# Patient Record
Sex: FEMALE | Race: WHITE | NOT HISPANIC OR LATINO | Employment: OTHER | URBAN - METROPOLITAN AREA
[De-identification: names, ages, dates, MRNs, and addresses within clinical notes are randomized per-mention and may not be internally consistent; named-entity substitution may affect disease eponyms.]

---

## 2017-01-05 ENCOUNTER — ALLSCRIPTS OFFICE VISIT (OUTPATIENT)
Dept: OTHER | Facility: OTHER | Age: 82
End: 2017-01-05

## 2017-01-20 ENCOUNTER — ALLSCRIPTS OFFICE VISIT (OUTPATIENT)
Dept: OTHER | Facility: OTHER | Age: 82
End: 2017-01-20

## 2017-01-21 ENCOUNTER — APPOINTMENT (EMERGENCY)
Dept: RADIOLOGY | Facility: HOSPITAL | Age: 82
End: 2017-01-21
Payer: COMMERCIAL

## 2017-01-21 ENCOUNTER — HOSPITAL ENCOUNTER (EMERGENCY)
Facility: HOSPITAL | Age: 82
Discharge: HOME | End: 2017-01-21
Attending: EMERGENCY MEDICINE | Admitting: EMERGENCY MEDICINE
Payer: COMMERCIAL

## 2017-01-21 VITALS
WEIGHT: 213.19 LBS | OXYGEN SATURATION: 98 % | HEART RATE: 115 BPM | RESPIRATION RATE: 16 BRPM | DIASTOLIC BLOOD PRESSURE: 62 MMHG | TEMPERATURE: 97.5 F | BODY MASS INDEX: 38.99 KG/M2 | SYSTOLIC BLOOD PRESSURE: 119 MMHG

## 2017-01-21 DIAGNOSIS — R55 NEAR SYNCOPE: Primary | ICD-10-CM
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
## 2017-01-21 DIAGNOSIS — S73.004A HIP DISLOCATION, RIGHT, INITIAL ENCOUNTER (HCC): ICD-10-CM

## 2017-01-21 LAB
ATRIAL RATE: 149 BPM
QRS AXIS: -8 DEGREES
QRSD INTERVAL: 88 MS
QT INTERVAL: 294 MS
QTC INTERVAL: 398 MS
T WAVE AXIS: 258 DEGREES
VENTRICULAR RATE: 110 BPM

## 2017-01-21 PROCEDURE — 99284 EMERGENCY DEPT VISIT MOD MDM: CPT

## 2017-01-21 PROCEDURE — 73502 X-RAY EXAM HIP UNI 2-3 VIEWS: CPT

## 2017-01-21 PROCEDURE — 73501 X-RAY EXAM HIP UNI 1 VIEW: CPT

## 2017-01-21 PROCEDURE — 93005 ELECTROCARDIOGRAM TRACING: CPT | Performed by: EMERGENCY MEDICINE

## 2017-01-21 PROCEDURE — 96374 THER/PROPH/DIAG INJ IV PUSH: CPT

## 2017-01-21 PROCEDURE — 96375 TX/PRO/DX INJ NEW DRUG ADDON: CPT

## 2017-01-21 RX ORDER — FENTANYL CITRATE 50 UG/ML
100 INJECTION, SOLUTION INTRAMUSCULAR; INTRAVENOUS ONCE
Status: COMPLETED | OUTPATIENT
Start: 2017-01-21 | End: 2017-01-21

## 2017-01-21 RX ORDER — ETOMIDATE 2 MG/ML
8 INJECTION INTRAVENOUS ONCE
Status: COMPLETED | OUTPATIENT
Start: 2017-01-21 | End: 2017-01-21

## 2017-01-21 RX ADMIN — ETOMIDATE 8 MG: 2 INJECTION, SOLUTION INTRAVENOUS at 14:10

## 2017-01-21 RX ADMIN — FENTANYL CITRATE 100 MCG: 50 INJECTION, SOLUTION INTRAMUSCULAR; INTRAVENOUS at 13:21

## 2017-01-26 ENCOUNTER — APPOINTMENT (OUTPATIENT)
Dept: LAB | Facility: CLINIC | Age: 82
End: 2017-01-26
Payer: COMMERCIAL

## 2017-01-26 DIAGNOSIS — I48.91 ATRIAL FIBRILLATION (HCC): ICD-10-CM

## 2017-01-26 LAB
INR PPP: 2.1 (ref 0.86–1.16)
PROTHROMBIN TIME: 23.3 SECONDS (ref 12–14.3)

## 2017-01-26 PROCEDURE — 85610 PROTHROMBIN TIME: CPT

## 2017-01-26 PROCEDURE — 36415 COLL VENOUS BLD VENIPUNCTURE: CPT

## 2017-02-14 ENCOUNTER — GENERIC CONVERSION - ENCOUNTER (OUTPATIENT)
Dept: OTHER | Facility: OTHER | Age: 82
End: 2017-02-14

## 2017-02-28 ENCOUNTER — ALLSCRIPTS OFFICE VISIT (OUTPATIENT)
Dept: OTHER | Facility: OTHER | Age: 82
End: 2017-02-28

## 2017-03-17 ENCOUNTER — TRANSCRIBE ORDERS (OUTPATIENT)
Dept: LAB | Facility: CLINIC | Age: 82
End: 2017-03-17

## 2017-03-17 ENCOUNTER — APPOINTMENT (OUTPATIENT)
Dept: LAB | Facility: CLINIC | Age: 82
End: 2017-03-17
Payer: COMMERCIAL

## 2017-03-17 DIAGNOSIS — I48.91 ATRIAL FIBRILLATION, UNSPECIFIED TYPE (HCC): Primary | ICD-10-CM

## 2017-03-17 LAB
INR PPP: 1.95 (ref 0.86–1.16)
PROTHROMBIN TIME: 22.1 SECONDS (ref 12–14.3)

## 2017-03-17 PROCEDURE — 85610 PROTHROMBIN TIME: CPT

## 2017-03-17 PROCEDURE — 36415 COLL VENOUS BLD VENIPUNCTURE: CPT

## 2017-03-27 ENCOUNTER — APPOINTMENT (OUTPATIENT)
Dept: LAB | Facility: CLINIC | Age: 82
End: 2017-03-27
Payer: COMMERCIAL

## 2017-03-27 ENCOUNTER — TRANSCRIBE ORDERS (OUTPATIENT)
Dept: LAB | Facility: CLINIC | Age: 82
End: 2017-03-27

## 2017-03-27 DIAGNOSIS — I48.91 ATRIAL FIBRILLATION, UNSPECIFIED TYPE (HCC): Primary | ICD-10-CM

## 2017-03-27 LAB
INR PPP: 1.83 (ref 0.86–1.16)
PROTHROMBIN TIME: 21 SECONDS (ref 12–14.3)

## 2017-03-27 PROCEDURE — 85610 PROTHROMBIN TIME: CPT

## 2017-03-27 PROCEDURE — 36415 COLL VENOUS BLD VENIPUNCTURE: CPT

## 2017-04-04 ENCOUNTER — ALLSCRIPTS OFFICE VISIT (OUTPATIENT)
Dept: OTHER | Facility: OTHER | Age: 82
End: 2017-04-04

## 2017-04-24 ENCOUNTER — TRANSCRIBE ORDERS (OUTPATIENT)
Dept: LAB | Facility: CLINIC | Age: 82
End: 2017-04-24

## 2017-04-24 ENCOUNTER — TRANSCRIBE ORDERS (OUTPATIENT)
Dept: ADMINISTRATIVE | Facility: HOSPITAL | Age: 82
End: 2017-04-24

## 2017-04-24 ENCOUNTER — APPOINTMENT (OUTPATIENT)
Dept: LAB | Facility: CLINIC | Age: 82
End: 2017-04-24
Payer: COMMERCIAL

## 2017-04-24 DIAGNOSIS — E55.9 UNSPECIFIED VITAMIN D DEFICIENCY: ICD-10-CM

## 2017-04-24 DIAGNOSIS — Z79.899 ENCOUNTER FOR LONG-TERM (CURRENT) USE OF OTHER MEDICATIONS: ICD-10-CM

## 2017-04-24 DIAGNOSIS — I34.0 MITRAL VALVE INSUFFICIENCY, UNSPECIFIED ETIOLOGY: Primary | ICD-10-CM

## 2017-04-24 DIAGNOSIS — I48.91 ATRIAL FIBRILLATION, UNSPECIFIED TYPE (HCC): Primary | ICD-10-CM

## 2017-04-24 LAB
25(OH)D3 SERPL-MCNC: 8.8 NG/ML (ref 30–100)
INR PPP: 2.18 (ref 0.86–1.16)
PROTHROMBIN TIME: 24 SECONDS (ref 12–14.3)
VIT B12 SERPL-MCNC: 313 PG/ML (ref 100–900)

## 2017-04-24 PROCEDURE — 85610 PROTHROMBIN TIME: CPT

## 2017-04-24 PROCEDURE — 82306 VITAMIN D 25 HYDROXY: CPT

## 2017-04-24 PROCEDURE — 82607 VITAMIN B-12: CPT

## 2017-04-24 PROCEDURE — 36415 COLL VENOUS BLD VENIPUNCTURE: CPT

## 2017-04-25 ENCOUNTER — GENERIC CONVERSION - ENCOUNTER (OUTPATIENT)
Dept: OTHER | Facility: OTHER | Age: 82
End: 2017-04-25

## 2017-05-02 ENCOUNTER — GENERIC CONVERSION - ENCOUNTER (OUTPATIENT)
Dept: OTHER | Facility: OTHER | Age: 82
End: 2017-05-02

## 2017-05-08 ENCOUNTER — TRANSCRIBE ORDERS (OUTPATIENT)
Dept: LAB | Facility: CLINIC | Age: 82
End: 2017-05-08

## 2017-05-08 ENCOUNTER — APPOINTMENT (OUTPATIENT)
Dept: LAB | Facility: CLINIC | Age: 82
End: 2017-05-08
Payer: COMMERCIAL

## 2017-05-08 DIAGNOSIS — I48.91 ATRIAL FIBRILLATION, UNSPECIFIED TYPE (HCC): Primary | ICD-10-CM

## 2017-05-08 LAB
INR PPP: 1.95 (ref 0.86–1.16)
PROTHROMBIN TIME: 22.4 SECONDS (ref 12.1–14.4)

## 2017-05-08 PROCEDURE — 36415 COLL VENOUS BLD VENIPUNCTURE: CPT

## 2017-05-08 PROCEDURE — 85610 PROTHROMBIN TIME: CPT

## 2017-05-17 ENCOUNTER — HOSPITAL ENCOUNTER (OUTPATIENT)
Dept: NON INVASIVE DIAGNOSTICS | Facility: CLINIC | Age: 82
Discharge: HOME | End: 2017-05-17
Payer: COMMERCIAL

## 2017-05-17 ENCOUNTER — GENERIC CONVERSION - ENCOUNTER (OUTPATIENT)
Dept: OTHER | Facility: OTHER | Age: 82
End: 2017-05-17

## 2017-05-17 DIAGNOSIS — I34.0 MITRAL VALVE INSUFFICIENCY, UNSPECIFIED ETIOLOGY: ICD-10-CM

## 2017-05-17 PROCEDURE — 93306 TTE W/DOPPLER COMPLETE: CPT

## 2017-05-22 ENCOUNTER — ALLSCRIPTS OFFICE VISIT (OUTPATIENT)
Dept: OTHER | Facility: OTHER | Age: 82
End: 2017-05-22

## 2017-05-22 DIAGNOSIS — I10 ESSENTIAL (PRIMARY) HYPERTENSION: ICD-10-CM

## 2017-05-22 DIAGNOSIS — E66.01 MORBID (SEVERE) OBESITY DUE TO EXCESS CALORIES (HCC): ICD-10-CM

## 2017-05-22 DIAGNOSIS — I48.91 ATRIAL FIBRILLATION (HCC): ICD-10-CM

## 2017-05-25 ENCOUNTER — GENERIC CONVERSION - ENCOUNTER (OUTPATIENT)
Dept: OTHER | Facility: OTHER | Age: 82
End: 2017-05-25

## 2017-05-29 ENCOUNTER — HOSPITAL ENCOUNTER (EMERGENCY)
Facility: HOSPITAL | Age: 82
Discharge: HOME | End: 2017-05-29
Attending: EMERGENCY MEDICINE | Admitting: EMERGENCY MEDICINE
Payer: COMMERCIAL

## 2017-05-29 VITALS
DIASTOLIC BLOOD PRESSURE: 67 MMHG | OXYGEN SATURATION: 94 % | SYSTOLIC BLOOD PRESSURE: 149 MMHG | HEART RATE: 86 BPM | RESPIRATION RATE: 20 BRPM | TEMPERATURE: 97.9 F

## 2017-05-29 DIAGNOSIS — M79.89 SWELLING OF RIGHT HAND: Primary | ICD-10-CM

## 2017-05-29 PROCEDURE — 99283 EMERGENCY DEPT VISIT LOW MDM: CPT

## 2017-05-29 RX ORDER — PREDNISONE 20 MG/1
20 TABLET ORAL DAILY
Qty: 10 TABLET | Refills: 0 | Status: SHIPPED | OUTPATIENT
Start: 2017-05-29 | End: 2017-06-08

## 2017-05-29 RX ORDER — PREDNISONE 20 MG/1
40 TABLET ORAL ONCE
Status: COMPLETED | OUTPATIENT
Start: 2017-05-29 | End: 2017-05-29

## 2017-05-29 RX ADMIN — PREDNISONE 40 MG: 20 TABLET ORAL at 13:16

## 2017-06-09 ENCOUNTER — LAB (OUTPATIENT)
Dept: LAB | Facility: CLINIC | Age: 82
End: 2017-06-09
Payer: COMMERCIAL

## 2017-06-09 DIAGNOSIS — E66.01 MORBID (SEVERE) OBESITY DUE TO EXCESS CALORIES (HCC): ICD-10-CM

## 2017-06-09 DIAGNOSIS — I48.91 ATRIAL FIBRILLATION (HCC): ICD-10-CM

## 2017-06-09 LAB
ALBUMIN SERPL BCP-MCNC: 3.2 G/DL (ref 3.5–5)
ALP SERPL-CCNC: 92 U/L (ref 46–116)
ALT SERPL W P-5'-P-CCNC: 20 U/L (ref 12–78)
ANION GAP SERPL CALCULATED.3IONS-SCNC: 9 MMOL/L (ref 4–13)
AST SERPL W P-5'-P-CCNC: 19 U/L (ref 5–45)
BILIRUB SERPL-MCNC: 0.36 MG/DL (ref 0.2–1)
BUN SERPL-MCNC: 13 MG/DL (ref 5–25)
CALCIUM SERPL-MCNC: 9 MG/DL (ref 8.3–10.1)
CHLORIDE SERPL-SCNC: 107 MMOL/L (ref 100–108)
CHOLEST SERPL-MCNC: 196 MG/DL (ref 50–200)
CO2 SERPL-SCNC: 25 MMOL/L (ref 21–32)
CREAT SERPL-MCNC: 0.8 MG/DL (ref 0.6–1.3)
ERYTHROCYTE [DISTWIDTH] IN BLOOD BY AUTOMATED COUNT: 13.8 % (ref 11.6–15.1)
GFR SERPL CREATININE-BSD FRML MDRD: >60 ML/MIN/1.73SQ M
GLUCOSE P FAST SERPL-MCNC: 101 MG/DL (ref 65–99)
HCT VFR BLD AUTO: 38.1 % (ref 34.8–46.1)
HDLC SERPL-MCNC: 63 MG/DL (ref 40–60)
HGB BLD-MCNC: 12.2 G/DL (ref 11.5–15.4)
INR PPP: 2.29 (ref 0.86–1.16)
LDLC SERPL CALC-MCNC: 108 MG/DL (ref 0–100)
MCH RBC QN AUTO: 31.3 PG (ref 26.8–34.3)
MCHC RBC AUTO-ENTMCNC: 32 G/DL (ref 31.4–37.4)
MCV RBC AUTO: 98 FL (ref 82–98)
PLATELET # BLD AUTO: 264 THOUSANDS/UL (ref 149–390)
PMV BLD AUTO: 10.6 FL (ref 8.9–12.7)
POTASSIUM SERPL-SCNC: 3.9 MMOL/L (ref 3.5–5.3)
PROT SERPL-MCNC: 6.9 G/DL (ref 6.4–8.2)
PROTHROMBIN TIME: 25.5 SECONDS (ref 12.1–14.4)
RBC # BLD AUTO: 3.9 MILLION/UL (ref 3.81–5.12)
SODIUM SERPL-SCNC: 141 MMOL/L (ref 136–145)
TRIGL SERPL-MCNC: 125 MG/DL
TSH SERPL DL<=0.05 MIU/L-ACNC: 4.02 UIU/ML (ref 0.36–3.74)
WBC # BLD AUTO: 8.51 THOUSAND/UL (ref 4.31–10.16)

## 2017-06-09 PROCEDURE — 84443 ASSAY THYROID STIM HORMONE: CPT

## 2017-06-09 PROCEDURE — 85027 COMPLETE CBC AUTOMATED: CPT

## 2017-06-09 PROCEDURE — 36415 COLL VENOUS BLD VENIPUNCTURE: CPT

## 2017-06-09 PROCEDURE — 80053 COMPREHEN METABOLIC PANEL: CPT

## 2017-06-09 PROCEDURE — 85610 PROTHROMBIN TIME: CPT

## 2017-06-09 PROCEDURE — 80061 LIPID PANEL: CPT

## 2017-06-10 ENCOUNTER — GENERIC CONVERSION - ENCOUNTER (OUTPATIENT)
Dept: OTHER | Facility: OTHER | Age: 82
End: 2017-06-10

## 2017-06-23 ENCOUNTER — APPOINTMENT (OUTPATIENT)
Dept: LAB | Facility: CLINIC | Age: 82
End: 2017-06-23
Payer: COMMERCIAL

## 2017-06-23 ENCOUNTER — HOSPITAL ENCOUNTER (OUTPATIENT)
Dept: NON INVASIVE DIAGNOSTICS | Facility: CLINIC | Age: 82
Discharge: HOME | End: 2017-06-23
Payer: COMMERCIAL

## 2017-06-23 ENCOUNTER — GENERIC CONVERSION - ENCOUNTER (OUTPATIENT)
Dept: OTHER | Facility: OTHER | Age: 82
End: 2017-06-23

## 2017-06-23 ENCOUNTER — TRANSCRIBE ORDERS (OUTPATIENT)
Dept: LAB | Facility: CLINIC | Age: 82
End: 2017-06-23

## 2017-06-23 DIAGNOSIS — I48.91 ATRIAL FIBRILLATION (HCC): ICD-10-CM

## 2017-06-23 DIAGNOSIS — I10 ESSENTIAL HYPERTENSION, BENIGN: Primary | ICD-10-CM

## 2017-06-23 LAB — TSH SERPL DL<=0.05 MIU/L-ACNC: 3.93 UIU/ML (ref 0.36–3.74)

## 2017-06-23 PROCEDURE — 93226 XTRNL ECG REC<48 HR SCAN A/R: CPT

## 2017-06-23 PROCEDURE — 36415 COLL VENOUS BLD VENIPUNCTURE: CPT

## 2017-06-23 PROCEDURE — 84443 ASSAY THYROID STIM HORMONE: CPT

## 2017-06-23 PROCEDURE — 84439 ASSAY OF FREE THYROXINE: CPT

## 2017-06-23 PROCEDURE — 93225 XTRNL ECG REC<48 HRS REC: CPT

## 2017-06-24 ENCOUNTER — GENERIC CONVERSION - ENCOUNTER (OUTPATIENT)
Dept: OTHER | Facility: OTHER | Age: 82
End: 2017-06-24

## 2017-06-27 ENCOUNTER — GENERIC CONVERSION - ENCOUNTER (OUTPATIENT)
Dept: OTHER | Facility: OTHER | Age: 82
End: 2017-06-27

## 2017-06-30 DIAGNOSIS — Z79.01 LONG TERM CURRENT USE OF ANTICOAGULANT: ICD-10-CM

## 2017-06-30 DIAGNOSIS — I48.91 ATRIAL FIBRILLATION (HCC): ICD-10-CM

## 2017-07-07 ENCOUNTER — ALLSCRIPTS OFFICE VISIT (OUTPATIENT)
Dept: OTHER | Facility: OTHER | Age: 82
End: 2017-07-07

## 2017-07-11 ENCOUNTER — ALLSCRIPTS OFFICE VISIT (OUTPATIENT)
Dept: OTHER | Facility: OTHER | Age: 82
End: 2017-07-11

## 2017-08-10 ENCOUNTER — ALLSCRIPTS OFFICE VISIT (OUTPATIENT)
Dept: OTHER | Facility: OTHER | Age: 82
End: 2017-08-10

## 2017-08-10 DIAGNOSIS — R60.9 EDEMA: ICD-10-CM

## 2017-08-10 DIAGNOSIS — I50.32 CHRONIC DIASTOLIC HEART FAILURE (HCC): ICD-10-CM

## 2017-08-10 DIAGNOSIS — R06.09 OTHER FORMS OF DYSPNEA: ICD-10-CM

## 2017-08-10 DIAGNOSIS — I48.91 ATRIAL FIBRILLATION (HCC): ICD-10-CM

## 2017-08-14 ENCOUNTER — APPOINTMENT (OUTPATIENT)
Dept: LAB | Facility: CLINIC | Age: 82
End: 2017-08-14
Payer: COMMERCIAL

## 2017-08-14 DIAGNOSIS — I48.91 ATRIAL FIBRILLATION (HCC): ICD-10-CM

## 2017-08-14 DIAGNOSIS — Z79.01 LONG TERM CURRENT USE OF ANTICOAGULANT: ICD-10-CM

## 2017-08-14 LAB
INR PPP: 2.28 (ref 0.86–1.16)
PROTHROMBIN TIME: 25.4 SECONDS (ref 12.1–14.4)

## 2017-08-14 PROCEDURE — 85610 PROTHROMBIN TIME: CPT

## 2017-08-14 PROCEDURE — 36415 COLL VENOUS BLD VENIPUNCTURE: CPT

## 2017-08-15 ENCOUNTER — GENERIC CONVERSION - ENCOUNTER (OUTPATIENT)
Dept: OTHER | Facility: OTHER | Age: 82
End: 2017-08-15

## 2017-08-16 ENCOUNTER — HOSPITAL ENCOUNTER (OUTPATIENT)
Dept: NON INVASIVE DIAGNOSTICS | Facility: CLINIC | Age: 82
Discharge: HOME | End: 2017-08-16
Payer: COMMERCIAL

## 2017-08-16 DIAGNOSIS — I48.91 ATRIAL FIBRILLATION (HCC): ICD-10-CM

## 2017-08-16 DIAGNOSIS — R06.09 OTHER FORMS OF DYSPNEA: ICD-10-CM

## 2017-08-16 DIAGNOSIS — I50.32 CHRONIC DIASTOLIC HEART FAILURE (HCC): ICD-10-CM

## 2017-08-16 DIAGNOSIS — R60.9 EDEMA: ICD-10-CM

## 2017-08-16 PROCEDURE — 93225 XTRNL ECG REC<48 HRS REC: CPT

## 2017-08-16 PROCEDURE — 93226 XTRNL ECG REC<48 HR SCAN A/R: CPT

## 2017-09-14 ENCOUNTER — GENERIC CONVERSION - ENCOUNTER (OUTPATIENT)
Dept: OTHER | Facility: OTHER | Age: 82
End: 2017-09-14

## 2017-10-16 ENCOUNTER — TRANSCRIBE ORDERS (OUTPATIENT)
Dept: LAB | Facility: CLINIC | Age: 82
End: 2017-10-16

## 2017-10-16 ENCOUNTER — APPOINTMENT (OUTPATIENT)
Dept: LAB | Facility: CLINIC | Age: 82
End: 2017-10-16
Payer: COMMERCIAL

## 2017-10-16 DIAGNOSIS — Z79.01 LONG-TERM (CURRENT) USE OF ANTICOAGULANTS: Primary | ICD-10-CM

## 2017-10-16 DIAGNOSIS — I48.91 ATRIAL FIBRILLATION, UNSPECIFIED TYPE (HCC): ICD-10-CM

## 2017-10-16 LAB
INR PPP: 2.59 (ref 0.86–1.16)
PROTHROMBIN TIME: 28.1 SECONDS (ref 12.1–14.4)

## 2017-10-16 PROCEDURE — 36415 COLL VENOUS BLD VENIPUNCTURE: CPT

## 2017-10-16 PROCEDURE — 85610 PROTHROMBIN TIME: CPT

## 2017-10-23 ENCOUNTER — ALLSCRIPTS OFFICE VISIT (OUTPATIENT)
Dept: OTHER | Facility: OTHER | Age: 82
End: 2017-10-23

## 2017-10-24 NOTE — PROGRESS NOTES
Assessment  Assessed    1  Anticoagulated on warfarin (V58 61) (Z79 01)   2  Atrial fibrillation (427 31) (I48 91)   3  Benign essential hypertension (401 1) (I10)   4  Chronic diastolic CHF (congestive heart failure) (428 32,428 0) (I50 32)   5  JEROME (dyspnea on exertion) (786 09) (R06 09)   6  Edema (782 3) (R60 9)   7  Moderate mitral regurgitation (424 0) (I34 0)   8  Morbid obesity (278 01) (E66 01)   9  Palpitations (785 1) (R00 2)    Plan  Atrial fibrillation    · EKG/ECG- POC; Status:Complete;   Done: 07BYT8435   Perform: In Office; [phone]; Last Updated By:Shaila Murphy; 10/23/2017 8:09:48 AM;Ordered; For:Atrial fibrillation; Ordered By:Lincoln Jean;   · Follow-up visit in 5 months Evaluation and Treatment  Follow-up  Status: Hold For -  Scheduling  Requested for: 23Oct2017   Ordered; For: Atrial fibrillation; Ordered By: Gume Whitaker Performed:  Due: 20GED8825    Discussion/Summary  Cardiology Discussion Summary Free Text Note Form [facility]:   Dear Dr Janet Paige,  had the pleasure of having Belkys Barraganfarhan return to see me at the [place] office  fibrillation - persistent since 2016, mostly asymptomatic, on chronic warfarin  Stopped digoxin and added bisoprolol to lower overall risk  INR therapeutic  Would like to entertain the possibility of novel anticoagulation  She needs to check her formularl or with her prescription plan regarding the cost of ELIQUIS 5mg twice daily versus XARELTO 20mg daily versus PRADAXA 150mg twice daily  diastolic heart failure - recent increase in edema treated with doubling her torsemide dose and improved  I also suggest wearing compression stocking during the day  valve disease - moderate MR and moderate TR as of 3/16  A recheck suggested only mild TR and only mild to moderate MR as of 5/17   - well controlled  change to meds but inquire about other blood thinners  month f/u        Chief Complaint  Chief Complaint Free Text Note Form: pt is here for a hospital follow up  pt complains of palpitations, SOB and leg swellings  Chief Complaint Chronic Condition [facility]: Patient is here today for follow up of chronic conditions described in HPI  History of Present Illness  Cardiology HPI Free Text Note Form [facility]: Freddy Moseley is a delightful 26-year-old woman chronic diastolic CHF, atrial fibrillation which is persistent, hypertension and 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  He she has chronic dyspnea on exertion, mostly driven by some ambulatory dysfunction, right knee discomfort, history of left total knee replacement and obesity  She does not get much exercise  She has persistent atrial fibrillation, heart rates are well controlled  This took some time as we tried getting her off of digoxin to lower her overall risk  Her diltiazem was doubled, bisoprolol was added and overall she is doing well from a rate control standpoint  She continues on warfarin  She does inquire about the possibility of novel anticoagulants  Her blood pressure remains very nicely controlled  Her mixed valve disease was assessed in May, no significant change with mild-to-moderate MR and mild TR  Review of Systems  Cardiology Female ROS:     Cardiac: rhythm problems,-- has heart murmur present-- and-- has swelling in the Room, but-- no chest pain  Genitourinary: post menopausal, but-- no kidney problems   Psychological: No complaints of feeling depressed, anxiety, panic attacks, or difficulty concentrating  General: lack of energy/fatigue, but-- no trouble sleeping  Respiratory: shortness of breath, but-- no cough/sputum  ROS Reviewed:   ROS reviewed  Active Problems  Problems    1  Abdominal bloating (787 3) (R14 0)   2  Abdominal pain (789 00) (R10 9)   3  Acute upper respiratory infection (465 9) (J06 9)   4  Advanced directives, counseling/discussion (V65 49) (Z71 89)   5  Allergic rhinitis (477 9) (J30 9)   6  Anemia (285 9) (D64 9)   7  Anticoagulated on warfarin (V58 61) (Z79 01)   8   Arthralgia of hip (719 45) (M25 559)   9  Arthralgia of knee, right (719 46) (M25 561)   10  Atrial fibrillation (427 31) (I48 91)   11  Benign essential hypertension (401 1) (I10)   12  Bursitis of right hip (726 5) (M70 71)   13  Chronic diastolic CHF (congestive heart failure) (428 32,428 0) (I50 32)   14  Chronic pain of lower extremity, bilateral (729 5,338 29) (M79 604,M79 605,G89 29)   15  Community acquired pneumonia (5) (J18 9)   12  Diarrhea (787 91) (R19 7)   17  JEROME (dyspnea on exertion) (786 09) (R06 09)   18  Early satiety (780 94) (R68 81)   19  Edema (782 3) (R60 9)   20  Encounter for routine gynecological examination (V72 31) (Z01 419)   21  Encounter for screening for other nervous system disorder (V80 09) (Z13 858)   22  Encounter for screening mammogram for malignant neoplasm of breast (V76 12)    (Z12 31)   23  Encounter for special screening examination for genitourinary disorder (V81 6) (Z13 89)   24  External hemorrhoids (455 3) (K64 4)   25  Fibromyalgia (729 1) (M79 7)   26  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)   27  Headache (784 0) (R51)   28  Helicobacter pylori gastritis (535 10,041 86) (K29 70,B96 81)   29  Hematoma (924 9) (T14 8XXA)   30  Impacted cerumen of left ear (380 4) (H61 22)   31  Irritable bowel syndrome with constipation (564 1) (K58 1)   32  Lichen sclerosus of female genitalia (701 0) (N90 4)   33  Long term use of drug (V58 69) (Z79 899)   34  Lower back pain (724 2) (M54 5)   35  Lumbar canal stenosis (724 02) (M48 061)   36  Moderate mitral regurgitation (424 0) (I34 0)   37  Morbid obesity (278 01) (E66 01)   38  Need for prophylactic vaccination against single diseases (V05 9) (Z23)   39  Need for prophylactic vaccination and inoculation against influenza (V04 81) (Z23)   40  Osteoarthritis, multiple sites (715 89) (M15 9)   41  Osteoporosis (733 00) (M81 0)   42  Palpitations (785 1) (R00 2)   43  Pap smear, as part of routine gynecological examination (V76 2) (Z01 419)   44   Peripheral neuropathy (356 9) (G62 9)   45  Pes anserinus bursitis of right knee (726 61) (M70 51)   46  Preoperative clearance (V72 84) (Z01 818)   47  Primary osteoarthritis of right knee (715 16) (M17 11)   48  Right ankle pain (719 47) (M25 571)   49  Routine Gynecological Exam With Cervical Pap Smear (V72 31)   50  Screening for genitourinary condition (V81 6) (Z13 89)   51  Screening for neurological condition (V80 09) (Z13 89)   52  Screening for osteoporosis (V82 81) (Z13 820)   53  Skin lump of leg, left (782 2) (R22 42)   54  Status post gastric bypass for obesity (V45 86) (Z98 84)   55  Synovial cyst of right popliteal space (727 51) (M71 21)   56  Unstable right hip (718 85) (M25 351)   57  Urinary incontinence (788 30) (R32)   58  Visit for screening mammogram (V76 12) (Z12 31)   59  Vitamin D deficiency (268 9) (E55 9)   60  Wheezing (786 07) (R06 2)    Past Medical History  Problems    1  History of Acute Myocardial Infarction (V12 59)   2  History of Birth History   3  History of Disturbance Of Taste (781 1)   4  History of Easy bruising (782 9) (R23 8)   5  History of Effusion of knee joint right (719 06) (M25 461)   6  History of atrial fibrillation (V12 59) (Z86 79)   7  History of cancer (V10 90) (Z85 9)   8  History of dermatitis (V13 3) (Z87 2)   9  History of obesity (V13 89) (Z86 39)   10  History of osteopenia (V13 59) (Z87 39)   11  History of sciatica (V12 49) (Z86 69)   12  History of shortness of breath (V13 89) (Z87 898)   13  History of sinusitis (V12 69) (Z87 09)   14  History of syncope (V15 89) (Z87 898)   15  History of viral infection (V12 09) (Z86 19)   16  History of Joint pain, hip (719 45) (M25 559)   17  History of Limb pain (729 5) (M79 609)   18  History of Mammogram   19  History of Myalgia and myositis (729 1)   20  Need for prophylactic vaccination against single diseases (V05 9) (Z23)   21  Need for prophylactic vaccination and inoculation against influenza (V04 81) (Z23)   22  History of Preoperative cardiovascular examination (V72 81) (Z01 810)   23  History of Primary osteoarthritis of right knee (715 16) (M17 11)   24  History of Reported A Previous Heart Murmur   25  History of Reported Pap Smear   26  History of Right leg pain (729 5) (M79 604)   27  History of Sore throat (462) (J02 9)   28  History of Umbilical hernia (409 4) (K42 9)  Active Problems And Past Medical History Reviewed: The active problems and past medical history were reviewed and updated today  Surgical History  Problems    1  History of Ankle Arthrodesis Right   2  History of Cholecystectomy   3  History of Cholecystectomy   4  History of Gastric Surgery For Morbid Obesity Bypass With Ann Marie-en-Y   5  History of Gastric Surgery For Morbid Obesity Bypass With Ann Marie-en-Y   6  History of Knee Replacement   7  History of Pilonidal Cyst Resection   8  History of Pilonidal Cyst Resection   9  History of Total Knee Arthroplasty   10  History of Umbilical Hernia Repair   11  History of Umbilical Hernia Repair    Family History  Mother    1  Family history of Coronary Artery Disease (V17 49)  Father    2  Family history of Coronary Artery Disease (V17 49)  Sister    3  Family history of Acute Lymphoma   4  Family history of Lymphomatoid Papulosis  Brother    5  Family history of Coronary Artery Disease (V17 49)    Social History  Problems    · Denied: History of Alcohol Use (History)   · Denied: History of Drug Use   · Denied: Exercising Regularly   · Never smoked tobacco (V49 89) (Z78 9)   · Social alcohol use (Z78 9)  Social History Reviewed: The social history was reviewed and updated today  The social history was reviewed and is unchanged  Current Meds   1  Bisoprolol Fumarate 5 MG Oral Tablet; TAKE 1 TABLET ONCE DAILY; Therapy: 30Jgt6611 to (Evaluate:75Fwb2437)  Requested for: 05Fjp1890; Last   Rx:15Gwi2736 Ordered   2   Desoximetasone 0 25 % External Cream; APPLY TO AFFECTED AREA SPARINGLY AS   NEEDED; Therapy: 02Yln2976 to (Evaluate:92Bgm5396)  Requested for: 32QEL2445; Last   Rx:01Nov2016 Ordered   3  Dicyclomine HCl - 10 MG Oral Capsule; Take 1 capsule twice daily as needed; Therapy: 30HEA9241 to (Evaluate:77Fwj9807)  Requested for: 67VOA7346; Last   Rx:22May2017 Ordered   4  DilTIAZem HCl - 120 MG Oral Tablet; Take 1 tablet twice daily; Therapy: 53SRD5833 to (IZNENMAR:12WTL9189)  Requested for: 03Aug2017; Last   Rx:03Aug2017 Ordered   5  DULoxetine HCl - 60 MG Oral Capsule Delayed Release Particles; take 1 capsule daily; Therapy: 17ZSI8861 to (Evaluate:31Oct2017)  Requested for: 04Apr2017; Last   Rx:04Apr2017 Ordered   6  Fluticasone Propionate 50 MCG/ACT Nasal Suspension; USE 2 SPRAYS IN EACH   NOSTRIL DAILY (AVOID NASAL SEPTUM); Therapy: 53HLQ7596 to (Evaluate:19Sep2017)  Requested for: 94KSI0949; Last   Rx:22May2017 Ordered   7  Omeprazole 40 MG Oral Capsule Delayed Release; TAKE ONE CAPSULE BY MOUTH   ONCE DAILY; Therapy: 11Kvb4756 to (Evaluate:28Sep2017)  Requested for: 30KVL4187; Last   Rx:02Jul2017 Ordered   8  Potassium Chloride ER 20 MEQ Oral Tablet Extended Release; Take 1 tablet daily; Therapy: 07CWM7881 to Recorded   9  Torsemide 20 MG Oral Tablet; 2 tabs daily prn; Therapy: 51UIG0339 to (Evaluate:30Mar2017) Recorded   10  TraMADol HCl - 50 MG Oral Tablet; TAKE 1 TABLET BY MOUTH TWICE A DAY AS    NEEDED FOR PAIN;    Therapy: 05Gsk2612 to (Evaluate:10Nov2017)  Requested for: 88BCS9919; Last    Rx:11Oct2017; Status: ACTIVE - Renewal Denied Ordered   11  Warfarin Sodium 1 MG Oral Tablet; TAKE 1 TABLET DAILY AS DIRECTED; Therapy: 94AMG7646 to (([phone])  Requested for: 25Apr2017; Last    Rx:25Apr2017 Ordered   12  Warfarin Sodium 5 MG Oral Tablet; TAKE 1 TABLET DAILY AS DIRECTED; Therapy: 08LXK7058 to (Evaluate:30Jan2018)  Requested for: 73Ict0867; Last    Rx:95Szz5424 Ordered  Medication List Reviewed: The medication list was reviewed and updated today  Allergies  Medication    1  Bactrim DS TABS   2  Codeine Sulfate TABS   3  Percocet TABS   4  Sulfa Drugs  Non-Medication    5  Seasonal    Vitals  Vital Signs    Recorded: 60WRG0675 08:06AM   Heart Rate 88, R Radial   Systolic 577, LUE, Sitting   Diastolic 60, LUE, Sitting   BP CUFF SIZE Large   Height 5 ft 2 in   Weight 203 lb    BMI Calculated 37 13   BSA Calculated 1 92   O2 Saturation 95     Physical Exam    Constitutional - A significantly overweight elderly woman in no distress  Eyes - Conjunctiva and Sclera examination: Conjunctiva pink, sclera anicteric  Neck - Supple neck, no JVD, mild hepatojugular reflex  Pulmonary - Respiratory effort: No signs of respiratory distress  -- Auscultation of lungs: Clear to auscultation  Cardiovascular - Irregularly irregular, heart rate-controlled, 1/6 systolic murmur left sternal border  Carotid pulses: Normal, 2+ bilaterally  -- Pedal pulses: Normal, 2+ bilaterally  -- Trace edema left lower extremity  , Soft cast on the right ankle  Abdomen - Soft  Musculoskeletal - Gait and station: Normal gait  Skin - Skin: Normal without rashes  Skin is warm and well perfused  Neurologic - Speech normal  No focal deficits  Psychiatric - Orientation to person, place, and time: Normal       Results/Data  Diagnostic Studies Reviewed Cardio:   Echocardiogram/[facility]: 2014 - LVEF normal, mild left atrial enlargement, mild MR, mild TR, aortic valve sclerosis- LVEF 50%, minimal global hypokinesis, moderate MR, moderate TR - EF 55%, RV upper limits of normal, possible mildly reduced function, mild to moderate MR  Mildly dilated left atrium  Mild TR    ECG Report: Normal sinus rhythm with occasional premature atrial complexes  - sinus rhythm, sinus arrhythmia, PACs, possible LVH- atrial fibrillation, heart rate 92- atrial fibrillation, heart rate 73- atrial fibrillation, heart rate 63 - Afib, HR 82 (1) PT WITH INR 16Oct2017 09:51AM Adama Uptonrows     Test Name Result Flag Reference   INR 2 59 H 0 86-1 16   PT 28 1 seconds H 12 1-14 4     (1) LIPID PANEL, FASTING 09Jun2017 09:49AM Evelyn Kate   TW Order Number: [id number]     Test Name Result Flag Reference   CHOLESTEROL 196 mg/dL     HDL,DIRECT 63 mg/dL H 40-60   Specimen collection should occur prior to Metamizole administration due to the potential for falsely depressed results  LDL CHOLESTEROL CALCULATED 108 mg/dL H 0-100   Triglyceride:         Normal              <150 mg/dl       Borderline High    150-199 mg/dl       High               200-499 mg/dl       Very High          >499 mg/dl  Cholesterol:         Desirable        <200 mg/dl      Borderline High  200-239 mg/dl      High             >239 mg/dl  HDL Cholesterol:        High    >59 mg/dL      Low     <41 mg/dL  LDL CALCULATED:    This screening LDL is a calculated result  It does not have the accuracy of the Direct Measured LDL in the monitoring of patients with hyperlipidemia and/or statin therapy  Direct Measure LDL (KCJ546) must be ordered separately in these patients  TRIGLYCERIDES 125 mg/dL  <=150   Specimen collection should occur prior to N-Acetylcysteine or Metamizole administration due to the potential for falsely depressed results       Future Appointments    Date/Time Provider Specialty Site   11/28/2017 10:00 AM Evelyn Kate DO [facility]     Signatures   Electronically signed by : Liu Clayton MD; Oct 23 2017  8:34AM EST                       (Author)

## 2017-11-28 ENCOUNTER — ALLSCRIPTS OFFICE VISIT (OUTPATIENT)
Dept: OTHER | Facility: OTHER | Age: 82
End: 2017-11-28

## 2017-11-28 DIAGNOSIS — I10 ESSENTIAL (PRIMARY) HYPERTENSION: ICD-10-CM

## 2017-11-28 DIAGNOSIS — E66.01 MORBID (SEVERE) OBESITY DUE TO EXCESS CALORIES (HCC): ICD-10-CM

## 2017-11-28 DIAGNOSIS — Z00.00 ENCOUNTER FOR GENERAL ADULT MEDICAL EXAMINATION WITHOUT ABNORMAL FINDINGS: ICD-10-CM

## 2017-11-28 DIAGNOSIS — M79.604 PAIN OF RIGHT LEG: ICD-10-CM

## 2017-11-28 DIAGNOSIS — M79.671 PAIN OF RIGHT FOOT: ICD-10-CM

## 2017-11-29 NOTE — PROGRESS NOTES
Assessment    1  Right foot pain (729 5) (M79 671)   2  Medicare annual wellness visit, subsequent (V70 0) (Z00 00)   3  Dry mouth (527 7) (R68 2)   4  Right ankle pain (719 47) (M25 571)   5  Pain of right lower extremity (729 5) (M79 604)   6  Edema (782 3) (R60 9)  Assessment and plan 1  Status post fall resulting and right foot pain, right knee pain; rule out stress fracture will check x-ray of the right foot, right ankle the patient wants to hold off on x-ray of the right knee because her right knee pain has improved I recommended elevation and icing the foot  She will continue to use walker for now 2  Dry mouth likely secondary to the Bentyl I did ask her to use it as needed I will check SSA and SSB 3   Bilateral lower extremity edema which she noticed after discontinuation of the digoxin she had only been taking 1 Demadex were normally she takes 2 I will have her increase her Demadex to 20 mg 2 tablets per day she will continue her current dose of potassium chloride I will check laboratories and the patient is to contact her cardiologist to see what the next step would be regarding the leg edema and regarding the digoxin  Return to office  2 months  call if any problems   Plan  Allergic rhinitis    · Fluticasone Propionate 50 MCG/ACT Nasal Suspension; USE 2 SPRAYS INEACH NOSTRIL DAILY (AVOID NASAL SEPTUM)  Benign essential hypertension, Medicare annual wellness visit, subsequent, Morbidobesity    · (1) LIPID PANEL, FASTING; Status:Active; Requested for:28Nov2017;   Dry mouth    · (Q) SJOGRENS ANTIBODIES (SS-A,SS-B); Status:Active; Requested for:28Nov2017;   Medicare annual wellness visit, subsequent    · (1) COMPREHENSIVE METABOLIC PANEL; Status:Active; Requested for:28Nov2017;   Pain of right lower extremity    · VAS LOWER LIMB VENOUS DUPLEX STUDY, UNILATERAL/LIMITED;  UnilateralSide:Right; Status:Hold For - Scheduling; Requested for:28Nov2017;   Right foot pain    · * XR ANKLE 3+ VIEW RIGHT; Status:Active; Requested for:28Nov2017;    · * XR FOOT 3+ VIEW RIGHT; Status:Active; Requested for:28Nov2017;     Chief Complaint  Patient presents today c/o right foot and toe pain after a fall yesterday  History of Present Illness  HPI: 70-year-old female coming in with a chief complaint of a fall yesterday the patient reports to me yesterday she washed her footing and twisted her right ankle at which could lead to pain in the right foot and also right knee  She reports me the right knee pain is improving she reports me that eventually she will need a knee replacement because he arthritis in the right knee but at this point in time they are holding off on surgery because of her history of MRSA  She reports me the right knee pain is improved at this point  She reports me that she has pain of the right foot currently  Also the patient reports me swelling in the right lower extremity greater than left lower extremity  She has noticed some calf tenderness in the right lower extremity  Review of Systems   Constitutional: No fever, no chills, feels well, no tiredness, no recent weight gain or loss  Cardiovascular: no complaints of slow or fast heart rate, no chest pain, no palpitations, no leg claudication or lower extremity edema  Respiratory: no complaints of shortness of breath, no wheezing, no dyspnea on exertion, no orthopnea or PND  Gastrointestinal: no complaints of abdominal pain, no constipation, no nausea or diarrhea, no vomiting, no bloody stools  Genitourinary: no complaints of dysuria, no incontinence, no pelvic pain, no dysmenorrhea, no vaginal discharge or abnormal vaginal bleeding  ROS reviewed  Active Problems    1  Abdominal bloating (787 3) (R14 0)   2  Abdominal pain (789 00) (R10 9)   3  Acute upper respiratory infection (465 9) (J06 9)   4  Advanced directives, counseling/discussion (V65 49) (Z71 89)   5  Allergic rhinitis (477 9) (J30 9)   6  Anemia (285 9) (D64 9)   7   Anticoagulated on warfarin (V58 61) (Z79 01)   8  Arthralgia of hip (719 45) (M25 559)   9  Arthralgia of knee, right (719 46) (M25 561)   10  Atrial fibrillation (427 31) (I48 91)   11  Benign essential hypertension (401 1) (I10)   12  Bursitis of right hip (726 5) (M70 71)   13  Chronic diastolic CHF (congestive heart failure) (428 32,428 0) (I50 32)   14  Chronic pain of lower extremity, bilateral (729 5,338 29) (M79 604,M79 605,G89 29)   15  Community acquired pneumonia (5) (J18 9)   12  Diarrhea (787 91) (R19 7)   17  JEROME (dyspnea on exertion) (786 09) (R06 09)   18  Early satiety (780 94) (R68 81)   19  Edema (782 3) (R60 9)   20  Encounter for routine gynecological examination (V72 31) (Z01 419)   21  Encounter for screening for other nervous system disorder (V80 09) (Z13 858)   22  Encounter for screening mammogram for malignant neoplasm of breast (V76 12)  (Z12 31)   23  Encounter for special screening examination for genitourinary disorder (V81 6) (Z13 89)   24  External hemorrhoids (455 3) (K64 4)   25  Fibromyalgia (729 1) (M79 7)   26  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)   27  Headache (784 0) (R51)   28  Helicobacter pylori gastritis (535 10,041 86) (K29 70,B96 81)   29  Hematoma (924 9) (T14 8XXA)   30  Impacted cerumen of left ear (380 4) (H61 22)   31  Irritable bowel syndrome with constipation (564 1) (K58 1)   32  Lichen sclerosus of female genitalia (701 0) (N90 4)   33  Long term use of drug (V58 69) (Z79 899)   34  Lower back pain (724 2) (M54 5)   35  Lumbar canal stenosis (724 02) (M48 061)   36  Medicare annual wellness visit, subsequent (V70 0) (Z00 00)   37  Moderate mitral regurgitation (424 0) (I34 0)   38  Morbid obesity (278 01) (E66 01)   39  Need for prophylactic vaccination against single diseases (V05 9) (Z23)   40  Need for prophylactic vaccination and inoculation against influenza (V04 81) (Z23)   41  Osteoarthritis, multiple sites (715 89) (M15 9)   42  Osteoporosis (733 00) (M81 0)   43  Palpitations (785 1) (R00 2)   44  Pap smear, as part of routine gynecological examination (V76 2) (Z01 419)   45  Peripheral neuropathy (356 9) (G62 9)   46  Pes anserinus bursitis of right knee (726 61) (M70 51)   47  Preoperative clearance (V72 84) (Z01 818)   48  Primary osteoarthritis of right knee (715 16) (M17 11)   49  Right ankle pain (719 47) (M25 571)   50  Routine Gynecological Exam With Cervical Pap Smear (V72 31)   51  Screening for genitourinary condition (V81 6) (Z13 89)   52  Screening for neurological condition (V80 09) (Z13 89)   53  Screening for osteoporosis (V82 81) (Z13 820)   54  Skin lump of leg, left (782 2) (R22 42)   55  Status post gastric bypass for obesity (V45 86) (Z98 84)   56  Synovial cyst of right popliteal space (727 51) (M71 21)   57  Unstable right hip (718 85) (M25 351)   58  Urinary incontinence (788 30) (R32)   59  Visit for screening mammogram (V76 12) (Z12 31)   60  Vitamin D deficiency (268 9) (E55 9)   61  Wheezing (786 07) (R06 2)    Past Medical History  Active Problems And Past Medical History Reviewed: The active problems and past medical history were reviewed and updated today  Surgical History  Surgical History Reviewed: The surgical history was reviewed and updated today  Social History     · Denied: History of Alcohol Use (History)   · Denied: History of Drug Use   · Denied: Exercising Regularly   · Never smoked tobacco (V49 89) (Z78 9)   · Social alcohol use (Z78 9)  The social history was reviewed and updated today  The social history was reviewed and is unchanged  Family History  Family History Reviewed: The family history was reviewed and updated today  Current Meds   1  Bisoprolol Fumarate 5 MG Oral Tablet; TAKE 1 TABLET ONCE DAILY; Therapy: 04Mjs7599 to (Evaluate:39Hke9918)  Requested for: 48Yrw2787; Last Rx:40Zsz0145 Ordered   2  Desoximetasone 0 25 % External Cream; APPLY TO AFFECTED AREA SPARINGLY AS NEEDED;  Therapy: 25Lmp0365 to (Evaluate:29Jul2017)  Requested for: 64FJF5675; Last Rx:01Nov2016 Ordered   3  Dicyclomine HCl - 10 MG Oral Capsule; Take 1 capsule twice daily as needed; Therapy: 08UDT6392 to (Evaluate:19Sep2017)  Requested for: 62FZJ0113; Last Rx:22May2017 Ordered   4  DilTIAZem HCl - 120 MG Oral Tablet; Take 1 tablet twice daily; Therapy: 06MHW1949 to (Helen Wright)  Requested for: 34CFE2285 Recorded   5  DULoxetine HCl - 60 MG Oral Capsule Delayed Release Particles; take 1 capsule by mouth daily; Therapy: 07DFR6561 to (Evaluate:30Nov2017)  Requested for: 31Oct2017; Last Rx:31Oct2017 Ordered   6  Fluticasone Propionate 50 MCG/ACT Nasal Suspension; USE 2 SPRAYS IN EACH NOSTRIL DAILY (AVOID NASAL SEPTUM); Therapy: 80ZOJ5873 to (Evaluate:19Sep2017)  Requested for: 37IID3814; Last Rx:22May2017 Ordered   7  Potassium Chloride Katheryn ER 20 MEQ Oral Tablet Extended Release; Take 1 tablet daily; Therapy: 90JEY2850 to (Evaluate:16Mar2018)  Requested for: 01BAL1655; Last Rx:16Nov2017; Status: ACTIVE - Retrospective Authorization Ordered   8  Potassium Chloride ER 20 MEQ Oral Tablet Extended Release; Take 1 tablet daily; Therapy: 77CQZ8256 to Recorded   9  Torsemide 20 MG Oral Tablet; 2 tabs daily prn; Therapy: 14FWR3896 to (Evaluate:30Mar2017) Recorded   10  TraMADol HCl - 50 MG Oral Tablet; TAKE 1 TABLET BY MOUTH TWICE A DAY AS  NEEDED FOR PAIN;  Therapy: 33Oja5264 to (Evaluate:87Veo0757)  Requested for: 20Nov2017; Last  Rx:19Nov2017; Status: ACTIVE - Renewal Denied Ordered   11  Warfarin Sodium 1 MG Oral Tablet; TAKE 1 TABLET DAILY AS DIRECTED; Therapy: 20TGD3458 to (Gustavo Genao)  Requested for: 25Apr2017; Last  Rx:25Apr2017 Ordered   12  Warfarin Sodium 5 MG Oral Tablet; TAKE ONE TABLET BY MOUTH ONCE DAILY AS  DIRECTED; Therapy: 58CYH8437 to (Evaluate:29Jan2018)  Requested for: ([phone] 0205 9495; Last  Rx:31Oct2017 Ordered    The medication list was reviewed and updated today  Allergies  1  Bactrim DS TABS   2   Codeine Sulfate TABS   3  Percocet TABS   4  Sulfa Drugs  5  Seasonal    Vitals   Recorded: 89ACK9976 10:03AM   Heart Rate 73   Respiration 16   Systolic 689   Diastolic 70   Height 5 ft 2 in   Weight 207 lb 0 8 oz   BMI Calculated 37 87   BSA Calculated 1 94   O2 Saturation 95       Physical Exam  Right lower extremity edema greater than left lower extremity, right calf tenderness, there is metatarsal tenderness with palpation 3rd ray right foot examination of the right knee there is some medial joint line tenderness patient reports me this is chronic currently there is no erythema, no warmth and full range of motion  Constitutional  General appearance: No acute distress, well appearing and well nourished  appears healthy-- and-- obese  Eyes  Conjunctiva and lids: No swelling, erythema or discharge  Pupils and irises: Equal, round and reactive to light  Ears, Nose, Mouth, and Throat  External inspection of ears and nose: Normal    Otoscopic examination: Tympanic membranes translucent with normal light reflex  Canals patent without erythema  Nasal mucosa, septum, and turbinates: Normal without edema or erythema  Oropharynx: Normal with no erythema, edema, exudate or lesions  Pulmonary  Respiratory effort: No increased work of breathing or signs of respiratory distress  Auscultation of lungs: Clear to auscultation  Cardiovascular  Auscultation of heart: Normal rate and rhythm, normal S1 and S2, without murmurs  Examination of extremities for edema and/or varicosities: Normal    Abdomen  Abdomen: Non-tender, no masses  Lymphatic  Palpation of lymph nodes in neck: No lymphadenopathy     Psychiatric  Mood and affect: Normal          Results/Data  Falls Risk Assessment (Dx Z13 89 Screen for Neurologic Disorder) 10RLC5868 10:11AM User, s     Test Name Result Flag Reference   Falls Risk      1 fall without injury in the past year         Signatures   Electronically signed by : Nikole Bustillos DO; Nov 28 2017 11:07AM EST                       (Author)

## 2018-01-05 ENCOUNTER — TRANSCRIBE ORDERS (OUTPATIENT)
Dept: LAB | Facility: CLINIC | Age: 83
End: 2018-01-05

## 2018-01-05 ENCOUNTER — LAB CONVERSION - ENCOUNTER (OUTPATIENT)
Dept: OTHER | Facility: OTHER | Age: 83
End: 2018-01-05

## 2018-01-05 ENCOUNTER — APPOINTMENT (OUTPATIENT)
Dept: LAB | Facility: CLINIC | Age: 83
End: 2018-01-05
Payer: COMMERCIAL

## 2018-01-05 DIAGNOSIS — I48.91 ATRIAL FIBRILLATION (HCC): ICD-10-CM

## 2018-01-05 DIAGNOSIS — Z79.01 LONG TERM CURRENT USE OF ANTICOAGULANT: ICD-10-CM

## 2018-01-05 DIAGNOSIS — E66.01 MORBID (SEVERE) OBESITY DUE TO EXCESS CALORIES (HCC): ICD-10-CM

## 2018-01-05 DIAGNOSIS — I10 ESSENTIAL (PRIMARY) HYPERTENSION: ICD-10-CM

## 2018-01-05 DIAGNOSIS — R68.2 DRY MOUTH: Primary | ICD-10-CM

## 2018-01-05 DIAGNOSIS — Z00.00 ENCOUNTER FOR GENERAL ADULT MEDICAL EXAMINATION WITHOUT ABNORMAL FINDINGS: ICD-10-CM

## 2018-01-05 LAB
ALBUMIN SERPL BCP-MCNC: 3.6 G/DL (ref 3.5–5)
ALP SERPL-CCNC: 92 U/L (ref 46–116)
ALT SERPL W P-5'-P-CCNC: 18 U/L (ref 12–78)
ANION GAP SERPL CALCULATED.3IONS-SCNC: 9 MMOL/L (ref 4–13)
AST SERPL W P-5'-P-CCNC: 18 U/L (ref 5–45)
BILIRUB SERPL-MCNC: 0.42 MG/DL (ref 0.2–1)
BUN SERPL-MCNC: 14 MG/DL (ref 5–25)
CALCIUM SERPL-MCNC: 8.6 MG/DL (ref 8.3–10.1)
CHLORIDE SERPL-SCNC: 109 MMOL/L (ref 100–108)
CHOLEST SERPL-MCNC: 145 MG/DL (ref 50–200)
CO2 SERPL-SCNC: 23 MMOL/L (ref 21–32)
CREAT SERPL-MCNC: 0.84 MG/DL (ref 0.6–1.3)
GFR SERPL CREATININE-BSD FRML MDRD: 64 ML/MIN/1.73SQ M
GLUCOSE P FAST SERPL-MCNC: 92 MG/DL (ref 65–99)
HDLC SERPL-MCNC: 65 MG/DL (ref 40–60)
INR PPP: 2.08 (ref 0.86–1.16)
LDLC SERPL CALC-MCNC: 61 MG/DL (ref 0–100)
POTASSIUM SERPL-SCNC: 4 MMOL/L (ref 3.5–5.3)
PROT SERPL-MCNC: 7.1 G/DL (ref 6.4–8.2)
PROTHROMBIN TIME: 23.6 SECONDS (ref 12.1–14.4)
SODIUM SERPL-SCNC: 141 MMOL/L (ref 136–145)
TRIGL SERPL-MCNC: 93 MG/DL

## 2018-01-05 PROCEDURE — 86235 NUCLEAR ANTIGEN ANTIBODY: CPT

## 2018-01-05 PROCEDURE — 85610 PROTHROMBIN TIME: CPT

## 2018-01-05 PROCEDURE — 36415 COLL VENOUS BLD VENIPUNCTURE: CPT

## 2018-01-05 PROCEDURE — 80061 LIPID PANEL: CPT

## 2018-01-05 PROCEDURE — 80053 COMPREHEN METABOLIC PANEL: CPT

## 2018-01-06 LAB
ENA SS-A AB SER-ACNC: <0.2 AI (ref 0–0.9)
ENA SS-B AB SER-ACNC: <0.2 AI (ref 0–0.9)

## 2018-01-07 ENCOUNTER — GENERIC CONVERSION - ENCOUNTER (OUTPATIENT)
Dept: OTHER | Facility: OTHER | Age: 83
End: 2018-01-07

## 2018-01-10 NOTE — MISCELLANEOUS
History of Present Illness  A call was received from another HCP  The contact name and phone number is  [facility] Physical Therapist, Juan Osorio from [address] [facility]  Status Check: today's weight is 237 6  Patient is experiencing the following symptoms: edema  Concerns expressed today consisted of: Reports 8 # weight gain  HFCC Additional Notes: Called patient who reports nausea all morning related to eating fried chicken last night  Denies worsening HF symptoms  Also spoke to the RN, Tawnya Luna (saw patient today [phone]) who questions the accuracy of weights  BMP and Mg were done at this am's visit  WIll give extra 20 mg torsemide and check lab results  Diet reinforced  Future Appointments    Date/Time Provider Specialty Site   05/03/2016 01:00 PM PIO [place]   Orthopedic Surgery [facility]   04/23/2016 09:45 AM Brittany Najera MD Internal Medicine [facility]   04/15/2016 10:00 AM Elena Fuchs MD Cardiology [facility] [address] [facility]     Signatures   Electronically signed by : Joann Martinez, ; Apr 11 2016  1:44PM EST                       (Author)

## 2018-01-10 NOTE — PROGRESS NOTES
Assessment    1  Preoperative clearance (V72 84) (Z01 818)   2  Right ankle pain (719 47) (M25 571)   3  Benign essential hypertension (401 1) (I10)   4  Fibromyalgia (729 1) (M79 7)   5  Irritable bowel syndrome (564 1) (K58 9)   6  Osteoporosis (733 00) (M81 0)   7  Peripheral neuropathy (356 9) (G62 9)    Plan   *VB - Fall Risk Assessment  (Dx V80 09 Screen for Neurologic Disorder); Status:Resulted - Requires Verification;   Done: 90XGH7329 12:00AM  SGI:04CRQ7703; Last Updated By:Shirin Danielle; 1/22/2016 12:20:37 PM;Ordered;    For:Encounter for screening for other nervous system disorder; Ordered By:Santiago Schafer; Discussion/Summary  Surgical Clearance: She is at a LOW TO MODERATE risk from a cardiovascular standpoint at this time without any additional cardiac testing  Reevaluation needed, if she should present with symptoms prior to surgery/procedure  Surgical clearance faxed to Dr Jhonathan Campbell   MEDICALLY CLEARED FOR SURGERY  Chief Complaint  Patient here for right foot surgery      History of Present Illness  Pre-Op Visit (Brief): The patient is being seen for a preoperative visit  The procedure is a(n) RIGHT ANKLE REPAIR scheduled for 1/29/16 with JORDY  The indication for surgery is PAIN IN ANKLE  Surgical Risk Assessment:   Prior Anesthesia: She had prior anesthesia, a prior adverse reaction to general anesthesia and HAD COMPLICATIONS FROM ABDOMINAL SURGERY   Pertinent Past Medical History: TMJ osteoarthrosis and obesity, but no angina, no arrhythmia, no CAD, CAD without prior MI, CAD without recent PCI, no CHF, no chronic liver disease, no acute hepatitis, no coagulation delay, no primary hypercoagulable state, no secondary hypercoagulable state, no pulmonary embolism, no DVT, does not use anticoagulants, no diabetes, does not use insulin, no thyroid disease, no neck osteoarthrosis, does not wear dentures, no seizure disorder, no CVA, no asthma, no COPD, not MENG, no renal disease and no low serum albumin  Exercise Capacity: unable to walk four blocks without symptoms and unable to walk two flights of stairs without symptoms  Lifestyle Factors: denies tobacco use and denies illegal drug use  Symptoms: no symptoms  STOP questionnaire score is 1  Other MENG risk factors include high BMI and age over 48, but female gender and normal neck circumference  Predicted risk of MENG: Mild  Pertinent Family History: ischemic heart disease, but no family history of an adverse reaction to anesthesia, no aneurysm, no bleeding problems, no sudden early deaths and no stroke  Living Situation: home is secure and supportive and no post-op concerns with her living situation  HPI: PREOP CLEARANCE   LABS AND EKG REVIEWED  DR RIOS PERFORMED CARDIAC CLEARANCE       Review of Systems    Constitutional: No fever, no chills, feels well, no tiredness, no recent weight gain or weight loss  Eyes: No complaints of eye pain, no red eyes, no eyesight problems, no discharge, no dry eyes, no itching of eyes  ENT: no complaints of earache, no loss of hearing, no nose bleeds, no nasal discharge, no sore throat, no hoarseness  Cardiovascular: No complaints of slow heart rate, no fast heart rate, no chest pain, no palpitations, no leg claudication, no lower extremity edema  Respiratory: No complaints of shortness of breath, no wheezing, no cough, no SOB on exertion, no orthopnea, no PND  Gastrointestinal: No complaints of abdominal pain, no constipation, no nausea or vomiting, no diarrhea, no bloody stools  Genitourinary: No complaints of dysuria, no incontinence, no pelvic pain, no dysmenorrhea, no vaginal discharge or bleeding  Musculoskeletal: as noted in HPI  Integumentary: No complaints of skin rash or lesions, no itching, no skin wounds, no breast pain or lump     Neurological: No complaints of headache, no confusion, no convulsions, no numbness, no dizziness or fainting, no tingling, no limb weakness, no difficulty walking  Psychiatric: Not suicidal, no sleep disturbance, no anxiety or depression, no change in personality, no emotional problems  Endocrine: No complaints of proptosis, no hot flashes, no muscle weakness, no deepening of the voice, no feelings of weakness  Hematologic/Lymphatic: No complaints of swollen glands, no swollen glands in the neck, does not bleed easily, does not bruise easily  Preventive Quality 65 and Older: Falls Risk: The patient fell 0 times in the past 12 months  The patient is currently experiencing urinary symptoms  Urinary Incontinence Symptoms includes: urinary frequency       Active Problems    1  Abdominal pain (789 00) (R10 9)   2  Allergic rhinitis (477 9) (J30 9)   3  Anemia (285 9) (D64 9)   4  Arthralgia of hip (719 45) (M25 559)   5  Arthralgia of knee, right (719 46) (M25 561)   6  Benign essential hypertension (401 1) (I10)   7  Bursitis of right hip (726 5) (M70 71)   8  Edema (782 3) (R60 9)   9  Encounter for screening mammogram for malignant neoplasm of breast (V76 12)   (Z12 31)   10  Fibromyalgia (729 1) (M79 7)   11  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)   12  Headache (784 0) (R51)   13  Heart murmur (785 2) (R01 1)   14  Helicobacter pylori gastritis (535 10,041 86) (B96 81)   15  Irritable bowel syndrome (564 1) (K58 9)   16  Irritable bowel syndrome with constipation (564 1) (K58 9)   17  Lichen sclerosus of female genitalia (701 0) (N90 4)   18  Long term use of drug (V58 69) (Z79 899)   19  Lower back pain (724 2) (M54 5)   20  Lumbar canal stenosis (724 02) (M48 06)   21  Mitral regurgitation (424 0) (I34 0)   22  Morbid obesity (278 01) (E66 01)   23  Need for prophylactic vaccination against single diseases (V05 9) (Z23)   24  Need for prophylactic vaccination and inoculation against influenza (V04 81) (Z23)   25  Osteoarthritis, multiple sites (715 89) (M15 9)   26  Osteoporosis (733 00) (M81 0)   27  Palpitations (785 1) (R00 2)   28   History of Paroxysmal atrial fibrillation (427 31) (I48 0)   29  Peripheral neuropathy (356 9) (G62 9)   30  Preoperative cardiovascular examination (V72 81) (Z01 810)   31  Primary osteoarthritis of right knee (715 16) (M17 11)   32  Routine Gynecological Exam With Cervical Pap Smear (V72 31)   33  Screening for genitourinary condition (V81 6) (Z13 89)   34  Screening for neurological condition (V80 09) (Z13 89)   35  Status post gastric bypass for obesity (V45 86) (Z98 84)   36  Synovial cyst of right popliteal space (727 51) (M71 21)   37  Urinary incontinence (788 30) (R32)   38   Vitamin D deficiency (268 9) (E55 9)    Past Medical History    · History of Acute Myocardial Infarction (V12 59)   · History of Birth History   · History of Disturbance Of Taste (781 1)   · History of Easy bruising (782 9) (R23 8)   · History of Effusion of knee joint right (719 06) (M25 461)   · History of atrial fibrillation (V12 59) (Z86 79)   · History of cancer (V10 90) (Z85 9)   · History of dermatitis (V13 3) (Z87 2)   · History of obesity (V13 89) (Z86 39)   · History of osteopenia (V13 59) (Z87 39)   · History of sciatica (V12 49) (Z86 69)   · History of shortness of breath (V13 89) (H62 209)   · History of sinusitis (V12 69) (Z87 09)   · History of syncope (V15 89) (P97 230)   · History of viral infection (V12 09) (Z86 19)   · History of Joint pain, hip (719 45) (M25 559)   · History of Limb pain (729 5) (M79 609)   · History of Mammogram   · History of Myalgia and myositis (729 1) (M79 1,M60 9)   · Need for prophylactic vaccination against single diseases (V05 9) (Z23)   · Need for prophylactic vaccination and inoculation against influenza (V04 81) (Z23)   · History of Paroxysmal atrial fibrillation (427 31) (I48 0)   · History of Primary osteoarthritis of right knee (715 16) (M17 11)   · History of Reported A Previous Heart Murmur   · History of Reported Pap Smear   · History of Right leg pain (729 5) (H71 384)   · History of Sore throat (462) (J02 9)   · History of Umbilical hernia (462 3) (K42 9)    The active problems and past medical history were reviewed and updated today  Surgical History    · History of Ankle Arthrodesis Right   · History of Cholecystectomy   · History of Cholecystectomy   · History of Gastric Surgery For Morbid Obesity Bypass With Ann Marie-en-Y   · History of Gastric Surgery For Morbid Obesity Bypass With Ann Marie-en-Y   · History of Knee Replacement   · History of Pilonidal Cyst Resection   · History of Pilonidal Cyst Resection   · History of Total Knee Arthroplasty   · History of Umbilical Hernia Repair   · History of Umbilical Hernia Repair    The surgical history was reviewed and updated today  Family History    · Family history of Coronary Artery Disease (V17 49)    · Family history of Coronary Artery Disease (V17 49)    · Family history of Acute Lymphoma   · Family history of Lymphomatoid Papulosis    · Family history of Coronary Artery Disease (V17 49)    The family history was reviewed and updated today  Social History    · Denied: History of Alcohol Use (History)   · Denied: History of Drug Use   · Denied: Exercising Regularly   · Never A Smoker   · Social alcohol use (F10 99)  The social history was reviewed and updated today  The social history was reviewed and is unchanged  Current Meds   1  Aspirin 81 MG Oral Tablet; TAKE 1 TABLET DAILY; Therapy: (Recorded:71Fiq2637) to Recorded   2  Betamethasone Sod Phos & Acet 6 (3-3) MG/ML Injection Suspension; 4 ml; To Be Done:   00IKJ2795; Status: HOLD FOR - Administration Ordered   3  Calcium + D TABS; Advised to get between 1,200 and 2,000 mg daily, combining diet   and supplements; Therapy: (Recorded:56Oim5767) to Recorded   4  Desoximetasone 0 25 % External Cream; APPLY TO AFFECTED AREA SPARINGLY AS   NEEDED; Therapy: 29Taj7537 to (Evaluate:14Vql4916)  Requested for: 44ZLY0509; Last   Rx:17Oct2014 Ordered   5   Dicyclomine HCl - 10 MG Oral Capsule; TAKE 1 CAPSULE EVERY 6 HOURS AS   NEEDED  Requested for: 02EUD0256; Last Rx:72Yly0228 Ordered   6  DULoxetine HCl - 60 MG Oral Capsule Delayed Release Particles; TAKE 1 CAPSULE   Daily; Therapy: 75PXI8511 to (Vin Garza)  Requested for: 11TTC8118; Last   Rx:15Tpb1709 Ordered   7  Ergocalciferol 46887 UNIT Oral Capsule; TAKE 1 CAPSULE BY MOUTH EVERY WEEK; Therapy: 09LNI5939 to (Evaluate:19Oct2015)  Requested for: 80JWT2123 Recorded   8  Fluticasone Propionate 50 MCG/ACT Nasal Suspension; USE 2 SPRAYS IN EACH   NOSTRIL DAILY (AVOID NASAL SEPTUM); Therapy: 85UYS5710 to (Elmer Peaches)  Requested for: 27Jun2014; Last   SD:76UKV3483 Ordered   9  Gabapentin 300 MG Oral Capsule; TAKE 1 CAPSULE 4 TIMES DAILY -MAY CAUSE   DROWSINESS -DO NOT TAKE WITHALCOHOL; Therapy: 94RQU3847 to (Evaluate:24Sep2016)  Requested for: 44SVB3904; Last   Rx:68Rnw1706 Ordered   10  Hydrocodone-Acetaminophen 5-325 MG Oral Tablet; Take 1 to 2 tablets every 8 hours as    needed for pain  No alcohol nor driving with medication use  Do not exceed 6 tablets    in 24 hour period; Therapy: 50XAQ6042 to (Evaluate:08Mar2016); Last Rx:98Azd3170 Ordered   11  Multivitamins Oral Capsule; TAKE 1 CAPSULE DAILY; Therapy: (Recorded:28Rcc7532) to Recorded   12  Omeprazole 40 MG Oral Capsule Delayed Release; TAKE 1 CAPSULE DAILY; Therapy: 12Rhn9851 to (Evaluate:13Jan2016)  Requested for: 68Nbz4218; Last    Rx:15Swk9334 Ordered   13  Omeprazole 40 MG Oral Capsule Delayed Release; TAKE 1 CAPSULE DAILY; Therapy: 32JIO3138 to (Evaluate:13Jan2016)  Requested for: 84Yzw5667; Last    Rx:01Ccx0699 Ordered   14  Restasis 0 05 % Ophthalmic Emulsion; INSTILL 1 DROP IN [facility] EYE TWICE DAILY; Therapy: (Recorded:22Jan2016) to Recorded   15  Slow Fe 142 (45 Fe) MG Oral Tablet Extended Release; TAKE 1 TABLET DAILY; Therapy: 20WGL0265 to (Last LN:13EST2467) Ordered   16   Torsemide 20 MG Oral Tablet; TAKE 1 TABLET DAILY AS DIRECTED; Therapy: 55Vdj8195 to (Evaluate:20Dcw3203); Last Rx:23Fgx2722 Ordered   17  Vitamin B-12 500 MCG Oral Tablet; TAKE 1 TABLET DAILY; Therapy: 50ONX6409 to (Evaluate:01Prc0518); Last L99QBE6393 Ordered    The medication list was reviewed and updated today  Allergies    1  Bactrim DS TABS   2  Codeine Sulfate TABS   3  Percocet TABS   4  Sulfa Drugs    5  Seasonal    Vitals   Recorded: 03BTI3697 11:04AM   Temperature 97 8 F   Heart Rate 69   Respiration 18   Systolic 516   Diastolic 70   Height 5 ft 2 in   Weight 230 lb 4 oz   BMI Calculated 42 11   BSA Calculated 2 03     Physical Exam    Constitutional   General appearance: No acute distress, well appearing and well nourished  Head and Face   Head and face: Normal     Eyes   Conjunctiva and lids: No swelling, erythema or discharge  Pupils and irises: Equal, round, reactive to light  Ears, Nose, Mouth, and Throat   External inspection of ears and nose: Normal     Otoscopic examination: Tympanic membranes translucent with normal light reflex  Canals patent without erythema  Nasal mucosa, septum, and turbinates: Normal without edema or erythema  Lips, teeth, and gums: Normal, good dentition  Oropharynx: Normal with no erythema, edema, exudate or lesions  Neck   Neck: Supple, symmetric, trachea midline, no masses  Thyroid: Normal, no thyromegaly  Pulmonary   Respiratory effort: No increased work of breathing or signs of respiratory distress  Auscultation of lungs: Clear to auscultation  Cardiovascular   Auscultation of heart: Normal rate and rhythm, normal S1 and S2, no murmurs  Carotid pulses: 2+ bilaterally  Examination of extremities for edema and/or varicosities: Normal     Abdomen   Abdomen: Non-tender, no masses  Liver and spleen: No hepatomegaly or splenomegaly  Lymphatic   Palpation of lymph nodes in neck: No lymphadenopathy  Musculoskeletal   Gait and station: Abnormal   USING SPC     Digits and nails: Normal without clubbing or cyanosis  Joints, bones, and muscles: Abnormal   RIGHT ANKLE MEDIAL DEVIATION INWARD  Range of motion: Normal     Stability: Normal     Muscle strength/tone: Normal     Skin   Skin and subcutaneous tissue: Normal without rashes or lesions  Palpation of skin and subcutaneous tissue: Normal turgor  Neurologic   Cranial nerves: Cranial nerves II-XII intact  Cortical function: Normal mental status  Reflexes: 2+ and symmetric  Sensation: No sensory loss  Coordination: Normal finger to nose and heel to shin  Psychiatric   Judgment and insight: Normal     Orientation to person, place, and time: Normal     Recent and remote memory: Intact  Mood and affect: Normal        End of Encounter Meds    1  Fluticasone Propionate 50 MCG/ACT Nasal Suspension; USE 2 SPRAYS IN EACH   NOSTRIL DAILY (AVOID NASAL SEPTUM); Therapy: 47ZKR5869 to (87 89 79)  Requested for: 27Jun2014; Last   Rx:27Jun2014 Ordered    2  Slow Fe 142 (45 Fe) MG Oral Tablet Extended Release; TAKE 1 TABLET DAILY; Therapy: 43HMG2527 to (Last MAGAÑA:17XYQ7355) Ordered   3  Vitamin B-12 500 MCG Oral Tablet; TAKE 1 TABLET DAILY; Therapy: 58DLD0658 to (Evaluate:12Aug2015); Last DR:46PWH8111 Ordered    4  Betamethasone Sod Phos & Acet 6 (3-3) MG/ML Injection Suspension (Celestone   Soluspan); 4 ml; To Be Done: 76RKQ1956; Status: HOLD FOR - Administration Ordered    5  Torsemide 20 MG Oral Tablet; TAKE 1 TABLET DAILY AS DIRECTED; Therapy: 46Eah5683 to (Evaluate:02Sep2015); Last Rx:03Aug2015 Ordered    6  DULoxetine HCl - 60 MG Oral Capsule Delayed Release Particles; TAKE 1 CAPSULE   Daily; Therapy: 21WVY5318 to (Audelia Short)  Requested for: 02AVU0519; Last   Rx:19Chy6959 Ordered    7  Omeprazole 40 MG Oral Capsule Delayed Release (PriLOSEC); TAKE 1 CAPSULE   DAILY; Therapy: 08KYB2046 to (Evaluate:13Jan2016)  Requested for: 15Sep2015; Last   Rx:15Sep2015 Ordered   8   Omeprazole 40 MG Oral Capsule Delayed Release; TAKE 1 CAPSULE DAILY; Therapy: 87Zmv9596 to (Evaluate:13Jan2016)  Requested for: 49Ujf4737; Last   Rx:04Ncz4308 Ordered    9  Dicyclomine HCl - 10 MG Oral Capsule (Bentyl); TAKE 1 CAPSULE EVERY 6 HOURS AS   NEEDED  Requested for: 41AUZ6275; Last Rx:34Blc2546 Ordered    10  Desoximetasone 0 25 % External Cream; APPLY TO AFFECTED AREA SPARINGLY AS    NEEDED; Therapy: 44Gad4109 to (Evaluate:14Hro0151)  Requested for: 93NPR8427; Last    Rx:95Nup4052 Ordered    11  Hydrocodone-Acetaminophen 5-325 MG Oral Tablet; Take 1 to 2 tablets every 8 hours as    needed for pain  No alcohol nor driving with medication use  Do not exceed 6 tablets    in 24 hour period; Therapy: 15YWG3968 to (Evaluate:08Mar2016); Last Rx:59Hhm8398 Ordered    12  Gabapentin 300 MG Oral Capsule; TAKE 1 CAPSULE 4 TIMES DAILY -MAY CAUSE    DROWSINESS -DO NOT TAKE WITHALCOHOL; Therapy: 93HPJ4329 to (Evaluate:07Xdn5507)  Requested for: 09UCE1635; Last    Rx:44Uvi9713 Ordered    13  Ergocalciferol 59442 UNIT Oral Capsule; TAKE 1 CAPSULE BY MOUTH EVERY WEEK; Therapy: 03PBS9211 to (Evaluate:19Oct2015)  Requested for: 83LXU2488 Recorded    14  Aspirin 81 MG Oral Tablet; TAKE 1 TABLET DAILY; Therapy: (Recorded:61Nno5446) to Recorded   15  Calcium + D TABS; Advised to get between 1,200 and 2,000 mg daily, combining diet    and supplements; Therapy: (Recorded:11Ujr6880) to Recorded   16  Multivitamins Oral Capsule; TAKE 1 CAPSULE DAILY; Therapy: (Recorded:32Tiu3736) to Recorded   17  Restasis 0 05 % Ophthalmic Emulsion; INSTILL 1 DROP IN [facility] EYE TWICE DAILY; Therapy: (Recorded:34Kul1608) to Recorded    Future Appointments    Date/Time Provider Specialty Site   05/03/2016 01:00 PM PIO Diaz   Orthopedic Surgery [facility]   02/23/2016 10:00 AM Alma Rosa Farah DO Gastroenterology Adult [facility] GA[facility]OENTER[facility]   05/03/2016 02:00 PM Rafy Kay MD [facility]     Signatures   Electronically signed by : Mandy Alexis, Carlotta Samson; Jan 22 2016 11:35AM EST                       (Author)    Electronically signed by : Carol Clark DO; Jan 22 2016  4:48PM EST                       (Author)

## 2018-01-11 NOTE — RESULT NOTES
Message   Notify the patient normal free T4 follow up as scheduled        Verified Results  (1) T4, FREE 58HUM5435 02:02PM Sal Rodríguez Order Number: [id number]     Test Name Result Flag Reference   T4,FREE 0 96 ng/dL  0 76-1 46       Signatures   Electronically signed by : Jonathan Barrera DO; Jun 23 2017  5:09PM EST                       (Author)

## 2018-01-11 NOTE — RESULT NOTES
Message   Notify the patient the TSH is mildly elevated at 3 9 please have the patient follow up as scheduled to discuss if she develops any symptoms of hypothyroidism please let me know immediately fatigue, cold intolerance, difficulty losing weight, leg edema and constipation etc         Verified Results  (1) TSH 16HVQ1258 02:02PM Eliz Feng     Test Name Result Flag Reference   TSH 3 930 uIU/mL H 0 358-3 740   This bloodwork is non-fasting  Please drink two glasses of water morning of  bloodwork  Patients undergoing fluorescein dye angiography may retain small amounts of fluorescein in the body for 48-72 hours post procedure  Samples containing fluorescein can produce falsely depressed TSH values  If the patient had this procedure,a specimen should be resubmitted post fluorescein clearance            The recommended reference ranges for TSH during pregnancy are as follows:  First trimester 0 1 to 2 5 uIU/mL  Second trimester  0 2 to 3 0 uIU/mL  Third trimester 0 3 to 3 0 uIU/m       Signatures   Electronically signed by : Mayank Fernández DO; Jun 24 2017  8:30PM EST                       (Author)

## 2018-01-11 NOTE — PROGRESS NOTES
History of Present Illness  Care Coordination Encounter Information:   Type of Encounter: Telephonic    Spoke to Patient  Care Coordination  Nurse [address] [place] [address] [address]:   The reason for call is to discuss outreach for follow up/needed services  Care management case opened, target CHF, documentation in [employer]      Active Problems    1  Abdominal bloating (787 3) (R14 0)   2  Abdominal pain (789 00) (R10 9)   3  Acute upper respiratory infection (465 9) (J06 9)   4  Allergic rhinitis (477 9) (J30 9)   5  Anemia (285 9) (D64 9)   6  Anticoagulated on warfarin (V58 61) (Z79 01)   7  Arthralgia of hip (719 45) (M25 559)   8  Arthralgia of knee, right (719 46) (M25 561)   9  Atrial fibrillation (427 31) (I48 91)   10  Benign essential hypertension (401 1) (I10)   11  Bursitis of right hip (726 5) (M70 71)   12  Chronic diastolic CHF (congestive heart failure) (428 32,428 0) (I50 32)   13  Chronic pain of lower extremity, bilateral (729 5,338 29) (M79 604,M79 605,G89 29)   14  Community acquired pneumonia (5) (J18 9)   15  Diarrhea (787 91) (R19 7)   16  Early satiety (780 94) (R68 81)   17  Edema (782 3) (R60 9)   18  Encounter for routine gynecological examination (V72 31) (Z01 419)   19  Encounter for screening for other nervous system disorder (V80 09) (Z13 858)   20  Encounter for screening mammogram for malignant neoplasm of breast (V76 12)    (Z12 31)   21  Encounter for special screening examination for genitourinary disorder (V81 6) (Z13 89)   22  External hemorrhoids (455 3) (K64 4)   23  Fibromyalgia (729 1) (M79 7)   24  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)   25  Headache (784 0) (R51)   26  Heart murmur (785 2) (R01 1)   27  Helicobacter pylori gastritis (535 10,041 86) (K29 70,B96 81)   28  Hematoma (924 9) (T14 8)   29  Impacted cerumen of left ear (380 4) (H61 22)   30  Irritable bowel syndrome with constipation (564 1) (K58 1)   31  Lichen sclerosus of female genitalia (701 0) (N90 4)   32   Long term use of drug (V58 69) (Z79 899)   33  Lower back pain (724 2) (M54 5)   34  Lumbar canal stenosis (724 02) (M48 06)   35  Moderate mitral regurgitation (424 0) (I34 0)   36  Morbid obesity (278 01) (E66 01)   37  Need for prophylactic vaccination against single diseases (V05 9) (Z23)   38  Need for prophylactic vaccination and inoculation against influenza (V04 81) (Z23)   39  Osteoarthritis, multiple sites (715 89) (M15 9)   40  Osteoporosis (733 00) (M81 0)   41  Palpitations (785 1) (R00 2)   42  Pap smear, as part of routine gynecological examination (V76 2) (Z01 419)   43  Peripheral neuropathy (356 9) (G62 9)   44  Preoperative clearance (V72 84) (Z01 818)   45  Primary osteoarthritis of right knee (715 16) (M17 11)   46  Right ankle pain (719 47) (M25 571)   47  Routine Gynecological Exam With Cervical Pap Smear (V72 31)   48  Screening for genitourinary condition (V81 6) (Z13 89)   49  Screening for neurological condition (V80 09) (Z13 89)   50  Screening for osteoporosis (V82 81) (Z13 820)   51  Skin lump of leg, left (782 2) (R22 42)   52  Status post gastric bypass for obesity (V45 86) (Z98 84)   53  Synovial cyst of right popliteal space (727 51) (M71 21)   54  Unstable right hip (718 85) (M25 351)   55  Urinary incontinence (788 30) (R32)   56  Visit for screening mammogram (V76 12) (Z12 31)   57  Vitamin D deficiency (268 9) (E55 9)   58  Wheezing (786 07) (R06 2)    Past Medical History    1  History of Acute Myocardial Infarction (V12 59)   2  History of Birth History   3  History of Disturbance Of Taste (781 1)   4  History of Easy bruising (782 9) (R23 8)   5  History of Effusion of knee joint right (719 06) (M25 461)   6  History of atrial fibrillation (V12 59) (Z86 79)   7  History of cancer (V10 90) (Z85 9)   8  History of dermatitis (V13 3) (Z87 2)   9  History of obesity (V13 89) (Z86 39)   10  History of osteopenia (V13 59) (Z87 39)   11  History of sciatica (V12 49) (Z86 69)   12   History of shortness of breath (V13 89) (Z87 898)   13  History of sinusitis (V12 69) (Z87 09)   14  History of syncope (V15 89) (Z87 898)   15  History of viral infection (V12 09) (Z86 19)   16  History of Joint pain, hip (719 45) (M25 559)   17  History of Limb pain (729 5) (M79 609)   18  History of Mammogram   19  History of Myalgia and myositis (729 1)   20  Need for prophylactic vaccination against single diseases (V05 9) (Z23)   21  Need for prophylactic vaccination and inoculation against influenza (V04 81) (Z23)   22  History of Preoperative cardiovascular examination (V72 81) (Z01 810)   23  History of Primary osteoarthritis of right knee (715 16) (M17 11)   24  History of Reported A Previous Heart Murmur   25  History of Reported Pap Smear   26  History of Right leg pain (729 5) (M79 604)   27  History of Sore throat (462) (J02 9)   28  History of Umbilical hernia (729 0) (K42 9)    Surgical History    1  History of Ankle Arthrodesis Right   2  History of Cholecystectomy   3  History of Cholecystectomy   4  History of Gastric Surgery For Morbid Obesity Bypass With Ann Marie-en-Y   5  History of Gastric Surgery For Morbid Obesity Bypass With Ann Marie-en-Y   6  History of Knee Replacement   7  History of Pilonidal Cyst Resection   8  History of Pilonidal Cyst Resection   9  History of Total Knee Arthroplasty   10  History of Umbilical Hernia Repair   11  History of Umbilical Hernia Repair    Family History  Mother    1  Family history of Coronary Artery Disease (V17 49)  Father    2  Family history of Coronary Artery Disease (V17 49)  Sister    3  Family history of Acute Lymphoma   4  Family history of Lymphomatoid Papulosis  Brother    5  Family history of Coronary Artery Disease (V17 49)    Social History    · Denied: History of Alcohol Use (History)   · Denied: History of Drug Use   · Denied: Exercising Regularly   · Never smoked tobacco (V49 89) (Z78 9)   · Social alcohol use (Z78 9)    Current Meds    1   Fluticasone Propionate 50 MCG/ACT Nasal Suspension; USE 2 SPRAYS IN EACH   NOSTRIL DAILY (AVOID NASAL SEPTUM); Therapy: 18EBL6039 to (Evaluate:18Apr2017)  Requested for: 23Apr2016; Last   Rx:88Otg1635 Ordered    2  TraMADol HCl - 50 MG Oral Tablet; TAKE ONE TABLET BY MOUTH TWICE DAILY AS   NEEDED FOR PAIN;   Therapy: 75Srm6148 to (Seth Paige)  Requested for: 03Apr2017; Last   Rx:77Blt1616 Ordered    3  Digoxin 125 MCG Oral Tablet; Take 1 tablet daily; Therapy: 55Ycg5815 to (Evaluate:11Aug2017)  Requested for: 25Apr2017; Last   Rx:80Zxn7750 Ordered   4  Warfarin Sodium 1 MG Oral Tablet; TAKE 1 TABLET DAILY AS DIRECTED; Therapy: 31ZAF4267 to [phone])  Requested for: 25Apr2017; Last   Rx:91Szw1287 Ordered   5  Warfarin Sodium 4 MG Oral Tablet; TAKE 1 TABLET DAILY AS DIRECTED; Therapy: 24BYB0429 to (Evaluate:40Jfi6566)  Requested for: 67ZFP1319; Last   Rx:46Zth8279 Ordered    6  Potassium Chloride ER 20 MEQ Oral Tablet Extended Release; Take 1 tablet daily; Therapy: 95TNW2507 to Recorded   7  Torsemide 20 MG Oral Tablet; 2 tabs daily prn; Therapy: 00EDA5019 to (Evaluate:30Mar2017) Recorded    8  DilTIAZem HCl - 120 MG Oral Tablet; Take 1 tablet twice daily; Therapy: 03KCW8930 to (Evaluate:30Jun2017)  Requested for: 73AAW5536; Last   Rx:97Ajr4198 Ordered    9  DULoxetine HCl - 60 MG Oral Capsule Delayed Release Particles; take 1 capsule daily; Therapy: 29TGE4697 to (Evaluate:31Oct2017)  Requested for: 04Apr2017; Last   Rx:31Srt3965 Ordered    10  Omeprazole 40 MG Oral Capsule Delayed Release; TAKE ONE CAPSULE BY MOUTH    ONCE DAILY; Therapy: 09Sia2928 to (Yves Fox)  Requested for: 03Apr2017; Last    Rx:03Apr2017 Ordered    11  Dicyclomine HCl - 10 MG Oral Capsule (Bentyl); Take 1 capsule twice daily as needed; Therapy: 30ZHV5648 to (Evaluate:24Oct2016)  Requested for: 89Bts1669; Last    Rx:94Ufc6254 Ordered    12   Desoximetasone 0 25 % External Cream; APPLY TO AFFECTED AREA SPARINGLY AS    NEEDED; Therapy: 25Idu5771 to (Evaluate:31Cim0599)  Requested for: 27QYN2043; Last    Rx:01Nov2016 Ordered    Allergies    1  Bactrim DS TABS   2  Codeine Sulfate TABS   3  Percocet TABS   4  Sulfa Drugs    5  Seasonal    End of Encounter Meds    1  Fluticasone Propionate 50 MCG/ACT Nasal Suspension; USE 2 SPRAYS IN EACH   NOSTRIL DAILY (AVOID NASAL SEPTUM); Therapy: 80FCV7643 to (Evaluate:18Apr2017)  Requested for: 64Zck0801; Last   Rx:50Ayz0713 Ordered    2  TraMADol HCl - 50 MG Oral Tablet; TAKE ONE TABLET BY MOUTH TWICE DAILY AS   NEEDED FOR PAIN;   Therapy: 56Cst8867 to (Herminio Mitchell)  Requested for: 03Apr2017; Last   Rx:26Las2334 Ordered    3  Digoxin 125 MCG Oral Tablet; Take 1 tablet daily; Therapy: 36Bpu7192 to (Evaluate:11Aug2017)  Requested for: 16Ufw2264; Last   Rx:25Jhl8024 Ordered   4  Warfarin Sodium 1 MG Oral Tablet; TAKE 1 TABLET DAILY AS DIRECTED; Therapy: 68VQJ7278 to [phone])  Requested for: 25Apr2017; Last   Rx:67Wpt2279 Ordered   5  Warfarin Sodium 4 MG Oral Tablet; TAKE 1 TABLET DAILY AS DIRECTED; Therapy: 19HDT2914 to (Evaluate:00Nse7721)  Requested for: 51QZN0264; Last   Rx:32Ahp7941 Ordered    6  Potassium Chloride ER 20 MEQ Oral Tablet Extended Release; Take 1 tablet daily; Therapy: 80EYM6627 to Recorded   7  Torsemide 20 MG Oral Tablet; 2 tabs daily prn; Therapy: 46OVM0844 to (Evaluate:30Mar2017) Recorded    8  DilTIAZem HCl - 120 MG Oral Tablet; Take 1 tablet twice daily; Therapy: 42TUX3003 to (Evaluate:25Jyo4972)  Requested for: 55OLI1959; Last   Rx:03Ruq4533 Ordered    9  DULoxetine HCl - 60 MG Oral Capsule Delayed Release Particles; take 1 capsule daily; Therapy: 46NOG3364 to (Evaluate:66Rmg1752)  Requested for: 28Nvm6544; Last   Rx:85Evr8474 Ordered    10  Omeprazole 40 MG Oral Capsule Delayed Release; TAKE ONE CAPSULE BY MOUTH    ONCE DAILY;     Therapy: 16Qdn8616 to (Rosa Ambriz)  Requested for: 03Apr2017; Last Rx:03Apr2017 Ordered    11  Dicyclomine HCl - 10 MG Oral Capsule (Bentyl); Take 1 capsule twice daily as needed; Therapy: 34CWD4358 to (Evaluate:24Oct2016)  Requested for: 38Xtu5735; Last    Rx:97Wtz3756 Ordered    12  Desoximetasone 0 25 % External Cream; APPLY TO AFFECTED AREA SPARINGLY AS    NEEDED; Therapy: 43Hib1565 to (Freda Thornton)  Requested for: 57DTO0589; Last    Rx:01Nov2016 Ordered    Future Appointments    Date/Time Provider Specialty Site   07/11/2017 09:30 AM PIO Owens  Orthopedic Surgery [facility] [place] [facility]   05/22/2017 02:30 PM Carlitos Guzman DO Internal Medicine [facility]     Patient Care Team    Care Team Member Role Specialty Office Number   Manish Murillo MD Specialist Cardiology [phone] DPM Specialist Podiatry [phone]   Kaylen Noguera MD Specialist General Surgery [phone]   Miranda Srinivasan MD Specialist Obstetrics/Gynecology [phone]   [facility] Specialist Dermatology [phone]   Callie Muller MD Specialist Ophthalmology [phone]   Marianela Senior MD Specialist Orthopedic Surgery [phone]   Eliseo Goodness DO Specialist Pain Management [phone]   Jayjay WILSON   Specialist Orthopedic Surgery , Patrice Kirby DO Specialist Gastroenterology Adult [phone]   Farnaz Vogel MD  Orthopedic Surgery [phone]   Oscar Abarca DPPIO Specialist Podiatry [phone] [address] MD Specialist Orthopedic Surgery [phone]     Signatures   Electronically signed by : Orlando Quintanilla, ; May  2 2017  1:23PM EST                       (Author)

## 2018-01-11 NOTE — MISCELLANEOUS
Message   Recorded as Task   Date: 03/04/2016 11:42 AM, Created By: Kayden Mahan   Task Name: Follow Up   Assigned To: Sally Mosqueda   Regarding Patient: Tavon Le, Status: In Progress   Lei Segundo - 04 Mar 2016 11:42 AM     TASK CREATED  Caller: Lonnie Body; General Medical Question  Krys Vigil from [facility] called to report a change in patient's condition  Krys Yaritza says this patient has been immobilefor 1 5 months and has been eating cans of soup every day for a week  Patient is not in any pain and there is no redness, but she is having swelling in bilateral legs and abdomen  Krys Vigil- [phone]  Patient- [phone]   Sally Jaylin - 04 Mar 2016 12:40 PM     TASK REASSIGNED: Previously Assigned To Bulmaro Kurtz,    #1  Please call the patient to see what symptoms she is having  #2  If her symptoms are significant, have her go to emergency room for an evaluation and treatment  #3  If she feels that her symptoms are not too bad, schedule her on Monday here in the office for an evaluation  Halina Schwarz - 04 Mar 2016 3:41 PM     TASK EDITED  S/W PT SHE C/O BEING BLOATED FROM ABDOMIN TO BILATERAL LOWER EXTREMITIES AND HEADACHE AS K05 IF SYMPTOM ARE SIGNIFICANT HAVE PT GO TO ER, PT WOULD RATHER WAIT AND SHE SCHEDULED AN APPT MONDAY 3/7 WITH E05 AND ALSO STATES IF SHE FEELS ANY WORSE SHE WILL GO TO THE ER OVER THE WEEKEND  Yao Jha - 04 Mar 2016 3:42 PM     TASK IN PROGRESS        Signatures   Electronically signed by :  Kevin Hayes MD; Mar  5 2016 10:38AM EST                       (Author)

## 2018-01-12 VITALS
BODY MASS INDEX: 38.83 KG/M2 | DIASTOLIC BLOOD PRESSURE: 69 MMHG | HEIGHT: 62 IN | WEIGHT: 211 LBS | HEART RATE: 75 BPM | SYSTOLIC BLOOD PRESSURE: 103 MMHG

## 2018-01-12 VITALS
SYSTOLIC BLOOD PRESSURE: 122 MMHG | HEART RATE: 96 BPM | DIASTOLIC BLOOD PRESSURE: 72 MMHG | HEIGHT: 62 IN | WEIGHT: 199.04 LBS | BODY MASS INDEX: 36.63 KG/M2 | RESPIRATION RATE: 16 BRPM

## 2018-01-12 NOTE — RESULT NOTES
Message   Notify the patient normal comprehensive metabolic panel except a slightly elevated glucose level and a slightly low albumin level protein level please have the patient carbohydrates and sweets and follow up as scheduled        Verified Results  (1) COMPREHENSIVE METABOLIC PANEL [id number] 46:09IN Renay BANGURA Order Number: [id number]     Test Name Result Flag Reference   SODIUM 141 mmol/L  136-145   POTASSIUM 3 9 mmol/L  3 5-5 3   CHLORIDE 107 mmol/L  100-108   CARBON DIOXIDE 25 mmol/L  21-32   ANION GAP (CALC) 9 mmol/L  4-13   BLOOD UREA NITROGEN 13 mg/dL  5-25   CREATININE 0 80 mg/dL  0 60-1 30   Standardized to IDMS reference method   CALCIUM 9 0 mg/dL  8 3-10 1   BILI, TOTAL 0 36 mg/dL  0 20-1 00   ALK PHOSPHATAS 92 U/L     ALT (SGPT) 20 U/L  12-78   AST(SGOT) 19 U/L  5-45   ALBUMIN 3 2 g/dL L 3 5-5 0   TOTAL PROTEIN 6 9 g/dL  6 4-8 2   eGFR Non-African American      >60 0 ml/min/1 73sq m   [facility] [employer] Disease Education Program recommendations are as follows:  GFR calculation is accurate only with a steady state creatinine  Chronic Kidney disease less than 60 ml/min/1 73 sq  meters  Kidney failure less than 15 ml/min/1 73 sq  meters     GLUCOSE FASTING 101 mg/dL H 65-99       Signatures   Electronically signed by : Vannesa Michelle DO; Seamus 10 2017  4:43PM EST                       (Author)

## 2018-01-12 NOTE — PROGRESS NOTES
Assessment    1  Medicare annual wellness visit, subsequent (V70 0) (Z00 00)   2  Dry mouth (527 7) (R68 2)   3  Right ankle pain (719 47) (M25 571)   4  Right foot pain (729 5) (M79 671)   5  Pain of right lower extremity (729 5) (M79 604)    Plan  Allergic rhinitis    · Fluticasone Propionate 50 MCG/ACT Nasal Suspension; USE 2 SPRAYS IN  EACH NOSTRIL DAILY (AVOID NASAL SEPTUM)  Benign essential hypertension, Medicare annual wellness visit, subsequent, Morbid  obesity    · (1) LIPID PANEL, FASTING; Status:Active; Requested for:28Nov2017;   Dry mouth    · (Q) SJOGRENS ANTIBODIES (SS-A,SS-B); Status:Active; Requested for:28Nov2017;   Medicare annual wellness visit, subsequent    · (1) COMPREHENSIVE METABOLIC PANEL; Status:Active; Requested for:28Nov2017;   Pain of right lower extremity    · VAS LOWER LIMB VENOUS DUPLEX STUDY, UNILATERAL/LIMITED; Unilateral  Side:Right; Status:Hold For - Scheduling; Requested for:28Nov2017;   Right foot pain    · * XR ANKLE 3+ VIEW RIGHT; Status:Active; Requested for:28Nov2017;    · * XR FOOT 3+ VIEW RIGHT; Status:Active; Requested for:28Nov2017;     Assessment and plan 1  Medicare annual wellness examination completed today I did review the Medicare form with the patient I have counselled the pt to follow a healthy and balanced diet ,and recommend routine exercise  , weight loss recommended following a healthy imbalance diet  Will order routine laboratories patient is up-to-date on all vaccines with the exception of Tdap she will check with the insurance carrier to see if it is covered and let me know in the future if interested  Because of his advanced age no further screening colonoscopy is required  Return to office  6 months  call if any problems     History of Present Illness  Welcome to Medicare and Wellness Visits: The patient is being seen for the subsequent annual wellness visit     Medicare Screening and Risk Factors   Hospitalizations: no previous hospitalizations and she has been hospitalized 1 times  Medicare Screening Tests Risk Questions   Abdominal aortic aneurysm risk assessment: none indicated  HIV risk assessment: none indicated  Drug and Alcohol Use: The patient has never smoked cigarettes  The patient reports rare alcohol use and 1 per yr  She has never used illicit drugs  Diet and Physical Activity: Current diet includes well balanced meals, 1-2 servings of fruit per day, 1-2 servings of vegetables per day, 0-1 servings of meat per day, 1-2 servings of whole grains per day, 3 cups of coffee per day and 1 cans of diet soda per day  She exercises infrequently  Mood Disorder and Cognitive Impairment Screening:   Depression screening was done using  She denies feeling down, depressed, or hopeless over the past two weeks  She denies feeling little interest or pleasure in doing things over the past two weeks  Cognitive impairment screening: denies difficulty learning/retaining new information, denies difficulty handling complex tasks, denies difficulty with reasoning, denies difficulty with spatial ability and orientation, denies difficulty with language and denies difficulty with behavior  Functional Ability/Level of Safety: Hearing is normal in the right ear and slightly decreased in the left ear  She reports hearing difficulties  She does not use a hearing aid  Activities of daily living details: does not need help using the phone, no transportation help needed, does not need help shopping, no meal preparation help needed, does not need help doing housework, does not need help doing laundry, does not need help managing medications and does not need help managing money  Fall risk factors: The patient fell 1 times in the past 12 months  Home safety risk factors:  no unfamiliar surroundings, no loose rugs, no poor household lighting, no uneven floors, grab bars in the bathroom and handrails on the stairs     Advance Directives: Advance directives: living will, durable power of  for health care directives, advance directives and poa children  Co-Managers and Medical Equipment/Suppliers: See Patient Care Team      Patient Care Team    Care Team Member Role Specialty Office Number   Heidi Damon MD Specialist Cardiology [phone] [id number] DPM Specialist Podiatry [phone]   Triston Rick MD Specialist General Surgery [phone]   Jarred Leach MD Specialist Obstetrics/Gynecology [phone]   [facility] Specialist Dermatology [phone]   Rogelio Hu MD Specialist Ophthalmology [phone]   Holger Gleason MD Specialist Orthopedic Surgery [phone]   Olman Silva DO Specialist Pain Management [phone]   Lilliam WILSON  Specialist Orthopedic Surgery , [address] [facility] Specialist Gastroenterology Adult [phone]   Vinicio Sweeney MD  Orthopedic Surgery [phone]   [facility]zytaj Kowalski [facility] Specialist Podiatry [phone] [address] Gil[address] Garcia MD Specialist Orthopedic Surgery , [facility]  Internal Medicine [phone]     Active Problems    1  Abdominal bloating (787 3) (R14 0)   2  Abdominal pain (789 00) (R10 9)   3  Acute upper respiratory infection (465 9) (J06 9)   4  Advanced directives, counseling/discussion (V65 49) (Z71 89)   5  Allergic rhinitis (477 9) (J30 9)   6  Anemia (285 9) (D64 9)   7  Anticoagulated on warfarin (V58 61) (Z79 01)   8  Arthralgia of hip (719 45) (M25 559)   9  Arthralgia of knee, right (719 46) (M25 561)   10  Atrial fibrillation (427 31) (I48 91)   11  Benign essential hypertension (401 1) (I10)   12  Bursitis of right hip (726 5) (M70 71)   13  Chronic diastolic CHF (congestive heart failure) (428 32,428 0) (I50 32)   14  Chronic pain of lower extremity, bilateral (729 5,338 29) (M79 604,M79 605,G89 29)   15  Community acquired pneumonia (5) (J18 9)   12  Diarrhea (787 91) (R19 7)   17   JEROME (dyspnea on exertion) (786 09) (R06 09)   18  Early satiety (780 94) (R68 81)   19  Edema (782 3) (R60 9)   20  Encounter for routine gynecological examination (V72 31) (Z01 419)   21  Encounter for screening for other nervous system disorder (V80 09) (Z13 858)   22  Encounter for screening mammogram for malignant neoplasm of breast (V76 12)    (Z12 31)   23  Encounter for special screening examination for genitourinary disorder (V81 6) (Z13 89)   24  External hemorrhoids (455 3) (K64 4)   25  Fibromyalgia (729 1) (M79 7)   26  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)   27  Headache (784 0) (R51)   28  Helicobacter pylori gastritis (535 10,041 86) (K29 70,B96 81)   29  Hematoma (924 9) (T14 8XXA)   30  Impacted cerumen of left ear (380 4) (H61 22)   31  Irritable bowel syndrome with constipation (564 1) (K58 1)   32  Lichen sclerosus of female genitalia (701 0) (N90 4)   33  Long term use of drug (V58 69) (Z79 899)   34  Lower back pain (724 2) (M54 5)   35  Lumbar canal stenosis (724 02) (M48 061)   36  Moderate mitral regurgitation (424 0) (I34 0)   37  Morbid obesity (278 01) (E66 01)   38  Need for prophylactic vaccination against single diseases (V05 9) (Z23)   39  Need for prophylactic vaccination and inoculation against influenza (V04 81) (Z23)   40  Osteoarthritis, multiple sites (715 89) (M15 9)   41  Osteoporosis (733 00) (M81 0)   42  Palpitations (785 1) (R00 2)   43  Pap smear, as part of routine gynecological examination (V76 2) (Z01 419)   44  Peripheral neuropathy (356 9) (G62 9)   45  Pes anserinus bursitis of right knee (726 61) (M70 51)   46  Preoperative clearance (V72 84) (Z01 818)   47  Primary osteoarthritis of right knee (715 16) (M17 11)   48  Right ankle pain (719 47) (M25 571)   49  Routine Gynecological Exam With Cervical Pap Smear (V72 31)   50  Screening for genitourinary condition (V81 6) (Z13 89)   51  Screening for neurological condition (V80 09) (Z13 89)   52  Screening for osteoporosis (V82 81) (Z13 820)   53  Skin lump of leg, left (782 2) (R22 42)   54  Status post gastric bypass for obesity (V45 86) (Z98 84)   55  Synovial cyst of right popliteal space (727 51) (M71 21)   56  Unstable right hip (718 85) (M25 351)   57  Urinary incontinence (788 30) (R32)   58  Visit for screening mammogram (V76 12) (Z12 31)   59  Vitamin D deficiency (268 9) (E55 9)   60  Wheezing (786 07) (R06 2)    Past Medical History    · History of Acute Myocardial Infarction (V12 59)   · History of Birth History   · History of Disturbance Of Taste (781 1)   · History of Easy bruising (782 9) (R23 8)   · History of Effusion of knee joint right (719 06) (M25 461)   · History of atrial fibrillation (V12 59) (Z86 79)   · History of cancer (V10 90) (Z85 9)   · History of dermatitis (V13 3) (Z87 2)   · History of obesity (V13 89) (Z86 39)   · History of osteopenia (V13 59) (Z87 39)   · History of sciatica (V12 49) (Z86 69)   · History of shortness of breath (V13 89) (W01 600)   · History of sinusitis (V12 69) (Z87 09)   · History of syncope (V15 89) (O21 774)   · History of viral infection (V12 09) (Z86 19)   · History of Joint pain, hip (719 45) (M25 559)   · History of Limb pain (729 5) (M79 609)   · History of Mammogram   · History of Myalgia and myositis (729 1)   · Need for prophylactic vaccination against single diseases (V05 9) (Z23)   · Need for prophylactic vaccination and inoculation against influenza (V04 81) (Z23)   · History of Preoperative cardiovascular examination (V72 81) (Z01 810)   · History of Primary osteoarthritis of right knee (715 16) (M17 11)   · History of Reported A Previous Heart Murmur   · History of Reported Pap Smear   · History of Right leg pain (729 5) (M79 604)   · History of Sore throat (462) (J02 9)   · History of Umbilical hernia (256 0) (K42 9)    The active problems and past medical history were reviewed and updated today        Surgical History    · History of Ankle Arthrodesis Right   · History of Cholecystectomy   · History of Cholecystectomy   · History of Gastric Surgery For Morbid Obesity Bypass With Ann Marie-en-Y   · History of Gastric Surgery For Morbid Obesity Bypass With Ann Marie-en-Y   · History of Knee Replacement   · History of Pilonidal Cyst Resection   · History of Pilonidal Cyst Resection   · History of Total Knee Arthroplasty   · History of Umbilical Hernia Repair   · History of Umbilical Hernia Repair    The surgical history was reviewed and updated today  Family History  Mother    · Family history of Coronary Artery Disease (V17 49)  Father    · Family history of Coronary Artery Disease (V17 49)  Sister    · Family history of Acute Lymphoma   · Family history of Lymphomatoid Papulosis  Brother    · Family history of Coronary Artery Disease (V17 49)    The family history was reviewed and updated today  Social History    · Denied: History of Alcohol Use (History)   · Denied: History of Drug Use   · Denied: Exercising Regularly   · Never smoked tobacco (V49 89) (Z78 9)   · Social alcohol use (Z78 9)  The social history was reviewed and updated today  The social history was reviewed and is unchanged  Current Meds   1  Bisoprolol Fumarate 5 MG Oral Tablet; TAKE 1 TABLET ONCE DAILY; Therapy: 68Rdo4275 to (Evaluate:76Bde9508)  Requested for: 61Zbl5875; Last   Rx:39Gmm1520 Ordered   2  Desoximetasone 0 25 % External Cream; APPLY TO AFFECTED AREA SPARINGLY AS   NEEDED; Therapy: 67Ofp3491 to (Evaluate:62Ipt3428)  Requested for: 31XXZ5041; Last   Rx:53Ljz9255 Ordered   3  Dicyclomine HCl - 10 MG Oral Capsule; Take 1 capsule twice daily as needed; Therapy: 46YPZ0470 to (Evaluate:73Spi3826)  Requested for: 17HDJ9154; Last   Rx:12Ion7086 Ordered   4  DilTIAZem HCl - 120 MG Oral Tablet; Take 1 tablet twice daily; Therapy: 55ILW6814 to ([facility])  Requested for: 27BHH9752 Recorded   5   DULoxetine HCl - 60 MG Oral Capsule Delayed Release Particles; take 1 capsule by   mouth daily; Therapy: 68AYZ5762 to (Evaluate:30Nov2017)  Requested for: 31Oct2017; Last   Rx:31Oct2017 Ordered   6  Fluticasone Propionate 50 MCG/ACT Nasal Suspension; USE 2 SPRAYS IN EACH   NOSTRIL DAILY (AVOID NASAL SEPTUM); Therapy: 37RDM4467 to (Evaluate:54Kyu0489)  Requested for: 90OQX1466; Last   Rx:01Kdi7396 Ordered   7  Potassium Chloride Katheryn ER 20 MEQ Oral Tablet Extended Release; Take 1 tablet daily; Therapy: 29AVO1134 to (Evaluate:16Mar2018)  Requested for: 55CVM0727; Last   Rx:16Nov2017; Status: ACTIVE - Retrospective Authorization Ordered   8  Potassium Chloride ER 20 MEQ Oral Tablet Extended Release; Take 1 tablet daily; Therapy: 02XAF7362 to Recorded   9  Torsemide 20 MG Oral Tablet; 2 tabs daily prn; Therapy: 95PZC2903 to (Evaluate:30Mar2017) Recorded   10  TraMADol HCl - 50 MG Oral Tablet; TAKE 1 TABLET BY MOUTH TWICE A DAY AS    NEEDED FOR PAIN;    Therapy: 55Wmk7472 to (Evaluate:74Nvb6187)  Requested for: 20Nov2017; Last    Rx:19Nov2017; Status: ACTIVE - Renewal Denied Ordered   11  Warfarin Sodium 1 MG Oral Tablet; TAKE 1 TABLET DAILY AS DIRECTED; Therapy: 38XUG0615 to (63 330 135)  Requested for: 25Apr2017; Last    Rx:25Apr2017 Ordered   12  Warfarin Sodium 5 MG Oral Tablet; TAKE ONE TABLET BY MOUTH ONCE DAILY AS    DIRECTED; Therapy: 29ABX1922 to (Evaluate:29Jan2018)  Requested for: [phone]; Last    Rx:31Oct2017 Ordered    The medication list was reviewed and updated today  Allergies    1  Bactrim DS TABS   2  Codeine Sulfate TABS   3  Percocet TABS   4  Sulfa Drugs    5  Seasonal    Immunizations   ** Printed in Appendix #1 below  Vitals  Signs    Heart Rate: 73  Respiration: 16  Systolic: 808  Diastolic: 70  Height: 5 ft 2 in  Weight: 207 lb 0 8 oz  BMI Calculated: 37 87  BSA Calculated: 1 94  O2 Saturation: 95    Physical Exam    Constitutional   General appearance: No acute distress, well appearing and well nourished      Head and Face   Head and face: Normal     Eyes Conjunctiva and lids: No swelling, erythema or discharge  Pupils and irises: Equal, round, reactive to light  Ears, Nose, Mouth, and Throat   External inspection of ears and nose: Normal     Otoscopic examination: Tympanic membranes translucent with normal light reflex  Canals patent without erythema  Hearing: Normal     Nasal mucosa, septum, and turbinates: Normal without edema or erythema  Lips, teeth, and gums: Normal, good dentition  Oropharynx: Normal with no erythema, edema, exudate or lesions  Neck   Neck: Supple, symmetric, trachea midline, no masses  Pulmonary   Respiratory effort: No increased work of breathing or signs of respiratory distress  Auscultation of lungs: Clear to auscultation  Cardiovascular   Auscultation of heart: Normal rate and rhythm, normal S1 and S2, no murmurs  Examination of extremities for edema and/or varicosities: Normal     Abdomen   Abdomen: Non-tender, no masses  Liver and spleen: No hepatomegaly or splenomegaly     Psychiatric   Mood and affect: Normal        Results/Data  Falls Risk Assessment (Dx Z13 89 Screen for Neurologic Disorder) 00RAW0040 10:11AM User, [employer]     Test Name Result Flag Reference   Falls Risk      1 fall without injury in the past year     (1) PT WITH INR 86Nev7078 09:51AM [employer]     Test Name Result Flag Reference   INR 2 59 H 0 86-1 16   PT 28 1 seconds H 12 1-14 4     (1) COMPREHENSIVE METABOLIC PANEL 99STI4891 70:26FV [employer]   TW Order Number: [id number]     Test Name Result Flag Reference   SODIUM 141 mmol/L  136-145   POTASSIUM 3 9 mmol/L  3 5-5 3   CHLORIDE 107 mmol/L  100-108   CARBON DIOXIDE 25 mmol/L  21-32   ANION GAP (CALC) 9 mmol/L  4-13   BLOOD UREA NITROGEN 13 mg/dL  5-25   CREATININE 0 80 mg/dL  0 60-1 30   Standardized to IDMS reference method   CALCIUM 9 0 mg/dL  8 3-10 1   BILI, TOTAL 0 36 mg/dL  0 20-1 00   ALK PHOSPHATAS 92 U/L     ALT (SGPT) 20 U/L  12-78 AST(SGOT) 19 U/L  5-45   ALBUMIN 3 2 g/dL L 3 5-5 0   TOTAL PROTEIN 6 9 g/dL  6 4-8 2   eGFR Non-African American      >60 0 ml/min/1 73sq m   [facility] Disease Education Program recommendations are as follows:  GFR calculation is accurate only with a steady state creatinine  Chronic Kidney disease less than 60 ml/min/1 73 sq  meters  Kidney failure less than 15 ml/min/1 73 sq  meters  GLUCOSE FASTING 101 mg/dL H 65-99     (1) LIPID PANEL, FASTING 2017 09:49AM Cara Willard    Order Number: [id number]     Test Name Result Flag Reference   CHOLESTEROL 196 mg/dL     HDL,DIRECT 63 mg/dL H 40-60   Specimen collection should occur prior to Metamizole administration due to the potential for falsely depressed results  LDL CHOLESTEROL CALCULATED 108 mg/dL H 0-100   Triglyceride:         Normal              <150 mg/dl       Borderline High    150-199 mg/dl       High               200-499 mg/dl       Very High          >499 mg/dl  Cholesterol:         Desirable        <200 mg/dl      Borderline High  200-239 mg/dl      High             >239 mg/dl  HDL Cholesterol:        High    >59 mg/dL      Low     <41 mg/dL  LDL CALCULATED:    This screening LDL is a calculated result  It does not have the accuracy of the Direct Measured LDL in the monitoring of patients with hyperlipidemia and/or statin therapy  Direct Measure LDL (LDM067) must be ordered separately in these patients  TRIGLYCERIDES 125 mg/dL  <=150   Specimen collection should occur prior to N-Acetylcysteine or Metamizole administration due to the potential for falsely depressed results       Signatures   Electronically signed by : Brenna Heath DO; 2017 11:02AM EST                       (Author)    Appendix #1     Patient: Masood Casillas ; : 1933; MRN: [id number]      1 2 3 4 5 6    Influenza  04-Oct-2010 05-Oct-2013 31-Oct-2014 14-Sep-2015 13-Oct-2016 17-Oct-2017    [facility]  14-Sep-2015         PPSV 21-Oct-2002         Zoster  16-Apr-2013

## 2018-01-12 NOTE — MISCELLANEOUS
Chief Complaint  Chief Complaint Free Text Note Form: No LISA was made for this patient because she was discharged to a skilled nursing facility  Active Problems    1  Abdominal bloating (787 3) (R14 0)   2  Abdominal pain (789 00) (R10 9)   3  Allergic rhinitis (477 9) (J30 9)   4  Anemia (285 9) (D64 9)   5  Anticoagulated on warfarin (V58 61) (Z79 01)   6  Arthralgia of hip (719 45) (M25 559)   7  Arthralgia of knee, right (719 46) (M25 561)   8  Atrial fibrillation (427 31) (I48 91)   9  Benign essential hypertension (401 1) (I10)   10  Bursitis of right hip (726 5) (M70 71)   11  Chronic diastolic CHF (congestive heart failure) (428 32,428 0) (I50 32)   12  Chronic pain of lower extremity, bilateral (729 5,338 29) (M79 604,M79 605,G89 29)   13  Community acquired pneumonia (5) (J18 9)   14  Diarrhea (787 91) (R19 7)   15  Edema (782 3) (R60 9)   16  Encounter for screening for other nervous system disorder (V80 09) (Z13 858)   17  Encounter for screening mammogram for malignant neoplasm of breast (V76 12)    (Z12 31)   18  Encounter for special screening examination for genitourinary disorder (V81 6) (Z13 89)   19  External hemorrhoids (455 3) (K64 4)   20  Fibromyalgia (729 1) (M79 7)   21  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)   22  Headache (784 0) (R51)   23  Heart murmur (785 2) (R01 1)   24  Helicobacter pylori gastritis (535 10,041 86) (K29 70,B96 81)   25  Hematoma (924 9) (T14 8)   26  Irritable bowel syndrome with constipation (564 1) (K58 9)   27  Lichen sclerosus of female genitalia (701 0) (N90 4)   28  Long term use of drug (V58 69) (Z79 899)   29  Lower back pain (724 2) (M54 5)   30  Lumbar canal stenosis (724 02) (M48 06)   31  Moderate mitral regurgitation (424 0) (I34 0)   32  Morbid obesity (278 01) (E66 01)   33  Need for prophylactic vaccination against single diseases (V05 9) (Z23)   34  Need for prophylactic vaccination and inoculation against influenza (V04 81) (Z23)   35  Osteoarthritis, multiple sites (715 89) (M15 9)   36  Osteoporosis (733 00) (M81 0)   37  Palpitations (785 1) (R00 2)   38  Peripheral neuropathy (356 9) (G62 9)   39  Preoperative clearance (V72 84) (Z01 818)   40  Primary osteoarthritis of right knee (715 16) (M17 11)   41  Right ankle pain (719 47) (M25 571)   42  Routine Gynecological Exam With Cervical Pap Smear (V72 31)   43  Screening for genitourinary condition (V81 6) (Z13 89)   44  Screening for neurological condition (V80 09) (Z13 89)   45  Skin lump of leg, left (782 2) (R22 42)   46  Status post gastric bypass for obesity (V45 86) (Z98 84)   47  Synovial cyst of right popliteal space (727 51) (M71 21)   48  Urinary incontinence (788 30) (R32)   49  Vitamin D deficiency (268 9) (E55 9)   50  Wheezing (786 07) (R06 2)    Past Medical History    1  History of Acute Myocardial Infarction (V12 59)   2  History of Birth History   3  History of Disturbance Of Taste (781 1)   4  History of Easy bruising (782 9) (R23 8)   5  History of Effusion of knee joint right (719 06) (M25 461)   6  History of atrial fibrillation (V12 59) (Z86 79)   7  History of cancer (V10 90) (Z85 9)   8  History of dermatitis (V13 3) (Z87 2)   9  History of obesity (V13 89) (Z86 39)   10  History of osteopenia (V13 59) (Z87 39)   11  History of sciatica (V12 49) (Z86 69)   12  History of shortness of breath (V13 89) (Z87 898)   13  History of sinusitis (V12 69) (Z87 09)   14  History of syncope (V15 89) (Z87 898)   15  History of viral infection (V12 09) (Z86 19)   16  History of Joint pain, hip (719 45) (M25 559)   17  History of Limb pain (729 5) (M79 609)   18  History of Mammogram   19  History of Myalgia and myositis (729 1)   20  Need for prophylactic vaccination against single diseases (V05 9) (Z23)   21  Need for prophylactic vaccination and inoculation against influenza (V04 81) (Z23)   22  History of Preoperative cardiovascular examination (V72 81) (Z01 810)   23   History of Primary osteoarthritis of right knee (715 16) (M17 11)   24  History of Reported A Previous Heart Murmur   25  History of Reported Pap Smear   26  History of Right leg pain (729 5) (M79 604)   27  History of Sore throat (462) (J02 9)   28  History of Umbilical hernia (975 8) (K42 9)    Surgical History    1  History of Ankle Arthrodesis Right   2  History of Cholecystectomy   3  History of Cholecystectomy   4  History of Gastric Surgery For Morbid Obesity Bypass With Ann Marie-en-Y   5  History of Gastric Surgery For Morbid Obesity Bypass With Ann Marie-en-Y   6  History of Knee Replacement   7  History of Pilonidal Cyst Resection   8  History of Pilonidal Cyst Resection   9  History of Total Knee Arthroplasty   10  History of Umbilical Hernia Repair   11  History of Umbilical Hernia Repair    Family History  Mother    1  Family history of Coronary Artery Disease (V17 49)  Father    2  Family history of Coronary Artery Disease (V17 49)  Sister    3  Family history of Acute Lymphoma   4  Family history of Lymphomatoid Papulosis  Brother    5  Family history of Coronary Artery Disease (V17 49)    Social History    · Denied: History of Alcohol Use (History)   · Denied: History of Drug Use   · Denied: Exercising Regularly   · Never smoked tobacco (V49 89) (Z78 9)   · Social alcohol use (Z78 9)    Current Meds   1  Desoximetasone 0 25 % External Cream; APPLY TO AFFECTED AREA SPARINGLY AS   NEEDED; Therapy: 28Udf8459 to (Evaluate:01Jcw1206)  Requested for: 36NIL9908; Last   Rx:29Gxl0575 Ordered   2  Dicyclomine HCl - 10 MG Oral Capsule; Take 1 capsule twice daily as needed; Therapy: 86XUP1869 to (Evaluate:53Wgv6809)  Requested for: 52Euk4045; Last   Rx:94Jam2903 Ordered   3  Digoxin 125 MCG Oral Tablet; TAKE 1 TABLET DAILY  Requested for: 81Pjg9084; Last   Rx:35Yoh7681 Ordered   4  Diltiazem HCl - 120 MG Oral Tablet; Take 1 tablet twice daily;    Therapy: 54DDS8407 to (Evaluate:72Bro5592)  Requested for: 69RWR5783; Last Rx: 65RBY6351 Ordered   5  DULoxetine HCl - 60 MG Oral Capsule Delayed Release Particles; take 1 capsule daily; Therapy: 93FKS8050 to (Evaluate:33Qus7167)  Requested for: 08RJX8980; Last   Rx:72Oes5996 Ordered   6  Ergocalciferol 42669 UNIT Oral Capsule; TAKE 1 CAPSULE BY MOUTH EVERY WEEK; Therapy: 78DHM4357 to (Evaluate:23Htc4654)  Requested for: 60FHR8490 Recorded   7  Fluticasone Propionate 50 MCG/ACT Nasal Suspension; USE 2 SPRAYS IN EACH   NOSTRIL DAILY (AVOID NASAL SEPTUM); Therapy: 20BZJ8666 to (Evaluate:80Kvh2488)  Requested for: 85Nrs7212; Last   Rx:50Mty9000 Ordered   8  Gabapentin 300 MG Oral Capsule; TAKE 1 CAPSULE 4 TIMES DAILY -MAY CAUSE   DROWSINESS -DO NOT TAKE WITHALCOHOL; Therapy: 30TBT3038 to (Evaluate:32Kxc7056)  Requested for: 84KXS9044; Last   Rx:01Yqu9676 Ordered   9  Hydrocodone-Acetaminophen 5-325 MG Oral Tablet; Take 1 to 2 tablets every 8 hours as   needed for pain  No alcohol nor driving with medication use  Do not exceed 6 tablets   in 24 hour period; Therapy: 13PKM7768 to (Evaluate:03Cjv7352); Last Rx:67Mms8486 Ordered   10  Multivitamins Oral Capsule; TAKE 1 CAPSULE DAILY; Therapy: (Recorded:00Exo3889) to Recorded   11  Omeprazole 40 MG Oral Capsule Delayed Release; TAKE 1 CAPSULE DAILY; Therapy: 85Nqf3423 to (Evaluate:27Vcl2257)  Requested for: 47MZN2201; Last    Rx:40Opy0385 Ordered   12  Potassium Chloride Katheryn ER 20 MEQ Oral Tablet Extended Release; TAKE 1 TABLET    DAILY  Requested for: 13Xbf5627; Last Rx:04Qnj6656; Status: ACTIVE - Renewal Denied    Ordered   13  Torsemide 20 MG Oral Tablet; take 2 tablet daily; Therapy: 91Tsz2622 to (Evaluate:02Udc8258)  Requested for: 18Utk2721; Last    Rx:24Dkv1651 Ordered   14  Vitamin B-12 500 MCG Oral Tablet; TAKE 1 TABLET DAILY; Therapy: 82XYZ6565 to (Evaluate:40Dhz0589) Recorded   15  Vitamin D3 1000 UNIT Oral Capsule;     Therapy: (Recorded:97Ddr2446) to Recorded   16  Warfarin Sodium 2 MG Oral Tablet; TAKE 1 TABLET DAILY AS DIRECTED; Therapy: 57GWK2109 to (Evaluate:13Cth2361)  Requested for: 93XQG6408; Last    Rx:55Pco4981 Ordered   17  Warfarin Sodium 3 MG Oral Tablet; TAKE 1 TABLET DAILY; Therapy: 06AYO5247 to (Evaluate:54Aul8297)  Requested for: 72Cwa6613; Last    Rx:81Ndi3108 Ordered    Allergies    1  Bactrim DS TABS   2  Codeine Sulfate TABS   3  Percocet TABS   4  Sulfa Drugs    5  Seasonal    Future Appointments    Date/Time Provider Specialty Site   10/13/2016 09:30 AM Junito Duncan DO [facility]     Signatures   Electronically signed by : Michi Flores, ; Aug 24 2016  8:13AM EST                       (Author)    Electronically signed by :  Isa Rodrigues MD; Aug 24 2016 10:31AM EST

## 2018-01-12 NOTE — PROGRESS NOTES
Surgery Scheduling Form  Pre-Operative Risk Assessment:   Date Last Seen: 01/19/2016   Date of Surgery: 01/29/2016   Type of Surgery: Ankle Surgery   Surgeon Name: Jerry Wasserman Office Number: [phone]  Fax Number: [phone]     Pulmonary: No Pulmonary Contraindication for planned surgical procedure   Cardiac: No Cardiac Contraindication for planned surgical procedure   Vascular: No Vascular Contraindication for planned surgical procedure   Further Testing Required: No   Anticoagulation: No   Anesthesia: Choice   Patient Approved for Surgery: Yes   Clearing Doctor: Umer Lyles      Signatures   Electronically signed by : Umer Lyles MD; Jan 19 2016  2:35PM EST                       (Author)

## 2018-01-12 NOTE — MISCELLANEOUS
History of Present Illness    The patient is being contacted for follow-up after hospitalization  This was not a readmission  The date of discharge was 3/17/16  She has a moderate risk for readmission  Spoke with the patient  She was discharged home with home health services  Spoke to DAFNE  The name and phone number of the home health agency is [facility], [phone]- Louis Lovett is usual nurse  Left message for Louisabraham [place] to call me      Discharge instructions were reviewed and medications were reviewed at time of discharge  The patient's discharge weight was 235  Patient has follow-up appointment(s) on: To see PCP NP and HF NP this Friday  Patient is experiencing the following symptoms: edema   "just abit, but much better"  Knowledge Assessment/Teachback Questions: the patient is following diet, foods to limit/avoid, she knows the name of her medications/water pill and the patient understands the activity/exercise plan, but the patient is not obtaining daily weights and the patient does not know when to report symptoms  Counseling was provided to the patient  Topics counseled included diet, need for daily weights, medications, importance of compliance with treatment, symptoms to report, home health agency benefits and HF program # and when to call   HF Additional Notes:   Also reviewed reading food labels-  does shopping and does not really cook any healthy meals, so will try to meet with him at office visit  Current Meds   1  Aspirin 81 MG Oral Tablet; TAKE 1 TABLET DAILY; Therapy: (Recorded:41Rqm6534) to Recorded   2  Betamethasone Sod Phos & Acet 6 (3-3) MG/ML Injection Suspension (Celestone   Soluspan); 4 ml; To Be Done: 64FSA2762; Status: HOLD FOR - Administration Ordered   3  Calcium + D TABS; Advised to get between 1,200 and 2,000 mg daily, combining diet and   supplements; Therapy: (Recorded:24Xvz0916) to Recorded   4   Desoximetasone 0 25 % External Cream; APPLY TO AFFECTED AREA SPARINGLY AS   NEEDED; Therapy: 47Nvg6466 to (Evaluate:37Ksi6405)  Requested for: 07TDD1238; Last   Rx:64Vgh0141 Ordered   5  Dicyclomine HCl - 10 MG Oral Capsule (Bentyl); Take 1 capsule twice daily, as needed    Requested for: 04Oyv8838; Last Rx:49Ere8274 Ordered   6  DULoxetine HCl - 60 MG Oral Capsule Delayed Release Particles; TAKE 1 CAPSULE   Daily; Therapy: 42KZC6803 to (Evaluate:15Jun2016)  Requested for: 81DHU0812; Last   Rx:06Nup5640 Ordered   7  Ergocalciferol 56314 UNIT Oral Capsule; TAKE 1 CAPSULE BY MOUTH EVERY WEEK; Therapy: 44QKQ5464 to (Evaluate:19Oct2015)  Requested for: 78JMV9975 Recorded   8  Fluticasone Propionate 50 MCG/ACT Nasal Suspension; USE 2 SPRAYS IN EACH   NOSTRIL DAILY (AVOID NASAL SEPTUM); Therapy: 77NER4471 to (Joao Naranjo)  Requested for: 27Jun2014; Last   QT:82USI3793 Ordered   9  Gabapentin 300 MG Oral Capsule; TAKE 1 CAPSULE 4 TIMES DAILY -MAY CAUSE   DROWSINESS -DO NOT TAKE WITHALCOHOL; Therapy: 53XEH6662 to (Evaluate:90Smu2121)  Requested for: 89WYY9155; Last   Rx:09Lwy5349 Ordered   10  Hydrocodone-Acetaminophen 5-325 MG Oral Tablet; Take 1 to 2 tablets every 8 hours as    needed for pain  No alcohol nor driving with medication use  Do not exceed 6 tablets    in 24 hour period; Therapy: 30BOW5714 to (Evaluate:08Mar2016); Last Rx:30Vlz8900 Ordered   11  Multivitamins Oral Capsule; TAKE 1 CAPSULE DAILY; Therapy: (Recorded:00Uyf8674) to Recorded   12  Omeprazole 40 MG Oral Capsule Delayed Release (PriLOSEC); TAKE 1 CAPSULE    DAILY; Therapy: 66YDP0331 to (Evaluate:13Jan2016)  Requested for: 14Pkj7174; Last    Rx:94Qli7616 Ordered   13  Omeprazole 40 MG Oral Capsule Delayed Release; TAKE 1 CAPSULE DAILY; Therapy: 76Jpk5007 to (Evaluate:11Jun2016)  Requested for: 81THE4768; Last    Rx:01Klz8005 Ordered   14  Restasis 0 05 % Ophthalmic Emulsion; INSTILL 1 DROP IN [facility] EYE TWICE DAILY; Therapy: (Recorded:22Jan2016) to Recorded   15   Slow Fe 142 (45 Fe) MG Oral Tablet Extended Release; TAKE 1 TABLET DAILY; Therapy: 86QUF8739 to (Last NE:18AEM0623) Ordered   16  Torsemide 20 MG Oral Tablet; TAKE 1 TABLET DAILY AS DIRECTED; Therapy: 21Hrl1420 to (Evaluate:27Lqn2372); Last Rx:53Tmy0333 Ordered   17  Vitamin B-12 500 MCG Oral Tablet; TAKE 1 TABLET DAILY; Therapy: 68JXA8709 to (Evaluate:24Jah0854); Last ZX:49RSM5180 Ordered    Future Appointments    Date/Time Provider Specialty Site   05/03/2016 01:00 PM PIO Owens  Orthopedic Surgery [facility]   03/25/2016 01:40 PM Rudy Courser Cardiology [facility] CARDIOLOGY [facility]   05/03/2016 02:00 PM Calista Hi MD [facility]   03/25/2016 11:30 AM TEJAS Godoy [facility]     Allergies    1  Bactrim DS TABS   2  Codeine Sulfate TABS   3  Percocet TABS   4  Sulfa Drugs    5   Seasonal    Signatures   Electronically signed by : Corazon Whitmore, ; Mar 21 2016 12:31PM EST                       (Author)

## 2018-01-13 VITALS
OXYGEN SATURATION: 95 % | SYSTOLIC BLOOD PRESSURE: 130 MMHG | HEART RATE: 86 BPM | DIASTOLIC BLOOD PRESSURE: 70 MMHG | HEIGHT: 62 IN

## 2018-01-13 VITALS
BODY MASS INDEX: 37.21 KG/M2 | HEART RATE: 108 BPM | SYSTOLIC BLOOD PRESSURE: 120 MMHG | HEIGHT: 62 IN | WEIGHT: 202.19 LBS | DIASTOLIC BLOOD PRESSURE: 62 MMHG

## 2018-01-13 VITALS
HEIGHT: 62 IN | SYSTOLIC BLOOD PRESSURE: 138 MMHG | WEIGHT: 203 LBS | BODY MASS INDEX: 37.36 KG/M2 | DIASTOLIC BLOOD PRESSURE: 80 MMHG | RESPIRATION RATE: 16 BRPM | HEART RATE: 82 BPM | OXYGEN SATURATION: 97 % | TEMPERATURE: 97.6 F

## 2018-01-13 VITALS
SYSTOLIC BLOOD PRESSURE: 108 MMHG | HEART RATE: 88 BPM | BODY MASS INDEX: 37.36 KG/M2 | WEIGHT: 203 LBS | HEIGHT: 62 IN | DIASTOLIC BLOOD PRESSURE: 60 MMHG | OXYGEN SATURATION: 95 %

## 2018-01-13 VITALS
HEIGHT: 62 IN | HEART RATE: 73 BPM | SYSTOLIC BLOOD PRESSURE: 112 MMHG | DIASTOLIC BLOOD PRESSURE: 70 MMHG | BODY MASS INDEX: 38.1 KG/M2 | RESPIRATION RATE: 16 BRPM | OXYGEN SATURATION: 95 % | WEIGHT: 207.05 LBS

## 2018-01-13 VITALS
DIASTOLIC BLOOD PRESSURE: 74 MMHG | HEIGHT: 62 IN | BODY MASS INDEX: 36.62 KG/M2 | SYSTOLIC BLOOD PRESSURE: 121 MMHG | HEART RATE: 98 BPM | WEIGHT: 199 LBS

## 2018-01-13 NOTE — RESULT NOTES
Message   #1  Please address       Verified Results  (1) PT WITH INR 77Ttz3438 09:05AM Deon Yuli     Test Name Result Flag Reference   INR 2 23 H 0 86-1 16   PT 24 4 seconds H 12 0-14 3

## 2018-01-14 VITALS
HEART RATE: 69 BPM | BODY MASS INDEX: 37.36 KG/M2 | HEIGHT: 62 IN | DIASTOLIC BLOOD PRESSURE: 62 MMHG | SYSTOLIC BLOOD PRESSURE: 124 MMHG | WEIGHT: 203 LBS

## 2018-01-14 VITALS
WEIGHT: 203 LBS | HEIGHT: 62 IN | HEART RATE: 80 BPM | DIASTOLIC BLOOD PRESSURE: 75 MMHG | BODY MASS INDEX: 37.36 KG/M2 | SYSTOLIC BLOOD PRESSURE: 131 MMHG

## 2018-01-14 NOTE — RESULT NOTES
Message   Notify the patient the echocardiogram does show a mild to moderate regurgitation of the mitral valve and also does show a low normal systolic function please have the patient see cardiologist Dr Beyer Screen follow up as scheduled        Verified Results  ECHO COMPLETE WITH CONTRAST IF INDICATED 09EVG2259 09:06AM Jayashree Kincaid     Test Name Result Flag Reference   ECHO COMPLETE WITH CONTRAST IF INDICATED (Report)     [facility], [address] [place] [address]   [phone]     Transthoracic Echocardiogram   2D, M-mode, Doppler, and Color Doppler     Study date: 17-May-2017     Patient: Marlon Devlin   MR number: [id number]   Account number: [de-identified]   : 1933   Age: 80 years   Gender: Female   Status: Outpatient   Location: [facility]   Height: 62 in   Weight: 212 5 lb   BP: 138/ 80 mmHg     Indications: Follow up mitral regurgitation  Diagnoses: I34 9 - Nonrheumatic mitral valve disorder, unspecified     Sonographer: JESUS Wayne   Referring Physician: Berenice Delacruz DO   Group: [facility]   cc: Natalia Vargas MD   Interpreting Physician: Ileana Banks MD     SUMMARY     LEFT VENTRICLE:   Systolic function was normal  Ejection fraction was estimated to be 55 %  Although no diagnostic regional wall motion abnormality was identified, this possibility cannot be completely excluded on the basis of this study  RIGHT VENTRICLE:   The size was at the upper limits of normal    Systolic function was low normal      MITRAL VALVE:   There was mild to moderate regurgitation  COMPARISONS:   The previous study was not available for direct comparison, however, there have been changes from the report of that study  Comparison was made with the previous study of 08-Mar-2016  Estimated pulmonary pressures on today's study are   lower, and RV function overall appears improved   RV does not measure dilated, although visually does appear enlarged compared to LV  HISTORY: PRIOR HISTORY: Increasing shortness of breath, CHF, Hypertension, Afib, Edema     PROCEDURE: The study was performed in the [address] [place] [address] and [facility]  This was a routine study  The transthoracic approach was used  The study included complete 2D imaging, M-mode, complete spectral Doppler, and color Doppler  The   heart rate was 96 bpm, at the start of the study  Images were obtained from the parasternal, apical, subcostal, and suprasternal notch acoustic windows  Image quality was adequate  LEFT VENTRICLE: Size was normal  Systolic function was normal  Ejection fraction was estimated to be 55 %  Although no diagnostic regional wall motion abnormality was identified, this possibility cannot be completely excluded on the basis   of this study  Wall thickness was normal  DOPPLER: Normal sinus rhythm was absent  RIGHT VENTRICLE: The size was at the upper limits of normal  Systolic function was low normal      LEFT ATRIUM: The atrium was mildly dilated  RIGHT ATRIUM: Size was normal      MITRAL VALVE: There was mild to moderate annular calcification  There was mild thickening  There was mild calcification  There was normal leaflet separation  DOPPLER: There was no evidence for stenosis  There was mild to moderate   regurgitation  AORTIC VALVE: The valve was probably trileaflet  Leaflets exhibited mildly increased thickness, mild calcification, and normal cuspal separation  DOPPLER: There was no evidence for stenosis  There was no regurgitation  TRICUSPID VALVE: The valve structure was normal  There was normal leaflet separation  DOPPLER: There was no evidence for stenosis  There was mild regurgitation  Estimated peak PA pressure was 25 mmHg  PULMONIC VALVE: Leaflets exhibited normal thickness, no calcification, and normal cuspal separation  DOPPLER: The transpulmonic velocity was within the normal range   There was no regurgitation  PERICARDIUM: There was no pericardial effusion  The pericardium was normal in appearance  AORTA: The root exhibited normal size       SYSTEM MEASUREMENT TABLES     2D   %FS: 26 03 %   AV Diam: 2 55 cm   EDV(Teich): 94 34 ml   EF(Cube): 59 53 %   EF(Teich): 51 23 %   ESV(Cube): 37 84 ml   ESV(Teich): 46 01 ml   IVSd: 0 84 cm   LA Area: 21 69 cm2   LA Diam: 4 03 cm   LVEDV MOD A4C: 75 17 ml   LVEF MOD A4C: 62 83 %   LVESV MOD A4C: 27 94 ml   LVIDd: 4 54 cm   LVIDs: 3 36 cm   LVLd A4C: 7 07 cm   LVLs A4C: 5 41 cm   LVPWd: 0 82 cm   RA Area: 17 61 cm2   RV Diam: 3 07 cm   SI(Cube): 28 26 ml/m2   SI(Teich): 24 53 ml/m2   SV MOD A4C: 47 23 ml   SV(Cube): 55 67 ml   SV(Teich): 48 33 ml     CW   TR MaxP 76 mmHg   TR Vmax: 2 27 m/s     [employer] Accredited Echocardiography Laboratory     Prepared and electronically signed by     Paul Infante MD   Signed 17-May-2017 13:09:45       Signatures   Electronically signed by : Yanely Richard DO; May 17 2017  8:12PM EST                       (Author)

## 2018-01-14 NOTE — RESULT NOTES
Message   notify the pt mild elevated TSH -  please have the pt check Third-generation TSH and free T4 and follow up as scheduled        Verified Results  (1) COMPREHENSIVE METABOLIC PANEL 07NFP2072 09:38WM Han Martinezi   TW Order Number: [id number]     Test Name Result Flag Reference   SODIUM 141 mmol/L  136-145   POTASSIUM 3 9 mmol/L  3 5-5 3   CHLORIDE 107 mmol/L  100-108   CARBON DIOXIDE 25 mmol/L  21-32   ANION GAP (CALC) 9 mmol/L  4-13   BLOOD UREA NITROGEN 13 mg/dL  5-25   CREATININE 0 80 mg/dL  0 60-1 30   Standardized to IDMS reference method   CALCIUM 9 0 mg/dL  8 3-10 1   BILI, TOTAL 0 36 mg/dL  0 20-1 00   ALK PHOSPHATAS 92 U/L     ALT (SGPT) 20 U/L  12-78   AST(SGOT) 19 U/L  5-45   ALBUMIN 3 2 g/dL L 3 5-5 0   TOTAL PROTEIN 6 9 g/dL  6 4-8 2   eGFR Non-African American      >60 0 ml/min/1 73sq m   [facility] [employer] Disease Education Program recommendations are as follows:  GFR calculation is accurate only with a steady state creatinine  Chronic Kidney disease less than 60 ml/min/1 73 sq  meters  Kidney failure less than 15 ml/min/1 73 sq  meters  GLUCOSE FASTING 101 mg/dL H 65-99     (1) LIPID PANEL, FASTING 09Jun2017 09:49AM Han Cutler   TW Order Number: [id number]     Test Name Result Flag Reference   CHOLESTEROL 196 mg/dL     HDL,DIRECT 63 mg/dL H 40-60   Specimen collection should occur prior to Metamizole administration due to the potential for falsely depressed results  LDL CHOLESTEROL CALCULATED 108 mg/dL H 0-100   Triglyceride:         Normal              <150 mg/dl       Borderline High    150-199 mg/dl       High               200-499 mg/dl       Very High          >499 mg/dl  Cholesterol:         Desirable        <200 mg/dl      Borderline High  200-239 mg/dl      High             >239 mg/dl  HDL Cholesterol:        High    >59 mg/dL      Low     <41 mg/dL  LDL CALCULATED:    This screening LDL is a calculated result    It does not have the accuracy of the Direct Measured LDL in the monitoring of patients with hyperlipidemia and/or statin therapy  Direct Measure LDL (HFY237) must be ordered separately in these patients  TRIGLYCERIDES 125 mg/dL  <=150   Specimen collection should occur prior to N-Acetylcysteine or Metamizole administration due to the potential for falsely depressed results  (1) CBC/ PLT (NO DIFF) 51UHJ9418 09:49AM Anita Chiqui   TW Order Number: [id number]     Test Name Result Flag Reference   HEMATOCRIT 38 1 %  34 8-46 1   HEMOGLOBIN 12 2 g/dL  11 5-15 4   MCHC 32 0 g/dL  31 4-37 4   MCH 31 3 pg  26 8-34 3   MCV 98 fL  82-98   PLATELET COUNT 740 Thousands/uL  149-390   RBC COUNT 3 90 Million/uL  3 81-5 12   RDW 13 8 %  11 6-15 1   WBC COUNT 8 51 Thousand/uL  4 31-10 16   MPV 10 6 fL  8 9-12 7     (1) TSH 38HEE0948 09:49AM Anita Chiqui     Test Name Result Flag Reference   TSH 4 020 uIU/mL H 0 358-3 740   This bloodwork is non-fasting  Please drink two glasses of water morning of  bloodwork  Patients undergoing fluorescein dye angiography may retain small amounts of fluorescein in the body for 48-72 hours post procedure  Samples containing fluorescein can produce falsely depressed TSH values  If the patient had this procedure,a specimen should be resubmitted post fluorescein clearance            The recommended reference ranges for TSH during pregnancy are as follows:  First trimester 0 1 to 2 5 uIU/mL  Second trimester  0 2 to 3 0 uIU/mL  Third trimester 0 3 to 3 0 uIU/m       Signatures   Electronically signed by : Yanely Richard DO; Seamus 10 2017  4:47PM EST                       (Author)

## 2018-01-14 NOTE — RESULT NOTES
Message   Notify the patient the Holter monitor does show atrial fibrillation with a controlled ventricular rate there are occasional ventricular ectopy versus abberency please have the patient see cardiologist Dr Jagdish Keen follow up as scheduled        Verified Results  HOLTER MONITOR - Vdaim Tang 115 [id number] 02:29PM Cassi Meyer Order Number: [id number]    - Patient Instructions: To schedule this appointment, please contact Central Scheduling at [phone]  Test Name Result Flag Reference   HOLTER MONITOR - 48 HOUR (Report)     PT NAME: Brandi Zendejas   : 1933 AGE: 80 y o  GENDER: female   MRN: [id number] PROCEDURE: Holter monitor - 48 hour       INDICATIONS:    48 hour Holter monitor was performed 17 for atrial fibrillation  The patient was in atrial fibrillation throughout the test  Average heart rate was 74 BPM; minimum heart rate was 35 BPM at 3:36 PM; and maximum heart rate was 162 BPM at 10:28 AM     Average hourly heart rate ranged between  BPM       Ventricular ectopic activity consisted of 324 beats, which comprises 0 2% of total beats  13 were in 2 runs, 96 were in couplets, 10 were late beats, remainder were isolated PVCs  Longest ventricular run was 9 beats at a HR of 126 BPM at 2:53 AM on day    2, possible afib with aberrancy as rhythm was irregular and preceded by a long-short sequence (Rik's phenomenon)  Fastest ventricular run was 4 beats with a maximum HR of 135 BPM at 2:53 AM on day 2  Longest R-R interval was 2 3 seconds at 5:48 AM       IMPRESSIONS:   1  Atrial fibrillation with controlled ventricular response  2  Occasional ventricular ectopy versus aberrancy  3  No symptoms reported  Interpreted by:  Dorothy Ford MD       Signatures   Electronically signed by : Toribio Montes DO; 2017  6:13PM EST                       (Author)

## 2018-01-14 NOTE — MISCELLANEOUS
Message  GI Reminder Recall Dru Singh:   Date: 12/21/2016   Dear Gypsy Dillard:     Review of our records shows you are due for the following: Follow Up Visit  Please call the following office to schedule your appointment:   [address] [facility], [address], [address] [facility] [phone]  We look forward to hearing from you!      Sincerely,       [facility]      Signatures   Electronically signed by : Meli Deras, ; Dec 21 2016  2:21PM EST                       (Author)

## 2018-01-15 NOTE — PROGRESS NOTES
History of Present Illness  Care Coordination Encounter Information:   Type of Encounter: Telephonic   Contact: Follow-Up    Spoke to Patient  Care Coordination SL Nurse Shanelle Learn:   The reason for call is to discuss outreach for follow up/needed services  outreach TC to patient for CM assessment  Patient reports she was at cardiology and had digoxin stopped  Patient denies any CP/pressure, palpitations, edema  Patient does report SOB w/ exertion  No chronic cough  Patient does report BLE swelling trace amount  Patient is not always compliant with her diuretic  Patient is to have Holter monitor applied tomorrow for 24 hours  Patient has F/U visit scheduled with cardiology, orthopedics, and PCP  Patient verbalizes understanding of and compliance with medication schedule  Patient has no current needs/concerns  Patient agrees with program closure      Active Problems    1  Abdominal bloating (787 3) (R14 0)   2  Abdominal pain (789 00) (R10 9)   3  Acute upper respiratory infection (465 9) (J06 9)   4  Advanced directives, counseling/discussion (V65 49) (Z71 89)   5  Allergic rhinitis (477 9) (J30 9)   6  Anemia (285 9) (D64 9)   7  Anticoagulated on warfarin (V58 61) (Z79 01)   8  Arthralgia of hip (719 45) (M25 559)   9  Arthralgia of knee, right (719 46) (M25 561)   10  Atrial fibrillation (427 31) (I48 91)   11  Benign essential hypertension (401 1) (I10)   12  Bursitis of right hip (726 5) (M70 71)   13  Chronic diastolic CHF (congestive heart failure) (428 32,428 0) (I50 32)   14  Chronic pain of lower extremity, bilateral (729 5,338 29) (M79 604,M79 605,G89 29)   15  Community acquired pneumonia (5) (J18 9)   12  Diarrhea (787 91) (R19 7)   17  JEROME (dyspnea on exertion) (786 09) (R06 09)   18  Early satiety (780 94) (R68 81)   19  Edema (782 3) (R60 9)   20  Encounter for routine gynecological examination (V72 31) (Z01 419)   21   Encounter for screening for other nervous system disorder (V80 09) (Z13 858)   22  Encounter for screening mammogram for malignant neoplasm of breast (V76 12)    (Z12 31)   23  Encounter for special screening examination for genitourinary disorder (V81 6) (Z13 89)   24  External hemorrhoids (455 3) (K64 4)   25  Fibromyalgia (729 1) (M79 7)   26  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)   27  Headache (784 0) (R51)   28  Helicobacter pylori gastritis (535 10,041 86) (K29 70,B96 81)   29  Hematoma (924 9) (T14 8)   30  Impacted cerumen of left ear (380 4) (H61 22)   31  Irritable bowel syndrome with constipation (564 1) (K58 1)   32  Lichen sclerosus of female genitalia (701 0) (N90 4)   33  Long term use of drug (V58 69) (Z79 899)   34  Lower back pain (724 2) (M54 5)   35  Lumbar canal stenosis (724 02) (M48 06)   36  Moderate mitral regurgitation (424 0) (I34 0)   37  Morbid obesity (278 01) (E66 01)   38  Need for prophylactic vaccination against single diseases (V05 9) (Z23)   39  Need for prophylactic vaccination and inoculation against influenza (V04 81) (Z23)   40  Osteoarthritis, multiple sites (715 89) (M15 9)   41  Osteoporosis (733 00) (M81 0)   42  Palpitations (785 1) (R00 2)   43  Pap smear, as part of routine gynecological examination (V76 2) (Z01 419)   44  Peripheral neuropathy (356 9) (G62 9)   45  Pes anserinus bursitis of right knee (726 61) (M70 51)   46  Preoperative clearance (V72 84) (Z01 818)   47  Primary osteoarthritis of right knee (715 16) (M17 11)   48  Right ankle pain (719 47) (M25 571)   49  Routine Gynecological Exam With Cervical Pap Smear (V72 31)   50  Screening for genitourinary condition (V81 6) (Z13 89)   51  Screening for neurological condition (V80 09) (Z13 89)   52  Screening for osteoporosis (V82 81) (Z13 820)   53  Skin lump of leg, left (782 2) (R22 42)   54  Status post gastric bypass for obesity (V45 86) (Z98 84)   55  Synovial cyst of right popliteal space (727 51) (M71 21)   56  Unstable right hip (718 85) (M25 351)   57   Urinary incontinence (788 30) (R32)   58  Visit for screening mammogram (V76 12) (Z12 31)   59  Vitamin D deficiency (268 9) (E55 9)   60  Wheezing (786 07) (R06 2)    Past Medical History    1  History of Acute Myocardial Infarction (V12 59)   2  History of Birth History   3  History of Disturbance Of Taste (781 1)   4  History of Easy bruising (782 9) (R23 8)   5  History of Effusion of knee joint right (719 06) (M25 461)   6  History of atrial fibrillation (V12 59) (Z86 79)   7  History of cancer (V10 90) (Z85 9)   8  History of dermatitis (V13 3) (Z87 2)   9  History of obesity (V13 89) (Z86 39)   10  History of osteopenia (V13 59) (Z87 39)   11  History of sciatica (V12 49) (Z86 69)   12  History of shortness of breath (V13 89) (Z87 898)   13  History of sinusitis (V12 69) (Z87 09)   14  History of syncope (V15 89) (Z87 898)   15  History of viral infection (V12 09) (Z86 19)   16  History of Joint pain, hip (719 45) (M25 559)   17  History of Limb pain (729 5) (M79 609)   18  History of Mammogram   19  History of Myalgia and myositis (729 1)   20  Need for prophylactic vaccination against single diseases (V05 9) (Z23)   21  Need for prophylactic vaccination and inoculation against influenza (V04 81) (Z23)   22  History of Preoperative cardiovascular examination (V72 81) (Z01 810)   23  History of Primary osteoarthritis of right knee (715 16) (M17 11)   24  History of Reported A Previous Heart Murmur   25  History of Reported Pap Smear   26  History of Right leg pain (729 5) (M79 604)   27  History of Sore throat (462) (J02 9)   28  History of Umbilical hernia (885 8) (K42 9)    Surgical History    1  History of Ankle Arthrodesis Right   2  History of Cholecystectomy   3  History of Cholecystectomy   4  History of Gastric Surgery For Morbid Obesity Bypass With Ann Marie-en-Y   5  History of Gastric Surgery For Morbid Obesity Bypass With Ann Marie-en-Y   6  History of Knee Replacement   7  History of Pilonidal Cyst Resection   8  History of Pilonidal Cyst Resection   9  History of Total Knee Arthroplasty   10  History of Umbilical Hernia Repair   11  History of Umbilical Hernia Repair    Family History  Mother    1  Family history of Coronary Artery Disease (V17 49)  Father    2  Family history of Coronary Artery Disease (V17 49)  Sister    3  Family history of Acute Lymphoma   4  Family history of Lymphomatoid Papulosis  Brother    5  Family history of Coronary Artery Disease (V17 49)    Social History    · Denied: History of Alcohol Use (History)   · Denied: History of Drug Use   · Denied: Exercising Regularly   · Never smoked tobacco (V49 89) (Z78 9)   · Social alcohol use (Z78 9)    Current Meds    1  Fluticasone Propionate 50 MCG/ACT Nasal Suspension; USE 2 SPRAYS IN EACH   NOSTRIL DAILY (AVOID NASAL SEPTUM); Therapy: 37ETA4639 to (Evaluate:19Sep2017)  Requested for: 42PHM4555; Last   Rx:44Gda1263 Ordered    2  TraMADol HCl - 50 MG Oral Tablet; TAKE 1 TABLET BY MOUTH TWICE A DAY AS   NEEDED FOR PAIN;   Therapy: 03Apr2017 to (Evaluate:03Sep2017)  Requested for: 10Inb4816; Last   Rx:36Uax6887; Status: ACTIVE - Renewal Denied Ordered    3  Warfarin Sodium 1 MG Oral Tablet; TAKE 1 TABLET DAILY AS DIRECTED; Therapy: 51PSJ3351 to (96 446853)  Requested for: 25Apr2017; Last   Rx:25Apr2017 Ordered   4  Warfarin Sodium 5 MG Oral Tablet (Coumadin); TAKE 1 TABLET DAILY AS DIRECTED; Therapy: 67EPS0857 to (Evaluate:30Jan2018)  Requested for: 93Wtk7389; Last   Rx:55Rcv5103 Ordered    5  DilTIAZem HCl - 120 MG Oral Tablet; Take 1 tablet twice daily; Therapy: 81Qyf9303 to (Evaluate:30Jan2018)  Requested for: 51Khc1862; Last   Rx:60Zji9912; Status: ACTIVE - Retrospective Authorization Ordered    6  Potassium Chloride ER 20 MEQ Oral Tablet Extended Release; Take 1 tablet daily; Therapy: 02BXN9155 to Recorded   7  Torsemide 20 MG Oral Tablet; 2 tabs daily prn; Therapy: 20NDM2219 to (Evaluate:30Mar2017) Recorded    8   Dicyclomine HCl - 10 MG Oral Capsule (Bentyl); Take 1 capsule twice daily as needed; Therapy: 26HKB5222 to (Evaluate:76Els2503)  Requested for: 64BMT3579; Last   Rx:20Cog4870 Ordered   9  DilTIAZem HCl - 120 MG Oral Tablet; Take 1 tablet twice daily; Therapy: 93ESB2310 to (PPSHBBQK:04WDC2132)  Requested for: 21Gml5267; Last   Rx:92Nbr7360 Ordered    10  DULoxetine HCl - 60 MG Oral Capsule Delayed Release Particles; take 1 capsule daily; Therapy: 54WWX3405 to (Evaluate:31Oct2017)  Requested for: 64Qhj6633; Last    Rx:51Zev1639 Ordered    11  Omeprazole 40 MG Oral Capsule Delayed Release; TAKE ONE CAPSULE BY MOUTH    ONCE DAILY; Therapy: 50Fyb3354 to (Evaluate:96Rgp5675)  Requested for: 34UOK0484; Last    Rx:72Lca5648 Ordered    12  Desoximetasone 0 25 % External Cream; APPLY TO AFFECTED AREA SPARINGLY AS    NEEDED; Therapy: 75Qlx6475 to (Evaluate:59Ugc6451)  Requested for: 84ODC0275; Last    Rx:31Cts8293 Ordered    Allergies    1  Bactrim DS TABS   2  Codeine Sulfate TABS   3  Percocet TABS   4  Sulfa Drugs    5  Seasonal    End of Encounter Meds    1  Fluticasone Propionate 50 MCG/ACT Nasal Suspension; USE 2 SPRAYS IN EACH   NOSTRIL DAILY (AVOID NASAL SEPTUM); Therapy: 27PPI9839 to (Evaluate:04Ohg3238)  Requested for: 15BGN4489; Last   Rx:90Knj8936 Ordered    2  TraMADol HCl - 50 MG Oral Tablet; TAKE 1 TABLET BY MOUTH TWICE A DAY AS   NEEDED FOR PAIN;   Therapy: 70Gcg9020 to (Evaluate:49Xuz6505)  Requested for: 06Yat9628; Last   Rx:07Mfp5551; Status: ACTIVE - Renewal Denied Ordered    3  Warfarin Sodium 1 MG Oral Tablet; TAKE 1 TABLET DAILY AS DIRECTED; Therapy: 84LML3702 to [phone]  Requested for: 91Eld1184; Last   Rx:77Vgq6079 Ordered   4  Warfarin Sodium 5 MG Oral Tablet (Coumadin); TAKE 1 TABLET DAILY AS DIRECTED; Therapy: 50MJW2735 to (Evaluate:30Jan2018)  Requested for: 03Aug2017; Last   Rx:03Aug2017 Ordered    5  DilTIAZem HCl - 120 MG Oral Tablet; Take 1 tablet twice daily;    Therapy: 03Aug2017 to (Evaluate:30Jan2018)  Requested for: 94Nim4060; Last   Rx:51Eaq7477; Status: ACTIVE - Retrospective Authorization Ordered    6  Potassium Chloride ER 20 MEQ Oral Tablet Extended Release; Take 1 tablet daily; Therapy: 83LTH4293 to Recorded   7  Torsemide 20 MG Oral Tablet; 2 tabs daily prn; Therapy: 16ABD8375 to (Evaluate:30Mar2017) Recorded    8  Dicyclomine HCl - 10 MG Oral Capsule (Bentyl); Take 1 capsule twice daily as needed; Therapy: 74IVN1238 to (Evaluate:19Sep2017)  Requested for: 84VKU6931; Last   Rx:83Zem3996 Ordered   9  DilTIAZem HCl - 120 MG Oral Tablet; Take 1 tablet twice daily; Therapy: 35IQB2897 to (ALQSJYFP:22FIK2177)  Requested for: 01Zyk6171; Last   Rx:32Nqg4268 Ordered    10  DULoxetine HCl - 60 MG Oral Capsule Delayed Release Particles; take 1 capsule daily; Therapy: 33DZQ7651 to (Evaluate:31Oct2017)  Requested for: 04Apr2017; Last    Rx:04Apr2017 Ordered    11  Omeprazole 40 MG Oral Capsule Delayed Release; TAKE ONE CAPSULE BY MOUTH    ONCE DAILY; Therapy: 01Vbd9765 to (Evaluate:28Sep2017)  Requested for: 30SGA1796; Last    Rx:13Xmk9145 Ordered    12  Desoximetasone 0 25 % External Cream; APPLY TO AFFECTED AREA SPARINGLY AS    NEEDED; Therapy: 85Rig3962 to ([place]ark)  Requested for: 76RPW9626; Last    Rx:01Nov2016 Ordered    Future Appointments    Date/Time Provider Specialty Site   10/10/2017 09:30 AM PIO Villalba   Orthopedic Surgery [facility]   11/28/2017 10:00 AM Deepak Chris DO [facility]     Patient Care Team    Care Team Member Role Specialty Office Number   Dontrell TROTTER MD Specialist Cardiology [phone] DPM Specialist Podiatry [phone]   Tracee Burnette MD Specialist General Surgery [phone]   Clinton Whitt MD Specialist Obstetrics/Gynecology [phone]   [facility] Specialist Dermatology [phone]   Camille Downs MD Specialist Ophthalmology [phone]   [place] Specialist Orthopedic Surgery [phone]   El Quintana DO Specialist Pain Management [phone]   Melvi WILSON   Specialist Orthopedic Surgery , Jesenia Moy DO Specialist Gastroenterology Adult [phone]   Shweta Goodman MD  Orthopedic Surgery [phone]   Aryan Nguyen DPM Specialist Podiatry [phone] [address] Gil[address] Garcia MD Specialist Orthopedic Surgery , [address] [place] [address]  Internal Medicine [phone]     Signatures   Electronically signed by : Raul Rueda RN; Aug 15 2017 10:30AM EST                       (Author)

## 2018-01-15 NOTE — MISCELLANEOUS
Message   Recorded as Task   Date: 05/31/2016 02:48 PM, Created By: [place]   Task Name: Follow Up   Assigned To: Dina Vega   Regarding Patient: Neli Hernandez, Status: Active   Comment:    Christal Pompa - 31 May 2016 2:48 PM     TASK CREATED  Caller: Self; Other; [phone] (Home)  PT  CAN'T FIND THE PAPER GIVEN TO HER TO HAVE HER INR DONE- WAS GOOD FOR 6 MOS  DOESN'T REMEMBER IF SHE GOT IT IN THE HOSPITAL OR IN OUR OFFICE  PLEASE FILL OUT NEW SLIP  CALL PT  WHEN READY  Dina Vega - 44 May 2016 3:01 PM     TASK EDITED  CTN        Plan  Anticoagulated on warfarin, Atrial fibrillation    · (1) PT WITH INR; Status:Active; Requested for:41Ygi9521;     Signatures   Electronically signed by :  Jolly Porter MD; May 31 2016  3:02PM EST                       (Author)

## 2018-01-15 NOTE — RESULT NOTES
Message   notify the pt normal cbc f/u as scheduled        Verified Results  (1) COMPREHENSIVE METABOLIC PANEL 03MCI7119 45:73ZR Vee Ghotra   TW Order Number: [id number]     Test Name Result Flag Reference   SODIUM 141 mmol/L  136-145   POTASSIUM 3 9 mmol/L  3 5-5 3   CHLORIDE 107 mmol/L  100-108   CARBON DIOXIDE 25 mmol/L  21-32   ANION GAP (CALC) 9 mmol/L  4-13   BLOOD UREA NITROGEN 13 mg/dL  5-25   CREATININE 0 80 mg/dL  0 60-1 30   Standardized to IDMS reference method   CALCIUM 9 0 mg/dL  8 3-10 1   BILI, TOTAL 0 36 mg/dL  0 20-1 00   ALK PHOSPHATAS 92 U/L     ALT (SGPT) 20 U/L  12-78   AST(SGOT) 19 U/L  5-45   ALBUMIN 3 2 g/dL L 3 5-5 0   TOTAL PROTEIN 6 9 g/dL  6 4-8 2   eGFR Non-African American      >60 0 ml/min/1 73sq [facility] Disease Education Program recommendations are as follows:  GFR calculation is accurate only with a steady state creatinine  Chronic Kidney disease less than 60 ml/min/1 73 sq  meters  Kidney failure less than 15 ml/min/1 73 sq  meters  GLUCOSE FASTING 101 mg/dL H 65-99     (1) LIPID PANEL, FASTING 09Jun2017 09:49AM Vee Ghotra   TW Order Number: [id number]     Test Name Result Flag Reference   CHOLESTEROL 196 mg/dL     HDL,DIRECT 63 mg/dL H 40-60   Specimen collection should occur prior to Metamizole administration due to the potential for falsely depressed results  LDL CHOLESTEROL CALCULATED 108 mg/dL H 0-100   Triglyceride:         Normal              <150 mg/dl       Borderline High    150-199 mg/dl       High               200-499 mg/dl       Very High          >499 mg/dl  Cholesterol:         Desirable        <200 mg/dl      Borderline High  200-239 mg/dl      High             >239 mg/dl  HDL Cholesterol:        High    >59 mg/dL      Low     <41 mg/dL  LDL CALCULATED:    This screening LDL is a calculated result    It does not have the accuracy of the Direct Measured LDL in the monitoring of patients with hyperlipidemia and/or statin therapy  Direct Measure LDL (GSA855) must be ordered separately in these patients  TRIGLYCERIDES 125 mg/dL  <=150   Specimen collection should occur prior to N-Acetylcysteine or Metamizole administration due to the potential for falsely depressed results       (1) CBC/ PLT (NO DIFF) 60KEF5567 09:49AM Miroslava Fall    Order Number: [id number]     Test Name Result Flag Reference   HEMATOCRIT 38 1 %  34 8-46 1   HEMOGLOBIN 12 2 g/dL  11 5-15 4   MCHC 32 0 g/dL  31 4-37 4   MCH 31 3 pg  26 8-34 3   MCV 98 fL  82-98   PLATELET COUNT 492 Thousands/uL  149-390   RBC COUNT 3 90 Million/uL  3 81-5 12   RDW 13 8 %  11 6-15 1   WBC COUNT 8 51 Thousand/uL  4 31-10 16   MPV 10 6 fL  8 9-12 7       Signatures   Electronically signed by : Veena Lovett DO; Seamus 10 2017  4:44PM EST                       (Author)

## 2018-01-15 NOTE — PROGRESS NOTES
Chief Complaint  VALE FROM [facility] CALLED STATING PATIENT HAS GAINED 8 LBS IN 4 DAYS PT'S CURRENT WEIGHT  LBS  PER K05 ADVISED VALE TO CALL PT CARDIOLOGIST KELVIN BEAVER  Active Problems     1  Abdominal pain (789 00) (R10 9)   2  Acute foot pain, left (729 5) (M79 672)   3  Allergic rhinitis (477 9) (J30 9)   4  Anemia (285 9) (D64 9)   5  Arthralgia of hip (719 45) (M25 559)   6  Arthralgia of knee, right (719 46) (M25 561)   7  Bursitis of right hip (726 5) (M70 71)   8  Edema (782 3) (R60 9)   9  Encounter for screening for other nervous system disorder (V80 09) (Z13 858)   10  Encounter for screening mammogram for malignant neoplasm of breast (V76 12)    (Z12 31)   11  Encounter for special screening examination for genitourinary disorder (V81 6) (Z13 89)   12  External hemorrhoids (455 3) (K64 4)   13  Fibromyalgia (729 1) (M79 7)   14  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)   15  Headache (784 0) (R51)   16  Heart murmur (785 2) (R01 1)   17  Helicobacter pylori gastritis (535 10,041 86) (K29 70,B96 81)   18  Irritable bowel syndrome (564 1) (K58 9)   19  Irritable bowel syndrome with constipation (564 1) (K58 9)   20  Lichen sclerosus of female genitalia (701 0) (N90 4)   21  Long term use of drug (V58 69) (Z79 899)   22  Lower back pain (724 2) (M54 5)   23  Lumbar canal stenosis (724 02) (M48 06)   24  Morbid obesity (278 01) (E66 01)   25  Need for prophylactic vaccination against single diseases (V05 9) (Z23)   26  Need for prophylactic vaccination and inoculation against influenza (V04 81) (Z23)   27  Osteoarthritis, multiple sites (715 89) (M15 9)   28  Osteoporosis (733 00) (M81 0)   29  Palpitations (785 1) (R00 2)   30  Peripheral neuropathy (356 9) (G62 9)   31  Preoperative clearance (V72 84) (Z01 818)   32  Primary osteoarthritis of right knee (715 16) (M17 11)   33  Right ankle pain (719 47) (M25 571)   34  Routine Gynecological Exam With Cervical Pap Smear (V72 31)   35  Screening for genitourinary condition (V81 6) (Z13 89)   36  Screening for neurological condition (V80 09) (Z13 89)   37  Status post gastric bypass for obesity (V45 86) (Z98 84)   38  Synovial cyst of right popliteal space (727 51) (M71 21)   39  Urinary incontinence (788 30) (R32)   40  Vitamin D deficiency (268 9) (E55 9)    Benign essential hypertension (401 1) (I10)       Mitral regurgitation (424 0) (I34 0)     - Mild - 2014       Paroxysmal atrial fibrillation (427 31) (I48 0)     - Occurred during a hospitalization for sepsis       Preoperative cardiovascular examination (V72 81) (Z01 810)       Atrial fibrillation (427 31) (I48 91)       Chronic atrial fibrillation (427 31) (I48 2)       Chronic combined systolic and diastolic CHF (congestive heart failure) (428 42) (I50 42)          Current Meds   1  Desoximetasone 0 25 % External Cream; APPLY TO AFFECTED AREA SPARINGLY AS   NEEDED; Therapy: 66Mpk7824 to (Evaluate:15Exv4389)  Requested for: 09PBR8082; Last   Rx:17Oct2014 Ordered   2  Dicyclomine HCl - 10 MG Oral Capsule; Take 1 capsule daily, as needed; Therapy: ([facility] to  Requested for: 72HTQ3172 Recorded   3  Digoxin 125 MCG Oral Tablet; TAKE 1 TABLET DAILY; Therapy: (Recorded:25Mar2016) to Recorded   4  DULoxetine HCl - 60 MG Oral Capsule Delayed Release Particles; TAKE 1 CAPSULE   Daily; Therapy: 28OBO0240 to (Evaluate:15Jun2016)  Requested for: 33IOM2197; Last   Rx:64Kuo6395 Ordered   5  Ergocalciferol 51288 UNIT Oral Capsule; TAKE 1 CAPSULE BY MOUTH EVERY WEEK; Therapy: 67PCC9414 to (Evaluate:19Oct2015)  Requested for: 11DQF1691 Recorded   6  Fluticasone Propionate 50 MCG/ACT Nasal Suspension; USE 2 SPRAYS IN EACH   NOSTRIL DAILY (AVOID NASAL SEPTUM); Therapy: 92VFX5624 to (Porfirio Coon)  Requested for: 27Jun2014; Last   Rx:27Jun2014 Ordered   7  Gabapentin 300 MG Oral Capsule; TAKE 1 CAPSULE 4 TIMES DAILY -MAY CAUSE   DROWSINESS -DO NOT TAKE WITHALCOHOL;    Therapy: 65ZFV9921 to (Evaluate:73Two8386)  Requested for: 22NCX1578; Last   Rx:09Eoe0830 Ordered   8  Hydrocodone-Acetaminophen 5-325 MG Oral Tablet; Take 1 to 2 tablets every 8 hours as   needed for pain  No alcohol nor driving with medication use  Do not exceed 6 tablets   in 24 hour period; Therapy: 34BCA4539 to (Evaluate:20Jun2016); Last Rx:22Mar2016 Ordered   9  Metoprolol Tartrate 50 MG Oral Tablet; 1 5 tabs every 12 hours; Last Rx:25Mar2016   Ordered   10  Multivitamins Oral Capsule; TAKE 1 CAPSULE DAILY; Therapy: (Recorded:48Xdu5415) to Recorded   11  Omeprazole 40 MG Oral Capsule Delayed Release; TAKE 1 CAPSULE DAILY; Therapy: 89Rsk4780 to (Evaluate:11Jun2016)  Requested for: 67WAH7990; Last    Rx:53Frb3318 Ordered   12  Omeprazole 40 MG Oral Capsule Delayed Release; TAKE 1 CAPSULE DAILY; Therapy: 10PKP3485 to (Evaluate:27Eol0390)  Requested for: 25Mar2016; Last    Rx:25Mar2016 Ordered   13  Restasis 0 05 % Ophthalmic Emulsion; INSTILL 1 DROP IN [facility] EYE TWICE DAILY; Therapy: (Recorded:22Jan2016) to Recorded   14  Slow Fe 142 (45 Fe) MG Oral Tablet Extended Release; TAKE 1 TABLET DAILY; Therapy: 22XXT0086 to (Last CQ:35IZM1136) Ordered   15  Torsemide 20 MG Oral Tablet; TAKE 2 TABS DAILY; Therapy: 14Xbp1056 to (Evaluate:20Mar2017) Recorded   16  Vitamin B-12 500 MCG Oral Tablet; TAKE 1 TABLET DAILY; Therapy: 69OTB0730 to (Evaluate:93Gpu3626); Last GT:63PJU9082 Ordered   17  Vitamin D3 1000 UNIT Oral Capsule; Therapy: (Recorded:25Mar2016) to Recorded   18  Warfarin Sodium 3 MG Oral Tablet; TAKE 1 TABLET DAILY; Therapy: 50SWD5879 to (Evaluate:30Apr2016)  Requested for: 99MRG6218; Last    Rx:31Mar2016 Ordered   19  Warfarin Sodium 3 MG Oral Tablet; TAKE 1 TABLET DAILY; Therapy: 55TQH9745 to (Evaluate:28Jun2016)  Requested for: 01Apr2016; Last    Rx:30Mar2016 Ordered   20  Warfarin Sodium TABS; Therapy: (Recorded:25Mar2016) to Recorded    Allergies    1  Bactrim DS TABS   2   Codeine Sulfate TABS   3  Percocet TABS   4  Sulfa Drugs    5  Seasonal    Future Appointments    Date/Time Provider Specialty Site   05/03/2016 01:00 PM PIO Mercedes  Orthopedic Surgery [facility]     Signatures   Electronically signed by :  Chelsey Sanchez MD; Apr 23 2016 10:38AM EST

## 2018-01-15 NOTE — MISCELLANEOUS
Provider Comments  Provider Comments:   Pt was a n/s  SWP, she forgot her apt, pt made new apt        Signatures   Electronically signed by : Nazario Valencia DO; Feb 14 2017  8:24PM EST                       (Author)

## 2018-01-15 NOTE — RESULT NOTES
Message   notify the pt normal lipid panel f/u as scheduled        Verified Results  (1) COMPREHENSIVE METABOLIC PANEL 20EHI7594 69:93ZU "[employer]   TW Order Number: [id number]     Test Name Result Flag Reference   SODIUM 141 mmol/L  136-145   POTASSIUM 3 9 mmol/L  3 5-5 3   CHLORIDE 107 mmol/L  100-108   CARBON DIOXIDE 25 mmol/L  21-32   ANION GAP (CALC) 9 mmol/L  4-13   BLOOD UREA NITROGEN 13 mg/dL  5-25   CREATININE 0 80 mg/dL  0 60-1 30   Standardized to IDMS reference method   CALCIUM 9 0 mg/dL  8 3-10 1   BILI, TOTAL 0 36 mg/dL  0 20-1 00   ALK PHOSPHATAS 92 U/L     ALT (SGPT) 20 U/L  12-78   AST(SGOT) 19 U/L  5-45   ALBUMIN 3 2 g/dL L 3 5-5 0   TOTAL PROTEIN 6 9 g/dL  6 4-8 2   eGFR Non-African American      >60 0 ml/min/1 73sq [facility] Disease Education Program recommendations are as follows:  GFR calculation is accurate only with a steady state creatinine  Chronic Kidney disease less than 60 ml/min/1 73 sq  meters  Kidney failure less than 15 ml/min/1 73 sq  meters  GLUCOSE FASTING 101 mg/dL H 65-99     (1) LIPID PANEL, FASTING 09Jun2017 09:49AM [employer] Order Number: [id number]     Test Name Result Flag Reference   CHOLESTEROL 196 mg/dL     HDL,DIRECT 63 mg/dL H 40-60   Specimen collection should occur prior to Metamizole administration due to the potential for falsely depressed results  LDL CHOLESTEROL CALCULATED 108 mg/dL H 0-100   Triglyceride:         Normal              <150 mg/dl       Borderline High    150-199 mg/dl       High               200-499 mg/dl       Very High          >499 mg/dl  Cholesterol:         Desirable        <200 mg/dl      Borderline High  200-239 mg/dl      High             >239 mg/dl  HDL Cholesterol:        High    >59 mg/dL      Low     <41 mg/dL  LDL CALCULATED:    This screening LDL is a calculated result    It does not have the accuracy of the Direct Measured LDL in the monitoring of patients with hyperlipidemia and/or statin therapy  Direct Measure LDL (FHO492) must be ordered separately in these patients  TRIGLYCERIDES 125 mg/dL  <=150   Specimen collection should occur prior to N-Acetylcysteine or Metamizole administration due to the potential for falsely depressed results         Signatures   Electronically signed by : Sunita Stapleton DO; Seamus 10 2017  4:44PM EST                       (Author)

## 2018-01-15 NOTE — RESULT NOTES
Message   Notify the patient low vitamin D level; please let me know how much vitamin D the patient is currently taking        Verified Results  (1) VITAMIN D 25-HYDROXY 97Swb2641 11:38AM Symone Soares     Test Name Result Flag Reference   VIT D 25-HYDROX 8 8 ng/mL L 30 0-100 0   This assay is a certified procedure of the CDC Vitamin D Standardization Certification Program (VDSCP)     Deficiency <20ng/ml   Insufficiency 20-30ng/ml   Sufficient  ng/ml     *Patients undergoing fluorescein dye angiography may retain small amounts of fluorescein in the body for 48-72 hours post procedure  Samples containing fluorescein can produce falsely elevated Vitamin D values  If the patient had this procedure, a specimen should be resubmitted post fluorescein clearance  Signatures   Electronically signed by : Mitchell Mccrary DO;  Apr 25 2017  7:03PM EST                       (Author)

## 2018-01-15 NOTE — CONSULTS
Chief Complaint  Patient here for right foot surgery      History of Present Illness  Pre-Op Visit (Brief): The patient is being seen for a preoperative visit  The procedure is a(n) RIGHT ANKLE REPAIR scheduled for 1/29/16 with JORDY  The indication for surgery is PAIN IN ANKLE  Surgical Risk Assessment:   Prior Anesthesia: She had prior anesthesia, a prior adverse reaction to general anesthesia and HAD COMPLICATIONS FROM ABDOMINAL SURGERY  Pertinent Past Medical History: TMJ osteoarthrosis and obesity, but no angina, no arrhythmia, no CAD, CAD without prior MI, CAD without recent PCI, no CHF, no chronic liver disease, no acute hepatitis, no coagulation delay, no primary hypercoagulable state, no secondary hypercoagulable state, no pulmonary embolism, no DVT, does not use anticoagulants, no diabetes, does not use insulin, no thyroid disease, no neck osteoarthrosis, does not wear dentures, no seizure disorder, no CVA, no asthma, no COPD, not MENG, no renal disease and no low serum albumin  Exercise Capacity: unable to walk four blocks without symptoms and unable to walk two flights of stairs without symptoms  Lifestyle Factors: denies tobacco use and denies illegal drug use  Symptoms: no symptoms  STOP questionnaire score is 1  Other MENG risk factors include high BMI and age over 48, but female gender and normal neck circumference  Predicted risk of MENG: Mild  Pertinent Family History: ischemic heart disease, but no family history of an adverse reaction to anesthesia, no aneurysm, no bleeding problems, no sudden early deaths and no stroke  Living Situation: home is secure and supportive and no post-op concerns with her living situation  HPI: PREOP CLEARANCE   LABS AND EKG REVIEWED  DR RIOS PERFORMED CARDIAC CLEARANCE       Review of Systems    Constitutional: No fever, no chills, feels well, no tiredness, no recent weight gain or weight loss     Eyes: No complaints of eye pain, no red eyes, no eyesight problems, no discharge, no dry eyes, no itching of eyes  ENT: no complaints of earache, no loss of hearing, no nose bleeds, no nasal discharge, no sore throat, no hoarseness  Cardiovascular: No complaints of slow heart rate, no fast heart rate, no chest pain, no palpitations, no leg claudication, no lower extremity edema  Respiratory: No complaints of shortness of breath, no wheezing, no cough, no SOB on exertion, no orthopnea, no PND  Gastrointestinal: No complaints of abdominal pain, no constipation, no nausea or vomiting, no diarrhea, no bloody stools  Genitourinary: No complaints of dysuria, no incontinence, no pelvic pain, no dysmenorrhea, no vaginal discharge or bleeding  Musculoskeletal: as noted in HPI  Integumentary: No complaints of skin rash or lesions, no itching, no skin wounds, no breast pain or lump  Neurological: No complaints of headache, no confusion, no convulsions, no numbness, no dizziness or fainting, no tingling, no limb weakness, no difficulty walking  Psychiatric: Not suicidal, no sleep disturbance, no anxiety or depression, no change in personality, no emotional problems  Endocrine: No complaints of proptosis, no hot flashes, no muscle weakness, no deepening of the voice, no feelings of weakness  Hematologic/Lymphatic: No complaints of swollen glands, no swollen glands in the neck, does not bleed easily, does not bruise easily  Preventive Quality 65 and Older: Falls Risk: The patient fell 0 times in the past 12 months  The patient is currently experiencing urinary symptoms  Urinary Incontinence Symptoms includes: urinary frequency       Active Problems    1  Abdominal pain (789 00) (R10 9)   2  Allergic rhinitis (477 9) (J30 9)   3  Anemia (285 9) (D64 9)   4  Arthralgia of hip (719 45) (M25 559)   5  Arthralgia of knee, right (719 46) (M25 561)   6  Benign essential hypertension (401 1) (I10)   7  Bursitis of right hip (726 5) (M70 71)   8   Edema (782 3) (R60 9) 9  Encounter for screening mammogram for malignant neoplasm of breast (V76 12)   (Z12 31)   10  Fibromyalgia (729 1) (M79 7)   11  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)   12  Headache (784 0) (R51)   13  Heart murmur (785 2) (R01 1)   14  Helicobacter pylori gastritis (535 10,041 86) (B96 81)   15  Irritable bowel syndrome (564 1) (K58 9)   16  Irritable bowel syndrome with constipation (564 1) (K58 9)   17  Lichen sclerosus of female genitalia (701 0) (N90 4)   18  Long term use of drug (V58 69) (Z79 899)   19  Lower back pain (724 2) (M54 5)   20  Lumbar canal stenosis (724 02) (M48 06)   21  Mitral regurgitation (424 0) (I34 0)   22  Morbid obesity (278 01) (E66 01)   23  Need for prophylactic vaccination against single diseases (V05 9) (Z23)   24  Need for prophylactic vaccination and inoculation against influenza (V04 81) (Z23)   25  Osteoarthritis, multiple sites (715 89) (M15 9)   26  Osteoporosis (733 00) (M81 0)   27  Palpitations (785 1) (R00 2)   28  History of Paroxysmal atrial fibrillation (427 31) (I48 0)   29  Peripheral neuropathy (356 9) (G62 9)   30  Preoperative cardiovascular examination (V72 81) (Z01 810)   31  Primary osteoarthritis of right knee (715 16) (M17 11)   32  Routine Gynecological Exam With Cervical Pap Smear (V72 31)   33  Screening for genitourinary condition (V81 6) (Z13 89)   34  Screening for neurological condition (V80 09) (Z13 89)   35  Status post gastric bypass for obesity (V45 86) (Z98 84)   36  Synovial cyst of right popliteal space (727 51) (M71 21)   37  Urinary incontinence (788 30) (R32)   38   Vitamin D deficiency (268 9) (E55 9)    Past Medical History    · History of Acute Myocardial Infarction (V12 59)   · History of Birth History   · History of Disturbance Of Taste (781 1)   · History of Easy bruising (782 9) (R23 8)   · History of Effusion of knee joint right (719 06) (M25 461)   · History of atrial fibrillation (V12 59) (Z86 79)   · History of cancer (V10 90) (Z85 9)   · History of dermatitis (V13 3) (Z87 2)   · History of obesity (V13 89) (Z86 39)   · History of osteopenia (V13 59) (Z87 39)   · History of sciatica (V12 49) (Z86 69)   · History of shortness of breath (V13 89) (T32 249)   · History of sinusitis (V12 69) (Z87 09)   · History of syncope (V15 89) (B99 634)   · History of viral infection (V12 09) (Z86 19)   · History of Joint pain, hip (719 45) (M25 559)   · History of Limb pain (729 5) (M79 609)   · History of Mammogram   · History of Myalgia and myositis (729 1) (M79 1,M60 9)   · Need for prophylactic vaccination against single diseases (V05 9) (Z23)   · Need for prophylactic vaccination and inoculation against influenza (V04 81) (Z23)   · History of Paroxysmal atrial fibrillation (427 31) (I48 0)   · History of Primary osteoarthritis of right knee (715 16) (M17 11)   · History of Reported A Previous Heart Murmur   · History of Reported Pap Smear   · History of Right leg pain (729 5) (M79 604)   · History of Sore throat (462) (J02 9)   · History of Umbilical hernia (634 0) (K42 9)    The active problems and past medical history were reviewed and updated today  Surgical History    · History of Ankle Arthrodesis Right   · History of Cholecystectomy   · History of Cholecystectomy   · History of Gastric Surgery For Morbid Obesity Bypass With Ann Marie-en-Y   · History of Gastric Surgery For Morbid Obesity Bypass With Ann Marie-en-Y   · History of Knee Replacement   · History of Pilonidal Cyst Resection   · History of Pilonidal Cyst Resection   · History of Total Knee Arthroplasty   · History of Umbilical Hernia Repair   · History of Umbilical Hernia Repair    The surgical history was reviewed and updated today         Family History    · Family history of Coronary Artery Disease (V17 49)    · Family history of Coronary Artery Disease (V17 49)    · Family history of Acute Lymphoma   · Family history of Lymphomatoid Papulosis    · Family history of Coronary Artery Disease (V17 49)    The family history was reviewed and updated today  Social History    · Denied: History of Alcohol Use (History)   · Denied: History of Drug Use   · Denied: Exercising Regularly   · Never A Smoker   · Social alcohol use (F10 99)  The social history was reviewed and updated today  The social history was reviewed and is unchanged  Current Meds   1  Aspirin 81 MG Oral Tablet; TAKE 1 TABLET DAILY; Therapy: (Recorded:10Sfc3125) to Recorded   2  Betamethasone Sod Phos & Acet 6 (3-3) MG/ML Injection Suspension; 4 ml; To Be Done:   31QGT3035; Status: HOLD FOR - Administration Ordered   3  Calcium + D TABS; Advised to get between 1,200 and 2,000 mg daily, combining diet and   supplements; Therapy: (Recorded:36Uij0830) to Recorded   4  Desoximetasone 0 25 % External Cream; APPLY TO AFFECTED AREA SPARINGLY AS   NEEDED; Therapy: 75Sjr5567 to (Evaluate:77Nay4793)  Requested for: 91GHA0741; Last   Rx:14Ilf8987 Ordered   5  Dicyclomine HCl - 10 MG Oral Capsule; TAKE 1 CAPSULE EVERY 6 HOURS AS   NEEDED  Requested for: 93ZUD3134; Last Rx:21Wec1621 Ordered   6  DULoxetine HCl - 60 MG Oral Capsule Delayed Release Particles; TAKE 1 CAPSULE   Daily; Therapy: 23YPY9555 to (Mary Jane [place])  Requested for: 91PKN0760; Last   Rx:11Kdk1958 Ordered   7  Ergocalciferol 34401 UNIT Oral Capsule; TAKE 1 CAPSULE BY MOUTH EVERY WEEK; Therapy: 80DRU5007 to (Evaluate:19Oct2015)  Requested for: 54GCU4815 Recorded   8  Fluticasone Propionate 50 MCG/ACT Nasal Suspension; USE 2 SPRAYS IN EACH   NOSTRIL DAILY (AVOID NASAL SEPTUM); Therapy: 83UKA7843 to ([phone])  Requested for: 27Jun2014; Last   FO:09PBF9377 Ordered   9  Gabapentin 300 MG Oral Capsule; TAKE 1 CAPSULE 4 TIMES DAILY -MAY CAUSE   DROWSINESS -DO NOT TAKE WITHALCOHOL; Therapy: 66WOX1756 to (Evaluate:73Oti9906)  Requested for: 67KTP9091; Last   Rx:73Akq0984 Ordered   10  Hydrocodone-Acetaminophen 5-325 MG Oral Tablet;  Take 1 to 2 tablets every 8 hours as    needed for pain  No alcohol nor driving with medication use  Do not exceed 6 tablets    in 24 hour period; Therapy: 55UGV4801 to (Evaluate:08Mar2016); Last Rx:08Rql4308 Ordered   11  Multivitamins Oral Capsule; TAKE 1 CAPSULE DAILY; Therapy: (Recorded:22Myl1472) to Recorded   12  Omeprazole 40 MG Oral Capsule Delayed Release; TAKE 1 CAPSULE DAILY; Therapy: 61Dif8131 to (Evaluate:13Jan2016)  Requested for: 72Qut3059; Last    Rx:15Phh6199 Ordered   13  Omeprazole 40 MG Oral Capsule Delayed Release; TAKE 1 CAPSULE DAILY; Therapy: 29YYO4089 to (Evaluate:13Jan2016)  Requested for: 39Glv1699; Last    Rx:13Waw0792 Ordered   14  Restasis 0 05 % Ophthalmic Emulsion; INSTILL 1 DROP IN [facility] EYE TWICE DAILY; Therapy: (Recorded:22Jan2016) to Recorded   15  Slow Fe 142 (45 Fe) MG Oral Tablet Extended Release; TAKE 1 TABLET DAILY; Therapy: 14FCZ7706 to (Last IB:08YNN7124) Ordered   16  Torsemide 20 MG Oral Tablet; TAKE 1 TABLET DAILY AS DIRECTED; Therapy: 45Npp2595 to (Evaluate:35Oih2103); Last Rx:66Kal0271 Ordered   17  Vitamin B-12 500 MCG Oral Tablet; TAKE 1 TABLET DAILY; Therapy: 73YPB7082 to (Evaluate:84Dki6141); Last YR:45ZKL4442 Ordered    The medication list was reviewed and updated today  Allergies    1  Bactrim DS TABS   2  Codeine Sulfate TABS   3  Percocet TABS   4  Sulfa Drugs    5  Seasonal    Vitals  Signs [Data Includes: Current Encounter]    Temperature: 97 8 F  Heart Rate: 69  Respiration: 18  Systolic: 100  Diastolic: 70  Height: 5 ft 2 in  Weight: 230 lb 4 oz  BMI Calculated: 42 11  BSA Calculated: 2 03    Physical Exam    Constitutional   General appearance: No acute distress, well appearing and well nourished  Head and Face   Head and face: Normal     Eyes   Conjunctiva and lids: No swelling, erythema or discharge  Pupils and irises: Equal, round, reactive to light      Ears, Nose, Mouth, and Throat   External inspection of ears and nose: Normal  Otoscopic examination: Tympanic membranes translucent with normal light reflex  Canals patent without erythema  Nasal mucosa, septum, and turbinates: Normal without edema or erythema  Lips, teeth, and gums: Normal, good dentition  Oropharynx: Normal with no erythema, edema, exudate or lesions  Neck   Neck: Supple, symmetric, trachea midline, no masses  Thyroid: Normal, no thyromegaly  Pulmonary   Respiratory effort: No increased work of breathing or signs of respiratory distress  Auscultation of lungs: Clear to auscultation  Cardiovascular   Auscultation of heart: Normal rate and rhythm, normal S1 and S2, no murmurs  Carotid pulses: 2+ bilaterally  Examination of extremities for edema and/or varicosities: Normal     Abdomen   Abdomen: Non-tender, no masses  Liver and spleen: No hepatomegaly or splenomegaly  Lymphatic   Palpation of lymph nodes in neck: No lymphadenopathy  Musculoskeletal   Gait and station: Abnormal   USING SPC  Digits and nails: Normal without clubbing or cyanosis  Joints, bones, and muscles: Abnormal   RIGHT ANKLE MEDIAL DEVIATION INWARD  Range of motion: Normal     Stability: Normal     Muscle strength/tone: Normal     Skin   Skin and subcutaneous tissue: Normal without rashes or lesions  Palpation of skin and subcutaneous tissue: Normal turgor  Neurologic   Cranial nerves: Cranial nerves II-XII intact  Cortical function: Normal mental status  Reflexes: 2+ and symmetric  Sensation: No sensory loss  Coordination: Normal finger to nose and heel to shin  Psychiatric   Judgment and insight: Normal     Orientation to person, place, and time: Normal     Recent and remote memory: Intact  Mood and affect: Normal        Assessment    1  Preoperative clearance (V72 84) (Z01 818)   2  Right ankle pain (719 47) (M25 571)   3  Benign essential hypertension (401 1) (I10)   4  Fibromyalgia (729 1) (M79 7)   5   Irritable bowel syndrome (564  1) (K58 9)   6  Osteoporosis (733 00) (M81 0)   7  Peripheral neuropathy (356 9) (G62 9)    Plan   *VB - Fall Risk Assessment  (Dx V80 09 Screen for Neurologic Disorder); Status:Resulted - Requires Verification;   Done: 49UCG0630 12:00AM  OZS:49KZW0093; Last Updated By:Yamilex Danielle; 1/22/2016 12:20:37 PM;Ordered;    For:Encounter for screening for other nervous system disorder; Ordered By:Itz Schafer; Discussion/Summary  Surgical Clearance: She is at a LOW TO MODERATE risk from a cardiovascular standpoint at this time without any additional cardiac testing  Reevaluation needed, if she should present with symptoms prior to surgery/procedure  Surgical clearance faxed to Dr Aurelio Kern   MEDICALLY CLEARED FOR SURGERY  End of Encounter Meds    1  Fluticasone Propionate 50 MCG/ACT Nasal Suspension; USE 2 SPRAYS IN EACH   NOSTRIL DAILY (AVOID NASAL SEPTUM); Therapy: 53BYJ8721 to (Anahi Ward)  Requested for: 27Jun2014; Last   Rx:27Jun2014 Ordered    2  Slow Fe 142 (45 Fe) MG Oral Tablet Extended Release; TAKE 1 TABLET DAILY; Therapy: 21KIC0968 to (Last EO:16AGG1930) Ordered   3  Vitamin B-12 500 MCG Oral Tablet; TAKE 1 TABLET DAILY; Therapy: 80HPY5840 to (Evaluate:06Vfp6578); Last HI:12PKL8648 Ordered    4  Betamethasone Sod Phos & Acet 6 (3-3) MG/ML Injection Suspension ([employer]   Soluspan); 4 ml; To Be Done: 89IXO6784; Status: HOLD FOR - Administration Ordered    5  Torsemide 20 MG Oral Tablet; TAKE 1 TABLET DAILY AS DIRECTED; Therapy: 31Jdj6838 to (Evaluate:81Waf3765); Last Rx:17Gfq8437 Ordered    6  DULoxetine HCl - 60 MG Oral Capsule Delayed Release Particles; TAKE 1 CAPSULE   Daily; Therapy: 62WAM4596 to (Nicolasa Blanco)  Requested for: 42PQE2930; Last   Rx:88Pxj7245 Ordered    7  Omeprazole 40 MG Oral Capsule Delayed Release (PriLOSEC); TAKE 1 CAPSULE   DAILY; Therapy: 97RSC7069 to (Evaluate:13Jan2016)  Requested for: 03Veh9349; Last   Rx:44Brj3570 Ordered   8  Omeprazole 40 MG Oral Capsule Delayed Release; TAKE 1 CAPSULE DAILY; Therapy: 45Unh5677 to (Evaluate:13Jan2016)  Requested for: 98Ctm8979; Last   Rx:06Dba7056 Ordered    9  Dicyclomine HCl - 10 MG Oral Capsule (Bentyl); TAKE 1 CAPSULE EVERY 6 HOURS AS   NEEDED  Requested for: 89IPP3704; Last Rx:90Iel7578 Ordered    10  Desoximetasone 0 25 % External Cream; APPLY TO AFFECTED AREA SPARINGLY AS    NEEDED; Therapy: 49Hqq0874 to (Evaluate:50Vtc6250)  Requested for: 43LRW4652; Last    Rx:74Xvv5418 Ordered    11  Hydrocodone-Acetaminophen 5-325 MG Oral Tablet; Take 1 to 2 tablets every 8 hours as    needed for pain  No alcohol nor driving with medication use  Do not exceed 6 tablets    in 24 hour period; Therapy: 41CBW0568 to (Evaluate:08Mar2016); Last Rx:36Aur5593 Ordered    12  Gabapentin 300 MG Oral Capsule; TAKE 1 CAPSULE 4 TIMES DAILY -MAY CAUSE    DROWSINESS -DO NOT TAKE WITHALCOHOL; Therapy: 75YUA8327 to (Evaluate:92Qmx4217)  Requested for: 86PZJ6711; Last    Rx:34Yaq8646 Ordered    13  Ergocalciferol 06400 UNIT Oral Capsule; TAKE 1 CAPSULE BY MOUTH EVERY WEEK; Therapy: 04TNL9646 to (Evaluate:19Oct2015)  Requested for: 97FFP2265 Recorded    14  Aspirin 81 MG Oral Tablet; TAKE 1 TABLET DAILY; Therapy: (Recorded:53Gjc2670) to Recorded   15  Calcium + D TABS; Advised to get between 1,200 and 2,000 mg daily, combining diet and    supplements; Therapy: (Recorded:23Klo7123) to Recorded   16  Multivitamins Oral Capsule; TAKE 1 CAPSULE DAILY; Therapy: (Recorded:18Rhd2352) to Recorded   17  Restasis 0 05 % Ophthalmic Emulsion; INSTILL 1 DROP IN [facility] EYE TWICE DAILY;     Therapy: (Ben Kong) to Recorded    Signatures   Electronically signed by : Lizabeth Najjar, [address]; Jan 22 2016 11:35AM EST                       (Author)    Electronically signed by : Jonathan Barrrea DO; Jan 22 2016  4:48PM EST                       (Author)

## 2018-01-16 NOTE — PROGRESS NOTES
Assessment  Assessed    1  Benign essential hypertension (401 1) (I10)   2  Edema (782 3) (R60 9)   3  Mitral regurgitation (424 0) (I34 0)   4  Obesity (278 00) (E66 9)   5  Preoperative cardiovascular examination (V72 81) (Z01 810)    Plan  Preoperative cardiovascular examination    · EKG/ECG- POC; Status:Complete;   Done: 03AFG6180   Perform: In Office; LIS:51VEM7672; Last Updated By:Mitul Sheriff; 1/19/2016 9:59:03 AM;Ordered; For:Preoperative cardiovascular examination; Ordered By:Lincoln Jean;   · Follow-up visit in 1 year Evaluation and Treatment  Follow-up  Status: Complete  Done:  04BOR4469   Ordered; For: Preoperative cardiovascular examination; Ordered By: Shanon Benz Performed:  Due: 49KQC4811; Last Updated By: Kylie Mcgraw; 1/19/2016 11:14:35 AM    Discussion/Summary  Cardiology Discussion Summary Free Text Note Form [facility]:   Dear Dr Ophelia Barthel    I had the pleasure of seeing Renetta Hall for a preoperative clearance prior to undergoing right ankle repair  She is a woman I saw in consultation a year ago for edema which I felt was multifactorial and probably related to obesity  Her echocardiogram suggested minimal valve disease  She takes sparing torsemide on occasion  Her blood pressure is minimally elevated today without a long-standing history of hypertension  She has normal lipids  She has no diagnosis of coronary artery disease  At this point in time she does seem clinically stable without significant cardiovascular symptoms and can proceed with her planned right ankle repair  Chief Complaint  Chief Complaint Free Text Note Form: Pt here for 1 year f/u and cardiac clearance for ankle surgery  i completed an EKG   Chief Complaint Chronic Condition [facility]: Patient is here today for follow up of chronic conditions described in HPI  History of Present Illness  Cardiology HPI Free Text Note Form [facility]:  Myra Hillman is a very pleasant 22-year-old woman, status post history of gastric bypass, but remains severely overweight, who has had intermittent elevated blood pressure in the past and does present slightly more elevated today, but also assures me that in many other doctors offices she has blood pressures that seem well controlled in the 130 range  She does not take any antihypertensive therapy  She has normal lipids, does not take lipid-lowering therapy  She has very rare lightheadedness without any alan syncope  No recent change in his symptoms  She denies chest pain  She ambulates with a cane  She has right ankle issues and right foot architectural changes for which he is seeking surgical improvement  She is here for preoperative risk clearance  She can walk up a flight of stairs  She does some shopping  She denies any symptoms with any of her current level of exertion  Her EKG reveals sinus rhythm with sinus arrhythmia and occasional PACs with minimal criteria for possible LVH  She has had a prior echo of the last 2 years indicates minimal valve disease  She does have a murmur on exam       Review of Systems  Cardiology Female ROS:     Cardiac: as noted in HPI  Skin: No complaints of nonhealing sores or skin rash  Psychological: palpitations present   General: trouble sleeping and lack of energy/fatigue  Respiratory: shortness of breath, but no cough/sputum  Musculoskeletal: arthritis and back pain Fibromyalgia  Active Problems  Problems    1  Abdominal pain (789 00) (R10 9)   2  Allergic rhinitis (477 9) (J30 9)   3  Anemia (285 9) (D64 9)   4  Arthralgia of hip (719 45) (M25 559)   5  Arthralgia of knee, right (719 46) (M25 561)   6  Benign essential hypertension (401 1) (I10)   7  Bursitis of right hip (726 5) (M70 71)   8  Dermatitis (692 9) (L30 9)   9  Disturbance Of Taste (781 1)   10  Easy bruising (782 9) (R23 8)   11  Edema (782 3) (R60 9)   12  Effusion of knee joint right (719 06) (M25 461)   13   Encounter for screening mammogram for malignant neoplasm of breast (V76 12)    (Z12 31)   14  Fibromyalgia (729 1) (M79 7)   15  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)   16  Headache (784 0) (R51)   17  Heart murmur (785 2) (R01 1)   18  Helicobacter pylori gastritis (535 10,041 86) (B96 81)   19  Irritable bowel syndrome (564 1) (K58 9)   20  Irritable bowel syndrome with constipation (564 1) (K58 9)   21  Joint pain, hip (719 45) (M25 559)   22  Lichen sclerosus of female genitalia (701 0) (N90 4)   23  Limb pain (729 5) (M79 609)   24  Long term use of drug (V58 69) (Z79 899)   25  Lower back pain (724 2) (M54 5)   26  Lumbar canal stenosis (724 02) (M48 06)   27  Mitral regurgitation (424 0) (I34 0)   28  Morbid obesity (278 01) (E66 01)   29  Myalgia and myositis (729 1) (M79 1,M60 9)   30  Need for prophylactic vaccination against single diseases (V05 9) (Z23)   31  Need for prophylactic vaccination and inoculation against influenza (V04 81) (Z23)   32  Obesity (278 00) (E66 9)   33  Osteoarthritis, multiple sites (715 89) (M15 9)   34  Osteopenia (733 90) (M85 80)   35  Osteoporosis (733 00) (M81 0)   36  Palpitations (785 1) (R00 2)   37  History of Paroxysmal atrial fibrillation (427 31) (I48 0)   38  Peripheral neuropathy (356 9) (G62 9)   39  Preoperative cardiovascular examination (V72 81) (Z01 810)   40  Primary osteoarthritis of right knee (715 16) (M17 11)   41  Right leg pain (729 5) (M79 604)   42  Routine Gynecological Exam With Cervical Pap Smear (V72 31)   43  Sciatica (724 3) (M54 30)   44  Screening for genitourinary condition (V81 6) (Z13 89)   45  Screening for neurological condition (V80 09) (Z13 89)   46  Shortness of breath (786 05) (R06 02)   47  Sinusitis (473 9) (J32 9)   48  Sore throat (462) (J02 9)   49  Status post gastric bypass for obesity (V45 86) (Z98 84)   50  Synovial cyst of right popliteal space (727 51) (M71 21)   51  Umbilical hernia (628 7) (K42 9)   52  Urinary incontinence (788 30) (R32)   53  Viral syndrome (079 99) (B34 9)   54   Vitamin D deficiency (268 9) (E55 9)    Past Medical History  Problems    1  History of Acute Myocardial Infarction (V12 59)   2  History of Birth History   3  History of atrial fibrillation (V12 59) (Z86 79)   4  History of cancer (V10 90) (Z85 9)   5  History of syncope (V15 89) (Z87 898)   6  History of Mammogram   7  Need for prophylactic vaccination against single diseases (V05 9) (Z23)   8  Need for prophylactic vaccination and inoculation against influenza (V04 81) (Z23)   9  History of Paroxysmal atrial fibrillation (427 31) (I48 0)   10  History of Primary osteoarthritis of right knee (715 16) (M17 11)   11  History of Reported A Previous Heart Murmur   12  History of Reported Pap Smear  Active Problems And Past Medical History Reviewed: The active problems and past medical history were reviewed and updated today  Surgical History  Problems    1  History of Ankle Arthrodesis Right   2  History of Cholecystectomy   3  History of Cholecystectomy   4  History of Gastric Surgery For Morbid Obesity Bypass With Ann Marie-en-Y   5  History of Gastric Surgery For Morbid Obesity Bypass With Ann Marie-en-Y   6  History of Knee Replacement   7  History of Pilonidal Cyst Resection   8  History of Pilonidal Cyst Resection   9  History of Total Knee Arthroplasty   10  History of Umbilical Hernia Repair   11  History of Umbilical Hernia Repair  Surgical History Reviewed: The surgical history was reviewed and updated today  Family History  Mother    1  Family history of Coronary Artery Disease (V17 49)  Father    2  Family history of Coronary Artery Disease (V17 49)  Sister    3  Family history of Acute Lymphoma   4  Family history of Lymphomatoid Papulosis  Brother    5  Family history of Coronary Artery Disease (V17 49)  Family History Reviewed: The family history was reviewed and updated today         Social History  Problems    · Denied: History of Alcohol Use (History)   · Denied: History of Drug Use   · Denied: Exercising Regularly   · Never A Smoker   · Social alcohol use (F10 99)  Social History Reviewed: The social history was reviewed and updated today  The social history was reviewed and is unchanged  Current Meds   1  Amoxicillin 500 MG Oral Tablet; TAKE 2 TABLETS EVERY 12 HOURS UNTIL GONE   Recorded   2  Aspirin 81 MG Oral Tablet; TAKE 1 TABLET DAILY; Therapy: (Recorded:40Jgd1698) to Recorded   3  Betamethasone Sod Phos & Acet 6 (3-3) MG/ML Injection Suspension; 4 ml; To Be Done:   86BJH1579; Status: HOLD FOR - Administration Ordered   4  Calcium + D TABS; Advised to get between 1,200 and 2,000 mg daily, combining diet and   supplements; Therapy: (Recorded:98Qva5524) to Recorded   5  Desoximetasone 0 25 % External Cream; APPLY TO AFFECTED AREA SPARINGLY AS   NEEDED; Therapy: 80Rsf5860 to (Evaluate:40Atb8764)  Requested for: 45NUM2550; Last   Rx:71Nez0720 Ordered   6  Dicyclomine HCl - 10 MG Oral Capsule; TAKE 1 CAPSULE EVERY 6 HOURS AS   NEEDED  Requested for: 78KDC0530; Last Rx:13Veh6238 Ordered   7  DULoxetine HCl - 60 MG Oral Capsule Delayed Release Particles; TAKE 1 CAPSULE   Daily; Therapy: 90MDD3783 to (Alexandro Conteh)  Requested for: 87CHR0291; Last   Rx:04Wus4739 Ordered   8  Ergocalciferol 49108 UNIT Oral Capsule; TAKE 1 CAPSULE BY MOUTH EVERY WEEK; Therapy: 26NGL6074 to (Evaluate:42Xec1489)  Requested for: 24AUZ5290 Recorded   9  Fluticasone Propionate 50 MCG/ACT Nasal Suspension; USE 2 SPRAYS IN EACH   NOSTRIL DAILY (AVOID NASAL SEPTUM); Therapy: 97JCJ4821 to (Natalia Harmon)  Requested for: 27Jun2014; Last   Rx:27Jun2014 Ordered   10  Gabapentin 300 MG Oral Capsule; TAKE 1 CAPSULE 4 TIMES DAILY -MAY CAUSE    DROWSINESS -DO NOT TAKE WITHALCOHOL; Therapy: 00DYE9176 to (Evaluate:33Nod3254)  Requested for: 59MWS6798; Last    Rx:14Jpx9381 Ordered   11  Hydrocodone-Acetaminophen 5-325 MG Oral Tablet; Take 1 to 2 tablets every 8 hours as    needed for pain   No alcohol nor driving with medication use  Do not exceed 6 tablets    in 24 hour period; Therapy: 31FPM4612 to (Evaluate:08Mar2016); Last Rx:80Ory0114 Ordered   12  Linzess 290 MCG Oral Capsule; take 1 capsule daily; Therapy: 04Wqj7646 to (Evaluate:86Pfu7863); Last Rx:42Lmk6806 Ordered   13  Multivitamins Oral Capsule; TAKE 1 CAPSULE DAILY; Therapy: (Recorded:50Xcc6058) to Recorded   14  Omeprazole 40 MG Oral Capsule Delayed Release; TAKE 1 CAPSULE DAILY; Therapy: 62Xgk1736 to (Evaluate:13Jan2016)  Requested for: 01Wmk8727; Last    Rx:59Fzv0264 Ordered   15  Omeprazole 40 MG Oral Capsule Delayed Release; TAKE 1 CAPSULE DAILY; Therapy: 77HRP7263 to (Evaluate:13Jan2016)  Requested for: 23Gmz6316; Last    Rx:78Wax2543 Ordered   16  Slow Fe 142 (45 Fe) MG Oral Tablet Extended Release; TAKE 1 TABLET DAILY; Therapy: 37UBE4056 to (Last :55HCZ7767) Ordered   17  Torsemide 20 MG Oral Tablet; TAKE 1 TABLET DAILY AS DIRECTED; Therapy: 98Xhi9689 to (Evaluate:76Dxh9871); Last Rx:53Bpf3251 Ordered   18  Vitamin B-12 500 MCG Oral Tablet; TAKE 1 TABLET DAILY; Therapy: 24HLX0500 to (Evaluate:57Qdf6339); Last ALFRED:89LBX2596 Ordered  Medication List Reviewed: The medication list was reviewed and updated today  Allergies  Medication    1  Bactrim DS TABS   2  Codeine Sulfate TABS   3  Percocet TABS   4  Sulfa Drugs  Non-Medication    5  Seasonal    Vitals  Vital Signs [Data Includes: Current Encounter]    Recorded: [id number] 10:50AM   Heart Rate 67   Systolic 980   Diastolic 76   Height 5 ft 2 in   Weight 228 lb    BMI Calculated 41 7   BSA Calculated 2 02   O2 Saturation 96     Physical Exam    Constitutional - A significantly overweight elderly woman in no distress  Eyes - Conjunctiva and Sclera examination: Conjunctiva pink, sclera anicteric  Neck - Normal, no JVD   Pulmonary - Respiratory effort: No signs of respiratory distress  Auscultation of lungs: Clear to auscultation      Cardiovascular - Faint 1/6 systolic murmur, regular rate and rhythm, no ectopy  Carotid pulses: Normal, 2+ bilaterally  Pedal pulses: Normal, 2+ bilaterally  Examination of extremities for edema and/or varicosities: Normal     Abdomen - Soft  Musculoskeletal - Gait and station: Normal gait  Skin - Skin: Normal without rashes  Skin is warm and well perfused  Neurologic - Speech normal  No focal deficits  Psychiatric - Orientation to person, place, and time: Normal       Results/Data  Diagnostic Studies Reviewed Cardio:   Echocardiogram/[facility]: 2014 - LVEF normal, mild left atrial enlargement, mild MR, mild TR, aortic valve sclerosis  ECG Report: Normal sinus rhythm with occasional premature atrial complexes  1/19/16 - sinus rhythm, sinus arrhythmia, PACs, possible LVH      Future Appointments    Date/Time Provider Specialty Site   04/12/2016 09:30 AM PIO Luu   Orthopedic Surgery [facility]   02/23/2016 10:00 AM Zeny aFrah,  Gastroenterology Adult [facility]   02/26/2016 09:30 AM Elisabeth Pandya MD [facility] [address]   01/20/2016 08:30 AM Carlotta Joseph [facility] [address]     Signatures   Electronically signed by : Oz Ballard MD; Jan 19 2016  2:34PM EST                       (Author)

## 2018-01-16 NOTE — RESULT NOTES
Verified Results  (1) PT WITH INR [id number] 10:14AM Ha Chinchilla Order Number: [id number]     Test Name Result Flag Reference   INR 2 65 H 0 86-1 16   Performing Comments: WEEKLY OR AS DIRECTED X 6 MONTHS      Performing Comments: WEEKLY OR AS DIRECTED X 6 MONTHS   PT 27 8 seconds H 12 0-14 3   INR 2 65 H 0 86-1 16   Performing Comments: WEEKLY OR AS DIRECTED X 6 MONTHS      Performing Comments: WEEKLY OR AS DIRECTED X 6 MONTHS   PT 27 8 seconds H 12 0-14 3

## 2018-01-17 NOTE — MISCELLANEOUS
Message  Add Ilia Hernandez has had worsening swelling and weight gain, possibly in response to stopping digoxin and increasing diltiazem  I have been trying to avoid digoxin chronically in her, however she does have slightly difficult to control heart rates  She has a history of diarrhea with metoprolol, however I am going to attempt an alternative beta-blocker in the form of bisoprolol and try to reduce her diltiazem back to 120 mg twice a day  Check not going to have her change her diuretic at this time, but I did recommend she call me at the continues to be issues with weight gain and edema        Plan  Atrial fibrillation    · Bisoprolol Fumarate 5 MG Oral Tablet; TAKE 1 TABLET ONCE DAILY  Benign essential hypertension    · DilTIAZem HCl - 120 MG Oral Tablet    Signatures   Electronically signed by : Carlos Reagan MD; Sep 14 2017  2:50PM EST                       (Author)

## 2018-01-17 NOTE — RESULT NOTES
Message   Notify the patient chest x-ray no active pulmonary disease follow up as scheduled        Verified Results  * XR CHEST PA & LATERAL [id number] 02:52PM [facility] [place]     Test Name Result Flag Reference   XR CHEST PA & LATERAL (Report)     CHEST      INDICATION: Unresolved aspiration pneumonia  COMPARISON: 8/20/2016  VIEWS: Frontal and lateral projections; 2 images     FINDINGS:        Cardiomediastinal silhouette appears stable  The lungs are clear  No pneumothorax or pleural effusion  Visualized osseous structures appear within normal limits for the patient's age  IMPRESSION:     No active pulmonary disease  Workstation performed: [id number]     Signed by:    Arsh Winter MD   12/7/16       Signatures   Electronically signed by : Dellia Gosselin, DO; Dec  7 2016  7:30PM EST                       (Author)

## 2018-01-17 NOTE — MISCELLANEOUS
Assessment    1  Benign essential hypertension (401 1) (I10)   2  Acute foot pain, left (729 5) (M79 672)   3  External hemorrhoids (455 3) (K64 4)   4  Edema (782 3) (R60 9)   5  Anemia (285 9) (D64 9)   6  Fibromyalgia (729 1) (M79 7)   7  Irritable bowel syndrome (564 1) (K58 9)   8  Atrial fibrillation (427 31) (I48 91)   9  Chronic combined systolic and diastolic CHF (congestive heart failure) (428 42,428 0)   (I50 42)    Plan  Atrial fibrillation    · (1) PT WITH INR; Status:Active; Requested for:28Mar2016;    Perform:[facility]; RICO:[id number];QASEUCG; For:Atrial fibrillation; Ordered By:Lawrence Denton;  Gastric erosion    · Omeprazole 40 MG Oral Capsule Delayed Release (PriLOSEC); TAKE 1  CAPSULE DAILY   Rx By: Israel Ahumada; Dispense: 30 Days ; #:30 Capsule Delayed Release; Refill: 3; For: Gastric erosion; DOROTA = N; Verified Transmission to [facility]; Last Updated By: System, SureScript[employer]; 3/25/2016 12:58:01 PM    Discussion/Summary  Discussion Summary:   REFILL AND CONT OMEPRAZOLE  PT/INR, HOLD COUMADIN UNTIL MONDAY AND AWAIT ORDERED  CARDIAC MEDS PER CARDIOLOGY  SLEEP STUDY IN THE NEAR FUTURE, PT TO CONSIDER  CONT OTC PREP H CREAM, CAN TRY TUCKS PADS FOR EXT HEMORRHOIDS  CONT NON WEIGHT BEARING PER PODIATRY   CONT HOME VNA   F/U DR Boy Moseley ONE MONTH  CALL WITH ANY PROBLEMS    Medication SE Review and Pt Understands Tx: Possible side effects of new medications were reviewed with the patient/guardian today  The treatment plan was reviewed with the patient/guardian  The patient/guardian understands and agrees with the treatment plan      Chief Complaint  Chief Complaint Free Text Note Form: TCM       History of Present Illness  TCM Communication [facility]: The patient is being contacted for follow-up after hospitalization  Hospital records were reviewed  She was hospitalized at [address] [place] [address]  The date of admission: 03/07/2016, date of discharge: 03/17/2016   Diagnosis: Atrial Fibrillation  She was discharged to home  Medications were not reviewed today  She scheduled a follow up appointment  The patient is currently asymptomatic  Counseling was provided to the patient  Communication performed and completed by Donta Gaytan   HPI: [address] 65  89 [address]  HAD FOOT SURGERY ON FEB 29TH, DID WELL INITIALLY, HAS CAST AND NON WEIGHT BEARING RIGHT FOOT  INJURY TO LEFT FOOT ON 3/6 AND HAS CONTUSION TO DORSUM OF LEFT FOOT WITH RESOLVING HEMATOMA  POST OP WAS HOME AND STARTED WITH ABDOMINAL BLOATING AND LEG SWELLING  WENT TO ER ON 3/6 WITH ABD BLOATING AND LEG SWELLING   FOUND TO BE IN RAPID AFIB AND CHF   D/C ON 3/17 FROM [place] [address], PUT ON METOPROLOL AND DIGOXIN , ON TORSEMIDE AND COUMADIN, COUMADIN HELD FOR THE WEEKEND, REPEAT ON 3/28 PER DR Garrett EMERY NURSES COMING INTO THE HOME   THERE IS CONCERN FOR SOME MENG AND IS TO HAVE OUTPATIENT SLEEP STUDY  HAS APPT WITH CARDIOLOGY TODAY   FEELING WELL OVERALL, DENIES CP/SOB/JEROME  C/O DIARRHEA/LOOSE STOOLS, NOT SURE RELATED TO MEDICATION SIDE AFFECT, BOTHERSOME EXTERNAL HEMORRHOID USING PREPARATION H WITH SOME RELIEF           Review of Systems  Complete-Female:   Constitutional: No fever, no chills, feels well, no tiredness, no recent weight gain or weight loss  Eyes: No complaints of eye pain, no red eyes, no eyesight problems, no discharge, no dry eyes, no itching of eyes  ENT: no complaints of earache, no loss of hearing, no nose bleeds, no nasal discharge, no sore throat, no hoarseness  Cardiovascular: as noted in HPI  Respiratory: No complaints of shortness of breath, no wheezing, no cough, no SOB on exertion, no orthopnea, no PND  Gastrointestinal: as noted in HPI  Genitourinary: No complaints of dysuria, no incontinence, no pelvic pain, no dysmenorrhea, no vaginal discharge or bleeding  Musculoskeletal: as noted in HPI     Integumentary: No complaints of skin rash or lesions, no itching, no skin wounds, no breast pain or lump  Neurological: No complaints of headache, no confusion, no convulsions, no numbness, no dizziness or fainting, no tingling, no limb weakness, no difficulty walking  Psychiatric: Not suicidal, no sleep disturbance, no anxiety or depression, no change in personality, no emotional problems  Endocrine: No complaints of proptosis, no hot flashes, no muscle weakness, no deepening of the voice, no feelings of weakness  Hematologic/Lymphatic: No complaints of swollen glands, no swollen glands in the neck, does not bleed easily, does not bruise easily  Active Problems    1  Abdominal pain (789 00) (R10 9)   2  Allergic rhinitis (477 9) (J30 9)   3  Anemia (285 9) (D64 9)   4  Arthralgia of hip (719 45) (M25 559)   5  Arthralgia of knee, right (719 46) (M25 561)   6  Benign essential hypertension (401 1) (I10)   7  Bursitis of right hip (726 5) (M70 71)   8  Edema (782 3) (R60 9)   9  Encounter for screening for other nervous system disorder (V80 09) (Z13 858)   10  Encounter for screening mammogram for malignant neoplasm of breast (V76 12)    (Z12 31)   11  Encounter for special screening examination for genitourinary disorder (V81 6) (Z13 89)   12  Fibromyalgia (729 1) (M79 7)   13  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)   14  Headache (784 0) (R51)   15  Heart murmur (785 2) (R01 1)   16  Helicobacter pylori gastritis (535 10,041 86) (K29 70,B96 81)   17  Irritable bowel syndrome (564 1) (K58 9)   18  Irritable bowel syndrome with constipation (564 1) (K58 9)   19  Lichen sclerosus of female genitalia (701 0) (N90 4)   20  Long term use of drug (V58 69) (Z79 899)   21  Lower back pain (724 2) (M54 5)   22  Lumbar canal stenosis (724 02) (M48 06)   23  Mitral regurgitation (424 0) (I34 0)   24  Morbid obesity (278 01) (E66 01)   25  Need for prophylactic vaccination against single diseases (V05 9) (Z23)   26   Need for prophylactic vaccination and inoculation against influenza (V04 81) (Z23)   27  Osteoarthritis, multiple sites (715 89) (M15 9)   28  Osteoporosis (733 00) (M81 0)   29  Palpitations (785 1) (R00 2)   30  History of Paroxysmal atrial fibrillation (427 31) (I48 0)   31  Peripheral neuropathy (356 9) (G62 9)   32  Preoperative cardiovascular examination (V72 81) (Z01 810)   33  Preoperative clearance (V72 84) (Z01 818)   34  Primary osteoarthritis of right knee (715 16) (M17 11)   35  Right ankle pain (719 47) (M25 571)   36  Routine Gynecological Exam With Cervical Pap Smear (V72 31)   37  Screening for genitourinary condition (V81 6) (Z13 89)   38  Screening for neurological condition (V80 09) (Z13 89)   39  Status post gastric bypass for obesity (V45 86) (Z98 84)   40  Synovial cyst of right popliteal space (727 51) (M71 21)   41  Urinary incontinence (788 30) (R32)   42  Vitamin D deficiency (268 9) (E55 9)    Past Medical History    1  History of Acute Myocardial Infarction (V12 59)   2  History of Birth History   3  History of Disturbance Of Taste (781 1)   4  History of Easy bruising (782 9) (R23 8)   5  History of Effusion of knee joint right (719 06) (M25 461)   6  History of atrial fibrillation (V12 59) (Z86 79)   7  History of cancer (V10 90) (Z85 9)   8  History of dermatitis (V13 3) (Z87 2)   9  History of obesity (V13 89) (Z86 39)   10  History of osteopenia (V13 59) (Z87 39)   11  History of sciatica (V12 49) (Z86 69)   12  History of shortness of breath (V13 89) (Z87 898)   13  History of sinusitis (V12 69) (Z87 09)   14  History of syncope (V15 89) (Z87 898)   15  History of viral infection (V12 09) (Z86 19)   16  History of Joint pain, hip (719 45) (M25 559)   17  History of Limb pain (729 5) (M79 609)   18  History of Mammogram   19  History of Myalgia and myositis (729 1) (M79 1,M60 9)   20  Need for prophylactic vaccination against single diseases (V05 9) (Z23)   21   Need for prophylactic vaccination and inoculation against influenza (V04 81) (Z23)   22  History of Paroxysmal atrial fibrillation (427 31) (I48 0)   23  History of Primary osteoarthritis of right knee (715 16) (M17 11)   24  History of Reported A Previous Heart Murmur   25  History of Reported Pap Smear   26  History of Right leg pain (729 5) (M79 604)   27  History of Sore throat (462) (J02 9)   28  History of Umbilical hernia (320 7) (K42 9)    Surgical History    1  History of Ankle Arthrodesis Right   2  History of Cholecystectomy   3  History of Cholecystectomy   4  History of Gastric Surgery For Morbid Obesity Bypass With Ann Marie-en-Y   5  History of Gastric Surgery For Morbid Obesity Bypass With Ann Marie-en-Y   6  History of Knee Replacement   7  History of Pilonidal Cyst Resection   8  History of Pilonidal Cyst Resection   9  History of Total Knee Arthroplasty   10  History of Umbilical Hernia Repair   11  History of Umbilical Hernia Repair  Surgical History Reviewed: The surgical history was reviewed and updated today  Family History    1  Family history of Coronary Artery Disease (V17 49)    2  Family history of Coronary Artery Disease (V17 49)    3  Family history of Acute Lymphoma   4  Family history of Lymphomatoid Papulosis    5  Family history of Coronary Artery Disease (V17 49)  Family History Reviewed: The family history was reviewed and updated today  Social History    · Denied: History of Alcohol Use (History)   · Denied: History of Drug Use   · Denied: Exercising Regularly   · Never A Smoker   · Social alcohol use (Z78 9)  Social History Reviewed: The social history was reviewed and updated today  The social history was reviewed and is unchanged  Current Meds   1  Aspirin 81 MG Oral Tablet; TAKE 1 TABLET DAILY; Therapy: (Recorded:39Gsr0148) to Recorded   2  Betamethasone Sod Phos & Acet 6 (3-3) MG/ML Injection Suspension; 4 ml; To Be Done:   99ISW0674; Status: HOLD FOR - Administration Ordered   3   Calcium + D TABS; Advised to get between 1,200 and 2,000 mg daily, combining diet and   supplements; Therapy: (Recorded:95Kre6292) to Recorded   4  Coumadin 5 MG Oral Tablet; Take 1 tablet daily as directed; Therapy: 01SLS0710 to Recorded   5  Desoximetasone 0 25 % External Cream; APPLY TO AFFECTED AREA SPARINGLY AS   NEEDED; Therapy: 11Mba0690 to (Evaluate:38Zds2891)  Requested for: 04MDK0066; Last   Rx:63Umo2252 Ordered   6  Dicyclomine HCl - 10 MG Oral Capsule; Take 1 capsule twice daily, as needed    Requested for: 95Alq9451; Last Rx:93Tnd2811 Ordered   7  DULoxetine HCl - 60 MG Oral Capsule Delayed Release Particles; TAKE 1 CAPSULE   Daily; Therapy: 53HSF7118 to (Evaluate:15Jun2016)  Requested for: 22NWI7708; Last   Rx:08Cwi1228 Ordered   8  Ergocalciferol 75668 UNIT Oral Capsule; TAKE 1 CAPSULE BY MOUTH EVERY WEEK; Therapy: 43VXY5881 to (Evaluate:19Oct2015)  Requested for: 04VBC8628 Recorded   9  Fluticasone Propionate 50 MCG/ACT Nasal Suspension; USE 2 SPRAYS IN EACH   NOSTRIL DAILY (AVOID NASAL SEPTUM); Therapy: 62HBE9200 to (Rhonda Diesel)  Requested for: 27Jun2014; Last   Rx:27Jun2014 Ordered   10  Gabapentin 300 MG Oral Capsule; TAKE 1 CAPSULE 4 TIMES DAILY -MAY CAUSE    DROWSINESS -DO NOT TAKE WITHALCOHOL; Therapy: 38GYO4953 to (Evaluate:47Ltq9566)  Requested for: 52BKW6805; Last    Rx:17Hwf0549 Ordered   11  Hydrocodone-Acetaminophen 5-325 MG Oral Tablet; Take 1 to 2 tablets every 8 hours as    needed for pain  No alcohol nor driving with medication use  Do not exceed 6 tablets    in 24 hour period; Therapy: 28ITJ8208 to (Evaluate:08Mar2016); Last Rx:62Wzn1591 Ordered   12  Multivitamins Oral Capsule; TAKE 1 CAPSULE DAILY; Therapy: (Recorded:14Idz4426) to Recorded   13  Omeprazole 40 MG Oral Capsule Delayed Release; TAKE 1 CAPSULE DAILY; Therapy: 12Ooz9813 to (Evaluate:11Jun2016)  Requested for: 17SOB0051; Last    Rx:73Jzx3048 Ordered   14   Omeprazole 40 MG Oral Capsule Delayed Release; TAKE 1 CAPSULE DAILY; Therapy: 39JIY6182 to (Evaluate:13Jan2016)  Requested for: 33Hie9419; Last    Rx:26Jlb5118 Ordered   15  Restasis 0 05 % Ophthalmic Emulsion; INSTILL 1 DROP IN [facility] EYE TWICE DAILY; Therapy: (Recorded:22Jan2016) to Recorded   16  Slow Fe 142 (45 Fe) MG Oral Tablet Extended Release; TAKE 1 TABLET DAILY; Therapy: 27IWB5666 to (Last AX:49MKM4392) Ordered   17  Torsemide 20 MG Oral Tablet; TAKE 1 TABLET DAILY AS DIRECTED; Therapy: 13Huq0503 to (Evaluate:02Sep2015); Last Rx:03Aug2015 Ordered   18  Vitamin B-12 500 MCG Oral Tablet; TAKE 1 TABLET DAILY; Therapy: 22ZQK1705 to (Evaluate:12Aug2015); Last IQ:45DWI0822 Ordered  Medication List Reviewed: The medication list was reviewed and updated today  Allergies    1  Bactrim DS TABS   2  Codeine Sulfate TABS   3  Percocet TABS   4  Sulfa Drugs    5  Seasonal    Vitals  Signs [Data Includes: Current Encounter]   Recorded: [id number] 12:00PM   Temperature: 97 8 F, Oral  Heart Rate: 97  Respiration: 16  Systolic: 877, LUE, Sitting  Diastolic: 76, LUE, Sitting  Height Unobtainable: Yes  Weight Unobtainable: Yes    Physical Exam    Constitutional   General appearance: No acute distress, well appearing and well nourished  Eyes   Conjunctiva and lids: No swelling, erythema or discharge  Pupils and irises: Equal, round and reactive to light  Ears, Nose, Mouth, and Throat   External inspection of ears and nose: Normal     Otoscopic examination: Tympanic membranes translucent with normal light reflex  Canals patent without erythema  Nasal mucosa, septum, and turbinates: Normal without edema or erythema  Oropharynx: Normal with no erythema, edema, exudate or lesions  Pulmonary   Respiratory effort: No increased work of breathing or signs of respiratory distress  Auscultation of lungs: Clear to auscultation  Cardiovascular   Auscultation of heart: Abnormal   The rhythm was irregularly irregular   Heart sounds: normal S1 and normal S2  Examination of extremities for edema and/or varicosities: Abnormal   bilateral ankle 2+ pitting edema and bilateral pretibial 2+ pitting edema, but pitting edema present  Carotid pulses: Normal     Abdomen   Abdomen: Non-tender, no masses  The abdomen was rounded and obese  The abdomen was soft and nontender  no masses palpated  Liver and spleen: No hepatomegaly or splenomegaly  Lymphatic   Palpation of lymph nodes in neck: No lymphadenopathy  Musculoskeletal   Gait and station: Normal     Digits and nails: Normal without clubbing or cyanosis  Inspection/palpation of joints, bones, and muscles: Normal     Skin   Skin and subcutaneous tissue: Normal without rashes or lesions  Neurologic   Reflexes: 2+ and symmetric  Sensation: No sensory loss  Psychiatric   Orientation to person, place, and time: Normal     Mood and affect: Normal          Future Appointments    Date/Time Provider Specialty Site   05/03/2016 01:00 PM PIO Monson   Orthopedic Surgery [facility]   04/23/2016 09:45 AM Donna Quach MD [facility]   05/03/2016 02:00 PM Donna Quach MD [facility]   04/15/2016 10:00 AM Basil Hutson MD Cardiology [address]     Signatures   Electronically signed by : Roxanne Limon, ; Mar 18 2016 12:00PM EST                       (Author)    Electronically signed by : TEJAS Jovel; Mar 25 2016  9:16PM EST                       (Author)    Electronically signed by : Ryann Cordero DO; Mar 26 2016  5:08PM EST                       (Author)

## 2018-01-17 NOTE — PROGRESS NOTES
History of Present Illness  Care Coordination Encounter Information:   Type of Encounter: Telephonic   Contact: Follow-Up        Care Coordination SL Nurse ADVOCATE On license of [facility]:   The reason for call is to discuss outreach for follow up/needed services  comprehensive assessment completed  Patient has PMH of CHF,A-Fib IBS, Fibromyalgia, gastritis and Gastric bypass surgery  patient reports at this time she has no cardiac symptoms except SOB with moderate exertion  Patient denies chest pain/pressure, cough, edema, or weight gain  Patient does report widespread pain secondary to fibromyalgia  Patient states pain is controlled using tramadol  Patient currently denies any abdominal pain  patient states her bowels are irregular and she uses dicyclomine when needed  Patient has blood work to complete per PCP orders and is to schedule a visit with GI  Patient understands need for doing so and denies needing help with this task  Patient is agreeable to have blood work completed on Tuesday, 5/30/17  Follow -up from previous CC note patient is taking torsemide 1/2 tab daily  patient is agreeable to continued care coordination  Care Coordination Chronic Condition HPI:     with room air  Patient is experiencing the following symptoms: fatigue and shortness of breath with usual activity, but no cough, no chest pain and no edema  Knowledge Assessment/Teachback Questions: is following diet, foods to limit/avoid, knows the name of her medications/water pill and understands the activity/exercise plan, but is not obtaining daily weights, does not know when to report symptoms and does not have a plan in place for who to call for worsening symptoms  Counseling was provided to the patient  Topics counseled included medications, importance of compliance with treatment and symptoms to report    Care Coordinator Additional Notes: discussed with patient importance of medication compliance, need for dietary compliance secondary to warfarin and disease process  patient verbalizing understanding  Patient has follow-up appointment(s) on:   Date: 7/7/17  Provider: Dr Fatou Taveras  Active Problems    1  Abdominal bloating (787 3) (R14 0)   2  Abdominal pain (789 00) (R10 9)   3  Acute upper respiratory infection (465 9) (J06 9)   4  Advanced directives, counseling/discussion (V65 49) (Z71 89)   5  Allergic rhinitis (477 9) (J30 9)   6  Anemia (285 9) (D64 9)   7  Anticoagulated on warfarin (V58 61) (Z79 01)   8  Arthralgia of hip (719 45) (M25 559)   9  Arthralgia of knee, right (719 46) (M25 561)   10  Atrial fibrillation (427 31) (I48 91)   11  Benign essential hypertension (401 1) (I10)   12  Bursitis of right hip (726 5) (M70 71)   13  Chronic diastolic CHF (congestive heart failure) (428 32,428 0) (I50 32)   14  Chronic pain of lower extremity, bilateral (729 5,338 29) (M79 604,M79 605,G89 29)   15  Community acquired pneumonia (5) (J18 9)   12  Diarrhea (787 91) (R19 7)   17  JEROME (dyspnea on exertion) (786 09) (R06 09)   18  Early satiety (780 94) (R68 81)   19  Edema (782 3) (R60 9)   20  Encounter for routine gynecological examination (V72 31) (Z01 419)   21  Encounter for screening for other nervous system disorder (V80 09) (Z13 858)   22  Encounter for screening mammogram for malignant neoplasm of breast (V76 12)    (Z12 31)   23  Encounter for special screening examination for genitourinary disorder (V81 6) (Z13 89)   24  External hemorrhoids (455 3) (K64 4)   25  Fibromyalgia (729 1) (M79 7)   26  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)   27  Headache (784 0) (R51)   28  Heart murmur (785 2) (R01 1)   29  Helicobacter pylori gastritis (535 10,041 86) (K29 70,B96 81)   30  Hematoma (924 9) (T14 8)   31  Impacted cerumen of left ear (380 4) (H61 22)   32  Irritable bowel syndrome with constipation (564 1) (K58 1)   33  Lichen sclerosus of female genitalia (701 0) (N90 4)   34  Long term use of drug (V58 69) (Z79 899)   35   Lower back pain (724 2) (M54 5)   36  Lumbar canal stenosis (724 02) (M48 06)   37  Moderate mitral regurgitation (424 0) (I34 0)   38  Morbid obesity (278 01) (E66 01)   39  Need for prophylactic vaccination against single diseases (V05 9) (Z23)   40  Need for prophylactic vaccination and inoculation against influenza (V04 81) (Z23)   41  Osteoarthritis, multiple sites (715 89) (M15 9)   42  Osteoporosis (733 00) (M81 0)   43  Palpitations (785 1) (R00 2)   44  Pap smear, as part of routine gynecological examination (V76 2) (Z01 419)   45  Peripheral neuropathy (356 9) (G62 9)   46  Preoperative clearance (V72 84) (Z01 818)   47  Primary osteoarthritis of right knee (715 16) (M17 11)   48  Right ankle pain (719 47) (M25 571)   49  Routine Gynecological Exam With Cervical Pap Smear (V72 31)   50  Screening for genitourinary condition (V81 6) (Z13 89)   51  Screening for neurological condition (V80 09) (Z13 89)   52  Screening for osteoporosis (V82 81) (Z13 820)   53  Skin lump of leg, left (782 2) (R22 42)   54  Status post gastric bypass for obesity (V45 86) (Z98 84)   55  Synovial cyst of right popliteal space (727 51) (M71 21)   56  Unstable right hip (718 85) (M25 351)   57  Urinary incontinence (788 30) (R32)   58  Visit for screening mammogram (V76 12) (Z12 31)   59  Vitamin D deficiency (268 9) (E55 9)   60  Wheezing (786 07) (R06 2)    Past Medical History    1  History of Acute Myocardial Infarction (V12 59)   2  History of Birth History   3  History of Disturbance Of Taste (781 1)   4  History of Easy bruising (782 9) (R23 8)   5  History of Effusion of knee joint right (719 06) (M25 461)   6  History of atrial fibrillation (V12 59) (Z86 79)   7  History of cancer (V10 90) (Z85 9)   8  History of dermatitis (V13 3) (Z87 2)   9  History of obesity (V13 89) (Z86 39)   10  History of osteopenia (V13 59) (Z87 39)   11  History of sciatica (V12 49) (Z86 69)   12  History of shortness of breath (V13 89) (Z87 898)   13   History of sinusitis (V12 69) (Z87 09)   14  History of syncope (V15 89) (Z87 898)   15  History of viral infection (V12 09) (Z86 19)   16  History of Joint pain, hip (719 45) (M25 559)   17  History of Limb pain (729 5) (M79 609)   18  History of Mammogram   19  History of Myalgia and myositis (729 1)   20  Need for prophylactic vaccination against single diseases (V05 9) (Z23)   21  Need for prophylactic vaccination and inoculation against influenza (V04 81) (Z23)   22  History of Preoperative cardiovascular examination (V72 81) (Z01 810)   23  History of Primary osteoarthritis of right knee (715 16) (M17 11)   24  History of Reported A Previous Heart Murmur   25  History of Reported Pap Smear   26  History of Right leg pain (729 5) (M79 604)   27  History of Sore throat (462) (J02 9)   28  History of Umbilical hernia (954 4) (K42 9)    Surgical History    1  History of Ankle Arthrodesis Right   2  History of Cholecystectomy   3  History of Cholecystectomy   4  History of Gastric Surgery For Morbid Obesity Bypass With Ann Marie-en-Y   5  History of Gastric Surgery For Morbid Obesity Bypass With Ann Marie-en-Y   6  History of Knee Replacement   7  History of Pilonidal Cyst Resection   8  History of Pilonidal Cyst Resection   9  History of Total Knee Arthroplasty   10  History of Umbilical Hernia Repair   11  History of Umbilical Hernia Repair    Family History  Mother    1  Family history of Coronary Artery Disease (V17 49)  Father    2  Family history of Coronary Artery Disease (V17 49)  Sister    3  Family history of Acute Lymphoma   4  Family history of Lymphomatoid Papulosis  Brother    5  Family history of Coronary Artery Disease (V17 49)    Social History    · Denied: History of Alcohol Use (History)   · Denied: History of Drug Use   · Denied: Exercising Regularly   · Never smoked tobacco (V49 89) (Z78 9)   · Social alcohol use (Z78 9)    Current Meds    1   Fluticasone Propionate 50 MCG/ACT Nasal Suspension; USE 2 SPRAYS IN EACH   NOSTRIL DAILY (AVOID NASAL SEPTUM); Therapy: 01MBV4232 to (Evaluate:19Sep2017)  Requested for: 72MPS5329; Last   Rx:22May2017 Ordered    2  TraMADol HCl - 50 MG Oral Tablet; TAKE ONE TABLET BY MOUTH TWICE DAILY AS   NEEDED FOR PAIN;   Therapy: 03Apr2017 to (Evaluate:21Jun2017)  Requested for: 35WBZ2949; Last   Rx:78Qau8329 Ordered    3  Digoxin 125 MCG Oral Tablet; Take 1 tablet daily; Therapy: 01Hqk2072 to (Evaluate:11Aug2017)  Requested for: 25Apr2017; Last   Rx:13Apr2017 Ordered   4  Warfarin Sodium 1 MG Oral Tablet; TAKE 1 TABLET DAILY AS DIRECTED; Therapy: 78IIY1117 to (77 873 135)  Requested for: 25Apr2017; Last   Rx:25Apr2017 Ordered   5  Warfarin Sodium 4 MG Oral Tablet; TAKE 1 TABLET DAILY AS DIRECTED; Therapy: 61DHU4525 to (Evaluate:81Tkr2796)  Requested for: 17WNA8177; Last   Rx:23Xnm4511 Ordered    6  Potassium Chloride ER 20 MEQ Oral Tablet Extended Release; Take 1 tablet daily; Therapy: 39ZTY3348 to Recorded   7  Torsemide 20 MG Oral Tablet; 2 tabs daily prn; Therapy: 65LRG2209 to (Evaluate:30Mar2017) Recorded    8  Dicyclomine HCl - 10 MG Oral Capsule (Bentyl); Take 1 capsule twice daily as needed; Therapy: 39LLX4742 to (Evaluate:19Sep2017)  Requested for: 23UJT0772; Last   Rx:22May2017 Ordered   9  DilTIAZem HCl - 120 MG Oral Tablet; Take 1 tablet twice daily; Therapy: 14XLD8395 to (Evaluate:30Jun2017)  Requested for: 39NWI7492; Last   Rx:84Ycq8067 Ordered    10  DULoxetine HCl - 60 MG Oral Capsule Delayed Release Particles; take 1 capsule daily; Therapy: 09BBR9172 to (Evaluate:31Oct2017)  Requested for: 04Apr2017; Last    Rx:04Apr2017 Ordered    11  Omeprazole 40 MG Oral Capsule Delayed Release; TAKE ONE CAPSULE BY MOUTH    ONCE DAILY; Therapy: 46Mlv0251 to (Bard Perks)  Requested for: 03Apr2017; Last    Rx:70Ils1633 Ordered    12  Desoximetasone 0 25 % External Cream; APPLY TO AFFECTED AREA SPARINGLY AS    NEEDED;     Therapy: 75Mis8810 to (Romie Whitney)  Requested for: 56KFL5087; Last    Rx:01Nov2016 Ordered    Allergies    1  Bactrim DS TABS   2  Codeine Sulfate TABS   3  Percocet TABS   4  Sulfa Drugs    5  Seasonal    Plan    Continue care coordination  Goals  Goals Care Coordination:     1)  weigh daily and record  2)  free of s/sx of CHF  3)  IBS under control  End of Encounter Meds    1  Fluticasone Propionate 50 MCG/ACT Nasal Suspension; USE 2 SPRAYS IN EACH   NOSTRIL DAILY (AVOID NASAL SEPTUM); Therapy: 07SDA1889 to (Evaluate:52Yqk4842)  Requested for: 14GAC5346; Last   Rx:22May2017 Ordered    2  TraMADol HCl - 50 MG Oral Tablet; TAKE ONE TABLET BY MOUTH TWICE DAILY AS   NEEDED FOR PAIN;   Therapy: 47Jhs8001 to (Evaluate:21Jun2017)  Requested for: 82OPD2609; Last   Rx:22May2017 Ordered    3  Digoxin 125 MCG Oral Tablet; Take 1 tablet daily; Therapy: 96Ymv3717 to (Evaluate:11Aug2017)  Requested for: 15Uyt7078; Last   Rx:06Vcr3046 Ordered   4  Warfarin Sodium 1 MG Oral Tablet; TAKE 1 TABLET DAILY AS DIRECTED; Therapy: 25TVB0944 to [phone]  Requested for: 25Apr2017; Last   Rx:25Apr2017 Ordered   5  Warfarin Sodium 4 MG Oral Tablet; TAKE 1 TABLET DAILY AS DIRECTED; Therapy: 26FBZ8470 to (Evaluate:62Iiw4234)  Requested for: 50IDL9686; Last   Rx:60Sli7150 Ordered    6  Potassium Chloride ER 20 MEQ Oral Tablet Extended Release; Take 1 tablet daily; Therapy: 99DLZ8274 to Recorded   7  Torsemide 20 MG Oral Tablet; 2 tabs daily prn; Therapy: 77QOY8918 to (Evaluate:30Mar2017) Recorded    8  Dicyclomine HCl - 10 MG Oral Capsule (Bentyl); Take 1 capsule twice daily as needed; Therapy: 65SKH2190 to (Evaluate:88Qge7106)  Requested for: 88PDB3953; Last   Rx:06Kvk1794 Ordered   9  DilTIAZem HCl - 120 MG Oral Tablet; Take 1 tablet twice daily; Therapy: 57RAJ7682 to (Evaluate:30Jun2017)  Requested for: 50CMQ5019; Last   Rx:03Gfe5731 Ordered    10   DULoxetine HCl - 60 MG Oral Capsule Delayed Release Particles; take 1 capsule daily; Therapy: 07JMB4868 to (Evaluate:31Oct2017)  Requested for: 04Apr2017; Last    Rx:04Apr2017 Ordered    11  Omeprazole 40 MG Oral Capsule Delayed Release; TAKE ONE CAPSULE BY MOUTH    ONCE DAILY; Therapy: 69Yez6967 to (Jaz Genin)  Requested for: 03Apr2017; Last    Rx:03Apr2017 Ordered    12  Desoximetasone 0 25 % External Cream; APPLY TO AFFECTED AREA SPARINGLY AS    NEEDED; Therapy: 68Pse6367 to (Jaz Genin)  Requested for: 86BHP7551; Last    Rx:01Nov2016 Ordered    Future Appointments    Date/Time Provider Specialty Site   07/11/2017 09:30 AM PIO Winters  Orthopedic Surgery [facility] [place]   07/07/2017 10:40 AM Maisha Cole MD Cardiology [address]   11/28/2017 10:00 AM Argentina Gabriel DO Internal Medicine [facility]     Patient Care Team    Care Team Member Role Specialty Office Number   Elvin Amin MD Specialist Cardiology [phone] [id number] DPM Specialist Podiatry [phone]   Salome Quintero MD Specialist General Surgery [phone]   Danis Cunningham MD Specialist Obstetrics/Gynecology [phone]   [facility] Specialist Dermatology [phone]   Noel Lassiter MD Specialist Ophthalmology [phone]   Sue Goins MD Specialist Orthopedic Surgery [phone]   Yvone Dear DO Specialist Pain Management [phone]   Antonio WILSON   Specialist Orthopedic Surgery , Samreen Duncan DO Specialist Gastroenterology Adult [phone]   Ashley Renee MD  Orthopedic Surgery [phone]   Sharif Hines DPM Specialist Podiatry [phone] [address] MD Specialist Orthopedic Surgery [phone]     Signatures   Electronically signed by : Sami Siddiqi RN; May 25 2017  3:04PM EST                       (Author)

## 2018-01-18 NOTE — MISCELLANEOUS
Message   Recorded as Task   Date: 02/12/2016 10:47 AM, Created By: Adryan Jimenes   Task Name: Med Renewal Request   Assigned To: Lily De La Torre   Regarding Patient: Benny Seth, Status: In Progress   Comment:    Krysten Rust - 12 Feb 2016 10:47 AM     TASK CREATED  patient said she will no longer be seeing Dr Lauri Pike who had prescribed omeprazole 40mg but is asking that you prescribe because the medication is helping    [facility] pharmacy/[facility]/30 days    [email or web address]   Halina Heredia - 12 Feb 2016 11:25 AM     TASK IN PROGRESS   Halina Heredia - 12 Feb 2016 11:26 AM     TASK REASSIGNED: Previously Assigned To Lawrence Denton        Active Problems    1  Abdominal pain (789 00) (R10 9)   2  Allergic rhinitis (477 9) (J30 9)   3  Anemia (285 9) (D64 9)   4  Arthralgia of hip (719 45) (M25 559)   5  Arthralgia of knee, right (719 46) (M25 561)   6  Benign essential hypertension (401 1) (I10)   7  Bursitis of right hip (726 5) (M70 71)   8  Edema (782 3) (R60 9)   9  Encounter for screening for other nervous system disorder (V80 09) (Z13 858)   10  Encounter for screening mammogram for malignant neoplasm of breast (V76 12)    (Z12 31)   11  Encounter for special screening examination for genitourinary disorder (V81 6) (Z13 89)   12  Fibromyalgia (729 1) (M79 7)   13  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)   14  Headache (784 0) (R51)   15  Heart murmur (785 2) (R01 1)   16  Helicobacter pylori gastritis (535 10,041 86) (B96 81)   17  Irritable bowel syndrome (564 1) (K58 9)   18  Irritable bowel syndrome with constipation (564 1) (K58 9)   19  Lichen sclerosus of female genitalia (701 0) (N90 4)   20  Long term use of drug (V58 69) (Z79 899)   21  Lower back pain (724 2) (M54 5)   22  Lumbar canal stenosis (724 02) (M48 06)   23  Mitral regurgitation (424 0) (I34 0)   24  Morbid obesity (278 01) (E66 01)   25  Need for prophylactic vaccination against single diseases (V05 9) (Z23)   26   Need for prophylactic vaccination and inoculation against influenza (V04 81) (Z23)   27  Osteoarthritis, multiple sites (715 89) (M15 9)   28  Osteoporosis (733 00) (M81 0)   29  Palpitations (785 1) (R00 2)   30  History of Paroxysmal atrial fibrillation (427 31) (I48 0)   31  Peripheral neuropathy (356 9) (G62 9)   32  Preoperative cardiovascular examination (V72 81) (Z01 810)   33  Preoperative clearance (V72 84) (Z01 818)   34  Primary osteoarthritis of right knee (715 16) (M17 11)   35  Right ankle pain (719 47) (M25 571)   36  Routine Gynecological Exam With Cervical Pap Smear (V72 31)   37  Screening for genitourinary condition (V81 6) (Z13 89)   38  Screening for neurological condition (V80 09) (Z13 89)   39  Status post gastric bypass for obesity (V45 86) (Z98 84)   40  Synovial cyst of right popliteal space (727 51) (M71 21)   41  Urinary incontinence (788 30) (R32)   42  Vitamin D deficiency (268 9) (E55 9)    Current Meds   1  Aspirin 81 MG Oral Tablet; TAKE 1 TABLET DAILY; Therapy: (Recorded:87Lwr2131) to Recorded   2  Betamethasone Sod Phos & Acet 6 (3-3) MG/ML Injection Suspension (Celestone   Soluspan); 4 ml; To Be Done: 16QJA1286; Status: HOLD FOR - Administration Ordered   3  Calcium + D TABS; Advised to get between 1,200 and 2,000 mg daily, combining diet   and supplements; Therapy: (Recorded:81Jbv1724) to Recorded   4  Desoximetasone 0 25 % External Cream; APPLY TO AFFECTED AREA SPARINGLY AS   NEEDED; Therapy: 22Dcc9948 to (Evaluate:88Tsb1152)  Requested for: 96FYU5555; Last   Rx:17Oct2014 Ordered   5  Dicyclomine HCl - 10 MG Oral Capsule (Bentyl); TAKE 1 CAPSULE EVERY 6 HOURS AS   NEEDED  Requested for: 23AOT8309; Last Rx:57Jiz2114 Ordered   6  DULoxetine HCl - 60 MG Oral Capsule Delayed Release Particles; TAKE 1 CAPSULE   Daily; Therapy: 71HDE4871 to (Patrice Chiang)  Requested for: 39CJK2010; Last   Rx:94Sxc1200 Ordered   7   Ergocalciferol 78012 UNIT Oral Capsule; TAKE 1 CAPSULE BY MOUTH EVERY WEEK; Therapy: 83QXF4055 to (Evaluate:19Oct2015)  Requested for: 83VER0469 Recorded   8  Fluticasone Propionate 50 MCG/ACT Nasal Suspension; USE 2 SPRAYS IN EACH   NOSTRIL DAILY (AVOID NASAL SEPTUM); Therapy: 63TFD1835 to (August Mis)  Requested for: 27Jun2014; Last   DF:40ZWY2576 Ordered   9  Gabapentin 300 MG Oral Capsule; TAKE 1 CAPSULE 4 TIMES DAILY -MAY CAUSE   DROWSINESS -DO NOT TAKE WITHALCOHOL; Therapy: 14JTK1703 to (Evaluate:24Sep2016)  Requested for: 52QDZ2057; Last   Rx:66Erw3228 Ordered   10  Hydrocodone-Acetaminophen 5-325 MG Oral Tablet; Take 1 to 2 tablets every 8 hours as    needed for pain  No alcohol nor driving with medication use  Do not exceed 6 tablets    in 24 hour period; Therapy: 29GPM2062 to (Evaluate:08Mar2016); Last Rx:02Aud6832 Ordered   11  Multivitamins Oral Capsule; TAKE 1 CAPSULE DAILY; Therapy: (Recorded:70Sxq9339) to Recorded   12  Omeprazole 40 MG Oral Capsule Delayed Release (PriLOSEC); TAKE 1 CAPSULE    DAILY; Therapy: 22FYX8890 to (Evaluate:13Jan2016)  Requested for: 15Sep2015; Last    Rx:78Byh3136 Ordered   13  Restasis 0 05 % Ophthalmic Emulsion; INSTILL 1 DROP IN [facility] EYE TWICE DAILY; Therapy: (Recorded:22Jan2016) to Recorded   14  Slow Fe 142 (45 Fe) MG Oral Tablet Extended Release; TAKE 1 TABLET DAILY; Therapy: 77FVY9088 to (Last AF:54JWS8902) Ordered   15  Torsemide 20 MG Oral Tablet; TAKE 1 TABLET DAILY AS DIRECTED; Therapy: 43Apn5273 to (Evaluate:94Kma2646); Last Rx:59Qlz5536 Ordered   16  Vitamin B-12 500 MCG Oral Tablet; TAKE 1 TABLET DAILY; Therapy: 76SGF1120 to (Evaluate:59Zko5769); Last GO:55OYE8443 Ordered    Allergies    1  Bactrim DS TABS   2  Codeine Sulfate TABS   3  Percocet TABS   4  Sulfa Drugs    5  Seasonal    Plan   #1  Prescription processed  Signatures   Electronically signed by :  Shadi Bravo MD; Feb 12 2016 12:31PM EST                       (Author)

## 2018-01-22 ENCOUNTER — ANTICOAG VISIT (OUTPATIENT)
Dept: INTERNAL MEDICINE CLINIC | Facility: CLINIC | Age: 83
End: 2018-01-22

## 2018-01-23 NOTE — RESULT NOTES
Message   Please notify the patient normal labs follow up as scheduled to discuss the report in detail        Verified Results  (1) [address] [id number] 11:28AM Edenilson Combs     Test Name Result Flag Reference   SS-A <0 2 AI  0 0 - 0 9   SS-B <0 2 AI  0 0 - 0 9   Performed at:  [address]  [id number]  : Vanda Singletary MD, Phone:  [phone]

## 2018-01-26 ENCOUNTER — APPOINTMENT (OUTPATIENT)
Dept: LAB | Facility: CLINIC | Age: 83
End: 2018-01-26
Payer: COMMERCIAL

## 2018-01-26 ENCOUNTER — OFFICE VISIT (OUTPATIENT)
Dept: INTERNAL MEDICINE CLINIC | Facility: CLINIC | Age: 83
End: 2018-01-26
Payer: COMMERCIAL

## 2018-01-26 ENCOUNTER — TRANSCRIBE ORDERS (OUTPATIENT)
Dept: LAB | Facility: CLINIC | Age: 83
End: 2018-01-26

## 2018-01-26 VITALS
HEART RATE: 96 BPM | WEIGHT: 218.2 LBS | DIASTOLIC BLOOD PRESSURE: 70 MMHG | RESPIRATION RATE: 16 BRPM | BODY MASS INDEX: 40.15 KG/M2 | HEIGHT: 62 IN | OXYGEN SATURATION: 99 % | SYSTOLIC BLOOD PRESSURE: 130 MMHG

## 2018-01-26 DIAGNOSIS — Z79.01 LONG-TERM (CURRENT) USE OF ANTICOAGULANTS: Primary | ICD-10-CM

## 2018-01-26 DIAGNOSIS — R10.31 RIGHT LOWER QUADRANT ABDOMINAL PAIN: Primary | ICD-10-CM

## 2018-01-26 DIAGNOSIS — I48.91 ATRIAL FIBRILLATION, UNSPECIFIED TYPE (HCC): ICD-10-CM

## 2018-01-26 DIAGNOSIS — R07.89 OTHER CHEST PAIN: ICD-10-CM

## 2018-01-26 DIAGNOSIS — I50.31 ACUTE DIASTOLIC CONGESTIVE HEART FAILURE (HCC): ICD-10-CM

## 2018-01-26 DIAGNOSIS — I48.20 CHRONIC ATRIAL FIBRILLATION (HCC): ICD-10-CM

## 2018-01-26 DIAGNOSIS — E66.9 OBESITY WITHOUT SERIOUS COMORBIDITY, UNSPECIFIED CLASSIFICATION, UNSPECIFIED OBESITY TYPE: ICD-10-CM

## 2018-01-26 PROBLEM — M25.351: Status: ACTIVE | Noted: 2017-04-04

## 2018-01-26 LAB
INR PPP: 2.66 (ref 0.86–1.16)
INR PPP: 2.66 (ref 0.86–1.16)
PROTHROMBIN TIME: 28.7 SECONDS (ref 12.1–14.4)

## 2018-01-26 PROCEDURE — 36415 COLL VENOUS BLD VENIPUNCTURE: CPT

## 2018-01-26 PROCEDURE — 99214 OFFICE O/P EST MOD 30 MIN: CPT | Performed by: INTERNAL MEDICINE

## 2018-01-26 PROCEDURE — 93000 ELECTROCARDIOGRAM COMPLETE: CPT | Performed by: INTERNAL MEDICINE

## 2018-01-26 PROCEDURE — 85610 PROTHROMBIN TIME: CPT

## 2018-01-26 RX ORDER — WARFARIN SODIUM 5 MG/1
1 TABLET ORAL DAILY
COMMUNITY
Start: 2016-12-30 | End: 2018-03-22

## 2018-01-26 RX ORDER — FLUTICASONE PROPIONATE 50 MCG
SPRAY, SUSPENSION (ML) NASAL
COMMUNITY
Start: 2012-01-25 | End: 2018-03-22

## 2018-01-26 RX ORDER — DESOXIMETASONE 2.5 MG/G
CREAM TOPICAL
COMMUNITY
Start: 2011-08-03 | End: 2018-10-24 | Stop reason: SDUPTHER

## 2018-01-26 RX ORDER — TRAMADOL HYDROCHLORIDE 50 MG/1
1 TABLET ORAL 2 TIMES DAILY PRN
COMMUNITY
Start: 2017-04-03 | End: 2018-03-08 | Stop reason: SDUPTHER

## 2018-01-26 RX ORDER — WARFARIN SODIUM 1 MG/1
1 TABLET ORAL DAILY
COMMUNITY
Start: 2017-04-03 | End: 2018-03-22

## 2018-01-26 RX ORDER — WARFARIN SODIUM 4 MG/1
4 TABLET ORAL EVERY OTHER DAY
Refills: 3 | COMMUNITY
Start: 2017-12-20 | End: 2018-08-01 | Stop reason: SDUPTHER

## 2018-01-26 RX ORDER — DILTIAZEM HYDROCHLORIDE 120 MG/1
1 TABLET, FILM COATED ORAL 2 TIMES DAILY
COMMUNITY
Start: 2016-07-05 | End: 2018-03-22

## 2018-01-26 RX ORDER — TORSEMIDE 20 MG/1
TABLET ORAL
COMMUNITY
Start: 2017-02-28 | End: 2018-03-22

## 2018-01-26 RX ORDER — POTASSIUM CHLORIDE 20 MEQ/1
1 TABLET, EXTENDED RELEASE ORAL DAILY
COMMUNITY
Start: 2017-11-16 | End: 2018-03-22

## 2018-01-26 RX ORDER — BISOPROLOL FUMARATE 5 MG/1
1 TABLET ORAL DAILY
COMMUNITY
Start: 2017-09-14 | End: 2018-03-29 | Stop reason: CLARIF

## 2018-01-26 RX ORDER — DULOXETIN HYDROCHLORIDE 60 MG/1
1 CAPSULE, DELAYED RELEASE ORAL DAILY
COMMUNITY
Start: 2014-03-10 | End: 2018-03-22

## 2018-01-26 RX ORDER — DICYCLOMINE HYDROCHLORIDE 10 MG/1
1 CAPSULE ORAL 2 TIMES DAILY PRN
COMMUNITY
Start: 2016-07-26 | End: 2018-03-22

## 2018-01-26 NOTE — PROGRESS NOTES
Assessment/Plan:           Problem List Items Addressed This Visit     RESOLVED: Acute diastolic congestive heart failure (HCC)    Relevant Medications    bisoprolol (ZEBETA) 5 mg tablet    diltiazem (CARDIZEM) 120 MG tablet    Atrial fibrillation (HCC)     Rate controlled, anticoagulated  Relevant Medications    bisoprolol (ZEBETA) 5 mg tablet    diltiazem (CARDIZEM) 120 MG tablet    Obesity without serious comorbidity     I have counselled the pt to follow a healthy and balanced diet ,and recommend routine exercise  Other chest pain     Today I will be checking EKG, I will also have the patient see cardiologist Dr Marly Figueroa ECG (Completed)    Ambulatory referral to Cardiology    Right lower quadrant abdominal pain - Primary     I will be checking a CT scan of the abdomen, I will refer the patient to GI Dr Shannon Jj she will need colonoscopy         Relevant Orders    CT abdomen pelvis wo contrast            Subjective:      Patient ID: Emily Enrique is a 80 y o  female  [age] old female coming in for a follow up office visit regarding chronic atrial fibrillation morbid obesity abdominal pain/distension chronic lower extremity edema/lymphedema; does report me that the abdominal wall on had usually hung since her bypass surgery approximately 15 years ago; but more recently she has noticed "a puffiness to the lower abdomen right side greater than the left side" and also notices some swelling of bilateral lower extremities  She 1st noticed the symptoms in 2016, at she does mention that I will weather will make it worse at times  He also notices some  Water blisters diet would be intermittent   Vital reports me elevating leg does have an order for a venous Doppler which she will work on in the near future    Patient also mentions me that she has noticed some shortness of breath when going a flight is step along chest pain at peak exercise this is newSince she has started to become more active packing up her house for a  Currently she is asymptomatic        The following portions of the patient's history were reviewed and updated as appropriate: allergies, current medications, past family history, past medical history, past social history, past surgical history and problem list   Harry Jorgensen was seen today for follow-up  Diagnoses and all orders for this visit:    Right lower quadrant abdominal pain  -     CT abdomen pelvis wo contrast; Future    Other chest pain  -     POCT ECG  -     Ambulatory referral to Cardiology;  Future    Chronic atrial fibrillation (HCC)    Acute diastolic congestive heart failure (HCC)    Obesity without serious comorbidity, unspecified classification, unspecified obesity type                            Review of Systems - General ROS: positive for  - weight gain  Past Medical History:   Diagnosis Date    Arthritis     CHF (congestive heart failure) (HCC)     Disturbance of smell and taste     disturbance of taste    Effusion of knee joint right     Fibromyalgia     Heart murmur     reported a previous heart murmur    History of acute myocardial infarction     History of atrial fibrillation     History of bruising easily     History of cancer     History of dermatitis     History of mammogram     History of obesity     History of osteopenia     History of sciatica     History of shortness of breath     History of sinusitis     History of sore throat     History of syncope     History of umbilical hernia     History of viral infection     Irritable bowel syndrome (IBS)     Joint pain, hip     Limb pain     Myalgia     myalgia and myositis    Need for prophylactic vaccination against single diseases     Need for prophylactic vaccination and inoculation against influenza     Preoperative cardiovascular examination     Primary osteoarthritis of right knee     Right leg pain     Vaginal Pap smear     reported pap smear     Past Surgical History:   Procedure Laterality Date    ANKLE ARTHRODESIS Right     BACK SURGERY      CHOLECYSTECTOMY      x2    FOOT SURGERY      HIP CLOSE REDUCTION Right 8/21/2016    Procedure: CLOSED REDUCTION RIGHT TOTAL HIP;  Surgeon: Diane Polanco MD;  [facility];  Service:     JOINT REPLACEMENT      LEFT KNEE REPLACEMENT    PILONIDAL CYST EXCISION      x2    REPLACEMENT TOTAL KNEE Right     SILVANA-EN-Y PROCEDURE      x2    TOTAL KNEE ARTHROPLASTY      UMBILICAL HERNIA REPAIR      x2     Current Outpatient Prescriptions on File Prior to Visit   Medication Sig Dispense Refill    fluticasone (FLONASE) 50 mcg/act nasal spray 1 spray into each nostril daily   warfarin (COUMADIN) 5 mg tablet Take 1 tablet (5 mg total) by mouth daily  2 mg alternating with 3 mg (Patient taking differently: Take 2 mg by mouth daily 2 mg alternating with 3 mg  )  0    dicyclomine (BENTYL) 10 mg capsule Take 10 mg by mouth daily   digoxin (LANOXIN) 0 125 mg tablet Take 125 mcg by mouth daily   diltiazem (CARDIZEM) 120 MG tablet Take 120 mg by mouth 2 (two) times a day   DULoxetine (CYMBALTA) 60 mg delayed release capsule Take 60 mg by mouth daily   HYDROcodone-acetaminophen (NORCO) 5-325 mg per tablet Take 1 tablet by mouth every 6 (six) hours as needed for moderate pain for up to 10 doses  Max Daily Amount: 4 tablets 10 tablet 0    omeprazole (PriLOSEC) 40 MG capsule Take 40 mg by mouth daily   potassium chloride (KLOR-CON) 20 mEq packet Take 20 mEq by mouth once a week        torsemide (DEMADEX) 20 mg tablet Take 20 mg by mouth once a week         No current facility-administered medications on file prior to visit        Family History   Problem Relation Age of Onset    Coronary artery disease Mother     Coronary artery disease Father     Lymphoma Sister      acute    Rashes / Skin problems Sister      lymphomatoid papulosis    Coronary artery disease Brother      Social History     Social History    Marital status: /Civil Union     Spouse name: N/A    Number of children: N/A    Years of education: N/A     Occupational History    Not on file  Social History Main Topics    Smoking status: Never Smoker    Smokeless tobacco: Never Used    Alcohol use Yes      Comment: (history) social    Drug use: No    Sexual activity: Not on file     Other Topics Concern    Not on file     Social History Narrative    No narrative on file     Vitals:    01/26/18 1023   BP: 130/70   Pulse: 96   Resp: 16   SpO2: 99%                               Results for orders placed or performed in visit on 01/05/18   Comprehensive metabolic panel   Result Value Ref Range    Sodium 141 136 - 145 mmol/L    Potassium 4 0 3 5 - 5 3 mmol/L    Chloride 109 (H) 100 - 108 mmol/L    CO2 23 21 - 32 mmol/L    Anion Gap 9 4 - 13 mmol/L    BUN 14 5 - 25 mg/dL    Creatinine 0 84 0 60 - 1 30 mg/dL    Glucose, Fasting 92 65 - 99 mg/dL    Calcium 8 6 8 3 - 10 1 mg/dL    AST 18 5 - 45 U/L    ALT 18 12 - 78 U/L    Alkaline Phosphatase 92 46 - 116 U/L    Total Protein 7 1 6 4 - 8 2 g/dL    Albumin 3 6 3 5 - 5 0 g/dL    Total Bilirubin 0 42 0 20 - 1 00 mg/dL    eGFR 64 ml/min/1 73sq m   Lipid panel   Result Value Ref Range    Cholesterol 145 50 - 200 mg/dL    Triglycerides 93 <=150 mg/dL    HDL, Direct 65 (H) 40 - 60 mg/dL    LDL Calculated 61 0 - 100 mg/dL   Protime-INR   Result Value Ref Range    Protime 23 6 (H) 12 1 - 14 4 seconds    INR 2 08 (H) 0 86 - 1 16     No images are attached to the encounter  Review of Systems   Constitutional: Negative for activity change, appetite change, chills, fever and unexpected weight change  HENT: Negative for hearing loss and postnasal drip  Eyes: Negative for pain  Respiratory: Positive for shortness of breath (worse since starting moving)  Negative for cough      Cardiovascular: Positive for chest pain (at peak exercize getting a little cp)    Gastrointestinal: Positive for abdominal distention and abdominal pain  Negative for diarrhea, nausea and vomiting  Neurological: Negative for dizziness, light-headedness and headaches  Psychiatric/Behavioral: Negative for suicidal ideas  Objective:     Physical Exam   Constitutional: She appears well-developed and well-nourished  HENT:   Head: Normocephalic  Mouth/Throat: Oropharynx is clear and moist    Eyes: Conjunctivae are normal  Pupils are equal, round, and reactive to light  Neck: Neck supple  Cardiovascular: Normal rate, regular rhythm, normal heart sounds and intact distal pulses  Exam reveals no gallop and no friction rub  No murmur heard  Pulmonary/Chest: Breath sounds normal  No respiratory distress  She has no wheezes  She has no rales  Abdominal: She exhibits no mass  Distention: right lower quadrant  There is tenderness  There is no rebound (right lower quadrant tenderness) and no guarding  Musculoskeletal: She exhibits no edema  Neurological: She is alert

## 2018-01-26 NOTE — ASSESSMENT & PLAN NOTE
I will be checking a CT scan of the abdomen, I will refer the patient to GI Dr Angela Cline she will need colonoscopy

## 2018-01-29 ENCOUNTER — ANTICOAG VISIT (OUTPATIENT)
Dept: INTERNAL MEDICINE CLINIC | Facility: CLINIC | Age: 83
End: 2018-01-29

## 2018-02-01 DIAGNOSIS — G89.29 OTHER CHRONIC PAIN: Primary | ICD-10-CM

## 2018-02-01 RX ORDER — DULOXETIN HYDROCHLORIDE 60 MG/1
CAPSULE, DELAYED RELEASE ORAL
Qty: 30 CAPSULE | Refills: 1 | Status: SHIPPED | OUTPATIENT
Start: 2018-02-01 | End: 2018-03-05 | Stop reason: SDUPTHER

## 2018-02-02 ENCOUNTER — HOSPITAL ENCOUNTER (OUTPATIENT)
Dept: CT IMAGING | Facility: HOSPITAL | Age: 83
Discharge: HOME | End: 2018-02-02
Payer: COMMERCIAL

## 2018-02-02 DIAGNOSIS — R10.31 RIGHT LOWER QUADRANT ABDOMINAL PAIN: ICD-10-CM

## 2018-02-02 PROCEDURE — 74176 CT ABD & PELVIS W/O CONTRAST: CPT

## 2018-02-15 ENCOUNTER — OFFICE VISIT (OUTPATIENT)
Dept: OBGYN CLINIC | Facility: CLINIC | Age: 83
End: 2018-02-15
Payer: COMMERCIAL

## 2018-02-15 ENCOUNTER — OFFICE VISIT (OUTPATIENT)
Dept: OBGYN CLINIC | Facility: HOSPITAL | Age: 83
End: 2018-02-15
Payer: COMMERCIAL

## 2018-02-15 VITALS
HEIGHT: 61 IN | WEIGHT: 215 LBS | SYSTOLIC BLOOD PRESSURE: 110 MMHG | BODY MASS INDEX: 40.59 KG/M2 | DIASTOLIC BLOOD PRESSURE: 60 MMHG

## 2018-02-15 VITALS
BODY MASS INDEX: 40.02 KG/M2 | HEIGHT: 61 IN | SYSTOLIC BLOOD PRESSURE: 108 MMHG | HEART RATE: 125 BPM | WEIGHT: 212 LBS | DIASTOLIC BLOOD PRESSURE: 75 MMHG

## 2018-02-15 DIAGNOSIS — Z01.419 PAPANICOLAOU SMEAR, AS PART OF ROUTINE GYNECOLOGICAL EXAMINATION: ICD-10-CM

## 2018-02-15 DIAGNOSIS — Z12.31 VISIT FOR SCREENING MAMMOGRAM: Primary | ICD-10-CM

## 2018-02-15 DIAGNOSIS — M17.11 PRIMARY OSTEOARTHRITIS OF RIGHT KNEE: Primary | ICD-10-CM

## 2018-02-15 DIAGNOSIS — M70.50 PES ANSERINE BURSITIS: ICD-10-CM

## 2018-02-15 DIAGNOSIS — Z13.820 OSTEOPOROSIS SCREENING: ICD-10-CM

## 2018-02-15 DIAGNOSIS — Z01.419 GYNECOLOGIC EXAM NORMAL: ICD-10-CM

## 2018-02-15 PROCEDURE — 20610 DRAIN/INJ JOINT/BURSA W/O US: CPT | Performed by: ORTHOPAEDIC SURGERY

## 2018-02-15 PROCEDURE — G0101 CA SCREEN;PELVIC/BREAST EXAM: HCPCS | Performed by: OBSTETRICS & GYNECOLOGY

## 2018-02-15 PROCEDURE — G0145 SCR C/V CYTO,THINLAYER,RESCR: HCPCS | Performed by: OBSTETRICS & GYNECOLOGY

## 2018-02-15 PROCEDURE — 99213 OFFICE O/P EST LOW 20 MIN: CPT | Performed by: ORTHOPAEDIC SURGERY

## 2018-02-15 PROCEDURE — 87624 HPV HI-RISK TYP POOLED RSLT: CPT | Performed by: OBSTETRICS & GYNECOLOGY

## 2018-02-15 RX ORDER — BETAMETHASONE SODIUM PHOSPHATE AND BETAMETHASONE ACETATE 3; 3 MG/ML; MG/ML
12 INJECTION, SUSPENSION INTRA-ARTICULAR; INTRALESIONAL; INTRAMUSCULAR; SOFT TISSUE
Status: COMPLETED | OUTPATIENT
Start: 2018-02-15 | End: 2018-02-15

## 2018-02-15 RX ORDER — BUPIVACAINE HYDROCHLORIDE 2.5 MG/ML
2 INJECTION, SOLUTION INFILTRATION; PERINEURAL
Status: COMPLETED | OUTPATIENT
Start: 2018-02-15 | End: 2018-02-15

## 2018-02-15 RX ORDER — LIDOCAINE HYDROCHLORIDE 10 MG/ML
2 INJECTION, SOLUTION INFILTRATION; PERINEURAL
Status: COMPLETED | OUTPATIENT
Start: 2018-02-15 | End: 2018-02-15

## 2018-02-15 RX ADMIN — BETAMETHASONE SODIUM PHOSPHATE AND BETAMETHASONE ACETATE 12 MG: 3; 3 INJECTION, SUSPENSION INTRA-ARTICULAR; INTRALESIONAL; INTRAMUSCULAR; SOFT TISSUE at 13:58

## 2018-02-15 RX ADMIN — BUPIVACAINE HYDROCHLORIDE 2 ML: 2.5 INJECTION, SOLUTION INFILTRATION; PERINEURAL at 13:58

## 2018-02-15 RX ADMIN — LIDOCAINE HYDROCHLORIDE 2 ML: 10 INJECTION, SOLUTION INFILTRATION; PERINEURAL at 13:58

## 2018-02-15 NOTE — PROGRESS NOTES
Assessment/Plan:    Assessment:  Visit for annual gynecologic and medical examination    Plan:  Normal gynecologic examination today    Self-breast examination was stressed and mammogram slip given to the patient    Pap smear was completed    The importance of exercise in good diet as well as using sun block when out in the sun for prolonged periods were also stressed to the patient    The importance of calcium and vitamin-D intake were discussed    Patient is current with her colonoscopy  She will be seen in 1 year for routine follow-up and examination    She was told to call should any problems developed during the interim      No problem-specific Assessment & Plan notes found for this encounter  Diagnoses and all orders for this visit:    Visit for screening mammogram  -     Mammo screening bilateral w cad; Future    Gynecologic exam normal  -     Liquid-based pap, screening; Future  -     Liquid-based pap, screening    Osteoporosis screening  -     DXA bone density spine hip and pelvis; Future    Papanicolaou smear, as part of routine gynecological examination          Subjective:      Patient ID: Annmarie Moran is a 80 y o  female  HPI    The following portions of the patient's history were reviewed and updated as appropriate: allergies, current medications, past family history, past medical history, past social history, past surgical history and problem list     Review of Systems   Constitutional: Negative  HENT: Negative  Eyes: Negative  Respiratory: Negative  Cardiovascular: Negative  Followed for hypertension, congestive heart failure, and atrial fibrillation   Gastrointestinal: Negative  Endocrine: Negative  Genitourinary: Negative  Musculoskeletal: Negative  Patient also followed for multiple orthopedic issues and pain issues as well   Skin: Negative  Neurological: Negative  Psychiatric/Behavioral: Negative            Objective:    Vitals: 02/15/18 1017   BP: 110/60        Physical Exam   Constitutional: She appears well-developed  HENT:   Head: Normocephalic  Eyes: Pupils are equal, round, and reactive to light  Neck: Normal range of motion  Neck supple  Cardiovascular: Normal rate and regular rhythm  Pulmonary/Chest: Effort normal and breath sounds normal    Abdominal: Soft  Normal appearance and bowel sounds are normal    Genitourinary: Rectum normal, vagina normal and uterus normal  Pelvic exam was performed with patient supine  Right adnexum displays no mass, no tenderness and no fullness  Left adnexum displays no mass, no tenderness and no fullness  Lymphadenopathy:     She has no cervical adenopathy  She has no axillary adenopathy  Right: No inguinal and no supraclavicular adenopathy present  Left: No inguinal and no supraclavicular adenopathy present  Neurological: She is alert  Skin: Skin is intact  No rash noted  Psychiatric: She has a normal mood and affect   Her speech is normal and behavior is normal  Judgment and thought content normal  Cognition and memory are normal

## 2018-02-15 NOTE — PATIENT INSTRUCTIONS
Normal gynecologic examination    Self-breast examinations stressed to the patient    Mammogram slip was given    Slip for DEXA scan also given to the patient    Patient was told to return in 1 year for routine follow-up in examination    She was told to call should any problems developed during the interim

## 2018-02-15 NOTE — PROGRESS NOTES
Assessment/Plan:     80y o  year old female   With right knee osteoarthritis and right pes anserine bursitis  Today she presents to the exam room in a wheelchair  She says that she is able to ambulate however it is painful so she opted to use a wheelchair in the office today  The pain in her right knee in the area of the pes anserine bursa came back about 3 months after her last injection in July, so she opted for injections in both these areas again today which she tolerated well  She expressed interest in some form of therapy program to strengthen her legs and help her with ambulation  She was offered a referral to physical therapy to achieve these ends,  And we will see her again in 3 months to reassess her pain and ambulatory status  The assessment and plan was formulated by the attending physician and I assisted in carrying it out  Follow Up:  3  month(s)        Subjective:     CHIEF COMPLAINT:  Chief Complaint   Patient presents with    Right Knee - Pain, Follow-up       HPI:  Sofy Franks is a 80y o  year old female   Who presents the office today in a wheelchair for right knee pain  She last received injections last July in her right knee and region of right pes anserine bursa  She says she received  About 3 months of relief from her pain after these previous injections  She says that she is able to ambulate only short distances as pain limits her      PAST MEDICAL HISTORY:  Past Medical History:   Diagnosis Date    Arthritis     CHF (congestive heart failure) (HCC)     Disturbance of smell and taste     disturbance of taste    Effusion of knee joint right     Fibromyalgia     Heart murmur     reported a previous heart murmur    History of acute myocardial infarction     History of atrial fibrillation     History of bruising easily     History of cancer     History of dermatitis     History of mammogram     History of obesity     History of osteopenia     History of sciatica     History of shortness of breath     History of sinusitis     History of sore throat     History of syncope     History of umbilical hernia     History of viral infection     Irritable bowel syndrome (IBS)     Joint pain, hip     Limb pain     Myalgia     myalgia and myositis    Need for prophylactic vaccination against single diseases     Need for prophylactic vaccination and inoculation against influenza     Preoperative cardiovascular examination     Primary osteoarthritis of right knee     Right leg pain     Vaginal Pap smear     reported pap smear       PAST SURGICAL HISTORY:  Past Surgical History:   Procedure Laterality Date    ANKLE ARTHRODESIS Right     BACK SURGERY      BARIATRIC SURGERY      CHOLECYSTECTOMY      x2    FOOT SURGERY      HIP CLOSE REDUCTION Right 8/21/2016    Procedure: CLOSED REDUCTION RIGHT TOTAL HIP;  Surgeon: Dieudonne Devine MD;  [facility];  Service:     JOINT REPLACEMENT      LEFT KNEE REPLACEMENT    PILONIDAL CYST EXCISION      x2    REPLACEMENT TOTAL KNEE Right     SILVANA-EN-Y PROCEDURE      x2    TOTAL KNEE ARTHROPLASTY      UMBILICAL HERNIA REPAIR      x2       FAMILY HISTORY:  Family History   Problem Relation Age of Onset    Coronary artery disease Mother     Coronary artery disease Father     Lymphoma Sister      acute    Rashes / Skin problems Sister      lymphomatoid papulosis    Coronary artery disease Brother        SOCIAL HISTORY:  Social History   Substance Use Topics    Smoking status: Never Smoker    Smokeless tobacco: Never Used    Alcohol use Yes      Comment: (history) social       MEDICATIONS:    Current Outpatient Prescriptions:     bisoprolol (ZEBETA) 5 mg tablet, Take 1 tablet by mouth daily, Disp: , Rfl:     desoximetasone (TOPICORT) 0 25 % cream, Apply topically, Disp: , Rfl:     dicyclomine (BENTYL) 10 mg capsule, Take 10 mg by mouth daily  , Disp: , Rfl:     dicyclomine (BENTYL) 10 mg capsule, Take 1 capsule by mouth 2 (two) times a day as needed, Disp: , Rfl:     digoxin (LANOXIN) 0 125 mg tablet, Take 125 mcg by mouth daily  , Disp: , Rfl:     diltiazem (CARDIZEM) 120 MG tablet, Take 120 mg by mouth 2 (two) times a day , Disp: , Rfl:     diltiazem (CARDIZEM) 120 MG tablet, Take 1 tablet by mouth 2 (two) times a day, Disp: , Rfl:     DULoxetine (CYMBALTA) 60 mg delayed release capsule, Take 60 mg by mouth daily  , Disp: , Rfl:     DULoxetine (CYMBALTA) 60 mg delayed release capsule, Take 1 capsule by mouth daily, Disp: , Rfl:     DULoxetine (CYMBALTA) 60 mg delayed release capsule, TAKE ONE CAPSULE BY MOUTH ONCE DAILY, Disp: 30 capsule, Rfl: 1    fluticasone (FLONASE) 50 mcg/act nasal spray, 1 spray into each nostril daily  , Disp: , Rfl:     fluticasone (FLONASE) 50 mcg/act nasal spray, into each nostril, Disp: , Rfl:     HYDROcodone-acetaminophen (NORCO) 5-325 mg per tablet, Take 1 tablet by mouth every 6 (six) hours as needed for moderate pain for up to 10 doses  Max Daily Amount: 4 tablets, Disp: 10 tablet, Rfl: 0    omeprazole (PriLOSEC) 40 MG capsule, Take 40 mg by mouth daily  , Disp: , Rfl:     potassium chloride (K-DUR,KLOR-CON) 20 mEq tablet, Take 1 tablet by mouth daily, Disp: , Rfl:     potassium chloride (KLOR-CON) 20 mEq packet, Take 20 mEq by mouth once a week  , Disp: , Rfl:     torsemide (DEMADEX) 20 mg tablet, Take 20 mg by mouth once a week  , Disp: , Rfl:     torsemide (DEMADEX) 20 mg tablet, Take by mouth, Disp: , Rfl:     traMADol (ULTRAM) 50 mg tablet, Take 1 tablet by mouth 2 (two) times a day as needed, Disp: , Rfl:     warfarin (COUMADIN) 1 mg tablet, Take 1 tablet by mouth daily, Disp: , Rfl:     warfarin (COUMADIN) 4 mg tablet, Take 8 mg by mouth daily, Disp: , Rfl: 3    warfarin (COUMADIN) 5 mg tablet, Take 1 tablet (5 mg total) by mouth daily   2 mg alternating with 3 mg (Patient taking differently: Take 2 mg by mouth daily 2 mg alternating with 3 mg  ), Disp: , Rfl: 0    warfarin (COUMADIN) 5 mg tablet, Take 1 tablet by mouth daily, Disp: , Rfl:     ALLERGIES:  Allergies   Allergen Reactions    Codeine Vomiting     Reaction Date: 24Apr2012;     Oxycodone Hcl Vomiting     Reaction Date: 24Apr2012;     Percocet [Oxycodone-Acetaminophen]     Seasonal Ic  [Cholestatin] Allergic Rhinitis    Bactrim [Sulfamethoxazole-Trimethoprim] Rash    Sulfa Antibiotics Rash       REVIEW OF SYSTEMS:  Pertinent items are noted in HPI  LABS:  HgA1c: No results found for: HGBA1C  BMP:   Lab Results   Component Value Date    GLUCOSE 106 12/30/2016    CALCIUM 8 6 01/05/2018     01/05/2018    K 4 0 01/05/2018    CO2 23 01/05/2018     (H) 01/05/2018    BUN 14 01/05/2018    CREATININE 0 84 01/05/2018         Objective:     _____________________________________________________  PHYSICAL EXAMINATION:  General: well developed and well nourished, alert, oriented times 3 and appears comfortable Pt in wheelchair  Psychiatric: Normal  HEENT: Trachea Midline, No torticollis  Cardiovascular: No discernable arrhythmia  Pulmonary: No wheezing or stridor  Skin: No masses, erthema, lacerations, fluctation, ulcerations  Neurovascular:  sensation intact right lower extremity  Skin warm well perfused  MUSCULOSKELETAL EXAMINATION:   right knee:  Full range of motion with extension, flexion limited to about 95 degrees  No visible or palpable effusions present  Tenderness palpation over medial lateral joint lines as well as anteromedial knee, and region of pes anserine bursa  No thigh atrophy noted  No patellar bursal inflammation or swelling present  1+ pitting edema present bilateral legs     Calves nontender, non erythematous    _____________________________________________________  STUDIES REVIEWED:  No Studies to review      PROCEDURES PERFORMED:  Large joint arthrocentesis  Date/Time: 2/15/2018 1:58 PM  Consent given by: patient  Timeout: Immediately prior to procedure a time out was called to verify the correct patient, procedure, equipment, support staff and site/side marked as required   Supporting Documentation  Indications: pain   Procedure Details  Location: knee - R knee  Needle size: 22 G  Ultrasound guidance: no  Approach: anteromedial  Medications administered: 2 mL lidocaine 1 %; 2 mL bupivacaine 0 25 %; 12 mg betamethasone acetate-betamethasone sodium phosphate 6 (3-3) mg/mL    Patient tolerance: patient tolerated the procedure well with no immediate complications  Dressing:  Sterile dressing applied  Large joint arthrocentesis  Date/Time: 2/15/2018 1:58 PM  Consent given by: patient  Timeout: Immediately prior to procedure a time out was called to verify the correct patient, procedure, equipment, support staff and site/side marked as required   Supporting Documentation  Indications: pain   Procedure Details  Location: knee (Pes Anserine Bursa) - R knee  Preparation: Patient was prepped and draped in the usual sterile fashion  Needle size: 22 G  Ultrasound guidance: no  Medications administered: 2 mL bupivacaine 0 25 %; 2 mL lidocaine 1 %; 12 mg betamethasone acetate-betamethasone sodium phosphate 6 (3-3) mg/mL    Patient tolerance: patient tolerated the procedure well with no immediate complications  Dressing:  Sterile dressing applied

## 2018-02-19 DIAGNOSIS — I48.91 ATRIAL FIBRILLATION, UNSPECIFIED TYPE (HCC): Primary | ICD-10-CM

## 2018-02-19 LAB — HPV RRNA GENITAL QL NAA+PROBE: NORMAL

## 2018-02-20 LAB
LAB AP GYN PRIMARY INTERPRETATION: NORMAL
Lab: NORMAL

## 2018-02-21 ENCOUNTER — EVALUATION (OUTPATIENT)
Dept: PHYSICAL THERAPY | Facility: CLINIC | Age: 83
End: 2018-02-21

## 2018-03-05 DIAGNOSIS — G89.29 OTHER CHRONIC PAIN: ICD-10-CM

## 2018-03-05 RX ORDER — DULOXETIN HYDROCHLORIDE 60 MG/1
CAPSULE, DELAYED RELEASE ORAL
Qty: 30 CAPSULE | Refills: 3 | Status: SHIPPED | OUTPATIENT
Start: 2018-03-05 | End: 2018-03-29 | Stop reason: SDUPTHER

## 2018-03-08 DIAGNOSIS — G62.9 NEUROPATHY: Primary | ICD-10-CM

## 2018-03-08 NOTE — TELEPHONE ENCOUNTER
I left a message for the patient to call back and confirm that she is NOT allergic to this medication

## 2018-03-10 RX ORDER — TRAMADOL HYDROCHLORIDE 50 MG/1
TABLET ORAL
Qty: 60 TABLET | Refills: 0 | Status: SHIPPED | OUTPATIENT
Start: 2018-03-10 | End: 2018-05-14 | Stop reason: SDUPTHER

## 2018-03-12 DIAGNOSIS — K43.9 VENTRAL HERNIA WITHOUT OBSTRUCTION OR GANGRENE: Primary | ICD-10-CM

## 2018-03-12 DIAGNOSIS — T79.2XXS: ICD-10-CM

## 2018-03-13 ENCOUNTER — ANTICOAG VISIT (OUTPATIENT)
Dept: INTERNAL MEDICINE CLINIC | Facility: CLINIC | Age: 83
End: 2018-03-13

## 2018-03-13 ENCOUNTER — APPOINTMENT (OUTPATIENT)
Dept: LAB | Facility: CLINIC | Age: 83
End: 2018-03-13
Payer: COMMERCIAL

## 2018-03-13 ENCOUNTER — TRANSCRIBE ORDERS (OUTPATIENT)
Dept: LAB | Facility: CLINIC | Age: 83
End: 2018-03-13

## 2018-03-13 DIAGNOSIS — Z79.01 LONG-TERM (CURRENT) USE OF ANTICOAGULANTS: Primary | ICD-10-CM

## 2018-03-13 DIAGNOSIS — I48.91 ATRIAL FIBRILLATION, UNSPECIFIED TYPE (HCC): ICD-10-CM

## 2018-03-13 LAB
INR PPP: 3.21 (ref 0.86–1.16)
PROTHROMBIN TIME: 33.3 SECONDS (ref 12.1–14.4)

## 2018-03-13 PROCEDURE — 36415 COLL VENOUS BLD VENIPUNCTURE: CPT

## 2018-03-13 PROCEDURE — 85610 PROTHROMBIN TIME: CPT

## 2018-03-14 DIAGNOSIS — I48.91 ATRIAL FIBRILLATION, UNSPECIFIED TYPE (HCC): Primary | ICD-10-CM

## 2018-03-14 RX ORDER — WARFARIN SODIUM 3 MG/1
TABLET ORAL
Qty: 90 TABLET | Refills: 1 | Status: ON HOLD | OUTPATIENT
Start: 2018-03-14 | End: 2018-08-24

## 2018-03-21 ENCOUNTER — TELEPHONE (OUTPATIENT)
Dept: INTERNAL MEDICINE CLINIC | Facility: CLINIC | Age: 83
End: 2018-03-21

## 2018-03-21 NOTE — TELEPHONE ENCOUNTER
Spoke to the patient and advised  She will call her son and go to the [facility]

## 2018-03-21 NOTE — TELEPHONE ENCOUNTER
Patient called c/o black tarry stools since last night and bilateral ankle weeping since Monday  She has abdominal pain near the waist area as well as SOB on exertion but states that these are not new symptoms  Advised patient to ER for evaluation  The patient states that she would prefer to wait until tomorrow to go due to the weather  I told her I would discuss with you and ler her know your recommendation  Please advise

## 2018-03-22 ENCOUNTER — HOSPITAL ENCOUNTER (INPATIENT)
Facility: HOSPITAL | Age: 83
LOS: 2 days | Discharge: HOME | DRG: 813 | End: 2018-03-24
Attending: EMERGENCY MEDICINE | Admitting: INTERNAL MEDICINE
Payer: COMMERCIAL

## 2018-03-22 DIAGNOSIS — D64.9 ANEMIA, UNSPECIFIED TYPE: ICD-10-CM

## 2018-03-22 DIAGNOSIS — K92.1 GASTROINTESTINAL HEMORRHAGE WITH MELENA: ICD-10-CM

## 2018-03-22 DIAGNOSIS — K92.2 ACUTE UPPER GI BLEED: Primary | ICD-10-CM

## 2018-03-22 PROBLEM — R93.5 ABNORMAL CT OF THE ABDOMEN: Status: ACTIVE | Noted: 2018-03-22

## 2018-03-22 PROBLEM — K43.9 VENTRAL HERNIA: Status: ACTIVE | Noted: 2018-03-22

## 2018-03-22 LAB
ABO GROUP BLD: NORMAL
ALBUMIN SERPL BCP-MCNC: 3.3 G/DL (ref 3.5–5)
ALP SERPL-CCNC: 89 U/L (ref 46–116)
ALT SERPL W P-5'-P-CCNC: 15 U/L (ref 12–78)
ANION GAP SERPL CALCULATED.3IONS-SCNC: 10 MMOL/L (ref 4–13)
APTT PPP: 36 SECONDS (ref 23–35)
AST SERPL W P-5'-P-CCNC: 15 U/L (ref 5–45)
ATRIAL RATE: 234 BPM
BASOPHILS # BLD AUTO: 0.01 THOUSANDS/ΜL (ref 0–0.1)
BASOPHILS NFR BLD AUTO: 0 % (ref 0–1)
BILIRUB SERPL-MCNC: 0.37 MG/DL (ref 0.2–1)
BLD GP AB SCN SERPL QL: NEGATIVE
BUN SERPL-MCNC: 18 MG/DL (ref 5–25)
CALCIUM SERPL-MCNC: 8.3 MG/DL (ref 8.3–10.1)
CHLORIDE SERPL-SCNC: 105 MMOL/L (ref 100–108)
CO2 SERPL-SCNC: 25 MMOL/L (ref 21–32)
CREAT SERPL-MCNC: 0.88 MG/DL (ref 0.6–1.3)
EOSINOPHIL # BLD AUTO: 0.04 THOUSAND/ΜL (ref 0–0.61)
EOSINOPHIL NFR BLD AUTO: 1 % (ref 0–6)
ERYTHROCYTE [DISTWIDTH] IN BLOOD BY AUTOMATED COUNT: 14.6 % (ref 11.6–15.1)
GFR SERPL CREATININE-BSD FRML MDRD: 61 ML/MIN/1.73SQ M
GLUCOSE SERPL-MCNC: 92 MG/DL (ref 65–140)
HCT VFR BLD AUTO: 34.3 % (ref 34.8–46.1)
HGB BLD-MCNC: 10.7 G/DL (ref 11.5–15.4)
HGB BLD-MCNC: 9.9 G/DL (ref 11.5–15.4)
INR PPP: 1.65 (ref 0.86–1.16)
INR PPP: 2.29 (ref 0.86–1.16)
LIPASE SERPL-CCNC: 49 U/L (ref 73–393)
LYMPHOCYTES # BLD AUTO: 1.36 THOUSANDS/ΜL (ref 0.6–4.47)
LYMPHOCYTES NFR BLD AUTO: 15 % (ref 14–44)
MCH RBC QN AUTO: 29.6 PG (ref 26.8–34.3)
MCHC RBC AUTO-ENTMCNC: 31.2 G/DL (ref 31.4–37.4)
MCV RBC AUTO: 95 FL (ref 82–98)
MONOCYTES # BLD AUTO: 0.98 THOUSAND/ΜL (ref 0.17–1.22)
MONOCYTES NFR BLD AUTO: 11 % (ref 4–12)
NEUTROPHILS # BLD AUTO: 6.41 THOUSANDS/ΜL (ref 1.85–7.62)
NEUTS SEG NFR BLD AUTO: 73 % (ref 43–75)
NRBC BLD AUTO-RTO: 0 /100 WBCS
PLATELET # BLD AUTO: 208 THOUSANDS/UL (ref 149–390)
PMV BLD AUTO: 10.2 FL (ref 8.9–12.7)
POTASSIUM SERPL-SCNC: 3.8 MMOL/L (ref 3.5–5.3)
PROT SERPL-MCNC: 7 G/DL (ref 6.4–8.2)
PROTHROMBIN TIME: 19.6 SECONDS (ref 12.1–14.4)
PROTHROMBIN TIME: 25.5 SECONDS (ref 12.1–14.4)
QRS AXIS: -25 DEGREES
QRSD INTERVAL: 86 MS
QT INTERVAL: 254 MS
QTC INTERVAL: 414 MS
RBC # BLD AUTO: 3.61 MILLION/UL (ref 3.81–5.12)
RH BLD: POSITIVE
SODIUM SERPL-SCNC: 140 MMOL/L (ref 136–145)
SPECIMEN EXPIRATION DATE: NORMAL
T WAVE AXIS: 42 DEGREES
VENTRICULAR RATE: 160 BPM
WBC # BLD AUTO: 8.83 THOUSAND/UL (ref 4.31–10.16)

## 2018-03-22 PROCEDURE — 86901 BLOOD TYPING SEROLOGIC RH(D): CPT | Performed by: EMERGENCY MEDICINE

## 2018-03-22 PROCEDURE — 85018 HEMOGLOBIN: CPT | Performed by: PHYSICIAN ASSISTANT

## 2018-03-22 PROCEDURE — 80053 COMPREHEN METABOLIC PANEL: CPT | Performed by: EMERGENCY MEDICINE

## 2018-03-22 PROCEDURE — 93010 ELECTROCARDIOGRAM REPORT: CPT | Performed by: INTERNAL MEDICINE

## 2018-03-22 PROCEDURE — 36430 TRANSFUSION BLD/BLD COMPNT: CPT

## 2018-03-22 PROCEDURE — C9113 INJ PANTOPRAZOLE SODIUM, VIA: HCPCS | Performed by: PHYSICIAN ASSISTANT

## 2018-03-22 PROCEDURE — 86927 PLASMA FRESH FROZEN: CPT

## 2018-03-22 PROCEDURE — 82272 OCCULT BLD FECES 1-3 TESTS: CPT

## 2018-03-22 PROCEDURE — 36415 COLL VENOUS BLD VENIPUNCTURE: CPT | Performed by: EMERGENCY MEDICINE

## 2018-03-22 PROCEDURE — 96361 HYDRATE IV INFUSION ADD-ON: CPT

## 2018-03-22 PROCEDURE — P9017 PLASMA 1 DONOR FRZ W/IN 8 HR: HCPCS

## 2018-03-22 PROCEDURE — 99285 EMERGENCY DEPT VISIT HI MDM: CPT

## 2018-03-22 PROCEDURE — 86850 RBC ANTIBODY SCREEN: CPT | Performed by: EMERGENCY MEDICINE

## 2018-03-22 PROCEDURE — 85610 PROTHROMBIN TIME: CPT | Performed by: PHYSICIAN ASSISTANT

## 2018-03-22 PROCEDURE — 99254 IP/OBS CNSLTJ NEW/EST MOD 60: CPT | Performed by: PHYSICIAN ASSISTANT

## 2018-03-22 PROCEDURE — 86900 BLOOD TYPING SEROLOGIC ABO: CPT | Performed by: EMERGENCY MEDICINE

## 2018-03-22 PROCEDURE — 96374 THER/PROPH/DIAG INJ IV PUSH: CPT

## 2018-03-22 PROCEDURE — 96376 TX/PRO/DX INJ SAME DRUG ADON: CPT

## 2018-03-22 PROCEDURE — 85025 COMPLETE CBC W/AUTO DIFF WBC: CPT | Performed by: EMERGENCY MEDICINE

## 2018-03-22 PROCEDURE — 99223 1ST HOSP IP/OBS HIGH 75: CPT | Performed by: INTERNAL MEDICINE

## 2018-03-22 PROCEDURE — 93005 ELECTROCARDIOGRAM TRACING: CPT

## 2018-03-22 PROCEDURE — 85730 THROMBOPLASTIN TIME PARTIAL: CPT | Performed by: EMERGENCY MEDICINE

## 2018-03-22 PROCEDURE — 85610 PROTHROMBIN TIME: CPT | Performed by: EMERGENCY MEDICINE

## 2018-03-22 PROCEDURE — 30233K1 TRANSFUSION OF NONAUTOLOGOUS FROZEN PLASMA INTO PERIPHERAL VEIN, PERCUTANEOUS APPROACH: ICD-10-PCS | Performed by: INTERNAL MEDICINE

## 2018-03-22 PROCEDURE — 83690 ASSAY OF LIPASE: CPT | Performed by: EMERGENCY MEDICINE

## 2018-03-22 RX ORDER — DILTIAZEM HYDROCHLORIDE 120 MG/1
120 CAPSULE, EXTENDED RELEASE ORAL 2 TIMES DAILY
Status: DISCONTINUED | OUTPATIENT
Start: 2018-03-22 | End: 2018-03-24 | Stop reason: HOSPADM

## 2018-03-22 RX ORDER — ONDANSETRON 2 MG/ML
4 INJECTION INTRAMUSCULAR; INTRAVENOUS EVERY 6 HOURS PRN
Status: DISCONTINUED | OUTPATIENT
Start: 2018-03-22 | End: 2018-03-24 | Stop reason: HOSPADM

## 2018-03-22 RX ORDER — TRAMADOL HYDROCHLORIDE 50 MG/1
50 TABLET ORAL 2 TIMES DAILY PRN
Status: DISCONTINUED | OUTPATIENT
Start: 2018-03-22 | End: 2018-03-24 | Stop reason: HOSPADM

## 2018-03-22 RX ORDER — CALCIUM CARBONATE 200(500)MG
1000 TABLET,CHEWABLE ORAL DAILY PRN
Status: DISCONTINUED | OUTPATIENT
Start: 2018-03-22 | End: 2018-03-24 | Stop reason: HOSPADM

## 2018-03-22 RX ORDER — DULOXETIN HYDROCHLORIDE 60 MG/1
60 CAPSULE, DELAYED RELEASE ORAL DAILY
Status: DISCONTINUED | OUTPATIENT
Start: 2018-03-23 | End: 2018-03-24 | Stop reason: HOSPADM

## 2018-03-22 RX ORDER — DILTIAZEM HYDROCHLORIDE 5 MG/ML
10 INJECTION INTRAVENOUS ONCE
Status: COMPLETED | OUTPATIENT
Start: 2018-03-22 | End: 2018-03-22

## 2018-03-22 RX ORDER — SENNOSIDES 8.6 MG
1 TABLET ORAL
Status: DISCONTINUED | OUTPATIENT
Start: 2018-03-22 | End: 2018-03-24 | Stop reason: HOSPADM

## 2018-03-22 RX ORDER — DESOXIMETASONE 2.5 MG/G
CREAM TOPICAL 2 TIMES DAILY
Status: DISCONTINUED | OUTPATIENT
Start: 2018-03-22 | End: 2018-03-24 | Stop reason: HOSPADM

## 2018-03-22 RX ORDER — ACETAMINOPHEN 325 MG/1
650 TABLET ORAL EVERY 6 HOURS PRN
Status: DISCONTINUED | OUTPATIENT
Start: 2018-03-22 | End: 2018-03-24 | Stop reason: HOSPADM

## 2018-03-22 RX ORDER — TORSEMIDE 20 MG/1
40 TABLET ORAL 2 TIMES DAILY
Status: DISCONTINUED | OUTPATIENT
Start: 2018-03-22 | End: 2018-03-24 | Stop reason: HOSPADM

## 2018-03-22 RX ORDER — POTASSIUM CHLORIDE 750 MG/1
20 TABLET, EXTENDED RELEASE ORAL WEEKLY
Status: DISCONTINUED | OUTPATIENT
Start: 2018-03-22 | End: 2018-03-24 | Stop reason: HOSPADM

## 2018-03-22 RX ORDER — BISOPROLOL FUMARATE 5 MG/1
5 TABLET ORAL DAILY
Status: DISCONTINUED | OUTPATIENT
Start: 2018-03-23 | End: 2018-03-24 | Stop reason: HOSPADM

## 2018-03-22 RX ORDER — FLUTICASONE PROPIONATE 50 MCG
1 SPRAY, SUSPENSION (ML) NASAL DAILY
Status: DISCONTINUED | OUTPATIENT
Start: 2018-03-23 | End: 2018-03-24 | Stop reason: HOSPADM

## 2018-03-22 RX ADMIN — SODIUM CHLORIDE 8 MG/HR: 9 INJECTION, SOLUTION INTRAVENOUS at 14:43

## 2018-03-22 RX ADMIN — SODIUM CHLORIDE 500 ML: 0.9 INJECTION, SOLUTION INTRAVENOUS at 11:44

## 2018-03-22 RX ADMIN — SODIUM CHLORIDE 8 MG/HR: 9 INJECTION, SOLUTION INTRAVENOUS at 22:46

## 2018-03-22 RX ADMIN — DILTIAZEM HYDROCHLORIDE 120 MG: 120 CAPSULE, EXTENDED RELEASE ORAL at 21:03

## 2018-03-22 RX ADMIN — DILTIAZEM HYDROCHLORIDE 10 MG: 5 INJECTION INTRAVENOUS at 12:40

## 2018-03-22 RX ADMIN — DILTIAZEM HYDROCHLORIDE 10 MG/HR: 5 INJECTION INTRAVENOUS at 12:49

## 2018-03-22 RX ADMIN — PHYTONADIONE 10 MG: 10 INJECTION, EMULSION INTRAMUSCULAR; INTRAVENOUS; SUBCUTANEOUS at 13:48

## 2018-03-22 NOTE — ED PROVIDER NOTES
History  Chief Complaint   Patient presents with    Abdominal Pain     pt with abd pain, mid epigastric,  over the last few days and has had some ankle swelling with drainage also with dark black and tarry stools        70-year-old female, history of previous peptic ulcer disease, history gastric bypass, presenting to the emergency department for evaluation melanotic stools  Patient has history of AFib, takes warfarin for anticoagulation, states that she has been having 3 weeks of epigastric abdominal discomfort that has been evaluated by her primary care physician with a CT abdomen and pelvis  CT interpretation:    1  No acute intra-abdominal inflammatory process  2   Stable large lower abdominal and pelvic ventral hernia including multiple loops of small bowel and cecum  Note evidence of bowel obstruction or bowel compromise  3   8 7 x 7 9 x 10 4 cm hypodense rim calcified collection about the lateral and posterior aspect of the femoral stem component of the right hip arthroplasty  In retrospect this collection was present on prior exam dated 12/2012 and given the chronicity this likely represents a benign entity such as postoperative seroma  Superimposed infection cannot be excluded on this noncontrast evaluation  Recommend clinical and laboratory correlation  The patient states that she has been taking Pepto-Bismol for her epigastric abdominal discomfort but does not recall when she had last taken it  The patient is presenting to the emergency department for approximately 6 episodes of melanotic stools starting yesterday  Patient denies any palpitations, chest pain, lightheadedness or weakness  Patient reports she has baseline dyspnea  Patient states that she did not take her morning medications, and so some of her elevated heart rate could be secondary to medication noncompliance  Prior to Admission Medications   Prescriptions Last Dose Informant Patient Reported? Taking? DULoxetine (CYMBALTA) 60 mg delayed release capsule 3/21/2018 at Unknown time  Yes Yes   Sig: Take 60 mg by mouth daily  DULoxetine (CYMBALTA) 60 mg delayed release capsule 3/21/2018 at Unknown time  No Yes   Sig: TAKE ONE CAPSULE BY MOUTH ONCE DAILY   bisoprolol (ZEBETA) 5 mg tablet 3/21/2018 at Unknown time  Yes Yes   Sig: Take 1 tablet by mouth daily   desoximetasone (TOPICORT) 0 25 % cream Past Week at Unknown time  Yes Yes   Sig: Apply topically   diltiazem (CARDIZEM) 120 MG tablet 3/21/2018 at Unknown time  Yes Yes   Sig: Take 120 mg by mouth 2 (two) times a day  fluticasone (FLONASE) 50 mcg/act nasal spray Past Week at Unknown time  Yes Yes   Si spray into each nostril daily  potassium chloride (KLOR-CON) 20 mEq packet 3/21/2018 at Unknown time  Yes Yes   Sig: Take 20 mEq by mouth once a week     torsemide (DEMADEX) 20 mg tablet Past Week at Unknown time  Yes Yes   Sig: Take 40 mg by mouth 2 (two) times a day     traMADol (ULTRAM) 50 mg tablet 3/21/2018 at Unknown time  No Yes   Sig: TAKE ONE TABLET BY MOUTH TWICE DAILY AS NEEDED FOR PAIN   warfarin (COUMADIN) 3 mg tablet Past Week at Unknown time  No Yes   Sig: Take 1 tablet daily or as directed  Patient taking differently: Take 3 mg by mouth every other day Take 1 tablet daily or as directed      warfarin (COUMADIN) 4 mg tablet 3/21/2018 at Unknown time  Yes Yes   Sig: Take 4 mg by mouth every other day        Facility-Administered Medications: None       Past Medical History:   Diagnosis Date    Arthritis     CHF (congestive heart failure) (HCC)     Disturbance of smell and taste     disturbance of taste    Effusion of knee joint right     Fibromyalgia     Heart murmur     reported a previous heart murmur    History of acute myocardial infarction     History of atrial fibrillation     History of bruising easily     History of cancer     History of dermatitis     History of mammogram     History of obesity     History of osteopenia     History of sciatica     History of shortness of breath     History of sinusitis     History of sore throat     History of syncope     History of umbilical hernia     History of viral infection     Irritable bowel syndrome (IBS)     Joint pain, hip     Limb pain     Myalgia     myalgia and myositis    Need for prophylactic vaccination against single diseases     Need for prophylactic vaccination and inoculation against influenza     Preoperative cardiovascular examination     Primary osteoarthritis of right knee     Right leg pain     Vaginal Pap smear     reported pap smear       Past Surgical History:   Procedure Laterality Date    ANKLE ARTHRODESIS Right     BACK SURGERY      BARIATRIC SURGERY      CHOLECYSTECTOMY      x2    FOOT SURGERY      HIP CLOSE REDUCTION Right 8/21/2016    Procedure: CLOSED REDUCTION RIGHT TOTAL HIP;  Surgeon: Harjit Buchanan MD;  [facility];  Service:     JOINT REPLACEMENT      LEFT KNEE REPLACEMENT    PILONIDAL CYST EXCISION      x2    REPLACEMENT TOTAL KNEE Right     SILVANA-EN-Y PROCEDURE      x2    TOTAL KNEE ARTHROPLASTY      UMBILICAL HERNIA REPAIR      x2       Family History   Problem Relation Age of Onset    Coronary artery disease Mother     Coronary artery disease Father     Lymphoma Sister      acute    Rashes / Skin problems Sister      lymphomatoid papulosis    Coronary artery disease Brother      I have reviewed and agree with the history as documented  Social History   Substance Use Topics    Smoking status: Never Smoker    Smokeless tobacco: Never Used    Alcohol use Yes      Comment: (history) social        Review of Systems   Constitutional: Negative for diaphoresis and fever  HENT: Negative for congestion and drooling  Eyes: Negative for redness and visual disturbance  Respiratory: Negative for cough and shortness of breath  Cardiovascular: Negative for chest pain and leg swelling  Gastrointestinal: Positive for abdominal pain  Negative for diarrhea and vomiting  Melena   Genitourinary: Negative for difficulty urinating and dysuria  Musculoskeletal: Negative for neck pain and neck stiffness  Skin: Negative for color change and rash  Neurological: Negative for speech difficulty and headaches  All other systems reviewed and are negative  Physical Exam  ED Triage Vitals   Temperature Pulse Respirations Blood Pressure SpO2   03/22/18 1006 03/22/18 1006 03/22/18 1006 03/22/18 1006 03/22/18 1006   (!) 97 2 °F (36 2 °C) 82 18 122/74 97 %      Temp src Heart Rate Source Patient Position - Orthostatic VS BP Location FiO2 (%)   -- 03/22/18 1240 -- -- --    Monitor         Pain Score       03/22/18 1006       7           Orthostatic Vital Signs  Vitals:    03/22/18 1006 03/22/18 1240   BP: 122/74 113/78   Pulse: 82 (!) 123       Physical Exam   Constitutional: She is oriented to person, place, and time  She appears well-developed and well-nourished  HENT:   Head: Normocephalic and atraumatic  Eyes: Conjunctivae are normal  Pupils are equal, round, and reactive to light  Neck: Normal range of motion  Neck supple  Cardiovascular: Intact distal pulses  An irregularly irregular rhythm present  Tachycardia present  Pulmonary/Chest: Effort normal and breath sounds normal    Abdominal: Soft  There is no tenderness  Patient has grey stool  Hemoccult test positive  Positive control  Musculoskeletal: Normal range of motion  Neurological: She is alert and oriented to person, place, and time  She has normal strength  Skin: Skin is warm and dry  Vitals reviewed        ED Medications  Medications   phytonadione (AQUA-MEPHYTON) 10 mg/mL 10 mg in sodium chloride 0 9 % 50 mL IVPB (10 mg Intravenous New Bag 3/22/18 1348)   pantoprazole (PROTONIX) 80 mg in sodium chloride 0 9 % 100 mL infusion (not administered)   diltiazem (CARDIZEM) 125 mg in sodium chloride 0 9 % 125 mL infusion (10 mg/hr Intravenous New Bag 3/22/18 1249)   diltiazem (CARDIZEM) injection 10 mg (10 mg Intravenous Given 3/22/18 1240)   sodium chloride 0 9 % bolus 500 mL (0 mL Intravenous Stopped 3/22/18 1240)       Diagnostic Studies  Results Reviewed     Procedure Component Value Units Date/Time    Hemoglobin [[id number]]     Lab Status:  No result Specimen:  Blood     Protime-INR [[id number]]     Lab Status:  No result Specimen:  Blood     Protime-INR [[id number]]  (Abnormal) Collected:  03/22/18 1140    Lab Status:  Final result Specimen:  Blood from Arm, Right Updated:  03/22/18 1215     Protime 25 5 (H) seconds      INR 2 29 (H)    APTT [[id number]]  (Abnormal) Collected:  03/22/18 1140    Lab Status:  Final result Specimen:  Blood from Arm, Right Updated:  03/22/18 1215     PTT 36 (H) seconds     Narrative: Therapeutic Heparin Range = 60-90 seconds    Comprehensive metabolic panel [[id number]]  (Abnormal) Collected:  03/22/18 1140    Lab Status:  Final result Specimen:  Blood from Arm, Right Updated:  03/22/18 1210     Sodium 140 mmol/L      Potassium 3 8 mmol/L      Chloride 105 mmol/L      CO2 25 mmol/L      Anion Gap 10 mmol/L      BUN 18 mg/dL      Creatinine 0 88 mg/dL      Glucose 92 mg/dL      Calcium 8 3 mg/dL      AST 15 U/L      ALT 15 U/L      Alkaline Phosphatase 89 U/L      Total Protein 7 0 g/dL      Albumin 3 3 (L) g/dL      Total Bilirubin 0 37 mg/dL      eGFR 61 ml/min/1 73sq m     Narrative:         National Kidney Disease Education Program recommendations are as follows:  GFR calculation is accurate only with a steady state creatinine  Chronic Kidney disease less than 60 ml/min/1 73 sq  meters  Kidney failure less than 15 ml/min/1 73 sq  meters      Lipase [[id number]]  (Abnormal) Collected:  03/22/18 1140    Lab Status:  Final result Specimen:  Blood from Arm, Right Updated:  03/22/18 1210     Lipase 49 (L) u/L     CBC and differential [[id number]]  (Abnormal) Collected:  03/22/18 1140    Lab Status:  Final result Specimen:  Blood from Arm, Right Updated:  03/22/18 1154     WBC 8 83 Thousand/uL      RBC 3 61 (L) Million/uL      Hemoglobin 10 7 (L) g/dL      Hematocrit 34 3 (L) %      MCV 95 fL      MCH 29 6 pg      MCHC 31 2 (L) g/dL      RDW 14 6 %      MPV 10 2 fL      Platelets 509 Thousands/uL      nRBC 0 /100 WBCs      Neutrophils Relative 73 %      Lymphocytes Relative 15 %      Monocytes Relative 11 %      Eosinophils Relative 1 %      Basophils Relative 0 %      Neutrophils Absolute 6 41 Thousands/µL      Lymphocytes Absolute 1 36 Thousands/µL      Monocytes Absolute 0 98 Thousand/µL      Eosinophils Absolute 0 04 Thousand/µL      Basophils Absolute 0 01 Thousands/µL                  No orders to display              Procedures  Procedures       Phone Contacts  ED Phone Contact    ED Course  ED Course            Identification of Seniors at [facility] Most Recent Value   (ISAR) Identification of Seniors at Risk   Before the illness or injury that brought you to the Emergency, did you need someone to help you on a regular basis? 0 Filed at: 03/22/2018 1009   In the last 24 hours, have you needed more help than usual?  0 Filed at: 03/22/2018 1009   Have you been hospitalized for one or more nights during the past 6 months? 0 Filed at: 03/22/2018 1009   In general, do you see well?  0 Filed at: 03/22/2018 1009   In general, do you have serious problems with your memory? 0 Filed at: 03/22/2018 1009   Do you take more than three different medications every day?   1 Filed at: 03/22/2018 1009   ISAR Score  1 Filed at: 03/22/2018 1009                          [facility]  CritCare Time    Disposition  Final diagnoses:   Acute upper GI bleed     Time reflects when diagnosis was documented in both MDM as applicable and the Disposition within this note     Time User Action Codes Description Comment    3/22/2018 12:30 PM Suman Foreman [K92 2] Acute upper GI bleed       ED Disposition     ED Disposition Condition Comment    Admit  Case was discussed with Dr Savanna Vargas and the patient's admission status was agreed to be Inpatient to the service of Dr Savanna Vargas  Follow-up Information    None       Patient's Medications   Discharge Prescriptions    No medications on file     No discharge procedures on file      ED Provider  Electronically Signed by           Ángel Castañeda MD  03/22/18 4444

## 2018-03-22 NOTE — CONSULTS
Consultation -  [facility]  Teresa Zimmer 80 y o  female MRN: [id number]  Unit/Bed#: ED 29 Encounter: [id number]        Consults    ASSESSMENT/ PLAN:    81 yo female pmh gastric bypass, PUD, CHF, a-fib on coumadin presented to the ED for epigastric abdominal pain and multiple episodes of melena  1  Epigastric abdominal pain with melena: admits to epigastric abdominal pain x a few weeks  CT abdomen on 2/2 without acute intraabdominal inflammatory process  She has been taking pepto-bismol for her pain and she admits to multiple episodes of melena yesterday and throughout the night with last episode occurring around 7am today  She denies NSAID use  Admits to hx of PUD and gastric bypass  No recent labs for comparison, last Hgb 12 2 in 6/9/17 and today she is 10 7  She is tachycardic but currently in a-fib  Rectal exam revealed brown stool  Given her INR is elevated at 2 2, she is receiving 2 units of FFP  There are no signs of active bleeding  This could be secondary to PUD at her anastomosis site, gastric AVMs, gastritis  -monitor H&H  -transfuse as necessary  -NPO  -ppi gtt  -repeat INR and Hgb after FFP given  -EGD today vs tomorrow    Reason for Consult / Principal Problem: melena    HPI: Leslye Padilla is an 81 yo female pmh gastric bypass, PUD, CHF, a-fib on coumadin who presented to the ER for abdominal pain and melena  She states that for the past few weeks she has been experiencing epigastric abdominal pain and yesterday she noticed multiple episodes of black, tarry stool  She states that she had multiple episodes throughout the night as well as additional episode this morning bringing her to the ED  She denies NSAID use but admits to taking pepto bismol for the pain  She was evaluated by her PCP with a CT scan for the abdominal pain 2/2, which was without acute abdominal abnormality  It did reveal large pelvic ventral hernia including multiple loops of small bowel and cecum  She denies any SOB, lightheadedness, dizziness, hematochezia  She does admit to a hx of PUD as well as gastric bypass surgery and abdominal hernia repair  Denies alcohol, tobacco use  REVIEW OF SYSTEMS:     CONSTITUTIONAL: Denies any fever, chills, or rigors  Good appetite, and no recent weight loss  HEENT: No earache or tinnitus  Denies hearing loss or visual disturbances  CARDIOVASCULAR: No chest pain or palpitations  RESPIRATORY: Denies any cough, hemoptysis, shortness of breath or dyspnea on exertion  GASTROINTESTINAL: As noted in the History of Present Illness  GENITOURINARY: No problems with urination  Denies any hematuria or dysuria  NEUROLOGIC: No dizziness or vertigo, denies headaches  MUSCULOSKELETAL: Denies any muscle or joint pain  SKIN: Denies skin rashes or itching  ENDOCRINE: Denies excessive thirst  Denies intolerance to heat or cold  PSYCHOSOCIAL: Denies depression or anxiety  Denies any recent memory loss         Historical Information   Past Medical History:   Diagnosis Date    Arthritis     CHF (congestive heart failure) (HCC)     Disturbance of smell and taste     disturbance of taste    Effusion of knee joint right     Fibromyalgia     Heart murmur     reported a previous heart murmur    History of acute myocardial infarction     History of atrial fibrillation     History of bruising easily     History of cancer     History of dermatitis     History of mammogram     History of obesity     History of osteopenia     History of sciatica     History of shortness of breath     History of sinusitis     History of sore throat     History of syncope     History of umbilical hernia     History of viral infection     Irritable bowel syndrome (IBS)     Joint pain, hip     Limb pain     Myalgia     myalgia and myositis    Need for prophylactic vaccination against single diseases     Need for prophylactic vaccination and inoculation against influenza     Preoperative cardiovascular examination     Primary osteoarthritis of right knee     Right leg pain     Vaginal Pap smear     reported pap smear     Past Surgical History:   Procedure Laterality Date    ANKLE ARTHRODESIS Right     BACK SURGERY      BARIATRIC SURGERY      CHOLECYSTECTOMY      x2    FOOT SURGERY      HIP CLOSE REDUCTION Right 8/21/2016    Procedure: CLOSED REDUCTION RIGHT TOTAL HIP;  Surgeon: Miki Gordillo MD;  [facility];  Service:     JOINT REPLACEMENT      LEFT KNEE REPLACEMENT    PILONIDAL CYST EXCISION      x2    REPLACEMENT TOTAL KNEE Right     SILVANA-EN-Y PROCEDURE      x2    TOTAL KNEE ARTHROPLASTY      UMBILICAL HERNIA REPAIR      x2     Social History   History   Alcohol Use    Yes     Comment: (history) social     History   Drug Use No     History   Smoking Status    Never Smoker   Smokeless Tobacco    Never Used     Family History   Problem Relation Age of Onset    Coronary artery disease Mother     Coronary artery disease Father     Lymphoma Sister      acute    Rashes / Skin problems Sister      lymphomatoid papulosis    Coronary artery disease Brother        Meds/Allergies       (Not in a hospital admission)  Current Facility-Administered Medications   Medication Dose Route Frequency    pantoprazole (PROTONIX) 80 mg in sodium chloride 0 9 % 100 mL infusion  8 mg/hr Intravenous Continuous       Allergies   Allergen Reactions    Codeine Vomiting     Reaction Date: 24Apr2012;     Oxycodone Hcl Vomiting     Reaction Date: 24Apr2012;     Percocet [Oxycodone-Acetaminophen]     Seasonal Ic  [Cholestatin] Allergic Rhinitis    Bactrim [Sulfamethoxazole-Trimethoprim] Rash    Sulfa Antibiotics Rash           Objective     Blood pressure 145/78, pulse (!) 120, temperature 97 7 °F (36 5 °C), temperature source Oral, resp  rate 19, height 5' 1" (1 549 m), weight 99 8 kg (220 lb), SpO2 98 %        Intake/Output Summary (Last 24 hours) at 03/22/18 1452  Last data filed at 03/22/18 1436   Gross per 24 hour   Intake             1110 ml   Output                0 ml   Net             1110 ml         PHYSICAL EXAM:      General Appearance:   A&Ox3, cooperative, no distress, appears stated age    HEENT:   Normocephalic, atraumatic      Neck:  Supple, symmetrical, trachea midline   Lungs:   Clear to auscultation bilaterally; no rales, rhonchi or wheezing    Heart[de-identified]   S1 and S2 normal; irregularly irregular rhythm, tachycardic; +murmur   Abdomen:   Soft, non-tender, non-distended; normal bowel sounds; no masses, no organomegaly    Genitalia:   Deferred    Rectal:   Brown stool    Extremities:  No cyanosis, clubbing or edema        Skin:  Skin color, texture, turgor normal, no rashes or lesions          Lab Results:   Admission on 03/22/2018   Component Date Value    WBC 03/22/2018 8 83     RBC 03/22/2018 3 61*    Hemoglobin 03/22/2018 10 7*    Hematocrit 03/22/2018 34 3*    MCV 03/22/2018 95     MCH 03/22/2018 29 6     MCHC 03/22/2018 31 2*    RDW 03/22/2018 14 6     MPV 03/22/2018 10 2     Platelets 04/62/4661 208     nRBC 03/22/2018 0     Neutrophils Relative 03/22/2018 73     Lymphocytes Relative 03/22/2018 15     Monocytes Relative 03/22/2018 11     Eosinophils Relative 03/22/2018 1     Basophils Relative 03/22/2018 0     Neutrophils Absolute 03/22/2018 6 41     Lymphocytes Absolute 03/22/2018 1 36     Monocytes Absolute 03/22/2018 0 98     Eosinophils Absolute 03/22/2018 0 04     Basophils Absolute 03/22/2018 0 01     Protime 03/22/2018 25 5*    INR 03/22/2018 2 29*    PTT 03/22/2018 36*    ABO Grouping 03/22/2018 O     Rh Factor 03/22/2018 Positive     Antibody Screen 03/22/2018 Negative     Specimen Expiration Date 03/22/2018 [id number]     Sodium 03/22/2018 140     Potassium 03/22/2018 3 8     Chloride 03/22/2018 105     CO2 03/22/2018 25     Anion Gap 03/22/2018 10     BUN 03/22/2018 18     Creatinine 03/22/2018 0 88     Glucose 03/22/2018 92     Calcium 03/22/2018 8 3     AST 03/22/2018 15     ALT 03/22/2018 15     Alkaline Phosphatase 03/22/2018 89     Total Protein 03/22/2018 7 0     Albumin 03/22/2018 3 3*    Total Bilirubin 03/22/2018 0 37     eGFR 03/22/2018 61     Lipase 03/22/2018 49*    Unit Product Code 03/22/2018 P3968X71     Unit Number 03/22/2018 [id number]     Unit ABO 03/22/2018 O     Unit RH 03/22/2018 POS     Unit Dispense Status 03/22/2018 Issued     Unit Product Code 03/22/2018 N0909E70     Unit Number 03/22/2018 [id number]     Unit ABO 03/22/2018 O     Unit RH 03/22/2018 POS     Unit Dispense Status 03/22/2018 Issued        Imaging Studies: I have personally reviewed pertinent imaging studies  No results found  This patient was seen and examined by Dr Rick Perea  All kumar medical decisions were made by Dr Rick Perea  Thank you for allowing us to participate in the care of this patient  We will follow up closely with you

## 2018-03-22 NOTE — H&P
H&P- Emily Klaus 1933, 80 y o  female MRN: [id number]    Unit/Bed#: ED 29 Encounter: [id number]    Primary Care Provider: Bradley Ramos DO   Date and time admitted to hospital: 3/22/2018 11:02 AM        * GI bleeding   Assessment & Plan    · Patient has been having melena for the past 2 days  · GI consult  · For upper endoscopy  · NPO for now  According to ER physician, he spoke to the advance practitioner for GI, at this point in time, not yet sure if endoscopy will be done today or tomorrow  · Continue with Protonix drip as ordered here in the emergency room  · Monitor patient's CBC/hemoglobin hematocrit  · Transfuse as needed  Patient already had a blood consent  · FFP transfusion was ordered here in the emergency room, to reverse patient's Coumadin coagulopathy  · Patient has history of gastric erosions in the past with rare H pylori in the biopsy but negative H pylori stool antigen  Chronic diastolic CHF (congestive heart failure) ([facility])   Assessment & Plan    · Patient has chronic shortness of breath and chronic bilateral lower extremity edema for 2 years now  · Continue with patient's cardiovascular and heart failure medications  · Outpatient follow-up with cardiology, Dr Davis Bear  · We will put elastic bandage/Ace wrap or Butch stockings/compression stockings on patient's bilateral lower extremities to help with the edema  Anemia   Assessment & Plan    · Patient's previous hemoglobin was normal at 12 2  Today it was 10 7, likely due to the GI bleeding  · Monitor patient's hemoglobin/hematocrit/CBC, transfuse as needed  We will likely transfuse if hemoglobin is below 7 to 8 and/or if patient is actively having symptoms of anemia  · Patient already has a blood consent  Atrial fibrillation ([facility] [address] )   Assessment & Plan    · Presently, patient has atrial fibrillation with rapid ventricular response    This was likely due to the fact that patient has not taken her antiarrhythmic medications yet  · Cardizem drip was ordered here in the emergency room and we will continue  Hopefully, this will be titrated down and be off  · I will continue patient's oral antiarrhythmic medications  · Due to the GI bleeding, patient's Coumadin will be on hold  Benefits and risks of holding the Coumadin were explained to the patient and she is okay with the plans of holding it off for now  · Outpatient follow-up with Cardiology  Abnormal CT of the abdomen   Assessment & Plan    · Patient had a recent CT scan of the abdomen as a workup for patient's abdominal pains, revealing ventral hernia on the right side as well as likely postoperative seroma on her right hip  · Patient will need an outpatient follow-up with her orthopedic surgeon, Dr Anu Young (according to her, she had history of right hip replacement)  · For the ventral hernia, outpatient follow with primary care physician  Presently asymptomatic at this point  GI will be on board  VTE Prophylaxis: Pharmacologic VTE Prophylaxis contraindicated due to GI bleeding   / reason for no mechanical VTE prophylaxis Patient has significant bilateral lower extremity edema, with some weeping lesions  Code Status:  Full code  POLST: POLST form is not discussed and not completed at this time  Anticipated Length of Stay:  Patient will be admitted on an Inpatient basis with an anticipated length of stay of  greater than 2 midnights  Justification for Hospital Stay:  Due to the above findings and plans  Total Time for Visit, including Counseling / Coordination of Care: 1 hour  Greater than 50% of this total time spent on direct patient counseling and coordination of care  Chief Complaint:   I had been having black stools      History of Present Illness:    Mily Doyle is a 80 y o  female who presents with black stools to the emergency room at [facility]    According to the patient, she has been having black stools since 2 days ago  She told me that her last episode was last night  So far, no bowel movements yet today  She claims that she noticed this, because usually her stools were light colored, and thus at this is very unusual for her  For the past 1 month, patient had been complaining of abdominal pains  Patient described the pains to be gnawing in character, and severe  Patient localized the pain on her epigastric and periumbilical areas  Her primary care physician had her undergo a CT scan of the abdomen  Results of that CT scan revealed a ventral hernia on the right as well as likely postoperative seroma on her right hip  She told me she had history of hip replacement  When asked, patient told me that she has not taken any NSAID medications like Advil or Motrin or ibuprofen at least in the last 1 to 2 months  Patient is on Coumadin for her atrial fibrillation  Patient has history of gastric bypass and initially told us that she thinks she had a history of peptic ulcer disease, and was told of a large ulcer  However, further review of patient's records, revealed that in 2015, patient had an EGD done by our GI doctor, Dr Castillo Winslow, and it did not reveal a large ulcer but instead revealed 2 small gastric erosions on her gastric pouch  Biopsy revealed rare H pylori, however stool antigen was negative for H pylori  When asked, patient does not remember if she has taken medications for H pylori (e g , antibiotics)  Patient at that time was prescribed with a PPI  Patient is not on a PPI anymore  Presently, patient still has abdominal pains  Patient denies any chest pains or shortness of breath or any dizziness or lightheadedness at this point  However, patient told me that she has chronic/baseline dyspnea on exertion due to her congestive heart failure, that she started to have since 2016  Patient also told me that she has chronic bilateral lower extremity edema   According to her, her primary care physician and cardiologist new about these edema and she takes torsemide for this  Patient denies any nausea or vomiting or any fever chills  In the emergency room, patient was found to have atrial fibrillation with rapid ventricle response  Patient admitted to me that she has not taken her medications which included her medications that could slow down her heart rate  Review of Systems:    Review of Systems     Ten point review systems done and they were negative except for the ones I mentioned in my HPI  Patient denies any cough or colds  Patient denies any problems urinating  Patient denies any headaches        Past Medical and Surgical History:     Past Medical History:   Diagnosis Date    Arthritis     [facility]     Disturbance of smell and taste     disturbance of taste    Effusion of knee joint right     Fibromyalgia     Heart murmur     reported a previous heart murmur    History of acute myocardial infarction     History of atrial fibrillation     History of bruising easily     History of cancer     History of dermatitis     History of mammogram     History of obesity     History of osteopenia     History of sciatica     History of shortness of breath     History of sinusitis     History of sore throat     History of syncope     History of umbilical hernia     History of viral infection     Irritable bowel syndrome (IBS)     Joint pain, hip     Limb pain     Myalgia     myalgia and myositis    Need for prophylactic vaccination against single diseases     Need for prophylactic vaccination and inoculation against influenza     Preoperative cardiovascular examination     Primary osteoarthritis of right knee     Right leg pain     Vaginal Pap smear     reported pap smear       Past Surgical History:   Procedure Laterality Date    ANKLE ARTHRODESIS Right     BACK SURGERY      BARIATRIC SURGERY      CHOLECYSTECTOMY x2    FOOT SURGERY      HIP CLOSE REDUCTION Right 8/21/2016    Procedure: CLOSED REDUCTION RIGHT TOTAL HIP;  Surgeon: Sushila Ortega MD;  [facility];  Service:     JOINT REPLACEMENT      LEFT KNEE REPLACEMENT    PILONIDAL CYST EXCISION      x2    REPLACEMENT TOTAL KNEE Right     SILVANA-EN-Y PROCEDURE      x2    TOTAL KNEE ARTHROPLASTY      UMBILICAL HERNIA REPAIR      x2       Meds/Allergies:    Prior to Admission medications    Medication Sig Start Date End Date Taking? Authorizing Provider   bisoprolol (ZEBETA) 5 mg tablet Take 1 tablet by mouth daily 9/14/17  Yes Historical Provider, MD   desoximetasone (TOPICORT) 0 25 % cream Apply topically 8/3/11  Yes Historical Provider, MD   diltiazem (CARDIZEM) 120 MG tablet Take 120 mg by mouth 2 (two) times a day  Yes Historical Provider, MD   DULoxetine (CYMBALTA) 60 mg delayed release capsule Take 60 mg by mouth daily  Yes Historical Provider, MD   DULoxetine (CYMBALTA) 60 mg delayed release capsule TAKE ONE CAPSULE BY MOUTH ONCE DAILY 3/5/18  Yes Isaiah Louis, DO   fluticasone (FLONASE) 50 mcg/act nasal spray 1 spray into each nostril daily  Yes Historical Provider, MD   potassium chloride (KLOR-CON) 20 mEq packet Take 20 mEq by mouth once a week     Yes Historical Provider, MD   torsemide (DEMADEX) 20 mg tablet Take 40 mg by mouth 2 (two) times a day     Yes Historical Provider, MD   traMADol (ULTRAM) 50 mg tablet TAKE ONE TABLET BY MOUTH TWICE DAILY AS NEEDED FOR PAIN 3/10/18  Yes Lilliana Boyd, DO   warfarin (COUMADIN) 3 mg tablet Take 1 tablet daily or as directed  Patient taking differently: Take 3 mg by mouth every other day Take 1 tablet daily or as directed  3/14/18  Yes Lilliana Boyd, DO   warfarin (COUMADIN) 4 mg tablet Take 4 mg by mouth every other day   12/20/17  Yes Historical Provider, MD   dicyclomine (BENTYL) 10 mg capsule Take 10 mg by mouth daily    3/22/18  Historical Provider, MD   dicyclomine (BENTYL) 10 mg capsule Take 1 capsule by mouth 2 (two) times a day as needed 7/26/16 3/22/18  Historical Provider, MD   digoxin (LANOXIN) 0 125 mg tablet Take 125 mcg by mouth daily  3/22/18  Historical Provider, MD   diltiazem (CARDIZEM) 120 MG tablet Take 1 tablet by mouth 2 (two) times a day 7/5/16 3/22/18  Historical Provider, MD   DULoxetine (CYMBALTA) 60 mg delayed release capsule Take 1 capsule by mouth daily 3/10/14 3/22/18  Historical Provider, MD   fluticasone Mahsa Coad) 50 mcg/act nasal spray into each nostril 1/25/12 3/22/18  Historical Provider, MD   HYDROcodone-acetaminophen (NORCO) 5-325 mg per tablet Take 1 tablet by mouth every 6 (six) hours as needed for moderate pain for up to 10 doses  Max Daily Amount: 4 tablets 8/23/16 3/22/18  Agustina Will PA-C   omeprazole (PriLOSEC) 40 MG capsule Take 40 mg by mouth daily  3/22/18  Historical Provider, MD   potassium chloride (K-DUR,KLOR-CON) 20 mEq tablet Take 1 tablet by mouth daily 11/16/17 3/22/18  Historical Provider, MD   torsemide (DEMADEX) 20 mg tablet Take by mouth 2/28/17 3/22/18  Historical Provider, MD   warfarin (COUMADIN) 1 mg tablet Take 1 tablet by mouth daily 4/3/17 3/22/18  Historical Provider, MD   warfarin (COUMADIN) 5 mg tablet Take 1 tablet (5 mg total) by mouth daily  2 mg alternating with 3 mg  Patient taking differently: Take 2 mg by mouth daily 2 mg alternating with 3 mg   8/23/16 3/22/18  Yasmine Will PA-C   warfarin (COUMADIN) 5 mg tablet Take 1 tablet by mouth daily 12/30/16 3/22/18  Historical Provider, MD     Medication list was reviewed  Allergies:    Allergies   Allergen Reactions    Codeine Vomiting     Reaction Date: 24Apr2012;     Oxycodone Hcl Vomiting     Reaction Date: 24Apr2012;     Percocet [Oxycodone-Acetaminophen]     Seasonal Ic  [Cholestatin] Allergic Rhinitis    Bactrim [Sulfamethoxazole-Trimethoprim] Rash    Sulfa Antibiotics Rash       Social History:     Marital Status: /Civil Union     History   Alcohol Use    Yes     Comment: (history) social     History   Smoking Status    Never Smoker   Smokeless Tobacco    Never Used     History   Drug Use No       Family History:    Family History   Problem Relation Age of Onset    Coronary artery disease Mother     Coronary artery disease Father     Lymphoma Sister      acute    Rashes / Skin problems Sister      lymphomatoid papulosis    Coronary artery disease Brother        Physical Exam:     Vitals:   Blood Pressure: 145/78 (03/22/18 1436)  Pulse: (!) 120 (03/22/18 1436)  Temperature: 97 7 °F (36 5 °C) (03/22/18 1436)  Temp Source: Oral (03/22/18 1436)  Respirations: 19 (03/22/18 1436)  Height: 5' 1" (154 9 cm) (03/22/18 1006)  Weight - Scale: 99 8 kg (220 lb) (03/22/18 1006)  SpO2: 98 % (03/22/18 1436)    Physical Exam   Constitutional: She is oriented to person, place, and time  No distress  HENT:   Head: Normocephalic and atraumatic  Mouth/Throat: Oropharynx is clear and moist  No oropharyngeal exudate  Eyes: Conjunctivae are normal  Right eye exhibits no discharge  Left eye exhibits no discharge  No scleral icterus  Neck: No JVD present  No tracheal deviation present  Cardiovascular: Exam reveals no gallop and no friction rub  No murmur heard  Tachycardic, irregularly irregular heart rhythm  Pulmonary/Chest: Effort normal and breath sounds normal  No stridor  No respiratory distress  She has no wheezes  She has no rales  Abdominal: Soft  Bowel sounds are normal  She exhibits no distension  There is tenderness  There is no rebound and no guarding  Obese abdomen; ventral hernia noted on the right lower part of the abdomen  Nontender  No signs of incarceration or strangulation  Musculoskeletal: She exhibits edema  She exhibits no tenderness or deformity  Positive for grade 3 bilateral lower extremity edema (chronic according to the patient), with some few weeping lesions     Neurological: She is alert and oriented to person, place, and time  No cranial nerve deficit  Skin: Skin is warm  No rash noted  She is not diaphoretic  No erythema  No pallor  Psychiatric: She has a normal mood and affect  Her behavior is normal  Thought content normal    Patient is pleasant  Vitals reviewed  Additional Data:     Lab Results: I have personally reviewed pertinent reports  Results from last 7 days  Lab Units 03/22/18  1140   WBC Thousand/uL 8 83   HEMOGLOBIN g/dL 10 7*   HEMATOCRIT % 34 3*   PLATELETS Thousands/uL 208   NEUTROS PCT % 73   LYMPHS PCT % 15   MONOS PCT % 11   EOS PCT % 1       Results from last 7 days  Lab Units 03/22/18  1140   SODIUM mmol/L 140   POTASSIUM mmol/L 3 8   CHLORIDE mmol/L 105   CO2 mmol/L 25   BUN mg/dL 18   CREATININE mg/dL 0 88   CALCIUM mg/dL 8 3   TOTAL PROTEIN g/dL 7 0   BILIRUBIN TOTAL mg/dL 0 37   ALK PHOS U/L 89   ALT U/L 15   AST U/L 15   GLUCOSE RANDOM mg/dL 92       Results from last 7 days  Lab Units 03/22/18  1140   INR  2 29*       Imaging: I have personally reviewed pertinent reports  No orders to display       EKG, Pathology, and Other Studies Reviewed on Admission:   · EKG:  Atrial fibrillation with rapid ventricular response at 160 per minute  Presently on telemetry right now, patient's heart rate is now running 88 to 110 per minute  Allscripts / Epic Records Reviewed: Yes     ** Please Note: This note has been constructed using a voice recognition system   **

## 2018-03-22 NOTE — ASSESSMENT & PLAN NOTE
· Presently, patient has atrial fibrillation with rapid ventricular response  This was likely due to the fact that patient has not taken her antiarrhythmic medications yet  · Cardizem drip was ordered here in the emergency room and we will continue  Hopefully, this will be titrated down and be off  · I will continue patient's oral antiarrhythmic medications  · Due to the GI bleeding, patient's Coumadin will be on hold  Benefits and risks of holding the Coumadin were explained to the patient and she is okay with the plans of holding it off for now  · Outpatient follow-up with Cardiology

## 2018-03-22 NOTE — PROGRESS NOTES
Pt  With /88    Spoke with Dr Alise Todd as pt  Currently has cardizem drip started in ED still running  Per Dr Alise Todd, give oral cardizem as ordered and stop drip

## 2018-03-22 NOTE — ASSESSMENT & PLAN NOTE
· Patient has been having melena for the past 2 days  · GI consult  · For upper endoscopy  · NPO for now  According to ER physician, he spoke to the advance practitioner for GI, at this point in time, not yet sure if endoscopy will be done today or tomorrow  · Continue with Protonix drip as ordered here in the emergency room  · Monitor patient's CBC/hemoglobin hematocrit  · Transfuse as needed  Patient already had a blood consent  · FFP transfusion was ordered here in the emergency room, to reverse patient's Coumadin coagulopathy  · Patient has history of gastric erosions in the past with rare H pylori in the biopsy but negative H pylori stool antigen

## 2018-03-22 NOTE — ASSESSMENT & PLAN NOTE
· Patient's previous hemoglobin was normal at 12 2  Today it was 10 7, likely due to the GI bleeding  · Monitor patient's hemoglobin/hematocrit/CBC, transfuse as needed  We will likely transfuse if hemoglobin is below 7 to 8 and/or if patient is actively having symptoms of anemia  · Patient already has a blood consent

## 2018-03-22 NOTE — ASSESSMENT & PLAN NOTE
· Patient has chronic shortness of breath and chronic bilateral lower extremity edema for 2 years now  · Continue with patient's cardiovascular and heart failure medications  · Outpatient follow-up with cardiology, Dr Brower Sos  · We will put elastic bandage/Ace wrap or Butch stockings/compression stockings on patient's bilateral lower extremities to help with the edema

## 2018-03-22 NOTE — MEDICAL STUDENT
MEDICAL STUDENT  Inpatient H&P for TRAINING ONLY   Not Part of Legal Medical Record       H&P Exam - Jada Combs 80 y o  female MRN: [id number]    Unit/Bed#: ED 29 Encounter: [id number]      Assessment/Plan     Assessment:  81 y/o female with atrial fibrillation on warfarin, history of ulcer several years prior, chronic diastolic CHF (last EF 76% in 2012), hx of multiple abdominal surgeries, now presenting with 2 days of melanotic stool and several weeks of intermittent, gnawing abdominal pain that improves with meals  Patient is hemodynamically stable  Patient will be admitted for acute upper GI bleed  Plan:  Acute upper GI bleed - pt with 2d of multiple melanotic stools  Hemodynamically stable and currently asymptomatic  Blatchford score 5, will need upper endoscopy  Possible etiologies include duodenal ulcer which is consistent with patient's symptoms of intermittent gnawing periumbilical pain improved with food  Unlikely pancreatitis, lipase negative  Mesenteric ischemia, bowel strangulation also less likely as patient does not appear toxic and labs generally unremarkable  · Will give IV pantoprazole 80 mg to reduce rebleed risk  · Last INR 2 29, given vitamin K, hold coumadin for now   · Follow up INR tomorrow in AM   · Trend hemoglobin   · Check orthostatic vital signs   · Check lactate, would like to r/o hypoperfusion / ischemia and potentially avoid IVF as patient appears volume overloaded   · Consult to GI, will need endoscopy   · NPO for now     Coagulopathy - INR 2 29  Patient's warfarin dose recently decreased from 4 mg -> 3mg / 4mg alternating days  · Give 1 dose vitamin K 10 mg IV   · Give 4F prothrombin complex concentrate  · Recheck INR s/p infusion of blood products  Goal < 1 5     Chronic diastolic congestive heart failure - does not appear to be in acute exacerbation, but has had increased edema and weeping of L leg     · Avoid aggressive IVF for now   · Give 1 time dose furosemide 80 mg IV (~40 mg torsemide) as patient appears quite volume overloaded      Atrial fibrillation - pt in rapid a fib currently, reports she forgot her AM diltiazem   · Hold coumadin for now   · Give diltiazem 10 mg IV bolus, diltiazem 125 mg drip until rate controlled       History of Present Illness   HPI:  Uriel Wheatley is a 80 y o  female with history of GI ulcer, CHF, atrial fibrillation on warfarin, hx of gastric bypass, who presents with 2 days of black stools  She has had about 5-7 episodes total of "black, tarry" stools  She has never experienced this before  Over the last several weeks, she has been experiencing intermittent epigastric/periumbilical abdominal pain which feels like a "gnawing"  Pain improves with food  No specific exacerbating factors  The pain radiates around the R side to the back  She denies weakness or dizziness, nausea or vomiting, diarrhea or constipation, any bleeding or increased bruising elsewhere  Of note, she did experience palpitations this morning but forgot to take her diltiazem which she takes for a  fib  She denies any recent increased NSAID use  She is unsure of her last INR, but last week her warfarin dose was reduced from 4mg daily -> 3mg/4mg alternating days  She did have a recent abdominal CT w/o contrast last month due to increasing right lower quadrant pain and distension, which showed a stable large ventral hernia without evidence of obstruction or strangulation  Review of Systems   Constitutional: Positive for unexpected weight change  Negative for appetite change, fatigue and fever  Pt states over last several weeks she has been gaining about 1 lb per day    HENT: Negative for congestion and nosebleeds  Respiratory: Negative for cough, chest tightness and shortness of breath  Cardiovascular: Positive for palpitations  Negative for chest pain          Intermittently has palpitations due to a  fib   Gastrointestinal: Positive for abdominal distention, abdominal pain and blood in stool  Negative for constipation, diarrhea, nausea and vomiting  Black, tarry   Genitourinary: Negative for dysuria and frequency  Skin: Negative for rash and wound  Neurological: Negative for dizziness, weakness, light-headedness and headaches         Historical Information   Past Medical History:   Diagnosis Date    Arthritis     CHF (congestive heart failure) (HCC)     Disturbance of smell and taste     disturbance of taste    Effusion of knee joint right     Fibromyalgia     Heart murmur     reported a previous heart murmur    History of acute myocardial infarction     History of atrial fibrillation     History of bruising easily     History of cancer     History of dermatitis     History of mammogram     History of obesity     History of osteopenia     History of sciatica     History of shortness of breath     History of sinusitis     History of sore throat     History of syncope     History of umbilical hernia     History of viral infection     Irritable bowel syndrome (IBS)     Joint pain, hip     Limb pain     Myalgia     myalgia and myositis    Need for prophylactic vaccination against single diseases     Need for prophylactic vaccination and inoculation against influenza     Preoperative cardiovascular examination     Primary osteoarthritis of right knee     Right leg pain     Vaginal Pap smear     reported pap smear     Past Surgical History:   Procedure Laterality Date    ANKLE ARTHRODESIS Right     BACK SURGERY      BARIATRIC SURGERY      CHOLECYSTECTOMY      x2    FOOT SURGERY      HIP CLOSE REDUCTION Right 8/21/2016    Procedure: CLOSED REDUCTION RIGHT TOTAL HIP;  Surgeon: Simpson Olszewski, MD;  [facility]: [facility];  Service:     JOINT REPLACEMENT      LEFT KNEE REPLACEMENT    PILONIDAL CYST EXCISION      x2    REPLACEMENT TOTAL KNEE Right     SILVANA-EN-Y PROCEDURE      x2    TOTAL KNEE ARTHROPLASTY      UMBILICAL HERNIA REPAIR      x2     Social History   History   Alcohol Use    Yes     Comment: (history) social     History   Drug Use No     History   Smoking Status    Never Smoker   Smokeless Tobacco    Never Used     Family History:   Family History   Problem Relation Age of Onset    Coronary artery disease Mother     Coronary artery disease Father     Lymphoma Sister      acute    Rashes / Skin problems Sister      lymphomatoid papulosis    Coronary artery disease Brother        Meds/Allergies   all medications and allergies reviewed  Allergies   Allergen Reactions    Codeine Vomiting     Reaction Date: 24Apr2012;     Oxycodone Hcl Vomiting     Reaction Date: 24Apr2012;     Percocet [Oxycodone-Acetaminophen]     Seasonal Ic  [Cholestatin] Allergic Rhinitis    Bactrim [Sulfamethoxazole-Trimethoprim] Rash    Sulfa Antibiotics Rash       Objective   Vitals: Blood pressure 113/78, pulse (!) 123, temperature (!) 97 2 °F (36 2 °C), resp  rate 18, height 5' 1" (1 549 m), weight 99 8 kg (220 lb), SpO2 99 %  Intake/Output Summary (Last 24 hours) at 03/22/18 1318  Last data filed at 03/22/18 1240   Gross per 24 hour   Intake              500 ml   Output                0 ml   Net              500 ml       Invasive Devices     Peripheral Intravenous Line            Peripheral IV 03/22/18 Right Forearm less than 1 day                Physical Exam   Constitutional: She appears well-developed and well-nourished  No distress  HENT:   Head: Normocephalic and atraumatic  Mouth/Throat: No oropharyngeal exudate  Eyes: Right eye exhibits no discharge  Left eye exhibits no discharge  No scleral icterus  Neck: No JVD present  Cardiovascular: Exam reveals no friction rub  No murmur heard  Tachycardic, irregular    Pulmonary/Chest: Effort normal and breath sounds normal  No respiratory distress  Abdominal: Soft  Bowel sounds are normal  There is tenderness  There is no rebound     Diffuse tenderness, most pronounced in epigastric region   Musculoskeletal: She exhibits edema  Pitting edema to knee b/l, L ankle visibly weeping    Neurological: She is alert  Lab Results: I have personally reviewed pertinent reports  Imaging: I have personally reviewed pertinent reports  EKG, Pathology, and Other Studies: I have personally reviewed pertinent reports        Code Status: Prior

## 2018-03-22 NOTE — ASSESSMENT & PLAN NOTE
· Patient had a recent CT scan of the abdomen as a workup for patient's abdominal pains, revealing ventral hernia on the right side as well as likely postoperative seroma on her right hip  · Patient will need an outpatient follow-up with her orthopedic surgeon, Dr Desiree Reis (according to her, she had history of right hip replacement)  · For the ventral hernia, outpatient follow with primary care physician  Presently asymptomatic at this point  GI will be on board

## 2018-03-23 ENCOUNTER — ANESTHESIA (INPATIENT)
Dept: GASTROENTEROLOGY | Facility: HOSPITAL | Age: 83
DRG: 813 | End: 2018-03-23
Payer: COMMERCIAL

## 2018-03-23 ENCOUNTER — ANESTHESIA EVENT (INPATIENT)
Dept: GASTROENTEROLOGY | Facility: HOSPITAL | Age: 83
DRG: 813 | End: 2018-03-23
Payer: COMMERCIAL

## 2018-03-23 PROBLEM — K92.1 GASTROINTESTINAL HEMORRHAGE WITH MELENA: Status: ACTIVE | Noted: 2018-03-22

## 2018-03-23 LAB
ABO GROUP BLD BPU: NORMAL
ABO GROUP BLD BPU: NORMAL
ANION GAP SERPL CALCULATED.3IONS-SCNC: 8 MMOL/L (ref 4–13)
BPU ID: NORMAL
BPU ID: NORMAL
BUN SERPL-MCNC: 11 MG/DL (ref 5–25)
CALCIUM SERPL-MCNC: 8.4 MG/DL (ref 8.3–10.1)
CHLORIDE SERPL-SCNC: 110 MMOL/L (ref 100–108)
CO2 SERPL-SCNC: 25 MMOL/L (ref 21–32)
CREAT SERPL-MCNC: 0.73 MG/DL (ref 0.6–1.3)
ERYTHROCYTE [DISTWIDTH] IN BLOOD BY AUTOMATED COUNT: 14.5 % (ref 11.6–15.1)
GFR SERPL CREATININE-BSD FRML MDRD: 76 ML/MIN/1.73SQ M
GLUCOSE SERPL-MCNC: 92 MG/DL (ref 65–140)
HCT VFR BLD AUTO: 33.7 % (ref 34.8–46.1)
HGB BLD-MCNC: 10.6 G/DL (ref 11.5–15.4)
INR PPP: 1.39 (ref 0.86–1.16)
MCH RBC QN AUTO: 29.9 PG (ref 26.8–34.3)
MCHC RBC AUTO-ENTMCNC: 31.5 G/DL (ref 31.4–37.4)
MCV RBC AUTO: 95 FL (ref 82–98)
PLATELET # BLD AUTO: 190 THOUSANDS/UL (ref 149–390)
PMV BLD AUTO: 10.8 FL (ref 8.9–12.7)
POTASSIUM SERPL-SCNC: 3.9 MMOL/L (ref 3.5–5.3)
PROTHROMBIN TIME: 17.1 SECONDS (ref 12.1–14.4)
RBC # BLD AUTO: 3.55 MILLION/UL (ref 3.81–5.12)
SODIUM SERPL-SCNC: 143 MMOL/L (ref 136–145)
UNIT DISPENSE STATUS: NORMAL
UNIT DISPENSE STATUS: NORMAL
UNIT PRODUCT CODE: NORMAL
UNIT PRODUCT CODE: NORMAL
UNIT RH: NORMAL
UNIT RH: NORMAL
WBC # BLD AUTO: 5.83 THOUSAND/UL (ref 4.31–10.16)

## 2018-03-23 PROCEDURE — 85610 PROTHROMBIN TIME: CPT | Performed by: INTERNAL MEDICINE

## 2018-03-23 PROCEDURE — 80048 BASIC METABOLIC PNL TOTAL CA: CPT | Performed by: INTERNAL MEDICINE

## 2018-03-23 PROCEDURE — 99232 SBSQ HOSP IP/OBS MODERATE 35: CPT | Performed by: INTERNAL MEDICINE

## 2018-03-23 PROCEDURE — 43235 EGD DIAGNOSTIC BRUSH WASH: CPT | Performed by: INTERNAL MEDICINE

## 2018-03-23 PROCEDURE — C9113 INJ PANTOPRAZOLE SODIUM, VIA: HCPCS | Performed by: PHYSICIAN ASSISTANT

## 2018-03-23 PROCEDURE — 0DJ08ZZ INSPECTION OF UPPER INTESTINAL TRACT, VIA NATURAL OR ARTIFICIAL OPENING ENDOSCOPIC: ICD-10-PCS | Performed by: INTERNAL MEDICINE

## 2018-03-23 PROCEDURE — 85027 COMPLETE CBC AUTOMATED: CPT | Performed by: INTERNAL MEDICINE

## 2018-03-23 RX ORDER — SODIUM CHLORIDE 9 MG/ML
INJECTION, SOLUTION INTRAVENOUS CONTINUOUS PRN
Status: DISCONTINUED | OUTPATIENT
Start: 2018-03-23 | End: 2018-03-23 | Stop reason: SURG

## 2018-03-23 RX ORDER — PROPOFOL 10 MG/ML
INJECTION, EMULSION INTRAVENOUS AS NEEDED
Status: DISCONTINUED | OUTPATIENT
Start: 2018-03-23 | End: 2018-03-23 | Stop reason: SURG

## 2018-03-23 RX ORDER — LIDOCAINE HYDROCHLORIDE 10 MG/ML
INJECTION, SOLUTION INFILTRATION; PERINEURAL AS NEEDED
Status: DISCONTINUED | OUTPATIENT
Start: 2018-03-23 | End: 2018-03-23 | Stop reason: SURG

## 2018-03-23 RX ORDER — SUCRALFATE ORAL 1 G/10ML
1000 SUSPENSION ORAL EVERY 6 HOURS SCHEDULED
Status: DISCONTINUED | OUTPATIENT
Start: 2018-03-23 | End: 2018-03-24 | Stop reason: HOSPADM

## 2018-03-23 RX ORDER — ESMOLOL HYDROCHLORIDE 10 MG/ML
INJECTION INTRAVENOUS AS NEEDED
Status: DISCONTINUED | OUTPATIENT
Start: 2018-03-23 | End: 2018-03-23 | Stop reason: SURG

## 2018-03-23 RX ORDER — PANTOPRAZOLE SODIUM 40 MG/1
40 TABLET, DELAYED RELEASE ORAL
Status: DISCONTINUED | OUTPATIENT
Start: 2018-03-23 | End: 2018-03-24 | Stop reason: HOSPADM

## 2018-03-23 RX ADMIN — PROPOFOL 25 MG: 10 INJECTION, EMULSION INTRAVENOUS at 12:02

## 2018-03-23 RX ADMIN — PROPOFOL 50 MG: 10 INJECTION, EMULSION INTRAVENOUS at 12:00

## 2018-03-23 RX ADMIN — SUCRALFATE 1000 MG: 1 SUSPENSION ORAL at 17:55

## 2018-03-23 RX ADMIN — SODIUM CHLORIDE 8 MG/HR: 9 INJECTION, SOLUTION INTRAVENOUS at 07:46

## 2018-03-23 RX ADMIN — PROPOFOL 25 MG: 10 INJECTION, EMULSION INTRAVENOUS at 12:03

## 2018-03-23 RX ADMIN — ESMOLOL HYDROCHLORIDE 30 MG: 100 INJECTION, SOLUTION INTRAVENOUS at 12:01

## 2018-03-23 RX ADMIN — DULOXETINE HYDROCHLORIDE 60 MG: 60 CAPSULE, DELAYED RELEASE ORAL at 14:24

## 2018-03-23 RX ADMIN — LIDOCAINE HYDROCHLORIDE 100 MG: 10 INJECTION, SOLUTION INFILTRATION; PERINEURAL at 11:59

## 2018-03-23 RX ADMIN — PROPOFOL 50 MG: 10 INJECTION, EMULSION INTRAVENOUS at 11:59

## 2018-03-23 RX ADMIN — FLUTICASONE PROPIONATE 1 SPRAY: 50 SPRAY, METERED NASAL at 14:24

## 2018-03-23 RX ADMIN — SODIUM CHLORIDE: 0.9 INJECTION, SOLUTION INTRAVENOUS at 11:53

## 2018-03-23 RX ADMIN — DILTIAZEM HYDROCHLORIDE 120 MG: 120 CAPSULE, EXTENDED RELEASE ORAL at 14:23

## 2018-03-23 RX ADMIN — BISOPROLOL FUMARATE 5 MG: 5 TABLET ORAL at 14:24

## 2018-03-23 RX ADMIN — PANTOPRAZOLE SODIUM 40 MG: 40 TABLET, DELAYED RELEASE ORAL at 17:55

## 2018-03-23 RX ADMIN — PROPOFOL 50 MG: 10 INJECTION, EMULSION INTRAVENOUS at 12:01

## 2018-03-23 RX ADMIN — DILTIAZEM HYDROCHLORIDE 120 MG: 120 CAPSULE, EXTENDED RELEASE ORAL at 17:57

## 2018-03-23 NOTE — SOCIAL WORK
CM met with pt and pt  all aware cm role at discharge  Pt lives in an apartment with her  Maximo Fernandez  There are 2 steps to get in   Prior to admission pt was independent all ADL"s   Pt uses a rollator and has a cane at home  Pt denies any visiting nurse in the past  Pt was at short term rehab at [facility] in the past  Pt denies any mental health or [facility] rehab in the past  Pt drives and gets medication from [email or web address]   When medically clear family will transport home   CM reviewed d/c planning process including the following: identifying help at home, patient preference for d/c planning needs, Discharge Lounge, [facility] Meds to Bed program, availability of treatment team to discuss questions or concerns patient and/or family may have regarding understanding medications and recognizing signs and symptoms once discharged  CM also encouraged patient to follow up with all recommended appointments after discharge  Patient advised of importance for patient and family to participate in managing patients medical well being

## 2018-03-23 NOTE — ASSESSMENT & PLAN NOTE
· Presently, patient has atrial fibrillation with rapid ventricular response  This was likely due to the fact that patient has not taken her antiarrhythmic medications yet  · Cardizem drip was ordered here in the emergency room and we will continue  Hopefully, this will be titrated down and be off  · I will continue patient's oral antiarrhythmic medications  · Due to the GI bleeding, patient's Coumadin will be on hold  Benefits and risks of holding the Coumadin were explained to the patient and she is okay with the plans of holding it off for now  Resume Coumadin once cleared by GI  · Outpatient follow-up with Cardiology

## 2018-03-23 NOTE — ASSESSMENT & PLAN NOTE
· Patient had a recent CT scan of the abdomen as a workup for patient's abdominal pains, revealing ventral hernia on the right side as well as likely postoperative seroma on her right hip  · Patient will need an outpatient follow-up with her orthopedic surgeon, Dr Alfonzo Garcia (according to her, she had history of right hip replacement)  · For the ventral hernia, outpatient follow with primary care physician  Presently asymptomatic at this point

## 2018-03-23 NOTE — SOCIAL WORK
MCG Guide Used for Initial Round: Gi bleed   Optimal GLOS: 2  Hospital Day: 1 day  · DC Readiness:Discharge readiness is indicated by patient meeting Recovery Milestones, including ALL of the following:  ? Hypotension absent  ? Surgical and other acute interventions not needed  ? Bleeding absent or minimal  ? Stable Hgb/Hct  ? Pain absent or managed  ? Ambulatory  ? Oral hydration, medications, and diet  ?  Discharge plans and education understood    Identified Barriers: endoscopy today  Monitor hemoglobin   Discussion Date (Time): 03/23/18 with Dr Elena Li

## 2018-03-23 NOTE — ANESTHESIA PREPROCEDURE EVALUATION
Review of Systems/Medical History  Patient summary reviewed  Chart reviewed  No history of anesthetic complications     Cardiovascular  Hypertension , Dysrhythmias, atrial fibrillation, CHF ,    Pulmonary  Negative pulmonary ROS        GI/Hepatic    GI bleeding , active, Bariatric surgery,        Negative  ROS        Endo/Other    Obesity  morbid obesity   GYN       Hematology  Anemia acute blood loss anemia,     Musculoskeletal    Arthritis     Neurology    Neuromuscular disease ,   Comment: Fibromyalgia Psychology   Depression ,              Physical Exam    Airway    Mallampati score: II  TM Distance: >3 FB  Neck ROM: full     Dental   No notable dental hx     Cardiovascular      Pulmonary      Other Findings        Anesthesia Plan  ASA Score- 3     Anesthesia Type- IV sedation with anesthesia with ASA Monitors  Additional Monitors:   Airway Plan:     Comment: IV sedation,  standard ASA monitors  Risks and benefits discussed with patient; patient consented and agrees to proceed  I saw and evaluated the patient  If seen with CRNA, we have discussed the anesthetic plan and I am in agreement that the plan is appropriate for the patient  Pt in chronic afib, RVR  She is on cardizem, HR has been as high as 180s, low as 120s, now 120s-140s  She does not have any symptoms, no CP/SOB, BP stable  Given that her medical team is continuing to treat her afib V rate, she is otherwise stable and comfortable, and the need to determine the source of her bleeding, we will proceed cautiously  I spoke to patient about risks, she agrees to proceed        Plan Factors-    Induction- intravenous  Postoperative Plan-     Informed Consent- Anesthetic plan and risks discussed with patient  I personally reviewed this patient with the CRNA  Discussed and agreed on the Anesthesia Plan with the PHIL Blank

## 2018-03-23 NOTE — PROGRESS NOTES
Progress Note - Eduardo Brian 1933, 80 y o  female MRN: [id number]    Unit/Bed#: [facility] 820-01 Encounter: [id number]    Primary Care Provider: Bernadette Kee DO   Date and time admitted to hospital: 3/22/2018 11:02 AM        * GI bleeding   Assessment & Plan    · Patient has been having melena for the past 2 days  · GI consult  · For upper endoscopy  · NPO for now  · Continue with Protonix drip as ordered here in the emergency room  · CC stable, continue to follow  · Transfuse as needed  Patient already had a blood consent  · FFP transfusion was ordered here in the emergency room, to reverse patient's Coumadin coagulopathy  · Patient has history of gastric erosions in the past with rare H pylori in the biopsy but negative H pylori stool antigen  Abnormal CT of the abdomen   Assessment & Plan    · Patient had a recent CT scan of the abdomen as a workup for patient's abdominal pains, revealing ventral hernia on the right side as well as likely postoperative seroma on her right hip  · Patient will need an outpatient follow-up with her orthopedic surgeon, Dr Jennifer Esquivel (according to her, she had history of right hip replacement)  · For the ventral hernia, outpatient follow with primary care physician  Presently asymptomatic at this point  Chronic diastolic CHF (congestive heart failure) (HCC)   Assessment & Plan    · Patient has chronic shortness of breath and chronic bilateral lower extremity edema for 2 years now  · Continue with patient's cardiovascular and heart failure medications  · Outpatient follow-up with cardiology, Dr Clarissa Barnhart  · We will put elastic bandage/Ace wrap or Butch stockings/compression stockings on patient's bilateral lower extremities to help with the edema  Atrial fibrillation ([address] )   Assessment & Plan    · Presently, patient has atrial fibrillation with rapid ventricular response    This was likely due to the fact that patient has not taken her antiarrhythmic medications yet  · Cardizem drip was ordered here in the emergency room and we will continue  Hopefully, this will be titrated down and be off  · I will continue patient's oral antiarrhythmic medications  · Due to the GI bleeding, patient's Coumadin will be on hold  Benefits and risks of holding the Coumadin were explained to the patient and she is okay with the plans of holding it off for now  Resume Coumadin once cleared by GI  · Outpatient follow-up with Cardiology  VTE Pharmacologic Prophylaxis:   Pharmacologic: Pharmacologic VTE Prophylaxis contraindicated due to gib  Mechanical VTE Prophylaxis in Place: Yes    Patient Centered Rounds: I have performed bedside rounds with nursing staff today  Discussions with Specialists or Other Care Team Provider: gi    Education and Discussions with Family / Patient: yes    Current Length of Stay: 1 day(s)    Current Patient Status: Inpatient   Certification Statement: The patient will continue to require additional inpatient hospital stay due to gib    Code Status: Level 1 - Full Code      Subjective:   Patient post procedure, hungry and without complaints  Objective:     Vitals:   Temp (24hrs), Av 7 °F (36 5 °C), Min:97 °F (36 1 °C), Max:98 3 °F (36 8 °C)    HR:  [] 119  Resp:  [16-20] 18  BP: (105-146)/(61-96) 126/96  SpO2:  [93 %-99 %] 95 %  Body mass index is 41 57 kg/m²  Input and Output Summary (last 24 hours):        Intake/Output Summary (Last 24 hours) at 18 1439  Last data filed at 18 1207   Gross per 24 hour   Intake           130 33 ml   Output             2050 ml   Net         -1919 67 ml       Physical Exam:  General Appearance:    Alert, cooperative, no distress, appropriately responsive    Head:    Normocephalic, without obvious abnormality, atraumatic, mucous membranes moist    Eyes:    Conjunctiva/corneas clear, EOM's intact   Neck:   Supple, no JVD or bruits noted   Lungs:     Clear to auscultation bilaterally, respirations unlabored, no crackles or wheeze     Heart:    Regular rate and rhythm, S1 and S2    Abdomen:     Soft, non-tender, bowel sounds active all four quadrants,     no masses, no organomegaly   Extremities:   Extremities normal, atraumatic, no cyanosis or edema   Neurologic:  nonfocal, A/O x 3           Additional Data:     Labs:      Results from last 7 days  Lab Units 03/23/18  0550  03/22/18  1140   WBC Thousand/uL 5 83  --  8 83   HEMOGLOBIN g/dL 10 6*  < > 10 7*   HEMATOCRIT % 33 7*  --  34 3*   PLATELETS Thousands/uL 190  --  208   NEUTROS PCT %  --   --  73   LYMPHS PCT %  --   --  15   MONOS PCT %  --   --  11   EOS PCT %  --   --  1   < > = values in this interval not displayed  Results from last 7 days  Lab Units 03/23/18  0550 03/22/18  1140   SODIUM mmol/L 143 140   POTASSIUM mmol/L 3 9 3 8   CHLORIDE mmol/L 110* 105   CO2 mmol/L 25 25   BUN mg/dL 11 18   CREATININE mg/dL 0 73 0 88   CALCIUM mg/dL 8 4 8 3   TOTAL PROTEIN g/dL  --  7 0   BILIRUBIN TOTAL mg/dL  --  0 37   ALK PHOS U/L  --  89   ALT U/L  --  15   AST U/L  --  15   GLUCOSE RANDOM mg/dL 92 92       Results from last 7 days  Lab Units 03/23/18  0550   INR  1 39*       * I Have Reviewed All Lab Data Listed Above  * Additional Pertinent Lab Tests Reviewed:  All Labs Within Last 24 Hours Reviewed    Cultures:   Blood Culture: No results found for: BLOODCX  Urine Culture:   Lab Results   Component Value Date    URINECX 80,000-89,000 cfu/ml Mixed Contaminants X3 08/20/2016     Sputum Culture: No components found for: SPUTUMCX  Wound Culture: No results found for: WOUNDCULT    Last 24 Hours Medication List:     Current Facility-Administered Medications:  acetaminophen 650 mg Oral Q6H PRN Kirit Lyons MD    bisoprolol 5 mg Oral Daily Kirit Lyons MD    calcium carbonate 1,000 mg Oral Daily PRN Kirit Lyons MD    desoximetasone  Topical BID Kirit Lyons MD    diltiazem 120 mg Oral BID Michelle Phillips MD    DULoxetine 60 mg Oral Daily Kirit Lyons MD    fluticasone 1 spray Nasal Daily Kirit Lyons MD    ondansetron 4 mg Intravenous Q6H PRN Kirit Lyons MD    pantoprozole (PROTONIX) infusion (Continuous) 8 mg/hr Intravenous Continuous Giancarlo Bennett PA-C Last Rate: 8 mg/hr (03/23/18 0746)   potassium chloride 20 mEq Oral Weekly Kirit Lyons MD    senna 1 tablet Oral HS PRN Kirit Lyons MD    torsemide 40 mg Oral BID Kirit Lyons MD    traMADol 50 mg Oral BID PRN Michelle Phillips MD         Today, Patient Was Seen By: Shane Epperson DO    ** Please Note: Dragon 360 Dictation voice to text software may have been used in the creation of this document   **

## 2018-03-23 NOTE — ASSESSMENT & PLAN NOTE
· Patient has been having melena for the past 2 days  · GI consult  · For upper endoscopy  · NPO for now  · Continue with Protonix drip as ordered here in the emergency room  · CC stable, continue to follow  · Transfuse as needed  Patient already had a blood consent  · FFP transfusion was ordered here in the emergency room, to reverse patient's Coumadin coagulopathy  · Patient has history of gastric erosions in the past with rare H pylori in the biopsy but negative H pylori stool antigen

## 2018-03-23 NOTE — ANESTHESIA POSTPROCEDURE EVALUATION
Post-Op Assessment Note      CV Status:  Stable    Mental Status:  Alert and awake    Hydration Status:  Euvolemic    PONV Controlled:  Controlled    Airway Patency:  Patent    Post Op Vitals Reviewed: Yes          Staff: CRNA, Anesthesiologist           /86 (03/23/18 1213)    Temp     Pulse (!) 138 (03/23/18 1213)   Resp 16 (03/23/18 1213)    SpO2 97 % (03/23/18 1213)

## 2018-03-23 NOTE — OP NOTE
OPERATIVE REPORT  PATIENT NAME: Vianey Cano    :  1933  MRN: [id number]  Pt Location: [facility] ROOM 04    SURGERY DATE: 3/23/2018    Surgeon(s) and Role:     * Ailyn Olvera MD - Primary    ESOPHAGOGASTRODUODENOSCOPY    PROCEDURE: EGD    SEDATION: Monitored anesthesia care, check anesthesia records    ASA Class: 3    INDICATIONS:  Melena and anemia  Prior gastric bypass    CONSENT:  Informed consent was obtained for the procedure, including sedation after explaining the risks and benefits of the procedure  Risks including but not limited to bleeding, perforation, infection, and missed lesion  PREPARATION:   Telemetry, pulse oximetry, blood pressure were monitored throughout the procedure  Patient was identified by myself both verbally and by visual inspection of ID band  DESCRIPTION:   Patient was placed in the left lateral decubitus position and was sedated with the above medication  The gastroscope was introduced in to the oropharynx and the esophagus was intubated under direct visualization  Scope was passed down the esophagus up to proximal jejunum through [address] anastomosis  A careful inspection was made as the gastroscope was withdrawn, including a retroflexed view of the stomach; findings and interventions are described below  FINDINGS:    #1  Esophagus- normal esophagus    #2  Stomach- normal gastric pouch    #3  Jejunum -a 15 mm clean based ulcer noted on the jejunal side of gastrojejunostomy anastomosis  No active bleeding  No blood seen in the stomach or jejunum  Another small fistulous connection between the stomach and blind loop of jejunum noted  IMPRESSIONS:      15 mm clean based ulcer at the jejunal side of gastrojejunostomy anastomosis  No active bleeding  There was a small fistulous communication between the gastric pouch and the blind loop of the jejunum  No active bleeding or blood seen in the stomach or jejunum    Normal esophagus    RECOMMENDATIONS: Protonix 40 mg b i d   Carafate 1 g t i d  Resume regular diet  Monitor hemoglobin  If hemoglobin is stable can discharge home tomorrow  Okay to resume anticoagulation if hemoglobin stable by tomorrow  COMPLICATIONS:  None; patient tolerated the procedure well            DISPOSITION: PACU           CONDITION: Stable    SIGNATURE: Wood Liang MD  DATE: March 23, 2018  TIME: 12:08 PM

## 2018-03-23 NOTE — CASE MANAGEMENT
Initial Clinical Review    Admission: Date/Time/Statement: 3/22/18 @ 1256     Orders Placed This Encounter   Procedures    Inpatient Admission (expected length of stay for this patient is greater than two midnights)     Standing Status:   Standing     Number of Occurrences:   1     Order Specific Question:   Admitting Physician     Answer:   Max Ryder [[id number]]     Order Specific Question:   Level of Care     Answer:   Med Surg [[id number]]     Order Specific Question:   Estimated length of stay     Answer:   More than 2 Midnights     Order Specific Question:   Certification     Answer:   I certify that inpatient services are medically necessary for this patient for a duration of greater than two midnights  See H&P and MD Progress Notes for additional information about the patient's course of treatment  ED: Date/Time/Mode of Arrival:   ED Arrival Information     Expected Arrival Acuity Means of Arrival Escorted By Service Admission Type    3/22/2018 09:02 3/22/2018 09:55 Urgent Walk-In Self General Medicine Urgent    Arrival Complaint    black, tarry stools,        Chief Complaint:   Chief Complaint   Patient presents with    Abdominal Pain     pt with abd pain, mid epigastric,  over the last few days and has had some ankle swelling with drainage also with dark black and tarry stools      History of Illness: Gabriel Perez is a 80 y o  female who presents with black stools to the emergency room at [facility]  According to the patient, she has been having black stools since 2 days ago  She told me that her last episode was last night  So far, no bowel movements yet today  She claims that she noticed this, because usually her stools were light colored, and thus at this is very unusual for her  For the past 1 month, patient had been complaining of abdominal pains  Patient described the pains to be gnawing in character, and severe    Patient localized the pain on her epigastric and periumbilical areas  Her primary care physician had her undergo a CT scan of the abdomen  Results of that CT scan revealed a ventral hernia on the right as well as likely postoperative seroma on her right hip  She told me she had history of hip replacement  When asked, patient told me that she has not taken any NSAID medications like Advil or Motrin or ibuprofen at least in the last 1 to 2 months  Patient is on Coumadin for her atrial fibrillation  Patient has history of gastric bypass and initially told us that she thinks she had a history of peptic ulcer disease, and was told of a large ulcer  However, further review of patient's records, revealed that in 2015, patient had an EGD done by our GI doctor, Dr Eliane Lima, and it did not reveal a large ulcer but instead revealed 2 small gastric erosions on her gastric pouch  Biopsy revealed rare H pylori, however stool antigen was negative for H pylori  When asked, patient does not remember if she has taken medications for H pylori (e g , antibiotics)  Patient at that time was prescribed with a PPI  Patient is not on a PPI anymore  Presently, patient still has abdominal pains  Patient denies any chest pains or shortness of breath or any dizziness or lightheadedness at this point  However, patient told me that she has chronic/baseline dyspnea on exertion due to her congestive heart failure, that she started to have since 2016  Patient also told me that she has chronic bilateral lower extremity edema  According to her, her primary care physician and cardiologist new about these edema and she takes torsemide for this  Patient denies any nausea or vomiting or any fever chills  In the emergency room, patient was found to have atrial fibrillation with rapid ventricle response  Patient admitted to me that she has not taken her medications which included her medications that could slow down her heart rate      ED Vital Signs:   ED Triage Vitals   Temperature Pulse Respirations Blood Pressure SpO2   03/22/18 1006 03/22/18 1006 03/22/18 1006 03/22/18 1006 03/22/18 1006   (!) 97 2 °F (36 2 °C) 82 18 122/74 97 %      Temp Source Heart Rate Source Patient Position - Orthostatic VS BP Location FiO2 (%)   03/22/18 1413 03/22/18 1240 03/22/18 1436 03/22/18 1436 --   Oral Monitor Lying Right arm       Pain Score       03/22/18 1006       7        Wt Readings from Last 1 Encounters:   03/22/18 99 8 kg (220 lb)     Vital Signs (abnormal):   03/23/18 1020   97 3 °F (36 3 °C)   120  17  127/83  98 %  None (Room air)  --   Pulse: A-Fib at 03/23/18 1020   03/22/18 2300   97 °F (36 1 °C)   124  18  128/90  96 %  None (Room air)  Lying   03/22/18 1436  97 7 °F (36 5 °C)   120  19  145/78  98 %  None (Room air)  Lying   03/22/18 1422  97 5 °F (36 4 °C)   118  18   171/82  --  --  --   03/22/18 1413  97 8 °F (36 6 °C)   111  19  111/73  99 %  None (Room air)  --   03/22/18 1358  97 7 °F (36 5 °C)   106  18  128/81  --  --  --   03/22/18 1240  --   123  18  113/78  99 %  None (Room air)  --   03/22/18 1006   97 2 °F (36 2 °C)  82  18  122/74  97 %  None (Room air)  --     Abnormal Labs:    Cl 110  Albumin 3 3  Lipase 49  PT/INR 25 5/2 29, 19 6/1 65, 17 1/1 39  PTT 36     3/22  3/23   RBC 3 61  3 55   Hemoglobin 10 7 9 9 10 6   Hematocrit 34 3  33 7   MCHC 31 2  31 5     Diagnostic Test Results:     3/22 EKG - Atrial fibrillation with rapid ventricular response  Low voltage QRS  Poor R wave progression Cannot rule out Anterior infarct , age   undetermined  Low voltage QRS  Abnormal ECG    ED Treatment:   Medication Administration from 03/22/2018 0902 to 03/22/2018 1857    Date/Time Order Dose Route Action   03/22/2018 1249 diltiazem (CARDIZEM) 125 mg in sodium chloride 0 9 % 125 mL infusion 10 mg/hr Intravenous New Bag   03/22/2018 1240 diltiazem (CARDIZEM) injection 10 mg 10 mg Intravenous Given   03/22/2018 1144 sodium chloride 0 9 % bolus 500 mL 500 mL Intravenous New Bag   03/22/2018 0707 phytonadione (AQUA-MEPHYTON) 10 mg/mL 10 mg in sodium chloride 0 9 % 50 mL IVPB 10 mg Intravenous New Bag   03/22/2018 1443 pantoprazole (PROTONIX) 80 mg in sodium chloride 0 9 % 100 mL infusion 8 mg/hr Intravenous New Bag        Past Medical/Surgical History:    Active Ambulatory Problems     Diagnosis Date Noted    Atrial fibrillation ([facility] ) 03/07/2016    Anemia 03/09/2016    Fall 08/21/2016    Hip dislocation, right (HCC) 08/21/2016    Chronic diastolic CHF (congestive heart failure) ([facility] ) 08/23/2016    Depression 08/23/2016    Obesity without serious comorbidity 08/23/2016    Chronic pain 08/23/2016    Abdominal bloating 08/01/2016    Abdominal pain 12/08/2012    Arthralgia of hip 04/14/2015    Arthralgia of knee, right 09/18/2014    Benign essential hypertension 12/09/2012    Chronic pain of lower extremity, bilateral 06/23/2016    Fibromyalgia 05/31/2012    External hemorrhoids 03/25/2016    Irritable bowel syndrome with constipation 09/15/2015    Lumbar canal stenosis 05/17/2012    Moderate mitral regurgitation 05/13/2014    Osteoarthritis, multiple sites 06/26/2014    Osteoporosis 02/04/2014    Palpitations 05/04/2015    Peripheral neuropathy 05/25/2012    Primary osteoarthritis of right knee 06/30/2015    Synovial cyst of right popliteal space 08/04/2015    Unstable right hip 04/04/2017    Urinary incontinence 05/13/2014    Other chest pain 01/26/2018    Right lower quadrant abdominal pain 01/26/2018    Ventral hernia 03/22/2018     Resolved Ambulatory Problems     Diagnosis Date Noted    Acute diastolic congestive heart failure (HCC) 03/07/2016    Leg swelling 03/07/2016    Shortness of breath 03/08/2016    Hypokalemia 03/09/2016    Elevated TSH 03/09/2016    Left foot pain 03/11/2016     Past Medical History:   Diagnosis Date    Arthritis     [facility]     Disturbance of smell and taste     Effusion of knee joint right     Fibromyalgia     Heart murmur     History of acute myocardial infarction     History of atrial fibrillation     History of bruising easily     History of cancer     History of dermatitis     History of mammogram     History of obesity     History of osteopenia     History of sciatica     History of shortness of breath     History of sinusitis     History of sore throat     History of syncope     History of umbilical hernia     History of viral infection     Irritable bowel syndrome (IBS)     Joint pain, hip     Limb pain     Myalgia     Need for prophylactic vaccination against single diseases     Need for prophylactic vaccination and inoculation against influenza     Preoperative cardiovascular examination     Primary osteoarthritis of right knee     Right leg pain     Vaginal Pap smear      Admitting Diagnosis: Acute upper GI bleed [K92 2]  Black tarry stools [K92 1]    Age/Sex: 80 y o  female    Assessment/Plan:          * GI bleeding   Assessment & Plan     · Patient has been having melena for the past 2 days  · GI consult  · For upper endoscopy  · NPO for now  According to ER physician, he spoke to the advance practitioner for GI, at this point in time, not yet sure if endoscopy will be done today or tomorrow  · Continue with Protonix drip as ordered here in the emergency room  · Monitor patient's CBC/hemoglobin hematocrit  · Transfuse as needed  Patient already had a blood consent  · FFP transfusion was ordered here in the emergency room, to reverse patient's Coumadin coagulopathy  · Patient has history of gastric erosions in the past with rare H pylori in the biopsy but negative H pylori stool antigen         Chronic diastolic CHF (congestive heart failure) ([facility] )   Assessment & Plan     · Patient has chronic shortness of breath and chronic bilateral lower extremity edema for 2 years now  · Continue with patient's cardiovascular and heart failure medications    · Outpatient follow-up with cardiology, Dr Sarmiento Exon  · We will put elastic bandage/Ace wrap or Butch stockings/compression stockings on patient's bilateral lower extremities to help with the edema         Anemia   Assessment & Plan     · Patient's previous hemoglobin was normal at 12 2  Today it was 10 7, likely due to the GI bleeding  · Monitor patient's hemoglobin/hematocrit/CBC, transfuse as needed  We will likely transfuse if hemoglobin is below 7 to 8 and/or if patient is actively having symptoms of anemia  · Patient already has a blood consent        Atrial fibrillation [facility])   Assessment & Plan     · Presently, patient has atrial fibrillation with rapid ventricular response  This was likely due to the fact that patient has not taken her antiarrhythmic medications yet  · Cardizem drip was ordered here in the emergency room and we will continue  Hopefully, this will be titrated down and be off  · I will continue patient's oral antiarrhythmic medications  · Due to the GI bleeding, patient's Coumadin will be on hold  Benefits and risks of holding the Coumadin were explained to the patient and she is okay with the plans of holding it off for now  · Outpatient follow-up with Cardiology         Abnormal CT of the abdomen   Assessment & Plan     · Patient had a recent CT scan of the abdomen as a workup for patient's abdominal pains, revealing ventral hernia on the right side as well as likely postoperative seroma on her right hip  · Patient will need an outpatient follow-up with her orthopedic surgeon, Dr Deon Sahni (according to her, she had history of right hip replacement)  · For the ventral hernia, outpatient follow with primary care physician  Presently asymptomatic at this point  GI will be on board       Admission Orders:  Scheduled Meds:   Current Facility-Administered Medications:  acetaminophen 650 mg Oral Q6H PRN Kirit Lyons MD    bisoprolol 5 mg Oral Daily Kirit Lyons MD    calcium carbonate 1,000 mg Oral Daily PRN Kirit Lyons MD    desoximetasone  Topical BID Kirit Lyons MD    diltiazem 120 mg Oral BID Kirit Lyons MD    DULoxetine 60 mg Oral Daily Kirit Lyons MD    fluticasone 1 spray Nasal Daily Kirit Lyons MD    ondansetron 4 mg Intravenous Q6H PRN Kirit Lyons MD    pantoprozole (PROTONIX) infusion (Continuous) 8 mg/hr Intravenous Continuous Cristo Worrell PA-C Last Rate: 8 mg/hr (03/23/18 0746)   potassium chloride 20 mEq Oral Weekly Kirit Lyons MD    senna 1 tablet Oral HS PRN Kirit Lyosn MD    torsemide 40 mg Oral BID Kirit Lyons MD    traMADol 50 mg Oral BID PRN Kirit Lyons MD      Continuous Infusions:   pantoprozole (PROTONIX) infusion (Continuous) 8 mg/hr Last Rate: 8 mg/hr (03/23/18 0746)     PRN Meds:   acetaminophen    calcium carbonate    ondansetron    senna    traMADol     Tele  Up w/ assist   Diet NPO w/ sips  Cons GI   Transfused 2 U FFP   ___________________________  3/22 GI Consult   80-year-old female with history of Ann Marie-en-Y gastric bypass, peptic ulcer disease, congestive heart failure, AFib on Coumadin admitted with abdominal pain and melena  She was found to have hemoglobin of 10 7      1  Anemia secondary to GI blood loss -suspect upper GI bleeding differential diagnosis include marginal ulcer, erosive gastritis, peptic ulcer disease, or AVMs  -Protonix drip  -monitor hemoglobin  -hold Coumadin  -give her FFP  -EGD tomorrow for further evaluation and hemostatic intervention  -if patient's hemodynamic status change overnight please call the on-call doctor for urgent EGD     2   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 -multivitamin, iron and vitamin-D supplement once her bleeding stabilizes  Thank you,  [address] in the [facility] by Rahul Henderson for 2017  Network Utilization Review Department  Phone: [phone];  Fax [phone]  ATTENTION: The Network Utilization Review Department is now centralized for our 7 Facilities  Make a note that we have a new phone and fax numbers for our Department  Please call with any questions or concerns to [phone] and carefully follow the prompts so that you are directed to the right person  All voicemails are confidential  Fax any determinations, approvals, denials, and requests for initial or continue stay review clinical to [phone]  Due to HIGH CALL volume, it would be easier if you could please send faxed requests to expedite your requests and in part, help us provide discharge notifications faster

## 2018-03-23 NOTE — PLAN OF CARE
GASTROINTESTINAL - ADULT     Minimal or absence of nausea and/or vomiting Progressing     Maintains or returns to baseline bowel function Progressing     Maintains adequate nutritional intake Progressing        PAIN - ADULT     Verbalizes/displays adequate comfort level or baseline comfort level Progressing        SAFETY ADULT     Patient will remain free of falls Progressing     Maintain or return to baseline ADL function Progressing     Maintain or return mobility status to optimal level Progressing

## 2018-03-23 NOTE — ASSESSMENT & PLAN NOTE
· Patient has chronic shortness of breath and chronic bilateral lower extremity edema for 2 years now  · Continue with patient's cardiovascular and heart failure medications  · Outpatient follow-up with cardiology, Dr Acuna Core  · We will put elastic bandage/Ace wrap or Butch stockings/compression stockings on patient's bilateral lower extremities to help with the edema

## 2018-03-24 VITALS
WEIGHT: 220 LBS | DIASTOLIC BLOOD PRESSURE: 58 MMHG | SYSTOLIC BLOOD PRESSURE: 102 MMHG | HEART RATE: 70 BPM | BODY MASS INDEX: 41.54 KG/M2 | HEIGHT: 61 IN | RESPIRATION RATE: 17 BRPM | TEMPERATURE: 98 F | OXYGEN SATURATION: 95 %

## 2018-03-24 LAB
BASOPHILS # BLD AUTO: 0.02 THOUSANDS/ΜL (ref 0–0.1)
BASOPHILS NFR BLD AUTO: 0 % (ref 0–1)
EOSINOPHIL # BLD AUTO: 0.05 THOUSAND/ΜL (ref 0–0.61)
EOSINOPHIL NFR BLD AUTO: 1 % (ref 0–6)
ERYTHROCYTE [DISTWIDTH] IN BLOOD BY AUTOMATED COUNT: 14.6 % (ref 11.6–15.1)
HCT VFR BLD AUTO: 33.7 % (ref 34.8–46.1)
HGB BLD-MCNC: 10.5 G/DL (ref 11.5–15.4)
LYMPHOCYTES # BLD AUTO: 0.97 THOUSANDS/ΜL (ref 0.6–4.47)
LYMPHOCYTES NFR BLD AUTO: 14 % (ref 14–44)
MCH RBC QN AUTO: 29.7 PG (ref 26.8–34.3)
MCHC RBC AUTO-ENTMCNC: 31.2 G/DL (ref 31.4–37.4)
MCV RBC AUTO: 95 FL (ref 82–98)
MONOCYTES # BLD AUTO: 0.92 THOUSAND/ΜL (ref 0.17–1.22)
MONOCYTES NFR BLD AUTO: 13 % (ref 4–12)
NEUTROPHILS # BLD AUTO: 5.1 THOUSANDS/ΜL (ref 1.85–7.62)
NEUTS SEG NFR BLD AUTO: 72 % (ref 43–75)
NRBC BLD AUTO-RTO: 0 /100 WBCS
PLATELET # BLD AUTO: 195 THOUSANDS/UL (ref 149–390)
PMV BLD AUTO: 10.4 FL (ref 8.9–12.7)
RBC # BLD AUTO: 3.54 MILLION/UL (ref 3.81–5.12)
WBC # BLD AUTO: 7.08 THOUSAND/UL (ref 4.31–10.16)

## 2018-03-24 PROCEDURE — 85025 COMPLETE CBC W/AUTO DIFF WBC: CPT | Performed by: INTERNAL MEDICINE

## 2018-03-24 PROCEDURE — 99238 HOSP IP/OBS DSCHRG MGMT 30/<: CPT | Performed by: INTERNAL MEDICINE

## 2018-03-24 RX ORDER — PANTOPRAZOLE SODIUM 40 MG/1
40 TABLET, DELAYED RELEASE ORAL
Qty: 60 TABLET | Refills: 0 | Status: SHIPPED | OUTPATIENT
Start: 2018-03-24 | End: 2018-05-15 | Stop reason: ALTCHOICE

## 2018-03-24 RX ORDER — SUCRALFATE ORAL 1 G/10ML
1000 SUSPENSION ORAL EVERY 6 HOURS SCHEDULED
Qty: 420 ML | Refills: 0 | Status: SHIPPED | OUTPATIENT
Start: 2018-03-24 | End: 2018-04-17 | Stop reason: SDUPTHER

## 2018-03-24 RX ADMIN — SUCRALFATE 1000 MG: 1 SUSPENSION ORAL at 06:22

## 2018-03-24 RX ADMIN — DULOXETINE HYDROCHLORIDE 60 MG: 60 CAPSULE, DELAYED RELEASE ORAL at 08:59

## 2018-03-24 RX ADMIN — PANTOPRAZOLE SODIUM 40 MG: 40 TABLET, DELAYED RELEASE ORAL at 06:22

## 2018-03-24 RX ADMIN — SUCRALFATE 1000 MG: 1 SUSPENSION ORAL at 00:02

## 2018-03-24 NOTE — ASSESSMENT & PLAN NOTE
· Presently, patient has atrial fibrillation with rapid ventricular response  This was likely due to the fact that patient has not taken her antiarrhythmic medications yet  · Cardizem drip was ordered here in the emergency room and we will continue  Hopefully, this will be titrated down and be off  · I will continue patient's oral antiarrhythmic medications  · Due to the GI bleeding, patient's Coumadin will be on hold  Benefits and risks of holding the Coumadin were explained to the patient and she is okay with the plans of holding it off for now  Resume Coumadin, ok with GI  · Outpatient follow-up with Cardiology

## 2018-03-24 NOTE — ASSESSMENT & PLAN NOTE
· Patient has been having melena for the past 2 days  · Continue with Protonix drip as ordered here in the emergency room  · CC stable, continue to follow  · Transfuse as needed  Patient already had a blood consent  · FFP transfusion was ordered here in the emergency room, to reverse patient's Coumadin coagulopathy  · Patient has history of gastric erosions in the past with rare H pylori in the biopsy but negative H pylori stool antigen  Endoscopy results noted, patient tolerated procedure and diet without difficulty  Hemoglobin stable and okay to discharge

## 2018-03-24 NOTE — DISCHARGE SUMMARY
Discharge- Emily Klaus 1933, 80 y o  female MRN: [id number]    Unit/Bed#: [facility] 820-01 Encounter: [id number]    Primary Care Provider: Bradley Ramos DO   Date and time admitted to hospital: 3/22/2018 11:02 AM        * GI bleeding   Assessment & Plan    · Patient has been having melena for the past 2 days  · Continue with Protonix drip as ordered here in the emergency room  · CC stable, continue to follow  · Transfuse as needed  Patient already had a blood consent  · FFP transfusion was ordered here in the emergency room, to reverse patient's Coumadin coagulopathy  · Patient has history of gastric erosions in the past with rare H pylori in the biopsy but negative H pylori stool antigen  Endoscopy results noted, patient tolerated procedure and diet without difficulty  Hemoglobin stable and okay to discharge  Chronic diastolic CHF (congestive heart failure) (HCC)   Assessment & Plan    · Patient has chronic shortness of breath and chronic bilateral lower extremity edema for 2 years now  · Continue with patient's cardiovascular and heart failure medications  · Outpatient follow-up with cardiology, Dr Davis Bear  · We will put elastic bandage/Ace wrap or Butch stockings/compression stockings on patient's bilateral lower extremities to help with the edema  Atrial fibrillation ([address] )   Assessment & Plan    · Presently, patient has atrial fibrillation with rapid ventricular response  This was likely due to the fact that patient has not taken her antiarrhythmic medications yet  · Cardizem drip was ordered here in the emergency room and we will continue  Hopefully, this will be titrated down and be off  · I will continue patient's oral antiarrhythmic medications  · Due to the GI bleeding, patient's Coumadin will be on hold  Benefits and risks of holding the Coumadin were explained to the patient and she is okay with the plans of holding it off for now    Resume Coumadin, ok with GI  · Outpatient follow-up with Cardiology  Discharge Summary - [facility] Medicine    Patient Information: Sofy Franks 80 y o  female MRN: [id number]  Unit/Bed#: [facility] 820-01 Encounter: [id number]    Discharging Physician / Practitioner: Pinky Newell DO  PCP: Isis Martin DO  Admission Date: 3/22/2018  Discharge Date: 03/24/18    Disposition:     Home    Reason for Admission: gib    Discharge Diagnoses:     Principal Problem:    GI bleeding  Active Problems:    Abnormal CT of the abdomen    Chronic diastolic CHF (congestive heart failure) (HCC)    Atrial fibrillation ([address] )    Anemia    Gastrointestinal hemorrhage with melena  Resolved Problems:    * No resolved hospital problems  *      Consultations During Hospital Stay:  · gi    Procedures Performed:     · egd      Hospital Course:     Sofy Franks is a 80 y o  female patient who originally presented to the hospital on 3/22/2018 due to increased melanotic stool  Patient was treated with IV PPI and sucralfate  A endoscopy was performed which revealed marginal ulcer without active bleeding  Patient's diet was advanced to clear liquids and then low residue without difficulty  Hemoglobin remained stable  Patient was cleared by GI for discharge to home with outpatient follow-up  The above-mentioned GI bleed was related to the marginal ulcer with contributory factor of hypocoagulable state related to Coumadin use and INR greater than 2  Condition at Discharge: good     Discharge Day Visit / Exam:     Subjective:   Without complaints  Vitals: Blood Pressure: 102/58 (03/24/18 0700)  Pulse: 70 (03/24/18 0700)  Temperature: 98 °F (36 7 °C) (03/24/18 0700)  Temp Source: Oral (03/24/18 0700)  Respirations: 17 (03/24/18 0700)  Height: 5' 1" (154 9 cm) (03/22/18 1006)  Weight - Scale: 99 8 kg (220 lb) (03/22/18 1006)  SpO2: 95 % (03/24/18 0700)  Exam:   Physical Exam   Constitutional: She is oriented to person, place, and time  She appears well-developed and well-nourished  She is not intubated  HENT:   Head: Normocephalic and atraumatic  Neck: No JVD present  No thyromegaly present  Cardiovascular: Normal rate, regular rhythm, S1 normal and S2 normal     No murmur heard  Pulmonary/Chest: No tachypnea  She is not intubated  No respiratory distress  She has no wheezes  She has no rhonchi  She has no rales  Abdominal: Soft  Normal appearance and bowel sounds are normal  There is no hepatosplenomegaly  Musculoskeletal: Normal range of motion  Neurological: She is alert and oriented to person, place, and time  She has normal strength  Skin: Skin is warm and dry  No rash noted  Psychiatric: She has a normal mood and affect  Her behavior is normal        Discharge instructions/Information to patient and family:   See after visit summary for information provided to patient and family  Provisions for Follow-Up Care:  See after visit summary for information related to follow-up care and any pertinent home health orders  Discharge Statement:  I spent 25 minutes discharging the patient  This time was spent on the day of discharge  I had direct contact with the patient on the day of discharge  Greater than 50% of the total time was spent examining patient, answering all patient questions, arranging and discussing plan of care with patient as well as directly providing post-discharge instructions  Additional time then spent on discharge activities  Discharge Medications:  See after visit summary for reconciled discharge medications provided to patient and family        ** Please Note: This note has been constructed using a voice recognition system **

## 2018-03-24 NOTE — ASSESSMENT & PLAN NOTE
· Patient has chronic shortness of breath and chronic bilateral lower extremity edema for 2 years now  · Continue with patient's cardiovascular and heart failure medications  · Outpatient follow-up with cardiology, Dr Aidan Jc  · We will put elastic bandage/Ace wrap or Butch stockings/compression stockings on patient's bilateral lower extremities to help with the edema

## 2018-03-26 ENCOUNTER — TRANSITIONAL CARE MANAGEMENT (OUTPATIENT)
Dept: INTERNAL MEDICINE CLINIC | Facility: CLINIC | Age: 83
End: 2018-03-26

## 2018-03-26 NOTE — CASE MANAGEMENT
Notification of Discharge  This is a Notification of Discharge from our facility [address]  Please be advised that this patient has been discharge from our facility  Below you will find the admission and discharge date and time including the patients disposition  PRESENTATION DATE: 3/22/2018 11:02 AM  IP ADMISSION DATE: 3/22/18 1256  DISCHARGE DATE: 3/24/2018  1:22 PM  DISPOSITION: [facility] in the [facility] by Rahul Henderson for 2017  Network Utilization Review Department  Phone: [phone]; Fax [phone]  ATTENTION: The Network Utilization Review Department is now centralized for our 7 Facilities  Make a note that we have a new phone and fax numbers for our Department  Please call with any questions or concerns to [phone] and carefully follow the prompts so that you are directed to the right person  All voicemails are confidential  Fax any determinations, approvals, denials, and requests for initial or continue stay review clinical to [phone]  Due to HIGH CALL volume, it would be easier if you could please send faxed requests to expedite your requests and in part, help us provide discharge notifications faster

## 2018-03-29 ENCOUNTER — OFFICE VISIT (OUTPATIENT)
Dept: CARDIOLOGY CLINIC | Facility: CLINIC | Age: 83
End: 2018-03-29
Payer: COMMERCIAL

## 2018-03-29 ENCOUNTER — OFFICE VISIT (OUTPATIENT)
Dept: INTERNAL MEDICINE CLINIC | Facility: CLINIC | Age: 83
End: 2018-03-29
Payer: COMMERCIAL

## 2018-03-29 VITALS
HEART RATE: 109 BPM | HEIGHT: 62 IN | BODY MASS INDEX: 42.4 KG/M2 | WEIGHT: 230.4 LBS | SYSTOLIC BLOOD PRESSURE: 119 MMHG | OXYGEN SATURATION: 98 % | DIASTOLIC BLOOD PRESSURE: 70 MMHG | RESPIRATION RATE: 16 BRPM

## 2018-03-29 VITALS
SYSTOLIC BLOOD PRESSURE: 112 MMHG | BODY MASS INDEX: 42.62 KG/M2 | HEART RATE: 88 BPM | WEIGHT: 231.6 LBS | HEIGHT: 62 IN | DIASTOLIC BLOOD PRESSURE: 60 MMHG

## 2018-03-29 DIAGNOSIS — R07.89 CHEST PRESSURE: ICD-10-CM

## 2018-03-29 DIAGNOSIS — I10 BENIGN ESSENTIAL HYPERTENSION: ICD-10-CM

## 2018-03-29 DIAGNOSIS — D64.9 ANEMIA, UNSPECIFIED TYPE: ICD-10-CM

## 2018-03-29 DIAGNOSIS — I48.91 ATRIAL FIBRILLATION, UNSPECIFIED TYPE (HCC): Primary | ICD-10-CM

## 2018-03-29 DIAGNOSIS — I34.0 MODERATE MITRAL REGURGITATION: ICD-10-CM

## 2018-03-29 DIAGNOSIS — I48.20 CHRONIC ATRIAL FIBRILLATION (HCC): ICD-10-CM

## 2018-03-29 DIAGNOSIS — K92.1 GASTROINTESTINAL HEMORRHAGE WITH MELENA: ICD-10-CM

## 2018-03-29 DIAGNOSIS — I50.32 CHRONIC DIASTOLIC CHF (CONGESTIVE HEART FAILURE) (HCC): ICD-10-CM

## 2018-03-29 DIAGNOSIS — R14.0 ABDOMINAL BLOATING: ICD-10-CM

## 2018-03-29 DIAGNOSIS — R07.9 EXERTIONAL CHEST PAIN: ICD-10-CM

## 2018-03-29 DIAGNOSIS — IMO0002 ULCER: Primary | ICD-10-CM

## 2018-03-29 DIAGNOSIS — K43.9 VENTRAL HERNIA WITHOUT OBSTRUCTION OR GANGRENE: ICD-10-CM

## 2018-03-29 PROCEDURE — 99496 TRANSJ CARE MGMT HIGH F2F 7D: CPT | Performed by: INTERNAL MEDICINE

## 2018-03-29 PROCEDURE — 99214 OFFICE O/P EST MOD 30 MIN: CPT | Performed by: INTERNAL MEDICINE

## 2018-03-29 PROCEDURE — 93000 ELECTROCARDIOGRAM COMPLETE: CPT | Performed by: INTERNAL MEDICINE

## 2018-03-29 RX ORDER — BISOPROLOL FUMARATE 5 MG/1
5 TABLET ORAL DAILY
Qty: 30 TABLET | Refills: 11 | Status: SHIPPED | OUTPATIENT
Start: 2018-03-29 | End: 2018-05-15 | Stop reason: SDUPTHER

## 2018-03-29 RX ORDER — TORSEMIDE 20 MG/1
TABLET ORAL
Qty: 90 TABLET | Refills: 11 | Status: SHIPPED | OUTPATIENT
Start: 2018-03-29 | End: 2018-05-15 | Stop reason: SDUPTHER

## 2018-03-29 NOTE — PROGRESS NOTES
Follow-up - Cardiology   Jada Combs 80 y o  female MRN: [id number]        Problems    1  Atrial fibrillation, unspecified type ([address] )  POCT ECG   2  Abdominal bloating     3  Benign essential hypertension     4  Chronic diastolic CHF (congestive heart failure) ([facility])  Basic metabolic panel    torsemide (DEMADEX) 20 mg tablet   5  Gastrointestinal hemorrhage with melena     6  Anemia, unspecified type     7  Moderate mitral regurgitation  Echo complete with contrast if indicated   8  Chest pressure  NM myocardial perfusion spect (rx stress and/or rest)       Plan     I had the pleasure of having Dannie Abraham return to see me at the  [place] office having recently been hospitalized for melena, upper GI bleed, ulcers and anemia  acute on chronic diastolic CHF  -In this setting she has been gaining weight since December, is  Now 30 lb up from October when I last saw her  She has gross edematous legs  She has shortness of breath and chest pressure  I recommended the above testing  I have also recommended that she increase torsemide to 40 mg in the morning, 20 mg in the off noon, do not skip any days, and I have recommended some additional ways to reduce the salt in her diet  Hypertension  -controlled     atrial fibrillation   persistent, rates were 88 in the office today controlled, but prior Holter last year suggested rates were slightly elevated  She has continued on warfarin  No further melena despite resumption of warfarin  HPI: Jada Combs is a 80y o  year old female  With a history of moderate mitral regurgitation, chronic diastolic CHF, persistent atrial fibrillation, chronic anticoagulation who was recently hospitalized with melena, upper GI bleed due to ulcers, and in that setting is also had about a 30 lb weight gain since December  She has significant lower extremity swelling, lower extremity discomfort  She was released from her recent hospitalization back on warfarin  Pantoprazole was started as well as sucralfate  She has not had any further melena  Her recent hemoglobin was approximately 10  Normal for her has been about 12 5  Additionally she has a large abdominal hernia she saw general surgery regarding this, and there was reluctance to intervene on her hernia because of her cardiac conditions  Her AFib is under good rate control today, she takes bisoprolol and Cardizem, heart rate 88  Her blood pressure is normal   Her prior history of moderate mitral regurgitation was re-evaluated 5/17 with suggestion of only mild-to-moderate mitral regurgitation at that time  She is 231 lb in the office today, was 204 lb at my previous office visit 10/17  Her hospital records and labs were all reviewed  Review of Systems   All other systems reviewed and are negative          Past Medical History:   Diagnosis Date    Arthritis     CHF (congestive heart failure) (HCC)     Disturbance of smell and taste     disturbance of taste    Effusion of knee joint right     Fibromyalgia     Heart murmur     reported a previous heart murmur    History of acute myocardial infarction     History of atrial fibrillation     History of bruising easily     History of cancer     History of dermatitis     History of mammogram     History of obesity     History of osteopenia     History of sciatica     History of shortness of breath     History of sinusitis     History of sore throat     History of syncope     History of umbilical hernia     History of viral infection     Irritable bowel syndrome (IBS)     Joint pain, hip     Limb pain     Myalgia     myalgia and myositis    Need for prophylactic vaccination against single diseases     Need for prophylactic vaccination and inoculation against influenza     Preoperative cardiovascular examination     Primary osteoarthritis of right knee     Right leg pain     Vaginal Pap smear     reported pap smear     History Alcohol Use    Yes     Comment: (history) social     History   Drug Use No     History   Smoking Status    Never Smoker   Smokeless Tobacco    Never Used       Allergies: Allergies   Allergen Reactions    Codeine Vomiting     Reaction Date: 24Apr2012;     Oxycodone Hcl Vomiting     Reaction Date: 24Apr2012;     Percocet [Oxycodone-Acetaminophen]     Seasonal Ic  [Cholestatin] Allergic Rhinitis    Bactrim [Sulfamethoxazole-Trimethoprim] Rash    Sulfa Antibiotics Rash       Medications:     Current Outpatient Prescriptions:     desoximetasone (TOPICORT) 0 25 % cream, Apply topically, Disp: , Rfl:     diltiazem (CARDIZEM) 120 MG tablet, Take 120 mg by mouth 2 (two) times a day , Disp: , Rfl:     DULoxetine (CYMBALTA) 60 mg delayed release capsule, Take 60 mg by mouth daily  , Disp: , Rfl:     fluticasone (FLONASE) 50 mcg/act nasal spray, 1 spray into each nostril daily  , Disp: , Rfl:     pantoprazole (PROTONIX) 40 mg tablet, Take 1 tablet (40 mg total) by mouth 2 (two) times a day before meals, Disp: 60 tablet, Rfl: 0    potassium chloride (KLOR-CON) 20 mEq packet, Take 10 mEq by mouth as needed  , Disp: , Rfl:     sucralfate (CARAFATE) 1 g/10 mL suspension, Take 10 mL (1,000 mg total) by mouth every 6 (six) hours, Disp: 420 mL, Rfl: 0    torsemide (DEMADEX) 20 mg tablet, Take 2 tablets in the morning, 1 tablet in the  afternoon, Disp: 90 tablet, Rfl: 11    traMADol (ULTRAM) 50 mg tablet, TAKE ONE TABLET BY MOUTH TWICE DAILY AS NEEDED FOR PAIN, Disp: 60 tablet, Rfl: 0    warfarin (COUMADIN) 3 mg tablet, Take 1 tablet daily or as directed   (Patient taking differently: Take 3 mg by mouth every other day Take 1 tablet daily or as directed  ), Disp: 90 tablet, Rfl: 1    warfarin (COUMADIN) 4 mg tablet, Take 4 mg by mouth every other day  , Disp: , Rfl: 3      Vitals:    03/29/18 1333   BP: 112/60   Pulse: 88     Weight (last 2 days)     Date/Time   Weight    03/29/18 1333  105 (231 6) Physical Exam   Constitutional: No distress  HENT:   Head: Normocephalic and atraumatic  Eyes: Conjunctivae are normal  No scleral icterus  Neck: Normal range of motion  JVD present  Cardiovascular: Normal rate, normal heart sounds and intact distal pulses  No murmur heard  Irregular   Pulmonary/Chest: Effort normal and breath sounds normal  No respiratory distress  She has no wheezes  She has no rales  Musculoskeletal: She exhibits edema (  3+ pitting lower extremity edema with redness)  She exhibits no tenderness  Skin: Skin is warm and dry  She is not diaphoretic  Laboratory Studies:  CMP:  Results from last 7 days  Lab Units 18  0550   SODIUM mmol/L 143   POTASSIUM mmol/L 3 9   CHLORIDE mmol/L 110*   CO2 mmol/L 25   ANION GAP mmol/L 8   BUN mg/dL 11   CREATININE mg/dL 0 73   GLUCOSE RANDOM mg/dL 92   CALCIUM mg/dL 8 4   EGFR ml/min/1 73sq m 76     NT-proBNP: No results found for: NTBNP   Coags:  Results from last 7 days  Lab Units 18  0550   INR  1 39*     Lipid Profile:   Lab Results   Component Value Date    CHOL 145 2018     Lab Results   Component Value Date    HDL 65 (H) 2018     Lab Results   Component Value Date    LDLCALC 61 2018     Lab Results   Component Value Date    TRIG 93 2018       Cardiac testing:   EKG reviewed personally:  AFib, heart rate 88  Results for orders placed during the hospital encounter of 17   Echo complete with contrast if indicated    Narrative [place] [address], [address] [facility]  [phone]    Transthoracic Echocardiogram  2D, M-mode, Doppler, and Color Doppler    Study date:  17-May-2017    Patient: Fili Lima  MR number: [id number]  Account number: [de-identified]  : 1933  Age: 80 years  Gender: Female  Status: Outpatient  Location: [facility]  Height: 62 in  Weight: 212 5 lb  BP: 138/ 80 mmHg    Indications:  Follow up mitral regurgitation  Diagnoses: I34 9 - Nonrheumatic mitral valve disorder, unspecified    Sonographer:  JESUS Reyes  Referring Physician:  South Rdz DO  Group:  [facility]  cc:  Radha Frey MD  Interpreting Physician:  Janusz Lomax MD    SUMMARY    LEFT VENTRICLE:  Systolic function was normal  Ejection fraction was estimated to be 55 %  Although no diagnostic regional wall motion abnormality was identified, this possibility cannot be completely excluded on the basis of this study  RIGHT VENTRICLE:  The size was at the upper limits of normal   Systolic function was low normal     MITRAL VALVE:  There was mild to moderate regurgitation  COMPARISONS:  The previous study was not available for direct comparison, however, there have been changes from the report of that study  Comparison was made with the previous study of 08-Mar-2016  Estimated pulmonary pressures on today's study are  lower, and RV function overall appears improved  RV does not measure dilated, although visually does appear enlarged compared to LV  HISTORY: PRIOR HISTORY: Increasing shortness of breath, CHF, Hypertension, Afib, Edema    PROCEDURE: The study was performed in the [facility]  This was a routine study  The transthoracic approach was used  The study included complete 2D imaging, M-mode, complete spectral Doppler, and color Doppler  The  heart rate was 96 bpm, at the start of the study  Images were obtained from the parasternal, apical, subcostal, and suprasternal notch acoustic windows  Image quality was adequate  LEFT VENTRICLE: Size was normal  Systolic function was normal  Ejection fraction was estimated to be 55 %  Although no diagnostic regional wall motion abnormality was identified, this possibility cannot be completely excluded on the basis  of this study  Wall thickness was normal  DOPPLER: Normal sinus rhythm was absent      RIGHT VENTRICLE: The size was at the upper limits of normal  Systolic function was low normal     LEFT ATRIUM: The atrium was mildly dilated  RIGHT ATRIUM: Size was normal     MITRAL VALVE: There was mild to moderate annular calcification  There was mild thickening  There was mild calcification  There was normal leaflet separation  DOPPLER: There was no evidence for stenosis  There was mild to moderate  regurgitation  AORTIC VALVE: The valve was probably trileaflet  Leaflets exhibited mildly increased thickness, mild calcification, and normal cuspal separation  DOPPLER: There was no evidence for stenosis  There was no regurgitation  TRICUSPID VALVE: The valve structure was normal  There was normal leaflet separation  DOPPLER: There was no evidence for stenosis  There was mild regurgitation  Estimated peak PA pressure was 25 mmHg  PULMONIC VALVE: Leaflets exhibited normal thickness, no calcification, and normal cuspal separation  DOPPLER: The transpulmonic velocity was within the normal range  There was no regurgitation  PERICARDIUM: There was no pericardial effusion  The pericardium was normal in appearance  AORTA: The root exhibited normal size  SYSTEM MEASUREMENT TABLES    2D  %FS: 26 03 %  AV Diam: 2 55 cm  EDV(Teich): 94 34 ml  EF(Cube): 59 53 %  EF(Teich): 51 23 %  ESV(Cube): 37 84 ml  ESV(Teich): 46 01 ml  IVSd: 0 84 cm  LA Area: 21 69 cm2  LA Diam: 4 03 cm  LVEDV MOD A4C: 75 17 ml  LVEF MOD A4C: 62 83 %  LVESV MOD A4C: 27 94 ml  LVIDd: 4 54 cm  LVIDs: 3 36 cm  LVLd A4C: 7 07 cm  LVLs A4C: 5 41 cm  LVPWd: 0 82 cm  RA Area: 17 61 cm2  RV Diam: 3 07 cm  SI(Cube): 28 26 ml/m2  SI(Teich): 24 53 ml/m2  SV MOD A4C: 47 23 ml  SV(Cube): 55 67 ml  SV(Teich): 48 33 ml    CW  TR MaxP 76 mmHg  TR Vmax: 2 27 m/s    [employer] Accredited Echocardiography Laboratory    Prepared and electronically signed by    Violet Brady MD  Signed 17-May-2017 13:09:45       No results found for this or any previous visit    No results found for this or any previous visit  No results found for this or any previous visit  Urmila Dao MD    Portions of the record may have been created with voice recognition software   Occasional wrong word or "sound a like" substitutions may have occurred due to the inherent limitations of voice recognition software   Read the chart carefully and recognize, using context, where substitutions have occurred

## 2018-03-29 NOTE — PROGRESS NOTES
Assessment/Plan:     Transition of care management visit regarding recent hospitalization for stomach ulcer      Diagnoses and all orders for this visit:    Ulcer ([facility] )  Comments:  Recent hospitalization for ulcer of the anastomosis line; the patient is on high-dose Protonix no further melena she reports mild dyspepsia see GI  Orders:  -     Ambulatory referral to Gastroenterology; Future  -     CBC (Includes Diff/Plt) (Refl); Future  -     Comprehensive metabolic panel; Future    Exertional chest pain  Comments:  Exertional chest pain that resolves with shortness of breath she does not want to go back to the hospital we have arranged evaluation with Cardiology tomorrow    Gastrointestinal hemorrhage with melena  Comments:  No further melena she reports mild dyspepsia will have the patient see GI for possible Re EGD continue PPI    Ventral hernia without obstruction or gangrene  Comments:  She is seen general surgeon Dr Wen Hernandez; her cardiac symptoms must be evaluated prior to surgery    Chronic atrial fibrillation ([facility] )  Comments:  Patient is been having some mild elevations of the heart rate she is currently back on anticoagulation she will see Cardiology tomorrow       Return to office  1 month  call if any problems  Subjective:     Patient ID: Raissa Urrutia is a 80 y o  female  HPI 80year old female complex medical patient recent GI bleed, ulceration of the anastomosis, ventral hernia, chronic atrial fibrillation, exertional chest pain coming in for a transition care management visit regarding recent hospitalization for melena/GI bleed/peptic ulcer disease during that hospitalization she had higher heart rates  The patient's anticoagulation was temporally  Until EGD was completed which did show some ulceration of the neck to [employer]    She was on IV Protonix in the hospital and discharged on high-dose Protonix he reports me initially she felt better but has developed some mild dyspepsia but no melena since discharge from hospital   The patient also reports me exertional chest pain over last month which resolves with rest   She reports me mildly elevated heart rates  She does not want to go back to the hospital   The patient reports me seeing General surgery about the ventral hernia but they would like her to be stabilize cardiac wise prior to repair of the ventral hernia  Patient reports me not being able to get in to Cardiology for several weeks  We will arrange appointment ASAP with Cardiology to [place] she does not want to go back into the hospital     Review of Systems   Constitutional: Negative for activity change, appetite change, chills, fever and unexpected weight change  HENT: Negative for hearing loss and postnasal drip  Eyes: Negative for visual disturbance  Respiratory: Positive for chest tightness  Negative for cough and shortness of breath  Cardiovascular: Positive for chest pain  Gastrointestinal: Positive for abdominal pain and nausea  Negative for abdominal distention, blood in stool (no melena), diarrhea and vomiting  Musculoskeletal: Positive for gait problem (uses walker)  Neurological: Negative for dizziness, light-headedness and headaches  Objective:     Physical Exam   Constitutional: She appears well-developed and well-nourished  HENT:   Head: Normocephalic  Mouth/Throat: Oropharynx is clear and moist    Eyes: Conjunctivae are normal  Pupils are equal, round, and reactive to light  Right eye exhibits no discharge  Left eye exhibits no discharge  No scleral icterus  Neck: Neck supple  Cardiovascular: Normal rate, regular rhythm, normal heart sounds and intact distal pulses  Exam reveals no gallop and no friction rub  No murmur heard  Pulmonary/Chest: Breath sounds normal  No respiratory distress  She has no wheezes  She has no rales  Abdominal: Soft  Bowel sounds are normal  She exhibits no distension and no mass   There is tenderness (Mild epigastric tenderness)  There is no rebound and no guarding  Musculoskeletal: She exhibits no edema or deformity  Lymphadenopathy:     She has no cervical adenopathy  Neurological: She is alert  Coordination normal          Vitals:    03/29/18 0949   BP: 119/70   BP Location: Left arm   Patient Position: Sitting   Cuff Size: Large   Pulse: (!) 109   Resp: 16   SpO2: 98%   Weight: 105 kg (230 lb 6 4 oz)   Height: 5' 2" (1 575 m)       Transitional Care Management Review:  Zack Canas is a 80 y o  female here for TCM follow up       During the TCM phone call patient stated:    Hospital care reviewed:  Records not available  Patient was hopsitalized at:  [facility]  Date of admission:  3/22/18  Date of discharge:  3/24/18  Diagnosis:  abd pain  Were the patients medicaitons reviewed and updated:  No  Current symptoms:  None  Post hospital issues:  None  Should patient be enrolled in anticoag monitoring?:  Yes  Scheduled for follow up?:  Yes  Did you obtain your prescribed medications:  Yes  Do you need help managing your perscriptions or medications:  No  Is transportation to your appointments needed:  No  I have advised the patient to call PCP with any new or worsening symptoms (please type in name along with any credentials):  Dana 15:  Spouse or Significiant other  Support System:  Family, Children  The type of support provided:  Emotional, Financial, Physical  Do you have social support:  No, not at all  Are you recieving outpatient services:  No  Are you recieving home care services:  No  Are you using any community resources:  No  Have you fallen in the last 12 months:  Yes  How many times:  a few day prior to going into hospital  Interperter language line required?:  No  Counseling:  Patient             Tomeka Singer, DO

## 2018-04-17 DIAGNOSIS — K92.2 ACUTE UPPER GI BLEED: ICD-10-CM

## 2018-04-17 RX ORDER — SUCRALFATE ORAL 1 G/10ML
1000 SUSPENSION ORAL EVERY 6 HOURS SCHEDULED
Qty: 420 ML | Refills: 0 | Status: SHIPPED | OUTPATIENT
Start: 2018-04-17 | End: 2018-07-26 | Stop reason: SDUPTHER

## 2018-04-23 ENCOUNTER — TELEPHONE (OUTPATIENT)
Dept: INTERNAL MEDICINE CLINIC | Facility: CLINIC | Age: 83
End: 2018-04-23

## 2018-04-27 ENCOUNTER — TRANSCRIBE ORDERS (OUTPATIENT)
Dept: LAB | Facility: CLINIC | Age: 83
End: 2018-04-27

## 2018-04-27 ENCOUNTER — APPOINTMENT (OUTPATIENT)
Dept: LAB | Facility: CLINIC | Age: 83
End: 2018-04-27
Payer: COMMERCIAL

## 2018-04-27 ENCOUNTER — ANTICOAG VISIT (OUTPATIENT)
Dept: INTERNAL MEDICINE CLINIC | Facility: CLINIC | Age: 83
End: 2018-04-27

## 2018-04-27 ENCOUNTER — HOSPITAL ENCOUNTER (OUTPATIENT)
Dept: NON INVASIVE DIAGNOSTICS | Facility: CLINIC | Age: 83
Discharge: HOME | End: 2018-04-27
Payer: COMMERCIAL

## 2018-04-27 ENCOUNTER — HOSPITAL ENCOUNTER (OUTPATIENT)
Dept: NON INVASIVE DIAGNOSTICS | Facility: CLINIC | Age: 83
End: 2018-04-27
Payer: COMMERCIAL

## 2018-04-27 DIAGNOSIS — R07.89 CHEST PRESSURE: ICD-10-CM

## 2018-04-27 DIAGNOSIS — IMO0002 ULCER: ICD-10-CM

## 2018-04-27 DIAGNOSIS — Z79.01 LONG TERM (CURRENT) USE OF ANTICOAGULANTS: Primary | ICD-10-CM

## 2018-04-27 DIAGNOSIS — I50.32 CHRONIC DIASTOLIC CHF (CONGESTIVE HEART FAILURE) (HCC): ICD-10-CM

## 2018-04-27 DIAGNOSIS — I48.91 ATRIAL FIBRILLATION, UNSPECIFIED TYPE (HCC): ICD-10-CM

## 2018-04-27 DIAGNOSIS — I34.0 MODERATE MITRAL REGURGITATION: ICD-10-CM

## 2018-04-27 DIAGNOSIS — Z79.01 LONG TERM (CURRENT) USE OF ANTICOAGULANTS: ICD-10-CM

## 2018-04-27 LAB
ALBUMIN SERPL BCP-MCNC: 3.4 G/DL (ref 3.5–5)
ALP SERPL-CCNC: 92 U/L (ref 46–116)
ALT SERPL W P-5'-P-CCNC: 21 U/L (ref 12–78)
ANION GAP SERPL CALCULATED.3IONS-SCNC: 9 MMOL/L (ref 4–13)
AST SERPL W P-5'-P-CCNC: 16 U/L (ref 5–45)
BASOPHILS # BLD AUTO: 0.02 THOUSANDS/ΜL (ref 0–0.1)
BASOPHILS NFR BLD AUTO: 0 % (ref 0–1)
BILIRUB SERPL-MCNC: 0.4 MG/DL (ref 0.2–1)
BUN SERPL-MCNC: 18 MG/DL (ref 5–25)
CALCIUM SERPL-MCNC: 9 MG/DL (ref 8.3–10.1)
CHLORIDE SERPL-SCNC: 105 MMOL/L (ref 100–108)
CO2 SERPL-SCNC: 26 MMOL/L (ref 21–32)
CREAT SERPL-MCNC: 1.13 MG/DL (ref 0.6–1.3)
EOSINOPHIL # BLD AUTO: 0.08 THOUSAND/ΜL (ref 0–0.61)
EOSINOPHIL NFR BLD AUTO: 1 % (ref 0–6)
ERYTHROCYTE [DISTWIDTH] IN BLOOD BY AUTOMATED COUNT: 15.1 % (ref 11.6–15.1)
GFR SERPL CREATININE-BSD FRML MDRD: 45 ML/MIN/1.73SQ M
GLUCOSE SERPL-MCNC: 117 MG/DL (ref 65–140)
HCT VFR BLD AUTO: 32.7 % (ref 34.8–46.1)
HGB BLD-MCNC: 9.9 G/DL (ref 11.5–15.4)
INR PPP: 1.82 (ref 0.86–1.16)
LYMPHOCYTES # BLD AUTO: 0.91 THOUSANDS/ΜL (ref 0.6–4.47)
LYMPHOCYTES NFR BLD AUTO: 14 % (ref 14–44)
MCH RBC QN AUTO: 28.9 PG (ref 26.8–34.3)
MCHC RBC AUTO-ENTMCNC: 30.3 G/DL (ref 31.4–37.4)
MCV RBC AUTO: 95 FL (ref 82–98)
MONOCYTES # BLD AUTO: 0.59 THOUSAND/ΜL (ref 0.17–1.22)
MONOCYTES NFR BLD AUTO: 9 % (ref 4–12)
NEUTROPHILS # BLD AUTO: 4.91 THOUSANDS/ΜL (ref 1.85–7.62)
NEUTS SEG NFR BLD AUTO: 76 % (ref 43–75)
PLATELET # BLD AUTO: 201 THOUSANDS/UL (ref 149–390)
PMV BLD AUTO: 10.5 FL (ref 8.9–12.7)
POTASSIUM SERPL-SCNC: 4.3 MMOL/L (ref 3.5–5.3)
PROT SERPL-MCNC: 6.9 G/DL (ref 6.4–8.2)
PROTHROMBIN TIME: 21.7 SECONDS (ref 12.1–14.4)
RBC # BLD AUTO: 3.43 MILLION/UL (ref 3.81–5.12)
SODIUM SERPL-SCNC: 140 MMOL/L (ref 136–145)
WBC # BLD AUTO: 6.51 THOUSAND/UL (ref 4.31–10.16)

## 2018-04-27 PROCEDURE — 85025 COMPLETE CBC W/AUTO DIFF WBC: CPT

## 2018-04-27 PROCEDURE — 93306 TTE W/DOPPLER COMPLETE: CPT

## 2018-04-27 PROCEDURE — 85610 PROTHROMBIN TIME: CPT

## 2018-04-27 PROCEDURE — 36415 COLL VENOUS BLD VENIPUNCTURE: CPT

## 2018-04-27 PROCEDURE — 80053 COMPREHEN METABOLIC PANEL: CPT

## 2018-04-27 PROCEDURE — 93306 TTE W/DOPPLER COMPLETE: CPT | Performed by: INTERNAL MEDICINE

## 2018-05-11 ENCOUNTER — HOSPITAL ENCOUNTER (OUTPATIENT)
Dept: NON INVASIVE DIAGNOSTICS | Facility: CLINIC | Age: 83
Discharge: HOME | End: 2018-05-11
Payer: COMMERCIAL

## 2018-05-11 PROCEDURE — 93017 CV STRESS TEST TRACING ONLY: CPT

## 2018-05-11 PROCEDURE — 78452 HT MUSCLE IMAGE SPECT MULT: CPT

## 2018-05-11 PROCEDURE — A9502 TC99M TETROFOSMIN: HCPCS

## 2018-05-11 RX ADMIN — REGADENOSON 0.4 MG: 0.08 INJECTION, SOLUTION INTRAVENOUS at 13:49

## 2018-05-14 DIAGNOSIS — G62.9 NEUROPATHY: ICD-10-CM

## 2018-05-14 LAB
CHEST PAIN STATEMENT: NORMAL
MAX DIASTOLIC BP: 78 MMHG
MAX HEART RATE: 133 BPM
MAX PREDICTED HEART RATE: 136 BPM
MAX. SYSTOLIC BP: 122 MMHG
PROTOCOL NAME: NORMAL
REASON FOR TERMINATION: NORMAL
TARGET HR FORMULA: NORMAL
TEST INDICATION: NORMAL
TIME IN EXERCISE PHASE: NORMAL

## 2018-05-14 RX ORDER — POTASSIUM CHLORIDE 20 MEQ/1
20 TABLET, EXTENDED RELEASE ORAL DAILY
Refills: 3 | COMMUNITY
Start: 2018-05-01 | End: 2018-05-15 | Stop reason: SDUPTHER

## 2018-05-14 RX ORDER — TRAMADOL HYDROCHLORIDE 50 MG/1
TABLET ORAL
Qty: 60 TABLET | Refills: 0 | Status: SHIPPED | OUTPATIENT
Start: 2018-05-14 | End: 2018-08-28 | Stop reason: SDUPTHER

## 2018-05-15 ENCOUNTER — OFFICE VISIT (OUTPATIENT)
Dept: OBGYN CLINIC | Facility: HOSPITAL | Age: 83
End: 2018-05-15
Payer: COMMERCIAL

## 2018-05-15 ENCOUNTER — OFFICE VISIT (OUTPATIENT)
Dept: CARDIOLOGY CLINIC | Facility: CLINIC | Age: 83
End: 2018-05-15
Payer: COMMERCIAL

## 2018-05-15 VITALS
WEIGHT: 220 LBS | DIASTOLIC BLOOD PRESSURE: 64 MMHG | OXYGEN SATURATION: 96 % | HEART RATE: 108 BPM | HEIGHT: 62 IN | BODY MASS INDEX: 40.48 KG/M2 | SYSTOLIC BLOOD PRESSURE: 122 MMHG

## 2018-05-15 VITALS
HEIGHT: 62 IN | BODY MASS INDEX: 40.49 KG/M2 | WEIGHT: 220.02 LBS | DIASTOLIC BLOOD PRESSURE: 61 MMHG | SYSTOLIC BLOOD PRESSURE: 93 MMHG | HEART RATE: 114 BPM

## 2018-05-15 DIAGNOSIS — I48.91 ATRIAL FIBRILLATION, UNSPECIFIED TYPE (HCC): Primary | ICD-10-CM

## 2018-05-15 DIAGNOSIS — I50.32 CHRONIC DIASTOLIC CHF (CONGESTIVE HEART FAILURE) (HCC): ICD-10-CM

## 2018-05-15 DIAGNOSIS — I10 BENIGN ESSENTIAL HYPERTENSION: ICD-10-CM

## 2018-05-15 DIAGNOSIS — I34.0 MODERATE MITRAL REGURGITATION: ICD-10-CM

## 2018-05-15 DIAGNOSIS — M17.11 PRIMARY OSTEOARTHRITIS OF RIGHT KNEE: ICD-10-CM

## 2018-05-15 DIAGNOSIS — M25.561 CHRONIC PAIN OF RIGHT KNEE: Primary | ICD-10-CM

## 2018-05-15 DIAGNOSIS — G89.29 CHRONIC PAIN OF RIGHT KNEE: Primary | ICD-10-CM

## 2018-05-15 PROCEDURE — 93000 ELECTROCARDIOGRAM COMPLETE: CPT | Performed by: INTERNAL MEDICINE

## 2018-05-15 PROCEDURE — 99213 OFFICE O/P EST LOW 20 MIN: CPT | Performed by: ORTHOPAEDIC SURGERY

## 2018-05-15 PROCEDURE — 20610 DRAIN/INJ JOINT/BURSA W/O US: CPT | Performed by: ORTHOPAEDIC SURGERY

## 2018-05-15 PROCEDURE — 99214 OFFICE O/P EST MOD 30 MIN: CPT | Performed by: INTERNAL MEDICINE

## 2018-05-15 RX ORDER — BISOPROLOL FUMARATE 10 MG/1
10 TABLET ORAL DAILY
Qty: 90 TABLET | Refills: 3 | Status: SHIPPED | OUTPATIENT
Start: 2018-05-15 | End: 2018-07-26 | Stop reason: SDUPTHER

## 2018-05-15 RX ORDER — LIDOCAINE HYDROCHLORIDE 10 MG/ML
2 INJECTION, SOLUTION INFILTRATION; PERINEURAL
Status: COMPLETED | OUTPATIENT
Start: 2018-05-15 | End: 2018-05-15

## 2018-05-15 RX ORDER — BETAMETHASONE SODIUM PHOSPHATE AND BETAMETHASONE ACETATE 3; 3 MG/ML; MG/ML
12 INJECTION, SUSPENSION INTRA-ARTICULAR; INTRALESIONAL; INTRAMUSCULAR; SOFT TISSUE
Status: COMPLETED | OUTPATIENT
Start: 2018-05-15 | End: 2018-05-15

## 2018-05-15 RX ORDER — TORSEMIDE 20 MG/1
TABLET ORAL
Qty: 120 TABLET | Refills: 3 | Status: SHIPPED | OUTPATIENT
Start: 2018-05-15 | End: 2018-07-04 | Stop reason: HOSPADM

## 2018-05-15 RX ORDER — BUPIVACAINE HYDROCHLORIDE 2.5 MG/ML
2 INJECTION, SOLUTION INFILTRATION; PERINEURAL
Status: COMPLETED | OUTPATIENT
Start: 2018-05-15 | End: 2018-05-15

## 2018-05-15 RX ADMIN — BETAMETHASONE SODIUM PHOSPHATE AND BETAMETHASONE ACETATE 12 MG: 3; 3 INJECTION, SUSPENSION INTRA-ARTICULAR; INTRALESIONAL; INTRAMUSCULAR; SOFT TISSUE at 11:05

## 2018-05-15 RX ADMIN — BUPIVACAINE HYDROCHLORIDE 2 ML: 2.5 INJECTION, SOLUTION INFILTRATION; PERINEURAL at 11:05

## 2018-05-15 RX ADMIN — LIDOCAINE HYDROCHLORIDE 2 ML: 10 INJECTION, SOLUTION INFILTRATION; PERINEURAL at 11:05

## 2018-05-15 NOTE — PROGRESS NOTES
Follow-up - Cardiology   Brandi Zendejas 80 y o  female MRN: [id number]        Problems    1  Atrial fibrillation, unspecified type ([facility] [address] )  POCT ECG    bisoprolol (ZEBETA) 10 MG tablet   2  Chronic diastolic CHF (congestive heart failure) ([facility] [address] )     3  Moderate mitral regurgitation     4  Benign essential hypertension         Impression:      Joshua Abarca follows up with me at the [facility] ED office, she completed her testing in the form of her echocardiogram/stress test for her recent worsening diastolic heart failure  Echocardiogram shows normal LV function, moderate MR, stress test did not show any significant abnormality, EF 66%  AFib-persistent, rate uncontrolled, continues on warfarin  She is on bisoprolol 5 mg, diltiazem 120 mg twice a day  Chronic diastolic CHF-remains volume overloaded, but has lost 11 lb since my last visit with the change in the torsemide dosing  She is not very consistent with the administration, especially when visiting doctors in being out of the house she typically does not take the medication  Hypertension -well controlled  Mitral regurgitation -moderate, central, probably somewhat functional    No further testing needed at this time  Plan:    Orders Placed This Encounter   Procedures    POCT ECG       Increase bisoprolol to 10 mg daily  Holter monitor to be obtained  In 2 weeks to assess improvement in heart rate  I have recommended the days that she is home take torsemide 40 mg twice a day  When she is out to doctor's offices, if she is out in the morning, she should take 40 mg when she returns home  follow-up 8 weeks    HPI:   Brandi Zendejas is a 80y o  year old female with chronic AFib, heart rates not well controlled today  She takes bisoprolol 5 mg and diltiazem 20 mg twice a day  She has hypertension, blood pressure is well controlled  She has occasional palpitations especially when she is but active, feels heart racing    Heart rate 109 my office today  She had recent testing by echocardiogram with normal LV function, moderate MR  She had stress Myoview testing as well with EF 66% and no perfusion deficits  She remains volume overloaded, edema and shortness of breath are biggest complaints  He is however down 11 lb since my prior visit which means she has had some success with diuresis  However still has ambulatory dysfunction due to her heavy bilateral edematous legs  Review of Systems   HENT: Negative  Respiratory: Positive for shortness of breath  Cardiovascular: Positive for palpitations and leg swelling  Negative for chest pain  Gastrointestinal: Negative  Endocrine: Negative  Musculoskeletal: Positive for arthralgias and gait problem           Past Medical History:   Diagnosis Date    Arthritis     [facility]     Disturbance of smell and taste     disturbance of taste    Effusion of knee joint right     Fibromyalgia     Heart murmur     reported a previous heart murmur    History of acute myocardial infarction     History of atrial fibrillation     History of bruising easily     History of cancer     History of dermatitis     History of mammogram     History of obesity     History of osteopenia     History of sciatica     History of shortness of breath     History of sinusitis     History of sore throat     History of syncope     History of umbilical hernia     History of viral infection     Irritable bowel syndrome (IBS)     Joint pain, hip     Limb pain     Myalgia     myalgia and myositis    Need for prophylactic vaccination against single diseases     Need for prophylactic vaccination and inoculation against influenza     Preoperative cardiovascular examination     Primary osteoarthritis of right knee     Right leg pain     Vaginal Pap smear     reported pap smear     History   Alcohol Use    Yes     Comment:  social     History   Drug Use No     History Smoking Status    Never Smoker   Smokeless Tobacco    Never Used       Allergies: Allergies   Allergen Reactions    Codeine Vomiting     Reaction Date: 24Apr2012;     Oxycodone Hcl Vomiting     Reaction Date: 24Apr2012;     Percocet [Oxycodone-Acetaminophen]     Seasonal Ic  [Cholestatin] Allergic Rhinitis    Bactrim [Sulfamethoxazole-Trimethoprim] Rash    Sulfa Antibiotics Rash       Medications:     Current Outpatient Prescriptions:     bisoprolol (ZEBETA) 10 MG tablet, Take 1 tablet (10 mg total) by mouth daily, Disp: 90 tablet, Rfl: 3    desoximetasone (TOPICORT) 0 25 % cream, Apply topically, Disp: , Rfl:     diltiazem (CARDIZEM) 120 MG tablet, Take 120 mg by mouth 2 (two) times a day , Disp: , Rfl:     DULoxetine (CYMBALTA) 60 mg delayed release capsule, Take 60 mg by mouth daily  , Disp: , Rfl:     fluticasone (FLONASE) 50 mcg/act nasal spray, 1 spray into each nostril daily  , Disp: , Rfl:     potassium chloride (KLOR-CON) 20 mEq packet, Take 10 mEq by mouth as needed  , Disp: , Rfl:     sucralfate (CARAFATE) 1 g/10 mL suspension, Take 10 mL (1,000 mg total) by mouth every 6 (six) hours, Disp: 420 mL, Rfl: 0    torsemide (DEMADEX) 20 mg tablet, Take 2 tablets in the morning, 1 tablet in the  afternoon, Disp: 90 tablet, Rfl: 11    traMADol (ULTRAM) 50 mg tablet, TAKE ONE TABLET BY MOUTH TWICE DAILY AS NEEDED FOR PAIN, Disp: 60 tablet, Rfl: 0    warfarin (COUMADIN) 3 mg tablet, Take 1 tablet daily or as directed  (Patient taking differently: Take 3 mg by mouth every other day Take 1 tablet daily or as directed  ), Disp: 90 tablet, Rfl: 1    warfarin (COUMADIN) 4 mg tablet, Take 4 mg by mouth every other day  , Disp: , Rfl: 3      Vitals:    05/15/18 0853   BP: 122/64   Pulse: (!) 108   SpO2: 96%     Weight (last 2 days)     Date/Time   Weight    05/15/18 0853  99 8 (220)            Physical Exam   Constitutional: No distress  HENT:   Head: Normocephalic and atraumatic     Eyes: Conjunctivae are normal  No scleral icterus  Neck: Normal range of motion  JVD present  Cardiovascular: Intact distal pulses  Murmur heard  Tachycardic and irregular   Pulmonary/Chest: Effort normal and breath sounds normal  No respiratory distress  She has no wheezes  She has no rales  Musculoskeletal: She exhibits edema  She exhibits no tenderness  Skin: Skin is warm and dry  She is not diaphoretic           Laboratory Studies:  Lab Results   Component Value Date     04/27/2018     03/23/2018     03/22/2018     04/03/2015     11/04/2014     08/29/2013    K 4 3 04/27/2018    K 3 9 03/23/2018    K 3 8 03/22/2018    K 4 4 04/03/2015    K 4 5 11/04/2014    K 4 9 08/29/2013     04/27/2018     (H) 03/23/2018     03/22/2018     04/03/2015     11/04/2014     08/29/2013    CO2 26 04/27/2018    CO2 25 03/23/2018    CO2 25 03/22/2018    CO2 26 04/03/2015    CO2 28 11/04/2014    CO2 29 08/29/2013    GLUCOSE 117 04/27/2018    GLUCOSE 92 03/23/2018    GLUCOSE 92 03/22/2018    GLUCOSE 98 04/03/2015    GLUCOSE 94 11/04/2014    GLUCOSE 93 08/29/2013    CREATININE 1 13 04/27/2018    CREATININE 0 73 03/23/2018    CREATININE 0 88 03/22/2018    CREATININE 0 74 04/03/2015    CREATININE 0 72 11/04/2014    CREATININE 0 76 08/29/2013    BUN 18 04/27/2018    BUN 11 03/23/2018    BUN 18 03/22/2018    BUN 19 04/03/2015    BUN 14 11/04/2014    BUN 12 08/29/2013    MG 1 9 03/07/2016     Lab Results   Component Value Date    WBC 6 51 04/27/2018    WBC 7 98 04/20/2015    RBC 3 43 (L) 04/27/2018    RBC 3 78 (L) 04/20/2015    HGB 9 9 (L) 04/27/2018    HGB 12 0 04/20/2015    HCT 32 7 (L) 04/27/2018    HCT 37 0 04/20/2015    HCT 38 0 04/20/2015    MCV 95 04/27/2018     (H) 04/20/2015    MCH 28 9 04/27/2018    MCH 31 7 04/20/2015    RDW 15 1 04/27/2018    RDW 13 2 04/20/2015     04/27/2018     04/20/2015     NT-proBNP: No results for input(s): NTBNP in the last 72 hours  Coags:    Lipid Profile:   Lab Results   Component Value Date    CHOL 145 2018     Lab Results   Component Value Date    HDL 65 (H) 2018     Lab Results   Component Value Date    LDLCALC 61 2018     Lab Results   Component Value Date    TRIG 93 2018       Cardiac testing:   EKG reviewed personally:   AFib, heart rate 109   Results for orders placed during the hospital encounter of 18   Echo complete with contrast if indicated    Narrative [facility], [address] [place] [address]  [phone]    Transthoracic Echocardiogram  2D, M-mode, Doppler, and Color Doppler    Study date:  2018    Patient: Nadja Knox  MR number: [id number]  Account number: [de-identified]  : 1933  Age: 80 years  Gender: Female  Status: Outpatient  Location: [facility]  Height: 62 in  Weight: 231 lb  BP: 112/ 60 mmHg    Indications: MV Disease    Diagnoses: I34 0 - Nonrheumatic mitral (valve) insufficiency    Sonographer:  JESUS Garcia  Primary Physician:  Chiki Brooks DO  Referring Physician:  Juancarlos Manzano MD  Group:  [facility]  Interpreting Physician:  Gustavo Raines MD    SUMMARY    LEFT VENTRICLE:  Size was normal   Systolic function was normal  Ejection fraction was estimated to be 55 %  Wall thickness was at the upper limits of normal     RIGHT VENTRICLE:  The ventricle was mildly dilated  Systolic function was normal     RIGHT ATRIUM:  The atrium was mildly dilated  MITRAL VALVE:  There was moderate regurgitation  TRICUSPID VALVE:  There was moderate regurgitation  IVC, HEPATIC VEINS:  The inferior vena cava was mildly dilated  Respirophasic changes were blunted (less than 50% variation)  COMPARISONS:  Comparison was made with the previous study of 17-May-2017  Tricuspid regurgitation has worsened      HISTORY: PRIOR HISTORY: AFIB, Moderate MR, Hypertension, CHF, Anemia    PROCEDURE: The study was performed in the [facility]  This was a routine study  The transthoracic approach was used  The study included complete 2D imaging, M-mode, complete spectral Doppler, and color Doppler  The  heart rate was 110 bpm, at the start of the study  Images were obtained from the parasternal, apical, subcostal, and suprasternal notch acoustic windows  Echocardiographic views were limited due to lung interference  Image quality was  adequate  LEFT VENTRICLE: Size was normal  Systolic function was normal  Ejection fraction was estimated to be 55 %  There were no regional wall motion abnormalities  Wall thickness was at the upper limits of normal  DOPPLER: Transmitral flow  pattern: atrial fibrillation  The study was not technically sufficient to allow evaluation of LV diastolic function  RIGHT VENTRICLE: The ventricle was mildly dilated  Systolic function was normal  Wall thickness was normal     LEFT ATRIUM: Size was normal     RIGHT ATRIUM: The atrium was mildly dilated  MITRAL VALVE: Valve structure was normal  There was normal leaflet separation  DOPPLER: The transmitral velocity was within the normal range  There was no evidence for stenosis  There was moderate regurgitation  AORTIC VALVE: The valve was trileaflet  Leaflets exhibited normal thickness, normal cuspal separation, and sclerosis  DOPPLER: Transaortic velocity was within the normal range  There was no evidence for stenosis  There was no  regurgitation  TRICUSPID VALVE: The valve structure was normal  There was normal leaflet separation  DOPPLER: The transtricuspid velocity was within the normal range  There was no evidence for stenosis  There was moderate regurgitation  Pulmonary artery  systolic pressure was within the normal range  Estimated peak PA pressure was 36 mmHg  PULMONIC VALVE: Leaflets exhibited normal thickness, no calcification, and normal cuspal separation   DOPPLER: The transpulmonic velocity was within the normal range  There was no regurgitation  PERICARDIUM: There was no pericardial effusion  The pericardium was normal in appearance  AORTA: The root exhibited normal size  SYSTEMIC VEINS: IVC: The inferior vena cava was mildly dilated  Respirophasic changes were blunted (less than 50% variation)  SYSTEM MEASUREMENT TABLES    2D  %FS: 34 71 %  AV Diam: 2 84 cm  EDV(Teich): 101 4 ml  EF(Cube): 72 17 %  EF(Teich): 63 88 %  ESV(Cube): 28 55 ml  ESV(Teich): 36 63 ml  IVSd: 1 1 cm  LA Area: 17 55 cm2  LA Diam: 4 14 cm  LVEDV MOD A4C: 61 32 ml  LVEF MOD A4C: 61 87 %  LVESV MOD A4C: 23 38 ml  LVIDd: 4 68 cm  LVIDs: 3 06 cm  LVLd A4C: 6 59 cm  LVLs A4C: 5 67 cm  LVPWd: 1 cm  RA Area: 23 38 cm2  RV Diam: 3 71 cm  SV MOD A4C: 37 94 ml  SV(Cube): 74 03 ml  SV(Teich): 64 77 ml    CW  TR MaxP 51 mmHg  TR Vmax: 2 21 m/s    MM  TAPSE: 1 35 cm    [facility]redited [facility]    Prepared and electronically signed by    Francesco Kelley MD  Signed 2018 17:36:53               Carlos Reagan MD    Portions of the record may have been created with voice recognition software   Occasional wrong word or "sound a like" substitutions may have occurred due to the inherent limitations of voice recognition software   Read the chart carefully and recognize, using context, where substitutions have occurred

## 2018-05-15 NOTE — PROGRESS NOTES
80 y o female presents for follow-up  She has return to weight-bearing pain in the right knee  The pain is located level joint, the pain is made worse bearing weight, pain increases with increased activities  She desires ongoing not aggressive treatments is under alleviate pain as well as promote improved function  Review of Systems  Review of systems negative unless otherwise specified in HPI    Past Medical History  Past Medical History:   Diagnosis Date    Arthritis     CHF (congestive heart failure) (HCC)     Disturbance of smell and taste     disturbance of taste    Effusion of knee joint right     Fibromyalgia     Heart murmur     reported a previous heart murmur    History of acute myocardial infarction     History of atrial fibrillation     History of bruising easily     History of cancer     History of dermatitis     History of mammogram     History of obesity     History of osteopenia     History of sciatica     History of shortness of breath     History of sinusitis     History of sore throat     History of syncope     History of umbilical hernia     History of viral infection     Irritable bowel syndrome (IBS)     Joint pain, hip     Limb pain     Myalgia     myalgia and myositis    Need for prophylactic vaccination against single diseases     Need for prophylactic vaccination and inoculation against influenza     Preoperative cardiovascular examination     Primary osteoarthritis of right knee     Right leg pain     Vaginal Pap smear     reported pap smear       Past Surgical History  Past Surgical History:   Procedure Laterality Date    ANKLE ARTHRODESIS Right     BACK SURGERY      BARIATRIC SURGERY      CHOLECYSTECTOMY      x2    ESOPHAGOGASTRODUODENOSCOPY N/A 3/23/2018    Procedure: ESOPHAGOGASTRODUODENOSCOPY (EGD); Surgeon: Kalyan Coulter MD;  [facility]: [facility];   Service: Gastroenterology    FOOT SURGERY      HIP CLOSE REDUCTION Right 8/21/2016 Procedure: CLOSED REDUCTION RIGHT TOTAL HIP;  Surgeon: Raghu Souza MD;  [facility];  Service:     JOINT REPLACEMENT      LEFT KNEE REPLACEMENT    PILONIDAL CYST EXCISION      x2    REPLACEMENT TOTAL KNEE Right     SILVANA-EN-Y PROCEDURE      x2    TOTAL KNEE ARTHROPLASTY      UMBILICAL HERNIA REPAIR      x2       Current Medications  Current Outpatient Prescriptions on File Prior to Visit   Medication Sig Dispense Refill    desoximetasone (TOPICORT) 0 25 % cream Apply topically      diltiazem (CARDIZEM) 120 MG tablet Take 120 mg by mouth 2 (two) times a day   DULoxetine (CYMBALTA) 60 mg delayed release capsule Take 60 mg by mouth daily   fluticasone (FLONASE) 50 mcg/act nasal spray 1 spray into each nostril daily   potassium chloride (KLOR-CON) 20 mEq packet Take 10 mEq by mouth as needed        sucralfate (CARAFATE) 1 g/10 mL suspension Take 10 mL (1,000 mg total) by mouth every 6 (six) hours 420 mL 0    traMADol (ULTRAM) 50 mg tablet TAKE ONE TABLET BY MOUTH TWICE DAILY AS NEEDED FOR PAIN 60 tablet 0    warfarin (COUMADIN) 3 mg tablet Take 1 tablet daily or as directed  (Patient taking differently: Take 3 mg by mouth every other day Take 1 tablet daily or as directed  ) 90 tablet 1    warfarin (COUMADIN) 4 mg tablet Take 4 mg by mouth every other day    3    [DISCONTINUED] bisoprolol (ZEBETA) 5 mg tablet Take 1 tablet (5 mg total) by mouth daily 30 tablet 11    [DISCONTINUED] pantoprazole (PROTONIX) 40 mg tablet Take 1 tablet (40 mg total) by mouth 2 (two) times a day before meals 60 tablet 0    [DISCONTINUED] torsemide (DEMADEX) 20 mg tablet Take 2 tablets in the morning, 1 tablet in the  afternoon 90 tablet 11     No current facility-administered medications on file prior to visit          Recent Labs [facility])    0  Lab Value Date/Time   HCT 32 7 (L) 04/27/2018 0847   HCT 37 0 04/20/2015 1109   HCT 38 0 04/20/2015 1109   HGB 9 9 (L) 04/27/2018 0847   HGB 12 0 04/20/2015 1109   WBC 6 51 04/27/2018 0847   WBC 7 98 04/20/2015 1109   INR 1 82 (H) 04/27/2018 0852   GLUCOSE 117 04/27/2018 0847   GLUCOSE 98 04/03/2015 0831         Physical exam  · General: Awake, Alert, Oriented  · Eyes: Pupils equal, round and reactive to light  · Heart: regular rate and rhythm  · Lungs: No audible wheezing  · Abdomen: soft  Exam finds a gait pattern with a gliding walker  Right thighs devoid of atrophy right knee is not effused  There is bony enlargement and tenderness medially laterally  There is crepitation flexion extension  There is no palpable warmth the synovium  She has lymphedema in the right leg from the knee distal   There is no overt calf tenderness  Imaging  None obtained or performed today    Procedure  An injection of corticosteroid is provided for the arthritic right knee  It is documented below    Large joint arthrocentesis  Date/Time: 5/15/2018 11:05 AM  Consent given by: patient  Supporting Documentation  Indications: pain   Procedure Details  Location: knee - R knee  Needle size: 22 G  Ultrasound guidance: no  Medications administered: 2 mL bupivacaine 0 25 %; 2 mL lidocaine 1 %; 12 mg betamethasone acetate-betamethasone sodium phosphate 6 (3-3) mg/mL    Patient tolerance: patient tolerated the procedure well with no immediate complications  Dressing:  Sterile dressing applied          Assessment/Plan:   80 y  o female who has an arthritic right knee with return of symptoms  An injection of corticosteroid is indicated  It is advised, except, administers outlined above    I would welcome the opportunity see this patient back in the office in 3 months time for repeat physical exam

## 2018-05-15 NOTE — PATIENT INSTRUCTIONS
Torsemide 40 mg twice a day when home  Torsemide 40 mg in the p m  If you're out at [address] [place] [address] in the morning      Holter monitor in 2 weeks   follow-up with me in 8 weeks

## 2018-06-14 ENCOUNTER — APPOINTMENT (OUTPATIENT)
Dept: LAB | Facility: CLINIC | Age: 83
End: 2018-06-14
Payer: COMMERCIAL

## 2018-06-14 ENCOUNTER — ANTICOAG VISIT (OUTPATIENT)
Dept: INTERNAL MEDICINE CLINIC | Facility: CLINIC | Age: 83
End: 2018-06-14

## 2018-06-14 ENCOUNTER — TRANSCRIBE ORDERS (OUTPATIENT)
Dept: LAB | Facility: CLINIC | Age: 83
End: 2018-06-14

## 2018-06-14 DIAGNOSIS — Z79.01 LONG TERM (CURRENT) USE OF ANTICOAGULANTS: Primary | ICD-10-CM

## 2018-06-14 DIAGNOSIS — Z79.01 LONG TERM (CURRENT) USE OF ANTICOAGULANTS: ICD-10-CM

## 2018-06-14 DIAGNOSIS — I48.91 ATRIAL FIBRILLATION, UNSPECIFIED TYPE (HCC): ICD-10-CM

## 2018-06-14 LAB
INR PPP: 2.89 (ref 0.86–1.17)
PROTHROMBIN TIME: 30.3 SECONDS (ref 11.8–14.2)

## 2018-06-14 PROCEDURE — 36415 COLL VENOUS BLD VENIPUNCTURE: CPT

## 2018-06-14 PROCEDURE — 85610 PROTHROMBIN TIME: CPT

## 2018-06-28 ENCOUNTER — HOSPITAL ENCOUNTER (OUTPATIENT)
Dept: NON INVASIVE DIAGNOSTICS | Facility: CLINIC | Age: 83
Discharge: HOME | End: 2018-06-28
Payer: COMMERCIAL

## 2018-06-28 DIAGNOSIS — I48.91 ATRIAL FIBRILLATION, UNSPECIFIED TYPE (HCC): ICD-10-CM

## 2018-06-28 PROCEDURE — 93225 XTRNL ECG REC<48 HRS REC: CPT

## 2018-06-28 PROCEDURE — 93226 XTRNL ECG REC<48 HR SCAN A/R: CPT

## 2018-06-30 DIAGNOSIS — I48.91 ATRIAL FIBRILLATION, UNSPECIFIED TYPE (HCC): Primary | ICD-10-CM

## 2018-07-01 RX ORDER — WARFARIN SODIUM 5 MG/1
TABLET ORAL
Qty: 90 TABLET | Refills: 0 | Status: SHIPPED | OUTPATIENT
Start: 2018-07-01 | End: 2018-07-04 | Stop reason: HOSPADM

## 2018-07-02 ENCOUNTER — HOSPITAL ENCOUNTER (INPATIENT)
Facility: HOSPITAL | Age: 83
LOS: 2 days | Discharge: HOME HEALTH | DRG: 683 | End: 2018-07-04
Attending: EMERGENCY MEDICINE | Admitting: INTERNAL MEDICINE
Payer: COMMERCIAL

## 2018-07-02 ENCOUNTER — APPOINTMENT (EMERGENCY)
Dept: RADIOLOGY | Facility: HOSPITAL | Age: 83
DRG: 683 | End: 2018-07-02
Payer: COMMERCIAL

## 2018-07-02 ENCOUNTER — TELEPHONE (OUTPATIENT)
Dept: CARDIOLOGY CLINIC | Facility: CLINIC | Age: 83
End: 2018-07-02

## 2018-07-02 ENCOUNTER — ANTICOAG VISIT (OUTPATIENT)
Dept: INTERNAL MEDICINE CLINIC | Facility: CLINIC | Age: 83
End: 2018-07-02

## 2018-07-02 ENCOUNTER — TELEPHONE (OUTPATIENT)
Dept: INTERNAL MEDICINE CLINIC | Facility: CLINIC | Age: 83
End: 2018-07-02

## 2018-07-02 ENCOUNTER — OFFICE VISIT (OUTPATIENT)
Dept: INTERNAL MEDICINE CLINIC | Facility: CLINIC | Age: 83
End: 2018-07-02
Payer: COMMERCIAL

## 2018-07-02 VITALS
DIASTOLIC BLOOD PRESSURE: 50 MMHG | OXYGEN SATURATION: 97 % | HEIGHT: 62 IN | WEIGHT: 188.4 LBS | SYSTOLIC BLOOD PRESSURE: 90 MMHG | HEART RATE: 143 BPM | BODY MASS INDEX: 34.67 KG/M2

## 2018-07-02 DIAGNOSIS — I10 BENIGN ESSENTIAL HYPERTENSION: ICD-10-CM

## 2018-07-02 DIAGNOSIS — I48.20 ATRIAL FIBRILLATION, CHRONIC (HCC): Primary | ICD-10-CM

## 2018-07-02 DIAGNOSIS — I48.91 ATRIAL FIBRILLATION WITH RVR (HCC): Primary | ICD-10-CM

## 2018-07-02 DIAGNOSIS — R55 PRE-SYNCOPE: ICD-10-CM

## 2018-07-02 DIAGNOSIS — I48.20 ATRIAL FIBRILLATION, CHRONIC (HCC): ICD-10-CM

## 2018-07-02 DIAGNOSIS — R42 DIZZINESS: Primary | ICD-10-CM

## 2018-07-02 DIAGNOSIS — R55 NEAR SYNCOPE: ICD-10-CM

## 2018-07-02 DIAGNOSIS — I50.32 CHRONIC DIASTOLIC CHF (CONGESTIVE HEART FAILURE) (HCC): ICD-10-CM

## 2018-07-02 PROBLEM — N17.9 AKI (ACUTE KIDNEY INJURY) (HCC): Status: ACTIVE | Noted: 2018-07-02

## 2018-07-02 LAB
ALBUMIN SERPL BCP-MCNC: 3.2 G/DL (ref 3.5–5)
ALP SERPL-CCNC: 88 U/L (ref 46–116)
ALT SERPL W P-5'-P-CCNC: 18 U/L (ref 12–78)
ANION GAP SERPL CALCULATED.3IONS-SCNC: 11 MMOL/L (ref 4–13)
APTT PPP: 32 SECONDS (ref 24–36)
AST SERPL W P-5'-P-CCNC: 17 U/L (ref 5–45)
ATRIAL RATE: 250 BPM
BASOPHILS # BLD AUTO: 0.02 THOUSANDS/ΜL (ref 0–0.1)
BASOPHILS NFR BLD AUTO: 0 % (ref 0–1)
BILIRUB SERPL-MCNC: 0.5 MG/DL (ref 0.2–1)
BUN SERPL-MCNC: 41 MG/DL (ref 5–25)
CALCIUM SERPL-MCNC: 8.5 MG/DL (ref 8.3–10.1)
CHLORIDE SERPL-SCNC: 103 MMOL/L (ref 100–108)
CO2 SERPL-SCNC: 26 MMOL/L (ref 21–32)
CREAT SERPL-MCNC: 1.56 MG/DL (ref 0.6–1.3)
EOSINOPHIL # BLD AUTO: 0.02 THOUSAND/ΜL (ref 0–0.61)
EOSINOPHIL NFR BLD AUTO: 0 % (ref 0–6)
ERYTHROCYTE [DISTWIDTH] IN BLOOD BY AUTOMATED COUNT: 15.2 % (ref 11.6–15.1)
GFR SERPL CREATININE-BSD FRML MDRD: 30 ML/MIN/1.73SQ M
GLUCOSE SERPL-MCNC: 115 MG/DL (ref 65–140)
HCT VFR BLD AUTO: 35.7 % (ref 34.8–46.1)
HGB BLD-MCNC: 11.2 G/DL (ref 11.5–15.4)
INR PPP: 1.96 (ref 0.86–1.17)
LYMPHOCYTES # BLD AUTO: 0.86 THOUSANDS/ΜL (ref 0.6–4.47)
LYMPHOCYTES NFR BLD AUTO: 11 % (ref 14–44)
MAGNESIUM SERPL-MCNC: 1.8 MG/DL (ref 1.6–2.6)
MCH RBC QN AUTO: 29.3 PG (ref 26.8–34.3)
MCHC RBC AUTO-ENTMCNC: 31.4 G/DL (ref 31.4–37.4)
MCV RBC AUTO: 94 FL (ref 82–98)
MONOCYTES # BLD AUTO: 0.68 THOUSAND/ΜL (ref 0.17–1.22)
MONOCYTES NFR BLD AUTO: 9 % (ref 4–12)
NEUTROPHILS # BLD AUTO: 6.29 THOUSANDS/ΜL (ref 1.85–7.62)
NEUTS SEG NFR BLD AUTO: 80 % (ref 43–75)
NT-PROBNP SERPL-MCNC: 5945 PG/ML
P AXIS: 0 DEGREES
PLATELET # BLD AUTO: 162 THOUSANDS/UL (ref 149–390)
PMV BLD AUTO: 11.5 FL (ref 8.9–12.7)
POTASSIUM SERPL-SCNC: 3.7 MMOL/L (ref 3.5–5.3)
PROT SERPL-MCNC: 6.8 G/DL (ref 6.4–8.2)
PROTHROMBIN TIME: 21.7 SECONDS (ref 11.8–14.2)
QRS AXIS: -20 DEGREES
QRSD INTERVAL: 92 MS
QT INTERVAL: 330 MS
QTC INTERVAL: 468 MS
RBC # BLD AUTO: 3.82 MILLION/UL (ref 3.81–5.12)
SODIUM SERPL-SCNC: 140 MMOL/L (ref 136–145)
T WAVE AXIS: -69 DEGREES
TROPONIN I SERPL-MCNC: <0.02 NG/ML
VENTRICULAR RATE: 121 BPM
WBC # BLD AUTO: 7.87 THOUSAND/UL (ref 4.31–10.16)

## 2018-07-02 PROCEDURE — 36415 COLL VENOUS BLD VENIPUNCTURE: CPT | Performed by: EMERGENCY MEDICINE

## 2018-07-02 PROCEDURE — 93005 ELECTROCARDIOGRAM TRACING: CPT

## 2018-07-02 PROCEDURE — 93227 XTRNL ECG REC<48 HR R&I: CPT | Performed by: INTERNAL MEDICINE

## 2018-07-02 PROCEDURE — 99285 EMERGENCY DEPT VISIT HI MDM: CPT

## 2018-07-02 PROCEDURE — 83735 ASSAY OF MAGNESIUM: CPT | Performed by: EMERGENCY MEDICINE

## 2018-07-02 PROCEDURE — 99223 1ST HOSP IP/OBS HIGH 75: CPT | Performed by: INTERNAL MEDICINE

## 2018-07-02 PROCEDURE — 83880 ASSAY OF NATRIURETIC PEPTIDE: CPT | Performed by: EMERGENCY MEDICINE

## 2018-07-02 PROCEDURE — 85610 PROTHROMBIN TIME: CPT | Performed by: EMERGENCY MEDICINE

## 2018-07-02 PROCEDURE — 71046 X-RAY EXAM CHEST 2 VIEWS: CPT

## 2018-07-02 PROCEDURE — 84484 ASSAY OF TROPONIN QUANT: CPT | Performed by: INTERNAL MEDICINE

## 2018-07-02 PROCEDURE — 96361 HYDRATE IV INFUSION ADD-ON: CPT

## 2018-07-02 PROCEDURE — 85025 COMPLETE CBC W/AUTO DIFF WBC: CPT | Performed by: EMERGENCY MEDICINE

## 2018-07-02 PROCEDURE — 96374 THER/PROPH/DIAG INJ IV PUSH: CPT

## 2018-07-02 PROCEDURE — 84484 ASSAY OF TROPONIN QUANT: CPT | Performed by: EMERGENCY MEDICINE

## 2018-07-02 PROCEDURE — 80053 COMPREHEN METABOLIC PANEL: CPT | Performed by: EMERGENCY MEDICINE

## 2018-07-02 PROCEDURE — 93010 ELECTROCARDIOGRAM REPORT: CPT | Performed by: INTERNAL MEDICINE

## 2018-07-02 PROCEDURE — 96376 TX/PRO/DX INJ SAME DRUG ADON: CPT

## 2018-07-02 PROCEDURE — 99213 OFFICE O/P EST LOW 20 MIN: CPT | Performed by: NURSE PRACTITIONER

## 2018-07-02 PROCEDURE — 85730 THROMBOPLASTIN TIME PARTIAL: CPT | Performed by: EMERGENCY MEDICINE

## 2018-07-02 PROCEDURE — 1111F DSCHRG MED/CURRENT MED MERGE: CPT | Performed by: NURSE PRACTITIONER

## 2018-07-02 RX ORDER — ACETAMINOPHEN 325 MG/1
650 TABLET ORAL EVERY 6 HOURS PRN
Status: DISCONTINUED | OUTPATIENT
Start: 2018-07-02 | End: 2018-07-04 | Stop reason: HOSPADM

## 2018-07-02 RX ORDER — DILTIAZEM HYDROCHLORIDE 120 MG/1
TABLET, FILM COATED ORAL
Qty: 90 TABLET | Refills: 11 | Status: ON HOLD | OUTPATIENT
Start: 2018-07-02 | End: 2018-07-04

## 2018-07-02 RX ORDER — TRAMADOL HYDROCHLORIDE 50 MG/1
25 TABLET ORAL EVERY 12 HOURS PRN
Status: DISCONTINUED | OUTPATIENT
Start: 2018-07-02 | End: 2018-07-04 | Stop reason: HOSPADM

## 2018-07-02 RX ORDER — WARFARIN SODIUM 4 MG/1
4 TABLET ORAL
Status: DISCONTINUED | OUTPATIENT
Start: 2018-07-03 | End: 2018-07-04 | Stop reason: HOSPADM

## 2018-07-02 RX ORDER — DULOXETIN HYDROCHLORIDE 60 MG/1
60 CAPSULE, DELAYED RELEASE ORAL DAILY
Status: DISCONTINUED | OUTPATIENT
Start: 2018-07-03 | End: 2018-07-04 | Stop reason: HOSPADM

## 2018-07-02 RX ORDER — DILTIAZEM HYDROCHLORIDE 5 MG/ML
10 INJECTION INTRAVENOUS ONCE
Status: COMPLETED | OUTPATIENT
Start: 2018-07-02 | End: 2018-07-02

## 2018-07-02 RX ORDER — BISOPROLOL FUMARATE 5 MG/1
10 TABLET ORAL DAILY
Status: DISCONTINUED | OUTPATIENT
Start: 2018-07-03 | End: 2018-07-03

## 2018-07-02 RX ORDER — DILTIAZEM HYDROCHLORIDE 60 MG/1
120 TABLET, FILM COATED ORAL EVERY 12 HOURS SCHEDULED
Status: DISCONTINUED | OUTPATIENT
Start: 2018-07-02 | End: 2018-07-02

## 2018-07-02 RX ORDER — ONDANSETRON 2 MG/ML
4 INJECTION INTRAMUSCULAR; INTRAVENOUS EVERY 6 HOURS PRN
Status: DISCONTINUED | OUTPATIENT
Start: 2018-07-02 | End: 2018-07-04 | Stop reason: HOSPADM

## 2018-07-02 RX ORDER — DESOXIMETASONE 2.5 MG/G
CREAM TOPICAL 2 TIMES DAILY
Status: DISCONTINUED | OUTPATIENT
Start: 2018-07-02 | End: 2018-07-04 | Stop reason: HOSPADM

## 2018-07-02 RX ORDER — FLUTICASONE PROPIONATE 50 MCG
1 SPRAY, SUSPENSION (ML) NASAL DAILY
Status: DISCONTINUED | OUTPATIENT
Start: 2018-07-03 | End: 2018-07-04 | Stop reason: HOSPADM

## 2018-07-02 RX ORDER — SUCRALFATE ORAL 1 G/10ML
1000 SUSPENSION ORAL EVERY 6 HOURS SCHEDULED
Status: DISCONTINUED | OUTPATIENT
Start: 2018-07-02 | End: 2018-07-04 | Stop reason: HOSPADM

## 2018-07-02 RX ORDER — SODIUM CHLORIDE 9 MG/ML
50 INJECTION, SOLUTION INTRAVENOUS CONTINUOUS
Status: DISCONTINUED | OUTPATIENT
Start: 2018-07-02 | End: 2018-07-03

## 2018-07-02 RX ORDER — SENNOSIDES 8.6 MG
1 TABLET ORAL
Status: DISCONTINUED | OUTPATIENT
Start: 2018-07-02 | End: 2018-07-04 | Stop reason: HOSPADM

## 2018-07-02 RX ADMIN — DILTIAZEM HYDROCHLORIDE 5 MG/HR: 5 INJECTION INTRAVENOUS at 16:50

## 2018-07-02 RX ADMIN — SODIUM CHLORIDE 500 ML: 0.9 INJECTION, SOLUTION INTRAVENOUS at 16:09

## 2018-07-02 RX ADMIN — SUCRALFATE 1000 MG: 1 SUSPENSION ORAL at 21:35

## 2018-07-02 RX ADMIN — DILTIAZEM HYDROCHLORIDE 10 MG: 5 INJECTION INTRAVENOUS at 15:52

## 2018-07-02 RX ADMIN — SODIUM CHLORIDE 50 ML/HR: 0.9 INJECTION, SOLUTION INTRAVENOUS at 20:44

## 2018-07-02 NOTE — H&P
H&P- Elsa Ward 1933, 80 y o  female MRN: [id number]    Unit/Bed#: -01 Encounter: [id number]    Primary Care Provider: Yaniv Delaney DO   Date and time admitted to hospital: 7/2/2018  3:21 PM        * Near syncope   Assessment & Plan    · In the past 2 weeks, patient had been having lightheadedness/dizziness and feeling that she is going to pass out every time she would get up  · This is likely due to hypovolemia/dehydration, likely orthostasis  · Patient is clinically dry, and diuretic medications were increased lately with 40 lb weight loss, and with hot weather today  Patient has acute kidney injury at this point likely due to dehydration  · Hold off diuretics for now  · Gentle IV fluids  · PT OT eval   · Orthostatic vital signs  BRITTANY (acute kidney injury) ([facility] 75 )   Assessment & Plan    · Based on my review of patient's previous BMP, likely patient has chronic kidney disease stage 2 to 3 with baseline serum creatinine around 0 8 to 1 1  · This is likely prerenal with over diuresis, and being in the hot weather this week  · Will do gentle IV fluid hydration  · Monitor kidney function  · Monitor input and output  · For further workup and management if this will not improve significantly  At[facility] with [facility] ([facility] )   Assessment & Plan    · Patient came in with rapid ventricle response and has history of atrial fibrillation  · Continue Cardizem drip  We will discontinue Cardizem drip once heart rate is below 100, and at that time, we may restart patient's oral Cardizem  · Continue patient's oral beta-blocker  · Dr Oswaldo Latham, patient's outpatient cardiologist, recommended to increase patient's oral Cardizem doses to 240 mg in the morning and 120 mg in the evening  Likely will start this medication once heart rate is more controlled with the Cardizem drip, and once Cardizem drip is titrated down and off  · Continue Coumadin    Recheck PT and INR   · Patient's atrial fibrillation with rapid ventricular response likely brought about by patient's dehydration also  Thus will gently hydrate the patient  · Patient's Holter monitor in the out patient also having rapid ventricle response at 118 per minute  · Cardiology consult  Chronic diastolic CHF (congestive heart failure) (HCC)   Assessment & Plan    · Presently compensated  · According to the patient, her diuretic medications were recently increased  According to her, since she was diuresed, she had lost 40 lb  · Patient is presently clinically dry  Thus will hold off patient's diuretic medications  · Continue other cardiovascular heart failure medications  · Cardiology consult  Anemia   Assessment & Plan    · Mild and likely chronic based on my review of patient's previous CBCs  · Monitor  · For further workup management if this worsens significantly  VTE Prophylaxis: Warfarin (Coumadin)  / sequential compression device   Code Status:  Full code  POLST: POLST form is not discussed and not completed at this time  Discussion with family:  I spoke to the patient's  at bedside  I discussed our findings and plans  I answered questions and concerns  Anticipated Length of Stay:  Patient will be admitted on an Inpatient basis with an anticipated length of stay of  greater than 2 midnights  Justification for Hospital Stay:  Due to the above findings and plans  Total Time for Visit, including Counseling / Coordination of Care: 1 hour  Greater than 50% of this total time spent on direct patient counseling and coordination of care  Chief Complaint:   I felt that I am going to pass out    History of Present Illness:    Sunil Berumen is a 80 y o  female who presents to the emergency room at [facility] with a feeling that he she is going to pass out    Patient has a Holter monitor and has been having atrial fibrillation with rapid ventricle response with heart rate of 118 per minute  This past 2 weeks, patient told me that she has been having lightheadedness and dizziness and when she suddenly gets up, she would feel that the she will almost passed out  No actual loss of consciousness or passing out happened  This according to the patient is because she was able to hang on something, that prevented her to fall or completely passed out  According to the patient, she has been having more and more episodes of this  Thus, she called her cardiologist, Dr Latha Aguiar, and was told to call her primary care physician, Dr Saniya Rodrigues  Patient was seen in the office by the primary care physician's advance practitioner and was told to go to the nearest emergency room  Patient also told me that since she started her diuresis for her congestive heart failure, she lost approximately 40 lb  She also told me that recently, her cardiologist increase the doses of her diuretic medications  Presently, patient feels very dry  Patient also has occasional headaches, but denies any other symptoms other the ones mentioned above  Patient denies any chest pains or any shortness of breath  Review of Systems:    Review of Systems     Ten point review systems done and they were negative except for the ones I mentioned in my HPI  Patient denies any fever chills or any cough or colds  Patient denies any abdominal pains or any nausea or vomiting  Patient denies any urinary symptoms or any bowel movement problems        Past Medical and Surgical History:     Past Medical History:   Diagnosis Date    Arthritis     CHF (congestive heart failure) (HCC)     Disturbance of smell and taste     disturbance of taste    Effusion of knee joint right     Fibromyalgia     Heart murmur     reported a previous heart murmur    History of acute myocardial infarction     History of atrial fibrillation     History of bruising easily     History of cancer     History of dermatitis     History of mammogram     History of obesity     History of osteopenia     History of sciatica     History of shortness of breath     History of sinusitis     History of sore throat     History of syncope     History of umbilical hernia     History of viral infection     Irritable bowel syndrome (IBS)     Joint pain, hip     Limb pain     Myalgia     myalgia and myositis    Need for prophylactic vaccination against single diseases     Need for prophylactic vaccination and inoculation against influenza     Preoperative cardiovascular examination     Primary osteoarthritis of right knee     Right leg pain     Vaginal Pap smear     reported pap smear       Past Surgical History:   Procedure Laterality Date    ANKLE ARTHRODESIS Right     BACK SURGERY      BARIATRIC SURGERY      CHOLECYSTECTOMY      x2    ESOPHAGOGASTRODUODENOSCOPY N/A 3/23/2018    Procedure: ESOPHAGOGASTRODUODENOSCOPY (EGD); Surgeon: Millie Castellano MD;  Location: [facility]; Service: Gastroenterology    FOOT SURGERY      HIP CLOSE REDUCTION Right 8/21/2016    Procedure: CLOSED REDUCTION RIGHT TOTAL HIP;  Surgeon: Deborah Nguyen MD;  [facility];  Service:     JOINT REPLACEMENT      LEFT KNEE REPLACEMENT    PILONIDAL CYST EXCISION      x2    REPLACEMENT TOTAL KNEE Right     SILVANA-EN-Y PROCEDURE      x2    TOTAL KNEE ARTHROPLASTY      UMBILICAL HERNIA REPAIR      x2       Meds/Allergies:    Prior to Admission medications    Medication Sig Start Date End Date Taking? Authorizing Provider   bisoprolol (ZEBETA) 10 MG tablet Take 1 tablet (10 mg total) by mouth daily 5/15/18  Yes Mustapha Gonzales MD   desoximetasone (TOPICORT) 0 25 % cream Apply topically 8/3/11  Yes Historical Provider, MD   diltiazem (CARDIZEM) 120 MG tablet Take 2 tablets in the morning, 1 tablet at night 7/2/18  Yes Mustapha Gonzales MD   DULoxetine (CYMBALTA) 60 mg delayed release capsule Take 60 mg by mouth daily     Yes Historical Provider, MD   fluticasone (FLONASE) 50 mcg/act nasal spray 1 spray into each nostril daily  Yes Historical Provider, MD   potassium chloride (KLOR-CON) 20 mEq packet Take 10 mEq by mouth as needed     Yes Historical Provider, MD   sucralfate (CARAFATE) 1 g/10 mL suspension Take 10 mL (1,000 mg total) by mouth every 6 (six) hours 4/17/18  Yes Trudi Prader, DO   torsemide (DEMADEX) 20 mg tablet On days you are home, take 40mg twice a day  On days you are out, take 40mg in PM  5/15/18  Yes Tosha Johnson MD   traMADol (ULTRAM) 50 mg tablet TAKE ONE TABLET BY MOUTH TWICE DAILY AS NEEDED FOR PAIN 5/14/18  Yes Trudi Prader, DO   warfarin (COUMADIN) 3 mg tablet Take 1 tablet daily or as directed  Patient taking differently: Take 3 mg by mouth every other day Take 1 tablet daily or as directed  3/14/18  Yes Trudi Prader, DO   warfarin (COUMADIN) 4 mg tablet Take 4 mg by mouth every other day   12/20/17  Yes Historical Provider, MD   warfarin (COUMADIN) 5 mg tablet TAKE ONE TABLET BY MOUTH ONCE DAILY AS DIRECTED 7/1/18  Yes Trudi Prader, DO   diltiazem (CARDIZEM) 120 MG tablet Take 120 mg by mouth 2 (two) times a day  7/2/18  Historical Provider, MD     Patient's medication list was reviewed  Allergies: Allergies   Allergen Reactions    Codeine Vomiting and GI Intolerance     GI upset, ana m morphine  Reaction Date: 24Apr2012;     Other      Other reaction(s): Other (See Comments)  ana m toradol in OR 6/06 sah    Oxycodone Hcl Vomiting     Reaction Date: 24Apr2012;     Oxycodone-Acetaminophen      Other reaction(s): Other (See Comments)  GI upset; ana m vicodin    Percocet [Oxycodone-Acetaminophen]     Seasonal Ic  [Cholestatin] Allergic Rhinitis    Bactrim [Sulfamethoxazole-Trimethoprim] Rash    Sulfa Antibiotics Rash     Other reaction(s):  Other (See Comments)  takes lasix @home       Social History:     Marital Status: /Civil Union     History   Alcohol Use    Yes     Comment: social     History   Smoking Status    Never Smoker   Smokeless Tobacco    Never Used     History   Drug Use No       Family History:    Family History   Problem Relation Age of Onset    Coronary artery disease Mother     Heart attack Mother     Hypertension Mother     Coronary artery disease Father     Heart attack Father     Hypertension Father     Lymphoma Sister         acute    Rashes / Skin problems Sister         lymphomatoid papulosis    Coronary artery disease Brother     Heart attack Brother         x2       Physical Exam:     Vitals:   Blood Pressure: 137/78 (07/02/18 1800)  Pulse: (!) 130 (07/02/18 1800)  Temperature: 98 °F (36 7 °C) (07/02/18 1800)  Temp Source: Oral (07/02/18 1800)  Respirations: 20 (07/02/18 1800)  Height: 5' 2" (157 5 cm) (07/02/18 1800)  Weight - Scale: 87 4 kg (192 lb 10 9 oz) (07/02/18 1800)  SpO2: 97 % (07/02/18 1800)    Physical Exam   Constitutional: She is oriented to person, place, and time  No distress  HENT:   Head: Normocephalic and atraumatic  Mouth/Throat: No oropharyngeal exudate  Dry oral mucosa, dry tongue, dry lips  Eyes: Conjunctivae are normal  Right eye exhibits no discharge  Left eye exhibits no discharge  No scleral icterus  Neck: No JVD present  No tracheal deviation present  Cardiovascular: Exam reveals no gallop and no friction rub  No murmur heard  Tachycardic, irregularly irregular heart rhythm  Pulmonary/Chest: Effort normal and breath sounds normal  No stridor  No respiratory distress  She has no wheezes  She has no rales  Abdominal: Soft  Bowel sounds are normal  She exhibits no distension  There is no tenderness  There is no rebound and no guarding  Musculoskeletal: She exhibits no edema, tenderness or deformity  Neurological: She is alert and oriented to person, place, and time  No cranial nerve deficit  Skin: Skin is warm and dry  No rash noted  She is not diaphoretic  No erythema  No pallor     Psychiatric: She has a normal mood and affect  Her behavior is normal  Thought content normal    Vitals reviewed  Additional Data:     Lab Results: I have personally reviewed pertinent reports  Results from last 7 days  Lab Units 07/02/18  1604   WBC Thousand/uL 7 87   HEMOGLOBIN g/dL 11 2*   HEMATOCRIT % 35 7   PLATELETS Thousands/uL 162   NEUTROS PCT % 80*   LYMPHS PCT % 11*   MONOS PCT % 9   EOS PCT % 0       Results from last 7 days  Lab Units 07/02/18  1604   SODIUM mmol/L 140   POTASSIUM mmol/L 3 7   CHLORIDE mmol/L 103   CO2 mmol/L 26   BUN mg/dL 41*   CREATININE mg/dL 1 56*   CALCIUM mg/dL 8 5   TOTAL PROTEIN g/dL 6 8   BILIRUBIN TOTAL mg/dL 0 50   ALK PHOS U/L 88   ALT U/L 18   AST U/L 17   GLUCOSE RANDOM mg/dL 115       Results from last 7 days  Lab Units 07/02/18  1604   INR  1 96*               Imaging: I have personally reviewed pertinent reports  XR chest 2 views   Final Result by Celina Mancilla DO (07/02 5445)      No acute cardiopulmonary disease  Workstation performed: [id number]             EKG, Pathology, and Other Studies Reviewed on Admission:   · EKG:  Atrial fibrillation/atrial flutter at 121 per minute with nonspecific ST wave abnormality  No previous EKG for comparison    [facility] Epic Records Reviewed: Yes     ** Please Note: This note has been constructed using a voice recognition system   **

## 2018-07-02 NOTE — ED PROVIDER NOTES
History  Chief Complaint   Patient presents with    Syncope     Increased nausea, vomiting, "dry heaving", increased SOB [facility] and had a near syncolable episode  Been going on for about two week  40-year-old female presents today complaining of fatigue and shortness of breath with exertion which has been going on for approximately 2 weeks  States she can't walk anywhere without getting extremely short of breath  Was at her PCPs office today and became very dizzy upon standing and was sent to the emergency department for further evaluation  History provided by:  Patient  Shortness of Breath   Severity:  Moderate  Onset quality:  Gradual  Duration:  2 weeks  Timing:  Constant  Progression:  Waxing and waning  Chronicity:  New  Relieved by:  Rest  Worsened by:  Exertion  Ineffective treatments:  None tried  Associated symptoms: no abdominal pain, no chest pain, no diaphoresis, no fever, no headaches, no hemoptysis, no rash, no sore throat, no syncope and no wheezing        Prior to Admission Medications   Prescriptions Last Dose Informant Patient Reported? Taking? DULoxetine (CYMBALTA) 60 mg delayed release capsule Past Week at Unknown time Self Yes Yes   Sig: Take 60 mg by mouth daily  bisoprolol (ZEBETA) 10 MG tablet 2018 at Unknown time  No Yes   Sig: Take 1 tablet (10 mg total) by mouth daily   desoximetasone (TOPICORT) 0 25 % cream 2018 at Unknown time Self Yes Yes   Sig: Apply topically   diltiazem (CARDIZEM) 120 MG tablet Past Week at Unknown time  No Yes   Sig: Take 2 tablets in the morning, 1 tablet at night   fluticasone (FLONASE) 50 mcg/act nasal spray Past Week at Unknown time Self Yes Yes   Si spray into each nostril daily     potassium chloride (KLOR-CON) 20 mEq packet 2018 at Unknown time Self Yes Yes   Sig: Take 10 mEq by mouth as needed     sucralfate (CARAFATE) 1 g/10 mL suspension 2018 at Unknown time Self No Yes   Sig: Take 10 mL (1,000 mg total) by mouth every 6 (six) hours   torsemide (DEMADEX) 20 mg tablet 7/2/2018 at Unknown time  No Yes   Sig: On days you are home, take 40mg twice a day  On days you are out, take 40mg in PM    traMADol (ULTRAM) 50 mg tablet 7/2/2018 at Unknown time Self No Yes   Sig: TAKE ONE TABLET BY MOUTH TWICE DAILY AS NEEDED FOR PAIN   warfarin (COUMADIN) 3 mg tablet 7/2/2018 at Unknown time Self No Yes   Sig: Take 1 tablet daily or as directed  Patient taking differently: Take 3 mg by mouth every other day Take 1 tablet daily or as directed      warfarin (COUMADIN) 4 mg tablet 7/2/2018 at Unknown time Self Yes Yes   Sig: Take 4 mg by mouth every other day     warfarin (COUMADIN) 5 mg tablet 7/2/2018 at Unknown time  No Yes   Sig: TAKE ONE TABLET BY MOUTH ONCE DAILY AS DIRECTED      Facility-Administered Medications: None       Past Medical History:   Diagnosis Date    Arthritis     CHF (congestive heart failure) (HCC)     Disturbance of smell and taste     disturbance of taste    Effusion of knee joint right     Fibromyalgia     Heart murmur     reported a previous heart murmur    History of acute myocardial infarction     History of atrial fibrillation     History of bruising easily     History of cancer     History of dermatitis     History of mammogram     History of obesity     History of osteopenia     History of sciatica     History of shortness of breath     History of sinusitis     History of sore throat     History of syncope     History of umbilical hernia     History of viral infection     Irritable bowel syndrome (IBS)     Joint pain, hip     Limb pain     Myalgia     myalgia and myositis    Need for prophylactic vaccination against single diseases     Need for prophylactic vaccination and inoculation against influenza     Preoperative cardiovascular examination     Primary osteoarthritis of right knee     Right leg pain     Vaginal Pap smear     reported pap smear       Past Surgical History:   Procedure Laterality Date    ANKLE ARTHRODESIS Right     BACK SURGERY      BARIATRIC SURGERY      CHOLECYSTECTOMY      x2    ESOPHAGOGASTRODUODENOSCOPY N/A 3/23/2018    Procedure: ESOPHAGOGASTRODUODENOSCOPY (EGD); Surgeon: Juan Polanco MD;  Location: [facility]; Service: Gastroenterology    FOOT SURGERY      HIP CLOSE REDUCTION Right 8/21/2016    Procedure: CLOSED REDUCTION RIGHT TOTAL HIP;  Surgeon: Anson Holter, MD;  [facility];  Service:     JOINT REPLACEMENT      LEFT KNEE REPLACEMENT    PILONIDAL CYST EXCISION      x2    REPLACEMENT TOTAL KNEE Right     SILVANA-EN-Y PROCEDURE      x2    TOTAL KNEE ARTHROPLASTY      UMBILICAL HERNIA REPAIR      x2       Family History   Problem Relation Age of Onset    Coronary artery disease Mother     Heart attack Mother     Hypertension Mother     Coronary artery disease Father     Heart attack Father     Hypertension Father     Lymphoma Sister         acute    Rashes / Skin problems Sister         lymphomatoid papulosis    Coronary artery disease Brother     Heart attack Brother         x2     I have reviewed and agree with the history as documented  Social History   Substance Use Topics    Smoking status: Never Smoker    Smokeless tobacco: Never Used    Alcohol use Yes      Comment:  social        Review of Systems   Constitutional: Negative for chills, diaphoresis, fatigue and fever  HENT: Negative for congestion, postnasal drip, sore throat and trouble swallowing  Respiratory: Positive for shortness of breath  Negative for hemoptysis, chest tightness and wheezing  Cardiovascular: Negative for chest pain and syncope  Gastrointestinal: Negative for abdominal pain  Genitourinary: Negative for dysuria  Musculoskeletal: Negative for back pain  Skin: Negative for rash  Allergic/Immunologic: Negative for immunocompromised state  Neurological: Negative for light-headedness and headaches  Psychiatric/Behavioral: Negative for confusion  Physical Exam  Physical Exam   Constitutional: She is oriented to person, place, and time  She appears well-developed and well-nourished  HENT:   Head: Normocephalic and atraumatic  Mouth/Throat: Uvula is midline, oropharynx is clear and moist and mucous membranes are normal  No tonsillar exudate  Eyes: Pupils are equal, round, and reactive to light  Neck: Normal range of motion  Neck supple  Cardiovascular: An irregularly irregular rhythm present  Tachycardia present  Pulmonary/Chest: Effort normal and breath sounds normal    Abdominal: Soft  Bowel sounds are normal  There is no tenderness  There is no rebound and no guarding  Musculoskeletal: Normal range of motion  Neurological: She is alert and oriented to person, place, and time  Patient moving all extremities equally, no focal neuro deficits noted  Skin: Skin is warm and dry  Capillary refill takes less than 2 seconds  Psychiatric: She has a normal mood and affect  Nursing note and vitals reviewed        Vital Signs  ED Triage Vitals   Temperature Pulse Respirations Blood Pressure SpO2   07/02/18 1527 07/02/18 1534 07/02/18 1534 07/02/18 1534 07/02/18 1534   98 1 °F (36 7 °C) 82 20 112/77 98 %      Temp Source Heart Rate Source Patient Position - Orthostatic VS BP Location FiO2 (%)   07/02/18 1527 07/02/18 1534 07/02/18 1534 07/02/18 1534 --   Oral Monitor Sitting Right arm       Pain Score       07/02/18 1545       No Pain           Vitals:    07/02/18 1545 07/02/18 1615 07/02/18 1645 07/02/18 1800   BP: 116/90 114/63 107/70 137/78   Pulse: (!) 116 74 80 (!) 130   Patient Position - Orthostatic VS: Sitting Sitting Sitting Lying       Visual Acuity      ED Medications  Medications   bisoprolol (ZEBETA) tablet 10 mg (not administered)   desoximetasone (TOPICORT) 0 25 % cream (not administered)   DULoxetine (CYMBALTA) delayed release capsule 60 mg (not administered)   fluticasone (FLONASE) 50 mcg/act nasal spray 1 spray (not administered)   sucralfate (CARAFATE) oral suspension 1,000 mg (not administered)   warfarin (COUMADIN) tablet 4 mg (not administered)   traMADol (ULTRAM) tablet 25 mg (not administered)   sodium chloride 0 9 % infusion (not administered)   ondansetron (ZOFRAN) injection 4 mg (not administered)   senna (SENOKOT) tablet 8 6 mg (not administered)   acetaminophen (TYLENOL) tablet 650 mg (not administered)   diltiazem (CARDIZEM) 125 mg in sodium chloride 0 9 % 125 mL infusion (not administered)   diltiazem (CARDIZEM) injection 10 mg (10 mg Intravenous Given 7/2/18 1552)   diltiazem (CARDIZEM) 125 mg in sodium chloride 0 9 % 125 mL infusion (5 mg/hr Intravenous New Bag 7/2/18 1650)   sodium chloride 0 9 % bolus 500 mL (500 mL Intravenous New Bag 7/2/18 1609)       Diagnostic Studies  Results Reviewed     Procedure Component Value Units Date/Time    Magnesium [[id number]]  (Normal) Collected:  07/02/18 1604    Lab Status:  Final result Specimen:  Blood from Arm, Left Updated:  07/02/18 1641     Magnesium 1 8 mg/dL     B-type natriuretic peptide [[id number]]  (Abnormal) Collected:  07/02/18 1604    Lab Status:  Final result Specimen:  Blood from Arm, Left Updated:  07/02/18 1641     NT-proBNP 5,945 (H) pg/mL     Troponin I [[id number]]  (Normal) Collected:  07/02/18 1604    Lab Status:  Final result Specimen:  Blood from Arm, Left Updated:  07/02/18 1637     Troponin I <0 02 ng/mL     Comprehensive metabolic panel [[id number]]  (Abnormal) Collected:  07/02/18 1604    Lab Status:  Final result Specimen:  Blood from Arm, Left Updated:  07/02/18 1635     Sodium 140 mmol/L      Potassium 3 7 mmol/L      Chloride 103 mmol/L      CO2 26 mmol/L      Anion Gap 11 mmol/L      BUN 41 (H) mg/dL      Creatinine 1 56 (H) mg/dL      Glucose 115 mg/dL      Calcium 8 5 mg/dL      AST 17 U/L      ALT 18 U/L      Alkaline Phosphatase 88 U/L      Total Protein 6 8 g/dL      Albumin 3 2 (L) g/dL Total Bilirubin 0 50 mg/dL      eGFR 30 ml/min/1 73sq m     Narrative:         National Kidney Disease Education Program recommendations are as follows:  GFR calculation is accurate only with a steady state creatinine  Chronic Kidney disease less than 60 ml/min/1 73 sq  meters  Kidney failure less than 15 ml/min/1 73 sq  meters  Protime-INR [[id number]]  (Abnormal) Collected:  07/02/18 1604    Lab Status:  Final result Specimen:  Blood from Arm, Left Updated:  07/02/18 1631     Protime 21 7 (H) seconds      INR 1 96 (H)    APTT [[id number]]  (Normal) Collected:  07/02/18 1604    Lab Status:  Final result Specimen:  Blood from Arm, Left Updated:  07/02/18 1631     PTT 32 seconds     CBC and differential [[id number]]  (Abnormal) Collected:  07/02/18 1604    Lab Status:  Final result Specimen:  Blood from Arm, Left Updated:  07/02/18 1612     WBC 7 87 Thousand/uL      RBC 3 82 Million/uL      Hemoglobin 11 2 (L) g/dL      Hematocrit 35 7 %      MCV 94 fL      MCH 29 3 pg      MCHC 31 4 g/dL      RDW 15 2 (H) %      MPV 11 5 fL      Platelets 416 Thousands/uL      Neutrophils Relative 80 (H) %      Lymphocytes Relative 11 (L) %      Monocytes Relative 9 %      Eosinophils Relative 0 %      Basophils Relative 0 %      Neutrophils Absolute 6 29 Thousands/µL      Lymphocytes Absolute 0 86 Thousands/µL      Monocytes Absolute 0 68 Thousand/µL      Eosinophils Absolute 0 02 Thousand/µL      Basophils Absolute 0 02 Thousands/µL                  XR chest 2 views   Final Result by Antonina Jerry DO (07/02 1715)      No acute cardiopulmonary disease              Workstation performed: [id number]                    Procedures  ECG 12 Lead Documentation  Date/Time: 7/2/2018 3:45 PM  Performed by: Keron Hewitt  Authorized by: Keron Hewitt     Indications / Diagnosis:  Dizziness  ECG reviewed by me, the ED Provider: yes    Patient location:  ED  Previous ECG:     Previous ECG:  Compared to current  Comments:      AFib with rest with ventricular response at 121 beats per minute  Normal axis, normal intervals, nonspecific ST T wave abnormalities  When compared to previous EKG from 03/22/2018 the rate has decreased from 160 to 121 but still AFib RVR and nonspecific ST T wave abnormalities  Phone Contacts  ED Phone Contact    ED Course                               MDM  Number of Diagnoses or Management Options  Atrial fibrillation with RVR [facility]): new and requires workup  Diagnosis management comments: MDM: Patient presents to the Emergency Department and was diagnosed with acute a fib with RVR  This is a new problem that will require additional planned work-up in a hospitalized setting  Clinical laboratory testing, radiology imaging, and medical testing (EKG) were ordered  I independently reviewed the radiologic imaging, EKG, and laboratory studies  This case is considered high risk secondary to the above listed disease process that poses a threat to bodily function that requires further diagnostic testing and management which may include the administration of parenteral controlled substances  Discussed with ARELIS  We had a detailed discussion of the patient's condition and case,  including need for admission  Accepts to his/her service  Bed request/bridging orders placed             Amount and/or Complexity of Data Reviewed  Clinical lab tests: ordered and reviewed  Tests in the radiology section of CPT®: ordered and reviewed  Tests in the medicine section of CPT®: reviewed and ordered  Decide to obtain previous medical records or to obtain history from someone other than the patient: yes  Review and summarize past medical records: yes  Discuss the patient with other providers: yes  Independent visualization of images, tracings, or specimens: yes    Risk of Complications, Morbidity, and/or Mortality  Presenting problems: high  Diagnostic procedures: high  Management options: high    Patient Progress  Patient progress: stable    CritCare Time    Disposition  Final diagnoses:   Atrial fibrillation with RVR ([facility] [address] )     Time reflects when diagnosis was documented in both MDM as applicable and the Disposition within this note     Time User Action Codes Description Comment    7/2/2018  5:02 PM Jenny Montana [I48 [id number]] Atrial fibrillation with RVR ([facility] [address] )     7/2/2018  6:11 PM Eloy, Alpiniano B Modify [I48 91] Atrial fibrillation with RVR ([facility] [address] )     7/2/2018  6:11 PM Eloy, Alpiniano B Add [L93 47] Chronic diastolic [facility]congest[facility]     7/2/2018  6:11 PM Eloy, Alpiniano B Modify [I48 91] Atrial fibrillation with RVR ([facility]     7/2/2018  6:11 PM Eloy, Alpiniano B Modify [U46 57] Chronic diastolic CHF (congestive heart failure) ([facility]     7/2/2018  6:11 PM Eloy, Alpiniano B Modify [I48 91] Atrial fibrillation with RVR ([facility] [address] )     7/2/2018  6:11 PM Eloy, Alpiniano B Modify [I48 91] Atrial fibrillation with RVR ([facility] [address] )     7/2/2018  6:11 PM Eloy, Alpiniano B Add [R55] Near syncope     7/2/2018  6:11 PM Eloy, Alpiniano B Modify [I48 91] Atrial fibrillation with RVR ([facility] [address] )     7/2/2018  6:11 PM Eloy, Alpiniano B Modify [R55] Near syncope     7/2/2018  6:11 PM Eloy, Alpiniano B Modify [I48 91] Atrial fibrillation with RVR ([facility] [address] )     7/2/2018  6:11 PM Eloy, Alpiniano B Modify [I48 91] Atrial fibrillation with RVR [facility]       ED Disposition     ED Disposition Condition Comment    Admit  Case was discussed with ARELIS and the patient's admission status was agreed to be Admission Status: inpatient status to the service of Dr Ioana Howe           Follow-up Information    None         Current Discharge Medication List      CONTINUE these medications which have NOT CHANGED    Details   bisoprolol (ZEBETA) 10 MG tablet Take 1 tablet (10 mg total) by mouth daily  Qty: 90 tablet, Refills: 3    Associated Diagnoses: Atrial fibrillation, unspecified type (HCC)      desoximetasone (TOPICORT) 0 25 % cream Apply topically diltiazem (CARDIZEM) 120 MG tablet Take 2 tablets in the morning, 1 tablet at night  Qty: 90 tablet, Refills: 11    Associated Diagnoses: Atrial fibrillation, chronic (HCC)      DULoxetine (CYMBALTA) 60 mg delayed release capsule Take 60 mg by mouth daily  fluticasone (FLONASE) 50 mcg/act nasal spray 1 spray into each nostril daily  potassium chloride (KLOR-CON) 20 mEq packet Take 10 mEq by mouth as needed        sucralfate (CARAFATE) 1 g/10 mL suspension Take 10 mL (1,000 mg total) by mouth every 6 (six) hours  Qty: 420 mL, Refills: 0    Associated Diagnoses: Acute upper GI bleed      torsemide (DEMADEX) 20 mg tablet On days you are home, take 40mg twice a day  On days you are out, take 40mg in PM   Qty: 120 tablet, Refills: 3    Associated Diagnoses: Chronic diastolic CHF (congestive heart failure) (HCC)      traMADol (ULTRAM) 50 mg tablet TAKE ONE TABLET BY MOUTH TWICE DAILY AS NEEDED FOR PAIN  Qty: 60 tablet, Refills: 0    Associated Diagnoses: Neuropathy      !! warfarin (COUMADIN) 3 mg tablet Take 1 tablet daily or as directed  Qty: 90 tablet, Refills: 1    Associated Diagnoses: Atrial fibrillation, unspecified type ([address] )      ! ! warfarin (COUMADIN) 4 mg tablet Take 4 mg by mouth every other day    Refills: 3      !! warfarin (COUMADIN) 5 mg tablet TAKE ONE TABLET BY MOUTH ONCE DAILY AS DIRECTED  Qty: 90 tablet, Refills: 0    Associated Diagnoses: Atrial fibrillation, unspecified type ([address] )       ! ! - Potential duplicate medications found  Please discuss with provider  No discharge procedures on file      ED Provider  Electronically Signed by           Kevin Womack, DO  07/02/18 615 DAMIAN Rodgers, DO  07/02/18 1265

## 2018-07-02 NOTE — ASSESSMENT & PLAN NOTE
· Presently compensated  · According to the patient, her diuretic medications were recently increased  According to her, since she was diuresed, she had lost 40 lb  · Patient is presently clinically dry  Thus will hold off patient's diuretic medications  · Continue other cardiovascular heart failure medications  · Cardiology consult

## 2018-07-02 NOTE — ASSESSMENT & PLAN NOTE
· In the past 2 weeks, patient had been having lightheadedness/dizziness and feeling that she is going to pass out every time she would get up  · This is likely due to hypovolemia/dehydration, likely orthostasis  · Patient is clinically dry, and diuretic medications were increased lately with 40 lb weight loss, and with hot weather today  Patient has acute kidney injury at this point likely due to dehydration  · Hold off diuretics for now  · Gentle IV fluids  · PT OT eval   · Orthostatic vital signs

## 2018-07-02 NOTE — ASSESSMENT & PLAN NOTE
· Patient came in with rapid ventricle response and has history of atrial fibrillation  · Continue Cardizem drip  We will discontinue Cardizem drip once heart rate is below 100, and at that time, we may restart patient's oral Cardizem  · Continue patient's oral beta-blocker  · Dr Jose Barnes, patient's outpatient cardiologist, recommended to increase patient's oral Cardizem doses to 240 mg in the morning and 120 mg in the evening  Likely will start this medication once heart rate is more controlled with the Cardizem drip, and once Cardizem drip is titrated down and off  · Continue Coumadin  Recheck PT and INR  · Patient's atrial fibrillation with rapid ventricular response likely brought about by patient's dehydration also  Thus will gently hydrate the patient  · Patient's Holter monitor in the out patient also having rapid ventricle response at 118 per minute  · Cardiology consult

## 2018-07-02 NOTE — TELEPHONE ENCOUNTER
Patient called sched for appt at 2 pm,  pt has ongoing issues of dizziness, nausea and abd pain from ulcer  Over the weekend she ate a tiny piece of rib  At night she bought a large amount of mucous/bile up,  since then she is only able to take small sips of fluid  She has not eaten and feels very nauseated and dizzy      I scheduled her with you today at 2 PM

## 2018-07-02 NOTE — ASSESSMENT & PLAN NOTE
· Mild and likely chronic based on my review of patient's previous CBCs  · Monitor  · For further workup management if this worsens significantly

## 2018-07-02 NOTE — TELEPHONE ENCOUNTER
Pt c/o increased dizziness, emesis, bringing up mucous and c/o dry hemarilyn  Stated she had bariatric sx and emesis is ongoing, but worse than normal   C/o SOB on exertion  Wt down, no edema noted   Recommended an OV w/PCP and call cardiology if he recommends an appt

## 2018-07-02 NOTE — ASSESSMENT & PLAN NOTE
· Based on my review of patient's previous BMP, likely patient has chronic kidney disease stage 2 to 3 with baseline serum creatinine around 0 8 to 1 1  · This is likely prerenal with over diuresis, and being in the hot weather this week  · Will do gentle IV fluid hydration  · Monitor kidney function  · Monitor input and output  · For further workup and management if this will not improve significantly

## 2018-07-02 NOTE — PROGRESS NOTES
Holter monitor suggest average heart rate 118 in the setting of chronic AFib, not well controlled, recommending increasing Cardizem to 120 mg  2 tablets in the morning,  1 tablet at night

## 2018-07-03 LAB
ANION GAP SERPL CALCULATED.3IONS-SCNC: 11 MMOL/L (ref 4–13)
BUN SERPL-MCNC: 31 MG/DL (ref 5–25)
CALCIUM SERPL-MCNC: 8.3 MG/DL (ref 8.3–10.1)
CHLORIDE SERPL-SCNC: 105 MMOL/L (ref 100–108)
CO2 SERPL-SCNC: 25 MMOL/L (ref 21–32)
CREAT SERPL-MCNC: 1.17 MG/DL (ref 0.6–1.3)
ERYTHROCYTE [DISTWIDTH] IN BLOOD BY AUTOMATED COUNT: 15.2 % (ref 11.6–15.1)
GFR SERPL CREATININE-BSD FRML MDRD: 43 ML/MIN/1.73SQ M
GLUCOSE SERPL-MCNC: 89 MG/DL (ref 65–140)
HCT VFR BLD AUTO: 34.3 % (ref 34.8–46.1)
HGB BLD-MCNC: 10.6 G/DL (ref 11.5–15.4)
INR PPP: 2.05 (ref 0.86–1.17)
MAGNESIUM SERPL-MCNC: 2 MG/DL (ref 1.6–2.6)
MCH RBC QN AUTO: 29 PG (ref 26.8–34.3)
MCHC RBC AUTO-ENTMCNC: 30.9 G/DL (ref 31.4–37.4)
MCV RBC AUTO: 94 FL (ref 82–98)
PLATELET # BLD AUTO: 159 THOUSANDS/UL (ref 149–390)
PMV BLD AUTO: 11.6 FL (ref 8.9–12.7)
POTASSIUM SERPL-SCNC: 3.3 MMOL/L (ref 3.5–5.3)
PROTHROMBIN TIME: 22.5 SECONDS (ref 11.8–14.2)
RBC # BLD AUTO: 3.65 MILLION/UL (ref 3.81–5.12)
SODIUM SERPL-SCNC: 141 MMOL/L (ref 136–145)
TSH SERPL DL<=0.05 MIU/L-ACNC: 6.59 UIU/ML (ref 0.36–3.74)
WBC # BLD AUTO: 6.19 THOUSAND/UL (ref 4.31–10.16)

## 2018-07-03 PROCEDURE — G8979 MOBILITY GOAL STATUS: HCPCS

## 2018-07-03 PROCEDURE — 85610 PROTHROMBIN TIME: CPT | Performed by: INTERNAL MEDICINE

## 2018-07-03 PROCEDURE — 83735 ASSAY OF MAGNESIUM: CPT | Performed by: INTERNAL MEDICINE

## 2018-07-03 PROCEDURE — 80048 BASIC METABOLIC PNL TOTAL CA: CPT | Performed by: INTERNAL MEDICINE

## 2018-07-03 PROCEDURE — G8978 MOBILITY CURRENT STATUS: HCPCS

## 2018-07-03 PROCEDURE — 99232 SBSQ HOSP IP/OBS MODERATE 35: CPT | Performed by: INTERNAL MEDICINE

## 2018-07-03 PROCEDURE — 99222 1ST HOSP IP/OBS MODERATE 55: CPT | Performed by: INTERNAL MEDICINE

## 2018-07-03 PROCEDURE — 97163 PT EVAL HIGH COMPLEX 45 MIN: CPT

## 2018-07-03 PROCEDURE — 84443 ASSAY THYROID STIM HORMONE: CPT | Performed by: INTERNAL MEDICINE

## 2018-07-03 PROCEDURE — 85027 COMPLETE CBC AUTOMATED: CPT | Performed by: INTERNAL MEDICINE

## 2018-07-03 RX ORDER — DILTIAZEM HYDROCHLORIDE 60 MG/1
60 TABLET, FILM COATED ORAL EVERY 6 HOURS SCHEDULED
Status: DISCONTINUED | OUTPATIENT
Start: 2018-07-03 | End: 2018-07-04 | Stop reason: HOSPADM

## 2018-07-03 RX ORDER — BISOPROLOL FUMARATE 5 MG/1
5 TABLET ORAL EVERY 12 HOURS
Status: DISCONTINUED | OUTPATIENT
Start: 2018-07-03 | End: 2018-07-04 | Stop reason: HOSPADM

## 2018-07-03 RX ORDER — POTASSIUM CHLORIDE 20 MEQ/1
40 TABLET, EXTENDED RELEASE ORAL ONCE
Status: COMPLETED | OUTPATIENT
Start: 2018-07-03 | End: 2018-07-03

## 2018-07-03 RX ADMIN — DILTIAZEM HYDROCHLORIDE 30 MG: 30 TABLET, FILM COATED ORAL at 00:08

## 2018-07-03 RX ADMIN — DILTIAZEM HYDROCHLORIDE 30 MG: 30 TABLET, FILM COATED ORAL at 12:15

## 2018-07-03 RX ADMIN — SUCRALFATE 1000 MG: 1 SUSPENSION ORAL at 05:02

## 2018-07-03 RX ADMIN — SUCRALFATE 1000 MG: 1 SUSPENSION ORAL at 17:47

## 2018-07-03 RX ADMIN — DILTIAZEM HYDROCHLORIDE 60 MG: 60 TABLET, FILM COATED ORAL at 17:47

## 2018-07-03 RX ADMIN — SODIUM CHLORIDE 50 ML/HR: 0.9 INJECTION, SOLUTION INTRAVENOUS at 05:02

## 2018-07-03 RX ADMIN — SUCRALFATE 1000 MG: 1 SUSPENSION ORAL at 00:08

## 2018-07-03 RX ADMIN — DILTIAZEM HYDROCHLORIDE 60 MG: 60 TABLET, FILM COATED ORAL at 23:50

## 2018-07-03 RX ADMIN — DULOXETINE 60 MG: 60 CAPSULE, DELAYED RELEASE ORAL at 09:45

## 2018-07-03 RX ADMIN — POTASSIUM CHLORIDE 40 MEQ: 1500 TABLET, EXTENDED RELEASE ORAL at 09:45

## 2018-07-03 RX ADMIN — FLUTICASONE PROPIONATE 1 SPRAY: 50 SPRAY, METERED NASAL at 09:45

## 2018-07-03 RX ADMIN — SUCRALFATE 1000 MG: 1 SUSPENSION ORAL at 23:50

## 2018-07-03 RX ADMIN — BISOPROLOL FUMARATE 5 MG: 5 TABLET ORAL at 09:45

## 2018-07-03 RX ADMIN — SUCRALFATE 1000 MG: 1 SUSPENSION ORAL at 12:20

## 2018-07-03 RX ADMIN — WARFARIN SODIUM 4 MG: 4 TABLET ORAL at 17:47

## 2018-07-03 NOTE — ASSESSMENT & PLAN NOTE
· Recently had a 24 hour Holter monitor on 6/28/18 which showed atrial fibrillation with RVR  This was however in the setting of volume depletion  · Rates control has improved with IV fluids  She is currently off Cardizem infusion and has been resumed on oral Cardizem  Was on 240mg q a m and 120 mg Q p m  · Started on 60 mg Q 6 hours per Cardiology, ? Increase back to home dose    Will discuss with Cardiology further on this

## 2018-07-03 NOTE — ASSESSMENT & PLAN NOTE
· Patient with orthostatics symptoms over the last couple of weeks in the setting of intravascular volume depletion due to aggressive diuresis  · Symptoms have since improved with IV fluids and patient feels back at her baseline currently   · Diuretics remain on hold    Will need to resume at a lower dose on discharge

## 2018-07-03 NOTE — CASE MANAGEMENT
Thank you,  Jen Aqq  [facility] Utilization Review Department  Phone: [phone]; Fax [phone]  ATTENTION: The Network Utilization Review Department is now centralized for our 9 Facilities  Make a note that we have a new phone and fax numbers for our Department  Please call with any questions or concerns to [phone] and carefully follow the prompts so that you are directed to the right person  All voicemails are confidential  Fax any determinations, approvals, denials, and requests for initial or continue stay review clinical to [phone]  Due to HIGH CALL volume, it would be easier if you could please send faxed requests to expedite your requests and in part, help us provide discharge notifications faster  Initial Clinical Review    Admission: Date/Time/Statement:  INPATIENT ADMISSION 7/2/18 @ 1703     Orders Placed This Encounter   Procedures    Inpatient Admission (expected length of stay for this patient is greater than two midnights)     Standing Status:   Standing     Number of Occurrences:   1     Order Specific Question:   Admitting Physician     Answer:   Beth Steinberg [[id number]]     Order Specific Question:   Level of Care     Answer:   Med Surg [16]     Order Specific Question:   Estimated length of stay     Answer:   More than 2 Midnights     Order Specific Question:   Certification     Answer:   I certify that inpatient services are medically necessary for this patient for a duration of greater than two midnights  See H&P and MD Progress Notes for additional information about the patient's course of treatment           ED: Date/Time/Mode of Arrival:   ED Arrival Information     Expected Arrival Acuity Means of Arrival Escorted By Service Admission Type    - 7/2/2018 15:21 Urgent Ambulance [facility] General Medicine Urgent    Arrival Complaint    Dizziness          Chief Complaint:   Chief Complaint   Patient presents with    Syncope Increased nausea, vomiting, "dry heaving", increased [facility] and had a near syncolable episode  Been going on for about two week  History of Illness: 80 y o  Female presenting to ED with feeling she is going to pass out-currently has a Holter monitor and has been having atrial fibrillation with rapid ventricular response with /min  Pt has been having more episodes of lightheadedness when changing position from sit to stand, requiring her ot hold on to something to steady herself  She sought hep from her Cardiologist & PCP office who advised her to come to the ED       ED Vital Signs:   ED Triage Vitals   Temperature Pulse Respirations Blood Pressure SpO2   07/02/18 1527 07/02/18 1534 07/02/18 1534 07/02/18 1534 07/02/18 1534   98 1 °F (36 7 °C) 82 20 112/77 98 %      Temp Source Heart Rate Source Patient Position - Orthostatic VS BP Location FiO2 (%)   07/02/18 1527 07/02/18 1534 07/02/18 1534 07/02/18 1534 --   Oral Monitor Sitting Right arm       Pain Score       07/02/18 1545       No Pain        Wt Readings from Last 1 Encounters:   07/03/18 87 9 kg (193 lb 12 6 oz)       Vital Signs (abnormal): 07/02/2018: , 120 130, 116, 110, 116  07/03/2018: Temp 97 4    Abnormal Labs/Diagnostic Test Results: 07/02/2018: BUN 41, creatine 1 56, NT-proBNP 5,945, hgb 11 2, Protime 21 7, INR 1 96  EKG: Atrial fibrillation with rapid ventricular response  Nonspecific ST and T wave abnormality  Abnormal ECG  07/03/2018: RBC 3 65, hgb 10 6, hct 34 3, Protime 22 5, INR 2 05 TSH 3rd generation 6 591, potassium 3 3, BUN 31    ED Treatment:   Medication Administration from 07/02/2018 1520 to 07/02/2018 1800       Date/Time Order Dose Route Action Action by Comments     07/02/2018 1552 diltiazem (CARDIZEM) injection 10 mg 10 mg Intravenous Given Lissette Torres RN      07/02/2018 1650 diltiazem (CARDIZEM) 125 mg in sodium chloride 0 9 % 125 mL infusion 5 mg/hr Intravenous New Bag Lissette Torres RN HR 100     07/02/2018 1610 diltiazem (CARDIZEM) 125 mg in sodium chloride 0 9 % 125 mL infusion 0 mg/hr Intravenous Hold Paul Shore RN      07/02/2018 1609 sodium chloride 0 9 % bolus 500 mL 500 mL Intravenous New Bag Paul Shore RN           Past Medical/Surgical History:    Active Ambulatory Problems     Diagnosis Date Noted    [facility] 03/07/2016    Anemia 03/09/2016    Fall 08/21/2016    Hip dislocation, right (HCC) 08/21/2016    Chronic diastolic CHF (congestive heart failure) ([facility] ) 08/23/2016    Depression 08/23/2016    Obesity without serious comorbidity 08/23/2016    Chronic pain 08/23/2016    Abdominal bloating 08/01/2016    Abdominal pain 12/08/2012    Arthralgia of hip 04/14/2015    Arthralgia of knee, right 09/18/2014    Benign essential hypertension 12/09/2012    Chronic pain of lower extremity, bilateral 06/23/2016    Fibromyalgia 05/31/2012    External hemorrhoids 03/25/2016    Irritable bowel syndrome with constipation 09/15/2015    Lumbar canal stenosis 05/17/2012    Moderate mitral regurgitation 05/13/2014    Osteoarthritis, multiple sites 06/26/2014    Osteoporosis 02/04/2014    Palpitations 05/04/2015    Peripheral neuropathy 05/25/2012    Primary osteoarthritis of right knee 06/30/2015    Synovial cyst of right popliteal space 08/04/2015    Unstable right hip 04/04/2017    Urinary incontinence 05/13/2014    Exertional chest pain 01/26/2018    Right lower quadrant abdominal pain 01/26/2018    GI bleeding 03/22/2018    Ventral hernia without obstruction or gangrene 03/22/2018    Abnormal CT of the abdomen 03/22/2018    Gastrointestinal hemorrhage with melena 03/22/2018    Ulcer 03/29/2018    Chronic pain of right knee 05/15/2018     Resolved Ambulatory Problems     Diagnosis Date Noted    Acute diastolic congestive heart failure (HCC) 03/07/2016    Leg swelling 03/07/2016    Shortness of breath 03/08/2016    Hypokalemia 03/09/2016    Elevated TSH 03/09/2016    Left foot pain 03/11/2016     Past Medical History:   Diagnosis Date    Arthritis     [facility]est[facility]     Disturbance of smell and taste     Effusion of knee joint right     Fibromyalgia     Heart murmur     History of acute myocardial infarction     History of atrial fibrillation     History of bruising easily     History of cancer     History of dermatitis     History of mammogram     History of obesity     History of osteopenia     History of sciatica     History of shortness of breath     History of sinusitis     History of sore throat     History of syncope     History of umbilical hernia     History of viral infection     Irritable bowel syndrome (IBS)     Joint pain, hip     Limb pain     Myalgia     Need for prophylactic vaccination against single diseases     Need for prophylactic vaccination and inoculation against influenza     Preoperative cardiovascular examination     Primary osteoarthritis of right knee     Right leg pain     Vaginal Pap smear        Admitting Diagnosis: Syncope [R55]  At[facility] ([facility] ) [I48 [id number]]    Age/Sex: 80 y o  female    Assessment/Plan:  Near syncope   Assessment & Plan     · In the past 2 weeks, patient had been having lightheadedness/dizziness and feeling that she is going to pass out every time she would get up  · This is likely due to hypovolemia/dehydration, likely orthostasis  · Patient is clinically dry, and diuretic medications were increased lately with 40 lb weight loss, and with hot weather today  Patient has acute kidney injury at this point likely due to dehydration  · Hold off diuretics for now  · Gentle IV fluids    · PT OT eval   · Orthostatic vital signs           BRITTANY (acute kidney injury) ([facility] )   Assessment & Plan     · Based on my review of patient's previous BMP, likely patient has chronic kidney disease stage 2 to 3 with baseline serum creatinine around 0 8 to 1 1  · This is likely prerenal with over diuresis, and being in the hot weather this week  · Will do gentle IV fluid hydration  · Monitor kidney function  · Monitor input and output  · For further workup and management if this will not improve significantly             Atrial fibrillation with RVR ([facility])   Assessment & Plan     · Patient came in with rapid ventricle response and has history of atrial fibrillation  · Continue Cardizem drip  We will discontinue Cardizem drip once heart rate is below 100, and at that time, we may restart patient's oral Cardizem  · Continue patient's oral beta-blocker  · Dr Carlos Sheppard, patient's outpatient cardiologist, recommended to increase patient's oral Cardizem doses to 240 mg in the morning and 120 mg in the evening  Likely will start this medication once heart rate is more controlled with the Cardizem drip, and once Cardizem drip is titrated down and off  · Continue Coumadin  Recheck PT and INR  · Patient's atrial fibrillation with rapid ventricular response likely brought about by patient's dehydration also  Thus will gently hydrate the patient  · Patient's Holter monitor in the out patient also having rapid ventricle response at 118 per minute  · Cardiology consult           Chronic diastolic CHF (congestive heart failure) ([facility] )   Assessment & Plan     · Presently compensated  · According to the patient, her diuretic medications were recently increased  According to her, since she was diuresed, she had lost 40 lb  · Patient is presently clinically dry  Thus will hold off patient's diuretic medications  · Continue other cardiovascular heart failure medications  · Cardiology consult           Anemia   Assessment & Plan     · Mild and likely chronic based on my review of patient's previous CBCs  · Monitor    · For further workup management if this worsens significantly                VTE Prophylaxis: Warfarin (Coumadin)  / sequential compression device        Admission Orders:  Scheduled Meds:   Current Facility-Administered Medications:  acetaminophen 650 mg Oral Q6H PRN    bisoprolol 5 mg Oral Q12H    desoximetasone  Topical BID    diltiazem 1-15 mg/hr Intravenous Titrated Last Rate: Stopped (07/03/18 0704)   diltiazem 30 mg Oral Q6H JOSE    DULoxetine 60 mg Oral Daily    fluticasone 1 spray Nasal Daily    ondansetron 4 mg Intravenous Q6H PRN    senna 1 tablet Oral HS PRN    sodium chloride 50 mL/hr Intravenous Continuous Last Rate: 50 mL/hr (07/03/18 0502)   sucralfate 1,000 mg Oral Q6H [facility]    traMADol 25 mg Oral Q12H PRN    warfarin 4 mg Oral Daily (warfarin)      Continuous Infusions:   diltiazem 1-15 mg/hr Last Rate: Stopped (07/03/18 0704)   sodium chloride 50 mL/hr Last Rate: 50 mL/hr (07/03/18 0502)     Cardiac diet  PIV  Up w assist  Incentive spirometry  Daily wts  I/O  Orthostatic blood pressure  Pulse ox spot  Vitals routine  OT/PT eval & treat  48 hr tele  Nasal cannula O2  Consult Cardiology    Cardiology Consult  Assessment/Plan  1  Presyncope  Patient has been describing orthostasis symptoms for several weeks  She became presyncopal yesterday  I suspect this is due to intravascular volume depletion  Her symptoms are significantly improved with fluid administration  She did have an acute kidney injury on presentation which has resolved  I would stop her fluids this morning to avoid volume overload  She received 500 cc bolus of saline and then 125 cc an hour overnight  I would however continue to hold her diuretic for now  We need to confirm her diuretic dosing prior to determining her discharge dosing because there seems to be some confusion as to what she is taking  According her office note she is to be on Demadex 40 mg b i d   2   Chronic diastolic heart failure-diuretics as above  3  Persistent atrial fibrillation-she is much better rate controlled with fluid administration    She had recent Holter indicating a rapid ventricular response  This was in the setting of intravascular depletion  She however did not have controlled rates when she was in the office back in May  She is currently taking Cardizem 120 mg b i d  and she believes she is taking bisoprolol 10 mg daily  She is now off IV Cardizem  Continue PO meds and determine if there is need for titration  Currently her rates are controlled  Will continue her warfarin for stroke prophylaxis  4   Acute kidney injury-resolved with fluid administration    Patient was intravascularly depleted

## 2018-07-03 NOTE — CONSULTS
Consultation - Cardiology   Ivis Pinedo 80 y o  female MRN: [id number]  Unit/Bed#: -01 Encounter: [id number]    Assessment/Plan     Principal Problem:    Near syncope  Active Problems:    Atrial fibrillation with RVR ([facility])    Anemia    Chronic diastolic CHF (congestive heart failure) ([facility])    BRITTANY (acute kidney injury) ([facility] [address] )      Assessment/Plan    1  Presyncope  Patient has been describing orthostasis symptoms for several weeks  She became presyncopal yesterday  I suspect this is due to intravascular volume depletion  Her symptoms are significantly improved with fluid administration  She did have an acute kidney injury on presentation which has resolved  I would stop her fluids this morning to avoid volume overload  She received 500 cc bolus of saline and then 125 cc an hour overnight  I would however continue to hold her diuretic for now  We need to confirm her diuretic dosing prior to determining her discharge dosing because there seems to be some confusion as to what she is taking  According her office note she is to be on Demadex 40 mg b i d     2   Chronic diastolic heart failure-diuretics as above    3  Persistent atrial fibrillation-she is much better rate controlled with fluid administration  She had recent Holter indicating a rapid ventricular response  This was in the setting of intravascular depletion  She however did not have controlled rates when she was in the office back in May  She is currently taking Cardizem 120 mg b i d  and she believes she is taking bisoprolol 10 mg daily  She is now off IV Cardizem  Continue PO meds and determine if there is need for titration  Currently her rates are controlled  Will continue her warfarin for stroke prophylaxis  4   Acute kidney injury-resolved with fluid administration  Patient was intravascularly depleted      History of Present Illness   Physician Requesting Consult: Kayla Benson MD  Reason for Consult / Dominguez Calderon Problem:  Presyncope    HPI: Elsa Ward is a 80y o  year old female with chronic diastolic heart failure, persistent atrial fibrillation , moderate MR who presents with presyncope  She said she has been very lightheaded upon standing for several weeks  This is gotten pretty significant and yesterday she thought she was going to pass out  When she got to the emergency room her heart rate was rapid  It has been noted in the office that she had a recent Holter monitor which showed RVR  There was a call me from our office to her home last night she was increase her Cardizem to 240 mg in the morning and and 120 at night  She has been experiencing palpitations at home  Baseline creatinine 0 8-1 and creatinine yesterday was 1 5  She received fluids and the amount is unclear  She is currently on 125 cc an hour  Troponins are normal   ProBNP 5, 945  Her prescribed dosing of Demadex is 40 mg b i d   Sometime she does not take that 2nd dose  She says this is double what it was in her from early May  She was felt to have some decompensation at that point and her diuretic dose is increased  She said her cardiologist wanted to get about 20 lb of fluid off of her  She said her weight is down 40 lb from that time  Cardiac testing:  Lexiscan Myoview stress test May/20 18 no ischemia  2D echocardiogram 04/2018 normal LV function with RV mildly dilated with normal systolic function  Moderate MR  Moderate TR      Inpatient consult to Cardiology  Consult performed by: Lena Hernandez ordered by: Jamison Barkley          Review of Systems    Historical Information   Past Medical History:   Diagnosis Date    Arthritis     CHF (congestive heart failure) (HCC)     Disturbance of smell and taste     disturbance of taste    Effusion of knee joint right     Fibromyalgia     Heart murmur     reported a previous heart murmur    History of acute myocardial infarction     History of atrial fibrillation     History of bruising easily     History of cancer     History of dermatitis     History of mammogram     History of obesity     History of osteopenia     History of sciatica     History of shortness of breath     History of sinusitis     History of sore throat     History of syncope     History of umbilical hernia     History of viral infection     Irritable bowel syndrome (IBS)     Joint pain, hip     Limb pain     Myalgia     myalgia and myositis    Need for prophylactic vaccination against single diseases     Need for prophylactic vaccination and inoculation against influenza     Preoperative cardiovascular examination     Primary osteoarthritis of right knee     Right leg pain     Vaginal Pap smear     reported pap smear     Past Surgical History:   Procedure Laterality Date    ANKLE ARTHRODESIS Right     BACK SURGERY      BARIATRIC SURGERY      CHOLECYSTECTOMY      x2    ESOPHAGOGASTRODUODENOSCOPY N/A 3/23/2018    Procedure: ESOPHAGOGASTRODUODENOSCOPY (EGD); Surgeon: Kalyan Coulter MD;  [facility];   Service: Gastroenterology    FOOT SURGERY      HIP CLOSE REDUCTION Right 8/21/2016    Procedure: CLOSED REDUCTION RIGHT TOTAL HIP;  Surgeon: Faye Cardona MD;  [facility];  Service:     JOINT REPLACEMENT      LEFT KNEE REPLACEMENT    PILONIDAL CYST EXCISION      x2    REPLACEMENT TOTAL KNEE Right     SILVANA-EN-Y PROCEDURE      x2    TOTAL KNEE ARTHROPLASTY      UMBILICAL HERNIA REPAIR      x2     History   Alcohol Use    Yes     Comment:  social     History   Drug Use No     History   Smoking Status    Never Smoker   Smokeless Tobacco    Never Used     Family History:   Family History   Problem Relation Age of Onset    Coronary artery disease Mother     Heart attack Mother     Hypertension Mother     Coronary artery disease Father     Heart attack Father     Hypertension Father     Lymphoma Sister         acute    Rashes / Skin problems Sister         lymphomatoid papulosis    Coronary artery disease Brother     Heart attack Brother         x2       Meds/Allergies   current meds:   Current Facility-Administered Medications   Medication Dose Route Frequency    acetaminophen (TYLENOL) tablet 650 mg  650 mg Oral Q6H PRN    bisoprolol (ZEBETA) tablet 5 mg  5 mg Oral Q12H    desoximetasone (TOPICORT) 0 25 % cream   Topical BID    diltiazem (CARDIZEM) 125 mg in sodium chloride 0 9 % 125 mL infusion  1-15 mg/hr Intravenous Titrated    diltiazem (CARDIZEM) tablet 30 mg  30 mg Oral Q6H [facility]    DULoxetine (CYMBALTA) delayed release capsule 60 mg  60 mg Oral Daily    fluticasone (FLONASE) 50 mcg/act nasal spray 1 spray  1 spray Nasal Daily    ondansetron (ZOFRAN) injection 4 mg  4 mg Intravenous Q6H PRN    potassium chloride (K-DUR,KLOR-CON) CR tablet 40 mEq  40 mEq Oral Once    senna (SENOKOT) tablet 8 6 mg  1 tablet Oral HS PRN    sodium chloride 0 9 % infusion  50 mL/hr Intravenous Continuous    sucralfate (CARAFATE) oral suspension 1,000 mg  1,000 mg Oral Q6H JOSE    traMADol (ULTRAM) tablet 25 mg  25 mg Oral Q12H PRN    warfarin (COUMADIN) tablet 4 mg  4 mg Oral Daily (warfarin)    and PTA meds:  Prescriptions Prior to Admission   Medication    bisoprolol (ZEBETA) 10 MG tablet    desoximetasone (TOPICORT) 0 25 % cream    diltiazem (CARDIZEM) 120 MG tablet    DULoxetine (CYMBALTA) 60 mg delayed release capsule    fluticasone (FLONASE) 50 mcg/act nasal spray    potassium chloride (KLOR-CON) 20 mEq packet    sucralfate (CARAFATE) 1 g/10 mL suspension    torsemide (DEMADEX) 20 mg tablet    traMADol (ULTRAM) 50 mg tablet    warfarin (COUMADIN) 3 mg tablet    warfarin (COUMADIN) 4 mg tablet    warfarin (COUMADIN) 5 mg tablet     Allergies   Allergen Reactions    Codeine Vomiting and GI Intolerance     GI upset, ana m morphine  Reaction Date: 24Apr2012;     Other      Other reaction(s):  Other (See Comments)  ana m toradol in OR 6/06 sah    Oxycodone Hcl Vomiting     Reaction Date: 24Apr2012;     Oxycodone-Acetaminophen      Other reaction(s): Other (See Comments)  GI upset; ana m vicodin    Percocet [Oxycodone-Acetaminophen]     Seasonal Ic  [Cholestatin] Allergic Rhinitis    Bactrim [Sulfamethoxazole-Trimethoprim] Rash    Sulfa Antibiotics Rash     Other reaction(s): Other (See Comments)  takes lasix @home       Objective   Vitals: Blood pressure 108/65, pulse 98, temperature (!) 97 4 °F (36 3 °C), temperature source Oral, resp  rate 18, height 5' 2" (1 575 m), weight 87 9 kg (193 lb 12 6 oz), SpO2 97 %    Orthostatic Blood Pressures      Most Recent Value   Blood Pressure  108/65 filed at 07/03/2018 0722   Patient Position - Orthostatic VS  Lying filed at 07/03/2018 0680            Intake/Output Summary (Last 24 hours) at 07/03/18 0933  Last data filed at 07/03/18 0501   Gross per 24 hour   Intake              980 ml   Output                0 ml   Net              980 ml       Invasive Devices     Peripheral Intravenous Line            Peripheral IV 07/02/18 Right Antecubital 1 day                Physical Exam: /65 (BP Location: Left arm)   Pulse 98   Temp (!) 97 4 °F (36 3 °C) (Oral)   Resp 18   Ht 5' 2" (1 575 m)   Wt 87 9 kg (193 lb 12 6 oz)   SpO2 97%   BMI 35 44 kg/m²      General Appearance:    Alert, cooperative, no distress, appears stated age   Head:    Normocephalic, no scleral icterus   Eyes:    PERRL   Nose:   Nares normal, septum midline, mucosa normal, no drainage    Throat:   Lips, mucosa, and tongue normal   Neck:   Supple, symmetrical, trachea midline            Lungs:     Clear to auscultation bilaterally, respirations unlabored   Chest Wall:    No tenderness or deformity    Heart:    Iregular rate and rhythm, S1 and S2 normal, no murmur, rub   or gallop   Abdomen:     Soft, non-tender, bowel sounds active all four quadrants,     no masses, no organomegaly   Extremities:   Extremities normal, atraumatic, no cyanosis , trace edema   Pulses:   2+ and symmetric all extremities   Skin:   Skin color, texture, turgor normal, no rashes or lesions   Neurologic:   Alert and oriented to person place and time   No focal deficits       Lab Results:   Recent Results (from the past 72 hour(s))   ECG 12 lead    Collection Time: 07/02/18  3:39 PM   Result Value Ref Range    Ventricular Rate 121 BPM    Atrial Rate 250 BPM    AR Interval  ms    QRSD Interval 92 ms    QT Interval 330 ms    QTC Interval 468 ms    P Axis 0 degrees    QRS Axis -20 degrees    T Wave Axis -69 degrees   Comprehensive metabolic panel    Collection Time: 07/02/18  4:04 PM   Result Value Ref Range    Sodium 140 136 - 145 mmol/L    Potassium 3 7 3 5 - 5 3 mmol/L    Chloride 103 100 - 108 mmol/L    CO2 26 21 - 32 mmol/L    Anion Gap 11 4 - 13 mmol/L    BUN 41 (H) 5 - 25 mg/dL    Creatinine 1 56 (H) 0 60 - 1 30 mg/dL    Glucose 115 65 - 140 mg/dL    Calcium 8 5 8 3 - 10 1 mg/dL    AST 17 5 - 45 U/L    ALT 18 12 - 78 U/L    Alkaline Phosphatase 88 46 - 116 U/L    Total Protein 6 8 6 4 - 8 2 g/dL    Albumin 3 2 (L) 3 5 - 5 0 g/dL    Total Bilirubin 0 50 0 20 - 1 00 mg/dL    eGFR 30 ml/min/1 73sq m   CBC and differential    Collection Time: 07/02/18  4:04 PM   Result Value Ref Range    WBC 7 87 4 31 - 10 16 Thousand/uL    RBC 3 82 3 81 - 5 12 Million/uL    Hemoglobin 11 2 (L) 11 5 - 15 4 g/dL    Hematocrit 35 7 34 8 - 46 1 %    MCV 94 82 - 98 fL    MCH 29 3 26 8 - 34 3 pg    MCHC 31 4 31 4 - 37 4 g/dL    RDW 15 2 (H) 11 6 - 15 1 %    MPV 11 5 8 9 - 12 7 fL    Platelets 878 823 - 438 Thousands/uL    Neutrophils Relative 80 (H) 43 - 75 %    Lymphocytes Relative 11 (L) 14 - 44 %    Monocytes Relative 9 4 - 12 %    Eosinophils Relative 0 0 - 6 %    Basophils Relative 0 0 - 1 %    Neutrophils Absolute 6 29 1 85 - 7 62 Thousands/µL    Lymphocytes Absolute 0 86 0 60 - 4 47 Thousands/µL    Monocytes Absolute 0 68 0 17 - 1 22 Thousand/µL    Eosinophils Absolute 0 02 0 00 - 0 61 Thousand/µL    Basophils Absolute 0 02 0 00 - 0 10 Thousands/µL   Troponin I    Collection Time: 07/02/18  4:04 PM   Result Value Ref Range    Troponin I <0 02 <=0 04 ng/mL   Protime-INR    Collection Time: 07/02/18  4:04 PM   Result Value Ref Range    Protime 21 7 (H) 11 8 - 14 2 seconds    INR 1 96 (H) 0 86 - 1 17   APTT    Collection Time: 07/02/18  4:04 PM   Result Value Ref Range    PTT 32 24 - 36 seconds   Magnesium    Collection Time: 07/02/18  4:04 PM   Result Value Ref Range    Magnesium 1 8 1 6 - 2 6 mg/dL   B-type natriuretic peptide    Collection Time: 07/02/18  4:04 PM   Result Value Ref Range    NT-proBNP 5,945 (H) <450 pg/mL   Troponin I    Collection Time: 07/02/18  7:00 PM   Result Value Ref Range    Troponin I <0 02 <=0 04 ng/mL   Troponin I    Collection Time: 07/02/18  9:57 PM   Result Value Ref Range    Troponin I <0 02 <=0 04 ng/mL   CBC (With Platelets)    Collection Time: 07/03/18  6:40 AM   Result Value Ref Range    WBC 6 19 4 31 - 10 16 Thousand/uL    RBC 3 65 (L) 3 81 - 5 12 Million/uL    Hemoglobin 10 6 (L) 11 5 - 15 4 g/dL    Hematocrit 34 3 (L) 34 8 - 46 1 %    MCV 94 82 - 98 fL    MCH 29 0 26 8 - 34 3 pg    MCHC 30 9 (L) 31 4 - 37 4 g/dL    RDW 15 2 (H) 11 6 - 15 1 %    Platelets 988 188 - 368 Thousands/uL    MPV 11 6 8 9 - 12 7 fL   Protime-INR    Collection Time: 07/03/18  6:41 AM   Result Value Ref Range    Protime 22 5 (H) 11 8 - 14 2 seconds    INR 2 05 (H) 0 86 - 1 17   TSH, 3rd generation    Collection Time: 07/03/18  6:41 AM   Result Value Ref Range    TSH 3RD GENERATON 6 591 (H) 0 358 - 3 740 uIU/mL   Basic metabolic panel    Collection Time: 07/03/18  6:41 AM   Result Value Ref Range    Sodium 141 136 - 145 mmol/L    Potassium 3 3 (L) 3 5 - 5 3 mmol/L    Chloride 105 100 - 108 mmol/L    CO2 25 21 - 32 mmol/L    Anion Gap 11 4 - 13 mmol/L    BUN 31 (H) 5 - 25 mg/dL    Creatinine 1 17 0 60 - 1 30 mg/dL    Glucose 89 65 - 140 mg/dL    Calcium 8 3 8 3 - 10 1 mg/dL    eGFR 43 ml/min/1 73sq m   Magnesium    Collection Time: 18  6:41 AM   Result Value Ref Range    Magnesium 2 0 1 6 - 2 6 mg/dL     [facility] [facility]  [phone]     Transthoracic Echocardiogram  2D, M-mode, Doppler, and Color Doppler     Study date:  2018     Patient: Era Callejas  MR number: [id number]  Account number: [de-identified]  : 1933  Age: 80 years  Gender: Female  Status: Outpatient  Location: [facility]  Height: 62 in  Weight: 231 lb  BP: 112/ 60 mmHg     Indications: MV Disease     Diagnoses: I34 0 - Nonrheumatic mitral (valve) insufficiency     Sonographer:  JESUS Atkins  Primary Physician:  Trudi Prader, DO  Referring Physician:  Tosha Johnson MD  Group:  [facility]  Interpreting Physician:  Glenna Ordonez MD     SUMMARY     LEFT VENTRICLE:  Size was normal   Systolic function was normal  Ejection fraction was estimated to be 55 %  Wall thickness was at the upper limits of normal      RIGHT VENTRICLE:  The ventricle was mildly dilated  Systolic function was normal      RIGHT ATRIUM:  The atrium was mildly dilated      MITRAL VALVE:  There was moderate regurgitation      TRICUSPID VALVE:  There was moderate regurgitation      IVC, HEPATIC VEINS:  The inferior vena cava was mildly dilated  Respirophasic changes were blunted (less than 50% variation)      COMPARISONS:  Comparison was made with the previous study of 17-May-2017  Tricuspid regurgitation has worsened      HISTORY: PRIOR HISTORY: AFIB, Moderate MR, Hypertension, CHF, Anemia     PROCEDURE: The study was performed in the [facility]  This was a routine study  The transthoracic approach was used  The study included complete 2D imaging, M-mode, complete spectral Doppler, and color Doppler  The  heart rate was 110 bpm, at the start of the study   Images were obtained from the parasternal, apical, subcostal, and suprasternal notch acoustic windows  Echocardiographic views were limited due to lung interference  Image quality was  adequate      LEFT VENTRICLE: Size was normal  Systolic function was normal  Ejection fraction was estimated to be 55 %  There were no regional wall motion abnormalities  Wall thickness was at the upper limits of normal  DOPPLER: Transmitral flow  pattern: atrial fibrillation  The study was not technically sufficient to allow evaluation of LV diastolic function      RIGHT VENTRICLE: The ventricle was mildly dilated  Systolic function was normal  Wall thickness was normal      LEFT ATRIUM: Size was normal      RIGHT ATRIUM: The atrium was mildly dilated      MITRAL VALVE: Valve structure was normal  There was normal leaflet separation  DOPPLER: The transmitral velocity was within the normal range  There was no evidence for stenosis  There was moderate regurgitation      AORTIC VALVE: The valve was trileaflet  Leaflets exhibited normal thickness, normal cuspal separation, and sclerosis  DOPPLER: Transaortic velocity was within the normal range  There was no evidence for stenosis  There was no  regurgitation      TRICUSPID VALVE: The valve structure was normal  There was normal leaflet separation  DOPPLER: The transtricuspid velocity was within the normal range  There was no evidence for stenosis  There was moderate regurgitation  Pulmonary artery  systolic pressure was within the normal range  Estimated peak PA pressure was 36 mmHg      PULMONIC VALVE: Leaflets exhibited normal thickness, no calcification, and normal cuspal separation  DOPPLER: The transpulmonic velocity was within the normal range  There was no regurgitation      PERICARDIUM: There was no pericardial effusion  The pericardium was normal in appearance      AORTA: The root exhibited normal size      SYSTEMIC VEINS: IVC: The inferior vena cava was mildly dilated   Respirophasic changes were blunted (less than 50% variation)      SYSTEM MEASUREMENT TABLES     2D  %FS: 34 71 %  AV Diam: 2 84 cm  EDV(Teich): 101 4 ml  EF(Cube): 72 17 %  EF(Teich): 63 88 %  ESV(Cube): 28 55 ml  ESV(Teich): 36 63 ml  IVSd: 1 1 cm  LA Area: 17 55 cm2  LA Diam: 4 14 cm  LVEDV MOD A4C: 61 32 ml  LVEF MOD A4C: 61 87 %  LVESV MOD A4C: 23 38 ml  LVIDd: 4 68 cm  LVIDs: 3 06 cm  LVLd A4C: 6 59 cm  LVLs A4C: 5 67 cm  LVPWd: 1 cm  RA Area: 23 38 cm2  RV Diam: 3 71 cm  SV MOD A4C: 37 94 ml  SV(Cube): 74 03 ml  SV(Teich): 64 77 ml     CW  TR MaxP 51 mmHg  TR Vmax: 2 21 m/s     MM  TAPSE: 1 35 cm     [facility] Accredited Echocardiography Laboratory     Prepared and electronically signed by  Vidhi Arriaga MD  Signed 2018 17:36:53          [address]  [phone]     Rest/Stress Gated SPECT Myocardial Perfusion Imaging After Regadenoson     Patient: Jan Quintero  MR number: [id number]  Account number: [de-identified]  : 1933  Age: 80 years  Gender: Female  Status: Outpatient  Location: [facility]  Height: 62 in  Weight: 231 lb  BP: 116/ 70 mmHg     Allergies: CODEINE, OXYCODONE HCL, OXYCODONE-ACETAMINOPHEN, CHOLESTATIN, SULFAMETHOXAZOLE-TRIMETHOPRIM, SULFA ANTIBIOTICS     Diagnosis: I48 0 - Atrial fibrillation, I50 9 - Heart failure, unspecified, R07 9 - Chest pain, unspecified     Primary Physician:  Carol Clark DO  RN:  Alexandria Jackson RN BSN  Referring Physician:  Anabela Kasper MD  Technician:  Yana Styles  Group:  [facility]  Report Prepared By[de-identified]  Alexandria Jackson RN BSN  Interpreting Physician:  Ggae Cerda MD     INDICATIONS: chest pain/pressure     HISTORY: Gastric bypass/ GI bleed/BL LE edemaModerate Mitral Insuff  chronic diastolic CHF/arthritis/IBS/Fibromyalgia/MRSA  uses walker to ambulate/Chronic Afib The patient is a 80year old  female   Chest pain status: chest pain, consistent with atypical angina  Other symptoms: decreased effort tolerance, palpitations, and edema  Coronary artery  disease risk factors: hypertension  Cardiovascular history: congestive heart failure, mitral valve disease, and arrhythmia  Co-morbidity: obesity  Medications: a beta blocker and a calcium channel blocker  Previous test results: abnormal  ECG      PHYSICAL EXAM: Baseline physical exam screening: no wheezes audible      REST ECG: Atrial fibrillation  Nonspecific ST and T wave abnormalities were present      PROCEDURE: The study was performed at the [address] [place] [address] and Vascular Center  A regadenoson infusion pharmacologic stress test was performed  Gated SPECT myocardial perfusion imaging was performed after stress  Systolic blood pressure was  116 mmHg, at the start of the study  Diastolic blood pressure was 70 mmHg, at the start of the study  The heart rate was 85 bpm, at the start of the study  IV double checked  Regadenoson protocol:  HR bpm SBP mmHg DBP mmHg Symptoms  Baseline 85 116 70 none  Immediate -- -- -- moderate dyspnea  1 min -- -- -- same as above  2 min 107 122 78 subsiding  5 min 90 120 70 none     STRESS SUMMARY: Duration of pharmacologic stress was Maximal heart rate during stress was 107 bpm  There was normal resting blood pressure with an appropriate response to stress  The rate-pressure product for the peak heart rate and blood  pressure was 42373  There was no chest pain during stress  The stress test was terminated due to protocol completion  Pre oxygen saturation: 99 %  Peak oxygen saturation: 99 %  The stress ECG was negative for ischemia and normal   Arrhythmia during stress: isolated premature ventricular beats      ISOTOPE ADMINISTRATION:  The radiopharmaceutical was injected at the peak effect of pharmacologic stress      PERFUSION DEFECTS:  -  There were no perfusion defects      GATED SPECT:  The calculated left ventricular ejection fraction was 66 %   There was no left ventricular regional abnormality      SUMMARY:  -  Stress results: There was no chest pain during stress  -  ECG conclusions: The stress ECG was negative for ischemia and normal   -  Perfusion imaging: There were no perfusion defects   -  Gated SPECT: The calculated left ventricular ejection fraction was 66 %  There was no left ventricular regional abnormality      IMPRESSIONS: Normal study after pharmacologic vasodilation without reproduction of symptoms  Myocardial perfusion imaging was normal at rest and with stress      Prepared and signed by     Jordana Spaulding MD  Signed 05/11/2018 15:09:24    Imaging: I have personally reviewed pertinent reports  EKG: atrial fibrillation  VTE Prophylaxis: Warfarin (Coumadin)    Code Status: Level 1 - Full Code  Advance Directive and Living Will:      Power of :    POLST:      Counseling / Coordination of Care  Total floor / unit time spent today 45 minutes  Greater than 50% of total time was spent with the patient and / or family counseling and / or coordination of care

## 2018-07-03 NOTE — OCCUPATIONAL THERAPY NOTE
Occupational Therapy Screen        Patient Name: Brandi BEAUCHAMP Date: 7/3/2018    OT orders received and chart review completed  Spoke w/ DAVID, Rudolph Orta and RN, Shaun العلي  Per RN, pt completing ADL's independently, an no OT needs at this time   D/C OT    Larissa Stovall, OTR/L

## 2018-07-03 NOTE — PHYSICAL THERAPY NOTE
PT Evaluation (21min)  (9:10-9:31)    Past Medical History:   Diagnosis Date    Arthritis     CHF (congestive heart failure) (HCC)     Disturbance of smell and taste     disturbance of taste    Effusion of knee joint right     Fibromyalgia     Heart murmur     reported a previous heart murmur    History of acute myocardial infarction     History of atrial fibrillation     History of bruising easily     History of cancer     History of dermatitis     History of mammogram     History of obesity     History of osteopenia     History of sciatica     History of shortness of breath     History of sinusitis     History of sore throat     History of syncope     History of umbilical hernia     History of viral infection     Irritable bowel syndrome (IBS)     Joint pain, hip     Limb pain     Myalgia     myalgia and myositis    Need for prophylactic vaccination against single diseases     Need for prophylactic vaccination and inoculation against influenza     Preoperative cardiovascular examination     Primary osteoarthritis of right knee     Right leg pain     Vaginal Pap smear     reported pap smear        07/03/18 0931   Note Type   Note type Eval only   Pain Assessment   Pain Assessment No/denies pain   Home Living   Type of [address] [facility] One level;Stairs to enter without rails  (1 JER[address])   [address] [place] chair; Toilet raiser   Home Equipment Walker;Cane   Prior Function   Level of [place] Independent with ADLs and functional mobility   Lives With Spouse   Receives Help From Family   ADL Assistance Independent   IADLs Independent   Falls in the last 6 months 1 to 4   Vocational Retired   Comments (+) drives   Restrictions/Precautions   Other Precautions Contact/isolation;Multiple lines;Telemetry; Fall Risk   General   Additional Pertinent History pt presents to [facility] c lightheadedness/dizziness  s/p near syncopal episode  PT consulted for mobility + d/c planning  up c (A)  Family/Caregiver Present No   Cognition   Orientation Level Oriented X4   RUE Assessment   RUE Assessment WFL  (4/5)   LUE Assessment   LUE Assessment WFL  (4/5)   RLE Assessment   RLE Assessment WFL  (4/5)   LLE Assessment   LLE Assessment WFL  (4/5)   Coordination   Sensation WFL   Transfers   Sit to Stand 5  Supervision   Additional items Armrests; Verbal cues; Increased time required   Stand to Sit 5  Supervision   Additional items Armrests; Verbal cues; Increased time required   Ambulation/Elevation   Gait pattern Decreased foot clearance;Decreased L stance; Excessively slow   Gait Assistance 5  Supervision   Additional items Verbal cues   Assistive Device Other (Comment)  (IV pole)   Distance 3' + 45' c seated rest 2* dizziness   Balance   Static Sitting Good   Dynamic Sitting Good   Static Standing Fair   Dynamic Standing Fair   Ambulatory Fair   Endurance Deficit   Endurance Deficit Yes   Endurance Deficit Description dizziness; /61   Activity Tolerance   Activity Tolerance Patient limited by fatigue   Nurse Made Aware Radha   Assessment   Prognosis Good   Problem List Decreased strength;Decreased endurance; Impaired balance;Decreased mobility;Obesity   Assessment pt is an 83y/o f who presents to [facility] s/p near syncopal episode  pt c extensive PMH significant for anemia, CHF, obesity, chronic pain, fibromyaliga, + peripheral neuropathy  at baseline, pt (I) c functional mobility s AD  resides c spouse in apt c 1 JERRELL  currently presents c deficits in strength, balance, gait quality, + activity tolerance noted in PT exam above  Barthel Index 60/100  upon initial ambulation attempt, pt reported dizziness c standing requiring return to seated position  /61  pt able to resume ambulation once dizziness resolved  ambulated 39' c (S) + support of IV pole demonstrating gait deviations above  would benefit from follow up PT session to maximize functional mobility for safe d/c home   upon d/c, recommend pt return home c no anticipated PT needs  PT eval of high complexity 2* unstable med status c pt requiring ongoing medical management s/p near syncopal episode  pt c extensive PMH significant for L hip dislocation, CHF, + chronic pain impacting PT interventions  pt c reported dizziness upon standing requiring return to seated position prior to initiating ambulation  Barriers to Discharge None   Goals   Patient Goals "to go home"  STG Expiration Date 07/10/18   Short Term Goal #1 1  increase strength 1/2 grade to improve overall functional mobility, 2  perform transfers mod (I) to safely perform ADLs, 3  ambulate 300' (I) c stable vitals to safely ambulate in community, 4  negotiate 1 step mod (I) to safely enter apt   Plan   Treatment/Interventions Functional transfer training;LE strengthening/ROM; Elevations; Therapeutic exercise; Endurance training;Patient/family training;Bed mobility;Gait training;Spoke to nursing   PT Frequency Follow-up visit only   Recommendation   Recommendation Home with family support   PT - OK to Discharge Yes   Barthel Index   Feeding 10   Bathing 0   Grooming Score 5   Dressing Score 10   Bladder Score 10   Bowels Score 10   Toilet Use Score 5   Transfers (Bed/Chair) Score 10   Mobility (Level Surface) Score 0   Stairs Score 0   Barthel Index Score 60     Adriana Nieves, PT

## 2018-07-03 NOTE — PLAN OF CARE
Problem: PHYSICAL THERAPY ADULT  Goal: Performs mobility at highest level of function for planned discharge setting  See evaluation for individualized goals  Treatment/Interventions: Functional transfer training, LE strengthening/ROM, Elevations, Therapeutic exercise, Endurance training, Patient/family training, Bed mobility, Gait training, Spoke to nursing          See flowsheet documentation for full assessment, interventions and recommendations  Prognosis: Good  Problem List: Decreased strength, Decreased endurance, Impaired balance, Decreased mobility, Obesity  Assessment: pt is an 85y/o f who presents to [facility] s/p near syncopal episode  pt c extensive PMH significant for anemia, CHF, obesity, chronic pain, fibromyaliga, + peripheral neuropathy  at baseline, pt (I) c functional mobility s AD  resides c spouse in apt c 1 JERRELL  currently presents c deficits in strength, balance, gait quality, + activity tolerance noted in PT exam above  Barthel Index 60/100  upon initial ambulation attempt, pt reported dizziness c standing requiring return to seated position  /61  pt able to resume ambulation once dizziness resolved  ambulated 39' c (S) + support of IV pole demonstrating gait deviations above  would benefit from follow up PT session to maximize functional mobility for safe d/c home  upon d/c, recommend pt return home c no anticipated PT needs  PT eval of high complexity 2* unstable med status c pt requiring ongoing medical management s/p near syncopal episode  pt c extensive PMH significant for L hip dislocation, CHF, + chronic pain impacting PT interventions  pt c reported dizziness upon standing requiring return to seated position prior to initiating ambulation  Barriers to Discharge: None     Recommendation: Home with family support     PT - OK to Discharge: Yes    See flowsheet documentation for full assessment

## 2018-07-03 NOTE — PLAN OF CARE
CARDIOVASCULAR - ADULT     Maintains optimal cardiac output and hemodynamic stability Progressing     Absence of cardiac dysrhythmias or at baseline rhythm Progressing        METABOLIC, FLUID AND ELECTROLYTES - ADULT     Electrolytes maintained within normal limits Progressing     Fluid balance maintained Progressing        Potential for Falls     Patient will remain free of falls Progressing        RESPIRATORY - ADULT     Achieves optimal ventilation and oxygenation Progressing        SAFETY ADULT     Maintain or return to baseline ADL function Progressing     Maintain or return mobility status to optimal level Progressing

## 2018-07-03 NOTE — PROGRESS NOTES
Progress Note - Brandi Zendejas 1933, 80 y o  female MRN: [id number]    Unit/Bed#: -01 Encounter: [id number]    Primary Care Provider: Toribio Montes DO   Date and time admitted to hospital: 7/2/2018  3:21 PM    * Near syncope   Assessment & Plan    · Patient with orthostatics symptoms over the last couple of weeks in the setting of intravascular volume depletion due to aggressive diuresis  · Symptoms have since improved with IV fluids and patient feels back at her baseline currently   · Diuretics remain on hold  Will need to resume at a lower dose on discharge      Atrial fibrillation with RVR [facility])   Assessment & Plan    · Recently had a 24 hour Holter monitor on 6/28/18 which showed atrial fibrillation with RVR  This was however in the setting of volume depletion  · Rates control has improved with IV fluids  She is currently off Cardizem infusion and has been resumed on oral Cardizem  Was on 240mg q a m and 120 mg Q p m  · Started on 60 mg Q 6 hours per Cardiology, ? 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 with a baseline creatinine of 0 8-1 1       Chronic diastolic CHF (congestive heart failure) ([facility] [address] )   Assessment & Plan    · Presently compensated  · Patient reports approximately 40 lb weight loss since her diuretics were recently increased  · Continue to hold diuretics today with plans to resume at a lower dose prior to discharge      Anemia   Assessment & Plan    · Likely anemia of chronic disease    Hb stable at 10 6 today  · Continue to monitor        VTE Pharmacologic Prophylaxis:   Pharmacologic: Warfarin (Coumadin)  Mechanical VTE Prophylaxis in Place: Yes    Patient Centered Rounds: I have performed bedside rounds with nursing staff today  Discussions with Specialists or Other Care Team Provider:  Nursing    Education and Discussions with Family / Patient:  Patient    Current Length of Stay: 1 day(s)    Current Patient Status: Inpatient   Certification Statement: The patient will continue to require additional inpatient hospital stay due to Above diagnosis and care plan    Discharge Plan:  Anticipate discharge tomorrow if stable and heart rate controlled    Code Status: Level 1 - Full Code      Subjective:   Patient seen and evaluated  She reports feeling much better today  No dizziness  Had an episode earlier this morning of lightheadedness when she got up abruptly which has since resolved without further recurrence  She denies chest pain, no shortness of breath    Objective:     Vitals:   Temp (24hrs), Av 8 °F (36 6 °C), Min:97 4 °F (36 3 °C), Max:98 °F (36 7 °C)    HR:  [] 87  Resp:  [16-20] 18  BP: ()/(54-90) 97/65  SpO2:  [94 %-100 %] 94 %  Body mass index is 35 44 kg/m²  Input and Output Summary (last 24 hours):        Intake/Output Summary (Last 24 hours) at 18 1537  Last data filed at 18 1300   Gross per 24 hour   Intake             1340 ml   Output              100 ml   Net             1240 ml       Physical Exam:  General Appearance:    Alert, cooperative, no distress, appropriately responsive    Head:    Normocephalic, without obvious abnormality, atraumatic, mucous membranes moist    Eyes:    Conjunctiva/corneas clear, EOM's intact   Neck:   Supple   Lungs:     Clear to auscultation bilaterally, respirations unlabored, no crackles or wheeze     Heart:    Regular rate and rhythm, S1 and S2    Abdomen:     Soft, obese, non-tender, bowel sounds active all four quadrants,     no masses, no organomegaly   Extremities:   Extremities normal, atraumatic, no cyanosis or edema   Neurologic:  nonfocal         Additional Data:     Labs:      Results from last 7 days  Lab Units 07/03/18  0640 07/02/18  1604   WBC Thousand/uL 6 19 7 87   HEMOGLOBIN g/dL 10 6* 11 2*   HEMATOCRIT % 34 3* 35 7   PLATELETS Thousands/uL 159 162   NEUTROS PCT %  --  80*   LYMPHS PCT %  --  11*   MONOS PCT %  --  9   EOS PCT %  --  0       Results from last 7 days  Lab Units 07/03/18  0641 07/02/18  1604   SODIUM mmol/L 141 140   POTASSIUM mmol/L 3 3* 3 7   CHLORIDE mmol/L 105 103   CO2 mmol/L 25 26   BUN mg/dL 31* 41*   CREATININE mg/dL 1 17 1 56*   CALCIUM mg/dL 8 3 8 5   TOTAL PROTEIN g/dL  --  6 8   BILIRUBIN TOTAL mg/dL  --  0 50   ALK PHOS U/L  --  88   ALT U/L  --  18   AST U/L  --  17   GLUCOSE RANDOM mg/dL 89 115       Results from last 7 days  Lab Units 07/03/18  0641   INR  2 05*       * I Have Reviewed All Lab Data Listed Above  * Additional Pertinent Lab Tests Reviewed:  [address] Admission Reviewed    Cultures:   Blood Culture: No results found for: BLOODCX  Urine Culture:   Lab Results   Component Value Date    URINECX 80,000-89,000 cfu/ml Mixed Contaminants X3 08/20/2016     Sputum Culture: No components found for: SPUTUMCX  Wound Culture: No results found for: WOUNDCULT    Last 24 Hours Medication List:     Current Facility-Administered Medications:  acetaminophen 650 mg Oral Q6H PRN Kirit Lyons MD    bisoprolol 5 mg Oral Q12H TEJAS Alfaro    desoximetasone  Topical BID Kirit Lyons MD    diltiazem 1-15 mg/hr Intravenous Titrated Kirit Lyons MD Last Rate: Stopped (07/03/18 0704)   diltiazem 60 mg Oral Q6H [address] TEJAS Alfaro    DULoxetine 60 mg Oral Daily Kirit Lyons MD    fluticasone 1 spray Nasal Daily Kirit Lyons MD    ondansetron 4 mg Intravenous Q6H PRN Kirit Lyons MD    senna 1 tablet Oral HS PRN Kirit Lyons MD    sodium chloride 50 mL/hr Intravenous Continuous Kirit Lyons MD Last Rate: 50 mL/hr (07/03/18 7172)   sucralfate 1,000 mg Oral Q6H [address] Kirit Lyons MD    traMADol 25 mg Oral Q12H PRN Kirit Lyons MD    warfarin 4 mg Oral Daily (warfarin) Stiven Alcantara MD         Today, Patient Was Seen By: Markie Washington MD    ** Please Note: Dragon 360 Dictation voice to text software may have been used in the creation of this document   **

## 2018-07-03 NOTE — PLAN OF CARE
Problem: DISCHARGE PLANNING - CARE MANAGEMENT  Goal: Discharge to post-acute care or home with appropriate resources  INTERVENTIONS:  - Conduct assessment to determine patient/family and health care team treatment goals, and need for post-acute services based on payer coverage, community resources, and patient preferences, and barriers to discharge  - Address psychosocial, clinical, and financial barriers to discharge as identified in assessment in conjunction with the patient/family and health care team  - Arrange appropriate level of post-acute services according to patient's   needs and preference and payer coverage in collaboration with the physician and health care team  - Communicate with and update the patient/family, physician, and health care team regarding progress on the discharge plan  - Arrange appropriate transportation to post-acute venues  Outcome: Progressing  LOS 1  Not a bundle; Not a readmission  Cm met with pt,  and daughter at bedside  CM reviewed discharge planning process including the following: identifying caregivers at home, preference for d/c planning needs, [facility] Meds to Bed program, availability of treatment team to discuss questions or concerns patient and/or family may have regarding diagnosis, plan of care, old or new medications and discharge planning   CM will continue to follow for care coordination and update assessment as necessary  Pt lives at home with her  in an apartment where she has 2 small steps to enter  She is able to manage all her ADL's and has a shower chair and rollator  She uses a rollator when going out of the home  She is still able to drive  Her daughter will transport her home at [place]  She has had [facility] in the past with [facility]  She was told a referral will be made to the CHF PALS program and she wants whichever VNA offers that  Cm explained it is [facility] and she is agreeable to a referral being made  Referral has been made  Pt [facility]  Pt has a hx of depression that is managed  No hx of D&A  She does not have a POA/AD but has paperwork to do it  She has Rx coverage  CM will follow  CM name and role reviewed and Discharge Checklist reviewed with pt and family  07/03/18 1612   Patient Information   Mental Status Alert   Primary Caregiver Self   Support System Immediate family   Legal [facility] Proxy Appointed No   Activities of Daily Living Prior to Admission   Functional Status Independent   Assistive Device Walker   Living Arrangement Apartment;Lives with someone   Ambulation Independent   Means of Transportation   Means of Transport to \[facility] [place] [facility]\"": [place]       62/49/51 1623   Discharge Planning   Living Arrangements Spouse/significant other   [facility] Spouse/significant other   Type of [place] [address] residence   [facility] No   Home Health Referral Provided   [address] [place] [address] requested: [facility] Name: [id number] [facility] Provider: PCP   Home Health Services Needed: Heart Failure Management   Homebound Criteria Met: Uses an Assist Device (i e  cane, walker, etc)   Supporting Clincal Findings: Limited Endurance   Discharge Communications   Discharge planning discussed with: pt/daughter/   Freedom of Choice Yes   Does the patient need discharge transport arranged?  No   CM Handoff Comments 7/3: [facility] [place]

## 2018-07-03 NOTE — ASSESSMENT & PLAN NOTE
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

## 2018-07-03 NOTE — ASSESSMENT & PLAN NOTE
· Presently compensated    · Patient reports approximately 40 lb weight loss since her diuretics were recently increased  · Continue to hold diuretics today with plans to resume at a lower dose prior to discharge

## 2018-07-03 NOTE — SOCIAL WORK
LOS 1   Not a bundle; Not a readmission  Cm met with pt,  and daughter at bedside  CM reviewed discharge planning process including the following: identifying caregivers at home, preference for d/c planning needs, [facility] Meds to Bed program, availability of treatment team to discuss questions or concerns patient and/or family may have regarding diagnosis, plan of care, old or new medications and discharge planning   CM will continue to follow for care coordination and update assessment as necessary  Pt lives at home with her  in an apartment where she has 2 small steps to enter  She is able to manage all her ADL's and has a shower chair and rollator  She uses a rollator when going out of the home  She is still able to drive  Her daughter will transport her home at [place]  She has [facility] in the past [facility]  She was told a referral will be made to the CHF PALS program and she wants whichever VNA offers that  Cm explained it is [facility] and she is agreeable to a referral being made  Referral has been made  Pt has been to [facility] the past [facility]  Pt has a hx of depression that is managed  No hx of D&A  She does not have a POA/AD but has paperwork to do it  She has Rx coverage  CM will follow  CM name and role reviewed and Discharge Checklist reviewed with pt and family         07/03/18 1612   Patient Information   Mental Status Alert   Primary Caregiver Self   Support System Immediate family   Legal [facility] Proxy Appointed No   Activities of Daily Living Prior to Admission   Functional Status Independent   Assistive Device Walker   Living Arrangement Apartment;Lives with someone   Ambulation Independent   Means of Transportation   Means of Transport to Appts: Drive Self      14/45/59 1623   Discharge Planning   Living Arrangements Spouse/significant other   Support Systems Spouse/significant other   Type of Current Residence Private residence Current Home Care Services No   Home Health Referral Provided   Home Health Discipline requested: [facility] Name: [id number] [facility] Provider: PCP   Home Health Services Needed: Heart Failure Management   Homebound Criteria Met: Uses an Assist Device (i e  cane, walker, etc)   Supporting Clincal Findings: Limited Endurance   Discharge Communications   Discharge planning discussed with: pt/daughter/   Freedom of Choice Yes   Does the patient need discharge transport arranged?  No   CM Handoff Comments 7/3: [facility] [place]

## 2018-07-04 VITALS
WEIGHT: 196.43 LBS | SYSTOLIC BLOOD PRESSURE: 109 MMHG | BODY MASS INDEX: 36.15 KG/M2 | OXYGEN SATURATION: 100 % | RESPIRATION RATE: 18 BRPM | HEIGHT: 62 IN | TEMPERATURE: 98.4 F | HEART RATE: 64 BPM | DIASTOLIC BLOOD PRESSURE: 68 MMHG

## 2018-07-04 PROBLEM — N17.9 AKI (ACUTE KIDNEY INJURY) (HCC): Status: RESOLVED | Noted: 2018-07-02 | Resolved: 2018-07-04

## 2018-07-04 PROBLEM — R55 NEAR SYNCOPE: Status: RESOLVED | Noted: 2018-07-02 | Resolved: 2018-07-04

## 2018-07-04 LAB
ANION GAP SERPL CALCULATED.3IONS-SCNC: 11 MMOL/L (ref 4–13)
BUN SERPL-MCNC: 25 MG/DL (ref 5–25)
CALCIUM SERPL-MCNC: 8.7 MG/DL (ref 8.3–10.1)
CHLORIDE SERPL-SCNC: 106 MMOL/L (ref 100–108)
CO2 SERPL-SCNC: 23 MMOL/L (ref 21–32)
CREAT SERPL-MCNC: 1.16 MG/DL (ref 0.6–1.3)
GFR SERPL CREATININE-BSD FRML MDRD: 43 ML/MIN/1.73SQ M
GLUCOSE SERPL-MCNC: 181 MG/DL (ref 65–140)
INR PPP: 2.05 (ref 0.86–1.17)
POTASSIUM SERPL-SCNC: 3.9 MMOL/L (ref 3.5–5.3)
PROTHROMBIN TIME: 22.5 SECONDS (ref 11.8–14.2)
SODIUM SERPL-SCNC: 140 MMOL/L (ref 136–145)

## 2018-07-04 PROCEDURE — 85610 PROTHROMBIN TIME: CPT | Performed by: INTERNAL MEDICINE

## 2018-07-04 PROCEDURE — 80048 BASIC METABOLIC PNL TOTAL CA: CPT | Performed by: INTERNAL MEDICINE

## 2018-07-04 PROCEDURE — 99232 SBSQ HOSP IP/OBS MODERATE 35: CPT | Performed by: INTERNAL MEDICINE

## 2018-07-04 PROCEDURE — 99239 HOSP IP/OBS DSCHRG MGMT >30: CPT | Performed by: INTERNAL MEDICINE

## 2018-07-04 RX ORDER — TORSEMIDE 20 MG/1
40 TABLET ORAL DAILY
Refills: 0
Start: 2018-07-04 | End: 2018-08-24 | Stop reason: HOSPADM

## 2018-07-04 RX ORDER — DILTIAZEM HYDROCHLORIDE 120 MG/1
120 TABLET, FILM COATED ORAL EVERY 12 HOURS SCHEDULED
Qty: 90 TABLET | Refills: 0
Start: 2018-07-04 | End: 2018-07-04

## 2018-07-04 RX ORDER — DILTIAZEM HYDROCHLORIDE 120 MG/1
120 TABLET, FILM COATED ORAL EVERY 12 HOURS SCHEDULED
Qty: 90 TABLET | Refills: 0
Start: 2018-07-04 | End: 2018-07-26 | Stop reason: DRUGHIGH

## 2018-07-04 RX ORDER — TORSEMIDE 20 MG/1
40 TABLET ORAL DAILY
Status: DISCONTINUED | OUTPATIENT
Start: 2018-07-04 | End: 2018-07-04 | Stop reason: HOSPADM

## 2018-07-04 RX ADMIN — SUCRALFATE 1000 MG: 1 SUSPENSION ORAL at 12:13

## 2018-07-04 RX ADMIN — FLUTICASONE PROPIONATE 1 SPRAY: 50 SPRAY, METERED NASAL at 08:47

## 2018-07-04 RX ADMIN — BISOPROLOL FUMARATE 5 MG: 5 TABLET ORAL at 08:46

## 2018-07-04 RX ADMIN — DILTIAZEM HYDROCHLORIDE 60 MG: 60 TABLET, FILM COATED ORAL at 12:13

## 2018-07-04 RX ADMIN — DULOXETINE 60 MG: 60 CAPSULE, DELAYED RELEASE ORAL at 08:46

## 2018-07-04 RX ADMIN — SUCRALFATE 1000 MG: 1 SUSPENSION ORAL at 05:59

## 2018-07-04 NOTE — CASE MANAGEMENT
Continued Stay Review    Date: 7/3/2018    Vital Signs: Temp (24hrs), Av 8 °F (36 6 °C), Min:97 4 °F (36 3 °C), Max:98 °F (36 7 °C)     HR:  [] 87  Resp:  [16-20] 18  BP: ()/(54-90) 97/65  SpO2:  [94 %-100 %] 94 %  Body mass index is 35 44 kg/m²  Medications:   Scheduled Meds:   Current Facility-Administered Medications:  bisoprolol 5 mg Oral Q12H   desoximetasone  Topical BID   diltiazem 60 mg Oral Q6H JOSE   DULoxetine 60 mg Oral Daily   fluticasone 1 spray Nasal Daily   sucralfate 1,000 mg Oral Q6H [facility]   torsemide 40 mg Oral Daily   traMADol 25 mg Oral Q12H PRN   warfarin 4 mg Oral Daily (warfarin)     Continuous Infusions:    PRN Meds: not used  Abnormal Labs/Diagnostic Results:   K 3 3  Bun 31  Wbc 6 19, hgb 10 6, hct 34 3    Age/Sex: 80 y o  female     Assessment/Plan:   Near syncope   Assessment & Plan     · Patient with orthostatics symptoms over the last couple of weeks in the setting of intravascular volume depletion due to aggressive diuresis  · Symptoms have since improved with IV fluids and patient feels back at her baseline currently   · Diuretics remain on hold  Will need to resume at a lower dose on discharge       Atrial fibrillation with RVR [facility])   Assessment & Plan     · Recently had a 24 hour Holter monitor on 18 which showed atrial fibrillation with RVR  This was however in the setting of volume depletion  · Rates control has improved with IV fluids  She is currently off Cardizem infusion and has been resumed on oral Cardizem  Was on 240mg q a m and 120 mg Q p m  · Started on 60 mg Q 6 hours per Cardiology, ? Increase back to home dose  Will discuss with Cardiology further on this  · Continue Coumadin  INR therapeutic at 2 05 today       BRITTANY (acute kidney injury) ([facility] [address] )   Assessment & Plan     · Likely pre renal and is resolved with IV fluid hydration  · Discontinue IV fluids today and monitor    Eventual resume Demadex at lower dose on discharge  · Review of prior BMP's shows likely underlying CKD stage 2 to 3 with a baseline creatinine of 0 8-1  1        Chronic diastolic CHF (congestive heart failure) (HCC)   Assessment & Plan     · Presently compensated  · Patient reports approximately 40 lb weight loss since her diuretics were recently increased  · Continue to hold diuretics today with plans to resume at a lower dose prior to discharge       Anemia   Assessment & Plan     · Likely anemia of chronic disease  Hb stable at 10 6 today  · Continue to monitor         Discharge Plan:  Discharge planned for  7/4              Thank you,  Jen Navas  [address] Utilization Review Department  Phone: [phone]; Fax [phone]  ATTENTION: The Network Utilization Review Department is now centralized for our 9 Facilities  Make a note that we have a new phone and fax numbers for our Department  Please call with any questions or concerns to [phone] and carefully follow the prompts so that you are directed to the right person  All voicemails are confidential  Fax any determinations, approvals, denials, and requests for initial or continue stay review clinical to [phone]  Due to HIGH CALL volume, it would be easier if you could please send faxed requests to expedite your requests and in part, help us provide discharge notifications faster

## 2018-07-04 NOTE — DISCHARGE INSTRUCTIONS
Take an additional Lasix 40 mg PO in the evening if you note weight gain of >3 lbs in 24 hours or 5 lbs in 5 days

## 2018-07-04 NOTE — CASE MANAGEMENT
Notification of Discharge  This is a Notification of Discharge from our facility [address]  Please be advised that this patient has been discharge from our facility  Below you will find the admission and discharge date and time including the patients disposition  PRESENTATION DATE: 7/2/2018  3:21 PM  IP ADMISSION DATE: 7/2/18 1703  DISCHARGE DATE: 7/4/2018  1:43 PM  DISPOSITION: Home/Self Care    [address] [facility] in the [facility] Utilization Review Department  Phone: [phone]; Fax [phone]  ATTENTION: The Network Utilization Review Department is now centralized for our 9 Facilities  Make a note that we have a new phone and fax numbers for our Department  Please call with any questions or concerns to [phone] and carefully follow the prompts so that you are directed to the right person  All voicemails are confidential  Fax any determinations, approvals, denials, and requests for initial or continue stay review clinical to [phone]  Due to HIGH CALL volume, it would be easier if you could please send faxed requests to expedite your requests and in part, help us provide discharge notifications faster    Reference #[id number]

## 2018-07-04 NOTE — DISCHARGE SUMMARY
Discharge Summary - [facility]    Patient Information: Soco Marquez 80 y o  female MRN: [id number]  Unit/Bed#: -01 Encounter: [id number]    Discharging Physician / Practitioner: Vanna Closs, MD  PCP: Jenny Gamboa DO  Admission Date: 7/2/2018  Discharge Date: 07/04/18    Reason for Admission:  Near syncope    Discharge Diagnoses:     Principal Problem:    Near syncope  Active Problems:    Atrial fibrillation with RVR (HCC)    BRITTANY (acute kidney injury) ([facility] )    Chronic diastolic CHF (congestive heart failure) ([facility] )    Anemia      Consultations During Hospital Stay:  · Cardiology    Procedures Performed:   · None    Significant findings:  · None    Hospital Course:   Soco Marquez is a 80 y o  female patient who originally presented to the hospital on 7/2/2018 due to near syncope  Patient recently had the dose of her oral diuretics increased outpatient  She presented with a near syncopal episode and was noted to be volume depleted from over diuresis and in acute kidney injury  She was also noted to be in AFib with RVR on presentation which resolved following hydration  She received IV hydration with resolution of BRITTANY and symptoms of near-syncope  She was evaluated by the cardiologist and dose of her torsemide as well as Cardizem were adjusted during her hospital course  She will continue Cardizem 120 mg twice daily with torsemide 40 mg daily and additional 40 q p m  if she notes weight gain of greater than 3 lb in 24 hours or 5 lb in 5 days  The patient is to follow up with her cardiologist Dr Vadim Huffman on 7/12/18 at 8:40 a m  she was cleared for discharge home on 7/4/18  Condition at Discharge: stable     Discharge Day Visit / Exam:     Subjective:  Feels much better this morning and is eager for discharge  Denies any dizziness or lightheadedness  Patient instructed to avoid sudden position changes from lying to sitting or standing positions      Vitals: Blood Pressure: 109/68 (07/04/18 0700)  Pulse: 64 (07/04/18 0700)  Temperature: 98 4 °F (36 9 °C) (07/04/18 0700)  Temp Source: Oral (07/04/18 0700)  Respirations: 18 (07/04/18 0700)  Height: 5' 2" (157 5 cm) (07/02/18 1800)  Weight - Scale: 89 1 kg (196 lb 6 9 oz) (07/04/18 0600)  SpO2: 100 % (07/04/18 0700)    General Appearance:    Alert, cooperative, no distress, appropriately responsive    Head:    Normocephalic, without obvious abnormality, atraumatic, mucous membranes moist    Eyes:    Conjunctiva/corneas clear, EOM's intact   Neck:   Supple   Lungs:     Clear to auscultation bilaterally, respirations unlabored, no crackles or wheeze     Heart:    Regular rate and rhythm, S1 and S2    Abdomen:     Soft, obese, non-tender, bowel sounds active all four quadrants,     no masses, no organomegaly   Extremities:   Extremities normal, atraumatic, no cyanosis or edema   Neurologic:  nonfocal      Discharge instructions/Information to patient and family:   See after visit summary for information provided to patient and family  Provisions for Follow-Up Care:  See after visit summary for information related to follow-up care and any pertinent home health orders  Disposition: Home      Discharge Statement:  I spent >30 minutes discharging the patient  This time was spent on the day of discharge  I had direct contact with the patient on the day of discharge  Greater than 50% of the total time was spent examining patient, answering all patient questions, arranging and discussing plan of care with patient as well as directly providing post-discharge instructions  Additional time then spent on discharge activities  Discharge Medications:  See after visit summary for reconciled discharge medications provided to patient and family  ** Please Note: Dragon 360 Dictation voice to text software may have been used in the creation of this document   **

## 2018-07-04 NOTE — PROGRESS NOTES
Heart Failure Service Progress Note - Josefa Capellan 80 y o  female MRN: [id number]    Unit/Bed#: -01 Encounter: [id number]      Assessment:    Principal Problem:    Near syncope  Active Problems:    Atrial fibrillation with RVR ([facility])    Anemia    Chronic diastolic CHF (congestive heart failure) ([facility])    CALLIE (acute kidney injury) ([facility] )    # Near syncope: Likely volume depleted from overdiuresis  Improved with IV hydration  # AFib: Continue bisoprolol 5 mg PO BID and diltiazem 120 mg PO BID (outpatient dose)  Rates now controlled  Continue warfarin  # Callie: Improved  # Chronic diastolic HF: Euvolemic  --Per office notes, on torsemide 40 mg PO BID  --Decrease to torsemide 40 mg qAM, with additional 40 mg PO qPM if she notes weight gain of >3 lbs in 24 hours or 5 lbs in 5 days    F/U with Dr Kulwinder Kidd as outpatient  Subjective:   Patient seen and examined  No significant events overnight  Objective:     Xray [employer] (day, reason): Burger catheter (day, reason):    Vitals: Blood pressure 109/68, pulse 64, temperature 98 4 °F (36 9 °C), temperature source Oral, resp  rate 18, height 5' 2" (1 575 m), weight 89 1 kg (196 lb 6 9 oz), SpO2 100 %  , Body mass index is 35 93 kg/m² , I/O last 3 completed shifts: In: 9279 [P O :840; IV Piggyback:500]  Out: 1000 [Urine:1000]  No intake/output data recorded  Wt Readings from Last 3 Encounters:   07/04/18 89 1 kg (196 lb 6 9 oz)   07/02/18 85 5 kg (188 lb 6 4 oz)   05/15/18 99 8 kg (220 lb 0 3 oz)       Intake/Output Summary (Last 24 hours) at 07/04/18 1035  Last data filed at 07/04/18 5583   Gross per 24 hour   Intake              120 ml   Output             1000 ml   Net             -880 ml     I/O last 3 completed shifts: In: 5727 [P O :840; IV Piggyback:500]  Out: 1000 [Urine:1000]    No significant arrhythmias seen on telemetry review        Physical Exam:  Vitals:    07/03/18 2211 07/04/18 0559 07/04/18 0600 07/04/18 0700   BP: 106/73 (!) 84/53  109/68 BP Location: Left arm   Left arm   Pulse: 86   64   Resp: 18   18   Temp: 97 5 °F (36 4 °C)   98 4 °F (36 9 °C)   TempSrc: Oral   Oral   SpO2: 97%   100%   Weight:   89 1 kg (196 lb 6 9 oz)    Height:           GEN: Marcie Coulter appears well, alert and oriented x 3, pleasant and cooperative   HEENT: pupils equal, round, and reactive to light; extraocular muscles intact  NECK: supple, no carotid bruits   HEART: regular rhythm, normal S1 and S2, no murmurs, clicks, gallops or rubs, JVP is    LUNGS: clear to auscultation bilaterally; no wheezes, rales, or rhonchi   ABDOMEN: normal bowel sounds, soft, no tenderness, no distention  EXTREMITIES: peripheral pulses normal; no clubbing, cyanosis, or edema  NEURO: no focal findings   SKIN: normal without suspicious lesions on exposed skin      Current Facility-Administered Medications:     acetaminophen (TYLENOL) tablet 650 mg, 650 mg, Oral, Q6H PRN, Kirit Lyons MD    bisoprolol (ZEBETA) tablet 5 mg, 5 mg, Oral, Q12H, TEJAS Alfaro, 5 mg at 07/04/18 0846    desoximetasone (TOPICORT) 0 25 % cream, , Topical, BID, Kirit Lyons MD    diltiazem (CARDIZEM) tablet 60 mg, 60 mg, Oral, Q6H [facility], TEJAS Alfaro, 60 mg at 07/03/18 2350    DULoxetine (CYMBALTA) delayed release capsule 60 mg, 60 mg, Oral, Daily, Kirit Lyons MD, 60 mg at 07/04/18 0846    fluticasone (FLONASE) 50 mcg/act nasal spray 1 spray, 1 spray, Nasal, Daily, Kirit Lyons MD, 1 spray at 07/04/18 0847    ondansetron (ZOFRAN) injection 4 mg, 4 mg, Intravenous, Q6H PRN, Kriit Lyons MD    senna (SENOKOT) tablet 8 6 mg, 1 tablet, Oral, HS PRN, Kirit Lyons MD    sucralfate (CARAFATE) oral suspension 1,000 mg, 1,000 mg, Oral, Q6H [facility], Kirit Lyons MD, 1,000 mg at 07/04/18 0559    traMADol (ULTRAM) tablet 25 mg, 25 mg, Oral, Q12H PRN, Kirit Lyons MD    warfarin (COUMADIN) tablet 4 mg, 4 mg, Oral, Daily (warfarin), Kirit Lyons, MD, 4 mg at 07/03/18 1747      Labs & Results:      Results from last 7 days  Lab Units 07/02/18  2157 07/02/18  1900 07/02/18  1604   TROPONIN I ng/mL <0 02 <0 02 <0 02     Results from last 7 days  Lab Units 07/03/18  0640 07/02/18  1604   WBC Thousand/uL 6 19 7 87   HEMOGLOBIN g/dL 10 6* 11 2*   HEMATOCRIT % 34 3* 35 7   PLATELETS Thousands/uL 159 162           Results from last 7 days  Lab Units 07/04/18  0904 07/03/18  0641 07/02/18  1604   SODIUM mmol/L 140 141 140   POTASSIUM mmol/L 3 9 3 3* 3 7   CHLORIDE mmol/L 106 105 103   CO2 mmol/L 23 25 26   BUN mg/dL 25 31* 41*   CREATININE mg/dL 1 16 1 17 1 56*   CALCIUM mg/dL 8 7 8 3 8 5   TOTAL PROTEIN g/dL  --   --  6 8   BILIRUBIN TOTAL mg/dL  --   --  0 50   ALK PHOS U/L  --   --  88   ALT U/L  --   --  18   AST U/L  --   --  17   GLUCOSE RANDOM mg/dL 181* 89 115     Results from last 7 days  Lab Units 07/04/18  0904 07/03/18  0641 07/02/18  1604   INR  2 05* 2 05* 1 96*     EKG personally reviewed by Malcolm Glover MD      Counseling / Coordination of Care  Total floor / unit time spent today 40 minutes  Greater than 50% of total time was spent with the patient and / or family counseling and / or coordination of care  A description of the counseling / coordination of care: 20 min  Thank you for the opportunity to participate in the care of this patient      Malcolm Glover MD, PhD   Nick Fraga

## 2018-07-05 ENCOUNTER — TRANSITIONAL CARE MANAGEMENT (OUTPATIENT)
Dept: INTERNAL MEDICINE CLINIC | Facility: CLINIC | Age: 83
End: 2018-07-05

## 2018-07-05 ENCOUNTER — TELEPHONE (OUTPATIENT)
Dept: CARDIOLOGY CLINIC | Facility: CLINIC | Age: 83
End: 2018-07-05

## 2018-07-05 NOTE — TELEPHONE ENCOUNTER
Spoke with pt re your order for medication change  Pt states she was just discharged from [place] [address]  7/4/2018 with afib(rvr)  Pt will continue her medications as prescribed to her at time of discharge and will f/u with you 7/12, her nxt  appt

## 2018-07-06 ENCOUNTER — OFFICE VISIT (OUTPATIENT)
Dept: INTERNAL MEDICINE CLINIC | Facility: CLINIC | Age: 83
End: 2018-07-06
Payer: COMMERCIAL

## 2018-07-06 VITALS
DIASTOLIC BLOOD PRESSURE: 72 MMHG | RESPIRATION RATE: 16 BRPM | WEIGHT: 197.8 LBS | HEART RATE: 56 BPM | SYSTOLIC BLOOD PRESSURE: 110 MMHG | BODY MASS INDEX: 36.4 KG/M2 | OXYGEN SATURATION: 99 % | HEIGHT: 62 IN

## 2018-07-06 DIAGNOSIS — N17.9 AKI (ACUTE KIDNEY INJURY) (HCC): ICD-10-CM

## 2018-07-06 DIAGNOSIS — R79.89 ABNORMAL TSH: Primary | ICD-10-CM

## 2018-07-06 DIAGNOSIS — R42 POSTURAL DIZZINESS WITH PRESYNCOPE: ICD-10-CM

## 2018-07-06 DIAGNOSIS — I48.91 ATRIAL FIBRILLATION, UNSPECIFIED TYPE (HCC): ICD-10-CM

## 2018-07-06 DIAGNOSIS — R55 POSTURAL DIZZINESS WITH PRESYNCOPE: ICD-10-CM

## 2018-07-06 PROCEDURE — 99495 TRANSJ CARE MGMT MOD F2F 14D: CPT | Performed by: INTERNAL MEDICINE

## 2018-07-06 NOTE — PROGRESS NOTES
Assessment/Plan:            Problem List Items Addressed This Visit     Atrial fibrillation ([facility] )     Rate controlled and anticoagulated currently stable doing well we will continue monitor  Patient to follow up with Cardiology  Relevant Orders    Protime-INR    BRITTANY (acute kidney injury) ([facility] )    Relevant Orders    Comprehensive metabolic panel    Abnormal TSH - Primary     Will recheck a 3rd generation TSH         Relevant Orders    TSH, 3rd generation    Postural dizziness with presyncope     Transition of care management visit regarding recent hospitalization for presyncope/postural dizziness secondary to dehydration/volume depletion secondary to over diuresis; the patient also had a rapid ventricular rate atrial fibrillation  Her symptoms improved with IV hydration adjustment of the dose of Demadex to 40 mg once a day and adjustment of the Cardizem dose to 120 mg b i d   Patient is to weight herself daily and if greater than 3 lb weight gain notify myself or Cardiology immediately  The patient will follow up with Cardiology  Currently she is stable doing well  Return to office as scheduled call if any problems                Subjective:     Patient ID: Sunil Berumen is a 80 y o  female  HPI 59-year-old female coming in for a transition care management visit regarding recent hospitalization on 07/02/2018 due to near-syncope  The patient recently had the dose of her oral diuretics increased outpatient via Cardiology  The patient reports me she developed dizziness and also felt like she was going to pass out  Therefore she went to the ER and was found to have a low blood pressure and to be volume depleted from over diuresis  Also found to be in acute kidney injury  She was also found to have rapid ventricular rate of the AFib  Her symptoms improved with IV hydration  She was seen by Cardiology in the dose of her Demadex was changed to 40 mg once a day    Also her Cardizem was adjusted and she will continue with Cardizem 120 mg twice a day  Patient is now monitoring the weight routinely and if she notices greater than 3 lb weight gain she is to notify Cardiology or myself immediately  At this point time the patient is feeling much better the dizziness and presyncope has resolved  She does have a follow-up visit with her cardiologist   She ambulates with a walker  Review of Systems   Constitutional: Positive for activity change ( deconditioned)  Negative for appetite change and unexpected weight change ( patient has expected weight loss secondary to diuresis)  HENT: Negative for postnasal drip  Respiratory: Negative for cough and shortness of breath  Cardiovascular: Positive for leg swelling ( improving)  Negative for chest pain  Gastrointestinal: Negative for abdominal pain, diarrhea, nausea and vomiting  Neurological: Negative for dizziness, light-headedness and headaches  Objective:     Physical Exam   Constitutional: She appears well-developed and well-nourished  HENT:   Head: Normocephalic  Mouth/Throat: Oropharynx is clear and moist    Eyes: Conjunctivae are normal  Pupils are equal, round, and reactive to light  Right eye exhibits no discharge  Left eye exhibits no discharge  No scleral icterus  Neck: Neck supple  Cardiovascular: Normal rate, regular rhythm, normal heart sounds and intact distal pulses  Exam reveals no gallop and no friction rub  No murmur heard  Pulmonary/Chest: Breath sounds normal  No respiratory distress  She has no wheezes  She has no rales  Abdominal: Soft  Bowel sounds are normal  She exhibits no distension and no mass  There is no tenderness  There is no rebound and no guarding  Musculoskeletal: She exhibits edema (1+ pitting edema bilateral significantly improved as per the patient)  She exhibits no deformity  Lymphadenopathy:     She has no cervical adenopathy  Neurological: She is alert   Coordination normal  Vitals:    07/06/18 1102   BP: 110/72   BP Location: Right arm   Patient Position: Sitting   Cuff Size: Standard   Pulse: 56   Resp: 16   SpO2: 99%   Weight: 89 7 kg (197 lb 12 8 oz)   Height: 5' 2" (1 575 m)       Transitional Care Management Review:  Jennifer Keyes is a 80 y o  female here for TCM follow up  During the TCM phone call patient stated:    Date and time hospital follow up call was made:  7/5/2018  9:10 AM  Hospital care reviewed:  Records reviewed  Patient was hopsitalized at:  [address] [facility]  Date of admission:  7/2/18  Date of discharge:  7/4/18  Diagnosis:  Near syncope  Disposition:  Home  Were the patients medicaitons reviewed and updated:  No  Current symptoms:  Dizziness  Post hospital issues:  None  Should patient be enrolled in anticoag monitoring?:  Yes  Scheduled for follow up?:  Yes  Patients specialists:  Cardiologist  Do you need help managing your perscriptions or medications:  Yes  Is transportation to your appointments needed:  No  I have advised the patient to call PCP with any new or worsening symptoms (please type in name along with any credentials):  Byron Ordonez-   Are you recieving outpatient services:  No  Are you recieving home care services:  Yes  Types of home care services: Other (comment)  comment:  Patient is not sure who is coming to her house  It is someone especially for CHF     Are you using any community resources:  No  Have you fallen in the last 12 months:  No  Interperter language line required?:  No  Counseling:  Patient             Sascha Sal, DO

## 2018-07-08 PROBLEM — R42 POSTURAL DIZZINESS WITH PRESYNCOPE: Status: ACTIVE | Noted: 2018-07-08

## 2018-07-08 PROBLEM — R79.89 ABNORMAL TSH: Status: ACTIVE | Noted: 2018-07-08

## 2018-07-08 PROBLEM — R55 POSTURAL DIZZINESS WITH PRESYNCOPE: Status: ACTIVE | Noted: 2018-07-08

## 2018-07-08 NOTE — ASSESSMENT & PLAN NOTE
Rate controlled and anticoagulated currently stable doing well we will continue monitor  Patient to follow up with Cardiology

## 2018-07-08 NOTE — ASSESSMENT & PLAN NOTE
Transition of care management visit regarding recent hospitalization for presyncope/postural dizziness secondary to dehydration/volume depletion secondary to over diuresis; the patient also had a rapid ventricular rate atrial fibrillation  Her symptoms improved with IV hydration adjustment of the dose of Demadex to 40 mg once a day and adjustment of the Cardizem dose to 120 mg b i d   Patient is to weight herself daily and if greater than 3 lb weight gain notify myself or Cardiology immediately  The patient will follow up with Cardiology  Currently she is stable doing well    Return to office as scheduled call if any problems

## 2018-07-09 ENCOUNTER — APPOINTMENT (OUTPATIENT)
Dept: LAB | Facility: CLINIC | Age: 83
End: 2018-07-09
Payer: COMMERCIAL

## 2018-07-09 ENCOUNTER — TRANSITIONAL CARE MANAGEMENT (OUTPATIENT)
Dept: INTERNAL MEDICINE CLINIC | Facility: CLINIC | Age: 83
End: 2018-07-09

## 2018-07-09 DIAGNOSIS — R79.89 ABNORMAL TSH: ICD-10-CM

## 2018-07-09 DIAGNOSIS — N17.9 AKI (ACUTE KIDNEY INJURY) (HCC): ICD-10-CM

## 2018-07-09 DIAGNOSIS — Z79.01 LONG TERM (CURRENT) USE OF ANTICOAGULANTS: ICD-10-CM

## 2018-07-09 DIAGNOSIS — I48.91 ATRIAL FIBRILLATION, UNSPECIFIED TYPE (HCC): ICD-10-CM

## 2018-07-09 LAB
ALBUMIN SERPL BCP-MCNC: 3.4 G/DL (ref 3.5–5)
ALP SERPL-CCNC: 85 U/L (ref 46–116)
ALT SERPL W P-5'-P-CCNC: 15 U/L (ref 12–78)
ANION GAP SERPL CALCULATED.3IONS-SCNC: 10 MMOL/L (ref 4–13)
AST SERPL W P-5'-P-CCNC: 15 U/L (ref 5–45)
BILIRUB SERPL-MCNC: 0.49 MG/DL (ref 0.2–1)
BUN SERPL-MCNC: 19 MG/DL (ref 5–25)
CALCIUM SERPL-MCNC: 8.5 MG/DL (ref 8.3–10.1)
CHLORIDE SERPL-SCNC: 106 MMOL/L (ref 100–108)
CO2 SERPL-SCNC: 25 MMOL/L (ref 21–32)
CREAT SERPL-MCNC: 1.15 MG/DL (ref 0.6–1.3)
GFR SERPL CREATININE-BSD FRML MDRD: 44 ML/MIN/1.73SQ M
GLUCOSE P FAST SERPL-MCNC: 89 MG/DL (ref 65–99)
INR PPP: 2.99 (ref 0.86–1.17)
POTASSIUM SERPL-SCNC: 3.7 MMOL/L (ref 3.5–5.3)
PROT SERPL-MCNC: 6.7 G/DL (ref 6.4–8.2)
PROTHROMBIN TIME: 31.1 SECONDS (ref 11.8–14.2)
SODIUM SERPL-SCNC: 141 MMOL/L (ref 136–145)
TSH SERPL DL<=0.05 MIU/L-ACNC: 4.21 UIU/ML (ref 0.36–3.74)

## 2018-07-09 PROCEDURE — 80053 COMPREHEN METABOLIC PANEL: CPT

## 2018-07-09 PROCEDURE — 36415 COLL VENOUS BLD VENIPUNCTURE: CPT

## 2018-07-09 PROCEDURE — 84443 ASSAY THYROID STIM HORMONE: CPT

## 2018-07-09 PROCEDURE — 85610 PROTHROMBIN TIME: CPT

## 2018-07-10 ENCOUNTER — ANTICOAG VISIT (OUTPATIENT)
Dept: INTERNAL MEDICINE CLINIC | Facility: CLINIC | Age: 83
End: 2018-07-10

## 2018-07-10 DIAGNOSIS — E03.9 HYPOTHYROIDISM, UNSPECIFIED TYPE: Primary | ICD-10-CM

## 2018-07-10 DIAGNOSIS — G89.29 OTHER CHRONIC PAIN: ICD-10-CM

## 2018-07-10 RX ORDER — DULOXETIN HYDROCHLORIDE 60 MG/1
CAPSULE, DELAYED RELEASE ORAL
Qty: 30 CAPSULE | Refills: 3 | Status: SHIPPED | OUTPATIENT
Start: 2018-07-10 | End: 2018-12-02 | Stop reason: SDUPTHER

## 2018-07-12 ENCOUNTER — OFFICE VISIT (OUTPATIENT)
Dept: CARDIOLOGY CLINIC | Facility: CLINIC | Age: 83
End: 2018-07-12
Payer: COMMERCIAL

## 2018-07-12 VITALS
HEART RATE: 80 BPM | SYSTOLIC BLOOD PRESSURE: 120 MMHG | BODY MASS INDEX: 35.35 KG/M2 | HEIGHT: 62 IN | OXYGEN SATURATION: 96 % | WEIGHT: 192.1 LBS | DIASTOLIC BLOOD PRESSURE: 64 MMHG

## 2018-07-12 DIAGNOSIS — I10 BENIGN ESSENTIAL HYPERTENSION: ICD-10-CM

## 2018-07-12 DIAGNOSIS — I34.0 MODERATE MITRAL REGURGITATION: ICD-10-CM

## 2018-07-12 DIAGNOSIS — I50.32 CHRONIC DIASTOLIC CHF (CONGESTIVE HEART FAILURE) (HCC): ICD-10-CM

## 2018-07-12 DIAGNOSIS — I10 HYPERTENSION, UNSPECIFIED TYPE: ICD-10-CM

## 2018-07-12 DIAGNOSIS — I48.91 ATRIAL FIBRILLATION, UNSPECIFIED TYPE (HCC): Primary | ICD-10-CM

## 2018-07-12 DIAGNOSIS — N17.9 AKI (ACUTE KIDNEY INJURY) (HCC): ICD-10-CM

## 2018-07-12 PROCEDURE — 99214 OFFICE O/P EST MOD 30 MIN: CPT | Performed by: INTERNAL MEDICINE

## 2018-07-12 PROCEDURE — 93000 ELECTROCARDIOGRAM COMPLETE: CPT | Performed by: INTERNAL MEDICINE

## 2018-07-12 NOTE — PROGRESS NOTES
Follow-up - Cardiology   Rachel Jackson 80 y o  female MRN: [id number]        Problems    1  Atrial fibrillation, unspecified type ([address] )  POCT ECG   2  Hypertension, unspecified type  POCT ECG       Impression:      Braulio Schneider follows up with me at the [facility] office   Having been hospitalized last week for dehydration in the setting of aggressive diuresis with torsemide 40 mg twice daily  She still has occasional lightheadedness when standing, but nothing like the near syncope she had prior to her hospitalization    AFib    Permanent, rate controlled, previously increase diltiazem and bisoprolol for better rate control, heart rate, it Nicole 81 beats per minute and takes warfarin  Chronic diastolic CHF    Has lost about 40 lb since the beginning of the year  Mostly water weight  Now on  Torsemide 40 mg once daily instead of twice daily as she was recently hospitalized with dehydration and acute kidney injury  Blood pressure is normal, renal function has stabilized, creatinine 1 15  Hypertension     Well controlled  Mitral regurgitation   -moderate, With a central jet, mostly functional, recently diuresed exceptionally well, and thus not too concerned about the MR      Plan:    Orders Placed This Encounter   Procedures    POCT ECG    continue current medications  If the dizziness does not improve or gets worse, we should consider decreasing the torsemide even further by alternating 20 mg and 40 mg doses every other day  HPI:   Rachel Jackson is a 80y o  year old female   With chronic diastolic CHF, permanent atrial fibrillation, hypertension, moderate mitral regurgitation, hyperlipidemia presents for a hospital follow-up for recent acute kidney injury, dehydration in the setting of torsemide 40 mg twice daily which was prescribed back in the spring of 2018 due to severe volume overload  She had been between 220 in 231 lb    She diuresed and had some caloric weight loss and is currently down to 192 lb  She looks great, she feels better, her near syncope has resolved, her acute kidney injury has resolved, creatinine on 7/9/2018 was 1 15  Her AFib remains well rate controlled on bisoprolol and Cardizem, heart rate currently 81  She continues on warfarin anticoagulation  She denies any significant bleeding or stroke-like symptoms  She denies any worsening shortness of breath or edema  Her out blood pressure is well controlled  She has moderate MR  Review of Systems   Constitutional: Negative  HENT: Negative  Eyes: Negative  Respiratory: Positive for shortness of breath  Cardiovascular: Positive for leg swelling  Gastrointestinal: Negative  Endocrine: Negative  Genitourinary: Negative  Musculoskeletal: Positive for arthralgias and gait problem  Skin: Negative  Hematological: Negative  Psychiatric/Behavioral: Negative            Past Medical History:   Diagnosis Date    Arthritis     CHF (congestive heart failure) (HCC)     Disturbance of smell and taste     disturbance of taste    Effusion of knee joint right     Fibromyalgia     Heart murmur     reported a previous heart murmur    History of acute myocardial infarction     History of atrial fibrillation     History of bruising easily     History of cancer     History of dermatitis     History of mammogram     History of obesity     History of osteopenia     History of sciatica     History of shortness of breath     History of sinusitis     History of sore throat     History of syncope     History of umbilical hernia     History of viral infection     Irritable bowel syndrome (IBS)     Joint pain, hip     Limb pain     Myalgia     myalgia and myositis    Need for prophylactic vaccination against single diseases     Need for prophylactic vaccination and inoculation against influenza     Preoperative cardiovascular examination     Primary osteoarthritis of right knee     Right leg pain     Vaginal Pap smear     reported pap smear     History   Alcohol Use    Yes     Comment:  social     History   Drug Use No     History   Smoking Status    Never Smoker   Smokeless Tobacco    Never Used       Allergies: Allergies   Allergen Reactions    Codeine Vomiting and GI Intolerance     GI upset, ana m morphine  Reaction Date: 24Apr2012;     Other      Other reaction(s): Other (See Comments)  ana m toradol in OR 6/06 sah    Oxycodone Hcl Vomiting     Reaction Date: 24Apr2012;     Oxycodone-Acetaminophen      Other reaction(s): Other (See Comments)  GI upset; ana m vicodin    Percocet [Oxycodone-Acetaminophen]     Seasonal Ic  [Cholestatin] Allergic Rhinitis    Bactrim [Sulfamethoxazole-Trimethoprim] Rash    Sulfa Antibiotics Rash     Other reaction(s): Other (See Comments)  takes lasix @home       Medications:     Current Outpatient Prescriptions:     bisoprolol (ZEBETA) 10 MG tablet, Take 1 tablet (10 mg total) by mouth daily, Disp: 90 tablet, Rfl: 3    desoximetasone (TOPICORT) 0 25 % cream, Apply topically, Disp: , Rfl:     diltiazem (CARDIZEM) 120 MG tablet, Take 1 tablet (120 mg total) by mouth every 12 (twelve) hours, Disp: 90 tablet, Rfl: 0    DULoxetine (CYMBALTA) 60 mg delayed release capsule, TAKE 1 CAPSULE BY MOUTH ONCE DAILY, Disp: 30 capsule, Rfl: 3    fluticasone (FLONASE) 50 mcg/act nasal spray, 1 spray into each nostril daily  , Disp: , Rfl:     potassium chloride (KLOR-CON) 20 mEq packet, Take 10 mEq by mouth as needed  , Disp: , Rfl:     torsemide (DEMADEX) 20 mg tablet, Take 2 tablets (40 mg total) by mouth daily, Disp: , Rfl: 0    traMADol (ULTRAM) 50 mg tablet, TAKE ONE TABLET BY MOUTH TWICE DAILY AS NEEDED FOR PAIN, Disp: 60 tablet, Rfl: 0    warfarin (COUMADIN) 3 mg tablet, Take 1 tablet daily or as directed   (Patient taking differently: Take 3 mg by mouth every other day Take 1 tablet daily or as directed  ), Disp: 90 tablet, Rfl: 1    warfarin (COUMADIN) 4 mg tablet, Take 4 mg by mouth every other day  , Disp: , Rfl: 3    sucralfate (CARAFATE) 1 g/10 mL suspension, Take 10 mL (1,000 mg total) by mouth every 6 (six) hours, Disp: 420 mL, Rfl: 0      Vitals:    07/12/18 0853   BP: 120/64   Pulse: 80   SpO2: 96%     Weight (last 2 days)     Date/Time   Weight    07/12/18 0853  87 1 (192 1)            Physical Exam   Constitutional: No distress  HENT:   Head: Normocephalic and atraumatic  Eyes: Conjunctivae are normal  No scleral icterus  Neck: Normal range of motion  No JVD present  Cardiovascular: Normal rate and intact distal pulses  Murmur heard  Irregular   Pulmonary/Chest: Effort normal and breath sounds normal  No respiratory distress  She has no wheezes  She has no rales  Musculoskeletal: She exhibits edema  She exhibits no tenderness  Skin: Skin is warm and dry  She is not diaphoretic           Laboratory Studies:  Lab Results   Component Value Date     07/09/2018     07/04/2018     07/03/2018     04/03/2015     11/04/2014     08/29/2013    K 3 7 07/09/2018    K 3 9 07/04/2018    K 3 3 (L) 07/03/2018    K 4 4 04/03/2015    K 4 5 11/04/2014    K 4 9 08/29/2013     07/09/2018     07/04/2018     07/03/2018     04/03/2015     11/04/2014     08/29/2013    CO2 25 07/09/2018    CO2 23 07/04/2018    CO2 25 07/03/2018    CO2 26 04/03/2015    CO2 28 11/04/2014    CO2 29 08/29/2013    GLUCOSE 181 (H) 07/04/2018    GLUCOSE 89 07/03/2018    GLUCOSE 115 07/02/2018    GLUCOSE 98 04/03/2015    GLUCOSE 94 11/04/2014    GLUCOSE 93 08/29/2013    CREATININE 1 15 07/09/2018    CREATININE 1 16 07/04/2018    CREATININE 1 17 07/03/2018    CREATININE 0 74 04/03/2015    CREATININE 0 72 11/04/2014    CREATININE 0 76 08/29/2013    BUN 19 07/09/2018    BUN 25 07/04/2018    BUN 31 (H) 07/03/2018    BUN 19 04/03/2015    BUN 14 11/04/2014    BUN 12 08/29/2013    MG 2 0 07/03/2018    MG 1 8 07/02/2018    MG 1 9 2016     Lab Results   Component Value Date    WBC 6 19 2018    WBC 7 98 2015    RBC 3 65 (L) 2018    RBC 3 78 (L) 2015    HGB 10 6 (L) 2018    HGB 12 0 2015    HCT 34 3 (L) 2018    HCT 37 0 2015    HCT 38 0 2015    MCV 94 2018     (H) 2015    MCH 29 0 2018    MCH 31 7 2015    RDW 15 2 (H) 2018    RDW 13 2 2015     2018     2015     NT-proBNP: No results for input(s): NTBNP in the last 72 hours  Coags:    Results from last 7 days  Lab Units 18  0921   INR  2 99*     Lipid Profile:   Lab Results   Component Value Date    CHOL 145 2018     Lab Results   Component Value Date    HDL 65 (H) 2018     Lab Results   Component Value Date    LDLCALC 61 2018     Lab Results   Component Value Date    TRIG 93 2018       Cardiac testing:   EKG reviewed personally:   AFib, heart rate 109   Results for orders placed during the hospital encounter of 18   Echo complete with contrast if indicated    Narrative [place] [address], [address] [place] [address]  [phone]    Transthoracic Echocardiogram  2D, M-mode, Doppler, and Color Doppler    Study date:  2018    Patient: Marlon Devlin  MR number: [id number]  Account number: [de-identified]  : 1933  Age: 80 years  Gender: Female  Status: Outpatient  [facility]: [facility]  Height: 62 in  Weight: 231 lb  BP: 112/ 60 mmHg    Indications: MV Disease    Diagnoses: I34 0 - Nonrheumatic mitral (valve) insufficiency    Sonographer:  JESUS Virk  Primary Physician:  Berenice Delacruz DO  Referring Physician:  Natalia Vargas MD  Group:  [facility]  Interpreting Physician:  Genet Moreland MD    SUMMARY    LEFT VENTRICLE:  Size was normal   Systolic function was normal  Ejection fraction was estimated to be 55 %    Wall thickness was at the upper limits of normal     RIGHT VENTRICLE:  The ventricle was mildly dilated  Systolic function was normal     RIGHT ATRIUM:  The atrium was mildly dilated  MITRAL VALVE:  There was moderate regurgitation  TRICUSPID VALVE:  There was moderate regurgitation  IVC, HEPATIC VEINS:  The inferior vena cava was mildly dilated  Respirophasic changes were blunted (less than 50% variation)  COMPARISONS:  Comparison was made with the previous study of 17-May-2017  Tricuspid regurgitation has worsened  HISTORY: PRIOR HISTORY: AFIB, Moderate MR, Hypertension, CHF, Anemia    PROCEDURE: The study was performed in the [facility]  This was a routine study  The transthoracic approach was used  The study included complete 2D imaging, M-mode, complete spectral Doppler, and color Doppler  The  heart rate was 110 bpm, at the start of the study  Images were obtained from the parasternal, apical, subcostal, and suprasternal notch acoustic windows  Echocardiographic views were limited due to lung interference  Image quality was  adequate  LEFT VENTRICLE: Size was normal  Systolic function was normal  Ejection fraction was estimated to be 55 %  There were no regional wall motion abnormalities  Wall thickness was at the upper limits of normal  DOPPLER: Transmitral flow  pattern: atrial fibrillation  The study was not technically sufficient to allow evaluation of LV diastolic function  RIGHT VENTRICLE: The ventricle was mildly dilated  Systolic function was normal  Wall thickness was normal     LEFT ATRIUM: Size was normal     RIGHT ATRIUM: The atrium was mildly dilated  MITRAL VALVE: Valve structure was normal  There was normal leaflet separation  DOPPLER: The transmitral velocity was within the normal range  There was no evidence for stenosis  There was moderate regurgitation  AORTIC VALVE: The valve was trileaflet  Leaflets exhibited normal thickness, normal cuspal separation, and sclerosis  DOPPLER: Transaortic velocity was within the normal range  There was no evidence for stenosis  There was no  regurgitation  TRICUSPID VALVE: The valve structure was normal  There was normal leaflet separation  DOPPLER: The transtricuspid velocity was within the normal range  There was no evidence for stenosis  There was moderate regurgitation  Pulmonary artery  systolic pressure was within the normal range  Estimated peak PA pressure was 36 mmHg  PULMONIC VALVE: Leaflets exhibited normal thickness, no calcification, and normal cuspal separation  DOPPLER: The transpulmonic velocity was within the normal range  There was no regurgitation  PERICARDIUM: There was no pericardial effusion  The pericardium was normal in appearance  AORTA: The root exhibited normal size  SYSTEMIC VEINS: IVC: The inferior vena cava was mildly dilated  Respirophasic changes were blunted (less than 50% variation)      SYSTEM MEASUREMENT TABLES    2D  %FS: 34 71 %  AV Diam: 2 84 cm  EDV(Teich): 101 4 ml  EF(Cube): 72 17 %  EF(Teich): 63 88 %  ESV(Cube): 28 55 ml  ESV(Teich): 36 63 ml  IVSd: 1 1 cm  LA Area: 17 55 cm2  LA Diam: 4 14 cm  LVEDV MOD A4C: 61 32 ml  LVEF MOD A4C: 61 87 %  LVESV MOD A4C: 23 38 ml  LVIDd: 4 68 cm  LVIDs: 3 06 cm  LVLd A4C: 6 59 cm  LVLs A4C: 5 67 cm  LVPWd: 1 cm  RA Area: 23 38 cm2  RV Diam: 3 71 cm  SV MOD A4C: 37 94 ml  SV(Cube): 74 03 ml  SV(Teich): 64 77 ml    CW  TR MaxP 51 mmHg  TR Vmax: 2 21 m/s    MM  TAPSE: 1 35 cm    [employer] [facility] [facility]    Prepared and electronically signed by    Glenna Ordonez MD  Signed 2018 17:36:53               Tosha Johnson MD    Portions of the record may have been created with voice recognition software   Occasional wrong word or "sound a like" substitutions may have occurred due to the inherent limitations of voice recognition software   Read the chart carefully and recognize, using context, where substitutions have occurred

## 2018-07-13 ENCOUNTER — HOSPITAL ENCOUNTER (OUTPATIENT)
Facility: HOSPITAL | Age: 83
Setting detail: OBSERVATION
Discharge: HOME HEALTH | End: 2018-07-15
Attending: EMERGENCY MEDICINE | Admitting: INTERNAL MEDICINE
Payer: COMMERCIAL

## 2018-07-13 ENCOUNTER — APPOINTMENT (EMERGENCY)
Dept: RADIOLOGY | Facility: HOSPITAL | Age: 83
End: 2018-07-13
Payer: COMMERCIAL

## 2018-07-13 DIAGNOSIS — Z96.649 DISLOCATION OF HIP JOINT PROSTHESIS, INITIAL ENCOUNTER (HCC): Primary | ICD-10-CM

## 2018-07-13 DIAGNOSIS — E87.6 HYPOKALEMIA: ICD-10-CM

## 2018-07-13 DIAGNOSIS — S73.004D DISLOCATION OF RIGHT HIP, SUBSEQUENT ENCOUNTER: ICD-10-CM

## 2018-07-13 DIAGNOSIS — T84.029A DISLOCATION OF HIP JOINT PROSTHESIS, INITIAL ENCOUNTER (HCC): Primary | ICD-10-CM

## 2018-07-13 PROCEDURE — 73501 X-RAY EXAM HIP UNI 1 VIEW: CPT

## 2018-07-13 PROCEDURE — 96374 THER/PROPH/DIAG INJ IV PUSH: CPT

## 2018-07-13 PROCEDURE — 73502 X-RAY EXAM HIP UNI 2-3 VIEWS: CPT

## 2018-07-13 PROCEDURE — 96361 HYDRATE IV INFUSION ADD-ON: CPT

## 2018-07-13 RX ORDER — PROPOFOL 10 MG/ML
100 INJECTION, EMULSION INTRAVENOUS ONCE
Status: COMPLETED | OUTPATIENT
Start: 2018-07-13 | End: 2018-07-13

## 2018-07-13 RX ORDER — FENTANYL CITRATE 50 UG/ML
50 INJECTION, SOLUTION INTRAMUSCULAR; INTRAVENOUS ONCE
Status: COMPLETED | OUTPATIENT
Start: 2018-07-13 | End: 2018-07-13

## 2018-07-13 RX ADMIN — PROPOFOL 75 MG: 10 INJECTION, EMULSION INTRAVENOUS at 23:21

## 2018-07-13 RX ADMIN — SODIUM CHLORIDE 1000 ML: 0.9 INJECTION, SOLUTION INTRAVENOUS at 23:15

## 2018-07-13 RX ADMIN — FENTANYL CITRATE 50 MCG: 50 INJECTION, SOLUTION INTRAMUSCULAR; INTRAVENOUS at 22:28

## 2018-07-14 LAB
ANION GAP SERPL CALCULATED.3IONS-SCNC: 9 MMOL/L (ref 4–13)
BUN SERPL-MCNC: 14 MG/DL (ref 5–25)
CALCIUM SERPL-MCNC: 8.1 MG/DL (ref 8.3–10.1)
CHLORIDE SERPL-SCNC: 105 MMOL/L (ref 100–108)
CO2 SERPL-SCNC: 28 MMOL/L (ref 21–32)
CREAT SERPL-MCNC: 0.94 MG/DL (ref 0.6–1.3)
ERYTHROCYTE [DISTWIDTH] IN BLOOD BY AUTOMATED COUNT: 15 % (ref 11.6–15.1)
GFR SERPL CREATININE-BSD FRML MDRD: 56 ML/MIN/1.73SQ M
GLUCOSE SERPL-MCNC: 102 MG/DL (ref 65–140)
HCT VFR BLD AUTO: 32.3 % (ref 34.8–46.1)
HGB BLD-MCNC: 10.1 G/DL (ref 11.5–15.4)
INR PPP: 2.81 (ref 0.86–1.17)
MCH RBC QN AUTO: 29 PG (ref 26.8–34.3)
MCHC RBC AUTO-ENTMCNC: 31.3 G/DL (ref 31.4–37.4)
MCV RBC AUTO: 93 FL (ref 82–98)
PLATELET # BLD AUTO: 158 THOUSANDS/UL (ref 149–390)
PMV BLD AUTO: 10.6 FL (ref 8.9–12.7)
POTASSIUM SERPL-SCNC: 2.8 MMOL/L (ref 3.5–5.3)
PROTHROMBIN TIME: 28.7 SECONDS (ref 11.8–14.2)
RBC # BLD AUTO: 3.48 MILLION/UL (ref 3.81–5.12)
SODIUM SERPL-SCNC: 142 MMOL/L (ref 136–145)
WBC # BLD AUTO: 7.01 THOUSAND/UL (ref 4.31–10.16)

## 2018-07-14 PROCEDURE — 85027 COMPLETE CBC AUTOMATED: CPT | Performed by: PHYSICIAN ASSISTANT

## 2018-07-14 PROCEDURE — 85610 PROTHROMBIN TIME: CPT | Performed by: PHYSICIAN ASSISTANT

## 2018-07-14 PROCEDURE — 80048 BASIC METABOLIC PNL TOTAL CA: CPT | Performed by: PHYSICIAN ASSISTANT

## 2018-07-14 PROCEDURE — 99220 PR INITIAL OBSERVATION CARE/DAY 70 MINUTES: CPT | Performed by: INTERNAL MEDICINE

## 2018-07-14 PROCEDURE — 99285 EMERGENCY DEPT VISIT HI MDM: CPT

## 2018-07-14 RX ORDER — TORSEMIDE 20 MG/1
20 TABLET ORAL
Status: DISCONTINUED | OUTPATIENT
Start: 2018-07-14 | End: 2018-07-15 | Stop reason: HOSPADM

## 2018-07-14 RX ORDER — TRAMADOL HYDROCHLORIDE 50 MG/1
50 TABLET ORAL EVERY 6 HOURS PRN
Status: DISCONTINUED | OUTPATIENT
Start: 2018-07-14 | End: 2018-07-15 | Stop reason: HOSPADM

## 2018-07-14 RX ORDER — POTASSIUM CHLORIDE 14.9 MG/ML
20 INJECTION INTRAVENOUS
Status: COMPLETED | OUTPATIENT
Start: 2018-07-14 | End: 2018-07-14

## 2018-07-14 RX ORDER — POTASSIUM CHLORIDE 20 MEQ/1
40 TABLET, EXTENDED RELEASE ORAL DAILY
Status: DISCONTINUED | OUTPATIENT
Start: 2018-07-14 | End: 2018-07-15 | Stop reason: HOSPADM

## 2018-07-14 RX ORDER — SUCRALFATE ORAL 1 G/10ML
1000 SUSPENSION ORAL EVERY 6 HOURS SCHEDULED
Status: DISCONTINUED | OUTPATIENT
Start: 2018-07-14 | End: 2018-07-15 | Stop reason: HOSPADM

## 2018-07-14 RX ORDER — FLUTICASONE PROPIONATE 50 MCG
1 SPRAY, SUSPENSION (ML) NASAL DAILY
Status: DISCONTINUED | OUTPATIENT
Start: 2018-07-14 | End: 2018-07-15 | Stop reason: HOSPADM

## 2018-07-14 RX ORDER — DILTIAZEM HYDROCHLORIDE 60 MG/1
120 CAPSULE, EXTENDED RELEASE ORAL EVERY 12 HOURS SCHEDULED
Status: DISCONTINUED | OUTPATIENT
Start: 2018-07-14 | End: 2018-07-15 | Stop reason: HOSPADM

## 2018-07-14 RX ORDER — BISOPROLOL FUMARATE 5 MG/1
10 TABLET ORAL DAILY
Status: DISCONTINUED | OUTPATIENT
Start: 2018-07-14 | End: 2018-07-15 | Stop reason: HOSPADM

## 2018-07-14 RX ORDER — ONDANSETRON 2 MG/ML
4 INJECTION INTRAMUSCULAR; INTRAVENOUS EVERY 6 HOURS PRN
Status: DISCONTINUED | OUTPATIENT
Start: 2018-07-14 | End: 2018-07-15 | Stop reason: HOSPADM

## 2018-07-14 RX ORDER — WARFARIN SODIUM 4 MG/1
4 TABLET ORAL EVERY OTHER DAY
Status: DISCONTINUED | OUTPATIENT
Start: 2018-07-14 | End: 2018-07-15 | Stop reason: HOSPADM

## 2018-07-14 RX ORDER — ACETAMINOPHEN 325 MG/1
650 TABLET ORAL EVERY 6 HOURS PRN
Status: DISCONTINUED | OUTPATIENT
Start: 2018-07-14 | End: 2018-07-15 | Stop reason: HOSPADM

## 2018-07-14 RX ORDER — LIDOCAINE 50 MG/G
1 PATCH TOPICAL DAILY
Status: DISCONTINUED | OUTPATIENT
Start: 2018-07-14 | End: 2018-07-15

## 2018-07-14 RX ORDER — WARFARIN SODIUM 3 MG/1
3 TABLET ORAL EVERY OTHER DAY
Status: DISCONTINUED | OUTPATIENT
Start: 2018-07-15 | End: 2018-07-15 | Stop reason: HOSPADM

## 2018-07-14 RX ORDER — DULOXETIN HYDROCHLORIDE 60 MG/1
60 CAPSULE, DELAYED RELEASE ORAL DAILY
Status: DISCONTINUED | OUTPATIENT
Start: 2018-07-14 | End: 2018-07-15 | Stop reason: HOSPADM

## 2018-07-14 RX ADMIN — SUCRALFATE 1000 MG: 1 SUSPENSION ORAL at 06:30

## 2018-07-14 RX ADMIN — DILTIAZEM HYDROCHLORIDE 120 MG: 60 CAPSULE, EXTENDED RELEASE ORAL at 03:14

## 2018-07-14 RX ADMIN — POTASSIUM CHLORIDE 20 MEQ: 200 INJECTION, SOLUTION INTRAVENOUS at 15:17

## 2018-07-14 RX ADMIN — TORSEMIDE 20 MG: 20 TABLET ORAL at 09:24

## 2018-07-14 RX ADMIN — SUCRALFATE 1000 MG: 1 SUSPENSION ORAL at 17:47

## 2018-07-14 RX ADMIN — LIDOCAINE 1 PATCH: 50 PATCH CUTANEOUS at 03:13

## 2018-07-14 RX ADMIN — POTASSIUM CHLORIDE 40 MEQ: 1500 TABLET, EXTENDED RELEASE ORAL at 14:50

## 2018-07-14 RX ADMIN — DILTIAZEM HYDROCHLORIDE 120 MG: 60 CAPSULE, EXTENDED RELEASE ORAL at 09:25

## 2018-07-14 RX ADMIN — POTASSIUM CHLORIDE 20 MEQ: 200 INJECTION, SOLUTION INTRAVENOUS at 16:54

## 2018-07-14 RX ADMIN — SUCRALFATE 1000 MG: 1 SUSPENSION ORAL at 03:14

## 2018-07-14 RX ADMIN — DULOXETINE 60 MG: 60 CAPSULE, DELAYED RELEASE ORAL at 09:24

## 2018-07-14 RX ADMIN — TRAMADOL HYDROCHLORIDE 50 MG: 50 TABLET, FILM COATED ORAL at 09:29

## 2018-07-14 RX ADMIN — BISOPROLOL FUMARATE 10 MG: 5 TABLET ORAL at 09:24

## 2018-07-14 RX ADMIN — SUCRALFATE 1000 MG: 1 SUSPENSION ORAL at 11:53

## 2018-07-14 RX ADMIN — ONDANSETRON 4 MG: 2 INJECTION INTRAMUSCULAR; INTRAVENOUS at 19:12

## 2018-07-14 RX ADMIN — WARFARIN SODIUM 4 MG: 4 TABLET ORAL at 17:47

## 2018-07-14 NOTE — ASSESSMENT & PLAN NOTE
· Anticoagulated with Coumadin  Obtain PT/INR  · Rate controlled  Continue Cardizem and bisoprolol  · Outpatient follow-up with cardiology, Dr Arturo Fleming

## 2018-07-14 NOTE — SEDATION DOCUMENTATION
Reduction not successful per xray confirmation  XRAY to remain at bedside, Dr Mary Davis to give more sedation

## 2018-07-14 NOTE — CASE MANAGEMENT
Initial Clinical Review    Admission: Date/Time/Statement:   OBS  ORDER   7/14  @  0028     Orders Placed This Encounter   Procedures    Place in Observation (expected length of stay for this patient is less than two midnights)     Standing Status:   Standing     Number of Occurrences:   1     Order Specific Question:   Admitting Physician     Answer:   Torsten Biswas     Order Specific Question:   Level of Care     Answer:   Med Surg [16]         ED: Date/Time/Mode of Arrival:   ED Arrival Information     Expected Arrival Acuity Means of Arrival Escorted By Service Admission Type    - 7/13/2018 21:10 Emergent Ambulance [facility] General Medicine Emergency    Arrival Complaint    Hip Pain          Chief Complaint:   Chief Complaint   Patient presents with    Hip Injury     Pt was at [employer] and turning the wheelchair when she felt her right hip pop out  Pt right leg shortened and externally rotated, pt has good pedal pulses  History of Illness: Franci Padilla is a 80 y o  female with a history of right hip prosthesis, prior hip dislocations, atrial fibrillation on Coumadin, chronic CHF and hypertension who presents with dislocation of right hip prosthesis  Patient reports that she was grocery shopping last evening and was using one of the motorized shopping carts because she becomes SOB easily  She reportedly moved in the seat to reach the credit card machine when she felt her right hip dislocate  In the ED, patient's hip was relocated under sedation  She denies pain, paresthesias or numbness at this time  She reports having dislocated the right hip prosthesis 3 times in 2016    She was admitted in August 2016 with right hip dislocation s/p fall and required closed reduction at that time        ED Vital Signs:   ED Triage Vitals [07/13/18 2115]   Temperature Pulse Respirations Blood Pressure SpO2   98 3 °F (36 8 °C) 91 18 104/66 97 %      Temp Source Heart Rate Source Patient Position - Orthostatic VS BP Location FiO2 (%)   Oral Monitor Lying Right arm --      Pain Score       4        Wt Readings from Last 1 Encounters:   07/13/18 88 8 kg (195 lb 12 3 oz)       Vital Signs (abnormal):    above    Abnormal Labs/Diagnostic Test Results:    X ray  R  Hip:    Dislocation of the right total hip arthroplasty  Interval reduction of the previously noted right total hip arthroplasty dislocation  ED Treatment:   Medication Administration from 07/13/2018 2110 to 07/14/2018 0133       Date/Time Order Dose Route Action Action by Comments     07/13/2018 2228 fentanyl citrate (PF) 100 MCG/2ML 50 mcg 50 mcg Intravenous Given Jerrica Brooks RN      07/13/2018 2321 propofol (DIPRIVAN) 200 MG/20ML bolus injection 100 mg 75 mg Intravenous Given by Kim Meléndez RN Given by Dr Art Lewis     07/14/2018 0032 sodium chloride 0 9 % bolus 1,000 mL 0 mL Intravenous Stopped Jerrica Brooks RN      07/13/2018 2315 sodium chloride 0 9 % bolus 1,000 mL 1,000 mL Intravenous New Bag Abbey Meléndez RN           Past Medical/Surgical History:    Active Ambulatory Problems     Diagnosis Date Noted    Atrial fibrillation ([facility] ) 03/07/2016    Anemia 03/09/2016    Fall 08/21/2016    Hip dislocation, right ([facility] ) 08/21/2016    Chronic diastolic CHF (congestive heart failure) ([facility] ) 08/23/2016    Depression 08/23/2016    Obesity without serious comorbidity 08/23/2016    Chronic pain 08/23/2016    Abdominal bloating 08/01/2016    Abdominal pain 12/08/2012    Arthralgia of hip 04/14/2015    Arthralgia of knee, right 09/18/2014    Benign essential hypertension 12/09/2012    Chronic pain of lower extremity, bilateral 06/23/2016    Fibromyalgia 05/31/2012    External hemorrhoids 03/25/2016    Irritable bowel syndrome with constipation 09/15/2015    Lumbar canal stenosis 05/17/2012    Moderate mitral regurgitation 05/13/2014    Osteoarthritis, multiple sites 06/26/2014    Osteoporosis 02/04/2014    Palpitations 05/04/2015    Peripheral neuropathy 05/25/2012    Primary osteoarthritis of right knee 06/30/2015    Synovial cyst of right popliteal space 08/04/2015    Unstable right hip 04/04/2017    Urinary incontinence 05/13/2014    Exertional chest pain 01/26/2018    Right lower quadrant abdominal pain 01/26/2018    GI bleeding 03/22/2018    Ventral hernia without obstruction or gangrene 03/22/2018    Abnormal CT of the abdomen 03/22/2018    Gastrointestinal hemorrhage with melena 03/22/2018    Ulcer 03/29/2018    Chronic pain of right knee 05/15/2018    Abnormal TSH 07/08/2018    Postural dizziness with presyncope 07/08/2018     Resolved Ambulatory Problems     Diagnosis Date Noted    Acute diastolic congestive heart failure ([facility] ) 03/07/2016    Leg swelling 03/07/2016    Shortness of breath 03/08/2016    Hypokalemia 03/09/2016    Elevated TSH 03/09/2016    Left foot pain 03/11/2016    BRITT[facility]) [facility] ) 07/02/2018    Near syncope 07/02/2018     Past Medical History:   Diagnosis Date    Arthritis     Cancer ([facility] )     [facility]congest[facility]) [facility]     Disturbance of smell and taste     Effusion of knee joint right     Fibromyalgia     Heart murmur     History of acute myocardial infarction     History of atrial fibrillation     History of bruising easily     History of cancer     History of dermatitis     History of mammogram     History of obesity     History of osteopenia     History of sciatica     History of shortness of breath     History of sinusitis     History of sore throat     History of syncope     History of umbilical hernia     History of viral infection     Irritable bowel syndrome (IBS)     Joint pain, hip     Limb pain     Myalgia     Need for prophylactic vaccination against single diseases     Need for prophylactic vaccination and inoculation against influenza     Preoperative cardiovascular examination     Primary osteoarthritis of right knee     Right leg pain     Vaginal Pap smear        Admitting Diagnosis: Hip pain [M25 559]  Dislocation of hip joint prosthesis, initial encounter ([place] [address] ) [H65 043X, Z96 [id number]]    Age/Sex: 80 y o  female    Assessment/Plan:   Hip dislocation, right ([facility] )   Assessment & Plan     · Presented with nontraumatic dislocation of the right hip prosthesis  · Hip was relocated in the ED under sedation and confirmed with x-ray  · Pain control with prn acetaminophen, lidocaine patch  · Obtain PT evaluation  · Fall precautions            Atrial fibrillation ([place] [address] )   Assessment & Plan     · Anticoagulated with Coumadin  Obtain PT/INR  · Rate controlled  Continue Cardizem and bisoprolol  · Outpatient follow-up with cardiology, Dr Rasheed Romo          Chronic diastolic CHF (congestive heart failure) ([facility])   Assessment & Plan     · Continue torsemide  · Last echocardiogram was in April 2018, EF 55%, moderate MR and TR  · Monitor daily weights and intake/output            Benign essential hypertension   Assessment & Plan     · BP acceptable  · Continue bisoprolol and Cardizem    Anticipated Length of Stay:  Patient will be admitted on an Observation basis with an anticipated length of stay of  < 2 midnights     Justification for Hospital Stay: right hip dislocation, need for PT eval          Admission Orders:   OBS  ORDER   7/14  @  0028  Scheduled Meds:   Current Facility-Administered Medications:  acetaminophen 650 mg Oral Q6H PRN Emigdio Guardado PA-C   bisoprolol 10 mg Oral Daily Marlen Rivero PA-C   diltiazem 120 mg Oral Q12H [address] Emigdio Guardado PA-C   DULoxetine 60 mg Oral Daily Emigdio Guardado PA-C   fluticasone 1 spray Nasal Daily Emigdio Guardado PA-C   lidocaine 1 patch Transdermal Daily Marlen Rivero PA-C   ondansetron 4 mg Intravenous Q6H PRN Emigdio Guardado PA-C   sucralfate 1,000 mg Oral Q6H [address] Marlen Rivero PA-C   torsemide 20 mg Oral BID (diuretic) Marlen Trevor Reza PA-C   traMADol 50 mg Oral Q6H PRN Margarita Alexander PA-C   [START ON 7/15/2018] warfarin 3 mg Oral Every Other Day Margarita Alexander PA-C   warfarin 4 mg Oral Every Other Day Margarita Alexander PA-C     Continuous Infusions:    PRN Meds:   acetaminophen    ondansetron    traMADol     Cardiac  Diet  Contact isolation  PT    Thank you,  Jen Aqq  [address] Utilization Review Department  Phone: [phone]; Fax [phone]  ATTENTION: The Network Utilization Review Department is now centralized for our 9 Facilities  Make a note that we have a new phone and fax numbers for our Department  Please call with any questions or concerns to [phone] and carefully follow the prompts so that you are directed to the right person  All voicemails are confidential  Fax any determinations, approvals, denials, and requests for initial or continue stay review clinical to [phone]  Due to HIGH CALL volume, it would be easier if you could please send faxed requests to expedite your requests and in part, help us provide discharge notifications faster

## 2018-07-14 NOTE — ASSESSMENT & PLAN NOTE
· Anticoagulated with Coumadin  Obtain PT/INR  · Rate controlled  Continue Cardizem and bisoprolol  · Outpatient follow-up with cardiology, Dr Coleman Dominguez

## 2018-07-14 NOTE — H&P
H&P- Sander Escobedo 1933, 80 y o  female MRN: [id number]    Unit/Bed#: -01 Encounter: [id number]    Primary Care Provider: Nikole Bustillos DO   Date and time admitted to hospital: 7/13/2018  9:11 PM        * Hip dislocation, right [facility])   Assessment & Plan    · Presented with nontraumatic dislocation of the right hip prosthesis  · Hip was relocated in the ED under sedation and confirmed with x-ray  · Pain control with prn acetaminophen, lidocaine patch  · Obtain PT evaluation  · Fall precautions  Atrial fibrillation ([address] )   Assessment & Plan    · Anticoagulated with Coumadin  Obtain PT/INR  · Rate controlled  Continue Cardizem and bisoprolol  · Outpatient follow-up with cardiology, Dr Hussein Sanchez  Chronic diastolic CHF (congestive heart failure) ([facility])   Assessment & Plan    · Continue torsemide  · Last echocardiogram was in April 2018, EF 55%, moderate MR and TR  · Monitor daily weights and intake/output  Benign essential hypertension   Assessment & Plan    · BP acceptable  · Continue bisoprolol and Cardizem  VTE Prophylaxis: Warfarin (Coumadin)  / sequential compression device   Code Status: level 1- Full Code  POLST: POLST form is not discussed and not completed at this time  Anticipated Length of Stay:  Patient will be admitted on an Observation basis with an anticipated length of stay of  < 2 midnights  Justification for Hospital Stay: right hip dislocation, need for PT eval     Total Time for Visit, including Counseling / Coordination of Care: 45 minutes  Greater than 50% of this total time spent on direct patient counseling and coordination of care  Chief Complaint:   Right hip dislocation       History of Present Illness:    Sander Escobedo is a 80 y o  female with a history of right hip prosthesis, prior hip dislocations, atrial fibrillation on Coumadin, chronic CHF and hypertension who presents with dislocation of right hip prosthesis  Patient reports that she was grocery shopping last evening and was using one of the motorized shopping carts because she becomes SOB easily  She reportedly moved in the seat to reach the credit card machine when she felt her right hip dislocate  In the ED, patient's hip was relocated under sedation  She denies pain, paresthesias or numbness at this time  She reports having dislocated the right hip prosthesis 3 times in 2016  She was admitted in August 2016 with right hip dislocation s/p fall and required closed reduction at that time  Review of Systems:    Review of Systems   Musculoskeletal: Positive for arthralgias and gait problem  All other systems reviewed and are negative        Past Medical and Surgical History:     Past Medical History:   Diagnosis Date    Arthritis     Cancer [facility] [address] [facility]     CHF (congestive heart failure) ([facility])     Disturbance of smell and taste     disturbance of taste    Effusion of knee joint right     Fibromyalgia     Heart murmur     reported a previous heart murmur    History of acute myocardial infarction     History of atrial fibrillation     History of bruising easily     History of cancer     History of dermatitis     History of mammogram     History of obesity     History of osteopenia     History of sciatica     History of shortness of breath     History of sinusitis     History of sore throat     History of syncope     History of umbilical hernia     History of viral infection     Irritable bowel syndrome (IBS)     Joint pain, hip     Limb pain     Myalgia     myalgia and myositis    Need for prophylactic vaccination against single diseases     Need for prophylactic vaccination and inoculation against influenza     Preoperative cardiovascular examination     Primary osteoarthritis of right knee     Right leg pain     Vaginal Pap smear     reported pap smear       Past Surgical History:   Procedure Laterality Date    ANKLE ARTHRODESIS Right     BACK SURGERY      BARIATRIC SURGERY      CHOLECYSTECTOMY      x2    ESOPHAGOGASTRODUODENOSCOPY N/A 3/23/2018    Procedure: ESOPHAGOGASTRODUODENOSCOPY (EGD); Surgeon: Rj Salazar MD;  [facility]; Service: Gastroenterology    FOOT SURGERY      HIP CLOSE REDUCTION Right 8/21/2016    Procedure: CLOSED REDUCTION RIGHT TOTAL HIP;  Surgeon: Laree Brunner, MD;  [facility];  Service:     JOINT REPLACEMENT      LEFT KNEE REPLACEMENT    PILONIDAL CYST EXCISION      x2    REPLACEMENT TOTAL KNEE Right     SILVANA-EN-Y PROCEDURE      x2    TOTAL KNEE ARTHROPLASTY      UMBILICAL HERNIA REPAIR      x2       Meds/Allergies:    Prior to Admission medications    Medication Sig Start Date End Date Taking? Authorizing Provider   bisoprolol (ZEBETA) 10 MG tablet Take 1 tablet (10 mg total) by mouth daily 5/15/18   Farhana Benites MD   desoximetasone (TOPICORT) 0 25 % cream Apply topically 8/3/11   Historical Provider, MD   diltiazem (CARDIZEM) 120 MG tablet Take 1 tablet (120 mg total) by mouth every 12 (twelve) hours 7/4/18   Lissette Gutierrez MD   DULoxetine (CYMBALTA) 60 mg delayed release capsule TAKE 1 CAPSULE BY MOUTH ONCE DAILY 7/10/18   Jessica Hernandez DO   fluticasone (FLONASE) 50 mcg/act nasal spray 1 spray into each nostril daily  Historical Provider, MD   potassium chloride (KLOR-CON) 20 mEq packet Take 10 mEq by mouth as needed      Historical Provider, MD   sucralfate (CARAFATE) 1 g/10 mL suspension Take 10 mL (1,000 mg total) by mouth every 6 (six) hours 4/17/18   Jessica Hernandez DO   torsemide (DEMADEX) 20 mg tablet Take 2 tablets (40 mg total) by mouth daily 7/4/18   Lissette Gutierrez MD   traMADol (ULTRAM) 50 mg tablet TAKE ONE TABLET BY MOUTH TWICE DAILY AS NEEDED FOR PAIN 5/14/18   Jessica Hernandez DO   warfarin (COUMADIN) 3 mg tablet Take 1 tablet daily or as directed  Patient taking differently: Take 3 mg by mouth every other day Take 1 tablet daily or as directed  3/14/18   Nadeen Stoner DO   warfarin (COUMADIN) 4 mg tablet Take 4 mg by mouth every other day   12/20/17   Historical Provider, MD     I have reviewed home medications with a medical source (PCP, Pharmacy, other)  Allergies: Allergies   Allergen Reactions    Codeine Vomiting and GI Intolerance     GI upset, ana m morphine  Reaction Date: 24Apr2012;     Other      Other reaction(s): Other (See Comments)  ana m toradol in OR 6/06 sah    Oxycodone Hcl Vomiting     Reaction Date: 24Apr2012;     Oxycodone-Acetaminophen      Other reaction(s): Other (See Comments)  GI upset; ana m vicodin    Percocet [Oxycodone-Acetaminophen]     Seasonal Ic  [Cholestatin] Allergic Rhinitis    Bactrim [Sulfamethoxazole-Trimethoprim] Rash    Sulfa Antibiotics Rash     Other reaction(s):  Other (See Comments)  takes lasix @home       Social History:     Marital Status: /Civil Union   Patient Pre-hospital Living Situation: resides at home with her   Patient Pre-hospital Level of Mobility: rollator walker  Patient Pre-hospital Diet Restrictions: none  Substance Use History:   History   Alcohol Use    Yes     Comment:  social     History   Smoking Status    Never Smoker   Smokeless Tobacco    Never Used     History   Drug Use No       Family History:    Family History   Problem Relation Age of Onset    Coronary artery disease Mother     Heart attack Mother     Hypertension Mother     Coronary artery disease Father     Heart attack Father     Hypertension Father     Lymphoma Sister         acute    Rashes / Skin problems Sister         lymphomatoid papulosis    Coronary artery disease Brother     Heart attack Brother         x2    Aneurysm Neg Hx     Hyperlipidemia Neg Hx        Physical Exam:     Vitals:   Blood Pressure: 121/86 (07/14/18 0045)  Pulse: 68 (07/14/18 0138)  Temperature: 98 3 °F (36 8 °C) (07/13/18 2115)  Temp Source: Oral (07/13/18 2115)  Respirations: 18 (07/14/18 0138)  Weight - Scale: 88 8 kg (195 lb 12 3 oz) (07/13/18 2115)  SpO2: 99 % (07/14/18 0138)    Physical Exam   Constitutional: She is oriented to person, place, and time  She appears well-developed and well-nourished  No distress  HENT:   Head: Normocephalic and atraumatic  Eyes: Conjunctivae are normal    Cardiovascular: Normal rate and normal heart sounds  An irregular rhythm present  Pulmonary/Chest: Effort normal and breath sounds normal  No respiratory distress  Abdominal: Soft  Bowel sounds are normal  There is no tenderness  Musculoskeletal: She exhibits no edema or tenderness  Abduction pillow in place   Neurological: She is alert and oriented to person, place, and time  Skin: Skin is warm and dry  She is not diaphoretic  No erythema  Psychiatric: She has a normal mood and affect  Nursing note and vitals reviewed  Additional Data:     Lab Results: I have personally reviewed pertinent reports  Results from last 7 days  Lab Units 07/09/18  0921   SODIUM mmol/L 141   POTASSIUM mmol/L 3 7   CHLORIDE mmol/L 106   CO2 mmol/L 25   BUN mg/dL 19   CREATININE mg/dL 1 15   CALCIUM mg/dL 8 5   TOTAL PROTEIN g/dL 6 7   BILIRUBIN TOTAL mg/dL 0 49   ALK PHOS U/L 85   ALT U/L 15   AST U/L 15       Results from last 7 days  Lab Units 07/09/18  0921   INR  2 99*               Imaging: I have personally reviewed pertinent reports  and I have personally reviewed pertinent films in PACS    XR hip/pelv 2-3 vws right   ED Interpretation by Mary Jane Vega MD (07/13 2224)   Dislocation of right hip prosthesis      XR hip/pelv 1 vw RIGHT if performed    (Results Pending)     Allscripts / [facility] Records Reviewed: Yes     ** Please Note: This note has been constructed using a voice recognition system   **

## 2018-07-14 NOTE — ED PROVIDER NOTES
History  Chief Complaint   Patient presents with    Hip Injury     Pt was at [facility] and turning the wheelchair when she felt her right hip pop out  Pt right leg shortened and externally rotated, pt has good pedal pulses  History provided by:  Patient   used: No     51-year-old female with a right hip prosthesis and multiple dislocations in the past presented after moving slightly in her mart cart at [facility] with acute pain  Some pain radiating down the leg  Moderate overall  No paresthesias or loss of function  On exam here the leg is externally rotated and shortened  She has some tenderness proximally  Normal DP pulse, motion of the toes  Limited motion due to pain  Plan x-ray, pain control and closed reduction for likely prosthetic dislocation       Prior to Admission Medications   Prescriptions Last Dose Informant Patient Reported? Taking? DULoxetine (CYMBALTA) 60 mg delayed release capsule  Self No No   Sig: TAKE 1 CAPSULE BY MOUTH ONCE DAILY   bisoprolol (ZEBETA) 10 MG tablet  Self No No   Sig: Take 1 tablet (10 mg total) by mouth daily   desoximetasone (TOPICORT) 0 25 % cream  Self Yes No   Sig: Apply topically   diltiazem (CARDIZEM) 120 MG tablet  Self No No   Sig: Take 1 tablet (120 mg total) by mouth every 12 (twelve) hours   fluticasone (FLONASE) 50 mcg/act nasal spray  Self Yes No   Si spray into each nostril daily  potassium chloride (KLOR-CON) 20 mEq packet  Self Yes No   Sig: Take 10 mEq by mouth as needed     sucralfate (CARAFATE) 1 g/10 mL suspension  Self No No   Sig: Take 10 mL (1,000 mg total) by mouth every 6 (six) hours   torsemide (DEMADEX) 20 mg tablet  Self No No   Sig: Take 2 tablets (40 mg total) by mouth daily   traMADol (ULTRAM) 50 mg tablet  Self No No   Sig: TAKE ONE TABLET BY MOUTH TWICE DAILY AS NEEDED FOR PAIN   warfarin (COUMADIN) 3 mg tablet  Self No No   Sig: Take 1 tablet daily or as directed     Patient taking differently: Take 3 mg by mouth every other day Take 1 tablet daily or as directed  warfarin (COUMADIN) 4 mg tablet  Self Yes No   Sig: Take 4 mg by mouth every other day        Facility-Administered Medications: None       Past Medical History:   Diagnosis Date    Arthritis     Cancer ([address] )     CH[facility]congestive [facility]) ([facility]     Disturbance of smell and taste     disturbance of taste    Effusion of knee joint right     Fibromyalgia     Heart murmur     reported a previous heart murmur    History of acute myocardial infarction     History of atrial fibrillation     History of bruising easily     History of cancer     History of dermatitis     History of mammogram     History of obesity     History of osteopenia     History of sciatica     History of shortness of breath     History of sinusitis     History of sore throat     History of syncope     History of umbilical hernia     History of viral infection     Irritable bowel syndrome (IBS)     Joint pain, hip     Limb pain     Myalgia     myalgia and myositis    Need for prophylactic vaccination against single diseases     Need for prophylactic vaccination and inoculation against influenza     Preoperative cardiovascular examination     Primary osteoarthritis of right knee     Right leg pain     Vaginal Pap smear     reported pap smear       Past Surgical History:   Procedure Laterality Date    ANKLE ARTHRODESIS Right     BACK SURGERY      BARIATRIC SURGERY      CHOLECYSTECTOMY      x2    ESOPHAGOGASTRODUODENOSCOPY N/A 3/23/2018    Procedure: ESOPHAGOGASTRODUODENOSCOPY (EGD); Surgeon: Litzy Stacy MD;  [facility];   Service: Gastroenterology    FOOT SURGERY      HIP CLOSE REDUCTION Right 8/21/2016    Procedure: CLOSED REDUCTION RIGHT TOTAL HIP;  Surgeon: Filomena Kat MD;  [facility];  Service:     JOINT REPLACEMENT      LEFT KNEE REPLACEMENT    PILONIDAL CYST EXCISION      x2    REPLACEMENT TOTAL KNEE Right     SILVANA-EN-Y PROCEDURE      x2    TOTAL KNEE ARTHROPLASTY      UMBILICAL HERNIA REPAIR      x2       Family History   Problem Relation Age of Onset    Coronary artery disease Mother     Heart attack Mother     Hypertension Mother     Coronary artery disease Father     Heart attack Father     Hypertension Father     Lymphoma Sister         acute    Rashes / Skin problems Sister         lymphomatoid papulosis    Coronary artery disease Brother     Heart attack Brother         x2    Aneurysm Neg Hx     Hyperlipidemia Neg Hx      I have reviewed and agree with the history as documented  Social History   Substance Use Topics    Smoking status: Never Smoker    Smokeless tobacco: Never Used    Alcohol use Yes      Comment:  social        Review of Systems   Constitutional: Negative for activity change and appetite change  Musculoskeletal: Positive for gait problem  Negative for back pain  Neurological: Negative for weakness and numbness  All other systems reviewed and are negative  Physical Exam  Physical Exam   Constitutional: She is oriented to person, place, and time  She appears well-developed and well-nourished  No distress  HENT:   Head: Normocephalic  Mouth/Throat: Oropharynx is clear and moist    Cardiovascular: Normal rate, regular rhythm and intact distal pulses  Pulmonary/Chest: Effort normal  No respiratory distress  Abdominal: Soft  She exhibits no distension  Musculoskeletal:   Limited motion of the right hip due to pain  Leg is externally rotated and shortened  Neurological: She is alert and oriented to person, place, and time  No sensory deficit  She exhibits normal muscle tone  Skin: Skin is warm and dry  Psychiatric: She has a normal mood and affect  Her behavior is normal    Nursing note and vitals reviewed        Vital Signs  ED Triage Vitals [07/13/18 2115]   Temperature Pulse Respirations Blood Pressure SpO2   98 3 °F (36 8 °C) 91 18 104/66 97 %      Temp Source Heart Rate Source Patient Position - Orthostatic VS BP Location FiO2 (%)   Oral Monitor Lying Right arm --      Pain Score       4           Vitals:    07/13/18 2354 07/13/18 2355 07/13/18 2356 07/13/18 2358   BP:  102/56  134/62   Pulse: 86  82 84   Patient Position - Orthostatic VS:           Visual Acuity      ED Medications  Medications   sodium chloride 0 9 % bolus 1,000 mL (not administered)   fentanyl citrate (PF) 100 MCG/2ML 50 mcg (50 mcg Intravenous Given 7/13/18 2228)   propofol (DIPRIVAN) 200 MG/20ML bolus injection 100 mg (75 mg Intravenous Given by Other 7/13/18 2321)       Diagnostic Studies  Results Reviewed     None                 XR hip/pelv 2-3 vws right   ED Interpretation by Emerald Damon MD (07/13 2224)   Dislocation of right hip prosthesis      XR hip/pelv 1 vw RIGHT if performed    (Results Pending)              Procedures  Procedural Sedation  Date/Time: 7/13/2018 11:47 PM  Performed by: Curtis Garcia  Authorized by: Curtis Garcia     Orthopedic Injury  Date/Time: 7/13/2018 11:47 PM  Performed by: Curtis Garcia  Authorized by: Curtis Garcia         Conscious Sedation Assessment      Classification Score   ASA Scale Assessment  2-Mild to moderate systemic disease, medically well controlled, with no functional limitation filed at 07/13/2018 2228   Mallampati Classification  Class III: soft palate, base of uvula visible - Moderate Difficulty filed at 07/13/2018 2228           Phone Contacts  ED Phone Contact    ED Course                               MDM  Number of Diagnoses or Management Options  Dislocation of hip joint prosthesis, initial encounter [facility]):   Diagnosis management comments: 75-year-old female with a prosthetic right hip with multiple dislocations in the past presented after and atraumatic dislocation  Hip relocated under conscious sedation, confirmed on x-ray  Admitted after reduction for full PT eval prior to discharge         Amount and/or Complexity of Data Reviewed  Tests in the radiology section of CPT®: ordered and reviewed    Patient Progress  Patient progress: improved    CritCare Time    Disposition  Final diagnoses:   Dislocation of hip joint prosthesis, initial encounter [facility])     Time reflects when diagnosis was documented in both MDM as applicable and the Disposition within this note     Time User Action Codes Description Comment    7/14/2018 12:27 AM Arnol Jones Add [T84 029A,  [id number]] Dislocation of hip joint prosthesis, initial encounter [facility])       ED Disposition     ED Disposition Condition Comment    Admit  Case was discussed with Leodan Stiles and the patient's admission status was agreed to be Admission Status: observation status to the service of Dr Adrian Eli   Follow-up Information    None         Patient's Medications   Discharge Prescriptions    No medications on file     No discharge procedures on file      ED Provider  Electronically Signed by           Emerald Damon MD  07/14/18 8597

## 2018-07-14 NOTE — PROGRESS NOTES
Progress Note - Ed Tejada 1933, 80 y o  female MRN: [id number]    Unit/Bed#: -01 Encounter: [id number]    Primary Care Provider: Sunita Stapleton DO   Date and time admitted to hospital: 2018  9:11 PM        * Hip dislocation, right [facility])   Assessment & Plan    · Presented with nontraumatic dislocation of the right hip prosthesis  · Hip was relocated in the ED under sedation and confirmed with x-ray  · Pain control with prn acetaminophen, lidocaine patch  · Obtain PT evaluation  · Fall precautions  Benign essential hypertension   Assessment & Plan    · BP acceptable  · Continue bisoprolol and Cardizem  Chronic diastolic CHF (congestive heart failure) (HCC)   Assessment & Plan    · Continue torsemide  · Last echocardiogram was in 2018, EF 55%, moderate MR and TR  · Monitor daily weights and intake/output  Atrial fibrillation ([address] )   Assessment & Plan    · Anticoagulated with Coumadin  Obtain PT/INR  · Rate controlled  Continue Cardizem and bisoprolol  · Outpatient follow-up with cardiology, Dr Manish Todd  VTE Pharmacologic Prophylaxis:   Pharmacologic: Warfarin (Coumadin)  Mechanical VTE Prophylaxis in Place: No    Patient Centered Rounds: I have performed bedside rounds with nursing staff today  Time Spent for Care: 15 minutes  More than 50% of total time spent on counseling and coordination of care as described above      Current Length of Stay: 0 day(s)    Current Patient Status: Observation   Certification Statement: The patient will continue to require additional inpatient hospital stay due to Need to monitor symptoms of hip pain      Code Status: Level 1 - Full Code      Subjective:   Patient comfortable    Objective:     Vitals:   Temp (24hrs), Av 1 °F (36 7 °C), Min:97 9 °F (36 6 °C), Max:98 3 °F (36 8 °C)    HR:  [] 71  Resp:  [14-23] 18  BP: ()/(55-88) 135/75  SpO2:  [95 %-99 %] 98 %  Body mass index is 35 8 kg/m²  Input and Output Summary (last 24 hours): Intake/Output Summary (Last 24 hours) at 07/14/18 1231  Last data filed at 07/14/18 1144   Gross per 24 hour   Intake             1360 ml   Output              850 ml   Net              510 ml       Physical Exam:     Physical Exam   Constitutional: She is oriented to person, place, and time  HENT:   Head: Normocephalic and atraumatic  Pulmonary/Chest: Effort normal and breath sounds normal  No respiratory distress  She has no wheezes  Abdominal: Soft  She exhibits no distension  Neurological: She is oriented to person, place, and time  Skin: Skin is warm and dry  Additional Data:     Labs:      Results from last 7 days  Lab Units 07/14/18  0359   WBC Thousand/uL 7 01   HEMOGLOBIN g/dL 10 1*   HEMATOCRIT % 32 3*   PLATELETS Thousands/uL 158       Results from last 7 days  Lab Units 07/14/18  0359 07/09/18  0921   SODIUM mmol/L 142 141   POTASSIUM mmol/L 2 8* 3 7   CHLORIDE mmol/L 105 106   CO2 mmol/L 28 25   BUN mg/dL 14 19   CREATININE mg/dL 0 94 1 15   CALCIUM mg/dL 8 1* 8 5   TOTAL PROTEIN g/dL  --  6 7   BILIRUBIN TOTAL mg/dL  --  0 49   ALK PHOS U/L  --  85   ALT U/L  --  15   AST U/L  --  15   GLUCOSE RANDOM mg/dL 102  --        Results from last 7 days  Lab Units 07/14/18  0359   INR  2 81*       * I Have Reviewed All Lab Data Listed Above  * Additional Pertinent Lab Tests Reviewed:  All Labs Within Last 24 Hours Reviewed      Recent Cultures (last 7 days):           Last 24 Hours Medication List:     Current Facility-Administered Medications:  acetaminophen 650 mg Oral Q6H PRN Zahraa Briseno PA-C   bisoprolol 10 mg Oral Daily Marlen Rivero PA-C   diltiazem 120 mg Oral Q12H [address] Zahraa Briseno PA-C   DULoxetine 60 mg Oral Daily Marlen Rivero PA-C   fluticasone 1 spray Nasal Daily Zahraa Briseno PA-C   lidocaine 1 patch Transdermal Daily Marlen Rivero PA-C   ondansetron 4 mg Intravenous Q6H PRN Zahraa Briseno PA-C   sucralfate 1,000 mg Oral Q6H [facility] Marlen Rivero PA-C   torsemide 20 mg Oral BID (diuretic) Ericka Lux PA-C   traMADol 50 mg Oral Q6H PRN Ericka Lux PA-C   [START ON 7/15/2018] warfarin 3 mg Oral Every Other Day Ericka Lux PA-C   warfarin 4 mg Oral Every Other Day Ericka Lux PA-C        Today, Patient Was Seen By: Luisa Briseno DO    ** Please Note: Dictation voice to text software may have been used in the creation of this document   **

## 2018-07-14 NOTE — ASSESSMENT & PLAN NOTE
· Presented with nontraumatic dislocation of the right hip prosthesis  · Hip was relocated in the ED under sedation and confirmed with x-ray  · Pain control with prn acetaminophen, lidocaine patch  · Obtain PT evaluation  · Fall precautions

## 2018-07-14 NOTE — ASSESSMENT & PLAN NOTE
· Continue torsemide  · Last echocardiogram was in April 2018, EF 55%, moderate MR and TR  · Monitor daily weights and intake/output

## 2018-07-15 VITALS
RESPIRATION RATE: 18 BRPM | TEMPERATURE: 97.8 F | HEART RATE: 75 BPM | OXYGEN SATURATION: 97 % | SYSTOLIC BLOOD PRESSURE: 114 MMHG | WEIGHT: 195.77 LBS | DIASTOLIC BLOOD PRESSURE: 64 MMHG | BODY MASS INDEX: 35.8 KG/M2

## 2018-07-15 LAB — POTASSIUM SERPL-SCNC: 4.5 MMOL/L (ref 3.5–5.3)

## 2018-07-15 PROCEDURE — 84132 ASSAY OF SERUM POTASSIUM: CPT | Performed by: PHYSICIAN ASSISTANT

## 2018-07-15 PROCEDURE — 99217 PR OBSERVATION CARE DISCHARGE MANAGEMENT: CPT | Performed by: PHYSICIAN ASSISTANT

## 2018-07-15 RX ORDER — LIDOCAINE 50 MG/G
1 PATCH TOPICAL DAILY
Status: DISCONTINUED | OUTPATIENT
Start: 2018-07-16 | End: 2018-07-15 | Stop reason: HOSPADM

## 2018-07-15 RX ORDER — POTASSIUM CHLORIDE 1.5 G/1.77G
10 POWDER, FOR SOLUTION ORAL DAILY
Refills: 0
Start: 2018-07-15 | End: 2018-08-24 | Stop reason: HOSPADM

## 2018-07-15 RX ADMIN — POTASSIUM CHLORIDE 40 MEQ: 1500 TABLET, EXTENDED RELEASE ORAL at 09:28

## 2018-07-15 RX ADMIN — SUCRALFATE 1000 MG: 1 SUSPENSION ORAL at 11:28

## 2018-07-15 RX ADMIN — DULOXETINE 60 MG: 60 CAPSULE, DELAYED RELEASE ORAL at 09:28

## 2018-07-15 RX ADMIN — SUCRALFATE 1000 MG: 1 SUSPENSION ORAL at 00:12

## 2018-07-15 RX ADMIN — TORSEMIDE 20 MG: 20 TABLET ORAL at 09:24

## 2018-07-15 RX ADMIN — LIDOCAINE 1 PATCH: 50 PATCH CUTANEOUS at 09:28

## 2018-07-15 RX ADMIN — BISOPROLOL FUMARATE 10 MG: 5 TABLET ORAL at 09:30

## 2018-07-15 RX ADMIN — DILTIAZEM HYDROCHLORIDE 120 MG: 60 CAPSULE, EXTENDED RELEASE ORAL at 09:30

## 2018-07-15 RX ADMIN — SUCRALFATE 1000 MG: 1 SUSPENSION ORAL at 05:46

## 2018-07-15 NOTE — CASE MANAGEMENT
Continued Stay Review    Date: 7/15/2018    Vital Signs: /56 (BP Location: Right arm)   Pulse 75   Temp 97 8 °F (36 6 °C) (Oral)   Resp 18   Wt 88 8 kg (195 lb 12 3 oz)   SpO2 97%   BMI 35 80 kg/m²     Medications:   Scheduled Meds:   Current Facility-Administered Medications:  acetaminophen 650 mg Oral Q6H PRN    bisoprolol 10 mg Oral Daily    diltiazem 120 mg Oral Q12H JOSE    DULoxetine 60 mg Oral Daily    fluticasone 1 spray Nasal Daily    lidocaine 1 patch Transdermal Daily    ondansetron 4 mg Intravenous Q6H PRN 7/14 x 1    potassium chloride 40 mEq Oral Daily    sucralfate 1,000 mg Oral Q6H [address]    torsemide 20 mg Oral BID (diuretic)    traMADol 50 mg Oral Q6H PRN 7/14 x 1    warfarin 3 mg Oral Every Other Day    warfarin 4 mg Oral Every Other Day      Nursing Orders - VS q 4 - SCD's to le's - diet cardiac 2 3 gm NA - PT/OT eval     Abnormal Labs/Diagnostic Results:  7/14 - K 2 8 - Live 8 1 - H/H 10 1/32 3 - Pt/Inr 28 7/2 81    XR hip- Pelvis  Right 7/14 - Interval reduction of the previously noted right total hip arthroplasty dislocation      Age/Sex: 80 y o  female     Assessment/Plan:   Hip dislocation -  Was relocate din the ER - Pain control - PT eval- fall precautions  Benign essential HTN - continue  Po meds  Chronic CHF - continue meds- monitor weights - I & O   Afib - on coumadin - rate controlled  Continue meds     Discharge Plan: TBD

## 2018-07-15 NOTE — ASSESSMENT & PLAN NOTE
· Presented with nontraumatic dislocation of the right hip prosthesis  · Hip was relocated in the ED under sedation and confirmed with x-ray  · Pain control with prn acetaminophen, lidocaine patch     · Ambulating with walker without difficulty  · PT not available today  · Will set up PT through [facility]  · Discharge home

## 2018-07-15 NOTE — DISCHARGE SUMMARY
Discharge- [facility] 1933, 80 y o  female MRN: [id number]    Unit/Bed#: -01 Encounter: [id number]    Primary Care Provider: Mayank Fernández DO   Date and time admitted to hospital: 7/13/2018  9:11 PM        * Hip dislocation, right [facility])   Assessment & Plan    · Presented with nontraumatic dislocation of the right hip prosthesis  · Hip was relocated in the ED under sedation and confirmed with x-ray  · Pain control with prn acetaminophen, lidocaine patch  · Ambulating with walker without difficulty  · PT not available today  · Will set up PT through [facility]  · Discharge home         Atrial fibrillation ([address] )   Assessment & Plan    · Anticoagulated with Coumadin  · INR therapeutic  · Rate controlled  Continue Cardizem and bisoprolol  · Outpatient follow-up with cardiology, Dr Rolando Damon  Chronic diastolic CHF (congestive heart failure) ([facility])   Assessment & Plan    · Continue torsemide  · Last echocardiogram was in April 2018, EF 55%, moderate MR and TR  · Monitor daily weights and intake/output  Benign essential hypertension   Assessment & Plan    · BP acceptable  · Continue bisoprolol and Cardizem          Hypokalemia   Assessment & Plan    · replace            Discharging Physician / Practitioner: Neal Young PA-C  PCP: Mayank Fernández DO  Admission Date:   Admission Orders     Ordered        07/14/18 0028  Place in Observation (expected length of stay for this patient is less than two midnights)  Once             Discharge Date: 07/15/18    Resolved Problems  Date Reviewed: 7/15/2018    None          Consultations During Hospital Stay:  · none    Procedures Performed:     · Relocation of right hip  · Hip XR - Interval reduction of the previously noted right total hip arthroplasty dislocation  · Hip XR - Dislocation of the right total hip arthroplasty    Significant Findings / Test Results:     · See above    Incidental Findings:   · none     Test Results Pending at Discharge (will require follow up):   · none     Outpatient Tests Requested:  · none    Complications:  none    Reason for Admission:  Right hip dislocation    Hospital Course:     Patt Fitzpatrick is a 80 y o  female patient who originally presented to the hospital on 7/13/2018 due to right hip dislocation  Patient has a history of prior hip dislocations  Patient was using her motorized scooter when she moved in the see to reach the [employer] card machine, she felt her hip dislocate  Patient's hip was relocated in the ED under sedation  Patient was admitted under observation over night to monitor for pain and mobility  At time of discharge patient without pain  She is ambulating with a walker  She will be discharged home with the [facility] for PT  Please see above list of diagnoses and related plan for additional information  Condition at Discharge: good     Discharge Day Visit / Exam:     Subjective:  Offers no complaints  Ambulating with a walker  Denies any pain  Vitals: Blood Pressure: 114/64 (07/15/18 0929)  Pulse: 75 (07/15/18 0700)  Temperature: 97 8 °F (36 6 °C) (07/15/18 0700)  Temp Source: Oral (07/15/18 0700)  Respirations: 18 (07/15/18 0700)  Weight - Scale: 88 8 kg (195 lb 12 3 oz) (07/13/18 2115)  SpO2: 97 % (07/15/18 0700)  Exam:   Physical Exam   Constitutional: She is oriented to person, place, and time  She appears well-developed and well-nourished  No distress  HENT:   Head: Normocephalic and atraumatic  Cardiovascular: Normal rate  An irregularly irregular rhythm present  Exam reveals no friction rub  Murmur heard  Pulmonary/Chest: Effort normal and breath sounds normal  No respiratory distress  She has no wheezes  Abdominal: Soft  Bowel sounds are normal  She exhibits no distension  There is no tenderness  There is no rebound and no guarding  Musculoskeletal: She exhibits no edema  Neurological: She is alert and oriented to person, place, and time   No cranial nerve deficit  Skin: Skin is warm and dry  No rash noted  Psychiatric: She has a normal mood and affect  Nursing note and vitals reviewed  Discussion with Family:     Discharge instructions/Information to patient and family:   See after visit summary for information provided to patient and family  Provisions for Follow-Up Care:  See after visit summary for information related to follow-up care and any pertinent home health orders  Disposition:     Home with [facility] Services (Reminder: Complete face to face encounter)    For Discharges to [facility]:   · Not Applicable to this Patient - Not Applicable to this Patient    Planned Readmission: ori     Discharge Statement:  I spent 40 minutes discharging the patient  This time was spent on the day of discharge  I had direct contact with the patient on the day of discharge  Greater than 50% of the total time was spent examining patient, answering all patient questions, arranging and discussing plan of care with patient as well as directly providing post-discharge instructions  Additional time then spent on discharge activities  Discharge Medications:  See after visit summary for reconciled discharge medications provided to patient and family        ** Please Note: This note has been constructed using a voice recognition system **

## 2018-07-15 NOTE — ASSESSMENT & PLAN NOTE
· Anticoagulated with Coumadin  · INR therapeutic  · Rate controlled  Continue Cardizem and bisoprolol  · Outpatient follow-up with cardiology, Dr Adali Covarrubias

## 2018-07-17 ENCOUNTER — ANTICOAG VISIT (OUTPATIENT)
Dept: INTERNAL MEDICINE CLINIC | Facility: CLINIC | Age: 83
End: 2018-07-17

## 2018-07-17 ENCOUNTER — TELEPHONE (OUTPATIENT)
Dept: CARDIOLOGY CLINIC | Facility: CLINIC | Age: 83
End: 2018-07-17

## 2018-07-17 LAB — INR PPP: 2.7 (ref 0.86–1.17)

## 2018-07-17 NOTE — TELEPHONE ENCOUNTER
[facility] nurse saw pt today and BP was 88/52 with HR of 44 and irregular  She was just seen in office on 7/12 where BP was 120/64 and HR was 80  The day after ov, Belkys Maynard was in hospital overnight after falling while in her motorized wheelchair and dislocating her hip  She stayed overnight after hip relocated  BP, HR in hospital similar to OV vitals  [facility] nurse reports pt is asymptomatic except for feeling a little tired  She will visit the patient again on Friday  Please advise

## 2018-07-18 NOTE — TELEPHONE ENCOUNTER
I spoke with Dagoberto Oviedo and advised  Verbalized understanding  I also left a msg for THEODORA Cadena nurse advising

## 2018-07-26 ENCOUNTER — OFFICE VISIT (OUTPATIENT)
Dept: INTERNAL MEDICINE CLINIC | Facility: CLINIC | Age: 83
End: 2018-07-26
Payer: COMMERCIAL

## 2018-07-26 ENCOUNTER — TELEPHONE (OUTPATIENT)
Dept: INTERNAL MEDICINE CLINIC | Facility: CLINIC | Age: 83
End: 2018-07-26

## 2018-07-26 VITALS
BODY MASS INDEX: 34.41 KG/M2 | SYSTOLIC BLOOD PRESSURE: 102 MMHG | HEIGHT: 62 IN | OXYGEN SATURATION: 99 % | HEART RATE: 71 BPM | DIASTOLIC BLOOD PRESSURE: 80 MMHG | WEIGHT: 187 LBS

## 2018-07-26 DIAGNOSIS — R11.2 NAUSEA AND VOMITING, INTRACTABILITY OF VOMITING NOT SPECIFIED, UNSPECIFIED VOMITING TYPE: Primary | ICD-10-CM

## 2018-07-26 DIAGNOSIS — I48.91 ATRIAL FIBRILLATION, UNSPECIFIED TYPE (HCC): ICD-10-CM

## 2018-07-26 DIAGNOSIS — I95.9 HYPOTENSION, UNSPECIFIED HYPOTENSION TYPE: ICD-10-CM

## 2018-07-26 DIAGNOSIS — I48.20 ATRIAL FIBRILLATION, CHRONIC (HCC): ICD-10-CM

## 2018-07-26 PROCEDURE — 99213 OFFICE O/P EST LOW 20 MIN: CPT | Performed by: NURSE PRACTITIONER

## 2018-07-26 PROCEDURE — 1111F DSCHRG MED/CURRENT MED MERGE: CPT | Performed by: NURSE PRACTITIONER

## 2018-07-26 RX ORDER — BISOPROLOL FUMARATE 5 MG/1
5 TABLET ORAL DAILY
Qty: 30 TABLET | Refills: 0 | Status: ON HOLD | OUTPATIENT
Start: 2018-07-26 | End: 2018-08-14

## 2018-07-26 RX ORDER — DILTIAZEM HYDROCHLORIDE 120 MG/1
120 TABLET, FILM COATED ORAL DAILY
Qty: 90 TABLET | Refills: 0
Start: 2018-07-26 | End: 2018-08-24 | Stop reason: HOSPADM

## 2018-07-26 RX ORDER — SUCRALFATE ORAL 1 G/10ML
1000 SUSPENSION ORAL 2 TIMES DAILY
Qty: 420 ML | Refills: 0 | Status: SHIPPED | OUTPATIENT
Start: 2018-07-26 | End: 2018-09-24 | Stop reason: CLARIF

## 2018-07-26 NOTE — PROGRESS NOTES
Assessment/Plan:     Diagnoses and all orders for this visit:    Nausea and vomiting, intractability of vomiting not specified, unspecified vomiting type  Comments:  restart carafate  Follow up with GI regarding recent ulcers  Orders:  -     sucralfate (CARAFATE) 1 g/10 mL suspension; Take 10 mL (1,000 mg total) by mouth 2 (two) times a day  -     Ambulatory referral to Gastroenterology; Future    Atrial fibrillation, chronic (HCC)  -     diltiazem (CARDIZEM) 120 MG tablet; Take 1 tablet (120 mg total) by mouth daily    Hypotension, unspecified hypotension type  Comments:  discussed plan with Dr James Albertooter  Will decrease bisoprolol to 5 mg daily  [facility] will continue to monitor patient's BP     Atrial fibrillation, unspecified type (HCC)  -     bisoprolol (ZEBETA) 5 mg tablet; Take 1 tablet (5 mg total) by mouth daily          Subjective:      Patient ID: Patt Fitzpatrick is a 80 y o  female  Here for hypotension and vomiting     Since June she has had episodes of vomiting  She has a history of this since her gastric bypass about 12 years ago that comes and goes  Recently it has been worse  She had an egd which revealed two ulcers  She was treated with protonix and carafate but those have since been stopped  She denies any bloody emesis  She does have some epigastric discomfort from vomiting  She has been slightly constipated but had a normal BM yesterday  She also has concerns today about hypotension  The [facility] came Tuesday and noticed BP was low 90/60s  and HR was in the 40's  Patient is symptomatic with intermittent dizziness especially when changing position  She denies any recent falls, cp, increasing sob, or weight gain  She had an episode of dizziness while in bed on Monday night that lasted about an hour  She was diaphoretic and nauseous at the time  Once she turned her air conditioner back on she felt better     Cardiology decreased cardizem 7/18 due to [facility] calling in at that time        The following portions of the patient's history were reviewed and updated as appropriate: allergies, current medications, past family history, past medical history, past social history, past surgical history and problem list     Review of Systems   Constitutional: Negative  Respiratory: Negative  Cardiovascular: Negative  Gastrointestinal: Positive for nausea and vomiting  Negative for constipation and diarrhea  Genitourinary: Negative  Musculoskeletal: Negative  Skin: Negative  Neurological: Positive for dizziness and light-headedness  Objective:      /80 (BP Location: Left arm, Patient Position: Sitting, Cuff Size: Adult)   Pulse 71   Ht 5' 2" (1 575 m)   Wt 84 8 kg (187 lb)   SpO2 99%   BMI 34 20 kg/m²          Physical Exam   Constitutional: She is oriented to person, place, and time  She appears well-developed and well-nourished  Pulmonary/Chest: Effort normal and breath sounds normal    Abdominal: She exhibits no distension  There is tenderness in the epigastric area  Some mild epigastric tenderness   Musculoskeletal:   Mild pitting edema bilateral LE   Neurological: She is alert and oriented to person, place, and time  Psychiatric: She has a normal mood and affect  Her behavior is normal    Vitals reviewed

## 2018-07-26 NOTE — TELEPHONE ENCOUNTER
I spoke with Albino Daniels and advised   She will inform NP [place] of Dr Mansoor Littlejohn recommendation

## 2018-07-26 NOTE — TELEPHONE ENCOUNTER
Pt seen in PCP office today r/t nausea and vomiting and BP issues  On 7/16, you decreased diltiazem 120 mg to once a day  At PCP office /80, HR 71, and pt c/o dizziness  Yara also taking bisoprolol 10 mg daily  NP, Darwin Rao, at PCP office,  is inclined to change meds but wanted your input  Pt given referral to GI  Please advise

## 2018-07-26 NOTE — TELEPHONE ENCOUNTER
Ok great, I will decrease her bisoprolol to 5 mg daily  Please call the patient and let her know that her cardizem will stay the same and decrease the bisoprolol  Thank you!

## 2018-07-26 NOTE — TELEPHONE ENCOUNTER
Socorro Baugh, nurse from Dr Roopa Louise, called stating he recommends decreasing the bisoprolol to 5mg daily, instead of 10mg daily  He does not want to change the cardizem at this time  Please advise

## 2018-07-26 NOTE — TELEPHONE ENCOUNTER
Patient informed of medication change and made aware a new rx for lower dose of Bisoprolol sent to pharmacy  Patient verbalized understanding

## 2018-07-26 NOTE — TELEPHONE ENCOUNTER
Patient was seen in office today by Zabrina Barba with complaints of feeling lightheaded  Visiting nurse's were concerned about recent low BP readings (90s/60s) and low heart rates in the 40s  Zabrina Barba wants to consult with patient's cardiologist, Dr Kevin Kumari, before lowering or changing any medications  Call was placed to Dr Kevin Kumari office, information was relayed to nurse  Nurse states Dr Kevin Kumari was in the hospital today but will send a message to him regarding concerns  Waiting to hear back

## 2018-07-27 NOTE — TELEPHONE ENCOUNTER
THEODORA called  She is with pt today who has a BP of 90/60 and feels occasional lightheadedness  Yesterday, you instructed her to decrease bisoprolol to 5 mg daily from 10 mg daily  THEODORA nurse discovered today that Belkys Maynard has actually been taking 5 mg of bisoprolol daily all along  She checked the bottle, and they are 5 mg tablets  Pt was supposed to be taking 2 tabs but thought they were 10 mg tab so was only taking one  So, she has had the lower BP and symptoms on 5 mg bisoprolol  Any new instructions?

## 2018-07-31 ENCOUNTER — ANTICOAG VISIT (OUTPATIENT)
Dept: INTERNAL MEDICINE CLINIC | Facility: CLINIC | Age: 83
End: 2018-07-31

## 2018-07-31 LAB — INR PPP: 2.3 (ref 0.86–1.17)

## 2018-08-01 DIAGNOSIS — I48.20 CHRONIC ATRIAL FIBRILLATION (HCC): Primary | ICD-10-CM

## 2018-08-01 RX ORDER — WARFARIN SODIUM 4 MG/1
TABLET ORAL
Qty: 180 TABLET | Refills: 0 | Status: ON HOLD | OUTPATIENT
Start: 2018-08-01 | End: 2018-08-24

## 2018-08-07 ENCOUNTER — TELEPHONE (OUTPATIENT)
Dept: CARDIOLOGY CLINIC | Facility: CLINIC | Age: 83
End: 2018-08-07

## 2018-08-07 NOTE — TELEPHONE ENCOUNTER
Received a v/m from [place], [place] stating pt gained 5 lbs, also states she is non-compliant w/diet  baseline wt 184  Wt  7/  Wt yesterday & today-189 0  Asymptomatic, sleeping well at night, shoes/rings tight  Taking Torsemide 20mg BID, but took 40mg this am  Potassium 20meq daily   [phone]

## 2018-08-08 NOTE — TELEPHONE ENCOUNTER
Just received an e-mail today from the visiting nurse I believe, I suggested doubling the torsemide for at least a few days until her weight gets back to 185 or so

## 2018-08-14 ENCOUNTER — OFFICE VISIT (OUTPATIENT)
Dept: OBGYN CLINIC | Facility: HOSPITAL | Age: 83
End: 2018-08-14
Payer: COMMERCIAL

## 2018-08-14 ENCOUNTER — TELEPHONE (OUTPATIENT)
Dept: INTERNAL MEDICINE CLINIC | Facility: CLINIC | Age: 83
End: 2018-08-14

## 2018-08-14 ENCOUNTER — APPOINTMENT (EMERGENCY)
Dept: RADIOLOGY | Facility: HOSPITAL | Age: 83
DRG: 683 | End: 2018-08-14
Payer: COMMERCIAL

## 2018-08-14 ENCOUNTER — HOSPITAL ENCOUNTER (INPATIENT)
Facility: HOSPITAL | Age: 83
LOS: 2 days | DRG: 683 | End: 2018-08-16
Attending: EMERGENCY MEDICINE | Admitting: INTERNAL MEDICINE
Payer: COMMERCIAL

## 2018-08-14 VITALS
SYSTOLIC BLOOD PRESSURE: 110 MMHG | HEIGHT: 62 IN | BODY MASS INDEX: 34.93 KG/M2 | HEART RATE: 77 BPM | WEIGHT: 189.8 LBS | DIASTOLIC BLOOD PRESSURE: 69 MMHG

## 2018-08-14 DIAGNOSIS — I48.91 ATRIAL FIBRILLATION WITH RAPID VENTRICULAR RESPONSE (HCC): Primary | ICD-10-CM

## 2018-08-14 DIAGNOSIS — M17.11 PRIMARY OSTEOARTHRITIS OF RIGHT KNEE: Primary | ICD-10-CM

## 2018-08-14 DIAGNOSIS — M25.561 ARTHRALGIA OF KNEE, RIGHT: ICD-10-CM

## 2018-08-14 LAB
ALBUMIN SERPL BCP-MCNC: 3.3 G/DL (ref 3.5–5)
ALP SERPL-CCNC: 103 U/L (ref 46–116)
ALT SERPL W P-5'-P-CCNC: 22 U/L (ref 12–78)
ANION GAP SERPL CALCULATED.3IONS-SCNC: 9 MMOL/L (ref 4–13)
APTT PPP: 38 SECONDS (ref 24–36)
AST SERPL W P-5'-P-CCNC: 20 U/L (ref 5–45)
BASOPHILS # BLD AUTO: 0 THOUSANDS/ΜL (ref 0–0.1)
BASOPHILS NFR BLD AUTO: 0 % (ref 0–1)
BILIRUB DIRECT SERPL-MCNC: 0.16 MG/DL (ref 0–0.2)
BILIRUB SERPL-MCNC: 0.4 MG/DL (ref 0.2–1)
BUN SERPL-MCNC: 28 MG/DL (ref 5–25)
CALCIUM SERPL-MCNC: 8.9 MG/DL (ref 8.3–10.1)
CHLORIDE SERPL-SCNC: 100 MMOL/L (ref 100–108)
CO2 SERPL-SCNC: 27 MMOL/L (ref 21–32)
CREAT SERPL-MCNC: 1.37 MG/DL (ref 0.6–1.3)
EOSINOPHIL # BLD AUTO: 0 THOUSAND/ΜL (ref 0–0.61)
EOSINOPHIL NFR BLD AUTO: 0 % (ref 0–6)
ERYTHROCYTE [DISTWIDTH] IN BLOOD BY AUTOMATED COUNT: 15.2 % (ref 11.6–15.1)
GFR SERPL CREATININE-BSD FRML MDRD: 35 ML/MIN/1.73SQ M
GLUCOSE SERPL-MCNC: 219 MG/DL (ref 65–140)
HCT VFR BLD AUTO: 34.2 % (ref 34.8–46.1)
HGB BLD-MCNC: 10.9 G/DL (ref 11.5–15.4)
IMM GRANULOCYTES # BLD AUTO: 0.03 THOUSAND/UL (ref 0–0.2)
IMM GRANULOCYTES NFR BLD AUTO: 1 % (ref 0–2)
INR PPP: 2.3 (ref 0.86–1.17)
LYMPHOCYTES # BLD AUTO: 0.47 THOUSANDS/ΜL (ref 0.6–4.47)
LYMPHOCYTES NFR BLD AUTO: 13 % (ref 14–44)
MCH RBC QN AUTO: 29.7 PG (ref 26.8–34.3)
MCHC RBC AUTO-ENTMCNC: 31.9 G/DL (ref 31.4–37.4)
MCV RBC AUTO: 93 FL (ref 82–98)
MONOCYTES # BLD AUTO: 0.07 THOUSAND/ΜL (ref 0.17–1.22)
MONOCYTES NFR BLD AUTO: 2 % (ref 4–12)
NEUTROPHILS # BLD AUTO: 2.97 THOUSANDS/ΜL (ref 1.85–7.62)
NEUTS SEG NFR BLD AUTO: 84 % (ref 43–75)
NRBC BLD AUTO-RTO: 0 /100 WBCS
PLATELET # BLD AUTO: 169 THOUSANDS/UL (ref 149–390)
PMV BLD AUTO: 10.8 FL (ref 8.9–12.7)
POTASSIUM SERPL-SCNC: 4.2 MMOL/L (ref 3.5–5.3)
PROT SERPL-MCNC: 7.2 G/DL (ref 6.4–8.2)
PROTHROMBIN TIME: 24.6 SECONDS (ref 11.8–14.2)
RBC # BLD AUTO: 3.67 MILLION/UL (ref 3.81–5.12)
SODIUM SERPL-SCNC: 136 MMOL/L (ref 136–145)
TSH SERPL DL<=0.05 MIU/L-ACNC: 2.37 UIU/ML (ref 0.36–3.74)
WBC # BLD AUTO: 3.54 THOUSAND/UL (ref 4.31–10.16)

## 2018-08-14 PROCEDURE — 80048 BASIC METABOLIC PNL TOTAL CA: CPT | Performed by: EMERGENCY MEDICINE

## 2018-08-14 PROCEDURE — 36415 COLL VENOUS BLD VENIPUNCTURE: CPT | Performed by: EMERGENCY MEDICINE

## 2018-08-14 PROCEDURE — 84484 ASSAY OF TROPONIN QUANT: CPT | Performed by: PHYSICIAN ASSISTANT

## 2018-08-14 PROCEDURE — 99222 1ST HOSP IP/OBS MODERATE 55: CPT | Performed by: PHYSICIAN ASSISTANT

## 2018-08-14 PROCEDURE — 85610 PROTHROMBIN TIME: CPT | Performed by: EMERGENCY MEDICINE

## 2018-08-14 PROCEDURE — 20610 DRAIN/INJ JOINT/BURSA W/O US: CPT | Performed by: PHYSICIAN ASSISTANT

## 2018-08-14 PROCEDURE — 85730 THROMBOPLASTIN TIME PARTIAL: CPT | Performed by: EMERGENCY MEDICINE

## 2018-08-14 PROCEDURE — 85025 COMPLETE CBC W/AUTO DIFF WBC: CPT | Performed by: EMERGENCY MEDICINE

## 2018-08-14 PROCEDURE — 71045 X-RAY EXAM CHEST 1 VIEW: CPT

## 2018-08-14 PROCEDURE — 96365 THER/PROPH/DIAG IV INF INIT: CPT

## 2018-08-14 PROCEDURE — 99213 OFFICE O/P EST LOW 20 MIN: CPT | Performed by: PHYSICIAN ASSISTANT

## 2018-08-14 PROCEDURE — 96376 TX/PRO/DX INJ SAME DRUG ADON: CPT

## 2018-08-14 PROCEDURE — 85610 PROTHROMBIN TIME: CPT | Performed by: PHYSICIAN ASSISTANT

## 2018-08-14 PROCEDURE — 84443 ASSAY THYROID STIM HORMONE: CPT | Performed by: EMERGENCY MEDICINE

## 2018-08-14 PROCEDURE — 80076 HEPATIC FUNCTION PANEL: CPT | Performed by: EMERGENCY MEDICINE

## 2018-08-14 PROCEDURE — 99285 EMERGENCY DEPT VISIT HI MDM: CPT

## 2018-08-14 PROCEDURE — 93005 ELECTROCARDIOGRAM TRACING: CPT

## 2018-08-14 RX ORDER — ACETAMINOPHEN 325 MG/1
650 TABLET ORAL EVERY 6 HOURS PRN
Status: DISCONTINUED | OUTPATIENT
Start: 2018-08-14 | End: 2018-08-16 | Stop reason: HOSPADM

## 2018-08-14 RX ORDER — BISOPROLOL FUMARATE 5 MG/1
5 TABLET ORAL DAILY
Status: DISCONTINUED | OUTPATIENT
Start: 2018-08-15 | End: 2018-08-14

## 2018-08-14 RX ORDER — TRAMADOL HYDROCHLORIDE 50 MG/1
50 TABLET ORAL 2 TIMES DAILY PRN
Status: DISCONTINUED | OUTPATIENT
Start: 2018-08-14 | End: 2018-08-16 | Stop reason: HOSPADM

## 2018-08-14 RX ORDER — BUPIVACAINE HYDROCHLORIDE 2.5 MG/ML
2 INJECTION, SOLUTION INFILTRATION; PERINEURAL
Status: COMPLETED | OUTPATIENT
Start: 2018-08-14 | End: 2018-08-14

## 2018-08-14 RX ORDER — POTASSIUM CHLORIDE 20 MEQ/1
20 TABLET, EXTENDED RELEASE ORAL DAILY
Status: DISCONTINUED | OUTPATIENT
Start: 2018-08-15 | End: 2018-08-16 | Stop reason: HOSPADM

## 2018-08-14 RX ORDER — DILTIAZEM HYDROCHLORIDE 5 MG/ML
10 INJECTION INTRAVENOUS ONCE
Status: COMPLETED | OUTPATIENT
Start: 2018-08-14 | End: 2018-08-14

## 2018-08-14 RX ORDER — SODIUM CHLORIDE 9 MG/ML
75 INJECTION, SOLUTION INTRAVENOUS CONTINUOUS
Status: DISCONTINUED | OUTPATIENT
Start: 2018-08-14 | End: 2018-08-15

## 2018-08-14 RX ORDER — WARFARIN SODIUM 3 MG/1
3 TABLET ORAL EVERY OTHER DAY
Status: DISCONTINUED | OUTPATIENT
Start: 2018-08-15 | End: 2018-08-15

## 2018-08-14 RX ORDER — WARFARIN SODIUM 4 MG/1
8 TABLET ORAL DAILY
Status: DISCONTINUED | OUTPATIENT
Start: 2018-08-15 | End: 2018-08-15

## 2018-08-14 RX ORDER — DULOXETIN HYDROCHLORIDE 60 MG/1
60 CAPSULE, DELAYED RELEASE ORAL DAILY
Status: DISCONTINUED | OUTPATIENT
Start: 2018-08-15 | End: 2018-08-16 | Stop reason: HOSPADM

## 2018-08-14 RX ORDER — LIDOCAINE HYDROCHLORIDE 10 MG/ML
2 INJECTION, SOLUTION INFILTRATION; PERINEURAL
Status: COMPLETED | OUTPATIENT
Start: 2018-08-14 | End: 2018-08-14

## 2018-08-14 RX ORDER — POTASSIUM CHLORIDE 20 MEQ/1
20 TABLET, EXTENDED RELEASE ORAL DAILY
Refills: 3 | COMMUNITY
Start: 2018-08-01 | End: 2018-10-24 | Stop reason: SDUPTHER

## 2018-08-14 RX ORDER — METHYLPREDNISOLONE ACETATE 40 MG/ML
2 INJECTION, SUSPENSION INTRA-ARTICULAR; INTRALESIONAL; INTRAMUSCULAR; SOFT TISSUE
Status: COMPLETED | OUTPATIENT
Start: 2018-08-14 | End: 2018-08-14

## 2018-08-14 RX ORDER — FLUTICASONE PROPIONATE 50 MCG
1 SPRAY, SUSPENSION (ML) NASAL DAILY
Status: DISCONTINUED | OUTPATIENT
Start: 2018-08-15 | End: 2018-08-16 | Stop reason: HOSPADM

## 2018-08-14 RX ADMIN — LIDOCAINE HYDROCHLORIDE 2 ML: 10 INJECTION, SOLUTION INFILTRATION; PERINEURAL at 09:12

## 2018-08-14 RX ADMIN — SODIUM CHLORIDE 500 ML: 0.9 INJECTION, SOLUTION INTRAVENOUS at 19:15

## 2018-08-14 RX ADMIN — DILTIAZEM HYDROCHLORIDE 10 MG: 5 INJECTION INTRAVENOUS at 19:15

## 2018-08-14 RX ADMIN — METHYLPREDNISOLONE ACETATE 2 ML: 40 INJECTION, SUSPENSION INTRA-ARTICULAR; INTRALESIONAL; INTRAMUSCULAR; SOFT TISSUE at 09:12

## 2018-08-14 RX ADMIN — BUPIVACAINE HYDROCHLORIDE 2 ML: 2.5 INJECTION, SOLUTION INFILTRATION; PERINEURAL at 09:12

## 2018-08-14 RX ADMIN — DILTIAZEM HYDROCHLORIDE 5 MG/HR: 5 INJECTION INTRAVENOUS at 19:19

## 2018-08-14 NOTE — PROGRESS NOTES
Assessment/Plan:    Patient seen examined by Dr Jamil Espana and myself  She returns with symptomatic right knee osteoarthritis  She was provided a steroid injection in the office today without complication  She also notes today she had a right total hip arthroplasty dislocation about one month ago, and is a candidate for a new liner  She will contemplate her options and return for a visit if she wishes to proceed with a surgery  Otherwise she will follow-up in 3 months for repeat right knee injection if desired  Problem List Items Addressed This Visit     Arthralgia of knee, right    Primary osteoarthritis of right knee - Primary            Subjective:      Patient ID: Dora Mckay is a 80 y o  female  HPI     Patient is an 58-year-old female who presents for follow-up appointment  She has known right knee osteoarthritis and receives steroid injections approximately every three months to the right knee  Since her last visit three months ago she notes continuing right knee pain and stiffness that is unchanged  She notes that she lost approximately 30 lb of water weight and is feeling better  She does note that she had another right hip arthroplasty dislocation about one month ago that was reduced by the ER without complication  She states this was fourth time she had this problem  Her right hip was replaced by Dr Jammie Velarde  approximately 10 years ago  She is unsure if she is interested in revision surgery at this time  The following portions of the patient's history were reviewed and updated as appropriate: allergies, current medications, past family history, past medical history, past social history, past surgical history and problem list     Review of Systems   Constitutional: Negative for chills, diaphoresis, fatigue and fever  Respiratory: Negative for wheezing  Musculoskeletal: Positive for arthralgias and gait problem  Negative for joint swelling     Neurological: Positive for weakness  Negative for numbness  Objective:      /69   Pulse 77   Ht 5' 2" (1 575 m)   Wt 86 1 kg (189 lb 12 8 oz)   BMI 34 71 kg/m²          Physical Exam   Constitutional: She is oriented to person, place, and time  She appears well-developed and well-nourished  No distress  HENT:   Head: Normocephalic and atraumatic  Pulmonary/Chest: Effort normal    Musculoskeletal: She exhibits tenderness  She exhibits no edema  Neurological: She is alert and oriented to person, place, and time  Skin: Skin is warm and dry  She is not diaphoretic  There is erythema  Psychiatric: She has a normal mood and affect  Nursing note and vitals reviewed  No acute distress  Gait is assisted with a rolling walker  Inspection reveals pretibial edema and erythema with no warmth to palpation  There is no knee effusion, warmth, or edema about the knee joint  There is medial and lateral joint line tenderness  There is crepitation flexion and extension  There is no calf tenderness      Large joint arthrocentesis  Date/Time: 8/14/2018 9:12 AM  Consent given by: patient  Site marked: site marked  Timeout: Immediately prior to procedure a time out was called to verify the correct patient, procedure, equipment, support staff and site/side marked as required   Supporting Documentation  Indications: pain   Procedure Details  Location: knee - R knee  Preparation: Patient was prepped and draped in the usual sterile fashion  Needle size: 22 G  Ultrasound guidance: no  Approach: anterolateral  Medications administered: 2 mL bupivacaine 0 25 %; 2 mL lidocaine 1 %; 2 mL methylPREDNISolone acetate 40 mg/mL    Patient tolerance: patient tolerated the procedure well with no immediate complications  Dressing:  Sterile dressing applied

## 2018-08-14 NOTE — TELEPHONE ENCOUNTER
[facility] Visiting nurse called in regarding pt - pt has a history of afib  Bartholome Pinks She has a headache today heart rate is 136-144 for heart rate and it's also irregular    She wants to know if she should go to [facility]      Bp: 138/82

## 2018-08-14 NOTE — ED PROVIDER NOTES
History  Chief Complaint   Patient presents with    Irregular Heart Beat     pt was in physical therapy at home, sent by dr Jason Hughes and therapist for "rapid heart rate"  pt has hx of afib  History provided by:  Patient and spouse  Rapid Heart Rate   Palpitations quality:  Fast  Onset quality:  Unable to specify  Duration: Patient unclear, she thinks over the past 2 days but is not sure  Timing:  Constant  Progression:  Unchanged  Chronicity:  Recurrent  Context comment:  History of atrial fibrillation  Physical therapist today found her to be in rapid atrial fibrillation  Relieved by:  None tried  Worsened by:  Nothing  Ineffective treatments:  None tried  Associated symptoms: no chest pain, no cough, no diaphoresis, no dizziness, no nausea, no numbness, no shortness of breath and no vomiting        Prior to Admission Medications   Prescriptions Last Dose Informant Patient Reported? Taking? DULoxetine (CYMBALTA) 60 mg delayed release capsule  Self No No   Sig: TAKE 1 CAPSULE BY MOUTH ONCE DAILY   bisoprolol (ZEBETA) 5 mg tablet   No No   Sig: Take 1 tablet (5 mg total) by mouth daily   desoximetasone (TOPICORT) 0 25 % cream  Self Yes No   Sig: Apply topically   diltiazem (CARDIZEM) 120 MG tablet   No No   Sig: Take 1 tablet (120 mg total) by mouth daily   fluticasone (FLONASE) 50 mcg/act nasal spray  Self Yes No   Si spray into each nostril daily     potassium chloride (K-DUR,KLOR-CON) 20 mEq tablet   Yes No   Sig: Take 20 mEq by mouth daily   potassium chloride (KLOR-CON) 20 mEq packet   No No   Sig: Take 10 mEq by mouth daily   sucralfate (CARAFATE) 1 g/10 mL suspension   No No   Sig: Take 10 mL (1,000 mg total) by mouth 2 (two) times a day   torsemide (DEMADEX) 20 mg tablet  Self No No   Sig: Take 2 tablets (40 mg total) by mouth daily   traMADol (ULTRAM) 50 mg tablet  Self No No   Sig: TAKE ONE TABLET BY MOUTH TWICE DAILY AS NEEDED FOR PAIN   warfarin (COUMADIN) 3 mg tablet  Self No No   Sig: Take 1 tablet daily or as directed  Patient taking differently: Take 3 mg by mouth every other day Take 1 tablet daily or as directed  warfarin (COUMADIN) 4 mg tablet   No No   Sig: TAKE 2 TABLETS BY MOUTH DAILY      Facility-Administered Medications Last Administration Doses Remaining   bupivacaine (MARCAINE) 0 25 % injection 2 mL 8/14/2018  9:12 AM 0   lidocaine (XYLOCAINE) 1 % injection 2 mL 8/14/2018  9:12 AM 0   methylPREDNISolone acetate (DEPO-MEDROL) injection 2 mL 8/14/2018  9:12 AM 0          Past Medical History:   Diagnosis Date    Arthritis     Cancer ([address] )     [facility]     Disturbance of smell and taste     disturbance of taste    Effusion of knee joint right     Fibromyalgia     Heart murmur     reported a previous heart murmur    History of acute myocardial infarction     History of atrial fibrillation     History of bruising easily     History of cancer     History of dermatitis     History of mammogram     History of obesity     History of osteopenia     History of sciatica     History of shortness of breath     History of sinusitis     History of sore throat     History of syncope     History of umbilical hernia     History of viral infection     Irritable bowel syndrome (IBS)     Joint pain, hip     Limb pain     Myalgia     myalgia and myositis    Need for prophylactic vaccination against single diseases     Need for prophylactic vaccination and inoculation against influenza     Preoperative cardiovascular examination     Primary osteoarthritis of right knee     Right leg pain     Vaginal Pap smear     reported pap smear       Past Surgical History:   Procedure Laterality Date    ANKLE ARTHRODESIS Right     BACK SURGERY      BARIATRIC SURGERY      CHOLECYSTECTOMY      x2    ESOPHAGOGASTRODUODENOSCOPY N/A 3/23/2018    Procedure: ESOPHAGOGASTRODUODENOSCOPY (EGD); Surgeon: Maryann Ramirez MD;  Location: [facility];   Service: Gastroenterology    FOOT SURGERY      HIP CLOSE REDUCTION Right 8/21/2016    Procedure: CLOSED REDUCTION RIGHT TOTAL HIP;  Surgeon: Soraya Aguayo MD;  [facility];  Service:     JOINT REPLACEMENT      LEFT KNEE REPLACEMENT    PILONIDAL CYST EXCISION      x2    REPLACEMENT TOTAL KNEE Right     SILVANA-EN-Y PROCEDURE      x2    TOTAL KNEE ARTHROPLASTY      UMBILICAL HERNIA REPAIR      x2       Family History   Problem Relation Age of Onset    Coronary artery disease Mother     Heart attack Mother     Hypertension Mother     Coronary artery disease Father     Heart attack Father     Hypertension Father     Lymphoma Sister         acute    Rashes / Skin problems Sister         lymphomatoid papulosis    Coronary artery disease Brother     Heart attack Brother         x2    Aneurysm Neg Hx     Hyperlipidemia Neg Hx      I have reviewed and agree with the history as documented  Social History   Substance Use Topics    Smoking status: Never Smoker    Smokeless tobacco: Never Used    Alcohol use Yes      Comment:  social        Review of Systems   Constitutional: Negative for activity change, chills, diaphoresis and fever  HENT: Negative for congestion, sinus pressure and sore throat  Eyes: Negative for pain and visual disturbance  Respiratory: Negative for cough, chest tightness, shortness of breath, wheezing and stridor  Cardiovascular: Positive for palpitations  Negative for chest pain  Gastrointestinal: Negative for abdominal distention, abdominal pain, constipation, diarrhea, nausea and vomiting  Genitourinary: Negative for dysuria and frequency  Musculoskeletal: Negative for neck pain and neck stiffness  Skin: Negative for rash  Neurological: Negative for dizziness, speech difficulty, light-headedness, numbness and headaches  Physical Exam  Physical Exam   Constitutional: She is oriented to person, place, and time  She appears well-developed     HENT:   Head: Normocephalic and atraumatic  Eyes: Pupils are equal, round, and reactive to light  Neck: Normal range of motion  Neck supple  No tracheal deviation present  Cardiovascular: Normal heart sounds and intact distal pulses  An irregularly irregular rhythm present  Tachycardia present  No murmur heard  Patient atrial fibrillation on the monitor, ventricular rate averaging about 145 , typically anywhere between 130-160  Pulmonary/Chest: Effort normal and breath sounds normal  No stridor  No respiratory distress  Abdominal: Soft  She exhibits no distension  There is no tenderness  There is no rebound and no guarding  Musculoskeletal: Normal range of motion  Neurological: She is alert and oriented to person, place, and time  Skin: Skin is warm and dry  She is not diaphoretic  No erythema  No pallor  Psychiatric: She has a normal mood and affect  Vitals reviewed        Vital Signs  ED Triage Vitals   Temp Pulse Respirations Blood Pressure SpO2   -- 08/14/18 1900 08/14/18 1900 08/14/18 1900 08/14/18 1900    (!) 136 14 127/74 95 %      Temp src Heart Rate Source Patient Position - Orthostatic VS BP Location FiO2 (%)   -- 08/14/18 1958 08/14/18 1958 08/14/18 1958 --    Monitor Sitting Right arm       Pain Score       --                  Vitals:    08/14/18 1900 08/14/18 1958   BP: 127/74 115/84   Pulse: (!) 136 95   Patient Position - Orthostatic VS:  Sitting       Visual Acuity      ED Medications  Medications   sodium chloride 0 9 % bolus 500 mL (500 mL Intravenous New Bag 8/14/18 1915)   diltiazem (CARDIZEM) 125 mg in sodium chloride 0 9 % 125 mL infusion (5 mg/hr Intravenous New Bag 8/14/18 1919)   diltiazem (CARDIZEM) injection 10 mg (10 mg Intravenous Given 8/14/18 1915)       Diagnostic Studies  Results Reviewed     Procedure Component Value Units Date/Time    Hepatic function panel [[id number]]  (Abnormal) Collected:  08/14/18 1916    Lab Status:  Final result Specimen:  Blood from Arm, Right Updated:  08/14/18 1957     Total Bilirubin 0 40 mg/dL      Bilirubin, Direct 0 16 mg/dL      Alkaline Phosphatase 103 U/L      AST 20 U/L      ALT 22 U/L      Total Protein 7 2 g/dL      Albumin 3 3 (L) g/dL     TSH [[id number]]  (Normal) Collected:  08/14/18 1916    Lab Status:  Final result Specimen:  Blood from Arm, Right Updated:  08/14/18 1957     TSH 3RD GENERATON 2 369 uIU/mL     Narrative:         Patients undergoing fluorescein dye angiography may retain small amounts of fluorescein in the body for 48-72 hours post procedure  Samples containing fluorescein can produce falsely depressed TSH values  If the patient had this procedure,a specimen should be resubmitted post fluorescein clearance  The recommended reference ranges for TSH during pregnancy are as follows:  First trimester 0 1 to 2 5 uIU/mL  Second trimester  0 2 to 3 0 uIU/mL  Third trimester 0 3 to 3 0 uIU/m      Protime-INR [[id number]]  (Abnormal) Collected:  08/14/18 1916    Lab Status:  Final result Specimen:  Blood from Arm, Right Updated:  08/14/18 1951     Protime 24 6 (H) seconds      INR 2 30 (H)    APTT [[id number]]  (Abnormal) Collected:  08/14/18 1916    Lab Status:  Final result Specimen:  Blood from Arm, Right Updated:  08/14/18 1951     PTT 38 (H) seconds     Basic metabolic panel [[id number]]  (Abnormal) Collected:  08/14/18 1916    Lab Status:  Final result Specimen:  Blood from Arm, Right Updated:  08/14/18 1948     Sodium 136 mmol/L      Potassium 4 2 mmol/L      Chloride 100 mmol/L      CO2 27 mmol/L      Anion Gap 9 mmol/L      BUN 28 (H) mg/dL      Creatinine 1 37 (H) mg/dL      Glucose 219 (H) mg/dL      Calcium 8 9 mg/dL      eGFR 35 ml/min/1 73sq m     Narrative:         National Kidney Disease Education Program recommendations are as follows:  GFR calculation is accurate only with a steady state creatinine  Chronic Kidney disease less than 60 ml/min/1 73 sq  meters  Kidney failure less than 15 ml/min/1 73 sq  meters  CBC and differential [[id number]]  (Abnormal) Collected:  08/14/18 1916    Lab Status:  Final result Specimen:  Blood from Arm, Right Updated:  08/14/18 1934     WBC 3 54 (L) Thousand/uL      RBC 3 67 (L) Million/uL      Hemoglobin 10 9 (L) g/dL      Hematocrit 34 2 (L) %      MCV 93 fL      MCH 29 7 pg      MCHC 31 9 g/dL      RDW 15 2 (H) %      MPV 10 8 fL      Platelets 576 Thousands/uL      nRBC 0 /100 WBCs      Neutrophils Relative 84 (H) %      Immat GRANS % 1 %      Lymphocytes Relative 13 (L) %      Monocytes Relative 2 (L) %      Eosinophils Relative 0 %      Basophils Relative 0 %      Neutrophils Absolute 2 97 Thousands/µL      Immature Grans Absolute 0 03 Thousand/uL      Lymphocytes Absolute 0 47 (L) Thousands/µL      Monocytes Absolute 0 07 (L) Thousand/µL      Eosinophils Absolute 0 00 Thousand/µL      Basophils Absolute 0 00 Thousands/µL                  XR chest 1 view portable    (Results Pending)              Procedures  ECG 12 Lead Documentation  Date/Time: 8/14/2018 6:52 PM  Performed by: Michael Callejas  Authorized by: Michael Callejas     ECG reviewed by me, the ED Provider: yes    Patient location:  ED  Interpretation:     Interpretation: abnormal    Rate:     ECG rate:  140    ECG rate assessment: tachycardic    Rhythm:     Rhythm: atrial fibrillation    Ectopy:     Ectopy: none    QRS:     QRS axis:  Normal    QRS intervals:  Normal  Conduction:     Conduction: normal    ST segments:     ST segments:  Non-specific  T waves:     T waves: non-specific             Phone Contacts  ED Phone Contact    ED Course                               MDM  Number of Diagnoses or Management Options  Atrial fibrillation with rapid ventricular response [facility]): new and requires workup  Diagnosis management comments:       Initial ED assessment:  79-year-old female presents with atrial fibrillation with rapid ventricular response        Initial ED plan:  IV Cardizem, will check blood work patient will require admission  Final ED summary/disposition:   After evaluation and workup in the emergency department, AFib with RVR, patient admitted to hospital        Amount and/or Complexity of Data Reviewed  Clinical lab tests: ordered and reviewed  Tests in the radiology section of CPT®: ordered and reviewed  Decide to obtain previous medical records or to obtain history from someone other than the patient: yes  Obtain history from someone other than the patient: yes  Review and summarize past medical records: yes  Discuss the patient with other providers: yes  Independent visualization of images, tracings, or specimens: yes      The patient presented with a condition in which there was a high probability of imminent or life-threatening deterioration, and critical care services (excluding separately billable procedures) totalled 30-74 minutes  Disposition  Final diagnoses:   Atrial fibrillation with rapid ventricular response ([address] )     Time reflects when diagnosis was documented in both MDM as applicable and the Disposition within this note     Time User Action Codes Description Comment    8/14/2018  8:18 PM Johnathan Jenningseron [[id number]] Atrial fibrillation with rapid ventricular response [facility])       ED Disposition     ED Disposition Condition Comment    Admit  Case was discussed with ARELIS Griffith and the patient's admission status was agreed to be Admission Status: inpatient status to the service of Dr Sergei Park   Follow-up Information    None         Patient's Medications   Discharge Prescriptions    No medications on file     No discharge procedures on file      ED Provider  Electronically Signed by           Lucas Bhakta DO  08/14/18 2020

## 2018-08-15 LAB
ANION GAP SERPL CALCULATED.3IONS-SCNC: 8 MMOL/L (ref 4–13)
ATRIAL RATE: 145 BPM
BUN SERPL-MCNC: 26 MG/DL (ref 5–25)
CALCIUM SERPL-MCNC: 9.4 MG/DL (ref 8.3–10.1)
CHLORIDE SERPL-SCNC: 104 MMOL/L (ref 100–108)
CO2 SERPL-SCNC: 27 MMOL/L (ref 21–32)
CREAT SERPL-MCNC: 1.17 MG/DL (ref 0.6–1.3)
GFR SERPL CREATININE-BSD FRML MDRD: 43 ML/MIN/1.73SQ M
GLUCOSE SERPL-MCNC: 155 MG/DL (ref 65–140)
INR PPP: 2.36 (ref 0.86–1.17)
POTASSIUM SERPL-SCNC: 4.1 MMOL/L (ref 3.5–5.3)
PROTHROMBIN TIME: 25.1 SECONDS (ref 11.8–14.2)
QRS AXIS: 0 DEGREES
QRSD INTERVAL: 90 MS
QT INTERVAL: 258 MS
QTC INTERVAL: 394 MS
SODIUM SERPL-SCNC: 139 MMOL/L (ref 136–145)
T WAVE AXIS: 266 DEGREES
TROPONIN I SERPL-MCNC: <0.02 NG/ML
VENTRICULAR RATE: 140 BPM

## 2018-08-15 PROCEDURE — 99232 SBSQ HOSP IP/OBS MODERATE 35: CPT | Performed by: INTERNAL MEDICINE

## 2018-08-15 PROCEDURE — 99222 1ST HOSP IP/OBS MODERATE 55: CPT | Performed by: INTERNAL MEDICINE

## 2018-08-15 PROCEDURE — 93010 ELECTROCARDIOGRAM REPORT: CPT | Performed by: INTERNAL MEDICINE

## 2018-08-15 PROCEDURE — 84484 ASSAY OF TROPONIN QUANT: CPT | Performed by: PHYSICIAN ASSISTANT

## 2018-08-15 PROCEDURE — 87081 CULTURE SCREEN ONLY: CPT | Performed by: INTERNAL MEDICINE

## 2018-08-15 PROCEDURE — 80048 BASIC METABOLIC PNL TOTAL CA: CPT | Performed by: PHYSICIAN ASSISTANT

## 2018-08-15 RX ORDER — DILTIAZEM HYDROCHLORIDE 180 MG/1
180 CAPSULE, COATED, EXTENDED RELEASE ORAL DAILY
Status: DISCONTINUED | OUTPATIENT
Start: 2018-08-15 | End: 2018-08-16 | Stop reason: HOSPADM

## 2018-08-15 RX ORDER — DILTIAZEM HYDROCHLORIDE 5 MG/ML
10 INJECTION INTRAVENOUS CONTINUOUS
Status: DISCONTINUED | OUTPATIENT
Start: 2018-08-15 | End: 2018-08-15

## 2018-08-15 RX ORDER — DILTIAZEM HYDROCHLORIDE 120 MG/1
120 CAPSULE, COATED, EXTENDED RELEASE ORAL
Status: DISCONTINUED | OUTPATIENT
Start: 2018-08-15 | End: 2018-08-15

## 2018-08-15 RX ORDER — DILTIAZEM HYDROCHLORIDE 60 MG/1
120 CAPSULE, EXTENDED RELEASE ORAL DAILY
Status: DISCONTINUED | OUTPATIENT
Start: 2018-08-15 | End: 2018-08-15

## 2018-08-15 RX ORDER — WARFARIN SODIUM 3 MG/1
3 TABLET ORAL
Status: DISCONTINUED | OUTPATIENT
Start: 2018-08-16 | End: 2018-08-16 | Stop reason: HOSPADM

## 2018-08-15 RX ORDER — WARFARIN SODIUM 4 MG/1
4 TABLET ORAL
Status: DISCONTINUED | OUTPATIENT
Start: 2018-08-17 | End: 2018-08-16 | Stop reason: HOSPADM

## 2018-08-15 RX ORDER — WARFARIN SODIUM 4 MG/1
4 TABLET ORAL
Status: DISCONTINUED | OUTPATIENT
Start: 2018-08-15 | End: 2018-08-15

## 2018-08-15 RX ORDER — WARFARIN SODIUM 4 MG/1
4 TABLET ORAL
Status: COMPLETED | OUTPATIENT
Start: 2018-08-15 | End: 2018-08-15

## 2018-08-15 RX ADMIN — DULOXETINE HYDROCHLORIDE 60 MG: 60 CAPSULE, DELAYED RELEASE ORAL at 09:07

## 2018-08-15 RX ADMIN — SODIUM CHLORIDE 75 ML/HR: 0.9 INJECTION, SOLUTION INTRAVENOUS at 00:05

## 2018-08-15 RX ADMIN — DILTIAZEM HYDROCHLORIDE 120 MG: 120 CAPSULE, COATED, EXTENDED RELEASE ORAL at 18:06

## 2018-08-15 RX ADMIN — ACETAMINOPHEN 650 MG: 325 TABLET, FILM COATED ORAL at 13:36

## 2018-08-15 RX ADMIN — POTASSIUM CHLORIDE 20 MEQ: 1500 TABLET, EXTENDED RELEASE ORAL at 09:07

## 2018-08-15 RX ADMIN — DILTIAZEM HYDROCHLORIDE 180 MG: 180 CAPSULE, COATED, EXTENDED RELEASE ORAL at 10:00

## 2018-08-15 RX ADMIN — DILTIAZEM HYDROCHLORIDE 5 MG/HR: 5 INJECTION INTRAVENOUS at 21:30

## 2018-08-15 RX ADMIN — WARFARIN SODIUM 4 MG: 4 TABLET ORAL at 00:38

## 2018-08-15 RX ADMIN — DILTIAZEM HYDROCHLORIDE 10 MG/HR: 5 INJECTION INTRAVENOUS at 00:38

## 2018-08-15 RX ADMIN — ACETAMINOPHEN 650 MG: 325 TABLET, FILM COATED ORAL at 00:04

## 2018-08-15 NOTE — CASE MANAGEMENT
Thank you,  Jen Aqq  [facility] Utilization Review Department  Phone: [phone]; Fax [phone]  ATTENTION: The Network Utilization Review Department is now centralized for our 9 Facilities  Make a note that we have a new phone and fax numbers for our Department  Please call with any questions or concerns to [phone] and carefully follow the prompts so that you are directed to the right person  All voicemails are confidential  Fax any determinations, approvals, denials, and requests for initial or continue stay review clinical to [phone]  Due to HIGH CALL volume, it would be easier if you could please send faxed requests to expedite your requests and in part, help us provide discharge notifications faster  Initial Clinical Review    Admission: Date/Time/Statement: inpatient admission  8/14/18 @ 2019     Orders Placed This Encounter   Procedures    Inpatient Admission (expected length of stay for this patient is greater than two midnights)     Standing Status:   Standing     Number of Occurrences:   1     Order Specific Question:   Admitting Physician     Answer:   Milton Hussein [[id number]]     Order Specific Question:   Level of Care     Answer:   Med Surg [16]     Order Specific Question:   Estimated length of stay     Answer:   More than 2 Midnights     Order Specific Question:   Certification     Answer:   I certify that inpatient services are medically necessary for this patient for a duration of greater than two midnights  See H&P and MD Progress Notes for additional information about the patient's course of treatment           ED: Date/Time/Mode of Arrival:   ED Arrival Information     Expected Arrival Acuity Means of Arrival Escorted By Service Admission Type    - 8/14/2018 18:29 Emergent Walk-In Spouse General Medicine Emergency    Arrival Complaint    RAPID HEART RATE          Chief Complaint:   Chief Complaint   Patient presents with    Irregular Heart Beat     pt was in physical therapy at home, sent by dr Emil Johnston and therapist for "rapid heart rate"  pt has hx of afib  History of Illness:  80 y o  female  presents with rapid heart rate  Pt states she had PT at her home today and they found her heart rate to be in the 140's then in the 120's and "all over the place"    Pt states that besides a frontal headache today, she actually felt more energetic than usual  She does say she had " a lot of excitement today    ED Vital Signs:   ED Triage Vitals   Temperature Pulse Respirations Blood Pressure SpO2   08/14/18 2258 08/14/18 1900 08/14/18 1900 08/14/18 1900 08/14/18 1900   97 6 °F (36 4 °C) (!) 136 14 127/74 95 %      Temp Source Heart Rate Source Patient Position - Orthostatic VS BP Location FiO2 (%)   08/14/18 2258 08/14/18 1958 08/14/18 1958 08/14/18 1958 --   Oral Monitor Sitting Right arm       Pain Score       08/14/18 2322       7        Wt Readings from Last 1 Encounters:   08/15/18 87 kg (191 lb 14 4 oz)       Vital Signs (abnormal): 08/14 ,113,122,136    Abnormal Labs/Diagnostic Test Results: 08/14:   BUN 28     Creatinine 1 37     Glucose 219       WBC 3 54     RBC 3 67     Hemoglobin 10 9       Protime 24 6     INR 2 30       Protime 25 1     INR 2 36       PTT 38, wbc 3 54,   EKGECG rate:  140    ECG rate assessment: tachycardic    Rhythm:     Rhythm: atrial fibrillation    Ectopy:     Ectopy: none    QRS:     QRS axis:  Normal    QRS intervals:  Normal  Conduction:     Conduction: normal    ST segments:     ST segments:  Non-specific  T waves:     T waves: non-specific      08/15:  BUN 26     Creatinine 1 17    Glucose 155            ED Treatment:   Medication Administration from 08/14/2018 1829 to 08/14/2018 2259       Date/Time Order Dose Route Action Comments     08/14/2018 2102 sodium chloride 0 9 % bolus 500 mL 0 mL Intravenous Stopped      08/14/2018 1915 sodium chloride 0 9 % bolus 500 mL 500 mL Intravenous New Bag      08/14/2018 0060 diltiazem (CARDIZEM) 125 mg in sodium chloride 0 9 % 125 mL infusion 12 5 mg/hr Intravenous Rate/Dose Change      08/14/2018 2043 diltiazem (CARDIZEM) 125 mg in sodium chloride 0 9 % 125 mL infusion 10 mg/hr Intravenous Rate/Dose Change      08/14/2018 1919 diltiazem (CARDIZEM) 125 mg in sodium chloride 0 9 % 125 mL infusion 5 mg/hr Intravenous New Bag      08/14/2018 1915 diltiazem (CARDIZEM) injection 10 mg 10 mg Intravenous Given           Past Medical/Surgical History:    Active Ambulatory Problems     Diagnosis Date Noted    Atrial fibrillation ([facility] ) 03/07/2016    Hypokalemia 03/09/2016    Anemia 03/09/2016    Fall 08/21/2016    Hip dislocation, right ([facility] ) 08/21/2016    Chronic diastolic CHF (congestive heart failure) ([facility] ) 08/23/2016    Depression 08/23/2016    Obesity without serious comorbidity 08/23/2016    Chronic pain 08/23/2016    Abdominal bloating 08/01/2016    Abdominal pain 12/08/2012    Arthralgia of hip 04/14/2015    Arthralgia of knee, right 09/18/2014    Benign essential hypertension 12/09/2012    Chronic pain of lower extremity, bilateral 06/23/2016    Fibromyalgia 05/31/2012    External hemorrhoids 03/25/2016    Irritable bowel syndrome with constipation 09/15/2015    Lumbar canal stenosis 05/17/2012    Moderate mitral regurgitation 05/13/2014    Osteoarthritis, multiple sites 06/26/2014    Osteoporosis 02/04/2014    Palpitations 05/04/2015    Peripheral neuropathy 05/25/2012    Primary osteoarthritis of right knee 06/30/2015    Synovial cyst of right popliteal space 08/04/2015    Unstable right hip 04/04/2017    Urinary incontinence 05/13/2014    Exertional chest pain 01/26/2018    Right lower quadrant abdominal pain 01/26/2018    GI bleeding 03/22/2018    Ventral hernia without obstruction or gangrene 03/22/2018    Abnormal CT of the abdomen 03/22/2018    Gastrointestinal hemorrhage with melena 03/22/2018    Ulcer 03/29/2018    Chronic pain of right knee 05/15/2018    [facility] kidney injury ([facility] ) 07/02/2018    Abnormal TSH 07/08/2018    Postural dizziness with presyncope 07/08/2018     Resolved Ambulatory Problems     Diagnosis Date Noted    Acute diastolic congestive heart failure ([facility] ) 03/07/2016    Leg swelling 03/07/2016    Shortness of breath 03/08/2016    Elevated TSH 03/09/2016    Left foot pain 03/11/2016    Near syncope 07/02/2018     Past Medical History:   Diagnosis Date    Arthritis     Cancer ([facility] )     CHF (congestive heart failure) (HCC)     Disturbance of smell and taste     Effusion of knee joint right     Fibromyalgia     Heart murmur     History of acute myocardial infarction     History of atrial fibrillation     History of bruising easily     History of cancer     History of dermatitis     History of mammogram     History of obesity     History of osteopenia     History of sciatica     History of shortness of breath     History of sinusitis     History of sore throat     History of syncope     History of umbilical hernia     History of viral infection     Irritable bowel syndrome (IBS)     Joint pain, hip     Limb pain     Myalgia     Need for prophylactic vaccination against single diseases     Need for prophylactic vaccination and inoculation against influenza     Preoperative cardiovascular examination     Primary osteoarthritis of right knee     Right leg pain     Vaginal Pap smear        Admitting Diagnosis: Irregular heart beat [I49 9]  Atrial fibrillation with rapid ventricular response ([facility] ) [I48 91]    Age/Sex: 80 y o  female    Assessment/Plan:   Atrial fibrillation ([facility] )   Assessment & Plan     · Known h/o Afib, found to have HR in 140's at PT appointment   ? uncontrolled rate per last OP Cardiology visit in May 2018  ? Did receive steroid injection today  ? Asymptomatic today  ? Sustaining HR in 110's on Cardizem gtt  ? No electrolyte disturbance  ?  Perhaps related to dehydration as well as evidenced by BRITTANY on presentation   · Pt/INR therapeutic, maintained on warfarin  · Continue Cardizem gtt  ? BP's soft currently  ? Wean gtt as tolerating  · Check troponin  · Will monitor I/o's and daily weights  ? no s/sx fluid overload on my exam  ? CXR pending  · Consult Cardiology           Acute kidney injury ([facility] )   Assessment & Plan     · Cr of 1 37 today as compared to prior labs with Cr around 1-1 1  · Provide IVF, check AM BMP  · Hold nephrotoxins          Benign essential hypertension   Assessment & Plan     · BP in 408'D systolic on Cardizem gtt, continue to monitor           Chronic diastolic CHF (congestive heart failure) ([facility])   Assessment & Plan     · EF 55% on echo in April 2018  ? Moderate mitral regurgitation  · Follows with Dr Brian Carver   · Hold diuretic until renal function improves           Anemia   Assessment & Plan     · Hgb 10 9 on arrival, baseline around 10           Depression   Assessment & Plan     · Continue Cymbalta         Admission Orders:  Scheduled Meds:   Current Facility-Administered Medications:  acetaminophen 650 mg Oral Q6H PRN   diltiazem 120 mg Oral After Dinner   diltiazem 180 mg Oral Daily   DULoxetine 60 mg Oral Daily   fluticasone 1 spray Nasal Daily   potassium chloride 20 mEq Oral Daily   traMADol 50 mg Oral BID PRN   [START ON 8/16/2018] warfarin 3 mg Oral Q72H   [START ON 8/17/2018] warfarin 4 mg Oral Q72H   warfarin 4 mg Oral Q72H     Continuous Infusions:    PRN Meds:   48 hr tele  Peripheral iv   daily wt  Cardiac diet  I/o  MRSA culture  Inpt to cardiology    08/15 Cariology consult:Principal Problem:    Atrial fibrillation ([facility] )  Active Problems:    Anemia    Chronic diastolic CHF (congestive heart failure) ([facility])    Depression    Benign essential hypertension    Osteoporosis    Acute kidney injury ([facility])        Assessment/Plan     1  Atrial fibrillation with rapid ventricular response  Her heart rate is running 100-110 with a blood pressure systolic we of 444T  She is currently on Cardizem 7 5 milligrams/minute which is equivalent to 180 mg in a 24 hour period of time  For now I am going to resume her Cardizem but at 180 mg oral daily  We will see what her blood pressure and heart rate does with that dosing  She has not yet been up out of bed so I suspect her heart rates may certainly increase  She did have mild BRITTANY on presentation which was felt to be hypovolemic  She is up about 4 lb, however she reports improved shortness of breath recently and improved lower extremity edema from baseline  I do not feel she is volume overloaded at this time  Since her creatinine did normalize I would however discontinue her fluids now to avoid volume overload  If her  heart rates remain elevated and blood pressure does not allow us to up titrate her Cardizem we could consider digoxin  If this does not control her heart rates could consider AV node ablation/PPM  Bisoprolol has been discontinued d/t symptomatic hypotension after being decreased from 10mg to 5mg daily  On warfarin for stroke prophylaxis  TSH normal    No obvious cause of RVR       2  Chronic diastolic heart failure- as mentioned above, I do not think at this time she is volume overloaded  I would however discontinue her intravenous fluids  I am going to resume her Demadex at 20 mg daily starting tomorrow  She takes Demadex 20 mg daily and if she has weight gain she takes another 20 mg      3  HTN-her issue more so has been hypotension leading to the discontinuation of bisoprolol and decrease of Cardizem  Monitor     4  Acute kidney injury- normalized with IVF  Would d/t fluids now  ? Mildly hypovolemic on presentation or decreased renal perfusion d/t RVR       5  Moderate MR-by echocardiogram 4/2018  Normal LV function  Additionally there is moderate TR    MR felt to be mostly functional

## 2018-08-15 NOTE — PLAN OF CARE
CARDIOVASCULAR - ADULT     Maintains optimal cardiac output and hemodynamic stability Progressing     Absence of cardiac dysrhythmias or at baseline rhythm Progressing        DISCHARGE PLANNING     Discharge to home or other facility with appropriate resources Progressing        Knowledge Deficit     Patient/family/caregiver demonstrates understanding of disease process, treatment plan, medications, and discharge instructions Progressing        METABOLIC, FLUID AND ELECTROLYTES - ADULT     Electrolytes maintained within normal limits Progressing     Fluid balance maintained Progressing        PAIN - ADULT     Verbalizes/displays adequate comfort level or baseline comfort level Progressing        Potential for Falls     Patient will remain free of falls Progressing        SAFETY ADULT     Maintain or return to baseline ADL function Progressing     Maintain or return mobility status to optimal level Progressing

## 2018-08-15 NOTE — PLAN OF CARE
Problem: DISCHARGE PLANNING - CARE MANAGEMENT  Goal: Discharge to post-acute care or home with appropriate resources  INTERVENTIONS:  - Conduct assessment to determine patient/family and health care team treatment goals, and need for post-acute services based on payer coverage, community resources, and patient preferences, and barriers to discharge  - Address psychosocial, clinical, and financial barriers to discharge as identified in assessment in conjunction with the patient/family and health care team  - Arrange appropriate level of post-acute services according to patients   needs and preference and payer coverage in collaboration with the physician and health care team  - Communicate with and update the patient/family, physician, and health care team regarding progress on the discharge plan  - Arrange appropriate transportation to post-acute venues  Outcome: Progressing  LOS 1  Not a bundle; Not a readmission  CM reviewed discharge planning process including the following: identifying caregivers at home, preference for d/c planning needs, [facility] Meds to Bed program, availability of treatment team to discuss questions or concerns patient and/or family may have regarding diagnosis, plan of care, old or new medications and discharge planning   CM will continue to follow for care coordination and update assessment as necessary  Pt lives at home with her  in an apartment where she has 2 small steps to enter  She is able to manage all her ADL's and has a shower chair and rollator  She uses a rollator when going out of the home  She has recently stopped driving  Her daughter will transport her home at [place]  She has had [facility] in the past with [facility] and [facility]  She is currently receiving services with [facility] and [facility]   Referral has been made for [facility]  Pt has been to rehab in the past at [facility]  Pt has a hx of depression that is managed  No hx of D&A    She does not have a POA/AD but has paperwork to do it  She has Rx coverage  08/15/18 1421   Referral Data   Referral Reason VNA   Patient Information   Mental Status Alert   Primary Caregiver Self   [facility] No   Activities of Daily Living Prior to Admission   Functional Status Minimum assistance   Assistive Device Walker   Living Arrangement House;Lives with someone   Ambulation Independent   Means of Transportation   Means of Transport to Appts: Family      08/15/18 1422   Discharge Planning   Living Arrangements Spouse/significant other   Support Systems Spouse/significant other;Children;Family members   Type of Current Residence Private residence   [address] Yes   Type of Current [address] Referral Provided   [address] requested: Nursing; Occupational Therapy; Physical Therapy   Home Health Agency Name: [id number] [facility] Provider: PCP   Home Health Services Needed: Evaluate Functional Status and Safety;Strengthening/Theraputic Exercises to Improve Function; Restore Joint Function Post Joint Replacement   Homebound Criteria Met: Uses an Assist Device (i e  cane, walker, etc); Requires the Assistance of Another Person for Safe Ambulation or to Leave the Home   Supporting Clincal Findings: Limited Endurance   Discharge Communications   Discharge planning discussed with: pt   Freedom of Choice Yes

## 2018-08-15 NOTE — ASSESSMENT & PLAN NOTE
· Known h/o Afib, found to have HR in 140's at PT appointment   · uncontrolled rate per last OP Cardiology visit in May 2018  · Did receive steroid injection 8/14  · Asymptomatic on admission  · Status post Cardizem drip  · No electrolyte disturbance  · Perhaps related to dehydration as well as evidenced by BRITTANY on presentation, this has resolved  · Pt/INR therapeutic, maintained on warfarin  · Status post Cardizem gtt, now on oral Cardizem  · BP's soft currently  · Negative troponins  · Will monitor I/o's and daily weights  · no s/sx fluid overload on my exam  · CXR:  No acute abnormality  · Cardiology on board  · According to Cardiology, they will try to titrate oral Cardizem  If not still no improvement of patient's heart rate, likely patient will need AV agustina ablation and permanent pacemaker placement    Cardiology will talk to patient's cardiologist in the outpatient as well as the electrophysiologist

## 2018-08-15 NOTE — CONSULTS
Consultation - Cardiology   Kirti Williamson 80 y o  female MRN: [id number]  Unit/Bed#: -01 Encounter: [id number]    Assessment/Plan     Principal Problem:    Atrial fibrillation ([facility] [place] [address] )  Active Problems:    Anemia    Chronic diastolic CHF (congestive heart failure) ([facility])    Depression    Benign essential hypertension    Osteoporosis    Acute kidney injury ([facility] [place] [address] )      Assessment/Plan    1  Atrial fibrillation with rapid ventricular response  Her heart rate is running 100-110 with a blood pressure systolic we of 349L  She is currently on Cardizem 7 5 milligrams/minute which is equivalent to 180 mg in a 24 hour period of time  For now I am going to resume her Cardizem but at 180 mg oral daily  We will see what her blood pressure and heart rate does with that dosing  She has not yet been up out of bed so I suspect her heart rates may certainly increase  She did have mild BRITTANY on presentation which was felt to be hypovolemic  She is up about 4 lb, however she reports improved shortness of breath recently and improved lower extremity edema from baseline  I do not feel she is volume overloaded at this time  Since her creatinine did normalize I would however discontinue her fluids now to avoid volume overload  If her  heart rates remain elevated and blood pressure does not allow us to up titrate her Cardizem we could consider digoxin  If this does not control her heart rates could consider AV node ablation/PPM  Bisoprolol has been discontinued d/t symptomatic hypotension after being decreased from 10mg to 5mg daily  On warfarin for stroke prophylaxis  TSH normal    No obvious cause of RVR  2   Chronic diastolic heart failure- as mentioned above, I do not think at this time she is volume overloaded  I would however discontinue her intravenous fluids  I am going to resume her Demadex at 20 mg daily starting tomorrow    She takes Demadex 20 mg daily and if she has weight gain she takes another 20 mg     3   HTN-her issue more so has been hypotension leading to the discontinuation of bisoprolol and decrease of Cardizem  Monitor    4  Acute kidney injury- normalized with IVF  Would d/t fluids now  ? Mildly hypovolemic on presentation or decreased renal perfusion d/t RVR  5  Moderate MR-by echocardiogram 4/2018  Normal LV function  Additionally there is moderate TR  MR felt to be mostly functional         History of Present Illness   Physician Requesting Consult: Rebeca Barraza MD  Reason for Consult / Principal Problem:  Atrial fibrillation with rapid ventricular response    HPI: Tana Baez is a 80y o  year old female with chronic atrial fibrillation, chronic  diastolic heart failure, HTN, moderate MR and moderate TR who was directed to come emergency room by her primary care physician for tachycardia with HR of 140  She was noted to be tachycardic with physical therapist was at her home  She recently had steroid injection for her knee pain which she felt she had great improvement from  The patient actually felt like she was having a great day  She had no complaints of shortness of breath or chest pain  She did not particularly have palpitations  She did say that she felt like she was on a high  She felt like she had more energy than usual   She is followed in the office by Dr Darya Wiseman  She said she has had atrial fibrillation for over 3 years  Her recent AV agustina blocker includes Cardizem 120 mg daily  This is currently on hold and she is on a IV Cardizem drip  Her heart rate is running at 100 with Cardizem running at 7 5 mg an hour which is equivalent to 180mg a day  She is asymptomatic  She has had issues recently with rate control as well as hypotension leading to medication adjustments  She had been in the hospital at the [facility] in July for AFib with RVR in the setting of acute kidney injury/dehydration in the setting of over diuresis    At this point her diuretic is Demadex 20 mg daily and less her weight is over 185 lb and she takes a 2nd dose  She said this week she had take a 2nd dose to 1 particular day Cozaar weight did go up a couple lb  She thinks she is about 4 lb above her dry weight of 185  She has had some recent issues with lightheadedness that led to the discontinuation of bisoprolol completely  Her Cardizem has been decreased from 120 mg twice a day to 120 mg daily  Inpatient consult to Cardiology  Consult performed by: Rica Montano ordered by: Poornima Rodríguez          Review of Systems   Constitutional: Positive for unexpected weight change  HENT: Negative  Eyes: Negative  Respiratory: Negative for shortness of breath  Cardiovascular: Positive for palpitations and leg swelling  Negative for chest pain  Gastrointestinal: Negative  Genitourinary: Negative  Musculoskeletal: Positive for gait problem  Hematological: Bruises/bleeds easily  Psychiatric/Behavioral: Negative          Historical Information   Past Medical History:   Diagnosis Date    Arthritis     Cancer ([facility] [address] )     [facility]congest[facility]) [facility]     Disturbance of smell and taste     disturbance of taste    Effusion of knee joint right     Fibromyalgia     Heart murmur     reported a previous heart murmur    History of acute myocardial infarction     History of atrial fibrillation     History of bruising easily     History of cancer     History of dermatitis     History of mammogram     History of obesity     History of osteopenia     History of sciatica     History of shortness of breath     History of sinusitis     History of sore throat     History of syncope     History of umbilical hernia     History of viral infection     Irritable bowel syndrome (IBS)     Joint pain, hip     Limb pain     Myalgia     myalgia and myositis    Need for prophylactic vaccination against single diseases     Need for prophylactic vaccination and inoculation against influenza     Preoperative cardiovascular examination     Primary osteoarthritis of right knee     Right leg pain     Vaginal Pap smear     reported pap smear     Past Surgical History:   Procedure Laterality Date    ANKLE ARTHRODESIS Right     BACK SURGERY      BARIATRIC SURGERY      CHOLECYSTECTOMY      x2    ESOPHAGOGASTRODUODENOSCOPY N/A 3/23/2018    Procedure: ESOPHAGOGASTRODUODENOSCOPY (EGD); Surgeon: Niki Srivastava MD;  Location: [facility];   Service: Gastroenterology    FOOT SURGERY      HIP CLOSE REDUCTION Right 8/21/2016    Procedure: CLOSED REDUCTION RIGHT TOTAL HIP;  Surgeon: Nakul Jacome MD;  [facility];  Service:     JOINT REPLACEMENT      LEFT KNEE REPLACEMENT    PILONIDAL CYST EXCISION      x2    REPLACEMENT TOTAL KNEE Right     SILVANA-EN-Y PROCEDURE      x2    TOTAL KNEE ARTHROPLASTY      UMBILICAL HERNIA REPAIR      x2     History   Alcohol Use    Yes     Comment:  social     History   Drug Use No     History   Smoking Status    Never Smoker   Smokeless Tobacco    Never Used     Family History:   Family History   Problem Relation Age of Onset    Coronary artery disease Mother     Heart attack Mother     Hypertension Mother     Coronary artery disease Father     Heart attack Father     Hypertension Father     Lymphoma Sister         acute    Rashes / Skin problems Sister         lymphomatoid papulosis    Coronary artery disease Brother     Heart attack Brother         x2    Aneurysm Neg Hx     Hyperlipidemia Neg Hx        Meds/Allergies   current meds:   Current Facility-Administered Medications   Medication Dose Route Frequency    acetaminophen (TYLENOL) tablet 650 mg  650 mg Oral Q6H PRN    diltiazem (CARDIZEM SR) 12 hr capsule 120 mg  120 mg Oral Daily    diltiazem (CARDIZEM) 125 mg in sodium chloride 0 9 % 125 mL infusion  1-15 mg/hr Intravenous Titrated    diltiazem (CARDIZEM) tablet 30 mg  30 mg Oral Q6H [address]    DULoxetine (CYMBALTA) delayed release capsule 60 mg  60 mg Oral Daily    fluticasone (FLONASE) 50 mcg/act nasal spray 1 spray  1 spray Nasal Daily    potassium chloride (K-DUR,KLOR-CON) CR tablet 20 mEq  20 mEq Oral Daily    traMADol (ULTRAM) tablet 50 mg  50 mg Oral BID PRN    [START ON 8/16/2018] warfarin (COUMADIN) tablet 3 mg  3 mg Oral Q72H    [START ON 8/17/2018] warfarin (COUMADIN) tablet 4 mg  4 mg Oral Q72H    warfarin (COUMADIN) tablet 4 mg  4 mg Oral Q72H    and PTA meds:  Facility-Administered Medications Prior to Admission   Medication    [COMPLETED] bupivacaine (MARCAINE) 0 25 % injection 2 mL    [COMPLETED] lidocaine (XYLOCAINE) 1 % injection 2 mL    [COMPLETED] methylPREDNISolone acetate (DEPO-MEDROL) injection 2 mL     Prescriptions Prior to Admission   Medication    desoximetasone (TOPICORT) 0 25 % cream    diltiazem (CARDIZEM) 120 MG tablet    DULoxetine (CYMBALTA) 60 mg delayed release capsule    fluticasone (FLONASE) 50 mcg/act nasal spray    potassium chloride (K-DUR,KLOR-CON) 20 mEq tablet    potassium chloride (KLOR-CON) 20 mEq packet    sucralfate (CARAFATE) 1 g/10 mL suspension    torsemide (DEMADEX) 20 mg tablet    traMADol (ULTRAM) 50 mg tablet    warfarin (COUMADIN) 3 mg tablet    warfarin (COUMADIN) 4 mg tablet     Allergies   Allergen Reactions    Codeine Vomiting and GI Intolerance     GI upset, ana m morphine  Reaction Date: 24Apr2012;     Other      Other reaction(s): Other (See Comments)  ana m toradol in OR 6/06 sah    Oxycodone Hcl Vomiting     Reaction Date: 24Apr2012;     Oxycodone-Acetaminophen      Other reaction(s): Other (See Comments)  GI upset; ana m vicodin    Percocet [Oxycodone-Acetaminophen]     Seasonal Ic  [Cholestatin] Allergic Rhinitis    Bactrim [Sulfamethoxazole-Trimethoprim] Rash    Sulfa Antibiotics Rash     Other reaction(s):  Other (See Comments)  takes lasix @home       Objective   Vitals: Blood pressure 122/79, pulse 100, temperature 97 6 °F (36 4 °C), temperature source Oral, resp  rate 16, height 5' 2" (1 575 m), weight 87 kg (191 lb 14 4 oz), SpO2 94 %  Orthostatic Blood Pressures      Most Recent Value   Blood Pressure  122/79 filed at 08/15/2018 0700   Patient Position - Orthostatic VS  Lying filed at 08/15/2018 0700            Intake/Output Summary (Last 24 hours) at 08/15/18 0934  Last data filed at 08/15/18 1033   Gross per 24 hour   Intake           1177 5 ml   Output              500 ml   Net            677 5 ml       Invasive Devices     Peripheral Intravenous Line            Peripheral IV 08/14/18 Right Wrist less than 1 day                Physical Exam: /79 (BP Location: Right arm)   Pulse 100   Temp 97 6 °F (36 4 °C) (Oral)   Resp 16   Ht 5' 2" (1 575 m)   Wt 87 kg (191 lb 14 4 oz)   SpO2 94%   BMI 35 10 kg/m²   General Appearance:    Alert, cooperative, no distress, appears stated age   Head:    Normocephalic, no scleral icterus   Eyes:    PERRL   Nose:   Nares normal, septum midline, mucosa normal, no drainage    Throat:   Lips, mucosa, and tongue normal   Neck:   Supple, symmetrical, trachea midline     no carotid bruit or JVD   Back:     Symmetric   Lungs:     Clear to auscultation bilaterally, respirations unlabored   Chest Wall:    No tenderness or deformity    Heart:    Regular rate and rhythm, S1 and S2 normal, no murmur, rub   or gallop   Abdomen:     Soft, non-tender, bowel sounds active all four quadrants,     no masses, no organomegaly   Extremities:   Extremities normal, atraumatic, no cyanosis or edema   Pulses:   2+ and symmetric all extremities   Skin:   Skin color, texture, turgor normal, no rashes or lesions   Neurologic:   Alert and oriented to person place and time   No focal deficits       Lab Results:   Recent Results (from the past 72 hour(s))   CBC and differential    Collection Time: 08/14/18  7:16 PM   Result Value Ref Range    WBC 3 54 (L) 4 31 - 10 16 Thousand/uL    RBC 3 67 (L) 3 81 - 5 12 Million/uL    Hemoglobin 10 9 (L) 11 5 - 15 4 g/dL    Hematocrit 34 2 (L) 34 8 - 46 1 %    MCV 93 82 - 98 fL    MCH 29 7 26 8 - 34 3 pg    MCHC 31 9 31 4 - 37 4 g/dL    RDW 15 2 (H) 11 6 - 15 1 %    MPV 10 8 8 9 - 12 7 fL    Platelets 428 600 - 665 Thousands/uL    nRBC 0 /100 WBCs    Neutrophils Relative 84 (H) 43 - 75 %    Immat GRANS % 1 0 - 2 %    Lymphocytes Relative 13 (L) 14 - 44 %    Monocytes Relative 2 (L) 4 - 12 %    Eosinophils Relative 0 0 - 6 %    Basophils Relative 0 0 - 1 %    Neutrophils Absolute 2 97 1 85 - 7 62 Thousands/µL    Immature Grans Absolute 0 03 0 00 - 0 20 Thousand/uL    Lymphocytes Absolute 0 47 (L) 0 60 - 4 47 Thousands/µL    Monocytes Absolute 0 07 (L) 0 17 - 1 22 Thousand/µL    Eosinophils Absolute 0 00 0 00 - 0 61 Thousand/µL    Basophils Absolute 0 00 0 00 - 0 10 Thousands/µL   Protime-INR    Collection Time: 08/14/18  7:16 PM   Result Value Ref Range    Protime 24 6 (H) 11 8 - 14 2 seconds    INR 2 30 (H) 0 86 - 1 17   APTT    Collection Time: 08/14/18  7:16 PM   Result Value Ref Range    PTT 38 (H) 24 - 36 seconds   Basic metabolic panel    Collection Time: 08/14/18  7:16 PM   Result Value Ref Range    Sodium 136 136 - 145 mmol/L    Potassium 4 2 3 5 - 5 3 mmol/L    Chloride 100 100 - 108 mmol/L    CO2 27 21 - 32 mmol/L    Anion Gap 9 4 - 13 mmol/L    BUN 28 (H) 5 - 25 mg/dL    Creatinine 1 37 (H) 0 60 - 1 30 mg/dL    Glucose 219 (H) 65 - 140 mg/dL    Calcium 8 9 8 3 - 10 1 mg/dL    eGFR 35 ml/min/1 73sq m   Hepatic function panel    Collection Time: 08/14/18  7:16 PM   Result Value Ref Range    Total Bilirubin 0 40 0 20 - 1 00 mg/dL    Bilirubin, Direct 0 16 0 00 - 0 20 mg/dL    Alkaline Phosphatase 103 46 - 116 U/L    AST 20 5 - 45 U/L    ALT 22 12 - 78 U/L    Total Protein 7 2 6 4 - 8 2 g/dL    Albumin 3 3 (L) 3 5 - 5 0 g/dL   TSH    Collection Time: 08/14/18  7:16 PM   Result Value Ref Range    TSH 3RD GENERATON 2 369 0 358 - 3 740 uIU/mL   Protime-INR    Collection Time: 18 11:58 PM   Result Value Ref Range    Protime 25 1 (H) 11 8 - 14 2 seconds    INR 2 36 (H) 0 86 - 1 17   Troponin I    Collection Time: 18 11:58 PM   Result Value Ref Range    Troponin I <0 02 <=0 04 ng/mL   Troponin I    Collection Time: 08/15/18  3:23 AM   Result Value Ref Range    Troponin I <0 02 <=0 04 ng/mL   Troponin I    Collection Time: 08/15/18  6:51 AM   Result Value Ref Range    Troponin I <0 02 <=0 04 ng/mL   Basic metabolic panel    Collection Time: 08/15/18  6:51 AM   Result Value Ref Range    Sodium 139 136 - 145 mmol/L    Potassium 4 1 3 5 - 5 3 mmol/L    Chloride 104 100 - 108 mmol/L    CO2 27 21 - 32 mmol/L    Anion Gap 8 4 - 13 mmol/L    BUN 26 (H) 5 - 25 mg/dL    Creatinine 1 17 0 60 - 1 30 mg/dL    Glucose 155 (H) 65 - 140 mg/dL    Calcium 9 4 8 3 - 10 1 mg/dL    eGFR 43 ml/min/1 73sq m       Transthoracic Echocardiogram  2D, M-mode, Doppler, and Color Doppler     Study date:  2018     Patient: Kemi Barnes  MR number: [id number]  Account number: [de-identified]  : 1933  Age: 80 years  Gender: Female  Status: Outpatient  Location: [facility]  Height: 62 in  Weight: 231 lb  BP: 112/ 60 mmHg     Indications: MV Disease     Diagnoses: I34 0 - Nonrheumatic mitral (valve) insufficiency     Sonographer:  JESUS Andrade  Primary Physician:  [address] [place] [address]  Referring Physician:  Lori Vega MD  Group:  [facility]  Interpreting Physician:  Bryce Salgado MD     SUMMARY     LEFT VENTRICLE:  Size was normal   Systolic function was normal  Ejection fraction was estimated to be 55 %  Wall thickness was at the upper limits of normal      RIGHT VENTRICLE:  The ventricle was mildly dilated    Systolic function was normal      RIGHT ATRIUM:  The atrium was mildly dilated      MITRAL VALVE:  There was moderate regurgitation      TRICUSPID VALVE:  There was moderate regurgitation      IVC, HEPATIC VEINS:  The inferior vena cava was mildly dilated  Respirophasic changes were blunted (less than 50% variation)      COMPARISONS:  Imaging: I have personally reviewed pertinent reports  Tele- afib with RVR  VTE Prophylaxis: Warfarin (Coumadin)    Code Status: Level 1 - Full Code  Advance Directive and Living Will:      Power of :    POLST:      Counseling / Coordination of Care  Total floor / unit time spent today 45 minutes  Greater than 50% of total time was spent with the patient and / or family counseling and / or coordination of care

## 2018-08-15 NOTE — ASSESSMENT & PLAN NOTE
· EF 55% on echo in April 2018  · Moderate mitral regurgitation  · Follows with Dr Coleman Dominguez   · Hold diuretic until renal function improves

## 2018-08-15 NOTE — H&P
H&P- Reginaldo Shaker 1933, 80 y o  female MRN: [id number]    Unit/Bed#: -01 Encounter: [id number]    Primary Care Provider: Mina Ewing DO   Date and time admitted to hospital: 8/14/2018  6:40 PM    * Atrial fibrillation [facility])   Assessment & Plan    · Known h/o Afib, found to have HR in 140's at PT appointment   · uncontrolled rate per last OP Cardiology visit in May 2018  · Did receive steroid injection today  · Asymptomatic today  · Sustaining HR in 110's on Cardizem gtt  · No electrolyte disturbance  · Perhaps related to dehydration as well as evidenced by BRITTANY on presentation   · Pt/INR therapeutic, maintained on warfarin  · Continue Cardizem gtt  · BP's soft currently  · Wean gtt as tolerating  · Check troponin  · Will monitor I/o's and daily weights  · no s/sx fluid overload on my exam  · CXR pending  · Consult Cardiology         Acute kidney injury [facility])   Assessment & Plan    · Cr of 1 37 today as compared to prior labs with Cr around 1-1 1  · Provide IVF, check AM BMP  · Hold nephrotoxins        Benign essential hypertension   Assessment & Plan    · BP in 280'R systolic on Cardizem gtt, continue to monitor         Chronic diastolic CHF (congestive heart failure) (HCC)   Assessment & Plan    · EF 55% on echo in April 2018  · Moderate mitral regurgitation  · Follows with Dr Rasheed Romo   · Hold diuretic until renal function improves         Anemia   Assessment & Plan    · Hgb 10 9 on arrival, baseline around 10         Depression   Assessment & Plan    · Continue Cymbalta          VTE Prophylaxis: Warfarin (Coumadin)  / sequential compression device   Code Status: FULL  POLST: POLST form is not discussed and not completed at this time  Discussion with family: none    Anticipated Length of Stay:  Patient will be admitted on an Inpatient basis with an anticipated length of stay of  > 2 midnights     Justification for Hospital Stay: per plan above    Total Time for Visit, including Counseling / Coordination of Care: 30 minutes  Greater than 50% of this total time spent on direct patient counseling and coordination of care  Chief Complaint:   Rapid heart rate    History of Present Illness:    Ronal Ardon is a 80 y o  female with PMHX of CHF, afib, anemia,  who presents with rapid heart rate  Pt states she had PT at her home today and they found her heart rate to be in the 140's then in the 120's and "all over the place"  Pt states that besides a frontal headache today, she actually felt more energetic than usual  She does say she had " a lot of excitement today"  She says she was out doing errands and felt well walking around all day  She says she got a call that she was going to have visitors today which she became excited about as well  She says never felt short of breath and denies any chest pain  She denies any JEROME before today and reports taking all of her medications including her diuretic as Dr Rebecca Chahal recently instructed her to lose weight  She does say her legs look somewhat more edematous but says usually they are much more red  She denies any palpitations or syncope  Reports good fluid intake throughout the day  Denies n/v/d or abdominal pain  Denies cough, congestion, urinary symptoms, fever or chills  Review of Systems:    Review of Systems   Constitutional: Negative  HENT: Negative  Eyes: Negative  Respiratory: Negative  Cardiovascular: Negative  Gastrointestinal: Negative  Endocrine: Negative  Genitourinary: Negative  Musculoskeletal: Negative  Skin: Negative  Allergic/Immunologic: Negative  Neurological: Positive for headaches  Hematological: Negative  Psychiatric/Behavioral: Negative          Past Medical and Surgical History:     Past Medical History:   Diagnosis Date    Arthritis     Cancer ([facility] [address] )     [facility]congestive [facility] failure) [facility]     Disturbance of smell and taste     disturbance of taste    Effusion of knee joint right     Fibromyalgia     Heart murmur     reported a previous heart murmur    History of acute myocardial infarction     History of atrial fibrillation     History of bruising easily     History of cancer     History of dermatitis     History of mammogram     History of obesity     History of osteopenia     History of sciatica     History of shortness of breath     History of sinusitis     History of sore throat     History of syncope     History of umbilical hernia     History of viral infection     Irritable bowel syndrome (IBS)     Joint pain, hip     Limb pain     Myalgia     myalgia and myositis    Need for prophylactic vaccination against single diseases     Need for prophylactic vaccination and inoculation against influenza     Preoperative cardiovascular examination     Primary osteoarthritis of right knee     Right leg pain     Vaginal Pap smear     reported pap smear       Past Surgical History:   Procedure Laterality Date    ANKLE ARTHRODESIS Right     BACK SURGERY      BARIATRIC SURGERY      CHOLECYSTECTOMY      x2    ESOPHAGOGASTRODUODENOSCOPY N/A 3/23/2018    Procedure: ESOPHAGOGASTRODUODENOSCOPY (EGD); Surgeon: Glenna Whitley MD;  [facility]: [facility]; Service: Gastroenterology    FOOT SURGERY      HIP CLOSE REDUCTION Right 8/21/2016    Procedure: CLOSED REDUCTION RIGHT TOTAL HIP;  Surgeon: Guillermina Blanca MD;  [facility];  Service:     JOINT REPLACEMENT      LEFT KNEE REPLACEMENT    PILONIDAL CYST EXCISION      x2    REPLACEMENT TOTAL KNEE Right     SILVANA-EN-Y PROCEDURE      x2    TOTAL KNEE ARTHROPLASTY      UMBILICAL HERNIA REPAIR      x2       Meds/Allergies:    Prior to Admission medications    Medication Sig Start Date End Date Taking?  Authorizing Provider   bisoprolol (ZEBETA) 5 mg tablet Take 1 tablet (5 mg total) by mouth daily 7/26/18   [facility] TEJAS Foley   desoximetasone (TOPICORT) 0 25 % cream Apply topically 8/3/11   Historical Provider, MD   diltiazem (CARDIZEM) 120 MG tablet Take 1 tablet (120 mg total) by mouth daily 7/26/18   TEJAS Galeana   DULoxetine (CYMBALTA) 60 mg delayed release capsule TAKE 1 CAPSULE BY MOUTH ONCE DAILY 7/10/18   Regine Nasrao, DO   fluticasone (FLONASE) 50 mcg/act nasal spray 1 spray into each nostril daily  Historical Provider, MD   potassium chloride (K-DUR,KLOR-CON) 20 mEq tablet Take 20 mEq by mouth daily 8/1/18   Historical Provider, MD   potassium chloride (KLOR-CON) 20 mEq packet Take 10 mEq by mouth daily 7/15/18   Ha Mcgregor PA-C   sucralfate (CARAFATE) 1 g/10 mL suspension Take 10 mL (1,000 mg total) by mouth 2 (two) times a day 7/26/18   ClTEJAS Coulter   torsemide (DEMADEX) 20 mg tablet Take 2 tablets (40 mg total) by mouth daily 7/4/18   Hedii Ward MD   traMADol (ULTRAM) 50 mg tablet TAKE ONE TABLET BY MOUTH TWICE DAILY AS NEEDED FOR PAIN 5/14/18   Regine Castro, DO   warfarin (COUMADIN) 3 mg tablet Take 1 tablet daily or as directed  Patient taking differently: Take 3 mg by mouth every other day Take 1 tablet daily or as directed  3/14/18   Regine Nasrao, DO   warfarin (COUMADIN) 4 mg tablet TAKE 2 TABLETS BY MOUTH DAILY 8/1/18   Reginemarcelina Castro, DO     I have reviewed home medications with patient personally  Allergies: Allergies   Allergen Reactions    Codeine Vomiting and GI Intolerance     GI upset, ana m morphine  Reaction Date: 24Apr2012;     Other      Other reaction(s): Other (See Comments)  ana m toradol in OR 6/06 sah    Oxycodone Hcl Vomiting     Reaction Date: 24Apr2012;     Oxycodone-Acetaminophen      Other reaction(s): Other (See Comments)  GI upset; ana m vicodin    Percocet [Oxycodone-Acetaminophen]     Seasonal Ic  [Cholestatin] Allergic Rhinitis    Bactrim [Sulfamethoxazole-Trimethoprim] Rash    Sulfa Antibiotics Rash     Other reaction(s):  Other (See Comments)  takes lasix @home       Social History:     Marital Status: /Civil Union   Occupation: retired  Patient Pre-hospital Living Situation: home with   Patient Pre-hospital Level of Mobility: ambulate with rollater  Patient Pre-hospital Diet Restrictions: none  Substance Use History:   History   Alcohol Use    Yes     Comment:  social     History   Smoking Status    Never Smoker   Smokeless Tobacco    Never Used     History   Drug Use No       Family History:    non-contributory    Physical Exam:     Vitals:   Blood Pressure: 121/62 (08/14/18 2258)  Pulse: (!) 113 (08/14/18 2258)  Temperature: 97 6 °F (36 4 °C) (08/14/18 2258)  Temp Source: Oral (08/14/18 2258)  Respirations: 16 (08/14/18 2030)  Height: 5' 2" (157 5 cm) (08/14/18 2258)  Weight - Scale: 85 7 kg (188 lb 15 oz) (08/14/18 2258)  SpO2: 96 % (08/14/18 2258)    Physical Exam   Constitutional: She is oriented to person, place, and time  She appears well-developed and well-nourished  No distress  HENT:   Head: Normocephalic  Dry MM   Cardiovascular: Intact distal pulses  Exam reveals no gallop and no friction rub  No murmur heard  Irregularly irregular rhythm    Pulmonary/Chest: Effort normal and breath sounds normal  No respiratory distress  She has no wheezes  She has no rales  She exhibits no tenderness  Abdominal: Soft  Bowel sounds are normal  She exhibits no distension and no mass  There is no tenderness  There is no rebound and no guarding  Musculoskeletal: She exhibits edema (1+ pitting edema)  She exhibits no tenderness  Neurological: She is alert and oriented to person, place, and time  Skin: Skin is warm and dry  No rash noted  She is not diaphoretic  No erythema  No pallor  Psychiatric: She has a normal mood and affect  Her behavior is normal    Nursing note and vitals reviewed  Additional Data:     Lab Results: I have personally reviewed pertinent reports          Results from last 7 days  Lab Units 08/14/18 1916   WBC Thousand/uL 3 54*   HEMOGLOBIN g/dL 10 9* HEMATOCRIT % 34 2*   PLATELETS Thousands/uL 169   NEUTROS PCT % 84*   LYMPHS PCT % 13*   MONOS PCT % 2*   EOS PCT % 0       Results from last 7 days  Lab Units 08/14/18 1916   SODIUM mmol/L 136   POTASSIUM mmol/L 4 2   CHLORIDE mmol/L 100   CO2 mmol/L 27   BUN mg/dL 28*   CREATININE mg/dL 1 37*   CALCIUM mg/dL 8 9   TOTAL PROTEIN g/dL 7 2   BILIRUBIN TOTAL mg/dL 0 40   ALK PHOS U/L 103   ALT U/L 22   AST U/L 20   GLUCOSE RANDOM mg/dL 219*       Results from last 7 days  Lab Units 08/14/18 1916   INR  2 30*               Imaging: I have personally reviewed pertinent reports  XR chest 1 view portable    (Results Pending)       EKG, Pathology, and Other Studies Reviewed on Admission:   · EKG: Afib with RVR, 140 PM    Allscripts / Epic Records Reviewed: Yes     ** Please Note: This note has been constructed using a voice recognition system   **

## 2018-08-15 NOTE — ASSESSMENT & PLAN NOTE
· Cr of 1 37 today as compared to prior labs with Cr around 1-1 1  · Provide IVF, check AM BMP  · Hold nephrotoxins

## 2018-08-15 NOTE — ED NOTES
Spoke with kimberly from [facility] [phone]- OK for pt to go to floor  Tele box available and RN will place pt on tele given pt is on cardiazem gtt, however currently no tele order        Renae Babinski, RN  08/14/18 0137

## 2018-08-15 NOTE — ASSESSMENT & PLAN NOTE
· Resolved  · Cr of 1 37 on admission as compared to prior labs with Cr around 1-1 1  · Status post IVF  · Check AM BMP  · Avoid nephrotoxin  · Avoid hypotension

## 2018-08-15 NOTE — ASSESSMENT & PLAN NOTE
· Known h/o Afib, found to have HR in 140's at PT appointment   · uncontrolled rate per last OP Cardiology visit in May 2018  · Asymptomatic today  · Sustaining HR in 110's on Cardizem gtt  · No electrolyte disturbance  · Perhaps related to dehydration as evidenced by BRITTANY on presentation   · Pt/INR therapeutic, maintained on warfarin  · Continue Cardizem gtt  · BP's soft currently  · Can resume PO bisoprolol and PO cardizem when pressure allows   · Wean gtt as tolerating  · Check troponin  · Will monitor I/o's and daily weights  · no s/sx fluid overload on my exam  · CXR pending  · Consult Cardiology

## 2018-08-15 NOTE — ASSESSMENT & PLAN NOTE
· BP in 970'M systolic on Cardizem gtt, continue to monitor   · Hold PO cardizem at this time, consider holding PO bisoprolol if BP continues to decrease

## 2018-08-15 NOTE — SOCIAL WORK
LOS 1   Not a bundle; Not a readmission  CM reviewed discharge planning process including the following: identifying caregivers at home, preference for d/c planning needs, [facility] Meds to Bed program, availability of treatment team to discuss questions or concerns patient and/or family may have regarding diagnosis, plan of care, old or new medications and discharge planning   CM will continue to follow for care coordination and update assessment as necessary  Pt lives at home with her  in an apartment where she has 2 small steps to enter  She is able to manage all her ADL's and has a shower chair and rollator  She uses a rollator when going out of the home  She has recently stopped driving  Her daughter will transport her home at [place]  She has had [facility]NA in the past with [facility] and [facility]  She is currently receiving services with [facility] and [facility]   Referral has been made for [facility]  Pt has been to rehab in the past at  [facility]  Pt has a hx of depression that is managed  No hx of D&A  She does not have a POA/AD but has paperwork to do it  She has Rx coverage  08/15/18 1421   Referral Data   Referral Reason VNA   Patient Information   Mental Status Alert   Primary Caregiver Self   Legal [facility] Proxy Appointed No   Activities of Daily Living Prior to Admission   Functional Status Minimum assistance   Assistive Device Walker   Living Arrangement House;Lives with someone   Ambulation Independent   Means of Transportation   Means of Transport to Appts: Family      08/15/18 1422   Discharge Planning   Living Arrangements Spouse/significant other   Support Systems Spouse/significant other;Children;Family members   Type of Current Residence Private residence   [address] Yes   Type of Current [facility] Referral Provided   [address] requested: Nursing; Occupational Therapy; Physical Therapy   Home Health Agency Name: [id number] [facility] Provider: ESTUARDO   Home Health Services Needed: Evaluate Functional Status and Safety;Strengthening/Theraputic Exercises to Improve Function; Restore Joint Function Post Joint Replacement   Homebound Criteria Met: Uses an Assist Device (i e  cane, walker, etc); Requires the Assistance of Another Person for Safe Ambulation or to Leave the Home   Supporting Clincal Findings: Limited Endurance   Discharge Communications   Discharge planning discussed with: pt   Freedom of Choice Yes

## 2018-08-15 NOTE — ASSESSMENT & PLAN NOTE
· EF 55% on echo in April 2018  · Moderate mitral regurgitation  · Follows with Dr Rolando Damon   · Hold diuretic until renal function improves

## 2018-08-15 NOTE — PROGRESS NOTES
Progress Note - Davian Waters 1933, 80 y o  female MRN: [id number]    Unit/Bed#: -01 Encounter: [id number]    Primary Care Provider: Denzel Cole DO   Date and time admitted to hospital: 8/14/2018  6:40 PM        Acute kidney injury [facility])   Assessment & Plan    · Cr of 1 37 today as compared to prior labs with Cr around 1-1 1  · Provide IVF, check AM BMP  · Hold nephrotoxins        * Atrial fibrillation (HCC)   Assessment & Plan    · Known h/o Afib, found to have HR in 140's at PT appointment   · uncontrolled rate per last OP Cardiology visit in May 2018  · Did receive steroid injection today  · Asymptomatic today  · Sustaining HR in 110's on Cardizem gtt  · No electrolyte disturbance  · Perhaps related to dehydration as well as evidenced by BRITTANY on presentation   · Pt/INR therapeutic, maintained on warfarin  · Continue Cardizem gtt  · BP's soft currently  · Wean gtt as tolerating  · Check troponin  · Will monitor I/o's and daily weights  · no s/sx fluid overload on my exam  · CXR pending  · Consult Cardiology         Chronic diastolic CHF (congestive heart failure) (HCC)   Assessment & Plan    · EF 55% on echo in April 2018  · Moderate mitral regurgitation  · Follows with Dr Monroe Tierney   · Hold diuretic until renal function improves         Anemia   Assessment & Plan    · Hgb 10 9 on arrival, baseline around 10         Benign essential hypertension   Assessment & Plan    · BP in 055'Y systolic on Cardizem gtt, continue to monitor         Depression   Assessment & Plan    · Continue Cymbalta            VTE Pharmacologic Prophylaxis:   Pharmacologic: Warfarin (Coumadin)  Mechanical VTE Prophylaxis in Place: Yes    Patient Centered Rounds: I have performed bedside rounds with nursing staff today  Discussions with Specialists or Other Care Team Provider:  Case management  Advance practitioner for Cardiology  Education and Discussions with Family / Patient:  Patient    I spoke to the patient's  at bedside and discussed our findings and plans  Time Spent for Care: 30 minutes  More than 50% of total time spent on counseling and coordination of care as described above  Current Length of Stay: 1 day(s)    Current Patient Status: Inpatient   Certification Statement: The patient will continue to require additional inpatient hospital stay due to Above findings and plans  Discharge Plan:  None yet  Code Status: Level 1 - Full Code      Subjective:       Objective:     Vitals:   Temp (24hrs), Av 7 °F (36 5 °C), Min:97 6 °F (36 4 °C), Max:98 °F (36 7 °C)    HR:  [] 119  Resp:  [14-18] 18  BP: ()/(60-86) 129/86  SpO2:  [92 %-98 %] 98 %  Body mass index is 35 1 kg/m²  Input and Output Summary (last 24 hours): Intake/Output Summary (Last 24 hours) at 08/15/18 1816  Last data filed at 08/15/18 1500   Gross per 24 hour   Intake           1537 5 ml   Output             1100 ml   Net            437 5 ml       Physical Exam:     Physical Exam   Constitutional: No distress  HENT:   Head: Normocephalic and atraumatic  Eyes: Right eye exhibits no discharge  Left eye exhibits no discharge  No scleral icterus  Neck: No JVD present  No tracheal deviation present  Cardiovascular: Exam reveals no gallop and no friction rub  No murmur heard  Tachycardic, irregularly irregular heart rhythm  Pulmonary/Chest: Effort normal and breath sounds normal  No stridor  No respiratory distress  She has no wheezes  She has no rales  Abdominal: Soft  Bowel sounds are normal  She exhibits no distension  There is no tenderness  There is no rebound and no guarding  Musculoskeletal: She exhibits no edema, tenderness or deformity  Neurological: She is alert  No cranial nerve deficit  Skin: Skin is warm  No rash noted  She is not diaphoretic  No erythema  No pallor  Psychiatric: She has a normal mood and affect   Her behavior is normal  Thought content normal    Patient is pleasant  Vitals reviewed  Additional Data:     Labs:      Results from last 7 days  Lab Units 08/14/18  1916   WBC Thousand/uL 3 54*   HEMOGLOBIN g/dL 10 9*   HEMATOCRIT % 34 2*   PLATELETS Thousands/uL 169   NEUTROS PCT % 84*   LYMPHS PCT % 13*   MONOS PCT % 2*   EOS PCT % 0       Results from last 7 days  Lab Units 08/15/18  0651 08/14/18  1916   SODIUM mmol/L 139 136   POTASSIUM mmol/L 4 1 4 2   CHLORIDE mmol/L 104 100   CO2 mmol/L 27 27   BUN mg/dL 26* 28*   CREATININE mg/dL 1 17 1 37*   CALCIUM mg/dL 9 4 8 9   TOTAL PROTEIN g/dL  --  7 2   BILIRUBIN TOTAL mg/dL  --  0 40   ALK PHOS U/L  --  103   ALT U/L  --  22   AST U/L  --  20   GLUCOSE RANDOM mg/dL 155* 219*       Results from last 7 days  Lab Units 08/14/18  2358   INR  2 36*       * I Have Reviewed All Lab Data Listed Above  * Additional Pertinent Lab Tests Reviewed: [facility] Reviewed    Imaging:    Imaging Reports Reviewed Today Include:  Diagnostic imaging studies that were done on this admission  Imaging Personally Reviewed by Myself Includes:  None  Recent Cultures (last 7 days):           Last 24 Hours Medication List:     Current Facility-Administered Medications:  acetaminophen 650 mg Oral Q6H PRN Lexy Manrique PA-C   diltiazem 120 mg Oral After [employer], DO   diltiazem 180 mg Oral Daily TEJAS Middleton   DULoxetine 60 mg Oral Daily Tameka Garcia PA-C   fluticasone 1 spray Nasal Daily Tameka De Lóen PA-C   potassium chloride 20 mEq Oral Daily Lexy Manrique PA-C   traMADol 50 mg Oral BID PRN Lexy Manrique PA-C   [START ON 8/16/2018] warfarin 3 mg Oral Q72H Tameka De León PA-C   [START ON 8/17/2018] warfarin 4 mg Oral Q72H Lexy Manrique PA-C        Today, Patient Was Seen By: Shanna Quintero MD    ** Please Note: Dragon 360 Dictation voice to text software may have been used in the creation of this document   **

## 2018-08-16 ENCOUNTER — HOSPITAL ENCOUNTER (INPATIENT)
Facility: HOSPITAL | Age: 83
LOS: 7 days | Discharge: HOME HEALTH | DRG: 243 | End: 2018-08-24
Attending: INTERNAL MEDICINE | Admitting: HOSPITALIST
Payer: COMMERCIAL

## 2018-08-16 VITALS
HEART RATE: 101 BPM | TEMPERATURE: 97.5 F | DIASTOLIC BLOOD PRESSURE: 75 MMHG | OXYGEN SATURATION: 96 % | WEIGHT: 193.56 LBS | RESPIRATION RATE: 18 BRPM | BODY MASS INDEX: 35.62 KG/M2 | SYSTOLIC BLOOD PRESSURE: 113 MMHG | HEIGHT: 62 IN

## 2018-08-16 DIAGNOSIS — I48.20 CHRONIC ATRIAL FIBRILLATION (HCC): ICD-10-CM

## 2018-08-16 DIAGNOSIS — I48.19 PERSISTENT ATRIAL FIBRILLATION (HCC): Primary | ICD-10-CM

## 2018-08-16 DIAGNOSIS — I50.32 CHRONIC DIASTOLIC CHF (CONGESTIVE HEART FAILURE) (HCC): ICD-10-CM

## 2018-08-16 DIAGNOSIS — I48.91 ATRIAL FIBRILLATION WITH RVR (HCC): ICD-10-CM

## 2018-08-16 DIAGNOSIS — I48.91 ATRIAL FIBRILLATION, UNSPECIFIED TYPE (HCC): ICD-10-CM

## 2018-08-16 LAB
ANION GAP SERPL CALCULATED.3IONS-SCNC: 8 MMOL/L (ref 4–13)
BUN SERPL-MCNC: 30 MG/DL (ref 5–25)
CALCIUM SERPL-MCNC: 9 MG/DL (ref 8.3–10.1)
CHLORIDE SERPL-SCNC: 105 MMOL/L (ref 100–108)
CO2 SERPL-SCNC: 26 MMOL/L (ref 21–32)
CREAT SERPL-MCNC: 1.14 MG/DL (ref 0.6–1.3)
GFR SERPL CREATININE-BSD FRML MDRD: 44 ML/MIN/1.73SQ M
GLUCOSE SERPL-MCNC: 123 MG/DL (ref 65–140)
INR PPP: 2.58 (ref 0.86–1.17)
MRSA NOSE QL CULT: NORMAL
POTASSIUM SERPL-SCNC: 4.3 MMOL/L (ref 3.5–5.3)
PROTHROMBIN TIME: 26.9 SECONDS (ref 11.8–14.2)
SODIUM SERPL-SCNC: 139 MMOL/L (ref 136–145)

## 2018-08-16 PROCEDURE — 85610 PROTHROMBIN TIME: CPT | Performed by: NURSE PRACTITIONER

## 2018-08-16 PROCEDURE — 80048 BASIC METABOLIC PNL TOTAL CA: CPT | Performed by: INTERNAL MEDICINE

## 2018-08-16 PROCEDURE — 99232 SBSQ HOSP IP/OBS MODERATE 35: CPT | Performed by: INTERNAL MEDICINE

## 2018-08-16 PROCEDURE — 99239 HOSP IP/OBS DSCHRG MGMT >30: CPT | Performed by: INTERNAL MEDICINE

## 2018-08-16 RX ORDER — FLUTICASONE PROPIONATE 50 MCG
1 SPRAY, SUSPENSION (ML) NASAL DAILY
Status: CANCELLED | OUTPATIENT
Start: 2018-08-17

## 2018-08-16 RX ORDER — ACETAMINOPHEN 325 MG/1
650 TABLET ORAL EVERY 6 HOURS PRN
Status: CANCELLED | OUTPATIENT
Start: 2018-08-16

## 2018-08-16 RX ORDER — POTASSIUM CHLORIDE 20 MEQ/1
20 TABLET, EXTENDED RELEASE ORAL DAILY
Status: CANCELLED | OUTPATIENT
Start: 2018-08-17

## 2018-08-16 RX ORDER — DILTIAZEM HYDROCHLORIDE 180 MG/1
180 CAPSULE, COATED, EXTENDED RELEASE ORAL DAILY
Status: CANCELLED | OUTPATIENT
Start: 2018-08-17

## 2018-08-16 RX ORDER — TRAMADOL HYDROCHLORIDE 50 MG/1
50 TABLET ORAL 2 TIMES DAILY PRN
Status: CANCELLED | OUTPATIENT
Start: 2018-08-16

## 2018-08-16 RX ORDER — WARFARIN SODIUM 3 MG/1
3 TABLET ORAL
Status: CANCELLED | OUTPATIENT
Start: 2018-08-16

## 2018-08-16 RX ORDER — DULOXETIN HYDROCHLORIDE 60 MG/1
60 CAPSULE, DELAYED RELEASE ORAL DAILY
Status: CANCELLED | OUTPATIENT
Start: 2018-08-17

## 2018-08-16 RX ORDER — DILTIAZEM HYDROCHLORIDE 60 MG/1
180 TABLET, FILM COATED ORAL DAILY
COMMUNITY
End: 2018-08-24 | Stop reason: HOSPADM

## 2018-08-16 RX ORDER — WARFARIN SODIUM 4 MG/1
4 TABLET ORAL
Status: CANCELLED | OUTPATIENT
Start: 2018-08-17

## 2018-08-16 RX ADMIN — DILTIAZEM HYDROCHLORIDE 180 MG: 180 CAPSULE, COATED, EXTENDED RELEASE ORAL at 09:13

## 2018-08-16 RX ADMIN — DULOXETINE HYDROCHLORIDE 60 MG: 60 CAPSULE, DELAYED RELEASE ORAL at 09:13

## 2018-08-16 RX ADMIN — POTASSIUM CHLORIDE 20 MEQ: 1500 TABLET, EXTENDED RELEASE ORAL at 09:13

## 2018-08-16 RX ADMIN — FLUTICASONE PROPIONATE 1 SPRAY: 50 SPRAY, METERED NASAL at 09:13

## 2018-08-16 RX ADMIN — DILTIAZEM HYDROCHLORIDE 10 MG/HR: 5 INJECTION INTRAVENOUS at 09:52

## 2018-08-16 RX ADMIN — WARFARIN SODIUM 3 MG: 3 TABLET ORAL at 18:00

## 2018-08-16 NOTE — PLAN OF CARE
CARDIOVASCULAR - ADULT     Maintains optimal cardiac output and hemodynamic stability Progressing     Absence of cardiac dysrhythmias or at baseline rhythm Progressing        DISCHARGE PLANNING     Discharge to home or other facility with appropriate resources Progressing        DISCHARGE PLANNING - CARE MANAGEMENT     Discharge to post-acute care or home with appropriate resources Progressing        Knowledge Deficit     Patient/family/caregiver demonstrates understanding of disease process, treatment plan, medications, and discharge instructions Progressing        METABOLIC, FLUID AND ELECTROLYTES - ADULT     Electrolytes maintained within normal limits Progressing     Fluid balance maintained Progressing        PAIN - ADULT     Verbalizes/displays adequate comfort level or baseline comfort level Progressing        Potential for Falls     Patient will remain free of falls Progressing        SAFETY ADULT     Maintain or return to baseline ADL function Progressing     Maintain or return mobility status to optimal level Progressing

## 2018-08-16 NOTE — PROGRESS NOTES
Patient received 2 doses of Cardizem CD today-- 180mg around 10AM and 120mg around 6PM  Still with HRs in the 130s  Will placed patient back on cardizem gtt and monitor HR and blood pressures  Monitor for CCB toxicity

## 2018-08-16 NOTE — PROGRESS NOTES
Patient up in chair  No complaints of chest pain or palpitations  No S&S of distress  Vital signs WDLs  Patient on cardizem drip at 5mg/hr with HR stable 's  Discharge orders written, patient getting transferred to B  Awaiting time  Will continue to monitor

## 2018-08-16 NOTE — DISCHARGE SUMMARY
Discharge Summary - [facility] Internal Medicine    Patient Information: Tana Baez 80 y o  female MRN: [id number]  Unit/Bed#: -01 Encounter: [id number]    Discharging Physician / Practitioner: Jose Mcgill MD  PCP: Yanely Richard DO  Admission Date: 8/14/2018  Discharge Date: 08/16/18    Disposition:     Other: [address] [place] [address]    Reason for Admission:  Tachycardia    Discharge Diagnoses:     Principal Problem:    Atrial fibrillation ([facility] [address] )  Active Problems:    Anemia    Chronic diastolic CHF (congestive heart failure) ([facility])    Depression    Benign essential hypertension    Osteoporosis    Acute kidney injury ([facility] [address] )  Resolved Problems:    * No resolved hospital problems  *      Consultations During Hospital Stay:  · Cardiology    Procedures Performed:     · Chest x-ray no active lung disease      Hospital Course:     Tana Baez is a 80 y o  female patient who originally presented to the hospital on 8/14/2018 due to tachycardia  Patient with history of CHF, anemia atrial fibrillation, she was evaluated by Physical therapy at home and noted to have heart rate of 120 to 140s irregular  Patient was admitted noted to have atrial fibrillation with rapid ventricular response  She was seen in consultation cardiology, she was placed on Cardizem drip however her heart rates were tachycardic when taken off Cardizem  Discussed with Cardiology, her AFib is not controlled with Cardizem, she has also been on beta-blockers and has not tolerated them well, she has also failed digoxin hence patient is being transferred to [facility] for possible AV agustina ablation and pacemaker placement  Patient is hemodynamically stable, she is agreeable to the plan as outlined      Condition at Discharge: fair     Discharge Day Visit / Exam:     Subjective:      Comfortably sitting up in chair  History chart labs medications reviewed    Vitals: Blood Pressure: 113/75 (08/16/18 0700)  Pulse: 101 (08/16/18 0700)  Temperature: 97 5 °F (36 4 °C) (08/16/18 0700)  Temp Source: Oral (08/16/18 0700)  Respirations: 18 (08/16/18 0700)  Height: 5' 2" (157 5 cm) (08/14/18 4698)  Weight - Scale: 87 8 kg (193 lb 9 oz) (08/16/18 0541)  SpO2: 96 % (08/16/18 0700)  Exam:   Physical Exam    Comfortably sitting up in chair  Neck supple  Lungs diminished breath sounds at bases  Heart sounds irregular  Abdomen soft nontender  Pulses present  Awake alert obeys simple commands  No rash    Discharge instructions/Information to patient and family:   See after visit summary for information provided to patient and family  Discharge plan discussed with Cardiology  Discharge plan discussed with the patient and her family at bedside in detail  Discussed with Dr Dominik Donato accepting [facility] [address] [place] [address] Avenue:  See after visit summary for information related to follow-up care and any pertinent home health orders  Planned Readmission: no     Discharge Statement:  I spent 50 minutes discharging the patient  This time was spent on the day of discharge  I had direct contact with the patient on the day of discharge  Greater than 50% of the total time was spent examining patient, answering all patient questions, arranging and discussing plan of care with patient as well as directly providing post-discharge instructions  Additional time then spent on discharge activities  Discharge Medications:  See after visit summary for reconciled discharge medications provided to patient and family        ** Please Note: This note has been constructed using a voice recognition system **

## 2018-08-16 NOTE — PROGRESS NOTES
Patient sleeping comfortably in bed  No signs of distress  Vitals stable  HR ranging from 86 - 109 on cardizem gtt  Will continue to monitor

## 2018-08-16 NOTE — PROGRESS NOTES
Cardiology Progress Note - Celester Meme 80 y o  female MRN: [id number]    Unit/Bed#: -01 Encounter: [id number]    Assessment and plan  1  Persistent atrial fibrillation on anticoagulation  2  Multiple medication intolerances  3  Chronic diastolic congestive heart failure  4  Hypertension  5  Moderate mitral regurgitation/moderate tricuspid regurgitation    Recommendations: We tried oral Cardizem at a dose she was able to tolerate at home however did not control her rates adequately  She has been on multiple beta-blockers and has not tolerated them and has also failed digoxin  I will talk to the electrophysiology department given her age we could consider AV node ablation and pacemaker  Subjective:    No significant events overnight  Comfortable denies chest pain or dyspnea telemetry shows controlled atrial fibrillation on oral and IV Cardizem    ROS    Objective:   Vitals: Blood pressure 113/75, pulse 101, temperature 97 5 °F (36 4 °C), temperature source Oral, resp  rate 18, height 5' 2" (1 575 m), weight 87 8 kg (193 lb 9 oz), SpO2 96 %  , Body mass index is 35 4 kg/m² , Orthostatic Blood Pressures      Most Recent Value   Blood Pressure  113/75 filed at 08/16/2018 0700   Patient Position - Orthostatic VS  Lying filed at 08/16/2018 9628         Systolic (32VTF), QUK:608 , Min:99 , PGY:633     Diastolic (64TFB), XXF:10, Min:61, Max:86      Intake/Output Summary (Last 24 hours) at 08/16/18 1122  Last data filed at 08/16/18 0359   Gross per 24 hour   Intake              360 ml   Output             2100 ml   Net            -1740 ml     Weight (last 2 days)     Date/Time   Weight    08/16/18 0541  87 8 (193 56)    08/15/18 0600  87 (191 9)    08/14/18 2258  85 7 (188 93)                Telemetry Review: No significant arrhythmias seen on telemetry review  EKG personally reviewed by Carina Pedraza DO  Physical Exam   Constitutional: She is oriented to person, place, and time   She appears well-nourished  No distress  HENT:   Head: Atraumatic  Eyes: Conjunctivae are normal  Pupils are equal, round, and reactive to light  Neck: Neck supple  Cardiovascular: Normal rate and S1 normal   An irregularly irregular rhythm present  Exam reveals no friction rub  Murmur heard  Systolic murmur is present with a grade of 1/6   Pulmonary/Chest: Effort normal and breath sounds normal  No respiratory distress  She has no wheezes  She has no rales  Abdominal: Bowel sounds are normal  She exhibits no distension  There is no tenderness  There is no rebound  Musculoskeletal: She exhibits no edema  Neurological: She is alert and oriented to person, place, and time  No cranial nerve deficit  Skin: Skin is warm and dry  No erythema  Nursing note and vitals reviewed          Laboratory Results:    Results from last 7 days  Lab Units 08/15/18  0651 08/15/18  0323 08/14/18  2358   TROPONIN I ng/mL <0 02 <0 02 <0 02       CBC with diff:   Results from last 7 days  Lab Units 08/14/18 1916   WBC Thousand/uL 3 54*   HEMOGLOBIN g/dL 10 9*   HEMATOCRIT % 34 2*   MCV fL 93   PLATELETS Thousands/uL 169   MCH pg 29 7   MCHC g/dL 31 9   RDW % 15 2*   MPV fL 10 8   NRBC AUTO /100 WBCs 0         CMP:  Results from last 7 days  Lab Units 08/16/18 0446 08/15/18  0651 08/14/18 1916   SODIUM mmol/L 139 139 136   POTASSIUM mmol/L 4 3 4 1 4 2   CHLORIDE mmol/L 105 104 100   CO2 mmol/L 26 27 27   ANION GAP mmol/L 8 8 9   BUN mg/dL 30* 26* 28*   CREATININE mg/dL 1 14 1 17 1 37*   GLUCOSE RANDOM mg/dL 123 155* 219*   CALCIUM mg/dL 9 0 9 4 8 9   AST U/L  --   --  20   ALT U/L  --   --  22   ALK PHOS U/L  --   --  103   TOTAL PROTEIN g/dL  --   --  7 2   BILIRUBIN TOTAL mg/dL  --   --  0 40   EGFR ml/min/1 73sq m 44 43 35         BMP:  Results from last 7 days  Lab Units 08/16/18 0446 08/15/18  0651 08/14/18  1916   SODIUM mmol/L 139 139 136   POTASSIUM mmol/L 4 3 4 1 4 2   CHLORIDE mmol/L 105 104 100   CO2 mmol/L 26 27 27   BUN mg/dL 30* 26* 28*   CREATININE mg/dL 1 14 1 17 1 37*   GLUCOSE RANDOM mg/dL 123 155* 219*   CALCIUM mg/dL 9 0 9 4 8 9       BNP: No results for input(s): BNP in the last 72 hours  Magnesium:       Coags:   Results from last 7 days  Lab Units 18  0900 18  2358 18  1916   PTT seconds  --   --  38*   INR  2 58* 2 36* 2 30*       TSH:        Hemoglobin A1C       Lipid Profile:       Cardiac testing:   Results for orders placed during the hospital encounter of 18   Echo complete with contrast if indicated    Narrative [place] [address], [facility]  [phone]    Transthoracic Echocardiogram  2D, M-mode, Doppler, and Color Doppler    Study date:  2018    Patient: Poonam Taylor  MR number: [id number]  Account number: [de-identified]  : 1933  Age: 80 years  Gender: Female  Status: Outpatient  Location: [facility]  Height: 62 in  Weight: 231 lb  BP: 112/ 60 mmHg    Indications: MV Disease    Diagnoses: I34 0 - Nonrheumatic mitral (valve) insufficiency    Sonographer:  JESUS John  Primary Physician:  Mago Kwan DO  Referring Physician:  Vivi Salmon MD  Group:  [facility]  Interpreting Physician:  Mary Alice Ross MD    SUMMARY    LEFT VENTRICLE:  Size was normal   Systolic function was normal  Ejection fraction was estimated to be 55 %  Wall thickness was at the upper limits of normal     RIGHT VENTRICLE:  The ventricle was mildly dilated  Systolic function was normal     RIGHT ATRIUM:  The atrium was mildly dilated  MITRAL VALVE:  There was moderate regurgitation  TRICUSPID VALVE:  There was moderate regurgitation  IVC, HEPATIC VEINS:  The inferior vena cava was mildly dilated  Respirophasic changes were blunted (less than 50% variation)  COMPARISONS:  Comparison was made with the previous study of 17-May-2017   Tricuspid regurgitation has worsened  HISTORY: PRIOR HISTORY: AFIB, Moderate MR, Hypertension, CHF, Anemia    PROCEDURE: The study was performed in the [address] [place] [address] and Vascular Center  This was a routine study  The transthoracic approach was used  The study included complete 2D imaging, M-mode, complete spectral Doppler, and color Doppler  The  heart rate was 110 bpm, at the start of the study  Images were obtained from the parasternal, apical, subcostal, and suprasternal notch acoustic windows  Echocardiographic views were limited due to lung interference  Image quality was  adequate  LEFT VENTRICLE: Size was normal  Systolic function was normal  Ejection fraction was estimated to be 55 %  There were no regional wall motion abnormalities  Wall thickness was at the upper limits of normal  DOPPLER: Transmitral flow  pattern: atrial fibrillation  The study was not technically sufficient to allow evaluation of LV diastolic function  RIGHT VENTRICLE: The ventricle was mildly dilated  Systolic function was normal  Wall thickness was normal     LEFT ATRIUM: Size was normal     RIGHT ATRIUM: The atrium was mildly dilated  MITRAL VALVE: Valve structure was normal  There was normal leaflet separation  DOPPLER: The transmitral velocity was within the normal range  There was no evidence for stenosis  There was moderate regurgitation  AORTIC VALVE: The valve was trileaflet  Leaflets exhibited normal thickness, normal cuspal separation, and sclerosis  DOPPLER: Transaortic velocity was within the normal range  There was no evidence for stenosis  There was no  regurgitation  TRICUSPID VALVE: The valve structure was normal  There was normal leaflet separation  DOPPLER: The transtricuspid velocity was within the normal range  There was no evidence for stenosis  There was moderate regurgitation  Pulmonary artery  systolic pressure was within the normal range  Estimated peak PA pressure was 36 mmHg      PULMONIC VALVE: Leaflets exhibited normal thickness, no calcification, and normal cuspal separation  DOPPLER: The transpulmonic velocity was within the normal range  There was no regurgitation  PERICARDIUM: There was no pericardial effusion  The pericardium was normal in appearance  AORTA: The root exhibited normal size  SYSTEMIC VEINS: IVC: The inferior vena cava was mildly dilated  Respirophasic changes were blunted (less than 50% variation)  SYSTEM MEASUREMENT TABLES    2D  %FS: 34 71 %  AV Diam: 2 84 cm  EDV(Teich): 101 4 ml  EF(Cube): 72 17 %  EF(Teich): 63 88 %  ESV(Cube): 28 55 ml  ESV(Teich): 36 63 ml  IVSd: 1 1 cm  LA Area: 17 55 cm2  LA Diam: 4 14 cm  LVEDV MOD A4C: 61 32 ml  LVEF MOD A4C: 61 87 %  LVESV MOD A4C: 23 38 ml  LVIDd: 4 68 cm  LVIDs: 3 06 cm  LVLd A4C: 6 59 cm  LVLs A4C: 5 67 cm  LVPWd: 1 cm  RA Area: 23 38 cm2  RV Diam: 3 71 cm  SV MOD A4C: 37 94 ml  SV(Cube): 74 03 ml  SV(Teich): 64 77 ml    CW  TR MaxP 51 mmHg  TR Vmax: 2 21 m/s    MM  TAPSE: 1 35 cm    [facility] Accredited Echocardiography Laboratory    Prepared and electronically signed by    Yemi Lindsey MD  Signed 2018 17:36:53       No results found for this or any previous visit  No results found for this or any previous visit  No results found for this or any previous visit      Meds/Allergies   all current active meds have been reviewed  Facility-Administered Medications Prior to Admission   Medication    [COMPLETED] bupivacaine (MARCAINE) 0 25 % injection 2 mL    [COMPLETED] lidocaine (XYLOCAINE) 1 % injection 2 mL    [COMPLETED] methylPREDNISolone acetate (DEPO-MEDROL) injection 2 mL     Prescriptions Prior to Admission   Medication    desoximetasone (TOPICORT) 0 25 % cream    diltiazem (CARDIZEM) 120 MG tablet    DULoxetine (CYMBALTA) 60 mg delayed release capsule    fluticasone (FLONASE) 50 mcg/act nasal spray    potassium chloride (K-DUR,KLOR-CON) 20 mEq tablet    potassium chloride (KLOR-CON) 20 mEq packet    sucralfate (CARAFATE) 1 g/10 mL suspension    torsemide (DEMADEX) 20 mg tablet    traMADol (ULTRAM) 50 mg tablet    warfarin (COUMADIN) 3 mg tablet    warfarin (COUMADIN) 4 mg tablet         diltiazem 1-15 mg/hr Last Rate: 8 5 mg/hr (08/16/18 1029)     Assessment:  Principal Problem:    Atrial fibrillation (HCC)  Active Problems:    Anemia    Chronic diastolic CHF (congestive heart failure) ([facility])    Depression    Benign essential hypertension    Osteoporosis    Acute kidney injury ([facility] [address] )

## 2018-08-17 ENCOUNTER — TRANSITIONAL CARE MANAGEMENT (OUTPATIENT)
Dept: INTERNAL MEDICINE CLINIC | Facility: CLINIC | Age: 83
End: 2018-08-17

## 2018-08-17 PROBLEM — I48.91 ATRIAL FIBRILLATION WITH RVR (HCC): Status: ACTIVE | Noted: 2018-08-17

## 2018-08-17 LAB
ANION GAP SERPL CALCULATED.3IONS-SCNC: 7 MMOL/L (ref 4–13)
BASOPHILS # BLD AUTO: 0.01 THOUSANDS/ΜL (ref 0–0.1)
BASOPHILS NFR BLD AUTO: 0 % (ref 0–1)
BUN SERPL-MCNC: 32 MG/DL (ref 5–25)
CALCIUM SERPL-MCNC: 8.5 MG/DL (ref 8.3–10.1)
CHLORIDE SERPL-SCNC: 110 MMOL/L (ref 100–108)
CO2 SERPL-SCNC: 21 MMOL/L (ref 21–32)
CREAT SERPL-MCNC: 0.99 MG/DL (ref 0.6–1.3)
EOSINOPHIL # BLD AUTO: 0.01 THOUSAND/ΜL (ref 0–0.61)
EOSINOPHIL NFR BLD AUTO: 0 % (ref 0–6)
ERYTHROCYTE [DISTWIDTH] IN BLOOD BY AUTOMATED COUNT: 15.9 % (ref 11.6–15.1)
GFR SERPL CREATININE-BSD FRML MDRD: 52 ML/MIN/1.73SQ M
GLUCOSE SERPL-MCNC: 86 MG/DL (ref 65–140)
HCT VFR BLD AUTO: 35.9 % (ref 34.8–46.1)
HGB BLD-MCNC: 11 G/DL (ref 11.5–15.4)
IMM GRANULOCYTES # BLD AUTO: 0.03 THOUSAND/UL (ref 0–0.2)
IMM GRANULOCYTES NFR BLD AUTO: 0 % (ref 0–2)
INR PPP: 2.65 (ref 0.86–1.17)
LYMPHOCYTES # BLD AUTO: 1.62 THOUSANDS/ΜL (ref 0.6–4.47)
LYMPHOCYTES NFR BLD AUTO: 19 % (ref 14–44)
MAGNESIUM SERPL-MCNC: 2.8 MG/DL (ref 1.6–2.6)
MCH RBC QN AUTO: 29.6 PG (ref 26.8–34.3)
MCHC RBC AUTO-ENTMCNC: 30.6 G/DL (ref 31.4–37.4)
MCV RBC AUTO: 97 FL (ref 82–98)
MONOCYTES # BLD AUTO: 0.92 THOUSAND/ΜL (ref 0.17–1.22)
MONOCYTES NFR BLD AUTO: 11 % (ref 4–12)
NEUTROPHILS # BLD AUTO: 5.89 THOUSANDS/ΜL (ref 1.85–7.62)
NEUTS SEG NFR BLD AUTO: 70 % (ref 43–75)
NRBC BLD AUTO-RTO: 0 /100 WBCS
PLATELET # BLD AUTO: 186 THOUSANDS/UL (ref 149–390)
PMV BLD AUTO: 11 FL (ref 8.9–12.7)
POTASSIUM SERPL-SCNC: 5.2 MMOL/L (ref 3.5–5.3)
PROTHROMBIN TIME: 28.3 SECONDS (ref 11.8–14.2)
RBC # BLD AUTO: 3.72 MILLION/UL (ref 3.81–5.12)
SODIUM SERPL-SCNC: 138 MMOL/L (ref 136–145)
WBC # BLD AUTO: 8.48 THOUSAND/UL (ref 4.31–10.16)

## 2018-08-17 PROCEDURE — 99223 1ST HOSP IP/OBS HIGH 75: CPT | Performed by: INTERNAL MEDICINE

## 2018-08-17 PROCEDURE — TCMPR

## 2018-08-17 PROCEDURE — 85610 PROTHROMBIN TIME: CPT | Performed by: HOSPITALIST

## 2018-08-17 PROCEDURE — 85025 COMPLETE CBC W/AUTO DIFF WBC: CPT | Performed by: HOSPITALIST

## 2018-08-17 PROCEDURE — 83735 ASSAY OF MAGNESIUM: CPT | Performed by: HOSPITALIST

## 2018-08-17 PROCEDURE — 99232 SBSQ HOSP IP/OBS MODERATE 35: CPT | Performed by: INTERNAL MEDICINE

## 2018-08-17 PROCEDURE — 80048 BASIC METABOLIC PNL TOTAL CA: CPT | Performed by: HOSPITALIST

## 2018-08-17 RX ORDER — ACETAMINOPHEN 325 MG/1
650 TABLET ORAL EVERY 6 HOURS PRN
Status: DISCONTINUED | OUTPATIENT
Start: 2018-08-17 | End: 2018-08-24 | Stop reason: HOSPADM

## 2018-08-17 RX ORDER — WARFARIN SODIUM 1 MG/1
4 TABLET ORAL
Status: DISCONTINUED | OUTPATIENT
Start: 2018-08-17 | End: 2018-08-17

## 2018-08-17 RX ORDER — FLUTICASONE PROPIONATE 50 MCG
1 SPRAY, SUSPENSION (ML) NASAL DAILY
Status: DISCONTINUED | OUTPATIENT
Start: 2018-08-17 | End: 2018-08-24 | Stop reason: HOSPADM

## 2018-08-17 RX ORDER — WARFARIN SODIUM 1 MG/1
3 TABLET ORAL
Status: DISCONTINUED | OUTPATIENT
Start: 2018-08-19 | End: 2018-08-17

## 2018-08-17 RX ORDER — TRAMADOL HYDROCHLORIDE 50 MG/1
50 TABLET ORAL 2 TIMES DAILY PRN
Status: DISCONTINUED | OUTPATIENT
Start: 2018-08-17 | End: 2018-08-24 | Stop reason: HOSPADM

## 2018-08-17 RX ORDER — DULOXETIN HYDROCHLORIDE 60 MG/1
60 CAPSULE, DELAYED RELEASE ORAL DAILY
Status: DISCONTINUED | OUTPATIENT
Start: 2018-08-17 | End: 2018-08-24 | Stop reason: HOSPADM

## 2018-08-17 RX ORDER — POTASSIUM CHLORIDE 20 MEQ/1
20 TABLET, EXTENDED RELEASE ORAL DAILY
Status: DISCONTINUED | OUTPATIENT
Start: 2018-08-17 | End: 2018-08-17

## 2018-08-17 RX ORDER — TORSEMIDE 20 MG/1
20 TABLET ORAL DAILY
Status: DISCONTINUED | OUTPATIENT
Start: 2018-08-17 | End: 2018-08-24 | Stop reason: HOSPADM

## 2018-08-17 RX ORDER — DILTIAZEM HYDROCHLORIDE 180 MG/1
180 CAPSULE, COATED, EXTENDED RELEASE ORAL DAILY
Status: DISCONTINUED | OUTPATIENT
Start: 2018-08-17 | End: 2018-08-18

## 2018-08-17 RX ORDER — WARFARIN SODIUM 1 MG/1
3 TABLET ORAL
Status: DISCONTINUED | OUTPATIENT
Start: 2018-08-17 | End: 2018-08-17

## 2018-08-17 RX ADMIN — FLUTICASONE PROPIONATE 1 SPRAY: 50 SPRAY, METERED NASAL at 09:38

## 2018-08-17 RX ADMIN — DILTIAZEM HYDROCHLORIDE 5 MG/HR: 5 INJECTION INTRAVENOUS at 01:19

## 2018-08-17 RX ADMIN — DILTIAZEM HYDROCHLORIDE 180 MG: 180 CAPSULE, COATED, EXTENDED RELEASE ORAL at 09:38

## 2018-08-17 RX ADMIN — TORSEMIDE 20 MG: 20 TABLET ORAL at 10:34

## 2018-08-17 RX ADMIN — DULOXETINE HYDROCHLORIDE 60 MG: 60 CAPSULE, DELAYED RELEASE ORAL at 09:34

## 2018-08-17 NOTE — CASE MANAGEMENT
Thank you,  [facility]  Utilization Review Department  Phone: [phone]; Fax [phone]  ATTENTION: Please call with any questions or concerns to [phone]  and carefully follow the prompts so that you are directed to the right person  Send all requests for admission clinical reviews, approved or denied determinations and any other requests to fax [phone]  All voicemails are confidential          Initial Clinical Review    Admission: Date/Time/Statement: 8/17/18 AT 0023 URGENT INPATIENT TRANSFER FROM [address] 2ND PERSISTENT ATRIAL FIB WITH RVR + INABILITY TO TOLERATE AV ALOK AGENTS 2ND HYPOTENSION + MEDICATION INTOLERANCE - FOR ELECTROPHYSIOLOGY EVALUATION FOR AV ALOK ABLATION + PERM PACEMAKER IMPLANT          Orders Placed This Encounter   Procedures    Inpatient Admission     Standing Status:   Standing     Number of Occurrences:   1     Order Specific Question:   Admitting Physician     Jonathan Naidu [[id number]]     Order Specific Question:   Level of Care     Answer:   Med Surg [16]     Order Specific Question:   Estimated length of stay     Answer:   More than 2 Midnights     Order Specific Question:   Certification     Answer:   I certify that inpatient services are medically necessary for this patient for a duration of greater than two midnights  See H&P and MD Progress Notes for additional information about the patient's course of treatment         Date/Time/Mode of Arrival:  8/17/18 AT 0023 URGENT INPATIENT TRANSFER FROM [address] 2ND PERSISTENT ATRIAL FIB WITH RVR + INABILITY TO TOLERATE AV ALOK AGENTS 2ND HYPOTENSION + MEDICATION INTOLERANCE - FOR ELECTROPHYSIOLOGY EVALUATION FOR AV ALOK ABLATION + PERM PACEMAKER IMPLANT         Chief Complaint:   Shortness of breath, bilateral lower extremities edema     History of Present Illness:   Jeanna Wagoner is a 80 y o  female who presents with past medical history of atrial fibrillation on chronic Coumadin, congestive heart failure and multiple right-sided hip dislocation  She was a transfer from [facility] after being admitted 2 days ago for evaluation of atrial fibrillation with RVR  Apparently, patient had repeated right hip dislocation 2 weeks before and was admitted for external fixation  She was subsequently discharged home for home PT and on a routine Pulse examination, patient was noted with irregular beat and was subsequently referred to [facility] where she was noted to be AFib with RVR  Patient does admit to mild shortness of breath and bilateral lower extremity swelling       Review of Systems:   Constitutional: Positive for activity change  Negative for appetite change, chills, diaphoresis, fatigue and fever  Respiratory: Positive for shortness of breath  Cardiovascular: Positive for leg swelling  Negative for chest pain and palpitations  Bilateral leg swelling   Gastrointestinal: Negative for abdominal distention, abdominal pain, diarrhea, nausea and vomiting  Genitourinary: Positive for dysuria  Negative for flank pain, frequency and urgency  Musculoskeletal: Negative for back pain, gait problem, myalgias, neck pain and neck stiffness  Skin: Positive for wound  Negative for color change and rash  decubitus ulcer, on the lower back   Neurological: Negative for dizziness, seizures, syncope, speech difficulty, weakness, light-headedness, numbness and headaches           ED Vital Signs:   ED Triage Vitals   Temperature Pulse Respirations Blood Pressure SpO2   08/16/18 2100 08/16/18 2100 08/16/18 2100 08/16/18 2100 08/16/18 2300   97 6 °F (36 4 °C) 75 18 108/59 98 %      Temp Source Heart Rate Source Patient Position - Orthostatic VS BP Location FiO2 (%)   08/16/18 2100 08/17/18 0306 08/17/18 0306 08/17/18 0306 --   Oral Monitor Sitting Right arm       Pain Score       08/16/18 2115       No Pain        Wt Readings from Last 1 Encounters:   08/17/18 87 8 kg (193 lb 9 oz)   Vital Signs      Report    08/16 0701  08/17 0700 08/17 0701  08/17 1246  Most Recent    Temperature (°F) 97  598 3 97 597 6  97 5 (36 4)    Pulse 117 114  114    Respirations 18 18  18    Blood Pressure 108/59133/65 108/58129/59  121/73    SpO2 (%) 9899 9799  97        LABS/Diagnostic Test Results:  Results from last 7 days  Lab Units 08/14/18  1916   WBC Thousand/uL 3 54*   HEMOGLOBIN g/dL 10 9*   HEMATOCRIT % 34 2*   PLATELETS Thousands/uL 169   NEUTROS PCT % 84*   LYMPHS PCT % 13*   MONOS PCT % 2*   EOS PCT % 0       Results from last 7 days  Lab Units 08/16/18  0446   08/14/18  1916   SODIUM mmol/L 139  < > 136   POTASSIUM mmol/L 4 3  < > 4 2   CHLORIDE mmol/L 105  < > 100   CO2 mmol/L 26  < > 27   BUN mg/dL 30*  < > 28*   CREATININE mg/dL 1 14  < > 1 37*   CALCIUM mg/dL 9 0  < > 8 9   TOTAL PROTEIN g/dL  --   --  7 2   BILIRUBIN TOTAL mg/dL  --   --  0 40   ALK PHOS U/L  --   --  103   ALT U/L  --   --  22   AST U/L  --   --  20   GLUCOSE RANDOM mg/dL 123  < > 219*      Results from last 7 days  Lab Units 08/16/18  0900   INR   2 58*       TROPONIN NEG X 3      EKG (Per Card MD):  Atrial Fib with RVR      Past Medical/Surgical History:    Active Ambulatory Problems     Diagnosis Date Noted    Atrial fibrillation ([facility] ) 03/07/2016    Hypokalemia 03/09/2016    Anemia 03/09/2016    Fall 08/21/2016    Hip dislocation, right ([facility] ) 08/21/2016    Chronic diastolic CHF (congestive heart failure) ([facility] ) 08/23/2016    Depression 08/23/2016    Obesity without serious comorbidity 08/23/2016    Chronic pain 08/23/2016    Abdominal bloating 08/01/2016    Abdominal pain 12/08/2012    Arthralgia of hip 04/14/2015    Arthralgia of knee, right 09/18/2014    Benign essential hypertension 12/09/2012    Chronic pain of lower extremity, bilateral 06/23/2016    Fibromyalgia 05/31/2012    External hemorrhoids 03/25/2016    Irritable bowel syndrome with constipation 09/15/2015    Lumbar canal stenosis 05/17/2012    Moderate mitral regurgitation 05/13/2014    Osteoarthritis, multiple sites 06/26/2014    Osteoporosis 02/04/2014    Palpitations 05/04/2015    Peripheral neuropathy 05/25/2012    Primary osteoarthritis of right knee 06/30/2015    Synovial cyst of right popliteal space 08/04/2015    Unstable right hip 04/04/2017    Urinary incontinence 05/13/2014    Exertional chest pain 01/26/2018    Right lower quadrant abdominal pain 01/26/2018    GI bleeding 03/22/2018    Ventral hernia without obstruction or gangrene 03/22/2018    Abnormal CT of the abdomen 03/22/2018    Gastrointestinal hemorrhage with melena 03/22/2018    Ulcer 03/29/2018    Chronic pain of right knee 05/15/2018    Acute kidney injury ([facility] ) 07/02/2018    Abnormal TSH 07/08/2018    Postural dizziness with presyncope 07/08/2018     Resolved Ambulatory Problems     Diagnosis Date Noted    Acute diastolic congestive heart failure ([facility] ) 03/07/2016    Leg swelling 03/07/2016    Shortness of breath 03/08/2016    Elevated TSH 03/09/2016    Left foot pain 03/11/2016    Near syncope 07/02/2018     Past Medical History:   Diagnosis Date    Arthritis     Cancer ([facility] )     CHF (congestive heart failure) (HCC)     Disturbance of smell and taste     Effusion of knee joint right     Fibromyalgia     Heart murmur     History of acute myocardial infarction     History of atrial fibrillation     History of bruising easily     History of cancer     History of dermatitis     History of mammogram     History of methicillin resistant staphylococcus aureus (MRSA)     History of methicillin resistant staphylococcus aureus (MRSA) 08/17/2018    History of obesity     History of osteopenia     History of sciatica     History of shortness of breath     History of sinusitis     History of sore throat     History of syncope     History of umbilical hernia     History of viral infection     Irritable bowel syndrome (IBS)     Joint pain, hip     Limb pain     Myalgia     Need for prophylactic vaccination against single diseases     Need for prophylactic vaccination and inoculation against influenza     Preoperative cardiovascular examination     Primary osteoarthritis of right knee     Right leg pain     Vaginal Pap smear        Admitting Diagnosis: Atrial fibrillation ([facility] ) [I48 [id number]]    Age/Sex: 80 y o  female      Assessment/Plan:   * Atrial fibrillation with RVR ([facility] )   Assessment & Plan     Initially admitted to [facility] and was title Cardizem drip which was later stopped  However it is been resumed and patient being transferred  Consult Cardiology for evaluation for ablation and permanent pacemaker  Follow up with labs, CBC, BMP and magnesium in a m             Benign essential hypertension   Assessment & Plan     Presently  Home medications on hold while patient is on Cardizem drip          Depression   Assessment & Plan     Will continue home medication with Cymbalta              VTE Prophylaxis: Was on Coumadin INR of 2 5  / sequential compression device     Code Status:  Full code    Anticipated Length of Stay:  Patient will be admitted on an Inpatient basis with an anticipated length of stay of  > 2 midnights       Justification for Hospital Stay:  Atrial fibrillation with rapid ventricular response        Admission Orders:  8/17/18 AT 0023  ADMIT INPATIENT  TELEMETRY  VS Q2HRS    Up with Assistance   SCD      NPO  (Diet Cardiac; Cardiac TLC 2 3 GM NA)    Continuous IV Infusions:   Diltiazem   TITRATE FOR HR < 115 1-15 mg/hr Last Rate: 5 mg/hr (08/17/18 1219)       Scheduled Meds:   Current Facility-Administered Medications:  acetaminophen 650 mg Oral Q6H PRN Itz Martini MD    diltiazem 180 mg Oral Daily Vicki Morris MD    diltiazem 1-15 mg/hr Intravenous Titrated Vicki Morris MD Last Rate: 5 mg/hr (08/17/18 1219)   DULoxetine 60 mg Oral Daily Antwon Rao MD    fluticasone 1 spray Nasal Daily Antwon Rao MD    torsemide 20 mg Oral Daily Arabella Lomax DO    traMADol 50 mg Oral BID PRN Antwon Rao MD    warfarin 3 mg Oral Daily (warfarin) Arabella Lomax DO        PRN Meds:     acetaminophen    traMADol      CONSULT CARD / ELECTROPHYSIOLOGY       Electrophysiology  Consults Date of Service: 8/17/2018  9:14 AM     Assessment/Plan:   Assessment:  1  Persistent atrial fibrillation with rapid ventricular response, on Coumadin anticoagulation with therapeutic INR              A ) unable to tolerate multiple AV agustina agents due to hypotension and medication intolerance  2  Chronic diastolic congestive heart failure with preserved EF per echo 04/2018  3  Moderate mitral regurgitation and moderate tricuspid regurgitation  4  Hypertension  5  Nonischemic nuclear stress test 05/2018  6  Obesity with BMI 35     Plan:  1  I discussed different treatment plans in general, but since she has been unable to control her heart rate without becoming symptomatic or hypotensive I think the best option is to proceed with AV node ablation and pacemaker implantation  I explained that typically we do this in a stepwise approach, with the pacemaker first and the ablation the following day  I explained both procedures in detail, and answered all questions  She is aware that she will still need to continue her blood thinner, but that she could then discontinue her rate controlling agents  She is in agreement to proceed      2  I will discuss with my attending the timing of the procedure  She is on coumadin, and today her INR is 2 6  Will need her INR closer to 2 0   For now, I will leave her NPO and I will let her know the final plan once it is made

## 2018-08-17 NOTE — H&P
H&P- Soraya Monk 1933, 80 y o  female MRN: [id number]    Unit/Bed#: [facility] 528-01 Encounter: [id number]    Primary Care Provider: Nazario Valencia DO   Date and time admitted to hospital: 8/16/2018  9:06 PM        * Atrial fibrillation with RVR [facility])   Assessment & Plan    Initially admitted to [facility] and was title Cardizem drip which was later stopped  However it is been resumed and patient being transferred  Consult Cardiology for evaluation for ablation and permanent pacemaker  Follow up with labs, CBC, BMP and magnesium in a m           Benign essential hypertension   Assessment & Plan    Presently  Home medications on hold while patient is on Cardizem drip        Depression   Assessment & Plan    Will continue home medication with Cymbalta              VTE Prophylaxis: Was on Coumadin INR of 2 5  / sequential compression device   Code Status:  Full code  POLST: POLST form is on file already (pre-hospital)  Discussion with family:  No    Anticipated Length of Stay:  Patient will be admitted on an Inpatient basis with an anticipated length of stay of  > 2 midnights  Justification for Hospital Stay:  Atrial fibrillation with rapid ventricular response    Total Time for Visit, including Counseling / Coordination of Care: 45 minutes  Greater than 50% of this total time spent on direct patient counseling and coordination of care  Chief Complaint:   Shortness of breath, bilateral lower extremities edema    History of Present Illness:    Soraya Monk is a 80 y o  female who presents with past medical history of atrial fibrillation on chronic Coumadin, congestive heart failure and multiple right-sided hip dislocation  She was a transfer from [facility] after being admitted 2 days ago for evaluation of atrial fibrillation with RVR  Apparently, patient had repeated right hip dislocation 2 weeks before and was admitted for external fixation  She was subsequently discharged home for home PT and on a routine Pulse examination, patient was noted with irregular beat and was subsequently referred to [facility] where she was noted to be AFib with RVR  Patient does admit to mild shortness of breath and bilateral lower extremity swelling, otherwise denies any chest pain dizziness, diaphoresis, fever, chills, or cough       Review of Systems:    Review of Systems   Constitutional: Positive for activity change  Negative for appetite change, chills, diaphoresis, fatigue and fever  HENT: Negative for congestion, drooling, postnasal drip, rhinorrhea, sneezing, sore throat and trouble swallowing  Eyes: Negative for photophobia, pain, discharge, redness and visual disturbance  Respiratory: Positive for shortness of breath  Negative for cough, chest tightness and wheezing  Minimal shortness of breath   Cardiovascular: Positive for leg swelling  Negative for chest pain and palpitations  Bilateral leg swelling   Gastrointestinal: Negative for abdominal distention, abdominal pain, diarrhea, nausea and vomiting  Endocrine: Negative for polydipsia, polyphagia and polyuria  Genitourinary: Positive for dysuria  Negative for flank pain, frequency and urgency  Musculoskeletal: Negative for back pain, gait problem, myalgias, neck pain and neck stiffness  Skin: Positive for wound  Negative for color change and rash  decubitus ulcer, on the lower back   Neurological: Negative for dizziness, seizures, syncope, speech difficulty, weakness, light-headedness, numbness and headaches  Psychiatric/Behavioral: Negative for agitation, behavioral problems and confusion         Past Medical and Surgical History:     Past Medical History:   Diagnosis Date    Arthritis     Cancer ([facility] )     CHF (congestive heart failure) (HCC)     Disturbance of smell and taste     disturbance of taste    Effusion of knee joint right     Fibromyalgia     Heart murmur     reported a previous heart murmur    History of acute myocardial infarction     History of atrial fibrillation     History of bruising easily     History of cancer     History of dermatitis     History of mammogram     History of obesity     History of osteopenia     History of sciatica     History of shortness of breath     History of sinusitis     History of sore throat     History of syncope     History of umbilical hernia     History of viral infection     Irritable bowel syndrome (IBS)     Joint pain, hip     Limb pain     Myalgia     myalgia and myositis    Need for prophylactic vaccination against single diseases     Need for prophylactic vaccination and inoculation against influenza     Preoperative cardiovascular examination     Primary osteoarthritis of right knee     Right leg pain     Vaginal Pap smear     reported pap smear       Past Surgical History:   Procedure Laterality Date    ANKLE ARTHRODESIS Right     BACK SURGERY      BARIATRIC SURGERY      CHOLECYSTECTOMY      x2    ESOPHAGOGASTRODUODENOSCOPY N/A 3/23/2018    Procedure: ESOPHAGOGASTRODUODENOSCOPY (EGD); Surgeon: Zia Rdz MD;  Location: [facility]; Service: Gastroenterology    FOOT SURGERY      HIP CLOSE REDUCTION Right 8/21/2016    Procedure: CLOSED REDUCTION RIGHT TOTAL HIP;  Surgeon: Soraya Aguayo MD;  [facility];  Service:     JOINT REPLACEMENT      LEFT KNEE REPLACEMENT    PILONIDAL CYST EXCISION      x2    REPLACEMENT TOTAL KNEE Right     SILVANA-EN-Y PROCEDURE      x2    TOTAL KNEE ARTHROPLASTY      UMBILICAL HERNIA REPAIR      x2       Meds/Allergies:    Prior to Admission medications    Medication Sig Start Date End Date Taking?  Authorizing Provider   diltiazem (CARDIZEM) 60 mg tablet Take 180 mg by mouth daily   Yes Historical Provider, MD   desoximetasone (TOPICORT) 0 25 % cream Apply topically 8/3/11   Historical Provider, MD   diltiazem (CARDIZEM) 120 MG tablet Take 1 tablet (120 mg total) by mouth daily  Patient taking differently: Take 120 mg by mouth daily at bedtime   7/26/18   TEJAS Sams   DULoxetine (CYMBALTA) 60 mg delayed release capsule TAKE 1 CAPSULE BY MOUTH ONCE DAILY 7/10/18   Chika Barba,    fluticasone (FLONASE) 50 mcg/act nasal spray 1 spray into each nostril daily  Historical Provider, MD   potassium chloride (K-DUR,KLOR-CON) 20 mEq tablet Take 20 mEq by mouth daily 8/1/18   Historical Provider, MD   potassium chloride (KLOR-CON) 20 mEq packet Take 10 mEq by mouth daily 7/15/18   Marquise Jennings PA-C   sucralfate (CARAFATE) 1 g/10 mL suspension Take 10 mL (1,000 mg total) by mouth 2 (two) times a day 7/26/18   TEJAS Sams   torsemide (DEMADEX) 20 mg tablet Take 2 tablets (40 mg total) by mouth daily  Patient taking differently: Take 20 mg by mouth 2 (two) times a day   7/4/18   Lelo Bruce MD   traMADol (ULTRAM) 50 mg tablet TAKE ONE TABLET BY MOUTH TWICE DAILY AS NEEDED FOR PAIN 5/14/18   Chika Barba, DO   warfarin (COUMADIN) 3 mg tablet Take 1 tablet daily or as directed  Patient taking differently: Take 3 mg by mouth every 3 (three) days Take 1 tablet daily or as directed  3/14/18   Chika Barba, DO   warfarin (COUMADIN) 4 mg tablet TAKE 2 TABLETS BY MOUTH DAILY  Patient taking differently: TAKE 1 TABLET BY MOUTH DAILY 8/1/18   Chika Barba, DO     I have reviewed home medications with patient personally  Allergies: Allergies   Allergen Reactions    Codeine Vomiting and GI Intolerance     GI upset, ana m morphine  Reaction Date: 24Apr2012;     Other      Other reaction(s): Other (See Comments)  ana m toradol in OR 6/06 sah    Oxycodone Hcl Vomiting     Reaction Date: 24Apr2012;     Oxycodone-Acetaminophen      Other reaction(s):  Other (See Comments)  GI upset; ana m vicodin    Percocet [Oxycodone-Acetaminophen]     Seasonal Ic  [Cholestatin] Allergic Rhinitis    Bactrim [Sulfamethoxazole-Trimethoprim] Rash    Sulfa Antibiotics Rash     Other reaction(s): Other (See Comments)  takes lasix @home       Social History:     Marital Status: /Civil Union   Occupation:  Retired  Patient Pre-hospital Living Situation:  Stable and active  Patient Pre-hospital Level of Mobility:  Mobile with walker  Patient Pre-hospital Diet Restrictions:  Cardiac  Substance Use History:   History   Alcohol Use    Yes     Comment:  social     History   Smoking Status    Never Smoker   Smokeless Tobacco    Never Used     History   Drug Use No       Family History:    Family History   Problem Relation Age of Onset    Coronary artery disease Mother     Heart attack Mother     Hypertension Mother     Coronary artery disease Father     Heart attack Father     Hypertension Father     Lymphoma Sister         acute    Rashes / Skin problems Sister         lymphomatoid papulosis    Coronary artery disease Brother     Heart attack Brother         x2    Aneurysm Neg Hx     Hyperlipidemia Neg Hx        Physical Exam:     Vitals:   Blood Pressure: 133/65 (08/16/18 2300)  Pulse: 85 (08/16/18 2300)  Temperature: 98 3 °F (36 8 °C) (08/16/18 2300)  Temp Source: Oral (08/16/18 2300)  Respirations: 18 (08/16/18 2300)  Height: 5' 2" (157 5 cm) (08/16/18 2100)  Weight - Scale: 89 9 kg (198 lb 3 1 oz) (08/16/18 2100)  SpO2: 98 % (08/16/18 2300)    Physical Exam   Constitutional: She is oriented to person, place, and time  She appears well-developed and well-nourished  No distress  HENT:   Head: Normocephalic and atraumatic  Nose: Nose normal    Mouth/Throat: No oropharyngeal exudate  Eyes: Conjunctivae and EOM are normal  Pupils are equal, round, and reactive to light  No scleral icterus  Neck: Normal range of motion  Neck supple  No thyromegaly present  Cardiovascular:   Murmur heard  Irregularly irregular   Pulmonary/Chest: Effort normal and breath sounds normal  No respiratory distress   She has no wheezes  She has no rales  She exhibits no tenderness  Abdominal: She exhibits no distension  There is no tenderness  Musculoskeletal: She exhibits edema and tenderness  Trace edema bilaterally of lower extremities   Neurological: She is alert and oriented to person, place, and time  No cranial nerve deficit  Skin: Skin is warm and dry  No rash noted  She is not diaphoretic  There is erythema  No pallor  Some mild chronic erythematous skin changes of bilateral lower extremities   Psychiatric: She has a normal mood and affect  (  Be Sure to Include Physical Exam: Delete this entire line when you have entered your exam)    Additional Data:     Lab Results: I have personally reviewed pertinent reports  Results from last 7 days  Lab Units 08/14/18  1916   WBC Thousand/uL 3 54*   HEMOGLOBIN g/dL 10 9*   HEMATOCRIT % 34 2*   PLATELETS Thousands/uL 169   NEUTROS PCT % 84*   LYMPHS PCT % 13*   MONOS PCT % 2*   EOS PCT % 0       Results from last 7 days  Lab Units 08/16/18  0446  08/14/18  1916   SODIUM mmol/L 139  < > 136   POTASSIUM mmol/L 4 3  < > 4 2   CHLORIDE mmol/L 105  < > 100   CO2 mmol/L 26  < > 27   BUN mg/dL 30*  < > 28*   CREATININE mg/dL 1 14  < > 1 37*   CALCIUM mg/dL 9 0  < > 8 9   TOTAL PROTEIN g/dL  --   --  7 2   BILIRUBIN TOTAL mg/dL  --   --  0 40   ALK PHOS U/L  --   --  103   ALT U/L  --   --  22   AST U/L  --   --  20   GLUCOSE RANDOM mg/dL 123  < > 219*   < > = values in this interval not displayed  Results from last 7 days  Lab Units 08/16/18  0900   INR  2 58*               Imaging: I have personally reviewed pertinent reports  No orders to display       EKG, Pathology, and Other Studies Reviewed on Admission:   · EKG:  Irregularly irregular    Allscripts / Epic Records Reviewed: Yes     ** Please Note: This note has been constructed using a voice recognition system   **

## 2018-08-17 NOTE — PROGRESS NOTES
[address] Internal Medicine Progress Note  Patient: Susie Nageotte 80 y o  female   MRN: [id number]  PCP: Mitchell Mccrary DO  [facility] Encounter: [id number]  Date Of Visit: 18    Assessment:    Principal Problem:    Atrial fibrillation with RVR ([address] )  Active Problems:    Depression    Benign essential hypertension      Plan:    1  Persistent Atrial fibrillation with RVR, patient has been intolerant to multiple medications, consult EP for ablation and pacemaker placement, INR at goal, on Coumadin and Cardizem drip  2  Hypertension, on Cardizem  3  Depression, continue Cymbalta  4  Chronic diastolic CHF, with moderate MR/TR,  resume Demadex  5  BRITTANY,  resolved       VTE Pharmacologic Prophylaxis:   Pharmacologic: Warfarin (Coumadin)  Mechanical VTE Prophylaxis in Place: Yes    Patient Centered Rounds: I have performed bedside rounds with nursing staff today  Discussions with Specialists or Other Care Team Provider:     Education and Discussions with Family / Patient:  Patient    Time Spent for Care: 30 minutes  More than 50% of total time spent on counseling and coordination of care as described above  Current Length of Stay: 1 day(s)    Current Patient Status: Inpatient   Certification Statement: The patient will continue to require additional inpatient hospital stay due to Management of rapid AFib    Discharge Plan / Estimated Discharge Date: not ready yet    Code Status: Level 1 - Full Code      Subjective:   Patient seen and examined  Comfortable sitting in chair  Denied chest pain or shortness of breath  Still with rapid AFib on tele    Objective:     Vitals:   Temp (24hrs), Av 8 °F (36 6 °C), Min:97 5 °F (36 4 °C), Max:98 3 °F (36 8 °C)    HR:  [] 82  Resp:  [18] 18  BP: (108-133)/(58-75) 108/58  SpO2:  [98 %-99 %] 99 %  Body mass index is 35 4 kg/m²  Input and Output Summary (last 24 hours):        Intake/Output Summary (Last 24 hours) at 18 0928  Last data filed at 08/17/18 0750   Gross per 24 hour   Intake            13 42 ml   Output             1550 ml   Net         -1536 58 ml       Physical Exam:     Physical Exam  Patient is awake alert in no acute distress  Lung clear to auscultation bilateral  Heart positive S1-S2 irregular with 1/6 systolic murmur  Abdomen soft nontender obese positive bowel sounds  Lower extremity with pitting edema with chronic venous stasis changes    Additional Data:     Labs:      Results from last 7 days  Lab Units 08/17/18  0809   WBC Thousand/uL 8 48   HEMOGLOBIN g/dL 11 0*   HEMATOCRIT % 35 9   PLATELETS Thousands/uL 186   NEUTROS PCT % 70   LYMPHS PCT % 19   MONOS PCT % 11   EOS PCT % 0       Results from last 7 days  Lab Units 08/17/18  0503  08/14/18  1916   SODIUM mmol/L 138  < > 136   POTASSIUM mmol/L 5 2  < > 4 2   CHLORIDE mmol/L 110*  < > 100   CO2 mmol/L 21  < > 27   BUN mg/dL 32*  < > 28*   CREATININE mg/dL 0 99  < > 1 37*   CALCIUM mg/dL 8 5  < > 8 9   TOTAL PROTEIN g/dL  --   --  7 2   BILIRUBIN TOTAL mg/dL  --   --  0 40   ALK PHOS U/L  --   --  103   ALT U/L  --   --  22   AST U/L  --   --  20   GLUCOSE RANDOM mg/dL 86  < > 219*   < > = values in this interval not displayed  Results from last 7 days  Lab Units 08/17/18  0503   INR  2 65*       * I Have Reviewed All Lab Data Listed Above  * Additional Pertinent Lab Tests Reviewed:  [address] Admission Reviewed    Imaging:    Imaging Reports Reviewed Today Include:   Imaging Personally Reviewed by Myself Includes:      Recent Cultures (last 7 days):           Last 24 Hours Medication List:     Current Facility-Administered Medications:  acetaminophen 650 mg Oral Q6H PRN Meliza Tomlinson MD    diltiazem 180 mg Oral Daily Meliza Tomlinson MD    diltiazem 1-15 mg/hr Intravenous Titrated Meliza Tomlinson MD Last Rate: 5 mg/hr (08/17/18 0119)   DULoxetine 60 mg Oral Daily Meliza Tomlinson MD    fluticasone 1 spray Nasal Daily Meliza Tomlinson MD potassium chloride 20 mEq Oral Daily Abhi Palacios MD    traMADol 50 mg Oral BID PRN Abhi Palacios MD         Today, Patient Was Seen By: Katrina Bartlett DO    ** Please Note: This note has been constructed using a voice recognition system   **

## 2018-08-17 NOTE — CONSULTS
Consultation - Electrophysiology-Cardiology (EP)   Josefa Capellan 80 y o  female MRN: [id number]  Unit/Bed#: [facility] 528-01 Encounter: [id number]      Inpatient consult to Cardiology  Consult performed by: Easton Mendoza ordered by: Carlos Kinney          Assessment/Plan     Assessment:  1  Persistent atrial fibrillation with rapid ventricular response, on Coumadin anticoagulation with therapeutic INR   A ) unable to tolerate multiple AV agustina agents due to hypotension and medication intolerance  2  Chronic diastolic congestive heart failure with preserved EF per echo 04/2018  3  Moderate mitral regurgitation and moderate tricuspid regurgitation  4  Hypertension  5  Nonischemic nuclear stress test 05/2018  6  Obesity with BMI 35    Plan:  1  I discussed different treatment plans in general, but since she has been unable to control her heart rate without becoming symptomatic or hypotensive I think the best option is to proceed with AV node ablation and pacemaker implantation  I explained that typically we do this in a stepwise approach, with the pacemaker first and the ablation the following day  I explained both procedures in detail, and answered all questions  She is aware that she will still need to continue her blood thinner, but that she could then discontinue her rate controlling agents  She is in agreement to proceed  2   I will discuss with my attending the timing of the procedure  She is on coumadin, and today her INR is 2 6  We may need to defer until Monday so that her INR can be closer to 2 0  For now, I will leave her NPO and I will let her know the final plan once it is made         History of Present Illness   Physician Requesting Consult: Raymond Elam DO  Reason for Consult / Principal Problem:  AFib with RVR, discuss AV node ablation and pacemaker placement    HPI: Josefa Capellan is a 80y o  year old female with a history of persistent atrial fibrillation likely since 2016 maintained on Coumadin anticoagulation, chronic diastolic congestive heart failure with preserved EF, moderate MR/TR, hypertension, and obesity  She typically follows with Dr Arturo Fleming as an outpatient  Recently, she has had issues with rapid ventricular response  She is unable to tolerate medication increases due to intolerances and symptomatic hypotension  She was recently hospitalized in July for symptomatic AFib with RVR in the setting of likely over-diuresis  She has been tried on multiple beta-blockers as well as digoxin, and was not able to tolerate them  She states that at times she was extremely dizzy and could barely walk, and also reports feeling nauseous with dry heaves  She has thus been transferred to discuss possible AV node ablation and pacemaker placement  She is currently sitting comfortably, is on IV cardizem  She reports that back in July she is volume overloaded, and her diuretic was doubled  She states she quickly lost 30 lb of water weight, however then continue to lose weight and this is what led to her admission in July  She states that currently her legs may be slightly swollen, but they are much better than these to be  Review of Systems  ROS as noted above, otherwise 12 point review of systems was performed and is negative         Historical Information   Past Medical History:   Diagnosis Date    Arthritis     Cancer ([facility] [address] )     [facility]congestive [facility] failure) ([facility])     Disturbance of smell and taste     disturbance of taste    Effusion of knee joint right     Fibromyalgia     Heart murmur     reported a previous heart murmur    History of acute myocardial infarction     History of atrial fibrillation     History of bruising easily     History of cancer     History of dermatitis     History of mammogram     History of obesity     History of osteopenia     History of sciatica     History of shortness of breath     History of sinusitis     History of sore throat     History of syncope     History of umbilical hernia     History of viral infection     Irritable bowel syndrome (IBS)     Joint pain, hip     Limb pain     Myalgia     myalgia and myositis    Need for prophylactic vaccination against single diseases     Need for prophylactic vaccination and inoculation against influenza     Preoperative cardiovascular examination     Primary osteoarthritis of right knee     Right leg pain     Vaginal Pap smear     reported pap smear     Past Surgical History:   Procedure Laterality Date    ANKLE ARTHRODESIS Right     BACK SURGERY      BARIATRIC SURGERY      CHOLECYSTECTOMY      x2    ESOPHAGOGASTRODUODENOSCOPY N/A 3/23/2018    Procedure: ESOPHAGOGASTRODUODENOSCOPY (EGD); Surgeon: Lillie Shafer MD;  [facility];   Service: Gastroenterology    FOOT SURGERY      HIP CLOSE REDUCTION Right 8/21/2016    Procedure: CLOSED REDUCTION RIGHT TOTAL HIP;  Surgeon: Anna Ramirez MD;  [facility];  Service:     JOINT REPLACEMENT      LEFT KNEE REPLACEMENT    PILONIDAL CYST EXCISION      x2    REPLACEMENT TOTAL KNEE Right     SILVANA-EN-Y PROCEDURE      x2    TOTAL KNEE ARTHROPLASTY      UMBILICAL HERNIA REPAIR      x2     History   Alcohol Use    Yes     Comment:  social     History   Drug Use No     History   Smoking Status    Never Smoker   Smokeless Tobacco    Never Used     Family History:   Family History   Problem Relation Age of Onset    Coronary artery disease Mother     Heart attack Mother     Hypertension Mother     Coronary artery disease Father     Heart attack Father     Hypertension Father     Lymphoma Sister         acute    Rashes / Skin problems Sister         lymphomatoid papulosis    Coronary artery disease Brother     Heart attack Brother         x2    Aneurysm Neg Hx     Hyperlipidemia Neg Hx        Meds/Allergies   Hospital Medications: Current Facility-Administered Medications   Medication Dose Route Frequency    acetaminophen (TYLENOL) tablet 650 mg  650 mg Oral Q6H PRN    diltiazem (CARDIZEM CD) 24 hr capsule 180 mg  180 mg Oral Daily    diltiazem (CARDIZEM) 125 mg in sodium chloride 0 9 % 125 mL infusion  1-15 mg/hr Intravenous Titrated    DULoxetine (CYMBALTA) delayed release capsule 60 mg  60 mg Oral Daily    fluticasone (FLONASE) 50 mcg/act nasal spray 1 spray  1 spray Nasal Daily    potassium chloride (K-DUR,KLOR-CON) CR tablet 20 mEq  20 mEq Oral Daily    traMADol (ULTRAM) tablet 50 mg  50 mg Oral BID PRN     Home Medications:   Prescriptions Prior to Admission   Medication    diltiazem (CARDIZEM) 60 mg tablet    desoximetasone (TOPICORT) 0 25 % cream    diltiazem (CARDIZEM) 120 MG tablet    DULoxetine (CYMBALTA) 60 mg delayed release capsule    fluticasone (FLONASE) 50 mcg/act nasal spray    potassium chloride (K-DUR,KLOR-CON) 20 mEq tablet    potassium chloride (KLOR-CON) 20 mEq packet    sucralfate (CARAFATE) 1 g/10 mL suspension    torsemide (DEMADEX) 20 mg tablet    traMADol (ULTRAM) 50 mg tablet    warfarin (COUMADIN) 3 mg tablet    warfarin (COUMADIN) 4 mg tablet       Allergies   Allergen Reactions    Codeine Vomiting and GI Intolerance     GI upset, ana m morphine  Reaction Date: 24Apr2012;     Other      Other reaction(s): Other (See Comments)  ana m toradol in OR 6/06 sah    Oxycodone Hcl Vomiting     Reaction Date: 24Apr2012;     Oxycodone-Acetaminophen      Other reaction(s): Other (See Comments)  GI upset; ana m vicodin    Percocet [Oxycodone-Acetaminophen]     Seasonal Ic  [Cholestatin] Allergic Rhinitis    Bactrim [Sulfamethoxazole-Trimethoprim] Rash    Sulfa Antibiotics Rash     Other reaction(s): Other (See Comments)  takes lasix @home       Objective   Vitals: Blood pressure 108/58, pulse 82, temperature 97 6 °F (36 4 °C), temperature source Oral, resp  rate 18, height 5' 2" (1 575 m), weight 87 8 kg (193 lb 9 oz), SpO2 99 %    Orthostatic Blood Pressures      Most Recent Value   Blood Pressure  108/58 filed at 08/17/2018 3680   Patient Position - Orthostatic VS  Lying filed at 08/17/2018 5823            Intake/Output Summary (Last 24 hours) at 08/17/18 0914  Last data filed at 08/17/18 0750   Gross per 24 hour   Intake            13 42 ml   Output             1550 ml   Net         -1536 58 ml       Invasive Devices     Peripheral Intravenous Line            Peripheral IV 08/14/18 Right Wrist 2 days                Physical Exam   GEN: NAD, alert and oriented, well appearing  SKIN: dry without significant lesions or rashes  HEENT: NCAT, PERRL, EOMs intact  NECK: No JVD appreciated  CARDIOVASCULAR:  Irregularly irregular, slightly tachycardic, normal S1, S2 without murmurs, rubs, or gallops appreciated  LUNGS: Clear to auscultation bilaterally without wheezes, rhonchi, or rales  ABDOMEN: Soft, nontender, nondistended  EXTREMITIES/VASCULAR: perfused without clubbing or cyanosis; trace LE edema b/l  PSYCH: Normal mood and affect  NEURO: CN ll-Xll grossly intact        Lab Results: I have personally reviewed pertinent lab results  Results from last 7 days  Lab Units 08/17/18  0809 08/14/18  1916   WBC Thousand/uL 8 48 3 54*   HEMOGLOBIN g/dL 11 0* 10 9*   HEMATOCRIT % 35 9 34 2*   PLATELETS Thousands/uL 186 169       Results from last 7 days  Lab Units 08/17/18  0503 08/16/18  0446 08/15/18  0651   SODIUM mmol/L 138 139 139   POTASSIUM mmol/L 5 2 4 3 4 1   CHLORIDE mmol/L 110* 105 104   CO2 mmol/L 21 26 27   BUN mg/dL 32* 30* 26*   CREATININE mg/dL 0 99 1 14 1 17   GLUCOSE RANDOM mg/dL 86 123 155*   CALCIUM mg/dL 8 5 9 0 9 4       Results from last 7 days  Lab Units 08/17/18  0503 08/16/18  0900 08/14/18  2358 08/14/18  1916   INR  2 65* 2 58* 2 36* 2 30*   PTT seconds  --   --   --  38*       Results from last 7 days  Lab Units 08/17/18  0503   MAGNESIUM mg/dL 2 8*       Imaging: I have personally reviewed pertinent reports      ECHO:   Results for orders placed during the hospital encounter of 18   Echo complete with contrast if indicated    Narrative [address], [address] [facility]  [phone]    Transthoracic Echocardiogram  2D, M-mode, Doppler, and Color Doppler    Study date:  2018    Patient: Scott Carson  MR number: [id number]  Account number: [de-identified]  : 1933  Age: 80 years  Gender: Female  Status: Outpatient  [facility]: [facility]  Height: 62 in  Weight: 231 lb  BP: 112/ 60 mmHg    Indications: MV Disease    Diagnoses: I34 0 - Nonrheumatic mitral (valve) insufficiency    Sonographer:  JESUS Onofre  Primary Physician:  Donna Salcido DO  Referring Physician:  Latha Aguiar MD  Group:  [facility]  Interpreting Physician:  Aurora Vickers MD    SUMMARY    LEFT VENTRICLE:  Size was normal   Systolic function was normal  Ejection fraction was estimated to be 55 %  Wall thickness was at the upper limits of normal     RIGHT VENTRICLE:  The ventricle was mildly dilated  Systolic function was normal     RIGHT ATRIUM:  The atrium was mildly dilated  MITRAL VALVE:  There was moderate regurgitation  TRICUSPID VALVE:  There was moderate regurgitation  IVC, HEPATIC VEINS:  The inferior vena cava was mildly dilated  Respirophasic changes were blunted (less than 50% variation)  COMPARISONS:  Comparison was made with the previous study of 17-May-2017  Tricuspid regurgitation has worsened  HISTORY: PRIOR HISTORY: AFIB, Moderate MR, Hypertension, CHF, Anemia    PROCEDURE: The study was performed in the [facility]  This was a routine study  The transthoracic approach was used  The study included complete 2D imaging, M-mode, complete spectral Doppler, and color Doppler  The  heart rate was 110 bpm, at the start of the study  Images were obtained from the parasternal, apical, subcostal, and suprasternal notch acoustic windows  Echocardiographic views were limited due to lung interference  Image quality was  adequate  LEFT VENTRICLE: Size was normal  Systolic function was normal  Ejection fraction was estimated to be 55 %  There were no regional wall motion abnormalities  Wall thickness was at the upper limits of normal  DOPPLER: Transmitral flow  pattern: atrial fibrillation  The study was not technically sufficient to allow evaluation of LV diastolic function  RIGHT VENTRICLE: The ventricle was mildly dilated  Systolic function was normal  Wall thickness was normal     LEFT ATRIUM: Size was normal     RIGHT ATRIUM: The atrium was mildly dilated  MITRAL VALVE: Valve structure was normal  There was normal leaflet separation  DOPPLER: The transmitral velocity was within the normal range  There was no evidence for stenosis  There was moderate regurgitation  AORTIC VALVE: The valve was trileaflet  Leaflets exhibited normal thickness, normal cuspal separation, and sclerosis  DOPPLER: Transaortic velocity was within the normal range  There was no evidence for stenosis  There was no  regurgitation  TRICUSPID VALVE: The valve structure was normal  There was normal leaflet separation  DOPPLER: The transtricuspid velocity was within the normal range  There was no evidence for stenosis  There was moderate regurgitation  Pulmonary artery  systolic pressure was within the normal range  Estimated peak PA pressure was 36 mmHg  PULMONIC VALVE: Leaflets exhibited normal thickness, no calcification, and normal cuspal separation  DOPPLER: The transpulmonic velocity was within the normal range  There was no regurgitation  PERICARDIUM: There was no pericardial effusion  The pericardium was normal in appearance  AORTA: The root exhibited normal size  SYSTEMIC VEINS: IVC: The inferior vena cava was mildly dilated  Respirophasic changes were blunted (less than 50% variation)      SYSTEM MEASUREMENT TABLES    2D  %FS: 34 71 %  AV Diam: 2 84 cm  EDV(Teich): 101 4 ml  EF(Cube): 72 17 %  EF(Teich): 63 88 %  ESV(Cube): 28 55 ml  ESV(Teich): 36 63 ml  IVSd: 1 1 cm  LA Area: 17 55 cm2  LA Diam: 4 14 cm  LVEDV MOD A4C: 61 32 ml  LVEF MOD A4C: 61 87 %  LVESV MOD A4C: 23 38 ml  LVIDd: 4 68 cm  LVIDs: 3 06 cm  LVLd A4C: 6 59 cm  LVLs A4C: 5 67 cm  LVPWd: 1 cm  RA Area: 23 38 cm2  RV Diam: 3 71 cm  SV MOD A4C: 37 94 ml  SV(Cube): 74 03 ml  SV(Teich): 64 77 ml    CW  TR MaxP 51 mmHg  TR Vmax: 2 21 m/s    MM  TAPSE: 1 35 cm    [facility]    Prepared and electronically signed by    Mary Alice Ross MD  Signed 2018 17:36:53         NUCLEAR STRESS TEST 2018: PERFUSION DEFECTS:  -  There were no perfusion defects      GATED SPECT:  The calculated left ventricular ejection fraction was 66 %  There was no left ventricular regional abnormality      SUMMARY:  -  Stress results: There was no chest pain during stress  -  ECG conclusions: The stress ECG was negative for ischemia and normal   -  Perfusion imaging: There were no perfusion defects   -  Gated SPECT: The calculated left ventricular ejection fraction was 66 %  There was no left ventricular regional abnormality      IMPRESSIONS: Normal study after pharmacologic vasodilation without reproduction of symptoms  Myocardial perfusion imaging was normal at rest and with stress      EKG:  AFib with RVR      VTE Prophylaxis: Warfarin (Coumadin) with therapeutic INR

## 2018-08-17 NOTE — ASSESSMENT & PLAN NOTE
Initially admitted to [facility] and was title Cardizem drip which was later stopped  However it is been resumed and patient being transferred  Consult Cardiology for evaluation for ablation and permanent pacemaker  Follow up with labs, CBC, BMP and magnesium in [place]

## 2018-08-17 NOTE — RESTORATIVE TECHNICIAN NOTE
Restorative Specialist Mobility Note       Activity: Chair, Dangle, Stand at bedside, Turn, Ambulate in room, Ambulate in koo     Assistive Device: Front wheel walker     Ambulation Response:  Tolerated fairly well  Repositioned: Sitting, Up in chair

## 2018-08-17 NOTE — NURSING NOTE
Spoke with [facility] admitting physician and informed that patient had arrived via EMS from [address]    Patient alert and oriented, VSS, will await orders and continue to monitor

## 2018-08-17 NOTE — PLAN OF CARE
CARDIOVASCULAR - ADULT     Maintains optimal cardiac output and hemodynamic stability Progressing     Absence of cardiac dysrhythmias or at baseline rhythm Progressing        DISCHARGE PLANNING     Discharge to home or other facility with appropriate resources Progressing        INFECTION - ADULT     Absence or prevention of progression during hospitalization Progressing     Absence of fever/infection during neutropenic period Progressing        Knowledge Deficit     Patient/family/caregiver demonstrates understanding of disease process, treatment plan, medications, and discharge instructions Progressing        METABOLIC, FLUID AND ELECTROLYTES - ADULT     Electrolytes maintained within normal limits Progressing     Fluid balance maintained Progressing     Glucose maintained within target range Progressing        PAIN - ADULT     Verbalizes/displays adequate comfort level or baseline comfort level Progressing        Potential for Falls     Patient will remain free of falls Progressing        SAFETY ADULT     Maintain or return to baseline ADL function Progressing     Maintain or return mobility status to optimal level Progressing        SKIN/TISSUE INTEGRITY - ADULT     Skin integrity remains intact Progressing     Incision(s), wounds(s) or drain site(s) healing without S/S of infection Progressing     Oral mucous membranes remain intact Progressing

## 2018-08-17 NOTE — SOCIAL WORK
CM met with pt at bedside and explained CM role  Pt lives with her  in a 1st floor apartment with 2 steps to enter  PTA pt was independent with ADL's and utilized a Rolator for ambulation  Pt also owns a shower chair  Pt does not drive and has assistance with transportation from her daughter Amada Dinh  Pt [facility]  Pt is currently receiving HHC through  [facility] and requests a referral in order to continue services post d/c  Referral in [place]  Pt reports no hx of D&A rehabilitation or mental health issues  Pt does not have a POA  Pt's primary contact is her daughter Blue Flores [phone]  CM reviewed d/c planning process including the following: identifying help at home, patient preference for d/c planning needs, Discharge Lounge, [facility] Meds to Bed program, availability of treatment team to discuss questions or concerns patient and/or family may have regarding understanding medications and recognizing signs and symptoms once discharged  CM also encouraged patient to follow up with all recommended appointments after discharge  Patient advised of importance for patient and family to participate in managing patients medical well being  Patient/caregiver received discharge checklist   Content reviewed  Patient/caregiver encouraged to participate in discharge plan of care prior to discharge home

## 2018-08-18 LAB
ANION GAP SERPL CALCULATED.3IONS-SCNC: 9 MMOL/L (ref 4–13)
BUN SERPL-MCNC: 25 MG/DL (ref 5–25)
CALCIUM SERPL-MCNC: 8.3 MG/DL (ref 8.3–10.1)
CHLORIDE SERPL-SCNC: 108 MMOL/L (ref 100–108)
CO2 SERPL-SCNC: 25 MMOL/L (ref 21–32)
CREAT SERPL-MCNC: 0.98 MG/DL (ref 0.6–1.3)
GFR SERPL CREATININE-BSD FRML MDRD: 53 ML/MIN/1.73SQ M
GLUCOSE SERPL-MCNC: 81 MG/DL (ref 65–140)
INR PPP: 2.4 (ref 0.86–1.17)
POTASSIUM SERPL-SCNC: 3.7 MMOL/L (ref 3.5–5.3)
PROTHROMBIN TIME: 26.2 SECONDS (ref 11.8–14.2)
SODIUM SERPL-SCNC: 142 MMOL/L (ref 136–145)

## 2018-08-18 PROCEDURE — 80048 BASIC METABOLIC PNL TOTAL CA: CPT | Performed by: NURSE PRACTITIONER

## 2018-08-18 PROCEDURE — 85610 PROTHROMBIN TIME: CPT | Performed by: HOSPITALIST

## 2018-08-18 PROCEDURE — 99232 SBSQ HOSP IP/OBS MODERATE 35: CPT | Performed by: INTERNAL MEDICINE

## 2018-08-18 PROCEDURE — 99232 SBSQ HOSP IP/OBS MODERATE 35: CPT | Performed by: PHYSICIAN ASSISTANT

## 2018-08-18 RX ORDER — POTASSIUM CHLORIDE 20 MEQ/1
40 TABLET, EXTENDED RELEASE ORAL ONCE
Status: COMPLETED | OUTPATIENT
Start: 2018-08-18 | End: 2018-08-18

## 2018-08-18 RX ADMIN — TORSEMIDE 20 MG: 20 TABLET ORAL at 09:21

## 2018-08-18 RX ADMIN — DILTIAZEM HYDROCHLORIDE 5 MG/HR: 5 INJECTION INTRAVENOUS at 21:10

## 2018-08-18 RX ADMIN — FLUTICASONE PROPIONATE 1 SPRAY: 50 SPRAY, METERED NASAL at 09:21

## 2018-08-18 RX ADMIN — POTASSIUM CHLORIDE 40 MEQ: 1500 TABLET, EXTENDED RELEASE ORAL at 12:15

## 2018-08-18 RX ADMIN — DILTIAZEM HYDROCHLORIDE 15 MG/HR: 5 INJECTION INTRAVENOUS at 12:29

## 2018-08-18 RX ADMIN — DULOXETINE HYDROCHLORIDE 60 MG: 60 CAPSULE, DELAYED RELEASE ORAL at 09:21

## 2018-08-18 RX ADMIN — DILTIAZEM HYDROCHLORIDE 180 MG: 180 CAPSULE, COATED, EXTENDED RELEASE ORAL at 07:32

## 2018-08-18 NOTE — PROGRESS NOTES
Progress Note - Electrophysiology-Cardiology (EP)   Tyrell Yañez 80 y o  female MRN: [id number]  Unit/Bed#: [facility] 528-01 Encounter: [id number]      Assessment:  1  Persistent atrial fibrillation with rapid ventricular response, on Coumadin anticoagulation with therapeutic INR              A ) unable to tolerate multiple AV agustina agents due to hypotension and medication intolerance  2  Chronic diastolic congestive heart failure with preserved EF per echo 04/2018  3  Moderate mitral regurgitation and moderate tricuspid regurgitation  4  Hypertension  5  Nonischemic nuclear stress test 05/2018  6  Obesity with BMI 35    Plan:   1  She had recurrent atrial fibrillation with RVR this morning  Yesterday her Cardizem drip was held and she was transitioned to oral   Given that she is having pacemaker implantation on Monday followed by staged AV node ablation, will keep on Cardizem drip over the weekend  She is unable to tolerate beta-blockers or digoxin, she tolerates Cardizem well and it has been effective in controlling her rate while hospitalized  There is a 1-2% reported incidence of associated diarrhea with Cardizem, thus will need to keep monitoring this closely to see if it continues  2   Her procedure was not done yesterday given her elevated INR  Coumadin was held, INR today is 2 4  Will hold again tonight and continue to trend INR  Ideally would like this closer to 2 0 at time of pacemaker implantation on Monday  3   Replete potassium  Continue to monitor her edema  She has had issues recently with over diuresis, thus I am hesitant to increase her diuretic at this time  Subjective/Objective   Chief Complaint: diarrhea    Subjective:  Main complaint this morning is several episodes of diarrhea today, unclear whether this is from something she ate for breakfast   She denies chest pain or pressure, dizziness, lightheadedness  She feels her legs may be more swollen than yesterday    She is not always aware of her heart racing, but she does note that after she exerts herself she feels slightly more short of breath      Objective:     Vitals: /79   Pulse (!) 158   Temp 98 3 °F (36 8 °C) (Oral)   Resp 20   Ht 5' 2" (1 575 m)   Wt 87 1 kg (192 lb 0 3 oz)   SpO2 99%   BMI 35 12 kg/m²   Vitals:    08/17/18 0600 08/18/18 0600   Weight: 87 8 kg (193 lb 9 oz) 87 1 kg (192 lb 0 3 oz)     Orthostatic Blood Pressures      Most Recent Value   Blood Pressure  117/79 filed at 08/18/2018 1015   Patient Position - Orthostatic VS  Sitting filed at 08/18/2018 0741            Intake/Output Summary (Last 24 hours) at 08/18/18 1053  Last data filed at 08/18/18 1017   Gross per 24 hour   Intake            532 8 ml   Output             2725 ml   Net          -2192 2 ml       Invasive Devices     Peripheral Intravenous Line            Peripheral IV 08/18/18 Left Antecubital less than 1 day                            Scheduled Meds:  Current Facility-Administered Medications:  acetaminophen 650 mg Oral Q6H PRN Itz Watson MD    diltiazem 1-15 mg/hr Intravenous Titrated Lee Ann Miller MD Last Rate: 10 mg/hr (08/18/18 1015)   DULoxetine 60 mg Oral Daily Itz Watson MD    fluticasone 1 spray Nasal Daily Lee Ann Miller MD    torsemide 20 mg Oral Daily Belle Mcdonald DO    traMADol 50 mg Oral BID PRN Lee Ann Miller MD      Continuous Infusions:  diltiazem 1-15 mg/hr Last Rate: 10 mg/hr (08/18/18 1015)     PRN Meds:   acetaminophen    traMADol    Review of Systems: Cardiovascular ROS: positive for - dyspnea on exertion  negative for - chest pain    Physical Exam:   GEN: NAD, alert and oriented, well appearing  SKIN: dry without significant lesions or rashes  HEENT: NCAT, PERRL, EOMs intact  NECK: No JVD appreciated  CARDIOVASCULAR:  Irregularly irregular, tachycardic, normal S1, S2 without murmurs, rubs, or gallops able to be appreciated  LUNGS: Clear to auscultation bilaterally without wheezes, rhonchi, or rales  ABDOMEN: Soft, nontender, nondistended  EXTREMITIES/VASCULAR: perfused without clubbing or cyanosis; trace LE edema b/l  PSYCH: Normal mood and affect  NEURO: CN ll-Xll grossly intact                Lab Results: I have personally reviewed pertinent lab results  Results from last 7 days  Lab Units 18  0809 18  1916   WBC Thousand/uL 8 48 3 54*   HEMOGLOBIN g/dL 11 0* 10 9*   HEMATOCRIT % 35 9 34 2*   PLATELETS Thousands/uL 186 169       Results from last 7 days  Lab Units 18  0527 18  0503 18  0446   SODIUM mmol/L 142 138 139   POTASSIUM mmol/L 3 7 5 2 4 3   CHLORIDE mmol/L 108 110* 105   CO2 mmol/L 25 21 26   BUN mg/dL 25 32* 30*   CREATININE mg/dL 0 98 0 99 1 14   GLUCOSE RANDOM mg/dL 81 86 123   CALCIUM mg/dL 8 3 8 5 9 0       Results from last 7 days  Lab Units 18  0528 18  0503 18  0900  18  1916   INR  2 40* 2 65* 2 58*  < > 2 30*   PTT seconds  --   --   --   --  38*   < > = values in this interval not displayed  Results from last 7 days  Lab Units 18  0503   MAGNESIUM mg/dL 2 8*         Imaging: I have personally reviewed pertinent reports      Results for orders placed during the hospital encounter of 18   Echo complete with contrast if indicated    Narrative [place] [address] [address] [place] [address]  [phone]    Transthoracic Echocardiogram  2D, M-mode, Doppler, and Color Doppler    Study date:  2018    Patient: Martin Gomez  MR number: [id number]  Account number: [de-identified]  : 1933  Age: 80 years  Gender: Female  Status: Outpatient  Location: [facility]  Height: 62 in  Weight: 231 lb  BP: 112/ 60 mmHg    Indications: MV Disease    Diagnoses: I34 0 - Nonrheumatic mitral (valve) insufficiency    Sonographer:  JESUS Cagle  Primary Physician:  Mitchell Mccrary DO  Referring Physician:  Jose Barnes MD  [facility]:  [id number]  Interpreting Physician:  Genet Moreland MD    SUMMARY    LEFT VENTRICLE:  Size was normal   Systolic function was normal  Ejection fraction was estimated to be 55 %  Wall thickness was at the upper limits of normal     RIGHT VENTRICLE:  The ventricle was mildly dilated  Systolic function was normal     RIGHT ATRIUM:  The atrium was mildly dilated  MITRAL VALVE:  There was moderate regurgitation  TRICUSPID VALVE:  There was moderate regurgitation  IVC, HEPATIC VEINS:  The inferior vena cava was mildly dilated  Respirophasic changes were blunted (less than 50% variation)  COMPARISONS:  Comparison was made with the previous study of 17-May-2017  Tricuspid regurgitation has worsened  HISTORY: PRIOR HISTORY: AFIB, Moderate MR, Hypertension, CHF, Anemia    PROCEDURE: The study was performed in the [facility]  This was a routine study  The transthoracic approach was used  The study included complete 2D imaging, M-mode, complete spectral Doppler, and color Doppler  The  heart rate was 110 bpm, at the start of the study  Images were obtained from the parasternal, apical, subcostal, and suprasternal notch acoustic windows  Echocardiographic views were limited due to lung interference  Image quality was  adequate  LEFT VENTRICLE: Size was normal  Systolic function was normal  Ejection fraction was estimated to be 55 %  There were no regional wall motion abnormalities  Wall thickness was at the upper limits of normal  DOPPLER: Transmitral flow  pattern: atrial fibrillation  The study was not technically sufficient to allow evaluation of LV diastolic function  RIGHT VENTRICLE: The ventricle was mildly dilated  Systolic function was normal  Wall thickness was normal     LEFT ATRIUM: Size was normal     RIGHT ATRIUM: The atrium was mildly dilated  MITRAL VALVE: Valve structure was normal  There was normal leaflet separation   DOPPLER: The transmitral velocity was within the normal range  There was no evidence for stenosis  There was moderate regurgitation  AORTIC VALVE: The valve was trileaflet  Leaflets exhibited normal thickness, normal cuspal separation, and sclerosis  DOPPLER: Transaortic velocity was within the normal range  There was no evidence for stenosis  There was no  regurgitation  TRICUSPID VALVE: The valve structure was normal  There was normal leaflet separation  DOPPLER: The transtricuspid velocity was within the normal range  There was no evidence for stenosis  There was moderate regurgitation  Pulmonary artery  systolic pressure was within the normal range  Estimated peak PA pressure was 36 mmHg  PULMONIC VALVE: Leaflets exhibited normal thickness, no calcification, and normal cuspal separation  DOPPLER: The transpulmonic velocity was within the normal range  There was no regurgitation  PERICARDIUM: There was no pericardial effusion  The pericardium was normal in appearance  AORTA: The root exhibited normal size  SYSTEMIC VEINS: IVC: The inferior vena cava was mildly dilated  Respirophasic changes were blunted (less than 50% variation)      SYSTEM MEASUREMENT TABLES    2D  %FS: 34 71 %  AV Diam: 2 84 cm  EDV(Teich): 101 4 ml  EF(Cube): 72 17 %  EF(Teich): 63 88 %  ESV(Cube): 28 55 ml  ESV(Teich): 36 63 ml  IVSd: 1 1 cm  LA Area: 17 55 cm2  LA Diam: 4 14 cm  LVEDV MOD A4C: 61 32 ml  LVEF MOD A4C: 61 87 %  LVESV MOD A4C: 23 38 ml  LVIDd: 4 68 cm  LVIDs: 3 06 cm  LVLd A4C: 6 59 cm  LVLs A4C: 5 67 cm  LVPWd: 1 cm  RA Area: 23 38 cm2  RV Diam: 3 71 cm  SV MOD A4C: 37 94 ml  SV(Cube): 74 03 ml  SV(Teich): 64 77 ml    CW  TR MaxP 51 mmHg  TR Vmax: 2 21 m/s    MM  TAPSE: 1 35 cm    [facility]    Prepared and electronically signed by    Salvador Breaux MD  Signed 2018 17:36:53         EKG/telemetry:  Atrial fibrillation, around 6:30 this morning her rates accelerated, currently in the 110s but has been as high as 150s    VTE Pharmacologic Prophylaxis: Warfarin (Coumadin) with therapeutic INR

## 2018-08-18 NOTE — PLAN OF CARE
INFECTION - ADULT     Absence of fever/infection during neutropenic period Completed        METABOLIC, FLUID AND ELECTROLYTES - ADULT     Fluid balance maintained Completed     Glucose maintained within target range Completed          CARDIOVASCULAR - ADULT     Maintains optimal cardiac output and hemodynamic stability Progressing     Absence of cardiac dysrhythmias or at baseline rhythm Progressing        DISCHARGE PLANNING     Discharge to home or other facility with appropriate resources Progressing        DISCHARGE PLANNING - CARE MANAGEMENT     Discharge to post-acute care or home with appropriate resources Progressing        INFECTION - ADULT     Absence or prevention of progression during hospitalization Progressing        Knowledge Deficit     Patient/family/caregiver demonstrates understanding of disease process, treatment plan, medications, and discharge instructions Progressing        METABOLIC, FLUID AND ELECTROLYTES - ADULT     Electrolytes maintained within normal limits Progressing        PAIN - ADULT     Verbalizes/displays adequate comfort level or baseline comfort level Progressing        Potential for Falls     Patient will remain free of falls Progressing        SAFETY ADULT     Maintain or return to baseline ADL function Progressing     Maintain or return mobility status to optimal level Progressing        SKIN/TISSUE INTEGRITY - ADULT     Skin integrity remains intact Progressing     Incision(s), wounds(s) or drain site(s) healing without S/S of infection Progressing     Oral mucous membranes remain intact Progressing

## 2018-08-18 NOTE — PROGRESS NOTES
[facility] Progress Note  Patient: Olimpia Rodrigues 80 y o  female   MRN: [id number]  PCP: Rula Baugh DO  [facility] Encounter: [id number]  Date Of Visit: 18    Assessment:    Principal Problem:    Atrial fibrillation with RVR ([facility] 75 )  Active Problems:    Chronic diastolic CHF (congestive heart failure) ([facility] )    Depression    Benign essential hypertension      Plan:    1  Persistent Atrial fibrillation with RVR,  intolerant to multiple medications, EP input appreciated plan for pacemaker insertion on Monday, continue Cardizem drip, Coumadin on hold   2  Hypertension, acceptable BP, on Cardizem  3  Depression, continue Cymbalta  4  Chronic diastolic CHF, with moderate MR/TR,  continue Demadex  5  BRITTANY,  resolved       VTE Pharmacologic Prophylaxis:   Pharmacologic:  Elevated INR  Mechanical VTE Prophylaxis in Place: Yes    Patient Centered Rounds: I have performed bedside rounds with nursing staff today  Discussions with Specialists or Other Care Team Provider:     Education and Discussions with Family / Patient:  Patient    Time Spent for Care: 30 minutes  More than 50% of total time spent on counseling and coordination of care as described above  Current Length of Stay: 2 day(s)    Current Patient Status: Inpatient   Certification Statement: The patient will continue to require additional inpatient hospital stay due to Management of rapid AFib    Discharge Plan / Estimated Discharge Date: not ready yet    Code Status: Level 1 - Full Code      Subjective:   Patient seen and examined  Comfortable sitting in chair  Denied chest pain or shortness of breath  Still with rapid AFib on tele, Cardizem drip re-initiated    Objective:     Vitals:   Temp (24hrs), Av 8 °F (36 6 °C), Min:97 5 °F (36 4 °C), Max:98 3 °F (36 8 °C)    HR:  [] 158  Resp:  [18-20] 20  BP: (102-134)/(55-85) 117/79  SpO2:  [93 %-99 %] 99 %  Body mass index is 35 12 kg/m²       Input and Output Summary (last 24 hours): Intake/Output Summary (Last 24 hours) at 08/18/18 1130  Last data filed at 08/18/18 1017   Gross per 24 hour   Intake            532 8 ml   Output             2725 ml   Net          -2192 2 ml       Physical Exam:     Physical Exam  Patient is awake alert in no acute distress  Lung clear to auscultation bilateral  Heart positive S1-S2 irregular with 1/6 systolic murmur  Abdomen soft nontender obese positive bowel sounds  Lower extremity with pitting edema and chronic venous stasis changes    Additional Data:     Labs:      Results from last 7 days  Lab Units 08/17/18  0809   WBC Thousand/uL 8 48   HEMOGLOBIN g/dL 11 0*   HEMATOCRIT % 35 9   PLATELETS Thousands/uL 186   NEUTROS PCT % 70   LYMPHS PCT % 19   MONOS PCT % 11   EOS PCT % 0       Results from last 7 days  Lab Units 08/18/18  0527  08/14/18  1916   SODIUM mmol/L 142  < > 136   POTASSIUM mmol/L 3 7  < > 4 2   CHLORIDE mmol/L 108  < > 100   CO2 mmol/L 25  < > 27   BUN mg/dL 25  < > 28*   CREATININE mg/dL 0 98  < > 1 37*   CALCIUM mg/dL 8 3  < > 8 9   TOTAL PROTEIN g/dL  --   --  7 2   BILIRUBIN TOTAL mg/dL  --   --  0 40   ALK PHOS U/L  --   --  103   ALT U/L  --   --  22   AST U/L  --   --  20   GLUCOSE RANDOM mg/dL 81  < > 219*   < > = values in this interval not displayed  Results from last 7 days  Lab Units 08/18/18  0528   INR  2 40*       * I Have Reviewed All Lab Data Listed Above  * Additional Pertinent Lab Tests Reviewed:  [facility]    Imaging:    Imaging Reports Reviewed Today Include:   Imaging Personally Reviewed by Myself Includes:      Recent Cultures (last 7 days):           Last 24 Hours Medication List:     Current Facility-Administered Medications:  acetaminophen 650 mg Oral Q6H PRN Lee Ann Miller MD    diltiazem 1-15 mg/hr Intravenous Titrated Lee Ann Miller MD Last Rate: 10 mg/hr (08/18/18 1015)   DULoxetine 60 mg Oral Daily Lee Ann Miller MD fluticasone 1 spray Nasal Daily Lee Ann Miller MD    potassium chloride 40 mEq Oral Once Ricardo Cortes PA-C    torsemide 20 mg Oral Daily Belle Mcdonald DO    traMADol 50 mg Oral BID PRN Lee Ann Miller MD         Today, Patient Was Seen By: Belle Mcdonald DO    ** Please Note: This note has been constructed using a voice recognition system   **

## 2018-08-19 LAB
INR PPP: 1.85 (ref 0.86–1.17)
PROTHROMBIN TIME: 21.4 SECONDS (ref 11.8–14.2)

## 2018-08-19 PROCEDURE — 85610 PROTHROMBIN TIME: CPT | Performed by: HOSPITALIST

## 2018-08-19 PROCEDURE — 99232 SBSQ HOSP IP/OBS MODERATE 35: CPT | Performed by: INTERNAL MEDICINE

## 2018-08-19 RX ORDER — HEPARIN SODIUM 5000 [USP'U]/ML
5000 INJECTION, SOLUTION INTRAVENOUS; SUBCUTANEOUS EVERY 8 HOURS SCHEDULED
Status: DISCONTINUED | OUTPATIENT
Start: 2018-08-19 | End: 2018-08-22

## 2018-08-19 RX ORDER — VANCOMYCIN HYDROCHLORIDE 1 G/200ML
1000 INJECTION, SOLUTION INTRAVENOUS ONCE
Status: COMPLETED | OUTPATIENT
Start: 2018-08-20 | End: 2018-08-20

## 2018-08-19 RX ORDER — WARFARIN SODIUM 3 MG/1
3 TABLET ORAL
Status: DISCONTINUED | OUTPATIENT
Start: 2018-08-19 | End: 2018-08-19

## 2018-08-19 RX ADMIN — TORSEMIDE 20 MG: 20 TABLET ORAL at 09:19

## 2018-08-19 RX ADMIN — FLUTICASONE PROPIONATE 1 SPRAY: 50 SPRAY, METERED NASAL at 09:19

## 2018-08-19 RX ADMIN — HEPARIN SODIUM 5000 UNITS: 5000 INJECTION, SOLUTION INTRAVENOUS; SUBCUTANEOUS at 14:51

## 2018-08-19 RX ADMIN — DULOXETINE HYDROCHLORIDE 60 MG: 60 CAPSULE, DELAYED RELEASE ORAL at 09:19

## 2018-08-19 RX ADMIN — DILTIAZEM HYDROCHLORIDE 12.5 MG/HR: 5 INJECTION INTRAVENOUS at 15:24

## 2018-08-19 RX ADMIN — HEPARIN SODIUM 5000 UNITS: 5000 INJECTION, SOLUTION INTRAVENOUS; SUBCUTANEOUS at 21:53

## 2018-08-19 NOTE — PROGRESS NOTES
[facility] Progress Note  Patient: Jamil Martini 80 y o  female   MRN: [id number]  PCP: Regine Castro DO  Unit/Bed#: [facility] 528-01 Encounter: [id number]  Date Of Visit: 18    Assessment:    Principal Problem:    Atrial fibrillation with RVR ([facility] )  Active Problems:    Chronic diastolic CHF (congestive heart failure) ([facility] )    Depression    Benign essential hypertension      Plan:    1  Persistent Atrial fibrillation with RVR,  intolerant to multiple medications, per  EP, plan  for pacemaker insertion tomorrow, continue Cardizem drip, Coumadin on hold   2  Hypertension, acceptable BP, on Cardizem  3  Depression, continue Cymbalta  4  Chronic diastolic CHF, with moderate MR/TR,  continue Demadex       VTE Pharmacologic Prophylaxis:   Pharmacologic:  Heparin subcu  Mechanical VTE Prophylaxis in Place: Yes    Patient Centered Rounds: I have performed bedside rounds with nursing staff today  Discussions with Specialists or Other Care Team Provider:     Education and Discussions with Family / Patient:  Patient, I offered to call her  she declined    Time Spent for Care: 30 minutes  More than 50% of total time spent on counseling and coordination of care as described above      Current Length of Stay: 3 day(s)    Current Patient Status: Inpatient   Certification Statement: The patient will continue to require additional inpatient hospital stay due to Management of rapid AFib    Discharge Plan / Estimated Discharge Date: not ready yet    Code Status: Level 1 - Full Code      Subjective:   Patient seen and examined  Comfortable sitting in chair  Continue to have intermittent palpitation  Denied chest pain or shortness of breath  Still on Cardizem drip    Objective:     Vitals:   Temp (24hrs), Av 4 °F (36 9 °C), Min:98 1 °F (36 7 °C), Max:98 9 °F (37 2 °C)    HR:  [] 125  Resp:  [18-20] 18  BP: (112-132)/(67-83) 112/67  SpO2:  [96 %-99 %] 98 %  Body mass index is 35 12 kg/m²  Input and Output Summary (last 24 hours): Intake/Output Summary (Last 24 hours) at 08/19/18 1014  Last data filed at 08/19/18 0740   Gross per 24 hour   Intake          2153 87 ml   Output             3275 ml   Net         -1121 13 ml       Physical Exam:     Physical Exam  Patient is awake alert in no acute distress  Lung clear to auscultation bilateral  Heart positive S1-S2 irregular with 1/6 systolic murmur  Abdomen soft nontender obese positive bowel sounds  Lower extremity with pitting edema and chronic venous stasis changes    Additional Data:     Labs:      Results from last 7 days  Lab Units 08/17/18  0809   WBC Thousand/uL 8 48   HEMOGLOBIN g/dL 11 0*   HEMATOCRIT % 35 9   PLATELETS Thousands/uL 186   NEUTROS PCT % 70   LYMPHS PCT % 19   MONOS PCT % 11   EOS PCT % 0       Results from last 7 days  Lab Units 08/18/18  0527  08/14/18  1916   SODIUM mmol/L 142  < > 136   POTASSIUM mmol/L 3 7  < > 4 2   CHLORIDE mmol/L 108  < > 100   CO2 mmol/L 25  < > 27   BUN mg/dL 25  < > 28*   CREATININE mg/dL 0 98  < > 1 37*   CALCIUM mg/dL 8 3  < > 8 9   TOTAL PROTEIN g/dL  --   --  7 2   BILIRUBIN TOTAL mg/dL  --   --  0 40   ALK PHOS U/L  --   --  103   ALT U/L  --   --  22   AST U/L  --   --  20   GLUCOSE RANDOM mg/dL 81  < > 219*   < > = values in this interval not displayed  Results from last 7 days  Lab Units 08/19/18  0537   INR  1 85*       * I Have Reviewed All Lab Data Listed Above  * Additional Pertinent Lab Tests Reviewed:  [facility] Admission Reviewed    Imaging:    Imaging Reports Reviewed Today Include:   Imaging Personally Reviewed by Myself Includes:      Recent Cultures (last 7 days):           Last 24 Hours Medication List:     Current Facility-Administered Medications:  acetaminophen 650 mg Oral Q6H PRN Adeola Ortega MD    diltiazem 1-15 mg/hr Intravenous Titrated Adeola Ortega MD Last Rate: 12 5 mg/hr (08/19/18 0923)   DULoxetine 60 mg Oral Daily Itz SUERO Sylvia Kehr, MD    fluticasone 1 spray Nasal Daily Olman Nazario MD    torsemide 20 mg Oral Daily Doreen Umana DO    traMADol 50 mg Oral BID PRN Olman Nazario MD         Today, Patient Was Seen By: Doreen Umana DO    ** Please Note: This note has been constructed using a voice recognition system   **

## 2018-08-19 NOTE — PROGRESS NOTES
Progress Note - Electrophysiology-Cardiology (EP)   Jamil Martini 80 y o  female MRN: [id number]  Unit/Bed#: [facility] 528-01 Encounter: [id number]      Assessment:  1   Persistent atrial fibrillation with rapid ventricular response, on Coumadin anticoagulation with therapeutic INR              A ) unable to tolerate multiple AV agustina agents due to hypotension and medication intolerance  2   Chronic diastolic congestive heart failure with preserved EF per echo 04/2018  3   Moderate mitral regurgitation and moderate tricuspid regurgitation  4   Hypertension  5   Nonischemic nuclear stress test 05/2018  6   Obesity with BMI 35    Plan:  1  Continue Cardizem drip for now  She is still tachycardic, but cannot tolerate digoxin or beta blockers  Plan is for pacemaker implantation tomorrow followed by AV node ablation on Tuesday, at which time her rate-controlling agents can be discontinued  NPO at midnight, check a m  labs, IV Vanco on-call to be given at time of procedure tomorrow  2   Her INR is now 1 8 down from 2 4  Will hold for one more night, depending on INR tomorrow can likely restart tomorrow night following pacemaker implantation  3   Continue oral diuretics, she states she had a good response this morning  She does have lower extremity edema unchanged from previously  Will hold off on additional doses for now, continue to monitor for worsening edema or dyspnea  Subjective/Objective   Chief Complaint: "I feel tried and anxious"    Subjective:  She continues to feel tired and anxious at times, likely from her tachycardia  She denies alan palpitations, dizziness, lightheadedness, or chest pain  She does admit to dyspnea when she is up and exerting herself, but she no longer feels short of breath when sitting in a chair which she previously did  She states her edema is unchanged      Objective:     Vitals: /67   Pulse (!) 125   Temp 98 6 °F (37 °C) (Oral)   Resp 18   Ht 5' 2" (1 575 m) Wt 87 1 kg (192 lb 0 3 oz)   SpO2 98%   BMI 35 12 kg/m²   Vitals:    08/18/18 0600 08/19/18 0600   Weight: 87 1 kg (192 lb 0 3 oz) 87 1 kg (192 lb 0 3 oz)     Orthostatic Blood Pressures      Most Recent Value   Blood Pressure  112/67 filed at 08/19/2018 6473   Patient Position - Orthostatic VS  Sitting filed at 08/19/2018 0801            Intake/Output Summary (Last 24 hours) at 08/19/18 1146  Last data filed at 08/19/18 1138   Gross per 24 hour   Intake          1973 87 ml   Output             2950 ml   Net          -976 13 ml       Invasive Devices     Peripheral Intravenous Line            Peripheral IV 08/18/18 Left Antecubital 1 day                            Scheduled Meds:  Current Facility-Administered Medications:  acetaminophen 650 mg Oral Q6H PRN Itz Henderson MD    diltiazem 1-15 mg/hr Intravenous Titrated Jeremy Osman MD Last Rate: 12 5 mg/hr (08/19/18 0923)   DULoxetine 60 mg Oral Daily Itz Henderson MD    fluticasone 1 spray Nasal Daily Jeremy Osman MD    heparin (porcine) 5,000 Units Subcutaneous [facility] Jackeline Norlina, DO    torsemide 20 mg Oral Daily Jackeline Norlina, DO    traMADol 50 mg Oral BID PRN Jeremy Osman MD    [START ON 8/20/2018] vancomycin 1,000 mg Intravenous Once Samantha Heaton PA-C    warfarin 3 mg Oral Daily (warfarin) Samantha Heaton PA-C      Continuous Infusions:  diltiazem 1-15 mg/hr Last Rate: 12 5 mg/hr (08/19/18 0923)     PRN Meds:   acetaminophen    traMADol    Review of Systems: Cardiovascular ROS: positive for - dyspnea on exertion and edema  negative for - chest pain or palpitations    Physical Exam:   GEN: NAD, alert and oriented, well appearing  SKIN: dry without significant lesions or rashes  HEENT: NCAT, PERRL, EOMs intact  NECK: No JVD appreciated  CARDIOVASCULAR:  Irregularly irregular, tachycardia, normal S1, S2 without murmurs, rubs, or gallops appreciated  LUNGS: Clear to auscultation bilaterally without wheezes, rhonchi, or rales  ABDOMEN: Soft, nontender, nondistended  EXTREMITIES/VASCULAR: perfused without clubbing or cyanosis' 1+ LE edema b/l  PSYCH: Normal mood and affect  NEURO: CN ll-Xll grossly intact                Lab Results: I have personally reviewed pertinent lab results  Results from last 7 days  Lab Units 18  0809 18  1916   WBC Thousand/uL 8 48 3 54*   HEMOGLOBIN g/dL 11 0* 10 9*   HEMATOCRIT % 35 9 34 2*   PLATELETS Thousands/uL 186 169       Results from last 7 days  Lab Units 18  0527 18  0503 18  0446   SODIUM mmol/L 142 138 139   POTASSIUM mmol/L 3 7 5 2 4 3   CHLORIDE mmol/L 108 110* 105   CO2 mmol/L 25 21 26   BUN mg/dL 25 32* 30*   CREATININE mg/dL 0 98 0 99 1 14   GLUCOSE RANDOM mg/dL 81 86 123   CALCIUM mg/dL 8 3 8 5 9 0       Results from last 7 days  Lab Units 18  0537 18  0528 18  0503  18  1916   INR  1 85* 2 40* 2 65*  < > 2 30*   PTT seconds  --   --   --   --  38*   < > = values in this interval not displayed  Results from last 7 days  Lab Units 18  0503   MAGNESIUM mg/dL 2 8*         Imaging: I have personally reviewed pertinent reports      Results for orders placed during the hospital encounter of 18   Echo complete with contrast if indicated    Narrative [facility] [facility]  [phone]    Transthoracic Echocardiogram  2D, M-mode, Doppler, and Color Doppler    Study date:  2018    Patient: Cielo Hoffman  MR number: [id number]  Account number: [de-identified]  : 1933  Age: 80 years  Gender: Female  Status: Outpatient  Location: [facility]  Height: 62 in  Weight: 231 lb  BP: 112/ 60 mmHg    Indications: MV Disease    Diagnoses: I34 0 - Nonrheumatic mitral (valve) insufficiency    Sonographer:  JESUS Thomson  Primary Physician:  Sascha Sal DO  Referring Physician:  Dru Tidwell MD  Group:  [facility] [id number]  Interpreting Physician:  Ivet Faria MD    SUMMARY    LEFT VENTRICLE:  Size was normal   Systolic function was normal  Ejection fraction was estimated to be 55 %  Wall thickness was at the upper limits of normal     RIGHT VENTRICLE:  The ventricle was mildly dilated  Systolic function was normal     RIGHT ATRIUM:  The atrium was mildly dilated  MITRAL VALVE:  There was moderate regurgitation  TRICUSPID VALVE:  There was moderate regurgitation  IVC, HEPATIC VEINS:  The inferior vena cava was mildly dilated  Respirophasic changes were blunted (less than 50% variation)  COMPARISONS:  Comparison was made with the previous study of 17-May-2017  Tricuspid regurgitation has worsened  HISTORY: PRIOR HISTORY: AFIB, Moderate MR, Hypertension, CHF, Anemia    PROCEDURE: The study was performed in the [facility]  This was a routine study  The transthoracic approach was used  The study included complete 2D imaging, M-mode, complete spectral Doppler, and color Doppler  The  heart rate was 110 bpm, at the start of the study  Images were obtained from the parasternal, apical, subcostal, and suprasternal notch acoustic windows  Echocardiographic views were limited due to lung interference  Image quality was  adequate  LEFT VENTRICLE: Size was normal  Systolic function was normal  Ejection fraction was estimated to be 55 %  There were no regional wall motion abnormalities  Wall thickness was at the upper limits of normal  DOPPLER: Transmitral flow  pattern: atrial fibrillation  The study was not technically sufficient to allow evaluation of LV diastolic function  RIGHT VENTRICLE: The ventricle was mildly dilated  Systolic function was normal  Wall thickness was normal     LEFT ATRIUM: Size was normal     RIGHT ATRIUM: The atrium was mildly dilated  MITRAL VALVE: Valve structure was normal  There was normal leaflet separation   DOPPLER: The transmitral velocity was within the normal range  There was no evidence for stenosis  There was moderate regurgitation  AORTIC VALVE: The valve was trileaflet  Leaflets exhibited normal thickness, normal cuspal separation, and sclerosis  DOPPLER: Transaortic velocity was within the normal range  There was no evidence for stenosis  There was no  regurgitation  TRICUSPID VALVE: The valve structure was normal  There was normal leaflet separation  DOPPLER: The transtricuspid velocity was within the normal range  There was no evidence for stenosis  There was moderate regurgitation  Pulmonary artery  systolic pressure was within the normal range  Estimated peak PA pressure was 36 mmHg  PULMONIC VALVE: Leaflets exhibited normal thickness, no calcification, and normal cuspal separation  DOPPLER: The transpulmonic velocity was within the normal range  There was no regurgitation  PERICARDIUM: There was no pericardial effusion  The pericardium was normal in appearance  AORTA: The root exhibited normal size  SYSTEMIC VEINS: IVC: The inferior vena cava was mildly dilated  Respirophasic changes were blunted (less than 50% variation)      SYSTEM MEASUREMENT TABLES    2D  %FS: 34 71 %  AV Diam: 2 84 cm  EDV(Teich): 101 4 ml  EF(Cube): 72 17 %  EF(Teich): 63 88 %  ESV(Cube): 28 55 ml  ESV(Teich): 36 63 ml  IVSd: 1 1 cm  LA Area: 17 55 cm2  LA Diam: 4 14 cm  LVEDV MOD A4C: 61 32 ml  LVEF MOD A4C: 61 87 %  LVESV MOD A4C: 23 38 ml  LVIDd: 4 68 cm  LVIDs: 3 06 cm  LVLd A4C: 6 59 cm  LVLs A4C: 5 67 cm  LVPWd: 1 cm  RA Area: 23 38 cm2  RV Diam: 3 71 cm  SV MOD A4C: 37 94 ml  SV(Cube): 74 03 ml  SV(Teich): 64 77 ml    CW  TR MaxP 51 mmHg  TR Vmax: 2 21 m/s    MM  TAPSE: 1 35 cm    [employer] Accredited [facility]    Prepared and electronically signed by    Bryce Salgado MD  Signed 2018 17:36:53         EKG/telemetry: ongoing atrial fibrillation, rates relatively controlled overnight, increases during waking hours with bursts up to 150s    VTE Pharmacologic Prophylaxis: Heparin and Warfarin (Coumadin)  VTE Mechanical Prophylaxis: sequential compression device

## 2018-08-19 NOTE — PLAN OF CARE
SAFETY ADULT     Maintain or return to baseline ADL function Completed          CARDIOVASCULAR - ADULT     Maintains optimal cardiac output and hemodynamic stability Progressing     Absence of cardiac dysrhythmias or at baseline rhythm Progressing        DISCHARGE PLANNING     Discharge to home or other facility with appropriate resources Progressing        DISCHARGE PLANNING - CARE MANAGEMENT     Discharge to post-acute care or home with appropriate resources Progressing        INFECTION - ADULT     Absence or prevention of progression during hospitalization Progressing        Knowledge Deficit     Patient/family/caregiver demonstrates understanding of disease process, treatment plan, medications, and discharge instructions Progressing        METABOLIC, FLUID AND ELECTROLYTES - ADULT     Electrolytes maintained within normal limits Progressing        PAIN - ADULT     Verbalizes/displays adequate comfort level or baseline comfort level Progressing        Potential for Falls     Patient will remain free of falls Progressing        SAFETY ADULT     Maintain or return mobility status to optimal level Progressing        SKIN/TISSUE INTEGRITY - ADULT     Skin integrity remains intact Progressing     Incision(s), wounds(s) or drain site(s) healing without S/S of infection Progressing     Oral mucous membranes remain intact Progressing

## 2018-08-20 ENCOUNTER — ANESTHESIA EVENT (INPATIENT)
Dept: NON INVASIVE DIAGNOSTICS | Facility: HOSPITAL | Age: 83
DRG: 243 | End: 2018-08-20
Payer: COMMERCIAL

## 2018-08-20 ENCOUNTER — APPOINTMENT (INPATIENT)
Dept: RADIOLOGY | Facility: HOSPITAL | Age: 83
DRG: 243 | End: 2018-08-20
Payer: COMMERCIAL

## 2018-08-20 LAB
ANION GAP SERPL CALCULATED.3IONS-SCNC: 8 MMOL/L (ref 4–13)
BUN SERPL-MCNC: 21 MG/DL (ref 5–25)
CALCIUM SERPL-MCNC: 8.5 MG/DL (ref 8.3–10.1)
CHLORIDE SERPL-SCNC: 108 MMOL/L (ref 100–108)
CO2 SERPL-SCNC: 25 MMOL/L (ref 21–32)
CREAT SERPL-MCNC: 0.93 MG/DL (ref 0.6–1.3)
ERYTHROCYTE [DISTWIDTH] IN BLOOD BY AUTOMATED COUNT: 15.7 % (ref 11.6–15.1)
GFR SERPL CREATININE-BSD FRML MDRD: 57 ML/MIN/1.73SQ M
GLUCOSE SERPL-MCNC: 90 MG/DL (ref 65–140)
HCT VFR BLD AUTO: 38.3 % (ref 34.8–46.1)
HGB BLD-MCNC: 11.5 G/DL (ref 11.5–15.4)
INR PPP: 1.45 (ref 0.86–1.17)
MCH RBC QN AUTO: 28.9 PG (ref 26.8–34.3)
MCHC RBC AUTO-ENTMCNC: 30 G/DL (ref 31.4–37.4)
MCV RBC AUTO: 96 FL (ref 82–98)
PLATELET # BLD AUTO: 185 THOUSANDS/UL (ref 149–390)
PMV BLD AUTO: 10.4 FL (ref 8.9–12.7)
POTASSIUM SERPL-SCNC: 3.7 MMOL/L (ref 3.5–5.3)
PROTHROMBIN TIME: 17.7 SECONDS (ref 11.8–14.2)
RBC # BLD AUTO: 3.98 MILLION/UL (ref 3.81–5.12)
SODIUM SERPL-SCNC: 141 MMOL/L (ref 136–145)
WBC # BLD AUTO: 7.9 THOUSAND/UL (ref 4.31–10.16)

## 2018-08-20 PROCEDURE — 33207 INSERT HEART PM VENTRICULAR: CPT | Performed by: INTERNAL MEDICINE

## 2018-08-20 PROCEDURE — 0JH604Z INSERTION OF PACEMAKER, SINGLE CHAMBER INTO CHEST SUBCUTANEOUS TISSUE AND FASCIA, OPEN APPROACH: ICD-10-PCS | Performed by: INTERNAL MEDICINE

## 2018-08-20 PROCEDURE — 02HK3JZ INSERTION OF PACEMAKER LEAD INTO RIGHT VENTRICLE, PERCUTANEOUS APPROACH: ICD-10-PCS | Performed by: INTERNAL MEDICINE

## 2018-08-20 PROCEDURE — 85027 COMPLETE CBC AUTOMATED: CPT | Performed by: PHYSICIAN ASSISTANT

## 2018-08-20 PROCEDURE — C1769 GUIDE WIRE: HCPCS | Performed by: PHYSICIAN ASSISTANT

## 2018-08-20 PROCEDURE — C1892 INTRO/SHEATH,FIXED,PEEL-AWAY: HCPCS | Performed by: PHYSICIAN ASSISTANT

## 2018-08-20 PROCEDURE — 71045 X-RAY EXAM CHEST 1 VIEW: CPT

## 2018-08-20 PROCEDURE — 99232 SBSQ HOSP IP/OBS MODERATE 35: CPT | Performed by: INTERNAL MEDICINE

## 2018-08-20 PROCEDURE — 85610 PROTHROMBIN TIME: CPT | Performed by: PHYSICIAN ASSISTANT

## 2018-08-20 PROCEDURE — C1898 LEAD, PMKR, OTHER THAN TRANS: HCPCS

## 2018-08-20 PROCEDURE — 93005 ELECTROCARDIOGRAM TRACING: CPT

## 2018-08-20 PROCEDURE — 33207 INSERT HEART PM VENTRICULAR: CPT | Performed by: PHYSICIAN ASSISTANT

## 2018-08-20 PROCEDURE — 80048 BASIC METABOLIC PNL TOTAL CA: CPT | Performed by: INTERNAL MEDICINE

## 2018-08-20 PROCEDURE — C1786 PMKR, SINGLE, RATE-RESP: HCPCS

## 2018-08-20 RX ORDER — SODIUM CHLORIDE 9 MG/ML
INJECTION, SOLUTION INTRAVENOUS CONTINUOUS PRN
Status: DISCONTINUED | OUTPATIENT
Start: 2018-08-20 | End: 2018-08-20 | Stop reason: SURG

## 2018-08-20 RX ORDER — GENTAMICIN SULFATE 40 MG/ML
INJECTION, SOLUTION INTRAMUSCULAR; INTRAVENOUS CODE/TRAUMA/SEDATION MEDICATION
Status: COMPLETED | OUTPATIENT
Start: 2018-08-20 | End: 2018-08-20

## 2018-08-20 RX ORDER — PROPOFOL 10 MG/ML
INJECTION, EMULSION INTRAVENOUS AS NEEDED
Status: DISCONTINUED | OUTPATIENT
Start: 2018-08-20 | End: 2018-08-20 | Stop reason: SURG

## 2018-08-20 RX ORDER — PROPOFOL 10 MG/ML
INJECTION, EMULSION INTRAVENOUS CONTINUOUS PRN
Status: DISCONTINUED | OUTPATIENT
Start: 2018-08-20 | End: 2018-08-20 | Stop reason: SURG

## 2018-08-20 RX ORDER — LIDOCAINE HYDROCHLORIDE 10 MG/ML
INJECTION, SOLUTION INFILTRATION; PERINEURAL CODE/TRAUMA/SEDATION MEDICATION
Status: COMPLETED | OUTPATIENT
Start: 2018-08-20 | End: 2018-08-20

## 2018-08-20 RX ADMIN — HEPARIN SODIUM 5000 UNITS: 5000 INJECTION, SOLUTION INTRAVENOUS; SUBCUTANEOUS at 21:41

## 2018-08-20 RX ADMIN — HEPARIN SODIUM 5000 UNITS: 5000 INJECTION, SOLUTION INTRAVENOUS; SUBCUTANEOUS at 13:42

## 2018-08-20 RX ADMIN — SODIUM CHLORIDE: 9 INJECTION, SOLUTION INTRAVENOUS at 08:30

## 2018-08-20 RX ADMIN — TRAMADOL HYDROCHLORIDE 50 MG: 50 TABLET, FILM COATED ORAL at 21:42

## 2018-08-20 RX ADMIN — VANCOMYCIN HYDROCHLORIDE 1000 MG: 1 INJECTION, SOLUTION INTRAVENOUS at 08:50

## 2018-08-20 RX ADMIN — PROPOFOL 80 MCG/KG/MIN: 10 INJECTION, EMULSION INTRAVENOUS at 09:03

## 2018-08-20 RX ADMIN — GENTAMICIN SULFATE 80 MG: 40 INJECTION, SOLUTION INTRAMUSCULAR; INTRAVENOUS at 09:55

## 2018-08-20 RX ADMIN — IOHEXOL 20 ML: 350 INJECTION, SOLUTION INTRAVENOUS at 09:30

## 2018-08-20 RX ADMIN — HEPARIN SODIUM 5000 UNITS: 5000 INJECTION, SOLUTION INTRAVENOUS; SUBCUTANEOUS at 05:35

## 2018-08-20 RX ADMIN — LIDOCAINE HYDROCHLORIDE 20 ML: 10 INJECTION, SOLUTION INFILTRATION; PERINEURAL at 09:23

## 2018-08-20 RX ADMIN — DILTIAZEM HYDROCHLORIDE 12.5 MG/HR: 5 INJECTION INTRAVENOUS at 20:38

## 2018-08-20 RX ADMIN — TORSEMIDE 20 MG: 20 TABLET ORAL at 11:31

## 2018-08-20 RX ADMIN — FLUTICASONE PROPIONATE 1 SPRAY: 50 SPRAY, METERED NASAL at 11:31

## 2018-08-20 RX ADMIN — LIDOCAINE HYDROCHLORIDE 5 ML: 10 INJECTION, SOLUTION INFILTRATION; PERINEURAL at 09:24

## 2018-08-20 RX ADMIN — DILTIAZEM HYDROCHLORIDE 7.5 MG/HR: 5 INJECTION INTRAVENOUS at 05:38

## 2018-08-20 RX ADMIN — PROPOFOL 30 MG: 10 INJECTION, EMULSION INTRAVENOUS at 09:03

## 2018-08-20 RX ADMIN — DULOXETINE HYDROCHLORIDE 60 MG: 60 CAPSULE, DELAYED RELEASE ORAL at 11:31

## 2018-08-20 NOTE — PROGRESS NOTES
[address] Internal Medicine Progress Note  Patient: Franci Padilla 80 y o  female   MRN: [id number]  PCP: Carol Clark DO  Unit/Bed#: [facility] 528-01 Encounter: [id number]  Date Of Visit: 18    Assessment:    Principal Problem:    Atrial fibrillation with RVR ([address] )  Active Problems:    Chronic diastolic CHF (congestive heart failure) (HCC)    Depression    Benign essential hypertension      Plan:    1  Persistent Atrial fibrillation with RVR,  intolerant to multiple medications, status post pacemaker placement today, still on Cardizem drip,  EP is managing, coumadin still on hold   2  HTN, acceptable BP  3  Depression, continue Cymbalta  4  Chronic diastolic CHF, with moderate MR/TR,  continue Demadex    VTE Pharmacologic Prophylaxis:   Pharmacologic:  Elevated INR  Mechanical VTE Prophylaxis in Place: Yes    Patient Centered Rounds: I have performed bedside rounds with nursing staff today  Discussions with Specialists or Other Care Team Provider:     Education and Discussions with Family / Patient:  Patient, I offered to call family she declined    Time Spent for Care: 30 minutes  More than 50% of total time spent on counseling and coordination of care as described above  Current Length of Stay: 4 day(s)    Current Patient Status: Inpatient   Certification Statement: The patient will continue to require additional inpatient hospital stay due to Management of rapid AFib    Discharge Plan / Estimated Discharge Date:  When cleared by EP    Code Status: Level 1 - Full Code      Subjective:   Patient seen and examined  Comfortable in bed  Just had pacemaker placement  No chest pain or shortness of breath      Objective:     Vitals:   Temp (24hrs), Av 2 °F (36 8 °C), Min:97 9 °F (36 6 °C), Max:98 6 °F (37 °C)    HR:  [] 95  Resp:  [16-20] 16  BP: (109-136)/(59-75) 125/75  SpO2:  [96 %-98 %] 97 %  Body mass index is 34 64 kg/m²       Input and Output Summary (last 24 hours): Intake/Output Summary (Last 24 hours) at 08/20/18 1049  Last data filed at 08/20/18 1010   Gross per 24 hour   Intake          1213 46 ml   Output             3250 ml   Net         -2036 54 ml       Physical Exam:     Physical Exam  Patient is awake alert in no acute distress  Left shoulder sling  Lung clear to auscultation bilateral anteriorly  Heart positive S1-S2 irregular with systolic murmur  Abdomen soft nontender obese positive bowel sounds  Lower extremity with pitting edema and chronic venous stasis changes    Additional Data:     Labs:      Results from last 7 days  Lab Units 08/20/18  0552 08/17/18  0809   WBC Thousand/uL 7 90 8 48   HEMOGLOBIN g/dL 11 5 11 0*   HEMATOCRIT % 38 3 35 9   PLATELETS Thousands/uL 185 186   NEUTROS PCT %  --  70   LYMPHS PCT %  --  19   MONOS PCT %  --  11   EOS PCT %  --  0       Results from last 7 days  Lab Units 08/20/18  0552  08/14/18  1916   SODIUM mmol/L 141  < > 136   POTASSIUM mmol/L 3 7  < > 4 2   CHLORIDE mmol/L 108  < > 100   CO2 mmol/L 25  < > 27   BUN mg/dL 21  < > 28*   CREATININE mg/dL 0 93  < > 1 37*   CALCIUM mg/dL 8 5  < > 8 9   TOTAL PROTEIN g/dL  --   --  7 2   BILIRUBIN TOTAL mg/dL  --   --  0 40   ALK PHOS U/L  --   --  103   ALT U/L  --   --  22   AST U/L  --   --  20   GLUCOSE RANDOM mg/dL 90  < > 219*   < > = values in this interval not displayed  Results from last 7 days  Lab Units 08/20/18  0552   INR  1 45*       * I Have Reviewed All Lab Data Listed Above  * Additional Pertinent Lab Tests Reviewed:  [address] Admission Reviewed    Imaging:    Imaging Reports Reviewed Today Include:   Imaging Personally Reviewed by Myself Includes:      Recent Cultures (last 7 days):           Last 24 Hours Medication List:     Current Facility-Administered Medications:  acetaminophen 650 mg Oral Q6H PRN Itz Nuñez MD    diltiazem 1-15 mg/hr Intravenous Titrated Jessica Schulte MD Last Rate: 7 5 mg/hr (08/20/18 0840) DULoxetine 60 mg Oral Daily Ashley García MD    fluticasone 1 spray Nasal Daily Ashley García MD    heparin (porcine) 5,000 Units Subcutaneous [facility] Althea Tuttle DO    torsemide 20 mg Oral Daily Althea Tuttle DO    traMADol 50 mg Oral BID PRN Ashley García MD         Today, Patient Was Seen By: Althea Tuttle DO    ** Please Note: This note has been constructed using a voice recognition system   **

## 2018-08-20 NOTE — ANESTHESIA POSTPROCEDURE EVALUATION
Post-Op Assessment Note      CV Status:  Stable    Mental Status:  Alert and awake    Hydration Status:  Euvolemic    PONV Controlled:  Controlled    Airway Patency:  Patent    Post Op Vitals Reviewed: Yes          Staff: Anesthesiologist, CRNA           BP   116/86   Temp      Pulse  82   Resp   20   SpO2   96

## 2018-08-20 NOTE — PLAN OF CARE
CARDIOVASCULAR - ADULT     Maintains optimal cardiac output and hemodynamic stability Progressing     Absence of cardiac dysrhythmias or at baseline rhythm Progressing        DISCHARGE PLANNING     Discharge to home or other facility with appropriate resources Progressing        DISCHARGE PLANNING - CARE MANAGEMENT     Discharge to post-acute care or home with appropriate resources Progressing        INFECTION - ADULT     Absence or prevention of progression during hospitalization Progressing        Knowledge Deficit     Patient/family/caregiver demonstrates understanding of disease process, treatment plan, medications, and discharge instructions Progressing        METABOLIC, FLUID AND ELECTROLYTES - ADULT     Electrolytes maintained within normal limits Progressing        PAIN - ADULT     Verbalizes/displays adequate comfort level or baseline comfort level Progressing        Potential for Falls     Patient will remain free of falls Progressing        SAFETY ADULT     Maintain or return mobility status to optimal level Progressing        SKIN/TISSUE INTEGRITY - ADULT     Skin integrity remains intact Progressing     Incision(s), wounds(s) or drain site(s) healing without S/S of infection Progressing     Oral mucous membranes remain intact Progressing

## 2018-08-20 NOTE — ANESTHESIA PREPROCEDURE EVALUATION
Review of Systems/Medical History  Patient summary reviewed  Chart reviewed  No history of anesthetic complications     Cardiovascular  EKG reviewed, Exercise tolerance (METS): >4,  Hypertension on > 1 medication, Dysrhythmias , atrial fibrillation, CHF compensated CHF,    Pulmonary       GI/Hepatic            Endo/Other     GYN       Hematology   Musculoskeletal    Arthritis     Neurology    Fibromyalgia   Psychology   Depression , being treated for depression,              Physical Exam    Airway    Mallampati score: I  TM Distance: >3 FB  Neck ROM: full     Dental   No notable dental hx     Cardiovascular      Pulmonary      Other Findings        Anesthesia Plan  ASA Score- 3     Anesthesia Type- IV sedation with anesthesia with ASA Monitors  Additional Monitors:   Airway Plan:         Plan Factors-    Induction- intravenous  Postoperative Plan-     Informed Consent- Anesthetic plan and risks discussed with patient  I personally reviewed this patient with the CRNA  Discussed and agreed on the Anesthesia Plan with the CRNA  Georgette Ormond

## 2018-08-21 ENCOUNTER — APPOINTMENT (INPATIENT)
Dept: RADIOLOGY | Facility: HOSPITAL | Age: 83
DRG: 243 | End: 2018-08-21
Payer: COMMERCIAL

## 2018-08-21 LAB
ANION GAP SERPL CALCULATED.3IONS-SCNC: 8 MMOL/L (ref 4–13)
ATRIAL RATE: 125 BPM
BUN SERPL-MCNC: 19 MG/DL (ref 5–25)
CALCIUM SERPL-MCNC: 8.2 MG/DL (ref 8.3–10.1)
CHLORIDE SERPL-SCNC: 107 MMOL/L (ref 100–108)
CO2 SERPL-SCNC: 26 MMOL/L (ref 21–32)
CREAT SERPL-MCNC: 0.89 MG/DL (ref 0.6–1.3)
ERYTHROCYTE [DISTWIDTH] IN BLOOD BY AUTOMATED COUNT: 15.6 % (ref 11.6–15.1)
GFR SERPL CREATININE-BSD FRML MDRD: 60 ML/MIN/1.73SQ M
GLUCOSE SERPL-MCNC: 94 MG/DL (ref 65–140)
HCT VFR BLD AUTO: 32.4 % (ref 34.8–46.1)
HGB BLD-MCNC: 10.2 G/DL (ref 11.5–15.4)
MCH RBC QN AUTO: 29.9 PG (ref 26.8–34.3)
MCHC RBC AUTO-ENTMCNC: 31.5 G/DL (ref 31.4–37.4)
MCV RBC AUTO: 95 FL (ref 82–98)
PLATELET # BLD AUTO: 171 THOUSANDS/UL (ref 149–390)
PMV BLD AUTO: 10.4 FL (ref 8.9–12.7)
POTASSIUM SERPL-SCNC: 3.5 MMOL/L (ref 3.5–5.3)
QRS AXIS: -16 DEGREES
QRSD INTERVAL: 100 MS
QT INTERVAL: 372 MS
QTC INTERVAL: 442 MS
RBC # BLD AUTO: 3.41 MILLION/UL (ref 3.81–5.12)
SODIUM SERPL-SCNC: 141 MMOL/L (ref 136–145)
T WAVE AXIS: -46 DEGREES
VENTRICULAR RATE: 85 BPM
WBC # BLD AUTO: 8.37 THOUSAND/UL (ref 4.31–10.16)

## 2018-08-21 PROCEDURE — 99024 POSTOP FOLLOW-UP VISIT: CPT | Performed by: INTERNAL MEDICINE

## 2018-08-21 PROCEDURE — 71046 X-RAY EXAM CHEST 2 VIEWS: CPT

## 2018-08-21 PROCEDURE — 99232 SBSQ HOSP IP/OBS MODERATE 35: CPT | Performed by: INTERNAL MEDICINE

## 2018-08-21 PROCEDURE — 85027 COMPLETE CBC AUTOMATED: CPT | Performed by: PHYSICIAN ASSISTANT

## 2018-08-21 PROCEDURE — 80048 BASIC METABOLIC PNL TOTAL CA: CPT | Performed by: PHYSICIAN ASSISTANT

## 2018-08-21 PROCEDURE — 93010 ELECTROCARDIOGRAM REPORT: CPT | Performed by: INTERNAL MEDICINE

## 2018-08-21 RX ORDER — WARFARIN SODIUM 5 MG/1
5 TABLET ORAL
Status: DISCONTINUED | OUTPATIENT
Start: 2018-08-21 | End: 2018-08-24 | Stop reason: HOSPADM

## 2018-08-21 RX ORDER — POLYETHYLENE GLYCOL 3350 17 G/17G
17 POWDER, FOR SOLUTION ORAL DAILY PRN
Status: DISCONTINUED | OUTPATIENT
Start: 2018-08-21 | End: 2018-08-24 | Stop reason: HOSPADM

## 2018-08-21 RX ORDER — DILTIAZEM HYDROCHLORIDE 60 MG/1
60 TABLET, FILM COATED ORAL EVERY 6 HOURS SCHEDULED
Status: DISCONTINUED | OUTPATIENT
Start: 2018-08-21 | End: 2018-08-22

## 2018-08-21 RX ORDER — SODIUM CHLORIDE 9 MG/ML
75 INJECTION, SOLUTION INTRAVENOUS CONTINUOUS
Status: DISCONTINUED | OUTPATIENT
Start: 2018-08-21 | End: 2018-08-22

## 2018-08-21 RX ADMIN — DILTIAZEM HYDROCHLORIDE 60 MG: 60 TABLET, FILM COATED ORAL at 10:23

## 2018-08-21 RX ADMIN — WARFARIN SODIUM 5 MG: 5 TABLET ORAL at 17:05

## 2018-08-21 RX ADMIN — HEPARIN SODIUM 5000 UNITS: 5000 INJECTION, SOLUTION INTRAVENOUS; SUBCUTANEOUS at 05:18

## 2018-08-21 RX ADMIN — POLYETHYLENE GLYCOL 3350 17 G: 17 POWDER, FOR SOLUTION ORAL at 23:10

## 2018-08-21 RX ADMIN — DILTIAZEM HYDROCHLORIDE 60 MG: 60 TABLET, FILM COATED ORAL at 22:41

## 2018-08-21 RX ADMIN — HEPARIN SODIUM 5000 UNITS: 5000 INJECTION, SOLUTION INTRAVENOUS; SUBCUTANEOUS at 22:40

## 2018-08-21 RX ADMIN — ACETAMINOPHEN 650 MG: 325 TABLET, FILM COATED ORAL at 19:31

## 2018-08-21 RX ADMIN — DILTIAZEM HYDROCHLORIDE 60 MG: 60 TABLET, FILM COATED ORAL at 17:04

## 2018-08-21 RX ADMIN — SODIUM CHLORIDE 75 ML/HR: 0.9 INJECTION, SOLUTION INTRAVENOUS at 23:15

## 2018-08-21 RX ADMIN — TORSEMIDE 20 MG: 20 TABLET ORAL at 07:33

## 2018-08-21 RX ADMIN — HEPARIN SODIUM 5000 UNITS: 5000 INJECTION, SOLUTION INTRAVENOUS; SUBCUTANEOUS at 13:45

## 2018-08-21 RX ADMIN — DILTIAZEM HYDROCHLORIDE 15 MG/HR: 5 INJECTION INTRAVENOUS at 08:01

## 2018-08-21 RX ADMIN — DULOXETINE HYDROCHLORIDE 60 MG: 60 CAPSULE, DELAYED RELEASE ORAL at 09:21

## 2018-08-21 RX ADMIN — FLUTICASONE PROPIONATE 1 SPRAY: 50 SPRAY, METERED NASAL at 09:21

## 2018-08-21 RX ADMIN — ACETAMINOPHEN 650 MG: 325 TABLET, FILM COATED ORAL at 07:32

## 2018-08-21 NOTE — PROGRESS NOTES
Progress Note - Electrophysiology-Cardiology (EP)   Davian Waters 80 y o  female MRN: [id number]  Unit/Bed#: [facility] 528-01 Encounter: [id number]      Assessment:  1   Persistent atrial fibrillation with rapid ventricular response, on Coumadin anticoagulation with now subtherapeutic INR              A ) unable to tolerate multiple AV agustina agents due to hypotension and medication intolerance (beta blockers, digoxin)   B ) status post Medtronic single-chamber pacemaker with His lead implantation 8/20/2018  2   Chronic diastolic congestive heart failure with preserved EF per echo 04/2018  3   Moderate mitral regurgitation and moderate tricuspid regurgitation  4   Hypertension  5   Nonischemic nuclear stress test 05/2018  6   Obesity with BMI 35    Plan:  1  Her cardizem gtt extravasated over the weekend, and it was noted at time of device implantation yesterday  Thus, Dr Marylen Askew would prefer to delay the AV node ablation until this is fully resolved  We will see her as an outpatient for ongoing rate monitoring and determine if/when she will need to undergo an ablation  2   She is able to tolerate cardizem  Her gtt was stopped, and she was changed to oral short acting cardizem this morning  Continue to monitor on telemetry  3  OK to restart coumadin at this time  Subjective/Objective   Chief Complaint: "I still feel tired"    Subjective:  No acute complaints, she has ongoing fatigue and dyspnea with exertion which is unchanged  Her arm swelling is greatly improved  She denies palpitations, dizziness, lightheadedness, or chest pain  She states her swelling is at its baseline      Objective:     Vitals: /68 (BP Location: Right arm)   Pulse 80   Temp 97 8 °F (36 6 °C) (Oral)   Resp 16   Ht 5' 2" (1 575 m)   Wt 85 2 kg (187 lb 12 8 oz)   SpO2 99%   BMI 34 35 kg/m²   Vitals:    08/20/18 0600 08/21/18 0331   Weight: 85 9 kg (189 lb 6 oz) 85 2 kg (187 lb 12 8 oz)     Orthostatic Blood Pressures Most Recent Value   Blood Pressure  107/68 filed at 08/21/2018 1512   Patient Position - Orthostatic VS  Lying filed at 08/21/2018 1512            Intake/Output Summary (Last 24 hours) at 08/21/18 1531  Last data filed at 08/21/18 1516   Gross per 24 hour   Intake          1134 62 ml   Output             2050 ml   Net          -915 38 ml       Invasive Devices     Peripheral Intravenous Line            Peripheral IV 08/21/18 Right Arm less than 1 day                            Scheduled Meds:  Current Facility-Administered Medications:  acetaminophen 650 mg Oral Q6H PRN Itz Duarte MD   diltiazem 60 mg Oral Q6H [facility] Kya Mo, DO   DULoxetine 60 mg Oral Daily Itz Duarte MD   fluticasone 1 spray Nasal Daily Wade Funes MD   heparin (porcine) 5,000 Units Subcutaneous [facility] Jennifer Mt, DO   torsemide 20 mg Oral Daily Jennifer Mt, DO   traMADol 50 mg Oral BID PRN Wade Funes MD     Continuous Infusions:   PRN Meds:   acetaminophen    traMADol    Review of Systems: Cardiovascular ROS: positive for - dyspnea on exertion and edema  negative for - chest pain or palpitations    Physical Exam:   GEN: NAD, alert and oriented, well appearing  SKIN: dry without significant lesions or rashes  HEENT: NCAT, PERRL, EOMs intact  NECK: No JVD appreciated  CARDIOVASCULAR:  Irregularly irregular, slightly tachycardic, normal S1, S2 without murmurs, rubs, or gallops appreciated  LUNGS: Clear to auscultation bilaterally without wheezes, rhonchi, or rales  ABDOMEN: Soft, nontender, nondistended  EXTREMITIES/VASCULAR: perfused without clubbing, cyanosis; trace LE edema b/l  PSYCH: Normal mood and affect  NEURO: CN ll-Xll grossly intact              Lab Results: I have personally reviewed pertinent lab results        Results from last 7 days  Lab Units 08/21/18  0502 08/20/18  0552 08/17/18  0809   WBC Thousand/uL 8 37 7 90 8 48   HEMOGLOBIN g/dL 10 2* 11 5 11 0*   HEMATOCRIT % 32 4* 38 3 35 9   PLATELETS Thousands/uL 171 185 186       Results from last 7 days  Lab Units 18  0502 18  0552 18  0527   SODIUM mmol/L 141 141 142   POTASSIUM mmol/L 3 5 3 7 3 7   CHLORIDE mmol/L 107 108 108   CO2 mmol/L 26 25 25   BUN mg/dL 19 21 25   CREATININE mg/dL 0 89 0 93 0 98   GLUCOSE RANDOM mg/dL 94 90 81   CALCIUM mg/dL 8 2* 8 5 8 3       Results from last 7 days  Lab Units 18  0552 18  0537 18  0528  18  1916   INR  1 45* 1 85* 2 40*  < > 2 30*   PTT seconds  --   --   --   --  38*   < > = values in this interval not displayed  Results from last 7 days  Lab Units 18  0503   MAGNESIUM mg/dL 2 8*         Imaging: I have personally reviewed pertinent reports  Results for orders placed during the hospital encounter of 18   Echo complete with contrast if indicated    Narrative [place] [address], [address] [facility]  [phone]    Transthoracic Echocardiogram  2D, M-mode, Doppler, and Color Doppler    Study date:  2018    Patient: Pedro Luis Lobo  MR number: [id number]  Account number: [de-identified]  : 1933  Age: 80 years  Gender: Female  Status: Outpatient  Location: [facility]  Height: 62 in  Weight: 231 lb  BP: 112/ 60 mmHg    Indications: MV Disease    Diagnoses: I34 0 - Nonrheumatic mitral (valve) insufficiency    Sonographer:  JESUS Muñoz  Primary Physician:  Howard Pretty DO  Referring Physician:  Trude Fothergill, MD  Group:  [facility]  Interpreting Physician:  Toni Vaughn MD    SUMMARY    LEFT VENTRICLE:  Size was normal   Systolic function was normal  Ejection fraction was estimated to be 55 %  Wall thickness was at the upper limits of normal     RIGHT VENTRICLE:  The ventricle was mildly dilated  Systolic function was normal     RIGHT ATRIUM:  The atrium was mildly dilated  MITRAL VALVE:  There was moderate regurgitation      TRICUSPID VALVE:  There was moderate regurgitation  IVC, HEPATIC VEINS:  The inferior vena cava was mildly dilated  Respirophasic changes were blunted (less than 50% variation)  COMPARISONS:  Comparison was made with the previous study of 17-May-2017  Tricuspid regurgitation has worsened  HISTORY: PRIOR HISTORY: AFIB, Moderate MR, Hypertension, CHF, Anemia    PROCEDURE: The study was performed in the [facility]  This was a routine study  The transthoracic approach was used  The study included complete 2D imaging, M-mode, complete spectral Doppler, and color Doppler  The  heart rate was 110 bpm, at the start of the study  Images were obtained from the parasternal, apical, subcostal, and suprasternal notch acoustic windows  Echocardiographic views were limited due to lung interference  Image quality was  adequate  LEFT VENTRICLE: Size was normal  Systolic function was normal  Ejection fraction was estimated to be 55 %  There were no regional wall motion abnormalities  Wall thickness was at the upper limits of normal  DOPPLER: Transmitral flow  pattern: atrial fibrillation  The study was not technically sufficient to allow evaluation of LV diastolic function  RIGHT VENTRICLE: The ventricle was mildly dilated  Systolic function was normal  Wall thickness was normal     LEFT ATRIUM: Size was normal     RIGHT ATRIUM: The atrium was mildly dilated  MITRAL VALVE: Valve structure was normal  There was normal leaflet separation  DOPPLER: The transmitral velocity was within the normal range  There was no evidence for stenosis  There was moderate regurgitation  AORTIC VALVE: The valve was trileaflet  Leaflets exhibited normal thickness, normal cuspal separation, and sclerosis  DOPPLER: Transaortic velocity was within the normal range  There was no evidence for stenosis  There was no  regurgitation  TRICUSPID VALVE: The valve structure was normal  There was normal leaflet separation   DOPPLER: The transtricuspid velocity was within the normal range  There was no evidence for stenosis  There was moderate regurgitation  Pulmonary artery  systolic pressure was within the normal range  Estimated peak PA pressure was 36 mmHg  PULMONIC VALVE: Leaflets exhibited normal thickness, no calcification, and normal cuspal separation  DOPPLER: The transpulmonic velocity was within the normal range  There was no regurgitation  PERICARDIUM: There was no pericardial effusion  The pericardium was normal in appearance  AORTA: The root exhibited normal size  SYSTEMIC VEINS: IVC: The inferior vena cava was mildly dilated  Respirophasic changes were blunted (less than 50% variation)      SYSTEM MEASUREMENT TABLES    2D  %FS: 34 71 %  AV Diam: 2 84 cm  EDV(Teich): 101 4 ml  EF(Cube): 72 17 %  EF(Teich): 63 88 %  ESV(Cube): 28 55 ml  ESV(Teich): 36 63 ml  IVSd: 1 1 cm  LA Area: 17 55 cm2  LA Diam: 4 14 cm  LVEDV MOD A4C: 61 32 ml  LVEF MOD A4C: 61 87 %  LVESV MOD A4C: 23 38 ml  LVIDd: 4 68 cm  LVIDs: 3 06 cm  LVLd A4C: 6 59 cm  LVLs A4C: 5 67 cm  LVPWd: 1 cm  RA Area: 23 38 cm2  RV Diam: 3 71 cm  SV MOD A4C: 37 94 ml  SV(Cube): 74 03 ml  SV(Teich): 64 77 ml    CW  TR MaxP 51 mmHg  TR Vmax: 2 21 m/s    MM  TAPSE: 1 35 cm    [facility] Accredited Echocardiography Laboratory    Prepared and electronically signed by    Ken Poe MD  Signed 2018 17:36:53         EKG/telemetry:  Atrial fibrillation with periods of rapid ventricular response, heart rates currently around 100 beats per minute    VTE Pharmacologic Prophylaxis: Reason for no pharmacologic prophylaxis held for potential ablation today  VTE Mechanical Prophylaxis: sequential compression device

## 2018-08-21 NOTE — ASSESSMENT & PLAN NOTE
Status post pacemaker placement  Transitioned to p o  Cardizem today, monitor on telemetry, patient still symptomatic with some episodes of rapid ventricular response  Electrophysiology following and titrating medications    Possible ablation if fails medical therapy

## 2018-08-21 NOTE — PROGRESS NOTES
Progress Note - Beronica Scott 1933, 80 y o  female MRN: [id number]    Unit/Bed#: [facility] 528-01 Encounter: [id number]    Primary Care Provider: Mago Kwan DO   Date and time admitted to hospital: 2018  9:06 PM        Benign essential hypertension   Assessment & Plan    BP is low, will continue to monitor current medications  Check orthostatics        Depression   Assessment & Plan    Will continue home medication with Cymbalta        Chronic diastolic CHF (congestive heart failure) ([facility])   Assessment & Plan    Appears euvolemic  Continue torsemide  Monitor orthostatic BPs        * Atrial fibrillation with RVR [facility])   Assessment & Plan    Status post pacemaker placement  Transitioned to p o  Cardizem today, monitor on telemetry, patient still symptomatic with some episodes of rapid ventricular response  Electrophysiology following and titrating medications  Possible ablation if fails medical therapy            VTE Pharmacologic Prophylaxis:   Pharmacologic: Apixaban (Eliquis)  Mechanical VTE Prophylaxis in Place: Yes    Patient Centered Rounds: I have performed bedside rounds with nursing staff today  Discussions with Specialists or Other Care Team Provider: EP    Education and Discussions with Family / Patient: patient, plan of care    Time Spent for Care: 20 minutes  More than 50% of total time spent on counseling and coordination of care as described above  Current Length of Stay: 5 day(s)    Current Patient Status: Inpatient   Certification Statement: The patient will continue to require additional inpatient hospital stay due to Monitor heart rates, titrate medications  Discharge Plan: home when stable    Code Status: Level 1 - Full Code      Subjective:   Reports mild shortness of dizziness when standing and shortness of breath when walking      Objective:     Vitals:   Temp (24hrs), Av °F (36 7 °C), Min:97 5 °F (36 4 °C), Max:98 5 °F (36 9 °C)    HR:  [] 80  Resp:  [16-18] 16  BP: ()/(58-81) 107/68  SpO2:  [96 %-99 %] 99 %  Body mass index is 34 35 kg/m²  Input and Output Summary (last 24 hours): Intake/Output Summary (Last 24 hours) at 08/21/18 1620  Last data filed at 08/21/18 1516   Gross per 24 hour   Intake          1134 62 ml   Output             2050 ml   Net          -915 38 ml       Physical Exam:     Physical Exam   Constitutional: She is oriented to person, place, and time  She appears well-developed and well-nourished  HENT:   Head: Normocephalic and atraumatic  Eyes: EOM are normal  Pupils are equal, round, and reactive to light  No scleral icterus  Neck: Neck supple  No JVD present  Cardiovascular:   Irregularly irregular, tachy   Pulmonary/Chest: Effort normal  She has no wheezes  She has no rales  Abdominal: Soft  There is no tenderness  Musculoskeletal: Normal range of motion  She exhibits no edema  Neurological: She is alert and oriented to person, place, and time  Skin: Skin is warm and dry  Additional Data:     Labs:      Results from last 7 days  Lab Units 08/21/18  0502  08/17/18  0809   WBC Thousand/uL 8 37  < > 8 48   HEMOGLOBIN g/dL 10 2*  < > 11 0*   HEMATOCRIT % 32 4*  < > 35 9   PLATELETS Thousands/uL 171  < > 186   NEUTROS PCT %  --   --  70   LYMPHS PCT %  --   --  19   MONOS PCT %  --   --  11   EOS PCT %  --   --  0   < > = values in this interval not displayed  Results from last 7 days  Lab Units 08/21/18  0502  08/14/18  1916   SODIUM mmol/L 141  < > 136   POTASSIUM mmol/L 3 5  < > 4 2   CHLORIDE mmol/L 107  < > 100   CO2 mmol/L 26  < > 27   BUN mg/dL 19  < > 28*   CREATININE mg/dL 0 89  < > 1 37*   CALCIUM mg/dL 8 2*  < > 8 9   TOTAL PROTEIN g/dL  --   --  7 2   BILIRUBIN TOTAL mg/dL  --   --  0 40   ALK PHOS U/L  --   --  103   ALT U/L  --   --  22   AST U/L  --   --  20   GLUCOSE RANDOM mg/dL 94  < > 219*   < > = values in this interval not displayed      Results from last 7 days  Lab Units 08/20/18  0552   INR  1 45*                 * I Have Reviewed All Lab Data Listed Above  * Additional Pertinent Lab Tests Reviewed: All Labs Within Last 24 Hours Reviewed      Recent Cultures (last 7 days):           Last 24 Hours Medication List:     Current Facility-Administered Medications:  acetaminophen 650 mg Oral Q6H PRN Itz Trotter MD   diltiazem 60 mg Oral Q6H [facility] Alma Patrick, DO   DULoxetine 60 mg Oral Daily Itz Trotter MD   fluticasone 1 spray Nasal Daily Mariano Gomes MD   heparin (porcine) 5,000 Units Subcutaneous [facility] Vickey Varma DO   torsemide 20 mg Oral Daily Vickye Varma DO   traMADol 50 mg Oral BID PRN Mariano Gomes MD   warfarin 5 mg Oral Daily (warfarin) Jesús Yousif PA-C        Today, Patient Was Seen By: Joann Jasmine MD    ** Please Note: Dictation voice to text software may have been used in the creation of this document   **

## 2018-08-22 LAB
INR PPP: 1.26 (ref 0.86–1.17)
PROTHROMBIN TIME: 15.9 SECONDS (ref 11.8–14.2)

## 2018-08-22 PROCEDURE — 99024 POSTOP FOLLOW-UP VISIT: CPT | Performed by: INTERNAL MEDICINE

## 2018-08-22 PROCEDURE — 99232 SBSQ HOSP IP/OBS MODERATE 35: CPT | Performed by: INTERNAL MEDICINE

## 2018-08-22 PROCEDURE — 85610 PROTHROMBIN TIME: CPT | Performed by: PHYSICIAN ASSISTANT

## 2018-08-22 RX ORDER — HEPARIN SODIUM 5000 [USP'U]/ML
5000 INJECTION, SOLUTION INTRAVENOUS; SUBCUTANEOUS EVERY 8 HOURS SCHEDULED
Status: DISCONTINUED | OUTPATIENT
Start: 2018-08-22 | End: 2018-08-24 | Stop reason: HOSPADM

## 2018-08-22 RX ORDER — SODIUM CHLORIDE 9 MG/ML
250 INJECTION, SOLUTION INTRAVENOUS CONTINUOUS
Status: DISCONTINUED | OUTPATIENT
Start: 2018-08-22 | End: 2018-08-22

## 2018-08-22 RX ADMIN — DEXTROSE 150 MG: 50 INJECTION, SOLUTION INTRAVENOUS at 17:28

## 2018-08-22 RX ADMIN — DULOXETINE HYDROCHLORIDE 60 MG: 60 CAPSULE, DELAYED RELEASE ORAL at 09:03

## 2018-08-22 RX ADMIN — SODIUM CHLORIDE 250 ML/HR: 0.9 INJECTION, SOLUTION INTRAVENOUS at 15:44

## 2018-08-22 RX ADMIN — TORSEMIDE 20 MG: 20 TABLET ORAL at 09:04

## 2018-08-22 RX ADMIN — HEPARIN SODIUM 5000 UNITS: 5000 INJECTION, SOLUTION INTRAVENOUS; SUBCUTANEOUS at 21:15

## 2018-08-22 RX ADMIN — DILTIAZEM HYDROCHLORIDE 60 MG: 60 TABLET, FILM COATED ORAL at 05:19

## 2018-08-22 RX ADMIN — WARFARIN SODIUM 5 MG: 5 TABLET ORAL at 16:52

## 2018-08-22 RX ADMIN — DILTIAZEM HYDROCHLORIDE 60 MG: 60 TABLET, FILM COATED ORAL at 10:35

## 2018-08-22 RX ADMIN — HEPARIN SODIUM 5000 UNITS: 5000 INJECTION, SOLUTION INTRAVENOUS; SUBCUTANEOUS at 05:19

## 2018-08-22 RX ADMIN — DEXTROSE 150 MG: 50 INJECTION, SOLUTION INTRAVENOUS at 17:09

## 2018-08-22 NOTE — ASSESSMENT & PLAN NOTE
Status post pacemaker placement  Transitioned to p o  Cardizem  Still having episodes of AFib with rapid ventricular response, blood pressures are low, history of intolerance to multiple medications   Discussed with electrophysiology will likely have an ablation prior to discharge

## 2018-08-22 NOTE — SOCIAL WORK
Patient identified as HRR per criteria  Call made to DC appointment hotline with information as required for CM support follow up  Referral made to out CM

## 2018-08-22 NOTE — PROGRESS NOTES
Pt 's HR sustaining in the 150s at rest  Asymptomatic  BPs 116/69-sitting and 99/66- standing  MD Asim Juan and EP [facility] aware  See new orders

## 2018-08-22 NOTE — PROGRESS NOTES
Progress Note - Jeanna Wagoner 1933, 80 y o  female MRN: [id number]    Unit/Bed#: [facility] 528-01 Encounter: [id number]    Primary Care Provider: Robbin Borges DO   Date and time admitted to hospital: 2018  9:06 PM        Benign essential hypertension   Assessment & Plan    BP is low, will continue to monitor current medications  Orthostatics are low  Will continue with IV fluid hydration        Chronic diastolic CHF (congestive heart failure) ([facility])   Assessment & Plan    Orthostatics were low  Continue IV hydration        * Atrial fibrillation with RVR [facility])   Assessment & Plan    Status post pacemaker placement  Transitioned to p o  Cardizem  Still having episodes of AFib with rapid ventricular response, blood pressures are low, history of intolerance to multiple medications   Discussed with electrophysiology will likely have an ablation prior to discharge  VTE Pharmacologic Prophylaxis:   Pharmacologic: Heparin  Mechanical VTE Prophylaxis in Place: Yes    Patient Centered Rounds: I have performed bedside rounds with nursing staff today  Discussions with Specialists or Other Care Team Provider: EP    Education and Discussions with Family / Patient: patient, plan of care    Time Spent for Care: 15 minutes  More than 50% of total time spent on counseling and coordination of care as described above  Current Length of Stay: 6 day(s)    Current Patient Status: Inpatient   Certification Statement: The patient will continue to require additional inpatient hospital stay due to uncontrolled a fib    Discharge Plan: home when stable    Code Status: Level 1 - Full Code      Subjective:   Reports less dizziness when standing  Went into rapid AFib this afternoon  EP was notified and will be following up for a possible ablation      Objective:     Vitals:   Temp (24hrs), Av 8 °F (36 6 °C), Min:97 4 °F (36 3 °C), Max:98 5 °F (36 9 °C)    HR:  [] 137  Resp:  [18] 18  BP: ()/(55-77) 99/66  SpO2:  [96 %-99 %] 98 %  Body mass index is 34 31 kg/m²  Input and Output Summary (last 24 hours): Intake/Output Summary (Last 24 hours) at 08/22/18 1630  Last data filed at 08/22/18 1628   Gross per 24 hour   Intake          1751 25 ml   Output             4775 ml   Net         -3023 75 ml       Physical Exam:     Physical Exam   Constitutional: She is oriented to person, place, and time  She appears well-developed and well-nourished  HENT:   Head: Normocephalic and atraumatic  Eyes: EOM are normal  Pupils are equal, round, and reactive to light  Neck: Neck supple  Cardiovascular:   Irregular irregular tachy   Pulmonary/Chest: She has no wheezes  She has no rales  Abdominal: Soft  Musculoskeletal: She exhibits no edema  Neurological: She is alert and oriented to person, place, and time  Skin: Skin is warm and dry  Additional Data:     Labs:      Results from last 7 days  Lab Units 08/21/18  0502  08/17/18  0809   WBC Thousand/uL 8 37  < > 8 48   HEMOGLOBIN g/dL 10 2*  < > 11 0*   HEMATOCRIT % 32 4*  < > 35 9   PLATELETS Thousands/uL 171  < > 186   NEUTROS PCT %  --   --  70   LYMPHS PCT %  --   --  19   MONOS PCT %  --   --  11   EOS PCT %  --   --  0   < > = values in this interval not displayed  Results from last 7 days  Lab Units 08/21/18  0502   SODIUM mmol/L 141   POTASSIUM mmol/L 3 5   CHLORIDE mmol/L 107   CO2 mmol/L 26   BUN mg/dL 19   CREATININE mg/dL 0 89   CALCIUM mg/dL 8 2*   GLUCOSE RANDOM mg/dL 94       Results from last 7 days  Lab Units 08/22/18  0520   INR  1 26*                 * I Have Reviewed All Lab Data Listed Above  * Additional Pertinent Lab Tests Reviewed:  All Labs Within Last 24 Hours Reviewed    Recent Cultures (last 7 days):           Last 24 Hours Medication List:     Current Facility-Administered Medications:  acetaminophen 650 mg Oral Q6H PRN Noy Espinoza MD    DULoxetine 60 mg Oral Daily Noy Espinoza MD    fluticasone 1 spray Nasal Daily Mahrouf Denver Bureau, MD    heparin (porcine) 5,000 Units Subcutaneous [facility] Gio Edmonds MD    polyethylene glycol 17 g Oral Daily PRN Jos Singh PA-C    sodium chloride 250 mL/hr Intravenous Continuous Gio Edmonds MD Last Rate: 250 mL/hr (08/22/18 1544)   torsemide 20 mg Oral Daily Jaylon Courtney,     traMADol 50 mg Oral BID PRN Rochelle Morin MD    warfarin 5 mg Oral Daily (warfarin) Hope Mendoza PA-C         Today, Patient Was Seen By: Nadja Hamlin MD    ** Please Note: Dictation voice to text software may have been used in the creation of this document   **

## 2018-08-22 NOTE — CASE MANAGEMENT
Thank you,  Edgar Urbann  Utilization Review Department  Phone: [phone]; Fax [phone]  ATTENTION: Please call with any questions or concerns to [phone]  and carefully follow the prompts so that you are directed to the right person  Send all requests for admission clinical reviews, approved or denied determinations and any other requests to fax [phone]   All voicemails are confidential        Continued Stay Review    Date:  18 ACUTE MED SURG LEVEL OF CARE    Vital Signs: /77   Pulse (!) 129   Temp 98 1 °F (36 7 °C) (Oral)   Resp 18   Ht 5' 2" (1 575 m)   Wt 85 1 kg (187 lb 9 8 oz)   SpO2 99%   BMI 34 31 kg/m²      Vitals:   Temp (24hrs), Av °F (36 7 °C), Min:97 5 °F (36 4 °C), Max:98 5 °F (36 9 °C)   HR:  [] 80  Resp:  [16-18] 16  BP: ()/(58-81) 107/68  SpO2:  [96 %-99 %] 99 %  Body mass index is 34 35 kg/m²       Input and Output Summary (last 24 hours):   Last data filed at 18 1516    Gross per 24 hour   Intake          1134 62 ml   Output             2050 ml   Net          -915 38 ml        Diet Cardiac; Cardiac Step 1; Sodium 2 GM       IV ACCESS    Medications:   Scheduled Meds:   Current Facility-Administered Medications:  acetaminophen 650 mg Oral Q6H PRN Itz Mahan MD   diltiazem 60 mg Oral Q6H [address] Selinda Sicks, DO   DULoxetine 60 mg Oral Daily Itz Mahan MD   fluticasone 1 spray Nasal Daily Itz Mahan MD   polyethylene glycol 17 g Oral Daily PRN Jeff Fierro PA-C   torsemide 20 mg Oral Daily Akua Madison, DO   traMADol 50 mg Oral BID PRN Itz Mahan MD   warfarin 5 mg Oral Daily (warfarin) Asher Lowry PA-C       PRN Meds:     Acetaminophen 650 mg q6hrs prn given x 2/ 24 hrs    polyethylene glycol 17 Gms po qDay prn given x 1/ 24 hrs    traMADol      LABS/Diagnostic Results:   Results from last 7 days  Lab Units 18  0502   18  0809   WBC Thousand/uL 8 37  < > 8 48 HEMOGLOBIN g/dL 10 2*  < > 11 0*   HEMATOCRIT % 32 4*  < > 35 9   PLATELETS Thousands/uL 171  < > 186   NEUTROS PCT %  --   --  70   LYMPHS PCT %  --   --  19   MONOS PCT %  --   --  11   EOS PCT %  --   --  0      Results from last 7 days  Lab Units 08/21/18  0502   08/14/18  1916   SODIUM mmol/L 141  < > 136   POTASSIUM mmol/L 3 5  < > 4 2   CHLORIDE mmol/L 107  < > 100   CO2 mmol/L 26  < > 27   BUN mg/dL 19  < > 28*   CREATININE mg/dL 0 89  < > 1 37*   CALCIUM mg/dL 8 2*  < > 8 9   TOTAL PROTEIN g/dL  --   --  7 2   BILIRUBIN TOTAL mg/dL  --   --  0 40   ALK PHOS U/L  --   --  103   ALT U/L  --   --  22   AST U/L  --   --  20   GLUCOSE RANDOM mg/dL 94  < > 219*      Results from last 7 days  Lab Units 08/20/18  0552   INR   1 45*       Age/Sex: 80 y o  female       Assessment/Plan:   Benign essential hypertension   Assessment & Plan     BP is low, will continue to monitor current medications  Check orthostatics          Depression   Assessment & Plan     Will continue home medication with Cymbalta          Chronic diastolic CHF (congestive heart failure) ([facility])   Assessment & Plan     Appears euvolemic  Continue torsemide  Monitor orthostatic BPs          * Atrial fibrillation with RVR ([facility])   Assessment & Plan     Status post pacemaker placement  Transitioned to p o  Cardizem today, monitor on telemetry, patient still symptomatic with some episodes of rapid ventricular response  Electrophysiology following and titrating medications  Possible ablation if fails medical therapy         VTE Pharmacologic Prophylaxis:   Pharmacologic: Apixaban (Eliquis)  Mechanical VTE Prophylaxis in Place:  Yes      Current Length of Stay: 5 day(s)     Current Patient Status: Inpatient   Certification Statement: The patient will continue to require additional inpatient hospital stay due to Monitor heart rates, titrate medications            Discharge Plan:   [address] CLEARED    CASE MANAGEMENT FOLLOWING CLOSELY FOR ALL DISCHARGE NEEDS

## 2018-08-22 NOTE — PROGRESS NOTES
Pt 's HR s/p amio bolus x2 in Afib 120s-140s  Patient Is asymptomatic and resting in bed  S/w cardiology fellow regarding HR  Per his request since patient Is asymptomatic and HR is not sustaining in the 170s will not aggressively treat d/t to patient already being scheduled for ablation tomorrow  No new orders at this time

## 2018-08-22 NOTE — PROGRESS NOTES
Progress Note - Electrophysiology-Cardiology (EP)   Reginaldo Blake 80 y o  female MRN: [id number]  Unit/Bed#: [facility] 528-01 Encounter: [id number]      Assessment:  1   Persistent atrial fibrillation with rapid ventricular response, on Coumadin anticoagulation with now subtherapeutic INR              A ) unable to tolerate multiple AV agustina agents due to hypotension and medication intolerance (beta blockers, digoxin)              B ) status post Medtronic single-chamber pacemaker with His lead implantation 8/20/2018   C ) previously on IV Cardizem however it extravasated, she was then switched to oral Cardizem without adequate rate control  2   Chronic diastolic congestive heart failure with preserved EF per echo 04/2018  3   Moderate mitral regurgitation and moderate tricuspid regurgitation  4   Hypertension  5   Nonischemic nuclear stress test 05/2018  6   Obesity with BMI 35    Plan:  1  She continues to be tachycardic on oral cardizem, and cannot tolerate up titatration due to ongoing hypotension  Recommend proceeding with AV node ablation tomorrow, 8/23/2018  Discussed with the patient, all questions answered, and she is in agreement to proceed  2   Discussed rate control options with Dr Eveline Dinero  Recommend giving IV amiodarone 150 mg bolus x 2 separate doses to help better control her heart rate overnight  This bolus does not typically cause hypotension, but will need to monitor closely  3   Keep NPO at midnight, check AM labs  4   OK to continue giving coumadin, she is subtherapeutic  5   Recommend discontinuing IVF due to her history of diastolic CHF and prior difficulty with volume overload  Subjective/Objective   Chief Complaint:  I feel fine    Subjective:  She states she is largely asymptomatic, denies feeling dizzy or lightheaded with her borderline low blood pressures  While she does not notice alan palpitations, she does at times feel jittery with her rapid heart rate    She denies significant shortness of breath at rest, this has been ongoing with exertion however      Objective:     Vitals: BP 99/66   Pulse (!) 137   Temp 98 5 °F (36 9 °C) (Oral)   Resp 18   Ht 5' 2" (1 575 m)   Wt 85 1 kg (187 lb 9 8 oz)   SpO2 98%   BMI 34 31 kg/m²   Vitals:    08/21/18 0331 08/22/18 0517   Weight: 85 2 kg (187 lb 12 8 oz) 85 1 kg (187 lb 9 8 oz)     Orthostatic Blood Pressures      Most Recent Value   Blood Pressure  99/66 filed at 08/22/2018 1501   Patient Position - Orthostatic VS  Standing - Orthostatic VS filed at 08/22/2018 1501            Intake/Output Summary (Last 24 hours) at 08/22/18 1622  Last data filed at 08/22/18 1457   Gross per 24 hour   Intake          1751 25 ml   Output             4475 ml   Net         -2723 75 ml       Invasive Devices     Peripheral Intravenous Line            Peripheral IV 08/21/18 Right Arm 1 day                            Scheduled Meds:  Current Facility-Administered Medications:  acetaminophen 650 mg Oral Q6H PRN Itz Dodd MD    DULoxetine 60 mg Oral Daily Itz Dodd MD    fluticasone 1 spray Nasal Daily Itz Dodd MD    polyethylene glycol 17 g Oral Daily PRN Jacki Cardenas PA-C    sodium chloride 250 mL/hr Intravenous Continuous Manjeet Parsons MD Last Rate: 250 mL/hr (08/22/18 1544)   torsemide 20 mg Oral Daily Sammy Reina DO    traMADol 50 mg Oral BID PRN Itz Dodd MD    warfarin 5 mg Oral Daily (warfarin) Otilia Leahy PA-C      Continuous Infusions:  sodium chloride 250 mL/hr Last Rate: 250 mL/hr (08/22/18 1544)     PRN Meds:   acetaminophen    polyethylene glycol    traMADol    Review of Systems: Cardiovascular ROS: positive for - dyspnea on exertion  negative for - chest pain or palpitations    Physical Exam:   GEN: NAD, alert and oriented, well appearing  SKIN: dry without significant lesions or rashes  HEENT: NCAT, PERRL, EOMs intact  NECK: No JVD appreciated  CARDIOVASCULAR: irregularly irregular, tachycardic, normal S1, S2 without murmurs, rubs, or gallops appreciated  LUNGS: Clear to auscultation bilaterally without wheezes, rhonchi, or rales  ABDOMEN: Soft, nontender, nondistended  EXTREMITIES/VASCULAR: perfused without clubbing, cyanosis; 1+ LE edema b/l  PSYCH: Normal mood and affect  NEURO: CN ll-Xll grossly intact                Lab Results: I have personally reviewed pertinent lab results  Results from last 7 days  Lab Units 18  0502 18  0552 18  0809   WBC Thousand/uL 8 37 7 90 8 48   HEMOGLOBIN g/dL 10 2* 11 5 11 0*   HEMATOCRIT % 32 4* 38 3 35 9   PLATELETS Thousands/uL 171 185 186       Results from last 7 days  Lab Units 18  0502 18  0552 18  0527   SODIUM mmol/L 141 141 142   POTASSIUM mmol/L 3 5 3 7 3 7   CHLORIDE mmol/L 107 108 108   CO2 mmol/L 26 25 25   BUN mg/dL 19 21 25   CREATININE mg/dL 0 89 0 93 0 98   GLUCOSE RANDOM mg/dL 94 90 81   CALCIUM mg/dL 8 2* 8 5 8 3       Results from last 7 days  Lab Units 18  0520 18  0552 18  0537   INR  1 26* 1 45* 1 85*       Results from last 7 days  Lab Units 18  0503   MAGNESIUM mg/dL 2 8*         Imaging: I have personally reviewed pertinent reports      Results for orders placed during the hospital encounter of 18   Echo complete with contrast if indicated    Narrative [place] [address], [facility] [facility]  [phone]    Transthoracic Echocardiogram  2D, M-mode, Doppler, and Color Doppler    Study date:  2018    Patient: Kj Watson  MR number: [id number]  Account number: [de-identified]  : 1933  Age: 80 years  Gender: Female  Status: Outpatient  Location: [facility]  Height: 62 in  Weight: 231 lb  BP: 112/ 60 mmHg    Indications: MV Disease    Diagnoses: I34 0 - Nonrheumatic mitral (valve) insufficiency    Sonographer:  JESUS Kapoor  Primary Physician:  Sanchez Seo DO  Referring Physician:  Lucinda Ram Allan Fulton MD  Group:  [facility]  Interpreting Physician:  Jocelyn Meneses MD    SUMMARY    LEFT VENTRICLE:  Size was normal   Systolic function was normal  Ejection fraction was estimated to be 55 %  Wall thickness was at the upper limits of normal     RIGHT VENTRICLE:  The ventricle was mildly dilated  Systolic function was normal     RIGHT ATRIUM:  The atrium was mildly dilated  MITRAL VALVE:  There was moderate regurgitation  TRICUSPID VALVE:  There was moderate regurgitation  IVC, HEPATIC VEINS:  The inferior vena cava was mildly dilated  Respirophasic changes were blunted (less than 50% variation)  COMPARISONS:  Comparison was made with the previous study of 17-May-2017  Tricuspid regurgitation has worsened  HISTORY: PRIOR HISTORY: AFIB, Moderate MR, Hypertension, CHF, Anemia    PROCEDURE: The study was performed in the [facility]  This was a routine study  The transthoracic approach was used  The study included complete 2D imaging, M-mode, complete spectral Doppler, and color Doppler  The  heart rate was 110 bpm, at the start of the study  Images were obtained from the parasternal, apical, subcostal, and suprasternal notch acoustic windows  Echocardiographic views were limited due to lung interference  Image quality was  adequate  LEFT VENTRICLE: Size was normal  Systolic function was normal  Ejection fraction was estimated to be 55 %  There were no regional wall motion abnormalities  Wall thickness was at the upper limits of normal  DOPPLER: Transmitral flow  pattern: atrial fibrillation  The study was not technically sufficient to allow evaluation of LV diastolic function  RIGHT VENTRICLE: The ventricle was mildly dilated  Systolic function was normal  Wall thickness was normal     LEFT ATRIUM: Size was normal     RIGHT ATRIUM: The atrium was mildly dilated  MITRAL VALVE: Valve structure was normal  There was normal leaflet separation  DOPPLER: The transmitral velocity was within the normal range  There was no evidence for stenosis  There was moderate regurgitation  AORTIC VALVE: The valve was trileaflet  Leaflets exhibited normal thickness, normal cuspal separation, and sclerosis  DOPPLER: Transaortic velocity was within the normal range  There was no evidence for stenosis  There was no  regurgitation  TRICUSPID VALVE: The valve structure was normal  There was normal leaflet separation  DOPPLER: The transtricuspid velocity was within the normal range  There was no evidence for stenosis  There was moderate regurgitation  Pulmonary artery  systolic pressure was within the normal range  Estimated peak PA pressure was 36 mmHg  PULMONIC VALVE: Leaflets exhibited normal thickness, no calcification, and normal cuspal separation  DOPPLER: The transpulmonic velocity was within the normal range  There was no regurgitation  PERICARDIUM: There was no pericardial effusion  The pericardium was normal in appearance  AORTA: The root exhibited normal size  SYSTEMIC VEINS: IVC: The inferior vena cava was mildly dilated  Respirophasic changes were blunted (less than 50% variation)      SYSTEM MEASUREMENT TABLES    2D  %FS: 34 71 %  AV Diam: 2 84 cm  EDV(Teich): 101 4 ml  EF(Cube): 72 17 %  EF(Teich): 63 88 %  ESV(Cube): 28 55 ml  ESV(Teich): 36 63 ml  IVSd: 1 1 cm  LA Area: 17 55 cm2  LA Diam: 4 14 cm  LVEDV MOD A4C: 61 32 ml  LVEF MOD A4C: 61 87 %  LVESV MOD A4C: 23 38 ml  LVIDd: 4 68 cm  LVIDs: 3 06 cm  LVLd A4C: 6 59 cm  LVLs A4C: 5 67 cm  LVPWd: 1 cm  RA Area: 23 38 cm2  RV Diam: 3 71 cm  SV MOD A4C: 37 94 ml  SV(Cube): 74 03 ml  SV(Teich): 64 77 ml    CW  TR MaxP 51 mmHg  TR Vmax: 2 21 m/s    MM  TAPSE: 1 35 cm    [employer] Accredited Echocardiography [facility]    Prepared and electronically signed by    Kristen Hermosillo MD  Signed 2018 17:36:53         EKG/telemetry: ongoing atrial fibrillation with rapid ventricular response, this afternoon has been more consistently in the 150s    VTE Pharmacologic Prophylaxis: Warfarin (Coumadin) with subtherapeutic INR  VTE Mechanical Prophylaxis: sequential compression device

## 2018-08-22 NOTE — ASSESSMENT & PLAN NOTE
BP is low, will continue to monitor current medications  Orthostatics are low    Will continue with IV fluid hydration

## 2018-08-23 ENCOUNTER — ANESTHESIA (INPATIENT)
Dept: NON INVASIVE DIAGNOSTICS | Facility: HOSPITAL | Age: 83
DRG: 243 | End: 2018-08-23
Payer: COMMERCIAL

## 2018-08-23 LAB
ANION GAP SERPL CALCULATED.3IONS-SCNC: 8 MMOL/L (ref 4–13)
ATRIAL RATE: 312 BPM
BASOPHILS # BLD AUTO: 0.02 THOUSANDS/ΜL (ref 0–0.1)
BASOPHILS NFR BLD AUTO: 0 % (ref 0–1)
BUN SERPL-MCNC: 15 MG/DL (ref 5–25)
CALCIUM SERPL-MCNC: 8.6 MG/DL (ref 8.3–10.1)
CHLORIDE SERPL-SCNC: 108 MMOL/L (ref 100–108)
CO2 SERPL-SCNC: 26 MMOL/L (ref 21–32)
CREAT SERPL-MCNC: 0.84 MG/DL (ref 0.6–1.3)
EOSINOPHIL # BLD AUTO: 0.17 THOUSAND/ΜL (ref 0–0.61)
EOSINOPHIL NFR BLD AUTO: 2 % (ref 0–6)
ERYTHROCYTE [DISTWIDTH] IN BLOOD BY AUTOMATED COUNT: 15.7 % (ref 11.6–15.1)
GFR SERPL CREATININE-BSD FRML MDRD: 64 ML/MIN/1.73SQ M
GLUCOSE SERPL-MCNC: 93 MG/DL (ref 65–140)
HCT VFR BLD AUTO: 33.1 % (ref 34.8–46.1)
HGB BLD-MCNC: 9.9 G/DL (ref 11.5–15.4)
IMM GRANULOCYTES # BLD AUTO: 0.04 THOUSAND/UL (ref 0–0.2)
IMM GRANULOCYTES NFR BLD AUTO: 1 % (ref 0–2)
INR PPP: 1.39 (ref 0.86–1.17)
LYMPHOCYTES # BLD AUTO: 1.38 THOUSANDS/ΜL (ref 0.6–4.47)
LYMPHOCYTES NFR BLD AUTO: 19 % (ref 14–44)
MCH RBC QN AUTO: 28.9 PG (ref 26.8–34.3)
MCHC RBC AUTO-ENTMCNC: 29.9 G/DL (ref 31.4–37.4)
MCV RBC AUTO: 97 FL (ref 82–98)
MONOCYTES # BLD AUTO: 1.09 THOUSAND/ΜL (ref 0.17–1.22)
MONOCYTES NFR BLD AUTO: 15 % (ref 4–12)
NEUTROPHILS # BLD AUTO: 4.42 THOUSANDS/ΜL (ref 1.85–7.62)
NEUTS SEG NFR BLD AUTO: 63 % (ref 43–75)
NRBC BLD AUTO-RTO: 0 /100 WBCS
PLATELET # BLD AUTO: 184 THOUSANDS/UL (ref 149–390)
PMV BLD AUTO: 10.2 FL (ref 8.9–12.7)
POTASSIUM SERPL-SCNC: 3.9 MMOL/L (ref 3.5–5.3)
PROTHROMBIN TIME: 17.2 SECONDS (ref 11.8–14.2)
QRS AXIS: 1 DEGREES
QRSD INTERVAL: 144 MS
QT INTERVAL: 464 MS
QTC INTERVAL: 535 MS
RBC # BLD AUTO: 3.42 MILLION/UL (ref 3.81–5.12)
SODIUM SERPL-SCNC: 142 MMOL/L (ref 136–145)
T WAVE AXIS: 169 DEGREES
VENTRICULAR RATE: 80 BPM
WBC # BLD AUTO: 7.12 THOUSAND/UL (ref 4.31–10.16)

## 2018-08-23 PROCEDURE — 93005 ELECTROCARDIOGRAM TRACING: CPT

## 2018-08-23 PROCEDURE — 85025 COMPLETE CBC W/AUTO DIFF WBC: CPT | Performed by: PHYSICIAN ASSISTANT

## 2018-08-23 PROCEDURE — 93650 ICAR CATH ABLTJ AV NODE FUNC: CPT | Performed by: PHYSICIAN ASSISTANT

## 2018-08-23 PROCEDURE — C1894 INTRO/SHEATH, NON-LASER: HCPCS | Performed by: PHYSICIAN ASSISTANT

## 2018-08-23 PROCEDURE — C1733 CATH, EP, OTHR THAN COOL-TIP: HCPCS | Performed by: PHYSICIAN ASSISTANT

## 2018-08-23 PROCEDURE — C1893 INTRO/SHEATH, FIXED,NON-PEEL: HCPCS | Performed by: PHYSICIAN ASSISTANT

## 2018-08-23 PROCEDURE — 93286 PERI-PX EVAL PM/LDLS PM IP: CPT | Performed by: PHYSICIAN ASSISTANT

## 2018-08-23 PROCEDURE — 4A0234Z MEASUREMENT OF CARDIAC ELECTRICAL ACTIVITY, PERCUTANEOUS APPROACH: ICD-10-PCS | Performed by: INTERNAL MEDICINE

## 2018-08-23 PROCEDURE — 85610 PROTHROMBIN TIME: CPT | Performed by: PHYSICIAN ASSISTANT

## 2018-08-23 PROCEDURE — 93010 ELECTROCARDIOGRAM REPORT: CPT | Performed by: INTERNAL MEDICINE

## 2018-08-23 PROCEDURE — 80048 BASIC METABOLIC PNL TOTAL CA: CPT | Performed by: PHYSICIAN ASSISTANT

## 2018-08-23 PROCEDURE — 02583ZZ DESTRUCTION OF CONDUCTION MECHANISM, PERCUTANEOUS APPROACH: ICD-10-PCS | Performed by: INTERNAL MEDICINE

## 2018-08-23 PROCEDURE — 93286 PERI-PX EVAL PM/LDLS PM IP: CPT | Performed by: INTERNAL MEDICINE

## 2018-08-23 PROCEDURE — 93650 ICAR CATH ABLTJ AV NODE FUNC: CPT | Performed by: INTERNAL MEDICINE

## 2018-08-23 PROCEDURE — 99232 SBSQ HOSP IP/OBS MODERATE 35: CPT | Performed by: INTERNAL MEDICINE

## 2018-08-23 RX ORDER — PROPOFOL 10 MG/ML
INJECTION, EMULSION INTRAVENOUS CONTINUOUS PRN
Status: DISCONTINUED | OUTPATIENT
Start: 2018-08-23 | End: 2018-08-23 | Stop reason: SURG

## 2018-08-23 RX ORDER — PROPOFOL 10 MG/ML
INJECTION, EMULSION INTRAVENOUS AS NEEDED
Status: DISCONTINUED | OUTPATIENT
Start: 2018-08-23 | End: 2018-08-23 | Stop reason: SURG

## 2018-08-23 RX ORDER — LABETALOL HYDROCHLORIDE 5 MG/ML
10 INJECTION, SOLUTION INTRAVENOUS ONCE
Status: COMPLETED | OUTPATIENT
Start: 2018-08-23 | End: 2018-08-23

## 2018-08-23 RX ORDER — ONDANSETRON 2 MG/ML
INJECTION INTRAMUSCULAR; INTRAVENOUS AS NEEDED
Status: DISCONTINUED | OUTPATIENT
Start: 2018-08-23 | End: 2018-08-23 | Stop reason: SURG

## 2018-08-23 RX ORDER — SODIUM CHLORIDE 9 MG/ML
INJECTION, SOLUTION INTRAVENOUS CONTINUOUS PRN
Status: DISCONTINUED | OUTPATIENT
Start: 2018-08-23 | End: 2018-08-23 | Stop reason: SURG

## 2018-08-23 RX ORDER — LIDOCAINE HYDROCHLORIDE 10 MG/ML
INJECTION, SOLUTION INFILTRATION; PERINEURAL CODE/TRAUMA/SEDATION MEDICATION
Status: COMPLETED | OUTPATIENT
Start: 2018-08-23 | End: 2018-08-23

## 2018-08-23 RX ADMIN — PROPOFOL 60 MCG/KG/MIN: 10 INJECTION, EMULSION INTRAVENOUS at 12:44

## 2018-08-23 RX ADMIN — SODIUM CHLORIDE: 0.9 INJECTION, SOLUTION INTRAVENOUS at 12:32

## 2018-08-23 RX ADMIN — WARFARIN SODIUM 5 MG: 5 TABLET ORAL at 17:30

## 2018-08-23 RX ADMIN — TORSEMIDE 20 MG: 20 TABLET ORAL at 09:29

## 2018-08-23 RX ADMIN — LABETALOL 20 MG/4 ML (5 MG/ML) INTRAVENOUS SYRINGE 10 MG: at 09:42

## 2018-08-23 RX ADMIN — PROPOFOL 20 MG: 10 INJECTION, EMULSION INTRAVENOUS at 12:44

## 2018-08-23 RX ADMIN — VANCOMYCIN HYDROCHLORIDE 1 G: 1 INJECTION, POWDER, LYOPHILIZED, FOR SOLUTION INTRAVENOUS at 12:51

## 2018-08-23 RX ADMIN — ONDANSETRON 4 MG: 2 INJECTION INTRAMUSCULAR; INTRAVENOUS at 12:37

## 2018-08-23 RX ADMIN — HEPARIN SODIUM 5000 UNITS: 5000 INJECTION, SOLUTION INTRAVENOUS; SUBCUTANEOUS at 05:06

## 2018-08-23 RX ADMIN — FLUTICASONE PROPIONATE 1 SPRAY: 50 SPRAY, METERED NASAL at 09:29

## 2018-08-23 RX ADMIN — LIDOCAINE HYDROCHLORIDE 8 ML: 10 INJECTION, SOLUTION INFILTRATION; PERINEURAL at 12:57

## 2018-08-23 RX ADMIN — HEPARIN SODIUM 5000 UNITS: 5000 INJECTION, SOLUTION INTRAVENOUS; SUBCUTANEOUS at 21:02

## 2018-08-23 RX ADMIN — DULOXETINE HYDROCHLORIDE 60 MG: 60 CAPSULE, DELAYED RELEASE ORAL at 09:29

## 2018-08-23 NOTE — PLAN OF CARE
Problem: Potential for Falls  Goal: Patient will remain free of falls  INTERVENTIONS:  - Assess patient frequently for physical needs  -  Identify cognitive and physical deficits and behaviors that affect risk of falls    -  [place] fall precautions as indicated by assessment   - Educate patient/family on patient safety including physical limitations  - Instruct patient to call for assistance with activity based on assessment  - Modify environment to reduce risk of injury  - Consider OT/PT consult to assist with strengthening/mobility   Outcome: Progressing      Problem: PAIN - ADULT  Goal: Verbalizes/displays adequate comfort level or baseline comfort level  Interventions:  - Encourage patient to monitor pain and request assistance  - Assess pain using appropriate pain scale  - Administer analgesics based on type and severity of pain and evaluate response  - Implement non-pharmacological measures as appropriate and evaluate response  - Consider cultural and social influences on pain and pain management  - Notify physician/advanced practitioner if interventions unsuccessful or patient reports new pain   Outcome: Progressing      Problem: INFECTION - ADULT  Goal: Absence or prevention of progression during hospitalization  INTERVENTIONS:  - Assess and monitor for signs and symptoms of infection  - Monitor lab/diagnostic results  - Monitor all insertion sites, i e  indwelling lines, tubes, and drains  - Monitor endotracheal (as able) and nasal secretions for changes in amount and color  - [place] appropriate cooling/warming therapies per order  - Administer medications as ordered  - Instruct and encourage patient and family to use good hand hygiene technique  - Identify and instruct in appropriate isolation precautions for identified infection/condition   Outcome: Progressing      Problem: SAFETY ADULT  Goal: Maintain or return mobility status to optimal level  INTERVENTIONS:  - Assess patient's baseline mobility status (ambulation, transfers, stairs, etc )    - Identify cognitive and physical deficits and behaviors that affect mobility  - Identify mobility aids required to assist with transfers and/or ambulation (gait belt, sit-to-stand, lift, walker, cane, etc )  - [place] fall precautions as indicated by assessment  - Record patient progress and toleration of activity level on Mobility SBAR; progress patient to next Phase/Stage  - Instruct patient to call for assistance with activity based on assessment  - Request Rehabilitation consult to assist with strengthening/weightbearing, etc    Outcome: Progressing      Problem: DISCHARGE PLANNING  Goal: Discharge to home or other facility with appropriate resources  INTERVENTIONS:  - Identify barriers to discharge w/patient and caregiver  - Arrange for needed discharge resources and transportation as appropriate  - Identify discharge learning needs (meds, wound care, etc )  - Arrange for interpretive services to assist at discharge as needed  - Refer to Case Management Department for coordinating discharge planning if the patient needs post-hospital services based on physician/advanced practitioner order or complex needs related to functional status, cognitive ability, or social support system   Outcome: Progressing      Problem: Knowledge Deficit  Goal: Patient/family/caregiver demonstrates understanding of disease process, treatment plan, medications, and discharge instructions  Complete learning assessment and assess knowledge base    Interventions:  - Provide teaching at level of understanding  - Provide teaching via preferred learning methods   Outcome: Progressing      Problem: CARDIOVASCULAR - ADULT  Goal: Maintains optimal cardiac output and hemodynamic stability  INTERVENTIONS:  - Monitor I/O, vital signs and rhythm  - Monitor for S/S and trends of decreased cardiac output i e  bleeding, hypotension  - Administer and titrate ordered vasoactive medications to optimize hemodynamic stability  - Assess quality of pulses, skin color and temperature  - Assess for signs of decreased coronary artery perfusion - ex   Angina  - Instruct patient to report change in severity of symptoms   Outcome: Progressing    Goal: Absence of cardiac dysrhythmias or at baseline rhythm  INTERVENTIONS:  - Continuous cardiac monitoring, monitor vital signs, obtain 12 lead EKG if indicated  - Administer antiarrhythmic and heart rate control medications as ordered  - Monitor electrolytes and administer replacement therapy as ordered   Outcome: Progressing      Problem: METABOLIC, FLUID AND ELECTROLYTES - ADULT  Goal: Electrolytes maintained within normal limits  INTERVENTIONS:  - Monitor labs and assess patient for signs and symptoms of electrolyte imbalances  - Administer electrolyte replacement as ordered  - Monitor response to electrolyte replacements, including repeat lab results as appropriate  - Instruct patient on fluid and nutrition as appropriate   Outcome: Progressing      Problem: SKIN/TISSUE INTEGRITY - ADULT  Goal: Skin integrity remains intact  INTERVENTIONS  - Identify patients at risk for skin breakdown  - Assess and monitor skin integrity  - Assess and monitor nutrition and hydration status  - Monitor labs (i e  albumin)  - Assess for incontinence   - Turn and reposition patient  - Assist with mobility/ambulation  - Relieve pressure over bony prominences  - Avoid friction and shearing  - Provide appropriate hygiene as needed including keeping skin clean and dry  - Evaluate need for skin moisturizer/barrier cream  - Collaborate with interdisciplinary team (i e  Nutrition, Rehabilitation, etc )   - Patient/family teaching   Outcome: Progressing    Goal: Incision(s), wounds(s) or drain site(s) healing without S/S of infection  INTERVENTIONS  - Assess and document risk factors for skin impairment   - Assess and document dressing, incision, wound bed, drain sites and surrounding tissue  - Initiate Nutrition services consult and/or wound management as needed   Outcome: Progressing    Goal: Oral mucous membranes remain intact  INTERVENTIONS  - Assess oral mucosa and hygiene practices  - Implement preventative oral hygiene regimen  - Implement oral medicated treatments as ordered  - Initiate Nutrition services referral as needed   Outcome: Progressing      Problem: DISCHARGE PLANNING - CARE MANAGEMENT  Goal: Discharge to post-acute care or home with appropriate resources  INTERVENTIONS:  - Conduct assessment to determine patient/family and health care team treatment goals, and need for post-acute services based on payer coverage, community resources, and patient preferences, and barriers to discharge  - Address psychosocial, clinical, and financial barriers to discharge as identified in assessment in conjunction with the patient/family and health care team  - Arrange appropriate level of post-acute services according to patient's   needs and preference and payer coverage in collaboration with the physician and health care team  - Communicate with and update the patient/family, physician, and health care team regarding progress on the discharge plan  - Arrange appropriate transportation to post-acute venues  -Pt to d/c with appropriate resources when medically ready    Outcome: Progressing      Problem: Nutrition/Hydration-ADULT  Goal: Nutrient/Hydration intake appropriate for improving, restoring or maintaining nutritional needs  Monitor and assess patient's nutrition/hydration status for malnutrition (ex- brittle hair, bruises, dry skin, pale skin and conjunctiva, muscle wasting, smooth red tongue, and disorientation)  Collaborate with interdisciplinary team and initiate plan and interventions as ordered  Monitor patient's weight and dietary intake as ordered or per policy  Utilize nutrition screening tool and intervene per policy   Determine patient's food preferences and provide high-protein, high-caloric foods as appropriate       INTERVENTIONS:  - Monitor oral intake, urinary output, labs, and treatment plans  - Assess nutrition and hydration status and recommend course of action  - Evaluate amount of meals eaten  - Assist patient with eating if necessary   - Allow adequate time for meals  - Recommend/ encourage appropriate diets, oral nutritional supplements, and vitamin/mineral supplements  - Order, calculate, and assess calorie counts as needed  - Recommend, monitor, and adjust tube feedings and TPN/PPN based on assessed needs  - Assess need for intravenous fluids  - Provide specific nutrition/hydration education as appropriate  - Include patient/family/caregiver in decisions related to nutrition   Outcome: Progressing      Problem: Prexisting or High Potential for Compromised Skin Integrity  Goal: Skin integrity is maintained or improved  INTERVENTIONS:  - Identify patients at risk for skin breakdown  - Assess and monitor skin integrity  - Assess and monitor nutrition and hydration status  - Monitor labs (i e  albumin)  - Assess for incontinence   - Turn and reposition patient  - Assist with mobility/ambulation  - Relieve pressure over bony prominences  - Avoid friction and shearing  - Provide appropriate hygiene as needed including keeping skin clean and dry  - Evaluate need for skin moisturizer/barrier cream  - Collaborate with interdisciplinary team (i e  Nutrition, Rehabilitation, etc )   - Patient/family teaching   Outcome: Progressing

## 2018-08-23 NOTE — PROCEDURES
Procedure - AVN ablation      History and physical were reviewed  Patient was examined and history was reviewed  No change in patient's condition Since history and physical has been completed        The pre- operative diagnosis:  AF, RVR - Unable to control rate with rate control medications  His lead in place      Postoperative diagnosis:  AF, RVR - Unable to control rate with rate control medications  His lead in place      Procedure:  AVN ablation  Device reprogramming before and after AVN ablation        Surgeon: Erika Morgan    Assistants -Scout Umanzor    Specimens - none    Estimated blood loss-10 mL    Findings-none    Complications none    Anesthesia- modified by anesthesiologist, local lidocaine by myself      Details of the procedure    Sheaths used  All  access was obtained under ultrasound guidance  Right femoral vein- 8F, changed to SRO      Catheters used  Ablation catheter - EPT 8mm      Imaging systems used  1   Fluoroscopy   Right anterior oblique views were used whenever we needed to determine between anterior and posterior  Left anterior oblique views were used whenever we needed to determine between right and left      Anticoagulation:  On coumadin, continue same         Details of the ablation  Patient seen as inpatient  and AVN ablation set up  Proper consent signed  Brought to EP lab, proper time out done, sterile dressing and draping done  Sheaths and catheter placed as described    Medtronic His PPM - changed to VVI 40/min, BP-98/54 mmHg  EPT catheter taken through SRO sheath  Ventricle , Atrial and His in between were noted  His lead identified and basic measurements as below  Came back 2 cm and came down about same  Ablation - 70 rodriguez, 55 degree centigrade  Could not cause AV block    Ablated fast pathway - still could not get AV block    Ablated within CS - roof - still no AV block    Dr María Underwood came in for second look   Went into V, Just proximal to His lead  Have far field A   Ablation - 70 rodriguez, 55 degree centigrade  Patient went into paced rhythm at 40 /min  Ablation continued for 120 sec  Consolidation done by another proximal lesion for 60 sec      Waited 30 min  All paced beats  Lead aVR followed closely, no antegrade conduction  Has a junctional at 35 min  Lead capture threshold coming down  catheter and sheath removed       Device reprogrammed  VVIR -80/min  Come down to 70/min after 1 month        Details of the electrophysiologic study  Pre-procedure - Baseline  QRS-90  QT--360  Basal CL- A fib at rates 110-150 min, avg HR around 110/min , avg BCL about 530 ms        Recommendation:  Continue coumadin, have INR 2-3  Groin hemostasis

## 2018-08-23 NOTE — PROGRESS NOTES
Progress Note - Jamil Martini 1933, 80 y o  female MRN: [id number]    Unit/Bed#: [facility] 528-01 Encounter: [id number]    Primary Care Provider: Regine Castro DO   Date and time admitted to hospital: 2018  9:06 PM        Benign essential hypertension   Assessment & Plan    Bps labile today, likely due to stopping cardizem  Will monitor for now        Chronic diastolic CHF (congestive heart failure) ([address] )   Assessment & Plan    Stable  Restart demadex on discharge        * Atrial fibrillation with RVR [facility])   Assessment & Plan    Status post pacemaker placement and AVN ablation  Monitor on telemetry overnight, possible discharge in AM            VTE Pharmacologic Prophylaxis:   Pharmacologic: Apixaban (Eliquis)  Mechanical VTE Prophylaxis in Place: Yes    Patient Centered Rounds: I have performed bedside rounds with nursing staff today  Discussions with Specialists or Other Care Team Provider: EP    Education and Discussions with Family / Patient: patient, plan of care    Time Spent for Care: 15 minutes  More than 50% of total time spent on counseling and coordination of care as described above  Current Length of Stay: 7 day(s)    Current Patient Status: Inpatient   Certification Statement: The patient will continue to require additional inpatient hospital stay due to monitor for stability    Discharge Plan: home tomorrow      Code Status: Level 1 - Full Code      Subjective:   Having occasional palpitations and dizziness in AM prior to ablation    Objective:     Vitals:   Temp (24hrs), Av °F (36 7 °C), Min:97 5 °F (36 4 °C), Max:99 °F (37 2 °C)    HR:  [] 109  Resp:  [18] 18  BP: (106-198)/() 116/66  SpO2:  [97 %-98 %] 98 %  Body mass index is 33 47 kg/m²  Input and Output Summary (last 24 hours):        Intake/Output Summary (Last 24 hours) at 18 1854  Last data filed at 18 1742   Gross per 24 hour   Intake              260 ml   Output             4126 ml Net            -3866 ml       Physical Exam:     Physical Exam   Constitutional: She is oriented to person, place, and time  She appears well-developed and well-nourished  HENT:   Head: Normocephalic and atraumatic  Mouth/Throat: No oropharyngeal exudate  Eyes: EOM are normal  Pupils are equal, round, and reactive to light  No scleral icterus  Neck: Neck supple  No JVD present  Cardiovascular: Normal rate and regular rhythm  Pulmonary/Chest: Effort normal  She has no wheezes  She has no rales  Abdominal: Soft  There is no tenderness  There is no rebound  Musculoskeletal: Normal range of motion  She exhibits no edema  Neurological: She is alert and oriented to person, place, and time  Skin: Skin is warm and dry  Additional Data:     Labs:      Results from last 7 days  Lab Units 08/23/18  0502   WBC Thousand/uL 7 12   HEMOGLOBIN g/dL 9 9*   HEMATOCRIT % 33 1*   PLATELETS Thousands/uL 184   NEUTROS PCT % 63   LYMPHS PCT % 19   MONOS PCT % 15*   EOS PCT % 2       Results from last 7 days  Lab Units 08/23/18  0502   SODIUM mmol/L 142   POTASSIUM mmol/L 3 9   CHLORIDE mmol/L 108   CO2 mmol/L 26   BUN mg/dL 15   CREATININE mg/dL 0 84   CALCIUM mg/dL 8 6   GLUCOSE RANDOM mg/dL 93       Results from last 7 days  Lab Units 08/23/18  0502   INR  1 39*                 * I Have Reviewed All Lab Data Listed Above  * Additional Pertinent Lab Tests Reviewed:  All Labs Within Last 24 Hours Reviewed        Recent Cultures (last 7 days):           Last 24 Hours Medication List:     Current Facility-Administered Medications:  acetaminophen 650 mg Oral Q6H PRN Jean Marie Pratt MD   DULoxetine 60 mg Oral Daily Jean Marie Pratt MD   fluticasone 1 spray Nasal Daily Jean Marie Pratt MD   heparin (porcine) 5,000 Units Subcutaneous [facility] Jennifer Reza MD   polyethylene glycol 17 g Oral Daily PRN Reynaldo Rodriguez PA-C   torsemide 20 mg Oral Daily Lavандрей Rateeugene DO   traMADol 50 mg Oral BID PRN Jean Marie Pratt MD warfarin 5 mg Oral Daily (warfarin) Jennifer Miller PA-C        Today, Patient Was Seen By: Michelle Conn MD    ** Please Note: Dictation voice to text software may have been used in the creation of this document   **

## 2018-08-23 NOTE — PROCEDURES
History and physical were reviewed  Patient was examined and history was reviewed  No change in patient's condition Since history and physical has been completed        The pre- operative diagnosis:  Persistent atrial fibrillation with RVR   Diastolic heart failure  MR and TR      Postoperative diagnosis:  Persistent atrial fibrillation with RVR   Diastolic heart failure  MR and TR        Procedure:  Single chamber PPM  RV lead - His lead - non selective capture        Surgeon: [id number] -none    Specimens - none    Estimated blood loss- 10 ml    Findings-none    Complications none    Anesthesia-  Anesthesia by anaesthesiologist , local lidocaine by myself      Details of the device                Description of procedure: The patient was seen before the procedure  The details of the procedure was explained and patient agreed to the same  Appropriate consent was signed  The patient was brought to the electrophysiologic laboratory  Proper time out was done  Sterile dressing and draping was done  Local lidocaine was infiltrated, In the infraclavicular region at the site of device implant  Initial incision was made by a sharp number 10 blade  Thereafter electrocautery and blunt dissection was used to form a prepectoral pocket  Appropriate hemostasis was taken care of      20 mL of radiocontrast, followed by 20 mL of saline, was injected in a peripheral vein on the side of the implant  Under direct visualization, the axillary vein was  Punctured,extra-thoracic  A single guidewire was inserted, and taking all the way to the inferior vena cava to confirm that it is on the right side  We also confirmed by the left anterior oblique view that the wire is in the right side  A 7F sheath passed over first wire     A His sheath and lead was passed through the sheath  Mapping of His bundle done  Position of the lead was confirmed in both BLANCHARD and [place] views Appropriate sensing and thresholds were noted    Lead was screwed in a para-hisian position with non selective capture  The sheath was slit and removed  Once again the lead was tested for appropriate sensing, impedance and threshold  The lead was tied down to the prepectoral fascia and muscle  The pocket was washed with large amounts of antibiotic saline  Appropriate hemostasis was taken care of  The lead was connected to the generator  The generator and leads were placed inside the pocket  The generator was tied down  Thereafter the device was interrogated and proper sensing and thresholds through the device were confirmed  The pocket was closed in 3 separate layers with intermittent sutures  Dressing was done with Steri-Strips, Telfa and Tegaderm    A modified pressure bandage was applied         Summary of the procedure: The patient came in to the laboratory for single chamber device placement   The device has been placed and patient tolerated the procedure well

## 2018-08-23 NOTE — ASSESSMENT & PLAN NOTE
Status post pacemaker placement and AVN ablation  Monitor on telemetry overnight, possible discharge in AM

## 2018-08-23 NOTE — DISCHARGE INSTRUCTIONS
No heavy lifting or strenuous activity for one week  No soaking in a bath tub/hot tub/swimming pool for one week or until groin heals  If you notice ongoing bleeding, swelling, or firm lumps in groin near ablation incision, please contact Dr Farhana Zamora office - [phone]  Please refer to post pacemaker implantation discharge instructions and restrictions and your pacemaker booklet/temporary card  Keep incision dry for one week  Do not use lotions/powders/creams on incision  Remove outer bandage 48 hours after implantation  Leave underlying steri-strips in place, they will either fall off on their own or will be removed at 2 week follow up appointment  No overhead reaching/pushing/pulling/lifting greater than 5-10lbs with left arm for one month  Please call the office if you notice redness, swelling, bleeding, or drainage from incision or if you develop fevers  AFTER PACEMAKER CARE:    If you have any questions, please call [phone] to speak with a nurse (8:30am-4pm, or [phone] after hours)  For appointments, please call [phone]  WHAT YOU SHOULD KNOW:   A pacemaker is a small, battery-powered device that is placed under your skin in your upper chest area with wires placed through a vein that lead directly into the heart  It helps regulate your heart rate and prevent your heart from beating too slowly                  AFTER YOU LEAVE:     Medicines:     · Pain medicine: You may need medicine to take away or decrease pain  ¨ Learn how to take your medicine  Ask what medicine and how much you should take  Be sure you know how, when, and how often to take it  Usually Over the counter pain medicine is sufficient to control pain (Acetominophen or Ibuprofen) Ask your doctor if you may take these  If this does not control your pain, narcotic pain killers may be prescribed, please call if you need prescription       ¨ Do not wait until the pain is severe before you take your medicine  Tell caregivers if your pain does not decrease  ¨ Pain medicine can make you dizzy or sleepy  Prevent falls by calling someone when you get out of bed or if you need help  Take your medicine as directed  Call your healthcare provider if you think your medicine is not helping or if you have side effects  Tell him if you are allergic to any medicine  Follow up with your cardiologist after your procedure: You will need a follow-up visit approximately 2 weeks after you leave the hospital  Your cardiologist will check your wound and make sure that your pacemaker is working correctly  Follow the instructions to check your pacemaker: Your cardiologist or primary healthcare provider will check your pacemaker and the battery regularly  He will use a computer to check your pacemaker over the telephone or wireless device which will be given to you  Pacemaker batteries usually last 5 to 10 years  The pacemaker unit will be replaced when the battery gets low  This is a simpler procedure than the original one to implant your pacemaker  Wound care:  Keep your incision dry for one week  Sponge/tub baths are preferred, try to avoid a shower for 7 days  Do not use lotions/powders/creams on incision  Remove outer bandage 48 hours after implantation  Leave underlying steri-strips in place, they will either fall off on their own or will be removed at 2 week follow up appointment  Please call the office if you notice redness, swelling, bleeding, or drainage from incision or if you develop fevers  Activity:   · Arm movement and lifting:  Be careful using the arm on the side of your pacemaker  Do not move your arm for the first 24 hours after your procedure  Do not  lift your arm above your shoulder or lift more than 10 pounds for one month after your procedure  Avoid pushing, pulling, or repetitive arm movements for one month   This helps the leads stay in place and helps your wound heal  Ask your caregiver when you can drive after your procedure  You may move your arm side to side without lifting above your shoulder, and do not need to wear a sling at home  · Typically driving is allowed after 1 week post pacemaker if you are stable, however in some cases it may be longer and you should discuss with your doctor before proceeding  · Sports:  Ask your caregiver when it is okay to play tennis, golf, basketball, or any sport that requires you to lift your arms  Do not play full contact sports, such as football, that could damage your pacemaker  Ask your cardiologist or primary healthcare provider how much and what kinds of physical activity are safe for you  Living with a pacemaker:   · Tell all caregivers you have a pacemaker: This includes surgeons, radiologists, and medical technicians  You may want to wear a medical alert ID bracelet or necklace that states that you have a pacemaker  · Carry your pacemaker ID card: Make sure you receive a pacemaker ID card  Carry it with you at all times  It lists important information about your pacemaker  Show it to airport security if you travel  · Avoid electrical interference:  Avoid welding equipment and other equipment with large magnets or electric fields  These things could interfere with how your pacemaker works  Use your cell phone on the ear opposite from your pacemaker  Do not carry your cell phone in your shirt pocket over your chest      · Some Pacemakers are MRI safe  Ask you doctor if it is safe to proceed with MRI and let the radiologist and staff know you have a pacemaker  · Do not touch the skin around your pacemaker: This can cause damage to the lead wires or move the pacemaker unit from where it should be  Contact your cardiologist or primary healthcare provider if:   · The area around your pacemaker has increasing amount of pain after surgery  The pain should improve over first few days after implantation       · The skin around your stitches has increasing redness, swelling, or has drainage  This may mean that you have an infection  · You have a fever  · You have chills, a cough, and feel weak or achy  These are also signs of infection  · Your feet or ankles are more swollen than your baseline  · Your Heart rate is less than 50 beats per minute     Seek care immediately if:   · Your bandage becomes soaked with blood  · Your pacemaker is swelling rapidly    · Your stitches open up  · You feel your heart suddenly beating very slowly or quickly  · You become too weak or dizzy to stand, or you pass out  · Your arm or leg feels warm, tender, and painful  It may look swollen and red  · You have chest pain that does not go away with rest or medicine  · You feel lightheaded, short of breath, and have chest pain  · You cough up blood  © 2014 [address] is for End User's use only and may not be sold, redistributed or otherwise used for commercial purposes  All illustrations and images included in CareNotes® are the copyrighted property of A D A M [employer] Inc  or [employer]ul [employer]  The above information is an  only  It is not intended as medical advice for individual conditions or treatments  Talk to your doctor, nurse or pharmacist before following any medical regimen to see if it is safe and effective for you

## 2018-08-23 NOTE — ANESTHESIA PREPROCEDURE EVALUATION
Review of Systems/Medical History  Patient summary reviewed        Cardiovascular  Hypertension , CHF ,   Comment: Transthoracic Echocardiogram  2D, M-mode, Doppler, and Color Doppler     Study date:  2018     Patient: Felicitas Shen  MR number: [id number]  Account number: [de-identified]  : 1933  Age: 80 years  Gender: Female  Status: Outpatient  Location: [facility] [place]  Height: 62 in  Weight: 231 lb  BP: 112/ 60 mmHg     Indications: MV Disease     Diagnoses: I34 0 - Nonrheumatic mitral (valve) insufficiency     Sonographer:  JESUS Salguero  Primary Physician:  Amita Colmenares DO  Referring Physician:  Mustapha Gonzales MD  [facility]  Interpreting Physician:  Cipriano Fernandez MD     SUMMARY     LEFT VENTRICLE:  Size was normal   Systolic function was normal  Ejection fraction was estimated to be 55 %  Wall thickness was at the upper limits of normal      RIGHT VENTRICLE:  The ventricle was mildly dilated  Systolic function was normal      RIGHT ATRIUM:  The atrium was mildly dilated      MITRAL VALVE:  There was moderate regurgitation      TRICUSPID VALVE:  There was moderate regurgitation      IVC, HEPATIC VEINS:  The inferior vena cava was mildly dilated  Respirophasic changes were blunted (less than 50% variation)      COMPARISONS:  Comparison was made with the previous study of 17-May-2017  Tricuspid regurgitation has worsened      HISTORY: PRIOR HISTORY: AFIB, Moderate MR, Hypertension, CHF, Anemia     PROCEDURE: The study was performed in the [facility]  This was a routine study  The transthoracic approach was used  The study included complete 2D imaging, M-mode, complete spectral Doppler, and color Doppler  The  heart rate was 110 bpm, at the start of the study  Images were obtained from the parasternal, apical, subcostal, and suprasternal notch acoustic windows   Echocardiographic views were limited due to lung interference  Image quality was  adequate      LEFT VENTRICLE: Size was normal  Systolic function was normal  Ejection fraction was estimated to be 55 %  There were no regional wall motion abnormalities  Wall thickness was at the upper limits of normal  DOPPLER: Transmitral flow  pattern: atrial fibrillation  The study was not technically sufficient to allow evaluation of LV diastolic function      RIGHT VENTRICLE: The ventricle was mildly dilated  Systolic function was normal  Wall thickness was normal      LEFT ATRIUM: Size was normal      RIGHT ATRIUM: The atrium was mildly dilated      MITRAL VALVE: Valve structure was normal  There was normal leaflet separation  DOPPLER: The transmitral velocity was within the normal range  There was no evidence for stenosis  There was moderate regurgitation      AORTIC VALVE: The valve was trileaflet  Leaflets exhibited normal thickness, normal cuspal separation, and sclerosis  DOPPLER: Transaortic velocity was within the normal range  There was no evidence for stenosis  There was no  regurgitation      TRICUSPID VALVE: The valve structure was normal  There was normal leaflet separation  DOPPLER: The transtricuspid velocity was within the normal range  There was no evidence for stenosis  There was moderate regurgitation  Pulmonary artery  systolic pressure was within the normal range  Estimated peak PA pressure was 36 mmHg      PULMONIC VALVE: Leaflets exhibited normal thickness, no calcification, and normal cuspal separation  DOPPLER: The transpulmonic velocity was within the normal range  There was no regurgitation      PERICARDIUM: There was no pericardial effusion   The pericardium was normal in appearance      AORTA: The root exhibited normal size ,  Pulmonary       GI/Hepatic            Endo/Other     GYN       Hematology  Anemia ,     Musculoskeletal    Arthritis     Neurology   Psychology   Depression ,              Physical Exam    Airway    Mallampati score: II  TM Distance: >3 FB  Neck ROM: full     Dental       Cardiovascular      Pulmonary      Other Findings        Anesthesia Plan  ASA Score- 3     Anesthesia Type- IV sedation with anesthesia and general with ASA Monitors  Additional Monitors:   Airway Plan:         Plan Factors-    Induction- intravenous  Postoperative Plan-     Informed Consent- Anesthetic plan and risks discussed with patient

## 2018-08-24 ENCOUNTER — TRANSITIONAL CARE MANAGEMENT (OUTPATIENT)
Dept: INTERNAL MEDICINE CLINIC | Facility: CLINIC | Age: 83
End: 2018-08-24

## 2018-08-24 VITALS
HEIGHT: 62 IN | WEIGHT: 183.42 LBS | TEMPERATURE: 98.8 F | OXYGEN SATURATION: 98 % | SYSTOLIC BLOOD PRESSURE: 132 MMHG | DIASTOLIC BLOOD PRESSURE: 63 MMHG | HEART RATE: 80 BPM | BODY MASS INDEX: 33.75 KG/M2 | RESPIRATION RATE: 18 BRPM

## 2018-08-24 PROBLEM — I48.91 ATRIAL FIBRILLATION WITH RVR (HCC): Status: RESOLVED | Noted: 2018-08-17 | Resolved: 2018-08-24

## 2018-08-24 LAB
ANION GAP SERPL CALCULATED.3IONS-SCNC: 6 MMOL/L (ref 4–13)
BUN SERPL-MCNC: 15 MG/DL (ref 5–25)
CALCIUM SERPL-MCNC: 8.5 MG/DL (ref 8.3–10.1)
CHLORIDE SERPL-SCNC: 105 MMOL/L (ref 100–108)
CO2 SERPL-SCNC: 26 MMOL/L (ref 21–32)
CREAT SERPL-MCNC: 1 MG/DL (ref 0.6–1.3)
ERYTHROCYTE [DISTWIDTH] IN BLOOD BY AUTOMATED COUNT: 15.9 % (ref 11.6–15.1)
GFR SERPL CREATININE-BSD FRML MDRD: 52 ML/MIN/1.73SQ M
GLUCOSE SERPL-MCNC: 99 MG/DL (ref 65–140)
HCT VFR BLD AUTO: 33.1 % (ref 34.8–46.1)
HGB BLD-MCNC: 10 G/DL (ref 11.5–15.4)
MCH RBC QN AUTO: 29.2 PG (ref 26.8–34.3)
MCHC RBC AUTO-ENTMCNC: 30.2 G/DL (ref 31.4–37.4)
MCV RBC AUTO: 97 FL (ref 82–98)
PLATELET # BLD AUTO: 183 THOUSANDS/UL (ref 149–390)
PMV BLD AUTO: 10.2 FL (ref 8.9–12.7)
POTASSIUM SERPL-SCNC: 4 MMOL/L (ref 3.5–5.3)
RBC # BLD AUTO: 3.42 MILLION/UL (ref 3.81–5.12)
SODIUM SERPL-SCNC: 137 MMOL/L (ref 136–145)
WBC # BLD AUTO: 8 THOUSAND/UL (ref 4.31–10.16)

## 2018-08-24 PROCEDURE — 85027 COMPLETE CBC AUTOMATED: CPT | Performed by: PHYSICIAN ASSISTANT

## 2018-08-24 PROCEDURE — 99024 POSTOP FOLLOW-UP VISIT: CPT | Performed by: INTERNAL MEDICINE

## 2018-08-24 PROCEDURE — 99239 HOSP IP/OBS DSCHRG MGMT >30: CPT | Performed by: INTERNAL MEDICINE

## 2018-08-24 PROCEDURE — 80048 BASIC METABOLIC PNL TOTAL CA: CPT | Performed by: PHYSICIAN ASSISTANT

## 2018-08-24 RX ORDER — WARFARIN SODIUM 3 MG/1
TABLET ORAL
Start: 2018-08-24 | End: 2019-01-02

## 2018-08-24 RX ORDER — WARFARIN SODIUM 4 MG/1
TABLET ORAL
Qty: 180 TABLET | Refills: 0 | Status: SHIPPED | OUTPATIENT
Start: 2018-08-24 | End: 2019-03-05 | Stop reason: SDUPTHER

## 2018-08-24 RX ORDER — TORSEMIDE 20 MG/1
20 TABLET ORAL DAILY
Qty: 30 TABLET | Refills: 0 | Status: SHIPPED | OUTPATIENT
Start: 2018-08-25 | End: 2019-03-15 | Stop reason: SDUPTHER

## 2018-08-24 RX ADMIN — DULOXETINE HYDROCHLORIDE 60 MG: 60 CAPSULE, DELAYED RELEASE ORAL at 10:04

## 2018-08-24 RX ADMIN — HEPARIN SODIUM 5000 UNITS: 5000 INJECTION, SOLUTION INTRAVENOUS; SUBCUTANEOUS at 13:41

## 2018-08-24 RX ADMIN — TORSEMIDE 20 MG: 20 TABLET ORAL at 10:04

## 2018-08-24 RX ADMIN — FLUTICASONE PROPIONATE 1 SPRAY: 50 SPRAY, METERED NASAL at 10:04

## 2018-08-24 RX ADMIN — HEPARIN SODIUM 5000 UNITS: 5000 INJECTION, SOLUTION INTRAVENOUS; SUBCUTANEOUS at 05:14

## 2018-08-24 NOTE — DISCHARGE SUMMARY
Discharge Summary - [facility]    Patient Information: Uzair Bryant 80 y o  female MRN: [id number]  Unit/Bed#: DISCHARGE POOL Encounter: [id number]    Discharging Physician / Practitioner: Sera Espinoza MD  PCP: Howard Pretty DO  Admission Date: 8/16/2018  Discharge Date: 08/24/18    Disposition:     Home    Reason for Admission:  Atrial fibrillation with rapid ventricular response    Discharge Diagnoses:     Principal Problem (Resolved):    Atrial fibrillation with RVR ([facility] [address] )  Active Problems:    Chronic diastolic CHF (congestive heart failure) ([facility] )    Depression    Benign essential hypertension      Consultations During Hospital Stay:  · Electrophysiology    Procedures Performed:     · Pacemaker placement  · AV agustina ablation    Significant Findings / Test Results:     · Atrial fibrillation with rapid ventricular response on EKG telemetry    Incidental Findings:   · None     Test Results Pending at Discharge (will require follow up): · None     Outpatient Tests Requested:  · Follow-up with device Clinic in 1 month    Complications:  None    Hospital Course:     Uzair Bryant is a 80 y o  female patient who originally presented to the hospital on 8/16/2018 due to atrial fibrillation with rapid ventricular response  The patient was admitted and placed on IV Cardizem drip, while on this medication she had episodes of severe hypotension, dizziness, rates her poorly controlled  Cardiology and electrophysiology were consulted and titrated medications without significant improvement  During hospital course a pacemaker was placed in AV agustina ablation was performed, these interventions she had significant improvement in her lightheadedness, dizziness  Heart rate remained controlled blood pressure stabilized  She was discharged home with home care    Condition at Discharge: stable     Discharge Day Visit / Exam:     Subjective:  Denies dizziness, lightheadedness    Does reports some exertional dyspnea  Vitals: Blood Pressure: 132/63 (08/24/18 1500)  Pulse: 80 (08/24/18 1500)  Temperature: 98 8 °F (37 1 °C) (08/24/18 1500)  Temp Source: Oral (08/24/18 1500)  Respirations: 18 (08/24/18 1500)  Height: 5' 2" (157 5 cm) (08/16/18 2100)  Weight - Scale: 83 2 kg (183 lb 6 8 oz) (08/24/18 0514)  SpO2: 98 % (08/24/18 1500)  Exam:   Physical Exam   Constitutional: She is oriented to person, place, and time  She appears well-developed and well-nourished  HENT:   Head: Normocephalic and atraumatic  Eyes: EOM are normal  Pupils are equal, round, and reactive to light  No scleral icterus  Neck: Neck supple  No JVD present  Cardiovascular: Normal rate  Pulmonary/Chest: Effort normal  She has no wheezes  She has no rales  Abdominal: Soft  There is no tenderness  There is no rebound  Musculoskeletal: Normal range of motion  She exhibits no edema  Neurological: She is alert and oriented to person, place, and time  Skin: Skin is warm  Discharge instructions/Information to patient and family:   See after visit summary for information provided to patient and family  Provisions for Follow-Up Care:  See after visit summary for information related to follow-up care and any pertinent home health orders  Planned Readmission:  None     Discharge Statement:  I spent 40 minutes discharging the patient  This time was spent on the day of discharge  I had direct contact with the patient on the day of discharge  Greater than 50% of the total time was spent examining patient, answering all patient questions, arranging and discussing plan of care with patient as well as directly providing post-discharge instructions  Additional time then spent on discharge activities  Discharge Medications:  See after visit summary for reconciled discharge medications provided to patient and family        ** Please Note: This note has been constructed using a voice recognition system **

## 2018-08-24 NOTE — PROGRESS NOTES
Progress Note - Electrophysiology-Cardiology (EP)   Olimpia Rodrigues 80 y o  female MRN: [id number]  Unit/Bed#: [facility] 528-01 Encounter: [id number]      Assessment:  1   Persistent atrial fibrillation with rapid ventricular response, on Coumadin anticoagulation with now subtherapeutic INR              A ) unable to tolerate multiple AV agustina agents due to hypotension and medication intolerance (beta blockers, digoxin)              B ) status post Medtronic single-chamber pacemaker with His lead implantation 8/20/2018              C ) previously on IV Cardizem however it extravasated, she was then switched to oral Cardizem without adequate rate control  2   Chronic diastolic congestive heart failure with preserved EF per echo 04/2018  3   Moderate mitral regurgitation and moderate tricuspid regurgitation  4   Hypertension  5   Nonischemic nuclear stress test 05/2018  6   Obesity with BMI 35    Plan:  1  All rate controlling medications have been discontinued  She will need to remain on coumadin anticoagulation therapy - I would recommend that she continue on her prior outpatient regimen, and check an INR early next week  She can then follow up with her coumadin clinic for further direction  2   Per Dr Zulay Osorio, now that her rates are controlled she can decrease her torsemide 10 mg daily  I will arrange a follow up appointment with her primary cardiologist (Dr Roopa Louise) for ongoing monitoring  I asked her to contact our office sooner if she has worsening dyspnea, swelling, or other symptoms  3   I will also arrange a one month outpatient device check so that her rate can be lowered to 70 bpm following her AV node ablation  4   Consider PT eval prior discharge if patient feels unsteady with ambulation  She feels like she is stable for discharge  OK for discharge from our standpoint         Subjective/Objective   Chief Complaint: no acute complaints    Subjective: She is feeling well today, denies issues with groin  Denies chest pain, significant dyspnea, or palpitations  She is eager for discharge       Objective:     Vitals: /74   Pulse 84   Temp (!) 97 2 °F (36 2 °C) (Oral)   Resp 18   Ht 5' 2" (1 575 m)   Wt 83 2 kg (183 lb 6 8 oz)   SpO2 100%   BMI 33 55 kg/m²   Vitals:    08/23/18 0510 08/24/18 0514   Weight: 83 kg (182 lb 15 7 oz) 83 2 kg (183 lb 6 8 oz)     Orthostatic Blood Pressures      Most Recent Value   Blood Pressure  141/74 filed at 08/24/2018 1100   Patient Position - Orthostatic VS  Sitting filed at 08/24/2018 0854            Intake/Output Summary (Last 24 hours) at 08/24/18 1204  Last data filed at 08/24/18 1003   Gross per 24 hour   Intake             1472 ml   Output             1700 ml   Net             -228 ml       Invasive Devices     Peripheral Intravenous Line            Peripheral IV 08/21/18 Right Arm 3 days                            Scheduled Meds:  Current Facility-Administered Medications:  acetaminophen 650 mg Oral Q6H PRN Itz Garcias MD   DULoxetine 60 mg Oral Daily Itz Garcias MD   fluticasone 1 spray Nasal Daily Shaquille Marion MD   heparin (porcine) 5,000 Units Subcutaneous [facility] Kobe Perez MD   polyethylene glycol 17 g Oral Daily PRN Tanya Boone PA-C   torsemide 20 mg Oral Daily Al Wheatley DO   traMADol 50 mg Oral BID PRN Shaquille Marion MD   warfarin 5 mg Oral Daily (warfarin) Sarah Barnes PA-C     Continuous Infusions:   PRN Meds:   acetaminophen    polyethylene glycol    traMADol    Review of Systems: Cardiovascular ROS: negative for - chest pain, palpitations or shortness of breath    Physical Exam:   GEN: NAD, alert and oriented, well appearing  SKIN: dry without significant lesions or rashes  HEENT: NCAT, PERRL, EOMs intact  NECK: No JVD appreciated  CARDIOVASCULAR: RRR, normal S1, S2 without murmurs, rubs, or gallops appreciated  LUNGS: Clear to auscultation bilaterally without wheezes, rhonchi, or rales, slightly decreased at bases b/l  ABDOMEN: Soft, nontender, nondistended  EXTREMITIES/VASCULAR: perfused without clubbing, cyanosis; mild LE edema b/l  PSYCH: Normal mood and affect  NEURO: CN ll-Xll grossly intact                Lab Results: I have personally reviewed pertinent lab results  Results from last 7 days  Lab Units 18  0506 18  0502 18  0502   WBC Thousand/uL 8 00 7 12 8 37   HEMOGLOBIN g/dL 10 0* 9 9* 10 2*   HEMATOCRIT % 33 1* 33 1* 32 4*   PLATELETS Thousands/uL 183 184 171       Results from last 7 days  Lab Units 18  0506 18  0502 18  0502   SODIUM mmol/L 137 142 141   POTASSIUM mmol/L 4 0 3 9 3 5   CHLORIDE mmol/L 105 108 107   CO2 mmol/L 26 26 26   BUN mg/dL 15 15 19   CREATININE mg/dL 1 00 0 84 0 89   GLUCOSE RANDOM mg/dL 99 93 94   CALCIUM mg/dL 8 5 8 6 8 2*       Results from last 7 days  Lab Units 18  0502 18  0520 18  0552   INR  1 39* 1 26* 1 45*             Imaging: I have personally reviewed pertinent reports      Results for orders placed during the hospital encounter of 18   Echo complete with contrast if indicated    Narrative [address] [place] [address], [address] [place] [address]  [phone]    Transthoracic Echocardiogram  2D, M-mode, Doppler, and Color Doppler    Study date:  2018    Patient: Edelmira Nielsen  MR number: [id number]  Account number: [de-identified]  : 1933  Age: 80 years  Gender: Female  Status: Outpatient  Location: [facility]  Height: 62 in  Weight: 231 lb  BP: 112/ 60 mmHg    Indications: MV Disease    Diagnoses: I34 0 - Nonrheumatic mitral (valve) insufficiency    Sonographer:  JESUS Anderson  Primary Physician:  Brenna Heath DO  Referring Physician:  Juan Castro MD  Group:  [facility]  Interpreting Physician:  Cesar Griffiths MD    SUMMARY    LEFT VENTRICLE:  Size was normal   Systolic function was normal  Ejection fraction was estimated to be 55 %  Wall thickness was at the upper limits of normal     RIGHT VENTRICLE:  The ventricle was mildly dilated  Systolic function was normal     RIGHT ATRIUM:  The atrium was mildly dilated  MITRAL VALVE:  There was moderate regurgitation  TRICUSPID VALVE:  There was moderate regurgitation  IVC, HEPATIC VEINS:  The inferior vena cava was mildly dilated  Respirophasic changes were blunted (less than 50% variation)  COMPARISONS:  Comparison was made with the previous study of 17-May-2017  Tricuspid regurgitation has worsened  HISTORY: PRIOR HISTORY: AFIB, Moderate MR, Hypertension, CHF, Anemia    PROCEDURE: The study was performed in the [address] [place] [address] and Vascular Center  This was a routine study  The transthoracic approach was used  The study included complete 2D imaging, M-mode, complete spectral Doppler, and color Doppler  The  heart rate was 110 bpm, at the start of the study  Images were obtained from the parasternal, apical, subcostal, and suprasternal notch acoustic windows  Echocardiographic views were limited due to lung interference  Image quality was  adequate  LEFT VENTRICLE: Size was normal  Systolic function was normal  Ejection fraction was estimated to be 55 %  There were no regional wall motion abnormalities  Wall thickness was at the upper limits of normal  DOPPLER: Transmitral flow  pattern: atrial fibrillation  The study was not technically sufficient to allow evaluation of LV diastolic function  RIGHT VENTRICLE: The ventricle was mildly dilated  Systolic function was normal  Wall thickness was normal     LEFT ATRIUM: Size was normal     RIGHT ATRIUM: The atrium was mildly dilated  MITRAL VALVE: Valve structure was normal  There was normal leaflet separation  DOPPLER: The transmitral velocity was within the normal range  There was no evidence for stenosis  There was moderate regurgitation  AORTIC VALVE: The valve was trileaflet   Leaflets exhibited normal thickness, normal cuspal separation, and sclerosis  DOPPLER: Transaortic velocity was within the normal range  There was no evidence for stenosis  There was no  regurgitation  TRICUSPID VALVE: The valve structure was normal  There was normal leaflet separation  DOPPLER: The transtricuspid velocity was within the normal range  There was no evidence for stenosis  There was moderate regurgitation  Pulmonary artery  systolic pressure was within the normal range  Estimated peak PA pressure was 36 mmHg  PULMONIC VALVE: Leaflets exhibited normal thickness, no calcification, and normal cuspal separation  DOPPLER: The transpulmonic velocity was within the normal range  There was no regurgitation  PERICARDIUM: There was no pericardial effusion  The pericardium was normal in appearance  AORTA: The root exhibited normal size  SYSTEMIC VEINS: IVC: The inferior vena cava was mildly dilated  Respirophasic changes were blunted (less than 50% variation)      SYSTEM MEASUREMENT TABLES    2D  %FS: 34 71 %  AV Diam: 2 84 cm  EDV(Teich): 101 4 ml  EF(Cube): 72 17 %  EF(Teich): 63 88 %  ESV(Cube): 28 55 ml  ESV(Teich): 36 63 ml  IVSd: 1 1 cm  LA Area: 17 55 cm2  LA Diam: 4 14 cm  LVEDV MOD A4C: 61 32 ml  LVEF MOD A4C: 61 87 %  LVESV MOD A4C: 23 38 ml  LVIDd: 4 68 cm  LVIDs: 3 06 cm  LVLd A4C: 6 59 cm  LVLs A4C: 5 67 cm  LVPWd: 1 cm  RA Area: 23 38 cm2  RV Diam: 3 71 cm  SV MOD A4C: 37 94 ml  SV(Cube): 74 03 ml  SV(Teich): 64 77 ml    CW  TR MaxP 51 mmHg  TR Vmax: 2 21 m/s    MM  TAPSE: 1 35 cm    [employer] Accredited Echocardiography [facility]    Prepared and electronically signed by    Francesco Kelley MD  Signed 2018 17:36:53         EKG/telemetry: atrial fibrillation with ventricular pacing at 80 bpm, no other arrhythmias and no intrinsic conduction noted    VTE Pharmacologic Prophylaxis: Warfarin (Coumadin)  VTE Mechanical Prophylaxis: sequential compression device

## 2018-08-24 NOTE — NURSING NOTE
Discharge instructions provided to patient  Prescriptions provided to patient  Pt and belongings taken to discharge lounge until daughter can pick her up

## 2018-08-24 NOTE — SOCIAL WORK
Discussed patient with Dr Ewa Tomlinson during care coordination rounds  The patient is cleared for discharge home today with [facility] followup  [facility] referral was made to [facility]  Patient reports she will not have transport home since son is away  Completed taxi voucher and gave it to RN

## 2018-08-24 NOTE — PLAN OF CARE
Problem: Potential for Falls  Goal: Patient will remain free of falls  INTERVENTIONS:  - Assess patient frequently for physical needs  -  Identify cognitive and physical deficits and behaviors that affect risk of falls    -  [place] fall precautions as indicated by assessment   - Educate patient/family on patient safety including physical limitations  - Instruct patient to call for assistance with activity based on assessment  - Modify environment to reduce risk of injury  - Consider OT/PT consult to assist with strengthening/mobility   Outcome: Progressing      Problem: PAIN - ADULT  Goal: Verbalizes/displays adequate comfort level or baseline comfort level  Interventions:  - Encourage patient to monitor pain and request assistance  - Assess pain using appropriate pain scale  - Administer analgesics based on type and severity of pain and evaluate response  - Implement non-pharmacological measures as appropriate and evaluate response  - Consider cultural and social influences on pain and pain management  - Notify physician/advanced practitioner if interventions unsuccessful or patient reports new pain   Outcome: Progressing      Problem: INFECTION - ADULT  Goal: Absence or prevention of progression during hospitalization  INTERVENTIONS:  - Assess and monitor for signs and symptoms of infection  - Monitor lab/diagnostic results  - Monitor all insertion sites, i e  indwelling lines, tubes, and drains  - Monitor endotracheal (as able) and nasal secretions for changes in amount and color  - [place] appropriate cooling/warming therapies per order  - Administer medications as ordered  - Instruct and encourage patient and family to use good hand hygiene technique  - Identify and instruct in appropriate isolation precautions for identified infection/condition   Outcome: Progressing      Problem: SAFETY ADULT  Goal: Maintain or return mobility status to optimal level  INTERVENTIONS:  - Assess patient's baseline mobility status (ambulation, transfers, stairs, etc )    - Identify cognitive and physical deficits and behaviors that affect mobility  - Identify mobility aids required to assist with transfers and/or ambulation (gait belt, sit-to-stand, lift, walker, cane, etc )  - [place] fall precautions as indicated by assessment  - Record patient progress and toleration of activity level on Mobility SBAR; progress patient to next Phase/Stage  - Instruct patient to call for assistance with activity based on assessment  - Request Rehabilitation consult to assist with strengthening/weightbearing, etc    Outcome: Progressing      Problem: DISCHARGE PLANNING  Goal: Discharge to home or other facility with appropriate resources  INTERVENTIONS:  - Identify barriers to discharge w/patient and caregiver  - Arrange for needed discharge resources and transportation as appropriate  - Identify discharge learning needs (meds, wound care, etc )  - Arrange for interpretive services to assist at discharge as needed  - Refer to Case Management Department for coordinating discharge planning if the patient needs post-hospital services based on physician/advanced practitioner order or complex needs related to functional status, cognitive ability, or social support system   Outcome: Progressing      Problem: Knowledge Deficit  Goal: Patient/family/caregiver demonstrates understanding of disease process, treatment plan, medications, and discharge instructions  Complete learning assessment and assess knowledge base    Interventions:  - Provide teaching at level of understanding  - Provide teaching via preferred learning methods   Outcome: Progressing      Problem: CARDIOVASCULAR - ADULT  Goal: Maintains optimal cardiac output and hemodynamic stability  INTERVENTIONS:  - Monitor I/O, vital signs and rhythm  - Monitor for S/S and trends of decreased cardiac output i e  bleeding, hypotension  - Administer and titrate ordered vasoactive medications to optimize hemodynamic stability  - Assess quality of pulses, skin color and temperature  - Assess for signs of decreased coronary artery perfusion - ex   Angina  - Instruct patient to report change in severity of symptoms   Outcome: Progressing    Goal: Absence of cardiac dysrhythmias or at baseline rhythm  INTERVENTIONS:  - Continuous cardiac monitoring, monitor vital signs, obtain 12 lead EKG if indicated  - Administer antiarrhythmic and heart rate control medications as ordered  - Monitor electrolytes and administer replacement therapy as ordered   Outcome: Progressing      Problem: METABOLIC, FLUID AND ELECTROLYTES - ADULT  Goal: Electrolytes maintained within normal limits  INTERVENTIONS:  - Monitor labs and assess patient for signs and symptoms of electrolyte imbalances  - Administer electrolyte replacement as ordered  - Monitor response to electrolyte replacements, including repeat lab results as appropriate  - Instruct patient on fluid and nutrition as appropriate   Outcome: Progressing      Problem: SKIN/TISSUE INTEGRITY - ADULT  Goal: Skin integrity remains intact  INTERVENTIONS  - Identify patients at risk for skin breakdown  - Assess and monitor skin integrity  - Assess and monitor nutrition and hydration status  - Monitor labs (i e  albumin)  - Assess for incontinence   - Turn and reposition patient  - Assist with mobility/ambulation  - Relieve pressure over bony prominences  - Avoid friction and shearing  - Provide appropriate hygiene as needed including keeping skin clean and dry  - Evaluate need for skin moisturizer/barrier cream  - Collaborate with interdisciplinary team (i e  Nutrition, Rehabilitation, etc )   - Patient/family teaching   Outcome: Progressing    Goal: Incision(s), wounds(s) or drain site(s) healing without S/S of infection  INTERVENTIONS  - Assess and document risk factors for skin impairment   - Assess and document dressing, incision, wound bed, drain sites and surrounding tissue  - Initiate Nutrition services consult and/or wound management as needed   Outcome: Progressing    Goal: Oral mucous membranes remain intact  INTERVENTIONS  - Assess oral mucosa and hygiene practices  - Implement preventative oral hygiene regimen  - Implement oral medicated treatments as ordered  - Initiate Nutrition services referral as needed   Outcome: Progressing      Problem: DISCHARGE PLANNING - CARE MANAGEMENT  Goal: Discharge to post-acute care or home with appropriate resources  INTERVENTIONS:  - Conduct assessment to determine patient/family and health care team treatment goals, and need for post-acute services based on payer coverage, community resources, and patient preferences, and barriers to discharge  - Address psychosocial, clinical, and financial barriers to discharge as identified in assessment in conjunction with the patient/family and health care team  - Arrange appropriate level of post-acute services according to patient's   needs and preference and payer coverage in collaboration with the physician and health care team  - Communicate with and update the patient/family, physician, and health care team regarding progress on the discharge plan  - Arrange appropriate transportation to post-acute venues  -Pt to d/c with appropriate resources when medically ready    Outcome: Progressing      Problem: Nutrition/Hydration-ADULT  Goal: Nutrient/Hydration intake appropriate for improving, restoring or maintaining nutritional needs  Monitor and assess patient's nutrition/hydration status for malnutrition (ex- brittle hair, bruises, dry skin, pale skin and conjunctiva, muscle wasting, smooth red tongue, and disorientation)  Collaborate with interdisciplinary team and initiate plan and interventions as ordered  Monitor patient's weight and dietary intake as ordered or per policy  Utilize nutrition screening tool and intervene per policy   Determine patient's food preferences and provide high-protein, high-caloric foods as appropriate       INTERVENTIONS:  - Monitor oral intake, urinary output, labs, and treatment plans  - Assess nutrition and hydration status and recommend course of action  - Evaluate amount of meals eaten  - Assist patient with eating if necessary   - Allow adequate time for meals  - Recommend/ encourage appropriate diets, oral nutritional supplements, and vitamin/mineral supplements  - Order, calculate, and assess calorie counts as needed  - Recommend, monitor, and adjust tube feedings and TPN/PPN based on assessed needs  - Assess need for intravenous fluids  - Provide specific nutrition/hydration education as appropriate  - Include patient/family/caregiver in decisions related to nutrition   Outcome: Progressing      Problem: Prexisting or High Potential for Compromised Skin Integrity  Goal: Skin integrity is maintained or improved  INTERVENTIONS:  - Identify patients at risk for skin breakdown  - Assess and monitor skin integrity  - Assess and monitor nutrition and hydration status  - Monitor labs (i e  albumin)  - Assess for incontinence   - Turn and reposition patient  - Assist with mobility/ambulation  - Relieve pressure over bony prominences  - Avoid friction and shearing  - Provide appropriate hygiene as needed including keeping skin clean and dry  - Evaluate need for skin moisturizer/barrier cream  - Collaborate with interdisciplinary team (i e  Nutrition, Rehabilitation, etc )   - Patient/family teaching   Outcome: Progressing

## 2018-08-27 ENCOUNTER — TELEPHONE (OUTPATIENT)
Dept: INTERNAL MEDICINE CLINIC | Facility: CLINIC | Age: 83
End: 2018-08-27

## 2018-08-27 NOTE — TELEPHONE ENCOUNTER
The patient was called for a TCM  She reports stomach pain with bloating and belching  There are no fevers, blood in her stools, she has soft stools daily  The symptoms were decreased when she took one dose of Carafate last night  The patient is to be on Carafate 10 ml BID for the ulcers  I reviewed this with the patient, she states that she was unaware  The patient is scheduled for a TCM tomorrow with Theodore Wang

## 2018-08-28 ENCOUNTER — PATIENT OUTREACH (OUTPATIENT)
Dept: INTERNAL MEDICINE CLINIC | Facility: CLINIC | Age: 83
End: 2018-08-28

## 2018-08-28 ENCOUNTER — OFFICE VISIT (OUTPATIENT)
Dept: INTERNAL MEDICINE CLINIC | Facility: CLINIC | Age: 83
End: 2018-08-28
Payer: COMMERCIAL

## 2018-08-28 VITALS
WEIGHT: 180 LBS | HEART RATE: 92 BPM | OXYGEN SATURATION: 96 % | BODY MASS INDEX: 33.13 KG/M2 | HEIGHT: 62 IN | SYSTOLIC BLOOD PRESSURE: 120 MMHG | DIASTOLIC BLOOD PRESSURE: 84 MMHG

## 2018-08-28 DIAGNOSIS — I48.19 PERSISTENT ATRIAL FIBRILLATION (HCC): ICD-10-CM

## 2018-08-28 DIAGNOSIS — Z95.0 STATUS POST PLACEMENT OF CARDIAC PACEMAKER: Primary | ICD-10-CM

## 2018-08-28 DIAGNOSIS — G62.9 NEUROPATHY: ICD-10-CM

## 2018-08-28 DIAGNOSIS — I50.32 CHRONIC DIASTOLIC CHF (CONGESTIVE HEART FAILURE) (HCC): ICD-10-CM

## 2018-08-28 PROCEDURE — 99496 TRANSJ CARE MGMT HIGH F2F 7D: CPT | Performed by: NURSE PRACTITIONER

## 2018-08-28 PROCEDURE — 1111F DSCHRG MED/CURRENT MED MERGE: CPT | Performed by: NURSE PRACTITIONER

## 2018-08-28 RX ORDER — TRAMADOL HYDROCHLORIDE 50 MG/1
50 TABLET ORAL EVERY 8 HOURS PRN
Qty: 30 TABLET | Refills: 0 | Status: SHIPPED | OUTPATIENT
Start: 2018-08-28 | End: 2019-05-14 | Stop reason: SDUPTHER

## 2018-08-28 NOTE — PROGRESS NOTES
Assessment/Plan:      Diagnoses and all orders for this visit:    Status post placement of cardiac pacemaker    Chronic diastolic CHF (congestive heart failure) (HCC)    Persistent atrial fibrillation (HCC)    Neuropathy  -     traMADol (ULTRAM) 50 mg tablet; Take 1 tablet (50 mg total) by mouth every 8 (eight) hours as needed for moderate pain         Subjective:     Patient ID: Odin Lorenzo is a 80 y o  female  Here for TCM  Was in [facility] from 8/16-8/24 for afib with RVR  She was on IV cardizem but was hypotensive and dizzy, and her rates were poorly controlled  A pacemaker was placed and a successful AV agustina ablation was done  She had significant improvement of her dizziness and blood pressure/rate were stable   Today, she is feeling better, but still a little weak  [facility] has been coming to the house, both nursing and therapy services   On coumadin   Weight has been stable   Denies any chest pain but still having some JEROME, some days are more than others   appt with cardiology 9/24  Device check on 9/4, and device reprogramming on 9/20            Review of Systems   Constitutional: Positive for fatigue  Negative for appetite change and fever  HENT: Negative  Respiratory: Positive for shortness of breath  Negative for cough, chest tightness and wheezing  Cardiovascular: Negative  Gastrointestinal: Negative  Genitourinary: Negative  Skin: Positive for wound  Neurological: Negative  Objective:     Physical Exam   Constitutional: She is oriented to person, place, and time  She appears well-developed and well-nourished  Cardiovascular: Normal rate, regular rhythm, normal heart sounds and intact distal pulses  Pulmonary/Chest: Effort normal and breath sounds normal    Abdominal: Soft  Bowel sounds are normal    Musculoskeletal: She exhibits edema  Mild pitting edema bilateral LE   Neurological: She is alert and oriented to person, place, and time     Skin:   Left chest wall incision with steri strips, healing nicely, no s/s of infection  Right groin incision open to air, no s/s of infection    Psychiatric: She has a normal mood and affect  Her behavior is normal    Vitals reviewed  Vitals:    18 1248   BP: 120/84   BP Location: Left arm   Patient Position: Sitting   Cuff Size: Adult   Pulse: 92   SpO2: 96%   Weight: 81 6 kg (180 lb)   Height: 5' 2" (1 575 m)       Transitional Care Management Review:  Soco Marquez is a 80 y o  female here for TCM follow up       During the TCM phone call patient stated:    Date and time hospital follow up call was made:  2018 12:03 PM  Hospital care reviewed:  Records not available  Patient was hopsitalized at:  [facility]  Date of admission:  18  Date of discharge:  18  Diagnosis:  Atrial fibrillation  Disposition:  Home  Were the patients medicaitons reviewed and updated:  No  Current symptoms:  (Comment: stomach pain, feeling of fullness)  Post hospital issues:  None  Should patient be enrolled in anticoag monitoring?:  Yes  Scheduled for follow up?:  Yes  Patients specialists:  Cardiologist  Do you need help managing your perscriptions or medications:  Yes  I have advised the patient to call PCP with any new or worsening symptoms (please type in name along with any credentials):  Parviz Gilliland   Are you recieving outpatient services:  No  Are you recieving home care services:  Yes  Types of home care services:  Nurse visit, Home PT  Are you using any community resources:  No  Have you fallen in the last 12 months:  No  Interperter language line required?:  No  Counselin TEJAS Brink

## 2018-08-28 NOTE — PROGRESS NOTES
Face to face visit with patient in PCP office  Patient hospitalized 8/14-8/24  Patient had pacemaker placed & AV node ablation for persistent A-fib w/ RVR  Multiple medications tried but patient had poor response  Patient today denies palpitations, chest pain or pressure, SOB, dizziness, or syncope  Patient's incision has steri-strips & is healing well  Patient has f/u w/ cardiology for device check & MD f/u  Educated on role of CM/contact info  Reviewed med list, appts  Agrees to additional calls

## 2018-08-29 ENCOUNTER — ANTICOAG VISIT (OUTPATIENT)
Dept: INTERNAL MEDICINE CLINIC | Facility: CLINIC | Age: 83
End: 2018-08-29

## 2018-08-31 ENCOUNTER — ANTICOAG VISIT (OUTPATIENT)
Dept: INTERNAL MEDICINE CLINIC | Facility: CLINIC | Age: 83
End: 2018-08-31

## 2018-08-31 DIAGNOSIS — I48.21 PERMANENT ATRIAL FIBRILLATION (HCC): Primary | ICD-10-CM

## 2018-08-31 LAB — INTERNATIONAL NORMALIZATION RATIO, POC: 2.2

## 2018-09-04 ENCOUNTER — IN-CLINIC DEVICE VISIT (OUTPATIENT)
Dept: CARDIOLOGY CLINIC | Facility: CLINIC | Age: 83
End: 2018-09-04

## 2018-09-04 DIAGNOSIS — I48.20 CHRONIC ATRIAL FIBRILLATION (HCC): ICD-10-CM

## 2018-09-04 DIAGNOSIS — I49.5 SSS (SICK SINUS SYNDROME) (HCC): Primary | ICD-10-CM

## 2018-09-04 DIAGNOSIS — Z95.0 PACEMAKER: ICD-10-CM

## 2018-09-04 PROCEDURE — 99024 POSTOP FOLLOW-UP VISIT: CPT | Performed by: INTERNAL MEDICINE

## 2018-09-11 ENCOUNTER — TELEPHONE (OUTPATIENT)
Dept: GASTROENTEROLOGY | Facility: CLINIC | Age: 83
End: 2018-09-11

## 2018-09-13 ENCOUNTER — OFFICE VISIT (OUTPATIENT)
Dept: INTERNAL MEDICINE CLINIC | Facility: CLINIC | Age: 83
End: 2018-09-13
Payer: COMMERCIAL

## 2018-09-13 VITALS
TEMPERATURE: 98.1 F | WEIGHT: 179 LBS | OXYGEN SATURATION: 97 % | DIASTOLIC BLOOD PRESSURE: 78 MMHG | RESPIRATION RATE: 16 BRPM | SYSTOLIC BLOOD PRESSURE: 124 MMHG | HEIGHT: 62 IN | HEART RATE: 90 BPM | BODY MASS INDEX: 32.94 KG/M2

## 2018-09-13 DIAGNOSIS — L25.9 CONTACT DERMATITIS, UNSPECIFIED CONTACT DERMATITIS TYPE, UNSPECIFIED TRIGGER: Primary | ICD-10-CM

## 2018-09-13 PROCEDURE — 99213 OFFICE O/P EST LOW 20 MIN: CPT | Performed by: INTERNAL MEDICINE

## 2018-09-13 RX ORDER — MOMETASONE FUROATE 1 MG/G
CREAM TOPICAL
Qty: 45 G | Refills: 0 | Status: SHIPPED | OUTPATIENT
Start: 2018-09-13 | End: 2019-01-02

## 2018-09-13 RX ORDER — FEXOFENADINE HCL 180 MG/1
TABLET ORAL
Qty: 10 TABLET | Refills: 0 | Status: SHIPPED | OUTPATIENT
Start: 2018-09-13 | End: 2018-09-24 | Stop reason: CLARIF

## 2018-09-13 NOTE — PROGRESS NOTES
Assessment/Plan:           Problem List Items Addressed This Visit        Musculoskeletal and Integument    Contact dermatitis - Primary     Suspect secondary to the laundry detergent she should go back to her usual laundry detergent I have prescribed a steroid cream if the symptoms not improve next several days she should notify me she may use Allegra 180 mg once a day as needed for itching         Relevant Medications    fexofenadine (ALLEGRA) 180 MG tablet    mometasone (ELOCON) 0 1 % cream          Return to office as scheduled   call if any problems  Subjective:      Patient ID: Brandi Zendejas is a 80 y o  female  HPI 77-year-old female chief complaint contact dermatitis she reports me she has developed itchy rash bilateral arms and legs she reports me that her  had been changing the laundry detergent surgeon her routine basis  She reports me she has had the rash for several weeks  No new medications, no new body wash, no new foods, she reports me that her  does not have a rash  She has not recently been in a hotel  The following portions of the patient's history were reviewed and updated as appropriate: allergies, current medications, past family history, past medical history, past social history, past surgical history and problem list     Review of Systems   Constitutional: Negative for chills and fever  Skin: Positive for rash  Puritis         Objective:      /78 (BP Location: Right arm, Patient Position: Sitting, Cuff Size: Standard)   Pulse 90   Temp 98 1 °F (36 7 °C)   Resp 16   Ht 5' 2" (1 575 m)   Wt 81 2 kg (179 lb)   SpO2 97%   BMI 32 74 kg/m²          Physical Exam   Constitutional: She appears well-developed and well-nourished  No distress  HENT:   Head: Normocephalic and atraumatic  Right Ear: External ear normal    Left Ear: External ear normal    Nose: Nose normal    Mouth/Throat: Oropharynx is clear and moist  No oropharyngeal exudate  Eyes: Conjunctivae and EOM are normal  Pupils are equal, round, and reactive to light  Right eye exhibits no discharge  Left eye exhibits no discharge  No scleral icterus  Cardiovascular: Normal heart sounds  No murmur heard  Pulmonary/Chest: No respiratory distress  She has no wheezes  She has no rales  Lymphadenopathy:     She has no cervical adenopathy     Skin: She is not diaphoretic      bilateral upper and lower extremity small erythematous papules few

## 2018-09-14 ENCOUNTER — TELEPHONE (OUTPATIENT)
Dept: INTERNAL MEDICINE CLINIC | Facility: CLINIC | Age: 83
End: 2018-09-14

## 2018-09-14 LAB — INR PPP: 1.9 (ref 0.86–1.17)

## 2018-09-14 NOTE — TELEPHONE ENCOUNTER
[facility] called to advise you patient is being discharged from their services beginning today 9/14

## 2018-09-16 PROBLEM — L25.9 CONTACT DERMATITIS: Status: ACTIVE | Noted: 2018-09-16

## 2018-09-16 NOTE — ASSESSMENT & PLAN NOTE
Suspect secondary to the laundry detergent she should go back to her usual laundry detergent I have prescribed a steroid cream if the symptoms not improve next several days she should notify me she may use Allegra 180 mg once a day as needed for itching

## 2018-09-18 NOTE — PROGRESS NOTES
No change and recheck in 2 weeks  Patient states that she takes 4mg, 4mg, 3mg/repeat  Spoke to patient and advised

## 2018-09-20 ENCOUNTER — IN-CLINIC DEVICE VISIT (OUTPATIENT)
Dept: CARDIOLOGY CLINIC | Facility: CLINIC | Age: 83
End: 2018-09-20

## 2018-09-20 DIAGNOSIS — I44.2 COMPLETE AV BLOCK DUE TO AV NODAL ABLATION (HCC): ICD-10-CM

## 2018-09-20 DIAGNOSIS — I48.20 CHRONIC ATRIAL FIBRILLATION (HCC): Primary | ICD-10-CM

## 2018-09-20 DIAGNOSIS — Z95.0 CARDIAC PACEMAKER: ICD-10-CM

## 2018-09-20 DIAGNOSIS — I97.190 COMPLETE AV BLOCK DUE TO AV NODAL ABLATION (HCC): ICD-10-CM

## 2018-09-20 DIAGNOSIS — I50.32 CHRONIC DIASTOLIC CONGESTIVE HEART FAILURE (HCC): ICD-10-CM

## 2018-09-20 PROCEDURE — 99024 POSTOP FOLLOW-UP VISIT: CPT | Performed by: INTERNAL MEDICINE

## 2018-09-20 NOTE — PROGRESS NOTES
Results for orders placed or performed in visit on 09/20/18   Cardiac EP device report    Narrative    MDT-SINGLE CHAMBER "HIS" PPM  NONBILLABLE - [id number] REPROGRAMMING (OPTIMA NOTE - 9/4/18)  ADAPTIVE CAPT  MANAGEMENT PROGRAMMED "OFF", LRL DECREASED TO 70 BPM (s/p AV NODE ABL 8/20/18)  BATTERY VOLTAGE ADEQUATE (4 8 YRS)   - 100% (>40%/AVB)  ALL LEAD PARAMETERS WITHIN NORMAL LIMITS  RV CAPT  THRESHOLD @ 1 0v/1 0 ms  ALL OTHER TESTING WITHIN NORMAL LIMITS  NO SIGNIFICANT HIGH RATE EPISODES  PACEMAKER FUNCTIONING APPROPRIATELY      eb

## 2018-09-24 ENCOUNTER — OFFICE VISIT (OUTPATIENT)
Dept: CARDIOLOGY CLINIC | Facility: CLINIC | Age: 83
End: 2018-09-24
Payer: COMMERCIAL

## 2018-09-24 VITALS
OXYGEN SATURATION: 97 % | HEART RATE: 72 BPM | HEIGHT: 62 IN | WEIGHT: 183.7 LBS | DIASTOLIC BLOOD PRESSURE: 56 MMHG | BODY MASS INDEX: 33.8 KG/M2 | SYSTOLIC BLOOD PRESSURE: 114 MMHG

## 2018-09-24 DIAGNOSIS — I50.32 CHRONIC DIASTOLIC CHF (CONGESTIVE HEART FAILURE) (HCC): ICD-10-CM

## 2018-09-24 DIAGNOSIS — I48.19 PERSISTENT ATRIAL FIBRILLATION (HCC): Primary | ICD-10-CM

## 2018-09-24 DIAGNOSIS — Z95.0 PACEMAKER: ICD-10-CM

## 2018-09-24 PROCEDURE — 99024 POSTOP FOLLOW-UP VISIT: CPT | Performed by: NURSE PRACTITIONER

## 2018-09-24 RX ORDER — BISOPROLOL FUMARATE 5 MG/1
5 TABLET ORAL DAILY
Qty: 30 TABLET | Refills: 3 | Status: SHIPPED | OUTPATIENT
Start: 2018-09-24 | End: 2018-09-24 | Stop reason: CLARIF

## 2018-09-24 NOTE — PROGRESS NOTES
Cardiology Office Visit   Ed Tejada 80 y o  female MRN: [id number]    [facility]  Ms Timothy Benites is an 80y o  year old female with a known past medical history of persistent atrial fibrillation likely since 2016 maintained on Coumadin anticoagulation, chronic diastolic congestive heart failure with preserved EF, moderate MR/TR, hypertension, and obesity  She was recently hospitalized at [facility] on 8/16-8/24/18 with atrial fibrillation with RVR  She was treated with IV Cardizem and experienced hypotension, dizziness and poorly controlled rates  EP consulted  Griselda Rain underwent a Medtronic single chamber, His Lead, PPM implant on 8/20/18  She underwent an AV agustina Ablation by Dr Crystal Mueller on 8/23/18  Device re programmed at this time VVIR 80 BPM  She was discharged home  Weight was  183 pounds  Today Ms Timothy Benites presents to our office for a recent hospitalization follow up visit  She is feeling well  Recent device check on 9/20/18 showed  100%, rate decreased to 70 BPM   Today she is feeling well and admits to fatigue  Griselda Sit denies dyspnea with minimal or moderate exertion, CP, palpitations, lightheadedness or dizziness  Her weight at home is 180 pounds and in the office is 183 pounds  She is following a 2 gm sodium diet at home        Patient Active Problem List   Diagnosis    Atrial fibrillation ([address] )    Hypokalemia    Anemia    Fall    Hip dislocation, right (HCC)    Chronic diastolic CHF (congestive heart failure) (HCC)    Depression    Obesity without serious comorbidity    Chronic pain    Abdominal bloating    Abdominal pain    Arthralgia of hip    Arthralgia of knee, right    Benign essential hypertension    Chronic pain of lower extremity, bilateral    Fibromyalgia    External hemorrhoids    Irritable bowel syndrome with constipation    Lumbar canal stenosis    Moderate mitral regurgitation    Osteoarthritis, multiple sites    Osteoporosis    Palpitations    Peripheral neuropathy    Primary osteoarthritis of right knee    Synovial cyst of right popliteal space    Unstable right hip    Urinary incontinence    Exertional chest pain    Right lower quadrant abdominal pain    GI bleeding    Ventral hernia without obstruction or gangrene    Abnormal CT of the abdomen    Gastrointestinal hemorrhage with melena    Ulcer    Chronic pain of right knee    Acute kidney injury (HCC)    Abnormal TSH    Postural dizziness with presyncope    Contact dermatitis       ROS- + fatigue       Objective:     Vitals:RUE sitting 104/64   Vitals:    09/24/18 1003   BP: 114/56   BP Location: Right arm   Patient Position: Sitting   Cuff Size: Standard   Pulse: 72   SpO2: 97%   Weight: 83 3 kg (183 lb 11 2 oz)   Height: 5' 2" (1 575 m)     Body mass index is 33 6 kg/m²  Wt Readings from Last 3 Encounters:   09/24/18 83 3 kg (183 lb 11 2 oz)   09/13/18 81 2 kg (179 lb)   08/28/18 81 6 kg (180 lb)         Physical Exam:  GEN: Sharon Sanders appears well, alert and oriented x 3, pleasant and cooperative   HEENT: pupils equal, round, and reactive to light; extraocular muscles intact  NECK: supple, no carotid bruits   HEART: regular rhythm, normal S1 and S2, no murmurs, clicks, gallops or rubs, JVP is falt  LUNGS: clear to auscultation bilaterally; no wheezes, rales, or rhonchi   ABDOMEN: normal bowel sounds, soft, no tenderness, no distention  EXTREMITIES: peripheral pulses normal; no clubbing, cyanosis, or trace edema  NEURO: no focal findings   SKIN: normal without suspicious lesions on exposed skin      Current Outpatient Prescriptions:     desoximetasone (TOPICORT) 0 25 % cream, Apply topically, Disp: , Rfl:     DULoxetine (CYMBALTA) 60 mg delayed release capsule, TAKE 1 CAPSULE BY MOUTH ONCE DAILY, Disp: 30 capsule, Rfl: 3    fluticasone (FLONASE) 50 mcg/act nasal spray, 1 spray into each nostril daily  , Disp: , Rfl:     mometasone (ELOCON) 0 1 % cream, Once a day prn, Disp: 45 g, Rfl: 0    potassium chloride (K-DUR,KLOR-CON) 20 mEq tablet, Take 20 mEq by mouth daily, Disp: , Rfl: 3    torsemide (DEMADEX) 20 mg tablet, Take 1 tablet (20 mg total) by mouth daily, Disp: 30 tablet, Rfl: 0    traMADol (ULTRAM) 50 mg tablet, Take 1 tablet (50 mg total) by mouth every 8 (eight) hours as needed for moderate pain, Disp: 30 tablet, Rfl: 0    warfarin (COUMADIN) 3 mg tablet, Take 1 tablet by mouth daily as previously directed by cardiology, Disp: , Rfl:     warfarin (COUMADIN) 4 mg tablet, Take one tablet daily as previously directed by cardiology, Disp: 180 tablet, Rfl: 0        Assessment/Plan:   1  Persistent atrial fibrillation-  8/23/18 sp AV agustina ablation, 9/20/18 device interrogation showed 100% V paced, rate 70 BPM, no high rate episodes, continues on coumadin goal INR 2 0-3 0 followed by Dr Neri Gibbons  2  8/20/18 Sp single chamber PPM implant- recent interrogation showed  100%  3  Chronic diastolic heart failure LVEF 55%- HR and BP controlled, weight stable 180 pounds at home and 183 pounds in the office   Maintain a 2 gram daily sodium diet and 1500 ml daily fluid restriction  Check daily weights  If you gained 3 pounds in one day, 5 pounds in one week, or experience worsening shortness of breath or increasing lower leg swelling  Please call the heart failure office at [phone]  Please bring a  list of your current medications and daily weights to the office visit  Follow up with Dr Hussein Sanchez in one month       TEJAS Peña  9/24/2018  10:10 AM

## 2018-09-24 NOTE — PATIENT INSTRUCTIONS
Maintain a 2 gram daily sodium diet and 1500 ml daily fluid restriction  Check daily weights  If you gain 3 pounds in one day, 5 pounds in one week, or experience worsening shortness of breath or increasing lower leg swelling  Please call the heart failure office at [phone]    Please bring a  list of your current medications and daily weights to the office visit  Reading food labels look for food with   5%- 10%  Per serving

## 2018-10-02 ENCOUNTER — APPOINTMENT (OUTPATIENT)
Dept: LAB | Facility: CLINIC | Age: 83
End: 2018-10-02
Payer: COMMERCIAL

## 2018-10-02 ENCOUNTER — ANTICOAG VISIT (OUTPATIENT)
Dept: INTERNAL MEDICINE CLINIC | Facility: CLINIC | Age: 83
End: 2018-10-02

## 2018-10-02 DIAGNOSIS — I50.32 CHRONIC DIASTOLIC CHF (CONGESTIVE HEART FAILURE) (HCC): ICD-10-CM

## 2018-10-02 DIAGNOSIS — E03.9 HYPOTHYROIDISM, UNSPECIFIED TYPE: ICD-10-CM

## 2018-10-02 DIAGNOSIS — I10 HYPERTENSION, UNSPECIFIED TYPE: ICD-10-CM

## 2018-10-02 LAB
ANION GAP SERPL CALCULATED.3IONS-SCNC: 8 MMOL/L (ref 4–13)
BASOPHILS # BLD AUTO: 0.04 THOUSANDS/ΜL (ref 0–0.1)
BASOPHILS NFR BLD AUTO: 1 % (ref 0–1)
BUN SERPL-MCNC: 16 MG/DL (ref 5–25)
CALCIUM SERPL-MCNC: 8.7 MG/DL (ref 8.3–10.1)
CHLORIDE SERPL-SCNC: 106 MMOL/L (ref 100–108)
CO2 SERPL-SCNC: 26 MMOL/L (ref 21–32)
CREAT SERPL-MCNC: 0.88 MG/DL (ref 0.6–1.3)
EOSINOPHIL # BLD AUTO: 0.08 THOUSAND/ΜL (ref 0–0.61)
EOSINOPHIL NFR BLD AUTO: 2 % (ref 0–6)
ERYTHROCYTE [DISTWIDTH] IN BLOOD BY AUTOMATED COUNT: 14.2 % (ref 11.6–15.1)
GFR SERPL CREATININE-BSD FRML MDRD: 60 ML/MIN/1.73SQ M
GLUCOSE P FAST SERPL-MCNC: 83 MG/DL (ref 65–99)
HCT VFR BLD AUTO: 37 % (ref 34.8–46.1)
HGB BLD-MCNC: 11.4 G/DL (ref 11.5–15.4)
IMM GRANULOCYTES # BLD AUTO: 0.01 THOUSAND/UL (ref 0–0.2)
IMM GRANULOCYTES NFR BLD AUTO: 0 % (ref 0–2)
LYMPHOCYTES # BLD AUTO: 1.15 THOUSANDS/ΜL (ref 0.6–4.47)
LYMPHOCYTES NFR BLD AUTO: 21 % (ref 14–44)
MCH RBC QN AUTO: 30.5 PG (ref 26.8–34.3)
MCHC RBC AUTO-ENTMCNC: 30.8 G/DL (ref 31.4–37.4)
MCV RBC AUTO: 99 FL (ref 82–98)
MONOCYTES # BLD AUTO: 0.49 THOUSAND/ΜL (ref 0.17–1.22)
MONOCYTES NFR BLD AUTO: 9 % (ref 4–12)
NEUTROPHILS # BLD AUTO: 3.6 THOUSANDS/ΜL (ref 1.85–7.62)
NEUTS SEG NFR BLD AUTO: 67 % (ref 43–75)
NRBC BLD AUTO-RTO: 0 /100 WBCS
PLATELET # BLD AUTO: 184 THOUSANDS/UL (ref 149–390)
PMV BLD AUTO: 11.1 FL (ref 8.9–12.7)
POTASSIUM SERPL-SCNC: 3.9 MMOL/L (ref 3.5–5.3)
RBC # BLD AUTO: 3.74 MILLION/UL (ref 3.81–5.12)
SODIUM SERPL-SCNC: 140 MMOL/L (ref 136–145)
TSH SERPL DL<=0.05 MIU/L-ACNC: 6.72 UIU/ML (ref 0.36–3.74)
WBC # BLD AUTO: 5.37 THOUSAND/UL (ref 4.31–10.16)

## 2018-10-02 PROCEDURE — 84443 ASSAY THYROID STIM HORMONE: CPT

## 2018-10-02 PROCEDURE — 80048 BASIC METABOLIC PNL TOTAL CA: CPT

## 2018-10-02 PROCEDURE — 85025 COMPLETE CBC W/AUTO DIFF WBC: CPT

## 2018-10-05 DIAGNOSIS — R79.89 TSH ELEVATION: Primary | ICD-10-CM

## 2018-10-11 ENCOUNTER — OFFICE VISIT (OUTPATIENT)
Dept: INTERNAL MEDICINE CLINIC | Facility: CLINIC | Age: 83
End: 2018-10-11
Payer: COMMERCIAL

## 2018-10-11 VITALS
HEIGHT: 62 IN | SYSTOLIC BLOOD PRESSURE: 130 MMHG | BODY MASS INDEX: 33.45 KG/M2 | WEIGHT: 181.8 LBS | HEART RATE: 83 BPM | RESPIRATION RATE: 16 BRPM | OXYGEN SATURATION: 98 % | DIASTOLIC BLOOD PRESSURE: 84 MMHG

## 2018-10-11 DIAGNOSIS — E66.9 OBESITY WITHOUT SERIOUS COMORBIDITY, UNSPECIFIED CLASSIFICATION, UNSPECIFIED OBESITY TYPE: ICD-10-CM

## 2018-10-11 DIAGNOSIS — Z23 NEED FOR INFLUENZA VACCINATION: ICD-10-CM

## 2018-10-11 DIAGNOSIS — R00.2 PALPITATIONS: ICD-10-CM

## 2018-10-11 DIAGNOSIS — I48.19 PERSISTENT ATRIAL FIBRILLATION (HCC): Primary | ICD-10-CM

## 2018-10-11 DIAGNOSIS — E03.9 HYPOTHYROIDISM, UNSPECIFIED TYPE: ICD-10-CM

## 2018-10-11 PROCEDURE — G0008 ADMIN INFLUENZA VIRUS VAC: HCPCS

## 2018-10-11 PROCEDURE — 99214 OFFICE O/P EST MOD 30 MIN: CPT | Performed by: INTERNAL MEDICINE

## 2018-10-11 PROCEDURE — 90662 IIV NO PRSV INCREASED AG IM: CPT

## 2018-10-11 RX ORDER — LEVOTHYROXINE SODIUM 0.03 MG/1
25 TABLET ORAL DAILY
Qty: 30 TABLET | Refills: 4 | Status: SHIPPED | OUTPATIENT
Start: 2018-10-11 | End: 2018-11-22 | Stop reason: ALTCHOICE

## 2018-10-11 NOTE — ASSESSMENT & PLAN NOTE
Patient does report me exertional palpitations which are intermittent will check a Holter monitor in referred patient to Cardiology if worsening of symptoms go immediately to the ER    I have put a message in to the patient's cardiologist Dr Cole Plan to inform him of her current condition

## 2018-10-11 NOTE — PROGRESS NOTES
Assessment/Plan:           Problem List Items Addressed This Visit        Endocrine    Hypothyroidism     Patient does report me multiple symptoms it might be compatible with hypothyroidism including fatigue, cold intolerance, obesity will start low-dose of levothyroxine 25 mcg once daily on empty stomach with no other medicines recheck 3rd generation TSH in 4 weeks  Relevant Medications    levothyroxine 25 mcg tablet       Cardiovascular and Mediastinum    Atrial fibrillation (HCC) - Primary     Rate controlled and anticoagulated the patient now has a pace maker is currently stable although she does report me some intermittent palpitations on exertion I put a message in to her cardiologist will check a Holter monitor and have her follow up with her cardiologist if increased symptoms or presyncope, chest pain go immediately to the ER            Other    Obesity without serious comorbidity     Obesity -I have counseled patient following healthy and balanced diet, I would like the patient to lose weight, I would like the patient exercise routinely; we will continue monitor the patient's progress  Palpitations     Patient does report me exertional palpitations which are intermittent will check a Holter monitor in referred patient to Cardiology if worsening of symptoms go immediately to the ER  I have put a message in to the patient's cardiologist Dr Cole Plan to inform him of her current condition         Relevant Orders    Holter monitor - 48 hour    Ambulatory referral to Cardiology      Other Visit Diagnoses     Need for influenza vaccination        Relevant Orders    influenza vaccine, 2303-6794, high-dose, PF 0 5 mL, for patients 65 yr+ (FLUZONE HIGH-DOSE) (Completed)          Return to office 6  months  call if any problems  Pt would like to hold off on Derm evaluation and the pt will try oneyda cream and call me it the symptoms do not jordan    Subjective:      Patient ID: Ruther Breath is a 80 y o  female  HPI 80-year old female coming in for a follow up office visit regarding persistent atrial fibrillation, obesity, palpitations, hypothyroidism; The patient reports me compliant taking medications without untoward side effects the  The patient is here to review his medical condition, update me on the medical condition and the patient reports me no hospitalizations and no ER visits  Patient does report me no further rash since taking the allergy pill but she does notice if she time to could be her eyelashes "they itch here slightly" as compared to previous it is slowing down  She reports me a he did return to her usual detergent and this did not have a major impact on her symptoms  Patient reports me at times she can feel tired and also lightheaded or dizziness  She reports working with Dr Hernandez Agent  He may need 3 capping of the right hip  The following portions of the patient's history were reviewed and updated as appropriate: allergies, current medications, past family history, past medical history, past social history, past surgical history and problem list     Review of Systems   Constitutional: Positive for fatigue  Negative for activity change, appetite change and unexpected weight change  Eyes: Negative for visual disturbance  Respiratory: Negative for cough and shortness of breath  Cardiovascular: Positive for palpitations (Exertional palpitations)  Negative for chest pain  Gastrointestinal: Negative for abdominal pain, diarrhea, nausea and vomiting  Neurological: Positive for dizziness and headaches  Negative for light-headedness  Hematological: Negative for adenopathy  Objective:                  No Follow-up on file  Allergies   Allergen Reactions    Codeine Vomiting and GI Intolerance     GI upset, ana m morphine  Reaction Date: 24Apr2012;     Other      Other reaction(s):  Other (See Comments)  ana m toradol in OR 6/06 sah    Oxycodone Hcl Vomiting Reaction Date: 24Apr2012;     Oxycodone-Acetaminophen      Other reaction(s): Other (See Comments)  GI upset; ana m vicodin    Percocet [Oxycodone-Acetaminophen]     Seasonal Ic  [Cholestatin] Allergic Rhinitis    Bactrim [Sulfamethoxazole-Trimethoprim] Rash    Sulfa Antibiotics Rash     Other reaction(s): Other (See Comments)  takes lasix @home       Past Medical History:   Diagnosis Date    Arthritis     Cancer ([address] )     [facility]est[facility])     Disturbance of smell and taste     disturbance of taste    Effusion of knee joint right     Fibromyalgia     Heart murmur     reported a previous heart murmur    History of acute myocardial infarction     History of atrial fibrillation     History of bruising easily     History of cancer     History of dermatitis     History of mammogram     History of methicillin resistant staphylococcus aureus (MRSA)     Negative nasal culture, isolation discontinued 8/17/2018      History of methicillin resistant staphylococcus aureus (MRSA) 08/17/2018    negative nasal culture-isolation and hx discontinued 8/17/2018    History of obesity     History of osteopenia     History of sciatica     History of shortness of breath     History of sinusitis     History of sore throat     History of syncope     History of umbilical hernia     History of viral infection     Irritable bowel syndrome (IBS)     Joint pain, hip     Limb pain     Myalgia     myalgia and myositis    Need for prophylactic vaccination against single diseases     Need for prophylactic vaccination and inoculation against influenza     Preoperative cardiovascular examination     Primary osteoarthritis of right knee     Right leg pain     Vaginal Pap smear     reported pap smear     Past Surgical History:   Procedure Laterality Date    ANKLE ARTHRODESIS Right     BACK SURGERY      BARIATRIC SURGERY      CHOLECYSTECTOMY      x2    ESOPHAGOGASTRODUODENOSCOPY N/A 3/23/2018 Procedure: ESOPHAGOGASTRODUODENOSCOPY (EGD); Surgeon: Michelle Hernandez MD;  Location: [facility]; Service: Gastroenterology    FOOT SURGERY      HIP CLOSE REDUCTION Right 8/21/2016    Procedure: CLOSED REDUCTION RIGHT TOTAL HIP;  Surgeon: Savannah Grubbs MD;  Location[facility];  Service:     JOINT REPLACEMENT      LEFT KNEE REPLACEMENT    PILONIDAL CYST EXCISION      x2    REPLACEMENT TOTAL KNEE Right     SILVANA-EN-Y PROCEDURE      x2    TOTAL KNEE ARTHROPLASTY      UMBILICAL HERNIA REPAIR      x2     Current Outpatient Prescriptions on File Prior to Visit   Medication Sig Dispense Refill    desoximetasone (TOPICORT) 0 25 % cream Apply topically      DULoxetine (CYMBALTA) 60 mg delayed release capsule TAKE 1 CAPSULE BY MOUTH ONCE DAILY 30 capsule 3    fluticasone (FLONASE) 50 mcg/act nasal spray 1 spray into each nostril daily   mometasone (ELOCON) 0 1 % cream Once a day prn 45 g 0    potassium chloride (K-DUR,KLOR-CON) 20 mEq tablet Take 20 mEq by mouth daily  3    torsemide (DEMADEX) 20 mg tablet Take 1 tablet (20 mg total) by mouth daily 30 tablet 0    traMADol (ULTRAM) 50 mg tablet Take 1 tablet (50 mg total) by mouth every 8 (eight) hours as needed for moderate pain 30 tablet 0    warfarin (COUMADIN) 3 mg tablet Take 1 tablet by mouth daily as previously directed by cardiology      warfarin (COUMADIN) 4 mg tablet Take one tablet daily as previously directed by cardiology 180 tablet 0     No current facility-administered medications on file prior to visit        Family History   Problem Relation Age of Onset    Coronary artery disease Mother     Heart attack Mother     Hypertension Mother     Coronary artery disease Father     Heart attack Father     Hypertension Father     Lymphoma Sister         acute    Rashes / Skin problems Sister         lymphomatoid papulosis    Coronary artery disease Brother     Heart attack Brother         x2    Aneurysm Neg Hx     Hyperlipidemia Neg Hx      Social History     Social History    Marital status: /Civil Union     Spouse name: N/A    Number of children: N/A    Years of education: N/A     Occupational History    Not on file       Social History Main Topics    Smoking status: Never Smoker    Smokeless tobacco: Never Used    Alcohol use Yes      Comment:  social    Drug use: No    Sexual activity: No     Other Topics Concern    Not on file     Social History Narrative    No narrative on file     Vitals:    10/11/18 1008   BP: 130/84   BP Location: Right arm   Patient Position: Sitting   Cuff Size: Standard   Pulse: 83   Resp: 16   SpO2: 98%   Weight: 82 5 kg (181 lb 12 8 oz)   Height: 5' 2" (1 575 m)     Results for orders placed or performed in visit on 28/90/34   Basic metabolic panel   Result Value Ref Range    Sodium 140 136 - 145 mmol/L    Potassium 3 9 3 5 - 5 3 mmol/L    Chloride 106 100 - 108 mmol/L    CO2 26 21 - 32 mmol/L    ANION GAP 8 4 - 13 mmol/L    BUN 16 5 - 25 mg/dL    Creatinine 0 88 0 60 - 1 30 mg/dL    Glucose, Fasting 83 65 - 99 mg/dL    Calcium 8 7 8 3 - 10 1 mg/dL    eGFR 60 ml/min/1 73sq m   TSH, 3rd generation   Result Value Ref Range    TSH 3RD GENERATON 6 720 (H) 0 358 - 3 740 uIU/mL   CBC and differential   Result Value Ref Range    WBC 5 37 4 31 - 10 16 Thousand/uL    RBC 3 74 (L) 3 81 - 5 12 Million/uL    Hemoglobin 11 4 (L) 11 5 - 15 4 g/dL    Hematocrit 37 0 34 8 - 46 1 %    MCV 99 (H) 82 - 98 fL    MCH 30 5 26 8 - 34 3 pg    MCHC 30 8 (L) 31 4 - 37 4 g/dL    RDW 14 2 11 6 - 15 1 %    MPV 11 1 8 9 - 12 7 fL    Platelets 382 879 - 962 Thousands/uL    nRBC 0 /100 WBCs    Neutrophils Relative 67 43 - 75 %    Immat GRANS % 0 0 - 2 %    Lymphocytes Relative 21 14 - 44 %    Monocytes Relative 9 4 - 12 %    Eosinophils Relative 2 0 - 6 %    Basophils Relative 1 0 - 1 %    Neutrophils Absolute 3 60 1 85 - 7 62 Thousands/µL    Immature Grans Absolute 0 01 0 00 - 0 20 Thousand/uL    Lymphocytes Absolute 1 15 0 60 - 4 47 Thousands/µL    Monocytes Absolute 0 49 0 17 - 1 22 Thousand/µL    Eosinophils Absolute 0 08 0 00 - 0 61 Thousand/µL    Basophils Absolute 0 04 0 00 - 0 10 Thousands/µL     Weight (last 2 days)     Date/Time   Weight    10/11/18 1008  82 5 (181 8)            Body mass index is 33 25 kg/m²  BP      Temp      Pulse     Resp      SpO2        Vitals:    10/11/18 1008   Weight: 82 5 kg (181 lb 12 8 oz)     Vitals:    10/11/18 1008   Weight: 82 5 kg (181 lb 12 8 oz)         /84 (BP Location: Right arm, Patient Position: Sitting, Cuff Size: Standard)   Pulse 83   Resp 16   Ht 5' 2" (1 575 m)   Wt 82 5 kg (181 lb 12 8 oz)   SpO2 98%   BMI 33 25 kg/m²          Physical Exam   Constitutional: She appears well-developed and well-nourished  HENT:   Head: Normocephalic  Mouth/Throat: Oropharynx is clear and moist    Eyes: Pupils are equal, round, and reactive to light  Conjunctivae are normal  Right eye exhibits no discharge  Left eye exhibits no discharge  No scleral icterus  Neck: Neck supple  Cardiovascular: Normal rate, regular rhythm, normal heart sounds and intact distal pulses  Exam reveals no gallop and no friction rub  No murmur heard  Pulmonary/Chest: Breath sounds normal  No respiratory distress  She has no wheezes  She has no rales  Abdominal: Soft  Bowel sounds are normal  She exhibits no distension and no mass  There is no tenderness  There is no rebound and no guarding  Musculoskeletal: She exhibits no edema or deformity  Lymphadenopathy:     She has no cervical adenopathy  Neurological: She is alert   Coordination normal

## 2018-10-11 NOTE — ASSESSMENT & PLAN NOTE
Rate controlled and anticoagulated the patient now has a pace maker is currently stable although she does report me some intermittent palpitations on exertion I put a message in to her cardiologist will check a Holter monitor and have her follow up with her cardiologist if increased symptoms or presyncope, chest pain go immediately to the ER

## 2018-10-12 NOTE — ASSESSMENT & PLAN NOTE
Patient does report me multiple symptoms it might be compatible with hypothyroidism including fatigue, cold intolerance, obesity will start low-dose of levothyroxine 25 mcg once daily on empty stomach with no other medicines recheck 3rd generation TSH in 4 weeks

## 2018-10-22 ENCOUNTER — ANTICOAG VISIT (OUTPATIENT)
Dept: INTERNAL MEDICINE CLINIC | Facility: CLINIC | Age: 83
End: 2018-10-22

## 2018-10-24 DIAGNOSIS — N90.89 VULVAR IRRITATION: Primary | ICD-10-CM

## 2018-10-24 DIAGNOSIS — I10 BENIGN ESSENTIAL HTN: Primary | ICD-10-CM

## 2018-10-24 RX ORDER — DESOXIMETASONE 2.5 MG/G
CREAM TOPICAL AS NEEDED
Qty: 30 G | Refills: 0 | Status: SHIPPED | OUTPATIENT
Start: 2018-10-24 | End: 2019-06-27 | Stop reason: SDUPTHER

## 2018-11-01 RX ORDER — POTASSIUM CHLORIDE 20 MEQ/1
TABLET, EXTENDED RELEASE ORAL
Qty: 30 TABLET | Refills: 11 | Status: SHIPPED | OUTPATIENT
Start: 2018-11-01 | End: 2021-02-05 | Stop reason: SDUPTHER

## 2018-11-05 ENCOUNTER — ANTICOAG VISIT (OUTPATIENT)
Dept: INTERNAL MEDICINE CLINIC | Facility: CLINIC | Age: 83
End: 2018-11-05

## 2018-11-06 ENCOUNTER — OFFICE VISIT (OUTPATIENT)
Dept: OBGYN CLINIC | Facility: HOSPITAL | Age: 83
End: 2018-11-06
Payer: COMMERCIAL

## 2018-11-06 VITALS
HEART RATE: 90 BPM | BODY MASS INDEX: 34.23 KG/M2 | HEIGHT: 62 IN | SYSTOLIC BLOOD PRESSURE: 140 MMHG | WEIGHT: 186 LBS | DIASTOLIC BLOOD PRESSURE: 74 MMHG

## 2018-11-06 DIAGNOSIS — G89.29 CHRONIC PAIN OF RIGHT KNEE: Primary | ICD-10-CM

## 2018-11-06 DIAGNOSIS — M25.561 CHRONIC PAIN OF RIGHT KNEE: Primary | ICD-10-CM

## 2018-11-06 DIAGNOSIS — M17.11 PRIMARY OSTEOARTHRITIS OF RIGHT KNEE: ICD-10-CM

## 2018-11-06 PROCEDURE — 99213 OFFICE O/P EST LOW 20 MIN: CPT | Performed by: ORTHOPAEDIC SURGERY

## 2018-11-06 PROCEDURE — 20610 DRAIN/INJ JOINT/BURSA W/O US: CPT | Performed by: ORTHOPAEDIC SURGERY

## 2018-11-06 RX ORDER — BETAMETHASONE SODIUM PHOSPHATE AND BETAMETHASONE ACETATE 3; 3 MG/ML; MG/ML
12 INJECTION, SUSPENSION INTRA-ARTICULAR; INTRALESIONAL; INTRAMUSCULAR; SOFT TISSUE
Status: COMPLETED | OUTPATIENT
Start: 2018-11-06 | End: 2018-11-06

## 2018-11-06 RX ORDER — BUPIVACAINE HYDROCHLORIDE 2.5 MG/ML
2 INJECTION, SOLUTION INFILTRATION; PERINEURAL
Status: COMPLETED | OUTPATIENT
Start: 2018-11-06 | End: 2018-11-06

## 2018-11-06 RX ORDER — LIDOCAINE HYDROCHLORIDE 10 MG/ML
2 INJECTION, SOLUTION INFILTRATION; PERINEURAL
Status: COMPLETED | OUTPATIENT
Start: 2018-11-06 | End: 2018-11-06

## 2018-11-06 RX ADMIN — LIDOCAINE HYDROCHLORIDE 2 ML: 10 INJECTION, SOLUTION INFILTRATION; PERINEURAL at 09:11

## 2018-11-06 RX ADMIN — BETAMETHASONE SODIUM PHOSPHATE AND BETAMETHASONE ACETATE 12 MG: 3; 3 INJECTION, SUSPENSION INTRA-ARTICULAR; INTRALESIONAL; INTRAMUSCULAR; SOFT TISSUE at 09:11

## 2018-11-06 RX ADMIN — BUPIVACAINE HYDROCHLORIDE 2 ML: 2.5 INJECTION, SOLUTION INFILTRATION; PERINEURAL at 09:11

## 2018-11-06 NOTE — PROGRESS NOTES
85 y o female presents for follow-up  Since her last visit she has received pacemaker to manage a wrist media  She does admit to no pain in her right hip  She admits to return of weight-bearing pain in the right knee  She has pain level of right knee joint, pain is made worse bearing weight, the pain increases with increased activities  She expresses a desire to continue nonaggressive ongoing treatment to alleviate right knee pain and allow improved function  Review of Systems  Review of systems negative unless otherwise specified in HPI    Past Medical History  Past Medical History:   Diagnosis Date    Arthritis     Cancer ([facility] [address] )     CH[facility]congest[facility] failure) ([facility])     Disturbance of smell and taste     disturbance of taste    Effusion of knee joint right     Fibromyalgia     Heart murmur     reported a previous heart murmur    History of acute myocardial infarction     History of atrial fibrillation     History of bruising easily     History of cancer     History of dermatitis     History of mammogram     History of methicillin resistant staphylococcus aureus (MRSA)     Negative nasal culture, isolation discontinued 8/17/2018      History of methicillin resistant staphylococcus aureus (MRSA) 08/17/2018    negative nasal culture-isolation and hx discontinued 8/17/2018    History of obesity     History of osteopenia     History of sciatica     History of shortness of breath     History of sinusitis     History of sore throat     History of syncope     History of umbilical hernia     History of viral infection     Irritable bowel syndrome (IBS)     Joint pain, hip     Limb pain     Myalgia     myalgia and myositis    Need for prophylactic vaccination against single diseases     Need for prophylactic vaccination and inoculation against influenza     Preoperative cardiovascular examination     Primary osteoarthritis of right knee     Right leg pain     Vaginal Pap smear reported pap smear       Past Surgical History  Past Surgical History:   Procedure Laterality Date    ANKLE ARTHRODESIS Right     BACK SURGERY      BARIATRIC SURGERY      CHOLECYSTECTOMY      x2    ESOPHAGOGASTRODUODENOSCOPY N/A 3/23/2018    Procedure: ESOPHAGOGASTRODUODENOSCOPY (EGD); Surgeon: Wood Liang MD;  Location: [facility]; Service: Gastroenterology    FOOT SURGERY      HIP CLOSE REDUCTION Right 8/21/2016    Procedure: CLOSED REDUCTION RIGHT TOTAL HIP;  Surgeon: Simpson Olszewski, MD;  Location[facility];  Service:     JOINT REPLACEMENT      LEFT KNEE REPLACEMENT    PILONIDAL CYST EXCISION      x2    REPLACEMENT TOTAL KNEE Right     SILVANA-EN-Y PROCEDURE      x2    TOTAL KNEE ARTHROPLASTY      UMBILICAL HERNIA REPAIR      x2       Current Medications  Current Outpatient Prescriptions on File Prior to Visit   Medication Sig Dispense Refill    desoximetasone (TOPICORT) 0 25 % cream Apply topically as needed for irritation 30 g 0    DULoxetine (CYMBALTA) 60 mg delayed release capsule TAKE 1 CAPSULE BY MOUTH ONCE DAILY 30 capsule 3    fluticasone (FLONASE) 50 mcg/act nasal spray 1 spray into each nostril daily   levothyroxine 25 mcg tablet Take 1 tablet (25 mcg total) by mouth daily 30 tablet 4    mometasone (ELOCON) 0 1 % cream Once a day prn 45 g 0    potassium chloride (K-DUR,KLOR-CON) 20 mEq tablet TAKE 1 TABLET DAILY 30 tablet 11    torsemide (DEMADEX) 20 mg tablet Take 1 tablet (20 mg total) by mouth daily 30 tablet 0    traMADol (ULTRAM) 50 mg tablet Take 1 tablet (50 mg total) by mouth every 8 (eight) hours as needed for moderate pain 30 tablet 0    warfarin (COUMADIN) 3 mg tablet Take 1 tablet by mouth daily as previously directed by cardiology      warfarin (COUMADIN) 4 mg tablet Take one tablet daily as previously directed by cardiology 180 tablet 0     No current facility-administered medications on file prior to visit          Recent Labs (HCT,HGB,PT,INR,ESR,CRP,GLU,HgA1C)    0  Lab Value Date/Time   HCT 37 0 10/02/2018 1041   HCT 37 0 04/20/2015 1109   HCT 38 0 04/20/2015 1109   HGB 11 4 (L) 10/02/2018 1041   HGB 12 0 04/20/2015 1109   WBC 5 37 10/02/2018 1041   WBC 7 98 04/20/2015 1109   INR 1 75 (H) 10/02/2018 1041   GLUCOSE 98 04/03/2015 0831         Physical exam  · General: Awake, Alert, Oriented  · Eyes: Pupils equal, round and reactive to light  · Heart: regular rate and rhythm  · Lungs: No audible wheezing  · Abdomen: soft  Gait pattern is with gliding walker  Right hip has good motion, and no pain with motion  Right knee is in valgus  There is no effusion today  There is bony enlargement and tenderness laterally  There is crepitation flexion extension  There is no palpable warmth of the synovium  Calf compartments are soft and supple  Toes are warm, sensate, mobile  Imaging  No new x-rays accompany her    Procedure  An injection of corticosteroid is provided for the right knee joint  It is documented below      Large joint arthrocentesis  Date/Time: 11/6/2018 9:11 AM  Consent given by: patient  Supporting Documentation  Indications: pain   Procedure Details  Location: knee - R knee  Needle size: 22 G  Ultrasound guidance: no  Medications administered: 2 mL bupivacaine 0 25 %; 2 mL lidocaine 1 %; 12 mg betamethasone acetate-betamethasone sodium phosphate 6 (3-3) mg/mL    Patient tolerance: patient tolerated the procedure well with no immediate complications  Dressing:  Sterile dressing applied          Assessment/Plan:   80 y  o female who has a symptom-free right total hip today, however her has recurrence of pain and dysfunction in the arthritic right knee  Treatment options discussed in detail including ongoing weight loss  An injection of corticosteroid is indicated for pain relief purposes  It is advised, except, administers outlined above    I would welcome the opportunity see back in 3 months time for repeat physical exam

## 2018-11-15 ENCOUNTER — APPOINTMENT (OUTPATIENT)
Dept: LAB | Facility: CLINIC | Age: 83
End: 2018-11-15
Payer: COMMERCIAL

## 2018-11-15 ENCOUNTER — IN-CLINIC DEVICE VISIT (OUTPATIENT)
Dept: CARDIOLOGY CLINIC | Facility: CLINIC | Age: 83
End: 2018-11-15
Payer: COMMERCIAL

## 2018-11-15 ENCOUNTER — OFFICE VISIT (OUTPATIENT)
Dept: CARDIOLOGY CLINIC | Facility: CLINIC | Age: 83
End: 2018-11-15
Payer: COMMERCIAL

## 2018-11-15 VITALS
SYSTOLIC BLOOD PRESSURE: 132 MMHG | BODY MASS INDEX: 33.79 KG/M2 | HEART RATE: 92 BPM | OXYGEN SATURATION: 98 % | DIASTOLIC BLOOD PRESSURE: 76 MMHG | WEIGHT: 183.6 LBS | HEIGHT: 62 IN

## 2018-11-15 DIAGNOSIS — I34.0 MODERATE MITRAL REGURGITATION: ICD-10-CM

## 2018-11-15 DIAGNOSIS — Z95.0 CARDIAC PACEMAKER: ICD-10-CM

## 2018-11-15 DIAGNOSIS — I97.190 AV BLOCK, COMPLETE, POST OP COMPLICATION OF AV NODAL ABLATION (HCC): ICD-10-CM

## 2018-11-15 DIAGNOSIS — R79.89 TSH ELEVATION: ICD-10-CM

## 2018-11-15 DIAGNOSIS — I48.91 ATRIAL FIBRILLATION, UNSPECIFIED TYPE (HCC): Primary | ICD-10-CM

## 2018-11-15 DIAGNOSIS — I50.32 CHRONIC DIASTOLIC CHF (CONGESTIVE HEART FAILURE) (HCC): ICD-10-CM

## 2018-11-15 DIAGNOSIS — I10 BENIGN ESSENTIAL HYPERTENSION: ICD-10-CM

## 2018-11-15 DIAGNOSIS — I48.21 PERMANENT ATRIAL FIBRILLATION (HCC): Primary | ICD-10-CM

## 2018-11-15 DIAGNOSIS — I44.2 AV BLOCK, COMPLETE, POST OP COMPLICATION OF AV NODAL ABLATION (HCC): ICD-10-CM

## 2018-11-15 LAB — TSH SERPL DL<=0.05 MIU/L-ACNC: 3.83 UIU/ML (ref 0.36–3.74)

## 2018-11-15 PROCEDURE — 93000 ELECTROCARDIOGRAM COMPLETE: CPT | Performed by: INTERNAL MEDICINE

## 2018-11-15 PROCEDURE — 93279 PRGRMG DEV EVAL PM/LDLS PM: CPT | Performed by: INTERNAL MEDICINE

## 2018-11-15 PROCEDURE — 99214 OFFICE O/P EST MOD 30 MIN: CPT | Performed by: INTERNAL MEDICINE

## 2018-11-15 PROCEDURE — 4040F PNEUMOC VAC/ADMIN/RCVD: CPT | Performed by: INTERNAL MEDICINE

## 2018-11-15 PROCEDURE — 84443 ASSAY THYROID STIM HORMONE: CPT

## 2018-11-15 NOTE — PROGRESS NOTES
Results for orders placed or performed in visit on 11/15/18   Cardiac EP device report    Narrative    MDT-SINGLE CHAMBER "HIS" PPM  DEVICE INTERROGATED IN THE [facility] OFFICE: BATTERY VOLTAGE ADEQUATE (4 6 YRS - 8 7 YRS AFTER PROGRAMMING CHANGES)   - 100% (>40%/s/p AVN ABL - VVIR 70)  ALL LEAD PARAMETERS TEST WITHIN NORMAL LIMITS  ALL OTHER TESTING WITHIN NORMAL LIMITS  NO SIGNIFICANT HIGH RATE EPISODES  CHRONIC AF - PT ON WARFARIN  DECREASE MADE TO  RV AMPLITUDE TO PROMOTE DEVICE LONGEVITY WHILE MAINTAINING AN APPROPRIATE SAFETY MARGIN  PT SEEN IN OFFICE TODAY BY DR Gavin Whitley  PACEMAKER FUNCTIONING APPROPRIATELY     eb

## 2018-11-15 NOTE — PROGRESS NOTES
[facility]  Follow up note  Nikole Umanzor 80 y o  female MRN: [id number]        Problems    1  Atrial fibrillation, unspecified type ([facility] [address] )  POCT ECG   2  Chronic diastolic CHF (congestive heart failure) ([facility] [address] )     3  Moderate mitral regurgitation     4  Benign essential hypertension         Impression:    Nigel Killian returns to see me at the [facility] office, having been hospitalized for 10 days in August for AFib with RVR with impossible rate control, often associated with hypotension her resulting in recommendation for AV node ablation and placement of a permanent pacemaker which worked wonderfully  Hypertension  Controlled    Chronic diastolic CHF  Weight fluctuate 180 lb to 186 lb, she is 183 lb today and appears nicely euvolemic on torsemide 20 mg daily    Atrial fibrillation  Permanent, now status post AV node ablation and permanent pacemaker  Continues on warfarin, last INR slightly subtherapeutic 10/2/2018 and going for repeat INR today    Mitral regurgitation  Moderate, central jet, somewhat functional but no significant associated symptoms and continue surveillance  Last echo was 4/18    Plan:    Four-month follow-up recommended  No new testing between now and then      HPI:     Nikole Umanzor is a 80y o  year old female   With chronic diastolic CHF, permanent atrial fibrillation, hypertension, moderate mitral regurgitation, hyperlipidemia presents for a hospital follow-up for AFib with RVR complicated by hypotension with all rate controlling medications, recommended for AV node ablation and pacemaker placement which went successfully  She had a pacemaker check today, she denies any new cardiovascular symptoms, her heart rate is paced at 74 on EK G  She has lost about 50 lb in the last 1 year, this is certainly going to improve her diastolic heart failure  She is on torsemide 20 mg once a day, maintaining weights between 180 and 185 lb  She denies orthopnea, shortness of breath    She has some knee issues ongoing, surgery has been declined by Orthopedics due to some risk, and she continues to use a walker  She remains on warfarin for AFib, last INR was slightly subtherapeutic on 10/2/2018 at 1 75  Her labs have otherwise been normal, creatinine 0 88, and very minimally anemic      Review of Systems   Respiratory: Negative for shortness of breath  Cardiovascular: Negative for chest pain and palpitations  Musculoskeletal: Positive for arthralgias and back pain  Neurological: Positive for light-headedness  Past Medical History:   Diagnosis Date    Arthritis     Cancer ([address] )     [facility]est[facility]) ([facility])     Disturbance of smell and taste     disturbance of taste    Effusion of knee joint right     Fibromyalgia     Heart murmur     reported a previous heart murmur    History of acute myocardial infarction     History of atrial fibrillation     History of bruising easily     History of cancer     History of dermatitis     History of mammogram     History of methicillin resistant staphylococcus aureus (MRSA)     Negative nasal culture, isolation discontinued 8/17/2018      History of methicillin resistant staphylococcus aureus (MRSA) 08/17/2018    negative nasal culture-isolation and hx discontinued 8/17/2018    History of obesity     History of osteopenia     History of sciatica     History of shortness of breath     History of sinusitis     History of sore throat     History of syncope     History of umbilical hernia     History of viral infection     Irritable bowel syndrome (IBS)     Joint pain, hip     Limb pain     Myalgia     myalgia and myositis    Need for prophylactic vaccination against single diseases     Need for prophylactic vaccination and inoculation against influenza     Preoperative cardiovascular examination     Primary osteoarthritis of right knee     Right leg pain     Vaginal Pap smear     reported pap smear     History Alcohol Use    Yes     Comment:  social     History   Drug Use No     History   Smoking Status    Never Smoker   Smokeless Tobacco    Never Used       Allergies: Allergies   Allergen Reactions    Codeine Vomiting and GI Intolerance     GI upset, ana m morphine  Reaction Date: 24Apr2012;     Other      Other reaction(s): Other (See Comments)  ana m toradol in OR 6/06 sah    Oxycodone Hcl Vomiting     Reaction Date: 24Apr2012;     Oxycodone-Acetaminophen      Other reaction(s): Other (See Comments)  GI upset; ana m vicodin    Percocet [Oxycodone-Acetaminophen]     Seasonal Ic  [Cholestatin] Allergic Rhinitis    Bactrim [Sulfamethoxazole-Trimethoprim] Rash    Sulfa Antibiotics Rash     Other reaction(s): Other (See Comments)  takes lasix @home       Medications:     Current Outpatient Prescriptions:     desoximetasone (TOPICORT) 0 25 % cream, Apply topically as needed for irritation, Disp: 30 g, Rfl: 0    DULoxetine (CYMBALTA) 60 mg delayed release capsule, TAKE 1 CAPSULE BY MOUTH ONCE DAILY, Disp: 30 capsule, Rfl: 3    fluticasone (FLONASE) 50 mcg/act nasal spray, 1 spray into each nostril daily  , Disp: , Rfl:     levothyroxine 25 mcg tablet, Take 1 tablet (25 mcg total) by mouth daily, Disp: 30 tablet, Rfl: 4    potassium chloride (K-DUR,KLOR-CON) 20 mEq tablet, TAKE 1 TABLET DAILY, Disp: 30 tablet, Rfl: 11    torsemide (DEMADEX) 20 mg tablet, Take 1 tablet (20 mg total) by mouth daily, Disp: 30 tablet, Rfl: 0    traMADol (ULTRAM) 50 mg tablet, Take 1 tablet (50 mg total) by mouth every 8 (eight) hours as needed for moderate pain, Disp: 30 tablet, Rfl: 0    warfarin (COUMADIN) 3 mg tablet, Take 1 tablet by mouth daily as previously directed by cardiology, Disp: , Rfl:     warfarin (COUMADIN) 4 mg tablet, Take one tablet daily as previously directed by cardiology, Disp: 180 tablet, Rfl: 0    mometasone (ELOCON) 0 1 % cream, Once a day prn, Disp: 45 g, Rfl: 0      Vitals:    11/15/18 0757   BP: 132/76 Pulse: 92   SpO2: 98%     Weight (last 2 days)     Date/Time   Weight    11/15/18 0757  83 3 (183 6)            Physical Exam   Constitutional: No distress  HENT:   Head: Normocephalic and atraumatic  Eyes: Conjunctivae are normal  No scleral icterus  Neck: Normal range of motion  No JVD present  Cardiovascular: Normal rate, regular rhythm, normal heart sounds and intact distal pulses  No murmur heard  Pulmonary/Chest: Effort normal and breath sounds normal  No respiratory distress  She has no wheezes  She has no rales  Musculoskeletal: She exhibits no edema or tenderness  Skin: Skin is warm and dry  She is not diaphoretic           Laboratory Studies:  Lab Results   Component Value Date     04/03/2015     11/04/2014     08/29/2013    K 3 9 10/02/2018    K 4 0 08/24/2018    K 3 9 08/23/2018    K 4 4 04/03/2015    K 4 5 11/04/2014    K 4 9 08/29/2013     10/02/2018     08/24/2018     08/23/2018     04/03/2015     11/04/2014     08/29/2013    CO2 26 10/02/2018    CO2 26 08/24/2018    CO2 26 08/23/2018    CO2 26 04/03/2015    CO2 28 11/04/2014    CO2 29 08/29/2013    GLUCOSE 98 04/03/2015    GLUCOSE 94 11/04/2014    GLUCOSE 93 08/29/2013    CREATININE 0 88 10/02/2018    CREATININE 1 00 08/24/2018    CREATININE 0 84 08/23/2018    CREATININE 0 74 04/03/2015    CREATININE 0 72 11/04/2014    CREATININE 0 76 08/29/2013    BUN 16 10/02/2018    BUN 15 08/24/2018    BUN 15 08/23/2018    BUN 19 04/03/2015    BUN 14 11/04/2014    BUN 12 08/29/2013    MG 2 8 (H) 08/17/2018    MG 2 0 07/03/2018    MG 1 8 07/02/2018     Lab Results   Component Value Date    WBC 5 37 10/02/2018    WBC 7 98 04/20/2015    RBC 3 74 (L) 10/02/2018    RBC 3 78 (L) 04/20/2015    HGB 11 4 (L) 10/02/2018    HGB 12 0 04/20/2015    HCT 37 0 10/02/2018    HCT 37 0 04/20/2015    HCT 38 0 04/20/2015    MCV 99 (H) 10/02/2018     (H) 04/20/2015    MCH 30 5 10/02/2018    MCH 31 7 2015    RDW 14 2 10/02/2018    RDW 13 2 2015     10/02/2018     2015     NT-proBNP: No results for input(s): NTBNP in the last 72 hours  Coags:    Lipid Profile:   Lab Results   Component Value Date    CHOL 197 2013     Lab Results   Component Value Date    HDL 65 (H) 2018     Lab Results   Component Value Date    LDLCALC 61 2018     Lab Results   Component Value Date    TRIG 93 2018       Cardiac testing:   EKG reviewed personally: Atrial fibrillation, V paced, heart rate 74  Results for orders placed during the hospital encounter of 18   Echo complete with contrast if indicated    Narrative [place] [address], [address] [facility]  [phone]    Transthoracic Echocardiogram  2D, M-mode, Doppler, and Color Doppler    Study date:  2018    Patient: Rosemarie Lozoya  MR number: [id number]  Account number: [de-identified]  : 1933  Age: 80 years  Gender: Female  Status: Outpatient  Location: [facility]  Height: 62 in  Weight: 231 lb  BP: 112/ 60 mmHg    Indications: MV Disease    Diagnoses: I34 0 - Nonrheumatic mitral (valve) insufficiency    Sonographer:  JESUS Stein  Primary Physician:  Maria Ines Oconnor DO  Referring Physician:  Melania Singh MD  Group:  [facility]  Interpreting Physician:  Nellie Tobar MD    SUMMARY    LEFT VENTRICLE:  Size was normal   Systolic function was normal  Ejection fraction was estimated to be 55 %  Wall thickness was at the upper limits of normal     RIGHT VENTRICLE:  The ventricle was mildly dilated  Systolic function was normal     RIGHT ATRIUM:  The atrium was mildly dilated  MITRAL VALVE:  There was moderate regurgitation  TRICUSPID VALVE:  There was moderate regurgitation  IVC, HEPATIC VEINS:  The inferior vena cava was mildly dilated    Respirophasic changes were blunted (less than 50% variation)  COMPARISONS:  Comparison was made with the previous study of 17-May-2017  Tricuspid regurgitation has worsened  HISTORY: PRIOR HISTORY: AFIB, Moderate MR, Hypertension, CHF, Anemia    PROCEDURE: The study was performed in the [facility]  This was a routine study  The transthoracic approach was used  The study included complete 2D imaging, M-mode, complete spectral Doppler, and color Doppler  The  heart rate was 110 bpm, at the start of the study  Images were obtained from the parasternal, apical, subcostal, and suprasternal notch acoustic windows  Echocardiographic views were limited due to lung interference  Image quality was  adequate  LEFT VENTRICLE: Size was normal  Systolic function was normal  Ejection fraction was estimated to be 55 %  There were no regional wall motion abnormalities  Wall thickness was at the upper limits of normal  DOPPLER: Transmitral flow  pattern: atrial fibrillation  The study was not technically sufficient to allow evaluation of LV diastolic function  RIGHT VENTRICLE: The ventricle was mildly dilated  Systolic function was normal  Wall thickness was normal     LEFT ATRIUM: Size was normal     RIGHT ATRIUM: The atrium was mildly dilated  MITRAL VALVE: Valve structure was normal  There was normal leaflet separation  DOPPLER: The transmitral velocity was within the normal range  There was no evidence for stenosis  There was moderate regurgitation  AORTIC VALVE: The valve was trileaflet  Leaflets exhibited normal thickness, normal cuspal separation, and sclerosis  DOPPLER: Transaortic velocity was within the normal range  There was no evidence for stenosis  There was no  regurgitation  TRICUSPID VALVE: The valve structure was normal  There was normal leaflet separation  DOPPLER: The transtricuspid velocity was within the normal range  There was no evidence for stenosis  There was moderate regurgitation   Pulmonary artery  systolic pressure was within the normal range  Estimated peak PA pressure was 36 mmHg  PULMONIC VALVE: Leaflets exhibited normal thickness, no calcification, and normal cuspal separation  DOPPLER: The transpulmonic velocity was within the normal range  There was no regurgitation  PERICARDIUM: There was no pericardial effusion  The pericardium was normal in appearance  AORTA: The root exhibited normal size  SYSTEMIC VEINS: IVC: The inferior vena cava was mildly dilated  Respirophasic changes were blunted (less than 50% variation)  SYSTEM MEASUREMENT TABLES    2D  %FS: 34 71 %  AV Diam: 2 84 cm  EDV(Teich): 101 4 ml  EF(Cube): 72 17 %  EF(Teich): 63 88 %  ESV(Cube): 28 55 ml  ESV(Teich): 36 63 ml  IVSd: 1 1 cm  LA Area: 17 55 cm2  LA Diam: 4 14 cm  LVEDV MOD A4C: 61 32 ml  LVEF MOD A4C: 61 87 %  LVESV MOD A4C: 23 38 ml  LVIDd: 4 68 cm  LVIDs: 3 06 cm  LVLd A4C: 6 59 cm  LVLs A4C: 5 67 cm  LVPWd: 1 cm  RA Area: 23 38 cm2  RV Diam: 3 71 cm  SV MOD A4C: 37 94 ml  SV(Cube): 74 03 ml  SV(Teich): 64 77 ml    CW  TR MaxP 51 mmHg  TR Vmax: 2 21 m/s    MM  TAPSE: 1 35 cm    [employer] [facility] [facility]    Prepared and electronically signed by    Opal Echeverria MD  Signed 2018 17:36:53       No results found for this or any previous visit  No results found for this or any previous visit  No results found for this or any previous visit  Fide Colvin MD    Portions of the record may have been created with voice recognition software   Occasional wrong word or "sound a like" substitutions may have occurred due to the inherent limitations of voice recognition software   Read the chart carefully and recognize, using context, where substitutions have occurred

## 2018-11-16 ENCOUNTER — ANTICOAG VISIT (OUTPATIENT)
Dept: INTERNAL MEDICINE CLINIC | Facility: CLINIC | Age: 83
End: 2018-11-16

## 2018-11-16 DIAGNOSIS — E03.9 HYPOTHYROIDISM, UNSPECIFIED TYPE: Primary | ICD-10-CM

## 2018-11-16 DIAGNOSIS — R79.89 ELEVATED TSH: Primary | ICD-10-CM

## 2018-11-16 RX ORDER — LEVOTHYROXINE SODIUM 0.05 MG/1
50 TABLET ORAL DAILY
Qty: 30 TABLET | Refills: 1 | Status: SHIPPED | OUTPATIENT
Start: 2018-11-16 | End: 2018-12-12 | Stop reason: SDUPTHER

## 2018-11-20 ENCOUNTER — PATIENT OUTREACH (OUTPATIENT)
Dept: INTERNAL MEDICINE CLINIC | Facility: CLINIC | Age: 83
End: 2018-11-20

## 2018-11-20 NOTE — PROGRESS NOTES
Outreach TC to patient for Cm assessment and closure  patient reports that she has been doing fine  Patient has not had any ER/inpatient stay in the last 90 days  Patient recently saw cardiology with no change to POC or medications  Patient saw orthopedics for steroid injection into r knee  Patient states she has had some relief with injection  Patient also uses tramadol for pain relief  Patient denies any further need for complex CM  Patient states that she is able to self care and self manage  reviewed medications, future appointments, s/sx to report & how/when to report symptoms to provider vs 911  verbalizes understanding  Agrees to case closure

## 2018-11-22 ENCOUNTER — HOSPITAL ENCOUNTER (EMERGENCY)
Facility: HOSPITAL | Age: 83
Discharge: HOME | End: 2018-11-22
Attending: EMERGENCY MEDICINE
Payer: COMMERCIAL

## 2018-11-22 ENCOUNTER — APPOINTMENT (EMERGENCY)
Dept: RADIOLOGY | Facility: HOSPITAL | Age: 83
End: 2018-11-22
Payer: COMMERCIAL

## 2018-11-22 VITALS
HEIGHT: 62 IN | SYSTOLIC BLOOD PRESSURE: 160 MMHG | OXYGEN SATURATION: 97 % | HEART RATE: 68 BPM | BODY MASS INDEX: 34.04 KG/M2 | WEIGHT: 185 LBS | RESPIRATION RATE: 14 BRPM | DIASTOLIC BLOOD PRESSURE: 69 MMHG | TEMPERATURE: 97.8 F

## 2018-11-22 DIAGNOSIS — T84.020A: Primary | ICD-10-CM

## 2018-11-22 LAB
ANION GAP SERPL CALCULATED.3IONS-SCNC: 6 MMOL/L (ref 4–13)
APTT PPP: 39 SECONDS (ref 26–38)
BASOPHILS # BLD AUTO: 0.02 THOUSANDS/ΜL (ref 0–0.1)
BASOPHILS NFR BLD AUTO: 0 % (ref 0–1)
BUN SERPL-MCNC: 16 MG/DL (ref 5–25)
CALCIUM SERPL-MCNC: 8.7 MG/DL (ref 8.3–10.1)
CHLORIDE SERPL-SCNC: 106 MMOL/L (ref 100–108)
CO2 SERPL-SCNC: 28 MMOL/L (ref 21–32)
CREAT SERPL-MCNC: 0.76 MG/DL (ref 0.6–1.3)
EOSINOPHIL # BLD AUTO: 0.06 THOUSAND/ΜL (ref 0–0.61)
EOSINOPHIL NFR BLD AUTO: 1 % (ref 0–6)
ERYTHROCYTE [DISTWIDTH] IN BLOOD BY AUTOMATED COUNT: 13.7 % (ref 11.6–15.1)
GFR SERPL CREATININE-BSD FRML MDRD: 72 ML/MIN/1.73SQ M
GLUCOSE SERPL-MCNC: 89 MG/DL (ref 65–140)
HCT VFR BLD AUTO: 34.6 % (ref 34.8–46.1)
HGB BLD-MCNC: 10.9 G/DL (ref 11.5–15.4)
IMM GRANULOCYTES # BLD AUTO: 0.01 THOUSAND/UL (ref 0–0.2)
IMM GRANULOCYTES NFR BLD AUTO: 0 % (ref 0–2)
INR PPP: 2.69 (ref 0.86–1.17)
LYMPHOCYTES # BLD AUTO: 1.21 THOUSANDS/ΜL (ref 0.6–4.47)
LYMPHOCYTES NFR BLD AUTO: 23 % (ref 14–44)
MCH RBC QN AUTO: 30.9 PG (ref 26.8–34.3)
MCHC RBC AUTO-ENTMCNC: 31.5 G/DL (ref 31.4–37.4)
MCV RBC AUTO: 98 FL (ref 82–98)
MONOCYTES # BLD AUTO: 0.5 THOUSAND/ΜL (ref 0.17–1.22)
MONOCYTES NFR BLD AUTO: 9 % (ref 4–12)
NEUTROPHILS # BLD AUTO: 3.51 THOUSANDS/ΜL (ref 1.85–7.62)
NEUTS SEG NFR BLD AUTO: 67 % (ref 43–75)
NRBC BLD AUTO-RTO: 0 /100 WBCS
PLATELET # BLD AUTO: 162 THOUSANDS/UL (ref 149–390)
PMV BLD AUTO: 10.3 FL (ref 8.9–12.7)
POTASSIUM SERPL-SCNC: 5.5 MMOL/L (ref 3.5–5.3)
PROTHROMBIN TIME: 27.8 SECONDS (ref 11.8–14.2)
RBC # BLD AUTO: 3.53 MILLION/UL (ref 3.81–5.12)
SODIUM SERPL-SCNC: 140 MMOL/L (ref 136–145)
WBC # BLD AUTO: 5.31 THOUSAND/UL (ref 4.31–10.16)

## 2018-11-22 PROCEDURE — 36415 COLL VENOUS BLD VENIPUNCTURE: CPT | Performed by: EMERGENCY MEDICINE

## 2018-11-22 PROCEDURE — 80048 BASIC METABOLIC PNL TOTAL CA: CPT | Performed by: EMERGENCY MEDICINE

## 2018-11-22 PROCEDURE — 85610 PROTHROMBIN TIME: CPT | Performed by: EMERGENCY MEDICINE

## 2018-11-22 PROCEDURE — 99284 EMERGENCY DEPT VISIT MOD MDM: CPT

## 2018-11-22 PROCEDURE — 85025 COMPLETE CBC W/AUTO DIFF WBC: CPT | Performed by: EMERGENCY MEDICINE

## 2018-11-22 PROCEDURE — 85730 THROMBOPLASTIN TIME PARTIAL: CPT | Performed by: EMERGENCY MEDICINE

## 2018-11-22 PROCEDURE — 96361 HYDRATE IV INFUSION ADD-ON: CPT

## 2018-11-22 PROCEDURE — 96374 THER/PROPH/DIAG INJ IV PUSH: CPT

## 2018-11-22 PROCEDURE — 73502 X-RAY EXAM HIP UNI 2-3 VIEWS: CPT

## 2018-11-22 PROCEDURE — 73501 X-RAY EXAM HIP UNI 1 VIEW: CPT

## 2018-11-22 RX ORDER — FENTANYL CITRATE 50 UG/ML
50 INJECTION, SOLUTION INTRAMUSCULAR; INTRAVENOUS ONCE
Status: COMPLETED | OUTPATIENT
Start: 2018-11-22 | End: 2018-11-22

## 2018-11-22 RX ORDER — PROPOFOL 10 MG/ML
100 INJECTION, EMULSION INTRAVENOUS ONCE
Status: COMPLETED | OUTPATIENT
Start: 2018-11-22 | End: 2018-11-22

## 2018-11-22 RX ADMIN — FENTANYL CITRATE 50 MCG: 50 INJECTION INTRAMUSCULAR; INTRAVENOUS at 12:51

## 2018-11-22 RX ADMIN — SODIUM CHLORIDE 1000 ML: 0.9 INJECTION, SOLUTION INTRAVENOUS at 12:33

## 2018-11-22 RX ADMIN — PROPOFOL 40 MG: 10 INJECTION, EMULSION INTRAVENOUS at 13:29

## 2018-11-22 NOTE — DISCHARGE INSTRUCTIONS
Hip Dislocation   WHAT YOU NEED TO KNOW:   What is a hip dislocation? A hip dislocation occurs when your thigh bone is forced out of your hip socket  What causes a hip dislocation? The most common cause is when your knees hit the dashboard in a car accident  A fall from a high height could dislocate your hip, such as from a ladder  It could also happen during a sports injury or if you are hit by a car  What are the signs and symptoms of a hip dislocation? · You are in severe pain  · You cannot move your leg  · Your hip or knee is twisted in or out  · Your foot, ankle, or leg is numb  How is a hip dislocation diagnosed? Your healthcare provider will examine you  You may need the following tests:  · X-rays  are pictures of your hip bones and hip joint  Healthcare providers will take x-rays to see if your hip is dislocated and if you have other injuries  · A CT scan  is also called a CAT scan  An x-ray machine uses a computer to take pictures of your hip  The pictures may show dislocation, fractures, or other injuries  You may be given dye before the pictures are taken to help healthcare providers see the pictures better  Tell your healthcare provider if you have ever had an allergic reaction to contrast dye  How is a hip dislocation treated? · Pain medicine  helps decrease or take away your pain  · Moderate sedation  is when medicine is used to make you relaxed and sleepy, but you remain awake  It is also called conscious sedation  It is used before reduction  · Closed reduction  is a procedure to move your thigh bone back into the socket of your hip joint  Healthcare providers will rotate your leg and move your hip in different positions while you are sedated  They may push or pull on your hip joint  If they cannot move your bone back into place, you will need surgery  · Open reduction  is surgery to move your hip bones back into place   Anesthesia will be given to keep you asleep  Your healthcare provider will make an incision so he can see and repair your injured hip joint  After reduction you will have another x-ray or CT scan  What are the risks of a hip dislocation? You might develop arthritis in your hip  The sciatic nerve in the back of your leg may be damaged during the injury  This could lead to numbness in your leg and foot  There could be small loose pieces of bone and cartilage in your hip joint  This may require another procedure to remove the pieces  The blood supply to your hip bone could be cut off and the bone could begin to die  Your hip could dislocate again  How can I prevent my hip from dislocating again? Follow these precautions for 6 weeks after your injury or as directed:  · Sit with your back straight and your feet flat on the floor  Do not cross your legs  Do not lean forward when you sit in a chair  · Keep your knees apart  Place a pillow or wedge between your knees when you sit or lie down  Do not twist your knees  Do not lift your knees higher than your hips  · Do not sit in a low chair  Use armrests and your upper body strength to push yourself up from a sitting position  · Do not bend at the waist to  an object from the floor  Bend your knees to reach the object, or use a tool to pick it up  How can I manage my symptoms? It will take 2 to 3 months for your hip to heal   · Use a walker or crutches as directed  Ask your healthcare provider or orthopedist when you can put weight on your injured side  As your hip heals, use a cane to help you walk until your limp goes away  · Go to physical therapy as directed by your healthcare provider  A physical therapist teaches you exercises to increase the range of motion in your hip  Exercises also make your hip stronger and decrease pain  · Avoid high-impact activities and sports  for 6 to 12 weeks or until your hip strength has returned    When should I contact my healthcare provider? · You have a fever  · Your incision is red, swollen, or draining pus  · You have numbness in your leg or foot  · You have pain that does not go away after you take pain medicine  · You cannot walk well with your cane or crutches  · You have questions or concerns about your condition or care  When should I seek immediate care? · You have severe pain  · You dislocate your hip again  · Your incision comes apart, or blood soaks through your bandage  CARE AGREEMENT:   You have the right to help plan your care  Learn about your health condition and how it may be treated  Discuss treatment options with your caregivers to decide what care you want to receive  You always have the right to refuse treatment  The above information is an  only  It is not intended as medical advice for individual conditions or treatments  Talk to your doctor, nurse or pharmacist before following any medical regimen to see if it is safe and effective for you  © 2017 [address] [employer] Information is for End User's use only and may not be sold, redistributed or otherwise used for commercial purposes  All illustrations and images included in CareNotes® are the copyrighted property of A D A M [employer]  or Rahul Henderson

## 2018-11-22 NOTE — ED PROVIDER NOTES
History  Chief Complaint   Patient presents with    Hip Pain     Patient states"she was bending over when she popped her right hip out of place"  Patient states"her hip had a replacement a few times and may need a possible replacement as per ortho"  41-year-old female presents to the emergency department for evaluation of right hip pain  Patient states that she was bending down to sit in her recliner when she felt her right prosthetic hip become displaced  Patient has had multiple prior hip a dislocations and her orthopedist has recommended that she have a revision  Patient denies numbness or weakness of the lower extremity  She does have chronic right knee pain  She patient has been able to have her hip relocated in the department several times  History provided by:  Patient   used: No    Hip Pain   Location:  Right hip  Quality:  Suspected dislocation  Severity:  Severe  Onset quality:  Sudden  Timing:  Constant  Progression:  Unchanged  Chronicity:  Recurrent  Context:  Attempting to sit in recliner when hip dislocated  Relieved by:  Nothing  Worsened by:  Nothing  Ineffective treatments:  None  Associated symptoms: no loss of consciousness and no nausea        Prior to Admission Medications   Prescriptions Last Dose Informant Patient Reported? Taking? DULoxetine (CYMBALTA) 60 mg delayed release capsule  Self No Yes   Sig: TAKE 1 CAPSULE BY MOUTH ONCE DAILY   desoximetasone (TOPICORT) 0 25 % cream  Self No Yes   Sig: Apply topically as needed for irritation   fluticasone (FLONASE) 50 mcg/act nasal spray  Self Yes Yes   Si spray into each nostril daily     levothyroxine 50 mcg tablet   No Yes   Sig: Take 1 tablet (50 mcg total) by mouth daily   mometasone (ELOCON) 0 1 % cream  Self No Yes   Sig: Once a day prn   potassium chloride (K-DUR,KLOR-CON) 20 mEq tablet  Self No Yes   Sig: TAKE 1 TABLET DAILY   torsemide (DEMADEX) 20 mg tablet  Self No Yes   Sig: Take 1 tablet (20 mg total) by mouth daily   traMADol (ULTRAM) 50 mg tablet  Self No Yes   Sig: Take 1 tablet (50 mg total) by mouth every 8 (eight) hours as needed for moderate pain   warfarin (COUMADIN) 3 mg tablet  Self No Yes   Sig: Take 1 tablet by mouth daily as previously directed by cardiology   warfarin (COUMADIN) 4 mg tablet  Self No Yes   Sig: Take one tablet daily as previously directed by cardiology      Facility-Administered Medications: None       Past Medical History:   Diagnosis Date    Arthritis     Cancer ([facility] [address] )     CHF (congestive heart failure) ([facility])     Disturbance of smell and taste     disturbance of taste    Effusion of knee joint right     Fibromyalgia     Heart murmur     reported a previous heart murmur    History of acute myocardial infarction     History of atrial fibrillation     History of bruising easily     History of cancer     History of dermatitis     History of mammogram     History of methicillin resistant staphylococcus aureus (MRSA)     Negative nasal culture, isolation discontinued 8/17/2018      History of methicillin resistant staphylococcus aureus (MRSA) 08/17/2018    negative nasal culture-isolation and hx discontinued 8/17/2018    History of obesity     History of osteopenia     History of sciatica     History of shortness of breath     History of sinusitis     History of sore throat     History of syncope     History of umbilical hernia     History of viral infection     Irritable bowel syndrome (IBS)     Joint pain, hip     Limb pain     Myalgia     myalgia and myositis    Need for prophylactic vaccination against single diseases     Need for prophylactic vaccination and inoculation against influenza     Preoperative cardiovascular examination     Primary osteoarthritis of right knee     Right leg pain     Vaginal Pap smear     reported pap smear       Past Surgical History:   Procedure Laterality Date    ANKLE ARTHRODESIS Right     BACK SURGERY  BARIATRIC SURGERY      CHOLECYSTECTOMY      x2    ESOPHAGOGASTRODUODENOSCOPY N/A 3/23/2018    Procedure: ESOPHAGOGASTRODUODENOSCOPY (EGD); Surgeon: Candido Silverio MD;  [facility]: [facility]; Service: Gastroenterology    FOOT SURGERY      HIP CLOSE REDUCTION Right 8/21/2016    Procedure: CLOSED REDUCTION RIGHT TOTAL HIP;  Surgeon: Gisell Simeon MD;  [facility];  Service:     JOINT REPLACEMENT      LEFT KNEE REPLACEMENT    PILONIDAL CYST EXCISION      x2    REPLACEMENT TOTAL KNEE Right     SILVANA-EN-Y PROCEDURE      x2    TOTAL KNEE ARTHROPLASTY      UMBILICAL HERNIA REPAIR      x2       Family History   Problem Relation Age of Onset    Coronary artery disease Mother     Heart attack Mother     Hypertension Mother     Coronary artery disease Father     Heart attack Father     Hypertension Father     Lymphoma Sister         acute    Rashes / Skin problems Sister         lymphomatoid papulosis    Coronary artery disease Brother     Heart attack Brother         x2    Aneurysm Neg Hx     Hyperlipidemia Neg Hx      I have reviewed and agree with the history as documented  Social History   Substance Use Topics    Smoking status: Never Smoker    Smokeless tobacco: Never Used    Alcohol use Yes      Comment:  social        Review of Systems   Gastrointestinal: Negative for nausea  Musculoskeletal: Positive for arthralgias and gait problem  Neurological: Negative for loss of consciousness, weakness and numbness  All other systems reviewed and are negative  Physical Exam  Physical Exam   Constitutional: She is oriented to person, place, and time  She appears well-developed and well-nourished  HENT:   Head: Normocephalic  Nose: Nose normal    Mouth/Throat: Oropharynx is clear and moist  No oropharyngeal exudate  Eyes: Pupils are equal, round, and reactive to light  Conjunctivae and EOM are normal    Neck: Normal range of motion  Neck supple     Cardiovascular: Normal rate, regular rhythm, normal heart sounds and intact distal pulses  Pulmonary/Chest: Effort normal and breath sounds normal    Abdominal: Soft  Bowel sounds are normal  She exhibits no distension  There is no tenderness  There is no rebound and no guarding  Musculoskeletal: She exhibits no edema or deformity  Right hip: She exhibits decreased range of motion, tenderness and bony tenderness  Legs:  Lymphadenopathy:     She has no cervical adenopathy  Neurological: She is alert and oriented to person, place, and time  She has normal strength and normal reflexes  No cranial nerve deficit or sensory deficit  She exhibits normal muscle tone  Coordination and gait normal    Skin: Skin is warm, dry and intact  No rash noted  Psychiatric: She has a normal mood and affect  Her behavior is normal  Judgment and thought content normal    Nursing note and vitals reviewed        Vital Signs  ED Triage Vitals   Temperature Pulse Respirations Blood Pressure SpO2   11/22/18 1115 11/22/18 1115 11/22/18 1115 11/22/18 1115 11/22/18 1115   97 8 °F (36 6 °C) 71 18 (!) 187/85 99 %      Temp Source Heart Rate Source Patient Position - Orthostatic VS BP Location FiO2 (%)   11/22/18 1115 11/22/18 1115 11/22/18 1115 11/22/18 1115 --   Oral Monitor Sitting Right arm       Pain Score       11/22/18 1251       9           Vitals:    11/22/18 1351 11/22/18 1353 11/22/18 1355 11/22/18 1425   BP:  (!) 175/86 (!) 175/86 160/69   Pulse: 68 68     Patient Position - Orthostatic VS:           Visual Acuity      ED Medications  Medications   sodium chloride 0 9 % bolus 1,000 mL (0 mL Intravenous Stopped 11/22/18 1438)   propofol (DIPRIVAN) 200 MG/20ML bolus injection 100 mg (40 mg Intravenous Given 11/22/18 1329)   fentanyl citrate (PF) 100 MCG/2ML 50 mcg (50 mcg Intravenous Given 11/22/18 1251)       Diagnostic Studies  Results Reviewed     Procedure Component Value Units Date/Time    Protime-INR [[id number]]  (Abnormal) Collected: 11/22/18 1232    Lab Status:  Final result Specimen:  Blood from Arm, Right Updated:  11/22/18 1257     Protime 27 8 (H) seconds      INR 2 69 (H)    APTT [[id number]]  (Abnormal) Collected:  11/22/18 1232    Lab Status:  Final result Specimen:  Blood from Arm, Right Updated:  11/22/18 1257     PTT 39 (H) seconds     Basic metabolic panel [[id number]]  (Abnormal) Collected:  11/22/18 1232    Lab Status:  Final result Specimen:  Blood from Arm, Right Updated:  11/22/18 1257     Sodium 140 mmol/L      Potassium 5 5 (H) mmol/L      Chloride 106 mmol/L      CO2 28 mmol/L      ANION GAP 6 mmol/L      BUN 16 mg/dL      Creatinine 0 76 mg/dL      Glucose 89 mg/dL      Calcium 8 7 mg/dL      eGFR 72 ml/min/1 73sq m     Narrative:         National Kidney Disease Education Program recommendations are as follows:  GFR calculation is accurate only with a steady state creatinine  Chronic Kidney disease less than 60 ml/min/1 73 sq  meters  Kidney failure less than 15 ml/min/1 73 sq  meters      CBC and differential [[id number]]  (Abnormal) Collected:  11/22/18 1232    Lab Status:  Final result Specimen:  Blood from Arm, Right Updated:  11/22/18 1238     WBC 5 31 Thousand/uL      RBC 3 53 (L) Million/uL      Hemoglobin 10 9 (L) g/dL      Hematocrit 34 6 (L) %      MCV 98 fL      MCH 30 9 pg      MCHC 31 5 g/dL      RDW 13 7 %      MPV 10 3 fL      Platelets 802 Thousands/uL      nRBC 0 /100 WBCs      Neutrophils Relative 67 %      Immat GRANS % 0 %      Lymphocytes Relative 23 %      Monocytes Relative 9 %      Eosinophils Relative 1 %      Basophils Relative 0 %      Neutrophils Absolute 3 51 Thousands/µL      Immature Grans Absolute 0 01 Thousand/uL      Lymphocytes Absolute 1 21 Thousands/µL      Monocytes Absolute 0 50 Thousand/µL      Eosinophils Absolute 0 06 Thousand/µL      Basophils Absolute 0 02 Thousands/µL                  XR hip/pelv 1 vw right if performed   ED Interpretation by Maria Elena Lai DO (11/22 1352)   Successful relocation of right hip prosthesis      Final Result by Jus Aguilera MD (11/22 9516)      Successful reduction of right hip prosthetic dislocation  Workstation performed: [id number]         XR hip/pelv 2-3 vws right if performed   ED Interpretation by Adeel Collins DO (11/22 4241)   Posterior hip dislocation      Final Result by Jus Aguilera MD (11/22 8716)      Right hip arthroplasty dislocation as described  The study was marked in [facility] for immediate notification  Workstation performed: [id number]                    Procedures  Procedural Sedation  Date/Time: 11/22/2018 1:34 PM  Performed by: Jackelyn Singh  Authorized by: Jackelyn Singh     Consent:     Consent obtained:  Verbal    Consent given by:  Patient    Risks discussed:  Prolonged hypoxia resulting in organ damage, vomiting, nausea, inadequate sedation and dysrhythmia  Universal protocol:     Procedure explained and questions answered to patient or proxy's satisfaction: yes      Radiology Images displayed and confirmed    If images not available, report reviewed: yes      Site/side marked: yes      Immediately prior to procedure a time out was called: yes      Patient identity confirmation method:  Verbally with patient and arm band  Indications:     Sedation purpose:  Dislocation reduction    Procedure necessitating sedation performed by:  Physician performing sedation    Intended level of sedation:  Moderate (conscious sedation)  Pre-sedation assessment:     NPO status caution: urgency dictates proceeding with non-ideal NPO status      ASA classification: class 2 - patient with mild systemic disease      Neck mobility: normal      Mouth opening:  3 or more finger widths    Thyromental distance:  3 finger widths    Mallampati score:  II - soft palate, uvula, fauces visible    Pre-sedation assessments completed and reviewed: airway patency, cardiovascular function, hydration status, mental status, pain level, respiratory function and temperature      History of difficult intubation: no      Pre-sedation assessment completed:  11/22/2018 1:05 PM  Immediate pre-procedure details:     Reassessment: Patient reassessed immediately prior to procedure      Reviewed: vital signs and relevant labs/tests      Verified: bag valve mask available, emergency equipment available, intubation equipment available, IV patency confirmed, oxygen available and suction available    Procedure details (see MAR for exact dosages):     Sedation start time:  11/22/2018 1:30 PM    Preoxygenation:  Room air    Sedation:  Propofol    Analgesia:  Fentanyl    Intra-procedure monitoring:  Blood pressure monitoring, cardiac monitor, continuous capnometry, continuous pulse oximetry, frequent LOC assessments and frequent vital sign checks    Intra-procedure events: none      Sedation end time:  11/22/2018 1:33 PM  Post-procedure details:     Post-sedation assessment completed:  11/22/2018 1:36 PM    Recovery: Patient returned to pre-procedure baseline      Post-sedation assessments completed and reviewed: mental status, pain level and respiratory function      Patient tolerance: Tolerated well, no immediate complications  Orthopedic Injury  Date/Time: 11/22/2018 1:37 PM  Performed by: Donzella Severe  Authorized by: Donzella Severe     Patient Location:  ED  Other Assisting Provider: Yes (comment) (TANIYA Gotti)    Verbal consent obtained?: Yes    Risks and benefits: Risks, benefits and alternatives were discussed    Consent given by:  Patient  Radiology Images displayed and confirmed   If images not available, report reviewed: Yes    Time out: Immediately prior to the procedure a time out was called    Injury location:  Hip  Location details:  Right hip  Injury type:  Dislocation  Dislocation type: posterior    Spontaneous?: Yes    Prosthesis?: Yes    Neurovascular status: Neurovascularly intact    Distal perfusion: normal    Neurological function: normal    Range of motion: reduced    Local anesthesia used?: No    Sedation type: Moderate (conscious) sedation (See separate Procedural Sedation form)  Skeletal traction used?: Yes    Neurovascular status: Neurovascularly intact    Distal perfusion: normal    Neurological function: normal    Range of motion: normal    Patient tolerance:  Patient tolerated the procedure well with no immediate complications           Phone Contacts  ED Phone Contact    ED Course  ED Course as of Nov 23 2259   Thu Nov 22, 2018   1410 Case discussed with Macey CANNON with Orthopedics  He will discuss disposition with Dr Marlo Parra  [facility]  Number of Diagnoses or Management Options  Dislocation of internal right hip prosthesis ([address] ): new and requires workup     Amount and/or Complexity of Data Reviewed  Clinical lab tests: ordered and reviewed  Tests in the radiology section of CPT®: reviewed and ordered  Decide to obtain previous medical records or to obtain history from someone other than the patient: yes  Discuss the patient with other providers: yes  Independent visualization of images, tracings, or specimens: yes    Risk of Complications, Morbidity, and/or Mortality  General comments: 54-year-old female with acute right hip prosthesis dislocation, patient had successful reduction in the department  Case discussed with orthopedics  Patient does not require any acute intervention  She can be discharged home  Patient declines knee immobilizer does ambulate with a walker  She plans to follow up with her orthopedist next week      Patient Progress  Patient progress: stable    CritCare Time    Disposition  Final diagnoses:   Dislocation of internal right hip prosthesis ([address] )     Time reflects when diagnosis was documented in both MDM as applicable and the Disposition within this note     Time User Action Codes Description Comment    11/22/2018  2:35 PM Eunice Guy Add [[id number]] Dislocation of internal right hip prosthesis ([address] ) ED Disposition     ED Disposition Condition Comment    Discharge  [address] discharge to home/self care  Condition at discharge: Stable        Follow-up Information     Follow up With Specialties Details Why Contact Info    Era Robbins MD Orthopedic Surgery Schedule an appointment as soon as possible for a visit in 1 day For recheck of current symptoms [facility]  [phone]            Discharge Medication List as of 11/22/2018  2:36 PM      CONTINUE these medications which have NOT CHANGED    Details   desoximetasone (TOPICORT) 0 25 % cream Apply topically as needed for irritation, Starting Wed 10/24/2018, Normal      DULoxetine (CYMBALTA) 60 mg delayed release capsule TAKE 1 CAPSULE BY MOUTH ONCE DAILY, Normal      fluticasone (FLONASE) 50 mcg/act nasal spray 1 spray into each nostril daily  , Historical Med      levothyroxine 50 mcg tablet Take 1 tablet (50 mcg total) by mouth daily, Starting Fri 11/16/2018, Normal      mometasone (ELOCON) 0 1 % cream Once a day prn, Normal      potassium chloride (K-DUR,KLOR-CON) 20 mEq tablet TAKE 1 TABLET DAILY, Normal      torsemide (DEMADEX) 20 mg tablet Take 1 tablet (20 mg total) by mouth daily, Starting Sat 8/25/2018, Print      traMADol (ULTRAM) 50 mg tablet Take 1 tablet (50 mg total) by mouth every 8 (eight) hours as needed for moderate pain, Starting Tue 8/28/2018, Normal      !! warfarin (COUMADIN) 3 mg tablet Take 1 tablet by mouth daily as previously directed by cardiology, No Print      !! warfarin (COUMADIN) 4 mg tablet Take one tablet daily as previously directed by cardiology, Print       !! - Potential duplicate medications found  Please discuss with provider  No discharge procedures on file      ED Provider  Electronically Signed by           Gayathri Fisher DO  11/23/18 1893

## 2018-11-23 ENCOUNTER — TELEPHONE (OUTPATIENT)
Dept: OBGYN CLINIC | Facility: HOSPITAL | Age: 83
End: 2018-11-23

## 2018-11-23 NOTE — TELEPHONE ENCOUNTER
Caller: patient  Call back number: [phone]  Patient's doctor: Dr Anoop Reza    Patient called stating that she was seen in [place] [address] ER 11/22 because her hip dislocated again  She is asking what the next step will be   Please advise

## 2018-11-29 ENCOUNTER — APPOINTMENT (OUTPATIENT)
Dept: LAB | Facility: CLINIC | Age: 83
End: 2018-11-29
Payer: COMMERCIAL

## 2018-11-29 DIAGNOSIS — R79.89 ELEVATED TSH: ICD-10-CM

## 2018-11-29 LAB
INTERNATIONAL NORMALIZATION RATIO, POC: 2.54
TSH SERPL DL<=0.05 MIU/L-ACNC: 2.99 UIU/ML (ref 0.36–3.74)

## 2018-11-29 PROCEDURE — 84443 ASSAY THYROID STIM HORMONE: CPT

## 2018-12-02 DIAGNOSIS — G89.29 OTHER CHRONIC PAIN: ICD-10-CM

## 2018-12-03 RX ORDER — DULOXETIN HYDROCHLORIDE 60 MG/1
CAPSULE, DELAYED RELEASE ORAL
Qty: 30 CAPSULE | Refills: 4 | Status: SHIPPED | OUTPATIENT
Start: 2018-12-03 | End: 2019-01-03 | Stop reason: SDUPTHER

## 2018-12-12 DIAGNOSIS — E03.9 HYPOTHYROIDISM, UNSPECIFIED TYPE: ICD-10-CM

## 2018-12-12 RX ORDER — LEVOTHYROXINE SODIUM 0.05 MG/1
50 TABLET ORAL DAILY
Qty: 30 TABLET | Refills: 2 | Status: SHIPPED | OUTPATIENT
Start: 2018-12-12 | End: 2019-03-05 | Stop reason: SDUPTHER

## 2018-12-18 ENCOUNTER — PREP FOR PROCEDURE (OUTPATIENT)
Dept: OBGYN CLINIC | Facility: HOSPITAL | Age: 83
End: 2018-12-18

## 2018-12-18 ENCOUNTER — OFFICE VISIT (OUTPATIENT)
Dept: OBGYN CLINIC | Facility: HOSPITAL | Age: 83
End: 2018-12-18
Payer: COMMERCIAL

## 2018-12-18 VITALS
BODY MASS INDEX: 34.41 KG/M2 | HEIGHT: 62 IN | WEIGHT: 187 LBS | DIASTOLIC BLOOD PRESSURE: 84 MMHG | HEART RATE: 90 BPM | SYSTOLIC BLOOD PRESSURE: 127 MMHG

## 2018-12-18 DIAGNOSIS — Z96.649 STATUS POST REVISION OF TOTAL HIP REPLACEMENT: ICD-10-CM

## 2018-12-18 DIAGNOSIS — M25.351 UNSTABLE RIGHT HIP: ICD-10-CM

## 2018-12-18 DIAGNOSIS — S73.004D DISLOCATION OF RIGHT HIP, SUBSEQUENT ENCOUNTER: Primary | ICD-10-CM

## 2018-12-18 DIAGNOSIS — G89.29 CHRONIC PAIN OF RIGHT KNEE: ICD-10-CM

## 2018-12-18 DIAGNOSIS — M25.551 PAIN IN RIGHT HIP: Primary | ICD-10-CM

## 2018-12-18 DIAGNOSIS — M25.561 CHRONIC PAIN OF RIGHT KNEE: ICD-10-CM

## 2018-12-18 PROCEDURE — 99213 OFFICE O/P EST LOW 20 MIN: CPT | Performed by: ORTHOPAEDIC SURGERY

## 2018-12-18 RX ORDER — FERROUS SULFATE TAB EC 324 MG (65 MG FE EQUIVALENT) 324 (65 FE) MG
324 TABLET DELAYED RESPONSE ORAL
Qty: 60 TABLET | Refills: 0 | Status: SHIPPED | OUTPATIENT
Start: 2018-12-18 | End: 2019-04-05 | Stop reason: ALTCHOICE

## 2018-12-18 RX ORDER — CHLORHEXIDINE GLUCONATE 0.12 MG/ML
15 RINSE ORAL ONCE
Status: CANCELLED | OUTPATIENT
Start: 2018-12-18 | End: 2018-12-18

## 2018-12-18 RX ORDER — GABAPENTIN 300 MG/1
300 CAPSULE ORAL ONCE
Status: CANCELLED | OUTPATIENT
Start: 2018-12-18 | End: 2018-12-18

## 2018-12-18 RX ORDER — FOLIC ACID 1 MG/1
1 TABLET ORAL DAILY
Qty: 30 TABLET | Refills: 0 | Status: SHIPPED | OUTPATIENT
Start: 2018-12-18 | End: 2019-01-16 | Stop reason: SDUPTHER

## 2018-12-18 RX ORDER — ACETAMINOPHEN 325 MG/1
975 TABLET ORAL ONCE
Status: CANCELLED | OUTPATIENT
Start: 2018-12-18 | End: 2018-12-18

## 2018-12-18 RX ORDER — CEFAZOLIN SODIUM 2 G/50ML
2000 SOLUTION INTRAVENOUS ONCE
Status: CANCELLED | OUTPATIENT
Start: 2018-12-18 | End: 2018-12-18

## 2018-12-18 RX ORDER — ASCORBIC ACID 500 MG
500 TABLET ORAL 2 TIMES DAILY
Qty: 60 TABLET | Refills: 0 | Status: SHIPPED | OUTPATIENT
Start: 2018-12-18

## 2018-12-18 NOTE — PROGRESS NOTES
Assessment:  1  Dislocation of right hip, subsequent encounter  folic acid (FOLVITE) 1 mg tablet    ascorbic acid (VITAMIN C) 500 mg tablet    ferrous sulfate 324 (65 Fe) mg    Case request operating room: ARTHROPLASTY HIP TOTAL REVISION    Comprehensive metabolic panel    CBC and differential    Protime-INR    CBC and Platelet    HEMOGLOBIN A1C W/ EAG ESTIMATION    EKG 12 lead    XR chest pa & lateral    Case request operating room: ARTHROPLASTY HIP TOTAL REVISION   2  Unstable right hip     3  Status post revision of total hip replacement  enoxaparin (LOVENOX) 40 mg/0 4 mL       Plan:  Diagnostics reviewed and physical exam performed  Diagnosis, treatment options and associated risks were discussed with the patient including no treatment, nonsurgical treatment and potential for surgical intervention  The patient was given the opportunity to ask questions regarding each  Quality of life to discuss revision right total hip with constrained liner  Risks and benefits of hip revision surgery discussed and consent obtained  To do next visit:  Return for post-op with x-rays  The above stated was discussed in layman's terms and the patient expressed understanding  All questions were answered to the patient's satisfaction  Scribe Attestation    I,:   Heber Anthony am acting as a scribe while in the presence of the attending physician :        I,:   Deedee Jesus MD personally performed the services described in this documentation    as scribed in my presence :              Subjective:   Trish Arriaga is a 80 y o  female who presents for follow-up after recent right total hip dislocation which was reduced in the emergency room on 11/22/2018  She has a history of right total hip arthroplasty performed by an outside provider (Dr Sirisha Lloyd, possibly [facility]) greater than 10 years ago with persistent and chronic instability    She continues to have bearing pain and swelling in her right lower extremity as result of her painful right hip  She uses a seated rolling walker for ambulatory assistance  She does find that her right hip symptoms do quad limit her quality of life and impede on her daily activities  Given the unstable nature of her right total hip she is at a high risk of falls  Review of systems negative unless otherwise specified in HPI    Past Medical History:   Diagnosis Date    Arthritis     Cancer ([facility] [address] )     CHF (congestive heart failure) ([facility])     Disturbance of smell and taste     disturbance of taste    Effusion of knee joint right     Fibromyalgia     Heart murmur     reported a previous heart murmur    History of acute myocardial infarction     History of atrial fibrillation     History of bruising easily     History of cancer     History of dermatitis     History of mammogram     History of methicillin resistant staphylococcus aureus (MRSA)     Negative nasal culture, isolation discontinued 8/17/2018      History of methicillin resistant staphylococcus aureus (MRSA) 08/17/2018    negative nasal culture-isolation and hx discontinued 8/17/2018    History of obesity     History of osteopenia     History of sciatica     History of shortness of breath     History of sinusitis     History of sore throat     History of syncope     History of umbilical hernia     History of viral infection     Irritable bowel syndrome (IBS)     Joint pain, hip     Limb pain     Myalgia     myalgia and myositis    Need for prophylactic vaccination against single diseases     Need for prophylactic vaccination and inoculation against influenza     Preoperative cardiovascular examination     Primary osteoarthritis of right knee     Right leg pain     Vaginal Pap smear     reported pap smear       Past Surgical History:   Procedure Laterality Date    ANKLE ARTHRODESIS Right     BACK SURGERY      BARIATRIC SURGERY      CHOLECYSTECTOMY      x2    ESOPHAGOGASTRODUODENOSCOPY N/A 3/23/2018    Procedure: ESOPHAGOGASTRODUODENOSCOPY (EGD); Surgeon: Monica Molina MD;  Location: [facility]; Service: Gastroenterology    FOOT SURGERY      HIP CLOSE REDUCTION Right 8/21/2016    Procedure: CLOSED REDUCTION RIGHT TOTAL HIP;  Surgeon: Stas Worthy MD;  [facility];  Service:     JOINT REPLACEMENT      LEFT KNEE REPLACEMENT    PILONIDAL CYST EXCISION      x2    REPLACEMENT TOTAL KNEE Right     SILVANA-EN-Y PROCEDURE      x2    TOTAL KNEE ARTHROPLASTY      UMBILICAL HERNIA REPAIR      x2       Family History   Problem Relation Age of Onset    Coronary artery disease Mother     Heart attack Mother     Hypertension Mother     Coronary artery disease Father     Heart attack Father     Hypertension Father     Lymphoma Sister         acute    Rashes / Skin problems Sister         lymphomatoid papulosis    Coronary artery disease Brother     Heart attack Brother         x2    Aneurysm Neg Hx     Hyperlipidemia Neg Hx        Social History     Occupational History    Not on file       Social History Main Topics    Smoking status: Never Smoker    Smokeless tobacco: Never Used    Alcohol use Yes      Comment:  social    Drug use: No    Sexual activity: No         Current Outpatient Prescriptions:     ascorbic acid (VITAMIN C) 500 mg tablet, Take 1 tablet (500 mg total) by mouth 2 (two) times a day, Disp: 60 tablet, Rfl: 0    desoximetasone (TOPICORT) 0 25 % cream, Apply topically as needed for irritation, Disp: 30 g, Rfl: 0    DULoxetine (CYMBALTA) 60 mg delayed release capsule, TAKE 1 CAPSULE BY MOUTH ONCE DAILY, Disp: 30 capsule, Rfl: 4    enoxaparin (LOVENOX) 40 mg/0 4 mL, Inject 0 4 mL (40 mg total) under the skin daily for 28 days To start postoperatively, Disp: 28 Syringe, Rfl: 0    ferrous sulfate 324 (65 Fe) mg, Take 1 tablet (324 mg total) by mouth 2 (two) times a day before meals, Disp: 60 tablet, Rfl: 0    fluticasone (FLONASE) 50 mcg/act nasal spray, 1 spray into each nostril daily  , Disp: , Rfl:     folic acid (FOLVITE) 1 mg tablet, Take 1 tablet (1 mg total) by mouth daily, Disp: 30 tablet, Rfl: 0    levothyroxine 50 mcg tablet, Take 1 tablet (50 mcg total) by mouth daily, Disp: 30 tablet, Rfl: 2    mometasone (ELOCON) 0 1 % cream, Once a day prn, Disp: 45 g, Rfl: 0    potassium chloride (K-DUR,KLOR-CON) 20 mEq tablet, TAKE 1 TABLET DAILY, Disp: 30 tablet, Rfl: 11    torsemide (DEMADEX) 20 mg tablet, Take 1 tablet (20 mg total) by mouth daily, Disp: 30 tablet, Rfl: 0    traMADol (ULTRAM) 50 mg tablet, Take 1 tablet (50 mg total) by mouth every 8 (eight) hours as needed for moderate pain, Disp: 30 tablet, Rfl: 0    warfarin (COUMADIN) 3 mg tablet, Take 1 tablet by mouth daily as previously directed by cardiology, Disp: , Rfl:     warfarin (COUMADIN) 4 mg tablet, Take one tablet daily as previously directed by cardiology, Disp: 180 tablet, Rfl: 0    Allergies   Allergen Reactions    Codeine Vomiting and GI Intolerance     GI upset, ana m morphine  Reaction Date: 24Apr2012;     Other      Other reaction(s): Other (See Comments)  ana m toradol in OR 6/06 sah    Oxycodone Hcl Vomiting     Reaction Date: 24Apr2012;     Oxycodone-Acetaminophen      Other reaction(s): Other (See Comments)  GI upset; ana m vicodin    Percocet [Oxycodone-Acetaminophen]     Seasonal Ic  [Cholestatin] Allergic Rhinitis    Bactrim [Sulfamethoxazole-Trimethoprim] Rash    Sulfa Antibiotics Rash     Other reaction(s): Other (See Comments)  takes lasix @home            Vitals:    12/18/18 0752   BP: 127/84   Pulse: 90       Objective:          Physical Exam                    Right Hip Exam     Muscle Strength   Abduction: 4/5   Adduction: 4/5   Flexion: 4/5     Other   Erythema: absent  Sensation: normal    Comments:    Hip incision is healed  Moderate diffuse swelling throughout her right lower extremity  Calf and thigh are soft nontender no signs DVT  Her hip is clinically located  Range of motion was not excessively stressed given her recent dislocation with reduction  Ambulatory assistance with seated rolling walker          History and physical  · Heart: regular rate and rhythm, pacemaker left sided chest which is well tracing  · ENT: clear  · Lungs: No audible wheezing, clear to auscultation  · Abdomen: soft and nontender    Diagnostics, reviewed and taken today if performed as documented: The attending physician has personally reviewed the pertinent films in PACS and interpretation is as follows:    Right hip and pelvis x-rays taken on 11/22/2018 were reviewed today and show:  Right total hip arthroplasty with dislocation superior laterally  Post reduction x-rays confirm excellent reduction of her total hip arthroplasty without evidence of fracture or loosening      Procedures, if performed today:    Procedures    None performed      Portions of the record may have been created with voice recognition software   Occasional wrong word or "sound a like" substitutions may have occurred due to the inherent limitations of voice recognition software   Read the chart carefully and recognize, using context, where substitutions have occurred

## 2018-12-19 ENCOUNTER — TELEPHONE (OUTPATIENT)
Dept: PREADMISSION TESTING | Facility: HOSPITAL | Age: 83
End: 2018-12-19

## 2018-12-19 ENCOUNTER — PREP FOR PROCEDURE (OUTPATIENT)
Dept: OBGYN CLINIC | Facility: HOSPITAL | Age: 83
End: 2018-12-19

## 2018-12-19 DIAGNOSIS — Z01.818 PRE-OP TESTING: Primary | ICD-10-CM

## 2018-12-21 ENCOUNTER — ANTICOAG VISIT (OUTPATIENT)
Dept: INTERNAL MEDICINE CLINIC | Facility: CLINIC | Age: 83
End: 2018-12-21

## 2018-12-31 ENCOUNTER — TELEPHONE (OUTPATIENT)
Dept: PREADMISSION TESTING | Facility: HOSPITAL | Age: 83
End: 2018-12-31

## 2019-01-02 ENCOUNTER — TELEPHONE (OUTPATIENT)
Dept: PREADMISSION TESTING | Facility: HOSPITAL | Age: 84
End: 2019-01-02

## 2019-01-02 ENCOUNTER — ANESTHESIA EVENT (OUTPATIENT)
Dept: PERIOP | Facility: HOSPITAL | Age: 84
DRG: 467 | End: 2019-01-02
Payer: COMMERCIAL

## 2019-01-02 ENCOUNTER — HOSPITAL ENCOUNTER (OUTPATIENT)
Dept: RADIOLOGY | Facility: HOSPITAL | Age: 84
Discharge: HOME | End: 2019-01-02

## 2019-01-02 ENCOUNTER — APPOINTMENT (OUTPATIENT)
Dept: LAB | Facility: HOSPITAL | Age: 84
End: 2019-01-02
Attending: ORTHOPAEDIC SURGERY
Payer: COMMERCIAL

## 2019-01-02 DIAGNOSIS — S73.004D DISLOCATION OF RIGHT HIP, SUBSEQUENT ENCOUNTER: ICD-10-CM

## 2019-01-02 DIAGNOSIS — Z01.818 PRE-OP TESTING: ICD-10-CM

## 2019-01-02 LAB
ABO GROUP BLD: NORMAL
ALBUMIN SERPL BCP-MCNC: 3.3 G/DL (ref 3.5–5)
ALP SERPL-CCNC: 100 U/L (ref 46–116)
ALT SERPL W P-5'-P-CCNC: 16 U/L (ref 12–78)
ANION GAP SERPL CALCULATED.3IONS-SCNC: 6 MMOL/L (ref 4–13)
APTT PPP: 37 SECONDS (ref 26–38)
AST SERPL W P-5'-P-CCNC: 23 U/L (ref 5–45)
BASOPHILS # BLD AUTO: 0.03 THOUSANDS/ΜL (ref 0–0.1)
BASOPHILS NFR BLD AUTO: 1 % (ref 0–1)
BILIRUB SERPL-MCNC: 0.39 MG/DL (ref 0.2–1)
BLD GP AB SCN SERPL QL: NEGATIVE
BUN SERPL-MCNC: 17 MG/DL (ref 5–25)
CALCIUM SERPL-MCNC: 8.6 MG/DL (ref 8.3–10.1)
CHLORIDE SERPL-SCNC: 106 MMOL/L (ref 100–108)
CO2 SERPL-SCNC: 26 MMOL/L (ref 21–32)
CREAT SERPL-MCNC: 0.92 MG/DL (ref 0.6–1.3)
CRP SERPL QL: <3 MG/L
EOSINOPHIL # BLD AUTO: 0.09 THOUSAND/ΜL (ref 0–0.61)
EOSINOPHIL NFR BLD AUTO: 1 % (ref 0–6)
ERYTHROCYTE [DISTWIDTH] IN BLOOD BY AUTOMATED COUNT: 13.7 % (ref 11.6–15.1)
EST. AVERAGE GLUCOSE BLD GHB EST-MCNC: 114 MG/DL
FERRITIN SERPL-MCNC: 14 NG/ML (ref 8–388)
GFR SERPL CREATININE-BSD FRML MDRD: 57 ML/MIN/1.73SQ M
GLUCOSE P FAST SERPL-MCNC: 83 MG/DL (ref 65–99)
HBA1C MFR BLD: 5.6 % (ref 4.2–6.3)
HCT VFR BLD AUTO: 35.9 % (ref 34.8–46.1)
HGB BLD-MCNC: 11 G/DL (ref 11.5–15.4)
IMM GRANULOCYTES # BLD AUTO: 0.01 THOUSAND/UL (ref 0–0.2)
IMM GRANULOCYTES NFR BLD AUTO: 0 % (ref 0–2)
INR PPP: 1.99 (ref 0.86–1.17)
IRON SATN MFR SERPL: 13 %
IRON SERPL-MCNC: 54 UG/DL (ref 50–170)
LYMPHOCYTES # BLD AUTO: 1.22 THOUSANDS/ΜL (ref 0.6–4.47)
LYMPHOCYTES NFR BLD AUTO: 19 % (ref 14–44)
MCH RBC QN AUTO: 30.1 PG (ref 26.8–34.3)
MCHC RBC AUTO-ENTMCNC: 30.6 G/DL (ref 31.4–37.4)
MCV RBC AUTO: 98 FL (ref 82–98)
MONOCYTES # BLD AUTO: 0.58 THOUSAND/ΜL (ref 0.17–1.22)
MONOCYTES NFR BLD AUTO: 9 % (ref 4–12)
NEUTROPHILS # BLD AUTO: 4.63 THOUSANDS/ΜL (ref 1.85–7.62)
NEUTS SEG NFR BLD AUTO: 70 % (ref 43–75)
NRBC BLD AUTO-RTO: 0 /100 WBCS
PLATELET # BLD AUTO: 203 THOUSANDS/UL (ref 149–390)
PMV BLD AUTO: 10.6 FL (ref 8.9–12.7)
POTASSIUM SERPL-SCNC: 3.8 MMOL/L (ref 3.5–5.3)
PROT SERPL-MCNC: 7.4 G/DL (ref 6.4–8.2)
PROTHROMBIN TIME: 22.7 SECONDS (ref 11.8–14.2)
RBC # BLD AUTO: 3.65 MILLION/UL (ref 3.81–5.12)
RH BLD: POSITIVE
SODIUM SERPL-SCNC: 138 MMOL/L (ref 136–145)
SPECIMEN EXPIRATION DATE: NORMAL
TIBC SERPL-MCNC: 410 UG/DL (ref 250–450)
WBC # BLD AUTO: 6.56 THOUSAND/UL (ref 4.31–10.16)

## 2019-01-02 PROCEDURE — 83036 HEMOGLOBIN GLYCOSYLATED A1C: CPT

## 2019-01-02 PROCEDURE — 83540 ASSAY OF IRON: CPT

## 2019-01-02 PROCEDURE — 36415 COLL VENOUS BLD VENIPUNCTURE: CPT

## 2019-01-02 PROCEDURE — 71046 X-RAY EXAM CHEST 2 VIEWS: CPT

## 2019-01-02 PROCEDURE — 82728 ASSAY OF FERRITIN: CPT

## 2019-01-02 PROCEDURE — 86850 RBC ANTIBODY SCREEN: CPT

## 2019-01-02 PROCEDURE — 86140 C-REACTIVE PROTEIN: CPT

## 2019-01-02 PROCEDURE — 86900 BLOOD TYPING SEROLOGIC ABO: CPT

## 2019-01-02 PROCEDURE — 85610 PROTHROMBIN TIME: CPT

## 2019-01-02 PROCEDURE — 83550 IRON BINDING TEST: CPT

## 2019-01-02 PROCEDURE — 85730 THROMBOPLASTIN TIME PARTIAL: CPT

## 2019-01-02 PROCEDURE — 85025 COMPLETE CBC W/AUTO DIFF WBC: CPT

## 2019-01-02 PROCEDURE — 80053 COMPREHEN METABOLIC PANEL: CPT

## 2019-01-02 PROCEDURE — 86901 BLOOD TYPING SEROLOGIC RH(D): CPT

## 2019-01-03 ENCOUNTER — ANTICOAG VISIT (OUTPATIENT)
Dept: INTERNAL MEDICINE CLINIC | Facility: CLINIC | Age: 84
End: 2019-01-03

## 2019-01-03 ENCOUNTER — PREP FOR PROCEDURE (OUTPATIENT)
Dept: OBGYN CLINIC | Facility: HOSPITAL | Age: 84
End: 2019-01-03

## 2019-01-03 DIAGNOSIS — G89.29 OTHER CHRONIC PAIN: ICD-10-CM

## 2019-01-03 RX ORDER — VANCOMYCIN HYDROCHLORIDE 1 G/200ML
1000 INJECTION, SOLUTION INTRAVENOUS ONCE
Status: CANCELLED | OUTPATIENT
Start: 2019-01-03 | End: 2019-01-03

## 2019-01-03 RX ORDER — DULOXETIN HYDROCHLORIDE 60 MG/1
60 CAPSULE, DELAYED RELEASE ORAL DAILY
Qty: 30 CAPSULE | Refills: 0 | Status: SHIPPED | OUTPATIENT
Start: 2019-01-03 | End: 2019-02-02 | Stop reason: SDUPTHER

## 2019-01-08 ENCOUNTER — TELEPHONE (OUTPATIENT)
Dept: ANESTHESIOLOGY | Facility: HOSPITAL | Age: 84
End: 2019-01-08

## 2019-01-08 NOTE — TELEPHONE ENCOUNTER
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

## 2019-01-10 RX ORDER — SODIUM CHLORIDE 9 MG/ML
20 INJECTION, SOLUTION INTRAVENOUS CONTINUOUS
Status: DISCONTINUED | OUTPATIENT
Start: 2019-01-11 | End: 2019-01-14 | Stop reason: HOSPADM

## 2019-01-10 RX ORDER — SODIUM CHLORIDE 9 MG/ML
20 INJECTION, SOLUTION INTRAVENOUS CONTINUOUS
Status: DISCONTINUED | OUTPATIENT
Start: 2019-01-11 | End: 2019-01-10

## 2019-01-11 ENCOUNTER — HOSPITAL ENCOUNTER (OUTPATIENT)
Dept: INFUSION CENTER | Facility: CLINIC | Age: 84
Discharge: HOME | End: 2019-01-11
Payer: COMMERCIAL

## 2019-01-11 VITALS
DIASTOLIC BLOOD PRESSURE: 70 MMHG | SYSTOLIC BLOOD PRESSURE: 120 MMHG | HEART RATE: 82 BPM | TEMPERATURE: 97.5 F | RESPIRATION RATE: 20 BRPM | OXYGEN SATURATION: 99 %

## 2019-01-11 PROCEDURE — 96365 THER/PROPH/DIAG IV INF INIT: CPT

## 2019-01-11 PROCEDURE — 96366 THER/PROPH/DIAG IV INF ADDON: CPT

## 2019-01-11 RX ADMIN — SODIUM CHLORIDE 20 ML/HR: 0.9 INJECTION, SOLUTION INTRAVENOUS at 14:18

## 2019-01-11 RX ADMIN — IRON SUCROSE 300 MG: 20 INJECTION, SOLUTION INTRAVENOUS at 14:18

## 2019-01-14 ENCOUNTER — TELEPHONE (OUTPATIENT)
Dept: PREADMISSION TESTING | Facility: HOSPITAL | Age: 84
End: 2019-01-14

## 2019-01-14 ENCOUNTER — TELEPHONE (OUTPATIENT)
Dept: CARDIOLOGY CLINIC | Facility: CLINIC | Age: 84
End: 2019-01-14

## 2019-01-14 RX ORDER — SODIUM CHLORIDE 9 MG/ML
20 INJECTION, SOLUTION INTRAVENOUS CONTINUOUS
Status: DISCONTINUED | OUTPATIENT
Start: 2019-01-15 | End: 2019-01-18 | Stop reason: HOSPADM

## 2019-01-15 ENCOUNTER — HOSPITAL ENCOUNTER (OUTPATIENT)
Dept: INFUSION CENTER | Facility: CLINIC | Age: 84
Discharge: HOME | End: 2019-01-15
Payer: COMMERCIAL

## 2019-01-15 ENCOUNTER — EVALUATION (OUTPATIENT)
Dept: PHYSICAL THERAPY | Facility: CLINIC | Age: 84
End: 2019-01-15
Payer: COMMERCIAL

## 2019-01-15 ENCOUNTER — TELEPHONE (OUTPATIENT)
Dept: PREADMISSION TESTING | Facility: HOSPITAL | Age: 84
End: 2019-01-15

## 2019-01-15 ENCOUNTER — TELEPHONE (OUTPATIENT)
Dept: OBGYN CLINIC | Facility: HOSPITAL | Age: 84
End: 2019-01-15

## 2019-01-15 VITALS
DIASTOLIC BLOOD PRESSURE: 70 MMHG | RESPIRATION RATE: 16 BRPM | OXYGEN SATURATION: 99 % | HEART RATE: 71 BPM | SYSTOLIC BLOOD PRESSURE: 149 MMHG | TEMPERATURE: 98.2 F

## 2019-01-15 DIAGNOSIS — M25.551 PAIN OF RIGHT HIP JOINT: ICD-10-CM

## 2019-01-15 DIAGNOSIS — M25.351 UNSTABLE RIGHT HIP: Primary | ICD-10-CM

## 2019-01-15 DIAGNOSIS — M25.551 PAIN IN RIGHT HIP: ICD-10-CM

## 2019-01-15 PROCEDURE — 97110 THERAPEUTIC EXERCISES: CPT | Performed by: PHYSICAL THERAPIST

## 2019-01-15 PROCEDURE — 96366 THER/PROPH/DIAG IV INF ADDON: CPT

## 2019-01-15 PROCEDURE — 96365 THER/PROPH/DIAG IV INF INIT: CPT

## 2019-01-15 PROCEDURE — 97162 PT EVAL MOD COMPLEX 30 MIN: CPT | Performed by: PHYSICAL THERAPIST

## 2019-01-15 RX ADMIN — SODIUM CHLORIDE 20 ML/HR: 0.9 INJECTION, SOLUTION INTRAVENOUS at 13:33

## 2019-01-15 RX ADMIN — IRON SUCROSE 300 MG: 20 INJECTION, SOLUTION INTRAVENOUS at 13:36

## 2019-01-15 NOTE — PROGRESS NOTES
PT Evaluation     Today's date: 1/15/2019  Patient name: Esperanza Muhammad  : 1933  MRN: [id number]  Referring provider: Max Singh MD  Dx:   Encounter Diagnosis     ICD-10-CM    1  Unstable right hip M25 351    2  Pain of right hip joint M25 551                   Assessment  Assessment details: Pt demonstrates severe impairments of R hip strength > ROM, balance, gait, ambulation tolerance, core stability, and safety  She is scheduled for a R MAIA revision 2019 and will benefit from skilled PT services post-operatively to address her impairments in order to decrease pain and improve safe functional mobility    Impairments: abnormal gait, abnormal or restricted ROM, activity intolerance, impaired balance, impaired physical strength, lacks appropriate home exercise program, pain with function, safety issue, weight-bearing intolerance, poor posture  and poor body mechanics  Understanding of Dx/Px/POC: good   Prognosis: good    Goals  STG - 4 wks  1) pt will be I with HEP  2) pt will demonstrate > 300 ft ambulation tolerance with FWW  3) pt will demonstrate > 3-/5 R hip strength    LTG - 8 wks  1) pt will demonstrate > 1000 ft ambulation tolerance with rollator walker  2) pt will demonstrate > 3+/5 R hip strength  3) pt will report no falls since IE    Plan  Planned therapy interventions: abdominal trunk stabilization, activity modification, balance/weight bearing training, body mechanics training, neuromuscular re-education, manual therapy, patient education, postural training, strengthening, stretching, therapeutic activities, therapeutic exercise, home exercise program and gait training  Frequency: 2x week  Duration in weeks: 8  Treatment plan discussed with: patient        Subjective Evaluation    History of Present Illness  Mechanism of injury: Pt is a 80 y o  female with PMHx significant for CHF, HTN, A fib s/p pacer Aug 2018, R knee OA, s/p L TKA, s/p R MAIA, s/p lumbar fusion, s/p R aknle fusion, IBS, osteopenia, hypothyroidism  She presents to PT for R MAIA revision pre-op evaluation following 5 R hip dislocations over past 2 years  She reports poor balance and 2 falls over the past year  Pain  Current pain ratin  At best pain ratin  At worst pain rating: 10 (R SL)    Social Support  Steps to enter house: yes (2 small landings, no railing)  Stairs in house: no   Lives in: apartment  Lives with: significant other    Employment status: not working  Treatments  No previous or current treatments  Patient Goals  Patient goals for therapy: decreased pain, improved balance and independence with ADLs/IADLs          Objective     Passive Range of Motion     Right Hip   Flexion: 90 degrees with pain  Extension: 0 degrees   Abduction: 30 degrees with pain  Adduction: 10 degrees   External rotation (prone): 60 degrees   Internal rotation (prone): 10 degrees with pain    Strength/Myotome Testing     Right Hip   Planes of Motion   Flexion: 3-  Extension: 3+  Abduction: 2  Adduction: 3-  External rotation: 2+  Internal rotation: 3-    Right Knee   Flexion: 4-  Extension: 4-    Ambulation     Comments   Pt demonstrated mod decreased R stance time, severe R >L Trendelenberg, and severe ipsilateral trunk lean at midstance with SPC    Pt currently uses rollator walker for most functional mobility, occasional SPC use    Functional Assessment     Comments  SLS: unable to perform  Tandem stance: R: 1 sec, L: 2 sec  FT EO: > 30 sec  FT EC: 2 sec          Precautions:  PMHx: CHF, HTN, A fib s/p pacer Aug 2018, R knee OA, s/p L TKA, s/p R MAIA, s/p lumbar fusion, s/p R aknle fusion, IBS, osteopenia, hypothyroidism  Dx: pre-op R MAIA revision  Daily Treatment Diary     Manual  1/15                                                                                 Exercise Diary  1/15            Gait training with FWW             Step-ups  4"/           STS: adjustable table             Abdominal bracing 5"x5 DLS R bent-knee hip flexion 1x10            Supine clamshells YTB/  1x10            Supine R hip ABD AROM 1x10                                                                                                                                                                                         Modalities

## 2019-01-16 DIAGNOSIS — S73.004D DISLOCATION OF RIGHT HIP, SUBSEQUENT ENCOUNTER: ICD-10-CM

## 2019-01-16 RX ORDER — SODIUM CHLORIDE 9 MG/ML
20 INJECTION, SOLUTION INTRAVENOUS CONTINUOUS
Status: DISCONTINUED | OUTPATIENT
Start: 2019-01-17 | End: 2019-01-20 | Stop reason: HOSPADM

## 2019-01-16 RX ORDER — FOLIC ACID 1 MG/1
TABLET ORAL
Qty: 30 TABLET | Refills: 0 | Status: SHIPPED | OUTPATIENT
Start: 2019-01-16 | End: 2019-01-20 | Stop reason: SDUPTHER

## 2019-01-17 ENCOUNTER — ANTICOAG VISIT (OUTPATIENT)
Dept: INTERNAL MEDICINE CLINIC | Facility: CLINIC | Age: 84
End: 2019-01-17

## 2019-01-17 ENCOUNTER — TELEPHONE (OUTPATIENT)
Dept: OBGYN CLINIC | Facility: HOSPITAL | Age: 84
End: 2019-01-17

## 2019-01-17 ENCOUNTER — TRANSCRIBE ORDERS (OUTPATIENT)
Dept: LAB | Facility: CLINIC | Age: 84
End: 2019-01-17

## 2019-01-17 ENCOUNTER — OFFICE VISIT (OUTPATIENT)
Dept: LAB | Facility: CLINIC | Age: 84
End: 2019-01-17
Payer: COMMERCIAL

## 2019-01-17 ENCOUNTER — HOSPITAL ENCOUNTER (OUTPATIENT)
Dept: INFUSION CENTER | Facility: CLINIC | Age: 84
Discharge: HOME | End: 2019-01-17
Payer: COMMERCIAL

## 2019-01-17 ENCOUNTER — APPOINTMENT (OUTPATIENT)
Dept: LAB | Facility: CLINIC | Age: 84
End: 2019-01-17
Payer: COMMERCIAL

## 2019-01-17 VITALS
DIASTOLIC BLOOD PRESSURE: 50 MMHG | RESPIRATION RATE: 18 BRPM | SYSTOLIC BLOOD PRESSURE: 122 MMHG | OXYGEN SATURATION: 99 % | HEART RATE: 72 BPM | TEMPERATURE: 97.6 F

## 2019-01-17 DIAGNOSIS — S73.004D DISLOCATION OF RIGHT HIP, SUBSEQUENT ENCOUNTER: ICD-10-CM

## 2019-01-17 DIAGNOSIS — S71.001D DISLOCATION OF HIP, RIGHT, OPEN, SUBSEQUENT ENCOUNTER: Primary | ICD-10-CM

## 2019-01-17 DIAGNOSIS — S73.004D DISLOCATION OF HIP, RIGHT, OPEN, SUBSEQUENT ENCOUNTER: Primary | ICD-10-CM

## 2019-01-17 DIAGNOSIS — S71.001D DISLOCATION OF HIP, RIGHT, OPEN, SUBSEQUENT ENCOUNTER: ICD-10-CM

## 2019-01-17 DIAGNOSIS — S73.004D DISLOCATION OF HIP, RIGHT, OPEN, SUBSEQUENT ENCOUNTER: ICD-10-CM

## 2019-01-17 LAB
ERYTHROCYTE [DISTWIDTH] IN BLOOD BY AUTOMATED COUNT: 14.4 % (ref 11.6–15.1)
HCT VFR BLD AUTO: 33 % (ref 34.8–46.1)
HGB BLD-MCNC: 10.2 G/DL (ref 11.5–15.4)
MCH RBC QN AUTO: 30.8 PG (ref 26.8–34.3)
MCHC RBC AUTO-ENTMCNC: 30.9 G/DL (ref 31.4–37.4)
MCV RBC AUTO: 100 FL (ref 82–98)
PLATELET # BLD AUTO: 176 THOUSANDS/UL (ref 149–390)
PMV BLD AUTO: 11.2 FL (ref 8.9–12.7)
RBC # BLD AUTO: 3.31 MILLION/UL (ref 3.81–5.12)
WBC # BLD AUTO: 6.66 THOUSAND/UL (ref 4.31–10.16)

## 2019-01-17 PROCEDURE — 85027 COMPLETE CBC AUTOMATED: CPT

## 2019-01-17 PROCEDURE — 96365 THER/PROPH/DIAG IV INF INIT: CPT

## 2019-01-17 PROCEDURE — 93005 ELECTROCARDIOGRAM TRACING: CPT

## 2019-01-17 PROCEDURE — 96366 THER/PROPH/DIAG IV INF ADDON: CPT

## 2019-01-17 RX ADMIN — IRON SUCROSE 300 MG: 20 INJECTION, SOLUTION INTRAVENOUS at 13:36

## 2019-01-17 RX ADMIN — SODIUM CHLORIDE 20 ML/HR: 0.9 INJECTION, SOLUTION INTRAVENOUS at 13:31

## 2019-01-17 NOTE — PLAN OF CARE
Problem: Potential for Falls  Goal: Patient will remain free of falls  INTERVENTIONS:  - Assess patient frequently for physical needs  -  Identify cognitive and physical deficits and behaviors that affect risk of falls    -  [place] fall precautions as indicated by assessment   - Educate patient/family on patient safety including physical limitations  - Instruct patient to call for assistance with activity based on assessment  - Modify environment to reduce risk of injury  - Consider OT/PT consult to assist with strengthening/mobility   Outcome: Progressing

## 2019-01-18 ENCOUNTER — CONSULT (OUTPATIENT)
Dept: INTERNAL MEDICINE CLINIC | Facility: CLINIC | Age: 84
End: 2019-01-18
Payer: COMMERCIAL

## 2019-01-18 ENCOUNTER — ANTICOAG VISIT (OUTPATIENT)
Dept: INTERNAL MEDICINE CLINIC | Facility: CLINIC | Age: 84
End: 2019-01-18

## 2019-01-18 VITALS
HEART RATE: 68 BPM | SYSTOLIC BLOOD PRESSURE: 124 MMHG | RESPIRATION RATE: 16 BRPM | DIASTOLIC BLOOD PRESSURE: 64 MMHG | OXYGEN SATURATION: 97 % | BODY MASS INDEX: 35.7 KG/M2 | WEIGHT: 194 LBS | HEIGHT: 62 IN

## 2019-01-18 DIAGNOSIS — Z01.818 PREOP EXAMINATION: ICD-10-CM

## 2019-01-18 DIAGNOSIS — E03.9 HYPOTHYROIDISM, UNSPECIFIED TYPE: Primary | ICD-10-CM

## 2019-01-18 DIAGNOSIS — I50.32 CHRONIC DIASTOLIC CHF (CONGESTIVE HEART FAILURE) (HCC): ICD-10-CM

## 2019-01-18 DIAGNOSIS — I48.19 PERSISTENT ATRIAL FIBRILLATION (HCC): ICD-10-CM

## 2019-01-18 DIAGNOSIS — I10 BENIGN ESSENTIAL HYPERTENSION: ICD-10-CM

## 2019-01-18 LAB
ATRIAL RATE: 77 BPM
QRS AXIS: -12 DEGREES
QRSD INTERVAL: 116 MS
QT INTERVAL: 400 MS
QTC INTERVAL: 456 MS
T WAVE AXIS: 159 DEGREES
VENTRICULAR RATE: 78 BPM

## 2019-01-18 PROCEDURE — 99213 OFFICE O/P EST LOW 20 MIN: CPT | Performed by: INTERNAL MEDICINE

## 2019-01-18 PROCEDURE — 93010 ELECTROCARDIOGRAM REPORT: CPT | Performed by: INTERNAL MEDICINE

## 2019-01-18 NOTE — PROGRESS NOTES
Assessment/Plan:           Problem List Items Addressed This Visit        Endocrine    Hypothyroidism - Primary     Hypothyroidism controlled the patient is currently euthyroid I will be ordering a TSH prior to the next office visit and the patient will continue with current medical regiment; we will continue to monitor the patient's progress  Relevant Orders    TSH, 3rd generation       Cardiovascular and Mediastinum    Atrial fibrillation ([facility] [address] )     Rate controlled and fully anticoagulated with Coumadin, Orthopedics has checked with Cardiology and okay for surgery and will be holding the Coumadin per Cardiology  Patient will be treated with Lovenox postoperatively  Chronic diastolic CHF (congestive heart failure) ([address] )     Patient has had a mild increase in edema secondary to not taking her diuretic regularly she is not taking the diuretic in the last several days and will restart the medication she reports me the leg swelling goes away immediately after starting diuretic  I have explained to the patient the importance of taking medication         Benign essential hypertension     Hypertension - controlled, I have counseled patient following healthy balance diet, I would like the patient reduce sodium, exercise routinely, I would like the patient continued the med current medical regiment and we will continue to monitor  Other    Preop examination     Patient is of moderate risk in functional capacity is low she has recently seen Cardiology and the patient reports ortho will be contacting  Cardiology clearance from a cardiac stand point to proceed  Her Coumadin will be held per Cardiology prior to the operation and the patient will be started on Lovenox for 28 days after the operation  Currently she is medically stable to proceed with the operation from our standpoint  Must watch for postop anemia  I have explained the importance of DVT prophylaxis to patient today    She may need internal Medicine/Cardiology consultation during the admission for the hip replacement  Return to office as scheduled  call if any problems  Subjective:      Patient ID: Brant Abbott is a 80 y o  female  HPI 49-year-old female coming in for preop clearance requested by Orthopedics Dr Bailey; no previous reactions to anesthesia, she does have a limitation of her functional ability because of the hip arthritis, she is not able to walk up a full flight of steps the history of pacemaker and since the pacemaker insertion she is able to walk a full block without becoming short of breath or chest pain with a walker  No previous history of blood clots she has never had any bleeding dysfunction  Last week since starting trying the iron infusion she has started to notice a heat intolerance  No previous DVT, no previous PT no bleeding disorder although she is on Coumadin for anticoagulation for AFib which is stable  She has noticed some mild swelling in her ankles bilaterally because she is not taking her water pill in the last several days  She states that she will start a water pill immediately no short of breath  She has recently seen Cardiology and states that her orthopedic physician will directly be contacting her cardiologist for clearance  Patient has had recurrent dislocation now 5 times and to repair this problem she will need a total hip replacement right side side  Mild diarrhea secondary to odor in the house she will monitor and in not improved in the next day she will notify me  Someone in a different apartment was cooking a very spicy food leading to nausea and diarrhea for this patient  The following portions of the patient's history were reviewed and updated as appropriate: allergies, current medications, past family history, past medical history, past social history, past surgical history and problem list     Review of Systems   Constitutional: Positive for activity change  Negative for appetite change and unexpected weight change  HENT: Positive for postnasal drip  Negative for congestion  Eyes: Negative for visual disturbance  Respiratory: Negative for cough, shortness of breath and wheezing  Cardiovascular: Positive for leg swelling (He had missed a dose of the water pill and secondary to going to doctor's offices  )  Negative for chest pain and palpitations  Gastrointestinal: Negative for abdominal pain, diarrhea, nausea and vomiting  Neurological: Negative for dizziness, light-headedness and headaches  Hematological: Negative for adenopathy  Objective:    No Follow-up on file  Procedure: Xr Chest Pa & Lateral    Result Date: 1/3/2019  Narrative: CHEST INDICATION:   S73 004D: Unspecified dislocation of right hip, subsequent encounter  COMPARISON:  Chest x-ray 2018  EXAM PERFORMED/VIEWS:  XR CHEST PA & LATERAL FINDINGS:  Left-sided chest wall pacemaker is identified  Pacemaker leads are intact  Cardiomediastinal silhouette appears unremarkable  The lungs are clear  No pneumothorax or pleural effusion  Posterior lumbar fusion hardware again noted  Impression: No acute cardiopulmonary disease  Workstation performed: [id number]     Recent Results (from the past 93673 hour(s))   ECG 12 lead   Result Value    Ventricular Rate 78    Atrial Rate 77    NV Interval     QRSD Interval 116    QT Interval 400    QTC Interval 456    P Axis     QRS Axis -12    T Wave Axis 159    Narrative    Ventricular-paced rhythm  When compared with ECG of 23-AUG-2018 15:28,  Vent  rate has decreased BY   2 BPM  Confirmed by Juliocesar Galvan ([id number]) on 2019 8:59:27 AM   POCT ECG    Impression    Atrial fibrillation, V paced, heart rate 74   Holter monitor - 24 hour    Narrative    PT NAME: Mahnaz Lentz  : 1933  AGE: 80 y o    GENDER: female  MRN: [id number]  PROCEDURE: Holter monitor - 24 hour      INDICATIONS:   24 hour Holter monitor was performed 18 for atrial fibrillation  The   patient was in atrial fibrillation throughout the duration of the test     Average heart rate was 118 beats per minute  Minimum heart rate was 60   beats per minute at 8:25 a m     Maximum heart rate was 171 beats per   minute at 2:40 p m  Camilo Garcia Average hourly heart rate ranged between 97 to 150   beats per minute  Ventricular ectopic activity consisted of 9 isolated PVCs, which comprises   less than 0 1% of total beats  Longest R-R interval was 2 1 seconds at 12:45 p m  Camilo Merwillem Patient's rhythm included 1 hour 28 minutes of tachycardia  No symptoms reported  IMPRESSIONS:  1  Atrial fibrillation with rapid ventricular response  2   Rare ventricular ectopic activity  3   No symptoms reported  Interpreted by:  Lobito Peace MD   NM myocardial perfusion spect (rx stress and/or rest)    Narrative     [address] [place] [address], [address] [place] [address]  [phone]    Rest/Stress Gated SPECT Myocardial Perfusion Imaging After Regadenoson    Patient: Juanita Geronimo  MR number: [id number]  Account number: [de-identified]  : 1933  Age: 80 years  Gender: Female  Status: Outpatient  Location: [address] [place] [address] [facility]  Height: 62 in  Weight: 231 lb  BP: 116/ 70 mmHg    Allergies: CODEINE, OXYCODONE HCL, OXYCODONE-ACETAMINOPHEN, CHOLESTATIN, SULFAMETHOXAZOLE-TRIMETHOPRIM, SULFA ANTIBIOTICS    Diagnosis: I48 0 - Atrial fibrillation, I50 9 - Heart failure, unspecified, R07 9 - Chest pain, unspecified    Primary Physician:  Ramón Ramirez DO  RN:  Nickolas Aggarwal RN BSN  Referring Physician:  Jeanine Bass MD  Technician:  Gregor Haas  Group:  [facility]  Report Prepared By[de-identified]  Nickolas Aggarwal RN BSN  Interpreting Physician:  Shana Terrazas MD    INDICATIONS: chest pain/pressure    HISTORY: Gastric bypass/ GI bleed/BL LE edemaModerate Mitral Insuff  chronic diastolic CHF/arthritis/IBS/Fibromyalgia/MRSA  uses walker to ambulate/Chronic Afib The patient is a 80year old  female  Chest pain status: chest pain, consistent with atypical angina  Other symptoms: decreased effort tolerance, palpitations, and edema  Coronary artery  disease risk factors: hypertension  Cardiovascular history: congestive heart failure, mitral valve disease, and arrhythmia  Co-morbidity: obesity  Medications: a beta blocker and a calcium channel blocker  Previous test results: abnormal  ECG  PHYSICAL EXAM: Baseline physical exam screening: no wheezes audible  REST ECG: Atrial fibrillation  Nonspecific ST and T wave abnormalities were present  PROCEDURE: The study was performed at the [facility]  A regadenoson infusion pharmacologic stress test was performed  Gated SPECT myocardial perfusion imaging was performed after stress  Systolic blood pressure was  116 mmHg, at the start of the study  Diastolic blood pressure was 70 mmHg, at the start of the study  The heart rate was 85 bpm, at the start of the study  IV double checked  Regadenoson protocol:  HR bpm SBP mmHg DBP mmHg Symptoms  Baseline 85 116 70 none  Immediate -- -- -- moderate dyspnea  1 min -- -- -- same as above  2 min 107 122 78 subsiding  5 min 90 120 70 none    STRESS SUMMARY: Duration of pharmacologic stress was Maximal heart rate during stress was 107 bpm  There was normal resting blood pressure with an appropriate response to stress  The rate-pressure product for the peak heart rate and blood  pressure was 87672  There was no chest pain during stress  The stress test was terminated due to protocol completion  Pre oxygen saturation: 99 %  Peak oxygen saturation: 99 %  The stress ECG was negative for ischemia and normal   Arrhythmia during stress: isolated premature ventricular beats  ISOTOPE ADMINISTRATION:  The radiopharmaceutical was injected at the peak effect of pharmacologic stress  PERFUSION DEFECTS:  -  There were no perfusion defects      GATED SPECT:  The calculated left ventricular ejection fraction was 66 %  There was no left ventricular regional abnormality  SUMMARY:  -  Stress results: There was no chest pain during stress  -  ECG conclusions: The stress ECG was negative for ischemia and normal   -  Perfusion imaging: There were no perfusion defects   -  Gated SPECT: The calculated left ventricular ejection fraction was 66 %  There was no left ventricular regional abnormality  IMPRESSIONS: Normal study after pharmacologic vasodilation without reproduction of symptoms  Myocardial perfusion imaging was normal at rest and with stress  Prepared and signed by    Bhavesh Faith MD  Signed 2018 15:09:24     Echo complete with contrast if indicated    Narrative    [place] [address], [address] [facility]  [phone]    Transthoracic Echocardiogram  2D, M-mode, Doppler, and Color Doppler    Study date:  2018    Patient: Keya Basilio  MR number: [id number]  Account number: [de-identified]  : 1933  Age: 80 years  Gender: Female  Status: Outpatient  Location: [facility]  Height: 62 in  Weight: 231 lb  BP: 112/ 60 mmHg    Indications: MV Disease    Diagnoses: I34 0 - Nonrheumatic mitral (valve) insufficiency    Sonographer:  JESUS Membreno  Primary Physician:  Bernadette Kee DO  Referring Physician:  Chetan Bates MD  Group:  [facility]  Interpreting Physician:  Clarissa Johnson MD    SUMMARY    LEFT VENTRICLE:  Size was normal   Systolic function was normal  Ejection fraction was estimated to be 55 %  Wall thickness was at the upper limits of normal     RIGHT VENTRICLE:  The ventricle was mildly dilated  Systolic function was normal     RIGHT ATRIUM:  The atrium was mildly dilated  MITRAL VALVE:  There was moderate regurgitation  TRICUSPID VALVE:  There was moderate regurgitation      IVC, HEPATIC VEINS:  The inferior vena cava was mildly dilated  Respirophasic changes were blunted (less than 50% variation)  COMPARISONS:  Comparison was made with the previous study of 17-May-2017  Tricuspid regurgitation has worsened  HISTORY: PRIOR HISTORY: AFIB, Moderate MR, Hypertension, CHF, Anemia    PROCEDURE: The study was performed in the [facility]  This was a routine study  The transthoracic approach was used  The study included complete 2D imaging, M-mode, complete spectral Doppler, and color Doppler  The  heart rate was 110 bpm, at the start of the study  Images were obtained from the parasternal, apical, subcostal, and suprasternal notch acoustic windows  Echocardiographic views were limited due to lung interference  Image quality was  adequate  LEFT VENTRICLE: Size was normal  Systolic function was normal  Ejection fraction was estimated to be 55 %  There were no regional wall motion abnormalities  Wall thickness was at the upper limits of normal  DOPPLER: Transmitral flow  pattern: atrial fibrillation  The study was not technically sufficient to allow evaluation of LV diastolic function  RIGHT VENTRICLE: The ventricle was mildly dilated  Systolic function was normal  Wall thickness was normal     LEFT ATRIUM: Size was normal     RIGHT ATRIUM: The atrium was mildly dilated  MITRAL VALVE: Valve structure was normal  There was normal leaflet separation  DOPPLER: The transmitral velocity was within the normal range  There was no evidence for stenosis  There was moderate regurgitation  AORTIC VALVE: The valve was trileaflet  Leaflets exhibited normal thickness, normal cuspal separation, and sclerosis  DOPPLER: Transaortic velocity was within the normal range  There was no evidence for stenosis  There was no  regurgitation  TRICUSPID VALVE: The valve structure was normal  There was normal leaflet separation  DOPPLER: The transtricuspid velocity was within the normal range  There was no evidence for stenosis  There was moderate regurgitation  Pulmonary artery  systolic pressure was within the normal range  Estimated peak PA pressure was 36 mmHg  PULMONIC VALVE: Leaflets exhibited normal thickness, no calcification, and normal cuspal separation  DOPPLER: The transpulmonic velocity was within the normal range  There was no regurgitation  PERICARDIUM: There was no pericardial effusion  The pericardium was normal in appearance  AORTA: The root exhibited normal size  SYSTEMIC VEINS: IVC: The inferior vena cava was mildly dilated  Respirophasic changes were blunted (less than 50% variation)  SYSTEM MEASUREMENT TABLES    2D  %FS: 34 71 %  AV Diam: 2 84 cm  EDV(Teich): 101 4 ml  EF(Cube): 72 17 %  EF(Teich): 63 88 %  ESV(Cube): 28 55 ml  ESV(Teich): 36 63 ml  IVSd: 1 1 cm  LA Area: 17 55 cm2  LA Diam: 4 14 cm  LVEDV MOD A4C: 61 32 ml  LVEF MOD A4C: 61 87 %  LVESV MOD A4C: 23 38 ml  LVIDd: 4 68 cm  LVIDs: 3 06 cm  LVLd A4C: 6 59 cm  LVLs A4C: 5 67 cm  LVPWd: 1 cm  RA Area: 23 38 cm2  RV Diam: 3 71 cm  SV MOD A4C: 37 94 ml  SV(Cube): 74 03 ml  SV(Teich): 64 77 ml    CW  TR MaxP 51 mmHg  TR Vmax: 2 21 m/s    MM  TAPSE: 1 35 cm    [facility] Accredited [facility]chocardi[facility]    Prepared and electronically signed by    Robert Amanda MD  Signed 2018 17:36:53     Holter monitor - 48 hour    Narrative    PT NAME: Sofy Franks  : 1933  AGE: 80 y o  GENDER: female  MRN: [id number]  PROCEDURE: Holter monitor - 48 hour      INDICATIONS:   48 hour Holter monitor was performed 17 for atrial fibrillation  The   patient was in atrial fibrillation throughout the test  Average heart rate   was 74 BPM; minimum heart rate was 35 BPM at 3:36 PM; and maximum heart   rate was 162 BPM at 10:28 AM  Average hourly heart rate ranged between    BPM      Ventricular ectopic activity consisted of 324 beats, which comprises 0 2%   of total beats   13 were in 2 runs, 96 were in couplets, 10 were late   beats, remainder were isolated PVCs  Longest ventricular run was 9 beats   at a HR of 126 BPM at 2:53 AM on day 2, possible afib with aberrancy as   rhythm was irregular and preceded by a long-short sequence (Rik's   phenomenon)  Fastest ventricular run was 4 beats with a maximum HR of 135   BPM at 2:53 AM on day 2  Longest R-R interval was 2 3 seconds at 5:48 AM      IMPRESSIONS:  1  Atrial fibrillation with controlled ventricular response  2  Occasional ventricular ectopy versus aberrancy  3  No symptoms reported  Interpreted by: Ronn Conner MD       Allergies   Allergen Reactions    Codeine Vomiting and GI Intolerance     GI upset, ana m morphine  Reaction Date: 24Apr2012;     Other      Other reaction(s): Other (See Comments)  ana m toradol in OR 6/06 sah    Oxycodone Hcl Vomiting     Reaction Date: 24Apr2012;     Oxycodone-Acetaminophen      Other reaction(s): Other (See Comments)  GI upset; ana m vicodin    Percocet [Oxycodone-Acetaminophen]     Seasonal Ic  [Cholestatin] Allergic Rhinitis    Bactrim [Sulfamethoxazole-Trimethoprim] Rash    Sulfa Antibiotics Rash     Other reaction(s): Other (See Comments)  takes lasix @home       Past Medical History:   Diagnosis Date    Anemia     Arthritis     Cancer ([address] )     CHF (congestive heart failure) (HCC)     Disease of thyroid gland     Disturbance of smell and taste     disturbance of taste    Effusion of knee joint right     Fibromyalgia     Heart murmur     reported a previous heart murmur    History of acute myocardial infarction     History of atrial fibrillation     History of bruising easily     History of cancer     History of dermatitis     History of mammogram     History of methicillin resistant staphylococcus aureus (MRSA)     Negative nasal culture, isolation discontinued 8/17/2018      History of methicillin resistant staphylococcus aureus (MRSA) 08/17/2018    negative nasal culture-isolation and hx discontinued 8/17/2018    History of obesity     History of osteopenia     History of sciatica     History of shortness of breath     History of sinusitis     History of sore throat     History of syncope     History of umbilical hernia     History of viral infection     Irritable bowel syndrome (IBS)     Joint pain, hip     Limb pain     Myalgia     myalgia and myositis    Need for prophylactic vaccination against single diseases     Need for prophylactic vaccination and inoculation against influenza     Preoperative cardiovascular examination     Primary osteoarthritis of right knee     Right leg pain     Vaginal Pap smear     reported pap smear     Past Surgical History:   Procedure Laterality Date    ANKLE ARTHRODESIS Right     BACK SURGERY      BARIATRIC SURGERY      CHOLECYSTECTOMY      x2    ESOPHAGOGASTRODUODENOSCOPY N/A 3/23/2018    Procedure: ESOPHAGOGASTRODUODENOSCOPY (EGD); Surgeon: Sukhwinder Kirby MD;  [facility]: [facility];   Service: Gastroenterology    FOOT SURGERY      HIP CLOSE REDUCTION Right 8/21/2016    Procedure: CLOSED REDUCTION RIGHT TOTAL HIP;  Surgeon: Ronald Singh MD;  Location: [facility];  Service:    [facility] / [facility] / [address] [place] [address]      x2    REPLACEMENT TOTAL KNEE Right     SILVANA-EN-Y PROCEDURE      x2    TOTAL KNEE ARTHROPLASTY      UMBILICAL HERNIA REPAIR      x2     Current Outpatient Prescriptions on File Prior to Visit   Medication Sig Dispense Refill    ascorbic acid (VITAMIN C) 500 mg tablet Take 1 tablet (500 mg total) by mouth 2 (two) times a day 60 tablet 0    desoximetasone (TOPICORT) 0 25 % cream Apply topically as needed for irritation 30 g 0    DULoxetine (CYMBALTA) 60 mg delayed release capsule Take 1 capsule (60 mg total) by mouth daily 30 capsule 0    enoxaparin (LOVENOX) 40 mg/0 4 mL Inject 0 4 mL (40 mg total) under the skin daily for 28 days To start postoperatively 28 Syringe 0    ferrous sulfate 324 (65 Fe) mg Take 1 tablet (324 mg total) by mouth 2 (two) times a day before meals 60 tablet 0    fluticasone (FLONASE) 50 mcg/act nasal spray 1 spray into each nostril as needed        folic acid (FOLVITE) 1 mg tablet TAKE 1 TABLET BY MOUTH EVERY DAY 30 tablet 0    levothyroxine 50 mcg tablet Take 1 tablet (50 mcg total) by mouth daily 30 tablet 2    potassium chloride (K-DUR,KLOR-CON) 20 mEq tablet TAKE 1 TABLET DAILY 30 tablet 11    torsemide (DEMADEX) 20 mg tablet Take 1 tablet (20 mg total) by mouth daily 30 tablet 0    traMADol (ULTRAM) 50 mg tablet Take 1 tablet (50 mg total) by mouth every 8 (eight) hours as needed for moderate pain 30 tablet 0    warfarin (COUMADIN) 4 mg tablet Take one tablet daily as previously directed by cardiology 180 tablet 0     Current Facility-Administered Medications on File Prior to Visit   Medication Dose Route Frequency Provider Last Rate Last Dose    [DISCONTINUED] sodium chloride 0 9 % infusion  20 mL/hr Intravenous Continuous Rufino Lucia DO   Stopped at 01/17/19 1538     Family History   Problem Relation Age of Onset    Coronary artery disease Mother     Heart attack Mother     Hypertension Mother     Coronary artery disease Father     Heart attack Father     Hypertension Father     Lymphoma Sister         acute    Rashes / Skin problems Sister         lymphomatoid papulosis    Coronary artery disease Brother     Heart attack Brother         x2    Aneurysm Neg Hx     Hyperlipidemia Neg Hx      Social History     Social History    Marital status: /Civil Union     Spouse name: N/A    Number of children: N/A    Years of education: N/A     Occupational History    Not on file       Social History Main Topics    Smoking status: Never Smoker    Smokeless tobacco: Never Used    Alcohol use No    Drug use: No    Sexual activity: No     Other Topics Concern    Not on file     Social History Narrative    No narrative on file     Vitals:    01/18/19 0939   BP: 124/64   BP Location: Left arm   Patient Position: Sitting   Cuff Size: Standard   Pulse: 68   Resp: 16   SpO2: 97%   Weight: 88 kg (194 lb)   Height: 5' 2" (1 575 m)     Results for orders placed or performed in visit on 01/17/19   ECG 12 lead   Result Value Ref Range    Ventricular Rate 78 BPM    Atrial Rate 77 BPM    ID Interval  ms    QRSD Interval 116 ms    QT Interval 400 ms    QTC Interval 456 ms    P Axis  degrees    QRS Axis -12 degrees    T Wave Axis 159 degrees     Weight (last 2 days)     Date/Time   Weight    01/18/19 0939  88 (194)            Body mass index is 35 48 kg/m²  BP      Temp      Pulse     Resp      SpO2        Vitals:    01/18/19 0939   Weight: 88 kg (194 lb)     Vitals:    01/18/19 0939   Weight: 88 kg (194 lb)       /64 (BP Location: Left arm, Patient Position: Sitting, Cuff Size: Standard)   Pulse 68   Resp 16   Ht 5' 2" (1 575 m)   Wt 88 kg (194 lb)   LMP  (LMP Unknown)   SpO2 97%   BMI 35 48 kg/m²          Physical Exam   Constitutional: She appears well-developed and well-nourished  HENT:   Head: Normocephalic  Mouth/Throat: Oropharynx is clear and moist    Eyes: Pupils are equal, round, and reactive to light  Conjunctivae are normal  Right eye exhibits no discharge  Left eye exhibits no discharge  No scleral icterus  Neck: Neck supple  Cardiovascular: Normal rate, regular rhythm, normal heart sounds and intact distal pulses  Exam reveals no gallop and no friction rub  No murmur heard  Pulmonary/Chest: Breath sounds normal  No respiratory distress  She has no wheezes  She has no rales  Abdominal: Soft  Bowel sounds are normal  She exhibits no distension and no mass  There is no tenderness  There is no rebound and no guarding  Musculoskeletal: She exhibits edema (Mild bilateral)  She exhibits no deformity  Lymphadenopathy:     She has no cervical adenopathy     Neurological: She is alert   Coordination normal

## 2019-01-18 NOTE — TELEPHONE ENCOUNTER
Preoperative Elective Admission Assessment- Spoke with pt  Living Situation: Pt lives with her  in a 1st floor apartment  Home Layout: Pt lives with her  in a 1st floor apartment, step-in tub                     Steps: #2 steps to enter  First Floor Setup: Yes  Post-op Caregiver:  Primary- , Jennie Vicente  Secondary- Son  Luis F Keane and Daughter, Esperanza Wei    Post-op Transport: Pt has limited transportation availability  Pt reports her son can transport her but limitedly due to him working "long days, 10-12 hours"  Pt declined me reaching out to her family to discuss support/transport  Pt has received home health care "many times" in the past per pt and she reports she has a $40 co-pay with each outpatient PT visit- she was requesting [address] again  I educated patient on the many benefits of outpt PT(Including maintaining independence, additional resources at outpt site, better outcomes etc  )  Also educated on how home PT vs  outpt PT is determined (while inpt)  Pt understands that [address] is not a guarantee and that it is reserved for patients that are homebound  Pt was encouraged to reach out to additional family, friends, and/or neighbors to try to secure post-op transportation in the event that [address] is not granted  Pt also encouraged to consider uber/bus  Outpatient Physical Therapy Site: [facility]- pt reports she has a $40 co-pay with each outpatient PT appt of which patient reports would be unaffordable  I offered to reach out to her PT site for them to discuss a payment plan with pt, pt declined  DME: Pt has a RW, cane and BSC  Patient's Current Level of Function: Pt is currently ambulating with her RW, she is independent with her ADLs  Medication Management: Pt self manages her medications using a pillbox                     Preferred Pharmacy: [facility] on corner of [address]                     Blood Management Vitamins: Pt confirms she is taking her daily iron, vitamin C, folic acid and MV                     Post-op anticoagulant: Lovenox was sent into [facility] on corner of [address]  [address]  Patient was advised that is is $95 co-pay  Pharmacy attempted to bill half the # of syringes of which the co-pay still came back at $95   Patient was advised of cost   Pt reports she does not get paid until 1/30 but she will have her daughter pick the #28 syringes of lovenox up on 1/30  Pt educated these syringes are for after surgery use only  DC Plan:   Plan A:  Home with outpatient PT  Plan B: [facility]                    Barriers to DC identified preoperatively: Limited transportation, high outpatient PT co-pay    BMI: 34 20- [facility] OV on 12/18    [employer]: Pt enrolled 1/18/19                     RAPT: Score 5, already in caresense                     ACE/ARB Form: N/A                     HOOS/KOOS: Score 46 652, already in caresense  Patient Education:  Pt educated on post-op pain, early mobilization (POD0), indication for/use of incentive spirometer (10x/hour while awake) and indication for/use of foot/leg pumps (18 hours/day)  Pt denies having any questions at this time  Pt encouraged to call me with any questions, concerns or issues

## 2019-01-20 DIAGNOSIS — S73.004D DISLOCATION OF RIGHT HIP, SUBSEQUENT ENCOUNTER: ICD-10-CM

## 2019-01-20 RX ORDER — FOLIC ACID 1 MG/1
1000 TABLET ORAL DAILY
Qty: 30 TABLET | Refills: 0 | Status: SHIPPED | OUTPATIENT
Start: 2019-01-20 | End: 2019-03-24 | Stop reason: SDUPTHER

## 2019-01-20 NOTE — ASSESSMENT & PLAN NOTE
Rate controlled and fully anticoagulated with Coumadin, Orthopedics has checked with Cardiology and okay for surgery and will be holding the Coumadin per Cardiology  Patient will be treated with Lovenox postoperatively

## 2019-01-20 NOTE — ASSESSMENT & PLAN NOTE
Patient has had a mild increase in edema secondary to not taking her diuretic regularly she is not taking the diuretic in the last several days and will restart the medication she reports me the leg swelling goes away immediately after starting diuretic    I have explained to the patient the importance of taking medication

## 2019-01-20 NOTE — ASSESSMENT & PLAN NOTE
Patient is of moderate risk in functional capacity is low she has recently seen Cardiology and the patient reports ortho will be contacting  Cardiology clearance from a cardiac stand point to proceed  Her Coumadin will be held per Cardiology prior to the operation and the patient will be started on Lovenox for 28 days after the operation  Currently she is medically stable to proceed with the operation from our standpoint  Must watch for postop anemia  I have explained the importance of DVT prophylaxis to patient today  She may need internal Medicine/Cardiology consultation during the admission for the hip replacement

## 2019-01-21 RX ORDER — FOLIC ACID 1 MG/1
1 TABLET ORAL DAILY
Qty: 15 TABLET | Refills: 0 | Status: SHIPPED | OUTPATIENT
Start: 2019-01-21 | End: 2019-02-25 | Stop reason: SDUPTHER

## 2019-01-21 RX ORDER — SODIUM CHLORIDE 9 MG/ML
20 INJECTION, SOLUTION INTRAVENOUS CONTINUOUS
Status: DISCONTINUED | OUTPATIENT
Start: 2019-01-22 | End: 2019-01-25 | Stop reason: HOSPADM

## 2019-01-22 ENCOUNTER — APPOINTMENT (OUTPATIENT)
Dept: LAB | Facility: CLINIC | Age: 84
End: 2019-01-22
Payer: COMMERCIAL

## 2019-01-22 ENCOUNTER — TRANSCRIBE ORDERS (OUTPATIENT)
Dept: LAB | Facility: CLINIC | Age: 84
End: 2019-01-22

## 2019-01-22 ENCOUNTER — HOSPITAL ENCOUNTER (OUTPATIENT)
Dept: INFUSION CENTER | Facility: CLINIC | Age: 84
Discharge: HOME | End: 2019-01-22
Payer: COMMERCIAL

## 2019-01-22 VITALS
RESPIRATION RATE: 16 BRPM | DIASTOLIC BLOOD PRESSURE: 73 MMHG | HEART RATE: 72 BPM | SYSTOLIC BLOOD PRESSURE: 137 MMHG | TEMPERATURE: 98.7 F | OXYGEN SATURATION: 98 %

## 2019-01-22 DIAGNOSIS — E03.9 HYPOTHYROIDISM, UNSPECIFIED TYPE: ICD-10-CM

## 2019-01-22 LAB — TSH SERPL DL<=0.05 MIU/L-ACNC: 3.71 UIU/ML (ref 0.36–3.74)

## 2019-01-22 PROCEDURE — 84443 ASSAY THYROID STIM HORMONE: CPT

## 2019-01-22 PROCEDURE — 96366 THER/PROPH/DIAG IV INF ADDON: CPT

## 2019-01-22 PROCEDURE — 96365 THER/PROPH/DIAG IV INF INIT: CPT

## 2019-01-22 RX ADMIN — IRON SUCROSE 300 MG: 20 INJECTION, SOLUTION INTRAVENOUS at 11:19

## 2019-01-22 RX ADMIN — SODIUM CHLORIDE 20 ML/HR: 0.9 INJECTION, SOLUTION INTRAVENOUS at 10:45

## 2019-01-24 ENCOUNTER — ANTICOAG VISIT (OUTPATIENT)
Dept: INTERNAL MEDICINE CLINIC | Facility: CLINIC | Age: 84
End: 2019-01-24

## 2019-01-28 ENCOUNTER — APPOINTMENT (OUTPATIENT)
Dept: LAB | Facility: CLINIC | Age: 84
DRG: 467 | End: 2019-01-28
Payer: COMMERCIAL

## 2019-01-28 ENCOUNTER — TRANSCRIBE ORDERS (OUTPATIENT)
Dept: LAB | Facility: CLINIC | Age: 84
End: 2019-01-28

## 2019-01-28 DIAGNOSIS — E61.1 LOW IRON: Primary | ICD-10-CM

## 2019-01-28 LAB
BASOPHILS # BLD AUTO: 0.05 THOUSANDS/ΜL (ref 0–0.1)
BASOPHILS NFR BLD AUTO: 1 % (ref 0–1)
EOSINOPHIL # BLD AUTO: 0.05 THOUSAND/ΜL (ref 0–0.61)
EOSINOPHIL NFR BLD AUTO: 1 % (ref 0–6)
ERYTHROCYTE [DISTWIDTH] IN BLOOD BY AUTOMATED COUNT: 15.8 % (ref 11.6–15.1)
HCT VFR BLD AUTO: 37.3 % (ref 34.8–46.1)
HGB BLD-MCNC: 11.1 G/DL (ref 11.5–15.4)
IMM GRANULOCYTES # BLD AUTO: 0.02 THOUSAND/UL (ref 0–0.2)
IMM GRANULOCYTES NFR BLD AUTO: 0 % (ref 0–2)
LYMPHOCYTES # BLD AUTO: 1.3 THOUSANDS/ΜL (ref 0.6–4.47)
LYMPHOCYTES NFR BLD AUTO: 22 % (ref 14–44)
MCH RBC QN AUTO: 30.8 PG (ref 26.8–34.3)
MCHC RBC AUTO-ENTMCNC: 29.8 G/DL (ref 31.4–37.4)
MCV RBC AUTO: 104 FL (ref 82–98)
MONOCYTES # BLD AUTO: 0.66 THOUSAND/ΜL (ref 0.17–1.22)
MONOCYTES NFR BLD AUTO: 11 % (ref 4–12)
NEUTROPHILS # BLD AUTO: 3.98 THOUSANDS/ΜL (ref 1.85–7.62)
NEUTS SEG NFR BLD AUTO: 65 % (ref 43–75)
NRBC BLD AUTO-RTO: 0 /100 WBCS
PLATELET # BLD AUTO: 185 THOUSANDS/UL (ref 149–390)
PMV BLD AUTO: 11.1 FL (ref 8.9–12.7)
RBC # BLD AUTO: 3.6 MILLION/UL (ref 3.81–5.12)
WBC # BLD AUTO: 6.06 THOUSAND/UL (ref 4.31–10.16)

## 2019-01-28 PROCEDURE — 85046 RETICYTE/HGB CONCENTRATE: CPT | Performed by: ANESTHESIOLOGY

## 2019-01-28 PROCEDURE — 36415 COLL VENOUS BLD VENIPUNCTURE: CPT

## 2019-01-28 PROCEDURE — 85025 COMPLETE CBC W/AUTO DIFF WBC: CPT

## 2019-01-29 ENCOUNTER — TELEPHONE (OUTPATIENT)
Dept: OBGYN CLINIC | Facility: HOSPITAL | Age: 84
End: 2019-01-29

## 2019-01-29 DIAGNOSIS — D50.8 OTHER IRON DEFICIENCY ANEMIA: Primary | ICD-10-CM

## 2019-01-29 LAB
HGB RETIC QN AUTO: 36.6 PG (ref 30–38.3)
IMM RETICS NFR: 11.3 % (ref 0–14)
RETICS # AUTO: ABNORMAL 10*3/UL (ref 14097–95744)
RETICS # CALC: 2.46 % (ref 0.37–1.87)

## 2019-01-30 ENCOUNTER — ANESTHESIA (OUTPATIENT)
Dept: PERIOP | Facility: HOSPITAL | Age: 84
DRG: 467 | End: 2019-01-30
Payer: COMMERCIAL

## 2019-01-30 NOTE — TELEPHONE ENCOUNTER
Per Dr Jairo Wynne, pt's Hgb remains at 11 g/dL despite therapy and he would recommend delaying surgery for further optimization   Per him, Dr Severiano Guevara has also recommended additional laboratory testing  Received OK from Dr Carisa Carmichael to postpone patient for anemia optimization  Call placed to relay above to patient  Patient is aware of her postponement and indication for it  New OR date is 2/27, pt aware  Patient is aware of additional lab orders   She plans to go tomorrow, 1/31, to the [address] [place] [address] lab   I advised the patient she would most likely hear from us/Dr Jairo Wynne early next week as to a further optimization plan  Pt denies having any questions at this time  Pt encouraged to call me with any questions, concerns or issues

## 2019-02-01 ENCOUNTER — APPOINTMENT (OUTPATIENT)
Dept: LAB | Facility: CLINIC | Age: 84
End: 2019-02-01
Payer: COMMERCIAL

## 2019-02-01 DIAGNOSIS — Z79.01 LONG TERM (CURRENT) USE OF ANTICOAGULANTS: ICD-10-CM

## 2019-02-01 DIAGNOSIS — I48.91 ATRIAL FIBRILLATION, UNSPECIFIED TYPE (HCC): ICD-10-CM

## 2019-02-01 DIAGNOSIS — D50.8 OTHER IRON DEFICIENCY ANEMIA: ICD-10-CM

## 2019-02-01 LAB
LDH SERPL-CCNC: 232 U/L (ref 81–234)
VIT B12 SERPL-MCNC: 221 PG/ML (ref 100–900)

## 2019-02-01 PROCEDURE — 82607 VITAMIN B-12: CPT

## 2019-02-01 PROCEDURE — 83615 LACTATE (LD) (LDH) ENZYME: CPT

## 2019-02-02 DIAGNOSIS — G89.29 OTHER CHRONIC PAIN: ICD-10-CM

## 2019-02-03 RX ORDER — DULOXETIN HYDROCHLORIDE 60 MG/1
CAPSULE, DELAYED RELEASE ORAL
Qty: 30 CAPSULE | Refills: 0 | Status: SHIPPED | OUTPATIENT
Start: 2019-02-03 | End: 2019-03-09 | Stop reason: SDUPTHER

## 2019-02-04 ENCOUNTER — TELEPHONE (OUTPATIENT)
Dept: OBGYN CLINIC | Facility: HOSPITAL | Age: 84
End: 2019-02-04

## 2019-02-04 NOTE — TELEPHONE ENCOUNTER
Patient called me questioning if her lab work has resulted and if we have a plan moving forward  I advised patient that I am still waiting on a plan of care from Dr Beltran Schmid and hematology  When I receive an update, pt was advised I will call her back  Patient also has questions regarding when she should stop certain medications pre-op, one medication is an anticoagulant  Message sent over to PAT RNs Bre and Steven Hill for them to reach out to patient to go over holding medications  Patient denies having any further needs at this time  Pt encouraged to call me with any questions, concerns or issues

## 2019-02-05 ENCOUNTER — TELEPHONE (OUTPATIENT)
Dept: OBGYN CLINIC | Facility: HOSPITAL | Age: 84
End: 2019-02-05

## 2019-02-05 NOTE — TELEPHONE ENCOUNTER
Received OK from Dr Brit Nieto to schedule patient a hematology appt with Dr Tiara Gramajo for further anemia optimization  Referral place for hematology  Spoke with Toy Olvera from [address] [place] [address]  Scheduled patient for a hematology appt with Dr Tiara Gramajo on 2/20 @ 21   Patient is aware of her hematology appt date and time  Pt reports she plans to attend  Patient understands her OR date of 2/27 is tentative  She is aware this date is contingent upon her anemia being optimized and we receive patient's required clearances  Patient denies having further questions, concerns or issues at this time  Pt encouraged to call me with any questions, concerns or issues

## 2019-02-08 ENCOUNTER — TELEPHONE (OUTPATIENT)
Dept: OBGYN CLINIC | Facility: HOSPITAL | Age: 84
End: 2019-02-08

## 2019-02-08 DIAGNOSIS — D53.9 MACROCYTIC ANEMIA: Primary | ICD-10-CM

## 2019-02-13 ENCOUNTER — TELEPHONE (OUTPATIENT)
Dept: OBGYN CLINIC | Facility: HOSPITAL | Age: 84
End: 2019-02-13

## 2019-02-13 NOTE — TELEPHONE ENCOUNTER
Call placed to notify patient that Dr Jazmyn Hatch is requesting blood work be completed prior to her hematology appt on 2/20  Spoke with patient today- she has been advised of her need to get additional blood work prior to 2/20 heme appt  Pt plans to go tomorrow to a [facility]'s lab to get additional labs drawn  Pt denies having any questions, concerns or issues at this time  Pt encouraged to call me with any questions, concerns or issues

## 2019-02-14 ENCOUNTER — ANTICOAG VISIT (OUTPATIENT)
Dept: INTERNAL MEDICINE CLINIC | Facility: CLINIC | Age: 84
End: 2019-02-14

## 2019-02-14 ENCOUNTER — APPOINTMENT (OUTPATIENT)
Dept: LAB | Facility: CLINIC | Age: 84
DRG: 467 | End: 2019-02-14
Payer: COMMERCIAL

## 2019-02-14 DIAGNOSIS — D53.9 MACROCYTIC ANEMIA: ICD-10-CM

## 2019-02-14 LAB
BASOPHILS # BLD AUTO: 0.04 THOUSANDS/ΜL (ref 0–0.1)
BASOPHILS NFR BLD AUTO: 1 % (ref 0–1)
EOSINOPHIL # BLD AUTO: 0.08 THOUSAND/ΜL (ref 0–0.61)
EOSINOPHIL NFR BLD AUTO: 2 % (ref 0–6)
ERYTHROCYTE [DISTWIDTH] IN BLOOD BY AUTOMATED COUNT: 15.1 % (ref 11.6–15.1)
HCT VFR BLD AUTO: 36.4 % (ref 34.8–46.1)
HGB BLD-MCNC: 10.9 G/DL (ref 11.5–15.4)
IMM GRANULOCYTES # BLD AUTO: 0.01 THOUSAND/UL (ref 0–0.2)
IMM GRANULOCYTES NFR BLD AUTO: 0 % (ref 0–2)
LYMPHOCYTES # BLD AUTO: 1.13 THOUSANDS/ΜL (ref 0.6–4.47)
LYMPHOCYTES NFR BLD AUTO: 22 % (ref 14–44)
MCH RBC QN AUTO: 31.3 PG (ref 26.8–34.3)
MCHC RBC AUTO-ENTMCNC: 29.9 G/DL (ref 31.4–37.4)
MCV RBC AUTO: 105 FL (ref 82–98)
MONOCYTES # BLD AUTO: 0.4 THOUSAND/ΜL (ref 0.17–1.22)
MONOCYTES NFR BLD AUTO: 8 % (ref 4–12)
NEUTROPHILS # BLD AUTO: 3.51 THOUSANDS/ΜL (ref 1.85–7.62)
NEUTS SEG NFR BLD AUTO: 67 % (ref 43–75)
NRBC BLD AUTO-RTO: 0 /100 WBCS
PLATELET # BLD AUTO: 150 THOUSANDS/UL (ref 149–390)
PMV BLD AUTO: 11.5 FL (ref 8.9–12.7)
RBC # BLD AUTO: 3.48 MILLION/UL (ref 3.81–5.12)
WBC # BLD AUTO: 5.17 THOUSAND/UL (ref 4.31–10.16)

## 2019-02-14 PROCEDURE — 85025 COMPLETE CBC W/AUTO DIFF WBC: CPT

## 2019-02-20 ENCOUNTER — TELEPHONE (OUTPATIENT)
Dept: HEMATOLOGY ONCOLOGY | Facility: CLINIC | Age: 84
End: 2019-02-20

## 2019-02-20 ENCOUNTER — CONSULT (OUTPATIENT)
Dept: HEMATOLOGY ONCOLOGY | Facility: CLINIC | Age: 84
End: 2019-02-20
Payer: COMMERCIAL

## 2019-02-20 VITALS
BODY MASS INDEX: 35.68 KG/M2 | HEIGHT: 61 IN | TEMPERATURE: 98.9 F | HEART RATE: 93 BPM | DIASTOLIC BLOOD PRESSURE: 80 MMHG | RESPIRATION RATE: 16 BRPM | WEIGHT: 189 LBS | SYSTOLIC BLOOD PRESSURE: 158 MMHG | OXYGEN SATURATION: 98 %

## 2019-02-20 DIAGNOSIS — S73.004D DISLOCATION OF HIP, RIGHT, OPEN, SUBSEQUENT ENCOUNTER: ICD-10-CM

## 2019-02-20 DIAGNOSIS — E53.8 B12 DEFICIENCY: ICD-10-CM

## 2019-02-20 DIAGNOSIS — Z90.3 STATUS POST GASTRECTOMY: ICD-10-CM

## 2019-02-20 DIAGNOSIS — S71.001D DISLOCATION OF HIP, RIGHT, OPEN, SUBSEQUENT ENCOUNTER: ICD-10-CM

## 2019-02-20 DIAGNOSIS — D64.9 ANEMIA, UNSPECIFIED TYPE: Primary | ICD-10-CM

## 2019-02-20 DIAGNOSIS — D75.89 MACROCYTOSIS: ICD-10-CM

## 2019-02-20 PROCEDURE — 99204 OFFICE O/P NEW MOD 45 MIN: CPT | Performed by: PHYSICIAN ASSISTANT

## 2019-02-20 NOTE — PROGRESS NOTES
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  3   Low normal B12 level  She is advised to take B12 (2) tablets SL     4   Recurrent right hip prosthesis dislocation  She is scheduled for revision 2/27/2019  Patient is not opposed to blood transfusion  Given her recurrent dislocations and pain, recommend proceeding with surgery as scheduled and transfuse prn   1 week is insufficient time to workup and raise H/H  Possibility of procrit pre-operatively could be considered however, at least 1 month would be needed for this treatment, it may not be approved, could be ineffective and does have cardiovascular risks  She is asked to f/u in 1 month with repeat labs         Patient seen and treatment plan made with Dr Marianne Camejo  CC:  "Prep for surgery on right hip-blood level?"      HPI:  Brooke Turner is a 80 y o  seen for initial consultation 2/20/2019 at the referral of Lani Nunes MD regarding anemia  She is tentatively scheduled for  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  Her surgery was originally January 2019 canceled due to the anemia       2/14/2019:  Hemoglobin 10 9, MCV of 105, white blood cell count 5 17 with 67% neutrophils, 22% lymphocytes, 8% monocytes, platelet count 298    2/1/2019: Vitamin B12 level 221       1/28/2019 hemoglobin 11 1, , RDW 15 8 white blood cell count 6 06 with normal differential, platelet count 186     1/22/2019 TSH 3 7    1/2/2019:  Hemoglobin 11, RDW 13 7 MCV of 98,white blood cell count 6 56 with normal differential, iron saturation 13%, ferritin 14, CRP less than 3  Cr 0 92,  Total protein 7 4, albumin 3 3, normal LFTs    August 2018 hemoglobin 9 9, MCV of 97, RDW 15 7, white blood cell count 7 12, platelet count 948    April 2018 hemoglobin 9 9    March 2016 hemoglobin fluctuated between 9 4-12 2 during hospitalization  November 2014 hemoglobin 12 1, , white blood cell count 6 6 with normal differential, platelet count 740  She is status post Venofer 300 milligrams x4 doses early January 2019  She is s/p gastric bypass surgery early 2000s  She has a history of AFib with RVR and underwent AV node ablation and placement of a permanent pacemaker placement 8/2018, continued on warfarin  She has chronic diastolic CHF, HTN, hypothyroidism  Since her heart rate has been controlled, she is feeling much better  She does not experience the shortness of breath and fatigue that she had previously  She denies any change of bowel habits  Denies any dizziness or lightheadedness  She denies any melena or hematochezia  Test Results:      Labs:   Lab Results   Component Value Date    HGB 10 9 (L) 02/14/2019    HCT 36 4 02/14/2019     (H) 02/14/2019     02/14/2019    WBC 5 17 02/14/2019    NRBC 0 02/14/2019     Lab Results   Component Value Date     04/03/2015    K 3 8 01/02/2019     01/02/2019    CO2 26 01/02/2019    ANIONGAP 10 04/03/2015    BUN 17 01/02/2019    CREATININE 0 92 01/02/2019    GLUCOSE 98 04/03/2015    GLUF 83 01/02/2019    CALCIUM 8 6 01/02/2019    AST 23 01/02/2019    ALT 16 01/02/2019    ALKPHOS 100 01/02/2019    PROT 7 0 04/03/2015    BILITOT 0 30 04/03/2015    EGFR 57 01/02/2019           Imaging:   No results found  ROS: As mentioned in HPI & Interval History otherwise 14 point ROS negative        Active Problems:   Patient Active Problem List   Diagnosis    Atrial fibrillation ([address] )    Hypokalemia    Anemia    Fall    Hip dislocation, right (HCC)    Chronic diastolic CHF (congestive heart failure) ([facility] )    Depression    Obesity without serious comorbidity    Chronic pain    Abdominal bloating  Abdominal pain    Arthralgia of hip    Arthralgia of knee, right    Benign essential hypertension    Chronic pain of lower extremity, bilateral    Fibromyalgia    External hemorrhoids    Irritable bowel syndrome with constipation    Lumbar canal stenosis    Moderate mitral regurgitation    Osteoarthritis, multiple sites    Osteoporosis    Peripheral neuropathy    Primary osteoarthritis of right knee    Synovial cyst of right popliteal space    Unstable right hip    Urinary incontinence    Exertional chest pain    Right lower quadrant abdominal pain    GI bleeding    Ventral hernia without obstruction or gangrene    Abnormal CT of the abdomen    Gastrointestinal hemorrhage with melena    Ulcer    Chronic pain of right knee    Acute kidney injury ([facility] )    Abnormal TSH    Postural dizziness with presyncope    Contact dermatitis    Pacemaker    Hypothyroidism    Preop examination       Past Medical History:   Past Medical History:   Diagnosis Date    Anemia     Arthritis     Cancer ([facility] )     CHF (congestive heart failure) [facility]     Disease of thyroid gland     Disturbance of smell and taste     disturbance of taste    Effusion of knee joint right     Fibromyalgia     Heart murmur     reported a previous heart murmur    History of acute myocardial infarction     History of atrial fibrillation     History of bruising easily     History of cancer     History of dermatitis     History of mammogram     History of methicillin resistant staphylococcus aureus (MRSA)     Negative nasal culture, isolation discontinued 8/17/2018      History of methicillin resistant staphylococcus aureus (MRSA) 08/17/2018    negative nasal culture-isolation and hx discontinued 8/17/2018    History of obesity     History of osteopenia     History of sciatica     History of shortness of breath     History of sinusitis     History of sore throat     History of syncope     History of umbilical hernia     History of viral infection     Irritable bowel syndrome (IBS)     Joint pain, hip     Limb pain     Myalgia     myalgia and myositis    Need for prophylactic vaccination against single diseases     Need for prophylactic vaccination and inoculation against influenza     Preoperative cardiovascular examination     Primary osteoarthritis of right knee     Right leg pain     Vaginal Pap smear     reported pap smear       Surgical History:   Past Surgical History:   Procedure Laterality Date    ANKLE ARTHRODESIS Right     BACK SURGERY      BARIATRIC SURGERY      CHOLECYSTECTOMY      x2    ESOPHAGOGASTRODUODENOSCOPY N/A 3/23/2018    Procedure: ESOPHAGOGASTRODUODENOSCOPY (EGD); Surgeon: Kayden Benites MD;  Location: [facility]; Service: Gastroenterology    FOOT SURGERY      HIP CLOSE REDUCTION Right 8/21/2016    Procedure: CLOSED REDUCTION RIGHT TOTAL HIP;  Surgeon: Harjit Buchanan MD;  [facility];  Service:    Arnaldo Valadez / Cecily [address]      x2    REPLACEMENT TOTAL KNEE Right     SILVANA-EN-Y PROCEDURE      x2    TOTAL KNEE ARTHROPLASTY      UMBILICAL HERNIA REPAIR      x2       Family History:    Family History   Problem Relation Age of Onset    Coronary artery disease Mother     Heart attack Mother     Hypertension Mother     Coronary artery disease Father     Heart attack Father     Hypertension Father     Lymphoma Sister         acute    Rashes / Skin problems Sister         lymphomatoid papulosis    Coronary artery disease Brother     Heart attack Brother         x2    Aneurysm Neg Hx     Hyperlipidemia Neg Hx        Cancer-related family history includes Lymphoma in her sister      Social History:   Social History     Socioeconomic History    Marital status: /Civil Union     Spouse name: Not on file    Number of children: Not on file    Years of education: Not on file  Highest education level: Not on file   Occupational History    Not on file   Social Needs    Financial resource strain: Not on file    Food insecurity:     Worry: Not on file     Inability: Not on file    Transportation needs:     Medical: Not on file     Non-medical: Not on file   Tobacco Use    Smoking status: Never Smoker    Smokeless tobacco: Never Used   Substance and Sexual Activity    Alcohol use: No    Drug use: No    Sexual activity: Never   Lifestyle    Physical activity:     Days per week: Not on file     Minutes per session: Not on file    Stress: Not on file   Relationships    Social connections:     Talks on phone: Not on file     Gets together: Not on file     Attends Yarsanism service: Not on file     Active member of club or organization: Not on file     Attends meetings of clubs or organizations: Not on file     Relationship status: Not on file    Intimate partner violence:     Fear of current or ex partner: Not on file     Emotionally abused: Not on file     Physically abused: Not on file     Forced sexual activity: Not on file   Other Topics Concern    Not on file   Social History Narrative    Not on file       Current Medications:   Current Outpatient Medications   Medication Sig Dispense Refill    ascorbic acid (VITAMIN C) 500 mg tablet Take 1 tablet (500 mg total) by mouth 2 (two) times a day 60 tablet 0    desoximetasone (TOPICORT) 0 25 % cream Apply topically as needed for irritation 30 g 0    DULoxetine (CYMBALTA) 60 mg delayed release capsule TAKE 1 CAPSULE BY MOUTH EVERY DAY 30 capsule 0    enoxaparin (LOVENOX) 40 mg/0 4 mL Inject 0 4 mL (40 mg total) under the skin daily for 28 days To start postoperatively 28 Syringe 0    ferrous sulfate 324 (65 Fe) mg Take 1 tablet (324 mg total) by mouth 2 (two) times a day before meals 60 tablet 0    fluticasone (FLONASE) 50 mcg/act nasal spray 1 spray into each nostril as needed        folic acid (FOLVITE) 1 mg tablet Take 1 tablet (1,000 mcg total) by mouth daily 30 tablet 0    folic acid (FOLVITE) 1 mg tablet Take 1 tablet (1 mg total) by mouth daily for 15 doses Take 1 pill po Qday PRE-OP, with Vit C, and Iron 15 tablet 0    levothyroxine 50 mcg tablet Take 1 tablet (50 mcg total) by mouth daily 30 tablet 2    potassium chloride (K-DUR,KLOR-CON) 20 mEq tablet TAKE 1 TABLET DAILY 30 tablet 11    torsemide (DEMADEX) 20 mg tablet Take 1 tablet (20 mg total) by mouth daily 30 tablet 0    traMADol (ULTRAM) 50 mg tablet Take 1 tablet (50 mg total) by mouth every 8 (eight) hours as needed for moderate pain 30 tablet 0    warfarin (COUMADIN) 4 mg tablet Take one tablet daily as previously directed by cardiology 180 tablet 0     No current facility-administered medications for this visit  Allergies: Allergies   Allergen Reactions    Codeine Vomiting and GI Intolerance     GI upset, ana m morphine  Reaction Date: 24Apr2012;     Other      Other reaction(s): Other (See Comments)  ana m toradol in OR 6/06 sah    Oxycodone Hcl Vomiting     Reaction Date: 24Apr2012;     Oxycodone-Acetaminophen      Other reaction(s): Other (See Comments)  GI upset; ana m vicodin    Percocet [Oxycodone-Acetaminophen]     Seasonal Ic  [Cholestatin] Allergic Rhinitis    Bactrim [Sulfamethoxazole-Trimethoprim] Rash    Sulfa Antibiotics Rash     Other reaction(s): Other (See Comments)  takes lasix @home         Physical Exam:    There is no height or weight on file to calculate BSA     Ht Readings from Last 3 Encounters:   01/18/19 5' 2" (1 575 m)   12/18/18 5' 2" (1 575 m)   11/22/18 5' 2" (1 575 m)       Wt Readings from Last 3 Encounters:   01/18/19 88 kg (194 lb)   12/18/18 84 8 kg (187 lb)   11/22/18 83 9 kg (185 lb)        Temp Readings from Last 3 Encounters:   01/22/19 98 7 °F (37 1 °C) (Temporal)   01/17/19 97 6 °F (36 4 °C) (Oral)   01/15/19 98 2 °F (36 8 °C) (Temporal)        BP Readings from Last 3 Encounters:   01/22/19 137/73   01/18/19 124/64   01/17/19 122/50         Pulse Readings from Last 3 Encounters:   01/22/19 72   01/18/19 68   01/17/19 72         Physical Exam    Physical Exam   Constitutional: She is oriented to person, place, and time  She appears well-developed and well-nourished  No distress  HENT:   Head: Normocephalic and atraumatic  Nose: Nose normal    Mouth/Throat: Oropharynx is clear and moist    Eyes: Pupils are equal, round, and reactive to light  Conjunctivae and EOM are normal  No scleral icterus  Neck: Normal range of motion  Neck supple  No tracheal deviation present  Cardiovascular: Normal rate, regular rhythm, normal heart sounds and intact distal pulses  Exam reveals no gallop and no friction rub  No murmur heard  Pulmonary/Chest: Effort normal and breath sounds normal  No stridor  No respiratory distress  She has no wheezes  She has no rales  She exhibits no tenderness  Abdominal: Soft  Bowel sounds are normal  She exhibits no distension and no mass  There is no tenderness  There is no rebound and no guarding  Musculoskeletal: She exhibits no edema  Rolling walker   Lymphadenopathy:     She has no cervical adenopathy  Neurological: She is alert and oriented to person, place, and time  No cranial nerve deficit  Coordination normal    Skin: Skin is warm and dry  No rash noted  She is not diaphoretic  No erythema  No pallor  Psychiatric: She has a normal mood and affect   Her behavior is normal  Judgment and thought content normal            Emergency Contacts:    Toi Halsted, ,

## 2019-02-21 ENCOUNTER — TELEPHONE (OUTPATIENT)
Dept: OBGYN CLINIC | Facility: HOSPITAL | Age: 84
End: 2019-02-21

## 2019-02-21 ENCOUNTER — REMOTE DEVICE CLINIC VISIT (OUTPATIENT)
Dept: CARDIOLOGY CLINIC | Facility: CLINIC | Age: 84
End: 2019-02-21
Payer: COMMERCIAL

## 2019-02-21 ENCOUNTER — TELEPHONE (OUTPATIENT)
Dept: HEMATOLOGY ONCOLOGY | Facility: CLINIC | Age: 84
End: 2019-02-21

## 2019-02-21 DIAGNOSIS — Z95.0 PACEMAKER: Primary | ICD-10-CM

## 2019-02-21 DIAGNOSIS — I49.5 SSS (SICK SINUS SYNDROME) (HCC): ICD-10-CM

## 2019-02-21 PROCEDURE — 93294 REM INTERROG EVL PM/LDLS PM: CPT | Performed by: INTERNAL MEDICINE

## 2019-02-21 PROCEDURE — 93296 REM INTERROG EVL PM/IDS: CPT | Performed by: INTERNAL MEDICINE

## 2019-02-21 NOTE — TELEPHONE ENCOUNTER
Paloma Fagan,    This patient is requiring further anemia optimization  We would like pt to proceed with hematologist Dr Prasanna Bennett Recommendations for subcutaneous procrit x3 weeks  I plan to reach out to Dr Prasanna Bennett office for them to set up the injections  With pt requiring further anemia optimization, we need to postpone her  Can you choose pt a new date for the week of 3/27? Called pt today and spoke with her  She is aware we are postponing her to the last week in March- she was told a surgical coordinator would be reaching out to her with a new date  This patient is also requesting a R hip injection due to pain  Can you also address this with Dr Beverly Charles and facilitate that appt as well? Thank you

## 2019-02-21 NOTE — TELEPHONE ENCOUNTER
Called Dr Stephanie Barnhart office and let them know she is cleared for surgery and trasfusion if needed

## 2019-02-21 NOTE — PROGRESS NOTES
Results for orders placed or performed in visit on 02/21/19   Cardiac EP device report    Narrative    MDT-SINGLE CHAMBER "HIS" PPM  CARELINK TRANSMISSION: BATTERY ADEQUATE (8 YRS)   98% (SSS/VVIR 70)  ALL AVAILABLE LEAD PARAMETERS WITHIN NORMAL LIMITS  NO EPISODES  NORMAL DEVICE FUNCTION   PL

## 2019-02-21 NOTE — TELEPHONE ENCOUNTER
Dr Mary Robbins,    I understand heme/onc cleared this patient for 2/27 surgery however in that same clearance note, Dr Daylin Lopez states "The patient was seen and examined by me, I do not have time to improve the anemia, she is scheduled for hip replacement in 7 days, the patient had a history of gastric bypass surgery, she received IV iron, current hemoglobin of 10 9, she is okay to receive blood transfusion if needed after the surgery otherwise the surgery should be postpone and will treat the patient with Procrit 12837 units subcu weekly x3 provided insurance approves"  This patient was given the OR date of 2/27 as a tentative date for anemia optimization  She was told this was tentative contingent upon anemia optimization  Would you agree that we should postpone patient about 4 weeks to allow Dr Collins Pascual team to optimize this patient further? Please advise, thank you

## 2019-02-21 NOTE — TELEPHONE ENCOUNTER
Jessica from [facility] called, states that Dr Shital Guzman wanted them to push patients surgery off for at least three weeks so the patient can have injections  The surgery was pushed back until the end of March, and they would like for you to reach out to the patient to get these started        Yudith Escamilla can be reached at [phone]

## 2019-02-21 NOTE — TELEPHONE ENCOUNTER
Dr Alfonzo Garcia -  Hip Arthroplasty 2/27    Chiki Lira from Hem/Onc called    Elicia CANNON has cleared patient to proceed with Hip Sx 2/27  There is a note in Epic stating this and to transfuse PRN if needed

## 2019-02-22 ENCOUNTER — TELEPHONE (OUTPATIENT)
Dept: OBGYN CLINIC | Facility: HOSPITAL | Age: 84
End: 2019-02-22

## 2019-02-22 ENCOUNTER — PREP FOR PROCEDURE (OUTPATIENT)
Dept: OBGYN CLINIC | Facility: HOSPITAL | Age: 84
End: 2019-02-22

## 2019-02-22 NOTE — TELEPHONE ENCOUNTER
Patient surgery has been r/s to 4/1/2019 (see below message) I will call pt to r/s post op appointments today      ~Dr Beatriz Zhao can pt have a Rt Hip inj prior to surgery 4/1?   ~Adriana can you check what PAT will need to be repeated?  ~Emily can you contact patient to r/s PT eval?

## 2019-02-22 NOTE — TELEPHONE ENCOUNTER
An injection into the right hip would not be advisable if her surgery is to be performed in early April

## 2019-02-22 NOTE — TELEPHONE ENCOUNTER
If her RT MAIA rev is scheduled for 4/1 should she have the inj w/i the next week?    And can you add the order please

## 2019-02-25 ENCOUNTER — OFFICE VISIT (OUTPATIENT)
Dept: INTERNAL MEDICINE CLINIC | Facility: CLINIC | Age: 84
End: 2019-02-25
Payer: COMMERCIAL

## 2019-02-25 VITALS
WEIGHT: 185.4 LBS | BODY MASS INDEX: 35.01 KG/M2 | HEIGHT: 61 IN | OXYGEN SATURATION: 97 % | HEART RATE: 100 BPM | DIASTOLIC BLOOD PRESSURE: 80 MMHG | TEMPERATURE: 97.4 F | SYSTOLIC BLOOD PRESSURE: 130 MMHG

## 2019-02-25 DIAGNOSIS — J06.9 UPPER RESPIRATORY TRACT INFECTION, UNSPECIFIED TYPE: Primary | ICD-10-CM

## 2019-02-25 DIAGNOSIS — I50.32 CHRONIC DIASTOLIC CHF (CONGESTIVE HEART FAILURE) (HCC): ICD-10-CM

## 2019-02-25 DIAGNOSIS — I48.19 PERSISTENT ATRIAL FIBRILLATION (HCC): ICD-10-CM

## 2019-02-25 LAB
FLUAV AG SPEC QL: DETECTED
FLUBV AG SPEC QL: NOT DETECTED
RSV B RNA SPEC QL NAA+PROBE: NOT DETECTED

## 2019-02-25 PROCEDURE — 87631 RESP VIRUS 3-5 TARGETS: CPT | Performed by: NURSE PRACTITIONER

## 2019-02-25 PROCEDURE — 1160F RVW MEDS BY RX/DR IN RCRD: CPT | Performed by: NURSE PRACTITIONER

## 2019-02-25 PROCEDURE — 99213 OFFICE O/P EST LOW 20 MIN: CPT | Performed by: NURSE PRACTITIONER

## 2019-02-25 RX ORDER — AMOXICILLIN 500 MG/1
500 CAPSULE ORAL EVERY 8 HOURS SCHEDULED
Qty: 21 CAPSULE | Refills: 0 | Status: SHIPPED | OUTPATIENT
Start: 2019-02-25 | End: 2019-03-04

## 2019-02-25 NOTE — PROGRESS NOTES
Assessment/Plan:     Diagnoses and all orders for this visit:    Upper respiratory tract infection, unspecified type  -     Influenza A/B and RSV by PCR; Future  -     amoxicillin (AMOXIL) 500 mg capsule; Take 1 capsule (500 mg total) by mouth every 8 (eight) hours for 7 days  -     Influenza A/B and RSV by PCR    Chronic diastolic CHF (congestive heart failure) (HCC)    Persistent atrial fibrillation (HCC)          Drink plenty of fluids and rest  May take over the counter mucinex and cough syrup/cough drops  Call if worsening  Subjective:      Patient ID: Oscar Sheridan is a 80 y o  female  Patient is here for 3 days of cough  bodyaches and chills  Earache  headaches  Symptoms started gradually    No fever       The following portions of the patient's history were reviewed and updated as appropriate: allergies, current medications, past family history, past medical history, past social history, past surgical history and problem list     Review of Systems   Constitutional: Positive for fatigue  HENT: Positive for ear pain  Eyes: Negative  Respiratory: Positive for cough  Cardiovascular: Negative  Gastrointestinal: Negative  Musculoskeletal: Positive for myalgias  Neurological: Negative  Objective:      /80   Pulse 100   Temp (!) 97 4 °F (36 3 °C)   Ht 5' 1" (1 549 m)   Wt 84 1 kg (185 lb 6 4 oz)   LMP  (LMP Unknown)   SpO2 97%   BMI 35 03 kg/m²          Physical Exam   Constitutional: She is oriented to person, place, and time  She appears well-developed and well-nourished  HENT:   Head: Normocephalic and atraumatic  Eyes: Pupils are equal, round, and reactive to light  Conjunctivae are normal    Neck: Normal range of motion  Neck supple  Cardiovascular: Normal rate and regular rhythm  Pulmonary/Chest: Effort normal and breath sounds normal    Abdominal: Soft  Bowel sounds are normal    Musculoskeletal: Normal range of motion     Neurological: She is alert and oriented to person, place, and time  Skin: Skin is warm and dry

## 2019-02-26 ENCOUNTER — ANTICOAG VISIT (OUTPATIENT)
Dept: INTERNAL MEDICINE CLINIC | Facility: CLINIC | Age: 84
End: 2019-02-26

## 2019-02-26 DIAGNOSIS — R68.89 FLU-LIKE SYMPTOMS: Primary | ICD-10-CM

## 2019-02-26 RX ORDER — OSELTAMIVIR PHOSPHATE 75 MG/1
75 CAPSULE ORAL EVERY 12 HOURS SCHEDULED
Qty: 10 CAPSULE | Refills: 0 | Status: SHIPPED | OUTPATIENT
Start: 2019-02-26 | End: 2019-03-03

## 2019-02-27 ENCOUNTER — TELEPHONE (OUTPATIENT)
Dept: INTERNAL MEDICINE CLINIC | Facility: CLINIC | Age: 84
End: 2019-02-27

## 2019-02-27 NOTE — TELEPHONE ENCOUNTER
Patient called with complaints of dizziness for the last 2 days  She states that had 1 episode of feeling "hot" last night as well as bouts of dizziness followed by a bowel movement  She states that it is not diarrhea and she had 3 BM's last night and 1 this morning  She notes that she is not having any N/V, fever, headache or abdominal pain  She is only taking the Amoxicillin as she did not fill the Tamiflu  She also states that she read the instructions incorrectly and have been taking it 2 times a day instead of 3  Please advise

## 2019-02-27 NOTE — TELEPHONE ENCOUNTER
Spoke to patient   She is feeling better no fever  Dizzy/having more bms because of amoxicillin but she will try to finish  She is not going to take the tamiflu since she feels better and she doesn't want the side effects of tamiflu

## 2019-02-28 ENCOUNTER — TELEPHONE (OUTPATIENT)
Dept: PREADMISSION TESTING | Facility: HOSPITAL | Age: 84
End: 2019-02-28

## 2019-03-05 DIAGNOSIS — E03.9 HYPOTHYROIDISM, UNSPECIFIED TYPE: ICD-10-CM

## 2019-03-05 DIAGNOSIS — I48.20 CHRONIC ATRIAL FIBRILLATION (HCC): ICD-10-CM

## 2019-03-05 RX ORDER — LEVOTHYROXINE SODIUM 0.05 MG/1
50 TABLET ORAL DAILY
Qty: 30 TABLET | Refills: 2 | Status: SHIPPED | OUTPATIENT
Start: 2019-03-05 | End: 2019-07-07 | Stop reason: SDUPTHER

## 2019-03-05 RX ORDER — WARFARIN SODIUM 4 MG/1
TABLET ORAL
Qty: 180 TABLET | Refills: 0 | Status: SHIPPED | OUTPATIENT
Start: 2019-03-05 | End: 2019-08-29 | Stop reason: SDUPTHER

## 2019-03-08 ENCOUNTER — PREP FOR PROCEDURE (OUTPATIENT)
Dept: OBGYN CLINIC | Facility: HOSPITAL | Age: 84
End: 2019-03-08

## 2019-03-08 ENCOUNTER — TELEPHONE (OUTPATIENT)
Dept: OBGYN CLINIC | Facility: HOSPITAL | Age: 84
End: 2019-03-08

## 2019-03-08 ENCOUNTER — DOCUMENTATION (OUTPATIENT)
Dept: HEMATOLOGY ONCOLOGY | Facility: CLINIC | Age: 84
End: 2019-03-08

## 2019-03-08 DIAGNOSIS — Z01.818 PRE-OP TESTING: Primary | ICD-10-CM

## 2019-03-08 NOTE — TELEPHONE ENCOUNTER
Tried to call patient, no answer, Left VM for patient to return my call to address her questions  Spoke to patient  She is reporting she has not heard from [address] [place] [address] as to her next steps for procrit injections  I spoke with RN Sita Del Rio at [address] [place] [address]- per her she has been having trouble reaching this patient to set up appts  Fransisco Reyes RN pt's cell number and also updated [id number] as cell in Epic  Beige RN updated me she spoke with patient to get her set up for Procrit injections  Pt was also given a new OR date of 4/15/19 in order for hematology to have enough time to optimize patient  She is aware of her new 4/15/19 OR date and is aware that hematology will be coordinating injections  Pt was encouraged to reach back out to me with any further questions, concerns or updates

## 2019-03-08 NOTE — PROGRESS NOTES
Spoke to [employer] with ortho regarding the patient's procrit injections  I explained I have been unable to reach the patient and she provided a new contact number for the patient  Called the patient and reviewed the possible dates for the procrit  They will be 3/25, 4/1, and 4/8  Someone from scheduling will call her to confirm these dates  Her surgery has been move to 4/15  The patient verbalized understanding

## 2019-03-09 DIAGNOSIS — G89.29 OTHER CHRONIC PAIN: ICD-10-CM

## 2019-03-09 RX ORDER — DULOXETIN HYDROCHLORIDE 60 MG/1
CAPSULE, DELAYED RELEASE ORAL
Qty: 30 CAPSULE | Refills: 0 | Status: SHIPPED | OUTPATIENT
Start: 2019-03-09 | End: 2019-04-06 | Stop reason: SDUPTHER

## 2019-03-11 ENCOUNTER — APPOINTMENT (OUTPATIENT)
Dept: LAB | Facility: CLINIC | Age: 84
DRG: 467 | End: 2019-03-11
Payer: COMMERCIAL

## 2019-03-11 DIAGNOSIS — Z01.818 PRE-OP TESTING: ICD-10-CM

## 2019-03-11 DIAGNOSIS — Z90.3 STATUS POST GASTRECTOMY: ICD-10-CM

## 2019-03-11 DIAGNOSIS — E53.8 B12 DEFICIENCY: ICD-10-CM

## 2019-03-11 LAB
ALBUMIN SERPL BCP-MCNC: 3.1 G/DL (ref 3.5–5)
ALP SERPL-CCNC: 107 U/L (ref 46–116)
ALT SERPL W P-5'-P-CCNC: 16 U/L (ref 12–78)
ANION GAP SERPL CALCULATED.3IONS-SCNC: 8 MMOL/L (ref 4–13)
AST SERPL W P-5'-P-CCNC: 18 U/L (ref 5–45)
BASOPHILS # BLD AUTO: 0.04 THOUSANDS/ΜL (ref 0–0.1)
BASOPHILS NFR BLD AUTO: 1 % (ref 0–1)
BILIRUB SERPL-MCNC: 0.35 MG/DL (ref 0.2–1)
BUN SERPL-MCNC: 14 MG/DL (ref 5–25)
CALCIUM SERPL-MCNC: 8.2 MG/DL (ref 8.3–10.1)
CHLORIDE SERPL-SCNC: 110 MMOL/L (ref 100–108)
CO2 SERPL-SCNC: 26 MMOL/L (ref 21–32)
CREAT SERPL-MCNC: 0.7 MG/DL (ref 0.6–1.3)
CRP SERPL QL: <3 MG/L
EOSINOPHIL # BLD AUTO: 0.04 THOUSAND/ΜL (ref 0–0.61)
EOSINOPHIL NFR BLD AUTO: 1 % (ref 0–6)
ERYTHROCYTE [DISTWIDTH] IN BLOOD BY AUTOMATED COUNT: 15.1 % (ref 11.6–15.1)
GFR SERPL CREATININE-BSD FRML MDRD: 79 ML/MIN/1.73SQ M
GLUCOSE SERPL-MCNC: 77 MG/DL (ref 65–140)
HCT VFR BLD AUTO: 35.8 % (ref 34.8–46.1)
HGB BLD-MCNC: 10.9 G/DL (ref 11.5–15.4)
IMM GRANULOCYTES # BLD AUTO: 0.03 THOUSAND/UL (ref 0–0.2)
IMM GRANULOCYTES NFR BLD AUTO: 0 % (ref 0–2)
LYMPHOCYTES # BLD AUTO: 0.99 THOUSANDS/ΜL (ref 0.6–4.47)
LYMPHOCYTES NFR BLD AUTO: 15 % (ref 14–44)
MCH RBC QN AUTO: 31.3 PG (ref 26.8–34.3)
MCHC RBC AUTO-ENTMCNC: 30.4 G/DL (ref 31.4–37.4)
MCV RBC AUTO: 103 FL (ref 82–98)
MONOCYTES # BLD AUTO: 0.75 THOUSAND/ΜL (ref 0.17–1.22)
MONOCYTES NFR BLD AUTO: 11 % (ref 4–12)
NEUTROPHILS # BLD AUTO: 4.97 THOUSANDS/ΜL (ref 1.85–7.62)
NEUTS SEG NFR BLD AUTO: 72 % (ref 43–75)
NRBC BLD AUTO-RTO: 0 /100 WBCS
PLATELET # BLD AUTO: 230 THOUSANDS/UL (ref 149–390)
PMV BLD AUTO: 10.3 FL (ref 8.9–12.7)
POTASSIUM SERPL-SCNC: 3.9 MMOL/L (ref 3.5–5.3)
PROT SERPL-MCNC: 6.6 G/DL (ref 6.4–8.2)
RBC # BLD AUTO: 3.48 MILLION/UL (ref 3.81–5.12)
SODIUM SERPL-SCNC: 144 MMOL/L (ref 136–145)
WBC # BLD AUTO: 6.82 THOUSAND/UL (ref 4.31–10.16)

## 2019-03-11 PROCEDURE — 86140 C-REACTIVE PROTEIN: CPT

## 2019-03-11 PROCEDURE — 80053 COMPREHEN METABOLIC PANEL: CPT

## 2019-03-11 PROCEDURE — 85025 COMPLETE CBC W/AUTO DIFF WBC: CPT

## 2019-03-11 PROCEDURE — 82607 VITAMIN B-12: CPT

## 2019-03-12 LAB — VIT B12 SERPL-MCNC: 4120 PG/ML (ref 100–900)

## 2019-03-13 ENCOUNTER — ANTICOAG VISIT (OUTPATIENT)
Dept: INTERNAL MEDICINE CLINIC | Facility: CLINIC | Age: 84
End: 2019-03-13

## 2019-03-13 ENCOUNTER — APPOINTMENT (OUTPATIENT)
Dept: LAB | Facility: CLINIC | Age: 84
DRG: 467 | End: 2019-03-13
Payer: COMMERCIAL

## 2019-03-15 ENCOUNTER — TELEPHONE (OUTPATIENT)
Dept: OBGYN CLINIC | Facility: HOSPITAL | Age: 84
End: 2019-03-15

## 2019-03-15 DIAGNOSIS — I50.32 CHRONIC DIASTOLIC CHF (CONGESTIVE HEART FAILURE) (HCC): ICD-10-CM

## 2019-03-15 RX ORDER — TORSEMIDE 20 MG/1
20 TABLET ORAL DAILY
Qty: 90 TABLET | Refills: 3 | Status: SHIPPED | OUTPATIENT
Start: 2019-03-15

## 2019-03-18 ENCOUNTER — APPOINTMENT (OUTPATIENT)
Dept: LAB | Facility: CLINIC | Age: 84
DRG: 467 | End: 2019-03-18
Payer: COMMERCIAL

## 2019-03-19 ENCOUNTER — TELEPHONE (OUTPATIENT)
Dept: PREADMISSION TESTING | Facility: HOSPITAL | Age: 84
End: 2019-03-19

## 2019-03-20 ENCOUNTER — APPOINTMENT (OUTPATIENT)
Dept: LAB | Facility: HOSPITAL | Age: 84
DRG: 467 | End: 2019-03-20
Attending: ORTHOPAEDIC SURGERY
Payer: COMMERCIAL

## 2019-03-20 ENCOUNTER — TRANSCRIBE ORDERS (OUTPATIENT)
Dept: LAB | Facility: HOSPITAL | Age: 84
End: 2019-03-20

## 2019-03-20 DIAGNOSIS — S73.004D DISLOCATION OF RIGHT HIP, SUBSEQUENT ENCOUNTER: Primary | ICD-10-CM

## 2019-03-20 LAB
EST. AVERAGE GLUCOSE BLD GHB EST-MCNC: 103 MG/DL
FERRITIN SERPL-MCNC: 169 NG/ML (ref 8–388)
HBA1C MFR BLD: 5.2 % (ref 4.2–6.3)
IRON SATN MFR SERPL: 28 %
IRON SERPL-MCNC: 82 UG/DL (ref 50–170)
TIBC SERPL-MCNC: 293 UG/DL (ref 250–450)

## 2019-03-20 PROCEDURE — 83036 HEMOGLOBIN GLYCOSYLATED A1C: CPT

## 2019-03-20 PROCEDURE — 36415 COLL VENOUS BLD VENIPUNCTURE: CPT

## 2019-03-20 PROCEDURE — 83550 IRON BINDING TEST: CPT

## 2019-03-20 PROCEDURE — 83540 ASSAY OF IRON: CPT

## 2019-03-20 PROCEDURE — 82728 ASSAY OF FERRITIN: CPT

## 2019-03-22 ENCOUNTER — OFFICE VISIT (OUTPATIENT)
Dept: HEMATOLOGY ONCOLOGY | Facility: CLINIC | Age: 84
End: 2019-03-22
Payer: COMMERCIAL

## 2019-03-22 DIAGNOSIS — D75.89 MACROCYTOSIS: Primary | ICD-10-CM

## 2019-03-22 DIAGNOSIS — D64.9 ANEMIA, UNSPECIFIED TYPE: ICD-10-CM

## 2019-03-22 DIAGNOSIS — Z98.84 STATUS POST GASTRIC BYPASS FOR OBESITY: ICD-10-CM

## 2019-03-22 PROCEDURE — 99213 OFFICE O/P EST LOW 20 MIN: CPT | Performed by: INTERNAL MEDICINE

## 2019-03-22 NOTE — LETTER
March 22, 2019     Heber Rubalcava MD  [facility] [place] [address]    Patient: Eduardo Brian   YOB: 1933   Date of Visit: 3/22/2019       Dear Dr Jennifer Esquivel: Thank you for referring Farooq Varma to me for evaluation  Below are my notes for this consultation  If you have questions, please do not hesitate to call me  I look forward to following your patient along with you  Sincerely,        Cecil Eli MD        CC: No Recipients  Cecil Eli MD  3/22/2019  8:02 AM  Sign at close encounter  Hematology Outpatient Follow - Up Note  Eduardo Brian 80 y o  female MRN: @ Encounter: [id number]        Date:  3/22/2019        Assessment/ Plan:      Anemia unspecified reason with macrocytosis chronic, for many years, no need for bone marrow biopsy by the patient request    Status post gastric bypass surgery for obesity    She has now normal vitamin B12 level, C-reactive protein, iron studies    Prior to right hip replacement Procrit 33873 unit once a week 3 weeks prior to the surgery every week and then proceed with surgery a week after finishing Procrit    I suggest CBC a day of the surgery    Follow-up on as-needed basis          HPI:  Eduardo Brian is a 80 y o  seen for initial consultation 2/20/2019 at the referral of Karyle Lemon, MD regarding anemia  She is tentatively scheduled for  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    Her surgery was originally January 2019 canceled due to the anemia       2/14/2019:  Hemoglobin 10 9, MCV of 105, white blood cell count 5 17 with 67% neutrophils, 22% lymphocytes, 8% monocytes, platelet count 816     2/1/2019: Vitamin B12 level 221        1/28/2019 hemoglobin 11 1, , RDW 15 8 white blood cell count 6 06 with normal differential, platelet count 694      1/22/2019 TSH 3 7     1/2/2019:  Hemoglobin 11, RDW 13 7 MCV of 98,white blood cell count 6 56 with normal differential, iron saturation 13%, ferritin 14, CRP less than 3  Cr 0 92,  Total protein 7 4, albumin 3 3, normal LFTs     August 2018 hemoglobin 9 9, MCV of 97, RDW 15 7, white blood cell count 7 12, platelet count 190     April 2018 hemoglobin 9 9     March 2016 hemoglobin fluctuated between 9 4-12 2 during hospitalization      November 2014 hemoglobin 12 1, , white blood cell count 6 6 with normal differential, platelet count 879      She is status post Venofer 300 milligrams x4 doses early January 2019  She is s/p gastric bypass surgery early 2000s        She has a history of AFib with RVR and underwent AV node ablation and placement of a permanent pacemaker placement 8/2018, continued on warfarin  She has chronic diastolic CHF, HTN, hypothyroidism  Interval History:  Hemoglobin 10 9, after vitamin B12, normal vitamin B12 level, iron studies, C-reactive protein       Previous Treatment:         Test Results:    Imaging: No results found  Labs:   Lab Results   Component Value Date    WBC 6 82 03/11/2019    HGB 10 9 (L) 03/11/2019    HCT 35 8 03/11/2019     (H) 03/11/2019     03/11/2019     Lab Results   Component Value Date     04/03/2015    K 3 9 03/11/2019     (H) 03/11/2019    CO2 26 03/11/2019    ANIONGAP 10 04/03/2015    BUN 14 03/11/2019    CREATININE 0 70 03/11/2019    GLUCOSE 98 04/03/2015    GLUF 83 01/02/2019    CALCIUM 8 2 (L) 03/11/2019    AST 18 03/11/2019    ALT 16 03/11/2019    ALKPHOS 107 03/11/2019    PROT 7 0 04/03/2015    BILITOT 0 30 04/03/2015    EGFR 79 03/11/2019       Lab Results   Component Value Date    IRON 82 03/20/2019    TIBC 293 03/20/2019    FERRITIN 169 03/20/2019       Lab Results   Component Value Date    [id number] 4,120 (H) 03/11/2019         ROS:   Review of Systems   Constitutional: Negative for activity change, appetite change, diaphoresis, fatigue, fever and unexpected weight change     HENT: Negative for facial swelling, hearing loss, rhinorrhea, sinus pressure, sinus pain, sneezing, sore throat and tinnitus  Eyes: Negative for photophobia, pain, discharge, redness, itching and visual disturbance  Respiratory: Negative for apnea and chest tightness  Cardiovascular: Negative for chest pain, palpitations and leg swelling  Gastrointestinal: Negative for abdominal distention, abdominal pain, blood in stool, constipation, diarrhea, nausea, rectal pain and vomiting  Endocrine: Negative for cold intolerance, heat intolerance, polydipsia and polyphagia  Genitourinary: Negative for difficulty urinating, dyspareunia, frequency, hematuria, pelvic pain and urgency  Musculoskeletal: Positive for arthralgias (Right hip pain)  Negative for back pain, gait problem, joint swelling and myalgias  Skin: Negative for color change, pallor and rash  Allergic/Immunologic: Negative for environmental allergies and food allergies  Neurological: Negative for dizziness, tremors, seizures, syncope, speech difficulty, numbness and headaches  Hematological: Negative for adenopathy  Does not bruise/bleed easily  Psychiatric/Behavioral: Negative for agitation, confusion, dysphoric mood, hallucinations and suicidal ideas  Current Medications: Reviewed  Allergies: Reviewed  PMH/FH/SH:  Reviewed      Physical Exam:    There is no height or weight on file to calculate BSA  Wt Readings from Last 3 Encounters:   02/25/19 84 1 kg (185 lb 6 4 oz)   02/20/19 85 7 kg (189 lb)   01/18/19 88 kg (194 lb)        Temp Readings from Last 3 Encounters:   02/25/19 (!) 97 4 °F (36 3 °C)   02/20/19 98 9 °F (37 2 °C) (Tympanic)   01/22/19 98 7 °F (37 1 °C) (Temporal)        BP Readings from Last 3 Encounters:   02/25/19 130/80   02/20/19 158/80   01/22/19 137/73         Pulse Readings from Last 3 Encounters:   02/25/19 100   02/20/19 93   01/22/19 72        Physical Exam   Constitutional: She is oriented to person, place, and time  She appears well-developed and well-nourished  No distress  HENT:   Head: Normocephalic and atraumatic  Mouth/Throat: Oropharynx is clear and moist  No oropharyngeal exudate  Eyes: Pupils are equal, round, and reactive to light  Conjunctivae and EOM are normal    Neck: Normal range of motion  Neck supple  No tracheal deviation present  No thyromegaly present  Cardiovascular: Normal rate and regular rhythm  Exam reveals no gallop and no friction rub  Murmur heard  Pulmonary/Chest: Effort normal and breath sounds normal  No respiratory distress  She has no wheezes  She has no rales  She exhibits no tenderness  Abdominal: Soft  Bowel sounds are normal  She exhibits no distension and no mass  There is no tenderness  There is no rebound and no guarding  Musculoskeletal: Normal range of motion  Lymphadenopathy:     She has no cervical adenopathy  Neurological: She is alert and oriented to person, place, and time  Skin: Skin is warm and dry  No rash noted  She is not diaphoretic  No erythema  No pallor  Psychiatric: She has a normal mood and affect  Her behavior is normal  Judgment and thought content normal    Vitals reviewed  Goals and Barriers:  Current Goal: Minimize effects of disease  Barriers: None  Patient's Capacity to Self Care:  Patient is able to self care      Code Status: [unfilled]

## 2019-03-22 NOTE — PROGRESS NOTES
Hematology Outpatient Follow - Up Note  Trish Arriaga 80 y o  female MRN: @ Encounter: [id number]        Date:  3/22/2019        Assessment/ Plan:      Anemia unspecified reason with macrocytosis chronic, for many years, no need for bone marrow biopsy by the patient request    Status post gastric bypass surgery for obesity    She has now normal vitamin B12 level, C-reactive protein, iron studies    Prior to right hip replacement Procrit 98130 unit once a week 3 weeks prior to the surgery every week and then proceed with surgery a week after finishing Procrit    I suggest CBC a day of the surgery    Follow-up on as-needed basis          HPI:  Trish Arriaga is a 80 y o  seen for initial consultation 2/20/2019 at the referral of Marguerite Amaro MD regarding anemia  She is tentatively scheduled for  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  Her surgery was originally January 2019 canceled due to the anemia       2/14/2019:  Hemoglobin 10 9, MCV of 105, white blood cell count 5 17 with 67% neutrophils, 22% lymphocytes, 8% monocytes, platelet count 655     2/1/2019: Vitamin B12 level 221        1/28/2019 hemoglobin 11 1, , RDW 15 8 white blood cell count 6 06 with normal differential, platelet count 520      1/22/2019 TSH 3 7     1/2/2019:  Hemoglobin 11, RDW 13 7 MCV of 98,white blood cell count 6 56 with normal differential, iron saturation 13%, ferritin 14, CRP less than 3  Cr 0 92,  Total protein 7 4, albumin 3 3, normal LFTs     August 2018 hemoglobin 9 9, MCV of 97, RDW 15 7, white blood cell count 7 12, platelet count 854     April 2018 hemoglobin 9 9     March 2016 hemoglobin fluctuated between 9 4-12 2 during hospitalization      November 2014 hemoglobin 12 1, , white blood cell count 6 6 with normal differential, platelet count 957      She is status post Venofer 300 milligrams x4 doses early January 2019  She is s/p gastric bypass surgery early 2000s          She has a history of AFib with RVR and underwent AV node ablation and placement of a permanent pacemaker placement 8/2018, continued on warfarin  She has chronic diastolic CHF, HTN, hypothyroidism  Interval History:  Hemoglobin 10 9, after vitamin B12, normal vitamin B12 level, iron studies, C-reactive protein       Previous Treatment:         Test Results:    Imaging: No results found  Labs:   Lab Results   Component Value Date    WBC 6 82 03/11/2019    HGB 10 9 (L) 03/11/2019    HCT 35 8 03/11/2019     (H) 03/11/2019     03/11/2019     Lab Results   Component Value Date     04/03/2015    K 3 9 03/11/2019     (H) 03/11/2019    CO2 26 03/11/2019    ANIONGAP 10 04/03/2015    BUN 14 03/11/2019    CREATININE 0 70 03/11/2019    GLUCOSE 98 04/03/2015    GLUF 83 01/02/2019    CALCIUM 8 2 (L) 03/11/2019    AST 18 03/11/2019    ALT 16 03/11/2019    ALKPHOS 107 03/11/2019    PROT 7 0 04/03/2015    BILITOT 0 30 04/03/2015    EGFR 79 03/11/2019       Lab Results   Component Value Date    IRON 82 03/20/2019    TIBC 293 03/20/2019    FERRITIN 169 03/20/2019       Lab Results   Component Value Date    [id number] 4,120 (H) 03/11/2019         ROS:   Review of Systems   Constitutional: Negative for activity change, appetite change, diaphoresis, fatigue, fever and unexpected weight change  HENT: Negative for facial swelling, hearing loss, rhinorrhea, sinus pressure, sinus pain, sneezing, sore throat and tinnitus  Eyes: Negative for photophobia, pain, discharge, redness, itching and visual disturbance  Respiratory: Negative for apnea and chest tightness  Cardiovascular: Negative for chest pain, palpitations and leg swelling  Gastrointestinal: Negative for abdominal distention, abdominal pain, blood in stool, constipation, diarrhea, nausea, rectal pain and vomiting  Endocrine: Negative for cold intolerance, heat intolerance, polydipsia and polyphagia     Genitourinary: Negative for difficulty urinating, dyspareunia, frequency, hematuria, pelvic pain and urgency  Musculoskeletal: Positive for arthralgias (Right hip pain)  Negative for back pain, gait problem, joint swelling and myalgias  Skin: Negative for color change, pallor and rash  Allergic/Immunologic: Negative for environmental allergies and food allergies  Neurological: Negative for dizziness, tremors, seizures, syncope, speech difficulty, numbness and headaches  Hematological: Negative for adenopathy  Does not bruise/bleed easily  Psychiatric/Behavioral: Negative for agitation, confusion, dysphoric mood, hallucinations and suicidal ideas  Current Medications: Reviewed  Allergies: Reviewed  PMH/FH/SH:  Reviewed      Physical Exam:    There is no height or weight on file to calculate BSA  Wt Readings from Last 3 Encounters:   02/25/19 84 1 kg (185 lb 6 4 oz)   02/20/19 85 7 kg (189 lb)   01/18/19 88 kg (194 lb)        Temp Readings from Last 3 Encounters:   02/25/19 (!) 97 4 °F (36 3 °C)   02/20/19 98 9 °F (37 2 °C) (Tympanic)   01/22/19 98 7 °F (37 1 °C) (Temporal)        BP Readings from Last 3 Encounters:   02/25/19 130/80   02/20/19 158/80   01/22/19 137/73         Pulse Readings from Last 3 Encounters:   02/25/19 100   02/20/19 93   01/22/19 72        Physical Exam   Constitutional: She is oriented to person, place, and time  She appears well-developed and well-nourished  No distress  HENT:   Head: Normocephalic and atraumatic  Mouth/Throat: Oropharynx is clear and moist  No oropharyngeal exudate  Eyes: Pupils are equal, round, and reactive to light  Conjunctivae and EOM are normal    Neck: Normal range of motion  Neck supple  No tracheal deviation present  No thyromegaly present  Cardiovascular: Normal rate and regular rhythm  Exam reveals no gallop and no friction rub  Murmur heard  Pulmonary/Chest: Effort normal and breath sounds normal  No respiratory distress  She has no wheezes  She has no rales   She exhibits no tenderness  Abdominal: Soft  Bowel sounds are normal  She exhibits no distension and no mass  There is no tenderness  There is no rebound and no guarding  Musculoskeletal: Normal range of motion  Lymphadenopathy:     She has no cervical adenopathy  Neurological: She is alert and oriented to person, place, and time  Skin: Skin is warm and dry  No rash noted  She is not diaphoretic  No erythema  No pallor  Psychiatric: She has a normal mood and affect  Her behavior is normal  Judgment and thought content normal    Vitals reviewed  Goals and Barriers:  Current Goal: Minimize effects of disease  Barriers: None  Patient's Capacity to Self Care:  Patient is able to self care      Code Status: [unfilled]

## 2019-03-24 DIAGNOSIS — S73.004D DISLOCATION OF RIGHT HIP, SUBSEQUENT ENCOUNTER: ICD-10-CM

## 2019-03-25 ENCOUNTER — ANTICOAG VISIT (OUTPATIENT)
Dept: INTERNAL MEDICINE CLINIC | Facility: CLINIC | Age: 84
End: 2019-03-25

## 2019-03-25 ENCOUNTER — HOSPITAL ENCOUNTER (OUTPATIENT)
Dept: INFUSION CENTER | Facility: CLINIC | Age: 84
Discharge: HOME | End: 2019-03-25
Payer: COMMERCIAL

## 2019-03-25 VITALS — SYSTOLIC BLOOD PRESSURE: 118 MMHG | DIASTOLIC BLOOD PRESSURE: 56 MMHG

## 2019-03-25 PROCEDURE — 96372 THER/PROPH/DIAG INJ SC/IM: CPT

## 2019-03-25 RX ORDER — FOLIC ACID 1 MG/1
TABLET ORAL
Qty: 30 TABLET | Refills: 0 | Status: SHIPPED | OUTPATIENT
Start: 2019-03-25 | End: 2019-04-05

## 2019-03-25 RX ADMIN — ERYTHROPOIETIN 40000 UNITS: 40000 INJECTION, SOLUTION INTRAVENOUS; SUBCUTANEOUS at 10:49

## 2019-03-26 ENCOUNTER — TELEPHONE (OUTPATIENT)
Dept: OBGYN CLINIC | Facility: HOSPITAL | Age: 84
End: 2019-03-26

## 2019-03-27 DIAGNOSIS — S73.004D DISLOCATION OF RIGHT HIP, SUBSEQUENT ENCOUNTER: ICD-10-CM

## 2019-03-27 RX ORDER — FOLIC ACID 1 MG/1
TABLET ORAL
Qty: 30 TABLET | Refills: 0 | OUTPATIENT
Start: 2019-03-27

## 2019-03-28 NOTE — TELEPHONE ENCOUNTER
Updated Preoperative Elective Admission Assessment- Spoke with pt          Living Situation: Pt lives with her  in a 1st floor apartment      Home Layout: Pt lives with her  in a 1st floor apartment, step-in tub                     Steps: #2 steps to enter      First Floor Setup: Yes      Post-op Caregiver:  Primary- , Candis Torres  Secondary- Son  Mana Pedersen and Daughter, Derik Pedersen works 10-12 hour days per patient so would be available limitedly depending on date and time      Post-op Transport: Pt has limited transportation availability depending on date/time but reports she has spoken to family Zoila Melendez and Ag Michaud) and reports she would be able to work out rides to/from outpatient PT/post-op appts  Pt declined me reaching out to her family to discuss support/transport      Outpatient Physical Therapy Site: [facility]- pt reports she has a $40 co-pay with each outpatient PT appt of which patient reports would be unaffordable  Pt also has received home health care "many times" in the past and pt reports she would prefer this  Patient was agreeable to scheduling post-op outpatient PT appt and has set up a payment plan through [facility]  DME: Pt has a RW, rollator walker, cane and BSC      Patient's Current Level of Function: Pt is currently ambulating with her RW, she is independent with her ADLs     Medication Management: Pt self manages her medications using a pillbox                      Preferred Pharmacy: [facility] on [facility]  and [address]                      Blood Management Vitamins: Pt confirms she is taking her daily iron, vitamin C, folic acid and MV                      Post-op anticoagulant: Lovenox was sent into [facility] on [facility]  and [facility]  Patient was advised that is is $95 co-pay   Pharmacy attempted to bill half the # of syringes of which the co-pay still came back at $95   Patient was advised of cost  Pt reports that she or her daughter will pick the #28 syringes of lovenox up, I advised to do so prior to DOS  Pt educated these syringes are for after surgery use only      DC Plan:   Plan A:  Home with outpatient PT  Plan B: Home with [facility]                     Barriers to DC identified preoperatively: Limited transportation, high outpatient PT co-pay     BMI: 34 20- [facility] OV on 12/18     C[employer]: Pt enrolled 1/18/19                      RAPT: Score 5, already in caresense                      ACE/ARB Form: N/A                     HOOS/KOOS: Score 46 652, already in caresense       Patient Education:  Pt educated on post-op pain, early mobilization (POD0), indication for/use of incentive spirometer (10x/hour while awake) and indication for/use of foot/leg pumps (18 hours/day)  I educated patient on the many benefits of outpt PT(Including maintaining independence, additional resources at outpt site, better outcomes etc  )  Also educated on how home PT vs  outpt PT is determined (while inpt)  Pt understands that [id number] is not a guarantee and that it is reserved for patients that are homebound  Pt denies having any questions at this time    Pt encouraged to call me with any questions, concerns or issues

## 2019-04-01 ENCOUNTER — HOSPITAL ENCOUNTER (OUTPATIENT)
Dept: INFUSION CENTER | Facility: CLINIC | Age: 84
Discharge: HOME | End: 2019-04-01
Payer: COMMERCIAL

## 2019-04-01 VITALS — DIASTOLIC BLOOD PRESSURE: 70 MMHG | SYSTOLIC BLOOD PRESSURE: 110 MMHG | HEART RATE: 76 BPM

## 2019-04-01 PROCEDURE — 96372 THER/PROPH/DIAG INJ SC/IM: CPT

## 2019-04-01 RX ADMIN — ERYTHROPOIETIN 40000 UNITS: 40000 INJECTION, SOLUTION INTRAVENOUS; SUBCUTANEOUS at 10:50

## 2019-04-02 ENCOUNTER — APPOINTMENT (OUTPATIENT)
Dept: LAB | Facility: CLINIC | Age: 84
End: 2019-04-02
Payer: COMMERCIAL

## 2019-04-05 ENCOUNTER — OFFICE VISIT (OUTPATIENT)
Dept: CARDIOLOGY CLINIC | Facility: CLINIC | Age: 84
End: 2019-04-05
Payer: COMMERCIAL

## 2019-04-05 VITALS
OXYGEN SATURATION: 97 % | SYSTOLIC BLOOD PRESSURE: 128 MMHG | HEIGHT: 61 IN | DIASTOLIC BLOOD PRESSURE: 70 MMHG | WEIGHT: 190.4 LBS | BODY MASS INDEX: 35.95 KG/M2

## 2019-04-05 DIAGNOSIS — I48.91 ATRIAL FIBRILLATION, UNSPECIFIED TYPE (HCC): Primary | ICD-10-CM

## 2019-04-05 DIAGNOSIS — I34.0 MODERATE MITRAL REGURGITATION: ICD-10-CM

## 2019-04-05 DIAGNOSIS — Z95.0 PACEMAKER: ICD-10-CM

## 2019-04-05 DIAGNOSIS — I10 BENIGN ESSENTIAL HYPERTENSION: ICD-10-CM

## 2019-04-05 DIAGNOSIS — I50.32 CHRONIC DIASTOLIC CHF (CONGESTIVE HEART FAILURE) (HCC): ICD-10-CM

## 2019-04-05 PROCEDURE — 99214 OFFICE O/P EST MOD 30 MIN: CPT | Performed by: INTERNAL MEDICINE

## 2019-04-05 PROCEDURE — 93000 ELECTROCARDIOGRAM COMPLETE: CPT | Performed by: INTERNAL MEDICINE

## 2019-04-06 DIAGNOSIS — G89.29 OTHER CHRONIC PAIN: ICD-10-CM

## 2019-04-07 RX ORDER — DULOXETIN HYDROCHLORIDE 60 MG/1
CAPSULE, DELAYED RELEASE ORAL
Qty: 30 CAPSULE | Refills: 0 | Status: SHIPPED | OUTPATIENT
Start: 2019-04-07 | End: 2019-05-27 | Stop reason: SDUPTHER

## 2019-04-08 ENCOUNTER — ANTICOAG VISIT (OUTPATIENT)
Dept: INTERNAL MEDICINE CLINIC | Facility: CLINIC | Age: 84
End: 2019-04-08

## 2019-04-08 ENCOUNTER — HOSPITAL ENCOUNTER (OUTPATIENT)
Dept: INFUSION CENTER | Facility: CLINIC | Age: 84
Discharge: HOME | End: 2019-04-08
Payer: COMMERCIAL

## 2019-04-08 VITALS
BODY MASS INDEX: 35.9 KG/M2 | DIASTOLIC BLOOD PRESSURE: 62 MMHG | WEIGHT: 190 LBS | HEART RATE: 79 BPM | SYSTOLIC BLOOD PRESSURE: 132 MMHG

## 2019-04-08 PROCEDURE — 96372 THER/PROPH/DIAG INJ SC/IM: CPT

## 2019-04-08 RX ADMIN — ERYTHROPOIETIN 40000 UNITS: 40000 INJECTION, SOLUTION INTRAVENOUS; SUBCUTANEOUS at 10:05

## 2019-04-08 NOTE — PROGRESS NOTES
Patient presents today for procrit injection  Patient offers no complaints  No lab parameters ordered  Injection given into left arm sub q as ordered without incident  Patient with no further appointments  AVS printed and given to patient

## 2019-04-09 ENCOUNTER — OFFICE VISIT (OUTPATIENT)
Dept: INTERNAL MEDICINE CLINIC | Facility: CLINIC | Age: 84
End: 2019-04-09
Payer: COMMERCIAL

## 2019-04-09 VITALS
RESPIRATION RATE: 16 BRPM | DIASTOLIC BLOOD PRESSURE: 82 MMHG | HEIGHT: 61 IN | OXYGEN SATURATION: 95 % | BODY MASS INDEX: 36.02 KG/M2 | WEIGHT: 190.8 LBS | HEART RATE: 92 BPM | SYSTOLIC BLOOD PRESSURE: 136 MMHG | TEMPERATURE: 98.3 F

## 2019-04-09 DIAGNOSIS — I48.19 PERSISTENT ATRIAL FIBRILLATION (HCC): ICD-10-CM

## 2019-04-09 DIAGNOSIS — I10 BENIGN ESSENTIAL HYPERTENSION: ICD-10-CM

## 2019-04-09 DIAGNOSIS — R19.4 CHANGE IN BOWEL HABITS: Primary | ICD-10-CM

## 2019-04-09 DIAGNOSIS — R00.2 PALPITATIONS: ICD-10-CM

## 2019-04-09 PROCEDURE — 99214 OFFICE O/P EST MOD 30 MIN: CPT | Performed by: INTERNAL MEDICINE

## 2019-04-10 ENCOUNTER — TELEPHONE (OUTPATIENT)
Dept: INTERNAL MEDICINE CLINIC | Facility: CLINIC | Age: 84
End: 2019-04-10

## 2019-04-15 ENCOUNTER — HOSPITAL ENCOUNTER (INPATIENT)
Facility: HOSPITAL | Age: 84
LOS: 3 days | Discharge: NURSING, REHAB OR LONG-TERM CARE | DRG: 467 | End: 2019-04-18
Attending: ORTHOPAEDIC SURGERY | Admitting: ORTHOPAEDIC SURGERY
Payer: COMMERCIAL

## 2019-04-15 ENCOUNTER — HOSPITAL ENCOUNTER (OUTPATIENT)
Dept: RADIOLOGY | Facility: HOSPITAL | Age: 84
Setting detail: SURGERY ADMIT
Discharge: HOME | DRG: 467 | End: 2019-04-15
Payer: COMMERCIAL

## 2019-04-15 DIAGNOSIS — Z96.649 HISTORY OF REVISION OF TOTAL HIP ARTHROPLASTY: ICD-10-CM

## 2019-04-15 DIAGNOSIS — S73.004D DISLOCATION OF RIGHT HIP, SUBSEQUENT ENCOUNTER: ICD-10-CM

## 2019-04-15 DIAGNOSIS — Z96.649 STATUS POST REVISION OF TOTAL HIP REPLACEMENT: ICD-10-CM

## 2019-04-15 DIAGNOSIS — N17.9 ACUTE KIDNEY INJURY (HCC): Primary | ICD-10-CM

## 2019-04-15 PROBLEM — I95.81 POSTPROCEDURAL HYPOTENSION: Status: ACTIVE | Noted: 2019-04-15

## 2019-04-15 PROBLEM — N18.30 CHRONIC KIDNEY DISEASE, STAGE III (MODERATE) (HCC): Status: ACTIVE | Noted: 2019-04-15

## 2019-04-15 LAB
ABO GROUP BLD: NORMAL
ANION GAP SERPL CALCULATED.3IONS-SCNC: 5 MMOL/L (ref 4–13)
APTT PPP: 29 SECONDS (ref 26–38)
BACTERIA UR QL AUTO: ABNORMAL /HPF
BASOPHILS # BLD AUTO: 0.04 THOUSANDS/ΜL (ref 0–0.1)
BASOPHILS NFR BLD AUTO: 1 % (ref 0–1)
BILIRUB UR QL STRIP: NEGATIVE
BLD GP AB SCN SERPL QL: NEGATIVE
BUN SERPL-MCNC: 14 MG/DL (ref 5–25)
CALCIUM SERPL-MCNC: 8.2 MG/DL (ref 8.3–10.1)
CHLORIDE SERPL-SCNC: 109 MMOL/L (ref 100–108)
CLARITY UR: CLEAR
CO2 SERPL-SCNC: 26 MMOL/L (ref 21–32)
COLOR UR: YELLOW
CREAT SERPL-MCNC: 0.7 MG/DL (ref 0.6–1.3)
EOSINOPHIL # BLD AUTO: 0.06 THOUSAND/ΜL (ref 0–0.61)
EOSINOPHIL NFR BLD AUTO: 1 % (ref 0–6)
ERYTHROCYTE [DISTWIDTH] IN BLOOD BY AUTOMATED COUNT: 14.9 % (ref 11.6–15.1)
GFR SERPL CREATININE-BSD FRML MDRD: 79 ML/MIN/1.73SQ M
GLUCOSE SERPL-MCNC: 94 MG/DL (ref 65–140)
GLUCOSE UR STRIP-MCNC: NEGATIVE MG/DL
HCT VFR BLD AUTO: 43.3 % (ref 34.8–46.1)
HGB BLD-MCNC: 13.4 G/DL (ref 11.5–15.4)
HGB UR QL STRIP.AUTO: ABNORMAL
HYALINE CASTS #/AREA URNS LPF: ABNORMAL /LPF
IMM GRANULOCYTES # BLD AUTO: 0.01 THOUSAND/UL (ref 0–0.2)
IMM GRANULOCYTES NFR BLD AUTO: 0 % (ref 0–2)
INR PPP: 1.18 (ref 0.86–1.17)
KETONES UR STRIP-MCNC: NEGATIVE MG/DL
LEUKOCYTE ESTERASE UR QL STRIP: NEGATIVE
LYMPHOCYTES # BLD AUTO: 1.22 THOUSANDS/ΜL (ref 0.6–4.47)
LYMPHOCYTES NFR BLD AUTO: 22 % (ref 14–44)
MCH RBC QN AUTO: 31.5 PG (ref 26.8–34.3)
MCHC RBC AUTO-ENTMCNC: 30.9 G/DL (ref 31.4–37.4)
MCV RBC AUTO: 102 FL (ref 82–98)
MONOCYTES # BLD AUTO: 0.66 THOUSAND/ΜL (ref 0.17–1.22)
MONOCYTES NFR BLD AUTO: 12 % (ref 4–12)
NEUTROPHILS # BLD AUTO: 3.6 THOUSANDS/ΜL (ref 1.85–7.62)
NEUTS SEG NFR BLD AUTO: 64 % (ref 43–75)
NITRITE UR QL STRIP: NEGATIVE
NON-SQ EPI CELLS URNS QL MICRO: ABNORMAL /HPF
NRBC BLD AUTO-RTO: 0 /100 WBCS
PH UR STRIP.AUTO: 6.5 [PH]
PLATELET # BLD AUTO: 170 THOUSANDS/UL (ref 149–390)
PMV BLD AUTO: 9.9 FL (ref 8.9–12.7)
POTASSIUM SERPL-SCNC: 3.6 MMOL/L (ref 3.5–5.3)
PROT UR STRIP-MCNC: NEGATIVE MG/DL
PROTHROMBIN TIME: 15.1 SECONDS (ref 11.8–14.2)
RBC # BLD AUTO: 4.26 MILLION/UL (ref 3.81–5.12)
RBC #/AREA URNS AUTO: ABNORMAL /HPF
RH BLD: POSITIVE
SODIUM SERPL-SCNC: 140 MMOL/L (ref 136–145)
SP GR UR STRIP.AUTO: 1 (ref 1–1.03)
SPECIMEN EXPIRATION DATE: NORMAL
UROBILINOGEN UR QL STRIP.AUTO: 0.2 E.U./DL
WBC # BLD AUTO: 5.59 THOUSAND/UL (ref 4.31–10.16)
WBC #/AREA URNS AUTO: ABNORMAL /HPF

## 2019-04-15 PROCEDURE — 86901 BLOOD TYPING SEROLOGIC RH(D): CPT | Performed by: ORTHOPAEDIC SURGERY

## 2019-04-15 PROCEDURE — C1776 JOINT DEVICE (IMPLANTABLE): HCPCS | Performed by: ORTHOPAEDIC SURGERY

## 2019-04-15 PROCEDURE — 99024 POSTOP FOLLOW-UP VISIT: CPT | Performed by: ORTHOPAEDIC SURGERY

## 2019-04-15 PROCEDURE — 85610 PROTHROMBIN TIME: CPT | Performed by: ORTHOPAEDIC SURGERY

## 2019-04-15 PROCEDURE — G8981 BODY POS CURRENT STATUS: HCPCS

## 2019-04-15 PROCEDURE — G8982 BODY POS GOAL STATUS: HCPCS

## 2019-04-15 PROCEDURE — 97163 PT EVAL HIGH COMPLEX 45 MIN: CPT

## 2019-04-15 PROCEDURE — 99222 1ST HOSP IP/OBS MODERATE 55: CPT | Performed by: INTERNAL MEDICINE

## 2019-04-15 PROCEDURE — 86900 BLOOD TYPING SEROLOGIC ABO: CPT | Performed by: ORTHOPAEDIC SURGERY

## 2019-04-15 PROCEDURE — 0SUA09Z SUPPLEMENT RIGHT HIP JOINT, ACETABULAR SURFACE WITH LINER, OPEN APPROACH: ICD-10-PCS | Performed by: ORTHOPAEDIC SURGERY

## 2019-04-15 PROCEDURE — 87070 CULTURE OTHR SPECIMN AEROBIC: CPT | Performed by: ORTHOPAEDIC SURGERY

## 2019-04-15 PROCEDURE — 81001 URINALYSIS AUTO W/SCOPE: CPT | Performed by: INTERNAL MEDICINE

## 2019-04-15 PROCEDURE — C1713 ANCHOR/SCREW BN/BN,TIS/BN: HCPCS | Performed by: ORTHOPAEDIC SURGERY

## 2019-04-15 PROCEDURE — 0SP90JZ REMOVAL OF SYNTHETIC SUBSTITUTE FROM RIGHT HIP JOINT, OPEN APPROACH: ICD-10-PCS | Performed by: ORTHOPAEDIC SURGERY

## 2019-04-15 PROCEDURE — 87206 SMEAR FLUORESCENT/ACID STAI: CPT | Performed by: ORTHOPAEDIC SURGERY

## 2019-04-15 PROCEDURE — 87102 FUNGUS ISOLATION CULTURE: CPT | Performed by: ORTHOPAEDIC SURGERY

## 2019-04-15 PROCEDURE — 80048 BASIC METABOLIC PNL TOTAL CA: CPT | Performed by: INTERNAL MEDICINE

## 2019-04-15 PROCEDURE — 87116 MYCOBACTERIA CULTURE: CPT | Performed by: ORTHOPAEDIC SURGERY

## 2019-04-15 PROCEDURE — 86850 RBC ANTIBODY SCREEN: CPT | Performed by: ORTHOPAEDIC SURGERY

## 2019-04-15 PROCEDURE — 0SR90JA REPLACEMENT OF RIGHT HIP JOINT WITH SYNTHETIC SUBSTITUTE, UNCEMENTED, OPEN APPROACH: ICD-10-PCS | Performed by: ORTHOPAEDIC SURGERY

## 2019-04-15 PROCEDURE — 85025 COMPLETE CBC W/AUTO DIFF WBC: CPT | Performed by: ANESTHESIOLOGY

## 2019-04-15 PROCEDURE — 85730 THROMBOPLASTIN TIME PARTIAL: CPT | Performed by: ORTHOPAEDIC SURGERY

## 2019-04-15 PROCEDURE — 87176 TISSUE HOMOGENIZATION CULTR: CPT | Performed by: ORTHOPAEDIC SURGERY

## 2019-04-15 PROCEDURE — 27134 REVISE HIP JOINT REPLACEMENT: CPT | Performed by: ORTHOPAEDIC SURGERY

## 2019-04-15 PROCEDURE — 97110 THERAPEUTIC EXERCISES: CPT

## 2019-04-15 PROCEDURE — 87075 CULTR BACTERIA EXCEPT BLOOD: CPT | Performed by: ORTHOPAEDIC SURGERY

## 2019-04-15 PROCEDURE — 87205 SMEAR GRAM STAIN: CPT | Performed by: ORTHOPAEDIC SURGERY

## 2019-04-15 DEVICE — PINNACLE GRIPTION ACETABULAR SHELL MULTI-HOLE 54MM OD
Type: IMPLANTABLE DEVICE | Site: HIP | Status: FUNCTIONAL
Brand: PINNACLE GRIPTION

## 2019-04-15 DEVICE — IMPLANTABLE DEVICE: Type: IMPLANTABLE DEVICE | Site: HIP | Status: FUNCTIONAL

## 2019-04-15 DEVICE — PINNACLE HIP SOLUTIONS GVF POLYETHYLENE CONSTRAINED ACETABULAR LINER +4 NEUTRAL 32MM ID 54MM OD
Type: IMPLANTABLE DEVICE | Site: HIP | Status: FUNCTIONAL
Brand: PINNACLE

## 2019-04-15 DEVICE — PINNACLE CANCELLOUS BONE SCREW 6.5MM X 35MM
Type: IMPLANTABLE DEVICE | Site: HIP | Status: FUNCTIONAL
Brand: PINNACLE

## 2019-04-15 RX ORDER — SODIUM CHLORIDE, SODIUM LACTATE, POTASSIUM CHLORIDE, CALCIUM CHLORIDE 600; 310; 30; 20 MG/100ML; MG/100ML; MG/100ML; MG/100ML
125 INJECTION, SOLUTION INTRAVENOUS CONTINUOUS
Status: DISCONTINUED | OUTPATIENT
Start: 2019-04-15 | End: 2019-04-18 | Stop reason: HOSPADM

## 2019-04-15 RX ORDER — PROPOFOL 10 MG/ML
INJECTION, EMULSION INTRAVENOUS CONTINUOUS PRN
Status: DISCONTINUED | OUTPATIENT
Start: 2019-04-15 | End: 2019-04-15 | Stop reason: SURG

## 2019-04-15 RX ORDER — ONDANSETRON 2 MG/ML
INJECTION INTRAMUSCULAR; INTRAVENOUS AS NEEDED
Status: DISCONTINUED | OUTPATIENT
Start: 2019-04-15 | End: 2019-04-15 | Stop reason: SURG

## 2019-04-15 RX ORDER — MORPHINE SULFATE 10 MG/ML
2 INJECTION, SOLUTION INTRAMUSCULAR; INTRAVENOUS
Status: DISCONTINUED | OUTPATIENT
Start: 2019-04-15 | End: 2019-04-18 | Stop reason: HOSPADM

## 2019-04-15 RX ORDER — MAGNESIUM HYDROXIDE 1200 MG/15ML
LIQUID ORAL AS NEEDED
Status: DISCONTINUED | OUTPATIENT
Start: 2019-04-15 | End: 2019-04-15 | Stop reason: HOSPADM

## 2019-04-15 RX ORDER — ACETAMINOPHEN 325 MG/1
975 TABLET ORAL ONCE
Status: COMPLETED | OUTPATIENT
Start: 2019-04-15 | End: 2019-04-15

## 2019-04-15 RX ORDER — SENNOSIDES 8.6 MG
1 TABLET ORAL DAILY
Status: DISCONTINUED | OUTPATIENT
Start: 2019-04-15 | End: 2019-04-18 | Stop reason: HOSPADM

## 2019-04-15 RX ORDER — CEFAZOLIN SODIUM 2 G/50ML
2000 SOLUTION INTRAVENOUS ONCE
Status: DISCONTINUED | OUTPATIENT
Start: 2019-04-15 | End: 2019-04-15

## 2019-04-15 RX ORDER — HYDROCODONE BITARTRATE AND ACETAMINOPHEN 5; 325 MG/1; MG/1
2 TABLET ORAL EVERY 6 HOURS PRN
Status: DISCONTINUED | OUTPATIENT
Start: 2019-04-15 | End: 2019-04-18 | Stop reason: HOSPADM

## 2019-04-15 RX ORDER — WARFARIN SODIUM 5 MG/1
5 TABLET ORAL
Status: DISCONTINUED | OUTPATIENT
Start: 2019-04-16 | End: 2019-04-17

## 2019-04-15 RX ORDER — POTASSIUM CHLORIDE 20 MEQ/1
20 TABLET, EXTENDED RELEASE ORAL DAILY
Status: DISCONTINUED | OUTPATIENT
Start: 2019-04-15 | End: 2019-04-15

## 2019-04-15 RX ORDER — ALBUTEROL SULFATE 2.5 MG/3ML
2.5 SOLUTION RESPIRATORY (INHALATION) ONCE AS NEEDED
Status: DISCONTINUED | OUTPATIENT
Start: 2019-04-15 | End: 2019-04-15

## 2019-04-15 RX ORDER — LEVOTHYROXINE SODIUM 0.05 MG/1
50 TABLET ORAL
Status: DISCONTINUED | OUTPATIENT
Start: 2019-04-16 | End: 2019-04-18 | Stop reason: HOSPADM

## 2019-04-15 RX ORDER — CHLORHEXIDINE GLUCONATE 0.12 MG/ML
15 RINSE ORAL ONCE
Status: COMPLETED | OUTPATIENT
Start: 2019-04-15 | End: 2019-04-15

## 2019-04-15 RX ORDER — SODIUM CHLORIDE, SODIUM LACTATE, POTASSIUM CHLORIDE, CALCIUM CHLORIDE 600; 310; 30; 20 MG/100ML; MG/100ML; MG/100ML; MG/100ML
150 INJECTION, SOLUTION INTRAVENOUS CONTINUOUS
Status: DISCONTINUED | OUTPATIENT
Start: 2019-04-15 | End: 2019-04-18 | Stop reason: HOSPADM

## 2019-04-15 RX ORDER — HYDROMORPHONE HCL/PF 1 MG/ML
0.5 SYRINGE (ML) INJECTION
Status: DISCONTINUED | OUTPATIENT
Start: 2019-04-15 | End: 2019-04-15

## 2019-04-15 RX ORDER — BUPIVACAINE HYDROCHLORIDE 7.5 MG/ML
INJECTION, SOLUTION INTRASPINAL AS NEEDED
Status: DISCONTINUED | OUTPATIENT
Start: 2019-04-15 | End: 2019-04-15 | Stop reason: SURG

## 2019-04-15 RX ORDER — LIDOCAINE HYDROCHLORIDE 10 MG/ML
0.5 INJECTION, SOLUTION EPIDURAL; INFILTRATION; INTRACAUDAL; PERINEURAL ONCE AS NEEDED
Status: DISCONTINUED | OUTPATIENT
Start: 2019-04-15 | End: 2019-04-15

## 2019-04-15 RX ORDER — OXYCODONE HYDROCHLORIDE 5 MG/1
TABLET ORAL
Qty: 30 TABLET | Refills: 0 | Status: SHIPPED | OUTPATIENT
Start: 2019-04-15 | End: 2019-05-16 | Stop reason: SDUPTHER

## 2019-04-15 RX ORDER — MEPERIDINE HYDROCHLORIDE 25 MG/ML
12.5 INJECTION INTRAMUSCULAR; INTRAVENOUS; SUBCUTANEOUS AS NEEDED
Status: DISCONTINUED | OUTPATIENT
Start: 2019-04-15 | End: 2019-04-15

## 2019-04-15 RX ORDER — TRAMADOL HYDROCHLORIDE 50 MG/1
50 TABLET ORAL EVERY 8 HOURS PRN
Status: DISCONTINUED | OUTPATIENT
Start: 2019-04-15 | End: 2019-04-15

## 2019-04-15 RX ORDER — OXYCODONE HYDROCHLORIDE 5 MG/1
5 TABLET ORAL EVERY 4 HOURS PRN
Status: DISCONTINUED | OUTPATIENT
Start: 2019-04-15 | End: 2019-04-15 | Stop reason: SDDI

## 2019-04-15 RX ORDER — VANCOMYCIN HYDROCHLORIDE 1 G/200ML
1000 INJECTION, SOLUTION INTRAVENOUS ONCE
Status: COMPLETED | OUTPATIENT
Start: 2019-04-15 | End: 2019-04-15

## 2019-04-15 RX ORDER — LABETALOL 20 MG/4 ML (5 MG/ML) INTRAVENOUS SYRINGE
5
Status: DISCONTINUED | OUTPATIENT
Start: 2019-04-15 | End: 2019-04-15

## 2019-04-15 RX ORDER — ASCORBIC ACID 500 MG
500 TABLET ORAL 2 TIMES DAILY
Status: DISCONTINUED | OUTPATIENT
Start: 2019-04-15 | End: 2019-04-18 | Stop reason: HOSPADM

## 2019-04-15 RX ORDER — PROMETHAZINE HYDROCHLORIDE 25 MG/ML
12.5 INJECTION, SOLUTION INTRAMUSCULAR; INTRAVENOUS ONCE AS NEEDED
Status: DISCONTINUED | OUTPATIENT
Start: 2019-04-15 | End: 2019-04-15

## 2019-04-15 RX ORDER — CEFAZOLIN SODIUM 1 G/50ML
1000 SOLUTION INTRAVENOUS EVERY 8 HOURS
Status: CANCELLED | OUTPATIENT
Start: 2019-04-15 | End: 2019-04-16

## 2019-04-15 RX ORDER — ACETAMINOPHEN 325 MG/1
650 TABLET ORAL EVERY 4 HOURS PRN
Status: DISCONTINUED | OUTPATIENT
Start: 2019-04-15 | End: 2019-04-18 | Stop reason: HOSPADM

## 2019-04-15 RX ORDER — OXYCODONE HYDROCHLORIDE 5 MG/1
10 TABLET ORAL EVERY 4 HOURS PRN
Status: DISCONTINUED | OUTPATIENT
Start: 2019-04-15 | End: 2019-04-15

## 2019-04-15 RX ORDER — FENTANYL CITRATE/PF 50 MCG/ML
25 SYRINGE (ML) INJECTION
Status: DISCONTINUED | OUTPATIENT
Start: 2019-04-15 | End: 2019-04-15

## 2019-04-15 RX ORDER — FLUTICASONE PROPIONATE 50 MCG
1 SPRAY, SUSPENSION (ML) NASAL DAILY
Status: DISCONTINUED | OUTPATIENT
Start: 2019-04-15 | End: 2019-04-18 | Stop reason: HOSPADM

## 2019-04-15 RX ORDER — UBIDECARENONE 75 MG
50 CAPSULE ORAL EVERY OTHER DAY
Status: DISCONTINUED | OUTPATIENT
Start: 2019-04-15 | End: 2019-04-18 | Stop reason: HOSPADM

## 2019-04-15 RX ORDER — DOCUSATE SODIUM 100 MG/1
100 CAPSULE, LIQUID FILLED ORAL 2 TIMES DAILY
Status: DISCONTINUED | OUTPATIENT
Start: 2019-04-15 | End: 2019-04-18 | Stop reason: HOSPADM

## 2019-04-15 RX ORDER — HYDROCODONE BITARTRATE AND ACETAMINOPHEN 5; 325 MG/1; MG/1
1 TABLET ORAL EVERY 6 HOURS PRN
Status: DISCONTINUED | OUTPATIENT
Start: 2019-04-15 | End: 2019-04-18 | Stop reason: HOSPADM

## 2019-04-15 RX ORDER — VANCOMYCIN HYDROCHLORIDE 1 G/200ML
1000 INJECTION, SOLUTION INTRAVENOUS EVERY 8 HOURS
Status: COMPLETED | OUTPATIENT
Start: 2019-04-15 | End: 2019-04-16

## 2019-04-15 RX ORDER — TRAMADOL HYDROCHLORIDE 50 MG/1
50 TABLET ORAL EVERY 8 HOURS PRN
Status: DISCONTINUED | OUTPATIENT
Start: 2019-04-15 | End: 2019-04-15 | Stop reason: SDUPTHER

## 2019-04-15 RX ORDER — OXYCODONE HYDROCHLORIDE 5 MG/1
5 TABLET ORAL EVERY 4 HOURS PRN
Status: DISCONTINUED | OUTPATIENT
Start: 2019-04-15 | End: 2019-04-15 | Stop reason: SDUPTHER

## 2019-04-15 RX ORDER — ONDANSETRON 2 MG/ML
4 INJECTION INTRAMUSCULAR; INTRAVENOUS EVERY 8 HOURS PRN
Status: DISCONTINUED | OUTPATIENT
Start: 2019-04-15 | End: 2019-04-18 | Stop reason: HOSPADM

## 2019-04-15 RX ORDER — GABAPENTIN 300 MG/1
300 CAPSULE ORAL ONCE
Status: COMPLETED | OUTPATIENT
Start: 2019-04-15 | End: 2019-04-15

## 2019-04-15 RX ORDER — CHLORHEXIDINE GLUCONATE 0.12 MG/ML
15 RINSE ORAL ONCE
Status: DISCONTINUED | OUTPATIENT
Start: 2019-04-15 | End: 2019-04-15

## 2019-04-15 RX ORDER — TORSEMIDE 20 MG/1
20 TABLET ORAL DAILY
Status: DISCONTINUED | OUTPATIENT
Start: 2019-04-15 | End: 2019-04-15

## 2019-04-15 RX ORDER — DULOXETIN HYDROCHLORIDE 60 MG/1
60 CAPSULE, DELAYED RELEASE ORAL DAILY
Status: DISCONTINUED | OUTPATIENT
Start: 2019-04-15 | End: 2019-04-18 | Stop reason: HOSPADM

## 2019-04-15 RX ADMIN — BUPIVACAINE HYDROCHLORIDE IN DEXTROSE 1.6 ML: 7.5 INJECTION, SOLUTION SUBARACHNOID at 12:51

## 2019-04-15 RX ADMIN — CHLORHEXIDINE GLUCONATE 15 ML: 1.2 RINSE ORAL at 11:30

## 2019-04-15 RX ADMIN — SODIUM CHLORIDE, SODIUM LACTATE, POTASSIUM CHLORIDE, AND CALCIUM CHLORIDE 125 ML/HR: .6; .31; .03; .02 INJECTION, SOLUTION INTRAVENOUS at 23:30

## 2019-04-15 RX ADMIN — TRAMADOL HYDROCHLORIDE 50 MG: 50 TABLET, COATED ORAL at 18:40

## 2019-04-15 RX ADMIN — DOCUSATE SODIUM 100 MG: 100 CAPSULE, LIQUID FILLED ORAL at 18:40

## 2019-04-15 RX ADMIN — PROPOFOL 100 MCG/KG/MIN: 10 INJECTION, EMULSION INTRAVENOUS at 12:53

## 2019-04-15 RX ADMIN — PHENYLEPHRINE HYDROCHLORIDE 100 MCG: 10 INJECTION INTRAVENOUS at 13:12

## 2019-04-15 RX ADMIN — PHENYLEPHRINE HYDROCHLORIDE 100 MCG: 10 INJECTION INTRAVENOUS at 14:25

## 2019-04-15 RX ADMIN — CHLORHEXIDINE GLUCONATE 0.12% ORAL RINSE 15 ML: 1.2 LIQUID ORAL at 12:24

## 2019-04-15 RX ADMIN — FLUTICASONE PROPIONATE 1 SPRAY: 50 SPRAY, METERED NASAL at 18:42

## 2019-04-15 RX ADMIN — SODIUM CHLORIDE, SODIUM LACTATE, POTASSIUM CHLORIDE, AND CALCIUM CHLORIDE 125 ML/HR: .6; .31; .03; .02 INJECTION, SOLUTION INTRAVENOUS at 12:35

## 2019-04-15 RX ADMIN — HYDROCODONE BITARTRATE AND ACETAMINOPHEN 2 TABLET: 5; 325 TABLET ORAL at 23:29

## 2019-04-15 RX ADMIN — ONDANSETRON 4 MG: 2 INJECTION INTRAMUSCULAR; INTRAVENOUS at 13:03

## 2019-04-15 RX ADMIN — VANCOMYCIN HYDROCHLORIDE 1000 MG: 1 INJECTION, SOLUTION INTRAVENOUS at 12:27

## 2019-04-15 RX ADMIN — PHENYLEPHRINE HYDROCHLORIDE 200 MCG: 10 INJECTION INTRAVENOUS at 13:34

## 2019-04-15 RX ADMIN — VANCOMYCIN HYDROCHLORIDE 1000 MG: 1 INJECTION, SOLUTION INTRAVENOUS at 21:25

## 2019-04-15 RX ADMIN — GABAPENTIN 300 MG: 300 CAPSULE ORAL at 12:22

## 2019-04-15 RX ADMIN — OXYCODONE HYDROCHLORIDE AND ACETAMINOPHEN 500 MG: 500 TABLET ORAL at 18:40

## 2019-04-15 RX ADMIN — ACETAMINOPHEN 975 MG: 325 TABLET ORAL at 12:22

## 2019-04-16 PROBLEM — R60.0 BILATERAL LEG EDEMA: Status: ACTIVE | Noted: 2019-04-16

## 2019-04-16 LAB
ANION GAP SERPL CALCULATED.3IONS-SCNC: 3 MMOL/L (ref 4–13)
BUN SERPL-MCNC: 13 MG/DL (ref 5–25)
CALCIUM SERPL-MCNC: 7.7 MG/DL (ref 8.3–10.1)
CHLORIDE SERPL-SCNC: 111 MMOL/L (ref 100–108)
CO2 SERPL-SCNC: 27 MMOL/L (ref 21–32)
CREAT SERPL-MCNC: 0.7 MG/DL (ref 0.6–1.3)
ERYTHROCYTE [DISTWIDTH] IN BLOOD BY AUTOMATED COUNT: 14.8 % (ref 11.6–15.1)
GFR SERPL CREATININE-BSD FRML MDRD: 79 ML/MIN/1.73SQ M
GLUCOSE SERPL-MCNC: 92 MG/DL (ref 65–140)
HCT VFR BLD AUTO: 34.8 % (ref 34.8–46.1)
HGB BLD-MCNC: 10.8 G/DL (ref 11.5–15.4)
INR PPP: 1.22 (ref 0.86–1.17)
MAGNESIUM SERPL-MCNC: 2.1 MG/DL (ref 1.6–2.6)
MCH RBC QN AUTO: 32.1 PG (ref 26.8–34.3)
MCHC RBC AUTO-ENTMCNC: 31 G/DL (ref 31.4–37.4)
MCV RBC AUTO: 104 FL (ref 82–98)
PLATELET # BLD AUTO: 139 THOUSANDS/UL (ref 149–390)
PMV BLD AUTO: 10.4 FL (ref 8.9–12.7)
POTASSIUM SERPL-SCNC: 4.8 MMOL/L (ref 3.5–5.3)
PROTHROMBIN TIME: 15.5 SECONDS (ref 11.8–14.2)
RBC # BLD AUTO: 3.36 MILLION/UL (ref 3.81–5.12)
SODIUM SERPL-SCNC: 141 MMOL/L (ref 136–145)
WBC # BLD AUTO: 5.35 THOUSAND/UL (ref 4.31–10.16)

## 2019-04-16 PROCEDURE — 97167 OT EVAL HIGH COMPLEX 60 MIN: CPT

## 2019-04-16 PROCEDURE — 83735 ASSAY OF MAGNESIUM: CPT | Performed by: INTERNAL MEDICINE

## 2019-04-16 PROCEDURE — 85027 COMPLETE CBC AUTOMATED: CPT | Performed by: ORTHOPAEDIC SURGERY

## 2019-04-16 PROCEDURE — G8987 SELF CARE CURRENT STATUS: HCPCS

## 2019-04-16 PROCEDURE — 85610 PROTHROMBIN TIME: CPT | Performed by: ORTHOPAEDIC SURGERY

## 2019-04-16 PROCEDURE — 80048 BASIC METABOLIC PNL TOTAL CA: CPT | Performed by: ORTHOPAEDIC SURGERY

## 2019-04-16 PROCEDURE — 97116 GAIT TRAINING THERAPY: CPT

## 2019-04-16 PROCEDURE — 99232 SBSQ HOSP IP/OBS MODERATE 35: CPT | Performed by: INTERNAL MEDICINE

## 2019-04-16 PROCEDURE — G8988 SELF CARE GOAL STATUS: HCPCS

## 2019-04-16 PROCEDURE — NS001 PR NO SIGNATURE OR ATTESTATION: Performed by: ORTHOPAEDIC SURGERY

## 2019-04-16 RX ADMIN — OXYCODONE HYDROCHLORIDE AND ACETAMINOPHEN 500 MG: 500 TABLET ORAL at 17:01

## 2019-04-16 RX ADMIN — FLUOCINONIDE: 0.5 CREAM TOPICAL at 08:25

## 2019-04-16 RX ADMIN — DIPHENHYDRAMINE HYDROCHLORIDE 50 MG: 50 INJECTION, SOLUTION INTRAMUSCULAR; INTRAVENOUS at 08:19

## 2019-04-16 RX ADMIN — ENOXAPARIN SODIUM 40 MG: 40 INJECTION SUBCUTANEOUS at 08:19

## 2019-04-16 RX ADMIN — HYDROCODONE BITARTRATE AND ACETAMINOPHEN 2 TABLET: 5; 325 TABLET ORAL at 22:45

## 2019-04-16 RX ADMIN — DOCUSATE SODIUM 100 MG: 100 CAPSULE, LIQUID FILLED ORAL at 17:01

## 2019-04-16 RX ADMIN — LEVOTHYROXINE SODIUM 50 MCG: 50 TABLET ORAL at 05:08

## 2019-04-16 RX ADMIN — STANDARDIZED SENNA CONCENTRATE 8.6 MG: 8.6 TABLET ORAL at 08:20

## 2019-04-16 RX ADMIN — HYDROCODONE BITARTRATE AND ACETAMINOPHEN 1 TABLET: 5; 325 TABLET ORAL at 07:35

## 2019-04-16 RX ADMIN — HYDROCODONE BITARTRATE AND ACETAMINOPHEN 2 TABLET: 5; 325 TABLET ORAL at 13:19

## 2019-04-16 RX ADMIN — VANCOMYCIN HYDROCHLORIDE 1000 MG: 1 INJECTION, SOLUTION INTRAVENOUS at 05:08

## 2019-04-16 RX ADMIN — OXYCODONE HYDROCHLORIDE AND ACETAMINOPHEN 500 MG: 500 TABLET ORAL at 08:26

## 2019-04-16 RX ADMIN — WARFARIN SODIUM 5 MG: 5 TABLET ORAL at 17:01

## 2019-04-16 RX ADMIN — DOCUSATE SODIUM 100 MG: 100 CAPSULE, LIQUID FILLED ORAL at 08:20

## 2019-04-16 RX ADMIN — DULOXETINE HYDROCHLORIDE 60 MG: 60 CAPSULE, DELAYED RELEASE ORAL at 08:20

## 2019-04-17 LAB
ANION GAP SERPL CALCULATED.3IONS-SCNC: 6 MMOL/L (ref 4–13)
BUN SERPL-MCNC: 15 MG/DL (ref 5–25)
CALCIUM SERPL-MCNC: 7.8 MG/DL (ref 8.3–10.1)
CHLORIDE SERPL-SCNC: 110 MMOL/L (ref 100–108)
CO2 SERPL-SCNC: 25 MMOL/L (ref 21–32)
CREAT SERPL-MCNC: 0.69 MG/DL (ref 0.6–1.3)
ERYTHROCYTE [DISTWIDTH] IN BLOOD BY AUTOMATED COUNT: 14.6 % (ref 11.6–15.1)
GFR SERPL CREATININE-BSD FRML MDRD: 80 ML/MIN/1.73SQ M
GLUCOSE SERPL-MCNC: 96 MG/DL (ref 65–140)
HCT VFR BLD AUTO: 34 % (ref 34.8–46.1)
HGB BLD-MCNC: 10.5 G/DL (ref 11.5–15.4)
INR PPP: 1.23 (ref 0.86–1.17)
MCH RBC QN AUTO: 31.9 PG (ref 26.8–34.3)
MCHC RBC AUTO-ENTMCNC: 30.9 G/DL (ref 31.4–37.4)
MCV RBC AUTO: 103 FL (ref 82–98)
PLATELET # BLD AUTO: 125 THOUSANDS/UL (ref 149–390)
PMV BLD AUTO: 10.6 FL (ref 8.9–12.7)
POTASSIUM SERPL-SCNC: 4 MMOL/L (ref 3.5–5.3)
PROTHROMBIN TIME: 15.6 SECONDS (ref 11.8–14.2)
RBC # BLD AUTO: 3.29 MILLION/UL (ref 3.81–5.12)
SODIUM SERPL-SCNC: 141 MMOL/L (ref 136–145)
WBC # BLD AUTO: 7.82 THOUSAND/UL (ref 4.31–10.16)

## 2019-04-17 PROCEDURE — 99232 SBSQ HOSP IP/OBS MODERATE 35: CPT | Performed by: INTERNAL MEDICINE

## 2019-04-17 PROCEDURE — 97116 GAIT TRAINING THERAPY: CPT

## 2019-04-17 PROCEDURE — NS001 PR NO SIGNATURE OR ATTESTATION: Performed by: ORTHOPAEDIC SURGERY

## 2019-04-17 PROCEDURE — 85027 COMPLETE CBC AUTOMATED: CPT | Performed by: ORTHOPAEDIC SURGERY

## 2019-04-17 PROCEDURE — 80048 BASIC METABOLIC PNL TOTAL CA: CPT | Performed by: ORTHOPAEDIC SURGERY

## 2019-04-17 PROCEDURE — 85610 PROTHROMBIN TIME: CPT | Performed by: ORTHOPAEDIC SURGERY

## 2019-04-17 PROCEDURE — 97530 THERAPEUTIC ACTIVITIES: CPT

## 2019-04-17 PROCEDURE — 97535 SELF CARE MNGMENT TRAINING: CPT | Performed by: STUDENT IN AN ORGANIZED HEALTH CARE EDUCATION/TRAINING PROGRAM

## 2019-04-17 RX ORDER — WARFARIN SODIUM 7.5 MG/1
7.5 TABLET ORAL
Status: DISCONTINUED | OUTPATIENT
Start: 2019-04-17 | End: 2019-04-17

## 2019-04-17 RX ORDER — WARFARIN SODIUM 7.5 MG/1
7.5 TABLET ORAL
Status: COMPLETED | OUTPATIENT
Start: 2019-04-17 | End: 2019-04-17

## 2019-04-17 RX ADMIN — HYDROCODONE BITARTRATE AND ACETAMINOPHEN 2 TABLET: 5; 325 TABLET ORAL at 16:06

## 2019-04-17 RX ADMIN — LEVOTHYROXINE SODIUM 50 MCG: 50 TABLET ORAL at 05:16

## 2019-04-17 RX ADMIN — DULOXETINE HYDROCHLORIDE 60 MG: 60 CAPSULE, DELAYED RELEASE ORAL at 08:41

## 2019-04-17 RX ADMIN — OXYCODONE HYDROCHLORIDE AND ACETAMINOPHEN 500 MG: 500 TABLET ORAL at 08:41

## 2019-04-17 RX ADMIN — HYDROCODONE BITARTRATE AND ACETAMINOPHEN 2 TABLET: 5; 325 TABLET ORAL at 08:42

## 2019-04-17 RX ADMIN — DOCUSATE SODIUM 100 MG: 100 CAPSULE, LIQUID FILLED ORAL at 08:41

## 2019-04-17 RX ADMIN — OXYCODONE HYDROCHLORIDE AND ACETAMINOPHEN 500 MG: 500 TABLET ORAL at 17:29

## 2019-04-17 RX ADMIN — WARFARIN SODIUM 7.5 MG: 7.5 TABLET ORAL at 10:28

## 2019-04-17 RX ADMIN — ACETAMINOPHEN 650 MG: 325 TABLET ORAL at 19:36

## 2019-04-17 RX ADMIN — ENOXAPARIN SODIUM 40 MG: 40 INJECTION SUBCUTANEOUS at 10:22

## 2019-04-17 RX ADMIN — STANDARDIZED SENNA CONCENTRATE 8.6 MG: 8.6 TABLET ORAL at 08:41

## 2019-04-17 RX ADMIN — DOCUSATE SODIUM 100 MG: 100 CAPSULE, LIQUID FILLED ORAL at 17:29

## 2019-04-17 RX ADMIN — VITAM B12 50 MCG: 100 TAB at 08:42

## 2019-04-18 ENCOUNTER — ANTICOAG VISIT (OUTPATIENT)
Dept: INTERNAL MEDICINE CLINIC | Facility: CLINIC | Age: 84
End: 2019-04-18

## 2019-04-18 ENCOUNTER — TRANSITIONAL CARE MANAGEMENT (OUTPATIENT)
Dept: INTERNAL MEDICINE CLINIC | Facility: CLINIC | Age: 84
End: 2019-04-18

## 2019-04-18 VITALS
HEART RATE: 73 BPM | WEIGHT: 184 LBS | SYSTOLIC BLOOD PRESSURE: 114 MMHG | HEIGHT: 61 IN | RESPIRATION RATE: 18 BRPM | BODY MASS INDEX: 34.74 KG/M2 | DIASTOLIC BLOOD PRESSURE: 66 MMHG | OXYGEN SATURATION: 99 % | TEMPERATURE: 98.1 F

## 2019-04-18 LAB
ANION GAP SERPL CALCULATED.3IONS-SCNC: 5 MMOL/L (ref 4–13)
BACTERIA SPEC ANAEROBE CULT: NO GROWTH
BACTERIA SPEC ANAEROBE CULT: NO GROWTH
BACTERIA SPEC BFLD CULT: NO GROWTH
BACTERIA TISS AEROBE CULT: NO GROWTH
BUN SERPL-MCNC: 17 MG/DL (ref 5–25)
CALCIUM SERPL-MCNC: 7.8 MG/DL (ref 8.3–10.1)
CHLORIDE SERPL-SCNC: 107 MMOL/L (ref 100–108)
CO2 SERPL-SCNC: 25 MMOL/L (ref 21–32)
CREAT SERPL-MCNC: 0.71 MG/DL (ref 0.6–1.3)
ERYTHROCYTE [DISTWIDTH] IN BLOOD BY AUTOMATED COUNT: 14.6 % (ref 11.6–15.1)
GFR SERPL CREATININE-BSD FRML MDRD: 78 ML/MIN/1.73SQ M
GLUCOSE SERPL-MCNC: 97 MG/DL (ref 65–140)
GRAM STN SPEC: NORMAL
HCT VFR BLD AUTO: 31.5 % (ref 34.8–46.1)
HGB BLD-MCNC: 9.7 G/DL (ref 11.5–15.4)
INR PPP: 1.41 (ref 0.86–1.17)
MCH RBC QN AUTO: 31.6 PG (ref 26.8–34.3)
MCHC RBC AUTO-ENTMCNC: 30.8 G/DL (ref 31.4–37.4)
MCV RBC AUTO: 103 FL (ref 82–98)
PLATELET # BLD AUTO: 122 THOUSANDS/UL (ref 149–390)
PMV BLD AUTO: 10.8 FL (ref 8.9–12.7)
POTASSIUM SERPL-SCNC: 4.1 MMOL/L (ref 3.5–5.3)
PROTHROMBIN TIME: 17.4 SECONDS (ref 11.8–14.2)
RBC # BLD AUTO: 3.07 MILLION/UL (ref 3.81–5.12)
SODIUM SERPL-SCNC: 137 MMOL/L (ref 136–145)
WBC # BLD AUTO: 8.13 THOUSAND/UL (ref 4.31–10.16)

## 2019-04-18 PROCEDURE — 85027 COMPLETE CBC AUTOMATED: CPT | Performed by: ORTHOPAEDIC SURGERY

## 2019-04-18 PROCEDURE — 85610 PROTHROMBIN TIME: CPT | Performed by: ORTHOPAEDIC SURGERY

## 2019-04-18 PROCEDURE — 97530 THERAPEUTIC ACTIVITIES: CPT

## 2019-04-18 PROCEDURE — TCMXX

## 2019-04-18 PROCEDURE — 99024 POSTOP FOLLOW-UP VISIT: CPT | Performed by: ORTHOPAEDIC SURGERY

## 2019-04-18 PROCEDURE — NS001 PR NO SIGNATURE OR ATTESTATION: Performed by: ORTHOPAEDIC SURGERY

## 2019-04-18 PROCEDURE — 80048 BASIC METABOLIC PNL TOTAL CA: CPT | Performed by: ORTHOPAEDIC SURGERY

## 2019-04-18 PROCEDURE — 97535 SELF CARE MNGMENT TRAINING: CPT

## 2019-04-18 PROCEDURE — 97116 GAIT TRAINING THERAPY: CPT

## 2019-04-18 RX ORDER — SENNOSIDES 8.6 MG
1 TABLET ORAL DAILY
Qty: 120 EACH | Refills: 0 | Status: SHIPPED | OUTPATIENT
Start: 2019-04-18 | End: 2019-07-16 | Stop reason: CLARIF

## 2019-04-18 RX ORDER — WARFARIN SODIUM 5 MG/1
5 TABLET ORAL
Status: DISCONTINUED | OUTPATIENT
Start: 2019-04-18 | End: 2019-04-18 | Stop reason: HOSPADM

## 2019-04-18 RX ORDER — DOCUSATE SODIUM 100 MG/1
100 CAPSULE, LIQUID FILLED ORAL 2 TIMES DAILY
Qty: 10 CAPSULE | Refills: 0 | Status: SHIPPED | OUTPATIENT
Start: 2019-04-18 | End: 2019-07-16 | Stop reason: CLARIF

## 2019-04-18 RX ADMIN — ENOXAPARIN SODIUM 40 MG: 40 INJECTION SUBCUTANEOUS at 08:43

## 2019-04-18 RX ADMIN — OXYCODONE HYDROCHLORIDE AND ACETAMINOPHEN 500 MG: 500 TABLET ORAL at 08:43

## 2019-04-18 RX ADMIN — DULOXETINE HYDROCHLORIDE 60 MG: 60 CAPSULE, DELAYED RELEASE ORAL at 08:43

## 2019-04-18 RX ADMIN — LEVOTHYROXINE SODIUM 50 MCG: 50 TABLET ORAL at 05:21

## 2019-04-18 RX ADMIN — STANDARDIZED SENNA CONCENTRATE 8.6 MG: 8.6 TABLET ORAL at 08:43

## 2019-04-18 RX ADMIN — HYDROCODONE BITARTRATE AND ACETAMINOPHEN 1 TABLET: 5; 325 TABLET ORAL at 05:21

## 2019-04-18 RX ADMIN — DOCUSATE SODIUM 100 MG: 100 CAPSULE, LIQUID FILLED ORAL at 08:43

## 2019-04-18 RX ADMIN — HYDROCODONE BITARTRATE AND ACETAMINOPHEN 2 TABLET: 5; 325 TABLET ORAL at 12:49

## 2019-04-19 ENCOUNTER — TELEPHONE (OUTPATIENT)
Dept: OBGYN CLINIC | Facility: HOSPITAL | Age: 84
End: 2019-04-19

## 2019-04-19 ENCOUNTER — TELEPHONE (OUTPATIENT)
Dept: PAIN MEDICINE | Facility: MEDICAL CENTER | Age: 84
End: 2019-04-19

## 2019-04-22 ENCOUNTER — TELEPHONE (OUTPATIENT)
Dept: OBGYN CLINIC | Facility: HOSPITAL | Age: 84
End: 2019-04-22

## 2019-04-22 ENCOUNTER — APPOINTMENT (EMERGENCY)
Dept: RADIOLOGY | Facility: HOSPITAL | Age: 84
End: 2019-04-22
Payer: COMMERCIAL

## 2019-04-22 ENCOUNTER — HOSPITAL ENCOUNTER (EMERGENCY)
Facility: HOSPITAL | Age: 84
Discharge: HOME | End: 2019-04-22
Attending: EMERGENCY MEDICINE
Payer: COMMERCIAL

## 2019-04-22 VITALS
DIASTOLIC BLOOD PRESSURE: 58 MMHG | HEART RATE: 70 BPM | BODY MASS INDEX: 35.87 KG/M2 | HEIGHT: 61 IN | SYSTOLIC BLOOD PRESSURE: 127 MMHG | TEMPERATURE: 98.4 F | WEIGHT: 190 LBS | RESPIRATION RATE: 18 BRPM | OXYGEN SATURATION: 98 %

## 2019-04-22 DIAGNOSIS — S32.591A CLOSED FRACTURE OF RIGHT INFERIOR PUBIC RAMUS, INITIAL ENCOUNTER (HCC): ICD-10-CM

## 2019-04-22 DIAGNOSIS — R26.2 AMBULATORY DYSFUNCTION: ICD-10-CM

## 2019-04-22 DIAGNOSIS — M25.551 RIGHT HIP PAIN: Primary | ICD-10-CM

## 2019-04-22 LAB
ANION GAP SERPL CALCULATED.3IONS-SCNC: 5 MMOL/L (ref 4–13)
BASOPHILS # BLD AUTO: 0.03 THOUSANDS/ΜL (ref 0–0.1)
BASOPHILS NFR BLD AUTO: 0 % (ref 0–1)
BUN SERPL-MCNC: 15 MG/DL (ref 5–25)
CALCIUM SERPL-MCNC: 7 MG/DL (ref 8.3–10.1)
CHLORIDE SERPL-SCNC: 106 MMOL/L (ref 100–108)
CO2 SERPL-SCNC: 24 MMOL/L (ref 21–32)
CREAT SERPL-MCNC: 0.63 MG/DL (ref 0.6–1.3)
EOSINOPHIL # BLD AUTO: 0.05 THOUSAND/ΜL (ref 0–0.61)
EOSINOPHIL NFR BLD AUTO: 1 % (ref 0–6)
ERYTHROCYTE [DISTWIDTH] IN BLOOD BY AUTOMATED COUNT: 14.1 % (ref 11.6–15.1)
GFR SERPL CREATININE-BSD FRML MDRD: 82 ML/MIN/1.73SQ M
GLUCOSE SERPL-MCNC: 90 MG/DL (ref 65–140)
HCT VFR BLD AUTO: 30.8 % (ref 34.8–46.1)
HGB BLD-MCNC: 9.6 G/DL (ref 11.5–15.4)
IMM GRANULOCYTES # BLD AUTO: 0.07 THOUSAND/UL (ref 0–0.2)
IMM GRANULOCYTES NFR BLD AUTO: 1 % (ref 0–2)
LYMPHOCYTES # BLD AUTO: 0.92 THOUSANDS/ΜL (ref 0.6–4.47)
LYMPHOCYTES NFR BLD AUTO: 9 % (ref 14–44)
MCH RBC QN AUTO: 31.8 PG (ref 26.8–34.3)
MCHC RBC AUTO-ENTMCNC: 31.2 G/DL (ref 31.4–37.4)
MCV RBC AUTO: 102 FL (ref 82–98)
MONOCYTES # BLD AUTO: 1.29 THOUSAND/ΜL (ref 0.17–1.22)
MONOCYTES NFR BLD AUTO: 13 % (ref 4–12)
NEUTROPHILS # BLD AUTO: 7.42 THOUSANDS/ΜL (ref 1.85–7.62)
NEUTS SEG NFR BLD AUTO: 76 % (ref 43–75)
NRBC BLD AUTO-RTO: 0 /100 WBCS
PLATELET # BLD AUTO: 220 THOUSANDS/UL (ref 149–390)
PMV BLD AUTO: 9.8 FL (ref 8.9–12.7)
POTASSIUM SERPL-SCNC: 3.8 MMOL/L (ref 3.5–5.3)
RBC # BLD AUTO: 3.02 MILLION/UL (ref 3.81–5.12)
SODIUM SERPL-SCNC: 135 MMOL/L (ref 136–145)
WBC # BLD AUTO: 9.78 THOUSAND/UL (ref 4.31–10.16)

## 2019-04-22 PROCEDURE — 99284 EMERGENCY DEPT VISIT MOD MDM: CPT | Performed by: EMERGENCY MEDICINE

## 2019-04-22 PROCEDURE — 80048 BASIC METABOLIC PNL TOTAL CA: CPT | Performed by: EMERGENCY MEDICINE

## 2019-04-22 PROCEDURE — 85025 COMPLETE CBC W/AUTO DIFF WBC: CPT | Performed by: EMERGENCY MEDICINE

## 2019-04-22 PROCEDURE — 99284 EMERGENCY DEPT VISIT MOD MDM: CPT

## 2019-04-22 PROCEDURE — 36415 COLL VENOUS BLD VENIPUNCTURE: CPT | Performed by: EMERGENCY MEDICINE

## 2019-04-22 PROCEDURE — 73502 X-RAY EXAM HIP UNI 2-3 VIEWS: CPT

## 2019-04-22 RX ORDER — FENTANYL CITRATE 50 UG/ML
1 INJECTION, SOLUTION INTRAMUSCULAR; INTRAVENOUS ONCE
Status: COMPLETED | OUTPATIENT
Start: 2019-04-22 | End: 2019-04-22

## 2019-04-23 ENCOUNTER — TELEPHONE (OUTPATIENT)
Dept: OBGYN CLINIC | Facility: HOSPITAL | Age: 84
End: 2019-04-23

## 2019-04-24 PROBLEM — R50.9 FEBRILE: Status: ACTIVE | Noted: 2019-04-24

## 2019-04-25 ENCOUNTER — OFFICE VISIT (OUTPATIENT)
Dept: OBGYN CLINIC | Facility: HOSPITAL | Age: 84
End: 2019-04-25

## 2019-04-25 VITALS — HEIGHT: 61 IN | BODY MASS INDEX: 35.88 KG/M2 | WEIGHT: 190.04 LBS

## 2019-04-25 DIAGNOSIS — G89.29 CHRONIC PAIN OF LOWER EXTREMITY, BILATERAL: ICD-10-CM

## 2019-04-25 DIAGNOSIS — M25.561 ARTHRALGIA OF KNEE, RIGHT: ICD-10-CM

## 2019-04-25 DIAGNOSIS — Z96.649 HISTORY OF REVISION OF TOTAL HIP ARTHROPLASTY: Primary | ICD-10-CM

## 2019-04-25 DIAGNOSIS — M79.7 FIBROMYALGIA: ICD-10-CM

## 2019-04-25 DIAGNOSIS — S32.591D PUBIC RAMUS FRACTURE, RIGHT, WITH ROUTINE HEALING, SUBSEQUENT ENCOUNTER: ICD-10-CM

## 2019-04-25 DIAGNOSIS — M79.605 CHRONIC PAIN OF LOWER EXTREMITY, BILATERAL: ICD-10-CM

## 2019-04-25 DIAGNOSIS — E66.9 OBESITY WITHOUT SERIOUS COMORBIDITY, UNSPECIFIED CLASSIFICATION, UNSPECIFIED OBESITY TYPE: ICD-10-CM

## 2019-04-25 DIAGNOSIS — M79.604 CHRONIC PAIN OF LOWER EXTREMITY, BILATERAL: ICD-10-CM

## 2019-04-25 PROCEDURE — 99024 POSTOP FOLLOW-UP VISIT: CPT | Performed by: ORTHOPAEDIC SURGERY

## 2019-04-30 PROBLEM — L24.A9 DRAINAGE FROM WOUND: Status: ACTIVE | Noted: 2019-04-30

## 2019-04-30 PROBLEM — J18.9 PNEUMONIA: Status: ACTIVE | Noted: 2019-04-30

## 2019-04-30 PROBLEM — N39.0 UTI (URINARY TRACT INFECTION): Status: ACTIVE | Noted: 2019-04-30

## 2019-05-01 ENCOUNTER — OFFICE VISIT (OUTPATIENT)
Dept: OBGYN CLINIC | Facility: HOSPITAL | Age: 84
End: 2019-05-01

## 2019-05-01 ENCOUNTER — TELEPHONE (OUTPATIENT)
Dept: OBGYN CLINIC | Facility: HOSPITAL | Age: 84
End: 2019-05-01

## 2019-05-01 VITALS
WEIGHT: 193.56 LBS | HEART RATE: 73 BPM | SYSTOLIC BLOOD PRESSURE: 133 MMHG | HEIGHT: 61 IN | BODY MASS INDEX: 36.55 KG/M2 | DIASTOLIC BLOOD PRESSURE: 84 MMHG

## 2019-05-01 DIAGNOSIS — L53.9 ERYTHEMA: Primary | ICD-10-CM

## 2019-05-01 DIAGNOSIS — Z96.649 HISTORY OF REVISION OF TOTAL HIP ARTHROPLASTY: ICD-10-CM

## 2019-05-01 PROCEDURE — 99024 POSTOP FOLLOW-UP VISIT: CPT | Performed by: ORTHOPAEDIC SURGERY

## 2019-05-01 RX ORDER — CEPHALEXIN 500 MG/1
500 CAPSULE ORAL EVERY 6 HOURS SCHEDULED
Qty: 28 CAPSULE | Refills: 0 | Status: SHIPPED | OUTPATIENT
Start: 2019-05-01 | End: 2019-05-08

## 2019-05-03 ENCOUNTER — ANTICOAG VISIT (OUTPATIENT)
Dept: INTERNAL MEDICINE CLINIC | Facility: CLINIC | Age: 84
End: 2019-05-03

## 2019-05-06 ENCOUNTER — TELEPHONE (OUTPATIENT)
Dept: OBGYN CLINIC | Facility: HOSPITAL | Age: 84
End: 2019-05-06

## 2019-05-07 ENCOUNTER — OFFICE VISIT (OUTPATIENT)
Dept: OBGYN CLINIC | Facility: HOSPITAL | Age: 84
End: 2019-05-07

## 2019-05-07 VITALS
HEIGHT: 61 IN | WEIGHT: 193.56 LBS | BODY MASS INDEX: 36.55 KG/M2 | SYSTOLIC BLOOD PRESSURE: 107 MMHG | DIASTOLIC BLOOD PRESSURE: 65 MMHG | HEART RATE: 73 BPM

## 2019-05-07 DIAGNOSIS — L24.A9 DRAINAGE FROM WOUND: Primary | ICD-10-CM

## 2019-05-07 DIAGNOSIS — Z96.649 HISTORY OF REVISION OF TOTAL HIP ARTHROPLASTY: ICD-10-CM

## 2019-05-07 PROCEDURE — 99024 POSTOP FOLLOW-UP VISIT: CPT | Performed by: ORTHOPAEDIC SURGERY

## 2019-05-07 RX ORDER — CEPHALEXIN 500 MG/1
500 CAPSULE ORAL EVERY 6 HOURS SCHEDULED
Qty: 28 CAPSULE | Refills: 0 | Status: SHIPPED | OUTPATIENT
Start: 2019-05-07 | End: 2019-05-14

## 2019-05-08 ENCOUNTER — TRANSITIONAL CARE MANAGEMENT (OUTPATIENT)
Dept: INTERNAL MEDICINE CLINIC | Facility: CLINIC | Age: 84
End: 2019-05-08

## 2019-05-08 PROBLEM — N39.0 UTI (URINARY TRACT INFECTION): Status: RESOLVED | Noted: 2019-04-30 | Resolved: 2019-05-08

## 2019-05-08 PROBLEM — R26.2 AMBULATORY DYSFUNCTION: Status: ACTIVE | Noted: 2019-05-08

## 2019-05-08 PROBLEM — J18.9 PNEUMONIA: Status: RESOLVED | Noted: 2019-04-30 | Resolved: 2019-05-08

## 2019-05-08 PROBLEM — M62.81 MUSCLE WEAKNESS: Status: ACTIVE | Noted: 2019-05-07

## 2019-05-10 ENCOUNTER — TELEPHONE (OUTPATIENT)
Dept: INTERNAL MEDICINE CLINIC | Facility: CLINIC | Age: 84
End: 2019-05-10

## 2019-05-11 ENCOUNTER — TELEPHONE (OUTPATIENT)
Dept: OTHER | Facility: OTHER | Age: 84
End: 2019-05-11

## 2019-05-13 ENCOUNTER — TELEPHONE (OUTPATIENT)
Dept: INTERNAL MEDICINE CLINIC | Facility: CLINIC | Age: 84
End: 2019-05-13

## 2019-05-13 ENCOUNTER — ANTICOAG VISIT (OUTPATIENT)
Dept: INTERNAL MEDICINE CLINIC | Facility: CLINIC | Age: 84
End: 2019-05-13

## 2019-05-13 LAB — INR PPP: 3.3 (ref 0.86–1.17)

## 2019-05-14 DIAGNOSIS — G62.9 NEUROPATHY: ICD-10-CM

## 2019-05-14 DIAGNOSIS — G89.29 OTHER CHRONIC PAIN: Primary | ICD-10-CM

## 2019-05-14 PROBLEM — G31.84 MCI (MILD COGNITIVE IMPAIRMENT): Status: ACTIVE | Noted: 2019-05-14

## 2019-05-15 RX ORDER — WARFARIN SODIUM 3 MG/1
TABLET ORAL
Refills: 0 | COMMUNITY
Start: 2019-05-10 | End: 2020-03-26 | Stop reason: SDUPTHER

## 2019-05-15 RX ORDER — TRAMADOL HYDROCHLORIDE 50 MG/1
50 TABLET ORAL EVERY 8 HOURS PRN
Qty: 30 TABLET | Refills: 0 | Status: SHIPPED | OUTPATIENT
Start: 2019-05-15 | End: 2019-05-16 | Stop reason: SDUPTHER

## 2019-05-15 RX ORDER — SUCRALFATE ORAL 1 G/10ML
1 SUSPENSION ORAL 2 TIMES DAILY
Qty: 420 ML | Refills: 0 | Status: SHIPPED | OUTPATIENT
Start: 2019-05-15 | End: 2019-05-17 | Stop reason: SDUPTHER

## 2019-05-16 ENCOUNTER — OFFICE VISIT (OUTPATIENT)
Dept: OBGYN CLINIC | Facility: HOSPITAL | Age: 84
End: 2019-05-16

## 2019-05-16 VITALS — HEART RATE: 73 BPM | SYSTOLIC BLOOD PRESSURE: 101 MMHG | DIASTOLIC BLOOD PRESSURE: 64 MMHG

## 2019-05-16 DIAGNOSIS — L24.A9 DRAINAGE FROM WOUND: Primary | ICD-10-CM

## 2019-05-16 DIAGNOSIS — G62.9 NEUROPATHY: ICD-10-CM

## 2019-05-16 DIAGNOSIS — Z96.649 HISTORY OF REVISION OF TOTAL HIP ARTHROPLASTY: ICD-10-CM

## 2019-05-16 LAB — INR PPP: 1.9 (ref 0.86–1.17)

## 2019-05-16 PROCEDURE — 99024 POSTOP FOLLOW-UP VISIT: CPT | Performed by: ORTHOPAEDIC SURGERY

## 2019-05-16 RX ORDER — TRAMADOL HYDROCHLORIDE 50 MG/1
50 TABLET ORAL EVERY 8 HOURS PRN
Qty: 30 TABLET | Refills: 0 | Status: SHIPPED | OUTPATIENT
Start: 2019-05-16 | End: 2019-06-12 | Stop reason: SDUPTHER

## 2019-05-16 RX ORDER — OXYCODONE HYDROCHLORIDE 5 MG/1
TABLET ORAL
Qty: 30 TABLET | Refills: 0 | Status: SHIPPED | OUTPATIENT
Start: 2019-05-16 | End: 2019-07-16 | Stop reason: CLARIF

## 2019-05-17 ENCOUNTER — ANTICOAG VISIT (OUTPATIENT)
Dept: INTERNAL MEDICINE CLINIC | Facility: CLINIC | Age: 84
End: 2019-05-17

## 2019-05-17 ENCOUNTER — OFFICE VISIT (OUTPATIENT)
Dept: INTERNAL MEDICINE CLINIC | Facility: CLINIC | Age: 84
End: 2019-05-17
Payer: COMMERCIAL

## 2019-05-17 VITALS
OXYGEN SATURATION: 96 % | HEART RATE: 73 BPM | DIASTOLIC BLOOD PRESSURE: 58 MMHG | SYSTOLIC BLOOD PRESSURE: 124 MMHG | HEIGHT: 61 IN | RESPIRATION RATE: 16 BRPM | BODY MASS INDEX: 35.47 KG/M2 | TEMPERATURE: 98.2 F

## 2019-05-17 DIAGNOSIS — Z96.641 STATUS POST RIGHT HIP REPLACEMENT: Primary | ICD-10-CM

## 2019-05-17 DIAGNOSIS — R10.13 EPIGASTRIC PAIN: ICD-10-CM

## 2019-05-17 PROCEDURE — 99496 TRANSJ CARE MGMT HIGH F2F 7D: CPT | Performed by: NURSE PRACTITIONER

## 2019-05-17 RX ORDER — SUCRALFATE ORAL 1 G/10ML
1 SUSPENSION ORAL 2 TIMES DAILY
Qty: 420 ML | Refills: 0 | Status: SHIPPED | OUTPATIENT
Start: 2019-05-17 | End: 2019-07-16 | Stop reason: SDUPTHER

## 2019-05-17 RX ORDER — SUCRALFATE ORAL 1 G/10ML
1 SUSPENSION ORAL 2 TIMES DAILY
Qty: 420 ML | Refills: 0 | Status: CANCELLED | OUTPATIENT
Start: 2019-05-17

## 2019-05-20 ENCOUNTER — TELEPHONE (OUTPATIENT)
Dept: OBGYN CLINIC | Facility: HOSPITAL | Age: 84
End: 2019-05-20

## 2019-05-20 LAB
FUNGUS SPEC CULT: NORMAL
FUNGUS SPEC CULT: NORMAL

## 2019-05-23 ENCOUNTER — REMOTE DEVICE CLINIC VISIT (OUTPATIENT)
Dept: CARDIOLOGY CLINIC | Facility: CLINIC | Age: 84
End: 2019-05-23
Payer: COMMERCIAL

## 2019-05-23 DIAGNOSIS — I49.5 SSS (SICK SINUS SYNDROME) (HCC): Primary | ICD-10-CM

## 2019-05-23 DIAGNOSIS — Z95.0 PRESENCE OF CARDIAC PACEMAKER: ICD-10-CM

## 2019-05-23 PROBLEM — Z96.641 STATUS POST RIGHT HIP REPLACEMENT: Status: ACTIVE | Noted: 2019-04-25

## 2019-05-23 LAB — INR PPP: 1.9 (ref 0.86–1.17)

## 2019-05-23 PROCEDURE — 93294 REM INTERROG EVL PM/LDLS PM: CPT | Performed by: INTERNAL MEDICINE

## 2019-05-23 PROCEDURE — 93296 REM INTERROG EVL PM/IDS: CPT | Performed by: INTERNAL MEDICINE

## 2019-05-23 RX ORDER — SUCRALFATE 1 G/1
1 TABLET ORAL 2 TIMES DAILY
Qty: 30 TABLET | Refills: 0 | Status: SHIPPED | OUTPATIENT
Start: 2019-05-23 | End: 2019-06-05 | Stop reason: SDUPTHER

## 2019-05-24 ENCOUNTER — TELEPHONE (OUTPATIENT)
Dept: INTERNAL MEDICINE CLINIC | Facility: CLINIC | Age: 84
End: 2019-05-24

## 2019-05-24 ENCOUNTER — ANTICOAG VISIT (OUTPATIENT)
Dept: INTERNAL MEDICINE CLINIC | Facility: CLINIC | Age: 84
End: 2019-05-24

## 2019-05-27 DIAGNOSIS — G89.29 OTHER CHRONIC PAIN: ICD-10-CM

## 2019-05-28 ENCOUNTER — TELEPHONE (OUTPATIENT)
Dept: INTERNAL MEDICINE CLINIC | Facility: CLINIC | Age: 84
End: 2019-05-28

## 2019-05-28 DIAGNOSIS — R60.0 LEG EDEMA: Primary | ICD-10-CM

## 2019-05-28 RX ORDER — DULOXETIN HYDROCHLORIDE 60 MG/1
CAPSULE, DELAYED RELEASE ORAL
Qty: 30 CAPSULE | Refills: 0 | Status: SHIPPED | OUTPATIENT
Start: 2019-05-28 | End: 2019-07-05 | Stop reason: SDUPTHER

## 2019-05-31 ENCOUNTER — APPOINTMENT (OUTPATIENT)
Dept: LAB | Facility: CLINIC | Age: 84
End: 2019-05-31
Payer: COMMERCIAL

## 2019-05-31 ENCOUNTER — TELEPHONE (OUTPATIENT)
Dept: INTERNAL MEDICINE CLINIC | Facility: CLINIC | Age: 84
End: 2019-05-31

## 2019-05-31 ENCOUNTER — ANTICOAG VISIT (OUTPATIENT)
Dept: INTERNAL MEDICINE CLINIC | Facility: CLINIC | Age: 84
End: 2019-05-31

## 2019-05-31 DIAGNOSIS — R60.0 LEG EDEMA: ICD-10-CM

## 2019-05-31 LAB
ANION GAP SERPL CALCULATED.3IONS-SCNC: 8 MMOL/L (ref 4–13)
BUN SERPL-MCNC: 16 MG/DL (ref 5–25)
CALCIUM SERPL-MCNC: 8.2 MG/DL (ref 8.3–10.1)
CHLORIDE SERPL-SCNC: 108 MMOL/L (ref 100–108)
CO2 SERPL-SCNC: 26 MMOL/L (ref 21–32)
CREAT SERPL-MCNC: 0.74 MG/DL (ref 0.6–1.3)
GFR SERPL CREATININE-BSD FRML MDRD: 74 ML/MIN/1.73SQ M
GLUCOSE P FAST SERPL-MCNC: 81 MG/DL (ref 65–99)
POTASSIUM SERPL-SCNC: 4.5 MMOL/L (ref 3.5–5.3)
SODIUM SERPL-SCNC: 142 MMOL/L (ref 136–145)

## 2019-05-31 PROCEDURE — 80048 BASIC METABOLIC PNL TOTAL CA: CPT

## 2019-06-04 ENCOUNTER — TELEPHONE (OUTPATIENT)
Dept: INTERNAL MEDICINE CLINIC | Facility: CLINIC | Age: 84
End: 2019-06-04

## 2019-06-04 ENCOUNTER — TELEPHONE (OUTPATIENT)
Dept: OBGYN CLINIC | Facility: HOSPITAL | Age: 84
End: 2019-06-04

## 2019-06-04 LAB
MYCOBACTERIUM SPEC CULT: NORMAL
MYCOBACTERIUM SPEC CULT: NORMAL
RHODAMINE-AURAMINE STN SPEC: NORMAL
RHODAMINE-AURAMINE STN SPEC: NORMAL

## 2019-06-05 DIAGNOSIS — E83.51 HYPOCALCEMIA: Primary | ICD-10-CM

## 2019-06-05 DIAGNOSIS — R10.13 EPIGASTRIC PAIN: ICD-10-CM

## 2019-06-05 RX ORDER — SUCRALFATE 1 G/1
TABLET ORAL
Qty: 30 TABLET | Refills: 0 | Status: SHIPPED | OUTPATIENT
Start: 2019-06-05 | End: 2019-07-03 | Stop reason: SDUPTHER

## 2019-06-06 ENCOUNTER — TELEPHONE (OUTPATIENT)
Dept: INTERNAL MEDICINE CLINIC | Facility: CLINIC | Age: 84
End: 2019-06-06

## 2019-06-11 ENCOUNTER — HOSPITAL ENCOUNTER (OUTPATIENT)
Dept: RADIOLOGY | Facility: HOSPITAL | Age: 84
Discharge: HOME | End: 2019-06-11
Attending: ORTHOPAEDIC SURGERY
Payer: COMMERCIAL

## 2019-06-11 ENCOUNTER — OFFICE VISIT (OUTPATIENT)
Dept: OBGYN CLINIC | Facility: HOSPITAL | Age: 84
End: 2019-06-11

## 2019-06-11 VITALS
BODY MASS INDEX: 35.45 KG/M2 | HEIGHT: 61 IN | HEART RATE: 79 BPM | SYSTOLIC BLOOD PRESSURE: 146 MMHG | DIASTOLIC BLOOD PRESSURE: 81 MMHG

## 2019-06-11 DIAGNOSIS — Z47.1 AFTERCARE FOLLOWING RIGHT HIP JOINT REPLACEMENT SURGERY: Primary | ICD-10-CM

## 2019-06-11 DIAGNOSIS — Z96.641 AFTERCARE FOLLOWING RIGHT HIP JOINT REPLACEMENT SURGERY: ICD-10-CM

## 2019-06-11 DIAGNOSIS — Z96.641 AFTERCARE FOLLOWING RIGHT HIP JOINT REPLACEMENT SURGERY: Primary | ICD-10-CM

## 2019-06-11 DIAGNOSIS — Z47.1 AFTERCARE FOLLOWING RIGHT HIP JOINT REPLACEMENT SURGERY: ICD-10-CM

## 2019-06-11 PROCEDURE — 73502 X-RAY EXAM HIP UNI 2-3 VIEWS: CPT

## 2019-06-11 PROCEDURE — 99024 POSTOP FOLLOW-UP VISIT: CPT | Performed by: ORTHOPAEDIC SURGERY

## 2019-06-12 DIAGNOSIS — G62.9 NEUROPATHY: ICD-10-CM

## 2019-06-13 ENCOUNTER — LAB REQUISITION (OUTPATIENT)
Dept: LAB | Facility: HOSPITAL | Age: 84
End: 2019-06-13
Payer: COMMERCIAL

## 2019-06-13 DIAGNOSIS — E83.51 HYPOCALCEMIA: ICD-10-CM

## 2019-06-13 LAB
CALCIUM SERPL-MCNC: 8.6 MG/DL (ref 8.3–10.1)
CREAT UR-MCNC: 57.4 MG/DL
INR PPP: 2.05 (ref 0.86–1.17)
MICROALBUMIN UR-MCNC: 11.5 MG/L (ref 0–20)
MICROALBUMIN/CREAT 24H UR: 20 MG/G CREATININE (ref 0–30)
PROTHROMBIN TIME: 22.5 SECONDS (ref 11.8–14.2)

## 2019-06-13 PROCEDURE — 85610 PROTHROMBIN TIME: CPT | Performed by: INTERNAL MEDICINE

## 2019-06-13 PROCEDURE — 82310 ASSAY OF CALCIUM: CPT | Performed by: INTERNAL MEDICINE

## 2019-06-13 PROCEDURE — 82570 ASSAY OF URINE CREATININE: CPT | Performed by: INTERNAL MEDICINE

## 2019-06-13 PROCEDURE — 82043 UR ALBUMIN QUANTITATIVE: CPT | Performed by: INTERNAL MEDICINE

## 2019-06-18 RX ORDER — TRAMADOL HYDROCHLORIDE 50 MG/1
50 TABLET ORAL EVERY 8 HOURS PRN
Qty: 30 TABLET | Refills: 0 | Status: SHIPPED | OUTPATIENT
Start: 2019-06-18 | End: 2019-07-19 | Stop reason: SDUPTHER

## 2019-06-20 ENCOUNTER — TELEPHONE (OUTPATIENT)
Dept: INTERNAL MEDICINE CLINIC | Facility: CLINIC | Age: 84
End: 2019-06-20

## 2019-06-20 DIAGNOSIS — I48.20 CHRONIC ATRIAL FIBRILLATION (HCC): Primary | ICD-10-CM

## 2019-06-26 NOTE — ASSESSMENT & PLAN NOTE
Clara Stallings is a 55 year old female presenting with   Chief Complaint   Patient presents with   • Establish Care   • Hospital F/U     [facility], Continue Cymbalta

## 2019-06-27 ENCOUNTER — OFFICE VISIT (OUTPATIENT)
Dept: INTERNAL MEDICINE CLINIC | Facility: CLINIC | Age: 84
End: 2019-06-27
Payer: COMMERCIAL

## 2019-06-27 VITALS
DIASTOLIC BLOOD PRESSURE: 80 MMHG | OXYGEN SATURATION: 98 % | HEIGHT: 61 IN | BODY MASS INDEX: 35.19 KG/M2 | WEIGHT: 186.4 LBS | SYSTOLIC BLOOD PRESSURE: 148 MMHG | HEART RATE: 89 BPM

## 2019-06-27 DIAGNOSIS — Z96.641 STATUS POST RIGHT HIP REPLACEMENT: ICD-10-CM

## 2019-06-27 DIAGNOSIS — I48.20 ATRIAL FIBRILLATION, CHRONIC (HCC): ICD-10-CM

## 2019-06-27 DIAGNOSIS — N90.89 VULVAR IRRITATION: ICD-10-CM

## 2019-06-27 DIAGNOSIS — I10 BENIGN ESSENTIAL HYPERTENSION: ICD-10-CM

## 2019-06-27 DIAGNOSIS — I48.20 CHRONIC ATRIAL FIBRILLATION (HCC): ICD-10-CM

## 2019-06-27 DIAGNOSIS — L03.115 CELLULITIS OF RIGHT LOWER EXTREMITY: Primary | ICD-10-CM

## 2019-06-27 PROBLEM — M25.551 PAIN OF RIGHT HIP JOINT: Status: ACTIVE | Noted: 2019-06-27

## 2019-06-27 PROCEDURE — 99214 OFFICE O/P EST MOD 30 MIN: CPT | Performed by: INTERNAL MEDICINE

## 2019-06-27 PROCEDURE — 1160F RVW MEDS BY RX/DR IN RCRD: CPT | Performed by: INTERNAL MEDICINE

## 2019-06-27 RX ORDER — DESOXIMETASONE 2.5 MG/G
CREAM TOPICAL AS NEEDED
Qty: 30 G | Refills: 0 | Status: SHIPPED | OUTPATIENT
Start: 2019-06-27 | End: 2020-05-01 | Stop reason: SDUPTHER

## 2019-06-27 RX ORDER — DOXYCYCLINE 100 MG/1
100 CAPSULE ORAL 2 TIMES DAILY
Qty: 20 CAPSULE | Refills: 0 | Status: SHIPPED | OUTPATIENT
Start: 2019-06-27 | End: 2019-07-07

## 2019-06-28 ENCOUNTER — TELEPHONE (OUTPATIENT)
Dept: INTERNAL MEDICINE CLINIC | Facility: CLINIC | Age: 84
End: 2019-06-28

## 2019-06-28 PROBLEM — L03.115 CELLULITIS OF RIGHT LOWER EXTREMITY: Status: ACTIVE | Noted: 2019-06-28

## 2019-06-28 PROBLEM — I48.20 ATRIAL FIBRILLATION, CHRONIC (HCC): Status: ACTIVE | Noted: 2019-06-28

## 2019-06-28 NOTE — TELEPHONE ENCOUNTER
SL visiting nurse is asking when Linad Reddy next INR should be done  Patient said she was told to wait until next week because of starting antibiotics      Please advise

## 2019-07-02 ENCOUNTER — TELEPHONE (OUTPATIENT)
Dept: INTERNAL MEDICINE CLINIC | Facility: CLINIC | Age: 84
End: 2019-07-02

## 2019-07-02 ENCOUNTER — ANTICOAG VISIT (OUTPATIENT)
Dept: INTERNAL MEDICINE CLINIC | Facility: CLINIC | Age: 84
End: 2019-07-02

## 2019-07-02 LAB — INR PPP: 1.7 (ref 0.84–1.19)

## 2019-07-02 NOTE — TELEPHONE ENCOUNTER
Spoke to Dr Jerald Solano and advised message  The patient was instructed to increase her Torsemide from 10 mg to 20 mg as she was previously taking for the edema  Regarding the incision drainage, the patient notes that it has decreased significantly since starting the antibiotic  I did inform her to keep an eye on it till tomorrow and if she was still having the drainage, she was told to call Dr Stalin Salgado office to let them know and make a sooner appointment since she is not scheduled until 7/9/19  She was also advised to the ER if unable to make a sooner appointment for possible IV antibiotics  The patient agreed and follow instructions  Ursula BHATT) was also notified of all above information

## 2019-07-02 NOTE — TELEPHONE ENCOUNTER
Ursula from the [facility] called in regards to patient  She states the patient is having increased swelling to the right leg and also reported a yellow/green discharge at her right hip incision site over the weekend  Jory Song states that the drainage is currently just yellow and she is afebrile  The patient did not start the Doxycycline until Sunday  Please advise

## 2019-07-03 DIAGNOSIS — R10.13 EPIGASTRIC PAIN: ICD-10-CM

## 2019-07-03 RX ORDER — SUCRALFATE 1 G/1
TABLET ORAL
Qty: 30 TABLET | Refills: 0 | Status: SHIPPED | OUTPATIENT
Start: 2019-07-03 | End: 2019-07-21 | Stop reason: SDUPTHER

## 2019-07-05 ENCOUNTER — TELEPHONE (OUTPATIENT)
Dept: INTERNAL MEDICINE CLINIC | Facility: CLINIC | Age: 84
End: 2019-07-05

## 2019-07-05 DIAGNOSIS — G89.29 OTHER CHRONIC PAIN: ICD-10-CM

## 2019-07-05 NOTE — TELEPHONE ENCOUNTER
The patient was into see you last week  You had put her on doxycycline  The patient feels she is allergic to the medication  She was vomiting, has stomach pain and the hive  Since then the patient stopped the medication  How would you like to have the patient proceed? Song advise  Thank you  Jenny Maxwell is aware of this

## 2019-07-05 NOTE — TELEPHONE ENCOUNTER
Spoke to patient and advised to stop Doxycycline per Dr Suzette Nam and follow up with Dr Gee Rubio on Tuesday 7/9/19and our office on Wednesday 7/10/19  Patient states that the drainage from the incision has pretty much stopped

## 2019-07-05 NOTE — TELEPHONE ENCOUNTER
Spoke to patient regarding message  She states that she started the medication on Sunday and was able to take it that day, Monday and Tuesday morning but not the evening dose on Tuesday  She had abdominal pain, nausea, vomiting and hives  She discontinued as of Tuesday night and was unable to take any of her oral medications until last night  She notes that she feels 100% better but will not be taking any more of the Doxycycline  She is scheduled for a follow up appointment on 7/10/19  Is there anything that you want her to do in the meantime? Please advise

## 2019-07-07 DIAGNOSIS — E03.9 HYPOTHYROIDISM, UNSPECIFIED TYPE: ICD-10-CM

## 2019-07-07 RX ORDER — DULOXETIN HYDROCHLORIDE 60 MG/1
CAPSULE, DELAYED RELEASE ORAL
Qty: 30 CAPSULE | Refills: 0 | Status: SHIPPED | OUTPATIENT
Start: 2019-07-07 | End: 2019-07-31 | Stop reason: SDUPTHER

## 2019-07-08 RX ORDER — LEVOTHYROXINE SODIUM 0.05 MG/1
TABLET ORAL
Qty: 30 TABLET | Refills: 2 | Status: SHIPPED | OUTPATIENT
Start: 2019-07-08 | End: 2019-11-03 | Stop reason: SDUPTHER

## 2019-07-09 ENCOUNTER — APPOINTMENT (OUTPATIENT)
Dept: LAB | Facility: HOSPITAL | Age: 84
End: 2019-07-09
Attending: ORTHOPAEDIC SURGERY
Payer: COMMERCIAL

## 2019-07-09 ENCOUNTER — HOSPITAL ENCOUNTER (OUTPATIENT)
Dept: RADIOLOGY | Facility: HOSPITAL | Age: 84
Discharge: HOME | End: 2019-07-09
Attending: ORTHOPAEDIC SURGERY
Payer: COMMERCIAL

## 2019-07-09 ENCOUNTER — ANTICOAG VISIT (OUTPATIENT)
Dept: INTERNAL MEDICINE CLINIC | Facility: CLINIC | Age: 84
End: 2019-07-09

## 2019-07-09 ENCOUNTER — OFFICE VISIT (OUTPATIENT)
Dept: OBGYN CLINIC | Facility: HOSPITAL | Age: 84
End: 2019-07-09
Payer: COMMERCIAL

## 2019-07-09 ENCOUNTER — PREP FOR PROCEDURE (OUTPATIENT)
Dept: OBGYN CLINIC | Facility: HOSPITAL | Age: 84
End: 2019-07-09

## 2019-07-09 ENCOUNTER — TELEPHONE (OUTPATIENT)
Dept: INTERNAL MEDICINE CLINIC | Facility: CLINIC | Age: 84
End: 2019-07-09

## 2019-07-09 VITALS
BODY MASS INDEX: 35.12 KG/M2 | SYSTOLIC BLOOD PRESSURE: 142 MMHG | DIASTOLIC BLOOD PRESSURE: 80 MMHG | WEIGHT: 186 LBS | HEIGHT: 61 IN | HEART RATE: 93 BPM

## 2019-07-09 DIAGNOSIS — Z47.1 AFTERCARE FOLLOWING RIGHT HIP JOINT REPLACEMENT SURGERY: ICD-10-CM

## 2019-07-09 DIAGNOSIS — Z47.1 AFTERCARE FOLLOWING RIGHT HIP JOINT REPLACEMENT SURGERY: Primary | ICD-10-CM

## 2019-07-09 DIAGNOSIS — Z96.641 AFTERCARE FOLLOWING RIGHT HIP JOINT REPLACEMENT SURGERY: ICD-10-CM

## 2019-07-09 DIAGNOSIS — L91.0 HYPERTROPHIC SCAR OF SKIN: ICD-10-CM

## 2019-07-09 DIAGNOSIS — G89.29 CHRONIC PAIN OF RIGHT KNEE: ICD-10-CM

## 2019-07-09 DIAGNOSIS — M70.51 PES ANSERINUS BURSITIS OF RIGHT KNEE: ICD-10-CM

## 2019-07-09 DIAGNOSIS — M25.561 CHRONIC PAIN OF RIGHT KNEE: ICD-10-CM

## 2019-07-09 DIAGNOSIS — Z96.641 AFTERCARE FOLLOWING RIGHT HIP JOINT REPLACEMENT SURGERY: Primary | ICD-10-CM

## 2019-07-09 LAB
ANION GAP SERPL CALCULATED.3IONS-SCNC: 9 MMOL/L (ref 4–13)
BUN SERPL-MCNC: 16 MG/DL (ref 5–25)
CALCIUM SERPL-MCNC: 8.5 MG/DL (ref 8.3–10.1)
CHLORIDE SERPL-SCNC: 107 MMOL/L (ref 100–108)
CO2 SERPL-SCNC: 25 MMOL/L (ref 21–32)
CREAT SERPL-MCNC: 0.65 MG/DL (ref 0.6–1.3)
ERYTHROCYTE [DISTWIDTH] IN BLOOD BY AUTOMATED COUNT: 15.8 % (ref 11.6–15.1)
GFR SERPL CREATININE-BSD FRML MDRD: 81 ML/MIN/1.73SQ M
GLUCOSE SERPL-MCNC: 94 MG/DL (ref 65–140)
HCT VFR BLD AUTO: 35.4 % (ref 34.8–46.1)
HGB BLD-MCNC: 10.6 G/DL (ref 11.5–15.4)
INR PPP: 2 (ref 0.84–1.19)
MCH RBC QN AUTO: 29.9 PG (ref 26.8–34.3)
MCHC RBC AUTO-ENTMCNC: 29.9 G/DL (ref 31.4–37.4)
MCV RBC AUTO: 100 FL (ref 82–98)
PLATELET # BLD AUTO: 217 THOUSANDS/UL (ref 149–390)
PMV BLD AUTO: 9.9 FL (ref 8.9–12.7)
POTASSIUM SERPL-SCNC: 4 MMOL/L (ref 3.5–5.3)
RBC # BLD AUTO: 3.55 MILLION/UL (ref 3.81–5.12)
SODIUM SERPL-SCNC: 141 MMOL/L (ref 136–145)
WBC # BLD AUTO: 7.34 THOUSAND/UL (ref 4.31–10.16)

## 2019-07-09 PROCEDURE — 73502 X-RAY EXAM HIP UNI 2-3 VIEWS: CPT

## 2019-07-09 PROCEDURE — 36415 COLL VENOUS BLD VENIPUNCTURE: CPT

## 2019-07-09 PROCEDURE — 20610 DRAIN/INJ JOINT/BURSA W/O US: CPT | Performed by: ORTHOPAEDIC SURGERY

## 2019-07-09 PROCEDURE — 85027 COMPLETE CBC AUTOMATED: CPT

## 2019-07-09 PROCEDURE — 80048 BASIC METABOLIC PNL TOTAL CA: CPT

## 2019-07-09 PROCEDURE — 99024 POSTOP FOLLOW-UP VISIT: CPT | Performed by: ORTHOPAEDIC SURGERY

## 2019-07-09 RX ORDER — CEPHALEXIN 500 MG/1
500 CAPSULE ORAL EVERY 6 HOURS SCHEDULED
Qty: 40 CAPSULE | Refills: 0 | Status: SHIPPED | OUTPATIENT
Start: 2019-07-09 | End: 2019-07-19

## 2019-07-09 RX ORDER — BETAMETHASONE SODIUM PHOSPHATE AND BETAMETHASONE ACETATE 3; 3 MG/ML; MG/ML
12 INJECTION, SUSPENSION INTRA-ARTICULAR; INTRALESIONAL; INTRAMUSCULAR; SOFT TISSUE
Status: COMPLETED | OUTPATIENT
Start: 2019-07-09 | End: 2019-07-09

## 2019-07-09 RX ORDER — LIDOCAINE HYDROCHLORIDE 10 MG/ML
2 INJECTION, SOLUTION INFILTRATION; PERINEURAL
Status: COMPLETED | OUTPATIENT
Start: 2019-07-09 | End: 2019-07-09

## 2019-07-09 RX ORDER — BUPIVACAINE HYDROCHLORIDE 2.5 MG/ML
2 INJECTION, SOLUTION INFILTRATION; PERINEURAL
Status: COMPLETED | OUTPATIENT
Start: 2019-07-09 | End: 2019-07-09

## 2019-07-09 RX ADMIN — BUPIVACAINE HYDROCHLORIDE 2 ML: 2.5 INJECTION, SOLUTION INFILTRATION; PERINEURAL at 17:05

## 2019-07-09 RX ADMIN — LIDOCAINE HYDROCHLORIDE 2 ML: 10 INJECTION, SOLUTION INFILTRATION; PERINEURAL at 17:05

## 2019-07-09 RX ADMIN — BETAMETHASONE SODIUM PHOSPHATE AND BETAMETHASONE ACETATE 12 MG: 3; 3 INJECTION, SUSPENSION INTRA-ARTICULAR; INTRALESIONAL; INTRAMUSCULAR; SOFT TISSUE at 17:05

## 2019-07-09 NOTE — TELEPHONE ENCOUNTER
I spoke with Mable Padilla from the [facility] today The patients INR is 2 0, I placed the INR on a yellow sheet  Per Mable Padilla, The patient is having drainage it is intermittent and  yellow/green, no fever, no redness  The patient see's Dr Estefania Padilla today and you on Wednesday  Dr Julia Malone, Please order PT/INR as the patient is no longer having PT/INR's drawn at home  The patient does go to [facility] for her blood work

## 2019-07-09 NOTE — H&P (VIEW-ONLY)
Subjective;    59-year-old adult female patient now 3 months status post acetabular revision involving right total hip prosthesis which was painful, and would dislocate  She continues to have drainage from the incision of her lateral thigh  She presents the office accompanied by family members who state that her PCP has recommended cultures    She relates a recent administration of doxycycline that was stops secondary to her reaction  There are no laboratories of note that are looking for inflammation or infection noted on her chart  She does not have shaking chills rigors a temperature intolerance of food or difficulty and change elimination  X-rays of the right hip were performed on arrival to the office    Past Medical History:   Diagnosis Date    Anemia     Arthritis     [facility] [address] [facility]     [facility]     Disease of thyroid gland     Disturbance of smell and taste     disturbance of taste    Effusion of knee joint right     Fibromyalgia     Heart murmur     reported a previous heart murmur    History of acute myocardial infarction     History of atrial fibrillation     History of bruising easily     History of cancer     History of dermatitis     History of mammogram     History of methicillin resistant staphylococcus aureus (MRSA)     Negative nasal culture, isolation discontinued 8/17/2018      History of methicillin resistant staphylococcus aureus (MRSA) 08/17/2018    negative nasal culture-isolation and hx discontinued 8/17/2018    History of obesity     History of osteopenia     History of sciatica     History of shortness of breath     History of sinusitis     History of sore throat     History of syncope     History of umbilical hernia     History of viral infection     Irritable bowel syndrome (IBS)     Joint pain, hip     Limb pain     Myalgia     myalgia and myositis    Need for prophylactic vaccination against single diseases     Need for prophylactic vaccination and inoculation against influenza     Preoperative cardiovascular examination     Primary osteoarthritis of right knee     Right leg pain     Vaginal Pap smear     reported pap smear       Past Surgical History:   Procedure Laterality Date    ANKLE ARTHRODESIS Right     BACK SURGERY      BARIATRIC SURGERY      CHOLECYSTECTOMY      x2    ESOPHAGOGASTRODUODENOSCOPY N/A 3/23/2018    Procedure: ESOPHAGOGASTRODUODENOSCOPY (EGD); Surgeon: Bridgette Harvey MD;  [facility];   Service: Gastroenterology    FOOT SURGERY      HIP CLOSE REDUCTION Right 8/21/2016    Procedure: CLOSED REDUCTION RIGHT TOTAL HIP;  Surgeon: Lucero Hernandez MD;  [facility];  [facility]    [facility] [address] [place] [address]      x2    [place] REVISE TOTAL HIP REPLACEMENT Right 4/15/2019    Procedure: ARTHROPLASTY HIP TOTAL ACETABULAR REVISION;  Surgeon: Beata Taveras MD;  [facility];  Service: Orthopedics    REPLACEMENT TOTAL KNEE Right     SILVANA-EN-Y PROCEDURE      x2    TOTAL KNEE ARTHROPLASTY      UMBILICAL HERNIA REPAIR      x2       Family History   Problem Relation Age of Onset    Coronary artery disease Mother     Heart attack Mother     Hypertension Mother     Coronary artery disease Father     Heart attack Father     Hypertension Father     Lymphoma Sister         acute    Rashes / Skin problems Sister         lymphomatoid papulosis    Coronary artery disease Brother     Heart attack Brother         x2    Aneurysm Neg Hx     Hyperlipidemia Neg Hx        Social History     Tobacco Use    Smoking status: Never Smoker    Smokeless tobacco: Never Used   Substance Use Topics    Alcohol use: Not Currently     Frequency: Never    Drug use: No     Exam;    Adult female in no acute distress  HEENT; wears glasses  Neck; no bruits  Chest; CTA  CVS; RRR  Abd , nontender    This lady's exam was done seated as well as standing  With the use of a walker and 2 male individuals,   She stood in place her trousers lowered and the full length of the incision of the right thigh able to be inspected  There is exuberant granulation tissue noted in the incisional line as well as serosanguineous drainage that occurs without odor and without gross purulence  Her right knee, has significant medial greater than lateral compartment tenderness  The right knee has no warmth no redness no fluid collection or effusion  She also has significant discomfort of focal to the pes anserine bursa right leg  X-rays;   right hip series shows revision total hip components appropriately placed,   As well as healing of fractures of the ramus and sacrum  Large joint arthrocentesis: R knee  Date/Time: 7/9/2019 5:05 PM  Consent given by: patient  Supporting Documentation  Indications: pain   Procedure Details  Location: knee - R knee  Needle size: 22 G  Ultrasound guidance: no  Medications administered: 2 mL bupivacaine 0 25 %; 2 mL lidocaine 1 %; 12 mg betamethasone acetate-betamethasone sodium phosphate 6 (3-3) mg/mL      Large joint arthrocentesis: R knee  Date/Time: 7/9/2019 5:05 PM  Consent given by: patient  Supporting Documentation  Indications: pain   Procedure Details  Location: knee - R knee (Pes Bursa)  Needle size: 22 G  Medications administered: 2 mL bupivacaine 0 25 %; 2 mL lidocaine 1 %; 12 mg betamethasone acetate-betamethasone sodium phosphate 6 (3-3) mg/mL          Impression; History of revision right total hip arthroplasty  Ongoing drainage and hypertrophic scar right lateral thigh incision  Right knee arthralgia  Right pes anserine bursitis    Plan;    She was offered and electively consented to surgical scar revision, and treatment as necessary for her right thigh    The perioperative period and its in tensions were discussed in detail by the attending surgeon with all who were in attendance in the room     Additionally she desired symptomatic relief of her knee pain  She was provided injections focal to the right knee joint as well as right pes anserine bursa  She will next be seen in the office as a postoperative patient after right lateral thigh scar revision    Her entire exam was provided by and plan formulated by the attending surgeon it was my privilege to assist him in its delivery

## 2019-07-09 NOTE — PROGRESS NOTES
Subjective;    40-year-old adult female patient now 3 months status post acetabular revision involving right total hip prosthesis which was painful, and would dislocate  She continues to have drainage from the incision of her lateral thigh  She presents the office accompanied by family members who state that her PCP has recommended cultures    She relates a recent administration of doxycycline that was stops secondary to her reaction  There are no laboratories of note that are looking for inflammation or infection noted on her chart  She does not have shaking chills rigors a temperature intolerance of food or difficulty and change elimination  X-rays of the right hip were performed on arrival to the office    Past Medical History:   Diagnosis Date    Anemia     Arthritis     [facility] [address] [facility]     [facility]     Disease of thyroid gland     Disturbance of smell and taste     disturbance of taste    Effusion of knee joint right     Fibromyalgia     Heart murmur     reported a previous heart murmur    History of acute myocardial infarction     History of atrial fibrillation     History of bruising easily     History of cancer     History of dermatitis     History of mammogram     History of methicillin resistant staphylococcus aureus (MRSA)     Negative nasal culture, isolation discontinued 8/17/2018      History of methicillin resistant staphylococcus aureus (MRSA) 08/17/2018    negative nasal culture-isolation and hx discontinued 8/17/2018    History of obesity     History of osteopenia     History of sciatica     History of shortness of breath     History of sinusitis     History of sore throat     History of syncope     History of umbilical hernia     History of viral infection     Irritable bowel syndrome (IBS)     Joint pain, hip     Limb pain     Myalgia     myalgia and myositis    Need for prophylactic vaccination against single diseases     Need for prophylactic vaccination and inoculation against influenza     Preoperative cardiovascular examination     Primary osteoarthritis of right knee     Right leg pain     Vaginal Pap smear     reported pap smear       Past Surgical History:   Procedure Laterality Date    ANKLE ARTHRODESIS Right     BACK SURGERY      BARIATRIC SURGERY      CHOLECYSTECTOMY      x2    ESOPHAGOGASTRODUODENOSCOPY N/A 3/23/2018    Procedure: ESOPHAGOGASTRODUODENOSCOPY (EGD); Surgeon: Brenna Romero MD;  [facility];   Service: Gastroenterology    FOOT SURGERY      HIP CLOSE REDUCTION Right 8/21/2016    Procedure: CLOSED REDUCTION RIGHT TOTAL HIP;  Surgeon: Manuel Rosado MD;  [facility];  Service:    Edgar Lopez / Janie Calderon / [address] [place] [address]      x2    IN REVISE TOTAL HIP REPLACEMENT Right 4/15/2019    Procedure: ARTHROPLASTY HIP TOTAL ACETABULAR REVISION;  Surgeon: Kwame Barba MD;  [facility];  Service: Orthopedics    REPLACEMENT TOTAL KNEE Right     SILVANA-EN-Y PROCEDURE      x2    TOTAL KNEE ARTHROPLASTY      UMBILICAL HERNIA REPAIR      x2       Family History   Problem Relation Age of Onset    Coronary artery disease Mother     Heart attack Mother     Hypertension Mother     Coronary artery disease Father     Heart attack Father     Hypertension Father     Lymphoma Sister         acute    Rashes / Skin problems Sister         lymphomatoid papulosis    Coronary artery disease Brother     Heart attack Brother         x2    Aneurysm Neg Hx     Hyperlipidemia Neg Hx        Social History     Tobacco Use    Smoking status: Never Smoker    Smokeless tobacco: Never Used   Substance Use Topics    Alcohol use: Not Currently     Frequency: Never    Drug use: No     Exam;    Adult female in no acute distress  HEENT; wears glasses  Neck; no bruits  Chest; CTA  CVS; RRR  Abd , nontender    This lady's exam was done seated as well as standing  With the use of a walker and 2 male individuals,   She stood in place her trousers lowered and the full length of the incision of the right thigh able to be inspected  There is exuberant granulation tissue noted in the incisional line as well as serosanguineous drainage that occurs without odor and without gross purulence  Her right knee, has significant medial greater than lateral compartment tenderness  The right knee has no warmth no redness no fluid collection or effusion  She also has significant discomfort of focal to the pes anserine bursa right leg  X-rays;   right hip series shows revision total hip components appropriately placed,   As well as healing of fractures of the ramus and sacrum  Large joint arthrocentesis: R knee  Date/Time: 7/9/2019 5:05 PM  Consent given by: patient  Supporting Documentation  Indications: pain   Procedure Details  Location: knee - R knee  Needle size: 22 G  Ultrasound guidance: no  Medications administered: 2 mL bupivacaine 0 25 %; 2 mL lidocaine 1 %; 12 mg betamethasone acetate-betamethasone sodium phosphate 6 (3-3) mg/mL      Large joint arthrocentesis: R knee  Date/Time: 7/9/2019 5:05 PM  Consent given by: patient  Supporting Documentation  Indications: pain   Procedure Details  Location: knee - R knee (Pes Bursa)  Needle size: 22 G  Medications administered: 2 mL bupivacaine 0 25 %; 2 mL lidocaine 1 %; 12 mg betamethasone acetate-betamethasone sodium phosphate 6 (3-3) mg/mL          Impression; History of revision right total hip arthroplasty  Ongoing drainage and hypertrophic scar right lateral thigh incision  Right knee arthralgia  Right pes anserine bursitis    Plan;    She was offered and electively consented to surgical scar revision, and treatment as necessary for her right thigh    The perioperative period and its in tensions were discussed in detail by the attending surgeon with all who were in attendance in the room     Additionally she desired symptomatic relief of her knee pain  She was provided injections focal to the right knee joint as well as right pes anserine bursa  She will next be seen in the office as a postoperative patient after right lateral thigh scar revision    Her entire exam was provided by and plan formulated by the attending surgeon it was my privilege to assist him in its delivery

## 2019-07-10 ENCOUNTER — OFFICE VISIT (OUTPATIENT)
Dept: INTERNAL MEDICINE CLINIC | Facility: CLINIC | Age: 84
End: 2019-07-10
Payer: COMMERCIAL

## 2019-07-10 VITALS
DIASTOLIC BLOOD PRESSURE: 82 MMHG | HEART RATE: 81 BPM | OXYGEN SATURATION: 98 % | HEIGHT: 61 IN | WEIGHT: 184 LBS | BODY MASS INDEX: 34.74 KG/M2 | RESPIRATION RATE: 16 BRPM | SYSTOLIC BLOOD PRESSURE: 118 MMHG

## 2019-07-10 DIAGNOSIS — Z01.818 PREOP EXAM FOR INTERNAL MEDICINE: ICD-10-CM

## 2019-07-10 DIAGNOSIS — I48.20 ATRIAL FIBRILLATION, CHRONIC (HCC): ICD-10-CM

## 2019-07-10 DIAGNOSIS — I10 BENIGN ESSENTIAL HYPERTENSION: Primary | ICD-10-CM

## 2019-07-10 PROCEDURE — 99213 OFFICE O/P EST LOW 20 MIN: CPT | Performed by: INTERNAL MEDICINE

## 2019-07-10 NOTE — PROGRESS NOTES
Assessment/Plan:    Benign essential hypertension  Hypertension - controlled, I have counseled patient following healthy balance diet, I would like the patient reduce sodium, exercise routinely, I would like the patient continued the med current medical regiment and we will continue to monitor  Atrial fibrillation, chronic ([address] )  Rate controlled and anticoagulated patient will be seeing Cardiology for cardiac clearance 3 upcoming surgery I did suggest a 5 day hold of Coumadin prior to the operation but she will be checking with Cardiology  Preop exam for internal medicine  Medically stable doing well she will be undergoing operation in the near future via Orthopedics she will 1st get a cardiac clearance I would like her to hold the Coumadin for 5 days prior to the operation if okay with Cardiology  Patient will be undergoing wound revision it appears she may have developed an infection she is currently on Keflex and tolerating well she was unable to tolerate doxycycline  Problem List Items Addressed This Visit        Cardiovascular and Mediastinum    Benign essential hypertension - Primary     Hypertension - controlled, I have counseled patient following healthy balance diet, I would like the patient reduce sodium, exercise routinely, I would like the patient continued the med current medical regiment and we will continue to monitor  Atrial fibrillation, chronic ([address] )     Rate controlled and anticoagulated patient will be seeing Cardiology for cardiac clearance 3 upcoming surgery I did suggest a 5 day hold of Coumadin prior to the operation but she will be checking with Cardiology  Other    Preop exam for internal medicine     Medically stable doing well she will be undergoing operation in the near future via Orthopedics she will 1st get a cardiac clearance I would like her to hold the Coumadin for 5 days prior to the operation if okay with Cardiology    Patient will be undergoing wound revision it appears she may have developed an infection she is currently on Keflex and tolerating well she was unable to tolerate doxycycline  Call after hospitalization to set up a transition care management visit call if any problems  Subjective:      Patient ID: Susie Nageotte is a 80 y o  female  HPI preop evaluation requested by Orthopedics for surgical scar revision  The patient did not tolerate doxycycline and now on Keflex  Patient had developed hives and also GI symptoms  She reports me a yellow discharge from the wound she will need a surgical scar revision and is here for preop clearance  The patient does report me no chest pain no shortness of breath no fever no chills  She reports me she can tolerate anesthesia no history of blood clots she will be seeing Cardiology for cardiac clearance  She reports me that she tolerated her prior orthopedic surgical arthroplasty without any problems recently  The following portions of the patient's history were reviewed and updated as appropriate: allergies, current medications, past family history, past medical history, past social history, past surgical history and problem list     Review of Systems   Constitutional: Negative for activity change, appetite change and unexpected weight change  HENT: Negative for congestion and postnasal drip  Eyes: Negative for visual disturbance  Respiratory: Negative for cough and shortness of breath  Cardiovascular: Negative for chest pain  Gastrointestinal: Negative for abdominal pain, diarrhea, nausea and vomiting  Neurological: Negative for dizziness, light-headedness and headaches  Hematological: Negative for adenopathy  Objective:    No follow-ups on file  No results found    Recent Results (from the past 20742 hour(s))   POCT ECG    Impression    AFib, V paced, heart rate 92   ECG 12 lead   Result Value    Ventricular Rate 78    Atrial Rate 77    MT Interval QRSD Interval 116    QT Interval 400    QTC Interval 456    P Axis     QRS Axis -12    T Wave Axis 159    Narrative    Ventricular-paced rhythm  When compared with ECG of 23-AUG-2018 15:28,  Vent  rate has decreased BY   2 BPM  Confirmed by Elin Nguyen ([id number]) on 2019 8:59:27 AM   Holter monitor - 24 hour    Narrative    PT NAME: Kirti Williamson  : 1933  AGE: 80 y o  GENDER: female  MRN: [id number]  PROCEDURE: Holter monitor - 24 hour      INDICATIONS:   24 hour Holter monitor was performed 18 for atrial fibrillation  The   patient was in atrial fibrillation throughout the duration of the test     Average heart rate was 118 beats per minute  Minimum heart rate was 60   beats per minute at 8:25 a m     Maximum heart rate was 171 beats per   minute at 2:40 p m  Syble Geoffrey Average hourly heart rate ranged between 97 to 150   beats per minute  Ventricular ectopic activity consisted of 9 isolated PVCs, which comprises   less than 0 1% of total beats  Longest R-R interval was 2 1 seconds at 12:45 p m  Syble Geoffrey Patient's rhythm included 1 hour 28 minutes of tachycardia  No symptoms reported  IMPRESSIONS:  1  Atrial fibrillation with rapid ventricular response  2   Rare ventricular ectopic activity  3   No symptoms reported  Interpreted by:  Sirisha Perkins MD   NM myocardial perfusion spect (rx stress and/or rest)    Narrative    [address]  [phone]    Rest/Stress Gated SPECT Myocardial Perfusion Imaging After Regadenoson    Patient: Sonja Catalan  MR number: [id number]  Account number: [de-identified]  : 1933  Age: 80 years  Gender: Female  Status: Outpatient  Location: [address] [place] [address] [facility] [place]  Height: 62 in  Weight: 231 lb  BP: 116/ 70 mmHg    Allergies: CODEINE, OXYCODONE HCL, OXYCODONE-ACETAMINOPHEN, CHOLESTATIN, SULFAMETHOXAZOLE-TRIMETHOPRIM, SULFA ANTIBIOTICS    Diagnosis: I48 0 - Atrial fibrillation, I50 9 - Heart failure, unspecified, R07 9 - Chest pain, unspecified    Primary Physician:  Earnestine Sutherland DO  RN:  Tracie Starks RN BSN  Referring Physician:  Jasbir Florian MD  Technician:  Sanjay Harris  Group:  [facility]  Report Prepared By[de-identified]  Tracie Starks RN BSN  Interpreting Physician:  Clarissa Courtney MD    INDICATIONS: chest pain/pressure    HISTORY: Gastric bypass/ GI bleed/BL LE edemaModerate Mitral Insuff  chronic diastolic CHF/arthritis/IBS/Fibromyalgia/MRSA  uses walker to ambulate/Chronic Afib The patient is a 80year old  female  Chest pain status: chest pain, consistent with atypical angina  Other symptoms: decreased effort tolerance, palpitations, and edema  Coronary artery  disease risk factors: hypertension  Cardiovascular history: congestive heart failure, mitral valve disease, and arrhythmia  Co-morbidity: obesity  Medications: a beta blocker and a calcium channel blocker  Previous test results: abnormal  ECG  PHYSICAL EXAM: Baseline physical exam screening: no wheezes audible  REST ECG: Atrial fibrillation  Nonspecific ST and T wave abnormalities were present  PROCEDURE: The study was performed at the [facility] and [facility]  A regadenoson infusion pharmacologic stress test was performed  Gated SPECT myocardial perfusion imaging was performed after stress  Systolic blood pressure was  116 mmHg, at the start of the study  Diastolic blood pressure was 70 mmHg, at the start of the study  The heart rate was 85 bpm, at the start of the study  IV double checked  Regadenoson protocol:  HR bpm SBP mmHg DBP mmHg Symptoms  Baseline 85 116 70 none  Immediate -- -- -- moderate dyspnea  1 min -- -- -- same as above  2 min 107 122 78 subsiding  5 min 90 120 70 none    STRESS SUMMARY: Duration of pharmacologic stress was Maximal heart rate during stress was 107 bpm  There was normal resting blood pressure with an appropriate response to stress   The rate-pressure product for the peak heart rate and blood  pressure was 70664  There was no chest pain during stress  The stress test was terminated due to protocol completion  Pre oxygen saturation: 99 %  Peak oxygen saturation: 99 %  The stress ECG was negative for ischemia and normal   Arrhythmia during stress: isolated premature ventricular beats  ISOTOPE ADMINISTRATION:  The radiopharmaceutical was injected at the peak effect of pharmacologic stress  PERFUSION DEFECTS:  -  There were no perfusion defects  GATED SPECT:  The calculated left ventricular ejection fraction was 66 %  There was no left ventricular regional abnormality  SUMMARY:  -  Stress results: There was no chest pain during stress  -  ECG conclusions: The stress ECG was negative for ischemia and normal   -  Perfusion imaging: There were no perfusion defects   -  Gated SPECT: The calculated left ventricular ejection fraction was 66 %  There was no left ventricular regional abnormality  IMPRESSIONS: Normal study after pharmacologic vasodilation without reproduction of symptoms  Myocardial perfusion imaging was normal at rest and with stress      Prepared and signed by    Lele Lemos MD  Signed 2018 15:09:24     Echo complete with contrast if indicated    Narrative    [place] [id number] [address] [place] [address]  [phone]    Transthoracic Echocardiogram  2D, M-mode, Doppler, and Color Doppler    Study date:  2018    Patient: Radha Snider  MR number: [id number]  Account number: [de-identified]  : 1933  Age: 80 years  Gender: Female  Status: Outpatient  Location: [facility] [place]  Height: 62 in  Weight: 231 lb  BP: 112/ 60 mmHg    Indications: MV Disease    Diagnoses: I34 0 - Nonrheumatic mitral (valve) insufficiency    Sonographer:  JESUS Narvaez  Primary Physician:  Chapis Nation DO  Referring Physician:  Carlos Sheppard MD  Group:  [facility]  Interpreting Physician:  Pastor Gonzalez MD    SUMMARY    LEFT VENTRICLE:  Size was normal   Systolic function was normal  Ejection fraction was estimated to be 55 %  Wall thickness was at the upper limits of normal     RIGHT VENTRICLE:  The ventricle was mildly dilated  Systolic function was normal     RIGHT ATRIUM:  The atrium was mildly dilated  MITRAL VALVE:  There was moderate regurgitation  TRICUSPID VALVE:  There was moderate regurgitation  IVC, HEPATIC VEINS:  The inferior vena cava was mildly dilated  Respirophasic changes were blunted (less than 50% variation)  COMPARISONS:  Comparison was made with the previous study of 17-May-2017  Tricuspid regurgitation has worsened  HISTORY: PRIOR HISTORY: AFIB, Moderate MR, Hypertension, CHF, Anemia    PROCEDURE: The study was performed in the [address] [place] [address] and Vascular Center  This was a routine study  The transthoracic approach was used  The study included complete 2D imaging, M-mode, complete spectral Doppler, and color Doppler  The  heart rate was 110 bpm, at the start of the study  Images were obtained from the parasternal, apical, subcostal, and suprasternal notch acoustic windows  Echocardiographic views were limited due to lung interference  Image quality was  adequate  LEFT VENTRICLE: Size was normal  Systolic function was normal  Ejection fraction was estimated to be 55 %  There were no regional wall motion abnormalities  Wall thickness was at the upper limits of normal  DOPPLER: Transmitral flow  pattern: atrial fibrillation  The study was not technically sufficient to allow evaluation of LV diastolic function  RIGHT VENTRICLE: The ventricle was mildly dilated  Systolic function was normal  Wall thickness was normal     LEFT ATRIUM: Size was normal     RIGHT ATRIUM: The atrium was mildly dilated  MITRAL VALVE: Valve structure was normal  There was normal leaflet separation   DOPPLER: The transmitral velocity was within the normal range  There was no evidence for stenosis  There was moderate regurgitation  AORTIC VALVE: The valve was trileaflet  Leaflets exhibited normal thickness, normal cuspal separation, and sclerosis  DOPPLER: Transaortic velocity was within the normal range  There was no evidence for stenosis  There was no  regurgitation  TRICUSPID VALVE: The valve structure was normal  There was normal leaflet separation  DOPPLER: The transtricuspid velocity was within the normal range  There was no evidence for stenosis  There was moderate regurgitation  Pulmonary artery  systolic pressure was within the normal range  Estimated peak PA pressure was 36 mmHg  PULMONIC VALVE: Leaflets exhibited normal thickness, no calcification, and normal cuspal separation  DOPPLER: The transpulmonic velocity was within the normal range  There was no regurgitation  PERICARDIUM: There was no pericardial effusion  The pericardium was normal in appearance  AORTA: The root exhibited normal size  SYSTEMIC VEINS: IVC: The inferior vena cava was mildly dilated  Respirophasic changes were blunted (less than 50% variation)  SYSTEM MEASUREMENT TABLES    2D  %FS: 34 71 %  AV Diam: 2 84 cm  EDV(Teich): 101 4 ml  EF(Cube): 72 17 %  EF(Teich): 63 88 %  ESV(Cube): 28 55 ml  ESV(Teich): 36 63 ml  IVSd: 1 1 cm  LA Area: 17 55 cm2  LA Diam: 4 14 cm  LVEDV MOD A4C: 61 32 ml  LVEF MOD A4C: 61 87 %  LVESV MOD A4C: 23 38 ml  LVIDd: 4 68 cm  LVIDs: 3 06 cm  LVLd A4C: 6 59 cm  LVLs A4C: 5 67 cm  LVPWd: 1 cm  RA Area: 23 38 cm2  RV Diam: 3 71 cm  SV MOD A4C: 37 94 ml  SV(Cube): 74 03 ml  SV(Teich): 64 77 ml    CW  TR MaxP 51 mmHg  TR Vmax: 2 21 m/s    MM  TAPSE: 1 35 cm    [employer] Accredited Echocardiography Laboratory    Prepared and electronically signed by    Salvador Breaux MD  Signed 2018 17:36:53     Holter monitor - 48 hour    Narrative    PT NAME: Sunil Berumen  : 1933  AGE: 80 y o  GENDER: female  MRN: [id number]  PROCEDURE: Holter monitor - 48 hour      INDICATIONS:   48 hour Holter monitor was performed 6/23/17 for atrial fibrillation  The   patient was in atrial fibrillation throughout the test  Average heart rate   was 74 BPM; minimum heart rate was 35 BPM at 3:36 PM; and maximum heart   rate was 162 BPM at 10:28 AM  Average hourly heart rate ranged between    BPM      Ventricular ectopic activity consisted of 324 beats, which comprises 0 2%   of total beats  13 were in 2 runs, 96 were in couplets, 10 were late   beats, remainder were isolated PVCs  Longest ventricular run was 9 beats   at a HR of 126 BPM at 2:53 AM on day 2, possible afib with aberrancy as   rhythm was irregular and preceded by a long-short sequence (Rik's   phenomenon)  Fastest ventricular run was 4 beats with a maximum HR of 135   BPM at 2:53 AM on day 2  Longest R-R interval was 2 3 seconds at 5:48 AM      IMPRESSIONS:  1  Atrial fibrillation with controlled ventricular response  2  Occasional ventricular ectopy versus aberrancy  3  No symptoms reported  Interpreted by: Jayne Bond MD       Allergies   Allergen Reactions    Amoxicillin      Reports nausea, headache, dizzy    Codeine Vomiting and GI Intolerance     GI upset, ana m morphine  Reaction Date: 24Apr2012;     Other      Other reaction(s): Other (See Comments)  ana m toradol in OR 6/06 sah    Oxycodone-Acetaminophen      Other reaction(s): Other (See Comments)  GI upset; ana m vicodin    Percocet [Oxycodone-Acetaminophen]     Seasonal Ic  [Cholestatin] Allergic Rhinitis    Bactrim [Sulfamethoxazole-Trimethoprim] Rash    Folic Acid-Vit L3-NWX V75 Diarrhea    Sulfa Antibiotics Rash     Other reaction(s):  Other (See Comments)  takes lasix @home       Past Medical History:   Diagnosis Date    Anemia     Arthritis     Cancer ([address] )     [facility]ive heart failure) [facility])     Disease of thyroid gland     Disturbance of smell and taste disturbance of taste    Effusion of knee joint right     Fibromyalgia     Heart murmur     reported a previous heart murmur    History of acute myocardial infarction     History of atrial fibrillation     History of bruising easily     History of cancer     History of dermatitis     History of mammogram     History of methicillin resistant staphylococcus aureus (MRSA)     Negative nasal culture, isolation discontinued 8/17/2018   History of methicillin resistant staphylococcus aureus (MRSA) 08/17/2018    negative nasal culture-isolation and hx discontinued 8/17/2018    History of obesity     History of osteopenia     History of sciatica     History of shortness of breath     History of sinusitis     History of sore throat     History of syncope     History of umbilical hernia     History of viral infection     Irritable bowel syndrome (IBS)     Joint pain, hip     Limb pain     Myalgia     myalgia and myositis    Need for prophylactic vaccination against single diseases     Need for prophylactic vaccination and inoculation against influenza     Preoperative cardiovascular examination     Primary osteoarthritis of right knee     Right leg pain     Vaginal Pap smear     reported pap smear     Past Surgical History:   Procedure Laterality Date    ANKLE ARTHRODESIS Right     BACK SURGERY      BARIATRIC SURGERY      CHOLECYSTECTOMY      x2    ESOPHAGOGASTRODUODENOSCOPY N/A 3/23/2018    Procedure: ESOPHAGOGASTRODUODENOSCOPY (EGD); Surgeon: Glenna Whitley MD;  [facility]: [facility];   Service: Gastroenterology    FOOT SURGERY      HIP CLOSE REDUCTION Right 8/21/2016    Procedure: CLOSED REDUCTION RIGHT TOTAL HIP;  Surgeon: Guillermina Blanca MD;  [facility];  Service:    Gabriela Jade / Marly Melo / Ashley Lay REPLACEMENT      LEFT KNEE REPLACEMENT    PILONIDAL CYST EXCISION      x2    CO REVISE TOTAL HIP REPLACEMENT Right 4/15/2019    Procedure: ARTHROPLASTY HIP TOTAL ACETABULAR REVISION;  Surgeon: Marco Phillips MD;  [facility];  Service: Orthopedics    REPLACEMENT TOTAL KNEE Right     SILVANA-EN-Y PROCEDURE      x2    TOTAL KNEE ARTHROPLASTY      UMBILICAL HERNIA REPAIR      x2     Current Outpatient Medications on File Prior to Visit   Medication Sig Dispense Refill    ascorbic acid (VITAMIN C) 500 mg tablet Take 1 tablet (500 mg total) by mouth 2 (two) times a day 60 tablet 0    cephalexin (KEFLEX) 500 mg capsule Take 1 capsule (500 mg total) by mouth every 6 (six) hours for 40 doses 40 capsule 0    Cyanocobalamin (VITAMIN B12 PO) Take 1 capsule by mouth every other day      desoximetasone (TOPICORT) 0 25 % cream Apply topically as needed for irritation 30 g 0    docusate sodium (COLACE) 100 mg capsule Take 1 capsule (100 mg total) by mouth 2 (two) times a day 10 capsule 0    DULoxetine (CYMBALTA) 60 mg delayed release capsule TAKE 1 CAPSULE BY MOUTH EVERY DAY 30 capsule 0    fluticasone (FLONASE) 50 mcg/act nasal spray 1 spray into each nostril as needed        levothyroxine 50 mcg tablet TAKE 1 TABLET BY MOUTH EVERY DAY 30 tablet 2    oxyCODONE (ROXICODONE) 5 mg immediate release tablet 1 pill po Q4 Hrs prn pain 30 tablet 0    potassium chloride (K-DUR,KLOR-CON) 20 mEq tablet TAKE 1 TABLET DAILY 30 tablet 11    senna (SENOKOT) 8 6 mg Take 1 tablet (8 6 mg total) by mouth daily 120 each 0    sucralfate (CARAFATE) 1 g tablet TAKE 1 TABLET BY MOUTH TWO TIMES A DAY 30 tablet 0    sucralfate (CARAFATE) 1 g/10 mL suspension Take 10 mL (1 g total) by mouth 2 (two) times a day 420 mL 0    torsemide (DEMADEX) 20 mg tablet Take 1 tablet (20 mg total) by mouth daily 90 tablet 3    traMADol (ULTRAM) 50 mg tablet TAKE 1 TABLET (50 MG TOTAL) BY MOUTH EVERY 8 (EIGHT) HOURS AS NEEDED FOR MODERATE PAIN 30 tablet 0    warfarin (COUMADIN) 3 mg tablet TAKE FRIDAY,SATURDAY, SUNDAY INR MONDAY TO FURTHER DOSE  0    warfarin (COUMADIN) 4 mg tablet Take one tablet daily as previously directed by cardiology 180 tablet 0     No current facility-administered medications on file prior to visit        Family History   Problem Relation Age of Onset    Coronary artery disease Mother     Heart attack Mother     Hypertension Mother     Coronary artery disease Father     Heart attack Father    Elham Sahu Hypertension Father     Lymphoma Sister         acute    Rashes / Skin problems Sister         lymphomatoid papulosis    Coronary artery disease Brother     Heart attack Brother         x2    Aneurysm Neg Hx     Hyperlipidemia Neg Hx      Social History     Socioeconomic History    Marital status: /Civil Union     Spouse name: Not on file    Number of children: Not on file    Years of education: Not on file    Highest education level: Not on file   Occupational History    Not on file   Social Needs    Financial resource strain: Not on file    Food insecurity:     Worry: Not on file     Inability: Not on file    Transportation needs:     Medical: Not on file     Non-medical: Not on file   Tobacco Use    Smoking status: Never Smoker    Smokeless tobacco: Never Used   Substance and Sexual Activity    Alcohol use: Not Currently     Frequency: Never    Drug use: No    Sexual activity: Never   Lifestyle    Physical activity:     Days per week: Not on file     Minutes per session: Not on file    Stress: Not on file   Relationships    Social connections:     Talks on phone: Not on file     Gets together: Not on file     Attends Holiness service: Not on file     Active member of club or organization: Not on file     Attends meetings of clubs or organizations: Not on file     Relationship status: Not on file    Intimate partner violence:     Fear of current or ex partner: Not on file     Emotionally abused: Not on file     Physically abused: Not on file     Forced sexual activity: Not on file   Other Topics Concern    Not on file   Social History Narrative    Not on file Vitals:    07/10/19 1128   BP: 118/82   Pulse: 81   Resp: 16   SpO2: 98%   Weight: 83 5 kg (184 lb)   Height: 5' 1" (1 549 m)     Results for orders placed or performed in visit on 88/59/15   Basic metabolic panel   Result Value Ref Range    Sodium 141 136 - 145 mmol/L    Potassium 4 0 3 5 - 5 3 mmol/L    Chloride 107 100 - 108 mmol/L    CO2 25 21 - 32 mmol/L    ANION GAP 9 4 - 13 mmol/L    BUN 16 5 - 25 mg/dL    Creatinine 0 65 0 60 - 1 30 mg/dL    Glucose 94 65 - 140 mg/dL    Calcium 8 5 8 3 - 10 1 mg/dL    eGFR 81 ml/min/1 73sq m   CBC and Platelet   Result Value Ref Range    WBC 7 34 4 31 - 10 16 Thousand/uL    RBC 3 55 (L) 3 81 - 5 12 Million/uL    Hemoglobin 10 6 (L) 11 5 - 15 4 g/dL    Hematocrit 35 4 34 8 - 46 1 %     (H) 82 - 98 fL    MCH 29 9 26 8 - 34 3 pg    MCHC 29 9 (L) 31 4 - 37 4 g/dL    RDW 15 8 (H) 11 6 - 15 1 %    Platelets 161 381 - 686 Thousands/uL    MPV 9 9 8 9 - 12 7 fL     Weight (last 2 days)     Date/Time   Weight    07/10/19 1128   83 5 (184)            Body mass index is 34 77 kg/m²  BP      Temp      Pulse     Resp      SpO2        Vitals:    07/10/19 1128   Weight: 83 5 kg (184 lb)     Vitals:    07/10/19 1128   Weight: 83 5 kg (184 lb)       /82   Pulse 81   Resp 16   Ht 5' 1" (1 549 m)   Wt 83 5 kg (184 lb)   LMP  (LMP Unknown)   SpO2 98%   BMI 34 77 kg/m²          Physical Exam   Constitutional: She appears well-developed and well-nourished  HENT:   Head: Normocephalic  Mouth/Throat: Oropharynx is clear and moist    Eyes: Pupils are equal, round, and reactive to light  Conjunctivae are normal  Right eye exhibits no discharge  Left eye exhibits no discharge  No scleral icterus  Neck: Neck supple  Cardiovascular: Normal rate, regular rhythm, normal heart sounds and intact distal pulses  Exam reveals no gallop and no friction rub  No murmur heard  Pulmonary/Chest: Breath sounds normal  No respiratory distress  She has no wheezes  She has no rales  Abdominal: Soft  Bowel sounds are normal  She exhibits no distension and no mass  There is no tenderness  There is no rebound and no guarding  Musculoskeletal: She exhibits no edema or deformity  Lymphadenopathy:     She has no cervical adenopathy  Neurological: She is alert   Coordination normal

## 2019-07-12 NOTE — ASSESSMENT & PLAN NOTE
Medically stable doing well she will be undergoing operation in the near future via Orthopedics she will 1st get a cardiac clearance I would like her to hold the Coumadin for 5 days prior to the operation if okay with Cardiology  Patient will be undergoing wound revision it appears she may have developed an infection she is currently on Keflex and tolerating well she was unable to tolerate doxycycline

## 2019-07-12 NOTE — ASSESSMENT & PLAN NOTE
Rate controlled and anticoagulated patient will be seeing Cardiology for cardiac clearance 3 upcoming surgery I did suggest a 5 day hold of Coumadin prior to the operation but she will be checking with Cardiology

## 2019-07-14 DIAGNOSIS — G62.9 NEUROPATHY: ICD-10-CM

## 2019-07-16 ENCOUNTER — OFFICE VISIT (OUTPATIENT)
Dept: CARDIOLOGY CLINIC | Facility: CLINIC | Age: 84
End: 2019-07-16
Payer: COMMERCIAL

## 2019-07-16 VITALS
WEIGHT: 180.2 LBS | BODY MASS INDEX: 34.02 KG/M2 | SYSTOLIC BLOOD PRESSURE: 122 MMHG | HEIGHT: 61 IN | DIASTOLIC BLOOD PRESSURE: 60 MMHG | OXYGEN SATURATION: 98 % | HEART RATE: 71 BPM

## 2019-07-16 DIAGNOSIS — I48.19 PERSISTENT ATRIAL FIBRILLATION (HCC): ICD-10-CM

## 2019-07-16 DIAGNOSIS — Z01.818 PRE-OPERATIVE CLEARANCE: Primary | ICD-10-CM

## 2019-07-16 DIAGNOSIS — I50.32 CHRONIC DIASTOLIC CONGESTIVE HEART FAILURE (HCC): ICD-10-CM

## 2019-07-16 PROCEDURE — 99215 OFFICE O/P EST HI 40 MIN: CPT | Performed by: NURSE PRACTITIONER

## 2019-07-16 PROCEDURE — 93000 ELECTROCARDIOGRAM COMPLETE: CPT | Performed by: NURSE PRACTITIONER

## 2019-07-16 NOTE — PROGRESS NOTES
Cardiology Consultation for pre operative clearance    Soco Marquez  [id number]  1933  [facility]  [address]  [place] [address]      Atrial Fibrillation on Coumadin  4/05/19 sp right acetabular revision involving right total hip prosthesis  Continued drainage from incision of lateral thigh   HTN  PPM, 5/23/19 device interrogation  99 3%  HLD     Ms Emastephanie Capps presents to our office for cardiac clearance  She will undergo right hip surgical scar revision and treatment as necessary for right thigh on 7/24/19 by Dr Jw Lucia denies CP, palpitations, dyspnea with minimal or moderate exertion, lightheadedness, or dizziness  She is walking with a walker  Lina Lucia is experiencing pain in her back and right leg  Her weight is 180 pounds in the office today      1  Preop examination  POCT ECG     Patient Active Problem List   Diagnosis    Chronic atrial fibrillation (HCC)    Hypokalemia    Anemia    Fall    Hip dislocation, right (HCC)    Chronic diastolic CHF (congestive heart failure) ([facility] [address] )    Depression    Obesity without serious comorbidity    Chronic pain    Abdominal bloating    Abdominal pain    Arthralgia of hip    Arthralgia of knee, right    Benign essential hypertension    Chronic pain of lower extremity, bilateral    Fibromyalgia    External hemorrhoids    Irritable bowel syndrome with constipation    Lumbar canal stenosis    Moderate mitral regurgitation    Osteoarthritis, multiple sites    Osteoporosis    Palpitations    Peripheral neuropathy    Primary osteoarthritis of right knee    Synovial cyst of right popliteal space    Unstable right hip    Urinary incontinence    Exertional chest pain    Right lower quadrant abdominal pain    GI bleeding    Ventral hernia without obstruction or gangrene    Abnormal CT of the abdomen    Gastrointestinal hemorrhage with melena    Ulcer    Chronic pain of right knee    Acute kidney injury ([address] )    Abnormal TSH    Postural dizziness with presyncope    Contact dermatitis    Pacemaker    Hypothyroidism    Preop examination    Macrocytosis    Status post gastrectomy    Change in bowel habits    Chronic kidney disease, stage III (moderate) (HCC)    Postprocedural hypotension    Bilateral leg edema    Febrile    Status post right hip replacement    Drainage from wound    Muscle weakness    Ambulatory dysfunction    MCI (mild cognitive impairment)    Aftercare following right hip joint replacement surgery    Pain of right hip joint    Cellulitis of right lower extremity    Atrial fibrillation, chronic (HCC)    Hypertrophic scar of skin    Preop exam for internal medicine     Past Medical History:   Diagnosis Date    Anemia     Arthritis     Cancer (HCC)     [facility]     Disease of thyroid gland     Disturbance of smell and taste     disturbance of taste    Effusion of knee joint right     Fibromyalgia     Heart murmur     reported a previous heart murmur    History of acute myocardial infarction     History of atrial fibrillation     History of bruising easily     History of cancer     History of dermatitis     History of mammogram     History of methicillin resistant staphylococcus aureus (MRSA)     Negative nasal culture, isolation discontinued 8/17/2018      History of methicillin resistant staphylococcus aureus (MRSA) 08/17/2018    negative nasal culture-isolation and hx discontinued 8/17/2018    History of obesity     History of osteopenia     History of sciatica     History of shortness of breath     History of sinusitis     History of sore throat     History of syncope     History of umbilical hernia     History of viral infection     Irritable bowel syndrome (IBS)     Joint pain, hip     Limb pain     Myalgia     myalgia and myositis  Need for prophylactic vaccination against single diseases     Need for prophylactic vaccination and inoculation against influenza     Preoperative cardiovascular examination     Primary osteoarthritis of right knee     Right leg pain     Vaginal Pap smear     reported pap smear     Social History     Socioeconomic History    Marital status: /Civil Union     Spouse name: Not on file    Number of children: Not on file    Years of education: Not on file    Highest education level: Not on file   Occupational History    Not on file   Social Needs    Financial resource strain: Not on file    Food insecurity:     Worry: Not on file     Inability: Not on file    Transportation needs:     Medical: Not on file     Non-medical: Not on file   Tobacco Use    Smoking status: Never Smoker    Smokeless tobacco: Never Used   Substance and Sexual Activity    Alcohol use: Not Currently     Frequency: Never    Drug use: No    Sexual activity: Never   Lifestyle    Physical activity:     Days per week: Not on file     Minutes per session: Not on file    Stress: Not on file   Relationships    Social connections:     Talks on phone: Not on file     Gets together: Not on file     Attends Buddhism service: Not on file     Active member of club or organization: Not on file     Attends meetings of clubs or organizations: Not on file     Relationship status: Not on file    Intimate partner violence:     Fear of current or ex partner: Not on file     Emotionally abused: Not on file     Physically abused: Not on file     Forced sexual activity: Not on file   Other Topics Concern    Not on file   Social History Narrative    Not on file      Family History   Problem Relation Age of Onset    Coronary artery disease Mother     Heart attack Mother     Hypertension Mother     Coronary artery disease Father     Heart attack Father     Hypertension Father     Lymphoma Sister         acute    Rashes / Skin problems Sister         lymphomatoid papulosis    Coronary artery disease Brother     Heart attack Brother         x2    Aneurysm Neg Hx     Hyperlipidemia Neg Hx      Past Surgical History:   Procedure Laterality Date    ANKLE ARTHRODESIS Right     BACK SURGERY      BARIATRIC SURGERY      CHOLECYSTECTOMY      x2    ESOPHAGOGASTRODUODENOSCOPY N/A 3/23/2018    Procedure: ESOPHAGOGASTRODUODENOSCOPY (EGD); Surgeon: Maryann Ramirez MD;  [facility];   Service: Gastroenterology    FOOT SURGERY      HIP CLOSE REDUCTION Right 8/21/2016    Procedure: CLOSED REDUCTION RIGHT TOTAL HIP;  Surgeon: Flo Gastelum MD;  [facility];  Service:    Lacey Prasad / Alberta Seth / Simon Gonzalez REPLACEMENT      LEFT KNEE REPLACEMENT    PILONIDAL CYST EXCISION      x2    MN REVISE TOTAL HIP REPLACEMENT Right 4/15/2019    Procedure: ARTHROPLASTY HIP TOTAL ACETABULAR REVISION;  Surgeon: Maurice Ferrer MD;  [facility];  Service: Orthopedics    REPLACEMENT TOTAL KNEE Right     SILVANA-EN-Y PROCEDURE      x2    TOTAL KNEE ARTHROPLASTY      UMBILICAL HERNIA REPAIR      x2       Current Outpatient Medications:     ascorbic acid (VITAMIN C) 500 mg tablet, Take 1 tablet (500 mg total) by mouth 2 (two) times a day, Disp: 60 tablet, Rfl: 0    cephalexin (KEFLEX) 500 mg capsule, Take 1 capsule (500 mg total) by mouth every 6 (six) hours for 40 doses, Disp: 40 capsule, Rfl: 0    Cyanocobalamin (VITAMIN B12 PO), Take 1 capsule by mouth daily , Disp: , Rfl:     desoximetasone (TOPICORT) 0 25 % cream, Apply topically as needed for irritation, Disp: 30 g, Rfl: 0    DULoxetine (CYMBALTA) 60 mg delayed release capsule, TAKE 1 CAPSULE BY MOUTH EVERY DAY, Disp: 30 capsule, Rfl: 0    fluticasone (FLONASE) 50 mcg/act nasal spray, 1 spray into each nostril as needed  , Disp: , Rfl:     levothyroxine 50 mcg tablet, TAKE 1 TABLET BY MOUTH EVERY DAY, Disp: 30 tablet, Rfl: 2    potassium chloride (K-DUR,KLOR-CON) 20 mEq tablet, TAKE 1 TABLET DAILY, Disp: 30 tablet, Rfl: 11    sucralfate (CARAFATE) 1 g tablet, TAKE 1 TABLET BY MOUTH TWO TIMES A DAY, Disp: 30 tablet, Rfl: 0    torsemide (DEMADEX) 20 mg tablet, Take 1 tablet (20 mg total) by mouth daily, Disp: 90 tablet, Rfl: 3    traMADol (ULTRAM) 50 mg tablet, TAKE 1 TABLET (50 MG TOTAL) BY MOUTH EVERY 8 (EIGHT) HOURS AS NEEDED FOR MODERATE PAIN, Disp: 30 tablet, Rfl: 0    warfarin (COUMADIN) 3 mg tablet, TAKE FRIDAY,SATURDAY, SUNDAY INR MONDAY TO FURTHER DOSE, Disp: , Rfl: 0    warfarin (COUMADIN) 4 mg tablet, Take one tablet daily as previously directed by cardiology, Disp: 180 tablet, Rfl: 0  Allergies   Allergen Reactions    Amoxicillin      Reports nausea, headache, dizzy    Codeine Vomiting and GI Intolerance     GI upset, ana m morphine  Reaction Date: 24Apr2012;     Other      Other reaction(s): Other (See Comments)  ana m toradol in OR 6/06 sah    Oxycodone-Acetaminophen      Other reaction(s): Other (See Comments)  GI upset; ana m vicodin    Percocet [Oxycodone-Acetaminophen]     Seasonal Ic  [Cholestatin] Allergic Rhinitis    Bactrim [Sulfamethoxazole-Trimethoprim] Rash    Folic Acid-Vit W7-AIT V79 Diarrhea    Sulfa Antibiotics Rash     Other reaction(s):  Other (See Comments)  takes lasix @home     Vitals:    07/16/19 0806   BP: 122/60   BP Location: Right arm   Patient Position: Sitting   Cuff Size: Large   Pulse: 71   SpO2: 98%   Weight: 81 7 kg (180 lb 3 2 oz)   Height: 5' 1" (1 549 m)       Labs:  Appointment on 07/09/2019   Component Date Value    Sodium 07/09/2019 141     Potassium 07/09/2019 4 0     Chloride 07/09/2019 107     CO2 07/09/2019 25     ANION GAP 07/09/2019 9     BUN 07/09/2019 16     Creatinine 07/09/2019 0 65     Glucose 07/09/2019 94     Calcium 07/09/2019 8 5     eGFR 07/09/2019 81     WBC 07/09/2019 7 34     RBC 07/09/2019 3 55*    Hemoglobin 07/09/2019 10 6*    Hematocrit 07/09/2019 35 4     MCV 07/09/2019 100*    MCH 07/09/2019 29 9     MCHC 07/09/2019 29 9*    RDW 07/09/2019 15 8*    Platelets 41/63/5456 217     MPV 07/09/2019 9 9    Anticoag visit on 07/09/2019   Component Date Value    INR 07/09/2019 2 00*   Anticoag visit on 07/02/2019   Component Date Value    INR 07/02/2019 1 70*   Lab Requisition on 06/13/2019   Component Date Value    Calcium 06/13/2019 8 6     Protime 06/13/2019 22 5*    INR 06/13/2019 2 05*    Creatinine, Ur 06/13/2019 57 4     Microalbum  ,U,Random 06/13/2019 11 5     Microalb Creat Ratio 06/13/2019 20    Appointment on 05/31/2019   Component Date Value    Sodium 05/31/2019 142     Potassium 05/31/2019 4 5     Chloride 05/31/2019 108     CO2 05/31/2019 26     ANION GAP 05/31/2019 8     BUN 05/31/2019 16     Creatinine 05/31/2019 0 74     Glucose, Fasting 05/31/2019 81     Calcium 05/31/2019 8 2*    eGFR 05/31/2019 74    Ancillary Orders on 05/31/2019   Component Date Value    Protime 05/31/2019 23 9*    INR 05/31/2019 2 13*   Anticoag visit on 05/24/2019   Component Date Value    INR 05/23/2019 1 90*   Anticoag visit on 05/17/2019   Component Date Value    INR 05/16/2019 1 90*     Imaging: Xr Hip/pelv 2-3 Vws Right If Performed    Result Date: 7/13/2019  Narrative: RIGHT HIP INDICATION:   Z47 1: Aftercare following joint replacement surgery Z96 641: Presence of right artificial hip joint  COMPARISON:  Plain radiographs June 11, 2019 as well as CT abdomen pelvis February 2, 2018  VIEWS:  XR HIP/PELV 2-3 VWS RIGHT W PELVIS IF PERFORMED Images: 3 FINDINGS: There are healing fractures of the acetabulum, superior pubic ramus and inferior pubic ramus medially as well as the inferior pubic ramus centrally  There is progressive periosteal reaction  There is protrusio acetabula  These findings are stable from  the prior study  There is a small metallic fragment in the joint compartment  There is no acute fracture or dislocation   No significant hip degenerative changes  No lytic or blastic osseous lesions  Calcification in the soft tissues, most compatible with myositis ossificans  Degenerative changes pubic symphysis and visualized lower lumbar spine  Impression: Healing fractures of the right acetabulum and pubic rami  No acute osseous abnormality  Workstation performed: [id number]       Review of Systems:  Review of Systems   Musculoskeletal: Positive for arthralgias, back pain, gait problem and myalgias  All other systems reviewed and are negative  Physical Exam:  Physical Exam   Constitutional: She is oriented to person, place, and time  She appears well-developed  HENT:   Head: Normocephalic  Eyes: Pupils are equal, round, and reactive to light  Neck: Normal range of motion  Cardiovascular: Normal rate, regular rhythm and normal heart sounds  Pulmonary/Chest: Effort normal and breath sounds normal    Abdominal: Soft  Bowel sounds are normal    Musculoskeletal: Normal range of motion  She exhibits no edema  Neurological: She is alert and oriented to person, place, and time  Skin: Skin is warm and dry  Capillary refill takes less than 2 seconds  Psychiatric: She has a normal mood and affect  Vitals reviewed  Discussion/Summary:  1  Pre operative clearance for right surgical scar revision and treatment as necessary for her right thigh to be done by Dr Lue Osler on 7/24/19  Memory Deacon is able to walk with her walker without symptoms  EKG V paced underlying atrial fibrillation  She is at low risk for planned procedure from a cardiovascular standpoint  She will stop Coumadin 5 days prior to planned procedure  She will restart Coumadin when ok with orthopedics after the procedure, goal INR 2 0    2  Atrial fibrillation stop coumadin 5 days prior to procedure when OK with orthopedics after procedure with goal INR 2 0    3  Chronic diastolic heart failure NYHA class III stage C- Weight in the office is 180 pounds    On PE she is euvolemic and compensated  Heart rate and blood pressure control  Continue on torsemide 20 mg daily  Maintain a 2 gram daily sodium diet and 1500 ml daily fluid restriction  Check daily weights  If you gained 3 pounds in one day, 5 pounds in one week, or experience worsening shortness of breath or increasing lower leg swelling  Please call the heart failure office at [phone]    Please bring a  list of your current medications and daily weights to the office visit

## 2019-07-16 NOTE — PATIENT INSTRUCTIONS
Maintain a 2 gram daily sodium diet and 1500 ml daily fluid restriction  Check daily weights  If you gained 3 pounds in one day, 5 pounds in one week, or experience worsening shortness of breath or increasing lower leg swelling  Please call the heart failure office at [phone]    Please bring a  list of your current medications and daily weights to the office visit    Hold Coumadin for 5 days prior to the procedure

## 2019-07-19 DIAGNOSIS — G62.9 NEUROPATHY: ICD-10-CM

## 2019-07-19 RX ORDER — TRAMADOL HYDROCHLORIDE 50 MG/1
50 TABLET ORAL EVERY 8 HOURS PRN
Qty: 30 TABLET | Refills: 0 | Status: SHIPPED | OUTPATIENT
Start: 2019-07-19 | End: 2019-08-06 | Stop reason: SDUPTHER

## 2019-07-21 DIAGNOSIS — R10.13 EPIGASTRIC PAIN: ICD-10-CM

## 2019-07-21 RX ORDER — SUCRALFATE 1 G/1
TABLET ORAL
Qty: 30 TABLET | Refills: 0 | Status: SHIPPED | OUTPATIENT
Start: 2019-07-21 | End: 2019-08-06 | Stop reason: SDUPTHER

## 2019-07-22 RX ORDER — CEPHALEXIN 500 MG/1
500 CAPSULE ORAL EVERY 6 HOURS SCHEDULED
COMMUNITY
End: 2020-03-26 | Stop reason: ALTCHOICE

## 2019-07-22 NOTE — PRE-PROCEDURE INSTRUCTIONS
Pre-Surgery Instructions:   Medication Instructions    ascorbic acid (VITAMIN C) 500 mg tablet Instructed patient per Anesthesia Guidelines   cephalexin (KEFLEX) 500 mg capsule Instructed patient per Anesthesia Guidelines   Cyanocobalamin (VITAMIN B12 PO) Instructed patient per Anesthesia Guidelines   desoximetasone (TOPICORT) 0 25 % cream Instructed patient per Anesthesia Guidelines   DULoxetine (CYMBALTA) 60 mg delayed release capsule Instructed patient per Anesthesia Guidelines   fluticasone (FLONASE) 50 mcg/act nasal spray Instructed patient per Anesthesia Guidelines   levothyroxine 50 mcg tablet Instructed patient per Anesthesia Guidelines   potassium chloride (K-DUR,KLOR-CON) 20 mEq tablet Instructed patient per Anesthesia Guidelines   sucralfate (CARAFATE) 1 g tablet Instructed patient per Anesthesia Guidelines   torsemide (DEMADEX) 20 mg tablet Instructed patient per Anesthesia Guidelines   traMADol (ULTRAM) 50 mg tablet Instructed patient per Anesthesia Guidelines   warfarin (COUMADIN) 3 mg tablet Patient was instructed by Physician and understands  Education Index     Med Instructions Troubleshoot   Diuretic Med Class     Continue this medication up to the evening before surgery/procedure, but do not take the morning of the day of surgery  Antidepressant Med Class     Continue to take this medication on your normal schedule  If this is an oral medication and you take it in the morning, then you may take this medicine with a sip of water  Opioid Med Class     Continue to take this medication on your normal schedule  If this is an oral medication and you take it in the morning, then you may take this medicine with a sip of water  Thyroxine Med Class     Continue to take this medication on your normal schedule  If this is an oral medication and you take it in the morning, then you may take this medicine with a sip of water    Warfarin Med Class     Consult with your Surgeon and prescribing Physician for exact timing of stopping this medication  Bridging anticoagulation may be needed if you have an artificial heart valve  Frequently, this medication is stopped 5 days prior to surgery  Approval to stop this medication should first come from your surgeon and/or prescribing physician      Pre procedure instructions reviewed verbalizes understanding

## 2019-07-23 ENCOUNTER — ANESTHESIA EVENT (OUTPATIENT)
Dept: PERIOP | Facility: HOSPITAL | Age: 84
End: 2019-07-23
Payer: COMMERCIAL

## 2019-07-24 ENCOUNTER — HOSPITAL ENCOUNTER (OUTPATIENT)
Facility: HOSPITAL | Age: 84
Setting detail: OUTPATIENT SURGERY
Discharge: HOME | End: 2019-07-24
Attending: ORTHOPAEDIC SURGERY | Admitting: ORTHOPAEDIC SURGERY
Payer: COMMERCIAL

## 2019-07-24 ENCOUNTER — ANESTHESIA (OUTPATIENT)
Dept: PERIOP | Facility: HOSPITAL | Age: 84
End: 2019-07-24
Payer: COMMERCIAL

## 2019-07-24 VITALS
SYSTOLIC BLOOD PRESSURE: 111 MMHG | HEIGHT: 61 IN | RESPIRATION RATE: 16 BRPM | HEART RATE: 74 BPM | DIASTOLIC BLOOD PRESSURE: 73 MMHG | OXYGEN SATURATION: 97 % | WEIGHT: 182 LBS | TEMPERATURE: 96.3 F | BODY MASS INDEX: 34.36 KG/M2

## 2019-07-24 PROBLEM — Z98.890 S/P SCAR REVISION: Status: ACTIVE | Noted: 2019-07-24

## 2019-07-24 PROCEDURE — 13121 CMPLX RPR S/A/L 2.6-7.5 CM: CPT | Performed by: ORTHOPAEDIC SURGERY

## 2019-07-24 PROCEDURE — 13122 CMPLX RPR S/A/L ADDL 5 CM/>: CPT | Performed by: ORTHOPAEDIC SURGERY

## 2019-07-24 RX ORDER — MAGNESIUM HYDROXIDE 1200 MG/15ML
LIQUID ORAL AS NEEDED
Status: DISCONTINUED | OUTPATIENT
Start: 2019-07-24 | End: 2019-07-24 | Stop reason: HOSPADM

## 2019-07-24 RX ORDER — GLYCOPYRROLATE 0.2 MG/ML
INJECTION INTRAMUSCULAR; INTRAVENOUS AS NEEDED
Status: DISCONTINUED | OUTPATIENT
Start: 2019-07-24 | End: 2019-07-24 | Stop reason: SURG

## 2019-07-24 RX ORDER — SODIUM CHLORIDE, SODIUM LACTATE, POTASSIUM CHLORIDE, CALCIUM CHLORIDE 600; 310; 30; 20 MG/100ML; MG/100ML; MG/100ML; MG/100ML
125 INJECTION, SOLUTION INTRAVENOUS CONTINUOUS
Status: DISCONTINUED | OUTPATIENT
Start: 2019-07-24 | End: 2019-07-24 | Stop reason: HOSPADM

## 2019-07-24 RX ORDER — LIDOCAINE HYDROCHLORIDE 10 MG/ML
0.5 INJECTION, SOLUTION EPIDURAL; INFILTRATION; INTRACAUDAL; PERINEURAL ONCE AS NEEDED
Status: COMPLETED | OUTPATIENT
Start: 2019-07-24 | End: 2019-07-24

## 2019-07-24 RX ORDER — FENTANYL CITRATE 50 UG/ML
INJECTION, SOLUTION INTRAMUSCULAR; INTRAVENOUS AS NEEDED
Status: DISCONTINUED | OUTPATIENT
Start: 2019-07-24 | End: 2019-07-24 | Stop reason: SURG

## 2019-07-24 RX ORDER — FENTANYL CITRATE/PF 50 MCG/ML
25 SYRINGE (ML) INJECTION
Status: COMPLETED | OUTPATIENT
Start: 2019-07-24 | End: 2019-07-24

## 2019-07-24 RX ORDER — ONDANSETRON 2 MG/ML
4 INJECTION INTRAMUSCULAR; INTRAVENOUS EVERY 6 HOURS PRN
Status: DISCONTINUED | OUTPATIENT
Start: 2019-07-24 | End: 2019-07-24 | Stop reason: HOSPADM

## 2019-07-24 RX ORDER — ONDANSETRON 2 MG/ML
4 INJECTION INTRAMUSCULAR; INTRAVENOUS ONCE AS NEEDED
Status: COMPLETED | OUTPATIENT
Start: 2019-07-24 | End: 2019-07-24

## 2019-07-24 RX ORDER — FENTANYL CITRATE/PF 50 MCG/ML
50 SYRINGE (ML) INJECTION
Status: DISCONTINUED | OUTPATIENT
Start: 2019-07-24 | End: 2019-07-24 | Stop reason: HOSPADM

## 2019-07-24 RX ORDER — DEXAMETHASONE SODIUM PHOSPHATE 10 MG/ML
INJECTION, SOLUTION INTRAMUSCULAR; INTRAVENOUS AS NEEDED
Status: DISCONTINUED | OUTPATIENT
Start: 2019-07-24 | End: 2019-07-24 | Stop reason: SURG

## 2019-07-24 RX ORDER — LIDOCAINE HYDROCHLORIDE 10 MG/ML
INJECTION, SOLUTION INFILTRATION; PERINEURAL AS NEEDED
Status: DISCONTINUED | OUTPATIENT
Start: 2019-07-24 | End: 2019-07-24 | Stop reason: SURG

## 2019-07-24 RX ORDER — SODIUM CHLORIDE, SODIUM LACTATE, POTASSIUM CHLORIDE, CALCIUM CHLORIDE 600; 310; 30; 20 MG/100ML; MG/100ML; MG/100ML; MG/100ML
INJECTION, SOLUTION INTRAVENOUS CONTINUOUS PRN
Status: DISCONTINUED | OUTPATIENT
Start: 2019-07-24 | End: 2019-07-24 | Stop reason: SURG

## 2019-07-24 RX ORDER — ACETAMINOPHEN 325 MG/1
975 TABLET ORAL EVERY 8 HOURS
Status: DISCONTINUED | OUTPATIENT
Start: 2019-07-24 | End: 2019-07-24 | Stop reason: HOSPADM

## 2019-07-24 RX ORDER — CEFAZOLIN SODIUM 1 G/3ML
INJECTION, POWDER, FOR SOLUTION INTRAMUSCULAR; INTRAVENOUS AS NEEDED
Status: DISCONTINUED | OUTPATIENT
Start: 2019-07-24 | End: 2019-07-24 | Stop reason: SURG

## 2019-07-24 RX ORDER — ONDANSETRON 2 MG/ML
INJECTION INTRAMUSCULAR; INTRAVENOUS AS NEEDED
Status: DISCONTINUED | OUTPATIENT
Start: 2019-07-24 | End: 2019-07-24 | Stop reason: SURG

## 2019-07-24 RX ORDER — ROCURONIUM BROMIDE 10 MG/ML
INJECTION, SOLUTION INTRAVENOUS AS NEEDED
Status: DISCONTINUED | OUTPATIENT
Start: 2019-07-24 | End: 2019-07-24 | Stop reason: SURG

## 2019-07-24 RX ORDER — SODIUM CHLORIDE, SODIUM LACTATE, POTASSIUM CHLORIDE, CALCIUM CHLORIDE 600; 310; 30; 20 MG/100ML; MG/100ML; MG/100ML; MG/100ML
50 INJECTION, SOLUTION INTRAVENOUS CONTINUOUS
Status: DISCONTINUED | OUTPATIENT
Start: 2019-07-24 | End: 2019-07-24 | Stop reason: HOSPADM

## 2019-07-24 RX ORDER — PROPOFOL 10 MG/ML
INJECTION, EMULSION INTRAVENOUS AS NEEDED
Status: DISCONTINUED | OUTPATIENT
Start: 2019-07-24 | End: 2019-07-24 | Stop reason: SURG

## 2019-07-24 RX ORDER — NEOSTIGMINE METHYLSULFATE 1 MG/ML
INJECTION INTRAVENOUS AS NEEDED
Status: DISCONTINUED | OUTPATIENT
Start: 2019-07-24 | End: 2019-07-24 | Stop reason: SURG

## 2019-07-24 RX ADMIN — ACETAMINOPHEN 975 MG: 325 TABLET ORAL at 15:23

## 2019-07-24 RX ADMIN — FENTANYL CITRATE 50 MCG: 50 INJECTION, SOLUTION INTRAMUSCULAR; INTRAVENOUS at 12:28

## 2019-07-24 RX ADMIN — FENTANYL CITRATE 25 MCG: 50 INJECTION, SOLUTION INTRAMUSCULAR; INTRAVENOUS at 12:55

## 2019-07-24 RX ADMIN — ONDANSETRON 4 MG: 2 INJECTION INTRAMUSCULAR; INTRAVENOUS at 14:23

## 2019-07-24 RX ADMIN — PROPOFOL 150 MG: 10 INJECTION, EMULSION INTRAVENOUS at 11:47

## 2019-07-24 RX ADMIN — ONDANSETRON 4 MG: 2 INJECTION INTRAMUSCULAR; INTRAVENOUS at 12:26

## 2019-07-24 RX ADMIN — DEXAMETHASONE SODIUM PHOSPHATE 5 MG: 10 INJECTION, SOLUTION INTRAMUSCULAR; INTRAVENOUS at 11:54

## 2019-07-24 RX ADMIN — LIDOCAINE HYDROCHLORIDE 0.5 ML: 10 INJECTION, SOLUTION EPIDURAL; INFILTRATION; INTRACAUDAL; PERINEURAL at 11:35

## 2019-07-24 RX ADMIN — CEFAZOLIN 2000 MG: 1 INJECTION, POWDER, FOR SOLUTION INTRAVENOUS at 12:08

## 2019-07-24 RX ADMIN — FENTANYL CITRATE 50 MCG: 50 INJECTION, SOLUTION INTRAMUSCULAR; INTRAVENOUS at 11:47

## 2019-07-24 RX ADMIN — LIDOCAINE HYDROCHLORIDE 50 MG: 10 INJECTION, SOLUTION INFILTRATION; PERINEURAL at 11:47

## 2019-07-24 RX ADMIN — SODIUM CHLORIDE, SODIUM LACTATE, POTASSIUM CHLORIDE, AND CALCIUM CHLORIDE 125 ML/HR: .6; .31; .03; .02 INJECTION, SOLUTION INTRAVENOUS at 11:35

## 2019-07-24 RX ADMIN — GLYCOPYRROLATE 0.5 MG: 0.2 INJECTION, SOLUTION INTRAMUSCULAR; INTRAVENOUS at 12:26

## 2019-07-24 RX ADMIN — ONDANSETRON 4 MG: 2 INJECTION INTRAMUSCULAR; INTRAVENOUS at 12:56

## 2019-07-24 RX ADMIN — FENTANYL CITRATE 25 MCG: 50 INJECTION, SOLUTION INTRAMUSCULAR; INTRAVENOUS at 13:18

## 2019-07-24 RX ADMIN — FENTANYL CITRATE 25 MCG: 50 INJECTION, SOLUTION INTRAMUSCULAR; INTRAVENOUS at 13:01

## 2019-07-24 RX ADMIN — FENTANYL CITRATE 25 MCG: 50 INJECTION, SOLUTION INTRAMUSCULAR; INTRAVENOUS at 13:09

## 2019-07-24 RX ADMIN — NEOSTIGMINE METHYLSULFATE 3 MG: 1 INJECTION, SOLUTION INTRAVENOUS at 12:26

## 2019-07-24 RX ADMIN — FENTANYL CITRATE 50 MCG: 50 INJECTION, SOLUTION INTRAMUSCULAR; INTRAVENOUS at 13:25

## 2019-07-24 RX ADMIN — ROCURONIUM BROMIDE 35 MG: 10 INJECTION, SOLUTION INTRAVENOUS at 11:47

## 2019-07-24 RX ADMIN — SODIUM CHLORIDE, SODIUM LACTATE, POTASSIUM CHLORIDE, AND CALCIUM CHLORIDE: .6; .31; .03; .02 INJECTION, SOLUTION INTRAVENOUS at 11:39

## 2019-07-24 RX ADMIN — FENTANYL CITRATE 50 MCG: 50 INJECTION, SOLUTION INTRAMUSCULAR; INTRAVENOUS at 13:30

## 2019-07-24 NOTE — INTERVAL H&P NOTE
H&P reviewed  After examining the patient I find no changes in the patients condition since the H&P had been written        Preop for surgery on the right hip to consist of scar revision

## 2019-07-24 NOTE — DISCHARGE INSTRUCTIONS
Discharge Instructions - Prince TROTTER Marylin 80 y o  female MRN: [id number]  Unit/Bed#: PACU 13    Weight Bearing Status:                                           Weight Bearing as tolerated to the right lower extremity  DVT prophylaxis:  Patient takes warfarin    Pain:  Continue analgesics as directed    Showering Instructions:   Do not shower until follow-up appointment    Dressing Instructions:   Keep dressing clean, dry and intact until follow up appointment  Driving Instructions:  No driving until cleared by Orthopaedic Surgery  PT/OT:  Not applicable    Appt Instructions:    If you do not have your appointment, please call the clinic at [phone]  Otherwise followup as scheduled below:

## 2019-07-24 NOTE — ANESTHESIA POSTPROCEDURE EVALUATION
Post-Op Assessment Note    CV Status:  Stable    Pain management: adequate     Mental Status:  Alert and awake   Hydration Status:  Euvolemic   PONV Controlled:  Controlled   Airway Patency:  Patent   Post Op Vitals Reviewed: Yes      Staff: Anesthesiologist, CRNA           BP   145/75   Temp (!) 97 °F (36 1 °C) (07/24/19 1236)    Pulse  69   Resp   14   SpO2   98

## 2019-07-24 NOTE — ANESTHESIA PREPROCEDURE EVALUATION
Review of Systems/Medical History          Cardiovascular  Pacemaker/AICD, Hypertension , Dysrhythmias , atrial fibrillation, CHF ,    Pulmonary       GI/Hepatic    Bariatric surgery,        Chronic kidney disease stage 3,        Endo/Other  History of thyroid disease ,      GYN       Hematology  Anemia ,     Musculoskeletal    Arthritis     Neurology   Psychology   Depression ,   Chronic pain,            Physical Exam    Airway    Mallampati score: II         Dental   No notable dental hx     Cardiovascular      Pulmonary      Other Findings        Anesthesia Plan  ASA Score- 3     Anesthesia Type- general with ASA Monitors  Additional Monitors:   Airway Plan: LMA  Comment: I, Dr Juancarlos Degroot, the attending physician, have personally seen and evaluated the patient prior to anesthetic care  I have reviewed the pre-anesthetic record, and other medical records if appropriate to the anesthetic care  If a CRNA is involved in the case, I have reviewed the CRNA assessment, if present, and agree  The patient is in a suitable condition to proceed with my formulated anesthetic plan        Plan Factors-    Induction- intravenous  Postoperative Plan-     Informed Consent- Anesthetic plan and risks discussed with patient  I personally reviewed this patient with the CRNA  Discussed and agreed on the Anesthesia Plan with the CRNA  Alise Gregory

## 2019-07-24 NOTE — OP NOTE
OPERATIVE REPORT  PATIENT NAME: Josefa Capellan    :  1933  MRN: [id number]  Pt Location: [facility] ROOM 04    SURGERY DATE: 2019    Surgeon(s) and Role:     * Deshaun Vargas MD - Primary     * Jenny Edmonds PA-C - Assisting     * Yolande Christensen MD - Assisting    Preop Diagnosis:  Hypertrophic scar of skin [L91 0]    Post-Op Diagnosis Codes:     * Hypertrophic scar of skin [L91 0]    Procedure(s) (LRB):  REVISION SCAR EXTREMITY Rt Hip (Right)    Specimen(s):  * No specimens in log *    Estimated Blood Loss:   Minimal    Drains:  * No LDAs found *    Anesthesia Type:   General    Operative Indications:  Hypertrophic scar of skin [L91 0]      Operative Findings:  Scar revision    Please note, that length of scar revised was a 10 cm long by 1 5 cm wide scar    Complications:   None    Procedure and Technique: Following induction of adequate level of general anesthesia, this patient was then placed in the left leg is, right-side-up position  An axillary roll was placed underneath the left axilla  The right hip and lateral thigh were then prepped draped sterilely  The area of granulation tissue was identified, an E this was at the mid pole of the incision  A skin incision was mapped out surrounding this extended both proximally distally  An ellipse of healthy tissue surrounding the area of granulation tissue was intended to be included in the operative specimen  An incision was made in the skin encompassing this marking  Dissection carried through the subcutaneous tissues  There was noted to be a sinus tract from the area of granulation tissue that extended to the fascia but did not extend deep to the fascia  And excision of this area was perform, the area was then flushed out with saline, and then wound closure was performed  Deep tissues closed number Vicryl suture  The superficial subcu tissue closed 2 Vicryl suture  The skin was closed to a nylons  Sterile dressings were applied    She was then awakened from general anesthesia, taken recovery room stable condition with plans to include follow up in the office as an outpatient a no point time to this fluid collection extend deep to the fascia and as such this is not a prosthetic joint infection   I was present for the entire procedure    Patient Disposition:  PACU     SIGNATURE: Alexy Galindo MD  DATE: July 24, 2019  TIME: 12:20 PM

## 2019-07-25 ENCOUNTER — TELEPHONE (OUTPATIENT)
Dept: OBGYN CLINIC | Facility: HOSPITAL | Age: 84
End: 2019-07-25

## 2019-07-25 NOTE — TELEPHONE ENCOUNTER
Please:  Patient ask her to reinforce the dressing for breakthrough bleeding  If possible, preservation of the silver dressing would be preferred

## 2019-07-25 NOTE — TELEPHONE ENCOUNTER
Patient has been advised  Also advised to try icing the area to stop bleeding  She verbalized understanding  She stated she will call back if the bleeding does not self limit or is she has any other concerns   Thank you

## 2019-07-25 NOTE — TELEPHONE ENCOUNTER
Patient called stating her incision is bleeding  Patient resumed coumadin as instructed this morning  Patient stated it was coming from under the silver dressing  She stated she placed an ABD pad over and reinforced the dressing in place  Please advise if removal of silver dressing and daily dressing changes are appropriate or if the reinforcement will do until follow up   Thank you

## 2019-07-29 ENCOUNTER — ANTICOAG VISIT (OUTPATIENT)
Dept: INTERNAL MEDICINE CLINIC | Facility: CLINIC | Age: 84
End: 2019-07-29

## 2019-07-29 NOTE — TELEPHONE ENCOUNTER
Patient calling back today to state that the blood in the silver dressing is now hard  She stated it is not currently bleeding, stopped bleeding shortly after my last conversation with her but the dressing is very hard  Wonders if this is okay to wait until next week's appointment  She stated she is having pain  Advised that she is taking tramadol for her pain as well as tylenol  Advised her to ice as well 20 mins on/20 mins off  She stated the pain is better than before but she doesn't know what to make of the hard blood dried in the dressing  Please advise if she should keep this on or if she can take it off and change the dressing before her next appointment

## 2019-07-31 DIAGNOSIS — G89.29 OTHER CHRONIC PAIN: ICD-10-CM

## 2019-07-31 RX ORDER — DULOXETIN HYDROCHLORIDE 60 MG/1
CAPSULE, DELAYED RELEASE ORAL
Qty: 30 CAPSULE | Refills: 0 | Status: SHIPPED | OUTPATIENT
Start: 2019-07-31 | End: 2019-08-06 | Stop reason: SDUPTHER

## 2019-08-06 ENCOUNTER — OFFICE VISIT (OUTPATIENT)
Dept: OBGYN CLINIC | Facility: HOSPITAL | Age: 84
End: 2019-08-06

## 2019-08-06 ENCOUNTER — TELEPHONE (OUTPATIENT)
Dept: OBGYN CLINIC | Facility: HOSPITAL | Age: 84
End: 2019-08-06

## 2019-08-06 VITALS
WEIGHT: 182 LBS | DIASTOLIC BLOOD PRESSURE: 68 MMHG | HEIGHT: 61 IN | SYSTOLIC BLOOD PRESSURE: 119 MMHG | HEART RATE: 91 BPM | BODY MASS INDEX: 34.36 KG/M2

## 2019-08-06 DIAGNOSIS — Z47.1 AFTERCARE FOLLOWING RIGHT HIP JOINT REPLACEMENT SURGERY: Primary | ICD-10-CM

## 2019-08-06 DIAGNOSIS — Z96.641 AFTERCARE FOLLOWING RIGHT HIP JOINT REPLACEMENT SURGERY: Primary | ICD-10-CM

## 2019-08-06 DIAGNOSIS — G62.9 NEUROPATHY: ICD-10-CM

## 2019-08-06 DIAGNOSIS — L24.A9 DRAINAGE FROM WOUND: ICD-10-CM

## 2019-08-06 DIAGNOSIS — G89.29 OTHER CHRONIC PAIN: ICD-10-CM

## 2019-08-06 DIAGNOSIS — R10.13 EPIGASTRIC PAIN: ICD-10-CM

## 2019-08-06 PROCEDURE — 99024 POSTOP FOLLOW-UP VISIT: CPT | Performed by: ORTHOPAEDIC SURGERY

## 2019-08-06 RX ORDER — TRAMADOL HYDROCHLORIDE 50 MG/1
50 TABLET ORAL EVERY 8 HOURS PRN
Qty: 30 TABLET | Refills: 0 | Status: SHIPPED | OUTPATIENT
Start: 2019-08-06 | End: 2019-08-30 | Stop reason: SDUPTHER

## 2019-08-06 RX ORDER — DULOXETIN HYDROCHLORIDE 60 MG/1
60 CAPSULE, DELAYED RELEASE ORAL DAILY
Qty: 90 CAPSULE | Refills: 1 | Status: SHIPPED | OUTPATIENT
Start: 2019-08-06 | End: 2020-03-02

## 2019-08-06 RX ORDER — SUCRALFATE 1 G/1
TABLET ORAL
Qty: 30 TABLET | Refills: 0 | Status: SHIPPED | OUTPATIENT
Start: 2019-08-06 | End: 2019-08-27 | Stop reason: SDUPTHER

## 2019-08-06 NOTE — PROGRESS NOTES
Assessment:  1  Aftercare following right hip joint replacement surgery     2  Drainage from wound         Plan:  Sutures were removed and the wound was redressed  The patient should follow up in one month  To do next visit:  Return in about 4 weeks (around 9/3/2019)  The above stated was discussed in layman's terms and the patient expressed understanding  All questions were answered to the patient's satisfaction  Scribe Attestation    I,:   Jayne Rubinstein am acting as a scribe while in the presence of the attending physician :        I,:   Jennie Izaguirre MD personally performed the services described in this documentation    as scribed in my presence :              Subjective:   Soraya Monk is a 80 y o  female who presents 2 weeks s/p right scar revision, 7/24/19, and right hip acetabular total revision, 4/15/19  She feels she is doing well  Today she has posterolateral right hip burning sensation  She rates her pain at 6/10  She continues to walk with walker  Review of systems negative unless otherwise specified in HPI    Past Medical History:   Diagnosis Date    Anemia     Arthritis     Cancer ([address] )     basal cell    CHF (congestive heart failure) (HCC)     Disease of thyroid gland     Disturbance of smell and taste     disturbance of taste    Effusion of knee joint right     Fibromyalgia     Heart murmur     reported a previous heart murmur    History of acute myocardial infarction     History of atrial fibrillation     History of bruising easily     History of cancer     History of dermatitis     History of mammogram     History of methicillin resistant staphylococcus aureus (MRSA)     Negative nasal culture, isolation discontinued 8/17/2018      History of methicillin resistant staphylococcus aureus (MRSA) 08/17/2018    negative nasal culture-isolation and hx discontinued 8/17/2018    History of obesity     History of osteopenia     History of sciatica  History of shortness of breath     History of sinusitis     History of sore throat     History of syncope     History of umbilical hernia     History of viral infection     Irritable bowel syndrome (IBS)     Joint pain, hip     Limb pain     Myalgia     myalgia and myositis    Need for prophylactic vaccination against single diseases     Need for prophylactic vaccination and inoculation against influenza     Preoperative cardiovascular examination     Primary osteoarthritis of right knee     Right leg pain     Vaginal Pap smear     reported pap smear       Past Surgical History:   Procedure Laterality Date    ANKLE ARTHRODESIS Right     BACK SURGERY      BARIATRIC SURGERY      CHOLECYSTECTOMY      x2    COLONOSCOPY      ESOPHAGOGASTRODUODENOSCOPY N/A 3/23/2018    Procedure: ESOPHAGOGASTRODUODENOSCOPY (EGD); Surgeon: Rhonda Levin MD;  Location: [facility]; Service: Gastroenterology    FOOT SURGERY      HIP CLOSE REDUCTION Right 8/21/2016    Procedure: CLOSED REDUCTION RIGHT TOTAL HIP;  Surgeon: Shani Stallworth MD;  [facility];  Service:    Hyacinth Corbin / Blanca Moura / Claudetta Rom REPLACEMENT      LEFT KNEE REPLACEMENT    JOINT REPLACEMENT      Right HIP    PILONIDAL CYST EXCISION      x2    NE EXC SKIN BENIG <0 5 CM TRUNK,ARM,LEG Right 7/24/2019    Procedure: REVISION SCAR EXTREMITY Rt Hip;   Surgeon: Vincent Sanderson MD;  Location: [facility];  Service: Orthopedics    NE REVISE TOTAL HIP REPLACEMENT Right 4/15/2019    Procedure: ARTHROPLASTY HIP TOTAL ACETABULAR REVISION;  Surgeon: Vincent Sanderson MD;  [facility];  Service: Orthopedics    REPLACEMENT TOTAL KNEE Right     SILVANA-EN-Y PROCEDURE      x2    TOTAL KNEE ARTHROPLASTY      UMBILICAL HERNIA REPAIR      x2       Family History   Problem Relation Age of Onset    Coronary artery disease Mother     Heart attack Mother     Hypertension Mother     Coronary artery disease Father     Heart attack Father     Hypertension Father     Lymphoma Sister         acute    Rashes / Skin problems Sister         lymphomatoid papulosis    Coronary artery disease Brother     Heart attack Brother         x2    Aneurysm Neg Hx     Hyperlipidemia Neg Hx        Social History     Occupational History    Not on file   Tobacco Use    Smoking status: Never Smoker    Smokeless tobacco: Never Used   Substance and Sexual Activity    Alcohol use: Not Currently     Frequency: Never    Drug use: No    Sexual activity: Never         Current Outpatient Medications:     ascorbic acid (VITAMIN C) 500 mg tablet, Take 1 tablet (500 mg total) by mouth 2 (two) times a day, Disp: 60 tablet, Rfl: 0    cephalexin (KEFLEX) 500 mg capsule, Take 500 mg by mouth every 6 (six) hours, Disp: , Rfl:     Cyanocobalamin (VITAMIN B12 PO), Take 1 capsule by mouth daily , Disp: , Rfl:     desoximetasone (TOPICORT) 0 25 % cream, Apply topically as needed for irritation, Disp: 30 g, Rfl: 0    DULoxetine (CYMBALTA) 60 mg delayed release capsule, TAKE 1 CAPSULE BY MOUTH EVERY DAY, Disp: 30 capsule, Rfl: 0    fluticasone (FLONASE) 50 mcg/act nasal spray, 1 spray into each nostril as needed  , Disp: , Rfl:     levothyroxine 50 mcg tablet, TAKE 1 TABLET BY MOUTH EVERY DAY, Disp: 30 tablet, Rfl: 2    potassium chloride (K-DUR,KLOR-CON) 20 mEq tablet, TAKE 1 TABLET DAILY, Disp: 30 tablet, Rfl: 11    sucralfate (CARAFATE) 1 g tablet, TAKE 1 TABLET BY MOUTH TWICE A DAY, Disp: 30 tablet, Rfl: 0    torsemide (DEMADEX) 20 mg tablet, Take 1 tablet (20 mg total) by mouth daily, Disp: 90 tablet, Rfl: 3    traMADol (ULTRAM) 50 mg tablet, Take 1 tablet (50 mg total) by mouth every 8 (eight) hours as needed for moderate pain, Disp: 30 tablet, Rfl: 0    warfarin (COUMADIN) 3 mg tablet, TAKE FRIDAY,SATURDAY, SUNDAY INR MONDAY TO FURTHER DOSE, Disp: , Rfl: 0    warfarin (COUMADIN) 4 mg tablet, Take one tablet daily as previously directed by cardiology, Disp: 180 tablet, Rfl: 0    Allergies   Allergen Reactions    Amoxicillin      Reports nausea, headache, dizzy    Codeine Vomiting and GI Intolerance     GI upset, ana m morphine  Reaction Date: [id number];     Other      Other reaction(s): Other (See Comments)  ana m toradol in OR 6/06 sah    Oxycodone-Acetaminophen      Other reaction(s): Other (See Comments)  GI upset; ana m vicodin    Percocet [Oxycodone-Acetaminophen]     Seasonal Ic  [Cholestatin] Allergic Rhinitis    Bactrim [Sulfamethoxazole-Trimethoprim] Rash    Folic Acid-Vit Q1-SXH L08 Diarrhea    Sulfa Antibiotics Rash     Other reaction(s): Other (See Comments)  takes lasix @home            Vitals:    08/06/19 1358   BP: 119/68   Pulse: 91       Objective:  Physical exam  · General: Awake, Alert, Oriented  · Eyes: Pupils equal, round and reactive to light  · Heart: regular rate and rhythm  · Lungs: No audible wheezing  · Abdomen: soft                    Ortho Exam   Right hip:  Posterior incision clean dry and intact  Staples well approximated and removed today   Appropriate warm and swelling  Good arc of motion with no pain  Patient sits comfortably in chair with hip flexed at 90 degrees  Calf compartments soft and supple  Sensation intact  Toes are warm sensate and mobile      Diagnostics, reviewed and taken today if performed as documented:    None performed     Procedures, if performed today:    Procedures    None performed      Portions of the record may have been created with voice recognition software  Occasional wrong word or "sound a like" substitutions may have occurred due to the inherent limitations of voice recognition software  Read the chart carefully and recognize, using context, where substitutions have occurred

## 2019-08-06 NOTE — TELEPHONE ENCOUNTER
Pt contacted Call Center requested refill of their medication  Medication Name:tramadol      Dosage of Med:50mg      Frequency of Med:2 tablets per day, requesting 60 qty      Remaining Medication:      Pharmacy and Location: [facility] on file        Pt  Preferred Callback Phone Number: [phone]      Thank you

## 2019-08-08 ENCOUNTER — APPOINTMENT (OUTPATIENT)
Dept: LAB | Facility: CLINIC | Age: 84
End: 2019-08-08
Payer: COMMERCIAL

## 2019-08-09 ENCOUNTER — ANTICOAG VISIT (OUTPATIENT)
Dept: INTERNAL MEDICINE CLINIC | Facility: CLINIC | Age: 84
End: 2019-08-09

## 2019-08-13 ENCOUNTER — OFFICE VISIT (OUTPATIENT)
Dept: INTERNAL MEDICINE CLINIC | Facility: CLINIC | Age: 84
End: 2019-08-13
Payer: COMMERCIAL

## 2019-08-13 VITALS
BODY MASS INDEX: 34.55 KG/M2 | WEIGHT: 183 LBS | OXYGEN SATURATION: 96 % | HEIGHT: 61 IN | SYSTOLIC BLOOD PRESSURE: 124 MMHG | HEART RATE: 83 BPM | RESPIRATION RATE: 16 BRPM | DIASTOLIC BLOOD PRESSURE: 74 MMHG

## 2019-08-13 DIAGNOSIS — D64.9 ANEMIA, UNSPECIFIED TYPE: ICD-10-CM

## 2019-08-13 DIAGNOSIS — I10 BENIGN ESSENTIAL HYPERTENSION: Primary | ICD-10-CM

## 2019-08-13 DIAGNOSIS — J02.9 SORE THROAT: ICD-10-CM

## 2019-08-13 DIAGNOSIS — S32.9XXA CLOSED NONDISPLACED FRACTURE OF PELVIS, UNSPECIFIED PART OF PELVIS, INITIAL ENCOUNTER (HCC): ICD-10-CM

## 2019-08-13 DIAGNOSIS — N18.30 CHRONIC KIDNEY DISEASE, STAGE III (MODERATE) (HCC): ICD-10-CM

## 2019-08-13 DIAGNOSIS — I48.20 CHRONIC ATRIAL FIBRILLATION (HCC): ICD-10-CM

## 2019-08-13 PROBLEM — M25.519 CHRONIC SHOULDER PAIN: Status: ACTIVE | Noted: 2019-08-13

## 2019-08-13 PROBLEM — G89.29 CHRONIC SHOULDER PAIN: Status: ACTIVE | Noted: 2019-08-13

## 2019-08-13 PROCEDURE — 87070 CULTURE OTHR SPECIMN AEROBIC: CPT | Performed by: INTERNAL MEDICINE

## 2019-08-13 PROCEDURE — 1036F TOBACCO NON-USER: CPT | Performed by: INTERNAL MEDICINE

## 2019-08-13 PROCEDURE — 99214 OFFICE O/P EST MOD 30 MIN: CPT | Performed by: INTERNAL MEDICINE

## 2019-08-13 PROCEDURE — 1160F RVW MEDS BY RX/DR IN RCRD: CPT | Performed by: INTERNAL MEDICINE

## 2019-08-13 RX ORDER — FLUTICASONE PROPIONATE 50 MCG
2 SPRAY, SUSPENSION (ML) NASAL DAILY
Qty: 16 G | Refills: 0 | Status: SHIPPED | OUTPATIENT
Start: 2019-08-13 | End: 2019-09-14 | Stop reason: SDUPTHER

## 2019-08-13 NOTE — PROGRESS NOTES
BMI Counseling: Body mass index is 34 58 kg/m²  Discussed the patient's BMI with her  The BMI is above average  BMI counseling and education was provided to the patient  Nutrition recommendations include reducing portion sizes  Assessment/Plan:    Benign essential hypertension  Hypertension - controlled, I have counseled patient following healthy balance diet, I would like the patient reduce sodium, exercise routinely, I would like the patient continued the med current medical regiment and we will continue to monitor  Chronic atrial fibrillation (HCC)  Rate controlled and anticoagulated currently clinically stable doing well she will follow up with Cardiology  Chronic kidney disease, stage III (moderate) (HCC)  Reduce sodium in the diet, avoid anti-inflammatories, drink adequate amounts of water and continue monitor the kidney function  Anemia  Postop anemia over-the-counter iron she may try, recheck CBC in 1 month  Improving  Sore throat  Likely secondary to postnasal drip and throat irritation I do not see any signs or symptoms of an active infection I will check a throat culture she will use Flonase 2 sprays each nostril once a day she also has a component eustachian tube dysfunction on the right she will also use Flonase 2 sprays each nostril once a day no signs or symptoms of active infection of her symptoms not jordan she is to notify me immediately  Reports me some weakness of her upper extremities because of the use of the walker and also deconditioning at this point time she does not want to undergo physical therapy or diagnostic testing she will do her own home physical therapy and if her symptoms not improve next 1 month she will notify me to let me know for a diagnostic workup and physical therapy  Will check throat culture       Problem List Items Addressed This Visit        Cardiovascular and Mediastinum    Chronic atrial fibrillation ([address] )     Rate controlled and anticoagulated currently clinically stable doing well she will follow up with Cardiology  Benign essential hypertension - Primary     Hypertension - controlled, I have counseled patient following healthy balance diet, I would like the patient reduce sodium, exercise routinely, I would like the patient continued the med current medical regiment and we will continue to monitor  Relevant Orders    Basic metabolic panel       Genitourinary    Chronic kidney disease, stage III (moderate) (HCC)     Reduce sodium in the diet, avoid anti-inflammatories, drink adequate amounts of water and continue monitor the kidney function  Other    Anemia     Postop anemia over-the-counter iron she may try, recheck CBC in 1 month  Improving  Relevant Orders    CBC (Includes Diff/Plt) (Refl)    Sore throat     Likely secondary to postnasal drip and throat irritation I do not see any signs or symptoms of an active infection I will check a throat culture she will use Flonase 2 sprays each nostril once a day she also has a component eustachian tube dysfunction on the right she will also use Flonase 2 sprays each nostril once a day no signs or symptoms of active infection of her symptoms not jordan she is to notify me immediately  Relevant Medications    fluticasone (FLONASE) 50 mcg/act nasal spray    Other Relevant Orders    Throat culture      Other Visit Diagnoses     Closed nondisplaced fracture of pelvis, unspecified part of pelvis, initial encounter ([facility] [address] )        Relevant Orders    Vitamin D 25 hydroxy          Patient would like to hold off on evaluation treatment of the right shoulder pain which is likely musculoskeletal she plans to do her own home physical therapy and stretching of the symptoms not improve she is notify me immediately for further evaluation and treatment    Also patient is medically stable/cleared for cataract operation via Ophthalmology Dr Carmel Briscoe 3 months call if any problems  Subjective:      Patient ID: Reginaldo Blake is a 80 y o  female  HPI 80-year old female coming in for a follow up office visit regarding benign essential hypertension, anemia, chronic atrial fibrillation, sore throat, status post pelvic fracture, chronic kidney disease; The patient reports me compliant taking medications without untoward side effects the  The patient is here to review his medical condition, update me on the medical condition and the patient reports me 1 hospitalizations and no ER visits  she reports reports me chronic right hip pain since the revision she reports me recent operation to remove scar tissue, reports me still having pain eats use tramadol at this point working with Orthopedics Dr Jaqueline Mena  He has noticed a sore throat in last 2 weeks    Started after the ET tube was removed she has had some hoarseness intermittently the symptoms for 14 days no fever no chills no adenopathy no exposures that are known  Reporting to me right shoulder pain she has lost weight over time she has noticed a more weak getting up the arms she has lost weight in her arms but not her abdominal area and and upper chest wall  The following portions of the patient's history were reviewed and updated as appropriate: allergies, current medications, past family history, past medical history, past social history, past surgical history and problem list     Review of Systems   Constitutional: Negative for activity change, appetite change, chills, fever and unexpected weight change  HENT: Positive for postnasal drip and sore throat (Throat irritation secondary to dry mucous)  Negative for congestion  Right ear pressure   Eyes: Negative for visual disturbance  Respiratory: Negative for cough and shortness of breath  Cardiovascular: Negative for chest pain  Gastrointestinal: Negative for abdominal pain, diarrhea, nausea and vomiting     Neurological: Negative for dizziness, light-headedness and headaches  Hematological: Negative for adenopathy  Objective:    No follow-ups on file  No results found  Recent Results (from the past 51794 hour(s))   POCT ECG    Impression    Ventriclar rate 71 BPM, QRS 134ms,  ms, Ventricular paced rhythm, underlying atrial fibrillation  ECG 12 lead   Result Value    Ventricular Rate 78    Atrial Rate 77    IL Interval     QRSD Interval 116    QT Interval 400    QTC Interval 456    P Axis     QRS Axis -12    T Wave Axis 159    Narrative    Ventricular-paced rhythm  When compared with ECG of 23-AUG-2018 15:28,  Vent  rate has decreased BY   2 BPM  Confirmed by Tong Renteria ([id number]) on 2019 8:59:27 AM   Holter monitor - 24 hour    Narrative    PT NAME: Dante Riojas  : 1933  AGE: 80 y o  GENDER: female  MRN: [id number]  PROCEDURE: Holter monitor - 24 hour      INDICATIONS:   24 hour Holter monitor was performed 18 for atrial fibrillation  The   patient was in atrial fibrillation throughout the duration of the test     Average heart rate was 118 beats per minute  Minimum heart rate was 60   beats per minute at 8:25 a m     Maximum heart rate was 171 beats per   minute at 2:40 p m  [facility] Average hourly heart rate ranged between 97 to 150   beats per minute  Ventricular ectopic activity consisted of 9 isolated PVCs, which comprises   less than 0 1% of total beats  Longest R-R interval was 2 1 seconds at 12:45 p m  [facility] Patient's rhythm included 1 hour 28 minutes of tachycardia  No symptoms reported  IMPRESSIONS:  1  Atrial fibrillation with rapid ventricular response  2   Rare ventricular ectopic activity  3   No symptoms reported  Interpreted by:  Marianela Dickens MD   NM myocardial perfusion spect (rx stress and/or rest)    Narrative     [address] [place] [address], [address] [place] [address]  [phone]    Rest/Stress Gated SPECT Myocardial Perfusion Imaging After Regadenoson    Patient: Karolina Louisa Vivek Soto  MR number: [id number]  Account number: [de-identified]  : 1933  Age: 80 years  Gender: Female  Status: Outpatient  Location: [facility]  Height: 62 in  Weight: 231 lb  BP: 116/ 70 mmHg    Allergies: CODEINE, OXYCODONE HCL, OXYCODONE-ACETAMINOPHEN, CHOLESTATIN, SULFAMETHOXAZOLE-TRIMETHOPRIM, SULFA ANTIBIOTICS    Diagnosis: I48 0 - Atrial fibrillation, I50 9 - Heart failure, unspecified, R07 9 - Chest pain, unspecified    Primary Physician:  Mouna Montilla DO  RN:  Sarina Ayon RN BSN  Referring Physician:  Suzan Farrell MD  Technician:  Melba Collado  Group:  [facility]  Report Prepared By[de-identified]  Sarina Ayon RN BSN  Interpreting Physician:  Shruthi Webster MD    INDICATIONS: chest pain/pressure    HISTORY: Gastric bypass/ GI bleed/BL LE edemaModerate Mitral Insuff  chronic diastolic CHF/arthritis/IBS/Fibromyalgia/MRSA  uses walker to ambulate/Chronic Afib The patient is a 80year old  female  Chest pain status: chest pain, consistent with atypical angina  Other symptoms: decreased effort tolerance, palpitations, and edema  Coronary artery  disease risk factors: hypertension  Cardiovascular history: congestive heart failure, mitral valve disease, and arrhythmia  Co-morbidity: obesity  Medications: a beta blocker and a calcium channel blocker  Previous test results: abnormal  ECG  PHYSICAL EXAM: Baseline physical exam screening: no wheezes audible  REST ECG: Atrial fibrillation  Nonspecific ST and T wave abnormalities were present  PROCEDURE: The study was performed at the [facility]  A regadenoson infusion pharmacologic stress test was performed  Gated SPECT myocardial perfusion imaging was performed after stress  Systolic blood pressure was  116 mmHg, at the start of the study  Diastolic blood pressure was 70 mmHg, at the start of the study  The heart rate was 85 bpm, at the start of the study   IV double checked  Regadenoson protocol:  HR bpm SBP mmHg DBP mmHg Symptoms  Baseline 85 116 70 none  Immediate -- -- -- moderate dyspnea  1 min -- -- -- same as above  2 min 107 122 78 subsiding  5 min 90 120 70 none    STRESS SUMMARY: Duration of pharmacologic stress was Maximal heart rate during stress was 107 bpm  There was normal resting blood pressure with an appropriate response to stress  The rate-pressure product for the peak heart rate and blood  pressure was 84938  There was no chest pain during stress  The stress test was terminated due to protocol completion  Pre oxygen saturation: 99 %  Peak oxygen saturation: 99 %  The stress ECG was negative for ischemia and normal   Arrhythmia during stress: isolated premature ventricular beats  ISOTOPE ADMINISTRATION:  The radiopharmaceutical was injected at the peak effect of pharmacologic stress  PERFUSION DEFECTS:  -  There were no perfusion defects  GATED SPECT:  The calculated left ventricular ejection fraction was 66 %  There was no left ventricular regional abnormality  SUMMARY:  -  Stress results: There was no chest pain during stress  -  ECG conclusions: The stress ECG was negative for ischemia and normal   -  Perfusion imaging: There were no perfusion defects   -  Gated SPECT: The calculated left ventricular ejection fraction was 66 %  There was no left ventricular regional abnormality  IMPRESSIONS: Normal study after pharmacologic vasodilation without reproduction of symptoms  Myocardial perfusion imaging was normal at rest and with stress      Prepared and signed by    Jordana Spaulding MD  Signed 2018 15:09:24     Echo complete with contrast if indicated    Narrative    [address] [place] [address], [address] [place] [address]  [phone]    Transthoracic Echocardiogram  2D, M-mode, Doppler, and Color Doppler    Study date:  2018    Patient: Steven García  MR number: [id number]  Account number: [de-identified]  : 1933  Age: 80 years  Gender: Female  Status: Outpatient  Location: [facility]  Height: 62 in  Weight: 231 lb  BP: 112/ 60 mmHg    Indications: MV Disease    Diagnoses: I34 0 - Nonrheumatic mitral (valve) insufficiency    Sonographer:  JESUS Cagle  Primary Physician:  Mitchell Mccrary DO  Referring Physician:  Jose Barnes MD  Group:  [facility]  Interpreting Physician:  Ken Poe MD    SUMMARY    LEFT VENTRICLE:  Size was normal   Systolic function was normal  Ejection fraction was estimated to be 55 %  Wall thickness was at the upper limits of normal     RIGHT VENTRICLE:  The ventricle was mildly dilated  Systolic function was normal     RIGHT ATRIUM:  The atrium was mildly dilated  MITRAL VALVE:  There was moderate regurgitation  TRICUSPID VALVE:  There was moderate regurgitation  IVC, HEPATIC VEINS:  The inferior vena cava was mildly dilated  Respirophasic changes were blunted (less than 50% variation)  COMPARISONS:  Comparison was made with the previous study of 17-May-2017  Tricuspid regurgitation has worsened  HISTORY: PRIOR HISTORY: AFIB, Moderate MR, Hypertension, CHF, Anemia    PROCEDURE: The study was performed in the [facility]  This was a routine study  The transthoracic approach was used  The study included complete 2D imaging, M-mode, complete spectral Doppler, and color Doppler  The  heart rate was 110 bpm, at the start of the study  Images were obtained from the parasternal, apical, subcostal, and suprasternal notch acoustic windows  Echocardiographic views were limited due to lung interference  Image quality was  adequate  LEFT VENTRICLE: Size was normal  Systolic function was normal  Ejection fraction was estimated to be 55 %  There were no regional wall motion abnormalities  Wall thickness was at the upper limits of normal  DOPPLER: Transmitral flow  pattern: atrial fibrillation   The study was not technically sufficient to allow evaluation of LV diastolic function  RIGHT VENTRICLE: The ventricle was mildly dilated  Systolic function was normal  Wall thickness was normal     LEFT ATRIUM: Size was normal     RIGHT ATRIUM: The atrium was mildly dilated  MITRAL VALVE: Valve structure was normal  There was normal leaflet separation  DOPPLER: The transmitral velocity was within the normal range  There was no evidence for stenosis  There was moderate regurgitation  AORTIC VALVE: The valve was trileaflet  Leaflets exhibited normal thickness, normal cuspal separation, and sclerosis  DOPPLER: Transaortic velocity was within the normal range  There was no evidence for stenosis  There was no  regurgitation  TRICUSPID VALVE: The valve structure was normal  There was normal leaflet separation  DOPPLER: The transtricuspid velocity was within the normal range  There was no evidence for stenosis  There was moderate regurgitation  Pulmonary artery  systolic pressure was within the normal range  Estimated peak PA pressure was 36 mmHg  PULMONIC VALVE: Leaflets exhibited normal thickness, no calcification, and normal cuspal separation  DOPPLER: The transpulmonic velocity was within the normal range  There was no regurgitation  PERICARDIUM: There was no pericardial effusion  The pericardium was normal in appearance  AORTA: The root exhibited normal size  SYSTEMIC VEINS: IVC: The inferior vena cava was mildly dilated  Respirophasic changes were blunted (less than 50% variation)      SYSTEM MEASUREMENT TABLES    2D  %FS: 34 71 %  AV Diam: 2 84 cm  EDV(Teich): 101 4 ml  EF(Cube): 72 17 %  EF(Teich): 63 88 %  ESV(Cube): 28 55 ml  ESV(Teich): 36 63 ml  IVSd: 1 1 cm  LA Area: 17 55 cm2  LA Diam: 4 14 cm  LVEDV MOD A4C: 61 32 ml  LVEF MOD A4C: 61 87 %  LVESV MOD A4C: 23 38 ml  LVIDd: 4 68 cm  LVIDs: 3 06 cm  LVLd A4C: 6 59 cm  LVLs A4C: 5 67 cm  LVPWd: 1 cm  RA Area: 23 38 cm2  RV Diam: 3 71 cm  SV MOD A4C: 37 94 ml  SV(Cube): 74 03 ml  SV(Teich): 64 77 ml    CW  TR MaxP 51 mmHg  TR Vmax: 2 21 m/s    MM  TAPSE: 1 35 cm    [employer] Accredited Echocardiography Laboratory    Prepared and electronically signed by    Alexis Roman MD  Signed 2018 17:36:53     Holter monitor - 48 hour    Narrative    PT NAME: Soraya Monk  : 1933  AGE: 80 y o  GENDER: female  MRN: [id number]  PROCEDURE: Holter monitor - 48 hour      INDICATIONS:   48 hour Holter monitor was performed 17 for atrial fibrillation  The   patient was in atrial fibrillation throughout the test  Average heart rate   was 74 BPM; minimum heart rate was 35 BPM at 3:36 PM; and maximum heart   rate was 162 BPM at 10:28 AM  Average hourly heart rate ranged between    BPM      Ventricular ectopic activity consisted of 324 beats, which comprises 0 2%   of total beats  13 were in 2 runs, 96 were in couplets, 10 were late   beats, remainder were isolated PVCs  Longest ventricular run was 9 beats   at a HR of 126 BPM at 2:53 AM on day 2, possible afib with aberrancy as   rhythm was irregular and preceded by a long-short sequence (Rik's   phenomenon)  Fastest ventricular run was 4 beats with a maximum HR of 135   BPM at 2:53 AM on day 2  Longest R-R interval was 2 3 seconds at 5:48 AM      IMPRESSIONS:  1  Atrial fibrillation with controlled ventricular response  2  Occasional ventricular ectopy versus aberrancy  3  No symptoms reported  Interpreted by: Arnie Clark MD       Allergies   Allergen Reactions    Amoxicillin      Reports nausea, headache, dizzy    Codeine Vomiting and GI Intolerance     GI upset, ana m morphine  Reaction Date: 2012;     Other      Other reaction(s): Other (See Comments)  ana m toradol in OR  sah    Oxycodone-Acetaminophen      Other reaction(s):  Other (See Comments)  GI upset; ana m vicodin    Percocet [Oxycodone-Acetaminophen]     Seasonal Ic  [Cholestatin] Allergic Rhinitis    Bactrim [Sulfamethoxazole-Trimethoprim] Rash    Folic Acid-Vit D1-BLW S40 Diarrhea    Sulfa Antibiotics Rash     Other reaction(s): Other (See Comments)  takes lasix @home       Past Medical History:   Diagnosis Date    Anemia     Arthritis     Cancer ([address] )     basal cell    CHF (congestive heart failure) (HCC)     Disease of thyroid gland     Disturbance of smell and taste     disturbance of taste    Effusion of knee joint right     Fibromyalgia     Heart murmur     reported a previous heart murmur    History of acute myocardial infarction     History of atrial fibrillation     History of bruising easily     History of cancer     History of dermatitis     History of mammogram     History of methicillin resistant staphylococcus aureus (MRSA)     Negative nasal culture, isolation discontinued 8/17/2018   History of methicillin resistant staphylococcus aureus (MRSA) 08/17/2018    negative nasal culture-isolation and hx discontinued 8/17/2018    History of obesity     History of osteopenia     History of sciatica     History of shortness of breath     History of sinusitis     History of sore throat     History of syncope     History of umbilical hernia     History of viral infection     Irritable bowel syndrome (IBS)     Joint pain, hip     Limb pain     Myalgia     myalgia and myositis    Need for prophylactic vaccination against single diseases     Need for prophylactic vaccination and inoculation against influenza     Preoperative cardiovascular examination     Primary osteoarthritis of right knee     Right leg pain     Vaginal Pap smear     reported pap smear     Past Surgical History:   Procedure Laterality Date    ANKLE ARTHRODESIS Right     BACK SURGERY      BARIATRIC SURGERY      CHOLECYSTECTOMY      x2    COLONOSCOPY      ESOPHAGOGASTRODUODENOSCOPY N/A 3/23/2018    Procedure: ESOPHAGOGASTRODUODENOSCOPY (EGD);   Surgeon: Onesimo Jain MD; [facility]: [facility]; Service: Gastroenterology    FOOT SURGERY      HIP CLOSE REDUCTION Right 8/21/2016    Procedure: CLOSED REDUCTION RIGHT TOTAL HIP;  Surgeon: Romana Harris MD;  [facility];  Service:    Dorathy Bamberg / Laverne Stalling / Gina Spurling REPLACEMENT      LEFT KNEE REPLACEMENT    JOINT REPLACEMENT      Right HIP    PILONIDAL CYST EXCISION      x2    WI EXC SKIN BENIG <0 5 CM TRUNK,ARM,LEG Right 7/24/2019    Procedure: REVISION SCAR EXTREMITY Rt Hip;   Surgeon: Charanjit Meyer MD;  [facility];  Service: Orthopedics    WI REVISE TOTAL HIP REPLACEMENT Right 4/15/2019    Procedure: ARTHROPLASTY HIP TOTAL ACETABULAR REVISION;  Surgeon: Charanjit Meyer MD;  [facility];  Service: Orthopedics    REPLACEMENT TOTAL KNEE Right     SILVANA-EN-Y PROCEDURE      x2    TOTAL KNEE ARTHROPLASTY      UMBILICAL HERNIA REPAIR      x2     Current Outpatient Medications on File Prior to Visit   Medication Sig Dispense Refill    ascorbic acid (VITAMIN C) 500 mg tablet Take 1 tablet (500 mg total) by mouth 2 (two) times a day 60 tablet 0    cephalexin (KEFLEX) 500 mg capsule Take 500 mg by mouth every 6 (six) hours      Cyanocobalamin (VITAMIN B12 PO) Take 1 capsule by mouth daily       desoximetasone (TOPICORT) 0 25 % cream Apply topically as needed for irritation 30 g 0    DULoxetine (CYMBALTA) 60 mg delayed release capsule Take 1 capsule (60 mg total) by mouth daily 90 capsule 1    fluticasone (FLONASE) 50 mcg/act nasal spray 1 spray into each nostril as needed        levothyroxine 50 mcg tablet TAKE 1 TABLET BY MOUTH EVERY DAY 30 tablet 2    potassium chloride (K-DUR,KLOR-CON) 20 mEq tablet TAKE 1 TABLET DAILY 30 tablet 11    sucralfate (CARAFATE) 1 g tablet TAKE 1 TABLET BY MOUTH TWICE A DAY 30 tablet 0    torsemide (DEMADEX) 20 mg tablet Take 1 tablet (20 mg total) by mouth daily 90 tablet 3    traMADol (ULTRAM) 50 mg tablet Take 1 tablet (50 mg total) by mouth every 8 (eight) hours as needed for moderate pain 30 tablet 0    warfarin (COUMADIN) 3 mg tablet TAKE FRIDAY,SATURDAY, SUNDAY INR MONDAY TO FURTHER DOSE  0    warfarin (COUMADIN) 4 mg tablet Take one tablet daily as previously directed by cardiology 180 tablet 0     No current facility-administered medications on file prior to visit        Family History   Problem Relation Age of Onset    Coronary artery disease Mother     Heart attack Mother     Hypertension Mother     Coronary artery disease Father     Heart attack Father    Maria Sicks Hypertension Father     Lymphoma Sister         acute    Rashes / Skin problems Sister         lymphomatoid papulosis    Coronary artery disease Brother     Heart attack Brother         x2    Aneurysm Neg Hx     Hyperlipidemia Neg Hx      Social History     Socioeconomic History    Marital status: /Civil Union     Spouse name: Not on file    Number of children: Not on file    Years of education: Not on file    Highest education level: Not on file   Occupational History    Not on file   Social Needs    Financial resource strain: Not on file    Food insecurity:     Worry: Not on file     Inability: Not on file    Transportation needs:     Medical: Not on file     Non-medical: Not on file   Tobacco Use    Smoking status: Never Smoker    Smokeless tobacco: Never Used   Substance and Sexual Activity    Alcohol use: Not Currently     Frequency: Never    Drug use: No    Sexual activity: Never   Lifestyle    Physical activity:     Days per week: Not on file     Minutes per session: Not on file    Stress: Not on file   Relationships    Social connections:     Talks on phone: Not on file     Gets together: Not on file     Attends Advent service: Not on file     Active member of club or organization: Not on file     Attends meetings of clubs or organizations: Not on file     Relationship status: Not on file    Intimate partner violence:     Fear of current or ex partner: Not on file     Emotionally abused: Not on file     Physically abused: Not on file     Forced sexual activity: Not on file   Other Topics Concern    Not on file   Social History Narrative    Not on file     Vitals:    08/13/19 1516   BP: 124/74   Pulse: 83   Resp: 16   SpO2: 96%   Weight: 83 kg (183 lb)   Height: 5' 1" (1 549 m)     Results for orders placed or performed in visit on 08/02/19   Protime-INR   Result Value Ref Range    Protime 20 9 (H) 11 6 - 14 5 seconds    INR 1 85 (H) 0 84 - 1 19     Weight (last 2 days)     Date/Time   Weight    08/13/19 1516   83 (183)            Body mass index is 34 58 kg/m²  BP      Temp      Pulse     Resp      SpO2        Vitals:    08/13/19 1516   Weight: 83 kg (183 lb)     Vitals:    08/13/19 1516   Weight: 83 kg (183 lb)       /74   Pulse 83   Resp 16   Ht 5' 1" (1 549 m)   Wt 83 kg (183 lb)   LMP  (LMP Unknown)   SpO2 96%   BMI 34 58 kg/m²          Physical Exam   Constitutional: She appears well-developed and well-nourished  HENT:   Head: Normocephalic  Mouth/Throat: Oropharynx is clear and moist    Eyes: Pupils are equal, round, and reactive to light  Conjunctivae are normal  Right eye exhibits no discharge  Left eye exhibits no discharge  No scleral icterus  Neck: Neck supple  Cardiovascular: Normal rate, regular rhythm, normal heart sounds and intact distal pulses  Exam reveals no gallop and no friction rub  No murmur heard  Pulmonary/Chest: Breath sounds normal  No respiratory distress  She has no wheezes  She has no rales  Abdominal: Soft  Bowel sounds are normal  She exhibits no distension and no mass  There is no tenderness  There is no rebound and no guarding  Musculoskeletal: She exhibits no edema or deformity  Lymphadenopathy:     She has no cervical adenopathy  Neurological: She is alert   Coordination normal      no erythema, no exudate, postnasal drip, right tympanic membrane no erythema, no bulge,

## 2019-08-14 NOTE — ASSESSMENT & PLAN NOTE
Likely secondary to postnasal drip and throat irritation I do not see any signs or symptoms of an active infection I will check a throat culture she will use Flonase 2 sprays each nostril once a day she also has a component eustachian tube dysfunction on the right she will also use Flonase 2 sprays each nostril once a day no signs or symptoms of active infection of her symptoms not jordan she is to notify me immediately

## 2019-08-14 NOTE — ASSESSMENT & PLAN NOTE
Rate controlled and anticoagulated currently clinically stable doing well she will follow up with Cardiology

## 2019-08-14 NOTE — ASSESSMENT & PLAN NOTE
Reduce sodium in the diet, avoid anti-inflammatories, drink adequate amounts of water and continue monitor the kidney function

## 2019-08-15 LAB — BACTERIA THROAT CULT: NORMAL

## 2019-08-16 ENCOUNTER — TELEPHONE (OUTPATIENT)
Dept: INTERNAL MEDICINE CLINIC | Facility: CLINIC | Age: 84
End: 2019-08-16

## 2019-08-16 NOTE — TELEPHONE ENCOUNTER
Patient states that she has a hard time getting herself and her  here for numerous appointment  She states she was just here and is asking for you to please addend the note to clear her  Please advise

## 2019-08-16 NOTE — TELEPHONE ENCOUNTER
Patient is having cataract surgery on her R eye on 8/21/19  Patient was in to see you recently (8/13/19) and wanted to know if you can clear her for surgery based off of that appointment      Liana Pimentel    Please advise

## 2019-08-22 ENCOUNTER — REMOTE DEVICE CLINIC VISIT (OUTPATIENT)
Dept: CARDIOLOGY CLINIC | Facility: CLINIC | Age: 84
End: 2019-08-22
Payer: COMMERCIAL

## 2019-08-22 DIAGNOSIS — Z95.0 CARDIAC PACEMAKER: Primary | ICD-10-CM

## 2019-08-22 PROCEDURE — 93294 REM INTERROG EVL PM/LDLS PM: CPT | Performed by: INTERNAL MEDICINE

## 2019-08-22 PROCEDURE — 93296 REM INTERROG EVL PM/IDS: CPT | Performed by: INTERNAL MEDICINE

## 2019-08-22 NOTE — PROGRESS NOTES
Results for orders placed or performed in visit on 08/22/19   Cardiac EP device report    Narrative    MDT-SINGLE CHAMBER "HIS" PPM  CARELINK TRANSMISSION: BATTERY VOLTAGE ADEQUATE (7 6 YRS)   99 9% (>40%/VVIR 70)  ALL AVAILABLE LEAD PARAMETERS WITHIN NORMAL LIMITS  NO SIGNIFICANT HIGH RATE EPISODES  PACEMAKER FUNCTIONING APPROPRIATELY     EB

## 2019-08-26 ENCOUNTER — ANTICOAG VISIT (OUTPATIENT)
Dept: INTERNAL MEDICINE CLINIC | Facility: CLINIC | Age: 84
End: 2019-08-26

## 2019-08-27 DIAGNOSIS — R10.13 EPIGASTRIC PAIN: ICD-10-CM

## 2019-08-27 RX ORDER — SUCRALFATE 1 G/1
TABLET ORAL
Qty: 30 TABLET | Refills: 0 | Status: SHIPPED | OUTPATIENT
Start: 2019-08-27 | End: 2019-10-14 | Stop reason: SDUPTHER

## 2019-08-29 DIAGNOSIS — I48.20 CHRONIC ATRIAL FIBRILLATION (HCC): ICD-10-CM

## 2019-08-29 RX ORDER — WARFARIN SODIUM 4 MG/1
TABLET ORAL
Qty: 90 TABLET | Refills: 1 | Status: SHIPPED | OUTPATIENT
Start: 2019-08-29 | End: 2020-02-25

## 2019-08-30 ENCOUNTER — TELEPHONE (OUTPATIENT)
Dept: OBGYN CLINIC | Facility: HOSPITAL | Age: 84
End: 2019-08-30

## 2019-08-30 DIAGNOSIS — G62.9 NEUROPATHY: ICD-10-CM

## 2019-08-30 RX ORDER — TRAMADOL HYDROCHLORIDE 50 MG/1
50 TABLET ORAL EVERY 8 HOURS PRN
Qty: 30 TABLET | Refills: 0 | Status: SHIPPED | OUTPATIENT
Start: 2019-08-30 | End: 2019-09-26 | Stop reason: SDUPTHER

## 2019-08-30 NOTE — TELEPHONE ENCOUNTER
Pt contacted Call Center requested refill of their medication  Medication Name:  Tramadol     Dosage of Med:  50 mg    Frequency of Med:  1 pill once daily    Remaining Medication:  1 pill    Pharmacy and Location:  [facility] in [address]     Pt   Preferred Callback Phone Number:  [phone]

## 2019-09-03 ENCOUNTER — OFFICE VISIT (OUTPATIENT)
Dept: OBGYN CLINIC | Facility: HOSPITAL | Age: 84
End: 2019-09-03

## 2019-09-03 VITALS
HEIGHT: 61 IN | BODY MASS INDEX: 34.55 KG/M2 | SYSTOLIC BLOOD PRESSURE: 135 MMHG | HEART RATE: 86 BPM | DIASTOLIC BLOOD PRESSURE: 81 MMHG | WEIGHT: 183 LBS

## 2019-09-03 DIAGNOSIS — Z47.89 ORTHOPEDIC AFTERCARE: Primary | ICD-10-CM

## 2019-09-03 PROCEDURE — 99024 POSTOP FOLLOW-UP VISIT: CPT | Performed by: ORTHOPAEDIC SURGERY

## 2019-09-03 NOTE — PROGRESS NOTES
Assessment:  No diagnosis found  Plan:  Patient had one exposed suture that was removed in the office today  Continue PT/OT  Continue NSAIDS and Tylenol as needed for pain  Will see patient back in 6 weeks     To do next visit:  No follow-ups on file  The above stated was discussed in layman's terms and the patient expressed understanding  All questions were answered to the patient's satisfaction  Scribe Attestation    I,:    am acting as a scribe while in the presence of the attending physician :        I,:    personally performed the services described in this documentation    as scribed in my presence :              Subjective:   Josefa Capellan is a 80 y o  female who presents 6 weeks s/p right scar revision, 7/24/19, and right hip acetabular total revision, 4/15/19  She feels she is doing well overall but still has occasional pain and requires her rolling walker for ambulation  She rates her pain at 5/10  Review of systems negative unless otherwise specified in HPI    Past Medical History:   Diagnosis Date    Anemia     Arthritis     Cancer ([address] )     basal cell    CHF (congestive heart failure) (HCC)     Disease of thyroid gland     Disturbance of smell and taste     disturbance of taste    Effusion of knee joint right     Fibromyalgia     Heart murmur     reported a previous heart murmur    History of acute myocardial infarction     History of atrial fibrillation     History of bruising easily     History of cancer     History of dermatitis     History of mammogram     History of methicillin resistant staphylococcus aureus (MRSA)     Negative nasal culture, isolation discontinued 8/17/2018      History of methicillin resistant staphylococcus aureus (MRSA) 08/17/2018    negative nasal culture-isolation and hx discontinued 8/17/2018    History of obesity     History of osteopenia     History of sciatica     History of shortness of breath     History of sinusitis  History of sore throat     History of syncope     History of umbilical hernia     History of viral infection     Irritable bowel syndrome (IBS)     Joint pain, hip     Limb pain     Myalgia     myalgia and myositis    Need for prophylactic vaccination against single diseases     Need for prophylactic vaccination and inoculation against influenza     Preoperative cardiovascular examination     Primary osteoarthritis of right knee     Right leg pain     Vaginal Pap smear     reported pap smear       Past Surgical History:   Procedure Laterality Date    ANKLE ARTHRODESIS Right     BACK SURGERY      BARIATRIC SURGERY      CHOLECYSTECTOMY      x2    COLONOSCOPY      ESOPHAGOGASTRODUODENOSCOPY N/A 3/23/2018    Procedure: ESOPHAGOGASTRODUODENOSCOPY (EGD); Surgeon: Bk Castellanos MD;  [facility]; Service: Gastroenterology    FOOT SURGERY      HIP CLOSE REDUCTION Right 8/21/2016    Procedure: CLOSED REDUCTION RIGHT TOTAL HIP;  Surgeon: Sarah Mcgill MD;  [facility];  Service:    Clary Reza / Dave Perea / Jamison [place] REPLACEMENT      LEFT KNEE REPLACEMENT    JOINT REPLACEMENT      Right HIP    PILONIDAL CYST EXCISION      x2    [place] EXC SKIN BENIG <0 5 CM TRUNK,ARM,LEG Right 7/24/2019    Procedure: REVISION SCAR EXTREMITY Rt Hip;   Surgeon: Bernardo Huber MD;  [facility];  Service: Orthopedics    [place] REVISE TOTAL HIP REPLACEMENT Right 4/15/2019    Procedure: ARTHROPLASTY HIP TOTAL ACETABULAR REVISION;  Surgeon: Bernardo Huber MD;  [facility];  Service: Orthopedics    REPLACEMENT TOTAL KNEE Right     SILVANA-EN-Y PROCEDURE      x2    TOTAL KNEE ARTHROPLASTY      UMBILICAL HERNIA REPAIR      x2       Family History   Problem Relation Age of Onset    Coronary artery disease Mother     Heart attack Mother     Hypertension Mother     Coronary artery disease Father     Heart attack Father    Wm Daniels Hypertension Father     Lymphoma Sister acute    Rashes / Skin problems Sister         lymphomatoid papulosis    Coronary artery disease Brother     Heart attack Brother         x2    Aneurysm Neg Hx     Hyperlipidemia Neg Hx        Social History     Occupational History    Not on file   Tobacco Use    Smoking status: Never Smoker    Smokeless tobacco: Never Used   Substance and Sexual Activity    Alcohol use: Not Currently     Frequency: Never    Drug use: No    Sexual activity: Never         Current Outpatient Medications:     ascorbic acid (VITAMIN C) 500 mg tablet, Take 1 tablet (500 mg total) by mouth 2 (two) times a day, Disp: 60 tablet, Rfl: 0    cephalexin (KEFLEX) 500 mg capsule, Take 500 mg by mouth every 6 (six) hours, Disp: , Rfl:     Cyanocobalamin (VITAMIN B12 PO), Take 1 capsule by mouth daily , Disp: , Rfl:     desoximetasone (TOPICORT) 0 25 % cream, Apply topically as needed for irritation, Disp: 30 g, Rfl: 0    DULoxetine (CYMBALTA) 60 mg delayed release capsule, Take 1 capsule (60 mg total) by mouth daily, Disp: 90 capsule, Rfl: 1    fluticasone (FLONASE) 50 mcg/act nasal spray, 1 spray into each nostril as needed  , Disp: , Rfl:     fluticasone (FLONASE) 50 mcg/act nasal spray, 2 sprays into each nostril daily, Disp: 16 g, Rfl: 0    levothyroxine 50 mcg tablet, TAKE 1 TABLET BY MOUTH EVERY DAY, Disp: 30 tablet, Rfl: 2    potassium chloride (K-DUR,KLOR-CON) 20 mEq tablet, TAKE 1 TABLET DAILY, Disp: 30 tablet, Rfl: 11    sucralfate (CARAFATE) 1 g tablet, TAKE 1 TABLET BY MOUTH TWICE A DAY, Disp: 30 tablet, Rfl: 0    torsemide (DEMADEX) 20 mg tablet, Take 1 tablet (20 mg total) by mouth daily, Disp: 90 tablet, Rfl: 3    traMADol (ULTRAM) 50 mg tablet, Take 1 tablet (50 mg total) by mouth every 8 (eight) hours as needed for moderate pain, Disp: 30 tablet, Rfl: 0    warfarin (COUMADIN) 3 mg tablet, TAKE FRIDAY,SATURDAY, SUNDAY INR MONDAY TO FURTHER DOSE, Disp: , Rfl: 0    warfarin (COUMADIN) 4 mg tablet, TAKE ONE TABLET DAILY AS PREVIOUSLY DIRECTED BY CARDIOLOGY, Disp: 90 tablet, Rfl: 1    Allergies   Allergen Reactions    Amoxicillin      Reports nausea, headache, dizzy    Codeine Vomiting and GI Intolerance     GI upset, ana m morphine  Reaction Date: 24Apr2012;     Other      Other reaction(s): Other (See Comments)  ana m toradol in OR 6/06 sah    Oxycodone-Acetaminophen      Other reaction(s): Other (See Comments)  GI upset; ana m vicodin    Percocet [Oxycodone-Acetaminophen]     Seasonal Ic  [Cholestatin] Allergic Rhinitis    Bactrim [Sulfamethoxazole-Trimethoprim] Rash    Folic Acid-Vit F5-LAUREEN S55 Diarrhea    Sulfa Antibiotics Rash     Other reaction(s):  Other (See Comments)  takes lasix @home            Vitals:    09/03/19 0953   BP: 135/81   Pulse: 86       Objective:  Physical exam  · General: Awake, Alert, Oriented  · Eyes: Pupils equal, round and reactive to light  · Heart: regular rate and intact distal pulses   · Lungs: No audible wheezing  · Abdomen: soft                    Ortho Exam   Right hip:  Posterior incision clean dry and intact with one exposed black suture  Good arc of motion with no pain  Patient sits comfortably in chair with hip flexed at 90 degrees  Calf compartments soft and supple  Sensation intact  Toes are warm and well perfused       Diagnostics, reviewed and taken today if performed as documented:    None performed     Procedures, if performed today:    Procedures    None performed

## 2019-09-06 ENCOUNTER — APPOINTMENT (OUTPATIENT)
Dept: LAB | Facility: CLINIC | Age: 84
End: 2019-09-06
Payer: COMMERCIAL

## 2019-09-06 ENCOUNTER — TRANSCRIBE ORDERS (OUTPATIENT)
Dept: LAB | Facility: CLINIC | Age: 84
End: 2019-09-06

## 2019-09-06 DIAGNOSIS — S32.9XXA CLOSED NONDISPLACED FRACTURE OF PELVIS, UNSPECIFIED PART OF PELVIS, INITIAL ENCOUNTER (HCC): ICD-10-CM

## 2019-09-06 DIAGNOSIS — I10 BENIGN ESSENTIAL HYPERTENSION: ICD-10-CM

## 2019-09-06 DIAGNOSIS — G89.29 OTHER CHRONIC PAIN: ICD-10-CM

## 2019-09-06 DIAGNOSIS — D50.8 OTHER IRON DEFICIENCY ANEMIA: Primary | ICD-10-CM

## 2019-09-06 DIAGNOSIS — Z96.649 STATUS POST REVISION OF TOTAL HIP REPLACEMENT: ICD-10-CM

## 2019-09-06 DIAGNOSIS — D64.9 ANEMIA, UNSPECIFIED TYPE: ICD-10-CM

## 2019-09-06 LAB
25(OH)D3 SERPL-MCNC: 11.3 NG/ML (ref 30–100)
ANION GAP SERPL CALCULATED.3IONS-SCNC: 6 MMOL/L (ref 4–13)
BASOPHILS # BLD AUTO: 0.03 THOUSANDS/ΜL (ref 0–0.1)
BASOPHILS NFR BLD AUTO: 1 % (ref 0–1)
BUN SERPL-MCNC: 14 MG/DL (ref 5–25)
CALCIUM SERPL-MCNC: 8.9 MG/DL (ref 8.3–10.1)
CHLORIDE SERPL-SCNC: 110 MMOL/L (ref 100–108)
CO2 SERPL-SCNC: 25 MMOL/L (ref 21–32)
CREAT SERPL-MCNC: 0.74 MG/DL (ref 0.6–1.3)
EOSINOPHIL # BLD AUTO: 0.1 THOUSAND/ΜL (ref 0–0.61)
EOSINOPHIL NFR BLD AUTO: 2 % (ref 0–6)
ERYTHROCYTE [DISTWIDTH] IN BLOOD BY AUTOMATED COUNT: 13.9 % (ref 11.6–15.1)
GFR SERPL CREATININE-BSD FRML MDRD: 74 ML/MIN/1.73SQ M
GLUCOSE P FAST SERPL-MCNC: 79 MG/DL (ref 65–99)
HCT VFR BLD AUTO: 35.9 % (ref 34.8–46.1)
HGB BLD-MCNC: 10.9 G/DL (ref 11.5–15.4)
IMM GRANULOCYTES # BLD AUTO: 0.02 THOUSAND/UL (ref 0–0.2)
IMM GRANULOCYTES NFR BLD AUTO: 0 % (ref 0–2)
INR PPP: 1.64 (ref 0.84–1.19)
LYMPHOCYTES # BLD AUTO: 1.29 THOUSANDS/ΜL (ref 0.6–4.47)
LYMPHOCYTES NFR BLD AUTO: 21 % (ref 14–44)
MCH RBC QN AUTO: 31.1 PG (ref 26.8–34.3)
MCHC RBC AUTO-ENTMCNC: 30.4 G/DL (ref 31.4–37.4)
MCV RBC AUTO: 102 FL (ref 82–98)
MONOCYTES # BLD AUTO: 0.62 THOUSAND/ΜL (ref 0.17–1.22)
MONOCYTES NFR BLD AUTO: 10 % (ref 4–12)
NEUTROPHILS # BLD AUTO: 3.96 THOUSANDS/ΜL (ref 1.85–7.62)
NEUTS SEG NFR BLD AUTO: 66 % (ref 43–75)
NRBC BLD AUTO-RTO: 0 /100 WBCS
PLATELET # BLD AUTO: 206 THOUSANDS/UL (ref 149–390)
PMV BLD AUTO: 10.7 FL (ref 8.9–12.7)
POTASSIUM SERPL-SCNC: 4.3 MMOL/L (ref 3.5–5.3)
PROTHROMBIN TIME: 19 SECONDS (ref 11.6–14.5)
RBC # BLD AUTO: 3.51 MILLION/UL (ref 3.81–5.12)
SODIUM SERPL-SCNC: 141 MMOL/L (ref 136–145)
WBC # BLD AUTO: 6.02 THOUSAND/UL (ref 4.31–10.16)

## 2019-09-06 PROCEDURE — 36415 COLL VENOUS BLD VENIPUNCTURE: CPT

## 2019-09-06 PROCEDURE — 80048 BASIC METABOLIC PNL TOTAL CA: CPT

## 2019-09-06 PROCEDURE — 85025 COMPLETE CBC W/AUTO DIFF WBC: CPT

## 2019-09-06 PROCEDURE — 82306 VITAMIN D 25 HYDROXY: CPT

## 2019-09-06 PROCEDURE — 85610 PROTHROMBIN TIME: CPT

## 2019-09-06 RX ORDER — DULOXETIN HYDROCHLORIDE 60 MG/1
CAPSULE, DELAYED RELEASE ORAL
Qty: 30 CAPSULE | Refills: 0 | Status: SHIPPED | OUTPATIENT
Start: 2019-09-06 | End: 2020-07-08 | Stop reason: SDUPTHER

## 2019-09-09 ENCOUNTER — ANTICOAG VISIT (OUTPATIENT)
Dept: INTERNAL MEDICINE CLINIC | Facility: CLINIC | Age: 84
End: 2019-09-09

## 2019-09-11 DIAGNOSIS — E55.9 VITAMIN D DEFICIENCY: Primary | ICD-10-CM

## 2019-09-14 DIAGNOSIS — J02.9 SORE THROAT: ICD-10-CM

## 2019-09-14 RX ORDER — FLUTICASONE PROPIONATE 50 MCG
SPRAY, SUSPENSION (ML) NASAL
Qty: 16 ML | Refills: 0 | Status: SHIPPED | OUTPATIENT
Start: 2019-09-14 | End: 2019-10-17 | Stop reason: SDUPTHER

## 2019-09-25 ENCOUNTER — ANTICOAG VISIT (OUTPATIENT)
Dept: INTERNAL MEDICINE CLINIC | Facility: CLINIC | Age: 84
End: 2019-09-25

## 2019-09-25 ENCOUNTER — APPOINTMENT (OUTPATIENT)
Dept: LAB | Facility: CLINIC | Age: 84
End: 2019-09-25
Payer: COMMERCIAL

## 2019-09-25 DIAGNOSIS — E55.9 VITAMIN D DEFICIENCY: ICD-10-CM

## 2019-09-25 LAB — 25(OH)D3 SERPL-MCNC: 13.1 NG/ML (ref 30–100)

## 2019-09-25 PROCEDURE — 82306 VITAMIN D 25 HYDROXY: CPT

## 2019-09-26 ENCOUNTER — TELEPHONE (OUTPATIENT)
Dept: OBGYN CLINIC | Facility: HOSPITAL | Age: 84
End: 2019-09-26

## 2019-09-26 DIAGNOSIS — G62.9 NEUROPATHY: ICD-10-CM

## 2019-09-26 RX ORDER — TRAMADOL HYDROCHLORIDE 50 MG/1
50 TABLET ORAL EVERY 8 HOURS PRN
Qty: 30 TABLET | Refills: 0 | Status: SHIPPED | OUTPATIENT
Start: 2019-09-26 | End: 2019-10-25 | Stop reason: SDUPTHER

## 2019-09-26 NOTE — TELEPHONE ENCOUNTER
Medication was ordered    She is allowed to take it 3 times a day    An early evening dose may well benefit and help her during hours of sleep    Patient may be informed    RAMONE

## 2019-09-26 NOTE — TELEPHONE ENCOUNTER
Patient is calling for a refill on tramadol  She says that it only lasts until about 2 in the afternoon and she says that the rest of the night she is left in pain

## 2019-09-30 DIAGNOSIS — E55.9 VITAMIN D DEFICIENCY: Primary | ICD-10-CM

## 2019-09-30 RX ORDER — ERGOCALCIFEROL 1.25 MG/1
50000 CAPSULE ORAL WEEKLY
Qty: 6 CAPSULE | Refills: 0 | Status: SHIPPED | OUTPATIENT
Start: 2019-09-30 | End: 2020-03-03 | Stop reason: ALTCHOICE

## 2019-10-09 DIAGNOSIS — Z47.1 AFTERCARE FOLLOWING RIGHT HIP JOINT REPLACEMENT SURGERY: Primary | ICD-10-CM

## 2019-10-09 DIAGNOSIS — Z96.641 AFTERCARE FOLLOWING RIGHT HIP JOINT REPLACEMENT SURGERY: Primary | ICD-10-CM

## 2019-10-10 ENCOUNTER — ANTICOAG VISIT (OUTPATIENT)
Dept: INTERNAL MEDICINE CLINIC | Facility: CLINIC | Age: 84
End: 2019-10-10

## 2019-10-12 ENCOUNTER — IMMUNIZATIONS (OUTPATIENT)
Dept: INTERNAL MEDICINE CLINIC | Facility: CLINIC | Age: 84
End: 2019-10-12
Payer: COMMERCIAL

## 2019-10-12 DIAGNOSIS — Z23 ENCOUNTER FOR IMMUNIZATION: ICD-10-CM

## 2019-10-12 PROCEDURE — G0008 ADMIN INFLUENZA VIRUS VAC: HCPCS

## 2019-10-12 PROCEDURE — 90662 IIV NO PRSV INCREASED AG IM: CPT

## 2019-10-14 DIAGNOSIS — R10.13 EPIGASTRIC PAIN: ICD-10-CM

## 2019-10-14 RX ORDER — SUCRALFATE 1 G/1
TABLET ORAL
Qty: 30 TABLET | Refills: 0 | Status: SHIPPED | OUTPATIENT
Start: 2019-10-14 | End: 2019-10-29 | Stop reason: SDUPTHER

## 2019-10-14 RX ORDER — DICYCLOMINE HYDROCHLORIDE 10 MG/1
1 CAPSULE ORAL EVERY 12 HOURS
COMMUNITY
Start: 2013-04-01 | End: 2020-08-06 | Stop reason: SDUPTHER

## 2019-10-14 RX ORDER — GABAPENTIN 300 MG/1
1 CAPSULE ORAL EVERY 8 HOURS
COMMUNITY
Start: 2010-04-05 | End: 2020-03-26 | Stop reason: ALTCHOICE

## 2019-10-15 ENCOUNTER — HOSPITAL ENCOUNTER (OUTPATIENT)
Dept: RADIOLOGY | Facility: HOSPITAL | Age: 84
Discharge: HOME | End: 2019-10-15
Attending: ORTHOPAEDIC SURGERY
Payer: COMMERCIAL

## 2019-10-15 ENCOUNTER — OFFICE VISIT (OUTPATIENT)
Dept: OBGYN CLINIC | Facility: HOSPITAL | Age: 84
End: 2019-10-15
Payer: COMMERCIAL

## 2019-10-15 VITALS
DIASTOLIC BLOOD PRESSURE: 79 MMHG | HEART RATE: 94 BPM | HEIGHT: 61 IN | SYSTOLIC BLOOD PRESSURE: 127 MMHG | BODY MASS INDEX: 34.58 KG/M2

## 2019-10-15 DIAGNOSIS — Z47.1 AFTERCARE FOLLOWING RIGHT HIP JOINT REPLACEMENT SURGERY: ICD-10-CM

## 2019-10-15 DIAGNOSIS — Z96.641 AFTERCARE FOLLOWING RIGHT HIP JOINT REPLACEMENT SURGERY: ICD-10-CM

## 2019-10-15 DIAGNOSIS — M25.551 RIGHT HIP PAIN: Primary | ICD-10-CM

## 2019-10-15 PROCEDURE — 73502 X-RAY EXAM HIP UNI 2-3 VIEWS: CPT

## 2019-10-15 PROCEDURE — 99213 OFFICE O/P EST LOW 20 MIN: CPT | Performed by: ORTHOPAEDIC SURGERY

## 2019-10-15 NOTE — PROGRESS NOTES
80 y o female presenting 6 months status post right revision total hip arthroplasty and 3 months status post scar revision right hip  Patient is doing well  She reports her lateral hip pain is much better, although she does continue to have some posterior hip pain in the hamstrings region  She is still using a walker to ambulate  No numbness or tingling to the affected extremities  Review of Systems  Review of systems negative unless otherwise specified in HPI    Past Medical History  Past Medical History:   Diagnosis Date    Anemia     Arthritis     Cancer ([address] )     basal cell    CHF (congestive heart failure) (HCC)     Disease of thyroid gland     Disturbance of smell and taste     disturbance of taste    Effusion of knee joint right     Fibromyalgia     Heart murmur     reported a previous heart murmur    History of acute myocardial infarction     History of atrial fibrillation     History of bruising easily     History of cancer     History of dermatitis     History of mammogram     History of methicillin resistant staphylococcus aureus (MRSA)     Negative nasal culture, isolation discontinued 8/17/2018      History of methicillin resistant staphylococcus aureus (MRSA) 08/17/2018    negative nasal culture-isolation and hx discontinued 8/17/2018    History of obesity     History of osteopenia     History of sciatica     History of shortness of breath     History of sinusitis     History of sore throat     History of syncope     History of umbilical hernia     History of viral infection     Irritable bowel syndrome (IBS)     Joint pain, hip     Limb pain     Myalgia     myalgia and myositis    Need for prophylactic vaccination against single diseases     Need for prophylactic vaccination and inoculation against influenza     Preoperative cardiovascular examination     Primary osteoarthritis of right knee     Right leg pain     Vaginal Pap smear     reported pap smear Past Surgical History  Past Surgical History:   Procedure Laterality Date    ANKLE ARTHRODESIS Right     BACK SURGERY      BARIATRIC SURGERY      CHOLECYSTECTOMY      x2    COLONOSCOPY      ESOPHAGOGASTRODUODENOSCOPY N/A 3/23/2018    Procedure: ESOPHAGOGASTRODUODENOSCOPY (EGD); Surgeon: Sylvia Lombardi MD;  [facility]; Service: Gastroenterology    FOOT SURGERY      HIP CLOSE REDUCTION Right 8/21/2016    Procedure: CLOSED REDUCTION RIGHT TOTAL HIP;  Surgeon: Freda Chawla MD;  [facility];  Service:    Monroe Meuse / Gayyusef Westfall / [facility] Show REPLACEMENT      LEFT KNEE REPLACEMENT    JOINT REPLACEMENT      Right HIP    PILONIDAL CYST EXCISION      x2    [place] EXC SKIN BENIG <0 5 CM TRUNK,ARM,LEG Right 7/24/2019    Procedure: REVISION SCAR EXTREMITY Rt Hip;   Surgeon: Sunny Francisco MD;  [facility];  Service: Orthopedics    [place] REVISE TOTAL HIP REPLACEMENT Right 4/15/2019    Procedure: ARTHROPLASTY HIP TOTAL ACETABULAR REVISION;  Surgeon: Sunny Francisco MD;  [facility];  Service: Orthopedics    REPLACEMENT TOTAL KNEE Right     SILVANA-EN-Y PROCEDURE      x2    TOTAL KNEE ARTHROPLASTY      UMBILICAL HERNIA REPAIR      x2       Current Medications  Current Outpatient Medications on File Prior to Visit   Medication Sig Dispense Refill    aspirin 81 MG tablet Take by mouth Daily      dicyclomine (BENTYL) 10 mg capsule Take 1 capsule by mouth every 12 (twelve) hours      gabapentin (NEURONTIN) 300 mg capsule Take 1 capsule by mouth every 8 (eight) hours      ascorbic acid (VITAMIN C) 500 mg tablet Take 1 tablet (500 mg total) by mouth 2 (two) times a day 60 tablet 0    cephalexin (KEFLEX) 500 mg capsule Take 500 mg by mouth every 6 (six) hours      Cyanocobalamin (VITAMIN B12 PO) Take 1 capsule by mouth daily       desoximetasone (TOPICORT) 0 25 % cream Apply topically as needed for irritation 30 g 0    DULoxetine (CYMBALTA) 60 mg delayed release capsule Take 1 capsule (60 mg total) by mouth daily 90 capsule 1    DULoxetine (CYMBALTA) 60 mg delayed release capsule TAKE 1 CAPSULE BY MOUTH EVERY DAY 30 capsule 0    ergocalciferol (VITAMIN D2) 50,000 units Take 1 capsule (50,000 Units total) by mouth once a week for 6 doses 6 capsule 0    fluticasone (FLONASE) 50 mcg/act nasal spray 1 spray into each nostril as needed        fluticasone (FLONASE) 50 mcg/act nasal spray SPRAY 2 SPRAYS INTO EACH NOSTRIL EVERY DAY 16 mL 0    levothyroxine 50 mcg tablet TAKE 1 TABLET BY MOUTH EVERY DAY 30 tablet 2    potassium chloride (K-DUR,KLOR-CON) 20 mEq tablet TAKE 1 TABLET DAILY 30 tablet 11    sucralfate (CARAFATE) 1 g tablet TAKE 1 TABLET BY MOUTH TWICE A DAY 30 tablet 0    torsemide (DEMADEX) 20 mg tablet Take 1 tablet (20 mg total) by mouth daily 90 tablet 3    traMADol (ULTRAM) 50 mg tablet Take 1 tablet (50 mg total) by mouth every 8 (eight) hours as needed for moderate pain 30 tablet 0    warfarin (COUMADIN) 3 mg tablet TAKE FRIDAY,SATURDAY, SUNDAY INR MONDAY TO FURTHER DOSE  0    warfarin (COUMADIN) 4 mg tablet TAKE ONE TABLET DAILY AS PREVIOUSLY DIRECTED BY CARDIOLOGY 90 tablet 1     No current facility-administered medications on file prior to visit          Recent Labs [facility])  0   Lab Value Date/Time    HCT 35 9 09/06/2019 1026    HCT 37 0 04/20/2015 1109    HCT 38 0 04/20/2015 1109    HGB 10 9 (L) 09/06/2019 1026    HGB 12 0 04/20/2015 1109    WBC 6 02 09/06/2019 1026    WBC 7 98 04/20/2015 1109    INR 1 93 (H) 09/25/2019 1054    CRP <3 0 03/11/2019 0937    GLUCOSE 98 04/03/2015 0831    HGBA1C 5 2 03/20/2019 0946         Physical exam  · General: Awake, Alert, Oriented  · Eyes: Pupils equal, round and reactive to light  · Heart: regular rate and rhythm  · Lungs: No audible wheezing  · Abdomen: soft  MSK right hip  -well-healed surgical scar without any erythema, ecchymosis, or drainage  -no tenderness to palpation over the scar, some tenderness to palpation posteriorly over the hamstrings origin  -posterior hip pain with gentle internal/external rotation of the hip  -intact hip flexion, knee flexion/extension, ankle dorsiflexion/plantar flexion, EHL/FHL  -sensation intact L3-S1  -2+ DP pulse    Imaging  Imaging reviewed with the patient  X-ray of the right hip shows well placed right total hip arthroplasty with constrained liner  Evidence of callus formation and healing of fractures  Procedure  None indicated    Assessment/Plan:   80 y  o female presenting 6 months postop from a revision right total hip arthroplasty, 3 months status post scar revision of right hip  Patient is doing well  Patient was instructed to continue with home exercise regimen  Patient instructed to return to the office in 6 months for repeat examination at that time

## 2019-10-17 DIAGNOSIS — J02.9 SORE THROAT: ICD-10-CM

## 2019-10-17 RX ORDER — FLUTICASONE PROPIONATE 50 MCG
SPRAY, SUSPENSION (ML) NASAL
Qty: 16 ML | Refills: 0 | Status: SHIPPED | OUTPATIENT
Start: 2019-10-17 | End: 2019-11-09 | Stop reason: SDUPTHER

## 2019-10-25 ENCOUNTER — TELEPHONE (OUTPATIENT)
Dept: OBGYN CLINIC | Facility: HOSPITAL | Age: 84
End: 2019-10-25

## 2019-10-25 DIAGNOSIS — G62.9 NEUROPATHY: ICD-10-CM

## 2019-10-25 RX ORDER — TRAMADOL HYDROCHLORIDE 50 MG/1
50 TABLET ORAL EVERY 8 HOURS PRN
Qty: 30 TABLET | Refills: 0 | Status: SHIPPED | OUTPATIENT
Start: 2019-10-25 | End: 2019-11-20 | Stop reason: SDUPTHER

## 2019-10-25 NOTE — TELEPHONE ENCOUNTER
Pt contacted Call Center requested refill of their medication  Medication Name: Tramadol      Dosage of Med: 50 mg      Frequency of Med: Take 1 tablet (50 mg total) by mouth every 8 (eight) hours as needed for moderate pain        Pharmacy and Location:[facility]/pharmacy #[id number], [place]        Pt  Preferred Callback Phone Number:[phone]       Thank you

## 2019-10-28 ENCOUNTER — ANTICOAG VISIT (OUTPATIENT)
Dept: INTERNAL MEDICINE CLINIC | Facility: CLINIC | Age: 84
End: 2019-10-28

## 2019-10-29 DIAGNOSIS — R10.13 EPIGASTRIC PAIN: ICD-10-CM

## 2019-10-29 RX ORDER — SUCRALFATE 1 G/1
TABLET ORAL
Qty: 30 TABLET | Refills: 0 | Status: SHIPPED | OUTPATIENT
Start: 2019-10-29 | End: 2019-11-09 | Stop reason: SDUPTHER

## 2019-11-01 ENCOUNTER — OFFICE VISIT (OUTPATIENT)
Dept: INTERNAL MEDICINE CLINIC | Facility: CLINIC | Age: 84
End: 2019-11-01
Payer: COMMERCIAL

## 2019-11-01 ENCOUNTER — TELEPHONE (OUTPATIENT)
Dept: CARDIOLOGY CLINIC | Facility: CLINIC | Age: 84
End: 2019-11-01

## 2019-11-01 VITALS
RESPIRATION RATE: 16 BRPM | OXYGEN SATURATION: 95 % | SYSTOLIC BLOOD PRESSURE: 130 MMHG | HEIGHT: 61 IN | DIASTOLIC BLOOD PRESSURE: 74 MMHG | WEIGHT: 187.8 LBS | HEART RATE: 86 BPM | BODY MASS INDEX: 35.45 KG/M2

## 2019-11-01 DIAGNOSIS — E66.9 OBESITY WITHOUT SERIOUS COMORBIDITY, UNSPECIFIED CLASSIFICATION, UNSPECIFIED OBESITY TYPE: ICD-10-CM

## 2019-11-01 DIAGNOSIS — N18.30 CHRONIC KIDNEY DISEASE, STAGE III (MODERATE) (HCC): ICD-10-CM

## 2019-11-01 DIAGNOSIS — Z13.1 SCREENING FOR DIABETES MELLITUS: ICD-10-CM

## 2019-11-01 DIAGNOSIS — Z00.00 MEDICARE ANNUAL WELLNESS VISIT, SUBSEQUENT: ICD-10-CM

## 2019-11-01 DIAGNOSIS — L03.115 CELLULITIS OF RIGHT LOWER EXTREMITY: ICD-10-CM

## 2019-11-01 DIAGNOSIS — I10 BENIGN ESSENTIAL HYPERTENSION: Primary | ICD-10-CM

## 2019-11-01 DIAGNOSIS — E03.9 HYPOTHYROIDISM, UNSPECIFIED TYPE: ICD-10-CM

## 2019-11-01 DIAGNOSIS — Z13.6 SCREENING FOR CARDIOVASCULAR CONDITION: ICD-10-CM

## 2019-11-01 DIAGNOSIS — I48.20 ATRIAL FIBRILLATION, CHRONIC (HCC): ICD-10-CM

## 2019-11-01 PROCEDURE — 1170F FXNL STATUS ASSESSED: CPT | Performed by: INTERNAL MEDICINE

## 2019-11-01 PROCEDURE — 1125F AMNT PAIN NOTED PAIN PRSNT: CPT | Performed by: INTERNAL MEDICINE

## 2019-11-01 PROCEDURE — 99214 OFFICE O/P EST MOD 30 MIN: CPT | Performed by: INTERNAL MEDICINE

## 2019-11-01 PROCEDURE — G0439 PPPS, SUBSEQ VISIT: HCPCS | Performed by: INTERNAL MEDICINE

## 2019-11-01 NOTE — PROGRESS NOTES
Assessment and Plan:     Problem List Items Addressed This Visit        Endocrine    Hypothyroidism       Cardiovascular and Mediastinum    Benign essential hypertension - Primary    Relevant Orders    Comprehensive metabolic panel    Atrial fibrillation, chronic    Relevant Orders    CBC (Includes Diff/Plt) (Refl)       Genitourinary    Chronic kidney disease, stage III (moderate) (HCC)       Other    Obesity without serious comorbidity    Relevant Orders    CBC (Includes Diff/Plt) (Refl)    Cellulitis of right lower extremity      Other Visit Diagnoses     Screening for diabetes mellitus        Relevant Orders    Hemoglobin A1C    CBC (Includes Diff/Plt) (Refl)    Screening for cardiovascular condition        Relevant Orders    Lipid Panel with Direct LDL reflex    CBC (Includes Diff/Plt) (Refl)          Falls Plan of Care: balance, strength, and gait training instructions were provided  Preventive health issues were discussed with patient, and age appropriate screening tests were ordered as noted in patient's After Visit Summary  Personalized health advice and appropriate referrals for health education or preventive services given if needed, as noted in patient's After Visit Summary       History of Present Illness:     Patient presents for Medicare Annual Wellness visit    Patient Care Team:  Flaca Roche DO as PCP - General  MD Cj Troy MD Caleb Sago, MD Lourdes Lente, DO Elson Huntsman, DO Wallene Organ, MD (Inactive)  Gonsalo Koroma MD (Cardiology)     Problem List:     Patient Active Problem List   Diagnosis    Chronic atrial fibrillation    Hypokalemia    Anemia    Fall    Hip dislocation, right ([facility] [address] [facility]    Chronic diastolic CHF (congestive heart failure) ([facility] [address] [facility]    Depression    Obesity without serious comorbidity    Chronic pain    Abdominal bloating    Abdominal pain    Arthralgia of hip    Arthralgia of knee, right    Benign essential hypertension    Chronic pain of lower extremity, bilateral    Fibromyalgia    External hemorrhoids    Irritable bowel syndrome with constipation    Lumbar canal stenosis    Moderate mitral regurgitation    Osteoarthritis, multiple sites    Osteoporosis    Palpitations    Peripheral neuropathy    Primary osteoarthritis of right knee    Synovial cyst of right popliteal space    Unstable right hip    Urinary incontinence    Exertional chest pain    Right lower quadrant abdominal pain    GI bleeding    Ventral hernia without obstruction or gangrene    Abnormal CT of the abdomen    Gastrointestinal hemorrhage with melena    Ulcer    Chronic pain of right knee    Acute kidney injury ([address] )    Abnormal TSH    Postural dizziness with presyncope    Contact dermatitis    Pacemaker    Hypothyroidism    Preop examination    Macrocytosis    Status post gastrectomy    Change in bowel habits    Chronic [facility] disease[facility] stage III (moderate) [facility]    Postprocedural hypotension    Bilateral leg edema    Febrile    Status post right hip replacement    Drainage from wound    Muscle weakness    Ambulatory dysfunction    MCI (mild cognitive impairment)    Aftercare following right hip joint replacement surgery    Right hip pain    Cellulitis of right lower extremity    Atrial fibrillation, chronic    Hypertrophic scar of skin    Preop exam for internal medicine    S/P scar revision    Chronic shoulder pain    Sore throat    Orthopedic aftercare    Medicare annual wellness visit, subsequent      Past Medical and Surgical History:     Past Medical History:   Diagnosis Date    Anemia     Arthritis     Cancer (HCC)     basal cell    CHF [facility]congestive heart failure) ([facility]     Disease of thyroid gland     Disturbance of smell and taste     disturbance of taste    Effusion of knee joint right     Fibromyalgia     Heart murmur     reported a previous heart murmur    History of acute myocardial infarction     History of atrial fibrillation     History of bruising easily     History of cancer     History of dermatitis     History of mammogram     History of methicillin resistant staphylococcus aureus (MRSA)     Negative nasal culture, isolation discontinued 8/17/2018   History of methicillin resistant staphylococcus aureus (MRSA) 08/17/2018    negative nasal culture-isolation and hx discontinued 8/17/2018    History of obesity     History of osteopenia     History of sciatica     History of shortness of breath     History of sinusitis     History of sore throat     History of syncope     History of umbilical hernia     History of viral infection     Irritable bowel syndrome (IBS)     Joint pain, hip     Limb pain     Myalgia     myalgia and myositis    Need for prophylactic vaccination against single diseases     Need for prophylactic vaccination and inoculation against influenza     Preoperative cardiovascular examination     Primary osteoarthritis of right knee     Right leg pain     Vaginal Pap smear     reported pap smear     Past Surgical History:   Procedure Laterality Date    ANKLE ARTHRODESIS Right     BACK SURGERY      BARIATRIC SURGERY      CHOLECYSTECTOMY      x2    COLONOSCOPY      ESOPHAGOGASTRODUODENOSCOPY N/A 3/23/2018    Procedure: ESOPHAGOGASTRODUODENOSCOPY (EGD); Surgeon: Ye Tavares MD;  Location: [facility]; Service: Gastroenterology    FOOT SURGERY      HIP CLOSE REDUCTION Right 8/21/2016    Procedure: CLOSED REDUCTION RIGHT TOTAL HIP;  Surgeon: Deng Segundo MD;  [facility];  Service:    Manuel Rodriguez / Marie Rivero / Joao Chime REPLACEMENT      LEFT KNEE REPLACEMENT    JOINT REPLACEMENT      Right HIP    PILONIDAL CYST EXCISION      x2    ME EXC SKIN BENIG <0 5 CM TRUNK,ARM,LEG Right 7/24/2019    Procedure: REVISION SCAR EXTREMITY Rt Hip;   Surgeon: Anel Mcleod MD;  [facility];  Service: Orthopedics    CT REVISE TOTAL HIP REPLACEMENT Right 4/15/2019    Procedure: ARTHROPLASTY HIP TOTAL ACETABULAR REVISION;  Surgeon: Sherry Ward MD;  [facility];  Service: Orthopedics    REPLACEMENT TOTAL KNEE Right     SILVANA-EN-Y PROCEDURE      x2    TOTAL KNEE ARTHROPLASTY      UMBILICAL HERNIA REPAIR      x2      Family History:     Family History   Problem Relation Age of Onset    Coronary artery disease Mother     Heart attack Mother     Hypertension Mother     Coronary artery disease Father     Heart attack Father     Hypertension Father     Lymphoma Sister         acute    Rashes / Skin problems Sister         lymphomatoid papulosis    Coronary artery disease Brother     Heart attack Brother         x2    Aneurysm Neg Hx     Hyperlipidemia Neg Hx       Social History:     Social History     Socioeconomic History    Marital status: /Civil Union     Spouse name: None    Number of children: None    Years of education: None    Highest education level: None   Occupational History    None   Social Needs    Financial resource strain: None    Food insecurity:     Worry: None     Inability: None    Transportation needs:     Medical: None     Non-medical: None   Tobacco Use    Smoking status: Never Smoker    Smokeless tobacco: Never Used   Substance and Sexual Activity    Alcohol use: Not Currently     Frequency: Never    Drug use: No    Sexual activity: Never   Lifestyle    Physical activity:     Days per week: None     Minutes per session: None    Stress: None   Relationships    Social connections:     Talks on phone: None     Gets together: None     Attends [employer] service: None     Active member of club or organization: None     Attends meetings of clubs or organizations: None     Relationship status: None    Intimate partner violence:     Fear of current or ex partner: None     Emotionally abused: None     Physically abused: None     Forced sexual activity: None   Other Topics Concern    None   Social History Narrative    None       Medications and Allergies:     Current Outpatient Medications   Medication Sig Dispense Refill    ascorbic acid (VITAMIN C) 500 mg tablet Take 1 tablet (500 mg total) by mouth 2 (two) times a day 60 tablet 0    aspirin 81 MG tablet Take by mouth Daily      cephalexin (KEFLEX) 500 mg capsule Take 500 mg by mouth every 6 (six) hours      Cyanocobalamin (VITAMIN B12 PO) Take 1 capsule by mouth daily       desoximetasone (TOPICORT) 0 25 % cream Apply topically as needed for irritation 30 g 0    dicyclomine (BENTYL) 10 mg capsule Take 1 capsule by mouth every 12 (twelve) hours      DULoxetine (CYMBALTA) 60 mg delayed release capsule Take 1 capsule (60 mg total) by mouth daily 90 capsule 1    DULoxetine (CYMBALTA) 60 mg delayed release capsule TAKE 1 CAPSULE BY MOUTH EVERY DAY 30 capsule 0    ergocalciferol (VITAMIN D2) 50,000 units Take 1 capsule (50,000 Units total) by mouth once a week for 6 doses 6 capsule 0    fluticasone (FLONASE) 50 mcg/act nasal spray 1 spray into each nostril as needed        fluticasone (FLONASE) 50 mcg/act nasal spray SPRAY 2 SPRAYS INTO EACH NOSTRIL EVERY DAY 16 mL 0    gabapentin (NEURONTIN) 300 mg capsule Take 1 capsule by mouth every 8 (eight) hours      levothyroxine 50 mcg tablet TAKE 1 TABLET BY MOUTH EVERY DAY 30 tablet 2    potassium chloride (K-DUR,KLOR-CON) 20 mEq tablet TAKE 1 TABLET DAILY 30 tablet 11    sucralfate (CARAFATE) 1 g tablet TAKE 1 TABLET BY MOUTH TWICE A DAY 30 tablet 0    torsemide (DEMADEX) 20 mg tablet Take 1 tablet (20 mg total) by mouth daily 90 tablet 3    traMADol (ULTRAM) 50 mg tablet Take 1 tablet (50 mg total) by mouth every 8 (eight) hours as needed for moderate pain 30 tablet 0    warfarin (COUMADIN) 3 mg tablet TAKE FRIDAY,SATURDAY, SUNDAY INR MONDAY TO FURTHER DOSE  0    warfarin (COUMADIN) 4 mg tablet TAKE ONE TABLET DAILY AS PREVIOUSLY DIRECTED BY CARDIOLOGY 90 tablet 1     No current facility-administered medications for this visit  Allergies   Allergen Reactions    Amoxicillin      Reports nausea, headache, dizzy    Codeine Vomiting and GI Intolerance     GI upset, ana m morphine  Reaction Date: [id number];     Other      Other reaction(s): Other (See Comments)  ana m toradol in OR 6/06 sah    Oxycodone-Acetaminophen      Other reaction(s): Other (See Comments)  GI upset; ana m vicodin    Percocet [Oxycodone-Acetaminophen]     Seasonal Ic  [Cholestatin] Allergic Rhinitis    Trimethoprim     Bactrim [Sulfamethoxazole-Trimethoprim] Rash    Folic Acid-Vit T5-EXJ Z28 Diarrhea    Sulfa Antibiotics Rash     Other reaction(s): Other (See Comments)  takes lasix @home      Immunizations:     Immunization History   Administered Date(s) Administered    INFLUENZA 10/02/2012    Influenza Split High Dose Preservative Free IM 10/31/2014, 09/14/2015, 10/13/2016, 10/17/2017    Influenza TIV (IM) 10/04/2010, 10/05/2013    Influenza, high dose seasonal 0 5 mL 10/11/2018, 10/12/2019    Pneumococcal Conjugate 13-Valent 09/14/2015    Pneumococcal Polysaccharide PPV23 10/21/2002    Zoster 04/16/2013    Zoster Vaccine Recombinant 04/16/2013      Health Maintenance: There are no preventive care reminders to display for this patient  Topic Date Due    HEPATITIS B VACCINES (1 of 3 - Risk 3-dose series) 09/02/1952      Medicare Health Risk Assessment:     /74   Pulse 86   Resp 16   Ht 5' 1" (1 549 m)   Wt 85 2 kg (187 lb 12 8 oz)   LMP  (LMP Unknown)   SpO2 95%   BMI 35 48 kg/m²      Chandni Vargas is here for her Subsequent Wellness visit  Health Risk Assessment:   Patient rates overall health as fair  Patient feels that their physical health rating is same  Eyesight was rated as slightly worse  Hearing was rated as same  Patient feels that their emotional and mental health rating is same   Pain experienced in the last 7 days has been some  Patient's pain rating has been 6/10  Patient states that she has experienced no weight loss or gain in last 6 months  Fall Risk Screening: In the past year, patient has experienced: history of falling in past year    Number of falls: 2 or more  Injured during fall?: Yes    Feels unsteady when standing or walking?: Yes    Worried about falling?: Yes      Urinary Incontinence Screening:   Patient has not leaked urine accidently in the last six months  Home Safety:  Patient has trouble with stairs inside or outside of their home  Patient has working smoke alarms and has working carbon monoxide detector  Nutrition:   Current diet is Regular  Medications:   Patient is currently taking over-the-counter supplements  OTC medications include: see medication list  Patient is able to manage medications  Activities of Daily Living (ADLs)/Instrumental Activities of Daily Living (IADLs):   Walk and transfer into and out of bed and chair?: Yes  Dress and groom yourself?: Yes    Bathe or shower yourself?: Yes    Feed yourself? Yes  Do your laundry/housekeeping?: Yes  Manage your money, pay your bills and track your expenses?: Yes  Make your own meals?: Yes    Do your own shopping?: Yes    Previous Hospitalizations:   Any hospitalizations or ED visits within the last 12 months?: Yes    How many hospitalizations have you had in the last year?: 1-2    Advance Care Planning:   Living will: Yes    Durable POA for healthcare:  Yes    Advanced directive: Yes      Cognitive Screening:   Provider or family/friend/caregiver concerned regarding cognition?: No    PREVENTIVE SCREENINGS      Cardiovascular Screening:    General: Screening Current      Diabetes Screening:     General: Screening Current      Colorectal Cancer Screening:     General: Screening Not Indicated      Cervical Cancer Screening:    General: Screening Not Indicated      Osteoporosis Screening:    General: Screening Not Indicated and History Osteoporosis      Ngozi Polio, DO

## 2019-11-01 NOTE — PATIENT INSTRUCTIONS
Medicare Preventive Visit Patient Instructions  Thank you for completing your Welcome to Medicare Visit or Medicare Annual Wellness Visit today  Your next wellness visit will be due in one year (11/1/2020)  The screening/preventive services that you may require over the next 5-10 years are detailed below  Some tests may not apply to you based off risk factors and/or age  Screening tests ordered at today's visit but not completed yet may show as past due  Also, please note that scanned in results may not display below  Preventive Screenings:  Service Recommendations Previous Testing/Comments   Colorectal Cancer Screening  * Colonoscopy    * Fecal Occult Blood Test (FOBT)/Fecal Immunochemical Test (FIT)  * Fecal DNA/Cologuard Test  * Flexible Sigmoidoscopy Age: 54-65 years old   Colonoscopy: every 10 years (may be performed more frequently if at higher risk)  OR  FOBT/FIT: every 1 year  OR  Cologuard: every 3 years  OR  Sigmoidoscopy: every 5 years  Screening may be recommended earlier than age 48 if at higher risk for colorectal cancer  Also, an individualized decision between you and your healthcare provider will decide whether screening between the ages of 74-80 would be appropriate  Colonoscopy: Not on file  FOBT/FIT: Not on file  Cologuard: Not on file  Sigmoidoscopy: Not on file    Screening Not Indicated     Breast Cancer Screening Age: 36 years old  Frequency: every 1-2 years  Not required if history of left and right mastectomy Mammogram: 12/29/2016       Cervical Cancer Screening Between the ages of 21-29, pap smear recommended once every 3 years  Between the ages of 33-67, can perform pap smear with HPV co-testing every 5 years     Recommendations may differ for women with a history of total hysterectomy, cervical cancer, or abnormal pap smears in past  Pap Smear: 02/15/2018    Screening Not Indicated   Hepatitis C Screening Once for adults born between 1945 and 1965  More frequently in patients at high risk for Hepatitis C Hep C Antibody: Not on file       Diabetes Screening 1-2 times per year if you're at risk for diabetes or have pre-diabetes Fasting glucose: 79 mg/dL   A1C: 5 2 %    Screening Current   Cholesterol Screening Once every 5 years if you don't have a lipid disorder  May order more often based on risk factors  Lipid panel: 01/05/2018    Screening Current     Other Preventive Screenings Covered by Medicare:  1  Abdominal Aortic Aneurysm (AAA) Screening: covered once if your at risk  You're considered to be at risk if you have a family history of AAA  2  Lung Cancer Screening: covers low dose CT scan once per year if you meet all of the following conditions: (1) Age 50-69; (2) No signs or symptoms of lung cancer; (3) Current smoker or have quit smoking within the last 15 years; (4) You have a tobacco smoking history of at least 30 pack years (packs per day multiplied by number of years you smoked); (5) You get a written order from a healthcare provider  3  Glaucoma Screening: covered annually if you're considered high risk: (1) You have diabetes OR (2) Family history of glaucoma OR (3)  aged 48 and older OR (3)  American aged 72 and older  3  Osteoporosis Screening: covered every 2 years if you meet one of the following conditions: (1) You're estrogen deficient and at risk for osteoporosis based off medical history and other findings; (2) Have a vertebral abnormality; (3) On glucocorticoid therapy for more than 3 months; (4) Have primary hyperparathyroidism; (5) On osteoporosis medications and need to assess response to drug therapy  · Last bone density test (DXA Scan): 12/06/2016   5  HIV Screening: covered annually if you're between the age of 15-65  Also covered annually if you are younger than 13 and older than 72 with risk factors for HIV infection  For pregnant patients, it is covered up to 3 times per pregnancy      Immunizations:  Immunization Recommendations Influenza Vaccine Annual influenza vaccination during flu season is recommended for all persons aged >= 6 months who do not have contraindications   Pneumococcal Vaccine (Prevnar and Pneumovax)  * Prevnar = PCV13  * Pneumovax = PPSV23   Adults 25-60 years old: 1-3 doses may be recommended based on certain risk factors  Adults 72 years old: Prevnar (PCV13) vaccine recommended followed by Pneumovax (PPSV23) vaccine  If already received PPSV23 since turning 65, then PCV13 recommended at least one year after PPSV23 dose  Hepatitis B Vaccine 3 dose series if at intermediate or high risk (ex: diabetes, end stage renal disease, liver disease)   Tetanus (Td) Vaccine - COST NOT COVERED BY MEDICARE PART B Following completion of primary series, a booster dose should be given every 10 years to maintain immunity against tetanus  Td may also be given as tetanus wound prophylaxis  Tdap Vaccine - COST NOT COVERED BY MEDICARE PART B Recommended at least once for all adults  For pregnant patients, recommended with each pregnancy  Shingles Vaccine (Shingrix) - COST NOT COVERED BY MEDICARE PART B  2 shot series recommended in those aged 48 and above     Health Maintenance Due:  There are no preventive care reminders to display for this patient  Immunizations Due:      Topic Date Due    HEPATITIS B VACCINES (1 of 3 - Risk 3-dose series) 09/02/1952     Advance Directives   What are advance directives? Advance directives are legal documents that state your wishes and plans for medical care  These plans are made ahead of time in case you lose your ability to make decisions for yourself  Advance directives can apply to any medical decision, such as the treatments you want, and if you want to donate organs  What are the types of advance directives? There are many types of advance directives, and each state has rules about how to use them  You may choose a combination of any of the following:  · Living will:   This is a written record of the treatment you want  You can also choose which treatments you do not want, which to limit, and which to stop at a certain time  This includes surgery, medicine, IV fluid, and tube feedings  · Durable power of  for healthcare [facility]): This is a written record that states who you want to make healthcare choices for you when you are unable to make them for yourself  This person, called a proxy, is usually a family member or a friend  You may choose more than 1 proxy  · Do not resuscitate (DNR) order:  A DNR order is used in case your heart stops beating or you stop breathing  It is a request not to have certain forms of treatment, such as CPR  A DNR order may be included in other types of advance directives  · Medical directive: This covers the care that you want if you are in a coma, near death, or unable to make decisions for yourself  You can list the treatments you want for each condition  Treatment may include pain medicine, surgery, blood transfusions, dialysis, IV or tube feedings, and a ventilator (breathing machine)  · Values history: This document has questions about your views, beliefs, and how you feel and think about life  This information can help others choose the care that you would choose  Why are advance directives important? An advance directive helps you control your care  Although spoken wishes may be used, it is better to have your wishes written down  Spoken wishes can be misunderstood, or not followed  Treatments may be given even if you do not want them  An advance directive may make it easier for your family to make difficult choices about your care  Fall Prevention    Fall prevention  includes ways to make your home and other areas safer  It also includes ways you can move more carefully to prevent a fall  Health conditions that cause changes in your blood pressure, vision, or muscle strength and coordination may increase your risk for falls   Medicines may also increase your risk for falls if they make you dizzy, weak, or sleepy  Fall prevention tips:   · Stand or sit up slowly  · Use assistive devices as directed  · Wear shoes that fit well and have soles that   · Wear a personal alarm  · Stay active  · Manage your medical conditions  Home Safety Tips:  · Add items to prevent falls in the bathroom  · Keep paths clear  · Install bright lights in your home  · Keep items you use often on shelves within reach  · Paint or place reflective tape on the edges of your stairs  Urinary Incontinence   Urinary incontinence (UI)  is when you lose control of your bladder  UI develops because your bladder cannot store or empty urine properly  The 3 most common types of UI are stress incontinence, urge incontinence, or both  Medicines:   · May be given to help strengthen your bladder control  Report any side effects of medication to your healthcare provider  Do pelvic muscle exercises often:  Your pelvic muscles help you stop urinating  Squeeze these muscles tight for 5 seconds, then relax for 5 seconds  Gradually work up to squeezing for 10 seconds  Do 3 sets of 15 repetitions a day, or as directed  This will help strengthen your pelvic muscles and improve bladder control  Train your bladder:  Go to the bathroom at set times, such as every 2 hours, even if you do not feel the urge to go  You can also try to hold your urine when you feel the urge to go  For example, hold your urine for 5 minutes when you feel the urge to go  As that becomes easier, hold your urine for 10 minutes  Self-care:   · Keep a UI record  Write down how often you leak urine and how much you leak  Make a note of what you were doing when you leaked urine  · Drink liquids as directed  You may need to limit the amount of liquid you drink to help control your urine leakage  Do not drink any liquid right before you go to bed   Limit or do not have drinks that contain caffeine or alcohol  · Prevent constipation  Eat a variety of high-fiber foods  Good examples are high-fiber cereals, beans, vegetables, and whole-grain breads  Walking is the best way to trigger your intestines to have a bowel movement  · Exercise regularly and maintain a healthy weight  Weight loss and exercise will decrease pressure on your bladder and help you control your leakage  · Use a catheter as directed  to help empty your bladder  A catheter is a tiny, plastic tube that is put into your bladder to drain your urine  · Go to behavior therapy as directed  Behavior therapy may be used to help you learn to control your urge to urinate  Weight Management   Why it is important to manage your weight:  Being overweight increases your risk of health conditions such as heart disease, high blood pressure, type 2 diabetes, and certain types of cancer  It can also increase your risk for osteoarthritis, sleep apnea, and other respiratory problems  Aim for a slow, steady weight loss  Even a small amount of weight loss can lower your risk of health problems  How to lose weight safely:  A safe and healthy way to lose weight is to eat fewer calories and get regular exercise  You can lose up about 1 pound a week by decreasing the number of calories you eat by 500 calories each day  Healthy meal plan for weight management:  A healthy meal plan includes a variety of foods, contains fewer calories, and helps you stay healthy  A healthy meal plan includes the following:  · Eat whole-grain foods more often  A healthy meal plan should contain fiber  Fiber is the part of grains, fruits, and vegetables that is not broken down by your body  Whole-grain foods are healthy and provide extra fiber in your diet  Some examples of whole-grain foods are whole-wheat breads and pastas, oatmeal, brown rice, and bulgur  · Eat a variety of vegetables every day    Include dark, leafy greens such as spinach, kale, baljit greens, and mustard greens  Eat yellow and orange vegetables such as carrots, sweet potatoes, and winter squash  · Eat a variety of fruits every day  Choose fresh or canned fruit (canned in its own juice or light syrup) instead of juice  Fruit juice has very little or no fiber  · Eat low-fat dairy foods  Drink fat-free (skim) milk or 1% milk  Eat fat-free yogurt and low-fat cottage cheese  Try low-fat cheeses such as mozzarella and other reduced-fat cheeses  · Choose meat and other protein foods that are low in fat  Choose beans or other legumes such as split peas or lentils  Choose fish, skinless poultry (chicken or turkey), or lean cuts of red meat (beef or pork)  Before you cook meat or poultry, cut off any visible fat  · Use less fat and oil  Try baking foods instead of frying them  Add less fat, such as margarine, sour cream, regular salad dressing and mayonnaise to foods  Eat fewer high-fat foods  Some examples of high-fat foods include french fries, doughnuts, ice cream, and cakes  · Eat fewer sweets  Limit foods and drinks that are high in sugar  This includes candy, cookies, regular soda, and sweetened drinks  Exercise:  Exercise at least 30 minutes per day on most days of the week  Some examples of exercise include walking, biking, dancing, and swimming  You can also fit in more physical activity by taking the stairs instead of the elevator or parking farther away from stores  Ask your healthcare provider about the best exercise plan for you  © Copyright [employer] 2018 Information is for End User's use only and may not be sold, redistributed or otherwise used for commercial purposes   All illustrations and images included in CareNotes® are the copyrighted property of [employer]  or [address] [place] [address] [employer]

## 2019-11-01 NOTE — TELEPHONE ENCOUNTER
Brenda Crenshaw called regarding a tooth extraction scheduled with CANDICE OMS on 11/12  Please advise if ok to hold coumadin prior to extraction

## 2019-11-01 NOTE — PROGRESS NOTES
BMI Counseling: Body mass index is 35 48 kg/m²  Discussed the patient's BMI with her  The BMI is above normal  Nutrition recommendations include reducing portion sizes  Assessment/Plan:    Medicare annual wellness visit, subsequent  Assessment and plan 1  Medicare subsequent annual wellness examination overall the patient is clinically stable and doing well, we encouraged the patient to follow a healthy and balanced diet  We recommend that the patient exercise routinely approximately 30 minutes 5 times per week   We have reviewed the patient's vaccines and have made recommendations for updates if necessary flu vaccine, new shingles vaccine       We will be ordering screening laboratories which are age appropriate  Return to the office in  6 months    call if any problems  Hypothyroidism  Clinically stable and doing well continue the current medical regiment will continue monitor  Continue levothyroxine 50 mcg once daily    Atrial fibrillation, chronic (HCC)  Rate controlled, anticoagulated Clinically stable and doing well continue the current medical regiment will continue monitor  Continue follow-up with Cardiology routinely    Benign essential hypertension  Hypertension - controlled, I have counseled patient following healthy balance diet, I would like the patient reduce sodium, exercise routinely, I would like the patient continued the med current medical regiment and we will continue to monitor  Chronic kidney disease, stage III (moderate) (HCC)  Drink adequate amount of water, avoid anti-inflammatories, reduce sodium in the diet and will continue to monitor the kidney function    Cellulitis of right lower extremity  Resolved clinically    Obesity without serious comorbidity  Obesity -I have counseled patient following healthy and balanced diet, I would like the patient to lose weight, I would like the patient exercise routinely; we will continue monitor the patient's progress            Problem List Items Addressed This Visit        Endocrine    Hypothyroidism     Clinically stable and doing well continue the current medical regiment will continue monitor  Continue levothyroxine 50 mcg once daily            Cardiovascular and Mediastinum    Benign essential hypertension - Primary     Hypertension - controlled, I have counseled patient following healthy balance diet, I would like the patient reduce sodium, exercise routinely, I would like the patient continued the med current medical regiment and we will continue to monitor  Relevant Orders    Comprehensive metabolic panel    Atrial fibrillation, chronic     Rate controlled, anticoagulated Clinically stable and doing well continue the current medical regiment will continue monitor  Continue follow-up with Cardiology routinely         Relevant Orders    CBC (Includes Diff/Plt) (Refl)       Genitourinary    Chronic kidney disease, stage III (moderate) (HCC)     Drink adequate amount of water, avoid anti-inflammatories, reduce sodium in the diet and will continue to monitor the kidney function            Other    Obesity without serious comorbidity     Obesity -I have counseled patient following healthy and balanced diet, I would like the patient to lose weight, I would like the patient exercise routinely; we will continue monitor the patient's progress  Relevant Orders    CBC (Includes Diff/Plt) (Refl)    Cellulitis of right lower extremity     Resolved clinically           Other Visit Diagnoses     Screening for diabetes mellitus        Relevant Orders    Hemoglobin A1C    CBC (Includes Diff/Plt) (Refl)    Screening for cardiovascular condition        Relevant Orders    Lipid Panel with Direct LDL reflex    CBC (Includes Diff/Plt) (Refl)        return to office 6 months  call if any problems    Subjective:      Patient ID: Edwige Werner is a 80 y o  female      HPI 80-year old female coming in for a follow up office visit regarding benign essential hypertension, chronic kidney disease, hypothyroidism, chronic atrial fibrillation, obesity; The patient reports me compliant taking medications without untoward side effects the  The patient is here to review his medical condition, update me on the medical condition and the patient reports me no hospitalizations and no ER visits  She reports me that tramadol is helpful for her chronic right hip pain  She may consider medical marijuana in the future  She has seen Orthopedics and reports me that it will take some time  She does not want to go for formal physical therapy but she is doing home physical therapy when motivated  pain in the [place] , groin   Working , laxed at doing the exercizes,     The following portions of the patient's history were reviewed and updated as appropriate: allergies, current medications, past family history, past medical history, past social history, past surgical history and problem list     Review of Systems   Constitutional: Negative for activity change, appetite change and unexpected weight change  HENT: Negative for congestion and postnasal drip  Eyes: Negative for visual disturbance  Respiratory: Negative for cough and shortness of breath  Cardiovascular: Negative for chest pain  Gastrointestinal: Negative for abdominal pain, diarrhea, nausea and vomiting  Neurological: Negative for dizziness, light-headedness and headaches  Hematological: Negative for adenopathy  right lateral hip pain    Objective:    No follow-ups on file  Procedure: Xr Hip/pelv 2-3 Vws Right If Performed    Result Date: 10/18/2019  Narrative: RIGHT HIP INDICATION:   Z47 1: Aftercare following joint replacement surgery Z96 641: Presence of right artificial hip joint  COMPARISON:  Right hip plain films from 7/9/2019  VIEWS:  XR HIP/PELV 2-3 VWS RIGHT W PELVIS IF PERFORMED FINDINGS: There is no acute fracture or dislocation   Right total hip arthroplasty is identified in satisfactory position without evidence of hardware complication  Continued healing of right superior and inferior pubic rami fractures  No lytic or blastic osseous lesions  Soft tissues are unremarkable  Degenerative changes visualized lower lumbar spine  Impression: Intact right hip arthroplasty  Continued healing of right superior and inferior pubic rami fractures  Workstation performed: [id number]     Recent Results (from the past 12745 hour(s))   POCT ECG    Impression    Ventriclar rate 71 BPM, QRS 134ms,  ms, Ventricular paced rhythm, underlying atrial fibrillation  ECG 12 lead   Result Value    Ventricular Rate 78    Atrial Rate 77    NY Interval     QRSD Interval 116    QT Interval 400    QTC Interval 456    P Axis     QRS Axis -12    T Wave Axis 159    Narrative    Ventricular-paced rhythm  When compared with ECG of 23-AUG-2018 15:28,  Vent  rate has decreased BY   2 BPM  Confirmed by Jade Colmenares ([id number]) on 2019 8:59:27 AM   Holter monitor - 24 hour    Narrative    PT NAME: Lion Woodruff  : 1933  AGE: 80 y o  GENDER: female  MRN: [id number]  PROCEDURE: Holter monitor - 24 hour      INDICATIONS:   24 hour Holter monitor was performed 18 for atrial fibrillation  The   patient was in atrial fibrillation throughout the duration of the test     Average heart rate was 118 beats per minute  Minimum heart rate was 60   beats per minute at 8:25 a m     Maximum heart rate was 171 beats per   minute at 2:40 p m  Unk Power Average hourly heart rate ranged between 97 to 150   beats per minute  Ventricular ectopic activity consisted of 9 isolated PVCs, which comprises   less than 0 1% of total beats  Longest R-R interval was 2 1 seconds at 12:45 p m  Unk Power Patient's rhythm included 1 hour 28 minutes of tachycardia  No symptoms reported  IMPRESSIONS:  1  Atrial fibrillation with rapid ventricular response  2   Rare ventricular ectopic activity    3   No symptoms reported  Interpreted by: Jus Mann MD   NM myocardial perfusion spect (rx stress and/or rest)    Narrative    [address] [place], [address] [place] [address]  [phone]    Rest/Stress Gated SPECT Myocardial Perfusion Imaging After Regadenoson    Patient: Joesph Bah  MR number: [id number]  Account number: [de-identified]  : 1933  Age: 80 years  Gender: Female  Status: Outpatient  Location: [facility]  Height: 62 in  Weight: 231 lb  BP: 116/ 70 mmHg    Allergies: CODEINE, OXYCODONE HCL, OXYCODONE-ACETAMINOPHEN, CHOLESTATIN, SULFAMETHOXAZOLE-TRIMETHOPRIM, SULFA ANTIBIOTICS    Diagnosis: I48 0 - Atrial fibrillation, I50 9 - Heart failure, unspecified, R07 9 - Chest pain, unspecified    Primary Physician:  Elvin Mtz DO  RN:  Juan Zuleta RN BSN  Referring Physician:  Army Fermin MD  Technician:  Sam Bruce  Group:  [facility]  Report Prepared By[de-identified]  Juan Zuleta RN BSN  Interpreting Physician:  Zoran Colon MD    INDICATIONS: chest pain/pressure    HISTORY: Gastric bypass/ GI bleed/BL LE edemaModerate Mitral Insuff  chronic diastolic CHF/arthritis/IBS/Fibromyalgia/MRSA  uses walker to ambulate/Chronic Afib The patient is a 80year old  female  Chest pain status: chest pain, consistent with atypical angina  Other symptoms: decreased effort tolerance, palpitations, and edema  Coronary artery  disease risk factors: hypertension  Cardiovascular history: congestive heart failure, mitral valve disease, and arrhythmia  Co-morbidity: obesity  Medications: a beta blocker and a calcium channel blocker  Previous test results: abnormal  ECG  PHYSICAL EXAM: Baseline physical exam screening: no wheezes audible  REST ECG: Atrial fibrillation  Nonspecific ST and T wave abnormalities were present  PROCEDURE: The study was performed at the [facility] [place]   A regadenoson infusion pharmacologic stress test was performed  Gated SPECT myocardial perfusion imaging was performed after stress  Systolic blood pressure was  116 mmHg, at the start of the study  Diastolic blood pressure was 70 mmHg, at the start of the study  The heart rate was 85 bpm, at the start of the study  IV double checked  Regadenoson protocol:  HR bpm SBP mmHg DBP mmHg Symptoms  Baseline 85 116 70 none  Immediate -- -- -- moderate dyspnea  1 min -- -- -- same as above  2 min 107 122 78 subsiding  5 min 90 120 70 none    STRESS SUMMARY: Duration of pharmacologic stress was Maximal heart rate during stress was 107 bpm  There was normal resting blood pressure with an appropriate response to stress  The rate-pressure product for the peak heart rate and blood  pressure was 95846  There was no chest pain during stress  The stress test was terminated due to protocol completion  Pre oxygen saturation: 99 %  Peak oxygen saturation: 99 %  The stress ECG was negative for ischemia and normal   Arrhythmia during stress: isolated premature ventricular beats  ISOTOPE ADMINISTRATION:  The radiopharmaceutical was injected at the peak effect of pharmacologic stress  PERFUSION DEFECTS:  -  There were no perfusion defects  GATED SPECT:  The calculated left ventricular ejection fraction was 66 %  There was no left ventricular regional abnormality  SUMMARY:  -  Stress results: There was no chest pain during stress  -  ECG conclusions: The stress ECG was negative for ischemia and normal   -  Perfusion imaging: There were no perfusion defects   -  Gated SPECT: The calculated left ventricular ejection fraction was 66 %  There was no left ventricular regional abnormality  IMPRESSIONS: Normal study after pharmacologic vasodilation without reproduction of symptoms  Myocardial perfusion imaging was normal at rest and with stress      Prepared and signed by    Alena Spain MD  Signed 05/11/2018 15:09:24     Echo complete with contrast if indicated Narrative    [facility]  [phone]    Transthoracic Echocardiogram  2D, M-mode, Doppler, and Color Doppler    Study date:  2018    Patient: Radha Oneil  MR number: [id number]  Account number: [de-identified]  : 1933  Age: 80 years  Gender: Female  Status: Outpatient  Location: [facility]  Height: 62 in  Weight: 231 lb  BP: 112/ 60 mmHg    Indications: MV Disease    Diagnoses: I34 0 - Nonrheumatic mitral (valve) insufficiency    Sonographer:  JESUS Morris  Primary Physician:  Fatima Romberg, DO  Referring Physician:  Guillaume Sanchez MD  Group:  [facility]  Interpreting Physician:  Chay Shore MD    SUMMARY    LEFT VENTRICLE:  Size was normal   Systolic function was normal  Ejection fraction was estimated to be 55 %  Wall thickness was at the upper limits of normal     RIGHT VENTRICLE:  The ventricle was mildly dilated  Systolic function was normal     RIGHT ATRIUM:  The atrium was mildly dilated  MITRAL VALVE:  There was moderate regurgitation  TRICUSPID VALVE:  There was moderate regurgitation  IVC, HEPATIC VEINS:  The inferior vena cava was mildly dilated  Respirophasic changes were blunted (less than 50% variation)  COMPARISONS:  Comparison was made with the previous study of 17-May-2017  Tricuspid regurgitation has worsened  HISTORY: PRIOR HISTORY: AFIB, Moderate MR, Hypertension, CHF, Anemia    PROCEDURE: The study was performed in the [facility]  This was a routine study  The transthoracic approach was used  The study included complete 2D imaging, M-mode, complete spectral Doppler, and color Doppler  The  heart rate was 110 bpm, at the start of the study  Images were obtained from the parasternal, apical, subcostal, and suprasternal notch acoustic windows  Echocardiographic views were limited due to lung interference   Image quality was  adequate  LEFT VENTRICLE: Size was normal  Systolic function was normal  Ejection fraction was estimated to be 55 %  There were no regional wall motion abnormalities  Wall thickness was at the upper limits of normal  DOPPLER: Transmitral flow  pattern: atrial fibrillation  The study was not technically sufficient to allow evaluation of LV diastolic function  RIGHT VENTRICLE: The ventricle was mildly dilated  Systolic function was normal  Wall thickness was normal     LEFT ATRIUM: Size was normal     RIGHT ATRIUM: The atrium was mildly dilated  MITRAL VALVE: Valve structure was normal  There was normal leaflet separation  DOPPLER: The transmitral velocity was within the normal range  There was no evidence for stenosis  There was moderate regurgitation  AORTIC VALVE: The valve was trileaflet  Leaflets exhibited normal thickness, normal cuspal separation, and sclerosis  DOPPLER: Transaortic velocity was within the normal range  There was no evidence for stenosis  There was no  regurgitation  TRICUSPID VALVE: The valve structure was normal  There was normal leaflet separation  DOPPLER: The transtricuspid velocity was within the normal range  There was no evidence for stenosis  There was moderate regurgitation  Pulmonary artery  systolic pressure was within the normal range  Estimated peak PA pressure was 36 mmHg  PULMONIC VALVE: Leaflets exhibited normal thickness, no calcification, and normal cuspal separation  DOPPLER: The transpulmonic velocity was within the normal range  There was no regurgitation  PERICARDIUM: There was no pericardial effusion  The pericardium was normal in appearance  AORTA: The root exhibited normal size  SYSTEMIC VEINS: IVC: The inferior vena cava was mildly dilated  Respirophasic changes were blunted (less than 50% variation)      SYSTEM MEASUREMENT TABLES    2D  %FS: 34 71 %  AV Diam: 2 84 cm  EDV(Teich): 101 4 ml  EF(Cube): 72 17 %  EF(Teich): 63 88 %  ESV(Cube): 28 55 ml  ESV(Teich): 36 63 ml  IVSd: 1 1 cm  LA Area: 17 55 cm2  LA Diam: 4 14 cm  LVEDV MOD A4C: 61 32 ml  LVEF MOD A4C: 61 87 %  LVESV MOD A4C: 23 38 ml  LVIDd: 4 68 cm  LVIDs: 3 06 cm  LVLd A4C: 6 59 cm  LVLs A4C: 5 67 cm  LVPWd: 1 cm  RA Area: 23 38 cm2  RV Diam: 3 71 cm  SV MOD A4C: 37 94 ml  SV(Cube): 74 03 ml  SV(Teich): 64 77 ml    CW  TR MaxP 51 mmHg  TR Vmax: 2 21 m/s    MM  TAPSE: 1 35 cm    [facility] Accredited [facility]chocardi[facility]    Prepared and electronically signed by    Agueda Zaidi MD  Signed 2018 17:36:53     Holter monitor - 48 hour    Narrative    PT NAME: Kristine Ferrell  : 1933  AGE: 80 y o  GENDER: female  MRN: [id number]  PROCEDURE: Holter monitor - 48 hour      INDICATIONS:   48 hour Holter monitor was performed 17 for atrial fibrillation  The   patient was in atrial fibrillation throughout the test  Average heart rate   was 74 BPM; minimum heart rate was 35 BPM at 3:36 PM; and maximum heart   rate was 162 BPM at 10:28 AM  Average hourly heart rate ranged between    BPM      Ventricular ectopic activity consisted of 324 beats, which comprises 0 2%   of total beats  13 were in 2 runs, 96 were in couplets, 10 were late   beats, remainder were isolated PVCs  Longest ventricular run was 9 beats   at a HR of 126 BPM at 2:53 AM on day 2, possible afib with aberrancy as   rhythm was irregular and preceded by a long-short sequence (Rik's   phenomenon)  Fastest ventricular run was 4 beats with a maximum HR of 135   BPM at 2:53 AM on day 2  Longest R-R interval was 2 3 seconds at 5:48 AM      IMPRESSIONS:  1  Atrial fibrillation with controlled ventricular response  2  Occasional ventricular ectopy versus aberrancy  3  No symptoms reported  Interpreted by:  Pelon Rashid MD       Allergies   Allergen Reactions    Amoxicillin      Reports nausea, headache, dizzy    Codeine Vomiting and GI Intolerance     GI upset, ana m morphine  Reaction Date: 24Apr2012;     Other      Other reaction(s): Other (See Comments)  ana m toradol in OR 6/06 sah    Oxycodone-Acetaminophen      Other reaction(s): Other (See Comments)  GI upset; ana m vicodin    Percocet [Oxycodone-Acetaminophen]     Seasonal Ic  [Cholestatin] Allergic Rhinitis    Trimethoprim     Bactrim [Sulfamethoxazole-Trimethoprim] Rash    Folic Acid-Vit Y0-GJA V03 Diarrhea    Sulfa Antibiotics Rash     Other reaction(s): Other (See Comments)  takes lasix @home       Past Medical History:   Diagnosis Date    Anemia     Arthritis     Cancer ([address] )     basal cell    CHF (congestive heart failure) (HCC)     Disease of thyroid gland     Disturbance of smell and taste     disturbance of taste    Effusion of knee joint right     Fibromyalgia     Heart murmur     reported a previous heart murmur    History of acute myocardial infarction     History of atrial fibrillation     History of bruising easily     History of cancer     History of dermatitis     History of mammogram     History of methicillin resistant staphylococcus aureus (MRSA)     Negative nasal culture, isolation discontinued 8/17/2018      History of methicillin resistant staphylococcus aureus (MRSA) 08/17/2018    negative nasal culture-isolation and hx discontinued 8/17/2018    History of obesity     History of osteopenia     History of sciatica     History of shortness of breath     History of sinusitis     History of sore throat     History of syncope     History of umbilical hernia     History of viral infection     Irritable bowel syndrome (IBS)     Joint pain, hip     Limb pain     Myalgia     myalgia and myositis    Need for prophylactic vaccination against single diseases     Need for prophylactic vaccination and inoculation against influenza     Preoperative cardiovascular examination     Primary osteoarthritis of right knee     Right leg pain     Vaginal Pap smear     reported pap smear     Past Surgical History:   Procedure Laterality Date    ANKLE ARTHRODESIS Right     BACK SURGERY      BARIATRIC SURGERY      CHOLECYSTECTOMY      x2    COLONOSCOPY      ESOPHAGOGASTRODUODENOSCOPY N/A 3/23/2018    Procedure: ESOPHAGOGASTRODUODENOSCOPY (EGD); Surgeon: Jennifer Moseley MD;  [facility]; Service: Gastroenterology    FOOT SURGERY      HIP CLOSE REDUCTION Right 8/21/2016    Procedure: CLOSED REDUCTION RIGHT TOTAL HIP;  Surgeon: Dimitri Burrows MD;  [facility];  Service:    Aaron Lizama / Salome Jon / Devonte Dillardgomery REPLACEMENT      LEFT KNEE REPLACEMENT    JOINT REPLACEMENT      Right HIP    PILONIDAL CYST EXCISION      x2    MA EXC SKIN BENIG <0 5 CM TRUNK,ARM,LEG Right 7/24/2019    Procedure: REVISION SCAR EXTREMITY Rt Hip;   Surgeon: Jenise Pleitez MD;  [facility];  Service: Orthopedics    MA REVISE TOTAL HIP REPLACEMENT Right 4/15/2019    Procedure: ARTHROPLASTY HIP TOTAL ACETABULAR REVISION;  Surgeon: Jenise Pleitez MD;  [facility];  Service: Orthopedics    REPLACEMENT TOTAL KNEE Right     SILVANA-EN-Y PROCEDURE      x2    TOTAL KNEE ARTHROPLASTY      UMBILICAL HERNIA REPAIR      x2     Current Outpatient Medications on File Prior to Visit   Medication Sig Dispense Refill    ascorbic acid (VITAMIN C) 500 mg tablet Take 1 tablet (500 mg total) by mouth 2 (two) times a day 60 tablet 0    aspirin 81 MG tablet Take by mouth Daily      cephalexin (KEFLEX) 500 mg capsule Take 500 mg by mouth every 6 (six) hours      Cyanocobalamin (VITAMIN B12 PO) Take 1 capsule by mouth daily       desoximetasone (TOPICORT) 0 25 % cream Apply topically as needed for irritation 30 g 0    dicyclomine (BENTYL) 10 mg capsule Take 1 capsule by mouth every 12 (twelve) hours      DULoxetine (CYMBALTA) 60 mg delayed release capsule Take 1 capsule (60 mg total) by mouth daily 90 capsule 1    DULoxetine (CYMBALTA) 60 mg delayed release capsule TAKE 1 CAPSULE BY MOUTH EVERY DAY 30 capsule 0    ergocalciferol (VITAMIN D2) 50,000 units Take 1 capsule (50,000 Units total) by mouth once a week for 6 doses 6 capsule 0    fluticasone (FLONASE) 50 mcg/act nasal spray 1 spray into each nostril as needed        fluticasone (FLONASE) 50 mcg/act nasal spray SPRAY 2 SPRAYS INTO EACH NOSTRIL EVERY DAY 16 mL 0    gabapentin (NEURONTIN) 300 mg capsule Take 1 capsule by mouth every 8 (eight) hours      levothyroxine 50 mcg tablet TAKE 1 TABLET BY MOUTH EVERY DAY 30 tablet 2    potassium chloride (K-DUR,KLOR-CON) 20 mEq tablet TAKE 1 TABLET DAILY 30 tablet 11    sucralfate (CARAFATE) 1 g tablet TAKE 1 TABLET BY MOUTH TWICE A DAY 30 tablet 0    torsemide (DEMADEX) 20 mg tablet Take 1 tablet (20 mg total) by mouth daily 90 tablet 3    traMADol (ULTRAM) 50 mg tablet Take 1 tablet (50 mg total) by mouth every 8 (eight) hours as needed for moderate pain 30 tablet 0    warfarin (COUMADIN) 3 mg tablet TAKE FRIDAY,SATURDAY, SUNDAY INR MONDAY TO FURTHER DOSE  0    warfarin (COUMADIN) 4 mg tablet TAKE ONE TABLET DAILY AS PREVIOUSLY DIRECTED BY CARDIOLOGY 90 tablet 1     No current facility-administered medications on file prior to visit        Family History   Problem Relation Age of Onset    Coronary artery disease Mother     Heart attack Mother     Hypertension Mother     Coronary artery disease Father     Heart attack Father     Hypertension Father     Lymphoma Sister         acute    Rashes / Skin problems Sister         lymphomatoid papulosis    Coronary artery disease Brother     Heart attack Brother         x2    Aneurysm Neg Hx     Hyperlipidemia Neg Hx      Social History     Socioeconomic History    Marital status: /Civil Union     Spouse name: Not on file    Number of children: Not on file    Years of education: Not on file    Highest education level: Not on file   Occupational History    Not on file   Social Needs    Financial resource strain: Not on file    Food insecurity:     Worry: Not on file     Inability: Not on file    Transportation needs:     Medical: Not on file     Non-medical: Not on file   Tobacco Use    Smoking status: Never Smoker    Smokeless tobacco: Never Used   Substance and Sexual Activity    Alcohol use: Not Currently     Frequency: Never    Drug use: No    Sexual activity: Never   Lifestyle    Physical activity:     Days per week: Not on file     Minutes per session: Not on file    Stress: Not on file   Relationships    Social connections:     Talks on phone: Not on file     Gets together: Not on file     Attends [employer] service: Not on file     Active member of club or organization: Not on file     Attends meetings of clubs or organizations: Not on file     Relationship status: Not on file    Intimate partner violence:     Fear of current or ex partner: Not on file     Emotionally abused: Not on file     Physically abused: Not on file     Forced sexual activity: Not on file   Other Topics Concern    Not on file   Social History Narrative    Not on file     Vitals:    11/01/19 0928   BP: 130/74   Pulse: 86   Resp: 16   SpO2: 95%   Weight: 85 2 kg (187 lb 12 8 oz)   Height: 5' 1" (1 549 m)     Results for orders placed or performed in visit on 09/25/19   Vitamin D 25 hydroxy   Result Value Ref Range    Vit D, 25-Hydroxy 13 1 (L) 30 0 - 100 0 ng/mL     Weight (last 2 days)     Date/Time   Weight    11/01/19 0928   85 2 (187 8)            Body mass index is 35 48 kg/m²  BP      Temp      Pulse     Resp      SpO2        Vitals:    11/01/19 0928   Weight: 85 2 kg (187 lb 12 8 oz)     Vitals:    11/01/19 0928   Weight: 85 2 kg (187 lb 12 8 oz)       /74   Pulse 86   Resp 16   Ht 5' 1" (1 549 m)   Wt 85 2 kg (187 lb 12 8 oz)   LMP  (LMP Unknown)   SpO2 95%   BMI 35 48 kg/m²          Physical Exam   Constitutional: She appears well-developed and well-nourished  HENT:   Head: Normocephalic     Mouth/Throat: Oropharynx is clear and moist    Eyes: Pupils are equal, round, and reactive to light  Conjunctivae are normal  Right eye exhibits no discharge  Left eye exhibits no discharge  No scleral icterus  Neck: Neck supple  Cardiovascular: Normal rate, regular rhythm, normal heart sounds and intact distal pulses  Exam reveals no gallop and no friction rub  No murmur heard  Pulmonary/Chest: Breath sounds normal  No respiratory distress  She has no wheezes  She has no rales  Abdominal: Soft  Bowel sounds are normal  She exhibits no distension and no mass  There is no tenderness  There is no rebound and no guarding  Musculoskeletal: She exhibits no edema or deformity  Lymphadenopathy:     She has no cervical adenopathy  Neurological: She is alert   Coordination normal

## 2019-11-02 PROBLEM — Z00.00 MEDICARE ANNUAL WELLNESS VISIT, SUBSEQUENT: Status: ACTIVE | Noted: 2019-11-02

## 2019-11-03 DIAGNOSIS — E03.9 HYPOTHYROIDISM, UNSPECIFIED TYPE: ICD-10-CM

## 2019-11-03 RX ORDER — LEVOTHYROXINE SODIUM 0.05 MG/1
TABLET ORAL
Qty: 90 TABLET | Refills: 0 | Status: SHIPPED | OUTPATIENT
Start: 2019-11-03 | End: 2019-11-22 | Stop reason: SDUPTHER

## 2019-11-03 NOTE — ASSESSMENT & PLAN NOTE
Drink adequate amount of water, avoid anti-inflammatories, reduce sodium in the diet and will continue to monitor the kidney function

## 2019-11-03 NOTE — ASSESSMENT & PLAN NOTE
Clinically stable and doing well continue the current medical regiment will continue monitor    Continue levothyroxine 50 mcg once daily

## 2019-11-03 NOTE — ASSESSMENT & PLAN NOTE
Rate controlled, anticoagulated Clinically stable and doing well continue the current medical regiment will continue monitor    Continue follow-up with Cardiology routinely

## 2019-11-03 NOTE — ASSESSMENT & PLAN NOTE
Assessment and plan 1  Medicare subsequent annual wellness examination overall the patient is clinically stable and doing well, we encouraged the patient to follow a healthy and balanced diet  We recommend that the patient exercise routinely approximately 30 minutes 5 times per week   We have reviewed the patient's vaccines and have made recommendations for updates if necessary flu vaccine, new shingles vaccine       We will be ordering screening laboratories which are age appropriate  Return to the office in  6 months    call if any problems

## 2019-11-05 NOTE — TELEPHONE ENCOUNTER
Per verbal conversation with Dr Javier Iraheta, pt may hold coumadin for 4 days prior to extraction  Pt advised

## 2019-11-09 DIAGNOSIS — R10.13 EPIGASTRIC PAIN: ICD-10-CM

## 2019-11-09 DIAGNOSIS — J02.9 SORE THROAT: ICD-10-CM

## 2019-11-09 DIAGNOSIS — E55.9 VITAMIN D DEFICIENCY: ICD-10-CM

## 2019-11-11 ENCOUNTER — ANTICOAG VISIT (OUTPATIENT)
Dept: INTERNAL MEDICINE CLINIC | Facility: CLINIC | Age: 84
End: 2019-11-11

## 2019-11-11 RX ORDER — SUCRALFATE 1 G/1
TABLET ORAL
Qty: 30 TABLET | Refills: 0 | Status: SHIPPED | OUTPATIENT
Start: 2019-11-11 | End: 2020-01-20

## 2019-11-11 RX ORDER — FLUTICASONE PROPIONATE 50 MCG
SPRAY, SUSPENSION (ML) NASAL
Qty: 16 ML | Refills: 0 | Status: SHIPPED | OUTPATIENT
Start: 2019-11-11 | End: 2019-12-06 | Stop reason: SDUPTHER

## 2019-11-11 RX ORDER — ERGOCALCIFEROL 1.25 MG/1
50000 CAPSULE ORAL WEEKLY
Qty: 6 CAPSULE | Refills: 0 | OUTPATIENT
Start: 2019-11-11 | End: 2019-12-17

## 2019-11-14 ENCOUNTER — APPOINTMENT (OUTPATIENT)
Dept: LAB | Facility: CLINIC | Age: 84
End: 2019-11-14
Payer: COMMERCIAL

## 2019-11-14 DIAGNOSIS — Z13.6 SCREENING FOR CARDIOVASCULAR CONDITION: ICD-10-CM

## 2019-11-14 DIAGNOSIS — E55.9 VITAMIN D DEFICIENCY: ICD-10-CM

## 2019-11-14 DIAGNOSIS — E66.9 OBESITY WITHOUT SERIOUS COMORBIDITY, UNSPECIFIED CLASSIFICATION, UNSPECIFIED OBESITY TYPE: ICD-10-CM

## 2019-11-14 DIAGNOSIS — I10 BENIGN ESSENTIAL HYPERTENSION: ICD-10-CM

## 2019-11-14 DIAGNOSIS — Z13.1 SCREENING FOR DIABETES MELLITUS: ICD-10-CM

## 2019-11-14 DIAGNOSIS — I48.20 ATRIAL FIBRILLATION, CHRONIC (HCC): ICD-10-CM

## 2019-11-14 LAB
25(OH)D3 SERPL-MCNC: 26 NG/ML (ref 30–100)
ALBUMIN SERPL BCP-MCNC: 3.9 G/DL (ref 3.5–5)
ALP SERPL-CCNC: 93 U/L (ref 46–116)
ALT SERPL W P-5'-P-CCNC: 19 U/L (ref 12–78)
ANION GAP SERPL CALCULATED.3IONS-SCNC: 7 MMOL/L (ref 4–13)
AST SERPL W P-5'-P-CCNC: 19 U/L (ref 5–45)
BASOPHILS # BLD AUTO: 0.04 THOUSANDS/ΜL (ref 0–0.1)
BASOPHILS NFR BLD AUTO: 1 % (ref 0–1)
BILIRUB SERPL-MCNC: 0.36 MG/DL (ref 0.2–1)
BUN SERPL-MCNC: 18 MG/DL (ref 5–25)
CALCIUM SERPL-MCNC: 9.1 MG/DL (ref 8.3–10.1)
CHLORIDE SERPL-SCNC: 114 MMOL/L (ref 100–108)
CHOLEST SERPL-MCNC: 170 MG/DL (ref 50–200)
CO2 SERPL-SCNC: 25 MMOL/L (ref 21–32)
CREAT SERPL-MCNC: 0.72 MG/DL (ref 0.6–1.3)
EOSINOPHIL # BLD AUTO: 0.08 THOUSAND/ΜL (ref 0–0.61)
EOSINOPHIL NFR BLD AUTO: 2 % (ref 0–6)
ERYTHROCYTE [DISTWIDTH] IN BLOOD BY AUTOMATED COUNT: 13.5 % (ref 11.6–15.1)
EST. AVERAGE GLUCOSE BLD GHB EST-MCNC: 88 MG/DL
GFR SERPL CREATININE-BSD FRML MDRD: 76 ML/MIN/1.73SQ M
GLUCOSE P FAST SERPL-MCNC: 90 MG/DL (ref 65–99)
HBA1C MFR BLD: 4.7 % (ref 4.2–6.3)
HCT VFR BLD AUTO: 38.2 % (ref 34.8–46.1)
HDLC SERPL-MCNC: 75 MG/DL
HGB BLD-MCNC: 11.4 G/DL (ref 11.5–15.4)
IMM GRANULOCYTES # BLD AUTO: 0.02 THOUSAND/UL (ref 0–0.2)
IMM GRANULOCYTES NFR BLD AUTO: 0 % (ref 0–2)
LDLC SERPL CALC-MCNC: 82 MG/DL (ref 0–100)
LYMPHOCYTES # BLD AUTO: 1.3 THOUSANDS/ΜL (ref 0.6–4.47)
LYMPHOCYTES NFR BLD AUTO: 24 % (ref 14–44)
MCH RBC QN AUTO: 31 PG (ref 26.8–34.3)
MCHC RBC AUTO-ENTMCNC: 29.8 G/DL (ref 31.4–37.4)
MCV RBC AUTO: 104 FL (ref 82–98)
MONOCYTES # BLD AUTO: 0.63 THOUSAND/ΜL (ref 0.17–1.22)
MONOCYTES NFR BLD AUTO: 12 % (ref 4–12)
NEUTROPHILS # BLD AUTO: 3.41 THOUSANDS/ΜL (ref 1.85–7.62)
NEUTS SEG NFR BLD AUTO: 61 % (ref 43–75)
NRBC BLD AUTO-RTO: 0 /100 WBCS
PLATELET # BLD AUTO: 181 THOUSANDS/UL (ref 149–390)
PMV BLD AUTO: 10.6 FL (ref 8.9–12.7)
POTASSIUM SERPL-SCNC: 3.9 MMOL/L (ref 3.5–5.3)
PROT SERPL-MCNC: 7.3 G/DL (ref 6.4–8.2)
RBC # BLD AUTO: 3.68 MILLION/UL (ref 3.81–5.12)
SODIUM SERPL-SCNC: 146 MMOL/L (ref 136–145)
TRIGL SERPL-MCNC: 66 MG/DL
WBC # BLD AUTO: 5.48 THOUSAND/UL (ref 4.31–10.16)

## 2019-11-14 PROCEDURE — 80053 COMPREHEN METABOLIC PANEL: CPT

## 2019-11-14 PROCEDURE — 82306 VITAMIN D 25 HYDROXY: CPT

## 2019-11-14 PROCEDURE — 85025 COMPLETE CBC W/AUTO DIFF WBC: CPT

## 2019-11-14 PROCEDURE — 80061 LIPID PANEL: CPT

## 2019-11-14 PROCEDURE — 83036 HEMOGLOBIN GLYCOSYLATED A1C: CPT

## 2019-11-15 ENCOUNTER — ANTICOAG VISIT (OUTPATIENT)
Dept: INTERNAL MEDICINE CLINIC | Facility: CLINIC | Age: 84
End: 2019-11-15

## 2019-11-19 ENCOUNTER — APPOINTMENT (OUTPATIENT)
Dept: LAB | Facility: CLINIC | Age: 84
End: 2019-11-19
Payer: COMMERCIAL

## 2019-11-19 DIAGNOSIS — E55.9 VITAMIN D DEFICIENCY: ICD-10-CM

## 2019-11-19 DIAGNOSIS — E87.0 SERUM SODIUM ELEVATED: ICD-10-CM

## 2019-11-19 DIAGNOSIS — E87.0 SERUM SODIUM ELEVATED: Primary | ICD-10-CM

## 2019-11-19 LAB — SODIUM SERPL-SCNC: 142 MMOL/L (ref 136–145)

## 2019-11-19 PROCEDURE — 84295 ASSAY OF SERUM SODIUM: CPT

## 2019-11-19 PROCEDURE — 36415 COLL VENOUS BLD VENIPUNCTURE: CPT

## 2019-11-20 DIAGNOSIS — G62.9 NEUROPATHY: ICD-10-CM

## 2019-11-20 RX ORDER — TRAMADOL HYDROCHLORIDE 50 MG/1
50 TABLET ORAL EVERY 8 HOURS PRN
Qty: 30 TABLET | Refills: 0 | Status: SHIPPED | OUTPATIENT
Start: 2019-11-20 | End: 2019-12-23 | Stop reason: SDUPTHER

## 2019-11-22 DIAGNOSIS — E03.9 HYPOTHYROIDISM, UNSPECIFIED TYPE: ICD-10-CM

## 2019-11-23 RX ORDER — LEVOTHYROXINE SODIUM 0.05 MG/1
TABLET ORAL
Qty: 90 TABLET | Refills: 0 | Status: SHIPPED | OUTPATIENT
Start: 2019-11-23 | End: 2020-01-30

## 2019-11-26 ENCOUNTER — ANTICOAG VISIT (OUTPATIENT)
Dept: INTERNAL MEDICINE CLINIC | Facility: CLINIC | Age: 84
End: 2019-11-26

## 2019-12-06 DIAGNOSIS — J02.9 SORE THROAT: ICD-10-CM

## 2019-12-06 RX ORDER — FLUTICASONE PROPIONATE 50 MCG
SPRAY, SUSPENSION (ML) NASAL
Qty: 16 ML | Refills: 0 | Status: SHIPPED | OUTPATIENT
Start: 2019-12-06

## 2019-12-10 ENCOUNTER — OFFICE VISIT (OUTPATIENT)
Dept: CARDIOLOGY CLINIC | Facility: CLINIC | Age: 84
End: 2019-12-10
Payer: COMMERCIAL

## 2019-12-10 VITALS
WEIGHT: 188 LBS | DIASTOLIC BLOOD PRESSURE: 84 MMHG | BODY MASS INDEX: 35.5 KG/M2 | HEIGHT: 61 IN | SYSTOLIC BLOOD PRESSURE: 122 MMHG | HEART RATE: 73 BPM | OXYGEN SATURATION: 98 %

## 2019-12-10 DIAGNOSIS — N18.30 CHRONIC KIDNEY DISEASE, STAGE III (MODERATE) (HCC): ICD-10-CM

## 2019-12-10 DIAGNOSIS — Z95.0 PACEMAKER: ICD-10-CM

## 2019-12-10 DIAGNOSIS — I48.20 ATRIAL FIBRILLATION, CHRONIC (HCC): Primary | ICD-10-CM

## 2019-12-10 DIAGNOSIS — I10 BENIGN ESSENTIAL HYPERTENSION: ICD-10-CM

## 2019-12-10 DIAGNOSIS — I50.32 CHRONIC DIASTOLIC CHF (CONGESTIVE HEART FAILURE) (HCC): ICD-10-CM

## 2019-12-10 DIAGNOSIS — I48.20 CHRONIC ATRIAL FIBRILLATION (HCC): ICD-10-CM

## 2019-12-10 DIAGNOSIS — I34.0 MODERATE MITRAL REGURGITATION: ICD-10-CM

## 2019-12-10 DIAGNOSIS — R60.0 BILATERAL LEG EDEMA: ICD-10-CM

## 2019-12-10 PROCEDURE — 93000 ELECTROCARDIOGRAM COMPLETE: CPT | Performed by: INTERNAL MEDICINE

## 2019-12-10 PROCEDURE — 99214 OFFICE O/P EST MOD 30 MIN: CPT | Performed by: INTERNAL MEDICINE

## 2019-12-10 NOTE — PROGRESS NOTES
[facility]  Follow up note  Kristine Noriega 80 y o  female MRN: [id number]        Problems    1  Atrial fibrillation, chronic  POCT ECG   2  Chronic atrial fibrillation     3  Chronic diastolic CHF (congestive heart failure) ([address] )     4  Moderate mitral regurgitation     5  Benign essential hypertension     6  Chronic kidney disease, stage III (moderate) ([facility])     7  Bilateral leg edema     8  Pacemaker         Impression:    Valentina Nieto returns to see me at the [facility] office  Hypertension  · Hypertension is under excellent control    Chronic diastolic CHF  · Current weight 188 lb, baseline about 184 lb, but not grossly volume overloaded on exam today  · Would recommend continuing her same dose of diuretic, torsemide 20 mg daily with potassium supplementation, potassium was 3 9 last month    Atrial fibrillation  · Permanent atrial fibrillation with difficult rate control status post AV node ablation 2018  · Ventricularly paced, pacemaker dependent  · On warfarin, INRs relatively stable  · She has my recommendation to discontinue warfarin for 5 days prior to any major procedures, 3 days prior to minor procedures such as teeth pulling    Mitral regurgitation  · Moderate and asymptomatic by last echo 4/18    Plan:    · Does not need to proceed with any further cardiac testing at this time  · Unfortunately still dealing with sciatica type pain post hip repair  · Euvolemic, no change to medications at this time  · Six-month follow-up recommended      HPI:     Kristine Noriega is a 80y o  year old female  with chronic diastolic CHF, permanent atrial fibrillation, hypertension, moderate mitral regurgitation, hyperlipidemia, AV node ablation and pacemaker placement, hip issues status post recent hip repair 8 months ago with chronic right buttock and sciatica type pain since that time returns for a follow-up visit      Hypertension is well controlled  Weight up a bit 188 lb but in the absence of any worsening edema or shortness of breath  Continues taking torsemide 20 mg daily  Renal function stable and normal, recent creatinine 0 72, I removed CKD stage 3 from her problem list  AFib is permanent, she has a history of ventricular pacing 100% after AV node ablation  She stable on warfarin  She denies any bleeding  She is still dealing with significant buttock pain, and sciatica type pain down to the knee after having her hip repair  She has ambulatory dysfunction and uses a walker  She has moderate mitral regurgitation, no progression of any dyspnea on exertion, and last echo was 4/18  Review of Systems   Constitutional: Negative for appetite change, diaphoresis, fatigue and fever  Respiratory: Negative for chest tightness, shortness of breath and wheezing  Cardiovascular: Negative for chest pain, palpitations and leg swelling  Gastrointestinal: Negative for abdominal pain and blood in stool  Musculoskeletal: Positive for arthralgias and back pain  Negative for joint swelling  Skin: Negative for rash  Neurological: Negative for dizziness, syncope and light-headedness  Past Medical History:   Diagnosis Date    Anemia     Arthritis     Cancer ([facility] [address] [address] )     basal cell    CHF (congestive heart failure) (HCC)     Disease of thyroid gland     Disturbance of smell and taste     disturbance of taste    Effusion of knee joint right     Fibromyalgia     Heart murmur     reported a previous heart murmur    History of acute myocardial infarction     History of atrial fibrillation     History of bruising easily     History of cancer     History of dermatitis     History of mammogram     History of methicillin resistant staphylococcus aureus (MRSA)     Negative nasal culture, isolation discontinued 8/17/2018      History of methicillin resistant staphylococcus aureus (MRSA) 08/17/2018    negative nasal culture-isolation and hx discontinued 8/17/2018    History of obesity     History of osteopenia     History of sciatica     History of shortness of breath     History of sinusitis     History of sore throat     History of syncope     History of umbilical hernia     History of viral infection     Irritable bowel syndrome (IBS)     Joint pain, hip     Limb pain     Myalgia     myalgia and myositis    Need for prophylactic vaccination against single diseases     Need for prophylactic vaccination and inoculation against influenza     Preoperative cardiovascular examination     Primary osteoarthritis of right knee     Right leg pain     Vaginal Pap smear     reported pap smear     Social History     Substance and Sexual Activity   Alcohol Use Not Currently    Frequency: Never     Social History     Substance and Sexual Activity   Drug Use No     Social History     Tobacco Use   Smoking Status Never Smoker   Smokeless Tobacco Never Used       Allergies: Allergies   Allergen Reactions    Amoxicillin      Reports nausea, headache, dizzy    Codeine Vomiting and GI Intolerance     GI upset, ana m morphine  Reaction Date: 24Apr2012;     Other      Other reaction(s): Other (See Comments)  ana m toradol in OR 6/06 sah    Oxycodone-Acetaminophen      Other reaction(s): Other (See Comments)  GI upset; ana m vicodin    Percocet [Oxycodone-Acetaminophen]     Seasonal Ic  [Cholestatin] Allergic Rhinitis    Trimethoprim     Bactrim [Sulfamethoxazole-Trimethoprim] Rash    Folic Acid-Vit N4-WXD T31 Diarrhea    Sulfa Antibiotics Rash     Other reaction(s):  Other (See Comments)  takes lasix @home       Medications:     Current Outpatient Medications:     ascorbic acid (VITAMIN C) 500 mg tablet, Take 1 tablet (500 mg total) by mouth 2 (two) times a day, Disp: 60 tablet, Rfl: 0    Cyanocobalamin (VITAMIN B12 PO), Take 1 capsule by mouth daily , Disp: , Rfl:     desoximetasone (TOPICORT) 0 25 % cream, Apply topically as needed for irritation, Disp: 30 g, Rfl: 0    DULoxetine (CYMBALTA) 60 mg delayed release capsule, Take 1 capsule (60 mg total) by mouth daily, Disp: 90 capsule, Rfl: 1    fluticasone (FLONASE) 50 mcg/act nasal spray, 1 spray into each nostril as needed  , Disp: , Rfl:     levothyroxine 50 mcg tablet, TAKE 1 TABLET BY MOUTH EVERY DAY, Disp: 90 tablet, Rfl: 0    potassium chloride (K-DUR,KLOR-CON) 20 mEq tablet, TAKE 1 TABLET DAILY, Disp: 30 tablet, Rfl: 11    sucralfate (CARAFATE) 1 g tablet, TAKE 1 TABLET BY MOUTH TWICE A DAY, Disp: 30 tablet, Rfl: 0    torsemide (DEMADEX) 20 mg tablet, Take 1 tablet (20 mg total) by mouth daily, Disp: 90 tablet, Rfl: 3    traMADol (ULTRAM) 50 mg tablet, TAKE 1 TABLET (50 MG TOTAL) BY MOUTH EVERY 8 (EIGHT) HOURS AS NEEDED FOR MODERATE PAIN, Disp: 30 tablet, Rfl: 0    warfarin (COUMADIN) 4 mg tablet, TAKE ONE TABLET DAILY AS PREVIOUSLY DIRECTED BY CARDIOLOGY, Disp: 90 tablet, Rfl: 1    aspirin 81 MG tablet, Take by mouth Daily, Disp: , Rfl:     cephalexin (KEFLEX) 500 mg capsule, Take 500 mg by mouth every 6 (six) hours, Disp: , Rfl:     dicyclomine (BENTYL) 10 mg capsule, Take 1 capsule by mouth every 12 (twelve) hours, Disp: , Rfl:     DULoxetine (CYMBALTA) 60 mg delayed release capsule, TAKE 1 CAPSULE BY MOUTH EVERY DAY (Patient not taking: Reported on 12/10/2019), Disp: 30 capsule, Rfl: 0    ergocalciferol (VITAMIN D2) 50,000 units, Take 1 capsule (50,000 Units total) by mouth once a week for 6 doses (Patient not taking: Reported on 12/10/2019), Disp: 6 capsule, Rfl: 0    fluticasone (FLONASE) 50 mcg/act nasal spray, SPRAY 2 SPRAYS INTO EACH NOSTRIL EVERY DAY (Patient not taking: Reported on 12/10/2019), Disp: 16 mL, Rfl: 0    gabapentin (NEURONTIN) 300 mg capsule, Take 1 capsule by mouth every 8 (eight) hours, Disp: , Rfl:     warfarin (COUMADIN) 3 mg tablet, TAKE FRIDAY,SATURDAY, SUNDAY INR MONDAY TO FURTHER DOSE, Disp: , Rfl: 0      Vitals:    12/10/19 0950   BP: 122/84   Pulse: 73   SpO2: 98%     Weight (last 2 days)     Date/Time Weight    12/10/19 0950   85 3 (188)            Physical Exam   Constitutional: No distress  HENT:   Head: Normocephalic and atraumatic  Eyes: Conjunctivae are normal  No scleral icterus  Neck: Normal range of motion  No JVD present  Cardiovascular: Normal rate, regular rhythm, normal heart sounds and intact distal pulses  No murmur heard  Pulmonary/Chest: Effort normal and breath sounds normal  No respiratory distress  She has no wheezes  She has no rhonchi  She has no rales  Musculoskeletal: She exhibits no edema or tenderness  Right lower leg: Normal  She exhibits no edema  Left lower leg: Normal  She exhibits no edema  Skin: Skin is warm and dry  She is not diaphoretic           Laboratory Studies:  Lab Results   Component Value Date    HGBA1C 4 7 11/14/2019    HGBA1C 5 2 03/20/2019    HGBA1C 5 6 01/02/2019     04/03/2015     11/04/2014     08/29/2013    K 3 9 11/14/2019    K 4 3 09/06/2019    K 4 0 07/09/2019    K 4 4 04/03/2015    K 4 5 11/04/2014    K 4 9 08/29/2013     (H) 11/14/2019     (H) 09/06/2019     07/09/2019     04/03/2015     11/04/2014     08/29/2013    CO2 25 11/14/2019    CO2 25 09/06/2019    CO2 25 07/09/2019    CO2 26 04/03/2015    CO2 28 11/04/2014    CO2 29 08/29/2013    GLUCOSE 98 04/03/2015    GLUCOSE 94 11/04/2014    GLUCOSE 93 08/29/2013    CREATININE 0 72 11/14/2019    CREATININE 0 74 09/06/2019    CREATININE 0 65 07/09/2019    CREATININE 0 74 04/03/2015    CREATININE 0 72 11/04/2014    CREATININE 0 76 08/29/2013    BUN 18 11/14/2019    BUN 14 09/06/2019    BUN 16 07/09/2019    BUN 19 04/03/2015    BUN 14 11/04/2014    BUN 12 08/29/2013    MG 2 1 04/16/2019    MG 2 8 (H) 08/17/2018    MG 2 0 07/03/2018     Lab Results   Component Value Date    WBC 5 48 11/14/2019    WBC 7 98 04/20/2015    RBC 3 68 (L) 11/14/2019    RBC 3 78 (L) 04/20/2015    HGB 11 4 (L) 11/14/2019    HGB 12 0 04/20/2015    HCT 38 2 2019    HCT 37 0 2015    HCT 38 0 2015     (H) 2019     (H) 2015    MCH 31 0 2019    MCH 31 7 2015    RDW 13 5 2019    RDW 13 2 2015     2019     2015     NT-proBNP: No results for input(s): NTBNP in the last 72 hours  Coags:      Lipid Profile:   Lab Results   Component Value Date    CHOL 197 2013     Lab Results   Component Value Date    HDL 75 2019     Lab Results   Component Value Date    LDLCALC 82 2019     Lab Results   Component Value Date    TRIG 66 2019       Cardiac testing:   EKG reviewed personally:  AFib, V paced, heart rate 73  Results for orders placed during the hospital encounter of 18   Echo complete with contrast if indicated    Narrative [place] [address] [address] [place] [address]  [phone]    Transthoracic Echocardiogram  2D, M-mode, Doppler, and Color Doppler    Study date:  2018    Patient: Selena Lira  MR number: [id number]  Account number: [de-identified]  : 1933  Age: 80 years  Gender: Female  Status: Outpatient  Location: [facility]  Height: 62 in  Weight: 231 lb  BP: 112/ 60 mmHg    Indications: MV Disease    Diagnoses: I34 0 - Nonrheumatic mitral (valve) insufficiency    Sonographer:  JESUS Matute  Primary Physician:  Lina Emerson DO  Referring Physician:  Sg Huerta MD  Group:  [facility]  Interpreting Physician:  Vincenzo Ng MD    SUMMARY    LEFT VENTRICLE:  Size was normal   Systolic function was normal  Ejection fraction was estimated to be 55 %  Wall thickness was at the upper limits of normal     RIGHT VENTRICLE:  The ventricle was mildly dilated  Systolic function was normal     RIGHT ATRIUM:  The atrium was mildly dilated  MITRAL VALVE:  There was moderate regurgitation  TRICUSPID VALVE:  There was moderate regurgitation      IVC, HEPATIC VEINS:  The inferior vena cava was mildly dilated  Respirophasic changes were blunted (less than 50% variation)  COMPARISONS:  Comparison was made with the previous study of 17-May-2017  Tricuspid regurgitation has worsened  HISTORY: PRIOR HISTORY: AFIB, Moderate MR, Hypertension, CHF, Anemia    PROCEDURE: The study was performed in the [facility]  This was a routine study  The transthoracic approach was used  The study included complete 2D imaging, M-mode, complete spectral Doppler, and color Doppler  The  heart rate was 110 bpm, at the start of the study  Images were obtained from the parasternal, apical, subcostal, and suprasternal notch acoustic windows  Echocardiographic views were limited due to lung interference  Image quality was  adequate  LEFT VENTRICLE: Size was normal  Systolic function was normal  Ejection fraction was estimated to be 55 %  There were no regional wall motion abnormalities  Wall thickness was at the upper limits of normal  DOPPLER: Transmitral flow  pattern: atrial fibrillation  The study was not technically sufficient to allow evaluation of LV diastolic function  RIGHT VENTRICLE: The ventricle was mildly dilated  Systolic function was normal  Wall thickness was normal     LEFT ATRIUM: Size was normal     RIGHT ATRIUM: The atrium was mildly dilated  MITRAL VALVE: Valve structure was normal  There was normal leaflet separation  DOPPLER: The transmitral velocity was within the normal range  There was no evidence for stenosis  There was moderate regurgitation  AORTIC VALVE: The valve was trileaflet  Leaflets exhibited normal thickness, normal cuspal separation, and sclerosis  DOPPLER: Transaortic velocity was within the normal range  There was no evidence for stenosis  There was no  regurgitation  TRICUSPID VALVE: The valve structure was normal  There was normal leaflet separation   DOPPLER: The transtricuspid velocity was within the normal range  There was no evidence for stenosis  There was moderate regurgitation  Pulmonary artery  systolic pressure was within the normal range  Estimated peak PA pressure was 36 mmHg  PULMONIC VALVE: Leaflets exhibited normal thickness, no calcification, and normal cuspal separation  DOPPLER: The transpulmonic velocity was within the normal range  There was no regurgitation  PERICARDIUM: There was no pericardial effusion  The pericardium was normal in appearance  AORTA: The root exhibited normal size  SYSTEMIC VEINS: IVC: The inferior vena cava was mildly dilated  Respirophasic changes were blunted (less than 50% variation)  SYSTEM MEASUREMENT TABLES    2D  %FS: 34 71 %  AV Diam: 2 84 cm  EDV(Teich): 101 4 ml  EF(Cube): 72 17 %  EF(Teich): 63 88 %  ESV(Cube): 28 55 ml  ESV(Teich): 36 63 ml  IVSd: 1 1 cm  LA Area: 17 55 cm2  LA Diam: 4 14 cm  LVEDV MOD A4C: 61 32 ml  LVEF MOD A4C: 61 87 %  LVESV MOD A4C: 23 38 ml  LVIDd: 4 68 cm  LVIDs: 3 06 cm  LVLd A4C: 6 59 cm  LVLs A4C: 5 67 cm  LVPWd: 1 cm  RA Area: 23 38 cm2  RV Diam: 3 71 cm  SV MOD A4C: 37 94 ml  SV(Cube): 74 03 ml  SV(Teich): 64 77 ml    CW  TR MaxP 51 mmHg  TR Vmax: 2 21 m/s    MM  TAPSE: 1 35 cm    [employer] Accredited Echocardi[facility]    Prepared and electronically signed by    Jeramie Aguiar MD  Signed 2018 17:36:53       No results found for this or any previous visit  No results found for this or any previous visit  No results found for this or any previous visit  Lincoln Jeffers MD    Portions of the record may have been created with voice recognition software   Occasional wrong word or "sound a like" substitutions may have occurred due to the inherent limitations of voice recognition software   Read the chart carefully and recognize, using context, where substitutions have occurred

## 2019-12-18 ENCOUNTER — TELEPHONE (OUTPATIENT)
Dept: OBGYN CLINIC | Facility: CLINIC | Age: 84
End: 2019-12-18

## 2019-12-18 NOTE — TELEPHONE ENCOUNTER
Which medication [place] to refilled    I need more information in order to successfully complete this transection

## 2019-12-19 ENCOUNTER — IN-CLINIC DEVICE VISIT (OUTPATIENT)
Dept: CARDIOLOGY CLINIC | Facility: CLINIC | Age: 84
End: 2019-12-19
Payer: COMMERCIAL

## 2019-12-19 ENCOUNTER — APPOINTMENT (OUTPATIENT)
Dept: LAB | Facility: CLINIC | Age: 84
End: 2019-12-19
Payer: COMMERCIAL

## 2019-12-19 DIAGNOSIS — Z95.0 PACEMAKER: Primary | ICD-10-CM

## 2019-12-19 PROCEDURE — 93279 PRGRMG DEV EVAL PM/LDLS PM: CPT | Performed by: INTERNAL MEDICINE

## 2019-12-19 NOTE — PROGRESS NOTES
Results for orders placed or performed in visit on 12/19/19   Cardiac EP device report    Narrative    MDT-SINGLE CHAMBER "HIS" PPM  DEVICE INTERROGATED IN THE [place] OFFICE: BATTERY VOLTAGE ADEQUATE (7 3 YRS)   99 2% (DEPENDENT- S/P AVN ABLATION)  ALL LEAD PARAMETERS WITHIN NORMAL LIMITS  ALL OTHER TESTING WITHIN NORMAL LIMITS  NO SIGNIFICANT HIGH RATE EPISODES  HX: CHRONIC AF & PT ON ASA 81, WARFARIN  NO PROGRAMMING CHANGES MADE TO DEVICE PARAMETERS  PACEMAKER FUNCTIONING APPROPRIATELY      EB

## 2019-12-20 ENCOUNTER — ANTICOAG VISIT (OUTPATIENT)
Dept: INTERNAL MEDICINE CLINIC | Facility: CLINIC | Age: 84
End: 2019-12-20

## 2019-12-20 ENCOUNTER — TELEPHONE (OUTPATIENT)
Dept: OBGYN CLINIC | Facility: HOSPITAL | Age: 84
End: 2019-12-20

## 2019-12-20 NOTE — TELEPHONE ENCOUNTER
Pt contacted Call Center requested refill of their medication  Medication Name:traMADol (ULTRAM) 50 mg tablet       Dosage of Med:      Frequency of Med:every 8 hours PRN      Remaining Medication:1 tablet      Pharmacy and [address] [address] on file        Pt  Preferred Callback Phone Number:[phone]    Patient would like to know if she can get more than 15 days' supply at a time  Thank you

## 2019-12-23 DIAGNOSIS — G62.9 NEUROPATHY: ICD-10-CM

## 2019-12-23 RX ORDER — TRAMADOL HYDROCHLORIDE 50 MG/1
50 TABLET ORAL EVERY 8 HOURS PRN
Qty: 30 TABLET | Refills: 0 | Status: SHIPPED | OUTPATIENT
Start: 2019-12-23 | End: 2020-01-20

## 2019-12-23 NOTE — TELEPHONE ENCOUNTER
Message reviewed      And I are in the operating room    This is refilled    She unfortunately cannot have more medication at a time, as this is monitored by the prescription drug awareness program

## 2020-01-09 ENCOUNTER — APPOINTMENT (OUTPATIENT)
Dept: LAB | Facility: CLINIC | Age: 85
End: 2020-01-09
Payer: COMMERCIAL

## 2020-01-20 DIAGNOSIS — G62.9 NEUROPATHY: ICD-10-CM

## 2020-01-20 DIAGNOSIS — R10.13 EPIGASTRIC PAIN: ICD-10-CM

## 2020-01-20 RX ORDER — SUCRALFATE 1 G/1
TABLET ORAL
Qty: 30 TABLET | Refills: 0 | Status: SHIPPED | OUTPATIENT
Start: 2020-01-20 | End: 2020-02-24

## 2020-01-20 RX ORDER — TRAMADOL HYDROCHLORIDE 50 MG/1
50 TABLET ORAL EVERY 8 HOURS PRN
Qty: 30 TABLET | Refills: 0 | Status: SHIPPED | OUTPATIENT
Start: 2020-01-20 | End: 2020-04-17 | Stop reason: SDUPTHER

## 2020-01-26 ENCOUNTER — HOSPITAL ENCOUNTER (EMERGENCY)
Facility: HOSPITAL | Age: 85
Discharge: HOME | End: 2020-01-26
Attending: EMERGENCY MEDICINE | Admitting: EMERGENCY MEDICINE
Payer: COMMERCIAL

## 2020-01-26 ENCOUNTER — APPOINTMENT (EMERGENCY)
Dept: RADIOLOGY | Facility: HOSPITAL | Age: 85
End: 2020-01-26
Payer: COMMERCIAL

## 2020-01-26 VITALS
HEART RATE: 70 BPM | HEIGHT: 60 IN | OXYGEN SATURATION: 97 % | RESPIRATION RATE: 16 BRPM | DIASTOLIC BLOOD PRESSURE: 81 MMHG | BODY MASS INDEX: 36.12 KG/M2 | SYSTOLIC BLOOD PRESSURE: 176 MMHG | TEMPERATURE: 97.5 F | WEIGHT: 184 LBS

## 2020-01-26 DIAGNOSIS — M25.552 LEFT HIP PAIN: Primary | ICD-10-CM

## 2020-01-26 DIAGNOSIS — M51.36 DEGENERATIVE DISC DISEASE, LUMBAR: ICD-10-CM

## 2020-01-26 DIAGNOSIS — M54.32 SCIATICA OF LEFT SIDE: ICD-10-CM

## 2020-01-26 PROCEDURE — 74176 CT ABD & PELVIS W/O CONTRAST: CPT

## 2020-01-26 PROCEDURE — 99284 EMERGENCY DEPT VISIT MOD MDM: CPT

## 2020-01-26 PROCEDURE — 99284 EMERGENCY DEPT VISIT MOD MDM: CPT | Performed by: EMERGENCY MEDICINE

## 2020-01-26 RX ORDER — LIDOCAINE 50 MG/G
1 PATCH TOPICAL ONCE
Status: DISCONTINUED | OUTPATIENT
Start: 2020-01-26 | End: 2020-01-27 | Stop reason: HOSPADM

## 2020-01-26 RX ORDER — HYDROCODONE BITARTRATE AND ACETAMINOPHEN 5; 325 MG/1; MG/1
1 TABLET ORAL ONCE
Status: COMPLETED | OUTPATIENT
Start: 2020-01-26 | End: 2020-01-26

## 2020-01-26 RX ORDER — FENTANYL CITRATE 50 UG/ML
1 INJECTION, SOLUTION INTRAMUSCULAR; INTRAVENOUS ONCE
Status: COMPLETED | OUTPATIENT
Start: 2020-01-26 | End: 2020-01-26

## 2020-01-26 RX ORDER — TRAMADOL HYDROCHLORIDE 50 MG/1
50 TABLET ORAL ONCE
Status: COMPLETED | OUTPATIENT
Start: 2020-01-26 | End: 2020-01-26

## 2020-01-26 RX ORDER — HYDROCODONE BITARTRATE AND ACETAMINOPHEN 5; 325 MG/1; MG/1
1 TABLET ORAL EVERY 6 HOURS PRN
Qty: 8 TABLET | Refills: 0 | Status: SHIPPED | OUTPATIENT
Start: 2020-01-26 | End: 2020-03-03

## 2020-01-26 RX ORDER — METHOCARBAMOL 500 MG/1
500 TABLET, FILM COATED ORAL ONCE
Status: COMPLETED | OUTPATIENT
Start: 2020-01-26 | End: 2020-01-26

## 2020-01-26 RX ADMIN — HYDROCODONE BITARTRATE AND ACETAMINOPHEN 1 TABLET: 5; 325 TABLET ORAL at 19:40

## 2020-01-26 RX ADMIN — TRAMADOL HYDROCHLORIDE 50 MG: 50 TABLET, FILM COATED ORAL at 18:15

## 2020-01-26 RX ADMIN — METHOCARBAMOL TABLETS 500 MG: 500 TABLET, COATED ORAL at 18:15

## 2020-01-26 RX ADMIN — LIDOCAINE 1 PATCH: 50 PATCH TOPICAL at 18:15

## 2020-01-26 NOTE — ED ATTENDING ATTESTATION
1/26/2020  IDouglas MD, saw and evaluated the patient  I have discussed the patient with the resident/non-physician practitioner and agree with the resident's/non-physician practitioner's findings, Plan of Care, and MDM as documented in the resident's/non-physician practitioner's note, except where noted  All available labs and Radiology studies were reviewed  I was present for key portions of any procedure(s) performed by the resident/non-physician practitioner and I was immediately available to provide assistance  At this point I agree with the current assessment done in the Emergency Department  I have conducted an independent evaluation of this patient a history and physical is as follows:    ED Course         Critical Care Time  Procedures     79 yo female c/o low back pain and left hip pain with radiation into leg started yesterday lasting 20  Minutes then resolved and now worse since this morning  Pt unable to get around  No weakness, no numbness, tingling, no fever, no saddle numbness  No new incontinence  Pt given pain meds prehospital which helped  pmh afib, fibromyalgia, henriated disc,  Vss, afebrile, lungs cta, rrr, abdomen soft nontender, no lspine, hip or leg tenderness, positive straight leg  Pain meds ct lspine, hip

## 2020-01-26 NOTE — ED PROVIDER NOTES
History  Chief Complaint   Patient presents with    Hip Pain     per ems - patient began having sudden left hip pain around 3pm, denies fall or trauma to area  80 YOF with history of sciatica, lumbar disc herniation, CHF, afib who presents due to left hip and leg pain  She notes that her pain started yesterday when walking around her house  It lasted for about 20 minutes and resolved spontaneously  She then developed the pain again before bed  Today, her pain has worsened and she has had difficulty ambulating  It is unclear to her whether the pain starts in her lumbar back or hip but the pain is worst in the left hip  The pain radiates down her left leg to the ankle  She describes it as "electric shock" type pain  She has not taken anything at home for the pain  She denies fevers or saddle anesthesia  She has had episodes of both bowel and bladder incontinence, although, this is not unusual for her  She normally ambulates with a walker at home  Prior to Admission Medications   Prescriptions Last Dose Informant Patient Reported? Taking?    Cyanocobalamin (VITAMIN B12 PO)  Self Yes No   Sig: Take 1 capsule by mouth daily    DULoxetine (CYMBALTA) 60 mg delayed release capsule  Self No No   Sig: Take 1 capsule (60 mg total) by mouth daily   DULoxetine (CYMBALTA) 60 mg delayed release capsule  Self No No   Sig: TAKE 1 CAPSULE BY MOUTH EVERY DAY   Patient not taking: Reported on 12/10/2019   ascorbic acid (VITAMIN C) 500 mg tablet  Self No No   Sig: Take 1 tablet (500 mg total) by mouth 2 (two) times a day   aspirin 81 MG tablet  Self Yes No   Sig: Take by mouth Daily   cephalexin (KEFLEX) 500 mg capsule  Self Yes No   Sig: Take 500 mg by mouth every 6 (six) hours   desoximetasone (TOPICORT) 0 25 % cream  Self No No   Sig: Apply topically as needed for irritation   dicyclomine (BENTYL) 10 mg capsule  Self Yes No   Sig: Take 1 capsule by mouth every 12 (twelve) hours   ergocalciferol (VITAMIN D2) 50,000 units No No   Sig: Take 1 capsule (50,000 Units total) by mouth once a week for 6 doses   Patient not taking: Reported on 12/10/2019   fluticasone (FLONASE) 50 mcg/act nasal spray  Self Yes No   Si spray into each nostril as needed     fluticasone (FLONASE) 50 mcg/act nasal spray  Self No No   Sig: SPRAY 2 SPRAYS INTO EACH NOSTRIL EVERY DAY   Patient not taking: Reported on 12/10/2019   gabapentin (NEURONTIN) 300 mg capsule  Self Yes No   Sig: Take 1 capsule by mouth every 8 (eight) hours   levothyroxine 50 mcg tablet  Self No No   Sig: TAKE 1 TABLET BY MOUTH EVERY DAY   potassium chloride (K-DUR,KLOR-CON) 20 mEq tablet  Self No No   Sig: TAKE 1 TABLET DAILY   sucralfate (CARAFATE) 1 g tablet   No No   Sig: TAKE 1 TABLET BY MOUTH TWICE A DAY   torsemide (DEMADEX) 20 mg tablet  Self No No   Sig: Take 1 tablet (20 mg total) by mouth daily   traMADol (ULTRAM) 50 mg tablet   No No   Sig: TAKE 1 TABLET (50 MG TOTAL) BY MOUTH EVERY 8 (EIGHT) HOURS AS NEEDED FOR MODERATE PAIN   warfarin (COUMADIN) 3 mg tablet  Self Yes No   Sig: TAKE FRIDAY,SATURDAY,  INR MONDAY TO FURTHER DOSE   warfarin (COUMADIN) 4 mg tablet  Self No No   Sig: TAKE ONE TABLET DAILY AS PREVIOUSLY DIRECTED BY CARDIOLOGY      Facility-Administered Medications: None       Past Medical History:   Diagnosis Date    Anemia     Arthritis     Cancer (HCC)     basal cell    CHF (congestive heart failure) (HCC)     Disease of thyroid gland     Disturbance of smell and taste     disturbance of taste    Effusion of knee joint right     Fibromyalgia     Heart murmur     reported a previous heart murmur    History of acute myocardial infarction     History of atrial fibrillation     History of bruising easily     History of cancer     History of dermatitis     History of mammogram     History of methicillin resistant staphylococcus aureus (MRSA)     Negative nasal culture, isolation discontinued 2018      History of methicillin resistant staphylococcus aureus (MRSA) 08/17/2018    negative nasal culture-isolation and hx discontinued 8/17/2018    History of obesity     History of osteopenia     History of sciatica     History of shortness of breath     History of sinusitis     History of sore throat     History of syncope     History of umbilical hernia     History of viral infection     Irritable bowel syndrome (IBS)     Joint pain, hip     Limb pain     Myalgia     myalgia and myositis    Need for prophylactic vaccination against single diseases     Need for prophylactic vaccination and inoculation against influenza     Preoperative cardiovascular examination     Primary osteoarthritis of right knee     Right leg pain     Vaginal Pap smear     reported pap smear       Past Surgical History:   Procedure Laterality Date    ANKLE ARTHRODESIS Right     BACK SURGERY      BARIATRIC SURGERY      CHOLECYSTECTOMY      x2    COLONOSCOPY      ESOPHAGOGASTRODUODENOSCOPY N/A 3/23/2018    Procedure: ESOPHAGOGASTRODUODENOSCOPY (EGD); Surgeon: Vincent Colunga MD;  Location: [facility]; Service: Gastroenterology    FOOT SURGERY      HIP CLOSE REDUCTION Right 8/21/2016    Procedure: CLOSED REDUCTION RIGHT TOTAL HIP;  Surgeon: Bony Styles MD;  [facility];  Service:    Lordennis Tyrell / Lottie Mons / Dewain Leaver REPLACEMENT      LEFT KNEE REPLACEMENT    JOINT REPLACEMENT      Right HIP    PILONIDAL CYST EXCISION      x2    HI EXC SKIN BENIG <0 5 CM TRUNK,ARM,LEG Right 7/24/2019    Procedure: REVISION SCAR EXTREMITY Rt Hip;   Surgeon: William Bhandari MD;  Location: [facility];  Service: Orthopedics    HI REVISE TOTAL HIP REPLACEMENT Right 4/15/2019    Procedure: ARTHROPLASTY HIP TOTAL ACETABULAR REVISION;  Surgeon: William Bhandari MD;  [facility];  Service: Orthopedics    REPLACEMENT TOTAL KNEE Right     SILVANA-EN-Y PROCEDURE      x2    TOTAL KNEE ARTHROPLASTY      UMBILICAL HERNIA REPAIR      x2       Family History   Problem Relation Age of Onset    Coronary artery disease Mother     Heart attack Mother     Hypertension Mother     Coronary artery disease Father     Heart attack Father     Hypertension Father     Lymphoma Sister         acute    Rashes / Skin problems Sister         lymphomatoid papulosis    Coronary artery disease Brother     Heart attack Brother         x2    Aneurysm Neg Hx     Hyperlipidemia Neg Hx      I have reviewed and agree with the history as documented  Social History     Tobacco Use    Smoking status: Never Smoker    Smokeless tobacco: Never Used   Substance Use Topics    Alcohol use: Not Currently     Frequency: Never    Drug use: No        Review of Systems   Constitutional: Negative for chills and fever  HENT: Negative for congestion, rhinorrhea and sore throat  Eyes: Negative for visual disturbance  Respiratory: Negative for cough, chest tightness and shortness of breath  Cardiovascular: Negative for chest pain and leg swelling  Gastrointestinal: Negative for abdominal distention, abdominal pain, diarrhea, nausea and vomiting  Genitourinary: Negative for dysuria, flank pain, frequency and urgency  Musculoskeletal: Positive for arthralgias, back pain and gait problem  Negative for neck pain  Skin: Negative for pallor and rash  Neurological: Negative for syncope, weakness, light-headedness, numbness and headaches  All other systems reviewed and are negative        Physical Exam  ED Triage Vitals [01/26/20 1731]   Temperature Pulse Respirations Blood Pressure SpO2   97 5 °F (36 4 °C) 75 20 (!) 212/85 98 %      Temp Source Heart Rate Source Patient Position - Orthostatic VS BP Location FiO2 (%)   Oral Monitor -- -- --      Pain Score       Worst Possible Pain             Orthostatic Vital Signs  Vitals:    01/26/20 1731 01/26/20 1832 01/26/20 1900 01/26/20 1930   BP: (!) 212/85 (!) 196/86 (!) 187/81 (!) 176/81   Pulse: 75 70 70 70       Physical Exam Constitutional: She is oriented to person, place, and time  No distress  HENT:   Head: Normocephalic and atraumatic  Mouth/Throat: Oropharynx is clear and moist    Eyes: Conjunctivae and EOM are normal    Neck: Normal range of motion  Neck supple  Cardiovascular: Normal rate and regular rhythm  Exam reveals no gallop and no friction rub  No murmur heard  Pulmonary/Chest: Effort normal and breath sounds normal  She has no wheezes  She has no rales  Abdominal: Soft  Bowel sounds are normal  She exhibits no distension  There is no tenderness  Musculoskeletal:   1+ pitting edema of bilateral lower extremities to the knee  No tenderness of the lumbar back, left hip, or left knee  ROM intact and painless in the left ankle and knee  ROM intact but painful of the left hip  Straight leg raise re-produces pain  Neurological: She is alert and oriented to person, place, and time  She has normal strength  No sensory deficit  Skin: Skin is warm and dry  No rash noted  No pallor  Psychiatric: She has a normal mood and affect  Her behavior is normal    Nursing note and vitals reviewed  ED Medications  Medications   fentanyl citrate (PF) (FOR EMS ONLY) 100 mcg/2 mL injection 100 mcg (0 mcg Does not apply Given to EMS 1/26/20 1732)   methocarbamol (ROBAXIN) tablet 500 mg (500 mg Oral Given 1/26/20 1815)   traMADol (ULTRAM) tablet 50 mg (50 mg Oral Given 1/26/20 1815)   HYDROcodone-acetaminophen (NORCO) 5-325 mg per tablet 1 tablet (1 tablet Oral Given 1/26/20 1940)       Diagnostic Studies  Results Reviewed     None                 CT abdomen pelvis wo contrast   Final Result by Linnea Perez MD (01/26 1858)      No acute fractures or other acute abdominopelvic pathology seen  Severe lumbar degenerative changes as mentioned with multilevel foraminal stenoses  Postop fusion changes L2-L3 appear grossly satisfactory  Stable large ventral abdominal wall hernia without bowel obstruction  Gastric bypass  Pacemaker  Cholecystectomy               Workstation performed: [id number]               Procedures  Procedures      ED Course           Identification of Seniors at Risk      Most Recent Value   (ISAR) Identification of Seniors at Risk   Before the illness or injury that brought you to the Emergency, did you need someone to help you on a regular basis? 0 Filed at: 01/26/2020 1733   In the last 24 hours, have you needed more help than usual?  1 Filed at: 01/26/2020 1733   Have you been hospitalized for one or more nights during the past 6 months? 0 Filed at: 01/26/2020 1733   In general, do you see well?  0 Filed at: 01/26/2020 1733   In general, do you have serious problems with your memory? 0 Filed at: 01/26/2020 1733   Do you take more than three different medications every day? 1 Filed at: 01/26/2020 1733   ISAR Score  2 Filed at: 01/26/2020 1733                          MDM  Number of Diagnoses or Management Options  Degenerative disc disease, lumbar: new and requires workup  Left hip pain: new and requires workup  Sciatica of left side: new and requires workup  Diagnosis management comments: 80 YOF with left hip pain  Unclear whether source of pain is hip or lumbar back  Will obtain CT to evaluate L spine and left hip for any fractures  Will treat symptomatically with lidoderm, robaxin, tramadol and re-evaluate  CT showing degenerative changes but no compression fractures  L hip without acute fracture  No improvement in pain post treatment  Will try vicodin  Pain significantly improved after vicodin  Pt able to ambulate well in the department  Pt discharged with prescription for vicodin and PCP follow up  Return precautions discussed         Amount and/or Complexity of Data Reviewed  Tests in the radiology section of CPT®: ordered and reviewed  Decide to obtain previous medical records or to obtain history from someone other than the patient: yes  Review and summarize past medical records: yes  Discuss the patient with other providers: yes    Risk of Complications, Morbidity, and/or Mortality  Presenting problems: low  Management options: low    Patient Progress  Patient progress: improved        Disposition  Final diagnoses:   Left hip pain   Degenerative disc disease, lumbar   Sciatica of left side     Time reflects when diagnosis was documented in both MDM as applicable and the Disposition within this note     Time User Action Codes Description Comment    1/26/2020  8:45 PM Thedore Gaunt Add [M25 552] Left hip pain     1/26/2020  8:46 PM Thedore Gaunt Add [M51 36] Degenerative disc disease, lumbar     1/26/2020  8:46 PM Thedore Gaunt Add [M54 32] Sciatica of left side       ED Disposition     ED Disposition Condition Date/Time Comment    Discharge Stable Sun Jan 26, 2020  8:45 PM Farhana Guidry discharge to home/self care              Follow-up Information     Follow up With Specialties Details Why [address]  [facility], DO Internal Medicine In 1 day  [address]  [phone]            Discharge Medication List as of 1/26/2020  9:44 PM      START taking these medications    Details   HYDROcodone-acetaminophen (NORCO) 5-325 mg per tablet Take 1 tablet by mouth every 6 (six) hours as needed for painMax Daily Amount: 4 tablets, Starting Sun 1/26/2020, Normal         CONTINUE these medications which have NOT CHANGED    Details   ascorbic acid (VITAMIN C) 500 mg tablet Take 1 tablet (500 mg total) by mouth 2 (two) times a day, Starting Tue 12/18/2018, Normal      aspirin 81 MG tablet Take by mouth Daily, Starting Mon 4/5/2010, Historical Med      cephalexin (KEFLEX) 500 mg capsule Take 500 mg by mouth every 6 (six) hours, Historical Med      Cyanocobalamin (VITAMIN B12 PO) Take 1 capsule by mouth daily , Historical Med      desoximetasone (TOPICORT) 0 25 % cream Apply topically as needed for irritation, Starting u 6/27/2019, Normal dicyclomine (BENTYL) 10 mg capsule Take 1 capsule by mouth every 12 (twelve) hours, Starting Mon 4/1/2013, Historical Med      !! DULoxetine (CYMBALTA) 60 mg delayed release capsule Take 1 capsule (60 mg total) by mouth daily, Starting Tue 8/6/2019, Normal      !! DULoxetine (CYMBALTA) 60 mg delayed release capsule TAKE 1 CAPSULE BY MOUTH EVERY DAY, Normal      ergocalciferol (VITAMIN D2) 50,000 units Take 1 capsule (50,000 Units total) by mouth once a week for 6 doses, Starting Mon 9/30/2019, Until Tue 11/5/2019, Normal      !! fluticasone (FLONASE) 50 mcg/act nasal spray 1 spray into each nostril as needed  , Historical Med      !! fluticasone (FLONASE) 50 mcg/act nasal spray SPRAY 2 SPRAYS INTO EACH NOSTRIL EVERY DAY, Normal      gabapentin (NEURONTIN) 300 mg capsule Take 1 capsule by mouth every 8 (eight) hours, Starting Mon 4/5/2010, Historical Med      levothyroxine 50 mcg tablet TAKE 1 TABLET BY MOUTH EVERY DAY, Normal      potassium chloride (K-DUR,KLOR-CON) 20 mEq tablet TAKE 1 TABLET DAILY, Normal      sucralfate (CARAFATE) 1 g tablet TAKE 1 TABLET BY MOUTH TWICE A DAY, Normal      torsemide (DEMADEX) 20 mg tablet Take 1 tablet (20 mg total) by mouth daily, Starting Fri 3/15/2019, Print      traMADol (ULTRAM) 50 mg tablet TAKE 1 TABLET (50 MG TOTAL) BY MOUTH EVERY 8 (EIGHT) HOURS AS NEEDED FOR MODERATE PAIN, Starting Mon 1/20/2020, Normal      !! warfarin (COUMADIN) 3 mg tablet TAKE FRIDAY,SATURDAY, SUNDAY INR MONDAY TO FURTHER DOSE, Historical Med      !! warfarin (COUMADIN) 4 mg tablet TAKE ONE TABLET DAILY AS PREVIOUSLY DIRECTED BY CARDIOLOGY, Normal       !! - Potential duplicate medications found  Please discuss with provider  No discharge procedures on file  ED Provider  Attending physically available and evaluated Tomas Katz  I managed the patient along with the ED Attending      Electronically Signed by         Jaimie Jansen MD  01/27/20 9410

## 2020-01-27 ENCOUNTER — TELEPHONE (OUTPATIENT)
Dept: INTERNAL MEDICINE CLINIC | Facility: CLINIC | Age: 85
End: 2020-01-27

## 2020-01-27 NOTE — DISCHARGE INSTRUCTIONS
You came to the emergency department for evaluation of pain in your left hip  This pain is likely due to chronic degenerative changes in your lumbar back that were seen on your CT scan  We are sending you home with medication to help with your pain  Please follow up with your primary care doctor tomorrow  Please return to the emergency department if your symptoms worsen, you develop numbness/tingling or weakness in your legs, fevers, numbness in your groin, or anything else concerning to you

## 2020-01-27 NOTE — TELEPHONE ENCOUNTER
The patient was admitted to the ER for sever left hip pain  She was diagnosed with Degenerate disc disease in her lumbar sciatica  The patient would like to know what she should be doing, walking or resting  She is unable to drive  Would she need to come in and see you? Please advise  Thank you  Kate Jensen

## 2020-01-28 NOTE — TELEPHONE ENCOUNTER
Patient scheduled for you next available she could not come over this week because she is not driving right now  Done

## 2020-01-29 NOTE — TELEPHONE ENCOUNTER
Patient already has an appointment scheduled with you  Patient called back in said when she was in the ER they DC her with (hydrocodone/acetaminiphen 5/325mg take on tablet every 6 hours) patient only has 3 pills left she asked if you would refill this? Patient uses [address] [place] [address]  Patient also has tramadol, should she just continue with the tramadol for pain? Please advise for both medications?

## 2020-01-29 NOTE — TELEPHONE ENCOUNTER
She will need a office visit regarding this because it is a narcotic and she will need to sign a narcotics contract please schedule with the nurse practitioner

## 2020-01-30 ENCOUNTER — TELEPHONE (OUTPATIENT)
Dept: PHYSICAL THERAPY | Facility: OTHER | Age: 85
End: 2020-01-30

## 2020-01-30 ENCOUNTER — HOSPITAL ENCOUNTER (EMERGENCY)
Facility: HOSPITAL | Age: 85
Discharge: HOME | End: 2020-01-30
Attending: EMERGENCY MEDICINE
Payer: COMMERCIAL

## 2020-01-30 VITALS
BODY MASS INDEX: 37.93 KG/M2 | HEART RATE: 77 BPM | SYSTOLIC BLOOD PRESSURE: 166 MMHG | DIASTOLIC BLOOD PRESSURE: 78 MMHG | TEMPERATURE: 97.3 F | RESPIRATION RATE: 18 BRPM | WEIGHT: 194.22 LBS | OXYGEN SATURATION: 100 %

## 2020-01-30 DIAGNOSIS — M54.50 LOW BACK PAIN: Primary | ICD-10-CM

## 2020-01-30 DIAGNOSIS — E03.9 HYPOTHYROIDISM, UNSPECIFIED TYPE: ICD-10-CM

## 2020-01-30 PROCEDURE — 99283 EMERGENCY DEPT VISIT LOW MDM: CPT

## 2020-01-30 PROCEDURE — 99284 EMERGENCY DEPT VISIT MOD MDM: CPT | Performed by: EMERGENCY MEDICINE

## 2020-01-30 PROCEDURE — 96372 THER/PROPH/DIAG INJ SC/IM: CPT

## 2020-01-30 RX ORDER — LEVOTHYROXINE SODIUM 0.05 MG/1
TABLET ORAL
Qty: 90 TABLET | Refills: 0 | Status: SHIPPED | OUTPATIENT
Start: 2020-01-30 | End: 2020-03-17

## 2020-01-30 RX ORDER — HYDROCODONE BITARTRATE AND ACETAMINOPHEN 5; 325 MG/1; MG/1
1 TABLET ORAL EVERY 6 HOURS PRN
Qty: 12 TABLET | Refills: 0 | Status: SHIPPED | OUTPATIENT
Start: 2020-01-30 | End: 2020-02-04

## 2020-01-30 RX ORDER — HYDROCODONE BITARTRATE AND ACETAMINOPHEN 5; 325 MG/1; MG/1
1 TABLET ORAL ONCE
Status: COMPLETED | OUTPATIENT
Start: 2020-01-30 | End: 2020-01-30

## 2020-01-30 RX ORDER — ORPHENADRINE CITRATE 30 MG/ML
60 INJECTION INTRAMUSCULAR; INTRAVENOUS ONCE
Status: COMPLETED | OUTPATIENT
Start: 2020-01-30 | End: 2020-01-30

## 2020-01-30 RX ORDER — LIDOCAINE 50 MG/G
1 PATCH TOPICAL ONCE
Status: DISCONTINUED | OUTPATIENT
Start: 2020-01-30 | End: 2020-01-30 | Stop reason: HOSPADM

## 2020-01-30 RX ORDER — METHOCARBAMOL 500 MG/1
500 TABLET, FILM COATED ORAL 2 TIMES DAILY
Qty: 6 TABLET | Refills: 0 | Status: SHIPPED | OUTPATIENT
Start: 2020-01-30 | End: 2020-02-04 | Stop reason: SDUPTHER

## 2020-01-30 RX ADMIN — LIDOCAINE 1 PATCH: 50 PATCH TOPICAL at 03:02

## 2020-01-30 RX ADMIN — ORPHENADRINE CITRATE 60 MG: 30 INJECTION INTRAMUSCULAR; INTRAVENOUS at 03:09

## 2020-01-30 RX ADMIN — HYDROCODONE BITARTRATE AND ACETAMINOPHEN 1 TABLET: 5; 325 TABLET ORAL at 03:02

## 2020-01-30 NOTE — ED NOTES
Transport scheduled for 0600 by [facility]  Pt made aware        Harini Patterson RN  01/30/20 7848

## 2020-01-30 NOTE — ED PROVIDER NOTES
History  Chief Complaint   Patient presents with    Back Pain     Pt presents by ems d/t L lower back pain radiating down her L leg  States she was recently seen at [facility] ED for the same and was prescribed hydrocodone but she ran out and was unable to ambulate w/o pain meds  Denies any other complaints  63-year-old female comes in for back pain  Patient was seen for the same on January 26th at [facility]  She was prescribed hydrocodone and told to follow up with her doctor  Patient was an able to get to her doctor's because she could drive could not get a refill of any of her medications  Patient states she now has the same pain back and is unable to ambulate  There is nothing new or different about the pain  She has no loss of bowel or bladder function  She has no saddle anesthesia  On January 26 she had a CT does done that showed:  No acute fractures or other acute abdominopelvic pathology seen      Severe lumbar degenerative changes as mentioned with multilevel foraminal stenoses  Postop fusion changes L2-L3 appear grossly satisfactory              History provided by:  Patient and medical records   used: No    Back Pain   Location:  Lumbar spine  Quality:  Shooting  Radiates to:  L thigh and L posterior upper leg  Pain severity:  Severe  Pain is:  Same all the time  Onset quality:  Sudden  Duration:  4 days  Timing:  Constant  Progression:  Worsening  Chronicity:  New  Context: not emotional stress, not MVA and not twisting    Ineffective treatments:  Bed rest and narcotics  Associated symptoms: no abdominal pain, no abdominal swelling, no bladder incontinence, no bowel incontinence, no chest pain, no fever, no headaches, no numbness, no paresthesias and no perianal numbness    Risk factors: not pregnant, no steroid use and no vascular disease        Prior to Admission Medications   Prescriptions Last Dose Informant Patient Reported? Taking?    Cyanocobalamin (VITAMIN B12 PO)  Self Yes No   Sig: Take 1 capsule by mouth daily    DULoxetine (CYMBALTA) 60 mg delayed release capsule  Self No No   Sig: Take 1 capsule (60 mg total) by mouth daily   DULoxetine (CYMBALTA) 60 mg delayed release capsule  Self No No   Sig: TAKE 1 CAPSULE BY MOUTH EVERY DAY   Patient not taking: Reported on 12/10/2019   HYDROcodone-acetaminophen (NORCO) 5-325 mg per tablet   No No   Sig: Take 1 tablet by mouth every 6 (six) hours as needed for painMax Daily Amount: 4 tablets   ascorbic acid (VITAMIN C) 500 mg tablet  Self No No   Sig: Take 1 tablet (500 mg total) by mouth 2 (two) times a day   aspirin 81 MG tablet  Self Yes No   Sig: Take by mouth Daily   cephalexin (KEFLEX) 500 mg capsule  Self Yes No   Sig: Take 500 mg by mouth every 6 (six) hours   desoximetasone (TOPICORT) 0 25 % cream  Self No No   Sig: Apply topically as needed for irritation   dicyclomine (BENTYL) 10 mg capsule  Self Yes No   Sig: Take 1 capsule by mouth every 12 (twelve) hours   ergocalciferol (VITAMIN D2) 50,000 units   No No   Sig: Take 1 capsule (50,000 Units total) by mouth once a week for 6 doses   Patient not taking: Reported on 12/10/2019   fluticasone (FLONASE) 50 mcg/act nasal spray  Self Yes No   Si spray into each nostril as needed     fluticasone (FLONASE) 50 mcg/act nasal spray  Self No No   Sig: SPRAY 2 SPRAYS INTO EACH NOSTRIL EVERY DAY   Patient not taking: Reported on 12/10/2019   gabapentin (NEURONTIN) 300 mg capsule  Self Yes No   Sig: Take 1 capsule by mouth every 8 (eight) hours   levothyroxine 50 mcg tablet  Self No No   Sig: TAKE 1 TABLET BY MOUTH EVERY DAY   potassium chloride (K-DUR,KLOR-CON) 20 mEq tablet  Self No No   Sig: TAKE 1 TABLET DAILY   sucralfate (CARAFATE) 1 g tablet   No No   Sig: TAKE 1 TABLET BY MOUTH TWICE A DAY   torsemide (DEMADEX) 20 mg tablet  Self No No   Sig: Take 1 tablet (20 mg total) by mouth daily   traMADol (ULTRAM) 50 mg tablet   No No   Sig: TAKE 1 TABLET (50 MG TOTAL) BY MOUTH EVERY 8 (EIGHT) HOURS AS NEEDED FOR MODERATE PAIN   warfarin (COUMADIN) 3 mg tablet  Self Yes No   Sig: TAKE FRIDAY,SATURDAY, SUNDAY INR MONDAY TO FURTHER DOSE   warfarin (COUMADIN) 4 mg tablet  Self No No   Sig: TAKE ONE TABLET DAILY AS PREVIOUSLY DIRECTED BY CARDIOLOGY      Facility-Administered Medications: None       Past Medical History:   Diagnosis Date    Anemia     Arthritis     Cancer ([address] )     basal cell    CHF (congestive heart failure) (HCC)     Disease of thyroid gland     Disturbance of smell and taste     disturbance of taste    Effusion of knee joint right     Fibromyalgia     Heart murmur     reported a previous heart murmur    History of acute myocardial infarction     History of atrial fibrillation     History of bruising easily     History of cancer     History of dermatitis     History of mammogram     History of methicillin resistant staphylococcus aureus (MRSA)     Negative nasal culture, isolation discontinued 8/17/2018      History of methicillin resistant staphylococcus aureus (MRSA) 08/17/2018    negative nasal culture-isolation and hx discontinued 8/17/2018    History of obesity     History of osteopenia     History of sciatica     History of shortness of breath     History of sinusitis     History of sore throat     History of syncope     History of umbilical hernia     History of viral infection     Irritable bowel syndrome (IBS)     Joint pain, hip     Limb pain     Myalgia     myalgia and myositis    Need for prophylactic vaccination against single diseases     Need for prophylactic vaccination and inoculation against influenza     Preoperative cardiovascular examination     Primary osteoarthritis of right knee     Right leg pain     Vaginal Pap smear     reported pap smear       Past Surgical History:   Procedure Laterality Date    ANKLE ARTHRODESIS Right     BACK SURGERY      BARIATRIC SURGERY      CHOLECYSTECTOMY      x2    COLONOSCOPY  ESOPHAGOGASTRODUODENOSCOPY N/A 3/23/2018    Procedure: ESOPHAGOGASTRODUODENOSCOPY (EGD); Surgeon: Betty Arteaga MD;  Location: [facility]; Service: Gastroenterology    FOOT SURGERY      HIP CLOSE REDUCTION Right 8/21/2016    Procedure: CLOSED REDUCTION RIGHT TOTAL HIP;  Surgeon: Kelly Riggs MD;  [facility];  Service:    Sylvain Reji / Monae Chanel / Mark Rias REPLACEMENT      LEFT KNEE REPLACEMENT    JOINT REPLACEMENT      Right HIP    PILONIDAL CYST EXCISION      x2    NJ EXC SKIN BENIG <0 5 CM TRUNK,ARM,LEG Right 7/24/2019    Procedure: REVISION SCAR EXTREMITY Rt Hip; Surgeon: Sonali Perez MD;  [facility];  Service: Orthopedics    NJ REVISE TOTAL HIP REPLACEMENT Right 4/15/2019    Procedure: ARTHROPLASTY HIP TOTAL ACETABULAR REVISION;  Surgeon: Sonali Perez MD;  [facility];  Service: Orthopedics    REPLACEMENT TOTAL KNEE Right     SILVANA-EN-Y PROCEDURE      x2    TOTAL KNEE ARTHROPLASTY      UMBILICAL HERNIA REPAIR      x2       Family History   Problem Relation Age of Onset    Coronary artery disease Mother     Heart attack Mother     Hypertension Mother     Coronary artery disease Father     Heart attack Father     Hypertension Father     Lymphoma Sister         acute    Rashes / Skin problems Sister         lymphomatoid papulosis    Coronary artery disease Brother     Heart attack Brother         x2    Aneurysm Neg Hx     Hyperlipidemia Neg Hx      I have reviewed and agree with the history as documented  Social History     Tobacco Use    Smoking status: Never Smoker    Smokeless tobacco: Never Used   Substance Use Topics    Alcohol use: Not Currently     Frequency: Never    Drug use: No        Review of Systems   Constitutional: Negative for fatigue and fever  HENT: Negative for congestion and ear pain  Eyes: Negative for discharge and redness  Respiratory: Negative for apnea, cough, shortness of breath and wheezing  Cardiovascular: Negative for chest pain  Gastrointestinal: Negative for abdominal pain, bowel incontinence and diarrhea  Endocrine: Negative for cold intolerance and polydipsia  Genitourinary: Negative for bladder incontinence, difficulty urinating and hematuria  Musculoskeletal: Positive for back pain  Negative for arthralgias  Skin: Negative for color change and rash  Allergic/Immunologic: Negative for environmental allergies and immunocompromised state  Neurological: Negative for numbness, headaches and paresthesias  Hematological: Negative for adenopathy  Does not bruise/bleed easily  Psychiatric/Behavioral: Negative for agitation and behavioral problems  Physical Exam  Physical Exam   Constitutional: She is oriented to person, place, and time  Vital signs are normal  She appears well-developed and well-nourished  Non-toxic appearance  HENT:   Head: Normocephalic and atraumatic  Right Ear: Tympanic membrane and external ear normal    Left Ear: Tympanic membrane and external ear normal    Nose: Nose normal  No rhinorrhea, sinus tenderness or nasal deformity  Mouth/Throat: Uvula is midline and oropharynx is clear and moist  Normal dentition  Eyes: Pupils are equal, round, and reactive to light  Conjunctivae, EOM and lids are normal  Right eye exhibits no discharge  Left eye exhibits no discharge  Neck: Trachea normal and normal range of motion  Neck supple  No JVD present  Carotid bruit is not present  Cardiovascular: Normal rate, regular rhythm, intact distal pulses and normal pulses  No extrasystoles are present  PMI is not displaced  Pulmonary/Chest: Effort normal and breath sounds normal  No accessory muscle usage  No respiratory distress  She has no wheezes  She has no rhonchi  She has no rales  Abdominal: Soft  Normal appearance and bowel sounds are normal  She exhibits no mass  There is no tenderness  There is no rigidity, no rebound and no guarding  Musculoskeletal:        Right shoulder: She exhibits normal range of motion, no bony tenderness, no swelling and no deformity  Left hip: She exhibits decreased range of motion  Cervical back: Normal  She exhibits normal range of motion, no tenderness, no bony tenderness and no deformity  Lumbar back: She exhibits tenderness  Patient has a normal range of motion of the knee but pain is elicited when you try to range the hip  Lymphadenopathy:     She has no cervical adenopathy  She has no axillary adenopathy  Neurological: She is alert and oriented to person, place, and time  She has normal strength and normal reflexes  No cranial nerve deficit or sensory deficit  GCS eye subscore is 4  GCS verbal subscore is 5  GCS motor subscore is 6  Skin: Skin is warm and dry  No rash noted  Psychiatric: She has a normal mood and affect  Her speech is normal and behavior is normal    Nursing note and vitals reviewed        Vital Signs  ED Triage Vitals [01/30/20 0209]   Temperature Pulse Respirations Blood Pressure SpO2   (!) 97 3 °F (36 3 °C) 77 18 166/78 100 %      Temp Source Heart Rate Source Patient Position - Orthostatic VS BP Location FiO2 (%)   Oral Monitor Lying Right arm --      Pain Score       9           Vitals:    01/30/20 0209   BP: 166/78   Pulse: 77   Patient Position - Orthostatic VS: Lying         Visual Acuity      ED Medications  Medications   orphenadrine (NORFLEX) injection 60 mg (has no administration in time range)   HYDROcodone-acetaminophen (NORCO) 5-325 mg per tablet 1 tablet (has no administration in time range)   lidocaine (LIDODERM) 5 % patch 1 patch (has no administration in time range)       Diagnostic Studies  Results Reviewed     None                 No orders to display              Procedures  Procedures         ED Course                               MDM  Number of Diagnoses or Management Options  Low back pain: established and worsening     Amount and/or Complexity of Data Reviewed  Clinical lab tests: reviewed  Tests in the radiology section of CPT®: reviewed  Tests in the medicine section of CPT®: reviewed  Review and summarize past medical records: yes    Patient Progress  Patient progress: stable        Disposition  Final diagnoses:   Low back pain     Time reflects when diagnosis was documented in both MDM as applicable and the Disposition within this note     Time User Action Codes Description Comment    1/30/2020  2:52 AM James Porras Ahsan [M54 5] Low back pain       ED Disposition     ED Disposition Condition Date/Time Comment    Discharge Stable u Jan 30, 2020  2:52 AM Jb Fink discharge to home/self care              Follow-up Information     Follow up With Specialties Details Why [address]  [facility] Internal Medicine Schedule an appointment as soon as possible for a visit   [id number]  [phone]            Patient's Medications   Discharge Prescriptions    HYDROCODONE-ACETAMINOPHEN (NORCO) 5-325 MG PER TABLET    Take 1 tablet by mouth every 6 (six) hours as needed for pain for up to 5 daysMax Daily Amount: 4 tablets       Start Date: 1/30/2020 End Date: 2/4/2020       Order Dose: 1 tablet       Quantity: 12 tablet    Refills: 0    METHOCARBAMOL (ROBAXIN) 500 MG TABLET    Take 1 tablet (500 mg total) by mouth 2 (two) times a day for 3 days       Start Date: 1/30/2020 End Date: 2/2/2020       Order Dose: 500 mg       Quantity: 6 tablet    Refills: 0         ED Provider  Electronically Signed by           Fannie Osborne DO  01/30/20 0255

## 2020-01-30 NOTE — TELEPHONE ENCOUNTER
This RN did review in detail the [facility] and what we can provide for their back pain  Patient states he is interested in PT but would like to wait until her pain is better  Patient was given our ph# and business hrs  Patient encouraged to call us back when she is ready to be triaged  Referral closed

## 2020-01-30 NOTE — ED NOTES
Pt currently unable to reach family member to pick her up from ED, will arrange for transport back to residence d/t pt needing walker to ambulate  [facility] contacted, awaiting call back at this time  Pt VS stable, call bell within reach, will continue to monitor         Mehul Roque RN  01/30/20 [id number]

## 2020-01-31 ENCOUNTER — TELEPHONE (OUTPATIENT)
Dept: OTHER | Facility: OTHER | Age: 85
End: 2020-01-31

## 2020-01-31 DIAGNOSIS — M54.50 LOW BACK PAIN: ICD-10-CM

## 2020-01-31 RX ORDER — METHOCARBAMOL 500 MG/1
500 TABLET, FILM COATED ORAL 2 TIMES DAILY
Qty: 6 TABLET | Refills: 0 | Status: CANCELLED | OUTPATIENT
Start: 2020-01-31 | End: 2020-02-03

## 2020-01-31 NOTE — TELEPHONE ENCOUNTER
Prescriptions    Filled  ID  Written  Drug  QTY  Days  Prescriber  Rx #  Pharmacy *  Refills  Daily Dose  Pymt Type      01/30/2020  2  01/30/2020  HYDROCODONE-ACETAMIN 5-325 MG  12 0  3  ME TUR  [id number]  PENNS ([id number])  0  20 0 MME  [place]       Patient is going to call on Monday to set up an appointment  Her son is currently in [facility] and she does not have transportation

## 2020-02-03 ENCOUNTER — TELEPHONE (OUTPATIENT)
Dept: INTERNAL MEDICINE CLINIC | Facility: CLINIC | Age: 85
End: 2020-02-03

## 2020-02-03 NOTE — TELEPHONE ENCOUNTER
Please fill the methocarbamol for the pt she feels that is all that she may need  This was given to her at the ER and seems to help  Pt is scheduled to be eval on 3/2/2020  She will call tomorrow if she has increased pain

## 2020-02-03 NOTE — TELEPHONE ENCOUNTER
Patient called back in to asks the status of this refill? She said she went almost all weekend without her medication and she is going to a  tomorrow this will be painful wanted to know if you would sent this today?

## 2020-02-04 DIAGNOSIS — M54.50 LOW BACK PAIN: ICD-10-CM

## 2020-02-04 RX ORDER — METHOCARBAMOL 500 MG/1
500 TABLET, FILM COATED ORAL 2 TIMES DAILY PRN
Qty: 6 TABLET | Refills: 0 | Status: SHIPPED | OUTPATIENT
Start: 2020-02-04 | End: 2020-03-03 | Stop reason: ALTCHOICE

## 2020-02-04 RX ORDER — HYDROCODONE BITARTRATE AND ACETAMINOPHEN 5; 325 MG/1; MG/1
1 TABLET ORAL EVERY 6 HOURS PRN
Qty: 12 TABLET | Refills: 0 | OUTPATIENT
Start: 2020-02-04 | End: 2020-02-09

## 2020-02-05 ENCOUNTER — ANTICOAG VISIT (OUTPATIENT)
Dept: INTERNAL MEDICINE CLINIC | Facility: CLINIC | Age: 85
End: 2020-02-05

## 2020-02-05 ENCOUNTER — TELEPHONE (OUTPATIENT)
Dept: INTERNAL MEDICINE CLINIC | Facility: CLINIC | Age: 85
End: 2020-02-05

## 2020-02-17 ENCOUNTER — TRANSCRIBE ORDERS (OUTPATIENT)
Dept: LAB | Facility: CLINIC | Age: 85
End: 2020-02-17

## 2020-02-17 ENCOUNTER — APPOINTMENT (OUTPATIENT)
Dept: LAB | Facility: CLINIC | Age: 85
End: 2020-02-17
Payer: COMMERCIAL

## 2020-02-17 DIAGNOSIS — E55.9 VITAMIN D DEFICIENCY: ICD-10-CM

## 2020-02-17 LAB — 25(OH)D3 SERPL-MCNC: 23.7 NG/ML (ref 30–100)

## 2020-02-17 PROCEDURE — 82306 VITAMIN D 25 HYDROXY: CPT

## 2020-02-18 ENCOUNTER — ANTICOAG VISIT (OUTPATIENT)
Dept: INTERNAL MEDICINE CLINIC | Facility: CLINIC | Age: 85
End: 2020-02-18

## 2020-02-19 DIAGNOSIS — R79.89 LOW VITAMIN D LEVEL: Primary | ICD-10-CM

## 2020-02-23 DIAGNOSIS — R10.13 EPIGASTRIC PAIN: ICD-10-CM

## 2020-02-24 RX ORDER — SUCRALFATE 1 G/1
TABLET ORAL
Qty: 30 TABLET | Refills: 0 | Status: SHIPPED | OUTPATIENT
Start: 2020-02-24 | End: 2020-03-30

## 2020-02-25 DIAGNOSIS — I48.20 CHRONIC ATRIAL FIBRILLATION (HCC): ICD-10-CM

## 2020-02-25 RX ORDER — WARFARIN SODIUM 4 MG/1
TABLET ORAL
Qty: 90 TABLET | Refills: 1 | Status: SHIPPED | OUTPATIENT
Start: 2020-02-25 | End: 2020-08-29

## 2020-03-02 DIAGNOSIS — G89.29 OTHER CHRONIC PAIN: ICD-10-CM

## 2020-03-02 RX ORDER — DULOXETIN HYDROCHLORIDE 60 MG/1
CAPSULE, DELAYED RELEASE ORAL
Qty: 90 CAPSULE | Refills: 1 | Status: SHIPPED | OUTPATIENT
Start: 2020-03-02 | End: 2020-03-17 | Stop reason: SDUPTHER

## 2020-03-03 ENCOUNTER — ANTICOAG VISIT (OUTPATIENT)
Dept: INTERNAL MEDICINE CLINIC | Facility: CLINIC | Age: 85
End: 2020-03-03

## 2020-03-03 ENCOUNTER — OFFICE VISIT (OUTPATIENT)
Dept: INTERNAL MEDICINE CLINIC | Facility: CLINIC | Age: 85
End: 2020-03-03
Payer: COMMERCIAL

## 2020-03-03 VITALS
DIASTOLIC BLOOD PRESSURE: 80 MMHG | RESPIRATION RATE: 16 BRPM | WEIGHT: 185.6 LBS | OXYGEN SATURATION: 98 % | HEART RATE: 74 BPM | HEIGHT: 60 IN | SYSTOLIC BLOOD PRESSURE: 142 MMHG | BODY MASS INDEX: 36.44 KG/M2

## 2020-03-03 DIAGNOSIS — R00.2 PALPITATIONS: ICD-10-CM

## 2020-03-03 DIAGNOSIS — R29.898 WEAKNESS OF BOTH LOWER EXTREMITIES: ICD-10-CM

## 2020-03-03 DIAGNOSIS — G89.29 OTHER CHRONIC PAIN: Primary | ICD-10-CM

## 2020-03-03 DIAGNOSIS — M25.551 PAIN OF BOTH HIP JOINTS: ICD-10-CM

## 2020-03-03 DIAGNOSIS — Z13.1 SCREENING FOR DIABETES MELLITUS: ICD-10-CM

## 2020-03-03 DIAGNOSIS — M25.552 PAIN OF BOTH HIP JOINTS: ICD-10-CM

## 2020-03-03 DIAGNOSIS — I48.20 CHRONIC ATRIAL FIBRILLATION (HCC): ICD-10-CM

## 2020-03-03 DIAGNOSIS — M54.50 LOW BACK PAIN: ICD-10-CM

## 2020-03-03 DIAGNOSIS — E03.9 HYPOTHYROIDISM, UNSPECIFIED TYPE: ICD-10-CM

## 2020-03-03 PROCEDURE — 3077F SYST BP >= 140 MM HG: CPT | Performed by: INTERNAL MEDICINE

## 2020-03-03 PROCEDURE — 1160F RVW MEDS BY RX/DR IN RCRD: CPT | Performed by: INTERNAL MEDICINE

## 2020-03-03 PROCEDURE — 3008F BODY MASS INDEX DOCD: CPT | Performed by: INTERNAL MEDICINE

## 2020-03-03 PROCEDURE — 1036F TOBACCO NON-USER: CPT | Performed by: INTERNAL MEDICINE

## 2020-03-03 PROCEDURE — 99214 OFFICE O/P EST MOD 30 MIN: CPT | Performed by: INTERNAL MEDICINE

## 2020-03-03 PROCEDURE — 3079F DIAST BP 80-89 MM HG: CPT | Performed by: INTERNAL MEDICINE

## 2020-03-03 PROCEDURE — 4040F PNEUMOC VAC/ADMIN/RCVD: CPT | Performed by: INTERNAL MEDICINE

## 2020-03-03 RX ORDER — METHOCARBAMOL 500 MG/1
500 TABLET, FILM COATED ORAL
Qty: 30 TABLET | Refills: 0 | Status: SHIPPED | OUTPATIENT
Start: 2020-03-03 | End: 2020-07-08 | Stop reason: SDUPTHER

## 2020-03-03 NOTE — PROGRESS NOTES
BMI Counseling: Body mass index is 36 25 kg/m²  The BMI is above normal  Nutrition recommendations include decreasing portion sizes and moderation in carbohydrate intake  Falls Plan of Care: balance, strength, and gait training instructions were provided and referral to physical therapy  Assessment/Plan:    Hypothyroidism  Hypothyroidism controlled the patient is currently euthyroid I will be ordering a TSH prior to the next office visit and the patient will continue with current medical regiment; we will continue to monitor the patient's progress  Chronic atrial fibrillation (HCC)  rate controlled and anticoagulated clinically stable she does admit to intermittent palpitations because of this I will check a 48 hour Holter monitor    Weakness of both lower extremities  Secondary to deconditioning secondary to chronic pain will start physical therapy, she should use a walker at all times she will see pain management for possible epidural steroid regarding her known disc herniation  Arthralgia of hip  She has significant pain of bilateral hips will have the patient see pain management patient does request Robaxin for muscle spasm p r n  She tolerates well without side effect  She will use tramadol as directed p r n  Lidocaine patch on for 12 hours off for 12 hours p r n  And Tylenol as directed p r n  She will see pain management and start physical therapy  Problem List Items Addressed This Visit        Endocrine    Hypothyroidism     Hypothyroidism controlled the patient is currently euthyroid I will be ordering a TSH prior to the next office visit and the patient will continue with current medical regiment; we will continue to monitor the patient's progress           Relevant Orders    TSH, 3rd generation       Cardiovascular and Mediastinum    Chronic atrial fibrillation     rate controlled and anticoagulated clinically stable she does admit to intermittent palpitations because of this I will check a 48 hour Holter monitor         Relevant Orders    Comprehensive metabolic panel    Lipid Panel with Direct LDL reflex       Nervous and Auditory    Weakness of both lower extremities     Secondary to deconditioning secondary to chronic pain will start physical therapy, she should use a walker at all times she will see pain management for possible epidural steroid regarding her known disc herniation  Relevant Orders    Ambulatory referral to Physical Therapy       Other    Chronic pain - Primary    Relevant Orders    Ambulatory referral to Pain Management    Arthralgia of hip     She has significant pain of bilateral hips will have the patient see pain management patient does request Robaxin for muscle spasm p r n  She tolerates well without side effect  She will use tramadol as directed p r n  Lidocaine patch on for 12 hours off for 12 hours p r n  And Tylenol as directed p r n  She will see pain management and start physical therapy  Palpitations    Relevant Orders    Holter monitor - 48 hour    Low back pain    Relevant Medications    methocarbamol (ROBAXIN) 500 mg tablet    Screening for diabetes mellitus    Relevant Orders    Hemoglobin A1C          Return to office 2  months  call if any problemsSubjective:      Patient ID: Kylie Reynolds is a 80 y o  female  HPI 77-year-old female who is coming in with the chief complaint of chronic left and right hip pain, lower back pain, chronic pain syndrome, weakness of bilateral lower extremities secondary to deconditioning, chronic atrial fibrillation, hypothyroidism and intermittent palpitations; update patient has a known total hip replacement on the right she gets sciatica on the left-hand side    Patient does report me a time she must take methocarbamol to help with the pain and muscle spasm it is very helpful he and tolerates it on the days that she has these muscle spasm she needs to very unable to move in needs to be in the seated position until the end of the day  She reports me that she may have late wrong and has noticed some pain in cracking in the right musculature of the cervical spine I radiate into the of septal region and worsens with turning and twisting bending; she reports me ongoing significant bilateral hip pain  She requests a refill of her muscle relaxant which is helpful for the muscle spasm  When the pain is severe she does use tramadol  She finds lidocaine patch to be helpful  She reports me leg weakness secondary to deconditioning  She has a known disc herniation which is accounting for her left hip and lower extremity pain  Does report me at nighttime the gini night she has noticed that the dreams and when she wakes up she feels or heart racing  Which is short-lived    The following portions of the patient's history were reviewed and updated as appropriate: allergies, current medications, past family history, past medical history, past social history, past surgical history and problem list     Review of Systems   Constitutional: Negative for activity change, appetite change and unexpected weight change  Eyes: Negative for visual disturbance  Respiratory: Negative for cough and shortness of breath  Cardiovascular: Positive for palpitations  Negative for chest pain  Gastrointestinal: Negative for abdominal pain, diarrhea, nausea and vomiting  Musculoskeletal: Positive for back pain and gait problem  Hip pain   Neurological: Negative for dizziness, light-headedness and headaches  Objective:    No follow-ups on file  No results found  Allergies   Allergen Reactions    Amoxicillin      Reports nausea, headache, dizzy    Codeine Vomiting and GI Intolerance     GI upset, ana m morphine  Reaction Date: [id number];     Other      Other reaction(s): Other (See Comments)  ana m toradol in OR 6/06 sah    Oxycodone-Acetaminophen      Other reaction(s):  Other (See Comments)  GI upset; ana m vicodin    Percocet [Oxycodone-Acetaminophen]     Seasonal Ic  [Cholestatin] Allergic Rhinitis    Trimethoprim     Bactrim [Sulfamethoxazole-Trimethoprim] Rash    Folic Acid-Vit R8-[facility] U86 Diarrhea    Sulfa Antibiotics Rash     Other reaction(s): Other (See Comments)  takes lasix @home       Past Medical History:   Diagnosis Date    Anemia     Arthritis     Cancer ([address] )     basal cell    CHF (congestive heart failure) (HCC)     Disease of thyroid gland     Disturbance of smell and taste     disturbance of taste    Effusion of knee joint right     Fibromyalgia     Heart murmur     reported a previous heart murmur    History of acute myocardial infarction     History of atrial fibrillation     History of bruising easily     History of cancer     History of dermatitis     History of mammogram     History of methicillin resistant staphylococcus aureus (MRSA)     Negative nasal culture, isolation discontinued 8/17/2018      History of methicillin resistant staphylococcus aureus (MRSA) 08/17/2018    negative nasal culture-isolation and hx discontinued 8/17/2018    History of obesity     History of osteopenia     History of sciatica     History of shortness of breath     History of sinusitis     History of sore throat     History of syncope     History of umbilical hernia     History of viral infection     Irritable bowel syndrome (IBS)     Joint pain, hip     Limb pain     Myalgia     myalgia and myositis    Need for prophylactic vaccination against single diseases     Need for prophylactic vaccination and inoculation against influenza     Preoperative cardiovascular examination     Primary osteoarthritis of right knee     Right leg pain     Vaginal Pap smear     reported pap smear     Past Surgical History:   Procedure Laterality Date    ANKLE ARTHRODESIS Right     BACK SURGERY      BARIATRIC SURGERY      CHOLECYSTECTOMY      x2    COLONOSCOPY      ESOPHAGOGASTRODUODENOSCOPY N/A 3/23/2018    Procedure: ESOPHAGOGASTRODUODENOSCOPY (EGD); Surgeon: Tyesha Cleaning MD;  Location: [facility]; Service: Gastroenterology    FOOT SURGERY      HIP CLOSE REDUCTION Right 8/21/2016    Procedure: CLOSED REDUCTION RIGHT TOTAL HIP;  Surgeon: Ashley Lundberg MD;  [facility];  Service:    Lemus Lynne / Rainell Leech / [place] REPLACEMENT      LEFT KNEE REPLACEMENT    JOINT REPLACEMENT      Right HIP    PILONIDAL CYST EXCISION      x2    AL EXC SKIN BENIG <0 5 CM TRUNK,ARM,LEG Right 7/24/2019    Procedure: REVISION SCAR EXTREMITY Rt Hip;   Surgeon: Army Tiffany MD;  [facility];  Service: Orthopedics    AL REVISE TOTAL HIP REPLACEMENT Right 4/15/2019    Procedure: ARTHROPLASTY HIP TOTAL ACETABULAR REVISION;  Surgeon: Army Tiffany MD;  [facility];  Service: Orthopedics    REPLACEMENT TOTAL KNEE Right     SILVANA-EN-Y PROCEDURE      x2    TOTAL KNEE ARTHROPLASTY      UMBILICAL HERNIA REPAIR      x2     Current Outpatient Medications on File Prior to Visit   Medication Sig Dispense Refill    ascorbic acid (VITAMIN C) 500 mg tablet Take 1 tablet (500 mg total) by mouth 2 (two) times a day 60 tablet 0    desoximetasone (TOPICORT) 0 25 % cream Apply topically as needed for irritation 30 g 0    DULoxetine (CYMBALTA) 60 mg delayed release capsule TAKE 1 CAPSULE BY MOUTH EVERY DAY 30 capsule 0    DULoxetine (CYMBALTA) 60 mg delayed release capsule TAKE 1 CAPSULE BY MOUTH EVERY DAY 90 capsule 1    fluticasone (FLONASE) 50 mcg/act nasal spray SPRAY 2 SPRAYS INTO EACH NOSTRIL EVERY DAY 16 mL 0    levothyroxine 50 mcg tablet TAKE 1 TABLET BY MOUTH EVERY DAY 90 tablet 0    potassium chloride (K-DUR,KLOR-CON) 20 mEq tablet TAKE 1 TABLET DAILY 30 tablet 11    sucralfate (CARAFATE) 1 g tablet TAKE 1 TABLET BY MOUTH TWICE A DAY 30 tablet 0    torsemide (DEMADEX) 20 mg tablet Take 1 tablet (20 mg total) by mouth daily 90 tablet 3    traMADol (ULTRAM) 50 mg tablet TAKE 1 TABLET (50 MG TOTAL) BY MOUTH EVERY 8 (EIGHT) HOURS AS NEEDED FOR MODERATE PAIN 30 tablet 0    warfarin (COUMADIN) 3 mg tablet TAKE FRIDAY,SATURDAY, SUNDAY INR MONDAY TO FURTHER DOSE  0    warfarin (COUMADIN) 4 mg tablet TAKE ONE TABLET DAILY AS PREVIOUSLY DIRECTED BY CARDIOLOGY 90 tablet 1    aspirin 81 MG tablet Take by mouth Daily      cephalexin (KEFLEX) 500 mg capsule Take 500 mg by mouth every 6 (six) hours      Cyanocobalamin (VITAMIN B12 PO) Take 1 capsule by mouth daily       dicyclomine (BENTYL) 10 mg capsule Take 1 capsule by mouth every 12 (twelve) hours      gabapentin (NEURONTIN) 300 mg capsule Take 1 capsule by mouth every 8 (eight) hours      [DISCONTINUED] ergocalciferol (VITAMIN D2) 50,000 units Take 1 capsule (50,000 Units total) by mouth once a week for 6 doses (Patient not taking: Reported on 12/10/2019) 6 capsule 0    [DISCONTINUED] fluticasone (FLONASE) 50 mcg/act nasal spray 1 spray into each nostril as needed        [DISCONTINUED] HYDROcodone-acetaminophen (NORCO) 5-325 mg per tablet Take 1 tablet by mouth every 6 (six) hours as needed for painMax Daily Amount: 4 tablets (Patient not taking: Reported on 3/3/2020) 8 tablet 0    [DISCONTINUED] methocarbamol (ROBAXIN) 500 mg tablet Take 1 tablet (500 mg total) by mouth 2 (two) times a day as needed for muscle spasms for up to 3 days 6 tablet 0     No current facility-administered medications on file prior to visit        Family History   Problem Relation Age of Onset    Coronary artery disease Mother     Heart attack Mother     Hypertension Mother     Coronary artery disease Father     Heart attack Father     Hypertension Father     Lymphoma Sister         acute    Rashes / Skin problems Sister         lymphomatoid papulosis    Coronary artery disease Brother     Heart attack Brother         x2    Aneurysm Neg Hx     Hyperlipidemia Neg Hx      Social History     Socioeconomic History  Marital status: /Civil Union     Spouse name: Not on file    Number of children: Not on file    Years of education: Not on file    Highest education level: Not on file   Occupational History    Not on file   Social Needs    Financial resource strain: Not on file    Food insecurity:     Worry: Not on file     Inability: Not on file    Transportation needs:     Medical: Not on file     Non-medical: Not on file   Tobacco Use    Smoking status: Never Smoker    Smokeless tobacco: Never Used   Substance and Sexual Activity    Alcohol use: Not Currently     Frequency: Never    Drug use: No    Sexual activity: Never   Lifestyle    Physical activity:     Days per week: Not on file     Minutes per session: Not on file    Stress: Not on file   Relationships    Social connections:     Talks on phone: Not on file     Gets together: Not on file     Attends Islam service: Not on file     Active member of club or organization: Not on file     Attends meetings of clubs or organizations: Not on file     Relationship status: Not on file    Intimate partner violence:     Fear of current or ex partner: Not on file     Emotionally abused: Not on file     Physically abused: Not on file     Forced sexual activity: Not on file   Other Topics Concern    Not on file   Social History Narrative    Not on file     Vitals:    03/03/20 1532   BP: 142/80   Pulse: 74   Resp: 16   SpO2: 98%   Weight: 84 2 kg (185 lb 9 6 oz)   Height: 5' (1 524 m)     Results for orders placed or performed in visit on 02/17/20   Vitamin D 25 hydroxy   Result Value Ref Range    Vit D, 25-Hydroxy 23 7 (L) 30 0 - 100 0 ng/mL     Weight (last 2 days)     Date/Time   Weight    03/03/20 1532   84 2 (185 6)            Body mass index is 36 25 kg/m²    BP      Temp      Pulse     Resp      SpO2        Vitals:    03/03/20 1532   Weight: 84 2 kg (185 lb 9 6 oz)     Vitals:    03/03/20 1532   Weight: 84 2 kg (185 lb 9 6 oz)       /80 Pulse 74   Resp 16   Ht 5' (1 524 m)   Wt 84 2 kg (185 lb 9 6 oz)   LMP  (LMP Unknown)   SpO2 98%   BMI 36 25 kg/m²          Physical Exam   Constitutional: She appears well-developed and well-nourished  HENT:   Head: Normocephalic  Mouth/Throat: Oropharynx is clear and moist    Eyes: Pupils are equal, round, and reactive to light  Conjunctivae are normal  Right eye exhibits no discharge  Left eye exhibits no discharge  No scleral icterus  Neck: Neck supple  Cardiovascular: Normal rate, regular rhythm, normal heart sounds and intact distal pulses  Exam reveals no gallop and no friction rub  No murmur heard  Pulmonary/Chest: Breath sounds normal  No respiratory distress  She has no wheezes  She has no rales  Abdominal: Soft  Bowel sounds are normal  She exhibits no distension and no mass  There is no tenderness  There is no rebound and no guarding  Musculoskeletal: She exhibits no edema or deformity  Lymphadenopathy:     She has no cervical adenopathy  Neurological: She is alert   Coordination normal      difficulty with gait using a walker

## 2020-03-04 NOTE — ASSESSMENT & PLAN NOTE
Secondary to deconditioning secondary to chronic pain will start physical therapy, she should use a walker at all times she will see pain management for possible epidural steroid regarding her known disc herniation

## 2020-03-04 NOTE — ASSESSMENT & PLAN NOTE
She has significant pain of bilateral hips will have the patient see pain management patient does request Robaxin for muscle spasm p r n  She tolerates well without side effect  She will use tramadol as directed p r n  Lidocaine patch on for 12 hours off for 12 hours p r n  And Tylenol as directed p r n  She will see pain management and start physical therapy

## 2020-03-04 NOTE — ASSESSMENT & PLAN NOTE
rate controlled and anticoagulated clinically stable she does admit to intermittent palpitations because of this I will check a 48 hour Holter monitor

## 2020-03-13 ENCOUNTER — ANTICOAG VISIT (OUTPATIENT)
Dept: INTERNAL MEDICINE CLINIC | Facility: CLINIC | Age: 85
End: 2020-03-13

## 2020-03-13 ENCOUNTER — APPOINTMENT (OUTPATIENT)
Dept: LAB | Facility: CLINIC | Age: 85
End: 2020-03-13
Payer: COMMERCIAL

## 2020-03-13 DIAGNOSIS — I48.20 CHRONIC ATRIAL FIBRILLATION (HCC): ICD-10-CM

## 2020-03-13 DIAGNOSIS — R79.89 LOW VITAMIN D LEVEL: ICD-10-CM

## 2020-03-13 DIAGNOSIS — Z13.1 SCREENING FOR DIABETES MELLITUS: ICD-10-CM

## 2020-03-13 DIAGNOSIS — E03.9 HYPOTHYROIDISM, UNSPECIFIED TYPE: ICD-10-CM

## 2020-03-13 LAB
25(OH)D3 SERPL-MCNC: 25 NG/ML (ref 30–100)
ALBUMIN SERPL BCP-MCNC: 3.4 G/DL (ref 3.5–5)
ALP SERPL-CCNC: 88 U/L (ref 46–116)
ALT SERPL W P-5'-P-CCNC: 16 U/L (ref 12–78)
ANION GAP SERPL CALCULATED.3IONS-SCNC: 3 MMOL/L (ref 4–13)
AST SERPL W P-5'-P-CCNC: 18 U/L (ref 5–45)
BILIRUB SERPL-MCNC: 0.41 MG/DL (ref 0.2–1)
BUN SERPL-MCNC: 12 MG/DL (ref 5–25)
CALCIUM SERPL-MCNC: 8.9 MG/DL (ref 8.3–10.1)
CHLORIDE SERPL-SCNC: 111 MMOL/L (ref 100–108)
CHOLEST SERPL-MCNC: 166 MG/DL (ref 50–200)
CO2 SERPL-SCNC: 27 MMOL/L (ref 21–32)
CREAT SERPL-MCNC: 0.68 MG/DL (ref 0.6–1.3)
GFR SERPL CREATININE-BSD FRML MDRD: 79 ML/MIN/1.73SQ M
GLUCOSE P FAST SERPL-MCNC: 90 MG/DL (ref 65–99)
HDLC SERPL-MCNC: 76 MG/DL
LDLC SERPL CALC-MCNC: 77 MG/DL (ref 0–100)
POTASSIUM SERPL-SCNC: 3.8 MMOL/L (ref 3.5–5.3)
PROT SERPL-MCNC: 7 G/DL (ref 6.4–8.2)
SODIUM SERPL-SCNC: 141 MMOL/L (ref 136–145)
TRIGL SERPL-MCNC: 63 MG/DL
TSH SERPL DL<=0.05 MIU/L-ACNC: 4.4 UIU/ML (ref 0.36–3.74)

## 2020-03-13 PROCEDURE — 80053 COMPREHEN METABOLIC PANEL: CPT

## 2020-03-13 PROCEDURE — 82306 VITAMIN D 25 HYDROXY: CPT

## 2020-03-13 PROCEDURE — 80061 LIPID PANEL: CPT

## 2020-03-13 PROCEDURE — 84443 ASSAY THYROID STIM HORMONE: CPT

## 2020-03-17 ENCOUNTER — OFFICE VISIT (OUTPATIENT)
Dept: INTERNAL MEDICINE CLINIC | Facility: CLINIC | Age: 85
End: 2020-03-17
Payer: COMMERCIAL

## 2020-03-17 VITALS
SYSTOLIC BLOOD PRESSURE: 156 MMHG | OXYGEN SATURATION: 98 % | BODY MASS INDEX: 37.66 KG/M2 | WEIGHT: 191.8 LBS | DIASTOLIC BLOOD PRESSURE: 88 MMHG | HEART RATE: 82 BPM | HEIGHT: 60 IN | TEMPERATURE: 98.5 F | RESPIRATION RATE: 16 BRPM

## 2020-03-17 DIAGNOSIS — E03.9 HYPOTHYROIDISM, UNSPECIFIED TYPE: Primary | ICD-10-CM

## 2020-03-17 DIAGNOSIS — E03.9 HYPOTHYROIDISM, UNSPECIFIED TYPE: ICD-10-CM

## 2020-03-17 DIAGNOSIS — J06.9 UPPER RESPIRATORY TRACT INFECTION, UNSPECIFIED TYPE: Primary | ICD-10-CM

## 2020-03-17 PROCEDURE — 99213 OFFICE O/P EST LOW 20 MIN: CPT | Performed by: NURSE PRACTITIONER

## 2020-03-17 PROCEDURE — 4040F PNEUMOC VAC/ADMIN/RCVD: CPT | Performed by: NURSE PRACTITIONER

## 2020-03-17 PROCEDURE — 3077F SYST BP >= 140 MM HG: CPT | Performed by: NURSE PRACTITIONER

## 2020-03-17 PROCEDURE — 1036F TOBACCO NON-USER: CPT | Performed by: NURSE PRACTITIONER

## 2020-03-17 PROCEDURE — 3079F DIAST BP 80-89 MM HG: CPT | Performed by: NURSE PRACTITIONER

## 2020-03-17 PROCEDURE — 1160F RVW MEDS BY RX/DR IN RCRD: CPT | Performed by: NURSE PRACTITIONER

## 2020-03-17 RX ORDER — LEVOTHYROXINE SODIUM 0.07 MG/1
75 TABLET ORAL DAILY
Qty: 30 TABLET | Refills: 2 | Status: SHIPPED | OUTPATIENT
Start: 2020-03-17 | End: 2020-03-26 | Stop reason: SDUPTHER

## 2020-03-17 RX ORDER — CEFUROXIME AXETIL 500 MG/1
500 TABLET ORAL EVERY 12 HOURS SCHEDULED
Qty: 14 TABLET | Refills: 0
Start: 2020-03-17 | End: 2020-03-17 | Stop reason: SDUPTHER

## 2020-03-17 RX ORDER — LEVOTHYROXINE SODIUM 0.07 MG/1
75 TABLET ORAL DAILY
Qty: 30 TABLET | Refills: 5 | Status: SHIPPED | OUTPATIENT
Start: 2020-03-17 | End: 2020-11-28

## 2020-03-17 RX ORDER — CEFUROXIME AXETIL 500 MG/1
500 TABLET ORAL EVERY 12 HOURS SCHEDULED
Qty: 10 TABLET | Refills: 0 | Status: SHIPPED | OUTPATIENT
Start: 2020-03-17 | End: 2020-03-22

## 2020-03-17 NOTE — TELEPHONE ENCOUNTER
Per Dr Real Cummings, patient should be seen in office today for evaluation  Patient scheduled to see Iris at 1 pm  Patient aware

## 2020-03-17 NOTE — TELEPHONE ENCOUNTER
Called patient in regards to her labs and advised results  Patient is c/o post nasal drip, sore throat, slight productive cough and headache  Started with symptoms on Sunday  She does not have fever, SOB or travel history  She has been using Tylenol with some relief but patient is afraid that it will go to her chest  Please advise

## 2020-03-17 NOTE — PROGRESS NOTES
Assessment/Plan:    Upper respiratory tract infection  Start antibiotics  Drink plenty of fluids and rest  May take over the counter mucinex and cough syrup/cough drops  Call if worsening  Diagnoses and all orders for this visit:    Upper respiratory tract infection, unspecified type  -     Discontinue: cefuroxime (CEFTIN) 500 mg tablet; Take 1 tablet (500 mg total) by mouth every 12 (twelve) hours for 7 days  -     cefuroxime (CEFTIN) 500 mg tablet; Take 1 tablet (500 mg total) by mouth every 12 (twelve) hours for 5 days    Hypothyroidism, unspecified type  -     levothyroxine 75 mcg tablet; Take 1 tablet (75 mcg total) by mouth daily          Subjective:      Patient ID: Kristine Ferrell is a 80 y o  female  The patient is coughing mostly at night, large amounts of mucous with a slight headache for a week  The patients niece was ill 2 weeks ago but was negative for COVID  No fever or sob      The following portions of the patient's history were reviewed and updated as appropriate: allergies, current medications, past family history, past medical history, past social history, past surgical history and problem list     Review of Systems   Constitutional: Negative  HENT: Negative  Eyes: Negative  Respiratory: Positive for cough  Cardiovascular: Negative  Gastrointestinal: Negative  Musculoskeletal: Negative  Neurological: Negative  Objective:      /88   Pulse 82   Temp 98 5 °F (36 9 °C) (Tympanic)   Resp 16   Ht 5' (1 524 m)   Wt 87 kg (191 lb 12 8 oz)   LMP  (LMP Unknown)   SpO2 98%   BMI 37 46 kg/m²          Physical Exam   Constitutional: She is oriented to person, place, and time  She appears well-developed and well-nourished  HENT:   Head: Normocephalic and atraumatic     Right Ear: External ear normal    Left Ear: External ear normal    Nose: Nose normal    Mouth/Throat: Oropharynx is clear and moist    Eyes: Pupils are equal, round, and reactive to light  Conjunctivae are normal    Neck: Normal range of motion  Neck supple  Cardiovascular: Normal rate and regular rhythm  Pulmonary/Chest: Effort normal and breath sounds normal    Abdominal: Soft  Bowel sounds are normal    Musculoskeletal: Normal range of motion  Neurological: She is alert and oriented to person, place, and time  Skin: Skin is warm and dry  Nursing note and vitals reviewed

## 2020-03-18 ENCOUNTER — TELEPHONE (OUTPATIENT)
Dept: INTERNAL MEDICINE CLINIC | Facility: CLINIC | Age: 85
End: 2020-03-18

## 2020-03-18 DIAGNOSIS — E55.9 VITAMIN D DEFICIENCY: Primary | ICD-10-CM

## 2020-03-19 ENCOUNTER — TELEPHONE (OUTPATIENT)
Dept: INTERNAL MEDICINE CLINIC | Facility: CLINIC | Age: 85
End: 2020-03-19
Payer: COMMERCIAL

## 2020-03-19 ENCOUNTER — TELEPHONE (OUTPATIENT)
Dept: INTERNAL MEDICINE CLINIC | Facility: CLINIC | Age: 85
End: 2020-03-19

## 2020-03-19 DIAGNOSIS — Z20.828 EXPOSURE TO SARS-ASSOCIATED CORONAVIRUS: ICD-10-CM

## 2020-03-19 DIAGNOSIS — Z20.828 EXPOSURE TO SARS-ASSOCIATED CORONAVIRUS: Primary | ICD-10-CM

## 2020-03-19 PROBLEM — J06.9 UPPER RESPIRATORY TRACT INFECTION: Status: ACTIVE | Noted: 2020-03-19

## 2020-03-19 PROCEDURE — U0002 COVID-19 LAB TEST NON-CDC: HCPCS | Performed by: NURSE PRACTITIONER

## 2020-03-19 PROCEDURE — 87635 SARS-COV-2 COVID-19 AMP PRB: CPT | Performed by: NURSE PRACTITIONER

## 2020-03-19 PROCEDURE — 99212 OFFICE O/P EST SF 10 MIN: CPT | Performed by: NURSE PRACTITIONER

## 2020-03-19 NOTE — TELEPHONE ENCOUNTER
Patient called with an update regarding her symptoms  Patient states that she still has slight cough and runny nose  No fever or SOB  Her son is concerned because she was in contact with her grandchildren who are very young  She is requesting to be tested  I spoke with Dr Hillary Hernadez and Miriam who agreed that patient can go for testing  Please put in order  Thank you

## 2020-03-19 NOTE — TELEPHONE ENCOUNTER
COVID-19 Virtual Visit     This virtual check-in was done via telephone  Encounter provider Aayush García, [address]     Provider located at [address] [place] [id number]    Recent Visits  Date Type Provider Dept   03/18/20 Telephone [facility] 27   03/17/20 Office Visit Aayush García Giovannyshawn recent visits within past 7 days and meeting all other requirements     Today's Visits  Date Type Provider Dept   03/19/20 Telephone Aayush García, [address]   03/19/20 Telephone [address] today's visits and meeting all other requirements     Future Appointments  Date Type Provider Dept   03/19/20 Telephone Aayush García, [address]   Showing future appointments within next 150 days and meeting all other requirements        Patient agrees to participate in a virtual check in via telephone or video visit instead of presenting to the office to address urgent/immediate medical needs  Patient is aware this is a billable service  After connecting through telephone, the patient was identified by name and date of birth  Noe Fisher was informed that this was a telemedicine visit and that the exam was being conducted confidentially over secure lines  My office door was closed  No one else was in the room  Noe Fisher acknowledged consent and understanding of privacy and security of the telemedicine visit  I informed the patient that I have reviewed her record in Epic and presented the opportunity for her to ask any questions regarding the visit today  The patient agreed to participate  Noe Fisher is a 80 y o  female who is concerned about COVID-19  She reports cough  She has not traveled outside the [place]  within the last 14 days    She has not had contact with a person who is under investigation for or who is positive for COVID-19 within the last 14 days  She has not been hospitalized recently for fever and/or lower respiratory symptoms  Past Medical History:   Diagnosis Date    Anemia     Arthritis     Cancer ([address] )     basal cell    CHF (congestive heart failure) (HCC)     Disease of thyroid gland     Disturbance of smell and taste     disturbance of taste    Effusion of knee joint right     Fibromyalgia     Heart murmur     reported a previous heart murmur    History of acute myocardial infarction     History of atrial fibrillation     History of bruising easily     History of cancer     History of dermatitis     History of mammogram     History of methicillin resistant staphylococcus aureus (MRSA)     Negative nasal culture, isolation discontinued 8/17/2018   History of methicillin resistant staphylococcus aureus (MRSA) 08/17/2018    negative nasal culture-isolation and hx discontinued 8/17/2018    History of obesity     History of osteopenia     History of sciatica     History of shortness of breath     History of sinusitis     History of sore throat     History of syncope     History of umbilical hernia     History of viral infection     Irritable bowel syndrome (IBS)     Joint pain, hip     Limb pain     Myalgia     myalgia and myositis    Need for prophylactic vaccination against single diseases     Need for prophylactic vaccination and inoculation against influenza     Preoperative cardiovascular examination     Primary osteoarthritis of right knee     Right leg pain     Vaginal Pap smear     reported pap smear       Past Surgical History:   Procedure Laterality Date    ANKLE ARTHRODESIS Right     BACK SURGERY      BARIATRIC SURGERY      CHOLECYSTECTOMY      x2    COLONOSCOPY      ESOPHAGOGASTRODUODENOSCOPY N/A 3/23/2018    Procedure: ESOPHAGOGASTRODUODENOSCOPY (EGD); Surgeon: Mathew Gonsalves MD;  Location: [facility];   Service: Gastroenterology    FOOT SURGERY      HIP CLOSE REDUCTION Right 8/21/2016    Procedure: CLOSED REDUCTION RIGHT TOTAL HIP;  Surgeon: Leidy Westfall MD;  [facility];  Service:    Serenity Delgado / Shaun Patrick / Brownlee Levasy REPLACEMENT      LEFT KNEE REPLACEMENT    JOINT REPLACEMENT      Right HIP    PILONIDAL CYST EXCISION      x2    AR EXC SKIN BENIG <0 5 CM TRUNK,ARM,LEG Right 7/24/2019    Procedure: REVISION SCAR EXTREMITY Rt Hip;   Surgeon: Jenna Loyola MD;  [facility];  Service: Orthopedics    AR REVISE TOTAL HIP REPLACEMENT Right 4/15/2019    Procedure: ARTHROPLASTY HIP TOTAL ACETABULAR REVISION;  Surgeon: Jenna Loyola MD;  [facility];  Service: Orthopedics    REPLACEMENT TOTAL KNEE Right     SILVANA-EN-Y PROCEDURE      x2    TOTAL KNEE ARTHROPLASTY      UMBILICAL HERNIA REPAIR      x2       Current Outpatient Medications   Medication Sig Dispense Refill    ascorbic acid (VITAMIN C) 500 mg tablet Take 1 tablet (500 mg total) by mouth 2 (two) times a day (Patient not taking: Reported on 3/17/2020) 60 tablet 0    cefuroxime (CEFTIN) 500 mg tablet Take 1 tablet (500 mg total) by mouth every 12 (twelve) hours for 5 days 10 tablet 0    cephalexin (KEFLEX) 500 mg capsule Take 500 mg by mouth every 6 (six) hours      Cyanocobalamin (VITAMIN B12 PO) Take 1 capsule by mouth daily       desoximetasone (TOPICORT) 0 25 % cream Apply topically as needed for irritation 30 g 0    dicyclomine (BENTYL) 10 mg capsule Take 1 capsule by mouth every 12 (twelve) hours      DULoxetine (CYMBALTA) 60 mg delayed release capsule TAKE 1 CAPSULE BY MOUTH EVERY DAY 30 capsule 0    fluticasone (FLONASE) 50 mcg/act nasal spray SPRAY 2 SPRAYS INTO EACH NOSTRIL EVERY DAY 16 mL 0    gabapentin (NEURONTIN) 300 mg capsule Take 1 capsule by mouth every 8 (eight) hours      levothyroxine 75 mcg tablet Take 1 tablet (75 mcg total) by mouth daily 30 tablet 5    levothyroxine 75 mcg tablet Take 1 tablet (75 mcg total) by mouth daily 30 tablet 2    methocarbamol (ROBAXIN) 500 mg tablet Take 1 tablet (500 mg total) by mouth daily at bedtime as needed for muscle spasms for up to 3 days (Patient not taking: Reported on 3/17/2020) 30 tablet 0    potassium chloride (K-DUR,KLOR-CON) 20 mEq tablet TAKE 1 TABLET DAILY 30 tablet 11    sucralfate (CARAFATE) 1 g tablet TAKE 1 TABLET BY MOUTH TWICE A DAY 30 tablet 0    torsemide (DEMADEX) 20 mg tablet Take 1 tablet (20 mg total) by mouth daily 90 tablet 3    traMADol (ULTRAM) 50 mg tablet TAKE 1 TABLET (50 MG TOTAL) BY MOUTH EVERY 8 (EIGHT) HOURS AS NEEDED FOR MODERATE PAIN 30 tablet 0    warfarin (COUMADIN) 3 mg tablet TAKE FRIDAY,SATURDAY, SUNDAY INR MONDAY TO FURTHER DOSE  0    warfarin (COUMADIN) 4 mg tablet TAKE ONE TABLET DAILY AS PREVIOUSLY DIRECTED BY CARDIOLOGY 90 tablet 1     No current facility-administered medications for this visit  Allergies   Allergen Reactions    Amoxicillin      Reports nausea, headache, dizzy    Codeine Vomiting and GI Intolerance     GI upset, ana m morphine  Reaction Date: [id number];     Other      Other reaction(s): Other (See Comments)  ana m toradol in OR 6/06 sah    Oxycodone-Acetaminophen      Other reaction(s): Other (See Comments)  GI upset; ana m vicodin    Percocet [Oxycodone-Acetaminophen]     Seasonal Ic  [Cholestatin] Allergic Rhinitis    Trimethoprim     Bactrim [Sulfamethoxazole-Trimethoprim] Rash    Folic Acid-Vit M3-SVY L05 Diarrhea    Sulfa Antibiotics Rash     Other reaction(s): Other (See Comments)  takes lasix @home       Video Exam    Emily Seth appears healthy  Disposition:      I referred Emily Seth to one of our centralized sites for a COVID-19 swab  I spent 10 minutes with the patient during this virtual check-in visit

## 2020-03-25 LAB — SARS-COV-2 RNA SPEC QL NAA+PROBE: NOT DETECTED

## 2020-03-26 ENCOUNTER — CLINICAL SUPPORT (OUTPATIENT)
Dept: INTERNAL MEDICINE CLINIC | Facility: CLINIC | Age: 85
End: 2020-03-26

## 2020-03-26 DIAGNOSIS — M79.7 FIBROMYALGIA: Primary | ICD-10-CM

## 2020-03-26 DIAGNOSIS — Z79.899 POLYPHARMACY: ICD-10-CM

## 2020-03-26 RX ORDER — VENLAFAXINE HYDROCHLORIDE 75 MG/1
75 TABLET, EXTENDED RELEASE ORAL
Qty: 30 TABLET | Refills: 1 | Status: SHIPPED | OUTPATIENT
Start: 2020-03-26 | End: 2020-04-20

## 2020-03-26 NOTE — TELEPHONE ENCOUNTER
Fibromyalgia: no documented trial with venlafaxine, preferred SNRI for patients insurance  Patient reports occasional cost concerns with duloxetine  · If PCP is in agreeance, will convert patient from duloxetine to venlafaxine 75mg daily       Per pharmacist encounter on 03/26/20, pending orders for signature/concurrence from Trina Gómez, DO    Venlafaxine - will d/c duloxetine rx if in agreeance to starting venlafaxine

## 2020-03-26 NOTE — PROGRESS NOTES
Ilya, PharmD, [facility]    Phone appt with patient to review medications    SUBJECTIVE/OBJECTIVE                                                                                                   Medication list reviewed with patient, reports the following discrepancies/problems:   Ascorbic acid: ran out of current supply but plans to restart   Cephalexin: no longer taking as she completed course   Cyanocobalamin: ran out of current supply but plans to restart   Cholecalciferol: has been taking 5000 units daily   Duloxetine: takes for fibromyalgia but reports she will occasionally have cost concerns with affording rx   Fluticasone: only uses ~5days/week as she may forget to take other doses   Methocarbamol: takes PRN, maybe 1x/week   Potassium supplement: patient reports her  also has an rx for KCl and she takes his supply, uncertain what strength his tablets are but was unable to look during phone call, aware of concerns with sharing medications   Sucralfate: takes rx once daily and may only take second dose if needed   Tramadol: usually only takes once daily but will occasionally take a second dose if pain is severe enough but reports that rarely happens    Reports cough is still present and will cough up bits of phlegm, denies taking OTC medications, feels it is slightly improved    ASSESSMENT/PLAN                                                                                                             Patient was counseled on current active medications which include names, dosages, indications and directions for use  Medication list updated to reflect current medications    Fibromyalgia: no documented trial with venlafaxine, preferred SNRI for patients insurance  Patient reports occasional cost concerns with duloxetine  · If PCP is in agreeance, will convert patient from duloxetine to venlafaxine 75mg daily   Will pend rx for PCP signature/concurrence    Follow-Up: will f/u with patient after discussing conversion with PCP    Demographics  Interaction Method: Phone  Type of Intervention: New    Topic(s) Addressed  Polypharmacy    Intervention(s) Made    Pharmacologic:      Medication Conversion - Non-Preferred to Preferred    Tool(s) Used  Payer Portal    Time Spent:   Time Spent in Direct Patient Care: 16-30 Minutes    Time Spent in Care Coordination: 16-30 Minutes    Recommendation(s) Accepted by the Patient/Caregiver:  All Accepted

## 2020-03-29 DIAGNOSIS — R10.13 EPIGASTRIC PAIN: ICD-10-CM

## 2020-03-30 RX ORDER — SUCRALFATE 1 G/1
TABLET ORAL
Qty: 30 TABLET | Refills: 0 | Status: SHIPPED | OUTPATIENT
Start: 2020-03-30 | End: 2020-04-12

## 2020-03-31 ENCOUNTER — REMOTE DEVICE CLINIC VISIT (OUTPATIENT)
Dept: CARDIOLOGY CLINIC | Facility: CLINIC | Age: 85
End: 2020-03-31
Payer: COMMERCIAL

## 2020-03-31 DIAGNOSIS — Z95.0 CARDIAC PACEMAKER: Primary | ICD-10-CM

## 2020-03-31 PROCEDURE — 93294 REM INTERROG EVL PM/LDLS PM: CPT | Performed by: INTERNAL MEDICINE

## 2020-03-31 PROCEDURE — 93296 REM INTERROG EVL PM/IDS: CPT | Performed by: INTERNAL MEDICINE

## 2020-04-12 DIAGNOSIS — R10.13 EPIGASTRIC PAIN: ICD-10-CM

## 2020-04-12 RX ORDER — SUCRALFATE 1 G/1
TABLET ORAL
Qty: 30 TABLET | Refills: 0 | Status: SHIPPED | OUTPATIENT
Start: 2020-04-12 | End: 2020-04-23

## 2020-04-13 ENCOUNTER — ANTICOAG VISIT (OUTPATIENT)
Dept: INTERNAL MEDICINE CLINIC | Facility: CLINIC | Age: 85
End: 2020-04-13

## 2020-04-15 DIAGNOSIS — Z47.1 AFTERCARE FOLLOWING RIGHT HIP JOINT REPLACEMENT SURGERY: Primary | ICD-10-CM

## 2020-04-15 DIAGNOSIS — Z96.641 AFTERCARE FOLLOWING RIGHT HIP JOINT REPLACEMENT SURGERY: Primary | ICD-10-CM

## 2020-04-17 ENCOUNTER — TELEPHONE (OUTPATIENT)
Dept: OBGYN CLINIC | Facility: MEDICAL CENTER | Age: 85
End: 2020-04-17

## 2020-04-17 DIAGNOSIS — G62.9 NEUROPATHY: ICD-10-CM

## 2020-04-17 RX ORDER — TRAMADOL HYDROCHLORIDE 50 MG/1
50 TABLET ORAL 2 TIMES DAILY PRN
Qty: 42 TABLET | Refills: 0 | Status: SHIPPED | OUTPATIENT
Start: 2020-04-17 | End: 2020-07-28 | Stop reason: ALTCHOICE

## 2020-04-20 DIAGNOSIS — M79.7 FIBROMYALGIA: ICD-10-CM

## 2020-04-20 RX ORDER — VENLAFAXINE 75 MG/1
TABLET ORAL
Qty: 30 TABLET | Refills: 1 | Status: SHIPPED | OUTPATIENT
Start: 2020-04-20 | End: 2020-05-15

## 2020-04-23 DIAGNOSIS — R10.13 EPIGASTRIC PAIN: ICD-10-CM

## 2020-04-23 RX ORDER — SUCRALFATE 1 G/1
TABLET ORAL
Qty: 30 TABLET | Refills: 0 | Status: SHIPPED | OUTPATIENT
Start: 2020-04-23 | End: 2020-04-30

## 2020-04-24 ENCOUNTER — TELEPHONE (OUTPATIENT)
Dept: INTERNAL MEDICINE CLINIC | Facility: CLINIC | Age: 85
End: 2020-04-24

## 2020-04-28 ENCOUNTER — ANTICOAG VISIT (OUTPATIENT)
Dept: INTERNAL MEDICINE CLINIC | Facility: CLINIC | Age: 85
End: 2020-04-28

## 2020-04-30 DIAGNOSIS — R10.13 EPIGASTRIC PAIN: ICD-10-CM

## 2020-04-30 RX ORDER — SUCRALFATE 1 G/1
TABLET ORAL
Qty: 30 TABLET | Refills: 0 | Status: SHIPPED | OUTPATIENT
Start: 2020-04-30 | End: 2020-05-11

## 2020-05-01 DIAGNOSIS — Z12.39 BREAST CANCER SCREENING: Primary | ICD-10-CM

## 2020-05-01 DIAGNOSIS — N90.89 VULVAR IRRITATION: ICD-10-CM

## 2020-05-01 RX ORDER — DESOXIMETASONE 2.5 MG/G
CREAM TOPICAL AS NEEDED
Qty: 30 G | Refills: 1 | Status: SHIPPED | OUTPATIENT
Start: 2020-05-01 | End: 2021-03-23 | Stop reason: SDUPTHER

## 2020-05-05 ENCOUNTER — OFFICE VISIT (OUTPATIENT)
Dept: INTERNAL MEDICINE CLINIC | Facility: CLINIC | Age: 85
End: 2020-05-05
Payer: COMMERCIAL

## 2020-05-05 VITALS
BODY MASS INDEX: 34.2 KG/M2 | SYSTOLIC BLOOD PRESSURE: 138 MMHG | RESPIRATION RATE: 16 BRPM | HEIGHT: 60 IN | DIASTOLIC BLOOD PRESSURE: 80 MMHG | WEIGHT: 174.2 LBS | TEMPERATURE: 97.5 F

## 2020-05-05 DIAGNOSIS — E03.9 HYPOTHYROIDISM, UNSPECIFIED TYPE: Primary | ICD-10-CM

## 2020-05-05 DIAGNOSIS — I10 BENIGN ESSENTIAL HYPERTENSION: ICD-10-CM

## 2020-05-05 DIAGNOSIS — R10.32 LEFT LOWER QUADRANT ABDOMINAL PAIN: ICD-10-CM

## 2020-05-05 DIAGNOSIS — I48.20 CHRONIC ATRIAL FIBRILLATION (HCC): ICD-10-CM

## 2020-05-05 DIAGNOSIS — R63.4 WEIGHT LOSS: ICD-10-CM

## 2020-05-05 PROCEDURE — 1160F RVW MEDS BY RX/DR IN RCRD: CPT | Performed by: INTERNAL MEDICINE

## 2020-05-05 PROCEDURE — 3008F BODY MASS INDEX DOCD: CPT | Performed by: INTERNAL MEDICINE

## 2020-05-05 PROCEDURE — 3079F DIAST BP 80-89 MM HG: CPT | Performed by: INTERNAL MEDICINE

## 2020-05-05 PROCEDURE — 1036F TOBACCO NON-USER: CPT | Performed by: INTERNAL MEDICINE

## 2020-05-05 PROCEDURE — 3075F SYST BP GE 130 - 139MM HG: CPT | Performed by: INTERNAL MEDICINE

## 2020-05-05 PROCEDURE — 99214 OFFICE O/P EST MOD 30 MIN: CPT | Performed by: INTERNAL MEDICINE

## 2020-05-05 PROCEDURE — 4040F PNEUMOC VAC/ADMIN/RCVD: CPT | Performed by: INTERNAL MEDICINE

## 2020-05-11 ENCOUNTER — ANTICOAG VISIT (OUTPATIENT)
Dept: INTERNAL MEDICINE CLINIC | Facility: CLINIC | Age: 85
End: 2020-05-11

## 2020-05-11 DIAGNOSIS — R10.13 EPIGASTRIC PAIN: ICD-10-CM

## 2020-05-11 RX ORDER — SUCRALFATE 1 G/1
TABLET ORAL
Qty: 30 TABLET | Refills: 0 | Status: SHIPPED | OUTPATIENT
Start: 2020-05-11 | End: 2020-05-29

## 2020-05-15 ENCOUNTER — ANTICOAG VISIT (OUTPATIENT)
Dept: INTERNAL MEDICINE CLINIC | Facility: CLINIC | Age: 85
End: 2020-05-15

## 2020-05-15 ENCOUNTER — APPOINTMENT (OUTPATIENT)
Dept: LAB | Facility: CLINIC | Age: 85
End: 2020-05-15
Payer: COMMERCIAL

## 2020-05-15 DIAGNOSIS — M79.7 FIBROMYALGIA: ICD-10-CM

## 2020-05-15 DIAGNOSIS — R63.4 WEIGHT LOSS: ICD-10-CM

## 2020-05-15 DIAGNOSIS — E55.9 VITAMIN D DEFICIENCY: ICD-10-CM

## 2020-05-15 LAB — TSH SERPL DL<=0.05 MIU/L-ACNC: 2.08 UIU/ML (ref 0.36–3.74)

## 2020-05-15 PROCEDURE — 84443 ASSAY THYROID STIM HORMONE: CPT

## 2020-05-15 PROCEDURE — 36415 COLL VENOUS BLD VENIPUNCTURE: CPT

## 2020-05-15 RX ORDER — VENLAFAXINE 75 MG/1
TABLET ORAL
Qty: 30 TABLET | Refills: 1 | Status: SHIPPED | OUTPATIENT
Start: 2020-05-15 | End: 2020-05-29

## 2020-05-28 DIAGNOSIS — M79.7 FIBROMYALGIA: ICD-10-CM

## 2020-05-29 DIAGNOSIS — R10.13 EPIGASTRIC PAIN: ICD-10-CM

## 2020-05-29 RX ORDER — VENLAFAXINE HYDROCHLORIDE 75 MG/1
CAPSULE, EXTENDED RELEASE ORAL
Qty: 90 CAPSULE | Refills: 1 | Status: SHIPPED | OUTPATIENT
Start: 2020-05-29 | End: 2020-07-08 | Stop reason: ALTCHOICE

## 2020-05-29 RX ORDER — SUCRALFATE 1 G/1
TABLET ORAL
Qty: 180 TABLET | Refills: 1 | Status: SHIPPED | OUTPATIENT
Start: 2020-05-29 | End: 2020-07-07 | Stop reason: SDUPTHER

## 2020-06-05 ENCOUNTER — ANTICOAG VISIT (OUTPATIENT)
Dept: INTERNAL MEDICINE CLINIC | Facility: CLINIC | Age: 85
End: 2020-06-05

## 2020-06-10 ENCOUNTER — OFFICE VISIT (OUTPATIENT)
Dept: GASTROENTEROLOGY | Facility: CLINIC | Age: 85
End: 2020-06-10
Payer: COMMERCIAL

## 2020-06-10 VITALS
SYSTOLIC BLOOD PRESSURE: 127 MMHG | TEMPERATURE: 98.8 F | DIASTOLIC BLOOD PRESSURE: 77 MMHG | WEIGHT: 174 LBS | HEIGHT: 60 IN | BODY MASS INDEX: 34.16 KG/M2 | HEART RATE: 76 BPM

## 2020-06-10 DIAGNOSIS — R63.4 WEIGHT LOSS: ICD-10-CM

## 2020-06-10 PROCEDURE — 99214 OFFICE O/P EST MOD 30 MIN: CPT | Performed by: INTERNAL MEDICINE

## 2020-06-11 ENCOUNTER — TELEPHONE (OUTPATIENT)
Dept: GASTROENTEROLOGY | Facility: AMBULARY SURGERY CENTER | Age: 85
End: 2020-06-11

## 2020-06-11 ENCOUNTER — ANESTHESIA EVENT (OUTPATIENT)
Dept: GASTROENTEROLOGY | Facility: AMBULARY SURGERY CENTER | Age: 85
End: 2020-06-11

## 2020-06-11 DIAGNOSIS — R63.4 WEIGHT LOSS: Primary | ICD-10-CM

## 2020-06-12 ENCOUNTER — APPOINTMENT (OUTPATIENT)
Dept: LAB | Facility: CLINIC | Age: 85
End: 2020-06-12
Payer: COMMERCIAL

## 2020-06-12 ENCOUNTER — TRANSCRIBE ORDERS (OUTPATIENT)
Dept: LAB | Facility: CLINIC | Age: 85
End: 2020-06-12

## 2020-06-12 ENCOUNTER — TELEPHONE (OUTPATIENT)
Dept: INTERNAL MEDICINE CLINIC | Facility: CLINIC | Age: 85
End: 2020-06-12

## 2020-06-12 ENCOUNTER — ANTICOAG VISIT (OUTPATIENT)
Dept: INTERNAL MEDICINE CLINIC | Facility: CLINIC | Age: 85
End: 2020-06-12

## 2020-06-12 DIAGNOSIS — R79.89 LOW VITAMIN D LEVEL: ICD-10-CM

## 2020-06-12 DIAGNOSIS — Z13.1 SCREENING FOR DIABETES MELLITUS: ICD-10-CM

## 2020-06-12 DIAGNOSIS — R63.4 WEIGHT LOSS: ICD-10-CM

## 2020-06-12 DIAGNOSIS — R63.4 WEIGHT LOSS: Primary | ICD-10-CM

## 2020-06-12 DIAGNOSIS — I48.20 CHRONIC ATRIAL FIBRILLATION (HCC): ICD-10-CM

## 2020-06-12 DIAGNOSIS — E03.9 HYPOTHYROIDISM, UNSPECIFIED TYPE: ICD-10-CM

## 2020-06-12 LAB
25(OH)D3 SERPL-MCNC: 22.1 NG/ML (ref 30–100)
BASOPHILS # BLD AUTO: 0.02 THOUSANDS/ΜL (ref 0–0.1)
BASOPHILS NFR BLD AUTO: 0 % (ref 0–1)
BUN SERPL-MCNC: 15 MG/DL (ref 5–25)
CREAT SERPL-MCNC: 0.82 MG/DL (ref 0.6–1.3)
EOSINOPHIL # BLD AUTO: 0.04 THOUSAND/ΜL (ref 0–0.61)
EOSINOPHIL NFR BLD AUTO: 1 % (ref 0–6)
ERYTHROCYTE [DISTWIDTH] IN BLOOD BY AUTOMATED COUNT: 13.1 % (ref 11.6–15.1)
EST. AVERAGE GLUCOSE BLD GHB EST-MCNC: 108 MG/DL
FERRITIN SERPL-MCNC: 34 NG/ML (ref 8–388)
GFR SERPL CREATININE-BSD FRML MDRD: 65 ML/MIN/1.73SQ M
HBA1C MFR BLD: 5.4 %
HCT VFR BLD AUTO: 37.5 % (ref 34.8–46.1)
HGB BLD-MCNC: 11.6 G/DL (ref 11.5–15.4)
IMM GRANULOCYTES # BLD AUTO: 0.02 THOUSAND/UL (ref 0–0.2)
IMM GRANULOCYTES NFR BLD AUTO: 0 % (ref 0–2)
IRON SATN MFR SERPL: 20 %
IRON SERPL-MCNC: 68 UG/DL (ref 50–170)
LYMPHOCYTES # BLD AUTO: 1.23 THOUSANDS/ΜL (ref 0.6–4.47)
LYMPHOCYTES NFR BLD AUTO: 20 % (ref 14–44)
MCH RBC QN AUTO: 32 PG (ref 26.8–34.3)
MCHC RBC AUTO-ENTMCNC: 30.9 G/DL (ref 31.4–37.4)
MCV RBC AUTO: 104 FL (ref 82–98)
MONOCYTES # BLD AUTO: 0.56 THOUSAND/ΜL (ref 0.17–1.22)
MONOCYTES NFR BLD AUTO: 9 % (ref 4–12)
NEUTROPHILS # BLD AUTO: 4.34 THOUSANDS/ΜL (ref 1.85–7.62)
NEUTS SEG NFR BLD AUTO: 70 % (ref 43–75)
NRBC BLD AUTO-RTO: 0 /100 WBCS
PLATELET # BLD AUTO: 178 THOUSANDS/UL (ref 149–390)
PMV BLD AUTO: 10.4 FL (ref 8.9–12.7)
RBC # BLD AUTO: 3.62 MILLION/UL (ref 3.81–5.12)
TIBC SERPL-MCNC: 347 UG/DL (ref 250–450)
VIT B12 SERPL-MCNC: 677 PG/ML (ref 100–900)
WBC # BLD AUTO: 6.21 THOUSAND/UL (ref 4.31–10.16)

## 2020-06-12 PROCEDURE — U0003 INFECTIOUS AGENT DETECTION BY NUCLEIC ACID (DNA OR RNA); SEVERE ACUTE RESPIRATORY SYNDROME CORONAVIRUS 2 (SARS-COV-2) (CORONAVIRUS DISEASE [COVID-19]), AMPLIFIED PROBE TECHNIQUE, MAKING USE OF HIGH THROUGHPUT TECHNOLOGIES AS DESCRIBED BY CMS-2020-01-R: HCPCS | Performed by: OBSTETRICS & GYNECOLOGY

## 2020-06-12 PROCEDURE — 82565 ASSAY OF CREATININE: CPT

## 2020-06-12 PROCEDURE — 83540 ASSAY OF IRON: CPT

## 2020-06-12 PROCEDURE — 84590 ASSAY OF VITAMIN A: CPT

## 2020-06-12 PROCEDURE — 84207 ASSAY OF VITAMIN B-6: CPT

## 2020-06-12 PROCEDURE — 82728 ASSAY OF FERRITIN: CPT

## 2020-06-12 PROCEDURE — 83036 HEMOGLOBIN GLYCOSYLATED A1C: CPT

## 2020-06-12 PROCEDURE — 85025 COMPLETE CBC W/AUTO DIFF WBC: CPT

## 2020-06-12 PROCEDURE — 83550 IRON BINDING TEST: CPT

## 2020-06-12 PROCEDURE — 36415 COLL VENOUS BLD VENIPUNCTURE: CPT

## 2020-06-12 PROCEDURE — 84520 ASSAY OF UREA NITROGEN: CPT

## 2020-06-12 PROCEDURE — 84425 ASSAY OF VITAMIN B-1: CPT

## 2020-06-12 PROCEDURE — 82306 VITAMIN D 25 HYDROXY: CPT

## 2020-06-12 PROCEDURE — 82607 VITAMIN B-12: CPT

## 2020-06-13 LAB — SARS-COV-2 RNA SPEC QL NAA+PROBE: NOT DETECTED

## 2020-06-15 LAB — VIT B1 BLD-SCNC: 109.6 NMOL/L (ref 66.5–200)

## 2020-06-16 LAB — VIT A SERPL-MCNC: 15.2 UG/DL (ref 22–69.5)

## 2020-06-17 ENCOUNTER — HOSPITAL ENCOUNTER (OUTPATIENT)
Dept: GASTROENTEROLOGY | Facility: AMBULARY SURGERY CENTER | Age: 85
Setting detail: OUTPATIENT SURGERY
Discharge: HOME | End: 2020-06-17
Admitting: STUDENT IN AN ORGANIZED HEALTH CARE EDUCATION/TRAINING PROGRAM
Payer: COMMERCIAL

## 2020-06-17 ENCOUNTER — ANESTHESIA (OUTPATIENT)
Dept: GASTROENTEROLOGY | Facility: AMBULARY SURGERY CENTER | Age: 85
End: 2020-06-17

## 2020-06-17 VITALS
OXYGEN SATURATION: 99 % | WEIGHT: 179 LBS | HEART RATE: 69 BPM | BODY MASS INDEX: 35.14 KG/M2 | DIASTOLIC BLOOD PRESSURE: 65 MMHG | RESPIRATION RATE: 18 BRPM | HEIGHT: 60 IN | SYSTOLIC BLOOD PRESSURE: 156 MMHG | TEMPERATURE: 98 F

## 2020-06-17 DIAGNOSIS — R63.4 WEIGHT LOSS: ICD-10-CM

## 2020-06-17 LAB — VIT B6 SERPL-MCNC: 1.4 UG/L (ref 2–32.8)

## 2020-06-17 PROCEDURE — 45385 COLONOSCOPY W/LESION REMOVAL: CPT | Performed by: INTERNAL MEDICINE

## 2020-06-17 PROCEDURE — 43239 EGD BIOPSY SINGLE/MULTIPLE: CPT | Performed by: INTERNAL MEDICINE

## 2020-06-17 PROCEDURE — 88305 TISSUE EXAM BY PATHOLOGIST: CPT | Performed by: PATHOLOGY

## 2020-06-17 PROCEDURE — NC001 PR NO CHARGE: Performed by: INTERNAL MEDICINE

## 2020-06-17 RX ORDER — SODIUM CHLORIDE, SODIUM LACTATE, POTASSIUM CHLORIDE, CALCIUM CHLORIDE 600; 310; 30; 20 MG/100ML; MG/100ML; MG/100ML; MG/100ML
100 INJECTION, SOLUTION INTRAVENOUS CONTINUOUS
Status: DISCONTINUED | OUTPATIENT
Start: 2020-06-17 | End: 2020-06-21 | Stop reason: HOSPADM

## 2020-06-17 RX ORDER — SODIUM CHLORIDE, SODIUM LACTATE, POTASSIUM CHLORIDE, CALCIUM CHLORIDE 600; 310; 30; 20 MG/100ML; MG/100ML; MG/100ML; MG/100ML
125 INJECTION, SOLUTION INTRAVENOUS CONTINUOUS
Status: DISCONTINUED | OUTPATIENT
Start: 2020-06-17 | End: 2020-06-21 | Stop reason: HOSPADM

## 2020-06-17 RX ORDER — PROPOFOL 10 MG/ML
INJECTION, EMULSION INTRAVENOUS AS NEEDED
Status: DISCONTINUED | OUTPATIENT
Start: 2020-06-17 | End: 2020-06-17 | Stop reason: SURG

## 2020-06-17 RX ORDER — LIDOCAINE HYDROCHLORIDE 10 MG/ML
0.5 INJECTION, SOLUTION EPIDURAL; INFILTRATION; INTRACAUDAL; PERINEURAL ONCE AS NEEDED
Status: DISCONTINUED | OUTPATIENT
Start: 2020-06-17 | End: 2020-06-21 | Stop reason: HOSPADM

## 2020-06-17 RX ORDER — PROPOFOL 10 MG/ML
INJECTION, EMULSION INTRAVENOUS CONTINUOUS PRN
Status: DISCONTINUED | OUTPATIENT
Start: 2020-06-17 | End: 2020-06-17 | Stop reason: SURG

## 2020-06-17 RX ADMIN — PROPOFOL 120 MCG/KG/MIN: 10 INJECTION, EMULSION INTRAVENOUS at 09:10

## 2020-06-17 RX ADMIN — SODIUM CHLORIDE, SODIUM LACTATE, POTASSIUM CHLORIDE, AND CALCIUM CHLORIDE 125 ML/HR: .6; .31; .03; .02 INJECTION, SOLUTION INTRAVENOUS at 08:36

## 2020-06-17 RX ADMIN — Medication 40 MG: at 09:40

## 2020-06-17 RX ADMIN — PROPOFOL 80 MG: 10 INJECTION, EMULSION INTRAVENOUS at 09:10

## 2020-06-17 RX ADMIN — SODIUM CHLORIDE, SODIUM LACTATE, POTASSIUM CHLORIDE, AND CALCIUM CHLORIDE: .6; .31; .03; .02 INJECTION, SOLUTION INTRAVENOUS at 09:04

## 2020-06-24 ENCOUNTER — HOSPITAL ENCOUNTER (OUTPATIENT)
Dept: CT IMAGING | Facility: HOSPITAL | Age: 85
Discharge: HOME | End: 2020-06-24
Payer: COMMERCIAL

## 2020-06-24 DIAGNOSIS — R63.4 WEIGHT LOSS: ICD-10-CM

## 2020-06-24 PROCEDURE — 74177 CT ABD & PELVIS W/CONTRAST: CPT

## 2020-06-24 RX ADMIN — IOHEXOL 100 ML: 350 INJECTION, SOLUTION INTRAVENOUS at 17:52

## 2020-06-30 ENCOUNTER — REMOTE DEVICE CLINIC VISIT (OUTPATIENT)
Dept: CARDIOLOGY CLINIC | Facility: CLINIC | Age: 85
End: 2020-06-30
Payer: COMMERCIAL

## 2020-06-30 DIAGNOSIS — Z95.0 PACEMAKER: Primary | ICD-10-CM

## 2020-06-30 PROCEDURE — 93296 REM INTERROG EVL PM/IDS: CPT | Performed by: INTERNAL MEDICINE

## 2020-06-30 PROCEDURE — 93294 REM INTERROG EVL PM/LDLS PM: CPT | Performed by: INTERNAL MEDICINE

## 2020-07-07 ENCOUNTER — TELEPHONE (OUTPATIENT)
Dept: INTERNAL MEDICINE CLINIC | Facility: CLINIC | Age: 85
End: 2020-07-07

## 2020-07-07 DIAGNOSIS — R10.13 EPIGASTRIC PAIN: ICD-10-CM

## 2020-07-07 RX ORDER — SUCRALFATE 1 G/1
1 TABLET ORAL 2 TIMES DAILY
Qty: 180 TABLET | Refills: 1 | Status: SHIPPED | OUTPATIENT
Start: 2020-07-07 | End: 2021-01-05

## 2020-07-07 NOTE — TELEPHONE ENCOUNTER
Patient is asking if she can go back on the Duloxetine  Patient took the venlafaxine but doesn't feel like it worked for her    Asking if you can send a script for the duloxetine to [facility]       Please advise

## 2020-07-08 ENCOUNTER — ANTICOAG VISIT (OUTPATIENT)
Dept: INTERNAL MEDICINE CLINIC | Facility: CLINIC | Age: 85
End: 2020-07-08

## 2020-07-08 DIAGNOSIS — G89.29 OTHER CHRONIC PAIN: ICD-10-CM

## 2020-07-08 DIAGNOSIS — M54.50 LOW BACK PAIN: ICD-10-CM

## 2020-07-08 RX ORDER — DULOXETIN HYDROCHLORIDE 60 MG/1
60 CAPSULE, DELAYED RELEASE ORAL DAILY
Qty: 30 CAPSULE | Refills: 5 | Status: SHIPPED | OUTPATIENT
Start: 2020-07-08 | End: 2020-11-06

## 2020-07-08 RX ORDER — METHOCARBAMOL 500 MG/1
500 TABLET, FILM COATED ORAL
Qty: 30 TABLET | Refills: 0 | Status: SHIPPED | OUTPATIENT
Start: 2020-07-08 | End: 2020-08-04 | Stop reason: HOSPADM

## 2020-07-08 NOTE — TELEPHONE ENCOUNTER
Pt called back to check on this  She said she was taking 60 mg of the duloxetine  Please send to [facility] today

## 2020-07-22 ENCOUNTER — ANTICOAG VISIT (OUTPATIENT)
Dept: INTERNAL MEDICINE CLINIC | Facility: CLINIC | Age: 85
End: 2020-07-22

## 2020-07-28 ENCOUNTER — APPOINTMENT (EMERGENCY)
Dept: RADIOLOGY | Facility: HOSPITAL | Age: 85
DRG: 558 | End: 2020-07-28
Payer: COMMERCIAL

## 2020-07-28 ENCOUNTER — HOSPITAL ENCOUNTER (INPATIENT)
Facility: HOSPITAL | Age: 85
LOS: 5 days | Discharge: HOME HEALTH | DRG: 558 | End: 2020-08-04
Attending: EMERGENCY MEDICINE | Admitting: INTERNAL MEDICINE
Payer: COMMERCIAL

## 2020-07-28 DIAGNOSIS — R26.2 AMBULATORY DYSFUNCTION: ICD-10-CM

## 2020-07-28 DIAGNOSIS — Z96.641 STATUS POST RIGHT HIP REPLACEMENT: ICD-10-CM

## 2020-07-28 DIAGNOSIS — M54.31 SCIATICA OF RIGHT SIDE: Primary | ICD-10-CM

## 2020-07-28 DIAGNOSIS — M25.551 RIGHT HIP PAIN: ICD-10-CM

## 2020-07-28 DIAGNOSIS — M70.60 TROCHANTERIC BURSITIS: ICD-10-CM

## 2020-07-28 PROBLEM — E87.6 HYPOKALEMIA: Status: ACTIVE | Noted: 2020-07-28

## 2020-07-28 PROBLEM — D72.829 LEUKOCYTOSIS: Status: ACTIVE | Noted: 2020-07-28

## 2020-07-28 LAB
ALBUMIN SERPL BCP-MCNC: 3 G/DL (ref 3.5–5)
ALP SERPL-CCNC: 85 U/L (ref 46–116)
ALT SERPL W P-5'-P-CCNC: 20 U/L (ref 12–78)
ANION GAP SERPL CALCULATED.3IONS-SCNC: 11 MMOL/L (ref 4–13)
AST SERPL W P-5'-P-CCNC: 23 U/L (ref 5–45)
BASOPHILS # BLD AUTO: 0.02 THOUSANDS/ΜL (ref 0–0.1)
BASOPHILS NFR BLD AUTO: 0 % (ref 0–1)
BILIRUB SERPL-MCNC: 0.81 MG/DL (ref 0.2–1)
BUN SERPL-MCNC: 11 MG/DL (ref 5–25)
CALCIUM SERPL-MCNC: 8.3 MG/DL (ref 8.3–10.1)
CHLORIDE SERPL-SCNC: 103 MMOL/L (ref 100–108)
CO2 SERPL-SCNC: 23 MMOL/L (ref 21–32)
CREAT SERPL-MCNC: 0.8 MG/DL (ref 0.6–1.3)
EOSINOPHIL # BLD AUTO: 0 THOUSAND/ΜL (ref 0–0.61)
EOSINOPHIL NFR BLD AUTO: 0 % (ref 0–6)
ERYTHROCYTE [DISTWIDTH] IN BLOOD BY AUTOMATED COUNT: 13.1 % (ref 11.6–15.1)
GFR SERPL CREATININE-BSD FRML MDRD: 67 ML/MIN/1.73SQ M
GLUCOSE P FAST SERPL-MCNC: 105 MG/DL (ref 65–99)
GLUCOSE SERPL-MCNC: 105 MG/DL (ref 65–140)
HCT VFR BLD AUTO: 37.3 % (ref 34.8–46.1)
HGB BLD-MCNC: 11.9 G/DL (ref 11.5–15.4)
IMM GRANULOCYTES # BLD AUTO: 0.05 THOUSAND/UL (ref 0–0.2)
IMM GRANULOCYTES NFR BLD AUTO: 0 % (ref 0–2)
INR PPP: 1.64 (ref 0.84–1.19)
LYMPHOCYTES # BLD AUTO: 0.92 THOUSANDS/ΜL (ref 0.6–4.47)
LYMPHOCYTES NFR BLD AUTO: 7 % (ref 14–44)
MCH RBC QN AUTO: 32.2 PG (ref 26.8–34.3)
MCHC RBC AUTO-ENTMCNC: 31.9 G/DL (ref 31.4–37.4)
MCV RBC AUTO: 101 FL (ref 82–98)
MONOCYTES # BLD AUTO: 1.05 THOUSAND/ΜL (ref 0.17–1.22)
MONOCYTES NFR BLD AUTO: 8 % (ref 4–12)
NEUTROPHILS # BLD AUTO: 10.5 THOUSANDS/ΜL (ref 1.85–7.62)
NEUTS SEG NFR BLD AUTO: 85 % (ref 43–75)
NRBC BLD AUTO-RTO: 0 /100 WBCS
PLATELET # BLD AUTO: 131 THOUSANDS/UL (ref 149–390)
PMV BLD AUTO: 10.4 FL (ref 8.9–12.7)
POTASSIUM SERPL-SCNC: 3.2 MMOL/L (ref 3.5–5.3)
PROT SERPL-MCNC: 6.6 G/DL (ref 6.4–8.2)
PROTHROMBIN TIME: 18.6 SECONDS (ref 11.6–14.5)
RBC # BLD AUTO: 3.7 MILLION/UL (ref 3.81–5.12)
SODIUM SERPL-SCNC: 137 MMOL/L (ref 136–145)
WBC # BLD AUTO: 12.54 THOUSAND/UL (ref 4.31–10.16)

## 2020-07-28 PROCEDURE — 36415 COLL VENOUS BLD VENIPUNCTURE: CPT | Performed by: EMERGENCY MEDICINE

## 2020-07-28 PROCEDURE — 80053 COMPREHEN METABOLIC PANEL: CPT | Performed by: EMERGENCY MEDICINE

## 2020-07-28 PROCEDURE — 99220 PR INITIAL OBSERVATION CARE/DAY 70 MINUTES: CPT | Performed by: INTERNAL MEDICINE

## 2020-07-28 PROCEDURE — 73502 X-RAY EXAM HIP UNI 2-3 VIEWS: CPT

## 2020-07-28 PROCEDURE — 85025 COMPLETE CBC W/AUTO DIFF WBC: CPT | Performed by: EMERGENCY MEDICINE

## 2020-07-28 PROCEDURE — 99285 EMERGENCY DEPT VISIT HI MDM: CPT

## 2020-07-28 PROCEDURE — 85610 PROTHROMBIN TIME: CPT | Performed by: EMERGENCY MEDICINE

## 2020-07-28 PROCEDURE — 72100 X-RAY EXAM L-S SPINE 2/3 VWS: CPT

## 2020-07-28 PROCEDURE — 96374 THER/PROPH/DIAG INJ IV PUSH: CPT

## 2020-07-28 RX ORDER — METHOCARBAMOL 500 MG/1
500 TABLET, FILM COATED ORAL EVERY 8 HOURS SCHEDULED
Status: DISCONTINUED | OUTPATIENT
Start: 2020-07-28 | End: 2020-07-29

## 2020-07-28 RX ORDER — LIDOCAINE 50 MG/G
1 PATCH TOPICAL DAILY
Status: DISCONTINUED | OUTPATIENT
Start: 2020-07-28 | End: 2020-07-29

## 2020-07-28 RX ORDER — MORPHINE SULFATE 4 MG/ML
4 INJECTION, SOLUTION INTRAMUSCULAR; INTRAVENOUS ONCE
Status: COMPLETED | OUTPATIENT
Start: 2020-07-28 | End: 2020-07-28

## 2020-07-28 RX ORDER — WARFARIN SODIUM 4 MG/1
4 TABLET ORAL
Status: DISCONTINUED | OUTPATIENT
Start: 2020-07-28 | End: 2020-08-01

## 2020-07-28 RX ORDER — POTASSIUM CHLORIDE 20 MEQ/1
40 TABLET, EXTENDED RELEASE ORAL ONCE
Status: COMPLETED | OUTPATIENT
Start: 2020-07-28 | End: 2020-07-28

## 2020-07-28 RX ORDER — FLUTICASONE PROPIONATE 50 MCG
1 SPRAY, SUSPENSION (ML) NASAL DAILY
Status: DISCONTINUED | OUTPATIENT
Start: 2020-07-29 | End: 2020-08-04 | Stop reason: HOSPADM

## 2020-07-28 RX ORDER — POLYETHYLENE GLYCOL 3350 17 G/17G
17 POWDER, FOR SOLUTION ORAL DAILY PRN
Status: DISCONTINUED | OUTPATIENT
Start: 2020-07-28 | End: 2020-08-04 | Stop reason: HOSPADM

## 2020-07-28 RX ORDER — SUCRALFATE 1 G/1
1 TABLET ORAL 2 TIMES DAILY
Status: DISCONTINUED | OUTPATIENT
Start: 2020-07-28 | End: 2020-08-04 | Stop reason: HOSPADM

## 2020-07-28 RX ORDER — ASCORBIC ACID 500 MG
500 TABLET ORAL DAILY
Status: DISCONTINUED | OUTPATIENT
Start: 2020-07-29 | End: 2020-08-04 | Stop reason: HOSPADM

## 2020-07-28 RX ORDER — TRAMADOL HYDROCHLORIDE 50 MG/1
50 TABLET ORAL EVERY 6 HOURS PRN
Status: DISCONTINUED | OUTPATIENT
Start: 2020-07-28 | End: 2020-07-28

## 2020-07-28 RX ORDER — TORSEMIDE 20 MG/1
20 TABLET ORAL DAILY
Status: DISCONTINUED | OUTPATIENT
Start: 2020-07-29 | End: 2020-07-30

## 2020-07-28 RX ORDER — ONDANSETRON 2 MG/ML
4 INJECTION INTRAMUSCULAR; INTRAVENOUS EVERY 6 HOURS PRN
Status: DISCONTINUED | OUTPATIENT
Start: 2020-07-28 | End: 2020-08-04 | Stop reason: HOSPADM

## 2020-07-28 RX ORDER — SENNOSIDES 8.6 MG
1 TABLET ORAL
Status: DISCONTINUED | OUTPATIENT
Start: 2020-07-28 | End: 2020-08-02

## 2020-07-28 RX ORDER — DULOXETIN HYDROCHLORIDE 30 MG/1
60 CAPSULE, DELAYED RELEASE ORAL DAILY
Status: DISCONTINUED | OUTPATIENT
Start: 2020-07-29 | End: 2020-08-04 | Stop reason: HOSPADM

## 2020-07-28 RX ORDER — DICYCLOMINE HYDROCHLORIDE 10 MG/1
10 CAPSULE ORAL EVERY 12 HOURS
Status: DISCONTINUED | OUTPATIENT
Start: 2020-07-28 | End: 2020-08-04 | Stop reason: HOSPADM

## 2020-07-28 RX ORDER — LEVOTHYROXINE SODIUM 0.07 MG/1
75 TABLET ORAL
Status: DISCONTINUED | OUTPATIENT
Start: 2020-07-29 | End: 2020-08-04 | Stop reason: HOSPADM

## 2020-07-28 RX ORDER — ACETAMINOPHEN 325 MG/1
975 TABLET ORAL EVERY 8 HOURS SCHEDULED
Status: DISCONTINUED | OUTPATIENT
Start: 2020-07-28 | End: 2020-08-04 | Stop reason: HOSPADM

## 2020-07-28 RX ORDER — POTASSIUM CHLORIDE 20 MEQ/1
20 TABLET, EXTENDED RELEASE ORAL DAILY
Status: DISCONTINUED | OUTPATIENT
Start: 2020-07-29 | End: 2020-08-02

## 2020-07-28 RX ORDER — DOCUSATE SODIUM 100 MG/1
100 CAPSULE, LIQUID FILLED ORAL 2 TIMES DAILY
Status: DISCONTINUED | OUTPATIENT
Start: 2020-07-28 | End: 2020-08-04 | Stop reason: HOSPADM

## 2020-07-28 RX ADMIN — MORPHINE SULFATE 4 MG: 4 INJECTION INTRAVENOUS at 12:05

## 2020-07-28 RX ADMIN — SUCRALFATE 1 G: 1 TABLET ORAL at 17:36

## 2020-07-28 RX ADMIN — METHOCARBAMOL TABLETS 500 MG: 500 TABLET, COATED ORAL at 22:20

## 2020-07-28 RX ADMIN — POTASSIUM CHLORIDE 40 MEQ: 1500 TABLET, EXTENDED RELEASE ORAL at 15:31

## 2020-07-28 RX ADMIN — DICYCLOMINE HYDROCHLORIDE 10 MG: 10 CAPSULE ORAL at 17:35

## 2020-07-28 RX ADMIN — METHOCARBAMOL TABLETS 500 MG: 500 TABLET, COATED ORAL at 15:32

## 2020-07-28 RX ADMIN — WARFARIN SODIUM 4 MG: 4 TABLET ORAL at 17:36

## 2020-07-28 RX ADMIN — ACETAMINOPHEN 975 MG: 325 TABLET, FILM COATED ORAL at 22:20

## 2020-07-28 RX ADMIN — LIDOCAINE 1 PATCH: 50 PATCH TOPICAL at 15:30

## 2020-07-28 RX ADMIN — ACETAMINOPHEN 975 MG: 325 TABLET, FILM COATED ORAL at 15:31

## 2020-07-28 NOTE — PLAN OF CARE
Problem: Potential for Falls  Goal: Patient will remain free of falls  Description  INTERVENTIONS:  - Assess patient frequently for physical needs  -  Identify cognitive and physical deficits and behaviors that affect risk of falls    -  [place] fall precautions as indicated by assessment   - Educate patient/family on patient safety including physical limitations  - Instruct patient to call for assistance with activity based on assessment  - Modify environment to reduce risk of injury  - Consider OT/PT consult to assist with strengthening/mobility  Outcome: Progressing     Problem: Prexisting or High Potential for Compromised Skin Integrity  Goal: Skin integrity is maintained or improved  Description  INTERVENTIONS:  - Identify patients at risk for skin breakdown  - Assess and monitor skin integrity  - Assess and monitor nutrition and hydration status  - Monitor labs   - Assess for incontinence   - Turn and reposition patient  - Assist with mobility/ambulation  - Relieve pressure over bony prominences  - Avoid friction and shearing  - Provide appropriate hygiene as needed including keeping skin clean and dry  - Evaluate need for skin moisturizer/barrier cream  - Collaborate with interdisciplinary team   - Patient/family teaching  - Consider wound care consult   Outcome: Progressing     Problem: PAIN - ADULT  Goal: Verbalizes/displays adequate comfort level or baseline comfort level  Description  Interventions:  - Encourage patient to monitor pain and request assistance  - Assess pain using appropriate pain scale  - Administer analgesics based on type and severity of pain and evaluate response  - Implement non-pharmacological measures as appropriate and evaluate response  - Consider cultural and social influences on pain and pain management  - Notify physician/advanced practitioner if interventions unsuccessful or patient reports new pain  Outcome: Progressing     Problem: INFECTION - ADULT  Goal: Absence or prevention of progression during hospitalization  Description  INTERVENTIONS:  - Assess and monitor for signs and symptoms of infection  - Monitor lab/diagnostic results  - Monitor all insertion sites, i e  indwelling lines, tubes, and drains  - Monitor endotracheal if appropriate and nasal secretions for changes in amount and color  - [place] appropriate cooling/warming therapies per order  - Administer medications as ordered  - Instruct and encourage patient and family to use good hand hygiene technique  - Identify and instruct in appropriate isolation precautions for identified infection/condition  Outcome: Progressing  Goal: Absence of fever/infection during neutropenic period  Description  INTERVENTIONS:  - Monitor WBC    Outcome: Progressing     Problem: SAFETY ADULT  Goal: Maintain or return to baseline ADL function  Description  INTERVENTIONS:  -  Assess patient's ability to carry out ADLs; assess patient's baseline for ADL function and identify physical deficits which impact ability to perform ADLs (bathing, care of mouth/teeth, toileting, grooming, dressing, etc )  - Assess/evaluate cause of self-care deficits   - Assess range of motion  - Assess patient's mobility; develop plan if impaired  - Assess patient's need for assistive devices and provide as appropriate  - Encourage maximum independence but intervene and supervise when necessary  - Involve family in performance of ADLs  - Assess for home care needs following discharge   - Consider OT consult to assist with ADL evaluation and planning for discharge  - Provide patient education as appropriate  Outcome: Progressing  Goal: Maintain or return mobility status to optimal level  Description  INTERVENTIONS:  - Assess patient's baseline mobility status (ambulation, transfers, stairs, etc )    - Identify cognitive and physical deficits and behaviors that affect mobility  - Identify mobility aids required to assist with transfers and/or ambulation (gait belt, sit-to-stand, lift, walker, cane, etc )  - [place] fall precautions as indicated by assessment  - Record patient progress and toleration of activity level on Mobility SBAR; progress patient to next Phase/Stage  - Instruct patient to call for assistance with activity based on assessment  - Consider rehabilitation consult to assist with strengthening/weightbearing, etc   Outcome: Progressing

## 2020-07-28 NOTE — ASSESSMENT & PLAN NOTE
Outbound call to patient. Name and  verified. Notified patient that she could discuss request with provider at her ed follow up today. Patient verbalized understanding and appreciative of return call. Replete K  · AM BMP

## 2020-07-28 NOTE — ASSESSMENT & PLAN NOTE
Wt Readings from Last 3 Encounters:   07/28/20 78 kg (172 lb)   06/17/20 81 2 kg (179 lb)   06/10/20 78 9 kg (174 lb)       Appears euvolemic, on demadex 20 mg daily  · Monitor volume status and renal function   · Outpatient cardiology f/u

## 2020-07-28 NOTE — ASSESSMENT & PLAN NOTE
On warfarin 4 mg daily; does not appear to be on any rate control medications; hx of ablation  · Previously saw Dr Genesis Segal  · Monitor INR, slightly subtherapeutic

## 2020-07-28 NOTE — ASSESSMENT & PLAN NOTE
· Possibly reactive in the setting of pain, no identifiable infectious source, continue to monitor CBC

## 2020-07-28 NOTE — ED NOTES
Attempted to ambulate pt, unable to move the R leg without excruciating pain  Assisted to bedpan with great difficulty and pain to the R leg/pelvis  Could not lift herself or roll        Kyara Blevins RN  07/28/20 [id number]

## 2020-07-28 NOTE — ASSESSMENT & PLAN NOTE
Patient presents with acute on chronic R hip pain that started acutely 2 days ago without any injury/trauma  Has been unable to ambulate  · Has hx of R hip replacement 4/2019 and R scar revision 7/2019 with Dr Desiree Reis  · Lumbar spine xray negative for acute findings, R hip/pelvis xray shows increased lucency surrounding acetabular cup raising possibility of particle disease and/or hardware loosening  No acute periprosthetic fractures are identified  · Consult orthopedics for input, may require additional imaging? · Pain control with scheduled tylenol 975 mg q8h, lidoderm patch, aqua K pad, robaxin  Add gabapentin  Pt with codeine allergy but reports she tolerates morphine?   · PT/OT

## 2020-07-29 LAB
ANION GAP SERPL CALCULATED.3IONS-SCNC: 6 MMOL/L (ref 4–13)
BUN SERPL-MCNC: 14 MG/DL (ref 5–25)
CALCIUM SERPL-MCNC: 8.4 MG/DL (ref 8.3–10.1)
CHLORIDE SERPL-SCNC: 108 MMOL/L (ref 100–108)
CO2 SERPL-SCNC: 25 MMOL/L (ref 21–32)
CREAT SERPL-MCNC: 0.75 MG/DL (ref 0.6–1.3)
GFR SERPL CREATININE-BSD FRML MDRD: 72 ML/MIN/1.73SQ M
GLUCOSE SERPL-MCNC: 85 MG/DL (ref 65–140)
INR PPP: 1.66 (ref 0.84–1.19)
POTASSIUM SERPL-SCNC: 3.9 MMOL/L (ref 3.5–5.3)
PROTHROMBIN TIME: 18.8 SECONDS (ref 11.6–14.5)
SODIUM SERPL-SCNC: 139 MMOL/L (ref 136–145)

## 2020-07-29 PROCEDURE — 97112 NEUROMUSCULAR REEDUCATION: CPT

## 2020-07-29 PROCEDURE — 80048 BASIC METABOLIC PNL TOTAL CA: CPT | Performed by: FAMILY MEDICINE

## 2020-07-29 PROCEDURE — 97163 PT EVAL HIGH COMPLEX 45 MIN: CPT

## 2020-07-29 PROCEDURE — 97167 OT EVAL HIGH COMPLEX 60 MIN: CPT

## 2020-07-29 PROCEDURE — 85610 PROTHROMBIN TIME: CPT | Performed by: FAMILY MEDICINE

## 2020-07-29 PROCEDURE — 99225 PR SBSQ OBSERVATION CARE/DAY 25 MINUTES: CPT | Performed by: FAMILY MEDICINE

## 2020-07-29 PROCEDURE — 97535 SELF CARE MNGMENT TRAINING: CPT

## 2020-07-29 PROCEDURE — 99204 OFFICE O/P NEW MOD 45 MIN: CPT | Performed by: PHYSICIAN ASSISTANT

## 2020-07-29 RX ORDER — METHOCARBAMOL 750 MG/1
750 TABLET, FILM COATED ORAL EVERY 8 HOURS SCHEDULED
Status: DISCONTINUED | OUTPATIENT
Start: 2020-07-29 | End: 2020-08-04 | Stop reason: HOSPADM

## 2020-07-29 RX ORDER — LIDOCAINE 50 MG/G
2 PATCH TOPICAL DAILY
Status: DISCONTINUED | OUTPATIENT
Start: 2020-07-29 | End: 2020-08-04 | Stop reason: HOSPADM

## 2020-07-29 RX ADMIN — ACETAMINOPHEN 975 MG: 325 TABLET, FILM COATED ORAL at 22:01

## 2020-07-29 RX ADMIN — DICYCLOMINE HYDROCHLORIDE 10 MG: 10 CAPSULE ORAL at 05:45

## 2020-07-29 RX ADMIN — POTASSIUM CHLORIDE 20 MEQ: 1500 TABLET, EXTENDED RELEASE ORAL at 08:20

## 2020-07-29 RX ADMIN — SUCRALFATE 1 G: 1 TABLET ORAL at 17:30

## 2020-07-29 RX ADMIN — LIDOCAINE 2 PATCH: 50 PATCH CUTANEOUS at 08:22

## 2020-07-29 RX ADMIN — VITAM B12 100 MCG: 100 TAB at 08:21

## 2020-07-29 RX ADMIN — LEVOTHYROXINE SODIUM 75 MCG: 75 TABLET ORAL at 05:45

## 2020-07-29 RX ADMIN — DULOXETINE HYDROCHLORIDE 60 MG: 30 CAPSULE, DELAYED RELEASE ORAL at 08:20

## 2020-07-29 RX ADMIN — DICYCLOMINE HYDROCHLORIDE 10 MG: 10 CAPSULE ORAL at 17:29

## 2020-07-29 RX ADMIN — SUCRALFATE 1 G: 1 TABLET ORAL at 08:21

## 2020-07-29 RX ADMIN — ACETAMINOPHEN 975 MG: 325 TABLET, FILM COATED ORAL at 13:24

## 2020-07-29 RX ADMIN — ACETAMINOPHEN 975 MG: 325 TABLET, FILM COATED ORAL at 05:45

## 2020-07-29 RX ADMIN — MORPHINE SULFATE 2 MG: 2 INJECTION, SOLUTION INTRAMUSCULAR; INTRAVENOUS at 13:24

## 2020-07-29 RX ADMIN — WARFARIN SODIUM 4 MG: 4 TABLET ORAL at 17:30

## 2020-07-29 RX ADMIN — METHOCARBAMOL TABLETS 750 MG: 750 TABLET, COATED ORAL at 13:24

## 2020-07-29 RX ADMIN — METHOCARBAMOL TABLETS 750 MG: 750 TABLET, COATED ORAL at 22:01

## 2020-07-29 RX ADMIN — METHOCARBAMOL TABLETS 500 MG: 500 TABLET, COATED ORAL at 05:46

## 2020-07-29 RX ADMIN — OXYCODONE HYDROCHLORIDE AND ACETAMINOPHEN 500 MG: 500 TABLET ORAL at 08:21

## 2020-07-29 NOTE — ASSESSMENT & PLAN NOTE
On warfarin 4 mg daily; does not appear to be on any rate control medications; hx of ablation  · Previously saw Dr Tristin Hawk  · Monitor INR, slightly subtherapeutic  Will also need to follow-up on orthopedic recommendations whether the patient needs any kind of surgical intervention because Coumadin will need to be held in that case

## 2020-07-29 NOTE — PHYSICAL THERAPY NOTE
PHYSICAL THERAPY EVALUATION NOTE    Patient Name: Nikole Umanzor  FUSWB'U Date: 7/29/2020  AGE:   80 y o  Mrn:   [id number]  ADMIT DX:  Right hip pain [M25 551]  Hip pain, right [M25 551]  Sciatica of right side [M54 31]  Status post right hip replacement [Z96 641]  Ambulatory dysfunction [R26 2]    Past Medical History:   Diagnosis Date    Abdominal bloating 8/1/2016    Anemia     Arthritis     Cancer ([address] )     basal cell    CHF (congestive heart failure) (HCC)     Disease of thyroid gland     Disturbance of smell and taste     disturbance of taste    Effusion of knee joint right     Fibromyalgia     Heart murmur     reported a previous heart murmur    History of acute myocardial infarction     History of atrial fibrillation     History of bruising easily     History of cancer     History of dermatitis     History of mammogram     History of methicillin resistant staphylococcus aureus (MRSA)     Negative nasal culture, isolation discontinued 8/17/2018      History of methicillin resistant staphylococcus aureus (MRSA) 08/17/2018    negative nasal culture-isolation and hx discontinued 8/17/2018    History of obesity     History of osteopenia     History of sciatica     History of shortness of breath     History of sinusitis     History of sore throat     History of syncope     History of umbilical hernia     History of viral infection     Irritable bowel syndrome (IBS)     Joint pain, hip     Limb pain     Myalgia     myalgia and myositis    Need for prophylactic vaccination against single diseases     Need for prophylactic vaccination and inoculation against influenza     Preoperative cardiovascular examination     Primary osteoarthritis of right knee     Right leg pain     Vaginal Pap smear     reported pap smear     Length Of Stay: 0  PHYSICAL THERAPY EVALUATION :    07/29/20 1209   Pain Assessment   Pain Assessment Tool 0-10   Pain Score   (at rest, 10/10 w/ movement)   Pain Location/Orientation Orientation: Right;Location: Hip   Home Living   Type of [address] [facility] [address] level; Other (Comment)  (1+1 JERRELL)   Additional Comments lives w/   ambulates w/ rollator  owns cane, shower chair and elevated toilet  independent w/ ADLs and driving  no falls in last 6 months  pt is caregiver to   Prior Function   Comments pt seen supine in bed  agreed to participate in PT eval  c/o right hip pain  pt wants to back and cook without pain  Restrictions/Precautions   RLE Weight Bearing Per Order WBAT   Other Precautions Chair Alarm; Bed Alarm;Pain;Multiple lines; Fall Risk   General   Family/Caregiver Present Yes   Cognition   Arousal/Participation Cooperative   Attention Within functional limits   Orientation Level Oriented to person; Other (Comment)  (pt was identified w/ full name, birth date)   Following Commands Follows all commands and directions without difficulty   RUE Assessment   RUE Assessment WFL   LUE Assessment   LUE Assessment WFL   RLE Assessment   RLE Assessment X  (hip flex 2+/5 ext 3-/5, knee flex 2+/5 ext 3-/5, ankle 2+/5)   LLE Assessment   LLE Assessment WFL  (3+/5)   Light Touch   RLE Light Touch Grossly intact   LLE Light Touch Grossly intact   Bed Mobility   Supine to Sit 2  Maximal assistance   Additional items Assist x 2;HOB elevated; Bedrails; Increased time required;Verbal cues;LE management  (for railing use, trunk/LE positioning, breathing technique)   Transfers   Sit to Stand 3  Moderate assistance   Additional items Assist x 2; Increased time required   Stand to Sit 3  Moderate assistance   Additional items Increased time required   Stand pivot 3  Moderate assistance  (edge of bed to bedside commode)   Additional items Assist x 2; Increased time required   Ambulation/Elevation   Gait pattern Not appropriate   Assistive Device Rolling walker   Balance   Static Sitting [place] -   Static Standing Zero  (w/ roller walker)   Ambulatory Zero   Activity Tolerance   Activity Tolerance Patient limited by fatigue;Patient limited by pain   Nurse Made Aware spoke to [employer] Eunice GRUBBS Janelle OT   Assessment   Prognosis Fair   Problem List Decreased strength;Decreased range of motion;Decreased endurance; Impaired balance;Decreased mobility; Decreased safety awareness;Pain   Assessment Pt presents with sharp constant right posterior hip pain  Dx: right greater trochanteric bursitis, leukocytosis, hypokalemia, and ambulatory dysfunction  order placed for PT eval and tx, w/ activity order of up w/ A and WBAT R LE  pt presents w/ comorbidities of fibromyalgia, a-fib, HTN, CHF, mitral regurgitation, PPM, anemia, MI, obesity, syncope, bilateral TKR, and right THR w/ revision and personal factors of advanced age, mobilizing w/ assistive device, stair(s) to enter home and caregiver role to   pt presents w/ pain, weakness, decreased ROM, decreased endurance, impaired balance, decreased safety awareness and fall risk  these impairments are evident in findings from physical examination (weakness and decreased ROM), mobility assessment (need for assist x2 for bed mobility and transfers, inability to ambulate, need for input for mobility and breathing technique), and Barthel Index: 40/100  pt needed input for task focus and mobility technique/safety  pt is at risk for falls due to physical and safety awareness deficits  pt's clinical presentation is unstable/unpredictable (evident in need for assist x2 w/ all phases of mobility when usually mobilizing independently, pain impacting overall mobility status and need for input for task focus and mobility technique/safety)  pt needs inpatient PT tx to improve mobility deficits  discharge recommendation is for inpatient rehab to reduce fall risk and maximize level of functional independence   Pt would benefit from Pain Management consult to address pain control  Goals   Patient Goals back and cook without pain   STG Expiration Date 08/08/20   Short Term Goal #1 pt will: Increase bilateral LE strength 1/2 grade to facilitate independent mobility, Perform all bed mobility tasks w/ supervision to decrease fall risk factors, Perform all transfers w/ minx1 to improve independence, Ambulate 100 ft  with roller walker w/ minx1 w/o LOB to facilitate return to activities like cooking and baking, Increase all balance 1 grade to decrease risk for falls, Complete exercise program independently to improve strength and endurance, Tolerate 3 hr OOB to faciliate upright tolerance and Improve Barthel Index score to 65 or greater to facilitate independence   Plan   Treatment/Interventions Functional transfer training;LE strengthening/ROM; Therapeutic exercise; Endurance training;Cognitive reorientation;Patient/family training;Equipment eval/education; Bed mobility;Gait training   PT Frequency 5x/wk   Recommendation   PT Discharge Recommendation Post-Acute Rehabilitation Services   Equipment Recommended Walker   Additional Comments Pt would benefit from Pain Management consult to address pain control  Barthel Index   Feeding 10   Bathing 0   Grooming Score 5   Dressing Score 5   Bladder Score 0   Bowels Score 10   Toilet Use Score 5   Transfers (Bed/Chair) Score 5   Mobility (Level Surface) Score 0   Stairs Score 0   Barthel Index Score 40     Skilled PT recommended while in hospital and upon DC to progress pt toward treatment goals       Medardo Vogt, PT

## 2020-07-29 NOTE — ASSESSMENT & PLAN NOTE
Secondary to hip pain  Continue with pain control  Orthopedic evaluation also pending    Further imaging per them  · Pain control  · Fall precautions   · PT/OT

## 2020-07-29 NOTE — PLAN OF CARE
Problem: OCCUPATIONAL THERAPY ADULT  Goal: Performs self-care activities at highest level of function for planned discharge setting  See evaluation for individualized goals  Description  Treatment Interventions: ADL retraining, Functional transfer training, UE strengthening/ROM, Endurance training, Patient/family training, Equipment evaluation/education, Compensatory technique education, Energy conservation, Activityengagement          See flowsheet documentation for full assessment, interventions and recommendations  Note:   Limitation: Decreased ADL status, Decreased UE strength, Decreased Safe judgement during ADL, Decreased endurance, Decreased self-care trans, Decreased high-level ADLs  Prognosis: Good  Assessment: Patient is a 80 y o  female admitted to [address] [place] [address] on 7/28/2020 due to Right hip pain  Per ortho consult pt is WBAT RLE  Comorbidities affecting pt's physical performance at time of assessment include chronic a fib, CHF, depression, pacemaker, ambulatory dysfunction, leukocytosis  Patient has active OT orders and activity orders for Up with assistance  PTA pt living with  in apartment pt (I) with ADLs and IADLs, uses rollator at baseline, (+)drives, (-)falls  Personal factors affecting pt at time of IE include:steps to enter environment, limited home support, difficulty performing ADLS and difficulty performing IADLS   At the time of evaluation patient currently requires (S) for UB ADLs, max A for LB ADLs and mod-max A for functional mobility  The following deficits affected patient's occupational performance weakness, decreased functional strength, decreased functional balance, decreased activity tolerance, decreased safety awareness, increased pain, orthopedic restrictions, decreased coping skills and decreased endurance  Patient would benefit from skilled OT services while in the hospital to address above deficits   Occupational performance areas to be addressed include ADL retraining, bed mobility, functional transfer training, endurance training, patient/family training, equipment evaluation/education, compensatory technique education, energy conservation, activity engagement and activity tolerance in order to maximize patient's level of function  From OT standpoint recommend Post Acute Rehabilitation upon D/C  OT continue to follow pt 3-5x/week to address the following goals        OT Discharge Recommendation: Post-Acute Rehabilitation Services  OT - OK to Discharge: (when medically cleared)

## 2020-07-29 NOTE — PHYSICIAN ADVISOR
Current patient class: Observation  The patient is currently on Hospital Day: 2 at [facility]        The patient was admitted to the hospital  on N/A at N/A for the following diagnosis:  Right hip pain [M25 551]  Hip pain, right [M25 551]  Sciatica of right side [M54 31]  Status post right hip replacement [Z96 641]  Ambulatory dysfunction [R26 2]     After review of the relevant documentation, labs, vital signs and test results, the patient is most appropriate for OBSERVATION STATUS  Rationale is as follows: The patient is a 80 yrs   Female who presented to the ED at 7/28/2020 10:31 AM with a chief complaint of Groin Pain (Pt states she woke up Sunday morning and her right back groin was hurting  Unable to walk, stand, due to pain  Denies N/V/D or urinary symptoms)  Xrays of right hip showed Status post right hip arthroplasty with increased lucency surrounding the acetabular cup raising the possibility of particle disease and/or hardware loosening  No acute periprosthetic fractures are identified Orthopedics was consulted and WBAT and analgesia recommended with outpatient follow up  STR recommended following discharge by PT      The patients vitals on arrival were ED Triage Vitals   Temperature Pulse Respirations Blood Pressure SpO2   07/28/20 1047 07/28/20 1045 07/28/20 1045 07/28/20 1045 07/28/20 1045   99 1 °F (37 3 °C) 72 18 153/67 100 %      Temp Source Heart Rate Source Patient Position - Orthostatic VS BP Location FiO2 (%)   07/28/20 1045 07/28/20 1045 07/28/20 1045 07/28/20 1045 --   Oral Monitor Lying Left arm       Pain Score       07/28/20 1205       Worst Possible Pain           Past Medical History:   Diagnosis Date    Abdominal bloating 8/1/2016    Anemia     Arthritis     Cancer (HCC)     basal cell    CHF (congestive heart failure) (HCC)     Disease of thyroid gland     Disturbance of smell and taste     disturbance of taste    Effusion of knee joint right  Fibromyalgia     Heart murmur     reported a previous heart murmur    History of acute myocardial infarction     History of atrial fibrillation     History of bruising easily     History of cancer     History of dermatitis     History of mammogram     History of methicillin resistant staphylococcus aureus (MRSA)     Negative nasal culture, isolation discontinued 8/17/2018   History of methicillin resistant staphylococcus aureus (MRSA) 08/17/2018    negative nasal culture-isolation and hx discontinued 8/17/2018    History of obesity     History of osteopenia     History of sciatica     History of shortness of breath     History of sinusitis     History of sore throat     History of syncope     History of umbilical hernia     History of viral infection     Irritable bowel syndrome (IBS)     Joint pain, hip     Limb pain     Myalgia     myalgia and myositis    Need for prophylactic vaccination against single diseases     Need for prophylactic vaccination and inoculation against influenza     Preoperative cardiovascular examination     Primary osteoarthritis of right knee     Right leg pain     Vaginal Pap smear     reported pap smear     Past Surgical History:   Procedure Laterality Date    ANKLE ARTHRODESIS Right     BACK SURGERY      BARIATRIC SURGERY      CHOLECYSTECTOMY      x2    COLONOSCOPY      ESOPHAGOGASTRODUODENOSCOPY N/A 3/23/2018    Procedure: ESOPHAGOGASTRODUODENOSCOPY (EGD); Surgeon: Ashley Chua MD;  [facility];   Service: Gastroenterology    FOOT SURGERY      HIP CLOSE REDUCTION Right 8/21/2016    Procedure: CLOSED REDUCTION RIGHT TOTAL HIP;  Surgeon: Leonie Kincaid MD;  [facility];  Service:    Chang Cardona / Jayson Santamaria / Li Molina      LEFT KNEE REPLACEMENT    JOINT REPLACEMENT      Right HIP    PILONIDAL CYST EXCISION      x2    IL EXC SKIN BENIG <0 5 CM TRUNK,ARM,LEG Right 7/24/2019    Procedure: Dominik Carey EXTREMITY Rt Hip;   Surgeon: Filemon Capone MD;  [facility];  Service: Orthopedics    [place] REVISE TOTAL HIP REPLACEMENT Right 4/15/2019    Procedure: ARTHROPLASTY HIP TOTAL ACETABULAR REVISION;  Surgeon: Filemon Capone MD;  [facility];  Service: Orthopedics    REPLACEMENT TOTAL KNEE Right     SILVANA-EN-Y PROCEDURE      x2    TOTAL KNEE ARTHROPLASTY      UMBILICAL HERNIA REPAIR      x2           Consults have been placed to:   IP CONSULT TO ORTHOPEDIC SURGERY    Vitals:    07/28/20 1653 07/28/20 2157 07/29/20 0721 07/29/20 1500   BP: 106/52 114/55 117/58 102/55   BP Location:  Left arm Left arm Left arm   Pulse: 72 72 67 70   Resp: 16 16 18 18   Temp: 98 9 °F (37 2 °C) 98 °F (36 7 °C) 97 6 °F (36 4 °C) 98 2 °F (36 8 °C)   TempSrc: Oral Oral Oral Temporal   SpO2: 97% 98% 92% 98%   Weight:       Height:           Most recent labs:    Recent Labs     07/28/20  1357 07/29/20  1150   WBC 12 54*  --    HGB 11 9  --    HCT 37 3  --    *  --    K 3 2* 3 9   CALCIUM 8 3 8 4   BUN 11 14   CREATININE 0 80 0 75   INR 1 64* 1 66*   AST 23  --    ALT 20  --    ALKPHOS 85  --        Scheduled Meds:  Current Facility-Administered Medications:  acetaminophen 975 mg Oral Q8H [address] Camille Foley PA-C   ascorbic acid 500 mg Oral Daily Camille Foley PA-C   vitamin B-12 100 mcg Oral Daily Camille Foley PA-C   dicyclomine 10 mg Oral Q12H Camille Foley PA-C   docusate sodium 100 mg Oral BID Paulino Barron PA-C   DULoxetine 60 mg Oral Daily Camille Foley PA-C   fluticasone 1 spray Each Nare Daily Camille Foley PA-C   levothyroxine 75 mcg Oral Early Morning Camille Foley PA-C   lidocaine 2 patch Topical Daily Tim Ryder MD   methocarbamol 750 mg Oral Q8H [address] Tim Ryder MD   morphine injection 2 mg Intravenous Q4H PRN Camille Corvino, PA-C   ondansetron 4 mg Intravenous Q6H PRN Camille Foley PA-C   polyethylene glycol 17 g Oral Daily PRN Camille Foley PA-C   potassium chloride 20 mEq Oral Daily Tashi Guerrero PA-C   senna 1 tablet Oral HS Camille Foley PA-C   sucralfate 1 g Oral BID Tashi Guerrero PA-C   torsemide 20 mg Oral Daily Tashi Guerrero PA-C   warfarin 4 mg Oral Daily (warfarin) Tashi Guerrero PA-C     Continuous Infusions:   PRN Meds: morphine injection    ondansetron    polyethylene glycol

## 2020-07-29 NOTE — PHYSICAL THERAPY NOTE
PHYSICAL THERAPY TREATMENT NOTE    Patient Name: Sharlene Claude ADNMY'Z Date: 7/29/2020 07/29/20 1219   Pain Assessment   Pain Assessment Tool 0-10   Pain Score No Pain  (at rest, 10/10 w/ movement)   Pain Location/Orientation Orientation: Right;Location: Hip   Restrictions/Precautions   RLE Weight Bearing Per Order WBAT   Other Precautions Chair Alarm; Bed Alarm;Pain;Multiple lines; Fall Risk   General   Chart Reviewed Yes   Family/Caregiver Present Yes   Cognition   Arousal/Participation Alert; Cooperative   Attention Within functional limits   Orientation Level Oriented to person; Other (Comment)  (pt was identified w/ full name, birth date)   Following Commands Follows all commands and directions without difficulty   Subjective   Subjective pt agreed to PT intervention  mobility education was provided regarding transfers and roller walker use   education was completed via teachback, demonstration and verbal instruction  Transfers   Sit to Stand 3  Moderate assistance   Additional items Assist x 1; Increased time required;Verbal cues  (for walker positioning, posture, breathing technique)   Stand to Sit 3  Moderate assistance   Additional items Assist x 1; Increased time required;Verbal cues  (for body positioning, hand placement)   Stand pivot 3  Moderate assistance  (commode to chair)   Additional items Assist x 1; Increased time required;Verbal cues  (for walker positioning, use of UEs, breathing technique)   Ambulation/Elevation   Gait pattern Not appropriate   Assistive Device Rolling walker   Balance   Static Sitting Fair -   Static Standing Poor  (w/ roller walker)   Ambulatory Zero   Activity Tolerance   Activity Tolerance Patient limited by fatigue;Patient limited by pain   Nurse Made Aware spoke to [employer] Eunice GRUBBS Janelle OT   Assessment   Prognosis Fair   Problem List Decreased strength;Decreased range of motion;Decreased endurance; Impaired balance;Decreased mobility; Decreased safety awareness;Pain   Assessment Therapist provided education to pt for mobility technique including transfers and roller walker usage  Education was provided due to findings from evaluation  Occasional repetition was needed for carryover to be noted  Pt was found to have improvement after education w/ decreased level of assist to maintain safety  Pt continues to be a fall risk  continued inpatient PT tx is indicated to reduce fall risk factors  Goals   Patient Goals back and cook without pain   STG Expiration Date 08/08/20   Short Term Goal #1 pt will: Increase bilateral LE strength 1/2 grade to facilitate independent mobility, Perform all bed mobility tasks w/ supervision to decrease fall risk factors, Perform all transfers w/ minx1 to improve independence, Ambulate 100 ft  with roller walker w/ minx1 w/o LOB to facilitate return to activities like cooking and baking, Increase all balance 1 grade to decrease risk for falls, Complete exercise program independently to improve strength and endurance, Tolerate 3 hr OOB to faciliate upright tolerance and Improve Barthel Index score to 65 or greater to facilitate independence   PT Treatment Day 1   Plan   Treatment/Interventions Functional transfer training;LE strengthening/ROM; Therapeutic exercise; Endurance training;Cognitive reorientation;Patient/family training;Equipment eval/education; Bed mobility;Gait training   Progress Progressing toward goals   PT Frequency 5x/wk   Recommendation   PT Discharge Recommendation Post-Acute Rehabilitation Services   Equipment Recommended Walker   Additional Comments Pt would benefit from Pain Management consult to address pain control  Skilled inpatient PT recommended while in hospital to progress pt toward treatment goals      Verito Aragon, PT

## 2020-07-29 NOTE — OCCUPATIONAL THERAPY NOTE
Occupational Therapy Evaluation + Treatment     Patient Name: Jorge Gill  FSNAK'D Date: 7/29/2020  Problem List  Principal Problem:    Right hip pain  Active Problems:    Chronic atrial fibrillation (HCC)    Chronic diastolic CHF (congestive heart failure) (HCC)    Depression    Pacemaker    Ambulatory dysfunction    Hypokalemia    Leukocytosis    Past Medical History  Past Medical History:   Diagnosis Date    Abdominal bloating 8/1/2016    Anemia     Arthritis     Cancer ([address] )     basal cell    CHF (congestive heart failure) (HCC)     Disease of thyroid gland     Disturbance of smell and taste     disturbance of taste    Effusion of knee joint right     Fibromyalgia     Heart murmur     reported a previous heart murmur    History of acute myocardial infarction     History of atrial fibrillation     History of bruising easily     History of cancer     History of dermatitis     History of mammogram     History of methicillin resistant staphylococcus aureus (MRSA)     Negative nasal culture, isolation discontinued 8/17/2018      History of methicillin resistant staphylococcus aureus (MRSA) 08/17/2018    negative nasal culture-isolation and hx discontinued 8/17/2018    History of obesity     History of osteopenia     History of sciatica     History of shortness of breath     History of sinusitis     History of sore throat     History of syncope     History of umbilical hernia     History of viral infection     Irritable bowel syndrome (IBS)     Joint pain, hip     Limb pain     Myalgia     myalgia and myositis    Need for prophylactic vaccination against single diseases     Need for prophylactic vaccination and inoculation against influenza     Preoperative cardiovascular examination     Primary osteoarthritis of right knee     Right leg pain     Vaginal Pap smear     reported pap smear     Past Surgical History  Past Surgical History:   Procedure Laterality Date    ANKLE ARTHRODESIS Right     BACK SURGERY      BARIATRIC SURGERY      CHOLECYSTECTOMY      x2    COLONOSCOPY      ESOPHAGOGASTRODUODENOSCOPY N/A 3/23/2018    Procedure: ESOPHAGOGASTRODUODENOSCOPY (EGD); Surgeon: Monica Molina MD;  [facility]: [facility]; Service: Gastroenterology    FOOT SURGERY      HIP CLOSE REDUCTION Right 8/21/2016    Procedure: CLOSED REDUCTION RIGHT TOTAL HIP;  Surgeon: Stas Worthy MD;  [facility];  Service:    Jevondeja Field / Tomasz Smallwood / Juan Heaps REPLACEMENT      LEFT KNEE REPLACEMENT    JOINT REPLACEMENT      Right HIP    PILONIDAL CYST EXCISION      x2    WA EXC SKIN BENIG <0 5 CM TRUNK,ARM,LEG Right 7/24/2019    Procedure: REVISION SCAR EXTREMITY Rt Hip; Surgeon: Kimberly Matthew MD;  [facility];  Service: Orthopedics    WA REVISE TOTAL HIP REPLACEMENT Right 4/15/2019    Procedure: ARTHROPLASTY HIP TOTAL ACETABULAR REVISION;  Surgeon: Kimberly Matthew MD;  [facility];  Service: Orthopedics    REPLACEMENT TOTAL KNEE Right     SILVANA-EN-Y PROCEDURE      x2    TOTAL KNEE ARTHROPLASTY      UMBILICAL HERNIA REPAIR      x2             07/29/20 1147   Note Type   Note type Eval/Treat   Restrictions/Precautions   Weight Bearing Precautions Per Order Yes   RLE Weight Bearing Per Order WBAT   Other Precautions Fall Risk;Pain; Chair Alarm; Bed Alarm   Pain Assessment   Pain Assessment Tool 0-10   Pain Score Worst Possible Pain  (when moving, laying still is 0 pain )   Pain Location/Orientation Orientation: Right;Location: Hip   Pain Radiating Towards lower leg    Home Living   Type of Home Apartment   Home Layout One level;Performs ADLs on one level; Able to live on main level with bedroom/bathroom;Stairs to enter with rails   Bathroom Shower/Tub Tub/shower unit   [employer] Raised   Bathroom Equipment Grab bars in shower; Tub transfer bench   Bathroom Accessibility Accessible   Home Equipment Walker;Cane;Sock aid  (rollator)   Additional Comments usese rollator at baseline   Prior Function   Level of Wanda Independent with ADLs and functional mobility   Lives With Significant other   Receives Help From Family   ADL Assistance Independent   IADLs Independent   Falls in the last 6 months 0   Vocational Retired   Comments (+)drives    Lifestyle   Autonomy PTA pt living with  in apartment pt (I) with ADLs and IADLs, uses rollator at baseline, (+)drives, (-)falls    Reciprocal Relationships supportive daughter,    Service to Others retired    Intrinsic Gratification baking and cooking   Subjective   Subjective "Oh I love seeing therapists"   ADL   Where Assessed Edge of bed   Grooming Assistance 5  Supervision/Setup   UB Bathing Assistance 5  Supervision/Setup   LB Bathing Assistance 2  Maximal Assistance   LB Bathing Deficit Increased time to complete;Supervision/safety;Verbal cueing;Left upper leg;Right upper leg;Right lower leg including foot; Left lower leg including foot   UB Dressing Assistance 5  Supervision/Setup   LB Dressing Assistance 2  Maximal Assistance   LB Dressing Deficit Don/doff R sock; Don/doff L sock; Increased time to complete;Supervision/safety   Toileting Assistance  3  Moderate Assistance   Toileting Deficit Increased time to complete;Supervison/safety; Clothing management down;Clothing management up   Bed Mobility   Supine to Sit 2  Maximal assistance   Additional items Assist x 2; Increased time required;Verbal cues;LE management   Transfers   Sit to Stand 3  Moderate assistance   Additional items Assist x 2; Increased time required;Verbal cues   Stand to Sit 3  Moderate assistance   Additional items Assist x 2; Increased time required;Verbal cues   Stand pivot 3  Moderate assistance   Additional items Assist x 2; Increased time required;Verbal cues   Additional Comments VC for safety with RW   Functional Mobility   Functional Mobility 3  Moderate assistance   Additional Comments Ax2, max VC for safety with RW, pt with difficulties putting weight through RLE, Pt pivoting more on LLE throughout   Additional items Rolling walker   Balance   Static Sitting Fair -   Static Standing Zero  (Ax2)   Ambulatory Zero  (Ax2)   Activity Tolerance   Activity Tolerance Patient tolerated treatment well   Medical Staff Made Aware RN Cristy, PT RJ    RUE Assessment   RUE Assessment WFL   LUE Assessment   LUE Assessment WFL   Hand Function   Gross Motor Coordination Functional   Fine Motor Coordination Functional   Vision-Basic Assessment   Current Vision Wears glasses all the time   Cognition   Overall Cognitive Status [facility]   Arousal/Participation Alert; Cooperative   Attention Within functional limits   Orientation Level Oriented X4   Memory Within functional limits   Following Commands Follows all commands and directions without difficulty   Assessment   Limitation Decreased ADL status; Decreased UE strength;Decreased Safe judgement during ADL;Decreased endurance;Decreased self-care trans;Decreased high-level ADLs   Prognosis Good   Assessment Patient is a 80 y o  female admitted to [address] [place] [address] on 7/28/2020 due to Right hip pain  Per ortho consult pt is WBAT RLE  Comorbidities affecting pt's physical performance at time of assessment include chronic a fib, CHF, depression, pacemaker, ambulatory dysfunction, leukocytosis  Patient has active OT orders and activity orders for Up with assistance  PTA pt living with  in apartment pt (I) with ADLs and IADLs, uses rollator at baseline, (+)drives, (-)falls  Personal factors affecting pt at time of IE include:steps to enter environment, limited home support, difficulty performing ADLS and difficulty performing IADLS   At the time of evaluation patient currently requires (S) for UB ADLs, max A for LB ADLs and mod-max A for functional mobility   The following deficits affected patient's occupational performance weakness, decreased functional strength, decreased functional balance, decreased activity tolerance, decreased safety awareness, increased pain, orthopedic restrictions, decreased coping skills and decreased endurance  Patient would benefit from skilled OT services while in the hospital to address above deficits  Occupational performance areas to be addressed include ADL retraining, bed mobility, functional transfer training, endurance training, patient/family training, equipment evaluation/education, compensatory technique education, energy conservation, activity engagement and activity tolerance in order to maximize patient's level of function  From OT standpoint recommend Post Acute Rehabilitation upon D/C  OT continue to follow pt 3-5x/week to address the following goals  Goals   Patient Goals to eugenio and cook again   [facility] Time Frame 10-14   Long Term Goal see goals listed below   Plan   Treatment Interventions ADL retraining;Functional transfer training;UE strengthening/ROM; Endurance training;Patient/family training;Equipment evaluation/education; Compensatory technique education; Energy conservation; Activityengagement   Goal Expiration Date 08/12/20   OT Treatment Day 1   OT Frequency 3-5x/wk   Additional Treatment Session   Start Time 1210   End Time 1220   Treatment Assessment Pt agreeable to participate in skilled OT treatment session to address ADL retraining, functional transfer training and endurance training  Pt requesting use of commode, sit >< stand from EOB with mod A x2, upon standing pt urinating in standing  Return to sitting, max A for LB bathing seated EOB due to pt's increased pain with bending forwards and reaching  Pt completed 2nd sit >< stand with increased (I), requiring only mod A x1  Pt able to complete SPT to commode with mod A x2  Pt requiring mod A for toileting, with (A) for clothing management, pt unable to void  Pt completing sit >< stand from commode with mod A x1  Pt completed pivoting on LLE with use of RW to bedside chair   Pt greatly limited by pain throughout, and pt noted to be holding breath on transfers and functional mobility, pt educated on pursed lip breathing and able to replicate with min VC throughout  Pt would continue to benefit from skilled OT treatment, recommend PAR on d/c  Additional Treatment Day 1   Recommendation   OT Discharge Recommendation Post-Acute Rehabilitation Services   OT - OK to Discharge   (when medically cleared)   Barthel Index   Feeding 10   Bathing 0   Grooming Score 5   Dressing Score 5   Bladder Score 0   Bowels Score 10   Toilet Use Score 5   Transfers (Bed/Chair) Score 5   Mobility (Level Surface) Score 0   Stairs Score 0   Barthel Index Score 40      Goals    1) Patient will complete UB ADLs w/ mod I using AE and AD as needed    2) Patient will complete LB ADLs w/ min A using AE and AD as needed    3) Patient will complete toileting w/ (S) w/ G hygiene/thoroughness    4) Patient will complete bed mobility with Min A without use of bed rails    5) Patient will tolerate therapeutic activities for greater than 15 min, in order to increase tolerance for functional activities  6) Patient will perform functional transfers with Min A to/from all surfaces using DME as needed    7) Patient will complete functional mobility during ADL/IADL/leisure tasks with Min A     8) Patient will demonstrate 100% carryover of energy conservation techniques t/o functional I/ADL/leisure tasks w/o cues s/p skilled education to increase endurance during functional tasks    9) Patient will increase BUE strength by 1/2MM grade via AROM/AAROM/PROM exercises to increase independence in ADLs and transfers        Pt will benefit from continued OT services in order to maximize independence with ADL performance, functional mobility with RW, and improve overall endurance/strength required to complete functional tasks in preparation for discharge      At the end of the session, all needs met and pt seated in bedside chair, chair alarm activated and call bell within reach    [facility], OTR/L

## 2020-07-29 NOTE — ASSESSMENT & PLAN NOTE
Patient presents with acute on chronic R hip pain that started acutely 2 days ago without any injury/trauma  Has been unable to ambulate  · Has hx of R hip replacement 4/2019 and R scar revision 7/2019 with Dr Couch Case  · Lumbar spine xray negative for acute findings, R hip/pelvis xray shows increased lucency surrounding acetabular cup raising possibility of particle disease and/or hardware loosening  No acute periprosthetic fractures are identified  · Consult orthopedics for input, may require additional imaging? · Pain control with scheduled tylenol 975 mg q8h, lidoderm patch, aqua K pad, robaxin  Add gabapentin  Pt with codeine allergy but reports she tolerates morphine?    Continue to monitor closely  I did increase Lidoderm patch to 2 patches  Increase the Robaxin as well    · PT/OT once seen by Orthopedics

## 2020-07-29 NOTE — PROGRESS NOTES
Progress Note - Brant Flatness 1933, 80 y o  female MRN: [id number]    Unit/Bed#: S -01 Encounter: [id number]    Primary Care Provider: Vicki Proctor DO   Date and time admitted to hospital: 7/28/2020 10:31 AM        Leukocytosis  Assessment & Plan  · Possibly reactive in the setting of pain, no identifiable infectious source, continue to monitor CBC    Hypokalemia  Assessment & Plan  Replete K  · Replete p r n  Ambulatory dysfunction  Assessment & Plan  Secondary to hip pain  Continue with pain control  Orthopedic evaluation also pending  Further imaging per them  · Pain control  · Fall precautions   · PT/OT     Pacemaker  Assessment & Plan  · 2018  · Noted     Depression  Assessment & Plan  · Continue cymbalta    Chronic diastolic CHF (congestive heart failure) (HCC)  Assessment & Plan  Wt Readings from Last 3 Encounters:   07/28/20 78 kg (172 lb)   06/17/20 81 2 kg (179 lb)   06/10/20 78 9 kg (174 lb)       Appears euvolemic, on demadex 20 mg daily  · Monitor volume status and renal function   · Outpatient cardiology f/u       Chronic atrial fibrillation (HCC)  Assessment & Plan  On warfarin 4 mg daily; does not appear to be on any rate control medications; hx of ablation  · Previously saw Dr Sonali Penaloza  · Monitor INR, slightly subtherapeutic  Will also need to follow-up on orthopedic recommendations whether the patient needs any kind of surgical intervention because Coumadin will need to be held in that case  * Right hip pain  Assessment & Plan  Patient presents with acute on chronic R hip pain that started acutely 2 days ago without any injury/trauma  Has been unable to ambulate  · Has hx of R hip replacement 4/2019 and R scar revision 7/2019 with Dr Beverly Charles  · Lumbar spine xray negative for acute findings, R hip/pelvis xray shows increased lucency surrounding acetabular cup raising possibility of particle disease and/or hardware loosening   No acute periprosthetic fractures are identified  · Consult orthopedics for input, may require additional imaging? · Pain control with scheduled tylenol 975 mg q8h, lidoderm patch, aqua K pad, robaxin  Add gabapentin  Pt with codeine allergy but reports she tolerates morphine?    Continue to monitor closely  I did increase Lidoderm patch to 2 patches  Increase the Robaxin as well  · PT/OT once seen by Orthopedics      VTE Pharmacologic Prophylaxis:   Pharmacologic: Warfarin (Coumadin)  Mechanical VTE Prophylaxis in Place: Yes    Patient Centered Rounds: I have performed bedside rounds with nursing staff today  Discussions with Specialists or Other Care Team Provider:  Will need to discuss with orthopedics    Education and Discussions with Family / Patient:  Patient    Time Spent for Care: 20 minutes  More than 50% of total time spent on counseling and coordination of care as described above  Current Length of Stay: 0 day(s)    Current Patient Status: Observation   Certification Statement: The patient will continue to require additional inpatient hospital stay due to Having intractable pain with ambulatory dysfunction elderly female patient noted to have loosening hardware on imaging    Discharge Plan:  Discharge will depend on orthopedic evaluation and plan  Patient will likely be here least another day or 2 depending    Code Status: Level 1 - Full Code      Subjective:   Patient seen examined  States when she is lying down she is fine but any time she moves she has 10/10 pain  Objective:     Vitals:   Temp (24hrs), Av 4 °F (36 9 °C), Min:97 6 °F (36 4 °C), Max:99 1 °F (37 3 °C)    Temp:  [97 6 °F (36 4 °C)-99 1 °F (37 3 °C)] 97 6 °F (36 4 °C)  HR:  [67-72] 67  Resp:  [16-18] 18  BP: (106-153)/(52-67) 117/58  SpO2:  [92 %-100 %] 92 %  Body mass index is 33 59 kg/m²  Input and Output Summary (last 24 hours):        Intake/Output Summary (Last 24 hours) at 2020 0811  Last data filed at 2020 0604  Gross per 24 hour Intake 400 ml   Output 550 ml   Net -150 ml       Physical Exam:     Physical Exam  (   General Appearance:    Alert, cooperative, no distress, appears stated age                               Lungs:     Clear to auscultation bilaterally, respirations unlabored       Heart:    Regular rate and rhythm, S1 and S2 normal, no murmur, rub    or gallop   Abdomen:     Soft, non-tender, bowel sounds active all four quadrants,     no masses, no organomegaly           Extremities:   Extremities normal, atraumatic, no cyanosis or edema       Additional Data:     Labs:    Results from last 7 days   Lab Units 07/28/20  1357   WBC Thousand/uL 12 54*   HEMOGLOBIN g/dL 11 9   HEMATOCRIT % 37 3   PLATELETS Thousands/uL 131*   NEUTROS PCT % 85*   LYMPHS PCT % 7*   MONOS PCT % 8   EOS PCT % 0     Results from last 7 days   Lab Units 07/28/20  1357   SODIUM mmol/L 137   POTASSIUM mmol/L 3 2*   CHLORIDE mmol/L 103   CO2 mmol/L 23   BUN mg/dL 11   CREATININE mg/dL 0 80   ANION GAP mmol/L 11   CALCIUM mg/dL 8 3   ALBUMIN g/dL 3 0*   TOTAL BILIRUBIN mg/dL 0 81   ALK PHOS U/L 85   ALT U/L 20   AST U/L 23   GLUCOSE RANDOM mg/dL 105     Results from last 7 days   Lab Units 07/28/20  1357   INR  1 64*                       * I Have Reviewed All Lab Data Listed Above  * Additional Pertinent Lab Tests Reviewed:  Arjun 66 Admission Reviewed    **    Recent Cultures (last 7 days):           Last 24 Hours Medication List:     Current Facility-Administered Medications:  acetaminophen 975 mg Oral Q8H [facility] Camille Foley PA-C   ascorbic acid 500 mg Oral Daily Camille Foley PA-C   vitamin B-12 100 mcg Oral Daily Camille Foley PA-C   dicyclomine 10 mg Oral Q12H Camille Foley PA-C   docusate sodium 100 mg Oral BID Brian Gann PA-C   DULoxetine 60 mg Oral Daily Camille Foley PA-C   fluticasone 1 spray Each Nare Daily Camille Foley PA-C   levothyroxine 75 mcg Oral Early Morning Camille Foley PA-C   lidocaine 2 patch Topical Daily Graeme Vogt MD   methocarbamol 750 mg Oral [facility] Graeme Vogt MD   morphine injection 2 mg Intravenous Q4H PRN Jenae García PA-C   ondansetron 4 mg Intravenous Q6H PRN Jenae García PA-C   polyethylene glycol 17 g Oral Daily PRN Jenae García PA-C   potassium chloride 20 mEq Oral Daily Camille Foley PA-C   senna 1 tablet Oral HS Camille Foley PA-C   sucralfate 1 g Oral BID Jenae García PA-C   torsemide 20 mg Oral Daily Camille Foley PA-C   warfarin 4 mg Oral Daily (warfarin) Jenae García PA-C        Today, Patient Was Seen By: Graeme Vogt MD    ** Please Note: Dictation voice to text software may have been used in the creation of this document   **

## 2020-07-29 NOTE — PROGRESS NOTES
Patient resting in bed, patient offers no complaints at this time  Patient states that she has pain in her right hip with movement, but otherwise patient denies any pain  Bed low and locked, call bell within reach  Will continue to monitor

## 2020-07-29 NOTE — PLAN OF CARE
Problem: PHYSICAL THERAPY ADULT  Goal: Performs mobility at highest level of function for planned discharge setting  See evaluation for individualized goals  Description  Treatment/Interventions: Functional transfer training, LE strengthening/ROM, Therapeutic exercise, Endurance training, Cognitive reorientation, Patient/family training, Equipment eval/education, Bed mobility, Gait training  Equipment Recommended: Kaila Socks       See flowsheet documentation for full assessment, interventions and recommendations  Outcome: Progressing  Note:   Prognosis: Fair  Problem List: Decreased strength, Decreased range of motion, Decreased endurance, Impaired balance, Decreased mobility, Decreased safety awareness, Pain  Assessment: Pt presents with sharp constant right posterior hip pain  Dx: right greater trochanteric bursitis, leukocytosis, hypokalemia, and ambulatory dysfunction  order placed for PT eval and tx, w/ activity order of up w/ A and WBAT R LE  pt presents w/ comorbidities of fibromyalgia, a-fib, HTN, CHF, mitral regurgitation, PPM, anemia, MI, obesity, syncope, bilateral TKR, and right THR w/ revision and personal factors of advanced age, mobilizing w/ assistive device, stair(s) to enter home and caregiver role to   pt presents w/ pain, weakness, decreased ROM, decreased endurance, impaired balance, decreased safety awareness and fall risk  these impairments are evident in findings from physical examination (weakness and decreased ROM), mobility assessment (need for assist x2 for bed mobility and transfers, inability to ambulate, need for input for mobility and breathing technique), and Barthel Index: 40/100  pt needed input for task focus and mobility technique/safety  pt is at risk for falls due to physical and safety awareness deficits   pt's clinical presentation is unstable/unpredictable (evident in need for assist x2 w/ all phases of mobility when usually mobilizing independently, pain impacting overall mobility status and need for input for task focus and mobility technique/safety)  pt needs inpatient PT tx to improve mobility deficits  discharge recommendation is for inpatient rehab to reduce fall risk and maximize level of functional independence  Pt would benefit from Pain Management consult to address pain control  PT Discharge Recommendation: Post-Acute Rehabilitation Services          See flowsheet documentation for full assessment

## 2020-07-29 NOTE — UTILIZATION REVIEW
Initial Clinical Review    Admission: Date/Time/Statement: Admission Orders (From admission, onward)     Ordered        07/28/20 1338  Place in Observation  Once                   Orders Placed This Encounter   Procedures    Place in Observation     Standing Status:   Standing     Number of Occurrences:   1     Order Specific Question:   Admitting Physician     Answer:   Mary Westfall [[id number]]     Order Specific Question:   Level of Care     Answer:   Med Surg [[id number]]     ED Arrival Information     Expected Arrival Acuity Means of Arrival Escorted By Service Admission Type    - 7/28/2020 10:31 Urgent Ambulance [facility] General Medicine Urgent    Arrival Complaint    R Hip Pain        Chief Complaint   Patient presents with    Groin Pain     Pt states she woke up Sunday morning and her right back groin was hurting  Unable to walk, stand, due to pain  Denies N/V/D or urinary symptoms     Assessment/Plan: 81 yo female to ED by EMS admitted for Observation due to intractable right hip pain with ambulatory dysfunction  PMHx fibromyalgia, hypothyroidism, chronic AFib on warfarin, hypertension, chronic diastolic heart failure, history of [place]-en-Y gastric bypass, mitral regurgitation, permanent pacemaker placement, chronic right hip pain status post replacement - 4/2019 and scar revision in 7/2019 reports worsening worsening sharp, constant posterior hip pain ( directly below buttock) beginning 3 days prior  Denies injury or trauma, numbness or tingling down leg  Chronic shares she experiences some degree of pain following pelvic fracture surgery  States she did not take her diuretic in several days bc she is unable to get to bathroom  Baseline utilizes a walker & lives with her   Consult Orthopedic surgery; in ED inpatient labs & R hip/pelvis xray shows increased lucency surrounding acetabular cup raising possibility of particle disease and/or hardware loosening   Pain control with tylenol 975mg q8hr; lidoderm patch, aqua K pad & robaxin with gabapentin  PT/OT  Replete potassium, monitor INR & repeat labs  7/29/2020 Attending- Monitor Pain control, Increase Lidoderm patch to 2 patches & Robaxin ; PT/OT once seen by Ortho  7/29 ORTHO:  Pain consistent  With greater trochanteric bursitis  · WBAT right lower extremity  · PT/OT  ·  assistive devices as needed  ·  with no injury or trauma and based on location of the patient's pain  No urgent indication at this time for further imaging  · Pain control -  Consider addition of IV or IM Toradol  ·  given the patient's history of past infections related to surgeries, will defer on greater trochanteric cortisone injection at this time  · Body mass index is 33 59 kg/m²  mildly obese  Recommend behavior modifications, nutrition and physical activity      ED Triage Vitals   Temperature Pulse Respirations Blood Pressure SpO2   07/28/20 1047 07/28/20 1045 07/28/20 1045 07/28/20 1045 07/28/20 1045   99 1 °F (37 3 °C) 72 18 153/67 100 %      Temp Source Heart Rate Source Patient Position - Orthostatic VS BP Location FiO2 (%)   07/28/20 1045 07/28/20 1045 07/28/20 1045 07/28/20 1045 --   Oral Monitor Lying Left arm       Pain Score       07/28/20 1205       Worst Possible Pain        Wt Readings from Last 1 Encounters:   07/28/20 78 kg (172 lb)     Additional Vital Signs:   Date/Time  Temp  Pulse  Resp  BP  MAP (mmHg)  SpO2  O2 Device  Patient Position - Orthostatic VS   07/29/20 0721  97 6 °F (36 4 °C)  67  18  117/58  --  92 %  None (Room air)  Lying   07/29/20 0623  --  --  --  --  --  --  None (Room air)  --   07/28/20 2157  98 °F (36 7 °C)  72  16  114/55  78  98 %  None (Room air)  Lying   07/28/20 1653  98 9 °F (37 2 °C)  72  16  106/52  --  97 %  None (Room air)  --   07/28/20 1500  --  70  18  122/58  84  100 %  --  --   07/28/20 1430  --  70  --  --  --  98 %  --  --   07/28/20 1400  --  70  18  116/56  81  98 %  --  --   07/28/20 1047  99 1 °F (37 3 °C)  --  -- --  --  --  --  --   07/28/20 1045  --  72  18  153/67  --  100 %  None (Room air)  Lying      Weights (last 14 days)     Date/Time  Weight  Height   07/28/20 1045  78 kg (172 lb)  5' (1 524 m)       Pertinent Labs/Diagnostic Test Results:       Results from last 7 days   Lab Units 07/28/20  1357   WBC Thousand/uL 12 54*   HEMOGLOBIN g/dL 11 9   HEMATOCRIT % 37 3   PLATELETS Thousands/uL 131*   NEUTROS ABS Thousands/µL 10 50*         Results from last 7 days   Lab Units 07/28/20  1357   SODIUM mmol/L 137   POTASSIUM mmol/L 3 2*   CHLORIDE mmol/L 103   CO2 mmol/L 23   ANION GAP mmol/L 11   BUN mg/dL 11   CREATININE mg/dL 0 80   EGFR ml/min/1 73sq m 67   CALCIUM mg/dL 8 3     Results from last 7 days   Lab Units 07/28/20  1357   AST U/L 23   ALT U/L 20   ALK PHOS U/L 85   TOTAL PROTEIN g/dL 6 6   ALBUMIN g/dL 3 0*   TOTAL BILIRUBIN mg/dL 0 81         Results from last 7 days   Lab Units 07/28/20  1357   GLUCOSE RANDOM mg/dL 105           Results from last 7 days   Lab Units 07/28/20  1357   PROTIME seconds 18 6*   INR  1 64*     7/28  XR lumbar spine 2 or 3 views   No acute lumbar spine fractures identified  XR hip/pelv 2-3 vws right if performed   Status post right hip arthroplasty with increased lucency surrounding the acetabular cup raising the possibility of particle disease and/or hardware loosening    No acute periprosthetic fractures are identified          ED Treatment:   Medication Administration from 07/28/2020 1031 to 07/28/2020 1636       Date/Time Order Dose Route Action     07/28/2020 1205 morphine (PF) 4 mg/mL injection 4 mg 4 mg Intravenous Given     07/28/2020 1531 acetaminophen (TYLENOL) tablet 975 mg 975 mg Oral Given     07/28/2020 1530 lidocaine (LIDODERM) 5 % patch 1 patch 1 patch Topical Medication Applied     07/28/2020 1532 methocarbamol (ROBAXIN) tablet 500 mg 500 mg Oral Given     07/28/2020 1531 potassium chloride (K-DUR,KLOR-CON) CR tablet 40 mEq 40 mEq Oral Given        Past Medical History:   Diagnosis Date    Abdominal bloating 8/1/2016    Anemia     Arthritis     Cancer ([facility] [address] )     basal cell    CHF (congestive heart failure) (HCC)     Disease of thyroid gland     Disturbance of smell and taste     disturbance of taste    Effusion of knee joint right     Fibromyalgia     Heart murmur     reported a previous heart murmur    History of acute myocardial infarction     History of atrial fibrillation     History of bruising easily     History of cancer     History of dermatitis     History of mammogram     History of methicillin resistant staphylococcus aureus (MRSA)     Negative nasal culture, isolation discontinued 8/17/2018      History of methicillin resistant staphylococcus aureus (MRSA) 08/17/2018    negative nasal culture-isolation and hx discontinued 8/17/2018    History of obesity     History of osteopenia     History of sciatica     History of shortness of breath     History of sinusitis     History of sore throat     History of syncope     History of umbilical hernia     History of viral infection     Irritable bowel syndrome (IBS)     Joint pain, hip     Limb pain     Myalgia     myalgia and myositis    Need for prophylactic vaccination against single diseases     Need for prophylactic vaccination and inoculation against influenza     Preoperative cardiovascular examination     Primary osteoarthritis of right knee     Right leg pain     Vaginal Pap smear     reported pap smear     Present on Admission:   Chronic atrial fibrillation ([facility] )   Pacemaker   Chronic diastolic CHF (congestive heart failure) ([facility] )   Depression   Ambulatory dysfunction   Right hip pain    Admitting Diagnosis: Right hip pain [M25 551]  Hip pain, right [M25 551]  Sciatica of right side [M54 31]  Status post right hip replacement [Z96 641]  Ambulatory dysfunction [R26 2]  Age/Sex: 80 y o  female  Admission Orders:  Scheduled Medications:    Medications:  acetaminophen 975 mg Oral Q8H [address]   ascorbic acid 500 mg Oral Daily   vitamin B-12 100 mcg Oral Daily   dicyclomine 10 mg Oral Q12H   docusate sodium 100 mg Oral BID   DULoxetine 60 mg Oral Daily   fluticasone 1 spray Each Nare Daily   levothyroxine 75 mcg Oral Early Morning   lidocaine 2 patch Topical Daily   methocarbamol 750 mg Oral Q8H [address]   potassium chloride 20 mEq Oral Daily   senna 1 tablet Oral HS   sucralfate 1 g Oral BID   torsemide 20 mg Oral Daily   warfarin 4 mg Oral Daily (warfarin)     Continuous IV Infusions:     PRN Meds:    morphine injection 2 mg Intravenous Q4H PRN   ondansetron 4 mg Intravenous Q6H PRN   polyethylene glycol 17 g Oral Daily PRN     IP CONSULT TO ORTHOPEDIC SURGERY  [facility] [place], [place]  Network Utilization Review Department  [email or web address]  ATTENTION: Please call with any questions or concerns to [phone] and carefully listen to the prompts so that you are directed to the right person  All voicemails are confidential   [facility] all requests for admission clinical reviews, approved or denied determinations and any other requests to dedicated fax number below belonging to the campus where the patient is receiving treatment   List of dedicated fax numbers for the Facilities:  [address] DENIALS (Administrative/Medical Necessity) [phone]   [address] [place] [address] (Maternity/NICU/Pediatrics) [phone]   Arin Greco [phone]   [facility] [phone]   [facility] [phone]   Trinidad Page [phone]   [address] [facility] [address]  [phone]   [facility]  [phone]   [address] [facility] [address]  [address] [facility] [address] [facility] [phone]

## 2020-07-29 NOTE — CONSULTS
Orthopedics   Gabotyesha Turner 80 y o  female MRN: [id number]  Unit/Bed#: S -01      Chief Complaint:   right hip pain    HPI:   80 y  o female complaining of right hip pain  Pain began 2 days ago  Patient denies any injury trauma or change in activity prior to onset of pain  She denies any recent infection  She has had pain like this in the past after total hip revision with Dr Diamond Hernandez over 1 year ago  Pain  Is centered primarily around the lateral hip  No groin pain  The pain does radiate down to the knee but not past the knee  She denies any significant back pain no pain radiating from the back  The pain is moderate to severe in intermittent is made worse with attempted motion of the hip and weight-bearing  It is better with rest   She denies any numbness or tingling  No fevers or chills  Revision acetabular component with Dr Diamond Hernandez was on 4/15/19 and she had a scar tissue revision surgery in July 2019 with him as well  Review Of Systems:   · Skin: Normal  · Neuro: See HPI  · Musculoskeletal: See HPI  · 14 point review of systems negative except as stated above     Past Medical History:   Past Medical History:   Diagnosis Date    Abdominal bloating 8/1/2016    Anemia     Arthritis     Cancer ([address] )     basal cell    CHF (congestive heart failure) (HCC)     Disease of thyroid gland     Disturbance of smell and taste     disturbance of taste    Effusion of knee joint right     Fibromyalgia     Heart murmur     reported a previous heart murmur    History of acute myocardial infarction     History of atrial fibrillation     History of bruising easily     History of cancer     History of dermatitis     History of mammogram     History of methicillin resistant staphylococcus aureus (MRSA)     Negative nasal culture, isolation discontinued 8/17/2018      History of methicillin resistant staphylococcus aureus (MRSA) 08/17/2018    negative nasal culture-isolation and hx discontinued 8/17/2018    History of obesity     History of osteopenia     History of sciatica     History of shortness of breath     History of sinusitis     History of sore throat     History of syncope     History of umbilical hernia     History of viral infection     Irritable bowel syndrome (IBS)     Joint pain, hip     Limb pain     Myalgia     myalgia and myositis    Need for prophylactic vaccination against single diseases     Need for prophylactic vaccination and inoculation against influenza     Preoperative cardiovascular examination     Primary osteoarthritis of right knee     Right leg pain     Vaginal Pap smear     reported pap smear       Past Surgical History:   Past Surgical History:   Procedure Laterality Date    ANKLE ARTHRODESIS Right     BACK SURGERY      BARIATRIC SURGERY      CHOLECYSTECTOMY      x2    COLONOSCOPY      ESOPHAGOGASTRODUODENOSCOPY N/A 3/23/2018    Procedure: ESOPHAGOGASTRODUODENOSCOPY (EGD); Surgeon: Priscilla Gomes MD;  Location: [facility]; Service: Gastroenterology    FOOT SURGERY      HIP CLOSE REDUCTION Right 8/21/2016    Procedure: CLOSED REDUCTION RIGHT TOTAL HIP;  Surgeon: Yong Lerma MD;  [facility];  Service:    Angelic Hebert / Reynaldo Mena / Cong Never REPLACEMENT      LEFT KNEE REPLACEMENT    JOINT REPLACEMENT      Right HIP    PILONIDAL CYST EXCISION      x2    MS EXC SKIN BENIG <0 5 CM TRUNK,ARM,LEG Right 7/24/2019    Procedure: REVISION SCAR EXTREMITY Rt Hip;   Surgeon: Maximiliano Gore MD;  [facility];  Service: Orthopedics    MS REVISE TOTAL HIP REPLACEMENT Right 4/15/2019    Procedure: ARTHROPLASTY HIP TOTAL ACETABULAR REVISION;  Surgeon: Maximiliano Gore MD;  [facility];  Service: Orthopedics    REPLACEMENT TOTAL KNEE Right     SILVANA-EN-Y PROCEDURE      x2    TOTAL KNEE ARTHROPLASTY      UMBILICAL HERNIA REPAIR      x2       Family History:  Family history reviewed and non-contributory  Family History Problem Relation Age of Onset    Coronary artery disease Mother     Heart attack Mother     Hypertension Mother     Coronary artery disease Father     Heart attack Father     Hypertension Father     Lymphoma Sister         acute    Rashes / Skin problems Sister         lymphomatoid papulosis    Cancer Sister     Coronary artery disease Brother     Heart attack Brother         x2    Aneurysm Neg Hx     Hyperlipidemia Neg Hx        Social History:  Social History     Socioeconomic History    Marital status: /Civil Union     Spouse name: None    Number of children: None    Years of education: None    Highest education level: None   Occupational History    None   Social Needs    Financial resource strain: None    Food insecurity:     Worry: None     Inability: None    Transportation needs:     Medical: None     Non-medical: None   Tobacco Use    Smoking status: Never Smoker    Smokeless tobacco: Never Used   Substance and Sexual Activity    Alcohol use: Not Currently     Frequency: Never    Drug use: No    Sexual activity: Never   Lifestyle    Physical activity:     Days per week: None     Minutes per session: None    Stress: None   Relationships    Social connections:     Talks on phone: None     Gets together: None     Attends [employer] service: None     Active member of club or organization: None     Attends meetings of clubs or organizations: None     Relationship status: None    Intimate partner violence:     Fear of current or ex partner: None     Emotionally abused: None     Physically abused: None     Forced sexual activity: None   Other Topics Concern    None   Social History Narrative    None       Allergies: Allergies   Allergen Reactions    Amoxicillin      Reports nausea, headache, dizzy    Codeine Vomiting and GI Intolerance     GI upset, ana m morphine  Reaction Date: 24Apr2012;     Other      Other reaction(s):  Other (See Comments)  ana m toradol in OR 6/06 sah  Oxycodone-Acetaminophen      Other reaction(s): Other (See Comments)  GI upset; ana m vicodin    Percocet [Oxycodone-Acetaminophen]     Seasonal Ic  [Cholestatin] Allergic Rhinitis    Trimethoprim     Bactrim [Sulfamethoxazole-Trimethoprim] Rash    Folic Acid-Vit C0-RDS G13 Diarrhea    Sulfa Antibiotics Rash     Other reaction(s):  Other (See Comments)  takes lasix @home           Labs:  0   Lab Value Date/Time    HCT 37 3 07/28/2020 1357    HCT 37 5 06/12/2020 1133    HCT 38 2 11/14/2019 0938    HCT 37 0 04/20/2015 1109    HCT 38 0 04/20/2015 1109    HCT 37 2 04/03/2015 0831    HGB 11 9 07/28/2020 1357    HGB 11 6 06/12/2020 1133    HGB 11 4 (L) 11/14/2019 0938    HGB 12 0 04/20/2015 1109    HGB 11 6 04/03/2015 0831    HGB 12 1 11/04/2014 1025    INR 1 64 (H) 07/28/2020 1357    WBC 12 54 (H) 07/28/2020 1357    WBC 6 21 06/12/2020 1133    WBC 5 48 11/14/2019 0938    WBC 7 98 04/20/2015 1109    WBC 7 29 04/03/2015 0831    WBC 6 66 11/04/2014 1025    CRP <3 0 03/11/2019 0937       Meds:    Current Facility-Administered Medications:     acetaminophen (TYLENOL) tablet 975 mg, 975 mg, Oral, Q8H [facility], Camille Foley PA-C, 975 mg at 07/29/20 0545    ascorbic acid (VITAMIN C) tablet 500 mg, 500 mg, Oral, Daily, Mily Burkett PA-C, 500 mg at 07/29/20 4526    cyanocobalamin (VITAMIN B-12) tablet 100 mcg, 100 mcg, Oral, Daily, Mily Burkett PA-C, 100 mcg at 07/29/20 0818    dicyclomine (BENTYL) capsule 10 mg, 10 mg, Oral, Q12H, Camille Foley PA-C, 10 mg at 07/29/20 0545    docusate sodium (COLACE) capsule 100 mg, 100 mg, Oral, BID, Mily Burkett PA-C    DULoxetine (CYMBALTA) delayed release capsule 60 mg, 60 mg, Oral, Daily, Camille Foley PA-C, 60 mg at 07/29/20 0820    fluticasone (FLONASE) 50 mcg/act nasal spray 1 spray, 1 spray, Each Nare, Daily, Mily Burkett PA-C    levothyroxine tablet 75 mcg, 75 mcg, Oral, Early Morning, Camille Foley PA-C, 75 mcg at 07/29/20 0545    lidocaine (LIDODERM) 5 % patch 2 patch, 2 patch, Topical, Daily, Janeth Caballero MD, 2 patch at 07/29/20 0822    methocarbamol (ROBAXIN) tablet 750 mg, 750 mg, Oral, Q8H [address], Janeth Caballero MD    morphine injection 2 mg, 2 mg, Intravenous, Q4H PRN, Mandy Magic, PA-C    ondansetron (ZOFRAN) injection 4 mg, 4 mg, Intravenous, Q6H PRN, Mandy Magic, PA-C    polyethylene glycol (MIRALAX) packet 17 g, 17 g, Oral, Daily PRN, Mandy Magic, PA-C    potassium chloride (K-DUR,KLOR-CON) CR tablet 20 mEq, 20 mEq, Oral, Daily, Mandy Magic, PA-C, 20 mEq at 07/29/20 0820    senna (SENOKOT) tablet 8 6 mg, 1 tablet, Oral, HS, Camille Corvino, PA-C    sucralfate (CARAFATE) tablet 1 g, 1 g, Oral, BID, Camille Corvino, PA-C, 1 g at 07/29/20 1744    torsemide (DEMADEX) tablet 20 mg, 20 mg, Oral, Daily, Mandy Magic, PA-C    warfarin (COUMADIN) tablet 4 mg, 4 mg, Oral, Daily (warfarin), Camille Corvino, PA-C, 4 mg at 07/28/20 1736    Blood Culture:   No results found for: BLOODCX    Wound Culture:   No results found for: WOUNDCULT    Ins and Outs:  I/O last 24 hours: In: 400 [P O :400]  Out: 550 [Urine:550]          Physical Exam:   /58 (BP Location: Left arm)   Pulse 67   Temp 97 6 °F (36 4 °C) (Oral)   Resp 18   Ht 5' (1 524 m)   Wt 78 kg (172 lb)   LMP  (LMP Unknown)   SpO2 92%   BMI 33 59 kg/m²   Gen: Alert and oriented to person, place, time  HEENT: EOMI, eyes clear, moist mucus membranes, hearing intact  Respiratory: Bilateral chest rise   No audible wheezing found  Cardiovascular: Regular Rate and Rhythm  Abdomen: soft nontender/nondistended  Musculoskeletal: right lower extremity  · Skin pink, dry, and intact, no erythema  ·  well-healed incisional scar over the posterior lateral hip  · Painful range of motion of hip joint but no micromotion tenderness  ·  tenderness to palpation directly over the greater trochanter  · Sensation intact  Light touch L3-S1  ·  motor intacthip flexion/extension, knee flexion/extension, ankle dorsi/plantar flexion  ·  negative straight leg raise  · ALEXANDRU and FADDIR both produce pain over the lateral hip  · 2+ DP pulse    Radiology:   I personally reviewed the films  X-rays of right hip shows  Hardware in good alignment status post right total hip arthroplasty revision, possible increase lucency around the acetabular component however the acetabular screws are securely in place the components are well aligned    _*_*_*_*_*_*_*_*_*_*_*_*_*_*_*_*_*_*_*_*_*_*_*_*_*_*_*_*_*_*_*_*_*_*_*_*_*_*_*_*_*    Assessment:  80 y  o female with right hip  Pain consistent  With greater trochanteric bursitis    Plan:   · WBAT right lower extremity  · PT/OT  ·  assistive devices as needed  ·  with no injury or trauma and based on location of the patient's pain  No urgent indication at this time for further imaging  · Pain control -  Consider addition of IV or IM Toradol  ·  given the patient's history of past infections related to surgeries, will defer on greater trochanteric cortisone injection at this time  · Body mass index is 33 59 kg/m²  mildly obese  Recommend behavior modifications, nutrition and physical activity    · Dispo: Ortho signing off,  Please call with any questions  ·  patient should follow up with Dr Laith Devries  After discharge  To determine if any further workup is needed for possible acetabular component loosening      Quang Hamilton PA-C

## 2020-07-29 NOTE — PLAN OF CARE
Problem: PHYSICAL THERAPY ADULT  Goal: Performs mobility at highest level of function for planned discharge setting  See evaluation for individualized goals  Description  Treatment/Interventions: Functional transfer training, LE strengthening/ROM, Therapeutic exercise, Endurance training, Cognitive reorientation, Patient/family training, Equipment eval/education, Bed mobility, Gait training  Equipment Recommended: Valentin Valles       See flowsheet documentation for full assessment, interventions and recommendations  7/29/2020 1254 by Milly Kaye, PT  Outcome: Progressing  Note:   Prognosis: Fair  Problem List: Decreased strength, Decreased range of motion, Decreased endurance, Impaired balance, Decreased mobility, Decreased safety awareness, Pain  Assessment: Therapist provided education to pt for mobility technique including transfers and roller walker usage  Education was provided due to findings from evaluation  Occasional repetition was needed for carryover to be noted  Pt was found to have improvement after education w/ decreased level of assist to maintain safety  Pt continues to be a fall risk  continued inpatient PT tx is indicated to reduce fall risk factors  PT Discharge Recommendation: Post-Acute Rehabilitation Services          See flowsheet documentation for full assessment  7/29/2020 1250 by Milly Kaye PT  Outcome: Progressing  Note:   Prognosis: Fair  Problem List: Decreased strength, Decreased range of motion, Decreased endurance, Impaired balance, Decreased mobility, Decreased safety awareness, Pain  Assessment: Pt presents with sharp constant right posterior hip pain  Dx: right greater trochanteric bursitis, leukocytosis, hypokalemia, and ambulatory dysfunction   order placed for PT eval and tx, w/ activity order of up w/ A and WBAT R LE  pt presents w/ comorbidities of fibromyalgia, a-fib, HTN, CHF, mitral regurgitation, PPM, anemia, MI, obesity, syncope, bilateral TKR, and right THR w/ revision and personal factors of advanced age, mobilizing w/ assistive device, stair(s) to enter home and caregiver role to   pt presents w/ pain, weakness, decreased ROM, decreased endurance, impaired balance, decreased safety awareness and fall risk  these impairments are evident in findings from physical examination (weakness and decreased ROM), mobility assessment (need for assist x2 for bed mobility and transfers, inability to ambulate, need for input for mobility and breathing technique), and Barthel Index: 40/100  pt needed input for task focus and mobility technique/safety  pt is at risk for falls due to physical and safety awareness deficits  pt's clinical presentation is unstable/unpredictable (evident in need for assist x2 w/ all phases of mobility when usually mobilizing independently, pain impacting overall mobility status and need for input for task focus and mobility technique/safety)  pt needs inpatient PT tx to improve mobility deficits  discharge recommendation is for inpatient rehab to reduce fall risk and maximize level of functional independence  Pt would benefit from Pain Management consult to address pain control  PT Discharge Recommendation: Post-Acute Rehabilitation Services          See flowsheet documentation for full assessment

## 2020-07-30 LAB
INR PPP: 1.89 (ref 0.84–1.19)
PROTHROMBIN TIME: 20.8 SECONDS (ref 11.6–14.5)

## 2020-07-30 PROCEDURE — 99285 EMERGENCY DEPT VISIT HI MDM: CPT | Performed by: EMERGENCY MEDICINE

## 2020-07-30 PROCEDURE — 97530 THERAPEUTIC ACTIVITIES: CPT

## 2020-07-30 PROCEDURE — 97116 GAIT TRAINING THERAPY: CPT

## 2020-07-30 PROCEDURE — 99232 SBSQ HOSP IP/OBS MODERATE 35: CPT | Performed by: INTERNAL MEDICINE

## 2020-07-30 PROCEDURE — 85610 PROTHROMBIN TIME: CPT | Performed by: FAMILY MEDICINE

## 2020-07-30 RX ORDER — PANTOPRAZOLE SODIUM 40 MG/1
40 TABLET, DELAYED RELEASE ORAL
Status: DISCONTINUED | OUTPATIENT
Start: 2020-07-30 | End: 2020-07-31

## 2020-07-30 RX ORDER — TRAMADOL HYDROCHLORIDE 50 MG/1
75 TABLET ORAL EVERY 6 HOURS PRN
Status: DISCONTINUED | OUTPATIENT
Start: 2020-07-30 | End: 2020-08-04 | Stop reason: HOSPADM

## 2020-07-30 RX ORDER — TORSEMIDE 20 MG/1
10 TABLET ORAL DAILY
Status: DISCONTINUED | OUTPATIENT
Start: 2020-07-31 | End: 2020-07-30

## 2020-07-30 RX ORDER — GABAPENTIN 100 MG/1
100 CAPSULE ORAL 2 TIMES DAILY
Status: DISCONTINUED | OUTPATIENT
Start: 2020-07-30 | End: 2020-08-03

## 2020-07-30 RX ORDER — TORSEMIDE 20 MG/1
20 TABLET ORAL DAILY
Status: DISCONTINUED | OUTPATIENT
Start: 2020-07-31 | End: 2020-08-04 | Stop reason: HOSPADM

## 2020-07-30 RX ADMIN — SUCRALFATE 1 G: 1 TABLET ORAL at 17:09

## 2020-07-30 RX ADMIN — GABAPENTIN 100 MG: 100 CAPSULE ORAL at 11:19

## 2020-07-30 RX ADMIN — ACETAMINOPHEN 975 MG: 325 TABLET, FILM COATED ORAL at 14:03

## 2020-07-30 RX ADMIN — OXYCODONE HYDROCHLORIDE AND ACETAMINOPHEN 500 MG: 500 TABLET ORAL at 09:04

## 2020-07-30 RX ADMIN — LEVOTHYROXINE SODIUM 75 MCG: 75 TABLET ORAL at 05:18

## 2020-07-30 RX ADMIN — ACETAMINOPHEN 975 MG: 325 TABLET, FILM COATED ORAL at 21:13

## 2020-07-30 RX ADMIN — METHOCARBAMOL TABLETS 750 MG: 750 TABLET, COATED ORAL at 05:18

## 2020-07-30 RX ADMIN — METHOCARBAMOL TABLETS 750 MG: 750 TABLET, COATED ORAL at 14:03

## 2020-07-30 RX ADMIN — DICYCLOMINE HYDROCHLORIDE 10 MG: 10 CAPSULE ORAL at 05:24

## 2020-07-30 RX ADMIN — ACETAMINOPHEN 975 MG: 325 TABLET, FILM COATED ORAL at 05:17

## 2020-07-30 RX ADMIN — GABAPENTIN 100 MG: 100 CAPSULE ORAL at 17:09

## 2020-07-30 RX ADMIN — SUCRALFATE 1 G: 1 TABLET ORAL at 09:04

## 2020-07-30 RX ADMIN — VITAM B12 100 MCG: 100 TAB at 09:04

## 2020-07-30 RX ADMIN — FLUTICASONE PROPIONATE 1 SPRAY: 50 SPRAY, METERED NASAL at 09:06

## 2020-07-30 RX ADMIN — LIDOCAINE 2 PATCH: 50 PATCH CUTANEOUS at 09:05

## 2020-07-30 RX ADMIN — POTASSIUM CHLORIDE 20 MEQ: 1500 TABLET, EXTENDED RELEASE ORAL at 09:04

## 2020-07-30 RX ADMIN — WARFARIN SODIUM 4 MG: 4 TABLET ORAL at 17:10

## 2020-07-30 RX ADMIN — DICYCLOMINE HYDROCHLORIDE 10 MG: 10 CAPSULE ORAL at 17:09

## 2020-07-30 RX ADMIN — TRAMADOL HYDROCHLORIDE 75 MG: 50 TABLET, FILM COATED ORAL at 11:20

## 2020-07-30 RX ADMIN — DULOXETINE HYDROCHLORIDE 60 MG: 30 CAPSULE, DELAYED RELEASE ORAL at 09:04

## 2020-07-30 RX ADMIN — METHOCARBAMOL TABLETS 750 MG: 750 TABLET, COATED ORAL at 21:13

## 2020-07-30 NOTE — PHYSICIAN ADVISOR
Current patient class: Observation  The patient is currently on Hospital Day: 3 at [facility]      This patient was originally admitted to the hospital under observation status  After admission the patient was reevaluated and determined to require further hospitalization  The patient is now documented to require at least a 2nd midnight in the hospital  As such the patient is now expected to satisfy the 2 midnight benchmark and is therefore eligible for inpatient admission  After review of the relevant documentation, labs, vital signs and test results, the patient is appropriate for INPATIENT ADMISSION  Admission to the hospital as an inpatient is a complex decision making process which requires the practitioner to consider the patients presenting complaint, history and physical examination and all relevant testing  With this in mind, in this case, the patient was deemed appropriate for INPATIENT ADMISSION  After review of the documentation and testing available at the time of the admission I concur with this clinical determination of medical necessity  Rationale is as follows: The patient is a 80 yrs Female who presented to the ED at 7/28/2020 10:31 AM with a chief complaint of Groin Pain (Pt states she woke up Sunday morning and her right back groin was hurting  Unable to walk, stand, due to pain  Denies N/V/D or urinary symptoms) The patient is a 80 yrs   Female who presented to the ED at 7/28/2020 10:31 AM with a chief complaint of Groin Pain (Pt states she woke up Sunday morning and her right back groin was hurting  Unable to walk, stand, due to pain   Denies N/V/D or urinary symptoms)  Xrays of right hip showed Status post right hip arthroplasty with increased lucency surrounding the acetabular cup raising the possibility of particle disease and/or hardware loosening   No acute periprosthetic fractures are identified Orthopedics was consulted and WBAT and analgesia recommended with outpatient follow up  STR recommended following discharge by PT  She has received 1 dose IV morphine yesterday  She is receiving pain control with scheduled tylenol 975 mg q8h, lidoderm patch, aqua K pad, robaxin  Added gabapentin and tramadol  She is still receiving PT  The patients vitals on arrival were ED Triage Vitals   Temperature Pulse Respirations Blood Pressure SpO2   07/28/20 1047 07/28/20 1045 07/28/20 1045 07/28/20 1045 07/28/20 1045   99 1 °F (37 3 °C) 72 18 153/67 100 %      Temp Source Heart Rate Source Patient Position - Orthostatic VS BP Location FiO2 (%)   07/28/20 1045 07/28/20 1045 07/28/20 1045 07/28/20 1045 --   Oral Monitor Lying Left arm       Pain Score       07/28/20 1205       Worst Possible Pain           Past Medical History:   Diagnosis Date    Abdominal bloating 8/1/2016    Anemia     Arthritis     Cancer ([address] )     basal cell    CHF (congestive heart failure) (HCC)     Disease of thyroid gland     Disturbance of smell and taste     disturbance of taste    Effusion of knee joint right     Fibromyalgia     Heart murmur     reported a previous heart murmur    History of acute myocardial infarction     History of atrial fibrillation     History of bruising easily     History of cancer     History of dermatitis     History of mammogram     History of methicillin resistant staphylococcus aureus (MRSA)     Negative nasal culture, isolation discontinued 8/17/2018      History of methicillin resistant staphylococcus aureus (MRSA) 08/17/2018    negative nasal culture-isolation and hx discontinued 8/17/2018    History of obesity     History of osteopenia     History of sciatica     History of shortness of breath     History of sinusitis     History of sore throat     History of syncope     History of umbilical hernia     History of viral infection     Irritable bowel syndrome (IBS)     Joint pain, hip     Limb pain     Myalgia     myalgia and myositis    Need for prophylactic vaccination against single diseases     Need for prophylactic vaccination and inoculation against influenza     Preoperative cardiovascular examination     Primary osteoarthritis of right knee     Right leg pain     Vaginal Pap smear     reported pap smear     Past Surgical History:   Procedure Laterality Date    ANKLE ARTHRODESIS Right     BACK SURGERY      BARIATRIC SURGERY      CHOLECYSTECTOMY      x2    COLONOSCOPY      ESOPHAGOGASTRODUODENOSCOPY N/A 3/23/2018    Procedure: ESOPHAGOGASTRODUODENOSCOPY (EGD); Surgeon: Lyla Rick MD;  [facility]: [facility]; Service: Gastroenterology    FOOT SURGERY      HIP CLOSE REDUCTION Right 8/21/2016    Procedure: CLOSED REDUCTION RIGHT TOTAL HIP;  Surgeon: Francisca Huerta MD;  [facility];  Service:    [facility] / Brady Mage REPLACEMENT      LEFT KNEE REPLACEMENT    JOINT REPLACEMENT      Right HIP    PILONIDAL CYST EXCISION      x2    FL EXC SKIN BENIG <0 5 CM TRUNK,ARM,LEG Right 7/24/2019    Procedure: REVISION SCAR EXTREMITY Rt Hip;   Surgeon: Candance Knife, MD;  [facility];  Service: Orthopedics    FL REVISE TOTAL HIP REPLACEMENT Right 4/15/2019    Procedure: ARTHROPLASTY HIP TOTAL ACETABULAR REVISION;  Surgeon: Candance Knife, MD;  [facility];  Service: Orthopedics    REPLACEMENT TOTAL KNEE Right     SILVANA-EN-Y PROCEDURE      x2    TOTAL KNEE ARTHROPLASTY      UMBILICAL HERNIA REPAIR      x2       The patient was admitted to the hospital at N/A on N/A for the following diagnosis:  Right hip pain [M25 551]  Hip pain, right [M25 551]  Sciatica of right side [M54 31]  Status post right hip replacement [Z96 641]  Ambulatory dysfunction [R26 2]    Consults have been placed to:   IP CONSULT TO ORTHOPEDIC SURGERY    Vitals:    07/29/20 0721 07/29/20 1500 07/29/20 2228 07/30/20 0723   BP: 117/58 102/55 130/60 112/61   BP Location: Left arm Left arm Left arm Left arm Pulse: 67 70 70 70   Resp: 18 18 18 18   Temp: 97 6 °F (36 4 °C) 98 2 °F (36 8 °C) 98 4 °F (36 9 °C) (!) 97 3 °F (36 3 °C)   TempSrc: Oral Temporal Oral Oral   SpO2: 92% 98% 94% 94%   Weight:       Height:           Most recent labs:    Recent Labs     07/28/20  1357 07/29/20  1150 07/30/20  0537   WBC 12 54*  --   --    HGB 11 9  --   --    HCT 37 3  --   --    *  --   --    K 3 2* 3 9  --    CALCIUM 8 3 8 4  --    BUN 11 14  --    CREATININE 0 80 0 75  --    INR 1 64* 1 66* 1 89*   AST 23  --   --    ALT 20  --   --    ALKPHOS 85  --   --        Scheduled Meds:  Current Facility-Administered Medications:  acetaminophen 975 mg Oral Q8H [address] Camille Foley PA-C   ascorbic acid 500 mg Oral Daily Camille Foley PA-C   vitamin B-12 100 mcg Oral Daily Camille Foley PA-C   dicyclomine 10 mg Oral Q12H Camille Foley PA-C   docusate sodium 100 mg Oral BID Tashi Guerrero PA-C   DULoxetine 60 mg Oral Daily Camille Foley PA-C   fluticasone 1 spray Each Nare Daily Camille Foley PA-C   gabapentin 100 mg Oral BID Dinah Lobo MD   levothyroxine 75 mcg Oral Early Morning Tashi Guerrero PA-C   lidocaine 2 patch Topical Daily Wan Barclay MD   methocarbamol 750 mg Oral Q8H [address] Wan Barclay MD   morphine injection 2 mg Intravenous Q4H PRN Dinah Lobo MD   ondansetron 4 mg Intravenous Q6H PRN Tashi Guerrero PA-C   pantoprazole 40 mg Oral Early Morning Dinah Lobo MD   polyethylene glycol 17 g Oral Daily PRN Ranleo mirricky Foley PA-C   potassium chloride 20 mEq Oral Daily Camille Foley PA-C   senna 1 tablet Oral HS Camille Foley PA-C   sucralfate 1 g Oral BID Tashi Guerrero PA-C   [START ON 7/31/2020] torsemide 20 mg Oral Daily Dinah Lobo MD   traMADol 75 mg Oral Q6H PRN Dinah Lobo MD   warfarin 4 mg Oral Daily (warfarin) Tashi Guerrero PA-C     Continuous Infusions:   PRN Meds: morphine injection    ondansetron    polyethylene glycol    traMADol    Surgical procedures (if appropriate):

## 2020-07-30 NOTE — PROGRESS NOTES
Progress Note - Jayne Lemos 1933, 80 y o  female MRN: [id number]    Unit/Bed#: S -01 Encounter: [id number]    Primary Care Provider: Mraiaelena Loyola DO   Date and time admitted to hospital: 7/28/2020 10:31 AM        * Right hip pain  Assessment & Plan  Patient presents with acute on chronic R hip pain that started acutely 3 days ago without any injury/trauma  Has been unable to ambulate  · Has hx of R hip replacement 4/2019 and R scar revision 7/2019 with Dr Chavarria Kaitlyn  · Lumbar spine xray negative for acute findings, R hip/pelvis xray shows increased lucency surrounding acetabular cup raising possibility of particle disease and/or hardware loosening  No acute periprosthetic fractures are identified  · Consulted orthopedics  No surgery is planned at this time  · Pain control with scheduled tylenol 975 mg q8h, lidoderm patch, aqua K pad, robaxin  Added gabapentin and tramadol  Pt with codeine allergy but reports she tolerates morphine  Continue to monitor closely  · PT/OT once seen by Orthopedics    Hypokalemia  Assessment & Plan  Replete K  · Replete p r n  Ambulatory dysfunction  Assessment & Plan  Secondary to hip pain  Continue with pain control  Orthopedic has signed off  PT/OT and lifestyle modification was recommended  · Pain control with gabapentin 100mg BID, tramadol 75 mg as needed for moderate pain, and morphine for breakthrough pain  · Fall precautions   · PT/OT eval  Recommended acute rehab  Patient wants PT at home  Pacemaker  Assessment & Plan  · Cardiology follow up       Depression  Assessment & Plan  · Continue cymbalta    Chronic diastolic CHF (congestive heart failure) (HCC)  Assessment & Plan  Wt Readings from Last 3 Encounters:   07/28/20 78 kg (172 lb)   06/17/20 81 2 kg (179 lb)   06/10/20 78 9 kg (174 lb)       Appears euvolemic, on demadex 20 mg daily  · Monitor volume status and renal function   · Outpatient cardiology f/u       Chronic atrial fibrillation ([address] )  Assessment & Plan  On warfarin 4 mg daily; does not appear to be on any rate control medications; hx of ablation  · Previously saw Dr Kana Negrete  · Monitor INR, slightly subtherapeutic  · INR 1 89 today  VTE Pharmacologic Prophylaxis:   Pharmacologic: Warfarin (Coumadin)  Mechanical VTE Prophylaxis in Place: Yes    Discussions with Specialists or Other Care Team Provider: Orthopedics, CM, Nursing, PT/OT    Education and Discussions with Family / Patient: Patient and her daughter    Current Length of Stay: 0 day(s)    Current Patient Status: Inpatient     Discharge Plan / Estimated Discharge Date: Undecided, pending on patient's pain relief    Code Status: Level 1 - Full Code      Subjective:   Patient complained of hip pain elicited by movement  She reported she feels fine if she lies down  She rated her pain 10/10  No radiation or numbness, tingling was noted  She denied chest pain, SOB, abdominal pain, N/V or diarrhea  She stated she took 10 mg of torsemide at home  On review of her previous office visits with internal medicine and  Cardiology did not show the dosage of adjustment  Have explained the patient on the risks of self adjusting dose  Patient seemed to refused the current dosage of 20 mg      Objective:     Vitals:   Temp (24hrs), Av 9 °F (36 6 °C), Min:97 3 °F (36 3 °C), Max:98 4 °F (36 9 °C)    Temp:  [97 3 °F (36 3 °C)-98 4 °F (36 9 °C)] 98 1 °F (36 7 °C)  HR:  [70-72] 72  Resp:  [18] 18  BP: (102-130)/(56-61) 102/56  SpO2:  [94 %-97 %] 97 %  Body mass index is 33 59 kg/m²  Input and Output Summary (last 24 hours): Intake/Output Summary (Last 24 hours) at 2020 1526  Last data filed at 2020 0902  Gross per 24 hour   Intake 240 ml   Output 850 ml   Net -610 ml       Physical Exam:     Physical Exam   Constitutional: She is oriented to person, place, and time  She appears well-developed and well-nourished  No distress     HENT:   Head: Normocephalic and atraumatic  Eyes: Conjunctivae are normal  Right eye exhibits no discharge  Left eye exhibits no discharge  No scleral icterus  Neck: Normal range of motion  Neck supple  Cardiovascular: Normal rate, normal heart sounds and intact distal pulses  An irregular rhythm present  Exam reveals no gallop and no friction rub  No murmur heard  Pulmonary/Chest: Effort normal and breath sounds normal  No stridor  No respiratory distress  She has no wheezes  She has no rales  She exhibits no tenderness  Abdominal: Soft  Bowel sounds are normal  She exhibits no distension and no mass  There is no tenderness  There is no rebound and no guarding  No hernia  Musculoskeletal: Normal range of motion  She exhibits no edema or deformity  Right hip: She exhibits tenderness  Legs:  Neurological: She is alert and oriented to person, place, and time  Skin: Skin is warm  She is not diaphoretic  No erythema  No pallor  Psychiatric: She has a normal mood and affect  Her behavior is normal    Nursing note and vitals reviewed  Additional Data:     Labs:    Results from last 7 days   Lab Units 07/28/20  1357   WBC Thousand/uL 12 54*   HEMOGLOBIN g/dL 11 9   HEMATOCRIT % 37 3   PLATELETS Thousands/uL 131*   NEUTROS PCT % 85*   LYMPHS PCT % 7*   MONOS PCT % 8   EOS PCT % 0     Results from last 7 days   Lab Units 07/29/20  1150 07/28/20  1357   POTASSIUM mmol/L 3 9 3 2*   CHLORIDE mmol/L 108 103   CO2 mmol/L 25 23   BUN mg/dL 14 11   CREATININE mg/dL 0 75 0 80   CALCIUM mg/dL 8 4 8 3   ALK PHOS U/L  --  85   ALT U/L  --  20   AST U/L  --  23     Results from last 7 days   Lab Units 07/30/20  0537   INR  1 89*       * I Have Reviewed All Lab Data Listed Above  * Additional Pertinent Lab Tests Reviewed:  [address] Admission Reviewed    Imaging:    Imaging Reports Reviewed Today Include: hip x ray, lumbar x ray  Imaging Personally Reviewed by Myself Includes:  hip x ray, lumbar x ray    Recent Cultures (last 7 days):           Last 24 Hours Medication List:     Current Facility-Administered Medications:  acetaminophen 975 mg Oral Q8H [facility] Camille Foley PA-C   ascorbic acid 500 mg Oral Daily Camille Foley PA-C   vitamin B-12 100 mcg Oral Daily Camille Foley PA-C   dicyclomine 10 mg Oral Q12H Camille Foley PA-C   docusate sodium 100 mg Oral BID Gaby Pablo PA-C   DULoxetine 60 mg Oral Daily Camille Foley PA-C   fluticasone 1 spray Each Nare Daily Camille Foley PA-C   gabapentin 100 mg Oral BID Dinah Cano MD   levothyroxine 75 mcg Oral Early Morning Gaby Pablo PA-C   lidocaine 2 patch Topical Daily Mary Henley MD   methocarbamol 750 mg Oral Q8H [facility] Mary Henley MD   morphine injection 2 mg Intravenous Q4H PRN Dinah Cano MD   ondansetron 4 mg Intravenous Q6H PRN Gaby Pablo PA-C   pantoprazole 40 mg Oral Early Morning Dinah Cano MD   polyethylene glycol 17 g Oral Daily PRN Gaby Pablo PA-C   potassium chloride 20 mEq Oral Daily Camille Foley PA-C   senna 1 tablet Oral HS Camille Foley PA-C   sucralfate 1 g Oral BID Gaby Pablo PA-C   [START ON 7/31/2020] torsemide 20 mg Oral Daily Dinah Cano MD   traMADol 75 mg Oral Q6H PRN Dinah Cano MD   warfarin 4 mg Oral Daily (warfarin) Gaby Pablo PA-C        Today, Patient Was Seen By: Loyd Pozo MD    ** Please Note: This note has been constructed using a voice recognition system   **

## 2020-07-30 NOTE — ASSESSMENT & PLAN NOTE
Secondary to hip pain  Continue with pain control  Orthopedic has signed off  PT/OT and lifestyle modification was recommended  · Pain control with gabapentin 100mg BID, tramadol 75 mg as needed for moderate pain, and morphine for breakthrough pain  · Fall precautions   · PT/OT xial  Recommended acute rehab  Patient wants PT at home

## 2020-07-30 NOTE — ASSESSMENT & PLAN NOTE
On warfarin 4 mg daily; does not appear to be on any rate control medications; hx of ablation  · Previously saw Dr Haylie Caraballo  · Monitor INR, slightly subtherapeutic  · INR 1 89 today

## 2020-07-30 NOTE — PLAN OF CARE
Problem: PHYSICAL THERAPY ADULT  Goal: Performs mobility at highest level of function for planned discharge setting  See evaluation for individualized goals  Description  Treatment/Interventions: Functional transfer training, LE strengthening/ROM, Therapeutic exercise, Endurance training, Cognitive reorientation, Patient/family training, Equipment eval/education, Bed mobility, Gait training  Equipment Recommended: Yamilet Almaraz       See flowsheet documentation for full assessment, interventions and recommendations  Outcome: Progressing  Note:   Prognosis: Fair  Problem List: Decreased strength, Decreased range of motion, Decreased endurance, Impaired balance, Decreased mobility, Decreased safety awareness, Pain  Assessment: Patient agreeable and motivated to participate in therapy session  Patient requires mod ax1 for supine to sit transfers with increased time and instruction for body positioning and technique  Requires sitting EOB x5 minutes prior to transfer trials secondary to pain  Sit<>stand with min ax1 and verbal instruction for hand placement and body positioning for descend to chair  Patient able to ambulate short gait distance with roller walker and mod ax1  Requires steadying balance assist with advancement of roller walker and verbal instruction for stepping sequence  Pt unable to self adjust in chair requiring max ax2 for positioning to back of chair using pad to assist  Patient fatigues quickly requiring seated rest breaks throughout  Continue to focus on OOB mobility with progression of transfers and ambulation as appropriate  PT Discharge Recommendation: Post-Acute Rehabilitation Services          See flowsheet documentation for full assessment

## 2020-07-30 NOTE — UTILIZATION REVIEW
Continued Stay Review    Date: 7/30/2020                      Current Patient Class: OBSERVATION [email or web address] TO INPT [email or web address] INTRACTABLE R HIP PAIN AND NEED FOR PT     Current Level of Care: MED-SURG    HPI:86 y o  female initially admitted observation upgraded to Inpatient on [email or web address]/t intractable right hip pain with ambulatory dysfunction  Pt with hx of fibromyalgia, hypothyroidism, chronic AFib on warfarin, hypertension, chronic diastolic heart failure, history of [place]-en-Y gastric bypass, mitral regurgitation, permanent pacemaker placement, chronic right hip pain status post replacement  Does not take her diuretic in several days bc she is unable to get to bathroom  Uses walker at home and lives with   Imaging shows increased lucency surrounding acetabular cup raising possibility of particle disease and/or hardware loosening  tyleno, lidoderm patch, aqua K pad, robaxin, gabapentin  WBAT right lower extremity  PT/OT  Consult Orthopedic surgery  7/29 Ortho consult:right hip pain began 2 days ago  consistent  With greater trochanteric bursitis  denies any injury trauma or change in activity prior to onset of pain  denies any recent infection  pain like this in the past after total hip revision 1 yr ago  Pain centered primarily around the lateral hip pain does radiate down to the knee but not past the knee  pain is moderate to severe in intermittent is made worse with attempted motion of the hip and weight-bearing and better with rest  Revision acetabular component on 4/15/19 and had a scar tissue revision surgery in July 2019  WBAT right lower extremity  PT/OT  Pain control given the patient's history of past infections related to surgeries, will defer on greater trochanteric cortisone injection        Pertinent Labs/Diagnostic Results:       Results from last 7 days   Lab Units 07/28/20  1357   WBC Thousand/uL 12 54*   HEMOGLOBIN g/dL 11 9   HEMATOCRIT % 37 3   PLATELETS Thousands/uL 131* NEUTROS ABS Thousands/µL 10 50*         Results from last 7 days   Lab Units 07/29/20  1150 07/28/20  1357   SODIUM mmol/L 139 137   POTASSIUM mmol/L 3 9 3 2*   CHLORIDE mmol/L 108 103   CO2 mmol/L 25 23   ANION GAP mmol/L 6 11   BUN mg/dL 14 11   CREATININE mg/dL 0 75 0 80   EGFR ml/min/1 73sq m 72 67   CALCIUM mg/dL 8 4 8 3     Results from last 7 days   Lab Units 07/28/20  1357   AST U/L 23   ALT U/L 20   ALK PHOS U/L 85   TOTAL PROTEIN g/dL 6 6   ALBUMIN g/dL 3 0*   TOTAL BILIRUBIN mg/dL 0 81         Results from last 7 days   Lab Units 07/29/20  1150 07/28/20  1357   GLUCOSE RANDOM mg/dL 85 105       Results from last 7 days   Lab Units 07/30/20  0537 07/29/20  1150 07/28/20  1357   PROTIME seconds 20 8* 18 8* 18 6*   INR  1 89* 1 66* 1 64*     7/28  XR lumbar spine 2 or 3 views   No acute lumbar spine fractures identified     XR hip/pelv 2-3 vws right if performed   Status post right hip arthroplasty with increased lucency surrounding the acetabular cup raising the possibility of particle disease and/or hardware loosening   No acute periprosthetic fractures are identified                Vital Signs:   07/30/20 0723  97 3 °F (36 3 °C)Abnormal   70  18  112/61  --  94 %  None (Room air)  Lying   07/29/20 2228  98 4 °F (36 9 °C)  70  18  130/60  --  94 %  None (Room air)  Lying   07/29/20 1500  98 2 °F (36 8 °C)  70  18  102/55  --  98 %  None (Room air)  Lying   07/29/20 0721  97 6 °F (36 4 °C)  67  18  117/58  --  92 %  None (Room air)  Lying         Medications:   Scheduled Medications:    Medications:  acetaminophen 975 mg Oral Q8H [address]   ascorbic acid 500 mg Oral Daily   vitamin B-12 100 mcg Oral Daily   dicyclomine 10 mg Oral Q12H   docusate sodium 100 mg Oral BID   DULoxetine 60 mg Oral Daily   fluticasone 1 spray Each Nare Daily   levothyroxine 75 mcg Oral Early Morning   lidocaine 2 patch Topical Daily   methocarbamol 750 mg Oral Q8H [address]   potassium chloride 20 mEq Oral Daily   senna 1 tablet Oral HS sucralfate 1 g Oral BID   torsemide 20 mg Oral Daily   warfarin 4 mg Oral Daily (warfarin)     PRN Meds:    morphine injection 2 mg Intravenous Q4H PRN 7/29 x 1   ondansetron 4 mg Intravenous Q6H PRN   polyethylene glycol 17 g Oral Daily PRN     PT/OT    Discharge Plan: TBD    Network Utilization Review Department  [email or web address]  ATTENTION: Please call with any questions or concerns to [phone] and carefully listen to the prompts so that you are directed to the right person  All voicemails are confidential   Natasha Beck all requests for admission clinical reviews, approved or denied determinations and any other requests to dedicated fax number below belonging to the campus where the patient is receiving treatment   List of dedicated fax numbers for the Facilities:  [address] DENIALS (Administrative/Medical Necessity) [phone]   [address] (Maternity/NICU/Pediatrics) [phone]   Hoemr Steele [phone]   Timmy Osborne [phone]   Josie Ng [phone]   Pietro Ni [phone]   [address] [place] [address] [phone]   [facility]  [phone]   [address] [facility]  [address] [facility] [address] [facility] [phone]

## 2020-07-30 NOTE — ED PROVIDER NOTES
History  Chief Complaint   Patient presents with    Groin Pain     Pt states she woke up Sunday morning and her right back groin was hurting  Unable to walk, stand, due to pain  Denies N/V/D or urinary symptoms     80 yr female with r buttock pain this am - worse upon movements with radiation down rle to r knee-- no injury -- no abd/flank pain - no ble weakness/sensoty comps- no b/b incont/ no saddle anesthesia      History provided by:  Patient   used: No    Groin Pain       Prior to Admission Medications   Prescriptions Last Dose Informant Patient Reported? Taking?    Cholecalciferol 125 MCG (5000 UT) TABS  Self Yes No   Sig: Take 5,000 Units by mouth daily   Cyanocobalamin (VITAMIN B12 PO)  Self Yes No   Sig: Take 1 capsule by mouth daily    DULoxetine (CYMBALTA) 60 mg delayed release capsule   No No   Sig: Take 1 capsule (60 mg total) by mouth daily   ascorbic acid (VITAMIN C) 500 mg tablet  Self No No   Sig: Take 1 tablet (500 mg total) by mouth 2 (two) times a day   Patient taking differently: Take 500 mg by mouth daily    desoximetasone (TOPICORT) 0 25 % cream  Self No No   Sig: Apply topically as needed for irritation   dicyclomine (BENTYL) 10 mg capsule  Self Yes No   Sig: Take 1 capsule by mouth every 12 (twelve) hours   fluticasone (FLONASE) 50 mcg/act nasal spray  Self No No   Sig: SPRAY 2 SPRAYS INTO EACH NOSTRIL EVERY DAY   levothyroxine 75 mcg tablet  Self No No   Sig: Take 1 tablet (75 mcg total) by mouth daily   methocarbamol (ROBAXIN) 500 mg tablet   No No   Sig: Take 1 tablet (500 mg total) by mouth daily at bedtime as needed for muscle spasms for up to 3 days   potassium chloride (K-DUR,KLOR-CON) 20 mEq tablet  Self No No   Sig: TAKE 1 TABLET DAILY   sucralfate (CARAFATE) 1 g tablet   No No   Sig: Take 1 tablet (1 g total) by mouth 2 (two) times a day   torsemide (DEMADEX) 20 mg tablet  Self No No   Sig: Take 1 tablet (20 mg total) by mouth daily   warfarin (COUMADIN) 4 mg tablet  Self No No   Sig: TAKE ONE TABLET DAILY AS PREVIOUSLY DIRECTED BY CARDIOLOGY      Facility-Administered Medications: None       Past Medical History:   Diagnosis Date    Abdominal bloating 8/1/2016    Anemia     Arthritis     Cancer ([address] )     basal cell    CHF (congestive heart failure) (HCC)     Disease of thyroid gland     Disturbance of smell and taste     disturbance of taste    Effusion of knee joint right     Fibromyalgia     Heart murmur     reported a previous heart murmur    History of acute myocardial infarction     History of atrial fibrillation     History of bruising easily     History of cancer     History of dermatitis     History of mammogram     History of methicillin resistant staphylococcus aureus (MRSA)     Negative nasal culture, isolation discontinued 8/17/2018   History of methicillin resistant staphylococcus aureus (MRSA) 08/17/2018    negative nasal culture-isolation and hx discontinued 8/17/2018    History of obesity     History of osteopenia     History of sciatica     History of shortness of breath     History of sinusitis     History of sore throat     History of syncope     History of umbilical hernia     History of viral infection     Irritable bowel syndrome (IBS)     Joint pain, hip     Limb pain     Myalgia     myalgia and myositis    Need for prophylactic vaccination against single diseases     Need for prophylactic vaccination and inoculation against influenza     Preoperative cardiovascular examination     Primary osteoarthritis of right knee     Right leg pain     Vaginal Pap smear     reported pap smear       Past Surgical History:   Procedure Laterality Date    ANKLE ARTHRODESIS Right     BACK SURGERY      BARIATRIC SURGERY      CHOLECYSTECTOMY      x2    COLONOSCOPY      ESOPHAGOGASTRODUODENOSCOPY N/A 3/23/2018    Procedure: ESOPHAGOGASTRODUODENOSCOPY (EGD); Surgeon: Howie Norwood MD;  [facility];   Service: Gastroenterology    FOOT SURGERY      HIP CLOSE REDUCTION Right 8/21/2016    Procedure: CLOSED REDUCTION RIGHT TOTAL HIP;  Surgeon: Aleksandra Ruiz MD;  [facility];  Service:    Windy Resendez / Merlin Fitch / Elaina Weldon REPLACEMENT      LEFT KNEE REPLACEMENT    JOINT REPLACEMENT      Right HIP    PILONIDAL CYST EXCISION      x2    NE EXC SKIN BENIG <0 5 CM TRUNK,ARM,LEG Right 7/24/2019    Procedure: REVISION SCAR EXTREMITY Rt Hip; Surgeon: Guillermina Corbett MD;  [facility];  Service: Orthopedics    NE REVISE TOTAL HIP REPLACEMENT Right 4/15/2019    Procedure: ARTHROPLASTY HIP TOTAL ACETABULAR REVISION;  Surgeon: Guillermina Corbett MD;  [facility];  Service: Orthopedics    REPLACEMENT TOTAL KNEE Right     SILVANA-EN-Y PROCEDURE      x2    TOTAL KNEE ARTHROPLASTY      UMBILICAL HERNIA REPAIR      x2       Family History   Problem Relation Age of Onset    Coronary artery disease Mother     Heart attack Mother     Hypertension Mother     Coronary artery disease Father     Heart attack Father    [facility] Hypertension Father     Lymphoma Sister         acute    Rashes / Skin problems Sister         lymphomatoid papulosis    Cancer Sister     Coronary artery disease Brother     Heart attack Brother         x2    Aneurysm Neg Hx     Hyperlipidemia Neg Hx      I have reviewed and agree with the history as documented  E-Cigarette/Vaping    E-Cigarette Use Never User      E-Cigarette/Vaping Substances     Social History     Tobacco Use    Smoking status: Never Smoker    Smokeless tobacco: Never Used   Substance Use Topics    Alcohol use: Not Currently     Frequency: Never    Drug use: No       Review of Systems   Constitutional: Negative  HENT: Negative  Eyes: Negative  Respiratory: Negative  Cardiovascular: Negative  Gastrointestinal: Negative  Endocrine: Negative  Genitourinary: Negative  Musculoskeletal: Negative           R buttock pain    Skin: Negative  Allergic/Immunologic: Negative  Neurological: Negative  Hematological: Negative  Psychiatric/Behavioral: Negative  Physical Exam  Physical Exam   Constitutional: She is oriented to person, place, and time  She appears well-developed and well-nourished  No distress  AVSS- PULSE OX 94 % ON RA- INTERPRETATION IS NORMAL- NO INTERVENTION    HENT:   Head: Normocephalic and atraumatic  Eyes: Pupils are equal, round, and reactive to light  Conjunctivae and EOM are normal  Right eye exhibits no discharge  Left eye exhibits no discharge  No scleral icterus  MM PINK   Neck: Normal range of motion  Neck supple  No JVD present  No tracheal deviation present  No thyromegaly present  NO PMT C/T/L/S SPINE   Cardiovascular: Normal rate, regular rhythm and intact distal pulses  Exam reveals no gallop and no friction rub  Murmur heard  Pulmonary/Chest: Effort normal and breath sounds normal  No stridor  No respiratory distress  She has no wheezes  She has no rales  She exhibits no tenderness  Abdominal: Soft  Bowel sounds are normal  She exhibits no distension and no mass  There is no tenderness  There is no rebound and no guarding  No hernia  OFT NT/ND/ NO HSM/ NO CVA TENDERNESS/ NO PERITONEAL SINS- NO PULSATILE ADD MASS/BRUIT/ TENDERNESS   Musculoskeletal: She exhibits tenderness  She exhibits no edema or deformity  TRACE BLE PRETIBIAL EDEMA- NT- NO ASYM/ ERYTHEMA- EQUAL BILATERAL RADIAL/DP PULSES- POS R BUTTOCK TENDERNESS TO PALPATION  POS R SLRT- NEG XED SLRT    Lymphadenopathy:     She has no cervical adenopathy  Neurological: She is alert and oriented to person, place, and time  She displays normal reflexes  No cranial nerve deficit or sensory deficit  She exhibits normal muscle tone  Coordination normal    Skin: Skin is warm  Capillary refill takes less than 2 seconds  No rash noted  She is not diaphoretic  No erythema  There is pallor     Psychiatric: She has a normal mood and affect  Her behavior is normal  Judgment and thought content normal    Nursing note and vitals reviewed        Vital Signs  ED Triage Vitals   Temperature Pulse Respirations Blood Pressure SpO2   07/28/20 1047 07/28/20 1045 07/28/20 1045 07/28/20 1045 07/28/20 1045   99 1 °F (37 3 °C) 72 18 153/67 100 %      Temp Source Heart Rate Source Patient Position - Orthostatic VS BP Location FiO2 (%)   07/28/20 1045 07/28/20 1045 07/28/20 1045 07/28/20 1045 --   Oral Monitor Lying Left arm       Pain Score       07/28/20 1205       Worst Possible Pain           Vitals:    07/29/20 0721 07/29/20 1500 07/29/20 2228 07/30/20 0723   BP: 117/58 102/55 130/60 112/61   Pulse: 67 70 70 70   Patient Position - Orthostatic VS: Lying Lying Lying Lying         Visual Acuity      ED Medications  Medications   acetaminophen (TYLENOL) tablet 975 mg (975 mg Oral Given 7/30/20 0517)   polyethylene glycol (MIRALAX) packet 17 g (has no administration in time range)   docusate sodium (COLACE) capsule 100 mg (100 mg Oral Not Given 7/30/20 0903)   senna (SENOKOT) tablet 8 6 mg (8 6 mg Oral Not Given 7/29/20 2202)   ascorbic acid (VITAMIN C) tablet 500 mg (500 mg Oral Given 7/30/20 0904)   cyanocobalamin (VITAMIN B-12) tablet 100 mcg (100 mcg Oral Given 7/30/20 0904)   dicyclomine (BENTYL) capsule 10 mg (10 mg Oral Given 7/30/20 0524)   DULoxetine (CYMBALTA) delayed release capsule 60 mg (60 mg Oral Given 7/30/20 0904)   fluticasone (FLONASE) 50 mcg/act nasal spray 1 spray (1 spray Each Nare Given 7/30/20 0906)   levothyroxine tablet 75 mcg (75 mcg Oral Given 7/30/20 0518)   sucralfate (CARAFATE) tablet 1 g (1 g Oral Given 7/30/20 0904)   potassium chloride (K-DUR,KLOR-CON) CR tablet 20 mEq (20 mEq Oral Given 7/30/20 0904)   warfarin (COUMADIN) tablet 4 mg (4 mg Oral Given 7/29/20 5470)   ondansetron (ZOFRAN) injection 4 mg (has no administration in time range)   lidocaine (LIDODERM) 5 % patch 2 patch (2 patches Topical Medication Applied 7/30/20 6475)   methocarbamol (ROBAXIN) tablet 750 mg (750 mg Oral Given 7/30/20 0518)   gabapentin (NEURONTIN) capsule 100 mg (100 mg Oral Given 7/30/20 1119)   traMADol (ULTRAM) tablet 75 mg (75 mg Oral Given 7/30/20 1120)   morphine injection 2 mg (has no administration in time range)   pantoprazole (PROTONIX) EC tablet 40 mg (40 mg Oral Not Given 7/30/20 1107)   torsemide (DEMADEX) tablet 20 mg (has no administration in time range)   morphine (PF) 4 mg/mL injection 4 mg (4 mg Intravenous Given 7/28/20 1205)   potassium chloride (K-DUR,KLOR-CON) CR tablet 40 mEq (40 mEq Oral Given 7/28/20 1531)       Diagnostic Studies  Results Reviewed     Procedure Component Value Units Date/Time    Protime-INR [[id number]]  (Abnormal) Collected:  07/28/20 1357    Lab Status:  Final result Specimen:  Blood from Arm, Right Updated:  07/28/20 1424     Protime 18 6 seconds      INR 1 64    Comprehensive metabolic panel [[id number]]  (Abnormal) Collected:  07/28/20 1357    Lab Status:  Final result Specimen:  Blood from Arm, Right Updated:  07/28/20 1421     Sodium 137 mmol/L      Potassium 3 2 mmol/L      Chloride 103 mmol/L      CO2 23 mmol/L      ANION GAP 11 mmol/L      BUN 11 mg/dL      Creatinine 0 80 mg/dL      Glucose 105 mg/dL      Glucose, Fasting 105 mg/dL      Calcium 8 3 mg/dL      AST 23 U/L      ALT 20 U/L      Alkaline Phosphatase 85 U/L      Total Protein 6 6 g/dL      Albumin 3 0 g/dL      Total Bilirubin 0 81 mg/dL      eGFR 67 ml/min/1 73sq m     Narrative:       [place] guidelines for Chronic Kidney Disease (CKD):     Stage 1 with normal or high GFR (GFR > 90 mL/min/1 73 square meters)    Stage 2 Mild CKD (GFR = 60-89 mL/min/1 73 square meters)    Stage 3A Moderate CKD (GFR = 45-59 mL/min/1 73 square meters)    Stage 3B Moderate CKD (GFR = 30-44 mL/min/1 73 square meters)    Stage 4 Severe CKD (GFR = 15-29 mL/min/1 73 square meters)    Stage 5 End Stage CKD (GFR <15 mL/min/1 73 square meters)  Note: GFR calculation is accurate only with a steady state creatinine    CBC and differential [[id number]]  (Abnormal) Collected:  07/28/20 1357    Lab Status:  Final result Specimen:  Blood from Arm, Right Updated:  07/28/20 1415     WBC 12 54 Thousand/uL      RBC 3 70 Million/uL      Hemoglobin 11 9 g/dL      Hematocrit 37 3 %       fL      MCH 32 2 pg      MCHC 31 9 g/dL      RDW 13 1 %      MPV 10 4 fL      Platelets 651 Thousands/uL      nRBC 0 /100 WBCs      Neutrophils Relative 85 %      Immat GRANS % 0 %      Lymphocytes Relative 7 %      Monocytes Relative 8 %      Eosinophils Relative 0 %      Basophils Relative 0 %      Neutrophils Absolute 10 50 Thousands/µL      Immature Grans Absolute 0 05 Thousand/uL      Lymphocytes Absolute 0 92 Thousands/µL      Monocytes Absolute 1 05 Thousand/µL      Eosinophils Absolute 0 00 Thousand/µL      Basophils Absolute 0 02 Thousands/µL                  XR lumbar spine 2 or 3 views   Final Result by James Lovett MD (07/28 1424)      No acute lumbar spine fractures identified  Workstation performed: [id number]         XR hip/pelv 2-3 vws right if performed   Final Result by James Lovett MD (07/28 1423)      Status post right hip arthroplasty with increased lucency surrounding the acetabular cup raising the possibility of particle disease and/or hardware loosening  No acute periprosthetic fractures are identified        Workstation performed: [id number]                    Procedures  Procedures         ED Course  ED Course as of Jul 30 1215   Tue Jul 28, 2020   1208 Pelvis/ r hip xray - pos r hip replacement- no fx     - l/s spine- l2-3 fusion - no fx       1311 - er md note- pt unable to ambulate in er- will admit  slim       1312 Er md note- recent labs 5/20 tsh reviewed by er md       0 - er md note- pt unable  to ambulate in er with her walker will admit to slim                                                 MDM      Disposition  Final diagnoses: Sciatica of right side   Ambulatory dysfunction     Time reflects when diagnosis was documented in both MDM as applicable and the Disposition within this note     Time User Action Codes Description Comment    7/28/2020  1:38 PM Emily Tavares Add [M54 31] Sciatica of right side     7/28/2020  1:38 PM Emily Tavares Add [R26 2] Ambulatory dysfunction     7/28/2020  2:37 PM Chase Rutherford Add [M25 551] Right hip pain     7/28/2020  2:37 PM Chase Rutherford Add [I82 167] Status post right hip replacement       ED Disposition     ED Disposition Condition Date/Time Comment    Admit Stable Tue Jul 28, 2020  1:38 PM Case was discussed with dr Peyman Stacy and the patient's admission status was agreed to be Admission Status: observation status to the service of Dr Neo Luu           Follow-up Information    None         Current Discharge Medication List      CONTINUE these medications which have NOT CHANGED    Details   ascorbic acid (VITAMIN C) 500 mg tablet Take 1 tablet (500 mg total) by mouth 2 (two) times a day  Qty: 60 tablet, Refills: 0    Associated Diagnoses: Dislocation of right hip, subsequent encounter      Cholecalciferol 125 MCG (5000 UT) TABS Take 5,000 Units by mouth daily      Cyanocobalamin (VITAMIN B12 PO) Take 1 capsule by mouth daily       desoximetasone (TOPICORT) 0 25 % cream Apply topically as needed for irritation  Qty: 30 g, Refills: 1    Associated Diagnoses: Vulvar irritation      dicyclomine (BENTYL) 10 mg capsule Take 1 capsule by mouth every 12 (twelve) hours      DULoxetine (CYMBALTA) 60 mg delayed release capsule Take 1 capsule (60 mg total) by mouth daily  Qty: 30 capsule, Refills: 5    Associated Diagnoses: Other chronic pain      fluticasone (FLONASE) 50 mcg/act nasal spray SPRAY 2 SPRAYS INTO EACH NOSTRIL EVERY DAY  Qty: 16 mL, Refills: 0    Associated Diagnoses: Sore throat      levothyroxine 75 mcg tablet Take 1 tablet (75 mcg total) by mouth daily  Qty: 30 tablet, Refills: 5 Associated Diagnoses: Hypothyroidism, unspecified type      methocarbamol (ROBAXIN) 500 mg tablet Take 1 tablet (500 mg total) by mouth daily at bedtime as needed for muscle spasms for up to 3 days  Qty: 30 tablet, Refills: 0    Associated Diagnoses: Low back pain      potassium chloride (K-DUR,KLOR-CON) 20 mEq tablet TAKE 1 TABLET DAILY  Qty: 30 tablet, Refills: 11    Comments: This prescription was filled on 10/24/2018  Any refills authorized will be placed on file  Associated Diagnoses: Benign essential HTN      sucralfate (CARAFATE) 1 g tablet Take 1 tablet (1 g total) by mouth 2 (two) times a day  Qty: 180 tablet, Refills: 1    Associated Diagnoses: Epigastric pain      torsemide (DEMADEX) 20 mg tablet Take 1 tablet (20 mg total) by mouth daily  Qty: 90 tablet, Refills: 3    Associated Diagnoses: Chronic diastolic CHF (congestive heart failure) (HCC)      warfarin (COUMADIN) 4 mg tablet TAKE ONE TABLET DAILY AS PREVIOUSLY DIRECTED BY CARDIOLOGY  Qty: 90 tablet, Refills: 1    Associated Diagnoses: Chronic atrial fibrillation (HCC)           No discharge procedures on file      PDMP Review       Value Time User    PDMP Reviewed  Yes 4/17/2020 12:53 PM Candance Knife, MD          ED Provider  Electronically Signed by           Rosanna Pierce MD  07/30/20 5785

## 2020-07-30 NOTE — PHYSICAL THERAPY NOTE
PHYSICAL THERAPY NOTE    Patient Name: Crissy Mukherjee  FSIAD'R Date: 20 4461   Pain Assessment   Pain Assessment Tool 0-10   Pain Score 8   Pain Location/Orientation Orientation: Right;Location: Hip   Restrictions/Precautions   Weight Bearing Precautions Per Order Yes   RLE Weight Bearing Per Order WBAT   Other Precautions Chair Alarm; Bed Alarm;Pain;Multiple lines; Fall Risk   General   Family/Caregiver Present No   Subjective   Subjective Patient supine in bed and is agreeable to participate in therapy session  Pt identifers obtained from name &   Bed Mobility   Supine to Sit 3  Moderate assistance   Additional items Assist x 1;HOB elevated; Bedrails; Increased time required;Verbal cues;LE management   Sit to Supine Unable to assess   Additional Comments Patient seated OOB in recliner post session with chair alarm engaged, call bell and belongings in reach  Transfers   Sit to Stand 4  Minimal assistance   Additional items Assist x 1; Armrests; Increased time required;Verbal cues   Stand to Sit 4  Minimal assistance   Additional items Assist x 1; Armrests; Increased time required;Verbal cues   Ambulation/Elevation   Gait pattern Improper Weight shift;Narrow KAREN; Decreased foot clearance;Decreased R stance;Shuffling; Short stride; Step to;Excessively slow   Gait Assistance 3  Moderate assist   Additional items Assist x 1;Verbal cues   Assistive Device Rolling walker   Distance 15' x1   Balance   Static Sitting Fair -   Static Standing Poor +   Ambulatory Poor   Activity Tolerance   Activity Tolerance Patient limited by fatigue;Patient limited by pain   Nurse Made Aware Spoke to [place], RN   Assessment   Prognosis Fair   Problem List Decreased strength;Decreased range of motion;Decreased endurance; Impaired balance;Decreased mobility; Decreased safety awareness;Pain   Assessment Patient agreeable and motivated to participate in therapy session  Patient requires mod ax1 for supine to sit transfers with increased time and instruction for body positioning and technique  Requires sitting EOB x5 minutes prior to transfer trials secondary to pain  Sit<>stand with min ax1 and verbal instruction for hand placement and body positioning for descend to chair  Patient able to ambulate short gait distance with roller walker and mod ax1  Requires steadying balance assist with advancement of roller walker and verbal instruction for stepping sequence  Pt unable to self adjust in chair requiring max ax2 for positioning to back of chair using pad to assist  Patient fatigues quickly requiring seated rest breaks throughout  Continue to focus on OOB mobility with progression of transfers and ambulation as appropriate  Goals   Patient Goals to be able to walk better and be home with my    STG Expiration Date 08/08/20   PT Treatment Day 2   Plan   Treatment/Interventions Functional transfer training;LE strengthening/ROM; Therapeutic exercise; Endurance training;Cognitive reorientation;Patient/family training;Equipment eval/education; Bed mobility;Gait training;Spoke to nursing   Progress Progressing toward goals   PT Frequency 5x/wk   Recommendation   PT Discharge Recommendation Post-Acute Rehabilitation Services   Equipment Recommended [place], [place]

## 2020-07-30 NOTE — ASSESSMENT & PLAN NOTE
Patient presents with acute on chronic R hip pain that started acutely 3 days ago without any injury/trauma  Has been unable to ambulate  · Has hx of R hip replacement 4/2019 and R scar revision 7/2019 with Dr Anu Young  · Lumbar spine xray negative for acute findings, R hip/pelvis xray shows increased lucency surrounding acetabular cup raising possibility of particle disease and/or hardware loosening  No acute periprosthetic fractures are identified  · Consulted orthopedics  No surgery is planned at this time  · Pain control with scheduled tylenol 975 mg q8h, lidoderm patch, aqua K pad, robaxin  Added gabapentin and tramadol  Pt with codeine allergy but reports she tolerates morphine  Continue to monitor closely    · PT/OT once seen by Orthopedics

## 2020-07-31 LAB
CRP SERPL QL: 184 MG/L
ERYTHROCYTE [DISTWIDTH] IN BLOOD BY AUTOMATED COUNT: 13 % (ref 11.6–15.1)
ERYTHROCYTE [SEDIMENTATION RATE] IN BLOOD: 58 MM/HOUR (ref 0–29)
HCT VFR BLD AUTO: 35.4 % (ref 34.8–46.1)
HGB BLD-MCNC: 10.8 G/DL (ref 11.5–15.4)
INR PPP: 2.5 (ref 0.84–1.19)
MCH RBC QN AUTO: 31.3 PG (ref 26.8–34.3)
MCHC RBC AUTO-ENTMCNC: 30.5 G/DL (ref 31.4–37.4)
MCV RBC AUTO: 103 FL (ref 82–98)
PLATELET # BLD AUTO: 170 THOUSANDS/UL (ref 149–390)
PMV BLD AUTO: 10.3 FL (ref 8.9–12.7)
PROTHROMBIN TIME: 26 SECONDS (ref 11.6–14.5)
RBC # BLD AUTO: 3.45 MILLION/UL (ref 3.81–5.12)
WBC # BLD AUTO: 7.74 THOUSAND/UL (ref 4.31–10.16)

## 2020-07-31 PROCEDURE — 97530 THERAPEUTIC ACTIVITIES: CPT

## 2020-07-31 PROCEDURE — 85027 COMPLETE CBC AUTOMATED: CPT | Performed by: INTERNAL MEDICINE

## 2020-07-31 PROCEDURE — 85610 PROTHROMBIN TIME: CPT | Performed by: FAMILY MEDICINE

## 2020-07-31 PROCEDURE — 86140 C-REACTIVE PROTEIN: CPT | Performed by: PHYSICIAN ASSISTANT

## 2020-07-31 PROCEDURE — 99231 SBSQ HOSP IP/OBS SF/LOW 25: CPT | Performed by: PHYSICIAN ASSISTANT

## 2020-07-31 PROCEDURE — 99232 SBSQ HOSP IP/OBS MODERATE 35: CPT | Performed by: INTERNAL MEDICINE

## 2020-07-31 PROCEDURE — 85652 RBC SED RATE AUTOMATED: CPT | Performed by: INTERNAL MEDICINE

## 2020-07-31 PROCEDURE — 97110 THERAPEUTIC EXERCISES: CPT

## 2020-07-31 PROCEDURE — 97116 GAIT TRAINING THERAPY: CPT

## 2020-07-31 RX ORDER — PANTOPRAZOLE SODIUM 40 MG/1
40 TABLET, DELAYED RELEASE ORAL
Status: DISCONTINUED | OUTPATIENT
Start: 2020-08-01 | End: 2020-08-04 | Stop reason: HOSPADM

## 2020-07-31 RX ORDER — LIDOCAINE HYDROCHLORIDE 10 MG/ML
5 INJECTION, SOLUTION EPIDURAL; INFILTRATION; INTRACAUDAL; PERINEURAL ONCE
Status: DISCONTINUED | OUTPATIENT
Start: 2020-07-31 | End: 2020-08-04 | Stop reason: HOSPADM

## 2020-07-31 RX ORDER — TRIAMCINOLONE ACETONIDE 40 MG/ML
40 INJECTION, SUSPENSION INTRA-ARTICULAR; INTRAMUSCULAR ONCE
Status: DISCONTINUED | OUTPATIENT
Start: 2020-07-31 | End: 2020-08-04 | Stop reason: HOSPADM

## 2020-07-31 RX ORDER — BUPIVACAINE HYDROCHLORIDE 5 MG/ML
5 INJECTION, SOLUTION EPIDURAL; INTRACAUDAL ONCE
Status: DISCONTINUED | OUTPATIENT
Start: 2020-07-31 | End: 2020-08-04 | Stop reason: HOSPADM

## 2020-07-31 RX ORDER — KETOROLAC TROMETHAMINE 30 MG/ML
15 INJECTION, SOLUTION INTRAMUSCULAR; INTRAVENOUS EVERY 8 HOURS PRN
Status: DISCONTINUED | OUTPATIENT
Start: 2020-07-31 | End: 2020-08-02

## 2020-07-31 RX ORDER — KETOROLAC TROMETHAMINE 30 MG/ML
15 INJECTION, SOLUTION INTRAMUSCULAR; INTRAVENOUS ONCE
Status: DISCONTINUED | OUTPATIENT
Start: 2020-07-31 | End: 2020-07-31 | Stop reason: SDUPTHER

## 2020-07-31 RX ORDER — METHYLPREDNISOLONE SODIUM SUCCINATE 125 MG/2ML
60 INJECTION, POWDER, LYOPHILIZED, FOR SOLUTION INTRAMUSCULAR; INTRAVENOUS DAILY
Status: DISCONTINUED | OUTPATIENT
Start: 2020-08-01 | End: 2020-08-01

## 2020-07-31 RX ADMIN — VITAM B12 100 MCG: 100 TAB at 08:40

## 2020-07-31 RX ADMIN — LEVOTHYROXINE SODIUM 75 MCG: 75 TABLET ORAL at 05:18

## 2020-07-31 RX ADMIN — SUCRALFATE 1 G: 1 TABLET ORAL at 17:33

## 2020-07-31 RX ADMIN — GABAPENTIN 100 MG: 100 CAPSULE ORAL at 17:33

## 2020-07-31 RX ADMIN — DULOXETINE HYDROCHLORIDE 60 MG: 30 CAPSULE, DELAYED RELEASE ORAL at 08:40

## 2020-07-31 RX ADMIN — ACETAMINOPHEN 975 MG: 325 TABLET, FILM COATED ORAL at 22:25

## 2020-07-31 RX ADMIN — POTASSIUM CHLORIDE 20 MEQ: 1500 TABLET, EXTENDED RELEASE ORAL at 08:40

## 2020-07-31 RX ADMIN — ACETAMINOPHEN 975 MG: 325 TABLET, FILM COATED ORAL at 05:19

## 2020-07-31 RX ADMIN — OXYCODONE HYDROCHLORIDE AND ACETAMINOPHEN 500 MG: 500 TABLET ORAL at 08:40

## 2020-07-31 RX ADMIN — METHOCARBAMOL TABLETS 750 MG: 750 TABLET, COATED ORAL at 05:19

## 2020-07-31 RX ADMIN — LIDOCAINE 2 PATCH: 50 PATCH CUTANEOUS at 08:40

## 2020-07-31 RX ADMIN — DICYCLOMINE HYDROCHLORIDE 10 MG: 10 CAPSULE ORAL at 05:19

## 2020-07-31 RX ADMIN — DICYCLOMINE HYDROCHLORIDE 10 MG: 10 CAPSULE ORAL at 17:33

## 2020-07-31 RX ADMIN — METHOCARBAMOL TABLETS 750 MG: 750 TABLET, COATED ORAL at 15:01

## 2020-07-31 RX ADMIN — ACETAMINOPHEN 975 MG: 325 TABLET, FILM COATED ORAL at 15:01

## 2020-07-31 RX ADMIN — DOCUSATE SODIUM 100 MG: 100 CAPSULE, LIQUID FILLED ORAL at 08:40

## 2020-07-31 RX ADMIN — KETOROLAC TROMETHAMINE 15 MG: 30 INJECTION, SOLUTION INTRAMUSCULAR at 14:58

## 2020-07-31 RX ADMIN — FLUTICASONE PROPIONATE 1 SPRAY: 50 SPRAY, METERED NASAL at 08:42

## 2020-07-31 RX ADMIN — PANTOPRAZOLE SODIUM 40 MG: 40 TABLET, DELAYED RELEASE ORAL at 05:19

## 2020-07-31 RX ADMIN — METHOCARBAMOL TABLETS 750 MG: 750 TABLET, COATED ORAL at 22:26

## 2020-07-31 RX ADMIN — GABAPENTIN 100 MG: 100 CAPSULE ORAL at 08:40

## 2020-07-31 RX ADMIN — TRAMADOL HYDROCHLORIDE 75 MG: 50 TABLET, FILM COATED ORAL at 08:45

## 2020-07-31 RX ADMIN — WARFARIN SODIUM 4 MG: 4 TABLET ORAL at 17:33

## 2020-07-31 RX ADMIN — SUCRALFATE 1 G: 1 TABLET ORAL at 08:40

## 2020-07-31 NOTE — ASSESSMENT & PLAN NOTE
Patient presents with acute on chronic R hip pain that started acutely without any injury/trauma  Has been unable to ambulate  · Has hx of R hip replacement 4/2019 and R scar revision 7/2019 with Dr Cortez Aguirre  · Lumbar spine xray negative for acute findings, R hip/pelvis xray shows increased lucency surrounding acetabular cup raising possibility of particle disease and/or hardware loosening  No acute periprosthetic fractures are identified  · Consulted orthopedics  No surgery is planned at this time  · Pain control with scheduled tylenol 975 mg q8h, lidoderm patch, aqua K pad, robaxin, gabapentin and tramadol  Added toradol today  Pt with codeine allergy but reports she tolerates morphine  Continue to monitor closely  · PT/OT once seen by Orthopedics  Contacted Dr Cortez Aguirre as to how to proceed with the x ray findings as above  He stated he has reviewed the x rays  There may be evidence of loosening of her cup but looks securely fixed superiorly  Current plan is pain control and some moderation of activity for a few weeks

## 2020-07-31 NOTE — PHYSICAL THERAPY NOTE
Physical Therapy Progress Note     20 2068   Pain Assessment   Pain Assessment Tool 0-10   Pain Score Worst Possible Pain   Pain Location/Orientation Orientation: Right;Location: Hip;Location: Leg   Restrictions/Precautions   Weight Bearing Precautions Per Order Yes   RLE Weight Bearing Per Order WBAT   Other Precautions Fall Risk;Pain   General   Chart Reviewed Yes   Response to Previous Treatment Patient with no complaints from previous session  Family/Caregiver Present Yes   Cognition   Overall Cognitive Status WFL   Arousal/Participation Alert; Cooperative   Attention Within functional limits   Orientation Level Oriented X4   Memory Within functional limits   Following Commands Follows all commands and directions without difficulty   Comments Pt was identified by name and , agreeable to treatment  Transfers   Sit to Stand 4  Minimal assistance   Additional items Assist x 1; Increased time required;Verbal cues   Stand to Sit 4  Minimal assistance   Additional items Assist x 1; Increased time required;Verbal cues; Bedrails   Ambulation/Elevation   Gait pattern Narrow KAREN; Excessively slow; Step to;Short stride;Decreased foot clearance; Improper Weight shift;Decreased R stance;Shuffling   Gait Assistance 3  Moderate assist   Additional items Assist x 1;Verbal cues; Tactile cues   Assistive Device Rolling walker   Distance 18'x2   Balance   Static Sitting Fair -   Static Standing Poor +   Ambulatory Poor   Activity Tolerance   Activity Tolerance Patient limited by fatigue;Patient limited by pain   Nurse Made Aware yes, ok to see   Exercises   Quad Sets Supine;10 reps;AROM; Bilateral   Knee AROM Long Arc Quad Sitting;20 reps;AAROM;AROM; Bilateral  (AAROM RLE)   Ankle Pumps Sitting;20 reps;AROM; Bilateral   Marching Sitting;20 reps;AAROM;AROM; Bilateral  (AAROM RLE)   Assessment   Prognosis Fair   Problem List Decreased strength;Decreased range of motion;Decreased endurance; Impaired balance;Decreased mobility; Decreased safety awareness;Pain   Assessment Pt was found supine in bed tobegin session  She was very fearful of movements since it caused increased pain  With further education provided as well as encouragement, pt was able to move more  She sat at the EOB unsupported with no increased symptoms  She ambulated increased distances as well that took a long time but was able to complete  She had a difficult time with LLE advancement due to R SLS  Pt was instructed on and performed seated TE bedside with AAROM performed on the RLE  She was repositioned supine in bed very fatigued, but feeling better post session  She had all needs met and call bell in reach  Pt would benefit from continued PT in order to promote safe and functional mobility  Goals   Patient Goals to walk better   [facility] Expiration Date 08/08/20   Short Term Goal #1 pt will: Increase bilateral LE strength 1/2 grade to facilitate independent mobility, Perform all bed mobility tasks w/ supervision to decrease fall risk factors, Perform all transfers w/ minx1 to improve independence, Ambulate 100 ft  with roller walker w/ minx1 w/o LOB to facilitate return to activities like cooking and baking, Increase all balance 1 grade to decrease risk for falls, Complete exercise program independently to improve strength and endurance, Tolerate 3 hr OOB to faciliate upright tolerance and Improve Barthel Index score to 65 or greater to facilitate independence   PT Treatment Day 3   Plan   Treatment/Interventions Functional transfer training;LE strengthening/ROM; Therapeutic exercise; Endurance training;Cognitive reorientation;Patient/family training;Equipment eval/education; Bed mobility;Gait training   Progress Progressing toward goals   PT Frequency 5x/wk   Recommendation   PT Discharge Recommendation Post-Acute Rehabilitation Services   Equipment Recommended Walker   Additional Comments Pt would benefit from Pain Management consult to address pain control Stef Jacobson, PTA

## 2020-07-31 NOTE — SOCIAL WORK
LOS 1, patient is not a bundle, patient is not a 30 day readmission  CM met with patient at bedside to discuss discharge planning  CM name and role introduced  CM discussed the PT recommendations for post acute rehab  Patient declines rehab  Patient reports living at home with her  who has dementia; patient reports that they assist each other  Patient lists her daughter Camelia Cooper, son Indigo Dumont, and niece Roger Elizabeth who assist with her medical decisions  Patient is still having pain with movement  CM offered to set up [facility]  Patient agrees with [facility] PT; she states that she has had [facility] in the past   Freedom of choice provided and patient chooses 1- [facility], 2- [facility]  Referrals placed in [facility]  Patient reports that she has canes and a rolling walker at home, has transportation home with a friend  CM reviewed discharge planning process including the following: identifying caregivers at home, preference for d/c planning needs, [facility] to [facility] program, availability of treatment team to discuss questions or concerns patient and/or family may have regarding diagnosis, plan of care, old or new medications and discharge planning   CM will continue to follow for care coordination and update assessment as necessary

## 2020-07-31 NOTE — ASSESSMENT & PLAN NOTE
Wt Readings from Last 3 Encounters:   07/28/20 78 kg (172 lb)   06/17/20 81 2 kg (179 lb)   06/10/20 78 9 kg (174 lb)       Appears euvolemic, on demadex 20 mg daily  · Monitor volume status and renal function   · Outpatient cardiology f/u   · Have advised patient to continue to take torsemide 20mg  Patient reported she has been taking 10mg at home  But review of the chart did not see dosage changes  She has been refusing to take the torsemide since admission  Cardiac risks have explained

## 2020-07-31 NOTE — ASSESSMENT & PLAN NOTE
On warfarin 4 mg daily; does not appear to be on any rate control medications; hx of ablation  · Previously saw Dr Maldonado Reyes  · Monitor INR  · INR 2 5 today

## 2020-07-31 NOTE — PROGRESS NOTES
Orthopedics   Paula Patel 80 y o  female MRN: [id number]  Unit/Bed#: S -01      Subjective:  80 y  o female seen and evaluated this morning after request for repeat evaluation was made by Dr Jaqueline Ballard  Patient reports that the so far not of the measures undertaken including medications and therapy and topical modalities have alleviated her hip pain  The pain is still centered over the lateral hip and does radiate down to the knee  She denies any groin pain currently  He has had no fevers or chills over the past 2 days  She has had no sensation of popping or clicking in the hip  She is resting comfortably in bed in no distress she does state that with movement her pain is 10/10      Labs:  0   Lab Value Date/Time    HCT 35 4 07/31/2020 0445    HCT 37 3 07/28/2020 1357    HCT 37 5 06/12/2020 1133    HCT 37 0 04/20/2015 1109    HCT 38 0 04/20/2015 1109    HCT 37 2 04/03/2015 0831    HGB 10 8 (L) 07/31/2020 0445    HGB 11 9 07/28/2020 1357    HGB 11 6 06/12/2020 1133    HGB 12 0 04/20/2015 1109    HGB 11 6 04/03/2015 0831    HGB 12 1 11/04/2014 1025    INR 2 50 (H) 07/31/2020 0445    WBC 7 74 07/31/2020 0445    WBC 12 54 (H) 07/28/2020 1357    WBC 6 21 06/12/2020 1133    WBC 7 98 04/20/2015 1109    WBC 7 29 04/03/2015 0831    WBC 6 66 11/04/2014 1025    CRP <3 0 03/11/2019 0937       Meds:    Current Facility-Administered Medications:     acetaminophen (TYLENOL) tablet 975 mg, 975 mg, Oral, Q8H [facility], Camille Foley PA-C, 975 mg at 07/31/20 1501    ascorbic acid (VITAMIN C) tablet 500 mg, 500 mg, Oral, Daily, Camille Foley PA-C, 500 mg at 07/31/20 0840    bupivacaine (PF) (MARCAINE) 0 5 % injection 5 mL, 5 mL, Infiltration, Once, Adeel Oliveira PA-C    cyanocobalamin (VITAMIN B-12) tablet 100 mcg, 100 mcg, Oral, Daily, Camille Foley PA-C, 100 mcg at 07/31/20 0840    dicyclomine (BENTYL) capsule 10 mg, 10 mg, Oral, Q12H, Camille Foley PA-C, 10 mg at 07/31/20 0519    docusate sodium (COLACE) capsule 100 mg, 100 mg, Oral, BID, Camille Foley PA-C, 100 mg at 07/31/20 0840    DULoxetine (CYMBALTA) delayed release capsule 60 mg, 60 mg, Oral, Daily, Camille Foley PA-C, 60 mg at 07/31/20 0840    fluticasone (FLONASE) 50 mcg/act nasal spray 1 spray, 1 spray, Each Nare, Daily, Dylan Hamm PA-C, 1 spray at 07/31/20 3017    gabapentin (NEURONTIN) capsule 100 mg, 100 mg, Oral, BID, Dinah Locke MD, 100 mg at 07/31/20 0840    ketorolac (TORADOL) injection 15 mg, 15 mg, Intravenous, Q8H PRN, Sarah Golden MD, 15 mg at 07/31/20 1458    levothyroxine tablet 75 mcg, 75 mcg, Oral, Early Morning, Camille Foley PA-C, 75 mcg at 07/31/20 0518    lidocaine (LIDODERM) 5 % patch 2 patch, 2 patch, Topical, Daily, Brett Alvarado MD, 2 patch at 07/31/20 0840    lidocaine (PF) (XYLOCAINE-MPF) 1 % injection 5 mL, 5 mL, Infiltration, Once, Singh Alonso PA-C    methocarbamol (ROBAXIN) tablet 750 mg, 750 mg, Oral, Q8H [facility], Brett Alvarado MD, 750 mg at 07/31/20 1501    morphine injection 2 mg, 2 mg, Intravenous, Q4H PRN, Dinah Locke MD    ondansetron [facility]) injection 4 mg, 4 mg, Intravenous, Q6H PRN, Dylan Hamm PA-C    pantoprazole (PROTONIX) EC tablet 40 mg, 40 mg, Oral, Early Morning, Dinah Locke MD, 40 mg at 07/31/20 0519    polyethylene glycol (MIRALAX) packet 17 g, 17 g, Oral, Daily PRN, Dylan Hamm PA-C    potassium chloride (K-DUR,KLOR-CON) CR tablet 20 mEq, 20 mEq, Oral, Daily, Camille Foley PA-C, 20 mEq at 07/31/20 0840    senna (SENOKOT) tablet 8 6 mg, 1 tablet, Oral, HS, Camille Foley PA-C    sucralfate (CARAFATE) tablet 1 g, 1 g, Oral, BID, Camille Alaina, PA-C, 1 g at 07/31/20 0840    torsemide (DEMADEX) tablet 20 mg, 20 mg, Oral, Daily, Dinah Locke MD    traMADol [employer]) tablet 75 mg, 75 mg, Oral, Q6H PRN, Dinah Locke MD, 75 mg at 07/31/20 0845    triamcinolone acetonide (KENALOG-40) 40 mg/mL injection 40 mg, 40 mg, Intramuscular, Once, Bruce Leon PA-C    warfarin (COUMADIN) tablet 4 mg, 4 mg, Oral, Daily (warfarin), Guillermina Celaya PA-C, 4 mg at 07/30/20 1710    Blood Culture:   No results found for: BLOODCX    Wound Culture:   No results found for: WOUNDCULT    Ins and Outs:  I/O last 24 hours: In: 120 [P O :120]  Out: 750 [Urine:750]          Physical Exam:  Vitals:    07/31/20 0700   BP: 128/75   Pulse: 70   Resp: 18   Temp: 97 9 °F (36 6 °C)   SpO2: 97%     right Lower Extremity extremity:  · Skin is intact there is no erythema over the lateral hip there is an old well-healed incisional scar over the posterior lateral hip  · Severe tenderness palpation over and around the greater trochanter  · Range of motion the hip from 0-20 degrees without pain  · With passive supine internal external rotation there is lateral hip pain  · Sensation is intact light touch L3 through S1  · Equivocal straight leg raise    Assessment: 80 y  o female right hip pain likely secondary to greater trochanteric bursitis and postoperative heterotopic ossification    Plan:  · Weightbearing as tolerated right lower extremity  · DVT prophylaxis  · Analgesics continue current regimen discussed with Dr Zachary Grissom, will trial IV to oral steroid before trying an injection patient did not have any reported benefit from IV Toradol given 10-15 minutes before her examination today  · Patient has reported 10/10 pain with motion is not consistent with her imaging or her clinical history   · CRP and ESR were ordered by IM   CRP is currently pending, ESR slightly elevated at 58, based on the patient's history and location of her pain and her physical examination as well as her lack of fever back of leukocytosis, have a very low suspicion for any infections process in the hip  · If patient does not have any relief from IV steroids and there is no concern for infection than will consider local steroid injection over the greater trochanter  · [facility] resident also discussed patient and their XR with Dr Beatriz Zhao earlier, according to the resident note Dr Beatriz Zhao recommended pain control and activity modification and follow up with him as outpatient,  · Currently, do not feel need for any additional imaging, no orthopedic surgical indications at this time  · PT/OT  · Dispo: Ortho will follow      Romie Dugan PA-C

## 2020-07-31 NOTE — PLAN OF CARE
Problem: PHYSICAL THERAPY ADULT  Goal: Performs mobility at highest level of function for planned discharge setting  See evaluation for individualized goals  Description  Treatment/Interventions: Functional transfer training, LE strengthening/ROM, Therapeutic exercise, Endurance training, Cognitive reorientation, Patient/family training, Equipment eval/education, Bed mobility, Gait training  Equipment Recommended: Kena Pérez       See flowsheet documentation for full assessment, interventions and recommendations  Outcome: Progressing  Note:   Prognosis: Fair  Problem List: Decreased strength, Decreased range of motion, Decreased endurance, Impaired balance, Decreased mobility, Decreased safety awareness, Pain  Assessment: Pt was found supine in bed tobegin session  She was very fearful of movements since it caused increased pain  With further education provided as well as encouragement, pt was able to move more  She sat at the EOB unsupported with no increased symptoms  She ambulated increased distances as well that took a long time but was able to complete  She had a difficult time with LLE advancement due to R SLS  Pt was instructed on and performed seated TE bedside with AAROM performed on the RLE  She was repositioned supine in bed very fatigued, but feeling better post session  She had all needs met and call bell in reach  Pt would benefit from continued PT in order to promote safe and functional mobility  PT Discharge Recommendation: Post-Acute Rehabilitation Services          See flowsheet documentation for full assessment

## 2020-07-31 NOTE — PROGRESS NOTES
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 revision 7/2019 with Dr Rolando Silva  · Lumbar spine xray negative for acute findings, R hip/pelvis xray shows increased lucency surrounding acetabular cup raising possibility of particle disease and/or hardware loosening  No acute periprosthetic fractures are identified  · Consulted orthopedics  No surgery is planned at this time  · Pain control with scheduled tylenol 975 mg q8h, lidoderm patch, aqua K pad, robaxin, gabapentin and tramadol  Added toradol today  Pt with codeine allergy but reports she tolerates morphine  Continue to monitor closely  · PT/OT once seen by Orthopedics  · Contacted Dr Rolando Silva as to how to proceed with the x ray findings as above  Leukocytosis  Assessment & Plan  · Possibly reactive in the setting of pain, no identifiable infectious source, continue to monitor CBC  · WBC has trended down  Hypokalemia  Assessment & Plan  Replete K  · Replete p r n  Ambulatory dysfunction  Assessment & Plan  Secondary to hip pain  Continue with pain control  Orthopedic has signed off  PT/OT and lifestyle modification was recommended  Dr Rolando Silva also suggested pain control and moderation of activity  · Pain control with gabapentin 100mg BID, tramadol 75 mg as needed for moderate pain, toradol 15mg prn, and morphine for breakthrough pain  · Fall precautions   · PT/OT eval  Recommended acute rehab  Patient wants PT at home  Pacemaker  Assessment & Plan  · Cardiology follow up       Depression  Assessment & Plan  · Continue cymbalta    Chronic diastolic CHF (congestive heart failure) (HCC)  Assessment & Plan  Wt Readings from Last 3 Encounters:   20 78 kg (172 lb)   20 81 2 kg (179 lb)   06/10/20 78 9 kg (174 lb)       Appears euvolemic, on demadex 20 mg daily  · Monitor volume status and renal function   · Outpatient cardiology f/u   · Have advised patient to continue to take torsemide 20mg  Patient reported she has been taking 10mg at home  But review of the chart did not see dosage changes  She has been refusing to take the torsemide since admission  Cardiac risks have explained  Chronic atrial fibrillation (HCC)  Assessment & Plan  On warfarin 4 mg daily; does not appear to be on any rate control medications; hx of ablation  · Previously saw Dr Elroy Arguello  · Monitor INR  · INR 2 5 today  VTE Pharmacologic Prophylaxis:   Pharmacologic: Warfarin (Coumadin)  Mechanical VTE Prophylaxis in Place: Yes    Discussions with Specialists or Other Care Team Provider: Orthopedics, CM, PT/OT, Nursing  Education and Discussions with Family / Patient: patient and her daughter and niece, Arely Quarles    Current Length of Stay: 1 day(s)    Current Patient Status: Inpatient     Discharge Plan / Estimated Discharge Date: Undecided due to uncontrolled pain    Code Status: Level 1 - Full Code      Subjective:   Patient still reported severe right hip pain, rated 10/10  She stated the pain medication she is being receiving, has normal help with her pain  She still complained her hip hurts really bad whenever she moves  She stated that she is unable to ambulate  Currently, no chest pain, shortness of breath, fever, or chills were noted  Contacted her orthopedics Dr Ophelia Barrios  He stated he has reviewed the x rays  There may be evidence of loosening of her cup but looks securely fixed superiorly  Current plan is pain control and some moderation of activity for a few weeks       Objective:     Vitals:   Temp (24hrs), Av °F (36 7 °C), Min:97 9 °F (36 6 °C), Max:98 1 °F (36 7 °C)    Temp:  [97 9 °F (36 6 °C)-98 1 °F (36 7 °C)] 97 9 °F (36 6 °C)  HR:  [70-72] 70  Resp:  [18] 18  BP: (102-128)/(56-75) 128/75  SpO2:  [97 %-100 %] 97 %  Body mass index is 33 59 kg/m²  Input and Output Summary (last 24 hours): Intake/Output Summary (Last 24 hours) at 7/31/2020 1258  Last data filed at 7/31/2020 0522  Gross per 24 hour   Intake --   Output 750 ml   Net -750 ml       Physical Exam:     Physical Exam   Constitutional: She is oriented to person, place, and time  She appears well-developed and well-nourished  No distress  HENT:   Head: Normocephalic and atraumatic  Eyes: Conjunctivae are normal  Right eye exhibits no discharge  Left eye exhibits no discharge  No scleral icterus  Neck: Normal range of motion  Neck supple  Cardiovascular: Normal rate, normal heart sounds and intact distal pulses  An irregular rhythm present  Exam reveals no gallop and no friction rub  No murmur heard  Pulmonary/Chest: Effort normal and breath sounds normal  No stridor  No respiratory distress  She has no wheezes  She has no rales  She exhibits no tenderness  Abdominal: Soft  Bowel sounds are normal  She exhibits no distension and no mass  There is no tenderness  There is no rebound and no guarding  No hernia  Musculoskeletal: Normal range of motion  She exhibits no edema or deformity  Right hip: She exhibits tenderness  Legs:  Neurological: She is alert and oriented to person, place, and time  Skin: Skin is warm  She is not diaphoretic  No erythema  No pallor  Psychiatric: She has a normal mood and affect  Her behavior is normal    Nursing note and vitals reviewed            Additional Data:     Labs:    Results from last 7 days   Lab Units 07/31/20  0445 07/28/20  1357   WBC Thousand/uL 7 74 12 54*   HEMOGLOBIN g/dL 10 8* 11 9   HEMATOCRIT % 35 4 37 3   PLATELETS Thousands/uL 170 131*   NEUTROS PCT %  --  85*   LYMPHS PCT %  --  7*   MONOS PCT % --  8   EOS PCT %  --  0     Results from last 7 days   Lab Units 07/29/20  1150 07/28/20  1357   POTASSIUM mmol/L 3 9 3 2*   CHLORIDE mmol/L 108 103   CO2 mmol/L 25 23   BUN mg/dL 14 11   CREATININE mg/dL 0 75 0 80   CALCIUM mg/dL 8 4 8 3   ALK PHOS U/L  --  85   ALT U/L  --  20   AST U/L  --  23     Results from last 7 days   Lab Units 07/31/20  0445   INR  2 50*       * I Have Reviewed All Lab Data Listed Above  * Additional Pertinent Lab Tests Reviewed:  Arjun 66 Admission Reviewed    Imaging:    Imaging Reports Reviewed Today Include:  hip x ray, lumbar x ray  Imaging Personally Reviewed by Myself Includes:   hip x ray, lumbar x ray    Recent Cultures (last 7 days):           Last 24 Hours Medication List:     Current Facility-Administered Medications:  acetaminophen 975 mg Oral Q8H [facility] Camille Foley PA-C   ascorbic acid 500 mg Oral Daily Camille Foley PA-C   vitamin B-12 100 mcg Oral Daily Camille Foley PA-C   dicyclomine 10 mg Oral Q12H Camille Foley PA-C   docusate sodium 100 mg Oral BID Tashi Guerrero PA-C   DULoxetine 60 mg Oral Daily Camille Foley PA-C   fluticasone 1 spray Each Nare Daily Camille Foley PA-C   gabapentin 100 mg Oral BID Dinah Lobo MD   ketorolac 15 mg Intravenous Q8H PRN Salvador Pérez MD   levothyroxine 75 mcg Oral Early Morning Tashi Guerrero PA-C   lidocaine 2 patch Topical Daily Wan Barclay MD   methocarbamol 750 mg Oral Q8H [facility] Wan Barclay MD   morphine injection 2 mg Intravenous Q4H PRN Dinah Lobo MD   ondansetron 4 mg Intravenous Q6H PRN Tashi Guerrero PA-C   pantoprazole 40 mg Oral Early Morning Dinah Lobo MD   polyethylene glycol 17 g Oral Daily PRN Lianna Foley PA-C   potassium chloride 20 mEq Oral Daily Camille Foley PA-C   senna 1 tablet Oral HS Camille Foley PA-C   sucralfate 1 g Oral BID Tashi Guerrero PA-C   torsemide 20 mg Oral Daily Dinah Lobo MD   traMADol 75 mg Oral Q6H PRN Dinah Patel MD   warfarin 4 mg Oral Daily (warfarin) Jus Whitmore PA-C        Today, Patient Was Seen By: Kar Sewell MD    ** Please Note: This note has been constructed using a voice recognition system   **

## 2020-07-31 NOTE — ASSESSMENT & PLAN NOTE
· Possibly reactive in the setting of pain, no identifiable infectious source, continue to monitor CBC  · WBC has trended down  Today at 7 74    · Will continue to monitor

## 2020-07-31 NOTE — ASSESSMENT & PLAN NOTE
Secondary to hip pain  Continue with pain control  Orthopedic has signed off  PT/OT and lifestyle modification was recommended  Dr Diamond Hernandez also suggested pain control and moderation of activity  · Pain control with gabapentin 100mg BID, tramadol 75 mg as needed for moderate pain, toradol 15mg prn, and morphine for breakthrough pain  · Fall precautions   · PT/OT carmen  Recommended acute rehab  Patient wants PT at home

## 2020-08-01 ENCOUNTER — APPOINTMENT (INPATIENT)
Dept: RADIOLOGY | Facility: HOSPITAL | Age: 85
DRG: 558 | End: 2020-08-01
Payer: COMMERCIAL

## 2020-08-01 PROBLEM — D72.829 LEUKOCYTOSIS: Status: RESOLVED | Noted: 2020-07-28 | Resolved: 2020-08-01

## 2020-08-01 PROBLEM — E87.6 HYPOKALEMIA: Status: RESOLVED | Noted: 2020-07-28 | Resolved: 2020-08-01

## 2020-08-01 LAB
ANION GAP SERPL CALCULATED.3IONS-SCNC: 9 MMOL/L (ref 4–13)
APPEARANCE FLD: ABNORMAL
BUN SERPL-MCNC: 15 MG/DL (ref 5–25)
CALCIUM SERPL-MCNC: 8.4 MG/DL (ref 8.3–10.1)
CHLORIDE SERPL-SCNC: 107 MMOL/L (ref 100–108)
CO2 SERPL-SCNC: 23 MMOL/L (ref 21–32)
COLOR FLD: YELLOW
CREAT SERPL-MCNC: 0.78 MG/DL (ref 0.6–1.3)
ERYTHROCYTE [DISTWIDTH] IN BLOOD BY AUTOMATED COUNT: 12.9 % (ref 11.6–15.1)
GFR SERPL CREATININE-BSD FRML MDRD: 69 ML/MIN/1.73SQ M
GLUCOSE SERPL-MCNC: 80 MG/DL (ref 65–140)
HCT VFR BLD AUTO: 35.5 % (ref 34.8–46.1)
HGB BLD-MCNC: 11.1 G/DL (ref 11.5–15.4)
INR PPP: 2.82 (ref 0.84–1.19)
LYMPHOCYTES NFR BLD AUTO: 2 %
MCH RBC QN AUTO: 32.4 PG (ref 26.8–34.3)
MCHC RBC AUTO-ENTMCNC: 31.3 G/DL (ref 31.4–37.4)
MCV RBC AUTO: 104 FL (ref 82–98)
MONOCYTES NFR BLD AUTO: 6 %
NEUTS SEG NFR BLD AUTO: 92 %
PLATELET # BLD AUTO: 188 THOUSANDS/UL (ref 149–390)
PMV BLD AUTO: 10.3 FL (ref 8.9–12.7)
POTASSIUM SERPL-SCNC: 4.2 MMOL/L (ref 3.5–5.3)
PROTHROMBIN TIME: 28.7 SECONDS (ref 11.6–14.5)
RBC # BLD AUTO: 3.43 MILLION/UL (ref 3.81–5.12)
SITE: ABNORMAL
SODIUM SERPL-SCNC: 139 MMOL/L (ref 136–145)
TOTAL CELLS COUNTED SPEC: 100
WBC # BLD AUTO: 6.76 THOUSAND/UL (ref 4.31–10.16)
WBC # FLD MANUAL: 3050 /UL (ref 0–200)

## 2020-08-01 PROCEDURE — 0S993ZX DRAINAGE OF RIGHT HIP JOINT, PERCUTANEOUS APPROACH, DIAGNOSTIC: ICD-10-PCS | Performed by: RADIOLOGY

## 2020-08-01 PROCEDURE — 87205 SMEAR GRAM STAIN: CPT | Performed by: INTERNAL MEDICINE

## 2020-08-01 PROCEDURE — 85027 COMPLETE CBC AUTOMATED: CPT | Performed by: INTERNAL MEDICINE

## 2020-08-01 PROCEDURE — 89051 BODY FLUID CELL COUNT: CPT | Performed by: PHYSICIAN ASSISTANT

## 2020-08-01 PROCEDURE — 87070 CULTURE OTHR SPECIMN AEROBIC: CPT | Performed by: INTERNAL MEDICINE

## 2020-08-01 PROCEDURE — 85610 PROTHROMBIN TIME: CPT | Performed by: FAMILY MEDICINE

## 2020-08-01 PROCEDURE — 99232 SBSQ HOSP IP/OBS MODERATE 35: CPT | Performed by: INTERNAL MEDICINE

## 2020-08-01 PROCEDURE — 20610 DRAIN/INJ JOINT/BURSA W/O US: CPT | Performed by: RADIOLOGY

## 2020-08-01 PROCEDURE — 80048 BASIC METABOLIC PNL TOTAL CA: CPT | Performed by: INTERNAL MEDICINE

## 2020-08-01 PROCEDURE — 99231 SBSQ HOSP IP/OBS SF/LOW 25: CPT | Performed by: PHYSICIAN ASSISTANT

## 2020-08-01 PROCEDURE — 20610 DRAIN/INJ JOINT/BURSA W/O US: CPT

## 2020-08-01 PROCEDURE — 77002 NEEDLE LOCALIZATION BY XRAY: CPT | Performed by: RADIOLOGY

## 2020-08-01 PROCEDURE — 77002 NEEDLE LOCALIZATION BY XRAY: CPT

## 2020-08-01 RX ADMIN — METHYLPREDNISOLONE SODIUM SUCCINATE 60 MG: 125 INJECTION, POWDER, FOR SOLUTION INTRAMUSCULAR; INTRAVENOUS at 09:49

## 2020-08-01 RX ADMIN — OXYCODONE HYDROCHLORIDE AND ACETAMINOPHEN 500 MG: 500 TABLET ORAL at 09:48

## 2020-08-01 RX ADMIN — DICYCLOMINE HYDROCHLORIDE 10 MG: 10 CAPSULE ORAL at 16:51

## 2020-08-01 RX ADMIN — SUCRALFATE 1 G: 1 TABLET ORAL at 16:52

## 2020-08-01 RX ADMIN — DOCUSATE SODIUM 100 MG: 100 CAPSULE, LIQUID FILLED ORAL at 09:48

## 2020-08-01 RX ADMIN — ACETAMINOPHEN 975 MG: 325 TABLET, FILM COATED ORAL at 21:16

## 2020-08-01 RX ADMIN — ACETAMINOPHEN 975 MG: 325 TABLET, FILM COATED ORAL at 05:01

## 2020-08-01 RX ADMIN — METHOCARBAMOL TABLETS 750 MG: 750 TABLET, COATED ORAL at 05:00

## 2020-08-01 RX ADMIN — DICYCLOMINE HYDROCHLORIDE 10 MG: 10 CAPSULE ORAL at 05:01

## 2020-08-01 RX ADMIN — METHOCARBAMOL TABLETS 750 MG: 750 TABLET, COATED ORAL at 15:03

## 2020-08-01 RX ADMIN — PANTOPRAZOLE SODIUM 40 MG: 40 TABLET, DELAYED RELEASE ORAL at 16:50

## 2020-08-01 RX ADMIN — GABAPENTIN 100 MG: 100 CAPSULE ORAL at 16:52

## 2020-08-01 RX ADMIN — POTASSIUM CHLORIDE 20 MEQ: 1500 TABLET, EXTENDED RELEASE ORAL at 09:48

## 2020-08-01 RX ADMIN — DULOXETINE HYDROCHLORIDE 60 MG: 30 CAPSULE, DELAYED RELEASE ORAL at 09:48

## 2020-08-01 RX ADMIN — STANDARDIZED SENNA CONCENTRATE 8.6 MG: 8.6 TABLET ORAL at 21:18

## 2020-08-01 RX ADMIN — LIDOCAINE 2 PATCH: 50 PATCH CUTANEOUS at 09:48

## 2020-08-01 RX ADMIN — VITAM B12 100 MCG: 100 TAB at 09:48

## 2020-08-01 RX ADMIN — LEVOTHYROXINE SODIUM 75 MCG: 75 TABLET ORAL at 05:00

## 2020-08-01 RX ADMIN — GABAPENTIN 100 MG: 100 CAPSULE ORAL at 09:48

## 2020-08-01 RX ADMIN — TORSEMIDE 20 MG: 20 TABLET ORAL at 09:48

## 2020-08-01 RX ADMIN — METHOCARBAMOL TABLETS 750 MG: 750 TABLET, COATED ORAL at 21:17

## 2020-08-01 RX ADMIN — TRAMADOL HYDROCHLORIDE 75 MG: 50 TABLET, FILM COATED ORAL at 09:48

## 2020-08-01 RX ADMIN — ACETAMINOPHEN 975 MG: 325 TABLET, FILM COATED ORAL at 15:03

## 2020-08-01 RX ADMIN — SUCRALFATE 1 G: 1 TABLET ORAL at 09:48

## 2020-08-01 RX ADMIN — PANTOPRAZOLE SODIUM 40 MG: 40 TABLET, DELAYED RELEASE ORAL at 05:03

## 2020-08-01 NOTE — PLAN OF CARE
Problem: Potential for Falls  Goal: Patient will remain free of falls  Description  INTERVENTIONS:  - Assess patient frequently for physical needs  -  Identify cognitive and physical deficits and behaviors that affect risk of falls    -  [place] fall precautions as indicated by assessment   - Educate patient/family on patient safety including physical limitations  - Instruct patient to call for assistance with activity based on assessment  - Modify environment to reduce risk of injury  - Consider OT/PT consult to assist with strengthening/mobility  Outcome: Progressing     Problem: Prexisting or High Potential for Compromised Skin Integrity  Goal: Skin integrity is maintained or improved  Description  INTERVENTIONS:  - Identify patients at risk for skin breakdown  - Assess and monitor skin integrity  - Assess and monitor nutrition and hydration status  - Monitor labs   - Assess for incontinence   - Turn and reposition patient  - Assist with mobility/ambulation  - Relieve pressure over bony prominences  - Avoid friction and shearing  - Provide appropriate hygiene as needed including keeping skin clean and dry  - Evaluate need for skin moisturizer/barrier cream  - Collaborate with interdisciplinary team   - Patient/family teaching  - Consider wound care consult   Outcome: Progressing     Problem: PAIN - ADULT  Goal: Verbalizes/displays adequate comfort level or baseline comfort level  Description  Interventions:  - Encourage patient to monitor pain and request assistance  - Assess pain using appropriate pain scale  - Administer analgesics based on type and severity of pain and evaluate response  - Implement non-pharmacological measures as appropriate and evaluate response  - Consider cultural and social influences on pain and pain management  - Notify physician/advanced practitioner if interventions unsuccessful or patient reports new pain  Outcome: Progressing     Problem: INFECTION - ADULT  Goal: Absence or prevention of progression during hospitalization  Description  INTERVENTIONS:  - Assess and monitor for signs and symptoms of infection  - Monitor lab/diagnostic results  - Monitor all insertion sites, i e  indwelling lines, tubes, and drains  - Monitor endotracheal if appropriate and nasal secretions for changes in amount and color  - [place] appropriate cooling/warming therapies per order  - Administer medications as ordered  - Instruct and encourage patient and family to use good hand hygiene technique  - Identify and instruct in appropriate isolation precautions for identified infection/condition  Outcome: Progressing  Goal: Absence of fever/infection during neutropenic period  Description  INTERVENTIONS:  - Monitor WBC    Outcome: Progressing     Problem: SAFETY ADULT  Goal: Maintain or return to baseline ADL function  Description  INTERVENTIONS:  -  Assess patient's ability to carry out ADLs; assess patient's baseline for ADL function and identify physical deficits which impact ability to perform ADLs (bathing, care of mouth/teeth, toileting, grooming, dressing, etc )  - Assess/evaluate cause of self-care deficits   - Assess range of motion  - Assess patient's mobility; develop plan if impaired  - Assess patient's need for assistive devices and provide as appropriate  - Encourage maximum independence but intervene and supervise when necessary  - Involve family in performance of ADLs  - Assess for home care needs following discharge   - Consider OT consult to assist with ADL evaluation and planning for discharge  - Provide patient education as appropriate  Outcome: Progressing  Goal: Maintain or return mobility status to optimal level  Description  INTERVENTIONS:  - Assess patient's baseline mobility status (ambulation, transfers, stairs, etc )    - Identify cognitive and physical deficits and behaviors that affect mobility  - Identify mobility aids required to assist with transfers and/or ambulation (gait belt, sit-to-stand, lift, walker, cane, etc )  - [place] fall precautions as indicated by assessment  - Record patient progress and toleration of activity level on Mobility SBAR; progress patient to next Phase/Stage  - Instruct patient to call for assistance with activity based on assessment  - Consider rehabilitation consult to assist with strengthening/weightbearing, etc   Outcome: Progressing     Problem: Nutrition/Hydration-ADULT  Goal: Nutrient/Hydration intake appropriate for improving, restoring or maintaining nutritional needs  Description  Monitor and assess patient's nutrition/hydration status for malnutrition  Collaborate with interdisciplinary team and initiate plan and interventions as ordered  Monitor patient's weight and dietary intake as ordered or per policy  Utilize nutrition screening tool and intervene as necessary  Determine patient's food preferences and provide high-protein, high-caloric foods as appropriate       INTERVENTIONS:  - Monitor oral intake, urinary output, labs, and treatment plans  - Assess nutrition and hydration status and recommend course of action  - Evaluate amount of meals eaten  - Assist patient with eating if necessary   - Allow adequate time for meals  - Recommend/ encourage appropriate diets, oral nutritional supplements, and vitamin/mineral supplements  - Order, calculate, and assess calorie counts as needed  - Recommend, monitor, and adjust tube feedings and TPN/PPN based on assessed needs  - Assess need for intravenous fluids  - Provide specific nutrition/hydration education as appropriate  - Include patient/family/caregiver in decisions related to nutrition  Outcome: Progressing

## 2020-08-01 NOTE — PLAN OF CARE
Problem: Potential for Falls  Goal: Patient will remain free of falls  Description  INTERVENTIONS:  - Assess patient frequently for physical needs  -  Identify cognitive and physical deficits and behaviors that affect risk of falls    -  [place] fall precautions as indicated by assessment   - Educate patient/family on patient safety including physical limitations  - Instruct patient to call for assistance with activity based on assessment  - Modify environment to reduce risk of injury  - Consider OT/PT consult to assist with strengthening/mobility  Outcome: Progressing     Problem: Prexisting or High Potential for Compromised Skin Integrity  Goal: Skin integrity is maintained or improved  Description  INTERVENTIONS:  - Identify patients at risk for skin breakdown  - Assess and monitor skin integrity  - Assess and monitor nutrition and hydration status  - Monitor labs   - Assess for incontinence   - Turn and reposition patient  - Assist with mobility/ambulation  - Relieve pressure over bony prominences  - Avoid friction and shearing  - Provide appropriate hygiene as needed including keeping skin clean and dry  - Evaluate need for skin moisturizer/barrier cream  - Collaborate with interdisciplinary team   - Patient/family teaching  - Consider wound care consult   Outcome: Progressing     Problem: PAIN - ADULT  Goal: Verbalizes/displays adequate comfort level or baseline comfort level  Description  Interventions:  - Encourage patient to monitor pain and request assistance  - Assess pain using appropriate pain scale  - Administer analgesics based on type and severity of pain and evaluate response  - Implement non-pharmacological measures as appropriate and evaluate response  - Consider cultural and social influences on pain and pain management  - Notify physician/advanced practitioner if interventions unsuccessful or patient reports new pain  Outcome: Progressing     Problem: INFECTION - ADULT  Goal: Absence or prevention of progression during hospitalization  Description  INTERVENTIONS:  - Assess and monitor for signs and symptoms of infection  - Monitor lab/diagnostic results  - Monitor all insertion sites, i e  indwelling lines, tubes, and drains  - Monitor endotracheal if appropriate and nasal secretions for changes in amount and color  - [place] appropriate cooling/warming therapies per order  - Administer medications as ordered  - Instruct and encourage patient and family to use good hand hygiene technique  - Identify and instruct in appropriate isolation precautions for identified infection/condition  Outcome: Progressing  Goal: Absence of fever/infection during neutropenic period  Description  INTERVENTIONS:  - Monitor WBC    Outcome: Progressing     Problem: SAFETY ADULT  Goal: Maintain or return to baseline ADL function  Description  INTERVENTIONS:  -  Assess patient's ability to carry out ADLs; assess patient's baseline for ADL function and identify physical deficits which impact ability to perform ADLs (bathing, care of mouth/teeth, toileting, grooming, dressing, etc )  - Assess/evaluate cause of self-care deficits   - Assess range of motion  - Assess patient's mobility; develop plan if impaired  - Assess patient's need for assistive devices and provide as appropriate  - Encourage maximum independence but intervene and supervise when necessary  - Involve family in performance of ADLs  - Assess for home care needs following discharge   - Consider OT consult to assist with ADL evaluation and planning for discharge  - Provide patient education as appropriate  Outcome: Progressing  Goal: Maintain or return mobility status to optimal level  Description  INTERVENTIONS:  - Assess patient's baseline mobility status (ambulation, transfers, stairs, etc )    - Identify cognitive and physical deficits and behaviors that affect mobility  - Identify mobility aids required to assist with transfers and/or ambulation (gait belt, sit-to-stand, lift, walker, cane, etc )  - [place] fall precautions as indicated by assessment  - Record patient progress and toleration of activity level on Mobility SBAR; progress patient to next Phase/Stage  - Instruct patient to call for assistance with activity based on assessment  - Consider rehabilitation consult to assist with strengthening/weightbearing, etc   Outcome: Progressing     Problem: Nutrition/Hydration-ADULT  Goal: Nutrient/Hydration intake appropriate for improving, restoring or maintaining nutritional needs  Description  Monitor and assess patient's nutrition/hydration status for malnutrition  Collaborate with interdisciplinary team and initiate plan and interventions as ordered  Monitor patient's weight and dietary intake as ordered or per policy  Utilize nutrition screening tool and intervene as necessary  Determine patient's food preferences and provide high-protein, high-caloric foods as appropriate       INTERVENTIONS:  - Monitor oral intake, urinary output, labs, and treatment plans  - Assess nutrition and hydration status and recommend course of action  - Evaluate amount of meals eaten  - Assist patient with eating if necessary   - Allow adequate time for meals  - Recommend/ encourage appropriate diets, oral nutritional supplements, and vitamin/mineral supplements  - Order, calculate, and assess calorie counts as needed  - Recommend, monitor, and adjust tube feedings and TPN/PPN based on assessed needs  - Assess need for intravenous fluids  - Provide specific nutrition/hydration education as appropriate  - Include patient/family/caregiver in decisions related to nutrition  Outcome: Progressing

## 2020-08-01 NOTE — ASSESSMENT & PLAN NOTE
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 with Dr Lanis Opitz Dr Lanis Opitz stated he has reviewed the x rays  There may be evidence of loosening of her cup but looks securely fixed superiorly  Current plan is pain control and some moderation of activity for a few weeks  Ortho recommends trial IV to oral steroid before trying joint steroidal injection  · Given high CRP 1840, Will consult ortho regarding reimaging   · Pain control with scheduled tylenol 975 mg q8h, lidoderm patch, aqua K pad, robaxin, gabapentin and tramadol  Received Toradol yesterday at 3 PM  · PT recommended Post- Acute Rehab services, was able to take 100 steps yesterday

## 2020-08-01 NOTE — DISCHARGE INSTRUCTIONS
Abscess/fluid collection Aspiration    Right hip aspiration 8/1/20      WHAT YOU NEED TO KNOW:     Today you underwent a fluid collection/abscess aspiration  Usually the fluid is sent to the lab for culture  You may have some pain associated with the puncture site  The Procedure is performed with Local anesthesia (Lidocaine) and sometimes with local and IV Sedation  After you go Home:    Home care  These tips can help your wound heal:   The wound may drain for the first 2 days  Cover the wound with a clean dry dressing  Change the dressing if it becomes soaked with blood or pus   If a gauze packing was placed inside the abscess pocket, you may be told to remove it yourself  You may do this in the shower  Once the packing is removed, you should wash the area in the shower, or clean the area as directed by your provider  Continue to do this until the skin opening has closed  Make sure you wash your hands after changing the packing or cleaning the wound   If you were prescribed antibiotics, take them as directed until they are all gone   You may use acetaminophen or ibuprofen to control pain, unless another pain medicine was prescribed  If you have liver disease or ever had a stomach ulcer, talk with your doctor before using these medicines  Follow-up care  Follow up with your healthcare provider, or as advised  If a gauze packing was put in your wound, it should be removed in 1 to 2 days  Check your wound every day for any signs that the infection is getting worse  The signs are listed below    When to seek medical advice  Call your healthcare provider right away if any of these occur:   Increasing redness or swelling   Red streaks in the skin leading away from the wound   Increasing local pain or swelling   Continued pus draining from the wound 2 days after treatment   Fever of 100 4ºF (38ºC) or higher, or as directed by your healthcare provider   Abscess  returns when you are at home

## 2020-08-01 NOTE — PROGRESS NOTES
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 with Dr Shawna Mcdaniel stated he has reviewed the x rays  There may be evidence of loosening of her cup but looks securely fixed superiorly  Current plan is pain control and some moderation of activity for a few weeks  Ortho recommends trial IV to oral steroid before trying joint steroidal injection  · Given high CRP 1840, Will consult ortho regarding reimaging   · Pain control with scheduled tylenol 975 mg q8h, lidoderm patch, aqua K pad, robaxin, gabapentin and tramadol  Received Toradol yesterday at 3 PM  · PT recommended Post- Acute Rehab services, was able to take 100 steps yesterday  Ambulatory dysfunction  Assessment & Plan  Secondary to hip pain  Continue with pain control  Orthopedic has signed off  PT/OT and lifestyle modification was recommended  Dr Shawna Mcdaniel also suggested pain control and moderation of activity  · Pain control with gabapentin 100mg BID, tramadol 75 mg as needed for moderate pain, toradol 15mg prn, and morphine for breakthrough pain  · Fall precautions   · PT/OT eval  Recommended acute rehab  Patient wants PT at home  Pacemaker  Assessment & Plan  · Cardiology follow up       Depression  Assessment & Plan  · Continue cymbalta    Chronic diastolic CHF (congestive heart failure) ([facility])  Assessment & Plan  Wt Readings from Last 3 Encounters:   07/28/20 78 kg (172 lb)   06/17/20 81 2 kg (179 lb)   06/10/20 78 9 kg (174 lb)       Appears euvolemic, on demadex 20 mg daily  · Monitor volume status and renal function   · Outpatient cardiology f/u   · Have advised patient to continue to take torsemide 20mg  Patient reported she has been taking 10mg at home  But review of the chart did not see dosage changes  She has been refusing to take the torsemide since admission  Cardiac risks have explained  Chronic atrial fibrillation (HCC)  Assessment & Plan  On warfarin 4 mg daily; does not appear to be on any rate control medications; hx of ablation  · Previously saw Dr Adriana Martinez  · Monitor INR  Recent Labs     07/30/20  0537 07/31/20  0445 08/01/20  0522   INR 1 89* 2 50* 2 82*     -     Leukocytosis-resolved as of 8/1/2020  Assessment & Plan  ·  Present on admission- Possibly reactive in the setting of pain, no identifiable infectious source  Recent Labs     07/31/20  0445 08/01/20  0522   WBC 7 74 6 76   ·     Hypokalemia-resolved as of 8/1/2020  Assessment & Plan  Recent Labs     07/29/20  1150 08/01/20  0522   K 3 9 4 2   · Potassium Is normal today      VTE Pharmacologic Prophylaxis:   Pharmacologic: Warfarin (Coumadin)  Mechanical VTE Prophylaxis in Place: Yes    Discussions with Specialists or Other Care Team Provider: Will call patient family    Education and Discussions with Family / Patient: Will    Current Length of Stay: 2 day(s)    Current Patient Status: Inpatient     Discharge Plan / Estimated Discharge Date: tomorrow morning  Patient wants to go home because her  has dementia and neighbors alerted the family after seeing him wandering outside with his car keys  Code Status: Level 1 - Full Code      Subjective:   Patient is feeling well  States pain has been better compared to yesterday  7/10  Was able to roll over and was able to tolerate physical examination of the hip  No overnight events  Was able to tolerate the PT yesterday  She says was tired after PT but was able to walk with PT  Wants to go home because of her   States her house has two stairs on entry and none inside   Wants to be there physically for him even though she doesn't have to take care of him  Her daughter is coming to help her today with her  and will likely stay with her tomorrow  Objective:     Vitals:   Temp (24hrs), Av 8 °F (36 6 °C), Min:97 8 °F (36 6 °C), Max:97 8 °F (36 6 °C)    Temp:  [97 8 °F (36 6 °C)] 97 8 °F (36 6 °C)  HR:  [69-71] 71  Resp:  [16-17] 16  BP: (104-157)/(53-70) 157/70  SpO2:  [96 %-100 %] 97 %  Body mass index is 33 59 kg/m²  Input and Output Summary (last 24 hours): Intake/Output Summary (Last 24 hours) at 2020 1119  Last data filed at 2020 0701  Gross per 24 hour   Intake --   Output 800 ml   Net -800 ml       Physical Exam:     Physical Exam   Constitutional: She is oriented to person, place, and time  She appears well-developed and well-nourished  HENT:   Head: Normocephalic and atraumatic  Neck: Normal range of motion  Neck supple  Cardiovascular: Normal rate, regular rhythm, normal heart sounds and intact distal pulses  Pulmonary/Chest: Effort normal and breath sounds normal    Abdominal: Soft  Bowel sounds are normal    Musculoskeletal: She exhibits tenderness  She exhibits no edema  Right hip tenderness, Limited motion on passive hip fexion   Neurological: She is alert and oriented to person, place, and time  Skin: Skin is warm and dry  Capillary refill takes less than 2 seconds  Additional Data:     Labs:    Results from last 7 days   Lab Units 20  1357   WBC Thousand/uL 6 76   < > 12 54*   HEMOGLOBIN g/dL 11 1*   < > 11 9   HEMATOCRIT % 35 5   < > 37 3   PLATELETS Thousands/uL 188   < > 131*   NEUTROS PCT %  --   --  85*   LYMPHS PCT %  --   --  7*   MONOS PCT %  --   --  8   EOS PCT %  --   --  0    < > = values in this interval not displayed       Results from last 7 days   Lab Units 20  1357   POTASSIUM mmol/L 4 2   < > 3 2*   CHLORIDE mmol/L 107   < > 103   CO2 mmol/L 23   < > 23   BUN mg/dL 15   < > 11   CREATININE mg/dL 0 78   < > 0 80   CALCIUM mg/dL 8 4   < > 8 3   ALK PHOS U/L  --   --  85   ALT U/L  --   --  20   AST U/L  --   --  23    < > = values in this interval not displayed  Results from last 7 days   Lab Units 08/01/20  0522   INR  2 82*       * I Have Reviewed All Lab Data Listed Above  * Additional Pertinent Lab Tests Reviewed:  All Labs Within Last 24 Hours Reviewed    Imaging:    Imaging Reports Reviewed Today Include: X ray hip pelvis  Imaging Personally Reviewed by Myself Includes:  None    Recent Cultures (last 7 days):           Last 24 Hours Medication List:     Current Facility-Administered Medications:  acetaminophen 975 mg Oral Q8H [address] Camille Foley PA-C   ascorbic acid 500 mg Oral Daily Camille Foley PA-C   bupivacaine (PF) 5 mL Infiltration Once Tasia Dumont PA-C   vitamin B-12 100 mcg Oral Daily Camille CorGALDINO becker   dicyclomine 10 mg Oral Q12H Camille Foley PA-C   docusate sodium 100 mg Oral BID Darryle BowenGALDINO   DULoxetine 60 mg Oral Daily Camille Foley PA-C   fluticasone 1 spray Each Nare Daily Camille Foley PA-C   gabapentin 100 mg Oral BID Dinah Danielle MD   ketorolac 15 mg Intravenous Q8H PRN Jory Dawson MD   levothyroxine 75 mcg Oral Early Morning Darryle BowenGALDINO   lidocaine 2 patch Topical Daily Pancho Davila MD   lidocaine (PF) 5 mL Infiltration Once Tasia Dumont PA-C   methocarbamol 750 mg Oral Q8H [address] Pancho Davila MD   methylPREDNISolone sodium succinate 60 mg Intravenous Daily Rakesh Curran MD   morphine injection 2 mg Intravenous Q4H PRN Dinah Danielle MD   ondansetron 4 mg Intravenous Q6H PRN Darryle BowenGALDINO   pantoprazole 40 mg Oral BID AC Rakesh Curran MD   polyethylene glycol 17 g Oral Daily PRN Darryle Bowen, GALDINO   potassium chloride 20 mEq Oral Daily Camille CormartínoGALDINO   senna 1 tablet Oral HS Camille CorGALDINO becker   sucralfate 1 g Oral BID Darryle Bowen, PA-C   torsemide 20 mg Oral Daily Dinah Pozo Shani Winter MD   traMADol 75 mg Oral Q6H PRN Dinah Morin MD   triamcinolone acetonide 40 mg Intramuscular Once José Miguel Ewing PA-C   warfarin 4 mg Oral Daily (warfarin) [facility] InternationalGAL[facility]INO        Today, Patient Was Seen By: Stephanie Quevedo MD    ** Please Note: This note has been constructed using a voice recognition system   **

## 2020-08-01 NOTE — PROGRESS NOTES
Progress Note - Orthopedics   Leyla File 80 y o  female MRN: [id number]  Unit/Bed#: AN IR      Subjective:    80 y  o female seen and evaluated  She reports right hip pain has minimally improved since yesterday  She is just returning to her room from IR guided aspiration of the hip  Pain is currently controlled while laying still  Pain worse with movement  She reports pain is localized to the posterolateral hip and radiates down to the knee  She denies any groin pain  Denies fevers chills, numbness or tingling       Labs:  0   Lab Value Date/Time    HCT 35 5 08/01/2020 0522    HCT 35 4 07/31/2020 0445    HCT 37 3 07/28/2020 1357    HCT 37 0 04/20/2015 1109    HCT 38 0 04/20/2015 1109    HCT 37 2 04/03/2015 0831    HGB 11 1 (L) 08/01/2020 0522    HGB 10 8 (L) 07/31/2020 0445    HGB 11 9 07/28/2020 1357    HGB 12 0 04/20/2015 1109    HGB 11 6 04/03/2015 0831    HGB 12 1 11/04/2014 1025    INR 2 82 (H) 08/01/2020 0522    WBC 6 76 08/01/2020 0522    WBC 7 74 07/31/2020 0445    WBC 12 54 (H) 07/28/2020 1357    WBC 7 98 04/20/2015 1109    WBC 7 29 04/03/2015 0831    WBC 6 66 11/04/2014 1025    ESR 58 (H) 07/31/2020 1450     0 (H) 07/31/2020 1454       Meds:    Current Facility-Administered Medications:     acetaminophen (TYLENOL) tablet 975 mg, 975 mg, Oral, Q8H [facility] [place], Camille Foley PA-C, 975 mg at 08/01/20 1503    ascorbic acid (VITAMIN C) tablet 500 mg, 500 mg, Oral, Daily, Camille Foley PA-C, 500 mg at 08/01/20 0948    bupivacaine (PF) (MARCAINE) 0 5 % injection 5 mL, 5 mL, Infiltration, Once, Jo Lees PA-C    cyanocobalamin (VITAMIN B-12) tablet 100 mcg, 100 mcg, Oral, Daily, Mario Rodríguez PA-C, 100 mcg at 08/01/20 0948    dicyclomine (BENTYL) capsule 10 mg, 10 mg, Oral, Q12H, Camille Foley PA-C, 10 mg at 08/01/20 0501    docusate sodium (COLACE) capsule 100 mg, 100 mg, Oral, BID, Camille Foley PA-C, 100 mg at 08/01/20 0948    DULoxetine (CYMBALTA) delayed release capsule 60 mg, 60 mg, Oral, Daily, Camille Foley PA-C, 60 mg at 08/01/20 0948    fluticasone (FLONASE) 50 mcg/act nasal spray 1 spray, 1 spray, Each Nare, Daily, Loreta Gabriel PA-C, 1 spray at 07/31/20 3133    gabapentin (NEURONTIN) capsule 100 mg, 100 mg, Oral, BID, Dinah Fierro MD, 100 mg at 08/01/20 0948    ketorolac (TORADOL) injection 15 mg, 15 mg, Intravenous, Q8H PRN, Lennice Favre, MD, 15 mg at 07/31/20 1458    levothyroxine tablet 75 mcg, 75 mcg, Oral, Early Morning, aCmille Foley PA-C, 75 mcg at 08/01/20 0500    lidocaine (LIDODERM) 5 % patch 2 patch, 2 patch, Topical, Daily, Helen Aleman MD, 2 patch at 08/01/20 0948    lidocaine (PF) (XYLOCAINE-MPF) 1 % injection 5 mL, 5 mL, Infiltration, Once, Mila Ortega PA-C    methocarbamol (ROBAXIN) tablet 750 mg, 750 mg, Oral, Q8H [address], Helen Aleman MD, 750 mg at 08/01/20 1503    morphine injection 2 mg, 2 mg, Intravenous, Q4H PRN, Dinah Fierro MD    ondansetron [facility]) injection 4 mg, 4 mg, Intravenous, Q6H PRN, Loreta Gabriel PA-C    pantoprazole (PROTONIX) EC tablet 40 mg, 40 mg, Oral, BID AC, Angela Corley MD, 40 mg at 08/01/20 0503    polyethylene glycol (MIRALAX) packet 17 g, 17 g, Oral, Daily PRN, Loreta Gabriel PA-C    potassium chloride (K-DUR,KLOR-CON) CR tablet 20 mEq, 20 mEq, Oral, Daily, Loreta Gabriel PA-C, 20 mEq at 08/01/20 0948    senna (SENOKOT) tablet 8 6 mg, 1 tablet, Oral, HS, Camille Foley PA-C    sucralfate (CARAFATE) tablet 1 g, 1 g, Oral, BID, Camille Foley PA-C, 1 g at 08/01/20 0948    torsemide (DEMADEX) tablet 20 mg, 20 mg, Oral, Daily, Dinah Fierro MD, 20 mg at 08/01/20 0948    traMADol (ULTRAM) tablet 75 mg, 75 mg, Oral, Q6H PRN, Dinah Fierro MD, 75 mg at 08/01/20 0948    triamcinolone acetonide (KENALOG-40) 40 mg/mL injection 40 mg, 40 mg, Intramuscular, Once, Mila Ortega PA-C    Blood Culture:   No results found for: BLOODCX    Wound Culture:   No results found for: WOUNDCULT    Ins and Outs:  I/O last 24 hours: In: -   Out: 800 [Urine:800]          Physical:  Vitals:    08/01/20 1438   BP: 138/65   Pulse:    Resp:    Temp:    SpO2:      Musculoskeletal: right Lower Extremity (Hip)  · Skin warm and well perfused  No erythema  Well healed surgical scar over the posterolateral hip  · Tender to palpation over the right greater trochanter and buttock  · No pain with micromotion of the hip  · Able to actively flex the hip   · SILT s/s/sp/dp/t  · Motor intact knee flexion/extension, hip flexion, ankle dorsi/plantar flexion, FHL/EHL  · Intact DP pulse    Assessment:    80 y  o female with acute onset of right posterolateral hip pain  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

## 2020-08-01 NOTE — ASSESSMENT & PLAN NOTE
Secondary to hip pain  Continue with pain control  Orthopedic has signed off  PT/OT and lifestyle modification was recommended  Dr Chidi Jordan also suggested pain control and moderation of activity  · Pain control with gabapentin 100mg BID, tramadol 75 mg as needed for moderate pain, toradol 15mg prn, and morphine for breakthrough pain  · Fall precautions   · PT/OT xial  Recommended acute rehab  Patient wants PT at home

## 2020-08-01 NOTE — ASSESSMENT & PLAN NOTE
On warfarin 4 mg daily; does not appear to be on any rate control medications; hx of ablation  · Previously saw Dr Christofer Almodovar  · Monitor INR     Recent Labs     07/30/20  0537 07/31/20  0445 08/01/20  0522   INR 1 89* 2 50* 2 82*     -

## 2020-08-01 NOTE — ASSESSMENT & PLAN NOTE
·  Present on admission- Possibly reactive in the setting of pain, no identifiable infectious source  Recent Labs     07/31/20  0445 08/01/20  0522   WBC 7 74 6 76   ·

## 2020-08-01 NOTE — QUICK NOTE
Informed by orthopedics that patients synovial WBC is borderline  Patient is non toxic  Advised on holding solumedrol and coumadin till Dr Lina Bob evaluate her tomorrow     Coumadin and solumedrol held

## 2020-08-01 NOTE — SEDATION DOCUMENTATION
R hip aspiration complete  Patient tolerated well  Report called to rigoberto x [phone]   Patient taken back to room with IR RN

## 2020-08-02 LAB
ANION GAP SERPL CALCULATED.3IONS-SCNC: 6 MMOL/L (ref 4–13)
BUN SERPL-MCNC: 20 MG/DL (ref 5–25)
CALCIUM SERPL-MCNC: 8.6 MG/DL (ref 8.3–10.1)
CHLORIDE SERPL-SCNC: 106 MMOL/L (ref 100–108)
CO2 SERPL-SCNC: 28 MMOL/L (ref 21–32)
CREAT SERPL-MCNC: 0.95 MG/DL (ref 0.6–1.3)
ERYTHROCYTE [DISTWIDTH] IN BLOOD BY AUTOMATED COUNT: 12.7 % (ref 11.6–15.1)
GFR SERPL CREATININE-BSD FRML MDRD: 54 ML/MIN/1.73SQ M
GLUCOSE SERPL-MCNC: 84 MG/DL (ref 65–140)
HCT VFR BLD AUTO: 33.6 % (ref 34.8–46.1)
HGB BLD-MCNC: 10.7 G/DL (ref 11.5–15.4)
INR PPP: 3.14 (ref 0.84–1.19)
MCH RBC QN AUTO: 32.1 PG (ref 26.8–34.3)
MCHC RBC AUTO-ENTMCNC: 31.8 G/DL (ref 31.4–37.4)
MCV RBC AUTO: 101 FL (ref 82–98)
PLATELET # BLD AUTO: 218 THOUSANDS/UL (ref 149–390)
PMV BLD AUTO: 9.9 FL (ref 8.9–12.7)
POTASSIUM SERPL-SCNC: 4.7 MMOL/L (ref 3.5–5.3)
PROTHROMBIN TIME: 31.2 SECONDS (ref 11.6–14.5)
RBC # BLD AUTO: 3.33 MILLION/UL (ref 3.81–5.12)
SODIUM SERPL-SCNC: 140 MMOL/L (ref 136–145)
WBC # BLD AUTO: 9.94 THOUSAND/UL (ref 4.31–10.16)

## 2020-08-02 PROCEDURE — 99232 SBSQ HOSP IP/OBS MODERATE 35: CPT | Performed by: INTERNAL MEDICINE

## 2020-08-02 PROCEDURE — 85027 COMPLETE CBC AUTOMATED: CPT | Performed by: INTERNAL MEDICINE

## 2020-08-02 PROCEDURE — 85610 PROTHROMBIN TIME: CPT | Performed by: INTERNAL MEDICINE

## 2020-08-02 PROCEDURE — 80048 BASIC METABOLIC PNL TOTAL CA: CPT | Performed by: INTERNAL MEDICINE

## 2020-08-02 PROCEDURE — 99231 SBSQ HOSP IP/OBS SF/LOW 25: CPT | Performed by: ORTHOPAEDIC SURGERY

## 2020-08-02 RX ORDER — OXYCODONE HYDROCHLORIDE 5 MG/1
5 TABLET ORAL EVERY 4 HOURS PRN
Status: DISCONTINUED | OUTPATIENT
Start: 2020-08-02 | End: 2020-08-04 | Stop reason: HOSPADM

## 2020-08-02 RX ORDER — KETOROLAC TROMETHAMINE 30 MG/ML
15 INJECTION, SOLUTION INTRAMUSCULAR; INTRAVENOUS ONCE
Status: COMPLETED | OUTPATIENT
Start: 2020-08-02 | End: 2020-08-02

## 2020-08-02 RX ORDER — POLYETHYLENE GLYCOL 3350 17 G/17G
17 POWDER, FOR SOLUTION ORAL DAILY PRN
Status: DISCONTINUED | OUTPATIENT
Start: 2020-08-02 | End: 2020-08-02 | Stop reason: SDUPTHER

## 2020-08-02 RX ORDER — AMOXICILLIN 250 MG
1 CAPSULE ORAL 2 TIMES DAILY
Status: DISCONTINUED | OUTPATIENT
Start: 2020-08-02 | End: 2020-08-04 | Stop reason: HOSPADM

## 2020-08-02 RX ADMIN — GABAPENTIN 100 MG: 100 CAPSULE ORAL at 18:22

## 2020-08-02 RX ADMIN — ACETAMINOPHEN 975 MG: 325 TABLET, FILM COATED ORAL at 15:04

## 2020-08-02 RX ADMIN — METHOCARBAMOL TABLETS 750 MG: 750 TABLET, COATED ORAL at 21:22

## 2020-08-02 RX ADMIN — DULOXETINE HYDROCHLORIDE 60 MG: 30 CAPSULE, DELAYED RELEASE ORAL at 09:31

## 2020-08-02 RX ADMIN — DOCUSATE SODIUM 100 MG: 100 CAPSULE, LIQUID FILLED ORAL at 09:31

## 2020-08-02 RX ADMIN — OXYCODONE HYDROCHLORIDE AND ACETAMINOPHEN 500 MG: 500 TABLET ORAL at 09:31

## 2020-08-02 RX ADMIN — LIDOCAINE 2 PATCH: 50 PATCH CUTANEOUS at 09:31

## 2020-08-02 RX ADMIN — GABAPENTIN 100 MG: 100 CAPSULE ORAL at 09:31

## 2020-08-02 RX ADMIN — PANTOPRAZOLE SODIUM 40 MG: 40 TABLET, DELAYED RELEASE ORAL at 04:57

## 2020-08-02 RX ADMIN — ACETAMINOPHEN 975 MG: 325 TABLET, FILM COATED ORAL at 04:56

## 2020-08-02 RX ADMIN — DOCUSATE SODIUM 100 MG: 100 CAPSULE, LIQUID FILLED ORAL at 18:22

## 2020-08-02 RX ADMIN — TORSEMIDE 20 MG: 20 TABLET ORAL at 09:31

## 2020-08-02 RX ADMIN — METHOCARBAMOL TABLETS 750 MG: 750 TABLET, COATED ORAL at 15:04

## 2020-08-02 RX ADMIN — DICYCLOMINE HYDROCHLORIDE 10 MG: 10 CAPSULE ORAL at 04:57

## 2020-08-02 RX ADMIN — ACETAMINOPHEN 975 MG: 325 TABLET, FILM COATED ORAL at 21:22

## 2020-08-02 RX ADMIN — SUCRALFATE 1 G: 1 TABLET ORAL at 18:22

## 2020-08-02 RX ADMIN — METHOCARBAMOL TABLETS 750 MG: 750 TABLET, COATED ORAL at 04:56

## 2020-08-02 RX ADMIN — KETOROLAC TROMETHAMINE 15 MG: 30 INJECTION, SOLUTION INTRAMUSCULAR at 11:53

## 2020-08-02 RX ADMIN — SUCRALFATE 1 G: 1 TABLET ORAL at 09:31

## 2020-08-02 RX ADMIN — VITAM B12 100 MCG: 100 TAB at 09:31

## 2020-08-02 RX ADMIN — PANTOPRAZOLE SODIUM 40 MG: 40 TABLET, DELAYED RELEASE ORAL at 15:04

## 2020-08-02 RX ADMIN — LEVOTHYROXINE SODIUM 75 MCG: 75 TABLET ORAL at 04:56

## 2020-08-02 RX ADMIN — DICYCLOMINE HYDROCHLORIDE 10 MG: 10 CAPSULE ORAL at 18:21

## 2020-08-02 NOTE — PROGRESS NOTES
Progress Note - Trish Arriaga 1933, 80 y o  female MRN: [id number]    Unit/Bed#: S -01 Encounter: [id number]    Primary Care Provider: Hien Lou DO   Date and time admitted to hospital: 7/28/2020 10:31 AM        * Right hip pain  Assessment & Plan  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 with Dr Rolando Silva stated he has reviewed the x rays  There may be evidence of loosening of her cup but looks securely fixed superiorly  Current plan is pain control and some moderation of activity for a few weeks  Ortho recommends trial IV to oral steroid before trying joint steroidal injection  · , ESR 58  Methylprednisone was given one dose  · Pain control with scheduled tylenol 975 mg q8h, lidoderm patch, aqua K pad, robaxin, gabapentin and tramadol  She has been getting one dose of tramadol  Ketorlac 15mg stat today  Oxycodone 5mg was given and observe for allergy reaction  · Joint aspiration shows WBC 3050  Pending culture result  · Ortho did not think there was infection history of the hip joint  They will perform another joint aspiration tomorrow  · PT recommended Post- Acute Rehab services  Patient stated she hasn't ambulated yet  Ambulatory dysfunction  Assessment & Plan  Secondary to hip pain  Continue with pain control  PT/OT and lifestyle modification was recommended  Dr Rolando Silva also suggested pain control and moderation of activity  · Pain control with gabapentin 100mg BID, tramadol 75 mg as needed for moderate pain,and morphine for breakthrough pain  Beverly Dash was dc due to reduced GFR, and slightly increased CRE from baseline  · Fall precautions   · PT/OT eval  Recommended acute rehab  Patient wants PT at home  Pacemaker  Assessment & Plan  · Cardiology follow up       Depression  Assessment & Plan  · Continue cymbalta    Chronic diastolic CHF (congestive heart failure) (HCC)  Assessment & Plan  Wt Readings from Last 3 Encounters:   20 78 kg (172 lb)   20 81 2 kg (179 lb)   06/10/20 78 9 kg (174 lb)       Appears euvolemic, on demadex 20 mg daily  · Volume status has been the same   · Outpatient cardiology f/u   · Have advised patient to continue to take torsemide 20mg  Patient reported she has been taking 10mg at home  But review of the chart did not see dosage changes  She has been refusing to take the torsemide since admission  Cardiac risks have explained  Chronic atrial fibrillation (HCC)  Assessment & Plan  On warfarin 4 mg daily; does not appear to be on any rate control medications; hx of ablation  · Previously saw Dr Annie Boswell  · Monitored INR  Recent Labs     20  0445 20  0522 20  0403   INR 2 50* 2 82* 3 14*     - Coumadin has been held due to INR > 3  Consider switching to lovenox tomorrow if INR < 3  VTE Pharmacologic Prophylaxis:   Pharmacologic: Warfarin (Coumadin)  Mechanical VTE Prophylaxis in Place: Yes    Discussions with Specialists or Other Care Team Provider: orthopedics, CM, PT/OT, and nursing  Education and Discussions with Family / Patient: patient, her daughter, her niece  Current Length of Stay: 3 day(s)    Current Patient Status: Inpatient     Discharge Plan / Estimated Discharge Date: tentative on 8/3    Code Status: Level 1 - Full Code      Subjective:   She continued to complain of hip pain, although it has been better  Pain was rated 8/10  She said she has not ambulated yet, so on sure that if she is able to walk  No chest pain, shortness of breath, abdominal pain, nausea, vomiting, or constipation was noted  She stated that she wanted to go home if possible      Objective:     Vitals:   Temp (24hrs), Av 9 °F (36 6 °C), Min:97 6 °F (36 4 °C), Max:98 3 °F (36 8 °C)    Temp:  [97 6 °F (36 4 °C)-98 3 °F (36 8 °C)] 97 6 °F (36 4 °C)  HR:  [65-76] 65  Resp:  [14-21] 16  BP: (115-143)/(55-65) 143/64  SpO2:  [96 %-99 %] 98 %  Body mass index is 33 59 kg/m²  Input and Output Summary (last 24 hours): Intake/Output Summary (Last 24 hours) at 8/2/2020 1309  Last data filed at 8/1/2020 2201  Gross per 24 hour   Intake 250 ml   Output 1330 ml   Net -1080 ml       Physical Exam:     Physical Exam   Constitutional: She is oriented to person, place, and time  She appears well-developed and well-nourished  No distress  HENT:   Head: Normocephalic and atraumatic  Eyes: Conjunctivae are normal  Right eye exhibits no discharge  Left eye exhibits no discharge  No scleral icterus  Neck: Normal range of motion  Neck supple  Cardiovascular: Normal rate, normal heart sounds and intact distal pulses  An irregular rhythm present  Exam reveals no gallop and no friction rub  No murmur heard  Pulmonary/Chest: Effort normal and breath sounds normal  No stridor  No respiratory distress  She has no wheezes  She has no rales  She exhibits no tenderness  Abdominal: Soft  Bowel sounds are normal  She exhibits no distension and no mass  There is no abdominal tenderness  There is no rebound and no guarding  No hernia  Musculoskeletal: Normal range of motion  General: No deformity or edema  Right hip: She exhibits tenderness  Legs:    Neurological: She is alert and oriented to person, place, and time  Skin: Skin is warm  She is not diaphoretic  No erythema  No pallor  Psychiatric: She has a normal mood and affect  Her behavior is normal    Nursing note and vitals reviewed          Additional Data:     Labs:    Results from last 7 days   Lab Units 08/02/20  0403  07/28/20  1357   WBC Thousand/uL 9 94   < > 12 54*   HEMOGLOBIN g/dL 10 7*   < > 11 9   HEMATOCRIT % 33 6*   < > 37 3   PLATELETS Thousands/uL 218   < > 131*   NEUTROS PCT %  --   --  85*   LYMPHS PCT %  --   --  7*   MONOS PCT %  --   --  8   EOS PCT %  -- --  0    < > = values in this interval not displayed  Results from last 7 days   Lab Units 08/02/20  0403  07/28/20  1357   POTASSIUM mmol/L 4 7   < > 3 2*   CHLORIDE mmol/L 106   < > 103   CO2 mmol/L 28   < > 23   BUN mg/dL 20   < > 11   CREATININE mg/dL 0 95   < > 0 80   CALCIUM mg/dL 8 6   < > 8 3   ALK PHOS U/L  --   --  85   ALT U/L  --   --  20   AST U/L  --   --  23    < > = values in this interval not displayed  Results from last 7 days   Lab Units 08/02/20  0403   INR  3 14*       * I Have Reviewed All Lab Data Listed Above  * Additional Pertinent Lab Tests Reviewed:  [place] Admission Reviewed    Imaging:    Imaging Reports Reviewed Today Include: none  Imaging Personally Reviewed by Myself Includes:  none    Recent Cultures (last 7 days):     Results from last 7 days   Lab Units 08/01/20  1428   GRAM STAIN RESULT  1+ Polys  No bacteria seen   BODY FLUID CULTURE, STERILE  No growth       Last 24 Hours Medication List:   acetaminophen, 975 mg, Oral, Q8H [facility], Camille Foley PA-C  ascorbic acid, 500 mg, Oral, Daily, Camille Foley PA-C  bupivacaine (PF), 5 mL, Infiltration, Once, Mamadou Bunn PA-C  vitamin B-12, 100 mcg, Oral, Daily, Camille Foley PA-C  dicyclomine, 10 mg, Oral, Q12H, Camille Foley PA-C  docusate sodium, 100 mg, Oral, BID, Camille Foley PA-C  DULoxetine, 60 mg, Oral, Daily, Camille Foley PA-C  fluticasone, 1 spray, Each Nare, Daily, Camille Foley PA-C  gabapentin, 100 mg, Oral, BID, Dinah Gómez MD  levothyroxine, 75 mcg, Oral, Early Morning, Camille Foley PA-C  lidocaine, 2 patch, Topical, Daily, Ari Hughes MD  lidocaine (PF), 5 mL, Infiltration, Once, Mamadou Bunn PA-C  methocarbamol, 750 mg, Oral, Q8H [facility], Holger Horan MD  morphine injection, 2 mg, Intravenous, Q4H PRN, Dinah Gómez MD  ondansetron, 4 mg, Intravenous, Q6H PRN, Saba Andres PA-C  oxyCODONE, 5 mg, Oral, Q4H PRN, Dinah Gómez, MD  pantoprazole, 40 mg, Oral, BID AC, Magui Locke MD  polyethylene glycol, 17 g, Oral, Daily PRN, Natalia Foley PA-C  senna, 1 tablet, Oral, HS, Camille Foley PA-C  sucralfate, 1 g, Oral, BID, Camille Foley PA-C  torsemide, 20 mg, Oral, Daily, Dinah Gilliam MD  traMADol, 75 mg, Oral, Q6H PRN, Dinah Gilliam MD  triamcinolone acetonide, 40 mg, Intramuscular, Once, Marcus Doran PA-C         Today, Patient Was Seen By: Carla Locke MD    ** Please Note: This note has been constructed using a voice recognition system   **

## 2020-08-02 NOTE — PROGRESS NOTES
Progress Note - Orthopedics   Daphne Silver 80 y o  female MRN: [id number]  Unit/Bed#: S -01 Encounter: [id number]    Assessment:  R hip pain, hx of R MAIA, revision R MAIA 4/2019, scar revision 7/2019    Plan:  Ms Mansoor Mcdonald has righ hip pain with ROM that is minimally improved since admission  Her synovial WBC 3,050 which is borderline concerning for infection  Her neutrophil % was 92, gram stain showed no bacteria, 1+polys  Culture so far shows no growth  Patient received one dose IV solumedrol in AM 8/1 which may impact results  I think best to repeat CRP, ESR, CBC tomorrow and see which way her numbers are trending  Plan to d/w Dr Rosalino Gonzalez  May consider repeat aspiration and include synovasure test   Will take into consideration patient's age and comorbidities in decision making process  Will follow up on cultures as well  This was discussed with patient and her niece Davis Casey who was on the phone  I encouraged patient to get OOB if tolerated  D/w plan with SLIM  Will continue to hold coumadin and restart lovenox in case any surgical intervention needed  They will trend INR and start lovenox when appropriate  We will also continue to hold IV solumedrol  Med mgt per SLIM otherwise  Pain control prn  PT/OT-WBAT  Ortho will follow    Subjective: patient seen and examined by me today  She admits right hip pain that is mildly improved since admission  Her pain occurs over the buttock and radiates down to her knee  Occasional groin pain with ROM  ROM worsens her pain  At baseline she has mild posterolateral right hip pain but it significantly worsened last Sunday  Vitals: Blood pressure 143/64, pulse 65, temperature 97 6 °F (36 4 °C), temperature source Oral, resp  rate 16, height 5', weight 78 kg (172 lb), SpO2 98 %, not currently breastfeeding  ,Body mass index is 33 59 kg/m²        Intake/Output Summary (Last 24 hours) at 8/2/2020 1156  Last data filed at 8/1/2020 2201  Gross per 24 hour   Intake 250 ml   Output 1330 ml   Net -1080 ml       Invasive Devices     Peripheral Intravenous Line            Peripheral IV 08/01/20 Left Forearm less than 1 day          Drain            External Urinary Catheter 4 days                Ortho Exam:   RLE:  EHL/AT/GS intact, sensation grossly intact L4, L5, S1, palpable pedal pulse  R hip: active flexion: 0-30, limited by pain, tolerates passive flexion 0-60 with pain, no pain with ER 0-60, pain with IR 0-30, no erythema, no ecchymosis, no swelling, +TTP over R SIJ, pain patches in place over posterolateral hip  Posterolateral scar noted        Lab, Imaging and other studies:   CBC:   Lab Results   Component Value Date    WBC 9 94 08/02/2020    HGB 10 7 (L) 08/02/2020    HCT 33 6 (L) 08/02/2020     (H) 08/02/2020     08/02/2020    MCH 32 1 08/02/2020    MCHC 31 8 08/02/2020    RDW 12 7 08/02/2020    MPV 9 9 08/02/2020     CMP:   Lab Results   Component Value Date    SODIUM 140 08/02/2020     08/02/2020    CO2 28 08/02/2020    BUN 20 08/02/2020    CREATININE 0 95 08/02/2020    CALCIUM 8 6 08/02/2020    EGFR 54 08/02/2020     PT/INR:   Lab Results   Component Value Date    INR 3 14 (H) 08/02/2020

## 2020-08-02 NOTE — QUICK NOTE
Orthopedics has spoken with the Niece today  I called the daughter, Kellen Solis, but not unanswered

## 2020-08-02 NOTE — ASSESSMENT & PLAN NOTE
On warfarin 4 mg daily; does not appear to be on any rate control medications; hx of ablation  · Previously saw Dr Natasha Perea  · Monitored INR  Recent Labs     07/31/20  0445 08/01/20  0522 08/02/20  0403   INR 2 50* 2 82* 3 14*     - Coumadin has been held due to INR > 3  Consider switching to lovenox tomorrow if INR < 3

## 2020-08-02 NOTE — ASSESSMENT & PLAN NOTE
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 with Dr Anu Young stated he has reviewed the x rays  There may be evidence of loosening of her cup but looks securely fixed superiorly  Current plan is pain control and some moderation of activity for a few weeks  Ortho recommends trial IV to oral steroid before trying joint steroidal injection  · , ESR 58  Methylprednisone was given one dose  · Pain control with scheduled tylenol 975 mg q8h, lidoderm patch, aqua K pad, robaxin, gabapentin and tramadol  She has been getting one dose of tramadol  Ketorlac 15mg stat today  Oxycodone 5mg was given and observe for allergy reaction  · Joint aspiration shows WBC 3050  Pending culture result  · Ortho did not think there was infection history of the hip joint  They will perform another joint aspiration tomorrow  · PT recommended Post- Acute Rehab services  Patient stated she hasn't ambulated yet

## 2020-08-02 NOTE — ASSESSMENT & PLAN NOTE
Wt Readings from Last 3 Encounters:   07/28/20 78 kg (172 lb)   06/17/20 81 2 kg (179 lb)   06/10/20 78 9 kg (174 lb)       Appears euvolemic, on demadex 20 mg daily  · Volume status has been the same   · Outpatient cardiology f/u   · Have advised patient to continue to take torsemide 20mg  Patient reported she has been taking 10mg at home  But review of the chart did not see dosage changes  She has been refusing to take the torsemide since admission  Cardiac risks have explained

## 2020-08-02 NOTE — ASSESSMENT & PLAN NOTE
Secondary to hip pain  Continue with pain control  PT/OT and lifestyle modification was recommended  Dr Shawna Mcdaniel also suggested pain control and moderation of activity  · Pain control with gabapentin 100mg BID, tramadol 75 mg as needed for moderate pain,and morphine for breakthrough pain  Jose Gallegos was dc due to reduced GFR, and slightly increased CRE from baseline  · Fall precautions   · PT/OT carmen  Recommended acute rehab  Patient wants PT at home

## 2020-08-02 NOTE — DISCHARGE SUMMARY
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 with Dr Beverly Charles stated he has reviewed the x rays  There may be evidence of loosening of her cup but looks securely fixed superiorly  Current plan is pain control and some moderation of activity for a few weeks  Ortho recommends trial IV to oral steroid before trying joint steroidal injection  · , ESR 58  Methylprednisone was given one dose  · Pain control with scheduled tylenol 975 mg q8h, lidoderm patch, aqua K pad, robaxin, gabapentin  She has been getting one dose of tramadol, Ketorlac 15mg  Oxycodone 5mg was given at 12:30pm and there was no allergy reaction noted  · Joint aspiration shows WBC 3050  Culture result shows negative growth for 3 days  · Ortho did not think there was infection history of the hip joint  They are considering perform another joint aspiration  They would like to at least wait to see what the final cultures are  · PT recommended Post- Acute Rehab services  Patient stated her right still hurt very bad when she stands up  Today she can move her legs without hurting too much  Ambulatory dysfunction  Assessment & Plan  Secondary to hip pain  Continue with pain control  PT/OT and lifestyle modification was recommended  Dr Beverly Charles also suggested pain control and moderation of activity  · Pain control with gabapentin 200mg BID, tramadol 75 mg as needed for moderate pain,and morphine and oxycodone for breakthrough pain  Dejesus Brazen was given once    · Fall precautions   · PT/OT eval  Recommended acute rehab  Patient wants PT at home  Pacemaker  Assessment & Plan  · Cardiology follow up  Depression  Assessment & Plan  · Continue cymbalta    Chronic diastolic CHF (congestive heart failure) (HCC)  Assessment & Plan  Wt Readings from Last 3 Encounters:   07/28/20 78 kg (172 lb)   06/17/20 81 2 kg (179 lb)   06/10/20 78 9 kg (174 lb)       Appears euvolemic, on demadex 20 mg daily  · Volume status has been the same   · Outpatient cardiology f/u   · Advised patient to continue to take torsemide 20mg  Anemia  Assessment & Plan  · Patient is noted to have chronic anemia  Baseline from the past year, is around 10-11  · Patient is not having active bleeding  H&H has been stable  Chronic atrial fibrillation (HCC)  Assessment & Plan  On warfarin 4 mg daily; does not appear to be on any rate control medications; hx of ablation  · Previously saw Dr Sonali Penaloza  · Monitored INR  Recent Labs     08/02/20  0403 08/03/20  0440 08/04/20  0516   INR 3 14* 2 61* 2 42*     · Patient may resume Coumadin since there is no near future procedure needs to be done  Discharging Resident Physician: Grisel Pickens MD  Attending: Stacy Torres DO  PCP: Vicki Proctor DO  Admission Date: 7/28/2020  Discharge Date: 08/04/20    Disposition:     Home with [facility]NA Services (Reminder: Complete face to face encounter)    Reason for Admission: Right hip pain    Consultations During Hospital Stay:  · Orthopedicas, PT/OT, CM , nusring  Procedures Performed:     · Joint aspiration  Significant Findings / Test Results:     · Joint aspiration: WBC 3050  Negative growth for 3 days  · Hip x ray pm 7/28/2020: Status post right hip arthroplasty with increased lucency surrounding the acetabular cup raising the possibility of particle disease and/or hardware loosening  No acute periprosthetic fractures are identified  · Lumbar x ray on 7/28/2020:  No acute lumbar spine fractures noted      Incidental Findings: · none    Test Results Pending at Discharge (will require follow up):   · none     Outpatient Tests Requested:  · none    Complications:  none    Hospital Course:     Teresa Zimmer is a 80 y o  female patient with PMH of fibromyalgia, hypothyroidism, chronic AFib on warfarin, hypertension, chronic diastolic heart failure, history of Ann Marie-en-Y gastric bypass, mitral regurgitation, permanent pacemaker placement, chronic right hip pain status post replacement and scar revision in 2019 who originally presented to the hospital on 7/28/2020 due to right hip pain  she denied any injury or trauma  Denied any numbness or tingling down leg  No other joint pain  She stated that she typically has some degree of pain following her surgery which resulted in pelvic fractures, however this pain was much worse and reminded her of when she 1st had her surgery  She stated that she has not been able to ambulate and thus has not taken her diuretic in several days because she was not able to get to the bathroom  During admission, she continued to complain hip pain and unable to ambulate because of pain  She stated no pain when she was just resting, worse pain when she moves  Pain has been difficult to control  Pain medication has been morphine, ketorolac, gabapentin, Tylenol, tramadol, aqua k and lidoderm patch  Instructed the patient to take a trial of oxycodone for pain control, but she did not take it on the day that was ordered  Only morphine was given  She was later given oxycodone 5mg  The patient tolerated oxycodone wall with no allergic reaction  Orthopedics was consulted  Hip x-ray during admission did not show apparent fractures of the hip  Lumbar x ray did not show fractures either  Loosening of hip replacement was noted but looks secure per Orthopedic's opinion  Given by elevated CRP and ESR, one dose of methylprednisolone was given  Orthopedics also performed joint aspiration    It showed white count of 3050  Culture shows negative growth for 3 days  Patient was evaluated by PT OT  They recommended acute inpatient rehab, however patient declined because she preferred to have PT at home, and take care of her  with dementia  Her outpatient orthopedics Dr Merced Perez has reviewed the hip x ray also advised no surgery at this time, pain control, and moderation activity  She will follow-up with Dr Salinas Graves go within 2 weeks  Per orthopedics recommendation, she is discharged with Medrol Dosepak, oxycodone, Robaxin, Tylenol and volaren gel  Today's INR is 2 42  Therefore, she can resume Coumadin  Condition at Discharge: stable     Discharge Day Visit / Exam:     Subjective:  She reported her pain is better today  Rated 5/10  She can move her leg with no pain  The palpations still elicited pain  No chest pain or shortness of breath  There was no hives or swelling noted after taking oxycodone yesterday  Vitals: Blood Pressure: 128/60 (08/04/20 0900)  Pulse: 72 (08/04/20 0900)  Temperature: (!) 97 2 °F (36 2 °C) (08/04/20 0900)  Temp Source: Temporal (08/04/20 0900)  Respirations: 18 (08/04/20 0900)  Height: 5' (07/28/20 1045)  Weight - Scale: 78 kg (172 lb) (07/28/20 1045)  SpO2: 99 % (08/04/20 0900)  Exam:   Physical Exam   Constitutional: She is oriented to person, place, and time  She appears well-developed and well-nourished  No distress  HENT:   Head: Normocephalic and atraumatic  Eyes: Conjunctivae are normal  Right eye exhibits no discharge  Left eye exhibits no discharge  No scleral icterus  Neck: Normal range of motion  Neck supple  Cardiovascular: Normal rate, normal heart sounds and intact distal pulses  An irregular rhythm present  Exam reveals no gallop and no friction rub  No murmur heard  Pulmonary/Chest: Effort normal and breath sounds normal  No stridor  No respiratory distress  She has no wheezes  She has no rales  She exhibits no tenderness  Abdominal: Soft   Bowel sounds are normal  She exhibits no distension and no mass  There is no abdominal tenderness  There is no rebound and no guarding  No hernia  Musculoskeletal: Normal range of motion  General: No deformity or edema  Right hip: She exhibits tenderness  Legs:    Neurological: She is alert and oriented to person, place, and time  Skin: Skin is warm  She is not diaphoretic  No erythema  No pallor  Psychiatric: She has a normal mood and affect  Her behavior is normal    Nursing note and vitals reviewed  Discussion with Family: patient and her daughter, and niece, Elza Morrison  Discharge instructions/Information to patient and family:   See after visit summary for information provided to patient and family  Provisions for Follow-Up Care:  See after visit summary for information related to follow-up care and any pertinent home health orders  Planned Readmission: none     Discharge Medications:  See after visit summary for reconciled discharge medications provided to patient and family        ** Please Note: This note has been constructed using a voice recognition system **

## 2020-08-03 LAB
ANION GAP SERPL CALCULATED.3IONS-SCNC: 6 MMOL/L (ref 4–13)
BASOPHILS # BLD AUTO: 0.05 THOUSANDS/ΜL (ref 0–0.1)
BASOPHILS NFR BLD AUTO: 1 % (ref 0–1)
BUN SERPL-MCNC: 25 MG/DL (ref 5–25)
CALCIUM SERPL-MCNC: 8.5 MG/DL (ref 8.3–10.1)
CHLORIDE SERPL-SCNC: 106 MMOL/L (ref 100–108)
CO2 SERPL-SCNC: 27 MMOL/L (ref 21–32)
CREAT SERPL-MCNC: 1.03 MG/DL (ref 0.6–1.3)
CRP SERPL QL: 61.6 MG/L
EOSINOPHIL # BLD AUTO: 0.13 THOUSAND/ΜL (ref 0–0.61)
EOSINOPHIL NFR BLD AUTO: 2 % (ref 0–6)
ERYTHROCYTE [DISTWIDTH] IN BLOOD BY AUTOMATED COUNT: 12.7 % (ref 11.6–15.1)
ERYTHROCYTE [SEDIMENTATION RATE] IN BLOOD: 48 MM/HOUR (ref 0–29)
GFR SERPL CREATININE-BSD FRML MDRD: 49 ML/MIN/1.73SQ M
GLUCOSE SERPL-MCNC: 83 MG/DL (ref 65–140)
HCT VFR BLD AUTO: 34.6 % (ref 34.8–46.1)
HGB BLD-MCNC: 10.5 G/DL (ref 11.5–15.4)
IMM GRANULOCYTES # BLD AUTO: 0.1 THOUSAND/UL (ref 0–0.2)
IMM GRANULOCYTES NFR BLD AUTO: 1 % (ref 0–2)
INR PPP: 2.61 (ref 0.84–1.19)
LYMPHOCYTES # BLD AUTO: 1.54 THOUSANDS/ΜL (ref 0.6–4.47)
LYMPHOCYTES NFR BLD AUTO: 19 % (ref 14–44)
MCH RBC QN AUTO: 31.5 PG (ref 26.8–34.3)
MCHC RBC AUTO-ENTMCNC: 30.3 G/DL (ref 31.4–37.4)
MCV RBC AUTO: 104 FL (ref 82–98)
MONOCYTES # BLD AUTO: 1 THOUSAND/ΜL (ref 0.17–1.22)
MONOCYTES NFR BLD AUTO: 12 % (ref 4–12)
NEUTROPHILS # BLD AUTO: 5.5 THOUSANDS/ΜL (ref 1.85–7.62)
NEUTS SEG NFR BLD AUTO: 65 % (ref 43–75)
NRBC BLD AUTO-RTO: 0 /100 WBCS
PLATELET # BLD AUTO: 224 THOUSANDS/UL (ref 149–390)
PMV BLD AUTO: 9.4 FL (ref 8.9–12.7)
POTASSIUM SERPL-SCNC: 4.8 MMOL/L (ref 3.5–5.3)
PROTHROMBIN TIME: 28 SECONDS (ref 11.6–14.5)
RBC # BLD AUTO: 3.33 MILLION/UL (ref 3.81–5.12)
SODIUM SERPL-SCNC: 139 MMOL/L (ref 136–145)
WBC # BLD AUTO: 8.32 THOUSAND/UL (ref 4.31–10.16)

## 2020-08-03 PROCEDURE — 85610 PROTHROMBIN TIME: CPT | Performed by: INTERNAL MEDICINE

## 2020-08-03 PROCEDURE — 99232 SBSQ HOSP IP/OBS MODERATE 35: CPT | Performed by: INTERNAL MEDICINE

## 2020-08-03 PROCEDURE — 80048 BASIC METABOLIC PNL TOTAL CA: CPT | Performed by: ORTHOPAEDIC SURGERY

## 2020-08-03 PROCEDURE — 99231 SBSQ HOSP IP/OBS SF/LOW 25: CPT | Performed by: PHYSICIAN ASSISTANT

## 2020-08-03 PROCEDURE — 97116 GAIT TRAINING THERAPY: CPT

## 2020-08-03 PROCEDURE — 86140 C-REACTIVE PROTEIN: CPT | Performed by: ORTHOPAEDIC SURGERY

## 2020-08-03 PROCEDURE — 85652 RBC SED RATE AUTOMATED: CPT | Performed by: ORTHOPAEDIC SURGERY

## 2020-08-03 PROCEDURE — 97530 THERAPEUTIC ACTIVITIES: CPT

## 2020-08-03 PROCEDURE — 85025 COMPLETE CBC W/AUTO DIFF WBC: CPT | Performed by: ORTHOPAEDIC SURGERY

## 2020-08-03 PROCEDURE — 97110 THERAPEUTIC EXERCISES: CPT

## 2020-08-03 RX ORDER — GABAPENTIN 100 MG/1
200 CAPSULE ORAL 2 TIMES DAILY
Status: DISCONTINUED | OUTPATIENT
Start: 2020-08-03 | End: 2020-08-04 | Stop reason: HOSPADM

## 2020-08-03 RX ADMIN — GABAPENTIN 200 MG: 100 CAPSULE ORAL at 17:11

## 2020-08-03 RX ADMIN — VITAM B12 100 MCG: 100 TAB at 09:30

## 2020-08-03 RX ADMIN — METHOCARBAMOL TABLETS 750 MG: 750 TABLET, COATED ORAL at 06:25

## 2020-08-03 RX ADMIN — OXYCODONE HYDROCHLORIDE AND ACETAMINOPHEN 500 MG: 500 TABLET ORAL at 09:30

## 2020-08-03 RX ADMIN — PANTOPRAZOLE SODIUM 40 MG: 40 TABLET, DELAYED RELEASE ORAL at 17:11

## 2020-08-03 RX ADMIN — GABAPENTIN 100 MG: 100 CAPSULE ORAL at 09:30

## 2020-08-03 RX ADMIN — OXYCODONE HYDROCHLORIDE 5 MG: 5 TABLET ORAL at 12:42

## 2020-08-03 RX ADMIN — SUCRALFATE 1 G: 1 TABLET ORAL at 17:12

## 2020-08-03 RX ADMIN — ACETAMINOPHEN 975 MG: 325 TABLET, FILM COATED ORAL at 13:46

## 2020-08-03 RX ADMIN — ACETAMINOPHEN 975 MG: 325 TABLET, FILM COATED ORAL at 06:25

## 2020-08-03 RX ADMIN — METHOCARBAMOL TABLETS 750 MG: 750 TABLET, COATED ORAL at 21:56

## 2020-08-03 RX ADMIN — MORPHINE SULFATE 2 MG: 2 INJECTION, SOLUTION INTRAMUSCULAR; INTRAVENOUS at 09:28

## 2020-08-03 RX ADMIN — LIDOCAINE 2 PATCH: 50 PATCH CUTANEOUS at 09:30

## 2020-08-03 RX ADMIN — METHOCARBAMOL TABLETS 750 MG: 750 TABLET, COATED ORAL at 13:46

## 2020-08-03 RX ADMIN — LEVOTHYROXINE SODIUM 75 MCG: 75 TABLET ORAL at 06:25

## 2020-08-03 RX ADMIN — SUCRALFATE 1 G: 1 TABLET ORAL at 09:30

## 2020-08-03 RX ADMIN — PANTOPRAZOLE SODIUM 40 MG: 40 TABLET, DELAYED RELEASE ORAL at 06:25

## 2020-08-03 RX ADMIN — DICYCLOMINE HYDROCHLORIDE 10 MG: 10 CAPSULE ORAL at 06:25

## 2020-08-03 RX ADMIN — ACETAMINOPHEN 975 MG: 325 TABLET, FILM COATED ORAL at 21:56

## 2020-08-03 RX ADMIN — DULOXETINE HYDROCHLORIDE 60 MG: 30 CAPSULE, DELAYED RELEASE ORAL at 09:29

## 2020-08-03 RX ADMIN — TORSEMIDE 20 MG: 20 TABLET ORAL at 09:30

## 2020-08-03 RX ADMIN — DICYCLOMINE HYDROCHLORIDE 10 MG: 10 CAPSULE ORAL at 17:11

## 2020-08-03 NOTE — ASSESSMENT & PLAN NOTE
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 with Dr Mary Hernandez Agent stated he has reviewed the x rays  There may be evidence of loosening of her cup but looks securely fixed superiorly  Current plan is pain control and some moderation of activity for a few weeks  Ortho recommends trial IV to oral steroid before trying joint steroidal injection  · , ESR 58  Methylprednisone was given one dose  · Pain control with scheduled tylenol 975 mg q8h, lidoderm patch, aqua K pad, robaxin, gabapentin and tramadol  She has been getting one dose of tramadol, Ketorlac 15mg  Oxycodone 5mg was given at 12:30pm and observe for allergy reaction  · Joint aspiration shows WBC 3050  Pending culture result  · Ortho did not think there was infection history of the hip joint  They are considering perform another joint aspiration  They would like to at least wait to see what the final cultures are  · PT recommended Post- Acute Rehab services  Patient stated her right still hurt very bad when she stands up

## 2020-08-03 NOTE — PLAN OF CARE
Problem: PHYSICAL THERAPY ADULT  Goal: Performs mobility at highest level of function for planned discharge setting  See evaluation for individualized goals  Description: Treatment/Interventions: Functional transfer training, LE strengthening/ROM, Therapeutic exercise, Endurance training, Cognitive reorientation, Patient/family training, Equipment eval/education, Bed mobility, Gait training  Equipment Recommended: Geraldo Beaver       See flowsheet documentation for full assessment, interventions and recommendations  Outcome: Progressing  Note: Prognosis: Fair  Problem List: Decreased strength, Decreased range of motion, Decreased endurance, Impaired balance, Decreased mobility, Decreased safety awareness, Pain  Assessment: Patient supine in bed and is agreeable to participate in therapy session  Patient demonstrates progression with functional mobilty tasks requiring less person assist  Supine to sit with mod ax1 with LE management and instruction for technique  Sit<>stand with consistent mi n ax1 and good recall of technique and safety  Pt able to ambulate short gait distances with roller walker and min ax1  Participated in B LE exercise program with input for form and pace  Provided and reviewed written HEP with expressed understanding  Continue to focus on OOB mobility with progression of transfers and ambulation as appropriate  PT Discharge Recommendation: Post-Acute Rehabilitation Services          See flowsheet documentation for full assessment

## 2020-08-03 NOTE — PROGRESS NOTES
Orthopedics   Jayne Lemos 80 y o  female MRN: [id number]  Unit/Bed#: S -01      Subjective:  80 y  o female seen and evaluated this morning  Reports posterior lateral right hip pain that is slightly improved to 8/10 that is only present with movement  Pain still radiates down to the knee but not past the knee  No back pain today  No fevers or chills       Labs:  0   Lab Value Date/Time    HCT 34 6 (L) 08/03/2020 0440    HCT 33 6 (L) 08/02/2020 0403    HCT 35 5 08/01/2020 0522    HCT 37 0 04/20/2015 1109    HCT 38 0 04/20/2015 1109    HCT 37 2 04/03/2015 0831    HGB 10 5 (L) 08/03/2020 0440    HGB 10 7 (L) 08/02/2020 0403    HGB 11 1 (L) 08/01/2020 0522    HGB 12 0 04/20/2015 1109    HGB 11 6 04/03/2015 0831    HGB 12 1 11/04/2014 1025    INR 2 61 (H) 08/03/2020 0440    WBC 8 32 08/03/2020 0440    WBC 9 94 08/02/2020 0403    WBC 6 76 08/01/2020 0522    WBC 7 98 04/20/2015 1109    WBC 7 29 04/03/2015 0831    WBC 6 66 11/04/2014 1025    ESR 48 (H) 08/03/2020 0440    CRP 61 6 (H) 08/03/2020 0440       Meds:    Current Facility-Administered Medications:     acetaminophen (TYLENOL) tablet 975 mg, 975 mg, Oral, Q8H [facility], Camille Foley PA-C, 975 mg at 08/03/20 6054    ascorbic acid (VITAMIN C) tablet 500 mg, 500 mg, Oral, Daily, Camille Foley PA-C, 500 mg at 08/03/20 0930    bupivacaine (PF) (MARCAINE) 0 5 % injection 5 mL, 5 mL, Infiltration, Once, Shania Doherty PA-C    cyanocobalamin (VITAMIN B-12) tablet 100 mcg, 100 mcg, Oral, Daily, Harry Sorto PA-C, 100 mcg at 08/03/20 0930    dicyclomine (BENTYL) capsule 10 mg, 10 mg, Oral, Q12H, Camille Foley PA-C, 10 mg at 08/03/20 4028    docusate sodium (COLACE) capsule 100 mg, 100 mg, Oral, BID, Harry Sorto PA-C, 100 mg at 08/02/20 1822    DULoxetine (CYMBALTA) delayed release capsule 60 mg, 60 mg, Oral, Daily, Camille Foley PA-C, 60 mg at 08/03/20 0929    fluticasone (FLONASE) 50 mcg/act nasal spray 1 spray, 1 spray, Each Nare, Daily, Mariaelena Stevenson PA-C, 1 spray at 07/31/20 4120    gabapentin (NEURONTIN) capsule 100 mg, 100 mg, Oral, BID, Dinah Sauceda MD, 100 mg at 08/03/20 0930    levothyroxine tablet 75 mcg, 75 mcg, Oral, Early Morning, Camille Foley PA-C, 75 mcg at 08/03/20 2273    lidocaine (LIDODERM) 5 % patch 2 patch, 2 patch, Topical, Daily, Amador Urena MD, 2 patch at 08/03/20 0930    lidocaine (PF) (XYLOCAINE-MPF) 1 % injection 5 mL, 5 mL, Infiltration, Once, Nivia Edwards PA-C    methocarbamol (ROBAXIN) tablet 750 mg, 750 mg, Oral, Q8H [address], Amador Urena MD, 750 mg at 08/03/20 3818    morphine injection 2 mg, 2 mg, Intravenous, Q4H PRN, Dinah Sauceda MD, 2 mg at 08/03/20 0928    ondansetron (ZOFRAN) injection 4 mg, 4 mg, Intravenous, Q6H PRN, Mariaelena Stevenson PA-C    oxyCODONE (ROXICODONE) IR tablet 5 mg, 5 mg, Oral, Q4H PRN, Dinah Sauceda MD, 5 mg at 08/03/20 1242    pantoprazole (PROTONIX) EC tablet 40 mg, 40 mg, Oral, BID AC, Desean Flores MD, 40 mg at 08/03/20 4623    polyethylene glycol (MIRALAX) packet 17 g, 17 g, Oral, Daily PRN, Mariaelena Stevenson PA-C    senna-docusate sodium (SENOKOT S) 8 6-50 mg per tablet 1 tablet, 1 tablet, Oral, BID, Desean Flores MD    sucralfate (CARAFATE) tablet 1 g, 1 g, Oral, BID, Camille Foley PA-C, 1 g at 08/03/20 0930    torsemide (DEMADEX) tablet 20 mg, 20 mg, Oral, Daily, Dinah Sauceda MD, 20 mg at 08/03/20 0930    traMADol (ULTRAM) tablet 75 mg, 75 mg, Oral, Q6H PRN, Dinah Sauceda MD, 75 mg at 08/01/20 0948    triamcinolone acetonide (KENALOG-40) 40 mg/mL injection 40 mg, 40 mg, Intramuscular, Once, Nivia Edwards PA-C    Blood Culture:   No results found for: BLOODCX    Wound Culture:   No results found for: WOUNDCULT    Ins and Outs:  I/O last 24 hours:   In: 840 [P O :840]  Out: 350 [Urine:350]          Physical Exam:  Vitals:    08/03/20 0739   BP: 131/64   Pulse: 71   Resp: 18   Temp: 97 9 °F (36 6 °C)   SpO2: 98%     right Lower Extremity extremity:  · Skin intact no erythema or ecchymosis over posterolateral right hip  · TTP over greater trochanter and over lateral thigh down IT band  · Active hip flexion and SLR intact but limited by pain  · Hip flexion passively past 30 degrees with pain, no pain with passive IR/ER  · Negative ALEXANDRU, positive FADDIR with pain over lateral hip  · Sensation intact to light touch L3-S1  · Motor intact knee flexion/ext, EHL/FHL  · DP pulse intact    Assessment: 80 y  o female right hip pain she is likely inflammatory in nature with greater trochanteric bursitis and possible radicular lumbar back pain     Plan:  · WBAT RLE  · DVT prophylaxis  · Analgesics per primary team, recommended to continue to hold steroids, continue Toradol and other regimen as ordered  ·  will continue to follow results of right hip aspiration from 08/01/2020,  So far preliminary cultures are negative  ·  will continue to follow trending CRP this has decreased from 1804 to 61 6,  This is considering 1 IV dose of Solu-Medrol but no antibiotics  ·  very low clinical suspicion overall at this time for any infection do not recommend any antibiotic treatment  ·  given the elevated CRP, slightly elevated synovial white blood cell count just over 3050,  Recommend not discharging patient until at least  Final cultures are made available  ·  will discuss among the orthopedic team if any further workup  His necessary during this hospitalization if cultures are negative ultimately patient will need follow up with Dr Lanis Opitz in the office  · PT/OT  · Dispo: Ortho will follow      Beto Paul PA-C

## 2020-08-03 NOTE — ASSESSMENT & PLAN NOTE
Secondary to hip pain  Continue with pain control  PT/OT and lifestyle modification was recommended  Dr Laith Devries also suggested pain control and moderation of activity  · Pain control with gabapentin 100mg BID, tramadol 75 mg as needed for moderate pain,and morphine and oxycodone for breakthrough pain  Delmy Diane was given once/   · Fall precautions   · PT/OT carmen  Recommended acute rehab  Patient wants PT at home

## 2020-08-03 NOTE — PHYSICAL THERAPY NOTE
PHYSICAL THERAPY NOTE      Patient Name: Mily Doyle  IINNV'A Date: 8/3/2020        08/03/20 8575   Pain Assessment   Pain Assessment Tool 0-10   Pain Score 8   Pain Location/Orientation Orientation: Right;Location: Leg   Restrictions/Precautions   Weight Bearing Precautions Per Order Yes   RLE Weight Bearing Per Order WBAT   Other Precautions Fall Risk;Pain; Chair Alarm; Bed Alarm   General   Family/Caregiver Present No   Subjective   Subjective Patient supine in bed and is agreeable to participate in therapy session  Pt identifers obtained from name &   Bed Mobility   Supine to Sit 3  Moderate assistance   Additional items Assist x 1;HOB elevated; Bedrails; Increased time required;Verbal cues;LE management   Sit to Supine Unable to assess   Additional Comments Patient seated OOB in recliner post session with chair alarm engaged, call bell and belongings in reach  Transfers   Sit to Stand 4  Minimal assistance   Additional items Assist x 1; Armrests; Increased time required;Verbal cues   Stand to Sit 4  Minimal assistance   Additional items Assist x 1; Armrests; Increased time required;Verbal cues   Ambulation/Elevation   Gait pattern Narrow KAREN; Excessively slow; Step to;Short stride;Decreased foot clearance; Improper Weight shift;Decreased R stance;Shuffling   Gait Assistance 4  Minimal assist   Additional items Assist x 1;Verbal cues   Assistive Device Rolling walker   Distance 20' x1   Balance   Static Sitting Fair -   Static Standing Poor +   Ambulatory Poor +   Endurance Deficit   Endurance Deficit Yes   Endurance Deficit Description limited ambulation and activity tolerance   Activity Tolerance   Activity Tolerance Patient limited by fatigue;Patient limited by pain   Medical Staff Made Aware Spoke to Yecenia Mantilla    Nurse Made Aware Spoke to Jerson Howe RN   Exercises   Quad Sets Supine;10 reps;AROM; Bilateral   Heelslides Supine;5 reps;AROM; Bilateral   Glute Sets Sitting;10 reps;AROM; Bilateral   Knee AROM Short Arc Quad Supine;10 reps;AROM; Bilateral   Ankle Pumps Supine;20 reps;AROM; Bilateral   Assessment   Prognosis Fair   Problem List Decreased strength;Decreased range of motion;Decreased endurance; Impaired balance;Decreased mobility; Decreased safety awareness;Pain   Assessment Patient supine in bed and is agreeable to participate in therapy session  Patient demonstrates progression with functional mobilty tasks requiring less person assist  Supine to sit with mod ax1 with LE management and instruction for technique  Sit<>stand with consistent mi n ax1 and good recall of technique and safety  Pt able to ambulate short gait distances with roller walker and min ax1  Participated in B LE exercise program with input for form and pace  Provided and reviewed written HEP with expressed understanding  Continue to focus on OOB mobility with progression of transfers and ambulation as appropriate  Goals   STG Expiration Date 08/08/20   PT Treatment Day 4   Plan   Treatment/Interventions Functional transfer training;LE strengthening/ROM; Therapeutic exercise; Endurance training;Cognitive reorientation;Patient/family training;Equipment eval/education; Bed mobility;Gait training;Spoke to nursing   Progress Progressing toward goals   PT Frequency 5x/wk       Shanice Aguila PTA

## 2020-08-03 NOTE — PLAN OF CARE
Problem: Potential for Falls  Goal: Patient will remain free of falls  Description: INTERVENTIONS:  - Assess patient frequently for physical needs  -  Identify cognitive and physical deficits and behaviors that affect risk of falls    -  [place] fall precautions as indicated by assessment   - Educate patient/family on patient safety including physical limitations  - Instruct patient to call for assistance with activity based on assessment  - Modify environment to reduce risk of injury  - Consider OT/PT consult to assist with strengthening/mobility  Outcome: Progressing     Problem: Prexisting or High Potential for Compromised Skin Integrity  Goal: Skin integrity is maintained or improved  Description: INTERVENTIONS:  - Identify patients at risk for skin breakdown  - Assess and monitor skin integrity  - Assess and monitor nutrition and hydration status  - Monitor labs   - Assess for incontinence   - Turn and reposition patient  - Assist with mobility/ambulation  - Relieve pressure over bony prominences  - Avoid friction and shearing  - Provide appropriate hygiene as needed including keeping skin clean and dry  - Evaluate need for skin moisturizer/barrier cream  - Collaborate with interdisciplinary team   - Patient/family teaching  - Consider wound care consult   Outcome: Progressing     Problem: PAIN - ADULT  Goal: Verbalizes/displays adequate comfort level or baseline comfort level  Description: Interventions:  - Encourage patient to monitor pain and request assistance  - Assess pain using appropriate pain scale  - Administer analgesics based on type and severity of pain and evaluate response  - Implement non-pharmacological measures as appropriate and evaluate response  - Consider cultural and social influences on pain and pain management  - Notify physician/advanced practitioner if interventions unsuccessful or patient reports new pain  Outcome: Progressing     Problem: INFECTION - ADULT  Goal: Absence or prevention of progression during hospitalization  Description: INTERVENTIONS:  - Assess and monitor for signs and symptoms of infection  - Monitor lab/diagnostic results  - Monitor all insertion sites, i e  indwelling lines, tubes, and drains  - Monitor endotracheal if appropriate and nasal secretions for changes in amount and color  - [place] appropriate cooling/warming therapies per order  - Administer medications as ordered  - Instruct and encourage patient and family to use good hand hygiene technique  - Identify and instruct in appropriate isolation precautions for identified infection/condition  Outcome: Progressing     Problem: SAFETY ADULT  Goal: Maintain or return to baseline ADL function  Description: INTERVENTIONS:  -  Assess patient's ability to carry out ADLs; assess patient's baseline for ADL function and identify physical deficits which impact ability to perform ADLs (bathing, care of mouth/teeth, toileting, grooming, dressing, etc )  - Assess/evaluate cause of self-care deficits   - Assess range of motion  - Assess patient's mobility; develop plan if impaired  - Assess patient's need for assistive devices and provide as appropriate  - Encourage maximum independence but intervene and supervise when necessary  - Involve family in performance of ADLs  - Assess for home care needs following discharge   - Consider OT consult to assist with ADL evaluation and planning for discharge  - Provide patient education as appropriate  Outcome: Progressing  Goal: Maintain or return mobility status to optimal level  Description: INTERVENTIONS:  - Assess patient's baseline mobility status (ambulation, transfers, stairs, etc )    - Identify cognitive and physical deficits and behaviors that affect mobility  - Identify mobility aids required to assist with transfers and/or ambulation (gait belt, sit-to-stand, lift, walker, cane, etc )  - [place] fall precautions as indicated by assessment  - Record patient progress and toleration of activity level on Mobility SBAR; progress patient to next Phase/Stage  - Instruct patient to call for assistance with activity based on assessment  - Consider rehabilitation consult to assist with strengthening/weightbearing, etc   Outcome: Progressing     Problem: Nutrition/Hydration-ADULT  Goal: Nutrient/Hydration intake appropriate for improving, restoring or maintaining nutritional needs  Description: Monitor and assess patient's nutrition/hydration status for malnutrition  Collaborate with interdisciplinary team and initiate plan and interventions as ordered  Monitor patient's weight and dietary intake as ordered or per policy  Utilize nutrition screening tool and intervene as necessary  Determine patient's food preferences and provide high-protein, high-caloric foods as appropriate       INTERVENTIONS:  - Monitor oral intake, urinary output, labs, and treatment plans  - Assess nutrition and hydration status and recommend course of action  - Evaluate amount of meals eaten  - Assist patient with eating if necessary   - Allow adequate time for meals  - Recommend/ encourage appropriate diets, oral nutritional supplements, and vitamin/mineral supplements  - Order, calculate, and assess calorie counts as needed  - Recommend, monitor, and adjust tube feedings and TPN/PPN based on assessed needs  - Assess need for intravenous fluids  - Provide specific nutrition/hydration education as appropriate  - Include patient/family/caregiver in decisions related to nutrition  Outcome: Progressing

## 2020-08-03 NOTE — UTILIZATION REVIEW
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, scar revision 7/2019  Patient has righ hip pain with ROM that is minimally improved since admission  Her synovial WBC 3,050 which is borderline concerning for infection  Her neutrophil % was 92, gram stain showed no bacteria, 1+polys  Culture so far shows no growth  Patient received one dose IV solumedrol in AM 8/1 which may impact results  I think best to repeat CRP, ESR, CBC tomorrow and see which way her numbers are trending  Plan to d/w Dr Anoop Reza  May consider repeat aspiration and include synovasure test   Will take into consideration patient's age and comorbidities in decision making process  Will follow up on cultures as well    encouraged patient to get OOB if tolerated  D/w plan with SLIM  Will continue to hold coumadin and restart lovenox in case any surgical intervention needed  They will trend INR and start lovenox when appropriate  We will also continue to hold IV solumedrol    Med mgt per SLIM otherwise  Pain control prn  PT/OT-WBAT  Ortho will follow  Pertinent Labs/Diagnostic Results:       Results from last 7 days   Lab Units 08/03/20  0440 08/02/20  0403 08/01/20  0522 07/31/20  0445 07/28/20  1357   WBC Thousand/uL 8 32 9 94 6 76 7 74 12 54*   HEMOGLOBIN g/dL 10 5* 10 7* 11 1* 10 8* 11 9   HEMATOCRIT % 34 6* 33 6* 35 5 35 4 37 3   PLATELETS Thousands/uL 224 218 188 170 131*   NEUTROS ABS Thousands/µL 5 50  -- --   --  10 50*     Results from last 7 days   Lab Units 08/03/20  0440 08/02/20  0403 08/01/20  0522 07/29/20  1150 07/28/20  1357   SODIUM mmol/L 139 140 139 139 137   POTASSIUM mmol/L 4 8 4 7 4 2 3 9 3 2*   CHLORIDE mmol/L 106 106 107 108 103   CO2 mmol/L 27 28 23 25 23   ANION GAP mmol/L 6 6 9 6 11   BUN mg/dL 25 20 15 14 11   CREATININE mg/dL 1 03 0 95 0 78 0 75 0 80   EGFR ml/min/1 73sq m 49 54 69 72 67   CALCIUM mg/dL 8 5 8 6 8 4 8 4 8 3     Results from last 7 days   Lab Units 07/28/20  1357   AST U/L 23   ALT U/L 20   ALK PHOS U/L 85   TOTAL PROTEIN g/dL 6 6   ALBUMIN g/dL 3 0*   TOTAL BILIRUBIN mg/dL 0 81     Results from last 7 days   Lab Units 08/03/20  0440 08/02/20  0403 08/01/20  0522 07/29/20  1150 07/28/20  1357   GLUCOSE RANDOM mg/dL 83 84 80 85 105     Results from last 7 days   Lab Units 08/03/20  0440 08/02/20  0403 08/01/20  0522   PROTIME seconds 28 0* 31 2* 28 7*   INR  2 61* 3 14* 2 82*     Results from last 7 days   Lab Units 08/03/20  0440 07/31/20  1454 07/31/20  1450   CRP mg/L 61 6* 184 0*  --    SED RATE mm/hour  --   --  58*     Results from last 7 days   Lab Units 08/01/20  1428   GRAM STAIN RESULT  1+ Polys  No bacteria seen   BODY FLUID CULTURE, STERILE  No growth     Results from last 7 days   Lab Units 08/01/20  1557   TOTAL COUNTED  100   WBC FLUID /ul 3,050*       Vital Signs:   08/03/20 0739   97 9 °F (36 6 °C)   71   18   131/64   90   98 %   None (Room air)   Lying    08/02/20 2122   97 7 °F (36 5 °C)   80   18   125/66   90   100 %            GMF  IP  PT INR  Medications:   Scheduled Medications:  acetaminophen, 975 mg, Oral, Q8H JOSE  ascorbic acid, 500 mg, Oral, Daily  bupivacaine (PF), 5 mL, Infiltration, Once  vitamin B-12, 100 mcg, Oral, Daily  dicyclomine, 10 mg, Oral, Q12H  docusate sodium, 100 mg, Oral, BID  DULoxetine, 60 mg, Oral, Daily  fluticasone, 1 spray, Each Nare, Daily  gabapentin, 100 mg, Oral, BID  levothyroxine, 75 mcg, Oral, Early Morning  lidocaine, 2 patch, Topical, Daily  lidocaine (PF), 5 mL, Infiltration, Once  methocarbamol, 750 mg, Oral, Q8H JOSE  pantoprazole, 40 mg, Oral, BID AC  senna-docusate sodium, 1 tablet, Oral, BID  sucralfate, 1 g, Oral, BID  torsemide, 20 mg, Oral, Daily  triamcinolone acetonide, 40 mg, Intramuscular, Once      Continuous IV Infusions:     PRN Meds:  morphine injection, 2 mg, Intravenous, Q4H PRN  ondansetron, 4 mg, Intravenous, Q6H PRN  oxyCODONE, 5 mg, Oral, Q4H PRN  polyethylene glycol, 17 g, Oral, Daily PRN  traMADol, 75 mg, Oral, Q6H PRN        Discharge Plan: [facility]    Network Utilization Review Department  [email or web address]  ATTENTION: Please call with any questions or concerns to [phone] and carefully listen to the prompts so that you are directed to the right person  All voicemails are confidential   Sudie Crigler all requests for admission clinical reviews, approved or denied determinations and any other requests to dedicated fax number below belonging to the campus where the patient is receiving treatment   List of dedicated fax numbers for the Facilities:  [address] [place] [address] DENIALS (Administrative/Medical Necessity) [phone]   [address] [place] [address] (Maternity/NICU/Pediatrics) [phone]   Bola Min [phone]   Shreya Sheets [phone]   [facility] [phone]   Rosemary Littlejohn [phone]   [address] [facility] [address] [facility] [phone]   [facility]  [phone]   [address] [facility] [address]  [address] [facility] [address] [facility] [phone]

## 2020-08-03 NOTE — SOCIAL WORK
CM met with patient and spouse at bedside to discuss discharge planning  Patient continues to decline STR  CM notified patient that per the responses in [facility] regarding patient's [address] choices, [facility] is unable to accept, [facility] is able to accept  IMM reviewed; patient reports that she is ready to go home, has ride with daughter  Patient reports having a rolater and RW at home

## 2020-08-03 NOTE — ASSESSMENT & PLAN NOTE
Wt Readings from Last 3 Encounters:   07/28/20 78 kg (172 lb)   06/17/20 81 2 kg (179 lb)   06/10/20 78 9 kg (174 lb)       Appears euvolemic, on demadex 20 mg daily  · Volume status has been the same   · Outpatient cardiology f/u   · Advised patient to continue to take torsemide 20mg

## 2020-08-03 NOTE — QUICK NOTE
I have spoken with patient's niece, Arely Quarles on the phone  Explained patient's pain is still not under control  Pending joint aspiration culture result  So far less likely septic joint  Pending orthopedics clearance for patient to be discharged  Gave her a trial of oxycodone to monitor allergic reaction  Questions were answered

## 2020-08-03 NOTE — PROGRESS NOTES
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 with Dr Treviño Medicine  Dr Kamila Desouza stated he has reviewed the x rays  There may be evidence of loosening of her cup but looks securely fixed superiorly  Current plan is pain control and some moderation of activity for a few weeks  Ortho recommends trial IV to oral steroid before trying joint steroidal injection  · , ESR 58  Methylprednisone was given one dose  · Pain control with scheduled tylenol 975 mg q8h, lidoderm patch, aqua K pad, robaxin, gabapentin and tramadol  She has been getting one dose of tramadol, Ketorlac 15mg  Oxycodone 5mg was given at 12:30pm and observe for allergy reaction  · Joint aspiration shows WBC 3050  Pending culture result  · Ortho did not think there was infection history of the hip joint  They are considering perform another joint aspiration  They would like to at least wait to see what the final cultures are  · PT recommended Post- Acute Rehab services  Patient stated her right still hurt very bad when she stands up  Ambulatory dysfunction  Assessment & Plan  Secondary to hip pain  Continue with pain control  PT/OT and lifestyle modification was recommended  Dr Treviño Medicine also suggested pain control and moderation of activity  · Pain control with gabapentin 100mg BID, tramadol 75 mg as needed for moderate pain,and morphine and oxycodone for breakthrough pain  Nicole Ebbs was given once/   · Fall precautions   · PT/OT eval  Recommended acute rehab  Patient wants PT at home       Pacemaker  Assessment & Plan  · Cardiology follow up  Depression  Assessment & Plan  · Continue cymbalta    Chronic diastolic CHF (congestive heart failure) (HCC)  Assessment & Plan  Wt Readings from Last 3 Encounters:   20 78 kg (172 lb)   20 81 2 kg (179 lb)   06/10/20 78 9 kg (174 lb)       Appears euvolemic, on demadex 20 mg daily  · Volume status has been the same   · Outpatient cardiology f/u   · Advised patient to continue to take torsemide 20mg  Chronic atrial fibrillation (HCC)  Assessment & Plan  On warfarin 4 mg daily; does not appear to be on any rate control medications; hx of ablation  · Previously saw Dr Melvi Hdz  · Monitored INR  Recent Labs     20  0522 20  0403 20  0440   INR 2 82* 3 14* 2 61*     - Coumadin has been held due to INR > 3   -Consider switching to lovenox  if INR < 2  VTE Pharmacologic Prophylaxis:   Pharmacologic: Warfarin (Coumadin)  Mechanical VTE Prophylaxis in Place: Yes    Discussions with Specialists or Other Care Team Provider: orthopedics, PT/OT, CM, nursing  Education and Discussions with Family / Patient: patient, daughter , and her niece America Dunham  Current Length of Stay: 4 day(s)    Current Patient Status: Inpatient     Discharge Plan / Estimated Discharge Date: likely tommorw, pending on orthopedics clearance  Code Status: Level 1 - Full Code      Subjective:   She reported her right hip and right posterior thigh hurts when she stands or ambulates  She still rated her pain 8-9/10  But she did not received a dose of oxycodone yesterday  She had a dose today and will monitor allergic reaction  No chest pain, SOB, abdominal pain, N/V, constipation was noted  Pending discharge on orthopedics recommendation       Objective:     Vitals:   Temp (24hrs), Av 8 °F (36 6 °C), Min:97 7 °F (36 5 °C), Max:97 9 °F (36 6 °C)    Temp:  [97 7 °F (36 5 °C)-97 9 °F (36 6 °C)] 97 9 °F (36 6 °C)  HR:  [69-80] 71  Resp:  [18] 18  BP: (125-136)/(63-66) 131/64  SpO2:  [97 %-100 %] 98 %  Body mass index is 33 59 kg/m²  Input and Output Summary (last 24 hours): Intake/Output Summary (Last 24 hours) at 8/3/2020 1503  Last data filed at 8/3/2020 0001  Gross per 24 hour   Intake 600 ml   Output 350 ml   Net 250 ml       Physical Exam:     Physical Exam   Constitutional: She is oriented to person, place, and time  She appears well-developed and well-nourished  No distress  HENT:   Head: Normocephalic and atraumatic  Eyes: Conjunctivae are normal  Right eye exhibits no discharge  Left eye exhibits no discharge  No scleral icterus  Neck: Normal range of motion  Neck supple  Cardiovascular: Normal rate, normal heart sounds and intact distal pulses  An irregular rhythm present  Exam reveals no gallop and no friction rub  No murmur heard  Pulmonary/Chest: Effort normal and breath sounds normal  No stridor  No respiratory distress  She has no wheezes  She has no rales  She exhibits no tenderness  Abdominal: Soft  Bowel sounds are normal  She exhibits no distension and no mass  There is no abdominal tenderness  There is no rebound and no guarding  No hernia  Musculoskeletal: Normal range of motion  General: No deformity or edema  Right hip: She exhibits tenderness  Legs:    Neurological: She is alert and oriented to person, place, and time  Skin: Skin is warm  She is not diaphoretic  No erythema  No pallor  Psychiatric: She has a normal mood and affect  Her behavior is normal    Nursing note and vitals reviewed          Additional Data:     Labs:    Results from last 7 days   Lab Units 08/03/20  0440   WBC Thousand/uL 8 32   HEMOGLOBIN g/dL 10 5*   HEMATOCRIT % 34 6*   PLATELETS Thousands/uL 224   NEUTROS PCT % 65   LYMPHS PCT % 19   MONOS PCT % 12   EOS PCT % 2     Results from last 7 days   Lab Units 08/03/20  0440  07/28/20  1357   POTASSIUM mmol/L 4 8   < > 3 2*   CHLORIDE mmol/L 106   < > 103   CO2 mmol/L 27   < > 23   BUN mg/dL 25   < > 11   CREATININE mg/dL 1 03   < > 0 80   CALCIUM mg/dL 8 5   < > 8 3   ALK PHOS U/L  --   --  85   ALT U/L  --   --  20   AST U/L  --   --  23    < > = values in this interval not displayed  Results from last 7 days   Lab Units 08/03/20  0440   INR  2 61*       * I Have Reviewed All Lab Data Listed Above  * Additional Pertinent Lab Tests Reviewed:  [address] Admission Reviewed    Imaging:    Imaging Reports Reviewed Today Include: none  Imaging Personally Reviewed by Myself Includes:  none    Recent Cultures (last 7 days):     Results from last 7 days   Lab Units 08/01/20  1428   GRAM STAIN RESULT  1+ Polys  No bacteria seen   BODY FLUID CULTURE, STERILE  No growth       Last 24 Hours Medication List:   acetaminophen, 975 mg, Oral, Q8H [address], Camille Foley PA-C  ascorbic acid, 500 mg, Oral, Daily, Camille Foley PA-C  bupivacaine (PF), 5 mL, Infiltration, Once, Muna Glover PA-C  vitamin B-12, 100 mcg, Oral, Daily, Camille Foley PA-C  dicyclomine, 10 mg, Oral, Q12H, Camille Foley PA-C  docusate sodium, 100 mg, Oral, BID, Camille Foley PA-C  DULoxetine, 60 mg, Oral, Daily, Camille Foley PA-C  fluticasone, 1 spray, Each Nare, Daily, Camille Foley PA-C  gabapentin, 100 mg, Oral, BID, Dinah Villagomez MD  levothyroxine, 75 mcg, Oral, Early Morning, Camille Foley PA-C  lidocaine, 2 patch, Topical, Daily, Ari Hughes MD  lidocaine (PF), 5 mL, Infiltration, Once, Muna Glover PA-C  methocarbamol, 750 mg, Oral, Q8H [address], Lucia Nielson MD  morphine injection, 2 mg, Intravenous, Q4H PRN, Dinah Villagomez MD  ondansetron, 4 mg, Intravenous, Q6H PRN, Xiao Collazo PA-C  oxyCODONE, 5 mg, Oral, Q4H PRN, Dinah Villagomez MD  pantoprazole, 40 mg, Oral, BID AC, Edward Loya MD  polyethylene glycol, 17 g, Oral, Daily PRN, Jazmin Foley PA-C  senna-docusate sodium, 1 tablet, Oral, BID, Edward Loya MD  sucralfate, 1 g, Oral, BID, Xiao Collazo, GALDINO  torsemide, 20 mg, Oral, Daily, Dinah Cano MD  traMADol, 75 mg, Oral, Q6H PRN, Dinah Cano MD  triamcinolone acetonide, 40 mg, Intramuscular, Once, Vernadine GALDINO Mac         Today, Patient Was Seen By: Loyd Pozo MD    ** Please Note: This note has been constructed using a voice recognition system   **

## 2020-08-04 VITALS
TEMPERATURE: 97.2 F | BODY MASS INDEX: 33.77 KG/M2 | RESPIRATION RATE: 18 BRPM | WEIGHT: 172 LBS | HEIGHT: 60 IN | SYSTOLIC BLOOD PRESSURE: 128 MMHG | HEART RATE: 72 BPM | OXYGEN SATURATION: 99 % | DIASTOLIC BLOOD PRESSURE: 60 MMHG

## 2020-08-04 LAB
ANION GAP SERPL CALCULATED.3IONS-SCNC: 5 MMOL/L (ref 4–13)
BACTERIA SPEC BFLD CULT: NO GROWTH
BUN SERPL-MCNC: 25 MG/DL (ref 5–25)
CALCIUM SERPL-MCNC: 8.5 MG/DL (ref 8.3–10.1)
CHLORIDE SERPL-SCNC: 107 MMOL/L (ref 100–108)
CO2 SERPL-SCNC: 28 MMOL/L (ref 21–32)
CREAT SERPL-MCNC: 1.02 MG/DL (ref 0.6–1.3)
CRP SERPL QL: 52.9 MG/L
ERYTHROCYTE [DISTWIDTH] IN BLOOD BY AUTOMATED COUNT: 12.7 % (ref 11.6–15.1)
ERYTHROCYTE [SEDIMENTATION RATE] IN BLOOD: 51 MM/HOUR (ref 0–29)
GFR SERPL CREATININE-BSD FRML MDRD: 50 ML/MIN/1.73SQ M
GLUCOSE SERPL-MCNC: 86 MG/DL (ref 65–140)
GRAM STN SPEC: NORMAL
GRAM STN SPEC: NORMAL
HCT VFR BLD AUTO: 34.5 % (ref 34.8–46.1)
HGB BLD-MCNC: 10.6 G/DL (ref 11.5–15.4)
INR PPP: 2.42 (ref 0.84–1.19)
MCH RBC QN AUTO: 31.5 PG (ref 26.8–34.3)
MCHC RBC AUTO-ENTMCNC: 30.7 G/DL (ref 31.4–37.4)
MCV RBC AUTO: 103 FL (ref 82–98)
PLATELET # BLD AUTO: 243 THOUSANDS/UL (ref 149–390)
PMV BLD AUTO: 9.2 FL (ref 8.9–12.7)
POTASSIUM SERPL-SCNC: 4.5 MMOL/L (ref 3.5–5.3)
PROTHROMBIN TIME: 26.4 SECONDS (ref 11.6–14.5)
RBC # BLD AUTO: 3.36 MILLION/UL (ref 3.81–5.12)
SODIUM SERPL-SCNC: 140 MMOL/L (ref 136–145)
WBC # BLD AUTO: 5.79 THOUSAND/UL (ref 4.31–10.16)

## 2020-08-04 PROCEDURE — 85652 RBC SED RATE AUTOMATED: CPT | Performed by: INTERNAL MEDICINE

## 2020-08-04 PROCEDURE — 85610 PROTHROMBIN TIME: CPT | Performed by: INTERNAL MEDICINE

## 2020-08-04 PROCEDURE — 99239 HOSP IP/OBS DSCHRG MGMT >30: CPT | Performed by: INTERNAL MEDICINE

## 2020-08-04 PROCEDURE — 86140 C-REACTIVE PROTEIN: CPT | Performed by: INTERNAL MEDICINE

## 2020-08-04 PROCEDURE — 85027 COMPLETE CBC AUTOMATED: CPT | Performed by: INTERNAL MEDICINE

## 2020-08-04 PROCEDURE — 99231 SBSQ HOSP IP/OBS SF/LOW 25: CPT | Performed by: PHYSICIAN ASSISTANT

## 2020-08-04 PROCEDURE — 80048 BASIC METABOLIC PNL TOTAL CA: CPT | Performed by: INTERNAL MEDICINE

## 2020-08-04 RX ORDER — TRAMADOL HYDROCHLORIDE 50 MG/1
50 TABLET ORAL EVERY 8 HOURS PRN
Qty: 30 TABLET | Refills: 0 | OUTPATIENT
Start: 2020-08-04

## 2020-08-04 RX ORDER — METHOCARBAMOL 750 MG/1
750 TABLET, FILM COATED ORAL EVERY 8 HOURS SCHEDULED
Qty: 60 TABLET | Refills: 0 | Status: SHIPPED | OUTPATIENT
Start: 2020-08-04 | End: 2020-10-16 | Stop reason: SDUPTHER

## 2020-08-04 RX ORDER — GABAPENTIN 100 MG/1
200 CAPSULE ORAL 2 TIMES DAILY
Qty: 60 CAPSULE | Refills: 0 | Status: SHIPPED | OUTPATIENT
Start: 2020-08-04 | End: 2020-09-02 | Stop reason: SDUPTHER

## 2020-08-04 RX ORDER — OXYCODONE HYDROCHLORIDE 5 MG/1
5 TABLET ORAL EVERY 4 HOURS PRN
Qty: 30 TABLET | Refills: 0 | Status: SHIPPED | OUTPATIENT
Start: 2020-08-04 | End: 2020-08-14

## 2020-08-04 RX ORDER — METHYLPREDNISOLONE 4 MG/1
TABLET ORAL
Qty: 1 EACH | Refills: 0 | Status: SHIPPED | OUTPATIENT
Start: 2020-08-04 | End: 2021-04-12 | Stop reason: ALTCHOICE

## 2020-08-04 RX ORDER — ACETAMINOPHEN 500 MG
1000 TABLET ORAL EVERY 8 HOURS SCHEDULED
Qty: 90 TABLET | Refills: 0 | Status: SHIPPED | OUTPATIENT
Start: 2020-08-04

## 2020-08-04 RX ADMIN — VITAM B12 100 MCG: 100 TAB at 10:04

## 2020-08-04 RX ADMIN — FLUTICASONE PROPIONATE 1 SPRAY: 50 SPRAY, METERED NASAL at 10:05

## 2020-08-04 RX ADMIN — METHOCARBAMOL TABLETS 750 MG: 750 TABLET, COATED ORAL at 05:30

## 2020-08-04 RX ADMIN — LEVOTHYROXINE SODIUM 75 MCG: 75 TABLET ORAL at 05:31

## 2020-08-04 RX ADMIN — DULOXETINE HYDROCHLORIDE 60 MG: 30 CAPSULE, DELAYED RELEASE ORAL at 10:04

## 2020-08-04 RX ADMIN — OXYCODONE HYDROCHLORIDE AND ACETAMINOPHEN 500 MG: 500 TABLET ORAL at 10:05

## 2020-08-04 RX ADMIN — TORSEMIDE 20 MG: 20 TABLET ORAL at 11:03

## 2020-08-04 RX ADMIN — SUCRALFATE 1 G: 1 TABLET ORAL at 10:05

## 2020-08-04 RX ADMIN — METHOCARBAMOL TABLETS 750 MG: 750 TABLET, COATED ORAL at 13:33

## 2020-08-04 RX ADMIN — GABAPENTIN 200 MG: 100 CAPSULE ORAL at 10:04

## 2020-08-04 RX ADMIN — PANTOPRAZOLE SODIUM 40 MG: 40 TABLET, DELAYED RELEASE ORAL at 05:30

## 2020-08-04 RX ADMIN — DICYCLOMINE HYDROCHLORIDE 10 MG: 10 CAPSULE ORAL at 05:30

## 2020-08-04 RX ADMIN — ACETAMINOPHEN 975 MG: 325 TABLET, FILM COATED ORAL at 13:33

## 2020-08-04 RX ADMIN — ACETAMINOPHEN 975 MG: 325 TABLET, FILM COATED ORAL at 05:30

## 2020-08-04 NOTE — ASSESSMENT & PLAN NOTE
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 with Dr Rolando Lawlero  Dr Rolando Silva stated he has reviewed the x rays  There may be evidence of loosening of her cup but looks securely fixed superiorly  Current plan is pain control and some moderation of activity for a few weeks  Ortho recommends trial IV to oral steroid before trying joint steroidal injection  · , ESR 58  Methylprednisone was given one dose  · Pain control with scheduled tylenol 975 mg q8h, lidoderm patch, aqua K pad, robaxin, gabapentin  She has been getting one dose of tramadol, Ketorlac 15mg  Oxycodone 5mg was given at 12:30pm and there was no allergy reaction noted  · Joint aspiration shows WBC 3050  Culture result shows negative growth for 3 days  · Ortho did not think there was infection history of the hip joint  They are considering perform another joint aspiration  They would like to at least wait to see what the final cultures are  · PT recommended Post- Acute Rehab services  Patient stated her right still hurt very bad when she stands up  Today she can move her legs without hurting too much

## 2020-08-04 NOTE — ASSESSMENT & PLAN NOTE
On warfarin 4 mg daily; does not appear to be on any rate control medications; hx of ablation  · Previously saw Dr Annie Boswell  · Monitored INR  Recent Labs     08/02/20  0403 08/03/20  0440 08/04/20  0516   INR 3 14* 2 61* 2 42*     · Patient may resume Coumadin since there is no near future procedure needs to be done

## 2020-08-04 NOTE — ASSESSMENT & PLAN NOTE
· Patient is noted to have chronic anemia  Baseline from the past year, is around 10-11  · Patient is not having active bleeding  H&H has been stable

## 2020-08-04 NOTE — PLAN OF CARE
Problem: Potential for Falls  Goal: Patient will remain free of falls  Description: INTERVENTIONS:  - Assess patient frequently for physical needs  -  Identify cognitive and physical deficits and behaviors that affect risk of falls    -  [place] fall precautions as indicated by assessment   - Educate patient/family on patient safety including physical limitations  - Instruct patient to call for assistance with activity based on assessment  - Modify environment to reduce risk of injury  - Consider OT/PT consult to assist with strengthening/mobility  8/4/2020 1352 by Roland Queen  Outcome: Adequate for Discharge  8/4/2020 1009 by Letha Griffin  Outcome: Progressing     Problem: Prexisting or High Potential for Compromised Skin Integrity  Goal: Skin integrity is maintained or improved  Description: INTERVENTIONS:  - Identify patients at risk for skin breakdown  - Assess and monitor skin integrity  - Assess and monitor nutrition and hydration status  - Monitor labs   - Assess for incontinence   - Turn and reposition patient  - Assist with mobility/ambulation  - Relieve pressure over bony prominences  - Avoid friction and shearing  - Provide appropriate hygiene as needed including keeping skin clean and dry  - Evaluate need for skin moisturizer/barrier cream  - Collaborate with interdisciplinary team   - Patient/family teaching  - Consider wound care consult   8/4/2020 1352 by Roland Queen  Outcome: Adequate for Discharge  8/4/2020 1009 by Letha Griffin  Outcome: Progressing     Problem: PAIN - ADULT  Goal: Verbalizes/displays adequate comfort level or baseline comfort level  Description: Interventions:  - Encourage patient to monitor pain and request assistance  - Assess pain using appropriate pain scale  - Administer analgesics based on type and severity of pain and evaluate response  - Implement non-pharmacological measures as appropriate and evaluate response  - Consider cultural and social influences on pain and pain management  - Notify physician/advanced practitioner if interventions unsuccessful or patient reports new pain  8/4/2020 1352 by William Junior  Outcome: Adequate for Discharge  8/4/2020 1009 by Antonette Griffin  Outcome: Progressing     Problem: INFECTION - ADULT  Goal: Absence or prevention of progression during hospitalization  Description: INTERVENTIONS:  - Assess and monitor for signs and symptoms of infection  - Monitor lab/diagnostic results  - Monitor all insertion sites, i e  indwelling lines, tubes, and drains  - Monitor endotracheal if appropriate and nasal secretions for changes in amount and color  - [place] appropriate cooling/warming therapies per order  - Administer medications as ordered  - Instruct and encourage patient and family to use good hand hygiene technique  - Identify and instruct in appropriate isolation precautions for identified infection/condition  8/4/2020 1352 by William Junior  Outcome: Adequate for Discharge  8/4/2020 1009 by Antonette Griffin  Outcome: Progressing     Problem: SAFETY ADULT  Goal: Maintain or return to baseline ADL function  Description: INTERVENTIONS:  -  Assess patient's ability to carry out ADLs; assess patient's baseline for ADL function and identify physical deficits which impact ability to perform ADLs (bathing, care of mouth/teeth, toileting, grooming, dressing, etc )  - Assess/evaluate cause of self-care deficits   - Assess range of motion  - Assess patient's mobility; develop plan if impaired  - Assess patient's need for assistive devices and provide as appropriate  - Encourage maximum independence but intervene and supervise when necessary  - Involve family in performance of ADLs  - Assess for home care needs following discharge   - Consider OT consult to assist with ADL evaluation and planning for discharge  - Provide patient education as appropriate  8/4/2020 1352 by William Junior  Outcome: Adequate for Discharge  8/4/2020 1009 by Antonette Del Rosario Elias  Outcome: Progressing  Goal: Maintain or return mobility status to optimal level  Description: INTERVENTIONS:  - Assess patient's baseline mobility status (ambulation, transfers, stairs, etc )    - Identify cognitive and physical deficits and behaviors that affect mobility  - Identify mobility aids required to assist with transfers and/or ambulation (gait belt, sit-to-stand, lift, walker, cane, etc )  - [place] fall precautions as indicated by assessment  - Record patient progress and toleration of activity level on Mobility SBAR; progress patient to next Phase/Stage  - Instruct patient to call for assistance with activity based on assessment  - Consider rehabilitation consult to assist with strengthening/weightbearing, etc   8/4/2020 1352 by Mali Alvarenga  Outcome: Adequate for Discharge  8/4/2020 1009 by Dahlia Griffin  Outcome: Progressing     Problem: Nutrition/Hydration-ADULT  Goal: Nutrient/Hydration intake appropriate for improving, restoring or maintaining nutritional needs  Description: Monitor and assess patient's nutrition/hydration status for malnutrition  Collaborate with interdisciplinary team and initiate plan and interventions as ordered  Monitor patient's weight and dietary intake as ordered or per policy  Utilize nutrition screening tool and intervene as necessary  Determine patient's food preferences and provide high-protein, high-caloric foods as appropriate       INTERVENTIONS:  - Monitor oral intake, urinary output, labs, and treatment plans  - Assess nutrition and hydration status and recommend course of action  - Evaluate amount of meals eaten  - Assist patient with eating if necessary   - Allow adequate time for meals  - Recommend/ encourage appropriate diets, oral nutritional supplements, and vitamin/mineral supplements  - Order, calculate, and assess calorie counts as needed  - Recommend, monitor, and adjust tube feedings and TPN/PPN based on assessed needs  - Assess need for intravenous fluids  - Provide specific nutrition/hydration education as appropriate  - Include patient/family/caregiver in decisions related to nutrition  8/4/2020 1352 by Anupama Grady  Outcome: Adequate for Discharge  8/4/2020 1009 by [facility]  Outcome: Progressing

## 2020-08-04 NOTE — PROGRESS NOTES
Orthopedics   Betsy Layne Dioni 80 y o  female MRN: [id number]  Unit/Bed#: S -01      Subjective:  Patient seen and evaluated this morning  She reports improvements in her right hip pain since yesterday she had her meet cyst oxycodone  She has improved motion with rotation actively and active flexion her hip  She denies any fevers or chills  Still has pain radiating to the knee      Labs:  0   Lab Value Date/Time    HCT 34 5 (L) 08/04/2020 0516    HCT 34 6 (L) 08/03/2020 0440    HCT 33 6 (L) 08/02/2020 0403    HCT 37 0 04/20/2015 1109    HCT 38 0 04/20/2015 1109    HCT 37 2 04/03/2015 0831    HGB 10 6 (L) 08/04/2020 0516    HGB 10 5 (L) 08/03/2020 0440    HGB 10 7 (L) 08/02/2020 0403    HGB 12 0 04/20/2015 1109    HGB 11 6 04/03/2015 0831    HGB 12 1 11/04/2014 1025    INR 2 42 (H) 08/04/2020 0516    WBC 5 79 08/04/2020 0516    WBC 8 32 08/03/2020 0440    WBC 9 94 08/02/2020 0403    WBC 7 98 04/20/2015 1109    WBC 7 29 04/03/2015 0831    WBC 6 66 11/04/2014 1025    ESR 48 (H) 08/03/2020 0440    CRP 61 6 (H) 08/03/2020 0440       Meds:    Current Facility-Administered Medications:     acetaminophen (TYLENOL) tablet 975 mg, 975 mg, Oral, Q8H [address], Camille Foley PA-C, 975 mg at 08/04/20 0530    ascorbic acid (VITAMIN C) tablet 500 mg, 500 mg, Oral, Daily, Camille Foley PA-C, 500 mg at 08/03/20 0930    bupivacaine (PF) (MARCAINE) 0 5 % injection 5 mL, 5 mL, Infiltration, Once, Hoda Camacho PA-C    cyanocobalamin (VITAMIN B-12) tablet 100 mcg, 100 mcg, Oral, Daily, Chuy Silva PA-C, 100 mcg at 08/03/20 0930    dicyclomine (BENTYL) capsule 10 mg, 10 mg, Oral, Q12H, Camille Alaina, PA-C, 10 mg at 08/04/20 0530    docusate sodium (COLACE) capsule 100 mg, 100 mg, Oral, BID, Camille Alaina, PA-C, 100 mg at 08/02/20 1822    DULoxetine (CYMBALTA) delayed release capsule 60 mg, 60 mg, Oral, Daily, Camille Alaina PA-C, 60 mg at 08/03/20 0929    fluticasone (FLONASE) 50 mcg/act nasal spray 1 spray, 1 spray, Each Nare, Daily, Gaby Pablo PA-C, 1 spray at 07/31/20 2687    gabapentin (NEURONTIN) capsule 200 mg, 200 mg, Oral, BID, Beatrice Menard MD, 200 mg at 08/03/20 1711    levothyroxine tablet 75 mcg, 75 mcg, Oral, Early Morning, Camille Foley PA-C, 75 mcg at 08/04/20 0531    lidocaine (LIDODERM) 5 % patch 2 patch, 2 patch, Topical, Daily, Mary Henley MD, 2 patch at 08/03/20 0930    lidocaine (PF) (XYLOCAINE-MPF) 1 % injection 5 mL, 5 mL, Infiltration, Once, Vernadine MacGALDINO    methocarbamol (ROBAXIN) tablet 750 mg, 750 mg, Oral, Q8H [address], Mary Henley MD, 750 mg at 08/04/20 0530    morphine injection 2 mg, 2 mg, Intravenous, Q4H PRN, Dinah Cano MD, 2 mg at 08/03/20 0928    ondansetron (ZOFRAN) injection 4 mg, 4 mg, Intravenous, Q6H PRN, Gaby Pablo PA-C    oxyCODONE (ROXICODONE) IR tablet 5 mg, 5 mg, Oral, Q4H PRN, Dinah Cano MD, 5 mg at 08/03/20 1242    pantoprazole (PROTONIX) EC tablet 40 mg, 40 mg, Oral, BID AC, Beatrice Menard MD, 40 mg at 08/04/20 0530    polyethylene glycol (MIRALAX) packet 17 g, 17 g, Oral, Daily PRN, Gaby Pablo PA-C    senna-docusate sodium (SENOKOT S) 8 6-50 mg per tablet 1 tablet, 1 tablet, Oral, BID, Beatrice Menard MD    sucralfate (CARAFATE) tablet 1 g, 1 g, Oral, BID, Camille Foley PA-C, 1 g at 08/03/20 1712    torsemide (DEMADEX) tablet 20 mg, 20 mg, Oral, Daily, Dinah Cano MD, 20 mg at 08/03/20 0930    traMADol (ULTRAM) tablet 75 mg, 75 mg, Oral, Q6H PRN, Dinah Cano MD, 75 mg at 08/01/20 0948    triamcinolone acetonide (KENALOG-40) 40 mg/mL injection 40 mg, 40 mg, Intramuscular, Once, Vernadine GALDINO Mac    Blood Culture:   No results found for: BLOODCX    Wound Culture:   No results found for: WOUNDCULT    Ins and Outs:  I/O last 24 hours:   In: -   Out: 650 [Urine:650]          Physical Exam:  Vitals:    08/03/20 2235   BP: 109/62   Pulse: 71   Resp: 18   Temp: 98 2 °F (36 8 °C)   SpO2: 95% right Lower Extremity extremity:  · Skin intact no erythema or ecchymosis over posterolateral right hip  · TTP over greater trochanter and over lateral thigh down IT band  · Active hip flexion and SLR improved motion as compared yesterday  · Pain with passive hip flexion past 30°  · Negative ALEXANDRU, positive FADDIR with pain over lateral hip  · Sensation intact to light touch L3-S1  · Motor intact knee flexion/ext, EHL/FHL  · DP pulse intact    Assessment: 80 y  o female right hip pain likely inflammatory nature, suspected greater trochanteric bursitis, possible lumbar radiculitis    Plan:  · WBAT RLE  · DVT prophylaxis  · Analgesics per primary team, NSAIDs, recommend discharge on Medrol Dosepak  ·  Final cultures from aspiration from 08/1/20 of right hip negative for any bacterial growth  · PT/OT  · No further orthopedic intervention necessary during this hospitalization, no orthopedic surgical indications  · Dispo: Monet Aquino for discharge from ortho perspective   · Patient will need follow-up with Dr Beatriz Zhao within 2 weeks from date of discharge      Romie Dugan PA-C

## 2020-08-04 NOTE — PLAN OF CARE
Problem: Potential for Falls  Goal: Patient will remain free of falls  Description: INTERVENTIONS:  - Assess patient frequently for physical needs  -  Identify cognitive and physical deficits and behaviors that affect risk of falls    -  [place] fall precautions as indicated by assessment   - Educate patient/family on patient safety including physical limitations  - Instruct patient to call for assistance with activity based on assessment  - Modify environment to reduce risk of injury  - Consider OT/PT consult to assist with strengthening/mobility  Outcome: Progressing     Problem: Prexisting or High Potential for Compromised Skin Integrity  Goal: Skin integrity is maintained or improved  Description: INTERVENTIONS:  - Identify patients at risk for skin breakdown  - Assess and monitor skin integrity  - Assess and monitor nutrition and hydration status  - Monitor labs   - Assess for incontinence   - Turn and reposition patient  - Assist with mobility/ambulation  - Relieve pressure over bony prominences  - Avoid friction and shearing  - Provide appropriate hygiene as needed including keeping skin clean and dry  - Evaluate need for skin moisturizer/barrier cream  - Collaborate with interdisciplinary team   - Patient/family teaching  - Consider wound care consult   Outcome: Progressing     Problem: PAIN - ADULT  Goal: Verbalizes/displays adequate comfort level or baseline comfort level  Description: Interventions:  - Encourage patient to monitor pain and request assistance  - Assess pain using appropriate pain scale  - Administer analgesics based on type and severity of pain and evaluate response  - Implement non-pharmacological measures as appropriate and evaluate response  - Consider cultural and social influences on pain and pain management  - Notify physician/advanced practitioner if interventions unsuccessful or patient reports new pain  Outcome: Progressing     Problem: INFECTION - ADULT  Goal: Absence or prevention of progression during hospitalization  Description: INTERVENTIONS:  - Assess and monitor for signs and symptoms of infection  - Monitor lab/diagnostic results  - Monitor all insertion sites, i e  indwelling lines, tubes, and drains  - Monitor endotracheal if appropriate and nasal secretions for changes in amount and color  - [place] appropriate cooling/warming therapies per order  - Administer medications as ordered  - Instruct and encourage patient and family to use good hand hygiene technique  - Identify and instruct in appropriate isolation precautions for identified infection/condition  Outcome: Progressing     Problem: SAFETY ADULT  Goal: Maintain or return to baseline ADL function  Description: INTERVENTIONS:  -  Assess patient's ability to carry out ADLs; assess patient's baseline for ADL function and identify physical deficits which impact ability to perform ADLs (bathing, care of mouth/teeth, toileting, grooming, dressing, etc )  - Assess/evaluate cause of self-care deficits   - Assess range of motion  - Assess patient's mobility; develop plan if impaired  - Assess patient's need for assistive devices and provide as appropriate  - Encourage maximum independence but intervene and supervise when necessary  - Involve family in performance of ADLs  - Assess for home care needs following discharge   - Consider OT consult to assist with ADL evaluation and planning for discharge  - Provide patient education as appropriate  Outcome: Progressing  Goal: Maintain or return mobility status to optimal level  Description: INTERVENTIONS:  - Assess patient's baseline mobility status (ambulation, transfers, stairs, etc )    - Identify cognitive and physical deficits and behaviors that affect mobility  - Identify mobility aids required to assist with transfers and/or ambulation (gait belt, sit-to-stand, lift, walker, cane, etc )  - [place] fall precautions as indicated by assessment  - Record patient progress and toleration of activity level on Mobility SBAR; progress patient to next Phase/Stage  - Instruct patient to call for assistance with activity based on assessment  - Consider rehabilitation consult to assist with strengthening/weightbearing, etc   Outcome: Progressing     Problem: Nutrition/Hydration-ADULT  Goal: Nutrient/Hydration intake appropriate for improving, restoring or maintaining nutritional needs  Description: Monitor and assess patient's nutrition/hydration status for malnutrition  Collaborate with interdisciplinary team and initiate plan and interventions as ordered  Monitor patient's weight and dietary intake as ordered or per policy  Utilize nutrition screening tool and intervene as necessary  Determine patient's food preferences and provide high-protein, high-caloric foods as appropriate       INTERVENTIONS:  - Monitor oral intake, urinary output, labs, and treatment plans  - Assess nutrition and hydration status and recommend course of action  - Evaluate amount of meals eaten  - Assist patient with eating if necessary   - Allow adequate time for meals  - Recommend/ encourage appropriate diets, oral nutritional supplements, and vitamin/mineral supplements  - Order, calculate, and assess calorie counts as needed  - Recommend, monitor, and adjust tube feedings and TPN/PPN based on assessed needs  - Assess need for intravenous fluids  - Provide specific nutrition/hydration education as appropriate  - Include patient/family/caregiver in decisions related to nutrition  Outcome: Progressing

## 2020-08-04 NOTE — SOCIAL WORK
Patient is medically stable for discharge home today  [facility] accepted patient and this was added to AVS       IMM was reviewed yesterday  Patient's daughter will provide transportation home  CM reviewed the availability of treatment team to discuss questions or concerns patient and/or family may have regarding  understanding medications and recognizing signs and symptoms once discharged  CM also encouraged patient to follow up with all recommended appointments after discharge  Patient advised of importance for patient and family to participate in managing patient's medical well being

## 2020-08-04 NOTE — DISCHARGE INSTR - AVS FIRST PAGE
Dear Mahnaz Lentz,     It was our pleasure to care for you here at [facility]  It is our hope that we were always able to exceed the expected standards for your care during your stay  You were hospitalized due to right hip pain  You were cared for on the 2nd floor by Lela Mas MD under the service of Bryce Dumont DO with the [address] [place] [address] [facility] who covers for your primary care physician (PCP), Jeremy Montano DO, while you were hospitalized  If you have any questions or concerns related to this hospitalization, you may contact us at [phone]  For follow up as well as any medication refills, we recommend that you follow up with your primary care physician  A registered nurse will reach out to you by phone within a few days after your discharge to answer any additional questions that you may have after going home  However, at this time we provide for you here, the most important instructions / recommendations at discharge:     · Notable Medication Adjustments -   · Please continue your home medication  · Please start taking oxycodone 5mg, gabapentin 200mg twice daily, medro dosepak, robaxin (750mg instead of 500mg) and apply voltaren gel for your pain  · Testing Required after Discharge -   · None  · Important follow up information -   · Please follow up with Dr Marie Hall in 2 weeks  · Other Instructions -   · Please avoid weight lifting, or strenuous exercise that will aggravate your hip pain  · Oxycodone my cause constipation  If so, please take over the counter stool softener such as miralax or senna  · Please review this entire after visit summary as additional general instructions including medication list, appointments, activity, diet, any pertinent wound care, and other additional recommendations from your care team that may be provided for you        Sincerely,     Lela Mas MD

## 2020-08-04 NOTE — ASSESSMENT & PLAN NOTE
Secondary to hip pain  Continue with pain control  PT/OT and lifestyle modification was recommended  Dr Colan Ormond also suggested pain control and moderation of activity  · Pain control with gabapentin 200mg BID, tramadol 75 mg as needed for moderate pain,and morphine and oxycodone for breakthrough pain  Juani Oseguera was given once  · Fall precautions   · PT/OT carmen  Recommended acute rehab  Patient wants PT at home

## 2020-08-05 ENCOUNTER — TELEPHONE (OUTPATIENT)
Dept: OBGYN CLINIC | Facility: HOSPITAL | Age: 85
End: 2020-08-05

## 2020-08-05 ENCOUNTER — TRANSITIONAL CARE MANAGEMENT (OUTPATIENT)
Dept: INTERNAL MEDICINE CLINIC | Facility: CLINIC | Age: 85
End: 2020-08-05

## 2020-08-06 ENCOUNTER — TELEPHONE (OUTPATIENT)
Dept: INTERNAL MEDICINE CLINIC | Facility: CLINIC | Age: 85
End: 2020-08-06

## 2020-08-06 ENCOUNTER — ANTICOAG VISIT (OUTPATIENT)
Dept: INTERNAL MEDICINE CLINIC | Facility: CLINIC | Age: 85
End: 2020-08-06

## 2020-08-06 DIAGNOSIS — K58.1 IRRITABLE BOWEL SYNDROME WITH CONSTIPATION: Primary | ICD-10-CM

## 2020-08-06 NOTE — TELEPHONE ENCOUNTER
[facility] calling in stating they can check her coumadin levels when they visit 2x weekly if you would like, please advise    [address]

## 2020-08-07 RX ORDER — DICYCLOMINE HYDROCHLORIDE 10 MG/1
10 CAPSULE ORAL EVERY 12 HOURS
Qty: 60 CAPSULE | Refills: 0 | Status: SHIPPED | OUTPATIENT
Start: 2020-08-07 | End: 2020-09-01

## 2020-08-07 NOTE — TELEPHONE ENCOUNTER
VICTORINO on  for Tarah Weaver that we can have Dr Mcmullen Slider address on Monday when he returns  I advised she call back if she needs this addressed sooner

## 2020-08-07 NOTE — TELEPHONE ENCOUNTER
I do not normally check Coumadin levels that frequently, but will defer to Dr Emigdio Shoemaker when he returns on Monday, let them know

## 2020-08-10 NOTE — TELEPHONE ENCOUNTER
LM on VM for Bunny Carbine that PT/INR should be checked according to out instructions  Advised to CB with any questions

## 2020-08-12 ENCOUNTER — OFFICE VISIT (OUTPATIENT)
Dept: INTERNAL MEDICINE CLINIC | Facility: CLINIC | Age: 85
End: 2020-08-12
Payer: COMMERCIAL

## 2020-08-12 DIAGNOSIS — R79.82 ELEVATED C-REACTIVE PROTEIN (CRP): Primary | ICD-10-CM

## 2020-08-12 DIAGNOSIS — M25.551 RIGHT HIP PAIN: ICD-10-CM

## 2020-08-12 PROCEDURE — 1111F DSCHRG MED/CURRENT MED MERGE: CPT | Performed by: INTERNAL MEDICINE

## 2020-08-12 PROCEDURE — 99495 TRANSJ CARE MGMT MOD F2F 14D: CPT | Performed by: INTERNAL MEDICINE

## 2020-08-12 NOTE — PROGRESS NOTES
Assessment/Plan:     Right hip pain  Transition care management visit regarding recent hospitalization for severe right hip pain I did review the medication list, discharge summary, hospital course, laboratories in test in detail with the patient at this point time her hip pain has improved somewhat she will receive home physical therapy follow-up with Orthopedics possibly her symptoms were secondary to a bursitis  Of note the CRP and ESR were elevated the cultures were negative I will recheck her sed rate and C reactive protein, CBC and CMP  Currently she is clinically stable and doing well she does use a walker for mobility  RTO as scheduled call if any problems         Problem List Items Addressed This Visit        Other    Right hip pain     Transition care management visit regarding recent hospitalization for severe right hip pain I did review the medication list, discharge summary, hospital course, laboratories in test in detail with the patient at this point time her hip pain has improved somewhat she will receive home physical therapy follow-up with Orthopedics possibly her symptoms were secondary to a bursitis  Of note the CRP and ESR were elevated the cultures were negative I will recheck her sed rate and C reactive protein, CBC and CMP  Currently she is clinically stable and doing well she does use a walker for mobility  RTO as scheduled call if any problems         Elevated C-reactive protein (CRP) - Primary    Relevant Orders    Comprehensive metabolic panel    CBC (Includes Diff/Plt) (Refl)    Sedimentation rate, automated    C-reactive protein           Subjective:     Patient ID: Hector Martinez is a 80 y o  female      HPI 80year old female coming in for transition care management visit regarding recent hospitalization for severe right hip pain/ambulatory dysfunction, depression and history of chronic diastolic heart failure; today we did review the discharge summary, medications, laboratories and hospital course with the patient in detail  She reports me her hip pain has improved the cultures of the hip joint aspiration were negative  Of note the CRP and ESR were very elevated she did receive a dose of steroids in the hospital she has had somewhat improvement of her symptoms I did review her x-rays show follow-up with Orthopedics  I will repeat her CRP and also ESR she will start home physical therapy  she reports acute right hip painno injury  be starting home PT they have come out to her house for evaluation; she reports with standing more painful and cant move    Review of Systems   Constitutional: Negative for appetite change, chills and fever  HENT: Negative for congestion  Respiratory: Negative for cough and shortness of breath  Cardiovascular: Positive for leg swelling (chronic)  Negative for chest pain and palpitations  Gastrointestinal: Negative for abdominal pain, diarrhea and nausea  Genitourinary: Negative for difficulty urinating  Allergic/Immunologic: Negative for environmental allergies and food allergies  Neurological: Positive for light-headedness (with the pain med)  Negative for dizziness and headaches  Hematological: Negative for adenopathy  Bruises/bleeds easily  Psychiatric/Behavioral: Negative for suicidal ideas  The patient is not nervous/anxious  right hip pain improving    Objective:                  No follow-ups on file  Allergies   Allergen Reactions    Amoxicillin      Reports nausea, headache, dizzy    Codeine Vomiting and GI Intolerance     GI upset, ana m morphine  Reaction Date: [id number];     Other      Other reaction(s): Other (See Comments)  ana m toradol in OR 6/06 sah    Oxycodone-Acetaminophen      Other reaction(s):  Other (See Comments)  GI upset; ana m vicodin    Percocet [Oxycodone-Acetaminophen]     Seasonal Ic  [Cholestatin] Allergic Rhinitis    Trimethoprim     Bactrim [Sulfamethoxazole-Trimethoprim] Rash    Sulfa Antibiotics Rash     Other reaction(s): Other (See Comments)  takes lasix @home       Past Medical History:   Diagnosis Date    Abdominal bloating 8/1/2016    Anemia     Arthritis     Cancer ([address] )     basal cell    CHF (congestive heart failure) (HCC)     Disease of thyroid gland     Disturbance of smell and taste     disturbance of taste    Effusion of knee joint right     Fibromyalgia     Heart murmur     reported a previous heart murmur    History of acute myocardial infarction     History of atrial fibrillation     History of bruising easily     History of cancer     History of dermatitis     History of mammogram     History of methicillin resistant staphylococcus aureus (MRSA)     Negative nasal culture, isolation discontinued 8/17/2018   History of methicillin resistant staphylococcus aureus (MRSA) 08/17/2018    negative nasal culture-isolation and hx discontinued 8/17/2018    History of obesity     History of osteopenia     History of sciatica     History of shortness of breath     History of sinusitis     History of sore throat     History of syncope     History of umbilical hernia     History of viral infection     Irritable bowel syndrome (IBS)     Joint pain, hip     Limb pain     Myalgia     myalgia and myositis    Need for prophylactic vaccination against single diseases     Need for prophylactic vaccination and inoculation against influenza     Preoperative cardiovascular examination     Primary osteoarthritis of right knee     Right leg pain     Vaginal Pap smear     reported pap smear     Past Surgical History:   Procedure Laterality Date    ANKLE ARTHRODESIS Right     BACK SURGERY      BARIATRIC SURGERY      CHOLECYSTECTOMY      x2    COLONOSCOPY      ESOPHAGOGASTRODUODENOSCOPY N/A 3/23/2018    Procedure: ESOPHAGOGASTRODUODENOSCOPY (EGD); Surgeon: Priscilla Gomes MD;  [facility];   Service: Gastroenterology    FOOT SURGERY      HIP CLOSE REDUCTION Right 8/21/2016    Procedure: CLOSED REDUCTION RIGHT TOTAL HIP;  Surgeon: Ann Serrano MD;  [facility];  Service:    John Deluna / Marti Fierro / Maxine Moreno      IR IMAGE GUIDED BIOPSY/ASPIRATION  8/1/2020    JOINT REPLACEMENT      LEFT KNEE REPLACEMENT    JOINT REPLACEMENT      Right HIP    PILONIDAL CYST EXCISION      x2    WI EXC SKIN BENIG <0 5 CM TRUNK,ARM,LEG Right 7/24/2019    Procedure: REVISION SCAR EXTREMITY Rt Hip;   Surgeon: Jose Leonard MD;  [facility];  Service: Orthopedics    WI REVISE TOTAL HIP REPLACEMENT Right 4/15/2019    Procedure: ARTHROPLASTY HIP TOTAL ACETABULAR REVISION;  Surgeon: Jose Leonard MD;  [facility];  Service: Orthopedics    REPLACEMENT TOTAL KNEE Right     SILVANA-EN-Y PROCEDURE      x2    TOTAL KNEE ARTHROPLASTY      UMBILICAL HERNIA REPAIR      x2     Current Outpatient Medications on File Prior to Visit   Medication Sig Dispense Refill    acetaminophen (TYLENOL) 500 mg tablet Take 2 tablets (1,000 mg total) by mouth every 8 (eight) hours 90 tablet 0    ascorbic acid (VITAMIN C) 500 mg tablet Take 1 tablet (500 mg total) by mouth 2 (two) times a day (Patient taking differently: Take 500 mg by mouth daily ) 60 tablet 0    Cholecalciferol 125 MCG (5000 UT) TABS Take 5,000 Units by mouth daily      Cyanocobalamin (VITAMIN B12 PO) Take 1 capsule by mouth daily       desoximetasone (TOPICORT) 0 25 % cream Apply topically as needed for irritation 30 g 1    diclofenac sodium (VOLTAREN) 1 % Apply 2 g topically 4 (four) times a day Apply to right hip 150 g 0    dicyclomine (Bentyl) 10 mg capsule Take 1 capsule (10 mg total) by mouth every 12 (twelve) hours 60 capsule 0    DULoxetine (CYMBALTA) 60 mg delayed release capsule Take 1 capsule (60 mg total) by mouth daily 30 capsule 5    fluticasone (FLONASE) 50 mcg/act nasal spray SPRAY 2 SPRAYS INTO EACH NOSTRIL EVERY DAY 16 mL 0    gabapentin (NEURONTIN) 100 mg capsule Take 2 capsules (200 mg total) by mouth 2 (two) times a day 60 capsule 0    levothyroxine 75 mcg tablet Take 1 tablet (75 mcg total) by mouth daily 30 tablet 5    methocarbamol (ROBAXIN) 750 mg tablet Take 1 tablet (750 mg total) by mouth every 8 (eight) hours 60 tablet 0    methylPREDNISolone 4 MG tablet therapy pack Use as directed on package 1 each 0    oxyCODONE (ROXICODONE) 5 mg immediate release tablet Take 1 tablet (5 mg total) by mouth every 4 (four) hours as needed for moderate pain for up to 10 daysMax Daily Amount: 30 mg 30 tablet 0    potassium chloride (K-DUR,KLOR-CON) 20 mEq tablet TAKE 1 TABLET DAILY 30 tablet 11    sucralfate (CARAFATE) 1 g tablet Take 1 tablet (1 g total) by mouth 2 (two) times a day 180 tablet 1    torsemide (DEMADEX) 20 mg tablet Take 1 tablet (20 mg total) by mouth daily 90 tablet 3    warfarin (COUMADIN) 4 mg tablet TAKE ONE TABLET DAILY AS PREVIOUSLY DIRECTED BY CARDIOLOGY 90 tablet 1     No current facility-administered medications on file prior to visit        Family History   Problem Relation Age of Onset    Coronary artery disease Mother     Heart attack Mother     Hypertension Mother     Coronary artery disease Father     Heart attack Father    [facility] Hypertension Father     Lymphoma Sister         acute    Rashes / Skin problems Sister         lymphomatoid papulosis    Cancer Sister     Coronary artery disease Brother     Heart attack Brother         x2    Aneurysm Neg Hx     Hyperlipidemia Neg Hx      Social History     Socioeconomic History    Marital status: /Civil Union     Spouse name: Not on file    Number of children: Not on file    Years of education: Not on file    Highest education level: Not on file   Occupational History    Not on file   Social Needs    Financial resource strain: Not on file    Food insecurity     Worry: Not on file     Inability: Not on file    Transportation needs     Medical: Not on file     Non-medical: Not on file Tobacco Use    Smoking status: Never Smoker    Smokeless tobacco: Never Used   Substance and Sexual Activity    Alcohol use: Not Currently     Frequency: Never    Drug use: No    Sexual activity: Never   Lifestyle    Physical activity     Days per week: Not on file     Minutes per session: Not on file    Stress: Not on file   Relationships    Social connections     Talks on phone: Not on file     Gets together: Not on file     Attends Sabianism service: Not on file     Active member of club or organization: Not on file     Attends meetings of clubs or organizations: Not on file     Relationship status: Not on file    Intimate partner violence     Fear of current or ex partner: Not on file     Emotionally abused: Not on file     Physically abused: Not on file     Forced sexual activity: Not on file   Other Topics Concern    Not on file   Social History Narrative    Not on file     There were no vitals filed for this visit  Results for orders placed or performed during the hospital encounter of 07/28/20   Body fluid culture and Gram stain    Specimen: Joint, Right Hip;  Body Fluid   Result Value Ref Range    Body Fluid Culture, Sterile No growth     Gram Stain Result 1+ Polys     Gram Stain Result No bacteria seen    CBC and differential   Result Value Ref Range    WBC 12 54 (H) 4 31 - 10 16 Thousand/uL    RBC 3 70 (L) 3 81 - 5 12 Million/uL    Hemoglobin 11 9 11 5 - 15 4 g/dL    Hematocrit 37 3 34 8 - 46 1 %     (H) 82 - 98 fL    MCH 32 2 26 8 - 34 3 pg    MCHC 31 9 31 4 - 37 4 g/dL    RDW 13 1 11 6 - 15 1 %    MPV 10 4 8 9 - 12 7 fL    Platelets 173 (L) 123 - 390 Thousands/uL    nRBC 0 /100 WBCs    Neutrophils Relative 85 (H) 43 - 75 %    Immat GRANS % 0 0 - 2 %    Lymphocytes Relative 7 (L) 14 - 44 %    Monocytes Relative 8 4 - 12 %    Eosinophils Relative 0 0 - 6 %    Basophils Relative 0 0 - 1 %    Neutrophils Absolute 10 50 (H) 1 85 - 7 62 Thousands/µL    Immature Grans Absolute 0 05 0 00 - 0 20 Thousand/uL    Lymphocytes Absolute 0 92 0 60 - 4 47 Thousands/µL    Monocytes Absolute 1 05 0 17 - 1 22 Thousand/µL    Eosinophils Absolute 0 00 0 00 - 0 61 Thousand/µL    Basophils Absolute 0 02 0 00 - 0 10 Thousands/µL   Comprehensive metabolic panel   Result Value Ref Range    Sodium 137 136 - 145 mmol/L    Potassium 3 2 (L) 3 5 - 5 3 mmol/L    Chloride 103 100 - 108 mmol/L    CO2 23 21 - 32 mmol/L    ANION GAP 11 4 - 13 mmol/L    BUN 11 5 - 25 mg/dL    Creatinine 0 80 0 60 - 1 30 mg/dL    Glucose 105 65 - 140 mg/dL    Glucose, Fasting 105 (H) 65 - 99 mg/dL    Calcium 8 3 8 3 - 10 1 mg/dL    AST 23 5 - 45 U/L    ALT 20 12 - 78 U/L    Alkaline Phosphatase 85 46 - 116 U/L    Total Protein 6 6 6 4 - 8 2 g/dL    Albumin 3 0 (L) 3 5 - 5 0 g/dL    Total Bilirubin 0 81 0 20 - 1 00 mg/dL    eGFR 67 ml/min/1 73sq m   Protime-INR   Result Value Ref Range    Protime 18 6 (H) 11 6 - 14 5 seconds    INR 1 64 (H) 0 84 - 4 41   Basic metabolic panel   Result Value Ref Range    Sodium 139 136 - 145 mmol/L    Potassium 3 9 3 5 - 5 3 mmol/L    Chloride 108 100 - 108 mmol/L    CO2 25 21 - 32 mmol/L    ANION GAP 6 4 - 13 mmol/L    BUN 14 5 - 25 mg/dL    Creatinine 0 75 0 60 - 1 30 mg/dL    Glucose 85 65 - 140 mg/dL    Calcium 8 4 8 3 - 10 1 mg/dL    eGFR 72 ml/min/1 73sq m   Protime-INR   Result Value Ref Range    Protime 18 8 (H) 11 6 - 14 5 seconds    INR 1 66 (H) 0 84 - 1 19   Protime-INR   Result Value Ref Range    Protime 20 8 (H) 11 6 - 14 5 seconds    INR 1 89 (H) 0 84 - 1 19   CBC   Result Value Ref Range    WBC 7 74 4 31 - 10 16 Thousand/uL    RBC 3 45 (L) 3 81 - 5 12 Million/uL    Hemoglobin 10 8 (L) 11 5 - 15 4 g/dL    Hematocrit 35 4 34 8 - 46 1 %     (H) 82 - 98 fL    MCH 31 3 26 8 - 34 3 pg    MCHC 30 5 (L) 31 4 - 37 4 g/dL    RDW 13 0 11 6 - 15 1 %    Platelets 682 334 - 931 Thousands/uL    MPV 10 3 8 9 - 12 7 fL   Protime-INR   Result Value Ref Range    Protime 26 0 (H) 11 6 - 14 5 seconds    INR 2 50 (H) 0 84 - 1 19   Sedimentation rate, automated   Result Value Ref Range    Sed Rate 58 (H) 0 - 29 mm/hour   C-reactive protein   Result Value Ref Range     0 (H) <3 0 mg/L   CBC   Result Value Ref Range    WBC 6 76 4 31 - 10 16 Thousand/uL    RBC 3 43 (L) 3 81 - 5 12 Million/uL    Hemoglobin 11 1 (L) 11 5 - 15 4 g/dL    Hematocrit 35 5 34 8 - 46 1 %     (H) 82 - 98 fL    MCH 32 4 26 8 - 34 3 pg    MCHC 31 3 (L) 31 4 - 37 4 g/dL    RDW 12 9 11 6 - 15 1 %    Platelets 947 175 - 671 Thousands/uL    MPV 10 3 8 9 - 12 7 fL   Basic metabolic panel   Result Value Ref Range    Sodium 139 136 - 145 mmol/L    Potassium 4 2 3 5 - 5 3 mmol/L    Chloride 107 100 - 108 mmol/L    CO2 23 21 - 32 mmol/L    ANION GAP 9 4 - 13 mmol/L    BUN 15 5 - 25 mg/dL    Creatinine 0 78 0 60 - 1 30 mg/dL    Glucose 80 65 - 140 mg/dL    Calcium 8 4 8 3 - 10 1 mg/dL    eGFR 69 ml/min/1 73sq m   Protime-INR   Result Value Ref Range    Protime 28 7 (H) 11 6 - 14 5 seconds    INR 2 82 (H) 0 84 - 1 19   Body fluid white cell count with differential   Result Value Ref Range    Site Joint-right hip     Color, Fluid Yellow Clear, Colorless,Yellow    Clarity, Fluid Slightly Cloudy Clear    WBC, Fluid 3,050 (H) 0 - 200 /ul   Body Fluid Diff   Result Value Ref Range    Total Counted 100     Neutrophils % (Fluid) 92 %    Lymphs % (Fluid) 2 %    Monocytes % (Fluid) 6 %   Basic metabolic panel   Result Value Ref Range    Sodium 140 136 - 145 mmol/L    Potassium 4 7 3 5 - 5 3 mmol/L    Chloride 106 100 - 108 mmol/L    CO2 28 21 - 32 mmol/L    ANION GAP 6 4 - 13 mmol/L    BUN 20 5 - 25 mg/dL    Creatinine 0 95 0 60 - 1 30 mg/dL    Glucose 84 65 - 140 mg/dL    Calcium 8 6 8 3 - 10 1 mg/dL    eGFR 54 ml/min/1 73sq m   CBC   Result Value Ref Range    WBC 9 94 4 31 - 10 16 Thousand/uL    RBC 3 33 (L) 3 81 - 5 12 Million/uL    Hemoglobin 10 7 (L) 11 5 - 15 4 g/dL    Hematocrit 33 6 (L) 34 8 - 46 1 %     (H) 82 - 98 fL    MCH 32 1 26 8 - 34 3 pg MCHC 31 8 31 4 - 37 4 g/dL    RDW 12 7 11 6 - 15 1 %    Platelets 807 543 - 121 Thousands/uL    MPV 9 9 8 9 - 12 7 fL   Protime-INR   Result Value Ref Range    Protime 31 2 (H) 11 6 - 14 5 seconds    INR 3 14 (H) 0 84 - 1 19   CBC and differential   Result Value Ref Range    WBC 8 32 4 31 - 10 16 Thousand/uL    RBC 3 33 (L) 3 81 - 5 12 Million/uL    Hemoglobin 10 5 (L) 11 5 - 15 4 g/dL    Hematocrit 34 6 (L) 34 8 - 46 1 %     (H) 82 - 98 fL    MCH 31 5 26 8 - 34 3 pg    MCHC 30 3 (L) 31 4 - 37 4 g/dL    RDW 12 7 11 6 - 15 1 %    MPV 9 4 8 9 - 12 7 fL    Platelets 711 917 - 326 Thousands/uL    nRBC 0 /100 WBCs    Neutrophils Relative 65 43 - 75 %    Immat GRANS % 1 0 - 2 %    Lymphocytes Relative 19 14 - 44 %    Monocytes Relative 12 4 - 12 %    Eosinophils Relative 2 0 - 6 %    Basophils Relative 1 0 - 1 %    Neutrophils Absolute 5 50 1 85 - 7 62 Thousands/µL    Immature Grans Absolute 0 10 0 00 - 0 20 Thousand/uL    Lymphocytes Absolute 1 54 0 60 - 4 47 Thousands/µL    Monocytes Absolute 1 00 0 17 - 1 22 Thousand/µL    Eosinophils Absolute 0 13 0 00 - 0 61 Thousand/µL    Basophils Absolute 0 05 0 00 - 0 10 Thousands/µL   Basic metabolic panel   Result Value Ref Range    Sodium 139 136 - 145 mmol/L    Potassium 4 8 3 5 - 5 3 mmol/L    Chloride 106 100 - 108 mmol/L    CO2 27 21 - 32 mmol/L    ANION GAP 6 4 - 13 mmol/L    BUN 25 5 - 25 mg/dL    Creatinine 1 03 0 60 - 1 30 mg/dL    Glucose 83 65 - 140 mg/dL    Calcium 8 5 8 3 - 10 1 mg/dL    eGFR 49 ml/min/1 73sq m   C-reactive protein   Result Value Ref Range    CRP 61 6 (H) <3 0 mg/L   Sedimentation rate, automated   Result Value Ref Range    Sed Rate 48 (H) 0 - 29 mm/hour   Protime-INR   Result Value Ref Range    Protime 28 0 (H) 11 6 - 14 5 seconds    INR 2 61 (H) 0 84 - 1 19   CBC   Result Value Ref Range    WBC 5 79 4 31 - 10 16 Thousand/uL    RBC 3 36 (L) 3 81 - 5 12 Million/uL    Hemoglobin 10 6 (L) 11 5 - 15 4 g/dL    Hematocrit 34 5 (L) 34 8 - 46 1 %     (H) 82 - 98 fL    MCH 31 5 26 8 - 34 3 pg    MCHC 30 7 (L) 31 4 - 37 4 g/dL    RDW 12 7 11 6 - 15 1 %    Platelets 369 628 - 637 Thousands/uL    MPV 9 2 8 9 - 12 7 fL   Basic metabolic panel   Result Value Ref Range    Sodium 140 136 - 145 mmol/L    Potassium 4 5 3 5 - 5 3 mmol/L    Chloride 107 100 - 108 mmol/L    CO2 28 21 - 32 mmol/L    ANION GAP 5 4 - 13 mmol/L    BUN 25 5 - 25 mg/dL    Creatinine 1 02 0 60 - 1 30 mg/dL    Glucose 86 65 - 140 mg/dL    Calcium 8 5 8 3 - 10 1 mg/dL    eGFR 50 ml/min/1 73sq m   Protime-INR   Result Value Ref Range    Protime 26 4 (H) 11 6 - 14 5 seconds    INR 2 42 (H) 0 84 - 1 19   C-reactive protein   Result Value Ref Range    CRP 52 9 (H) <3 0 mg/L   Sedimentation rate, automated   Result Value Ref Range    Sed Rate 51 (H) 0 - 29 mm/hour     Weight (last 2 days)     None        There is no height or weight on file to calculate BMI  BP      Temp      Pulse     Resp      SpO2      There were no vitals filed for this visit  There were no vitals filed for this visit  Physical Exam  Constitutional:       Appearance: She is well-developed  HENT:      Head: Normocephalic  Eyes:      General: No scleral icterus  Right eye: No discharge  Left eye: No discharge  Conjunctiva/sclera: Conjunctivae normal       Pupils: Pupils are equal, round, and reactive to light  Neck:      Musculoskeletal: Neck supple  Cardiovascular:      Rate and Rhythm: Normal rate and regular rhythm  Heart sounds: Normal heart sounds  No murmur  No friction rub  No gallop  Pulmonary:      Effort: No respiratory distress  Breath sounds: Normal breath sounds  No wheezing or rales  Abdominal:      General: Bowel sounds are normal  There is no distension  Palpations: Abdomen is soft  There is no mass  Tenderness: There is no abdominal tenderness  There is no guarding or rebound  Musculoskeletal:         General: No deformity     Lymphadenopathy: Cervical: No cervical adenopathy  Neurological:      Mental Status: She is alert  Coordination: Coordination normal            There were no vitals filed for this visit  Transitional Care Management Review:  Nikole Umanzor is a 80 y o  female here for TCM follow up  During the TCM phone call patient stated:    TCM Call (since 7/12/2020)     Date and time call was made  8/5/2020  8:24 AM    Hospital care reviewed  Records reviewed    Patient was hospitialized at  [facility]    Date of Admission  07/28/20    Date of discharge  08/04/20    Diagnosis  Right hip pain    Disposition  Home; Home health services    Were the patients medications reviewed and updated  No    Current Symptoms  -- (Comment)  Right hip pain      TCM Call (since 7/12/2020)     Post hospital issues  Reduced activity    Should patient be enrolled in anticoag monitoring? Yes    Scheduled for follow up?   Yes    Did you obtain your prescribed medications  Yes    Do you need help managing your prescriptions or medications  No    I have advised the patient to call PCP with any new or worsening symptoms  Antonietta Nolasco MR    Are you recieving home care services  Yes    Types of home care services  Nurse visit; Home PT    Have you fallen in the last 12 months  Yes    How many times  1    Interperter language line needed  No    Counseling  Patient          [facility],

## 2020-08-13 ENCOUNTER — TELEPHONE (OUTPATIENT)
Dept: INTERNAL MEDICINE CLINIC | Facility: CLINIC | Age: 85
End: 2020-08-13

## 2020-08-13 NOTE — TELEPHONE ENCOUNTER
LM on VM for Jerrica that INR is to be checked in 3 wks  (wk of 8/31)  Advised to CB with any questions

## 2020-08-13 NOTE — ASSESSMENT & PLAN NOTE
Transition care management visit regarding recent hospitalization for severe right hip pain I did review the medication list, discharge summary, hospital course, laboratories in test in detail with the patient at this point time her hip pain has improved somewhat she will receive home physical therapy follow-up with Orthopedics possibly her symptoms were secondary to a bursitis  Of note the CRP and ESR were elevated the cultures were negative I will recheck her sed rate and C reactive protein, CBC and CMP  Currently she is clinically stable and doing well she does use a walker for mobility    RTO as scheduled call if any problems

## 2020-08-24 ENCOUNTER — LAB REQUISITION (OUTPATIENT)
Dept: LAB | Facility: HOSPITAL | Age: 85
End: 2020-08-24
Payer: COMMERCIAL

## 2020-08-24 ENCOUNTER — ANTICOAG VISIT (OUTPATIENT)
Dept: INTERNAL MEDICINE CLINIC | Facility: CLINIC | Age: 85
End: 2020-08-24

## 2020-08-24 DIAGNOSIS — R79.82 ELEVATED C-REACTIVE PROTEIN (CRP): ICD-10-CM

## 2020-08-24 LAB
ALBUMIN SERPL BCP-MCNC: 3.2 G/DL (ref 3.5–5)
ALP SERPL-CCNC: 94 U/L (ref 46–116)
ALT SERPL W P-5'-P-CCNC: 22 U/L (ref 12–78)
ANION GAP SERPL CALCULATED.3IONS-SCNC: 11 MMOL/L (ref 4–13)
AST SERPL W P-5'-P-CCNC: 21 U/L (ref 5–45)
BASOPHILS # BLD AUTO: 0.03 THOUSANDS/ΜL (ref 0–0.1)
BASOPHILS NFR BLD AUTO: 1 % (ref 0–1)
BILIRUB SERPL-MCNC: 0.38 MG/DL (ref 0.2–1)
BUN SERPL-MCNC: 14 MG/DL (ref 5–25)
CALCIUM SERPL-MCNC: 8.2 MG/DL (ref 8.3–10.1)
CHLORIDE SERPL-SCNC: 107 MMOL/L (ref 100–108)
CO2 SERPL-SCNC: 23 MMOL/L (ref 21–32)
CREAT SERPL-MCNC: 0.81 MG/DL (ref 0.6–1.3)
CRP SERPL QL: 13.8 MG/L
EOSINOPHIL # BLD AUTO: 0.17 THOUSAND/ΜL (ref 0–0.61)
EOSINOPHIL NFR BLD AUTO: 3 % (ref 0–6)
ERYTHROCYTE [DISTWIDTH] IN BLOOD BY AUTOMATED COUNT: 13.7 % (ref 11.6–15.1)
ERYTHROCYTE [SEDIMENTATION RATE] IN BLOOD: 46 MM/HOUR (ref 0–29)
GFR SERPL CREATININE-BSD FRML MDRD: 66 ML/MIN/1.73SQ M
GLUCOSE SERPL-MCNC: 80 MG/DL (ref 65–140)
HCT VFR BLD AUTO: 35.3 % (ref 34.8–46.1)
HGB BLD-MCNC: 11.1 G/DL (ref 11.5–15.4)
IMM GRANULOCYTES # BLD AUTO: 0.02 THOUSAND/UL (ref 0–0.2)
IMM GRANULOCYTES NFR BLD AUTO: 0 % (ref 0–2)
LYMPHOCYTES # BLD AUTO: 1.02 THOUSANDS/ΜL (ref 0.6–4.47)
LYMPHOCYTES NFR BLD AUTO: 17 % (ref 14–44)
MCH RBC QN AUTO: 32.4 PG (ref 26.8–34.3)
MCHC RBC AUTO-ENTMCNC: 31.4 G/DL (ref 31.4–37.4)
MCV RBC AUTO: 103 FL (ref 82–98)
MONOCYTES # BLD AUTO: 0.77 THOUSAND/ΜL (ref 0.17–1.22)
MONOCYTES NFR BLD AUTO: 13 % (ref 4–12)
NEUTROPHILS # BLD AUTO: 4.13 THOUSANDS/ΜL (ref 1.85–7.62)
NEUTS SEG NFR BLD AUTO: 66 % (ref 43–75)
NRBC BLD AUTO-RTO: 0 /100 WBCS
PLATELET # BLD AUTO: 152 THOUSANDS/UL (ref 149–390)
PMV BLD AUTO: 10.1 FL (ref 8.9–12.7)
POTASSIUM SERPL-SCNC: 3.8 MMOL/L (ref 3.5–5.3)
PROT SERPL-MCNC: 6.4 G/DL (ref 6.4–8.2)
RBC # BLD AUTO: 3.43 MILLION/UL (ref 3.81–5.12)
SODIUM SERPL-SCNC: 141 MMOL/L (ref 136–145)
WBC # BLD AUTO: 6.14 THOUSAND/UL (ref 4.31–10.16)

## 2020-08-24 PROCEDURE — 85652 RBC SED RATE AUTOMATED: CPT | Performed by: INTERNAL MEDICINE

## 2020-08-24 PROCEDURE — 80053 COMPREHEN METABOLIC PANEL: CPT | Performed by: INTERNAL MEDICINE

## 2020-08-24 PROCEDURE — 86140 C-REACTIVE PROTEIN: CPT | Performed by: INTERNAL MEDICINE

## 2020-08-24 PROCEDURE — 85025 COMPLETE CBC W/AUTO DIFF WBC: CPT | Performed by: INTERNAL MEDICINE

## 2020-08-24 NOTE — TELEPHONE ENCOUNTER
The doctor does not recommend an injection so close to the surgery    Therefore no order will be placed into the chart    Thank you,   RAMONE 55yo w/ multiple medical comorbidities admitted to medicine for syncopal episode and abdominal pain, diagnosed and treated for UTI.  GYN consulted due to pt report of pink discharge w/ wiping and fibroid on ultrasound.  4cm fibroid not likely cause of abdominal pain, pt recommended to have endometrial sampling to r/o malignancy given report of post menopausal bleeding, pt refuses at this time, will reconsider tomorrow, otherwise recommend outpatient f/u for biopsy or D&C if not amenable. No acute gyn intervention needed at this time, continue workup per primary team I have examined the patient and agree with the above assessment and plan

## 2020-08-25 DIAGNOSIS — I48.20 ATRIAL FIBRILLATION, CHRONIC (HCC): Primary | ICD-10-CM

## 2020-08-25 RX ORDER — WARFARIN SODIUM 1 MG/1
TABLET ORAL
Qty: 90 TABLET | Refills: 1 | Status: SHIPPED | OUTPATIENT
Start: 2020-08-25 | End: 2021-07-07 | Stop reason: SDUPTHER

## 2020-08-25 RX ORDER — WARFARIN SODIUM 2 MG/1
TABLET ORAL
Qty: 90 TABLET | Refills: 1 | Status: SHIPPED | OUTPATIENT
Start: 2020-08-25

## 2020-08-29 DIAGNOSIS — I48.20 CHRONIC ATRIAL FIBRILLATION (HCC): ICD-10-CM

## 2020-08-29 RX ORDER — WARFARIN SODIUM 4 MG/1
TABLET ORAL
Qty: 90 TABLET | Refills: 1 | Status: SHIPPED | OUTPATIENT
Start: 2020-08-29

## 2020-08-31 DIAGNOSIS — K58.1 IRRITABLE BOWEL SYNDROME WITH CONSTIPATION: ICD-10-CM

## 2020-09-01 ENCOUNTER — TELEPHONE (OUTPATIENT)
Dept: INTERNAL MEDICINE CLINIC | Facility: CLINIC | Age: 85
End: 2020-09-01

## 2020-09-01 ENCOUNTER — TELEPHONE (OUTPATIENT)
Dept: OBGYN CLINIC | Facility: HOSPITAL | Age: 85
End: 2020-09-01

## 2020-09-01 LAB — INR PPP: 2.4 (ref 0.84–1.19)

## 2020-09-01 RX ORDER — DICYCLOMINE HYDROCHLORIDE 10 MG/1
10 CAPSULE ORAL EVERY 12 HOURS
Qty: 60 CAPSULE | Refills: 0 | Status: SHIPPED | OUTPATIENT
Start: 2020-09-01 | End: 2020-09-28

## 2020-09-01 NOTE — TELEPHONE ENCOUNTER
Left voice message for patient to call  back regarding her appointment on 9/2/20  If the patient calls back please do COVID screening and document on the appointment line      Thank you

## 2020-09-01 NOTE — TELEPHONE ENCOUNTER
Dian Alvarez from [facility] called to let you know that the patient's INR today was 2 4  They are discharging her from services today and any further labs that need to be drawn need to be done by the mobile lab

## 2020-09-02 ENCOUNTER — ANTICOAG VISIT (OUTPATIENT)
Dept: INTERNAL MEDICINE CLINIC | Facility: CLINIC | Age: 85
End: 2020-09-02

## 2020-09-02 DIAGNOSIS — M25.551 RIGHT HIP PAIN: ICD-10-CM

## 2020-09-02 DIAGNOSIS — I48.20 CHRONIC ATRIAL FIBRILLATION (HCC): Primary | ICD-10-CM

## 2020-09-02 DIAGNOSIS — M70.60 TROCHANTERIC BURSITIS: ICD-10-CM

## 2020-09-02 RX ORDER — GABAPENTIN 100 MG/1
200 CAPSULE ORAL 2 TIMES DAILY
Qty: 120 CAPSULE | Refills: 0 | Status: SHIPPED | OUTPATIENT
Start: 2020-09-02 | End: 2020-10-12

## 2020-09-10 ENCOUNTER — TELEPHONE (OUTPATIENT)
Dept: OTHER | Facility: OTHER | Age: 85
End: 2020-09-10

## 2020-09-10 NOTE — TELEPHONE ENCOUNTER
FEDERICO THE NURSE CALLING TO NOTIFY THAT PT HAS BEEN DISCHARGED FROM HOME PHYSICAL THERAPY    Emmanuel Akins

## 2020-09-14 LAB
INR PPP: 1.6
PROTHROMBIN TIME: 15.7 SEC (ref 9–11.5)

## 2020-09-15 ENCOUNTER — ANTICOAG VISIT (OUTPATIENT)
Dept: INTERNAL MEDICINE CLINIC | Facility: CLINIC | Age: 85
End: 2020-09-15

## 2020-09-22 LAB
INR PPP: 2
PROTHROMBIN TIME: 19.6 SEC (ref 9–11.5)

## 2020-09-23 ENCOUNTER — ANTICOAG VISIT (OUTPATIENT)
Dept: INTERNAL MEDICINE CLINIC | Facility: CLINIC | Age: 85
End: 2020-09-23

## 2020-09-27 DIAGNOSIS — K58.1 IRRITABLE BOWEL SYNDROME WITH CONSTIPATION: ICD-10-CM

## 2020-09-28 RX ORDER — DICYCLOMINE HYDROCHLORIDE 10 MG/1
10 CAPSULE ORAL EVERY 12 HOURS
Qty: 60 CAPSULE | Refills: 0 | Status: SHIPPED | OUTPATIENT
Start: 2020-09-28 | End: 2020-10-24

## 2020-09-29 ENCOUNTER — TELEPHONE (OUTPATIENT)
Dept: GASTROENTEROLOGY | Facility: CLINIC | Age: 85
End: 2020-09-29

## 2020-09-29 ENCOUNTER — REMOTE DEVICE CLINIC VISIT (OUTPATIENT)
Dept: CARDIOLOGY CLINIC | Facility: CLINIC | Age: 85
End: 2020-09-29
Payer: COMMERCIAL

## 2020-09-29 DIAGNOSIS — Z95.0 PRESENCE OF CARDIAC PACEMAKER: Primary | ICD-10-CM

## 2020-09-29 PROCEDURE — 93296 REM INTERROG EVL PM/IDS: CPT | Performed by: INTERNAL MEDICINE

## 2020-09-29 PROCEDURE — 93294 REM INTERROG EVL PM/LDLS PM: CPT | Performed by: INTERNAL MEDICINE

## 2020-09-29 NOTE — PROGRESS NOTES
Results for orders placed or performed in visit on 09/29/20   Cardiac EP device report    Narrative    MDT-SINGLE CHAMBER "HIS" PPM  CARELINK TRANSMISSION: BATTERY VOLTAGE ADEQUATE (6 6 YRS)  : 99 6% (>40%~VVIR/70)  ALL AVAILABLE LEAD PARAMETERS WITHIN NORMAL LIMITS  NO SIGNIFICANT HIGH RATE EPISODES  PACEMAKER FUNCTIONING APPROPRIATELY   [address] [place] [address]

## 2020-10-01 NOTE — TELEPHONE ENCOUNTER
Spoke to Chio in [employer]  I asked her what patient needs as far as the letter we received  After looking at the patients order hx she stated that the B6 lab work would not be covered and would need a new DX code  Will update Dr Eliseo Mcmillan      Thank you

## 2020-10-07 ENCOUNTER — ANTICOAG VISIT (OUTPATIENT)
Dept: INTERNAL MEDICINE CLINIC | Facility: CLINIC | Age: 85
End: 2020-10-07

## 2020-10-11 DIAGNOSIS — M70.60 TROCHANTERIC BURSITIS: ICD-10-CM

## 2020-10-11 DIAGNOSIS — M25.551 RIGHT HIP PAIN: ICD-10-CM

## 2020-10-12 ENCOUNTER — OFFICE VISIT (OUTPATIENT)
Dept: CARDIOLOGY CLINIC | Facility: CLINIC | Age: 85
End: 2020-10-12
Payer: COMMERCIAL

## 2020-10-12 VITALS
OXYGEN SATURATION: 96 % | BODY MASS INDEX: 34.12 KG/M2 | HEIGHT: 60 IN | SYSTOLIC BLOOD PRESSURE: 124 MMHG | DIASTOLIC BLOOD PRESSURE: 66 MMHG | WEIGHT: 173.8 LBS | HEART RATE: 73 BPM

## 2020-10-12 DIAGNOSIS — R26.2 AMBULATORY DYSFUNCTION: ICD-10-CM

## 2020-10-12 DIAGNOSIS — I10 BENIGN ESSENTIAL HYPERTENSION: ICD-10-CM

## 2020-10-12 DIAGNOSIS — I34.0 MODERATE MITRAL REGURGITATION: ICD-10-CM

## 2020-10-12 DIAGNOSIS — I48.20 CHRONIC ATRIAL FIBRILLATION (HCC): Primary | ICD-10-CM

## 2020-10-12 DIAGNOSIS — Z95.0 PACEMAKER: ICD-10-CM

## 2020-10-12 DIAGNOSIS — I50.32 CHRONIC DIASTOLIC CHF (CONGESTIVE HEART FAILURE) (HCC): ICD-10-CM

## 2020-10-12 PROCEDURE — 99214 OFFICE O/P EST MOD 30 MIN: CPT | Performed by: INTERNAL MEDICINE

## 2020-10-12 PROCEDURE — 1160F RVW MEDS BY RX/DR IN RCRD: CPT | Performed by: INTERNAL MEDICINE

## 2020-10-12 PROCEDURE — 93000 ELECTROCARDIOGRAM COMPLETE: CPT | Performed by: INTERNAL MEDICINE

## 2020-10-12 RX ORDER — GABAPENTIN 100 MG/1
200 CAPSULE ORAL 2 TIMES DAILY
Qty: 120 CAPSULE | Refills: 0 | Status: SHIPPED | OUTPATIENT
Start: 2020-10-12 | End: 2021-02-23 | Stop reason: SDUPTHER

## 2020-10-13 ENCOUNTER — OFFICE VISIT (OUTPATIENT)
Dept: INTERNAL MEDICINE CLINIC | Facility: CLINIC | Age: 85
End: 2020-10-13
Payer: COMMERCIAL

## 2020-10-13 DIAGNOSIS — S05.10XA ECCHYMOSIS OF EYE, UNSPECIFIED LATERALITY, INITIAL ENCOUNTER: Primary | ICD-10-CM
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
## 2020-10-13 PROBLEM — T14.8XXA HEMATOMA: Status: ACTIVE | Noted: 2020-10-13

## 2020-10-13 PROCEDURE — G0008 ADMIN INFLUENZA VIRUS VAC: HCPCS | Performed by: INTERNAL MEDICINE

## 2020-10-13 PROCEDURE — 99213 OFFICE O/P EST LOW 20 MIN: CPT | Performed by: INTERNAL MEDICINE

## 2020-10-13 PROCEDURE — 90662 IIV NO PRSV INCREASED AG IM: CPT | Performed by: INTERNAL MEDICINE

## 2020-10-13 PROCEDURE — 1160F RVW MEDS BY RX/DR IN RCRD: CPT | Performed by: INTERNAL MEDICINE

## 2020-10-13 PROCEDURE — 1036F TOBACCO NON-USER: CPT | Performed by: INTERNAL MEDICINE

## 2020-10-16 ENCOUNTER — TELEPHONE (OUTPATIENT)
Dept: INTERNAL MEDICINE CLINIC | Facility: CLINIC | Age: 85
End: 2020-10-16

## 2020-10-16 DIAGNOSIS — M70.60 TROCHANTERIC BURSITIS: ICD-10-CM

## 2020-10-16 DIAGNOSIS — M25.551 RIGHT HIP PAIN: ICD-10-CM

## 2020-10-20 ENCOUNTER — ANTICOAG VISIT (OUTPATIENT)
Dept: INTERNAL MEDICINE CLINIC | Facility: CLINIC | Age: 85
End: 2020-10-20

## 2020-10-21 ENCOUNTER — HOSPITAL ENCOUNTER (OUTPATIENT)
Dept: RADIOLOGY | Facility: HOSPITAL | Age: 85
Discharge: HOME | End: 2020-10-21
Payer: COMMERCIAL

## 2020-10-21 ENCOUNTER — TRANSCRIBE ORDERS (OUTPATIENT)
Dept: RADIOLOGY | Facility: HOSPITAL | Age: 85
End: 2020-10-21
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
## 2020-10-21 DIAGNOSIS — T14.8XXA HEMATOMA: ICD-10-CM

## 2020-10-21 PROCEDURE — G1004 CDSM NDSC: HCPCS

## 2020-10-21 PROCEDURE — 70450 CT HEAD/BRAIN W/O DYE: CPT

## 2020-10-22 ENCOUNTER — TELEPHONE (OUTPATIENT)
Dept: INTERNAL MEDICINE CLINIC | Facility: CLINIC | Age: 85
End: 2020-10-22

## 2020-10-22 RX ORDER — METHOCARBAMOL 750 MG/1
750 TABLET, FILM COATED ORAL EVERY 8 HOURS PRN
Qty: 60 TABLET | Refills: 0 | Status: SHIPPED | OUTPATIENT
Start: 2020-10-22 | End: 2020-12-08

## 2020-10-24 DIAGNOSIS — K58.1 IRRITABLE BOWEL SYNDROME WITH CONSTIPATION: ICD-10-CM

## 2020-10-24 RX ORDER — DICYCLOMINE HYDROCHLORIDE 10 MG/1
10 CAPSULE ORAL EVERY 12 HOURS
Qty: 60 CAPSULE | Refills: 0 | Status: SHIPPED | OUTPATIENT
Start: 2020-10-24 | End: 2020-11-18

## 2020-10-29 DIAGNOSIS — D32.9 MENINGIOMA (HCC): Primary | ICD-10-CM

## 2020-11-02 ENCOUNTER — TELEPHONE (OUTPATIENT)
Dept: INTERNAL MEDICINE CLINIC | Facility: CLINIC | Age: 85
End: 2020-11-02

## 2020-11-03 ENCOUNTER — ANTICOAG VISIT (OUTPATIENT)
Dept: INTERNAL MEDICINE CLINIC | Facility: CLINIC | Age: 85
End: 2020-11-03

## 2020-11-03 DIAGNOSIS — D32.9 MENINGIOMA (HCC): Primary | ICD-10-CM

## 2020-11-05 DIAGNOSIS — D32.9 MENINGIOMA (HCC): Primary | ICD-10-CM

## 2020-11-06 DIAGNOSIS — G89.29 OTHER CHRONIC PAIN: ICD-10-CM

## 2020-11-06 RX ORDER — DULOXETIN HYDROCHLORIDE 60 MG/1
CAPSULE, DELAYED RELEASE ORAL
Qty: 90 CAPSULE | Refills: 1 | Status: SHIPPED | OUTPATIENT
Start: 2020-11-06 | End: 2021-07-07 | Stop reason: SDUPTHER

## 2020-11-10 ENCOUNTER — OFFICE VISIT (OUTPATIENT)
Dept: INTERNAL MEDICINE CLINIC | Facility: CLINIC | Age: 85
End: 2020-11-10
Payer: COMMERCIAL

## 2020-11-10 VITALS
RESPIRATION RATE: 16 BRPM | DIASTOLIC BLOOD PRESSURE: 82 MMHG | WEIGHT: 169 LBS | HEART RATE: 97 BPM | HEIGHT: 60 IN | TEMPERATURE: 97.1 F | SYSTOLIC BLOOD PRESSURE: 158 MMHG | OXYGEN SATURATION: 97 % | BODY MASS INDEX: 33.18 KG/M2

## 2020-11-10 DIAGNOSIS — I48.20 CHRONIC ATRIAL FIBRILLATION (HCC): ICD-10-CM

## 2020-11-10 DIAGNOSIS — N18.31 STAGE 3A CHRONIC KIDNEY DISEASE (HCC): ICD-10-CM

## 2020-11-10 DIAGNOSIS — D32.9 MENINGIOMA (HCC): Primary | ICD-10-CM

## 2020-11-10 DIAGNOSIS — R79.82 ELEVATED C-REACTIVE PROTEIN (CRP): ICD-10-CM

## 2020-11-10 DIAGNOSIS — M70.61 TROCHANTERIC BURSITIS OF RIGHT HIP: ICD-10-CM

## 2020-11-10 DIAGNOSIS — I10 BENIGN ESSENTIAL HYPERTENSION: ICD-10-CM

## 2020-11-10 DIAGNOSIS — E03.9 HYPOTHYROIDISM, UNSPECIFIED TYPE: ICD-10-CM

## 2020-11-10 DIAGNOSIS — Z00.00 MEDICARE ANNUAL WELLNESS VISIT, SUBSEQUENT: ICD-10-CM

## 2020-11-10 PROCEDURE — 1125F AMNT PAIN NOTED PAIN PRSNT: CPT | Performed by: INTERNAL MEDICINE

## 2020-11-10 PROCEDURE — 1036F TOBACCO NON-USER: CPT | Performed by: INTERNAL MEDICINE

## 2020-11-10 PROCEDURE — 1170F FXNL STATUS ASSESSED: CPT | Performed by: INTERNAL MEDICINE

## 2020-11-10 PROCEDURE — 1160F RVW MEDS BY RX/DR IN RCRD: CPT | Performed by: INTERNAL MEDICINE

## 2020-11-10 PROCEDURE — G0439 PPPS, SUBSEQ VISIT: HCPCS | Performed by: INTERNAL MEDICINE

## 2020-11-10 PROCEDURE — 3725F SCREEN DEPRESSION PERFORMED: CPT | Performed by: INTERNAL MEDICINE

## 2020-11-10 PROCEDURE — 99213 OFFICE O/P EST LOW 20 MIN: CPT | Performed by: INTERNAL MEDICINE

## 2020-11-11 ENCOUNTER — ANTICOAG VISIT (OUTPATIENT)
Dept: INTERNAL MEDICINE CLINIC | Facility: CLINIC | Age: 85
End: 2020-11-11

## 2020-11-11 LAB
INR PPP: 2
PROTHROMBIN TIME: 19.9 SEC (ref 9–11.5)

## 2020-11-17 ENCOUNTER — TELEPHONE (OUTPATIENT)
Dept: INTERNAL MEDICINE CLINIC | Facility: CLINIC | Age: 85
End: 2020-11-17

## 2020-11-17 DIAGNOSIS — D32.9 MENINGIOMA (HCC): Primary | ICD-10-CM

## 2020-11-18 DIAGNOSIS — K58.1 IRRITABLE BOWEL SYNDROME WITH CONSTIPATION: ICD-10-CM

## 2020-11-18 RX ORDER — DICYCLOMINE HYDROCHLORIDE 10 MG/1
10 CAPSULE ORAL EVERY 12 HOURS
Qty: 60 CAPSULE | Refills: 0 | Status: SHIPPED | OUTPATIENT
Start: 2020-11-18 | End: 2020-12-02

## 2020-11-21 LAB
ALBUMIN SERPL-MCNC: 3.9 G/DL (ref 3.6–5.1)
ALBUMIN/GLOB SERPL: 1.4 (CALC) (ref 1–2.5)
ALP SERPL-CCNC: 88 U/L (ref 37–153)
ALT SERPL-CCNC: 14 U/L (ref 6–29)
AST SERPL-CCNC: 18 U/L (ref 10–35)
BILIRUB SERPL-MCNC: 0.3 MG/DL (ref 0.2–1.2)
BUN SERPL-MCNC: 22 MG/DL (ref 7–25)
BUN/CREAT SERPL: NORMAL (CALC) (ref 6–22)
CALCIUM SERPL-MCNC: 9.1 MG/DL (ref 8.6–10.4)
CHLORIDE SERPL-SCNC: 108 MMOL/L (ref 98–110)
CHOLEST SERPL-MCNC: 158 MG/DL
CHOLEST/HDLC SERPL: 2.3 (CALC)
CO2 SERPL-SCNC: 27 MMOL/L (ref 20–32)
CREAT SERPL-MCNC: 0.68 MG/DL (ref 0.6–0.88)
GLOBULIN SER CALC-MCNC: 2.8 G/DL (CALC) (ref 1.9–3.7)
GLUCOSE SERPL-MCNC: 88 MG/DL (ref 65–99)
HBA1C MFR BLD: 5.2 % OF TOTAL HGB
HDLC SERPL-MCNC: 70 MG/DL
INR PPP: 2.7
LDLC SERPL CALC-MCNC: 69 MG/DL (CALC)
NONHDLC SERPL-MCNC: 88 MG/DL (CALC)
POTASSIUM SERPL-SCNC: 4.2 MMOL/L (ref 3.5–5.3)
PROT SERPL-MCNC: 6.7 G/DL (ref 6.1–8.1)
PROTHROMBIN TIME: 26.2 SEC (ref 9–11.5)
SL AMB EGFR AFRICAN AMERICAN: 91 ML/MIN/1.73M2
SL AMB EGFR NON AFRICAN AMERICAN: 79 ML/MIN/1.73M2
SODIUM SERPL-SCNC: 144 MMOL/L (ref 135–146)
TRIGL SERPL-MCNC: 106 MG/DL
TSH SERPL-ACNC: 4.64 MIU/L (ref 0.4–4.5)

## 2020-11-24 ENCOUNTER — ANTICOAG VISIT (OUTPATIENT)
Dept: INTERNAL MEDICINE CLINIC | Facility: CLINIC | Age: 85
End: 2020-11-24

## 2020-11-25 ENCOUNTER — TELEPHONE (OUTPATIENT)
Dept: INTERNAL MEDICINE CLINIC | Facility: CLINIC | Age: 85
End: 2020-11-25

## 2020-11-28 DIAGNOSIS — E03.9 HYPOTHYROIDISM, UNSPECIFIED TYPE: ICD-10-CM

## 2020-11-28 RX ORDER — LEVOTHYROXINE SODIUM 0.07 MG/1
TABLET ORAL
Qty: 90 TABLET | Refills: 1 | Status: SHIPPED | OUTPATIENT
Start: 2020-11-28 | End: 2021-04-05 | Stop reason: ALTCHOICE

## 2020-11-30 DIAGNOSIS — E03.9 HYPOTHYROIDISM, UNSPECIFIED TYPE: Primary | ICD-10-CM

## 2020-11-30 RX ORDER — LEVOTHYROXINE SODIUM 88 UG/1
88 TABLET ORAL DAILY
Qty: 90 TABLET | Refills: 1 | Status: SHIPPED | OUTPATIENT
Start: 2020-11-30 | End: 2021-03-09 | Stop reason: SDUPTHER

## 2020-12-01 DIAGNOSIS — K58.1 IRRITABLE BOWEL SYNDROME WITH CONSTIPATION: ICD-10-CM

## 2020-12-02 RX ORDER — DICYCLOMINE HYDROCHLORIDE 10 MG/1
10 CAPSULE ORAL EVERY 12 HOURS
Qty: 180 CAPSULE | Refills: 1 | Status: SHIPPED | OUTPATIENT
Start: 2020-12-02 | End: 2021-06-09

## 2020-12-07 ENCOUNTER — TELEPHONE (OUTPATIENT)
Dept: INTERNAL MEDICINE CLINIC | Facility: CLINIC | Age: 85
End: 2020-12-07

## 2020-12-07 LAB
INR PPP: 1.3
PROTHROMBIN TIME: 13.5 SEC (ref 9–11.5)

## 2020-12-08 ENCOUNTER — HOSPITAL ENCOUNTER (OUTPATIENT)
Dept: RADIOLOGY | Facility: HOSPITAL | Age: 85
Discharge: HOME | End: 2020-12-08
Payer: COMMERCIAL

## 2020-12-08 DIAGNOSIS — M70.60 TROCHANTERIC BURSITIS: ICD-10-CM

## 2020-12-08 DIAGNOSIS — M25.551 RIGHT HIP PAIN: ICD-10-CM

## 2020-12-08 DIAGNOSIS — D32.9 MENINGIOMA (HCC): ICD-10-CM

## 2020-12-08 PROCEDURE — 70553 MRI BRAIN STEM W/O & W/DYE: CPT

## 2020-12-08 PROCEDURE — A9585 GADOBUTROL INJECTION: HCPCS | Performed by: INTERNAL MEDICINE

## 2020-12-08 PROCEDURE — G1004 CDSM NDSC: HCPCS

## 2020-12-08 RX ORDER — METHOCARBAMOL 750 MG/1
750 TABLET, FILM COATED ORAL EVERY 8 HOURS PRN
Qty: 60 TABLET | Refills: 0 | Status: SHIPPED | OUTPATIENT
Start: 2020-12-08 | End: 2021-01-27

## 2020-12-08 RX ADMIN — GADOBUTROL 8 ML: 604.72 INJECTION INTRAVENOUS at 14:20

## 2020-12-09 ENCOUNTER — TELEPHONE (OUTPATIENT)
Dept: INTERNAL MEDICINE CLINIC | Facility: CLINIC | Age: 85
End: 2020-12-09

## 2020-12-11 LAB
INR PPP: 2.2
PROTHROMBIN TIME: 21.3 SEC (ref 9–11.5)

## 2021-01-04 ENCOUNTER — ANTICOAG VISIT (OUTPATIENT)
Dept: INTERNAL MEDICINE CLINIC | Facility: CLINIC | Age: 86
End: 2021-01-04

## 2021-01-05 DIAGNOSIS — R10.13 EPIGASTRIC PAIN: ICD-10-CM

## 2021-01-05 RX ORDER — SUCRALFATE 1 G/1
1 TABLET ORAL 2 TIMES DAILY
Qty: 180 TABLET | Refills: 1 | Status: SHIPPED | OUTPATIENT
Start: 2021-01-05 | End: 2021-08-16 | Stop reason: SDUPTHER

## 2021-01-11 ENCOUNTER — TELEPHONE (OUTPATIENT)
Dept: CARDIOLOGY CLINIC | Facility: CLINIC | Age: 86
End: 2021-01-11

## 2021-01-12 ENCOUNTER — IN-CLINIC DEVICE VISIT (OUTPATIENT)
Dept: CARDIOLOGY CLINIC | Facility: CLINIC | Age: 86
End: 2021-01-12
Payer: COMMERCIAL

## 2021-01-12 DIAGNOSIS — Z95.0 CARDIAC PACEMAKER: Primary | ICD-10-CM

## 2021-01-12 PROCEDURE — 93279 PRGRMG DEV EVAL PM/LDLS PM: CPT | Performed by: INTERNAL MEDICINE

## 2021-01-12 NOTE — PROGRESS NOTES
Results for orders placed or performed in visit on 01/12/21   Cardiac EP device report    Narrative    MDT-SINGLE CHAMBER "HIS" PPM / ACTIVE SYSTEM IS MRI CONDITIONAL  DEVICE INTERROGATED IN THE [facility] OFFICE: BATTERY VOLTAGE ADEQUATE (6 YRS)   97 5% (VVIR 70/DEPENDENT - S/P AVN ABL)  ALL LEAD PARAMETERS WITHIN NORMAL LIMITS  ALL OTHER TESTING WITHIN NORMAL LIMITS  NO HIGH RATE EPISODES  CHRONIC AF & PT ON WARFARIN  NO PROGRAMMING CHANGES MADE TO DEVICE PARAMETERS  PACEMAKER FUNCTIONING APPROPRIATELY      EBS

## 2021-01-15 ENCOUNTER — OFFICE VISIT (OUTPATIENT)
Dept: PODIATRY | Facility: CLINIC | Age: 86
End: 2021-01-15
Payer: COMMERCIAL

## 2021-01-15 VITALS — BODY MASS INDEX: 32.79 KG/M2 | HEIGHT: 60 IN | WEIGHT: 167 LBS

## 2021-01-15 DIAGNOSIS — I73.9 PERIPHERAL VASCULAR DISEASE, UNSPECIFIED (HCC): ICD-10-CM

## 2021-01-15 DIAGNOSIS — M19.071 ARTHRITIS OF RIGHT ANKLE: ICD-10-CM

## 2021-01-15 DIAGNOSIS — L60.0 INGROWN TOENAIL: ICD-10-CM

## 2021-01-15 DIAGNOSIS — L60.3 NAIL DYSTROPHY: Primary | ICD-10-CM

## 2021-01-15 PROCEDURE — 1036F TOBACCO NON-USER: CPT | Performed by: PODIATRIST

## 2021-01-15 PROCEDURE — 99202 OFFICE O/P NEW SF 15 MIN: CPT | Performed by: PODIATRIST

## 2021-01-15 PROCEDURE — 1160F RVW MEDS BY RX/DR IN RCRD: CPT | Performed by: PODIATRIST

## 2021-01-15 NOTE — PROGRESS NOTES
Assessment/Plan:  Treatment consisted of nail trimming  Superficial avulsion medial nail border right great toe  Patient is a poor candidate for partial avulsion or partial matrixectomy due to poor circulation  Periodic palliative care is advised  No problem-specific Assessment & Plan notes found for this encounter  Diagnoses and all orders for this visit:    Nail dystrophy    Arthritis of right ankle    Peripheral vascular disease, unspecified (HCC)    Ingrown toenail          Subjective:      Patient ID: Kristine Noriega is a 80 y o  female  HPI     Patient presents with a painful ingrown toenail affecting the medial nail border of the right great toe  Patient has extremely long toenails that she is unable to bend down to cut them  She also has significant osteoarthritis affecting both feet and more specifically the right ankle  Patient has a severe hallux valgus deformity of the right foot  The following portions of the patient's history were reviewed and updated as appropriate: allergies, current medications, past family history, past medical history, past social history, past surgical history and problem list     Review of Systems   Cardiovascular: Positive for leg swelling  Genitourinary:        Stage III renal disease   Musculoskeletal: Positive for arthralgias and gait problem  Chronic right hip pain   Neurological:        Paresthesia              Objective:      Ht 5' (1 524 m)   Wt 75 8 kg (167 lb)   LMP  (LMP Unknown)   BMI 32 61 kg/m²          Physical Exam  Cardiovascular:      Comments: No palpable pedal pulses  Legs are edematous with right lower leg erythematous  Musculoskeletal:         General: Deformity present  Comments: Hallux valgus disorder right foot     Hallux varus deformity left foot  Skin:     Comments: All toenails are elongated and dystrophic  Pain with palpation medial nail border right hallux

## 2021-01-19 ENCOUNTER — ANTICOAG VISIT (OUTPATIENT)
Dept: INTERNAL MEDICINE CLINIC | Facility: CLINIC | Age: 86
End: 2021-01-19

## 2021-01-23 ENCOUNTER — IMMUNIZATIONS (OUTPATIENT)
Dept: FAMILY MEDICINE CLINIC | Facility: HOSPITAL | Age: 86
End: 2021-01-23

## 2021-01-23 DIAGNOSIS — Z23 ENCOUNTER FOR IMMUNIZATION: Primary | ICD-10-CM

## 2021-01-23 PROCEDURE — 0011A SARS-COV-2 / COVID-19 MRNA VACCINE (MODERNA) 100 MCG: CPT

## 2021-01-23 PROCEDURE — 91301 SARS-COV-2 / COVID-19 MRNA VACCINE (MODERNA) 100 MCG: CPT

## 2021-01-27 DIAGNOSIS — M70.60 TROCHANTERIC BURSITIS: ICD-10-CM

## 2021-01-27 DIAGNOSIS — M25.551 RIGHT HIP PAIN: ICD-10-CM

## 2021-01-27 RX ORDER — METHOCARBAMOL 750 MG/1
750 TABLET, FILM COATED ORAL EVERY 8 HOURS PRN
Qty: 60 TABLET | Refills: 0 | Status: SHIPPED | OUTPATIENT
Start: 2021-01-27 | End: 2021-02-14

## 2021-02-05 ENCOUNTER — TELEPHONE (OUTPATIENT)
Dept: INTERNAL MEDICINE CLINIC | Facility: CLINIC | Age: 86
End: 2021-02-05

## 2021-02-05 DIAGNOSIS — I10 BENIGN ESSENTIAL HTN: ICD-10-CM

## 2021-02-05 RX ORDER — POTASSIUM CHLORIDE 20 MEQ/1
20 TABLET, EXTENDED RELEASE ORAL DAILY
Qty: 90 TABLET | Refills: 1 | Status: SHIPPED | OUTPATIENT
Start: 2021-02-05 | End: 2021-02-08

## 2021-02-05 NOTE — TELEPHONE ENCOUNTER
Patient is all out of her potassium and she needs a refill as soon as possible  I do not see this on there med list  Please advise  [facility]

## 2021-02-05 NOTE — TELEPHONE ENCOUNTER
Susi Scheuermann please contact patient to find out what dose and how frequently and set up an Rx please verify with the patient she is taking this medication

## 2021-02-07 DIAGNOSIS — I10 BENIGN ESSENTIAL HTN: ICD-10-CM

## 2021-02-08 ENCOUNTER — TELEPHONE (OUTPATIENT)
Dept: INTERNAL MEDICINE CLINIC | Facility: CLINIC | Age: 86
End: 2021-02-08

## 2021-02-08 RX ORDER — POTASSIUM CHLORIDE 20MEQ/15ML
20 LIQUID (ML) ORAL DAILY
Qty: 3800 ML | Refills: 3 | Status: SHIPPED | OUTPATIENT
Start: 2021-02-08

## 2021-02-08 NOTE — TELEPHONE ENCOUNTER
Pharmacy is calling about a drug interaction with the Klor-con and Dicyclomine  They are asking to switch the Klor-con to either a solution or packets        Please advise

## 2021-02-09 ENCOUNTER — ANTICOAG VISIT (OUTPATIENT)
Dept: INTERNAL MEDICINE CLINIC | Facility: CLINIC | Age: 86
End: 2021-02-09

## 2021-02-09 NOTE — PROGRESS NOTES
Spoke with pt, and we are going to refax an order over to [facility] to have it drawn as soon as possible

## 2021-02-13 DIAGNOSIS — M25.551 RIGHT HIP PAIN: ICD-10-CM

## 2021-02-13 DIAGNOSIS — M70.60 TROCHANTERIC BURSITIS: ICD-10-CM

## 2021-02-14 RX ORDER — METHOCARBAMOL 750 MG/1
750 TABLET, FILM COATED ORAL EVERY 8 HOURS PRN
Qty: 60 TABLET | Refills: 0 | Status: SHIPPED | OUTPATIENT
Start: 2021-02-14 | End: 2021-07-07 | Stop reason: SDUPTHER

## 2021-02-22 ENCOUNTER — IMMUNIZATIONS (OUTPATIENT)
Dept: FAMILY MEDICINE CLINIC | Facility: HOSPITAL | Age: 86
End: 2021-02-22

## 2021-02-22 DIAGNOSIS — Z23 ENCOUNTER FOR IMMUNIZATION: Primary | ICD-10-CM

## 2021-02-22 PROCEDURE — 91301 SARS-COV-2 / COVID-19 MRNA VACCINE (MODERNA) 100 MCG: CPT

## 2021-02-22 PROCEDURE — 0012A SARS-COV-2 / COVID-19 MRNA VACCINE (MODERNA) 100 MCG: CPT

## 2021-02-23 ENCOUNTER — TELEMEDICINE (OUTPATIENT)
Dept: INTERNAL MEDICINE CLINIC | Facility: CLINIC | Age: 86
End: 2021-02-23
Payer: COMMERCIAL

## 2021-02-23 DIAGNOSIS — M25.551 RIGHT HIP PAIN: ICD-10-CM

## 2021-02-23 DIAGNOSIS — M70.60 TROCHANTERIC BURSITIS: ICD-10-CM

## 2021-02-23 DIAGNOSIS — I10 BENIGN ESSENTIAL HTN: ICD-10-CM

## 2021-02-23 DIAGNOSIS — N18.31 STAGE 3A CHRONIC KIDNEY DISEASE (HCC): ICD-10-CM

## 2021-02-23 DIAGNOSIS — E03.9 HYPOTHYROIDISM, UNSPECIFIED TYPE: Primary | ICD-10-CM

## 2021-02-23 PROCEDURE — 99214 OFFICE O/P EST MOD 30 MIN: CPT | Performed by: INTERNAL MEDICINE

## 2021-02-23 PROCEDURE — 1160F RVW MEDS BY RX/DR IN RCRD: CPT | Performed by: INTERNAL MEDICINE

## 2021-02-23 PROCEDURE — 1036F TOBACCO NON-USER: CPT | Performed by: INTERNAL MEDICINE

## 2021-02-23 RX ORDER — GABAPENTIN 300 MG/1
300 CAPSULE ORAL 2 TIMES DAILY
Qty: 60 CAPSULE | Refills: 3 | Status: SHIPPED | OUTPATIENT
Start: 2021-02-23 | End: 2021-08-16

## 2021-02-23 NOTE — ASSESSMENT & PLAN NOTE
Lab Results   Component Value Date    EGFR 66 08/24/2020    EGFR 50 08/04/2020    EGFR 49 08/03/2020    CREATININE 0 68 11/20/2020    CREATININE 0 81 08/24/2020    CREATININE 1 02 08/04/2020    Drink adequate amount of water, avoid anti-inflammatories, reduce sodium in the diet and will continue to monitor the kidney function

## 2021-02-23 NOTE — ASSESSMENT & PLAN NOTE
Continue current medical regiment will be transferring to a new doctor when she moves in with her daughter in the [facility]

## 2021-02-23 NOTE — ASSESSMENT & PLAN NOTE
Patient does reports me some weight gain, normalization of her usual diarrhea and also cold intolerance will check a 3rd generation TSH she has not had a recent TSH

## 2021-02-23 NOTE — PROGRESS NOTES
Virtual Regular Visit      Assessment/Plan:    Problem List Items Addressed This Visit        Endocrine    Hypothyroidism - Primary       Patient does reports me some weight gain, normalization of her usual diarrhea and also cold intolerance will check a 3rd generation TSH she has not had a recent TSH  Relevant Orders    TSH, 3rd generation       Cardiovascular and Mediastinum    Benign essential HTN       Continue current medical regiment will be transferring to a new doctor when she moves in with her daughter in the [facility]  Relevant Orders    Comprehensive metabolic panel    Lipid Panel with Direct LDL reflex    Magnesium    Ambulatory referral to Internal Medicine       Musculoskeletal and Integument    Trochanteric bursitis    Relevant Medications    gabapentin (NEURONTIN) 300 mg capsule       Genitourinary    Stage 3a chronic kidney disease     Lab Results   Component Value Date    EGFR 66 08/24/2020    EGFR 50 08/04/2020    EGFR 49 08/03/2020    CREATININE 0 68 11/20/2020    CREATININE 0 81 08/24/2020    CREATININE 1 02 08/04/2020    Drink adequate amount of water, avoid anti-inflammatories, reduce sodium in the diet and will continue to monitor the kidney function         Relevant Orders    Comprehensive metabolic panel    Magnesium       Other    Right hip pain      Increase the Neurontin to 300 mg b i d  she will continue with Tylenol as directed p r n  Relevant Medications    gabapentin (NEURONTIN) 300 mg capsule         RTO p r n  she will be seeing a new doctor in the  [facility] she is relocating and moving in with her daughter which is an excellent idea       Reason for visit is   Chief Complaint   Patient presents with    Virtual Regular Visit        Encounter provider Ishmael Cutler DO    Provider located at [address] [address] [id number]      Recent Visits  No visits were found meeting these conditions  Showing recent visits within past 7 days and meeting all other requirements     Today's Visits  Date Type Provider Dept   02/23/21 Telemedicine Jonah Daley, [address] [facility] today's visits and meeting all other requirements     Future Appointments  No visits were found meeting these conditions  Showing future appointments within next 150 days and meeting all other requirements        The patient was identified by name and date of birth  Edwige Werner was informed that this is a telemedicine visit and that the visit is being conducted through [facility] and patient was informed that this is a secure, HIPAA-compliant platform  She agrees to proceed     My office door was closed  No one else was in the room  She acknowledged consent and understanding of privacy and security of the video platform  The patient has agreed to participate and understands they can discontinue the visit at any time  Patient is aware this is a billable service  Subjective  Edwige Werner is a 80 y o  female    HPI 80-year old female coming in for a follow up office visit regarding right hip pain, hypothyroidism, chronic kidney disease and hypertension; now gaining weight no leg swelling no shortness of breath she does report me cold intolerance also normalization of her bowel movement she reports me she is now getting [employer] food on weight watcher 173 (168) since charo passed in Dec leg swelling stable and better weight gain in the belly no difficulty with breathing and still cant walk, needs walker she is doing her own exercizes , still with back pain form the operation; she always had soft stool normalized   Pt moving in with daughter, mentions pacemaker pain she will discuss this further with her cardiologist   She reports me that she is doing overall motion well since the passing of her  she does report some loneliness and will be moving in with her daughter in the [place]  No depression no anxiety    Past Medical History:   Diagnosis Date    Abdominal bloating 8/1/2016    Anemia     Arthritis     Cancer ([address] )     basal cell    CHF (congestive heart failure) (HCC)     Disease of thyroid gland     Disturbance of smell and taste     disturbance of taste    Effusion of knee joint right     Fibromyalgia     Heart murmur     reported a previous heart murmur    History of acute myocardial infarction     History of atrial fibrillation     History of bruising easily     History of cancer     History of dermatitis     History of mammogram     History of methicillin resistant staphylococcus aureus (MRSA)     Negative nasal culture, isolation discontinued 8/17/2018   History of methicillin resistant staphylococcus aureus (MRSA) 08/17/2018    negative nasal culture-isolation and hx discontinued 8/17/2018    History of obesity     History of osteopenia     History of sciatica     History of shortness of breath     History of sinusitis     History of sore throat     History of syncope     History of umbilical hernia     History of viral infection     Irritable bowel syndrome (IBS)     Joint pain, hip     Limb pain     Myalgia     myalgia and myositis    Need for prophylactic vaccination against single diseases     Need for prophylactic vaccination and inoculation against influenza     Preoperative cardiovascular examination     Primary osteoarthritis of right knee     Right leg pain     Vaginal Pap smear     reported pap smear       Past Surgical History:   Procedure Laterality Date    ANKLE ARTHRODESIS Right     BACK SURGERY      BARIATRIC SURGERY      CHOLECYSTECTOMY      x2    COLONOSCOPY      ESOPHAGOGASTRODUODENOSCOPY N/A 3/23/2018    Procedure: ESOPHAGOGASTRODUODENOSCOPY (EGD); Surgeon: Ye Tavares MD;  [facility]: [facility];   Service: Gastroenterology    FOOT SURGERY      HIP CLOSE REDUCTION Right 8/21/2016    Procedure: CLOSED REDUCTION RIGHT TOTAL HIP;  Surgeon: hPyllis Zamora MD;  [facility];  Service:    Ghazala Leahy / Gypsy Beckman / Dahlia Hackett IR BIOPSY OTHER  8/1/2020    JOINT REPLACEMENT      LEFT KNEE REPLACEMENT    JOINT REPLACEMENT      Right HIP    PILONIDAL CYST EXCISION      x2    ND EXC SKIN BENIG <0 5 CM TRUNK,ARM,LEG Right 7/24/2019    Procedure: REVISION SCAR EXTREMITY Rt Hip;   Surgeon: Robson Foster MD;  [facility];  Service: Orthopedics    ND REVISE TOTAL HIP REPLACEMENT Right 4/15/2019    Procedure: ARTHROPLASTY HIP TOTAL ACETABULAR REVISION;  Surgeon: Robson Foster MD;  [facility];  Service: Orthopedics    REPLACEMENT TOTAL KNEE Right     SILVANA-EN-Y PROCEDURE      x2    TOTAL KNEE ARTHROPLASTY      UMBILICAL HERNIA REPAIR      x2       Current Outpatient Medications   Medication Sig Dispense Refill    acetaminophen (TYLENOL) 500 mg tablet Take 2 tablets (1,000 mg total) by mouth every 8 (eight) hours 90 tablet 0    Cholecalciferol 125 MCG (5000 UT) TABS Take 5,000 Units by mouth daily      Cyanocobalamin (VITAMIN B12 PO) Take 1 capsule by mouth daily       desoximetasone (TOPICORT) 0 25 % cream Apply topically as needed for irritation 30 g 1    diclofenac sodium (VOLTAREN) 1 % Apply 2 g topically 4 (four) times a day Apply to right hip 150 g 0    dicyclomine (BENTYL) 10 mg capsule TAKE 1 CAPSULE (10 MG TOTAL) BY MOUTH EVERY 12 (TWELVE) HOURS 180 capsule 1    DULoxetine (CYMBALTA) 60 mg delayed release capsule TAKE 1 CAPSULE BY MOUTH EVERY DAY 90 capsule 1    fluticasone (FLONASE) 50 mcg/act nasal spray SPRAY 2 SPRAYS INTO EACH NOSTRIL EVERY DAY 16 mL 0    gabapentin (NEURONTIN) 300 mg capsule Take 1 capsule (300 mg total) by mouth 2 (two) times a day 60 capsule 3    levothyroxine 88 mcg tablet Take 1 tablet (88 mcg total) by mouth daily 90 tablet 1    methocarbamol (ROBAXIN) 750 mg tablet TAKE 1 TABLET (750 MG TOTAL) BY MOUTH EVERY 8 (EIGHT) HOURS AS NEEDED FOR MUSCLE SPASMS 60 tablet 0    methylPREDNISolone 4 MG tablet therapy pack Use as directed on package 1 each 0    potassium chloride 10% oral solution Take 15 mL (20 mEq total) by mouth daily 3800 mL 3    sucralfate (CARAFATE) 1 g tablet TAKE 1 TABLET (1 G TOTAL) BY MOUTH 2 (TWO) TIMES A  tablet 1    torsemide (DEMADEX) 20 mg tablet Take 1 tablet (20 mg total) by mouth daily 90 tablet 3    warfarin (COUMADIN) 1 mg tablet Take 1 tablet daily or as directed 90 tablet 1    warfarin (COUMADIN) 2 mg tablet Take 1 tablet daily or as instructed  90 tablet 1    warfarin (COUMADIN) 4 mg tablet TAKE ONE TABLET DAILY AS PREVIOUSLY DIRECTED BY CARDIOLOGY 90 tablet 1    ascorbic acid (VITAMIN C) 500 mg tablet Take 1 tablet (500 mg total) by mouth 2 (two) times a day (Patient not taking: Reported on 1/15/2021) 60 tablet 0    levothyroxine 75 mcg tablet TAKE 1 TABLET BY MOUTH EVERY DAY (Patient not taking: Reported on 2/23/2021) 90 tablet 1     No current facility-administered medications for this visit  Allergies   Allergen Reactions    Amoxicillin      Reports nausea, headache, dizzy    Codeine Vomiting and GI Intolerance     GI upset, ana m morphine  Reaction Date: [id number];     Other      Other reaction(s): Other (See Comments)  ana m toradol in OR 6/06 sah    Oxycodone-Acetaminophen      Other reaction(s): Other (See Comments)  GI upset; ana m vicodin    Percocet [Oxycodone-Acetaminophen]     Seasonal Ic  [Cholestatin] Allergic Rhinitis    Trimethoprim     Bactrim [Sulfamethoxazole-Trimethoprim] Rash    Sulfa Antibiotics Rash     Other reaction(s): Other (See Comments)  takes lasix @home       Review of Systems   Constitutional: Positive for unexpected weight change  Negative for activity change and appetite change  HENT: Negative for congestion and postnasal drip  Respiratory: Negative for cough and shortness of breath  Cardiovascular: Negative for chest pain and palpitations  Gastrointestinal: Negative for abdominal pain, diarrhea, nausea and vomiting  Endocrine: Positive for cold intolerance  Neurological: Negative for dizziness, light-headedness and headaches  Video Exam    There were no vitals filed for this visit  Physical Exam     I spent 25 minutes directly with the patient during this visit      [address] acknowledges that she has consented to an online visit or consultation  She understands that the online visit is based solely on information provided by her, and that, in the absence of a face-to-face physical evaluation by the physician, the diagnosis she receives is both limited and provisional in terms of accuracy and completeness  This is not intended to replace a full medical face-to-face evaluation by the physician  Carrie Nix understands and accepts these terms

## 2021-03-02 LAB
ALBUMIN SERPL-MCNC: 3.7 G/DL (ref 3.6–5.1)
ALBUMIN/GLOB SERPL: 1.4 (CALC) (ref 1–2.5)
ALP SERPL-CCNC: 76 U/L (ref 37–153)
ALT SERPL-CCNC: 11 U/L (ref 6–29)
AST SERPL-CCNC: 19 U/L (ref 10–35)
BILIRUB SERPL-MCNC: 0.3 MG/DL (ref 0.2–1.2)
BUN SERPL-MCNC: 21 MG/DL (ref 7–25)
BUN/CREAT SERPL: ABNORMAL (CALC) (ref 6–22)
CALCIUM SERPL-MCNC: 8.7 MG/DL (ref 8.6–10.4)
CHLORIDE SERPL-SCNC: 107 MMOL/L (ref 98–110)
CHOLEST SERPL-MCNC: 152 MG/DL
CHOLEST/HDLC SERPL: 2.4 (CALC)
CO2 SERPL-SCNC: 23 MMOL/L (ref 20–32)
CREAT SERPL-MCNC: 0.85 MG/DL (ref 0.6–0.88)
GLOBULIN SER CALC-MCNC: 2.7 G/DL (CALC) (ref 1.9–3.7)
GLUCOSE SERPL-MCNC: 123 MG/DL (ref 65–99)
HBA1C MFR BLD: 5 % OF TOTAL HGB
HDLC SERPL-MCNC: 64 MG/DL
INR PPP: 2
LDLC SERPL CALC-MCNC: 71 MG/DL (CALC)
NONHDLC SERPL-MCNC: 88 MG/DL (CALC)
POTASSIUM SERPL-SCNC: 4.2 MMOL/L (ref 3.5–5.3)
PROT SERPL-MCNC: 6.4 G/DL (ref 6.1–8.1)
PROTHROMBIN TIME: 19.9 SEC (ref 9–11.5)
SL AMB EGFR AFRICAN AMERICAN: 71 ML/MIN/1.73M2
SL AMB EGFR NON AFRICAN AMERICAN: 62 ML/MIN/1.73M2
SODIUM SERPL-SCNC: 141 MMOL/L (ref 135–146)
TRIGL SERPL-MCNC: 87 MG/DL
TSH SERPL-ACNC: 1.99 MIU/L (ref 0.4–4.5)

## 2021-03-03 ENCOUNTER — ANTICOAG VISIT (OUTPATIENT)
Dept: INTERNAL MEDICINE CLINIC | Facility: CLINIC | Age: 86
End: 2021-03-03

## 2021-03-09 ENCOUNTER — TELEPHONE (OUTPATIENT)
Dept: INTERNAL MEDICINE CLINIC | Facility: CLINIC | Age: 86
End: 2021-03-09

## 2021-03-09 DIAGNOSIS — E03.9 HYPOTHYROIDISM, UNSPECIFIED TYPE: ICD-10-CM

## 2021-03-09 RX ORDER — LEVOTHYROXINE SODIUM 88 UG/1
88 TABLET ORAL DAILY
Qty: 90 TABLET | Refills: 1 | Status: SHIPPED | OUTPATIENT
Start: 2021-03-09 | End: 2021-04-05 | Stop reason: ALTCHOICE

## 2021-03-09 NOTE — TELEPHONE ENCOUNTER
Patient would like to know the results of her blood work and whether she is to continue with the same dose of Levothyroxine  Please advise

## 2021-03-23 DIAGNOSIS — N90.89 VULVAR IRRITATION: ICD-10-CM

## 2021-03-24 RX ORDER — DESOXIMETASONE 2.5 MG/G
CREAM TOPICAL AS NEEDED
Qty: 30 G | Refills: 0 | Status: SHIPPED | OUTPATIENT
Start: 2021-03-24 | End: 2021-03-26 | Stop reason: SDUPTHER

## 2021-03-26 DIAGNOSIS — N90.89 VULVAR IRRITATION: ICD-10-CM

## 2021-03-26 RX ORDER — DESOXIMETASONE 2.5 MG/G
CREAM TOPICAL AS NEEDED
Qty: 30 G | Refills: 0 | Status: SHIPPED | OUTPATIENT
Start: 2021-03-26 | End: 2021-08-16 | Stop reason: SDUPTHER

## 2021-03-31 ENCOUNTER — ANTICOAG VISIT (OUTPATIENT)
Dept: INTERNAL MEDICINE CLINIC | Facility: CLINIC | Age: 86
End: 2021-03-31

## 2021-03-31 NOTE — PROGRESS NOTES
The patient moved to [facility]  She will find another MD in that area  However until she does she will have her INR drawn and sent to Dr Marcos Taylor

## 2021-04-02 ENCOUNTER — TRANSCRIBE ORDERS (OUTPATIENT)
Dept: LAB | Facility: CLINIC | Age: 86
End: 2021-04-02

## 2021-04-02 ENCOUNTER — APPOINTMENT (OUTPATIENT)
Dept: LAB | Facility: CLINIC | Age: 86
End: 2021-04-02
Payer: COMMERCIAL

## 2021-04-02 DIAGNOSIS — Z79.82 ENCOUNTER FOR LONG-TERM (CURRENT) USE OF ASPIRIN: Primary | ICD-10-CM

## 2021-04-02 DIAGNOSIS — E03.9 HYPOTHYROIDISM, UNSPECIFIED TYPE: ICD-10-CM

## 2021-04-02 DIAGNOSIS — I10 BENIGN ESSENTIAL HTN: ICD-10-CM

## 2021-04-02 DIAGNOSIS — I10 BENIGN ESSENTIAL HYPERTENSION: ICD-10-CM

## 2021-04-02 DIAGNOSIS — N18.31 STAGE 3A CHRONIC KIDNEY DISEASE (HCC): ICD-10-CM

## 2021-04-02 DIAGNOSIS — R79.82 ELEVATED C-REACTIVE PROTEIN (CRP): ICD-10-CM

## 2021-04-02 LAB
ALBUMIN SERPL BCP-MCNC: 3.8 G/DL (ref 3.5–5)
ALP SERPL-CCNC: 113 U/L (ref 46–116)
ALT SERPL W P-5'-P-CCNC: 19 U/L (ref 12–78)
ANION GAP SERPL CALCULATED.3IONS-SCNC: 6 MMOL/L (ref 4–13)
AST SERPL W P-5'-P-CCNC: 21 U/L (ref 5–45)
BASOPHILS # BLD AUTO: 0.03 THOUSANDS/ΜL (ref 0–0.1)
BASOPHILS NFR BLD AUTO: 1 % (ref 0–1)
BILIRUB SERPL-MCNC: 0.45 MG/DL (ref 0.2–1)
BUN SERPL-MCNC: 21 MG/DL (ref 5–25)
CALCIUM SERPL-MCNC: 9 MG/DL (ref 8.3–10.1)
CHLORIDE SERPL-SCNC: 111 MMOL/L (ref 100–108)
CHOLEST SERPL-MCNC: 161 MG/DL (ref 50–200)
CO2 SERPL-SCNC: 26 MMOL/L (ref 21–32)
CREAT SERPL-MCNC: 0.85 MG/DL (ref 0.6–1.3)
CRP SERPL QL: 8.2 MG/L
EOSINOPHIL # BLD AUTO: 0.1 THOUSAND/ΜL (ref 0–0.61)
EOSINOPHIL NFR BLD AUTO: 2 % (ref 0–6)
ERYTHROCYTE [DISTWIDTH] IN BLOOD BY AUTOMATED COUNT: 13.5 % (ref 11.6–15.1)
ERYTHROCYTE [SEDIMENTATION RATE] IN BLOOD: 36 MM/HOUR (ref 0–29)
EST. AVERAGE GLUCOSE BLD GHB EST-MCNC: 105 MG/DL
GFR SERPL CREATININE-BSD FRML MDRD: 62 ML/MIN/1.73SQ M
GLUCOSE P FAST SERPL-MCNC: 88 MG/DL (ref 65–99)
HBA1C MFR BLD: 5.3 %
HCT VFR BLD AUTO: 36.7 % (ref 34.8–46.1)
HDLC SERPL-MCNC: 85 MG/DL
HGB BLD-MCNC: 11 G/DL (ref 11.5–15.4)
IMM GRANULOCYTES # BLD AUTO: 0.03 THOUSAND/UL (ref 0–0.2)
IMM GRANULOCYTES NFR BLD AUTO: 1 % (ref 0–2)
LDLC SERPL CALC-MCNC: 63 MG/DL (ref 0–100)
LYMPHOCYTES # BLD AUTO: 1.15 THOUSANDS/ΜL (ref 0.6–4.47)
LYMPHOCYTES NFR BLD AUTO: 18 % (ref 14–44)
MAGNESIUM SERPL-MCNC: 2.2 MG/DL (ref 1.6–2.6)
MCH RBC QN AUTO: 31.1 PG (ref 26.8–34.3)
MCHC RBC AUTO-ENTMCNC: 30 G/DL (ref 31.4–37.4)
MCV RBC AUTO: 104 FL (ref 82–98)
MONOCYTES # BLD AUTO: 0.72 THOUSAND/ΜL (ref 0.17–1.22)
MONOCYTES NFR BLD AUTO: 12 % (ref 4–12)
NEUTROPHILS # BLD AUTO: 4.24 THOUSANDS/ΜL (ref 1.85–7.62)
NEUTS SEG NFR BLD AUTO: 66 % (ref 43–75)
NRBC BLD AUTO-RTO: 0 /100 WBCS
PLATELET # BLD AUTO: 185 THOUSANDS/UL (ref 149–390)
PMV BLD AUTO: 11.1 FL (ref 8.9–12.7)
POTASSIUM SERPL-SCNC: 3.7 MMOL/L (ref 3.5–5.3)
PROT SERPL-MCNC: 7.5 G/DL (ref 6.4–8.2)
RBC # BLD AUTO: 3.54 MILLION/UL (ref 3.81–5.12)
SODIUM SERPL-SCNC: 143 MMOL/L (ref 136–145)
TRIGL SERPL-MCNC: 65 MG/DL
TSH SERPL DL<=0.05 MIU/L-ACNC: 3.76 UIU/ML (ref 0.36–3.74)
WBC # BLD AUTO: 6.27 THOUSAND/UL (ref 4.31–10.16)

## 2021-04-02 PROCEDURE — 85652 RBC SED RATE AUTOMATED: CPT

## 2021-04-02 PROCEDURE — 84443 ASSAY THYROID STIM HORMONE: CPT

## 2021-04-02 PROCEDURE — 86140 C-REACTIVE PROTEIN: CPT

## 2021-04-02 PROCEDURE — 83735 ASSAY OF MAGNESIUM: CPT

## 2021-04-02 PROCEDURE — 83036 HEMOGLOBIN GLYCOSYLATED A1C: CPT

## 2021-04-02 PROCEDURE — 80053 COMPREHEN METABOLIC PANEL: CPT

## 2021-04-02 PROCEDURE — 85025 COMPLETE CBC W/AUTO DIFF WBC: CPT

## 2021-04-02 PROCEDURE — 80061 LIPID PANEL: CPT

## 2021-04-05 DIAGNOSIS — E03.9 HYPOTHYROIDISM, UNSPECIFIED TYPE: Primary | ICD-10-CM

## 2021-04-05 RX ORDER — LEVOTHYROXINE SODIUM 0.1 MG/1
100 TABLET ORAL DAILY
Qty: 30 TABLET | Refills: 5 | Status: SHIPPED | OUTPATIENT
Start: 2021-04-05 | End: 2021-07-26

## 2021-04-06 DIAGNOSIS — I48.20 CHRONIC ATRIAL FIBRILLATION (HCC): Primary | ICD-10-CM

## 2021-04-06 RX ORDER — WARFARIN SODIUM 5 MG/1
TABLET ORAL
Qty: 30 TABLET | Refills: 5 | Status: SHIPPED | OUTPATIENT
Start: 2021-04-06 | End: 2021-07-07 | Stop reason: SDUPTHER

## 2021-04-12 ENCOUNTER — OFFICE VISIT (OUTPATIENT)
Dept: CARDIOLOGY CLINIC | Facility: CLINIC | Age: 86
End: 2021-04-12
Payer: COMMERCIAL

## 2021-04-12 VITALS
RESPIRATION RATE: 18 BRPM | DIASTOLIC BLOOD PRESSURE: 72 MMHG | BODY MASS INDEX: 34.36 KG/M2 | WEIGHT: 175 LBS | SYSTOLIC BLOOD PRESSURE: 126 MMHG | HEART RATE: 83 BPM | OXYGEN SATURATION: 96 % | HEIGHT: 60 IN

## 2021-04-12 DIAGNOSIS — Z95.0 PRESENCE OF CARDIAC PACEMAKER: ICD-10-CM

## 2021-04-12 DIAGNOSIS — Z95.0 PACEMAKER: Primary | ICD-10-CM

## 2021-04-12 DIAGNOSIS — Z76.89 ENCOUNTER TO ESTABLISH CARE: ICD-10-CM

## 2021-04-12 PROCEDURE — 1036F TOBACCO NON-USER: CPT | Performed by: INTERNAL MEDICINE

## 2021-04-12 PROCEDURE — 93000 ELECTROCARDIOGRAM COMPLETE: CPT | Performed by: INTERNAL MEDICINE

## 2021-04-12 PROCEDURE — 99214 OFFICE O/P EST MOD 30 MIN: CPT | Performed by: INTERNAL MEDICINE

## 2021-04-12 PROCEDURE — 1160F RVW MEDS BY RX/DR IN RCRD: CPT | Performed by: INTERNAL MEDICINE

## 2021-04-12 NOTE — PROGRESS NOTES
Cardiology Office Note    Olga Espinoza 80 y o  female MRN: [id number]    04/12/21          Assessment:  1  Chronic atrial fibrillation s/p AV Node ablation and MDT SC HIS PPM (2018)  2  Chronic HFpEF  3  Moderate mitral regurgitation  4  Hypertension   5  Ambulatory dysfucntion    Plan:  · She is close to euvolemic on torsemide   · Cont warfarin; will enroll in the coumadin clinic   · Ambulatory blood pressure monitoring and maintaining a low sodium diet was advised  · She was advised to notify us with the onset of cardiac symptoms      Follow up: 6 months or sooner as needed    1  Pacemaker     2  Presence of cardiac pacemaker  POCT ECG   3  Encounter to establish care  POCT ECG       HPI: Olga Espinoza is a 80y o  year old female  With history of atrial fibrillation s/p AV node ablation and MDT ppm implantation presents for routine follow-up  Past cardiac evaluation:  · AF with inability to tolerate rate control s/p AV Node ablation and MDT SC  PPM (2018)  · TTE 2018: EF: 55, biatrial dilation, moderate MR, TR    She has been doing well from a cardiac standpoint since her last evaluation  She recently relocated to this area  She did not take her torsemide for several days during her move and had progressive edema  She has resumed her torsemide with improvement  She has ambulatory dysfunction secondary to chronic hip pain  She denies recent falls or bleeding  She denies any other cardiac concerns at this time  Allergies   Allergen Reactions    Amoxicillin      Reports nausea, headache, dizzy    Codeine Vomiting and GI Intolerance     GI upset, ana m morphine  Reaction Date: [id number];     Other      Other reaction(s): Other (See Comments)  ana m toradol in OR 6/06 sah    Oxycodone-Acetaminophen      Other reaction(s):  Other (See Comments)  GI upset; ana m vicodin    Percocet [Oxycodone-Acetaminophen]     Seasonal Ic  [Cholestatin] Allergic Rhinitis    Trimethoprim     Bactrim [Sulfamethoxazole-Trimethoprim] Rash    Sulfa Antibiotics Rash     Other reaction(s):  Other (See Comments)  takes lasix @home         Current Outpatient Medications:     acetaminophen (TYLENOL) 500 mg tablet, Take 2 tablets (1,000 mg total) by mouth every 8 (eight) hours, Disp: 90 tablet, Rfl: 0    ascorbic acid (VITAMIN C) 500 mg tablet, Take 1 tablet (500 mg total) by mouth 2 (two) times a day, Disp: 60 tablet, Rfl: 0    Cholecalciferol 125 MCG (5000 UT) TABS, Take 5,000 Units by mouth daily, Disp: , Rfl:     Cyanocobalamin (VITAMIN B12 PO), Take 1 capsule by mouth daily , Disp: , Rfl:     desoximetasone (TOPICORT) 0 25 % cream, Apply topically as needed for irritation, Disp: 30 g, Rfl: 0    diclofenac sodium (VOLTAREN) 1 %, Apply 2 g topically 4 (four) times a day Apply to right hip, Disp: 150 g, Rfl: 0    dicyclomine (BENTYL) 10 mg capsule, TAKE 1 CAPSULE (10 MG TOTAL) BY MOUTH EVERY 12 (TWELVE) HOURS, Disp: 180 capsule, Rfl: 1    DULoxetine (CYMBALTA) 60 mg delayed release capsule, TAKE 1 CAPSULE BY MOUTH EVERY DAY, Disp: 90 capsule, Rfl: 1    fluticasone (FLONASE) 50 mcg/act nasal spray, SPRAY 2 SPRAYS INTO EACH NOSTRIL EVERY DAY, Disp: 16 mL, Rfl: 0    gabapentin (NEURONTIN) 300 mg capsule, Take 1 capsule (300 mg total) by mouth 2 (two) times a day, Disp: 60 capsule, Rfl: 3    levothyroxine 100 mcg tablet, Take 1 tablet (100 mcg total) by mouth daily, Disp: 30 tablet, Rfl: 5    methocarbamol (ROBAXIN) 750 mg tablet, TAKE 1 TABLET (750 MG TOTAL) BY MOUTH EVERY 8 (EIGHT) HOURS AS NEEDED FOR MUSCLE SPASMS, Disp: 60 tablet, Rfl: 0    potassium chloride 10% oral solution, Take 15 mL (20 mEq total) by mouth daily, Disp: 3800 mL, Rfl: 3    sucralfate (CARAFATE) 1 g tablet, TAKE 1 TABLET (1 G TOTAL) BY MOUTH 2 (TWO) TIMES A DAY, Disp: 180 tablet, Rfl: 1    torsemide (DEMADEX) 20 mg tablet, Take 1 tablet (20 mg total) by mouth daily, Disp: 90 tablet, Rfl: 3    warfarin (COUMADIN) 1 mg tablet, Take 1 tablet daily or as directed, Disp: 90 tablet, Rfl: 1    warfarin (COUMADIN) 2 mg tablet, Take 1 tablet daily or as instructed , Disp: 90 tablet, Rfl: 1    warfarin (COUMADIN) 4 mg tablet, TAKE ONE TABLET DAILY AS PREVIOUSLY DIRECTED BY CARDIOLOGY, Disp: 90 tablet, Rfl: 1    warfarin (COUMADIN) 5 mg tablet, Take 1 tablet daily as directed , Disp: 30 tablet, Rfl: 5    Past Medical History:   Diagnosis Date    Abdominal bloating 8/1/2016    Anemia     Arthritis     Cancer ([address] )     basal cell    CHF (congestive heart failure) (HCC)     Disease of thyroid gland     Disturbance of smell and taste     disturbance of taste    Effusion of knee joint right     Fibromyalgia     Heart murmur     reported a previous heart murmur    History of acute myocardial infarction     History of atrial fibrillation     History of bruising easily     History of cancer     History of dermatitis     History of mammogram     History of methicillin resistant staphylococcus aureus (MRSA)     Negative nasal culture, isolation discontinued 8/17/2018      History of methicillin resistant staphylococcus aureus (MRSA) 08/17/2018    negative nasal culture-isolation and hx discontinued 8/17/2018    History of obesity     History of osteopenia     History of sciatica     History of shortness of breath     History of sinusitis     History of sore throat     History of syncope     History of umbilical hernia     History of viral infection     Irritable bowel syndrome (IBS)     Joint pain, hip     Limb pain     Myalgia     myalgia and myositis    Need for prophylactic vaccination against single diseases     Need for prophylactic vaccination and inoculation against influenza     Preoperative cardiovascular examination     Primary osteoarthritis of right knee     Right leg pain     Vaginal Pap smear     reported pap smear       Family History   Problem Relation Age of Onset    Coronary artery disease Mother     Heart attack Mother     Hypertension Mother     Coronary artery disease Father     Heart attack Father    [employer] Hypertension Father     Lymphoma Sister         acute    Rashes / Skin problems Sister         lymphomatoid papulosis    Cancer Sister     Coronary artery disease Brother     Heart attack Brother         x2    Aneurysm Neg Hx     Hyperlipidemia Neg Hx        Past Surgical History:   Procedure Laterality Date    ANKLE ARTHRODESIS Right     BACK SURGERY      BARIATRIC SURGERY      CHOLECYSTECTOMY      x2    COLONOSCOPY      ESOPHAGOGASTRODUODENOSCOPY N/A 3/23/2018    Procedure: ESOPHAGOGASTRODUODENOSCOPY (EGD); Surgeon: Matthew Singh MD;  Location: [facility]; Service: Gastroenterology    FOOT SURGERY      HIP CLOSE REDUCTION Right 8/21/2016    Procedure: CLOSED REDUCTION RIGHT TOTAL HIP;  Surgeon: Sharlotte Castleman, MD;  [facility];  Service:    Naima Bennett / Amarilys Gannon / Amber Burrows IR BIOPSY OTHER  8/1/2020    JOINT REPLACEMENT      LEFT KNEE REPLACEMENT    JOINT REPLACEMENT      Right HIP    PILONIDAL CYST EXCISION      x2    RI EXC SKIN BENIG <0 5 CM TRUNK,ARM,LEG Right 7/24/2019    Procedure: REVISION SCAR EXTREMITY Rt Hip;   Surgeon: Arnoldo iL MD;  [facility];  Service: Orthopedics    RI REVISE TOTAL HIP REPLACEMENT Right 4/15/2019    Procedure: ARTHROPLASTY HIP TOTAL ACETABULAR REVISION;  Surgeon: Arnoldo Li MD;  [facility];  Service: Orthopedics    REPLACEMENT TOTAL KNEE Right     SILVANA-EN-Y PROCEDURE      x2    TOTAL KNEE ARTHROPLASTY      UMBILICAL HERNIA REPAIR      x2       Social History     Socioeconomic History    Marital status: /Civil Union     Spouse name: Not on file    Number of children: Not on file    Years of education: Not on file    Highest education level: Not on file   Occupational History    Not on file   Social Needs    Financial resource strain: Not on file    Food insecurity     Worry: Not on file Inability: Not on file    Transportation needs     Medical: Not on file     Non-medical: Not on file   Tobacco Use    Smoking status: Never Smoker    Smokeless tobacco: Never Used   Substance and Sexual Activity    Alcohol use: Not Currently     Frequency: Never    Drug use: No    Sexual activity: Never   Lifestyle    Physical activity     Days per week: Not on file     Minutes per session: Not on file    Stress: Not on file   Relationships    Social connections     Talks on phone: Not on file     Gets together: Not on file     Attends [employer] service: Not on file     Active member of club or organization: Not on file     Attends meetings of clubs or organizations: Not on file     Relationship status: Not on file    Intimate partner violence     Fear of current or ex partner: Not on file     Emotionally abused: Not on file     Physically abused: Not on file     Forced sexual activity: Not on file   Other Topics Concern    Not on file   Social History Narrative    Not on file       Review of Systems   Constitution: Negative for diaphoresis, weight gain and weight loss  HENT: Negative for congestion  Cardiovascular: Positive for leg swelling (trace/improving)  Negative for chest pain, dyspnea on exertion, irregular heartbeat, near-syncope, orthopnea, palpitations, paroxysmal nocturnal dyspnea and syncope  Respiratory: Negative for shortness of breath, sleep disturbances due to breathing and snoring  Hematologic/Lymphatic: Does not bruise/bleed easily  Skin: Negative for rash  Musculoskeletal: Negative for myalgias  Gastrointestinal: Negative for nausea and vomiting  Neurological: Negative for excessive daytime sleepiness and light-headedness  Psychiatric/Behavioral: The patient is not nervous/anxious          Vitals: /72   Pulse 83   Resp 18   Ht 5' (1 524 m)   Wt 79 4 kg (175 lb)   LMP  (LMP Unknown)   SpO2 96%   BMI 34 18 kg/m²       Physical Exam:     GEN: Alert and oriented x 3, in no acute distress  Well appearing and well nourished  HEENT: Sclera anicteric, conjunctivae pink, mucous membranes moist  Oropharynx clear  NECK: Supple, no carotid bruits, no significant JVD  Trachea midline, no thyromegaly  HEART: Regular rhythm, normal S1 and S2, no murmurs, clicks, gallops or rubs  PMI nondisplaced, no thrills  LUNGS: Clear to auscultation bilaterally; no wheezes, rales, or rhonchi  No increased work of breathing or signs of respiratory distress  ABDOMEN: Soft, nontender, nondistended, normoactive bowel sounds  EXTREMITIES: Skin warm and well perfused, no clubbing, or cyanosis, trace edema  NEURO: No focal findings  Normal speech  Mood and affect normal    SKIN: Normal without suspicious lesions on exposed skin

## 2021-04-21 ENCOUNTER — REMOTE DEVICE CLINIC VISIT (OUTPATIENT)
Dept: CARDIOLOGY CLINIC | Facility: CLINIC | Age: 86
End: 2021-04-21
Payer: COMMERCIAL

## 2021-04-21 DIAGNOSIS — Z95.0 PRESENCE OF PERMANENT CARDIAC PACEMAKER: Primary | ICD-10-CM

## 2021-04-21 PROCEDURE — 93296 REM INTERROG EVL PM/IDS: CPT | Performed by: INTERNAL MEDICINE

## 2021-04-21 PROCEDURE — 93294 REM INTERROG EVL PM/LDLS PM: CPT | Performed by: INTERNAL MEDICINE

## 2021-04-21 NOTE — PROGRESS NOTES
MDT-SINGLE CHAMBER "HIS" PPM / ACTIVE SYSTEM IS MRI CONDITIONAL   CARELINK TRANSMISSION:  BATTERY VOLTAGE ADEQUATE (5 6 YR)     99 1% (>40%/VVIR 70 PPM, HIS)   ALL AVAILABLE LEAD PARAMETERS WITHIN NORMAL LIMITS   NO HIGH RATE EPISODES   NORMAL DEVICE FUNCTION   RG

## 2021-05-03 ENCOUNTER — TRANSCRIBE ORDERS (OUTPATIENT)
Dept: LAB | Facility: CLINIC | Age: 86
End: 2021-05-03

## 2021-05-03 ENCOUNTER — APPOINTMENT (OUTPATIENT)
Dept: LAB | Facility: CLINIC | Age: 86
End: 2021-05-03
Payer: COMMERCIAL

## 2021-05-03 DIAGNOSIS — E03.9 HYPOTHYROIDISM, UNSPECIFIED TYPE: ICD-10-CM

## 2021-05-03 LAB — TSH SERPL DL<=0.05 MIU/L-ACNC: 1.41 UIU/ML (ref 0.36–3.74)

## 2021-05-03 PROCEDURE — 84443 ASSAY THYROID STIM HORMONE: CPT

## 2021-05-04 ENCOUNTER — ANTICOAG VISIT (OUTPATIENT)
Dept: INTERNAL MEDICINE CLINIC | Facility: CLINIC | Age: 86
End: 2021-05-04

## 2021-05-11 ENCOUNTER — APPOINTMENT (OUTPATIENT)
Dept: LAB | Facility: CLINIC | Age: 86
End: 2021-05-11
Payer: COMMERCIAL

## 2021-05-11 ENCOUNTER — TRANSCRIBE ORDERS (OUTPATIENT)
Dept: LAB | Facility: CLINIC | Age: 86
End: 2021-05-11

## 2021-05-12 ENCOUNTER — ANTICOAG VISIT (OUTPATIENT)
Dept: INTERNAL MEDICINE CLINIC | Facility: CLINIC | Age: 86
End: 2021-05-12

## 2021-05-25 ENCOUNTER — TRANSCRIBE ORDERS (OUTPATIENT)
Dept: LAB | Facility: CLINIC | Age: 86
End: 2021-05-25

## 2021-05-25 ENCOUNTER — APPOINTMENT (OUTPATIENT)
Dept: LAB | Facility: CLINIC | Age: 86
End: 2021-05-25
Payer: COMMERCIAL

## 2021-05-26 ENCOUNTER — ANTICOAG VISIT (OUTPATIENT)
Dept: INTERNAL MEDICINE CLINIC | Facility: CLINIC | Age: 86
End: 2021-05-26

## 2021-06-08 DIAGNOSIS — K58.1 IRRITABLE BOWEL SYNDROME WITH CONSTIPATION: ICD-10-CM

## 2021-06-09 RX ORDER — DICYCLOMINE HYDROCHLORIDE 10 MG/1
10 CAPSULE ORAL EVERY 12 HOURS
Qty: 180 CAPSULE | Refills: 1 | Status: SHIPPED | OUTPATIENT
Start: 2021-06-09 | End: 2022-08-03 | Stop reason: ALTCHOICE

## 2021-07-06 ENCOUNTER — APPOINTMENT (OUTPATIENT)
Dept: LAB | Facility: CLINIC | Age: 86
End: 2021-07-06
Payer: COMMERCIAL

## 2021-07-07 ENCOUNTER — ANTICOAG VISIT (OUTPATIENT)
Dept: INTERNAL MEDICINE CLINIC | Facility: CLINIC | Age: 86
End: 2021-07-07

## 2021-07-07 DIAGNOSIS — M25.551 RIGHT HIP PAIN: ICD-10-CM

## 2021-07-07 DIAGNOSIS — M70.60 TROCHANTERIC BURSITIS: ICD-10-CM

## 2021-07-07 DIAGNOSIS — I48.20 CHRONIC ATRIAL FIBRILLATION (HCC): ICD-10-CM

## 2021-07-07 DIAGNOSIS — G89.29 OTHER CHRONIC PAIN: ICD-10-CM

## 2021-07-07 DIAGNOSIS — I48.20 ATRIAL FIBRILLATION, CHRONIC (HCC): ICD-10-CM

## 2021-07-07 RX ORDER — WARFARIN SODIUM 1 MG/1
TABLET ORAL
Qty: 90 TABLET | Refills: 1 | Status: SHIPPED | OUTPATIENT
Start: 2021-07-07 | End: 2022-01-07

## 2021-07-07 RX ORDER — WARFARIN SODIUM 5 MG/1
TABLET ORAL
Qty: 90 TABLET | Refills: 1 | Status: SHIPPED | OUTPATIENT
Start: 2021-07-07 | End: 2022-05-10

## 2021-07-07 RX ORDER — DULOXETIN HYDROCHLORIDE 60 MG/1
60 CAPSULE, DELAYED RELEASE ORAL DAILY
Qty: 90 CAPSULE | Refills: 1 | Status: SHIPPED | OUTPATIENT
Start: 2021-07-07 | End: 2022-01-10

## 2021-07-07 RX ORDER — METHOCARBAMOL 750 MG/1
750 TABLET, FILM COATED ORAL EVERY 8 HOURS PRN
Qty: 90 TABLET | Refills: 0 | Status: SHIPPED | OUTPATIENT
Start: 2021-07-07 | End: 2021-08-16 | Stop reason: SDUPTHER

## 2021-07-20 ENCOUNTER — APPOINTMENT (OUTPATIENT)
Dept: LAB | Facility: CLINIC | Age: 86
End: 2021-07-20
Payer: COMMERCIAL

## 2021-07-20 ENCOUNTER — OFFICE VISIT (OUTPATIENT)
Dept: FAMILY MEDICINE CLINIC | Facility: CLINIC | Age: 86
End: 2021-07-20
Payer: COMMERCIAL

## 2021-07-20 VITALS
WEIGHT: 171 LBS | BODY MASS INDEX: 33.57 KG/M2 | OXYGEN SATURATION: 95 % | TEMPERATURE: 96.9 F | SYSTOLIC BLOOD PRESSURE: 144 MMHG | HEIGHT: 60 IN | HEART RATE: 74 BPM | DIASTOLIC BLOOD PRESSURE: 78 MMHG | RESPIRATION RATE: 18 BRPM

## 2021-07-20 DIAGNOSIS — E66.09 CLASS 1 OBESITY DUE TO EXCESS CALORIES WITH SERIOUS COMORBIDITY AND BODY MASS INDEX (BMI) OF 33.0 TO 33.9 IN ADULT: ICD-10-CM

## 2021-07-20 DIAGNOSIS — M25.551 RIGHT HIP PAIN: ICD-10-CM

## 2021-07-20 DIAGNOSIS — E03.9 HYPOTHYROIDISM, UNSPECIFIED TYPE: ICD-10-CM

## 2021-07-20 DIAGNOSIS — M70.61 TROCHANTERIC BURSITIS OF RIGHT HIP: ICD-10-CM

## 2021-07-20 DIAGNOSIS — Z13.31 NEGATIVE DEPRESSION SCREENING: ICD-10-CM

## 2021-07-20 DIAGNOSIS — I50.32 CHRONIC DIASTOLIC CHF (CONGESTIVE HEART FAILURE) (HCC): ICD-10-CM

## 2021-07-20 DIAGNOSIS — N18.31 STAGE 3A CHRONIC KIDNEY DISEASE (HCC): ICD-10-CM

## 2021-07-20 DIAGNOSIS — Z76.89 ENCOUNTER TO ESTABLISH CARE WITH NEW DOCTOR: Primary | ICD-10-CM

## 2021-07-20 LAB
INR PPP: 3.53 (ref 0.84–1.19)
PROTHROMBIN TIME: 35.1 SECONDS (ref 11.6–14.5)

## 2021-07-20 PROCEDURE — 85610 PROTHROMBIN TIME: CPT

## 2021-07-20 PROCEDURE — 1036F TOBACCO NON-USER: CPT | Performed by: FAMILY MEDICINE

## 2021-07-20 PROCEDURE — 1160F RVW MEDS BY RX/DR IN RCRD: CPT | Performed by: FAMILY MEDICINE

## 2021-07-20 PROCEDURE — 36415 COLL VENOUS BLD VENIPUNCTURE: CPT

## 2021-07-20 PROCEDURE — 99213 OFFICE O/P EST LOW 20 MIN: CPT | Performed by: FAMILY MEDICINE

## 2021-07-20 NOTE — PROGRESS NOTES
Assessment/Plan:    No problem-specific Assessment & Plan notes found for this encounter  Diagnoses and all orders for this visit:    Encounter to establish care with new doctor    Right hip pain  -     Diclofenac Sodium (VOLTAREN) 1 %; Apply 2 g topically 4 (four) times a day  -     Ambulatory referral to Orthopedic Surgery; Future    Trochanteric bursitis of right hip  -     Diclofenac Sodium (VOLTAREN) 1 %; Apply 2 g topically 4 (four) times a day  -     Ambulatory referral to Orthopedic Surgery; Future    Class 1 obesity due to excess calories with serious comorbidity and body mass index (BMI) of 33 0 to 33 9 in adult    Negative depression screening    Stage 3a chronic kidney disease (HCC)    Chronic diastolic CHF (congestive heart failure) (HCC)    Hypothyroidism, unspecified type          PHQ-9 Depression Screening    PHQ-9:   Frequency of the following problems over the past two weeks:            BMI Counseling: Body mass index is 33 4 kg/m²  The BMI is above normal  Nutrition recommendations include reducing portion sizes and 3-5 servings of fruits/vegetables daily  Exercise recommendations include moderate aerobic physical activity for 150 minutes/week and exercising 3-5 times per week  Subjective:      Patient ID: Jennifer Keyes is a 80 y o  female  New pt here to establish with afib, fibromyalgia, arthritis, bursitis, hypothyroidism, right THR, CHF, seasonal allergies, back surgery, right foot surgery x2, kalyani, non smoker, social EtoH, no illegal drugs, , 2 children, retired, had gastric bypass surgery      The following portions of the patient's history were reviewed and updated as appropriate: allergies, current medications, past family history, past medical history, past social history, past surgical history and problem list     Review of Systems   Constitutional: Negative for chills, fatigue and fever  HENT: Negative  Negative for hearing loss      Eyes: Positive for visual disturbance  Respiratory: Negative for shortness of breath and wheezing  Cardiovascular: Positive for palpitations  Negative for chest pain  Gastrointestinal: Negative for abdominal pain, blood in stool, constipation, diarrhea, nausea and vomiting  Endocrine: Negative  Genitourinary: Negative for difficulty urinating and dysuria  Musculoskeletal: Positive for arthralgias  Negative for myalgias  Skin: Negative  Allergic/Immunologic: Negative  Neurological: Negative for seizures and syncope  Hematological: Negative for adenopathy  Psychiatric/Behavioral: Negative  Objective:    /78   Pulse 74   Temp (!) 96 9 °F (36 1 °C)   Resp 18   Ht 5' (1 524 m)   Wt 77 6 kg (171 lb)   LMP  (LMP Unknown)   SpO2 95%   BMI 33 40 kg/m²      Physical Exam  Vitals and nursing note reviewed  Constitutional:       General: She is not in acute distress  Appearance: Normal appearance  She is well-developed  She is not ill-appearing, toxic-appearing or diaphoretic  HENT:      Head: Normocephalic and atraumatic  Right Ear: Tympanic membrane, ear canal and external ear normal  There is no impacted cerumen  Left Ear: Tympanic membrane, ear canal and external ear normal  There is no impacted cerumen  Nose: Nose normal  No congestion or rhinorrhea  Mouth/Throat:      Mouth: Mucous membranes are moist       Pharynx: Oropharynx is clear  No oropharyngeal exudate or posterior oropharyngeal erythema  Eyes:      General: No scleral icterus  Right eye: No discharge  Left eye: No discharge  Conjunctiva/sclera: Conjunctivae normal       Pupils: Pupils are equal, round, and reactive to light  Cardiovascular:      Rate and Rhythm: Normal rate and regular rhythm  Pulses: Normal pulses  Heart sounds: Normal heart sounds  No murmur heard  Pulmonary:      Effort: Pulmonary effort is normal  No respiratory distress        Breath sounds: Normal breath sounds  No wheezing, rhonchi or rales  Abdominal:      General: Bowel sounds are normal  There is no distension  Palpations: Abdomen is soft  There is no mass  Tenderness: There is no abdominal tenderness  There is no guarding or rebound  Musculoskeletal:         General: Normal range of motion  Cervical back: Normal range of motion and neck supple  No rigidity  Right lower leg: No edema  Left lower leg: No edema  Lymphadenopathy:      Cervical: No cervical adenopathy  Skin:     General: Skin is warm and dry  Findings: No rash  Neurological:      General: No focal deficit present  Mental Status: She is alert and oriented to person, place, and time  Sensory: No sensory deficit  Motor: No weakness or abnormal muscle tone  Coordination: Coordination normal       Gait: Gait normal       Deep Tendon Reflexes: Reflexes are normal and symmetric  Psychiatric:         Mood and Affect: Mood normal          Behavior: Behavior normal          Thought Content:  Thought content normal          Judgment: Judgment normal

## 2021-07-21 ENCOUNTER — REMOTE DEVICE CLINIC VISIT (OUTPATIENT)
Dept: CARDIOLOGY CLINIC | Facility: CLINIC | Age: 86
End: 2021-07-21
Payer: COMMERCIAL

## 2021-07-21 DIAGNOSIS — Z95.0 CARDIAC PACEMAKER: Primary | ICD-10-CM

## 2021-07-21 PROCEDURE — 93296 REM INTERROG EVL PM/IDS: CPT | Performed by: INTERNAL MEDICINE

## 2021-07-21 PROCEDURE — 93294 REM INTERROG EVL PM/LDLS PM: CPT | Performed by: INTERNAL MEDICINE

## 2021-07-21 NOTE — PROGRESS NOTES
Results for orders placed or performed in visit on 07/21/21   Cardiac EP device report    Narrative    MDT-SINGLE CHAMBER "HIS" PPM / ACTIVE SYSTEM IS MRI CONDITIONAL  CARELINK TRANSMISSION: BATTERY VOLTAGE ADEQUATE (5 1 YRS)   99 4% (>40%/VVIR 70/"HIS" LEAD)  ALL AVAILABLE LEAD PARAMETERS WITHIN NORMAL LIMITS  NO SIGNIFICANT HIGH RATE EPISODES  CHRONIC AF, S/P AVN ABL & ON WARFARIN  PACEMAKER FUNCTIONING APPROPRIATELY     EBS

## 2021-07-24 DIAGNOSIS — E03.9 HYPOTHYROIDISM, UNSPECIFIED TYPE: ICD-10-CM

## 2021-07-26 RX ORDER — LEVOTHYROXINE SODIUM 0.1 MG/1
TABLET ORAL
Qty: 90 TABLET | Refills: 1 | Status: SHIPPED | OUTPATIENT
Start: 2021-07-26 | End: 2021-08-16 | Stop reason: SDUPTHER

## 2021-08-11 ENCOUNTER — ANTICOAG VISIT (OUTPATIENT)
Dept: INTERNAL MEDICINE CLINIC | Facility: CLINIC | Age: 86
End: 2021-08-11

## 2021-08-14 DIAGNOSIS — M25.551 RIGHT HIP PAIN: ICD-10-CM

## 2021-08-14 DIAGNOSIS — M70.60 TROCHANTERIC BURSITIS: ICD-10-CM

## 2021-08-16 DIAGNOSIS — M70.60 TROCHANTERIC BURSITIS: ICD-10-CM

## 2021-08-16 DIAGNOSIS — R10.13 EPIGASTRIC PAIN: ICD-10-CM

## 2021-08-16 DIAGNOSIS — M25.551 RIGHT HIP PAIN: ICD-10-CM

## 2021-08-16 DIAGNOSIS — E03.9 HYPOTHYROIDISM, UNSPECIFIED TYPE: ICD-10-CM

## 2021-08-16 DIAGNOSIS — N90.89 VULVAR IRRITATION: ICD-10-CM

## 2021-08-16 RX ORDER — LEVOTHYROXINE SODIUM 0.1 MG/1
100 TABLET ORAL DAILY
Qty: 90 TABLET | Refills: 1 | Status: SHIPPED | OUTPATIENT
Start: 2021-08-16

## 2021-08-16 RX ORDER — GABAPENTIN 300 MG/1
CAPSULE ORAL
Qty: 60 CAPSULE | Refills: 3 | Status: SHIPPED | OUTPATIENT
Start: 2021-08-16 | End: 2021-08-16 | Stop reason: SDUPTHER

## 2021-08-16 RX ORDER — GABAPENTIN 300 MG/1
300 CAPSULE ORAL 2 TIMES DAILY
Qty: 60 CAPSULE | Refills: 3 | Status: SHIPPED | OUTPATIENT
Start: 2021-08-16 | End: 2022-04-18

## 2021-08-16 RX ORDER — SUCRALFATE 1 G/1
1 TABLET ORAL 2 TIMES DAILY
Qty: 180 TABLET | Refills: 1 | Status: SHIPPED | OUTPATIENT
Start: 2021-08-16 | End: 2022-02-07

## 2021-08-16 RX ORDER — DESOXIMETASONE 2.5 MG/G
CREAM TOPICAL AS NEEDED
Qty: 30 G | Refills: 0 | Status: SHIPPED | OUTPATIENT
Start: 2021-08-16 | End: 2021-12-13 | Stop reason: SDUPTHER

## 2021-08-16 RX ORDER — METHOCARBAMOL 750 MG/1
750 TABLET, FILM COATED ORAL EVERY 8 HOURS PRN
Qty: 90 TABLET | Refills: 0 | Status: SHIPPED | OUTPATIENT
Start: 2021-08-16 | End: 2021-08-18

## 2021-08-18 DIAGNOSIS — M25.551 RIGHT HIP PAIN: ICD-10-CM

## 2021-08-18 DIAGNOSIS — M70.60 TROCHANTERIC BURSITIS: ICD-10-CM

## 2021-08-18 RX ORDER — CYCLOBENZAPRINE HCL 5 MG
5 TABLET ORAL 3 TIMES DAILY PRN
Qty: 30 TABLET | Refills: 3 | Status: SHIPPED | OUTPATIENT
Start: 2021-08-18 | End: 2021-08-18

## 2021-08-18 RX ORDER — CYCLOBENZAPRINE HCL 5 MG
TABLET ORAL
Qty: 30 TABLET | Refills: 3 | Status: SHIPPED | OUTPATIENT
Start: 2021-08-18 | End: 2021-10-26 | Stop reason: SDUPTHER

## 2021-08-18 RX ORDER — CYCLOBENZAPRINE HCL 5 MG
TABLET ORAL
Qty: 30 TABLET | Refills: 3 | Status: SHIPPED | OUTPATIENT
Start: 2021-08-18 | End: 2021-08-18

## 2021-09-09 ENCOUNTER — APPOINTMENT (OUTPATIENT)
Dept: LAB | Facility: CLINIC | Age: 86
End: 2021-09-09
Payer: COMMERCIAL

## 2021-09-10 ENCOUNTER — ANTICOAG VISIT (OUTPATIENT)
Dept: INTERNAL MEDICINE CLINIC | Facility: CLINIC | Age: 86
End: 2021-09-10

## 2021-09-21 ENCOUNTER — TELEPHONE (OUTPATIENT)
Dept: OTHER | Facility: OTHER | Age: 86
End: 2021-09-21

## 2021-09-21 NOTE — TELEPHONE ENCOUNTER
The patient would like the results of her last PT INR and if she needs to have her medication changed  Please advise  Thank you

## 2021-09-22 ENCOUNTER — VBI (OUTPATIENT)
Dept: ADMINISTRATIVE | Facility: OTHER | Age: 86
End: 2021-09-22

## 2021-09-22 ENCOUNTER — TELEPHONE (OUTPATIENT)
Dept: INTERNAL MEDICINE CLINIC | Facility: CLINIC | Age: 86
End: 2021-09-22

## 2021-09-22 NOTE — TELEPHONE ENCOUNTER
Patient is calling requesting to speak with Daya Morgan  She stated that she is having trouble with her phone however she must know how much warfarin to be taking  She stated to call her back and okay to leave detailed message on voicemail if miss call

## 2021-09-28 ENCOUNTER — TELEPHONE (OUTPATIENT)
Dept: INTERNAL MEDICINE CLINIC | Facility: CLINIC | Age: 86
End: 2021-09-28

## 2021-09-28 ENCOUNTER — ANTICOAG VISIT (OUTPATIENT)
Dept: FAMILY MEDICINE CLINIC | Facility: CLINIC | Age: 86
End: 2021-09-28

## 2021-09-28 DIAGNOSIS — I48.20 CHRONIC ATRIAL FIBRILLATION (HCC): Primary | ICD-10-CM

## 2021-09-28 NOTE — TELEPHONE ENCOUNTER
The patient called she sees Dr Britney Maxwell now and is trying to have him read the INR since 9/9/2021  I called and spoke with Elif Silverman at Dr Angella Saul office  She stated she will call Dr Britney Maxwell and have him rvw the INR  LM for patient with the above information

## 2021-09-30 ENCOUNTER — APPOINTMENT (OUTPATIENT)
Dept: LAB | Facility: CLINIC | Age: 86
End: 2021-09-30
Payer: COMMERCIAL

## 2021-09-30 PROCEDURE — 85610 PROTHROMBIN TIME: CPT

## 2021-09-30 PROCEDURE — 36415 COLL VENOUS BLD VENIPUNCTURE: CPT

## 2021-10-01 ENCOUNTER — ANTICOAG VISIT (OUTPATIENT)
Dept: FAMILY MEDICINE CLINIC | Facility: CLINIC | Age: 86
End: 2021-10-01

## 2021-10-01 LAB
INR PPP: 2.82 (ref 0.84–1.19)
PROTHROMBIN TIME: 28.3 SECONDS (ref 11.6–14.5)

## 2021-10-20 ENCOUNTER — REMOTE DEVICE CLINIC VISIT (OUTPATIENT)
Dept: CARDIOLOGY CLINIC | Facility: CLINIC | Age: 86
End: 2021-10-20
Payer: COMMERCIAL

## 2021-10-20 DIAGNOSIS — Z95.0 PRESENCE OF PERMANENT CARDIAC PACEMAKER: Primary | ICD-10-CM

## 2021-10-20 PROCEDURE — 93294 REM INTERROG EVL PM/LDLS PM: CPT | Performed by: INTERNAL MEDICINE

## 2021-10-20 PROCEDURE — 93296 REM INTERROG EVL PM/IDS: CPT | Performed by: INTERNAL MEDICINE

## 2021-10-26 ENCOUNTER — OFFICE VISIT (OUTPATIENT)
Dept: FAMILY MEDICINE CLINIC | Facility: CLINIC | Age: 86
End: 2021-10-26
Payer: COMMERCIAL

## 2021-10-26 VITALS
HEART RATE: 96 BPM | OXYGEN SATURATION: 100 % | HEIGHT: 60 IN | BODY MASS INDEX: 34.16 KG/M2 | WEIGHT: 174 LBS | TEMPERATURE: 97.4 F | DIASTOLIC BLOOD PRESSURE: 100 MMHG | SYSTOLIC BLOOD PRESSURE: 148 MMHG

## 2021-10-26 DIAGNOSIS — M70.60 TROCHANTERIC BURSITIS: ICD-10-CM

## 2021-10-26 DIAGNOSIS — R29.898 WEAKNESS OF RIGHT LOWER EXTREMITY: ICD-10-CM

## 2021-10-26 DIAGNOSIS — M25.551 RIGHT HIP PAIN: ICD-10-CM

## 2021-10-26 DIAGNOSIS — Z23 ENCOUNTER FOR IMMUNIZATION: ICD-10-CM

## 2021-10-26 DIAGNOSIS — D32.9 MENINGIOMA (HCC): ICD-10-CM

## 2021-10-26 DIAGNOSIS — M79.604 PAIN OF RIGHT LOWER EXTREMITY: ICD-10-CM

## 2021-10-26 DIAGNOSIS — I10 BENIGN ESSENTIAL HTN: Primary | ICD-10-CM

## 2021-10-26 PROCEDURE — 99214 OFFICE O/P EST MOD 30 MIN: CPT | Performed by: FAMILY MEDICINE

## 2021-10-26 PROCEDURE — G0008 ADMIN INFLUENZA VIRUS VAC: HCPCS

## 2021-10-26 PROCEDURE — 90662 IIV NO PRSV INCREASED AG IM: CPT

## 2021-10-26 RX ORDER — CYCLOBENZAPRINE HCL 5 MG
5 TABLET ORAL 3 TIMES DAILY PRN
Qty: 30 TABLET | Refills: 3 | Status: SHIPPED | OUTPATIENT
Start: 2021-10-26 | End: 2021-11-19 | Stop reason: SDUPTHER

## 2021-10-26 NOTE — PROGRESS NOTES
Assessment/Plan:    No problem-specific Assessment & Plan notes found for this encounter  Diagnoses and all orders for this visit:    Benign essential HTN  Comments:  keep a home log    Encounter for immunization  -     influenza vaccine, high-dose, PF 0 7 mL (FLUZONE HIGH-DOSE)    Meningioma ([facility] [address] )  Comments:  referral back to neurosurgery    Weakness of right lower extremity  -     Ambulatory referral to Physical Therapy; Future    Pain of right lower extremity  -     Ambulatory referral to Physical Therapy; Future    Right hip pain  -     cyclobenzaprine (FLEXERIL) 5 mg tablet; Take 1 tablet (5 mg total) by mouth 3 (three) times a day as needed for muscle spasms    Trochanteric bursitis  -     cyclobenzaprine (FLEXERIL) 5 mg tablet; Take 1 tablet (5 mg total) by mouth 3 (three) times a day as needed for muscle spasms          PHQ-9 Depression Screening    PHQ-9:   Frequency of the following problems over the past two weeks:      Little interest or pleasure in doing things: 0 - not at all  Feeling down, depressed, or hopeless: 0 - not at all  Trouble falling or staying asleep, or sleeping too much: 0 - not at all  Feeling tired or having little energy: 0 - not at all  Poor appetite or overeatin - not at all  Feeling bad about yourself - or that you are a failure or have let yourself or your family down: 0 - not at all  Trouble concentrating on things, such as reading the newspaper or watching television: 0 - not at all  Moving or speaking so slowly that other people could have noticed  Or the opposite - being so fidgety or restless that you have been moving around a lot more than usual: 0 - not at all  Thoughts that you would be better off dead, or of hurting yourself in some way: 0 - not at all  PHQ-2 Score: 0  PHQ-9 Score: 0            Subjective:      Patient ID: Crissy Mukherjee is a 80 y o  female  Hypertension  This is a chronic problem  The current episode started more than 1 year ago   The problem is unchanged  The problem is uncontrolled  Pertinent negatives include no chest pain, headaches or palpitations  There are no associated agents to hypertension  Risk factors for coronary artery disease include obesity and sedentary lifestyle  Past treatments include nothing  Compliance problems include diet and exercise  The following portions of the patient's history were reviewed and updated as appropriate: allergies, current medications, past family history, past medical history, past social history, past surgical history and problem list     Review of Systems   Eyes: Negative for visual disturbance  Cardiovascular: Negative for chest pain and palpitations  Neurological: Negative for headaches  Objective:    /100   Pulse 96   Temp (!) 97 4 °F (36 3 °C)   Ht 5' (1 524 m)   Wt 78 9 kg (174 lb)   LMP  (LMP Unknown)   SpO2 100%   BMI 33 98 kg/m²      Physical Exam  Vitals and nursing note reviewed  Constitutional:       General: She is not in acute distress  Appearance: Normal appearance  She is not ill-appearing or diaphoretic  HENT:      Head: Normocephalic and atraumatic  Eyes:      Conjunctiva/sclera: Conjunctivae normal    Cardiovascular:      Rate and Rhythm: Normal rate and regular rhythm  Heart sounds: Normal heart sounds  No murmur heard  Pulmonary:      Effort: Pulmonary effort is normal  No respiratory distress  Breath sounds: Normal breath sounds  No wheezing, rhonchi or rales  Abdominal:      General: There is no distension  Palpations: Abdomen is soft  There is no mass  Tenderness: There is no abdominal tenderness  There is no guarding or rebound  Musculoskeletal:      Cervical back: Normal range of motion and neck supple  No rigidity  Right lower leg: No edema  Left lower leg: No edema  Lymphadenopathy:      Cervical: No cervical adenopathy     Neurological:      Mental Status: She is alert and oriented to person, place, and time  Psychiatric:         Mood and Affect: Mood normal          Behavior: Behavior normal          Thought Content:  Thought content normal          Judgment: Judgment normal

## 2021-11-15 ENCOUNTER — TELEPHONE (OUTPATIENT)
Dept: FAMILY MEDICINE CLINIC | Facility: CLINIC | Age: 86
End: 2021-11-15

## 2021-11-15 DIAGNOSIS — M79.604 PAIN OF RIGHT LOWER EXTREMITY: ICD-10-CM

## 2021-11-15 DIAGNOSIS — R29.898 WEAKNESS OF RIGHT LOWER EXTREMITY: Primary | ICD-10-CM

## 2021-11-15 DIAGNOSIS — M25.551 RIGHT HIP PAIN: ICD-10-CM

## 2021-11-17 ENCOUNTER — TELEPHONE (OUTPATIENT)
Dept: FAMILY MEDICINE CLINIC | Facility: CLINIC | Age: 86
End: 2021-11-17

## 2021-11-19 ENCOUNTER — TELEPHONE (OUTPATIENT)
Dept: FAMILY MEDICINE CLINIC | Facility: CLINIC | Age: 86
End: 2021-11-19

## 2021-11-19 DIAGNOSIS — M25.551 RIGHT HIP PAIN: ICD-10-CM

## 2021-11-19 DIAGNOSIS — M70.60 TROCHANTERIC BURSITIS: ICD-10-CM

## 2021-11-19 RX ORDER — CYCLOBENZAPRINE HCL 5 MG
5 TABLET ORAL 3 TIMES DAILY PRN
Qty: 30 TABLET | Refills: 3 | Status: SHIPPED | OUTPATIENT
Start: 2021-11-19 | End: 2021-11-23

## 2021-11-21 DIAGNOSIS — M70.60 TROCHANTERIC BURSITIS: ICD-10-CM

## 2021-11-21 DIAGNOSIS — M25.551 RIGHT HIP PAIN: ICD-10-CM

## 2021-11-22 ENCOUNTER — TELEPHONE (OUTPATIENT)
Dept: FAMILY MEDICINE CLINIC | Facility: CLINIC | Age: 86
End: 2021-11-22

## 2021-11-23 RX ORDER — TIZANIDINE 2 MG/1
2 TABLET ORAL EVERY 8 HOURS PRN
Qty: 60 TABLET | Refills: 2 | Status: SHIPPED | OUTPATIENT
Start: 2021-11-23 | End: 2022-06-16

## 2021-12-10 ENCOUNTER — IN-CLINIC DEVICE VISIT (OUTPATIENT)
Dept: CARDIOLOGY CLINIC | Facility: CLINIC | Age: 86
End: 2021-12-10
Payer: COMMERCIAL

## 2021-12-10 DIAGNOSIS — Z95.0 PRESENCE OF PERMANENT CARDIAC PACEMAKER: Primary | ICD-10-CM

## 2021-12-10 PROCEDURE — 93279 PRGRMG DEV EVAL PM/LDLS PM: CPT | Performed by: INTERNAL MEDICINE

## 2021-12-13 DIAGNOSIS — N90.89 VULVAR IRRITATION: ICD-10-CM

## 2021-12-14 RX ORDER — DESOXIMETASONE 2.5 MG/G
CREAM TOPICAL AS NEEDED
Qty: 30 G | Refills: 0 | Status: SHIPPED | OUTPATIENT
Start: 2021-12-14 | End: 2022-05-04

## 2021-12-20 ENCOUNTER — TELEPHONE (OUTPATIENT)
Dept: OBGYN CLINIC | Facility: HOSPITAL | Age: 86
End: 2021-12-20

## 2022-01-06 DIAGNOSIS — G89.29 OTHER CHRONIC PAIN: ICD-10-CM

## 2022-01-06 DIAGNOSIS — I48.20 ATRIAL FIBRILLATION, CHRONIC (HCC): ICD-10-CM

## 2022-01-07 RX ORDER — WARFARIN SODIUM 1 MG/1
TABLET ORAL
Qty: 90 TABLET | Refills: 1 | Status: SHIPPED | OUTPATIENT
Start: 2022-01-07

## 2022-01-10 RX ORDER — DULOXETIN HYDROCHLORIDE 60 MG/1
CAPSULE, DELAYED RELEASE ORAL
Qty: 90 CAPSULE | Refills: 1 | Status: SHIPPED | OUTPATIENT
Start: 2022-01-10 | End: 2022-07-14

## 2022-01-16 DIAGNOSIS — Z96.641 STATUS POST RIGHT HIP REPLACEMENT: Primary | ICD-10-CM

## 2022-01-18 ENCOUNTER — RA CDI HCC (OUTPATIENT)
Dept: OTHER | Facility: HOSPITAL | Age: 87
End: 2022-01-18

## 2022-01-18 NOTE — PROGRESS NOTES
[facility]  coding opportunities             Chart Reviewed * (Number of) Inbasket suggestions sent to Provider: 1                  Patients insurance company: [employer] (Medicare Advantage and Commercial)

## 2022-01-25 ENCOUNTER — OFFICE VISIT (OUTPATIENT)
Dept: FAMILY MEDICINE CLINIC | Facility: CLINIC | Age: 87
End: 2022-01-25
Payer: COMMERCIAL

## 2022-01-25 VITALS
SYSTOLIC BLOOD PRESSURE: 139 MMHG | DIASTOLIC BLOOD PRESSURE: 82 MMHG | HEART RATE: 70 BPM | BODY MASS INDEX: 33.96 KG/M2 | TEMPERATURE: 96.7 F | HEIGHT: 60 IN | WEIGHT: 173 LBS | OXYGEN SATURATION: 95 %

## 2022-01-25 DIAGNOSIS — M70.61 TROCHANTERIC BURSITIS OF RIGHT HIP: ICD-10-CM

## 2022-01-25 DIAGNOSIS — I10 BENIGN ESSENTIAL HTN: ICD-10-CM

## 2022-01-25 DIAGNOSIS — M25.551 RIGHT HIP PAIN: ICD-10-CM

## 2022-01-25 DIAGNOSIS — Z00.00 MEDICARE ANNUAL WELLNESS VISIT, SUBSEQUENT: Primary | ICD-10-CM

## 2022-01-25 DIAGNOSIS — E55.9 VITAMIN D DEFICIENCY: ICD-10-CM

## 2022-01-25 DIAGNOSIS — E03.9 ACQUIRED HYPOTHYROIDISM: ICD-10-CM

## 2022-01-25 PROCEDURE — 1160F RVW MEDS BY RX/DR IN RCRD: CPT | Performed by: FAMILY MEDICINE

## 2022-01-25 PROCEDURE — 1036F TOBACCO NON-USER: CPT | Performed by: FAMILY MEDICINE

## 2022-01-25 PROCEDURE — 3725F SCREEN DEPRESSION PERFORMED: CPT | Performed by: FAMILY MEDICINE

## 2022-01-25 PROCEDURE — 1125F AMNT PAIN NOTED PAIN PRSNT: CPT | Performed by: FAMILY MEDICINE

## 2022-01-25 PROCEDURE — 99214 OFFICE O/P EST MOD 30 MIN: CPT | Performed by: FAMILY MEDICINE

## 2022-01-25 PROCEDURE — 3288F FALL RISK ASSESSMENT DOCD: CPT | Performed by: FAMILY MEDICINE

## 2022-01-25 PROCEDURE — 1170F FXNL STATUS ASSESSED: CPT | Performed by: FAMILY MEDICINE

## 2022-01-25 PROCEDURE — G0439 PPPS, SUBSEQ VISIT: HCPCS | Performed by: FAMILY MEDICINE

## 2022-01-25 NOTE — PATIENT INSTRUCTIONS
Medicare Preventive Visit Patient Instructions  Thank you for completing your Welcome to Medicare Visit or Medicare Annual Wellness Visit today  Your next wellness visit will be due in one year (1/26/2023)  The screening/preventive services that you may require over the next 5-10 years are detailed below  Some tests may not apply to you based off risk factors and/or age  Screening tests ordered at today's visit but not completed yet may show as past due  Also, please note that scanned in results may not display below  Preventive Screenings:  Service Recommendations Previous Testing/Comments   Colorectal Cancer Screening  * Colonoscopy    * Fecal Occult Blood Test (FOBT)/Fecal Immunochemical Test (FIT)  * Fecal DNA/Cologuard Test  * Flexible Sigmoidoscopy Age: 54-65 years old   Colonoscopy: every 10 years (may be performed more frequently if at higher risk)  OR  FOBT/FIT: every 1 year  OR  Cologuard: every 3 years  OR  Sigmoidoscopy: every 5 years  Screening may be recommended earlier than age 48 if at higher risk for colorectal cancer  Also, an individualized decision between you and your healthcare provider will decide whether screening between the ages of 74-80 would be appropriate  Colonoscopy: 06/17/2020  FOBT/FIT: Not on file  Cologuard: Not on file  Sigmoidoscopy: Not on file    Screening Not Indicated     Breast Cancer Screening Age: 36 years old  Frequency: every 1-2 years  Not required if history of left and right mastectomy Mammogram: 12/29/2016        Cervical Cancer Screening Between the ages of 21-29, pap smear recommended once every 3 years  Between the ages of 33-67, can perform pap smear with HPV co-testing every 5 years     Recommendations may differ for women with a history of total hysterectomy, cervical cancer, or abnormal pap smears in past  Pap Smear: 02/15/2018    Screening Not Indicated   Hepatitis C Screening Once for adults born between 1945 and 1965  More frequently in patients at high risk for Hepatitis C Hep C Antibody: Not on file        Diabetes Screening 1-2 times per year if you're at risk for diabetes or have pre-diabetes Fasting glucose: 88 mg/dL   A1C: 5 3 %    Screening Current   Cholesterol Screening Once every 5 years if you don't have a lipid disorder  May order more often based on risk factors  Lipid panel: 04/02/2021    Screening Current     Other Preventive Screenings Covered by Medicare:  1  Abdominal Aortic Aneurysm (AAA) Screening: covered once if your at risk  You're considered to be at risk if you have a family history of AAA  2  Lung Cancer Screening: covers low dose CT scan once per year if you meet all of the following conditions: (1) Age 50-69; (2) No signs or symptoms of lung cancer; (3) Current smoker or have quit smoking within the last 15 years; (4) You have a tobacco smoking history of at least 30 pack years (packs per day multiplied by number of years you smoked); (5) You get a written order from a healthcare provider  3  Glaucoma Screening: covered annually if you're considered high risk: (1) You have diabetes OR (2) Family history of glaucoma OR (3)  aged 48 and older OR (3)  American aged 72 and older  3  Osteoporosis Screening: covered every 2 years if you meet one of the following conditions: (1) You're estrogen deficient and at risk for osteoporosis based off medical history and other findings; (2) Have a vertebral abnormality; (3) On glucocorticoid therapy for more than 3 months; (4) Have primary hyperparathyroidism; (5) On osteoporosis medications and need to assess response to drug therapy  · Last bone density test (DXA Scan): 12/06/2016   5  HIV Screening: covered annually if you're between the age of 15-65  Also covered annually if you are younger than 13 and older than 72 with risk factors for HIV infection  For pregnant patients, it is covered up to 3 times per pregnancy      Immunizations:  Immunization Recommendations Influenza Vaccine Annual influenza vaccination during flu season is recommended for all persons aged >= 6 months who do not have contraindications   Pneumococcal Vaccine (Prevnar and Pneumovax)  * Prevnar = PCV13  * Pneumovax = PPSV23   Adults 25-60 years old: 1-3 doses may be recommended based on certain risk factors  Adults 72 years old: Prevnar (PCV13) vaccine recommended followed by Pneumovax (PPSV23) vaccine  If already received PPSV23 since turning 65, then PCV13 recommended at least one year after PPSV23 dose  Hepatitis B Vaccine 3 dose series if at intermediate or high risk (ex: diabetes, end stage renal disease, liver disease)   Tetanus (Td) Vaccine - COST NOT COVERED BY MEDICARE PART B Following completion of primary series, a booster dose should be given every 10 years to maintain immunity against tetanus  Td may also be given as tetanus wound prophylaxis  Tdap Vaccine - COST NOT COVERED BY MEDICARE PART B Recommended at least once for all adults  For pregnant patients, recommended with each pregnancy  Shingles Vaccine (Shingrix) - COST NOT COVERED BY MEDICARE PART B  2 shot series recommended in those aged 48 and above     Health Maintenance Due:  There are no preventive care reminders to display for this patient  Immunizations Due:      Topic Date Due    DTaP,Tdap,and Td Vaccines (1 - Tdap) Never done     Advance Directives   What are advance directives? Advance directives are legal documents that state your wishes and plans for medical care  These plans are made ahead of time in case you lose your ability to make decisions for yourself  Advance directives can apply to any medical decision, such as the treatments you want, and if you want to donate organs  What are the types of advance directives? There are many types of advance directives, and each state has rules about how to use them  You may choose a combination of any of the following:  · Living will:   This is a written record of the treatment you want  You can also choose which treatments you do not want, which to limit, and which to stop at a certain time  This includes surgery, medicine, IV fluid, and tube feedings  · Durable power of  for healthcare [place] [facility]): This is a written record that states who you want to make healthcare choices for you when you are unable to make them for yourself  This person, called a proxy, is usually a family member or a friend  You may choose more than 1 proxy  · Do not resuscitate (DNR) order:  A DNR order is used in case your heart stops beating or you stop breathing  It is a request not to have certain forms of treatment, such as CPR  A DNR order may be included in other types of advance directives  · Medical directive: This covers the care that you want if you are in a coma, near death, or unable to make decisions for yourself  You can list the treatments you want for each condition  Treatment may include pain medicine, surgery, blood transfusions, dialysis, IV or tube feedings, and a ventilator (breathing machine)  · Values history: This document has questions about your views, beliefs, and how you feel and think about life  This information can help others choose the care that you would choose  Why are advance directives important? An advance directive helps you control your care  Although spoken wishes may be used, it is better to have your wishes written down  Spoken wishes can be misunderstood, or not followed  Treatments may be given even if you do not want them  An advance directive may make it easier for your family to make difficult choices about your care  Urinary Incontinence   Urinary incontinence (UI)  is when you lose control of your bladder  UI develops because your bladder cannot store or empty urine properly  The 3 most common types of UI are stress incontinence, urge incontinence, or both  Medicines:   · May be given to help strengthen your bladder control   Report any side effects of medication to your healthcare provider  Do pelvic muscle exercises often:  Your pelvic muscles help you stop urinating  Squeeze these muscles tight for 5 seconds, then relax for 5 seconds  Gradually work up to squeezing for 10 seconds  Do 3 sets of 15 repetitions a day, or as directed  This will help strengthen your pelvic muscles and improve bladder control  Train your bladder:  Go to the bathroom at set times, such as every 2 hours, even if you do not feel the urge to go  You can also try to hold your urine when you feel the urge to go  For example, hold your urine for 5 minutes when you feel the urge to go  As that becomes easier, hold your urine for 10 minutes  Self-care:   · Keep a UI record  Write down how often you leak urine and how much you leak  Make a note of what you were doing when you leaked urine  · Drink liquids as directed  You may need to limit the amount of liquid you drink to help control your urine leakage  Do not drink any liquid right before you go to bed  Limit or do not have drinks that contain caffeine or alcohol  · Prevent constipation  Eat a variety of high-fiber foods  Good examples are high-fiber cereals, beans, vegetables, and whole-grain breads  Walking is the best way to trigger your intestines to have a bowel movement  · Exercise regularly and maintain a healthy weight  Weight loss and exercise will decrease pressure on your bladder and help you control your leakage  · Use a catheter as directed  to help empty your bladder  A catheter is a tiny, plastic tube that is put into your bladder to drain your urine  · Go to behavior therapy as directed  Behavior therapy may be used to help you learn to control your urge to urinate  Weight Management   Why it is important to manage your weight:  Being overweight increases your risk of health conditions such as heart disease, high blood pressure, type 2 diabetes, and certain types of cancer   It can also increase your risk for osteoarthritis, sleep apnea, and other respiratory problems  Aim for a slow, steady weight loss  Even a small amount of weight loss can lower your risk of health problems  How to lose weight safely:  A safe and healthy way to lose weight is to eat fewer calories and get regular exercise  You can lose up about 1 pound a week by decreasing the number of calories you eat by 500 calories each day  Healthy meal plan for weight management:  A healthy meal plan includes a variety of foods, contains fewer calories, and helps you stay healthy  A healthy meal plan includes the following:  · Eat whole-grain foods more often  A healthy meal plan should contain fiber  Fiber is the part of grains, fruits, and vegetables that is not broken down by your body  Whole-grain foods are healthy and provide extra fiber in your diet  Some examples of whole-grain foods are whole-wheat breads and pastas, oatmeal, brown rice, and bulgur  · Eat a variety of vegetables every day  Include dark, leafy greens such as spinach, kale, baljit greens, and mustard greens  Eat yellow and orange vegetables such as carrots, sweet potatoes, and winter squash  · Eat a variety of fruits every day  Choose fresh or canned fruit (canned in its own juice or light syrup) instead of juice  Fruit juice has very little or no fiber  · Eat low-fat dairy foods  Drink fat-free (skim) milk or 1% milk  Eat fat-free yogurt and low-fat cottage cheese  Try low-fat cheeses such as mozzarella and other reduced-fat cheeses  · Choose meat and other protein foods that are low in fat  Choose beans or other legumes such as split peas or lentils  Choose fish, skinless poultry (chicken or turkey), or lean cuts of red meat (beef or pork)  Before you cook meat or poultry, cut off any visible fat  · Use less fat and oil  Try baking foods instead of frying them   Add less fat, such as margarine, sour cream, regular salad dressing and mayonnaise to foods  Eat fewer high-fat foods  Some examples of high-fat foods include french fries, doughnuts, ice cream, and cakes  · Eat fewer sweets  Limit foods and drinks that are high in sugar  This includes candy, cookies, regular soda, and sweetened drinks  Exercise:  Exercise at least 30 minutes per day on most days of the week  Some examples of exercise include walking, biking, dancing, and swimming  You can also fit in more physical activity by taking the stairs instead of the elevator or parking farther away from stores  Ask your healthcare provider about the best exercise plan for you  © Copyright [employer] 2018 Information is for End User's use only and may not be sold, redistributed or otherwise used for commercial purposes   All illustrations and images included in CareNotes® are the copyrighted property of [employer]  or [address] [place], [place] [address]paB[facility] Center

## 2022-01-25 NOTE — PROGRESS NOTES
Assessment and Plan:     Problem List Items Addressed This Visit        Endocrine    Hypothyroidism       Musculoskeletal and Integument    Trochanteric bursitis of right hip       Other    Right hip pain      Other Visit Diagnoses     Medicare annual wellness visit, subsequent    -  Primary           Preventive health issues were discussed with patient, and age appropriate screening tests were ordered as noted in patient's After Visit Summary  Personalized health advice and appropriate referrals for health education or preventive services given if needed, as noted in patient's After Visit Summary       History of Present Illness:     Patient presents for Medicare Annual Wellness visit    Patient Care Team:  Sarah Cushing, DO as PCP - General (Family Medicine)  MD Tanvi Delgadillo MD Rainell Joseph, MD Trenda Elk, DO Myrl Searing, MD Trisha Alberto, Ander Brown MD (Cardiology)     Problem List:     Patient Active Problem List   Diagnosis    Chronic atrial fibrillation (HCC)    Anemia    Chronic diastolic CHF (congestive heart failure) ([facility] [address] )    Depression    Class 1 obesity due to excess calories with serious comorbidity and body mass index (BMI) of 33 0 to 33 9 in adult    Chronic pain    Chronic pain of lower extremity, bilateral    Fibromyalgia    External hemorrhoids    Irritable bowel syndrome with constipation    Lumbar canal stenosis    Moderate mitral regurgitation    Osteoarthritis, multiple sites    Osteoporosis    Peripheral neuropathy    Primary osteoarthritis of right knee    Synovial cyst of right popliteal space    Pacemaker    Hypothyroidism    Macrocytosis    Status post gastrectomy    Status post right hip replacement    Ambulatory dysfunction    MCI (mild cognitive impairment)    Right hip pain    Cellulitis of right lower extremity    Hypertrophic scar of skin    S/P scar revision    Elevated C-reactive protein (CRP)    Fall    Hematoma    Ecchymosis of eye    Meningioma (HCC)    Trochanteric bursitis of right hip    Stage 3a chronic kidney disease ([facility] [address] )    Encounter to establish care with new doctor    Benign essential HTN    Trochanteric bursitis    Negative depression screening      Past Medical and Surgical History:     Past Medical History:   Diagnosis Date    Abdominal bloating 8/1/2016    Anemia     Arthritis     Cancer ([facility] [address] )     basal cell    CHF (congestive heart failure) ([facility] )     COVID-19     Disease of thyroid gland     Disturbance of smell and taste     disturbance of taste    Effusion of knee joint right     Fibromyalgia     Heart murmur     reported a previous heart murmur    History of acute myocardial infarction     History of atrial fibrillation     History of bruising easily     History of cancer     History of dermatitis     History of mammogram     History of methicillin resistant staphylococcus aureus (MRSA)     Negative nasal culture, isolation discontinued 8/17/2018      History of methicillin resistant staphylococcus aureus (MRSA) 08/17/2018    negative nasal culture-isolation and hx discontinued 8/17/2018    History of obesity     History of osteopenia     History of sciatica     History of shortness of breath     History of sinusitis     History of sore throat     History of syncope     History of umbilical hernia     History of viral infection     Irritable bowel syndrome (IBS)     Joint pain, hip     Limb pain     Myalgia     myalgia and myositis    Need for prophylactic vaccination against single diseases     Need for prophylactic vaccination and inoculation against influenza     Preoperative cardiovascular examination     Primary osteoarthritis of right knee     Right leg pain     Vaginal Pap smear     reported pap smear     Past Surgical History:   Procedure Laterality Date    ANKLE ARTHRODESIS Right  BACK SURGERY      BARIATRIC SURGERY      CHOLECYSTECTOMY      x2    COLONOSCOPY      ESOPHAGOGASTRODUODENOSCOPY N/A 3/23/2018    Procedure: ESOPHAGOGASTRODUODENOSCOPY (EGD); Surgeon: Eleazar Garcia MD;  [facility]: [facility]; Service: Gastroenterology    FOOT SURGERY      HIP CLOSE REDUCTION Right 8/21/2016    Procedure: CLOSED REDUCTION RIGHT TOTAL HIP;  Surgeon: Arnoldo New MD;  [facility];  Service:    Gaby Garcia / Saran Steele / Kirby Sifuenteser IR BIOPSY OTHER  8/1/2020    JOINT REPLACEMENT      LEFT KNEE REPLACEMENT    JOINT REPLACEMENT      Right HIP    PILONIDAL CYST EXCISION      x2    IN EXC SKIN BENIG <0 5 CM TRUNK,ARM,LEG Right 7/24/2019    Procedure: REVISION SCAR EXTREMITY Rt Hip;   Surgeon: Mahesh Castillo MD;  [facility];  Service: Orthopedics    IN REVISE TOTAL HIP REPLACEMENT Right 4/15/2019    Procedure: ARTHROPLASTY HIP TOTAL ACETABULAR REVISION;  Surgeon: Mahesh Castillo MD;  [facility];  Service: Orthopedics    REPLACEMENT TOTAL KNEE Right     SILVANA-EN-Y PROCEDURE      x2    TOTAL KNEE ARTHROPLASTY      UMBILICAL HERNIA REPAIR      x2      Family History:     Family History   Problem Relation Age of Onset    Coronary artery disease Mother     Heart attack Mother     Hypertension Mother     Coronary artery disease Father     Heart attack Father    Nikki Flores Hypertension Father     Lymphoma Sister         acute    Rashes / Skin problems Sister         lymphomatoid papulosis    Cancer Sister     Coronary artery disease Brother     Heart attack Brother         x2    Aneurysm Neg Hx     Hyperlipidemia Neg Hx       Social History:     Social History     Socioeconomic History    Marital status: /Civil Union     Spouse name: None    Number of children: None    Years of education: None    Highest education level: None   Occupational History    None   Tobacco Use    Smoking status: Never Smoker    Smokeless tobacco: Never Used   Vaping Use    Vaping Use: Never used   Substance and Sexual Activity    Alcohol use: Not Currently    Drug use: No    Sexual activity: Never   Other Topics Concern    None   Social History Narrative    None     Social Determinants of Health     Financial Resource Strain: Not on file   Food Insecurity: Not on file   Transportation Needs: Not on file   Physical Activity: Not on file   Stress: Not on file   Social Connections: Not on file   Intimate Partner Violence: Not on file   Housing Stability: Not on file      Medications and Allergies:     Current Outpatient Medications   Medication Sig Dispense Refill    ascorbic acid (VITAMIN C) 500 mg tablet Take 1 tablet (500 mg total) by mouth 2 (two) times a day 60 tablet 0    Cholecalciferol 125 MCG (5000 UT) TABS Take 5,000 Units by mouth daily      desoximetasone (TOPICORT) 0 25 % cream Apply topically as needed for irritation 30 g 0    diclofenac sodium (VOLTAREN) 1 % Apply 2 g topically 4 (four) times a day Apply to right hip 150 g 0    dicyclomine (BENTYL) 10 mg capsule TAKE 1 CAPSULE (10 MG TOTAL) BY MOUTH EVERY 12 (TWELVE) HOURS 180 capsule 1    DULoxetine (CYMBALTA) 60 mg delayed release capsule TAKE 1 CAPSULE BY MOUTH EVERY DAY 90 capsule 1    fluticasone (FLONASE) 50 mcg/act nasal spray SPRAY 2 SPRAYS INTO EACH NOSTRIL EVERY DAY 16 mL 0    gabapentin (NEURONTIN) 300 mg capsule Take 1 capsule (300 mg total) by mouth 2 (two) times a day 60 capsule 3    levothyroxine 100 mcg tablet Take 1 tablet (100 mcg total) by mouth daily 90 tablet 1    potassium chloride 10% oral solution Take 15 mL (20 mEq total) by mouth daily 3800 mL 3    sucralfate (CARAFATE) 1 g tablet Take 1 tablet (1 g total) by mouth 2 (two) times a day 180 tablet 1    tiZANidine (ZANAFLEX) 2 mg tablet Take 1 tablet (2 mg total) by mouth every 8 (eight) hours as needed for muscle spasms 60 tablet 2    torsemide (DEMADEX) 20 mg tablet Take 1 tablet (20 mg total) by mouth daily (Patient taking differently: Take 20 mg by mouth as needed  ) 90 tablet 3    warfarin (COUMADIN) 1 mg tablet TAKE 1 TABLET BY MOUTH DAILY OR AS DIRECTED 90 tablet 1    warfarin (COUMADIN) 2 mg tablet Take 1 tablet daily or as instructed  90 tablet 1    warfarin (COUMADIN) 4 mg tablet TAKE ONE TABLET DAILY AS PREVIOUSLY DIRECTED BY CARDIOLOGY 90 tablet 1    warfarin (COUMADIN) 5 mg tablet Take 1 tablet daily as directed  90 tablet 1    acetaminophen (TYLENOL) 500 mg tablet Take 2 tablets (1,000 mg total) by mouth every 8 (eight) hours (Patient not taking: Reported on 1/25/2022 ) 90 tablet 0     No current facility-administered medications for this visit  Allergies   Allergen Reactions    Amoxicillin      Reports nausea, headache, dizzy    Codeine Vomiting and GI Intolerance     GI upset, ana m morphine  Reaction Date: 24Apr2012;     Other      Other reaction(s): Other (See Comments)  ana m toradol in OR 6/06 sah    Oxycodone-Acetaminophen      Other reaction(s): Other (See Comments)  GI upset; ana m vicodin    Percocet [Oxycodone-Acetaminophen]     Seasonal Ic  [Cholestatin] Allergic Rhinitis    Trimethoprim     Bactrim [Sulfamethoxazole-Trimethoprim] Rash    Sulfa Antibiotics Rash     Other reaction(s): Other (See Comments)  takes lasix @home      Immunizations:     Immunization History   Administered Date(s) Administered    COVID-19 MODERNA VACC 0 5 ML IM 01/23/2021, 02/22/2021, 11/11/2021    INFLUENZA 10/02/2012    Influenza Split High Dose Preservative Free IM 10/31/2014, 09/14/2015, 10/13/2016, 10/17/2017    Influenza, high dose seasonal 0 7 mL 10/11/2018, 10/12/2019, 10/13/2020, 10/26/2021    Influenza, seasonal, injectable 10/04/2010, 10/05/2013    Pneumococcal Conjugate 13-Valent 09/14/2015    Pneumococcal Polysaccharide PPV23 10/21/2002    Zoster 04/16/2013    Zoster Vaccine Recombinant 04/16/2013      Health Maintenance: There are no preventive care reminders to display for this patient  Topic Date Due    DTaP,Tdap,and Td Vaccines (1 - Tdap) Never done      Medicare Health Risk Assessment:     /82 (BP Location: Left arm, Patient Position: Sitting, Cuff Size: Standard)   Pulse 70   Temp (!) 96 7 °F (35 9 °C) (Tympanic)   Ht 5' (1 524 m)   Wt 78 5 kg (173 lb)   LMP  (LMP Unknown)   SpO2 95%   BMI 33 79 kg/m²      José Miguel Hein is here for her Subsequent Wellness visit  Last Medicare Wellness visit information reviewed, patient interviewed and updates made to the record today  Health Risk Assessment:   Patient rates overall health as good  Patient feels that their physical health rating is same  Patient is satisfied with their life  Eyesight was rated as slightly worse  Hearing was rated as slightly worse  Patient feels that their emotional and mental health rating is slightly better  Patients states they are never, rarely angry  Patient states they are never, rarely unusually tired/fatigued  Pain experienced in the last 7 days has been a lot  Patient's pain rating has been 9/10  Patient states that she has experienced no weight loss or gain in last 6 months  Depression Screening:   PHQ-9 Score: 0      Fall Risk Screening: In the past year, patient has experienced: no history of falling in past year      Urinary Incontinence Screening:   Patient has leaked urine accidently in the last six months  Home Safety:  Patient has trouble with stairs inside or outside of their home  Patient has working smoke alarms and has working carbon monoxide detector  Home safety hazards include: none  Nutrition:   Current diet is Regular  Medications:   Patient is currently taking over-the-counter supplements  OTC medications include: see medication list  Patient is able to manage medications       Activities of Daily Living (ADLs)/Instrumental Activities of Daily Living (IADLs):   Walk and transfer into and out of bed and chair?: Yes  Dress and groom yourself?: Yes    Bathe or shower yourself?: Yes    Feed yourself? Yes  Do your laundry/housekeeping?: Yes  Manage your money, pay your bills and track your expenses?: Yes  Make your own meals?: Yes    Do your own shopping?: Yes    Previous Hospitalizations:   Any hospitalizations or ED visits within the last 12 months?: No      Advance Care Planning:   Living will: Yes    Durable POA for healthcare:  Yes    Advanced directive: Yes    Advanced directive counseling given: Yes    Five wishes given: Yes    Patient declined ACP directive: No    End of Life Decisions reviewed with patient: Yes    Provider agrees with end of life decisions: Yes      Cognitive Screening:   Provider or family/friend/caregiver concerned regarding cognition?: No    PREVENTIVE SCREENINGS      Cardiovascular Screening:    General: Screening Current      Diabetes Screening:     General: Screening Current      Colorectal Cancer Screening:     General: Screening Not Indicated      Breast Cancer Screening:     General: Screening Not Indicated      Cervical Cancer Screening:    General: Screening Not Indicated      Osteoporosis Screening:    General: Screening Not Indicated and History Osteoporosis      Abdominal Aortic Aneurysm (AAA) Screening:        General: Screening Not Indicated      Lung Cancer Screening:     General: Screening Not Indicated      Hepatitis C Screening:    General: Screening Not Indicated    Screening, Brief Intervention, and Referral to Treatment (SBIRT)    Screening      Single Item Drug Screening:  How often have you used an illegal drug (including marijuana) or a prescription medication for non-medical reasons in the past year? never    Single Item Drug Screen Score: 0  Interpretation: Negative screen for possible drug use disorder      Dannie Kimball DO

## 2022-01-25 NOTE — PROGRESS NOTES
Assessment/Plan:    No problem-specific Assessment & Plan notes found for this encounter  Diagnoses and all orders for this visit:    Medicare annual wellness visit, subsequent    Acquired hypothyroidism  -     TSH, 3rd generation with Free T4 reflex; Future    Right hip pain  -     Diclofenac Sodium (VOLTAREN) 1 %; Apply 2 g topically 4 (four) times a day    Trochanteric bursitis of right hip  -     Diclofenac Sodium (VOLTAREN) 1 %; Apply 2 g topically 4 (four) times a day    Benign essential HTN  -     CBC and differential; Future  -     Comprehensive metabolic panel; Future  -     Lipid panel; Future    Vitamin D deficiency  -     Vitamin D Panel; Future          PHQ-2/9 Depression Screening    Little interest or pleasure in doing things: 0 - not at all  Feeling down, depressed, or hopeless: 0 - not at all  Trouble falling or staying asleep, or sleeping too much: 0 - not at all  Feeling tired or having little energy: 0 - not at all  Poor appetite or overeatin - not at all  Feeling bad about yourself - or that you are a failure or have let yourself or your family down: 0 - not at all  Trouble concentrating on things, such as reading the newspaper or watching television: 0 - not at all  Moving or speaking so slowly that other people could have noticed  Or the opposite - being so fidgety or restless that you have been moving around a lot more than usual: 0 - not at all  Thoughts that you would be better off dead, or of hurting yourself in some way: 0 - not at all  PHQ-9 Score: 0   PHQ-9 Interpretation: No or Minimal depression             Subjective:      Patient ID: Hector Martinez is a 80 y o  female  Thyroid Problem  Presents for follow-up visit  Patient reports no cold intolerance, constipation, diarrhea, dry skin, fatigue, hair loss, heat intolerance, palpitations, weight gain or weight loss         The following portions of the patient's history were reviewed and updated as appropriate: allergies, current medications, past family history, past medical history, past social history, past surgical history and problem list     Review of Systems   Constitutional: Negative for chills, fatigue, fever, weight gain and weight loss  HENT: Negative  Negative for hearing loss  Eyes: Negative  Negative for visual disturbance  Respiratory: Negative for shortness of breath and wheezing  Cardiovascular: Negative for chest pain and palpitations  Gastrointestinal: Negative for abdominal pain, blood in stool, constipation, diarrhea, nausea and vomiting  Endocrine: Negative  Negative for cold intolerance and heat intolerance  Genitourinary: Negative for difficulty urinating and dysuria  Musculoskeletal: Negative for arthralgias and myalgias  Skin: Negative  Allergic/Immunologic: Negative  Neurological: Negative for seizures and syncope  Hematological: Negative for adenopathy  Psychiatric/Behavioral: Negative  Objective:    /82   Pulse 70   Temp (!) 96 7 °F (35 9 °C) (Tympanic)   Ht 5' (1 524 m)   Wt 78 5 kg (173 lb)   LMP  (LMP Unknown)   SpO2 95%   BMI 33 79 kg/m²      Physical Exam  Vitals and nursing note reviewed  Constitutional:       General: She is not in acute distress  Appearance: Normal appearance  She is well-developed  She is not ill-appearing, toxic-appearing or diaphoretic  HENT:      Head: Normocephalic and atraumatic  Right Ear: Tympanic membrane, ear canal and external ear normal  There is no impacted cerumen  Left Ear: Tympanic membrane, ear canal and external ear normal  There is no impacted cerumen  Nose: Nose normal  No congestion or rhinorrhea  Mouth/Throat:      Mouth: Mucous membranes are moist       Pharynx: Oropharynx is clear  No oropharyngeal exudate or posterior oropharyngeal erythema  Eyes:      General: No scleral icterus  Right eye: No discharge  Left eye: No discharge  Conjunctiva/sclera: Conjunctivae normal       Pupils: Pupils are equal, round, and reactive to light  Cardiovascular:      Rate and Rhythm: Normal rate and regular rhythm  Pulses: Normal pulses  Heart sounds: Normal heart sounds  No murmur heard  Pulmonary:      Effort: Pulmonary effort is normal  No respiratory distress  Breath sounds: Normal breath sounds  No wheezing, rhonchi or rales  Abdominal:      General: Bowel sounds are normal  There is no distension  Palpations: Abdomen is soft  There is no mass  Tenderness: There is no abdominal tenderness  There is no guarding or rebound  Musculoskeletal:         General: Normal range of motion  Cervical back: Normal range of motion and neck supple  No rigidity  Right lower leg: No edema  Left lower leg: No edema  Lymphadenopathy:      Cervical: No cervical adenopathy  Skin:     General: Skin is warm and dry  Findings: No rash  Neurological:      General: No focal deficit present  Mental Status: She is alert and oriented to person, place, and time  Sensory: No sensory deficit  Motor: No weakness or abnormal muscle tone  Coordination: Coordination normal       Gait: Gait normal       Deep Tendon Reflexes: Reflexes are normal and symmetric  Psychiatric:         Mood and Affect: Mood normal          Behavior: Behavior normal          Thought Content:  Thought content normal          Judgment: Judgment normal

## 2022-02-02 ENCOUNTER — TELEPHONE (OUTPATIENT)
Dept: LAB | Facility: HOSPITAL | Age: 87
End: 2022-02-02

## 2022-02-07 DIAGNOSIS — R10.13 EPIGASTRIC PAIN: ICD-10-CM

## 2022-02-07 RX ORDER — SUCRALFATE 1 G/1
1 TABLET ORAL 2 TIMES DAILY
Qty: 180 TABLET | Refills: 1 | Status: SHIPPED | OUTPATIENT
Start: 2022-02-07

## 2022-02-09 ENCOUNTER — APPOINTMENT (OUTPATIENT)
Dept: LAB | Facility: HOSPITAL | Age: 87
End: 2022-02-09
Payer: COMMERCIAL

## 2022-02-09 DIAGNOSIS — I10 BENIGN ESSENTIAL HTN: ICD-10-CM

## 2022-02-09 DIAGNOSIS — E55.9 VITAMIN D DEFICIENCY: ICD-10-CM

## 2022-02-09 DIAGNOSIS — E03.9 ACQUIRED HYPOTHYROIDISM: ICD-10-CM

## 2022-03-14 ENCOUNTER — TELEPHONE (OUTPATIENT)
Dept: FAMILY MEDICINE CLINIC | Facility: CLINIC | Age: 87
End: 2022-03-14

## 2022-03-14 NOTE — TELEPHONE ENCOUNTER
Pt states she has been getting chronic diarrhea asking what she can take for it - has adjusted her diet and using otc kaopectate and not helping - unable to come into the office for appt due to transportation issues

## 2022-03-16 ENCOUNTER — REMOTE DEVICE CLINIC VISIT (OUTPATIENT)
Dept: CARDIOLOGY CLINIC | Facility: CLINIC | Age: 87
End: 2022-03-16
Payer: COMMERCIAL

## 2022-03-16 DIAGNOSIS — Z95.0 PRESENCE OF CARDIAC PACEMAKER: Primary | ICD-10-CM

## 2022-03-16 PROCEDURE — 93294 REM INTERROG EVL PM/LDLS PM: CPT | Performed by: INTERNAL MEDICINE

## 2022-03-16 PROCEDURE — 93296 REM INTERROG EVL PM/IDS: CPT | Performed by: INTERNAL MEDICINE

## 2022-03-16 NOTE — PROGRESS NOTES
Results for orders placed or performed in visit on 03/16/22   Cardiac EP device report    Narrative    MDT SINGLE CHAMBER PPM (HIS) / ACTIVE SYSTEM IS MRI CONDITIONAL  CARELINK TRANSMISSION: BATTERY VOLTAGE ADEQUATE (4 YRS)   99 4% (>40%/S/P AV NODE/VVIR 70 - HIS LEAD); ALL AVAILABLE LEAD PARAMETERS WITHIN NORMAL LIMITS  NO SIGNIFICANT HIGH RATE EPISODES  CHRONIC AF & PATIENT TAKES WARFARIN  NORMAL DEVICE FUNCTION        ES

## 2022-04-08 ENCOUNTER — TELEPHONE (OUTPATIENT)
Dept: LAB | Facility: HOSPITAL | Age: 87
End: 2022-04-08

## 2022-04-14 ENCOUNTER — APPOINTMENT (OUTPATIENT)
Dept: LAB | Facility: HOSPITAL | Age: 87
End: 2022-04-14
Payer: COMMERCIAL

## 2022-05-04 DIAGNOSIS — N90.89 VULVAR IRRITATION: ICD-10-CM

## 2022-05-04 RX ORDER — DESOXIMETASONE 2.5 MG/G
CREAM TOPICAL AS NEEDED
Qty: 30 G | Refills: 0 | Status: SHIPPED | OUTPATIENT
Start: 2022-05-04

## 2022-05-10 DIAGNOSIS — I48.20 CHRONIC ATRIAL FIBRILLATION (HCC): ICD-10-CM

## 2022-05-10 RX ORDER — WARFARIN SODIUM 5 MG/1
TABLET ORAL
Qty: 90 TABLET | Refills: 1 | Status: SHIPPED | OUTPATIENT
Start: 2022-05-10

## 2022-05-18 DIAGNOSIS — M25.551 RIGHT HIP PAIN: ICD-10-CM

## 2022-05-18 DIAGNOSIS — M70.60 TROCHANTERIC BURSITIS: ICD-10-CM

## 2022-05-19 RX ORDER — GABAPENTIN 300 MG/1
CAPSULE ORAL
Qty: 60 CAPSULE | Refills: 0 | Status: SHIPPED | OUTPATIENT
Start: 2022-05-19 | End: 2022-06-15

## 2022-06-02 ENCOUNTER — TELEPHONE (OUTPATIENT)
Dept: LAB | Facility: HOSPITAL | Age: 87
End: 2022-06-02

## 2022-06-08 ENCOUNTER — APPOINTMENT (OUTPATIENT)
Dept: LAB | Facility: HOSPITAL | Age: 87
End: 2022-06-08
Payer: COMMERCIAL

## 2022-06-08 LAB
ALBUMIN SERPL BCP-MCNC: 3.3 G/DL (ref 3.5–5)
ALP SERPL-CCNC: 81 U/L (ref 46–116)
ALT SERPL W P-5'-P-CCNC: 25 U/L (ref 12–78)
ANION GAP SERPL CALCULATED.3IONS-SCNC: 4 MMOL/L (ref 4–13)
AST SERPL W P-5'-P-CCNC: 26 U/L (ref 5–45)
BASOPHILS # BLD AUTO: 0.03 THOUSANDS/ΜL (ref 0–0.1)
BASOPHILS NFR BLD AUTO: 1 % (ref 0–1)
BILIRUB SERPL-MCNC: 0.36 MG/DL (ref 0.2–1)
BUN SERPL-MCNC: 17 MG/DL (ref 5–25)
CALCIUM ALBUM COR SERPL-MCNC: 9.9 MG/DL (ref 8.3–10.1)
CALCIUM SERPL-MCNC: 9.3 MG/DL (ref 8.3–10.1)
CHLORIDE SERPL-SCNC: 113 MMOL/L (ref 100–108)
CHOLEST SERPL-MCNC: 151 MG/DL
CO2 SERPL-SCNC: 27 MMOL/L (ref 21–32)
CREAT SERPL-MCNC: 0.84 MG/DL (ref 0.6–1.3)
EOSINOPHIL # BLD AUTO: 0.12 THOUSAND/ΜL (ref 0–0.61)
EOSINOPHIL NFR BLD AUTO: 2 % (ref 0–6)
ERYTHROCYTE [DISTWIDTH] IN BLOOD BY AUTOMATED COUNT: 12.1 % (ref 11.6–15.1)
GFR SERPL CREATININE-BSD FRML MDRD: 62 ML/MIN/1.73SQ M
GLUCOSE P FAST SERPL-MCNC: 89 MG/DL (ref 65–99)
HCT VFR BLD AUTO: 38 % (ref 34.8–46.1)
HDLC SERPL-MCNC: 69 MG/DL
HGB BLD-MCNC: 11.6 G/DL (ref 11.5–15.4)
IMM GRANULOCYTES # BLD AUTO: 0.02 THOUSAND/UL (ref 0–0.2)
IMM GRANULOCYTES NFR BLD AUTO: 0 % (ref 0–2)
LDLC SERPL CALC-MCNC: 69 MG/DL (ref 0–100)
LYMPHOCYTES # BLD AUTO: 1.6 THOUSANDS/ΜL (ref 0.6–4.47)
LYMPHOCYTES NFR BLD AUTO: 28 % (ref 14–44)
MCH RBC QN AUTO: 32.1 PG (ref 26.8–34.3)
MCHC RBC AUTO-ENTMCNC: 30.5 G/DL (ref 31.4–37.4)
MCV RBC AUTO: 105 FL (ref 82–98)
MONOCYTES # BLD AUTO: 0.59 THOUSAND/ΜL (ref 0.17–1.22)
MONOCYTES NFR BLD AUTO: 10 % (ref 4–12)
NEUTROPHILS # BLD AUTO: 3.35 THOUSANDS/ΜL (ref 1.85–7.62)
NEUTS SEG NFR BLD AUTO: 59 % (ref 43–75)
NONHDLC SERPL-MCNC: 82 MG/DL
NRBC BLD AUTO-RTO: 0 /100 WBCS
PLATELET # BLD AUTO: 178 THOUSANDS/UL (ref 149–390)
PMV BLD AUTO: 10.8 FL (ref 8.9–12.7)
POTASSIUM SERPL-SCNC: 4.2 MMOL/L (ref 3.5–5.3)
PROT SERPL-MCNC: 7.1 G/DL (ref 6.4–8.2)
RBC # BLD AUTO: 3.61 MILLION/UL (ref 3.81–5.12)
SODIUM SERPL-SCNC: 144 MMOL/L (ref 136–145)
TRIGL SERPL-MCNC: 63 MG/DL
TSH SERPL DL<=0.05 MIU/L-ACNC: 2.26 UIU/ML (ref 0.45–4.5)
WBC # BLD AUTO: 5.71 THOUSAND/UL (ref 4.31–10.16)

## 2022-06-08 PROCEDURE — 80061 LIPID PANEL: CPT

## 2022-06-08 PROCEDURE — 36415 COLL VENOUS BLD VENIPUNCTURE: CPT

## 2022-06-08 PROCEDURE — 85025 COMPLETE CBC W/AUTO DIFF WBC: CPT

## 2022-06-08 PROCEDURE — 82306 VITAMIN D 25 HYDROXY: CPT

## 2022-06-08 PROCEDURE — 84443 ASSAY THYROID STIM HORMONE: CPT

## 2022-06-08 PROCEDURE — 80053 COMPREHEN METABOLIC PANEL: CPT

## 2022-06-10 ENCOUNTER — PATIENT MESSAGE (OUTPATIENT)
Dept: FAMILY MEDICINE CLINIC | Facility: CLINIC | Age: 87
End: 2022-06-10

## 2022-06-12 LAB
25(OH)D2 SERPL-MCNC: <1 NG/ML
25(OH)D3 SERPL-MCNC: 28 NG/ML
25(OH)D3+25(OH)D2 SERPL-MCNC: 28 NG/ML

## 2022-06-15 ENCOUNTER — REMOTE DEVICE CLINIC VISIT (OUTPATIENT)
Dept: CARDIOLOGY CLINIC | Facility: CLINIC | Age: 87
End: 2022-06-15
Payer: COMMERCIAL

## 2022-06-15 DIAGNOSIS — M25.551 RIGHT HIP PAIN: ICD-10-CM

## 2022-06-15 DIAGNOSIS — M70.60 TROCHANTERIC BURSITIS: ICD-10-CM

## 2022-06-15 DIAGNOSIS — Z95.0 PACEMAKER: Primary | ICD-10-CM

## 2022-06-15 PROCEDURE — 93294 REM INTERROG EVL PM/LDLS PM: CPT | Performed by: INTERNAL MEDICINE

## 2022-06-15 PROCEDURE — 93296 REM INTERROG EVL PM/IDS: CPT | Performed by: INTERNAL MEDICINE

## 2022-06-15 RX ORDER — GABAPENTIN 300 MG/1
CAPSULE ORAL
Qty: 60 CAPSULE | Refills: 0 | Status: SHIPPED | OUTPATIENT
Start: 2022-06-15 | End: 2022-07-14

## 2022-06-15 NOTE — PROGRESS NOTES
Results for orders placed or performed in visit on 06/15/22   Cardiac EP device report    Narrative    MDT SINGLE CHAMBER PPM (HIS) / ACTIVE SYSTEM IS MRI CONDITIONAL  CARELINK TRANSMISSION: BATTERY VOLTAGE ADEQUATE (3 6 YRS)   99 6% (>40%/S/P AV NODE/VVIR 70 - HIS LEAD); ALL AVAILABLE LEAD PARAMETERS WITHIN NORMAL LIMITS  NO SIGNIFICANT HIGH RATE EPISODES  CHRONIC AF & PATIENT TAKES WARFARIN  NORMAL DEVICE FUNCTION        ES

## 2022-06-16 RX ORDER — TIZANIDINE 2 MG/1
TABLET ORAL
Qty: 60 TABLET | Refills: 2 | Status: SHIPPED | OUTPATIENT
Start: 2022-06-16

## 2022-07-08 ENCOUNTER — APPOINTMENT (OUTPATIENT)
Dept: RADIOLOGY | Facility: CLINIC | Age: 87
End: 2022-07-08
Payer: COMMERCIAL

## 2022-07-08 ENCOUNTER — OFFICE VISIT (OUTPATIENT)
Dept: FAMILY MEDICINE CLINIC | Facility: CLINIC | Age: 87
End: 2022-07-08
Payer: COMMERCIAL

## 2022-07-08 VITALS
OXYGEN SATURATION: 99 % | HEIGHT: 60 IN | TEMPERATURE: 96.9 F | BODY MASS INDEX: 33.02 KG/M2 | HEART RATE: 44 BPM | DIASTOLIC BLOOD PRESSURE: 82 MMHG | SYSTOLIC BLOOD PRESSURE: 139 MMHG | WEIGHT: 168.2 LBS

## 2022-07-08 DIAGNOSIS — K21.9 GASTROESOPHAGEAL REFLUX DISEASE WITHOUT ESOPHAGITIS: ICD-10-CM

## 2022-07-08 DIAGNOSIS — E03.9 ACQUIRED HYPOTHYROIDISM: Primary | ICD-10-CM

## 2022-07-08 DIAGNOSIS — N18.31 STAGE 3A CHRONIC KIDNEY DISEASE (HCC): ICD-10-CM

## 2022-07-08 DIAGNOSIS — M25.511 ACUTE PAIN OF RIGHT SHOULDER: ICD-10-CM

## 2022-07-08 DIAGNOSIS — E66.09 CLASS 1 OBESITY DUE TO EXCESS CALORIES WITH SERIOUS COMORBIDITY AND BODY MASS INDEX (BMI) OF 32.0 TO 32.9 IN ADULT: ICD-10-CM

## 2022-07-08 DIAGNOSIS — D32.9 MENINGIOMA (HCC): ICD-10-CM

## 2022-07-08 DIAGNOSIS — I50.32 CHRONIC DIASTOLIC CHF (CONGESTIVE HEART FAILURE) (HCC): ICD-10-CM

## 2022-07-08 DIAGNOSIS — I48.20 CHRONIC ATRIAL FIBRILLATION (HCC): ICD-10-CM

## 2022-07-08 DIAGNOSIS — I73.9 PERIPHERAL VASCULAR DISEASE, UNSPECIFIED (HCC): ICD-10-CM

## 2022-07-08 PROCEDURE — 99214 OFFICE O/P EST MOD 30 MIN: CPT | Performed by: FAMILY MEDICINE

## 2022-07-08 PROCEDURE — 73030 X-RAY EXAM OF SHOULDER: CPT

## 2022-07-08 NOTE — PROGRESS NOTES
Assessment/Plan:    No problem-specific Assessment & Plan notes found for this encounter  Diagnoses and all orders for this visit:    Acquired hypothyroidism    Class 1 obesity due to excess calories with serious comorbidity and body mass index (BMI) of 32 0 to 32 9 in adult  Comments:  pt counseled on diet and exercise    Acute pain of right shoulder  -     XR shoulder 2+ vw right; Future    Gastroesophageal reflux disease without esophagitis  -     Ambulatory Referral to Gastroenterology; Future    Peripheral vascular disease, unspecified (HCC)    Chronic diastolic CHF (congestive heart failure) (HCC)    Chronic atrial fibrillation (HCC)    Meningioma (HCC)    Stage 3a chronic kidney disease (HCC)          PHQ-2/9 Depression Screening          BMI Counseling: Body mass index is 32 85 kg/m²  The BMI is above normal  Nutrition recommendations include reducing portion sizes and 3-5 servings of fruits/vegetables daily  Exercise recommendations include moderate aerobic physical activity for 150 minutes/week and exercising 3-5 times per week  Subjective:      Patient ID: Jam Larose is a 80 y o  female  Thyroid Problem  Presents for follow-up visit  Patient reports no anxiety, cold intolerance, fatigue, hair loss, heat intolerance or palpitations  Shoulder Pain   The pain is present in the left shoulder  This is a new problem  The current episode started more than 1 month ago  The problem occurs constantly  The quality of the pain is described as burning  The following portions of the patient's history were reviewed and updated as appropriate: allergies, current medications, past family history, past medical history, past social history, past surgical history and problem list     Review of Systems   Constitutional: Negative for fatigue  Cardiovascular: Negative for palpitations  Endocrine: Negative for cold intolerance and heat intolerance     Psychiatric/Behavioral: The patient is not nervous/anxious  Objective:    /82   Pulse (!) 44   Temp (!) 96 9 °F (36 1 °C) (Tympanic)   Ht 5' (1 524 m)   Wt 76 3 kg (168 lb 3 2 oz)   LMP  (LMP Unknown)   SpO2 99%   BMI 32 85 kg/m²      Physical Exam  Vitals and nursing note reviewed  Constitutional:       General: She is not in acute distress  Appearance: Normal appearance  She is not ill-appearing, toxic-appearing or diaphoretic  HENT:      Head: Normocephalic and atraumatic  Eyes:      Conjunctiva/sclera: Conjunctivae normal    Cardiovascular:      Rate and Rhythm: Normal rate and regular rhythm  Heart sounds: Normal heart sounds  No murmur heard  Pulmonary:      Effort: Pulmonary effort is normal  No respiratory distress  Breath sounds: Normal breath sounds  No wheezing, rhonchi or rales  Abdominal:      General: There is no distension  Palpations: Abdomen is soft  There is no mass  Tenderness: There is no abdominal tenderness  There is no guarding or rebound  Musculoskeletal:      Cervical back: Normal range of motion and neck supple  No rigidity  Right lower leg: No edema  Left lower leg: No edema  Lymphadenopathy:      Cervical: No cervical adenopathy  Neurological:      Mental Status: She is alert and oriented to person, place, and time  Psychiatric:         Mood and Affect: Mood normal          Behavior: Behavior normal          Thought Content:  Thought content normal          Judgment: Judgment normal

## 2022-07-14 DIAGNOSIS — G89.29 OTHER CHRONIC PAIN: ICD-10-CM

## 2022-07-14 DIAGNOSIS — M25.551 RIGHT HIP PAIN: ICD-10-CM

## 2022-07-14 DIAGNOSIS — M70.60 TROCHANTERIC BURSITIS: ICD-10-CM

## 2022-07-14 RX ORDER — GABAPENTIN 300 MG/1
CAPSULE ORAL
Qty: 60 CAPSULE | Refills: 0 | Status: SHIPPED | OUTPATIENT
Start: 2022-07-14 | End: 2022-08-12

## 2022-07-14 RX ORDER — DULOXETIN HYDROCHLORIDE 60 MG/1
CAPSULE, DELAYED RELEASE ORAL
Qty: 90 CAPSULE | Refills: 1 | Status: SHIPPED | OUTPATIENT
Start: 2022-07-14

## 2022-07-22 ENCOUNTER — OFFICE VISIT (OUTPATIENT)
Dept: FAMILY MEDICINE CLINIC | Facility: CLINIC | Age: 87
End: 2022-07-22
Payer: COMMERCIAL

## 2022-07-22 VITALS
TEMPERATURE: 98.2 F | HEART RATE: 74 BPM | SYSTOLIC BLOOD PRESSURE: 126 MMHG | BODY MASS INDEX: 32.98 KG/M2 | DIASTOLIC BLOOD PRESSURE: 76 MMHG | HEIGHT: 60 IN | WEIGHT: 168 LBS | OXYGEN SATURATION: 93 %

## 2022-07-22 DIAGNOSIS — M15.9 PRIMARY OSTEOARTHRITIS INVOLVING MULTIPLE JOINTS: ICD-10-CM

## 2022-07-22 DIAGNOSIS — M19.011 PRIMARY OSTEOARTHRITIS OF RIGHT SHOULDER: Primary | ICD-10-CM

## 2022-07-22 DIAGNOSIS — I48.20 CHRONIC ATRIAL FIBRILLATION (HCC): ICD-10-CM

## 2022-07-22 PROCEDURE — 20610 DRAIN/INJ JOINT/BURSA W/O US: CPT | Performed by: STUDENT IN AN ORGANIZED HEALTH CARE EDUCATION/TRAINING PROGRAM

## 2022-07-22 PROCEDURE — 3725F SCREEN DEPRESSION PERFORMED: CPT | Performed by: STUDENT IN AN ORGANIZED HEALTH CARE EDUCATION/TRAINING PROGRAM

## 2022-07-22 PROCEDURE — 99213 OFFICE O/P EST LOW 20 MIN: CPT | Performed by: STUDENT IN AN ORGANIZED HEALTH CARE EDUCATION/TRAINING PROGRAM

## 2022-07-22 PROCEDURE — 1160F RVW MEDS BY RX/DR IN RCRD: CPT | Performed by: STUDENT IN AN ORGANIZED HEALTH CARE EDUCATION/TRAINING PROGRAM

## 2022-07-22 RX ORDER — TRIAMCINOLONE ACETONIDE 40 MG/ML
40 INJECTION, SUSPENSION INTRA-ARTICULAR; INTRAMUSCULAR
Status: COMPLETED | OUTPATIENT
Start: 2022-07-22 | End: 2022-07-22

## 2022-07-22 RX ORDER — WARFARIN SODIUM 4 MG/1
TABLET ORAL
Qty: 90 TABLET | Refills: 1 | Status: CANCELLED | OUTPATIENT
Start: 2022-07-22

## 2022-07-22 RX ORDER — LIDOCAINE HYDROCHLORIDE 10 MG/ML
4 INJECTION, SOLUTION INFILTRATION; PERINEURAL
Status: COMPLETED | OUTPATIENT
Start: 2022-07-22 | End: 2022-07-22

## 2022-07-22 RX ADMIN — TRIAMCINOLONE ACETONIDE 40 MG: 40 INJECTION, SUSPENSION INTRA-ARTICULAR; INTRAMUSCULAR at 15:36

## 2022-07-22 RX ADMIN — LIDOCAINE HYDROCHLORIDE 4 ML: 10 INJECTION, SOLUTION INFILTRATION; PERINEURAL at 15:36

## 2022-07-22 NOTE — PROGRESS NOTES
Assessment/Plan/Follow up information       Diagnosis ICD-10-CM Associated Orders   1  Primary osteoarthritis of right shoulder  M19 011    2  Chronic atrial fibrillation (HCC)  I48 20    3  Primary osteoarthritis involving multiple joints  M15 9       I suspect patient most likely also has an underlying rotator cuff tear  We discussed modalities include physical therapy steroid injection, as well as surgical intervention  Patient states that this time she does not want to see an orthopedist or discuss surgical options as she is able to manage on her own  She is interested in pursuing steroid injection at this time  States she is not interested in physical therapy  Patient in agreement with the plan, all questions and concerns were answered/addressed  Advised to contact me or the office with any concerns or questions  In the event of an emergency, and unable to contact a provider they are to go to the emergency room  Subjective    HPI:  This is an 27-year-old female who presents the office today for review of recent x-ray  Patient states she has been having acute on chronic right shoulder pain was seen in his office per previous provider an x-ray was ordered  She denies any falls trauma  States pain is exacerbated with movement of the right shoulder alleviated with rest   Has not tried any over-the-counter medications  States she is unable to take NSAIDs secondary to being on warfarin also history of peptic ulcer disease  X-ray reviewed patient shows moderate degree of osteoarthritis no acute osseous injury  Review of Systems   Constitutional: Negative for activity change, appetite change, chills, fatigue and fever  HENT: Negative for congestion, dental problem, drooling, ear discharge, ear pain, facial swelling, postnasal drip, rhinorrhea and sinus pain  Eyes: Negative for photophobia, pain, discharge and itching     Respiratory: Negative for apnea, cough, chest tightness and shortness of breath  Cardiovascular: Negative for chest pain and leg swelling  Gastrointestinal: Negative for abdominal distention, abdominal pain, anal bleeding, constipation, diarrhea and nausea  Endocrine: Negative for cold intolerance, heat intolerance and polydipsia  Genitourinary: Negative for difficulty urinating  Musculoskeletal: Negative for arthralgias, gait problem, joint swelling and myalgias  Right shoulder pain   Skin: Negative for color change and pallor  Allergic/Immunologic: Negative for immunocompromised state  Neurological: Negative for dizziness, seizures, facial asymmetry, weakness, light-headedness, numbness and headaches  Psychiatric/Behavioral: Negative for agitation, behavioral problems, confusion, decreased concentration and dysphoric mood  All other systems reviewed and are negative  Large joint arthrocentesis: R subacromial bursa  Universal Protocol:  Consent: Verbal consent obtained  Written consent obtained  Risks and benefits: risks, benefits and alternatives were discussed  Time out: Immediately prior to procedure a "time out" was called to verify the correct patient, procedure, equipment, support staff and site/side marked as required  Patient understanding: patient states understanding of the procedure being performed  Patient consent: the patient's understanding of the procedure matches consent given  Procedure consent: procedure consent matches procedure scheduled  Relevant documents: relevant documents present and verified  Test results: test results available and properly labeled  Radiology Images displayed and confirmed   If images not available, report reviewed: imaging studies available  Required items: required blood products, implants, devices, and special equipment available    Supporting Documentation  Indications: pain   Procedure Details  Location: shoulder - R subacromial bursa  Needle size: 25 G  Approach: posterolateral  Medications administered: 4 mL lidocaine 1 %; 40 mg triamcinolone acetonide 40 mg/mL    Aspirate: serous    Dressing:  Sterile dressing applied            Objective    There were no vitals filed for this visit  Physical Exam  Vitals and nursing note reviewed  Constitutional:       General: She is not in acute distress  Appearance: She is well-developed  HENT:      Head: Normocephalic and atraumatic  Eyes:      Conjunctiva/sclera: Conjunctivae normal       Pupils: Pupils are equal, round, and reactive to light  Cardiovascular:      Rate and Rhythm: Normal rate and regular rhythm  Heart sounds: Normal heart sounds  No murmur heard  No friction rub  Pulmonary:      Effort: Pulmonary effort is normal       Breath sounds: Normal breath sounds  Abdominal:      General: Bowel sounds are normal       Palpations: Abdomen is soft  Musculoskeletal:         General: Normal range of motion  Cervical back: Normal range of motion and neck supple  Comments: Significant crepitus in the right shoulder, no obvious deformity  For of 5 strength  She is unable to abduct the right shoulder over 90°  Skin:     General: Skin is warm  Capillary Refill: Capillary refill takes less than 2 seconds  Neurological:      Mental Status: She is alert and oriented to person, place, and time  Motor: No abnormal muscle tone  Coordination: Coordination normal    Psychiatric:         Behavior: Behavior normal          Thought Content: Thought content normal             Portions of the record may have been created with voice recognition software  Occasional wrong word or "sound a like" substitutions may have occurred due to the inherent limitations of voice recognition software  Read the chart carefully and recognize, using context, where substitutions have occurred  Contact me with any questions         Pamela Strickland MD 07/22/22

## 2022-08-03 ENCOUNTER — OFFICE VISIT (OUTPATIENT)
Dept: CARDIOLOGY CLINIC | Facility: CLINIC | Age: 87
End: 2022-08-03
Payer: COMMERCIAL

## 2022-08-03 ENCOUNTER — ANTICOAG VISIT (OUTPATIENT)
Dept: CARDIOLOGY CLINIC | Facility: CLINIC | Age: 87
End: 2022-08-03

## 2022-08-03 ENCOUNTER — TELEPHONE (OUTPATIENT)
Dept: LAB | Facility: HOSPITAL | Age: 87
End: 2022-08-03

## 2022-08-03 VITALS
DIASTOLIC BLOOD PRESSURE: 74 MMHG | HEIGHT: 60 IN | OXYGEN SATURATION: 95 % | SYSTOLIC BLOOD PRESSURE: 130 MMHG | HEART RATE: 85 BPM | RESPIRATION RATE: 16 BRPM | BODY MASS INDEX: 32 KG/M2 | WEIGHT: 163 LBS

## 2022-08-03 DIAGNOSIS — I34.0 NONRHEUMATIC MITRAL VALVE REGURGITATION: Primary | ICD-10-CM

## 2022-08-03 DIAGNOSIS — Z79.01 LONG TERM (CURRENT) USE OF ANTICOAGULANTS: Primary | ICD-10-CM

## 2022-08-03 DIAGNOSIS — R19.7 DIARRHEA, UNSPECIFIED TYPE: ICD-10-CM

## 2022-08-03 PROCEDURE — 1160F RVW MEDS BY RX/DR IN RCRD: CPT | Performed by: INTERNAL MEDICINE

## 2022-08-03 PROCEDURE — 99214 OFFICE O/P EST MOD 30 MIN: CPT | Performed by: INTERNAL MEDICINE

## 2022-08-03 NOTE — PROGRESS NOTES
Cardiology Office Note    Hector Martinez 80 y o  female MRN: [id number]    08/03/22          Assessment:  1  Chronic atrial fibrillation s/p AV Node ablation and MDT SC HIS PPM (2018)  2  Chronic HFpEF  3  Moderate mitral regurgitation  4  Hypertension   5  Ambulatory dysfucntion    Plan:  · She is euvolemic on torsemide PRN  · Cont warfarin; will enroll in the coumadin clinic   · Will refer to GI for evaluation for weight loss/diarrhea  · Will continue surveillance of her valvular heart disease with a TTE  · She is enrolled in device clinic   Ambulatory blood pressure monitoring and maintaining a low sodium diet was advised  · He was advised to notify us with the onset of cardiac symptoms  Follow up: 6 months or sooner as needed    1  Nonrheumatic mitral valve regurgitation  Echo complete w/ contrast if indicated   2  Diarrhea, unspecified type  Ambulatory referral to Gastroenterology       HPI: Hector Martinez is a 80y o  year old female  With history of atrial fibrillation s/p AV node ablation and MDT ppm implantation presents for routine follow-up  Past cardiac evaluation:  · AF with inability to tolerate rate control s/p AV Node ablation and MDT SC HIS PPM (2018)  · TTE 2018: EF: 55, biatrial dilation, moderate MR, TR    She has been doing well from a cardiac standpoint since her last evaluation  She has lost about 12 lbs since her last evaluation  She notes abdominal bloating and diarrhea  She has been euvolemic and only require torsemide once weekly  She is on warfarin  She denies bleeding/falls  She denies any other cardiac concerns at this time  Allergies   Allergen Reactions    Amoxicillin      Reports nausea, headache, dizzy    Codeine Vomiting and GI Intolerance     GI upset, ana m morphine  Reaction Date: [id number];     Other      Other reaction(s): Other (See Comments)  ana m toradol in OR 6/06 sah    Oxycodone-Acetaminophen      Other reaction(s):  Other (See Comments)  GI upset; ana m vicodin    Percocet [Oxycodone-Acetaminophen]     Seasonal Ic  [Cholestatin] Allergic Rhinitis    Trimethoprim     Bactrim [Sulfamethoxazole-Trimethoprim] Rash    Sulfa Antibiotics Rash     Other reaction(s):  Other (See Comments)  takes lasix @home         Current Outpatient Medications:     acetaminophen (TYLENOL) 500 mg tablet, Take 2 tablets (1,000 mg total) by mouth every 8 (eight) hours, Disp: 90 tablet, Rfl: 0    ascorbic acid (VITAMIN C) 500 mg tablet, Take 1 tablet (500 mg total) by mouth 2 (two) times a day, Disp: 60 tablet, Rfl: 0    Cholecalciferol 125 MCG (5000 UT) TABS, Take 5,000 Units by mouth daily, Disp: , Rfl:     desoximetasone (TOPICORT) 0 25 % cream, APPLY TOPICALLY AS NEEDED FOR IRRITATION, Disp: 30 g, Rfl: 0    Diclofenac Sodium (VOLTAREN) 1 %, Apply 2 g topically 4 (four) times a day, Disp: 350 g, Rfl: 3    DULoxetine (CYMBALTA) 60 mg delayed release capsule, TAKE 1 CAPSULE BY MOUTH EVERY DAY, Disp: 90 capsule, Rfl: 1    fluticasone (FLONASE) 50 mcg/act nasal spray, SPRAY 2 SPRAYS INTO EACH NOSTRIL EVERY DAY, Disp: 16 mL, Rfl: 0    gabapentin (NEURONTIN) 300 mg capsule, TAKE 1 CAPSULE BY MOUTH TWICE A DAY, Disp: 60 capsule, Rfl: 0    levothyroxine 100 mcg tablet, Take 1 tablet (100 mcg total) by mouth daily, Disp: 90 tablet, Rfl: 1    potassium chloride 10% oral solution, Take 15 mL (20 mEq total) by mouth daily, Disp: 3800 mL, Rfl: 3    sucralfate (CARAFATE) 1 g tablet, TAKE 1 TABLET (1 G TOTAL) BY MOUTH 2 (TWO) TIMES A DAY, Disp: 180 tablet, Rfl: 1    tiZANidine (ZANAFLEX) 2 mg tablet, TAKE 1 TABLET BY MOUTH EVERY 8 HOURS AS NEEDED FOR MUSCLE SPASMS , Disp: 60 tablet, Rfl: 2    torsemide (DEMADEX) 20 mg tablet, Take 1 tablet (20 mg total) by mouth daily (Patient taking differently: Take 20 mg by mouth as needed), Disp: 90 tablet, Rfl: 3    warfarin (COUMADIN) 4 mg tablet, TAKE ONE TABLET DAILY AS PREVIOUSLY DIRECTED BY CARDIOLOGY, Disp: 90 tablet, Rfl: 1    warfarin (COUMADIN) 1 mg tablet, TAKE 1 TABLET BY MOUTH DAILY OR AS DIRECTED (Patient not taking: No sig reported), Disp: 90 tablet, Rfl: 1    warfarin (COUMADIN) 2 mg tablet, Take 1 tablet daily or as instructed  (Patient not taking: No sig reported), Disp: 90 tablet, Rfl: 1    warfarin (COUMADIN) 5 mg tablet, TAKE 1 TABLET BY MOUTH EVERY DAY AS DIRECTED (Patient not taking: No sig reported), Disp: 90 tablet, Rfl: 1    Past Medical History:   Diagnosis Date    Abdominal bloating 8/1/2016    Anemia     Arthritis     Cancer ([address] )     basal cell    CHF (congestive heart failure) (HCC)     COVID-19     Disease of thyroid gland     Disturbance of smell and taste     disturbance of taste    Effusion of knee joint right     Fibromyalgia     Heart murmur     reported a previous heart murmur    History of acute myocardial infarction     History of atrial fibrillation     History of bruising easily     History of cancer     History of dermatitis     History of mammogram     History of methicillin resistant staphylococcus aureus (MRSA)     Negative nasal culture, isolation discontinued 8/17/2018      History of methicillin resistant staphylococcus aureus (MRSA) 08/17/2018    negative nasal culture-isolation and hx discontinued 8/17/2018    History of obesity     History of osteopenia     History of sciatica     History of shortness of breath     History of sinusitis     History of sore throat     History of syncope     History of umbilical hernia     History of viral infection     Irritable bowel syndrome (IBS)     Joint pain, hip     Limb pain     Myalgia     myalgia and myositis    Need for prophylactic vaccination against single diseases     Need for prophylactic vaccination and inoculation against influenza     Preoperative cardiovascular examination     Primary osteoarthritis of right knee     Right leg pain     Vaginal Pap smear     reported pap smear       Family History   Problem Relation Age of Onset    Coronary artery disease Mother     Heart attack Mother     Hypertension Mother     Coronary artery disease Father     Heart attack Father    Juan Dickson Hypertension Father     Lymphoma Sister         acute    Rashes / Skin problems Sister         lymphomatoid papulosis    Cancer Sister     Coronary artery disease Brother     Heart attack Brother         x2    Aneurysm Neg Hx     Hyperlipidemia Neg Hx        Past Surgical History:   Procedure Laterality Date    ANKLE ARTHRODESIS Right     BACK SURGERY      BARIATRIC SURGERY      CHOLECYSTECTOMY      x2    COLONOSCOPY      ESOPHAGOGASTRODUODENOSCOPY N/A 3/23/2018    Procedure: ESOPHAGOGASTRODUODENOSCOPY (EGD); Surgeon: Kayden Benites MD;  Location: [facility]; Service: Gastroenterology    FOOT SURGERY      HIP CLOSE REDUCTION Right 8/21/2016    Procedure: CLOSED REDUCTION RIGHT TOTAL HIP;  Surgeon: Harjit Buchanan MD;  [facility];  Service:    Arnaldo Valadez / Cecily Thakur / Gail Bi IR BIOPSY OTHER  8/1/2020    JOINT REPLACEMENT      LEFT KNEE REPLACEMENT    JOINT REPLACEMENT      Right HIP    PILONIDAL CYST EXCISION      x2    DC EXC SKIN BENIG <0 5 CM TRUNK,ARM,LEG Right 7/24/2019    Procedure: REVISION SCAR EXTREMITY Rt Hip;   Surgeon: Sandrita Charles MD;  [facility];  Service: Orthopedics    DC REVISE TOTAL HIP REPLACEMENT Right 4/15/2019    Procedure: ARTHROPLASTY HIP TOTAL ACETABULAR REVISION;  Surgeon: Sandrita Charles MD;  [facility];  Service: Orthopedics    REPLACEMENT TOTAL KNEE Right     SILVANA-EN-Y PROCEDURE      x2    TOTAL KNEE ARTHROPLASTY      UMBILICAL HERNIA REPAIR      x2       Social History     Socioeconomic History    Marital status: /Civil Union     Spouse name: Not on file    Number of children: Not on file    Years of education: Not on file    Highest education level: Not on file   Occupational History    Not on file   Tobacco Use    Smoking status: Never Smoker    Smokeless tobacco: Never Used   Vaping Use    Vaping Use: Never used   Substance and Sexual Activity    Alcohol use: Not Currently    Drug use: No    Sexual activity: Never   Other Topics Concern    Not on file   Social History Narrative    Not on file     Social Determinants of Health     Financial Resource Strain: Not on file   Food Insecurity: Not on file   Transportation Needs: Not on file   Physical Activity: Not on file   Stress: Not on file   Social Connections: Not on file   Intimate Partner Violence: Not on file   Housing Stability: Not on file       Review of Systems   Constitutional: Positive for weight loss  Negative for diaphoresis and weight gain  HENT: Negative for congestion  Cardiovascular: Negative for chest pain, dyspnea on exertion, irregular heartbeat, leg swelling, near-syncope, orthopnea, palpitations, paroxysmal nocturnal dyspnea and syncope  Respiratory: Negative for shortness of breath, sleep disturbances due to breathing and snoring  Hematologic/Lymphatic: Does not bruise/bleed easily  Skin: Negative for rash  Musculoskeletal: Negative for myalgias  Gastrointestinal: Positive for diarrhea  Negative for nausea and vomiting  Neurological: Negative for excessive daytime sleepiness and light-headedness  Psychiatric/Behavioral: The patient is not nervous/anxious  Vitals: /74 (BP Location: Left arm, Patient Position: Sitting)   Pulse 85   Resp 16   Ht 5' (1 524 m)   Wt 73 9 kg (163 lb)   LMP  (LMP Unknown)   SpO2 95%   BMI 31 83 kg/m²       Physical Exam:     GEN: Alert and oriented x 3, in no acute distress  Well appearing and well nourished  HEENT: Sclera anicteric, conjunctivae pink, mucous membranes moist  Oropharynx clear  NECK: Supple, no carotid bruits, no significant JVD  Trachea midline, no thyromegaly  HEART: Regular rhythm, normal S1 and S2, 3/6 BOB, no clicks, gallops or rubs   PMI nondisplaced, no thrills  LUNGS: Clear to auscultation bilaterally; no wheezes, rales, or rhonchi  No increased work of breathing or signs of respiratory distress  ABDOMEN: Soft, nontender, nondistended, normoactive bowel sounds  EXTREMITIES: Skin warm and well perfused, no edema  NEURO: No focal findings  Normal speech  Mood and affect normal    SKIN: Normal without suspicious lesions on exposed skin

## 2022-08-03 NOTE — PROGRESS NOTES
Pt was seen in our office today  She is transferring to our coumadin clinic  States she is on 4 mg daily  [facility] home phlebotomy to request a home draw

## 2022-08-12 ENCOUNTER — TELEPHONE (OUTPATIENT)
Dept: LAB | Facility: HOSPITAL | Age: 87
End: 2022-08-12

## 2022-08-12 DIAGNOSIS — M25.551 RIGHT HIP PAIN: ICD-10-CM

## 2022-08-12 DIAGNOSIS — M70.60 TROCHANTERIC BURSITIS: ICD-10-CM

## 2022-08-12 RX ORDER — GABAPENTIN 300 MG/1
CAPSULE ORAL
Qty: 60 CAPSULE | Refills: 0 | Status: SHIPPED | OUTPATIENT
Start: 2022-08-12 | End: 2022-09-12

## 2022-08-14 DIAGNOSIS — R10.13 EPIGASTRIC PAIN: ICD-10-CM

## 2022-08-14 DIAGNOSIS — I48.20 ATRIAL FIBRILLATION, CHRONIC (HCC): ICD-10-CM

## 2022-08-14 RX ORDER — SUCRALFATE 1 G/1
TABLET ORAL
Qty: 180 TABLET | Refills: 1 | Status: SHIPPED | OUTPATIENT
Start: 2022-08-14

## 2022-08-15 DIAGNOSIS — E03.9 HYPOTHYROIDISM, UNSPECIFIED TYPE: ICD-10-CM

## 2022-08-15 RX ORDER — LEVOTHYROXINE SODIUM 0.1 MG/1
TABLET ORAL
Qty: 90 TABLET | Refills: 1 | Status: SHIPPED | OUTPATIENT
Start: 2022-08-15

## 2022-08-15 RX ORDER — WARFARIN SODIUM 1 MG/1
TABLET ORAL
Qty: 90 TABLET | Refills: 1 | Status: SHIPPED | OUTPATIENT
Start: 2022-08-15

## 2022-08-18 ENCOUNTER — APPOINTMENT (OUTPATIENT)
Dept: LAB | Facility: HOSPITAL | Age: 87
End: 2022-08-18
Attending: INTERNAL MEDICINE
Payer: COMMERCIAL

## 2022-08-19 ENCOUNTER — ANTICOAG VISIT (OUTPATIENT)
Dept: CARDIOLOGY CLINIC | Facility: CLINIC | Age: 87
End: 2022-08-19

## 2022-09-12 DIAGNOSIS — M70.60 TROCHANTERIC BURSITIS: ICD-10-CM

## 2022-09-12 DIAGNOSIS — M25.551 RIGHT HIP PAIN: ICD-10-CM

## 2022-09-12 RX ORDER — GABAPENTIN 300 MG/1
CAPSULE ORAL
Qty: 60 CAPSULE | Refills: 0 | Status: SHIPPED | OUTPATIENT
Start: 2022-09-12 | End: 2022-10-15

## 2022-09-13 RX ORDER — TIZANIDINE 2 MG/1
TABLET ORAL
Qty: 60 TABLET | Refills: 2 | Status: SHIPPED | OUTPATIENT
Start: 2022-09-13

## 2022-09-14 ENCOUNTER — REMOTE DEVICE CLINIC VISIT (OUTPATIENT)
Dept: CARDIOLOGY CLINIC | Facility: CLINIC | Age: 87
End: 2022-09-14
Payer: COMMERCIAL

## 2022-09-14 DIAGNOSIS — Z95.0 PRESENCE OF PERMANENT CARDIAC PACEMAKER: Primary | ICD-10-CM

## 2022-09-14 PROCEDURE — 93296 REM INTERROG EVL PM/IDS: CPT | Performed by: INTERNAL MEDICINE

## 2022-09-14 PROCEDURE — 93294 REM INTERROG EVL PM/LDLS PM: CPT | Performed by: INTERNAL MEDICINE

## 2022-09-14 NOTE — PROGRESS NOTES
Results for orders placed or performed in visit on 09/14/22   Cardiac EP device report    Narrative    MDT SINGLE CHAMBER PPM (HIS) / ACTIVE SYSTEM IS MRI CONDITIONAL  CARELINK TRANSMISSION: BATTERY VOLTAGE ADEQUATE  (3 1 YRS)  99%  ALL AVAILABLE LEAD PARAMETERS WITHIN NORMAL LIMITS  NO SIGNIFICANT HIGH RATE EPISODES  NORMAL DEVICE FUNCTION  ---GONZALES

## 2022-09-16 DIAGNOSIS — N90.89 VULVAR IRRITATION: ICD-10-CM

## 2022-09-16 RX ORDER — DESOXIMETASONE 2.5 MG/G
CREAM TOPICAL AS NEEDED
Qty: 30 G | Refills: 0 | Status: SHIPPED | OUTPATIENT
Start: 2022-09-16

## 2022-10-12 PROBLEM — Z00.00 MEDICARE ANNUAL WELLNESS VISIT, SUBSEQUENT: Status: RESOLVED | Noted: 2020-11-10 | Resolved: 2022-10-12

## 2022-10-15 DIAGNOSIS — M25.551 RIGHT HIP PAIN: ICD-10-CM

## 2022-10-15 DIAGNOSIS — M70.60 TROCHANTERIC BURSITIS: ICD-10-CM

## 2022-10-15 RX ORDER — GABAPENTIN 300 MG/1
CAPSULE ORAL
Qty: 60 CAPSULE | Refills: 0 | Status: SHIPPED | OUTPATIENT
Start: 2022-10-15

## 2022-11-11 DIAGNOSIS — I48.20 CHRONIC ATRIAL FIBRILLATION (HCC): ICD-10-CM

## 2022-11-11 RX ORDER — WARFARIN SODIUM 5 MG/1
TABLET ORAL
Qty: 90 TABLET | Refills: 1 | Status: SHIPPED | OUTPATIENT
Start: 2022-11-11

## 2022-11-17 DIAGNOSIS — M25.551 RIGHT HIP PAIN: ICD-10-CM

## 2022-11-17 DIAGNOSIS — M70.60 TROCHANTERIC BURSITIS: ICD-10-CM

## 2022-11-18 RX ORDER — GABAPENTIN 300 MG/1
CAPSULE ORAL
Qty: 60 CAPSULE | Refills: 0 | Status: SHIPPED | OUTPATIENT
Start: 2022-11-18

## 2022-12-13 ENCOUNTER — TELEPHONE (OUTPATIENT)
Dept: FAMILY MEDICINE CLINIC | Facility: CLINIC | Age: 87
End: 2022-12-13

## 2022-12-13 NOTE — TELEPHONE ENCOUNTER
Pt called in  She is asking for us to revise her medication list  She is prescribed Torsemide 20mg (which has not been filled since 2019)  She has been cutting the pill in half, then in half again  She is asking for her med list to be revised because she is going into an assisted living facility after the holidays and does not want them prescribing her the 20mg  She would like the torsemide to be changed to 5mg or even a lower dose if possible because she thinks the 5mg is too strong

## 2022-12-15 DIAGNOSIS — M25.551 RIGHT HIP PAIN: ICD-10-CM

## 2022-12-15 DIAGNOSIS — M70.60 TROCHANTERIC BURSITIS: ICD-10-CM

## 2022-12-15 RX ORDER — GABAPENTIN 300 MG/1
CAPSULE ORAL
Qty: 60 CAPSULE | Refills: 0 | Status: SHIPPED | OUTPATIENT
Start: 2022-12-15

## 2023-01-01 ENCOUNTER — TELEPHONE (OUTPATIENT)
Dept: INFECTIOUS DISEASES | Facility: CLINIC | Age: 88
End: 2023-01-01

## 2023-01-01 ENCOUNTER — TELEPHONE (OUTPATIENT)
Dept: PAIN MEDICINE | Facility: MEDICAL CENTER | Age: 88
End: 2023-01-01

## 2023-02-03 ENCOUNTER — RA CDI HCC (OUTPATIENT)
Dept: OTHER | Facility: HOSPITAL | Age: 88
End: 2023-02-03

## 2023-02-03 NOTE — PROGRESS NOTES
I13 0  [facility]  coding opportunities          Chart Reviewed number of suggestions sent to Provider: 1     Patients Insurance     Medicare Insurance: [employer]

## 2023-02-10 ENCOUNTER — CONSULT (OUTPATIENT)
Dept: PAIN MEDICINE | Facility: CLINIC | Age: 88
End: 2023-02-10

## 2023-02-10 VITALS
HEIGHT: 60 IN | SYSTOLIC BLOOD PRESSURE: 140 MMHG | DIASTOLIC BLOOD PRESSURE: 87 MMHG | BODY MASS INDEX: 31.83 KG/M2 | HEART RATE: 88 BPM

## 2023-02-10 DIAGNOSIS — M54.40 LOW BACK PAIN WITH SCIATICA, SCIATICA LATERALITY UNSPECIFIED, UNSPECIFIED BACK PAIN LATERALITY, UNSPECIFIED CHRONICITY: ICD-10-CM

## 2023-02-10 DIAGNOSIS — Z96.641 STATUS POST RIGHT HIP REPLACEMENT: ICD-10-CM

## 2023-02-10 DIAGNOSIS — M47.816 LUMBAR SPONDYLOSIS: ICD-10-CM

## 2023-02-10 DIAGNOSIS — M54.16 LUMBAR RADICULOPATHY: Primary | ICD-10-CM

## 2023-02-10 DIAGNOSIS — Z98.1 S/P LUMBAR FUSION: ICD-10-CM

## 2023-02-10 RX ORDER — GABAPENTIN 300 MG/1
600 CAPSULE ORAL 2 TIMES DAILY
Qty: 120 CAPSULE | Refills: 1 | Status: SHIPPED | OUTPATIENT
Start: 2023-02-10

## 2023-02-10 RX ORDER — GABAPENTIN 300 MG/1
600 CAPSULE ORAL 2 TIMES DAILY
Qty: 120 CAPSULE | Refills: 1 | Status: SHIPPED | OUTPATIENT
Start: 2023-02-10 | End: 2023-02-10 | Stop reason: SDUPTHER

## 2023-02-10 NOTE — PROGRESS NOTES
Assessment  1  Lumbar radiculopathy    2  Low back pain with sciatica, sciatica laterality unspecified, unspecified back pain laterality, unspecified chronicity    3  Lumbar spondylosis    4  Status post right hip replacement    5  S/P lumbar fusion        Plan  27-year-old female with a history of right MAIA, L2-3 fusion, CKD, prevascular disease, peripheral neuropathy, fibromyalgia, CHF status post pacemaker placement, atrial fibrillation on anticoagulation, and hypertension, referred by Dr Tigre Potts , presenting for initial consultation regarding a longstanding history of lumbosacral back pain that radiates into the anterior and posterior aspect of the right lower extremity to the foot with associated subjective weakness  She denies any trauma  The patient noted that her symptoms worsened shortly after her hip replacement/revision in 2019 with Dr Saji Resendez  X-ray of the lumbar spine July 28, 2020 demonstrates posterior fusion at L2-3  Degenerative disc disease and spondylosis most pronounced at L3-4 and L4-5  X-ray of the right hip from July 2020 shows increased lucency surrounding the acetabular cup  No periprosthetic fractures noted  OA of the left hip noted  The patient takes Tylenol 650 mg every 4 hours as needed, diclofenac gel as needed, duloxetine 60 mg daily for mood, gabapentin 300 mg twice daily, and tizanidine 2 mg every 8 hours as needed with minimal relief  She is undergoing physical therapy at the facility that she is residing without much improvement in her functional ability or pain  The patient's low back pain seems to be multifactorial including myofascial and facet mediated components  Lower extremity symptoms likely radicular in nature  She does have unilateral weakness is noted in physical exam   Also of concern was potential loosening of acetabular cup    I did explain to the patient I would like her to reestablish with orthopedic surgeon for further investigation into this matter  1  I will order a CT of the lumbar spine without contrast  2  Patient was given contact information for orthopedic department to reestablish with her orthopedic surgeon  3  I will increase the patient's gabapentin to 600 mg twice daily  The patient was instructed to initially increase to 300 mg in the morning and 600 mg at bedtime x3 days  If the patient is still noticing right lower extremity pain and she is not experiencing any side effect she may increase to 600 mg twice daily and stay on this dose until seen at her next office visit  4  Patient will continue with physical therapy  5  I will follow-up with the patient in 4 to 6 weeks      My impressions and treatment recommendations were discussed in detail with the patient who verbalized understanding and had no further questions  Discharge instructions were provided  I personally saw and examined the patient and I agree with the above discussed plan of care  No orders of the defined types were placed in this encounter  No orders of the defined types were placed in this encounter  History of Present Illness    Ramón Dawson is a 80 y o  female with a history of right MAIA, L2-3 fusion, CKD, prevascular disease, peripheral neuropathy, fibromyalgia, CHF status post pacemaker placement, atrial fibrillation on anticoagulation, and hypertension, referred by Dr Janes Gruber , presenting for initial consultation regarding a longstanding history of lumbosacral back pain that radiates into the anterior and posterior aspect of the right lower extremity to the foot with associated subjective weakness  She denies any left lower extremity symptoms, bladder or bowel incontinence, or saddle anesthesia  She denies any trauma  The patient noted that her symptoms worsened shortly after her hip replacement/revision in 2019 with Dr Aiden Duke  X-ray of the lumbar spine July 28, 2020 demonstrates posterior fusion at L2-3    Degenerative disc disease and spondylosis most pronounced at L3-4 and L4-5  X-ray of the right hip from July 2020 shows increased lucency surrounding the acetabular cup  No periprosthetic fractures noted  OA of the left hip noted  The patient takes Tylenol 650 mg every 4 hours as needed, diclofenac gel as needed, duloxetine 60 mg daily for mood, gabapentin 300 mg twice daily, and tizanidine 2 mg every 8 hours as needed with minimal relief  She is undergoing physical therapy at the facility that she is residing without much improvement in her functional ability or pain  The patient rates her pain a 10 out of 10 and the pain is constant  The pain is worse in the morning  The pain is described as shooting, sharp, and throbbing  The pain is decreased with lying down  The pain is increased with standing, bending, walking, exercise, coughing, sneezing, and bowel movements  She has found some relief with heat and ice application and a TENS unit  Other than as stated above, the patient denies any interval changes in medications, medical condition, mental condition, symptoms, or allergies since the last office visit  I have personally reviewed and/or updated the patient's past medical history, past surgical history, family history, social history, current medications, allergies, and vital signs today       Review of Systems    Patient Active Problem List   Diagnosis   • Chronic atrial fibrillation (HCC)   • Anemia   • Chronic diastolic CHF (congestive heart failure) (HCC)   • Depression   • Class 1 obesity due to excess calories with serious comorbidity and body mass index (BMI) of 33 0 to 33 9 in adult   • Chronic pain   • Chronic pain of lower extremity, bilateral   • Fibromyalgia   • External hemorrhoids   • Irritable bowel syndrome with constipation   • Lumbar canal stenosis   • Moderate mitral regurgitation   • Osteoarthritis, multiple sites   • Osteoporosis   • Peripheral neuropathy   • Primary osteoarthritis of right knee   • Synovial cyst of right popliteal space   • Pacemaker   • Hypothyroidism   • Macrocytosis   • Status post gastrectomy   • Status post right hip replacement   • Ambulatory dysfunction   • MCI (mild cognitive impairment)   • Right hip pain   • Cellulitis of right lower extremity   • Hypertrophic scar of skin   • S/P scar revision   • Elevated C-reactive protein (CRP)   • Fall   • Hematoma   • Ecchymosis of eye   • Meningioma (HCC)   • Trochanteric bursitis of right hip   • Stage 3a chronic kidney disease (HCC)   • Benign essential HTN   • Trochanteric bursitis   • Negative depression screening   • Peripheral vascular disease, unspecified ([facility] )       Past Medical History:   Diagnosis Date   • Abdominal bloating 8/1/2016   • Anemia    • Arthritis    • Cancer (HCC)     basal cell   • CHF (congestive heart failure) (HCC)    • COVID-19    • Disease of thyroid gland    • Disturbance of smell and taste     disturbance of taste   • Effusion of knee joint right    • Fibromyalgia    • Heart murmur     reported a previous heart murmur   • History of acute myocardial infarction    • History of atrial fibrillation    • History of bruising easily    • History of cancer    • History of dermatitis    • History of mammogram    • History of methicillin resistant staphylococcus aureus (MRSA)     Negative nasal culture, isolation discontinued 8/17/2018     • History of methicillin resistant staphylococcus aureus (MRSA) 08/17/2018    negative nasal culture-isolation and hx discontinued 8/17/2018   • History of obesity    • History of osteopenia    • History of sciatica    • History of shortness of breath    • History of sinusitis    • History of sore throat    • History of syncope    • History of umbilical hernia    • History of viral infection    • Irritable bowel syndrome (IBS)    • Joint pain, hip    • Limb pain    • Myalgia     myalgia and myositis   • Need for prophylactic vaccination against single diseases    • Need for prophylactic vaccination and inoculation against influenza    • Preoperative cardiovascular examination    • Primary osteoarthritis of right knee    • Right leg pain    • Vaginal Pap smear     reported pap smear       Past Surgical History:   Procedure Laterality Date   • ANKLE ARTHRODESIS Right    • BACK SURGERY     • BARIATRIC SURGERY     • CHOLECYSTECTOMY      x2   • COLONOSCOPY     • ESOPHAGOGASTRODUODENOSCOPY N/A 3/23/2018    Procedure: ESOPHAGOGASTRODUODENOSCOPY (EGD); Surgeon: Rebeca Tubbs MD;  Location: [facility]; Service: Gastroenterology   • FOOT SURGERY     • HIP CLOSE REDUCTION Right 8/21/2016    Procedure: CLOSED REDUCTION RIGHT TOTAL HIP;  Surgeon: Melba Rangel MD;  [facility];  Service:    • INSERT / Lunda Cerulean / Aretta Cornea     • IR BIOPSY OTHER  8/1/2020   • JOINT REPLACEMENT      LEFT KNEE REPLACEMENT   • JOINT REPLACEMENT      Right HIP   • PILONIDAL CYST EXCISION      x2   • ME EXC B9 LESION MRGN XCP SK TG T/A/L 0 5 CM/< Right 7/24/2019    Procedure: REVISION SCAR EXTREMITY Rt Hip;   Surgeon: Genevieve Fernandez MD;  Location[facility];  Service: Orthopedics   • ME REVJ TOT HIP ARTHRP [address] W/WO AGRFT/ALGRFT Right 4/15/2019    Procedure: ARTHROPLASTY HIP TOTAL ACETABULAR REVISION;  Surgeon: Genevieve Fernandez MD;  [facility];  Service: Orthopedics   • REPLACEMENT TOTAL KNEE Right    • SILVANA-EN-Y PROCEDURE      x2   • TOTAL KNEE ARTHROPLASTY     • UMBILICAL HERNIA REPAIR      x2       Family History   Problem Relation Age of Onset   • Coronary artery disease Mother    • Heart attack Mother    • Hypertension Mother    • Coronary artery disease Father    • Heart attack Father    • Hypertension Father    • Lymphoma Sister         acute   • Rashes / Skin problems Sister         lymphomatoid papulosis   • Cancer Sister    • Coronary artery disease Brother    • Heart attack Brother         x2   • Aneurysm Neg Hx    • Hyperlipidemia Neg Hx        Social History     Occupational History   • Not on file   Tobacco Use   • Smoking status: Never   • Smokeless tobacco: Never   Vaping Use   • Vaping Use: Never used   Substance and Sexual Activity   • Alcohol use: Not Currently   • Drug use: No   • Sexual activity: Never       Current Outpatient Medications on File Prior to Visit   Medication Sig   • acetaminophen (TYLENOL) 500 mg tablet Take 2 tablets (1,000 mg total) by mouth every 8 (eight) hours   • ascorbic acid (VITAMIN C) 500 mg tablet Take 1 tablet (500 mg total) by mouth 2 (two) times a day   • Cholecalciferol 125 MCG (5000 UT) TABS Take 5,000 Units by mouth daily   • desoximetasone (TOPICORT) 0 25 % cream APPLY TOPICALLY AS NEEDED FOR IRRITATION   • Diclofenac Sodium (VOLTAREN) 1 % Apply 2 g topically 4 (four) times a day   • DULoxetine (CYMBALTA) 60 mg delayed release capsule TAKE 1 CAPSULE BY MOUTH EVERY DAY   • fluticasone (FLONASE) 50 mcg/act nasal spray SPRAY 2 SPRAYS INTO EACH NOSTRIL EVERY DAY   • gabapentin (NEURONTIN) 300 mg capsule TAKE 1 CAPSULE BY MOUTH TWICE A DAY   • levothyroxine 100 mcg tablet TAKE 1 TABLET BY MOUTH EVERY DAY   • potassium chloride 10% oral solution Take 15 mL (20 mEq total) by mouth daily   • sucralfate (CARAFATE) 1 g tablet TAKE 1 TABLET BY MOUTH 2 TIMES A DAY  • tiZANidine (ZANAFLEX) 2 mg tablet TAKE 1 TABLET BY MOUTH EVERY 8 HOURS AS NEEDED FOR MUSCLE SPASM   • torsemide (DEMADEX) 20 mg tablet Take 1 tablet (20 mg total) by mouth daily (Patient taking differently: Take 20 mg by mouth as needed)   • warfarin (COUMADIN) 1 mg tablet TAKE 1 TABLET BY MOUTH DAILY OR AS DIRECTED   • warfarin (COUMADIN) 4 mg tablet TAKE ONE TABLET DAILY AS PREVIOUSLY DIRECTED BY CARDIOLOGY   • warfarin (COUMADIN) 5 mg tablet TAKE 1 TABLET BY MOUTH EVERY DAY AS DIRECTED   • warfarin (COUMADIN) 2 mg tablet Take 1 tablet daily or as instructed   (Patient not taking: Reported on 8/3/2022)   • [DISCONTINUED] methocarbamol (ROBAXIN) 750 mg tablet Take 1 tablet (750 mg total) by mouth every 8 (eight) hours as needed for muscle spasms     No current facility-administered medications on file prior to visit  Allergies   Allergen Reactions   • Amoxicillin      Reports nausea, headache, dizzy   • Codeine Vomiting and GI Intolerance     GI upset, ana m morphine  Reaction Date: 24Apr2012;    • Other      Other reaction(s): Other (See Comments)  ana m toradol in OR 6/06 sah   • Oxycodone-Acetaminophen      Other reaction(s): Other (See Comments)  GI upset; ana m vicodin   • Percocet [Oxycodone-Acetaminophen]    • Seasonal Ic  [Cholestatin] Allergic Rhinitis   • Trimethoprim    • Bactrim [Sulfamethoxazole-Trimethoprim] Rash   • Sulfa Antibiotics Rash     Other reaction(s): Other (See Comments)  takes lasix @home       Physical Exam    /87   Pulse 88   Ht 5' (1 524 m)   LMP  (LMP Unknown)   BMI 31 83 kg/m²     Constitutional: normal, well developed, well nourished, alert, in no distress and non-toxic and no overt pain behavior  Eyes: anicteric  HEENT: grossly intact  Neck: supple, symmetric, trachea midline and no masses   Pulmonary:even and unlabored  Cardiovascular:No edema or pitting edema present  Skin:Normal without rashes or lesions and well hydrated  Psychiatric:Mood and affect appropriate  Neurologic:Cranial Nerves II-XII grossly intact  Musculoskeletal:in wheelchair  Bilateral lumbar paraspinals tender to palpation from L3-L5 worse on the left than right  Bilateral SI joints nontender to palpation  Right trochanteric flare mildly tender to palpation  Bilateral patellar and Achilles reflexes trace  No clonus is noted bilaterally  Left lower extremity strength 5 out of 5 in all muscular's  Right plantarflexion 5-5 strength  Right dorsiflexion and EHL 4-5 strength  Right quadriceps 4-5 strength  Right hamstrings 3-5 strength  Right iliopsoas 3-5 strength  Sensation intact to light touch in L3-S1 dermatomes bilaterally    Positive seated straight leg raise on the right and negative on the left  Imaging      PACS Images     Show images for XR hip/pelv 2-3 vws right if performed  Study Result    Narrative & Impression   RIGHT HIP     INDICATION:   pabn      COMPARISON:  None     VIEWS:  XR HIP/PELV 2-3 VWS RIGHT W PELVIS IF PERFORMED         FINDINGS:     There is no acute fracture or dislocation      Patient is status post right hip arthroplasty  Alignment is anatomic  There is increased lucency surrounding the acetabular cup      No lytic or blastic osseous lesion      Surrounding heterotopic bone and/or vascular calcifications identified  There is chronic appearing deformity of the right inferior pubic ramus  There is osteoarthrosis of the left hip      The visualized lumbar spine is unremarkable      IMPRESSION:     Status post right hip arthroplasty with increased lucency surrounding the acetabular cup raising the possibility of particle disease and/or hardware loosening  No acute periprosthetic fractures are identified      Workstation performed: [id number]     Study Result    Narrative & Impression   LUMBAR SPINE     INDICATION:   injury      COMPARISON:  None     VIEWS:  XR SPINE LUMBAR 2 OR 3 VIEWS INJURY        FINDINGS:     There are 5 non rib bearing lumbar vertebral bodies       Patient is status post lumbar spinal fusion at L2-L3  There is interbody cage placement at this level    No discrete periprosthetic fractures are identified      There is trace retrolisthesis at L2-L3       There is vacuum disc phenomenon at L3-L4 and L4-L5      The pedicles appear intact      Soft tissues are unremarkable      IMPRESSION:     No acute lumbar spine fractures identified         Workstation performed: [id number]     PACS Images     Show images for DXA bone density spine hip and pelvis  Study Result    Narrative & Impression   CENTRAL  DXA SCAN     CLINICAL HISTORY:   80year old post-menopausal  female with history of left total knee replacement, gastric bypass, left total knee replacement, lower back surgery and right hip replacement  The patient does not exercise and does not take   calcium or vitamin D supplements      TECHNIQUE: Bone densitometry was performed using a H[employer] Horizon A bone densitometer  Regions of interest appear properly placed  There is metallic hardware at the V6-7 vertebral levels from prior back surgery  L2 and L3 were excluded from   calculation of lumbar spine BMD      COMPARISON:  April 18, 2014 and before     RESULTS:   LUMBAR SPINE:  L1 and L4:  BMD 1 043 gm/cm2  T-score 0 1  Z-score 2 8     LEFT TOTAL HIP:  BMD 0 904 gm/cm2  T-score -0 3  Z-score 1 9     LEFT FEMORAL NECK:  BMD 0 636 gm/cm2  T-score -1 9  Z-score 0 5     LEFT FOREARM-ONE 3RD REGION:  BMD 0 535 gm/cm2  T-score -2 6  Z-score 1 0     IMPRESSION:  1  Based on the [facility] classification, the T-score of -2 6 in the one 3rd region of the left forearm is consistent with osteoporosis  2   Since the prior study, there has been a significant decrease in BMD in the lumbar spine of 0 076 gm/cm2 or 6 8%  In the left hip, there has been a significant decrease in BMD of 0 023 gm/cm2 or 2 5%  There has been no significant change in BMD in   the left forearm  These changes do exceed our own least significant change and, therefore, are statistically significant within 95% confidence level  3   According to the [facility], prescription therapy is recommended with a T-score of -2 5 or less in the spine or hip after appropriate evaluation to exclude secondary causes  4   A daily intake of at least 1200 mg Calcium and 800 to 1000 IU of Vitamin D, as well as weight bearing and muscle strengthening exercise, fall prevention and avoidance of tobacco and excessive alcohol intake as  basic preventive measures are   suggested    5   Repeat DXA  in 18 - 24 months, on the same machine, as clinically indicated               The 10 year risk of hip fracture is 5 0%, with the 10 year risk of major osteoporotic fracture being 19%, as calculated by the WHO fracture risk assessment tool (FRAX)    The current NOF guidelines recommend treating patients with FRAX 10 year risk score   of >3% for hip fracture and >20% for major osteoporotic fracture       WHO CLASSIFICATION:  Normal (a T-score of -1 0 or higher)  Low bone mineral density (a T-score of less than -1 0 but higher than -2 5)  Osteoporosis (a T-score of -2 5 or less)  Severe osteoporosis (a T-score of -2 5 or less with a fragility fracture)               Workstation performed: [id number]

## 2023-02-16 ENCOUNTER — HOSPITAL ENCOUNTER (OUTPATIENT)
Dept: RADIOLOGY | Facility: HOSPITAL | Age: 88
Discharge: HOME | End: 2023-02-16
Attending: ANESTHESIOLOGY

## 2023-02-16 DIAGNOSIS — M54.16 LUMBAR RADICULOPATHY: ICD-10-CM

## 2023-02-17 ENCOUNTER — TELEPHONE (OUTPATIENT)
Dept: CARDIOLOGY CLINIC | Facility: CLINIC | Age: 88
End: 2023-02-17

## 2023-02-17 ENCOUNTER — OFFICE VISIT (OUTPATIENT)
Dept: CARDIOLOGY CLINIC | Facility: CLINIC | Age: 88
End: 2023-02-17

## 2023-02-17 VITALS
HEART RATE: 82 BPM | DIASTOLIC BLOOD PRESSURE: 70 MMHG | SYSTOLIC BLOOD PRESSURE: 120 MMHG | BODY MASS INDEX: 31.83 KG/M2 | HEIGHT: 60 IN

## 2023-02-17 DIAGNOSIS — I34.0 MODERATE MITRAL REGURGITATION: ICD-10-CM

## 2023-02-17 DIAGNOSIS — I10 BENIGN ESSENTIAL HTN: ICD-10-CM

## 2023-02-17 DIAGNOSIS — I50.32 CHRONIC DIASTOLIC CHF (CONGESTIVE HEART FAILURE) (HCC): ICD-10-CM

## 2023-02-17 DIAGNOSIS — I48.20 CHRONIC ATRIAL FIBRILLATION (HCC): Primary | ICD-10-CM

## 2023-02-17 RX ORDER — SIMETHICONE 80 MG
80 TABLET,CHEWABLE ORAL EVERY 6 HOURS PRN
COMMUNITY

## 2023-02-17 RX ORDER — WARFARIN SODIUM 2.5 MG/1
TABLET ORAL
COMMUNITY
Start: 2023-01-27

## 2023-02-17 RX ORDER — POLYETHYLENE GLYCOL 3350 17 G/17G
17 POWDER, FOR SOLUTION ORAL DAILY
COMMUNITY

## 2023-02-17 RX ORDER — LOPERAMIDE HYDROCHLORIDE 2 MG/1
2 CAPSULE ORAL 4 TIMES DAILY PRN
COMMUNITY

## 2023-02-17 RX ORDER — TORSEMIDE 10 MG/1
10 TABLET ORAL DAILY
COMMUNITY

## 2023-02-17 RX ORDER — WARFARIN SODIUM 3 MG/1
TABLET ORAL
COMMUNITY
Start: 2023-02-15

## 2023-02-17 RX ORDER — TIZANIDINE HYDROCHLORIDE 2 MG/1
CAPSULE, GELATIN COATED ORAL
COMMUNITY
Start: 2022-12-27

## 2023-02-17 NOTE — PROGRESS NOTES
[facility]    CHIEF COMPLAINT:   Chief Complaint   Patient presents with   • Follow-up     -R leg/ankle swelling  -Palpitations/racing heart on occasion       HPI:  Raven Hou is a 80 y o  female with a past medical history of hypertension, permanent atrial fibrillation s/p AV node ablation and MDT PPM, mitral regurgitation, chronic HFpEF who presents for follow up  Previously following with Dr Jessica Reilly but needed to move to a closer office  She is now at [address] [facility] in [place], [place]  She feels well today and has no acute complaints  She does report occasional palpitations which are generally short lived  She has no associated lightheadedness, chest pain, shortness of breath  She denies orthopnea or PND  She has right ankle swelling which is likely from severe R knee OA  No left leg edema  She is on warfarin which is being managed at [facility]  She denies any bruising or bleeding problems  She does complain of intermittent diarrhea  She reports taking her diuretic: torsemide 10 mg daily      The following portions of the patient's history were reviewed and updated as appropriate: allergies, current medications, past family history, past medical history, past social history, past surgical history, and problem list     SINCE LAST OV I REVIEWED WITH THE PATIENT THE INTERIM LABS, TEST RESULTS, CONSULTANT(S) NOTES AND PERFORMED AN INTERIM REVIEW OF HISTORY    Past Medical History:   Diagnosis Date   • Abdominal bloating 8/1/2016   • Anemia    • Arthritis    • [facility]     basal cell   • CHF (congestive heart failure) ([facility] [facility] )    • COVID-19    • Disease of thyroid gland    • Disturbance of smell and taste     disturbance of taste   • Effusion of knee joint right    • Fibromyalgia    • Heart murmur     reported a previous heart murmur   • History of acute myocardial infarction    • History of atrial fibrillation    • History of bruising easily    • History of cancer    • History of dermatitis    • History of mammogram    • History of methicillin resistant staphylococcus aureus (MRSA)     Negative nasal culture, isolation discontinued 8/17/2018  • History of methicillin resistant staphylococcus aureus (MRSA) 08/17/2018    negative nasal culture-isolation and hx discontinued 8/17/2018   • History of obesity    • History of osteopenia    • History of sciatica    • History of shortness of breath    • History of sinusitis    • History of sore throat    • History of syncope    • History of umbilical hernia    • History of viral infection    • Irritable bowel syndrome (IBS)    • Joint pain, hip    • Limb pain    • Myalgia     myalgia and myositis   • Need for prophylactic vaccination against single diseases    • Need for prophylactic vaccination and inoculation against influenza    • Preoperative cardiovascular examination    • Primary osteoarthritis of right knee    • Right leg pain    • Vaginal Pap smear     reported pap smear       Past Surgical History:   Procedure Laterality Date   • ANKLE ARTHRODESIS Right    • BACK SURGERY     • BARIATRIC SURGERY     • CHOLECYSTECTOMY      x2   • COLONOSCOPY     • ESOPHAGOGASTRODUODENOSCOPY N/A 3/23/2018    Procedure: ESOPHAGOGASTRODUODENOSCOPY (EGD); Surgeon: Jonas Johnson MD;  Location: [facility]; Service: Gastroenterology   • FOOT SURGERY     • HIP CLOSE REDUCTION Right 8/21/2016    Procedure: CLOSED REDUCTION RIGHT TOTAL HIP;  Surgeon: Canelo Barclay MD;  Location: [facility];  Service:    • INSERT / Juventino Din / Norva Shock     • IR BIOPSY OTHER  8/1/2020   • JOINT REPLACEMENT      LEFT KNEE REPLACEMENT   • JOINT REPLACEMENT      Right HIP   • PILONIDAL CYST EXCISION      x2   • NM EXC B9 LESION MRGN XCP SK TG T/A/L 0 5 CM/< Right 7/24/2019    Procedure: REVISION SCAR EXTREMITY Rt Hip;   Surgeon: You Weathers MD;  Location: [facility];  Service: Orthopedics   • NM REVJ TOT HIP ARTHRP [address] W/WO AGRFT/ALGRFT Right 4/15/2019 Procedure: ARTHROPLASTY HIP TOTAL ACETABULAR REVISION;  Surgeon: Jesse Jean MD;  Location: [facility];  Service: Orthopedics   • REPLACEMENT TOTAL KNEE Right    • SILVANA-EN-Y PROCEDURE      x2   • TOTAL KNEE ARTHROPLASTY     • UMBILICAL HERNIA REPAIR      x2       Social History     Socioeconomic History   • Marital status: /Civil Union     Spouse name: Not on file   • Number of children: Not on file   • Years of education: Not on file   • Highest education level: Not on file   Occupational History   • Not on file   Tobacco Use   • Smoking status: Never   • Smokeless tobacco: Never   Vaping Use   • Vaping Use: Never used   Substance and Sexual Activity   • Alcohol use: Not Currently   • Drug use: No   • Sexual activity: Never   Other Topics Concern   • Not on file   Social History Narrative   • Not on file     Social Determinants of Health     Financial Resource Strain: Not on file   Food Insecurity: Not on file   Transportation Needs: Not on file   Physical Activity: Not on file   Stress: Not on file   Social Connections: Not on file   Intimate Partner Violence: Not on file   Housing Stability: Not on file       Family History   Problem Relation Age of Onset   • Coronary artery disease Mother    • Heart attack Mother    • Hypertension Mother    • Coronary artery disease Father    • Heart attack Father    • Hypertension Father    • Lymphoma Sister         acute   • Rashes / Skin problems Sister         lymphomatoid papulosis   • Cancer Sister    • Coronary artery disease Brother    • Heart attack Brother         x2   • Aneurysm Neg Hx    • Hyperlipidemia Neg Hx        Allergies   Allergen Reactions   • Amoxicillin      Reports nausea, headache, dizzy   • Codeine Vomiting and GI Intolerance     GI upset, ana m morphine  Reaction Date: 24Apr2012;    • Other      Other reaction(s): Other (See Comments)  ana m toradol in OR 6/06 sah   • Oxycodone-Acetaminophen      Other reaction(s):  Other (See Comments)  GI upset; ana m vicodin   • Percocet [Oxycodone-Acetaminophen]    • Seasonal Ic  [Cholestatin] Allergic Rhinitis   • Trimethoprim    • Bactrim [Sulfamethoxazole-Trimethoprim] Rash   • Sulfa Antibiotics Rash     Other reaction(s): Other (See Comments)  takes lasix @home       Current Outpatient Medications   Medication Sig Dispense Refill   • acetaminophen (TYLENOL) 500 mg tablet Take 2 tablets (1,000 mg total) by mouth every 8 (eight) hours 90 tablet 0   • ascorbic acid (VITAMIN C) 500 mg tablet Take 1 tablet (500 mg total) by mouth 2 (two) times a day 60 tablet 0   • Cholecalciferol 125 MCG (5000 UT) TABS Take 5,000 Units by mouth daily     • desoximetasone (TOPICORT) 0 25 % cream APPLY TOPICALLY AS NEEDED FOR IRRITATION 30 g 0   • Diclofenac Sodium (VOLTAREN) 1 % Apply 2 g topically 4 (four) times a day 350 g 3   • DULoxetine (CYMBALTA) 60 mg delayed release capsule TAKE 1 CAPSULE BY MOUTH EVERY DAY 90 capsule 1   • fluticasone (FLONASE) 50 mcg/act nasal spray SPRAY 2 SPRAYS INTO EACH NOSTRIL EVERY DAY 16 mL 0   • gabapentin (NEURONTIN) 300 mg capsule Take 2 capsules (600 mg total) by mouth 2 (two) times a day 120 capsule 1   • levothyroxine 100 mcg tablet TAKE 1 TABLET BY MOUTH EVERY DAY 90 tablet 1   • loperamide (IMODIUM) 2 mg capsule Take 2 mg by mouth 4 (four) times a day as needed for diarrhea     • polyethylene glycol (MIRALAX) 17 g packet Take 17 g by mouth daily     • potassium chloride 10% oral solution Take 15 mL (20 mEq total) by mouth daily 3800 mL 3   • simethicone (MYLICON) 80 mg chewable tablet Chew 80 mg every 6 (six) hours as needed for flatulence     • sucralfate (CARAFATE) 1 g tablet TAKE 1 TABLET BY MOUTH 2 TIMES A DAY   180 tablet 1   • TiZANidine (ZANAFLEX) 2 MG capsule      • tiZANidine (ZANAFLEX) 2 mg tablet TAKE 1 TABLET BY MOUTH EVERY 8 HOURS AS NEEDED FOR MUSCLE SPASM 60 tablet 2   • torsemide (DEMADEX) 20 mg tablet Take 1 tablet (20 mg total) by mouth daily (Patient taking differently: Take 20 mg by mouth as needed) 90 tablet 3   • warfarin (COUMADIN) 1 mg tablet TAKE 1 TABLET BY MOUTH DAILY OR AS DIRECTED 90 tablet 1   • warfarin (COUMADIN) 2 mg tablet Take 1 tablet daily or as instructed  90 tablet 1   • warfarin (COUMADIN) 2 5 mg tablet      • warfarin (COUMADIN) 3 mg tablet      • warfarin (COUMADIN) 4 mg tablet TAKE ONE TABLET DAILY AS PREVIOUSLY DIRECTED BY CARDIOLOGY 90 tablet 1   • warfarin (COUMADIN) 5 mg tablet TAKE 1 TABLET BY MOUTH EVERY DAY AS DIRECTED 90 tablet 1     No current facility-administered medications for this visit  /70 (BP Location: Right arm, Patient Position: Sitting)   Pulse 82   Ht 5' (1 524 m)   LMP  (LMP Unknown)   BMI 31 83 kg/m²     Review of Systems   All other systems reviewed and are negative  Physical Exam  Vitals reviewed  Constitutional:       General: She is not in acute distress  Appearance: Normal appearance  She is well-developed  She is not toxic-appearing  HENT:      Head: Normocephalic and atraumatic  Eyes:      General: No scleral icterus  Extraocular Movements: Extraocular movements intact  Conjunctiva/sclera: Conjunctivae normal    Cardiovascular:      Rate and Rhythm: Normal rate and regular rhythm  Pulses: Normal pulses  Heart sounds: No murmur heard  Pulmonary:      Effort: Pulmonary effort is normal  No respiratory distress  Breath sounds: Normal breath sounds  No wheezing or rales  Abdominal:      General: Abdomen is flat  Bowel sounds are normal  There is no distension  Palpations: Abdomen is soft  Tenderness: There is no abdominal tenderness  Musculoskeletal:         General: No swelling  Right lower leg: Edema (non pitting) present  Left lower leg: No edema  Skin:     General: Skin is warm and dry  Capillary Refill: Capillary refill takes less than 2 seconds  Coloration: Skin is not jaundiced or pale  Neurological:      Mental Status: She is alert  Psychiatric:         Mood and Affect: Mood normal           Lab Results   Component Value Date     04/03/2015    K 4 2 06/08/2022     (H) 06/08/2022    CO2 27 06/08/2022    BUN 17 06/08/2022    CREATININE 0 84 06/08/2022    GLUCOSE 98 04/03/2015    CALCIUM 9 3 06/08/2022    ALT 25 06/08/2022    AST 26 06/08/2022    INR 1 98 (H) 08/18/2022       Lab Results   Component Value Date    CHOL 197 08/29/2013    HDL 69 06/08/2022    LDLCALC 69 06/08/2022    TRIG 63 06/08/2022       Lab Results   Component Value Date    WBC 5 71 06/08/2022    HGB 11 6 06/08/2022    HCT 38 0 06/08/2022     06/08/2022       Lab Results   Component Value Date     04/02/2021    HGBA1C 5 3 04/02/2021       Lab Results   Component Value Date    TSH 1 99 03/01/2021       Cardiac studies:   Results for orders placed or performed in visit on 02/17/23   POCT ECG    Impression    Ventricular paced rhythm with occasional PVCs  Atrial fibrillation          ASSESSMENT AND PLAN:  Diagnoses and all orders for this visit:    #  Chronic atrial fibrillation [facility]): History of intolerance and bradycardia with AVN blocking agents  She underwent AV node ablation in 8/2018 and implantation of MDT single chamber His PPM - medtronic  99% V-paced on last report  -ECG today V-paced with occasional PVCs  -Enroll in device clinic  -Anticoagulation is managed at [facility], goal INR 2 0-3 0    #  Chronic diastolic CHF (congestive heart failure) ([facility] )  #  Moderate mitral regurgitation  -She is asymptomatic from a cardiopulmonary perspective  -I have asked her to have [facility] rehab reach out to our office so we may clarify her torsemide dose-she we will continue this for now as she appears well compensated  -She was ordered an echocardiogram during her last follow-up to assess MR but yet to have this completed - will schedule today    #   Benign essential HTN: Blood pressure at goal <130/80 mmHg on loop diuretic      Liborio Mohs, MD

## 2023-02-17 NOTE — PATIENT INSTRUCTIONS
Please continue your current cardiac medications as prescribed  Please have an echocardiogram performed - this will be scheduled during checkout  Please have [address] call our office with the following information: Torsemide dose and frequency    Please call our office or use [employer] with any questions

## 2023-02-17 NOTE — TELEPHONE ENCOUNTER
[facility] called, patient has been taking torsemide 10mg once daily in the am  Med list updated

## 2023-02-22 ENCOUNTER — TELEPHONE (OUTPATIENT)
Dept: PAIN MEDICINE | Facility: CLINIC | Age: 88
End: 2023-02-22

## 2023-02-22 NOTE — TELEPHONE ENCOUNTER
Please notify the patient the CT of her lumbar spine demonstrates stable fusion at L2-3  She has multilevel degenerative disc disease and arthritis with moderate central and severe bilateral foraminal stenosis (narrowing where nerves exist) at L3-4  Mild central and left foraminal stenosis at L4-5 with moderate to severe right foraminal stenosis  Mild to moderate left foraminal stenosis at L5-S1  I can offer the patient a right L3 and L4 TFESI    If the patient chooses to pursue interventional therapy will need to get permission to hold Coumadin

## 2023-02-22 NOTE — TELEPHONE ENCOUNTER
ABILIO       RN attempted contact w/ pt  Pt currently at [facility] facility in [facility]  RN s/w nursing home/rehab RN who stated CT report was faxed to their facility and has been given to nursing home provider for review and determination of POC  Mrs. Danielle Chahal   MRN: [id number]    Department:  [facility] Emergency Department   Date of Visit:  5/20/2019           Disclosure     Insurance plans vary and the physician(s) referred by the ER may not be covered by your plan.  Please contac tell this physician (or your personal doctor if your instructions are to return to your personal doctor) about any new or lasting problems. The primary care or specialist physician will see patients referred from the [facility] Emergency Department.  Randy Giraldo

## 2023-03-16 ENCOUNTER — IN-CLINIC DEVICE VISIT (OUTPATIENT)
Dept: CARDIOLOGY CLINIC | Facility: CLINIC | Age: 88
End: 2023-03-16

## 2023-03-16 DIAGNOSIS — Z95.0 PRESENCE OF CARDIAC PACEMAKER: Primary | ICD-10-CM

## 2023-03-16 NOTE — PROGRESS NOTES
Results for orders placed or performed in visit on 03/16/23   Cardiac EP device report    Narrative    MDT SINGLE CHAMBER PPM - ACTIVE SYSTEM IS MRI CONDITIONAL  DEVICE INTERROGATED IN THE [place] OFFICE: BATTERY VOLTAGE ADEQUATE (2 6 YRS);  99 2% (DEPENDENT S/P AV NODE ABL; VVIR 70 PPM); ALL LEAD PARAMETERS WITHIN NORMAL LIMITS  NO SIGNIFICANT HIGH RATE EPISODES  CHRONIC AF & PATIENT TAKES WARFARIN; NO PROGRAMMING CHANGES MADE TO DEVICE PARAMETERS  PACEMAKER FUNCTIONING APPROPRIATELY    ES

## 2023-03-17 ENCOUNTER — APPOINTMENT (EMERGENCY)
Dept: CT IMAGING | Facility: HOSPITAL | Age: 88
End: 2023-03-17

## 2023-03-17 ENCOUNTER — HOSPITAL ENCOUNTER (EMERGENCY)
Facility: HOSPITAL | Age: 88
Discharge: NURSING, REHAB OR LONG-TERM CARE | End: 2023-03-18
Attending: EMERGENCY MEDICINE | Admitting: EMERGENCY MEDICINE

## 2023-03-17 DIAGNOSIS — S37.69XA ABRASION OF CERVIX, INITIAL ENCOUNTER: ICD-10-CM
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
## 2023-03-17 DIAGNOSIS — S09.90XA INJURY OF HEAD, INITIAL ENCOUNTER: ICD-10-CM

## 2023-03-17 LAB
BASE EXCESS BLDA CALC-SCNC: -2 MMOL/L (ref -2–3)
BASE EXCESS BLDA CALC-SCNC: -2 MMOL/L (ref -2–3)
CA-I BLD-SCNC: 1.23 MMOL/L (ref 1.12–1.32)
CA-I BLD-SCNC: 1.23 MMOL/L (ref 1.12–1.32)
GLUCOSE SERPL-MCNC: 82 MG/DL (ref 65–140)
GLUCOSE SERPL-MCNC: 82 MG/DL (ref 65–140)
HCO3 BLDA-SCNC: 22.9 MMOL/L (ref 24–30)
HCO3 BLDA-SCNC: 22.9 MMOL/L (ref 24–30)
HCT VFR BLD CALC: 31 % (ref 36.5–46.1)
HCT VFR BLD CALC: 31 % (ref 36.5–46.1)
HGB BLDA-MCNC: 10.5 G/DL (ref 12–15.4)
HGB BLDA-MCNC: 10.5 G/DL (ref 12–15.4)
PCO2 BLD: 24 MMOL/L (ref 21–32)
PCO2 BLD: 24 MMOL/L (ref 21–32)
PCO2 BLD: 41 MM HG (ref 42–50)
PCO2 BLD: 41 MM HG (ref 42–50)
PH BLD: 7.36 [PH] (ref 7.3–7.4)
PH BLD: 7.36 [PH] (ref 7.3–7.4)
PO2 BLD: 38 MM HG (ref 35–45)
PO2 BLD: 38 MM HG (ref 35–45)
POTASSIUM BLD-SCNC: 4.8 MMOL/L (ref 3.5–5.3)
POTASSIUM BLD-SCNC: 4.8 MMOL/L (ref 3.5–5.3)
SAO2 % BLD FROM PO2: 69 % (ref 60–85)
SAO2 % BLD FROM PO2: 69 % (ref 60–85)
SODIUM BLD-SCNC: 142 MMOL/L (ref 136–145)
SODIUM BLD-SCNC: 142 MMOL/L (ref 136–145)
SPECIMEN SOURCE: ABNORMAL
SPECIMEN SOURCE: ABNORMAL

## 2023-03-17 RX ADMIN — TETANUS TOXOID, REDUCED DIPHTHERIA TOXOID AND ACELLULAR PERTUSSIS VACCINE, ADSORBED 0.5 ML: 5; 2.5; 8; 8; 2.5 SUSPENSION INTRAMUSCULAR at 22:37

## 2023-03-18 VITALS
HEART RATE: 80 BPM | DIASTOLIC BLOOD PRESSURE: 73 MMHG | TEMPERATURE: 98.6 F | SYSTOLIC BLOOD PRESSURE: 161 MMHG | OXYGEN SATURATION: 97 % | WEIGHT: 169.75 LBS | RESPIRATION RATE: 18 BRPM

## 2023-03-18 NOTE — ED ATTENDING ATTESTATION
3/17/2023  Tali ALBARRAN MD, saw and evaluated the patient    80 YR FEMALE FROM REHAB FACILITY  STOOD UP WITH WALKER AND FELL BACK AGAINST WALL WITH POSTERIOR HEAD INJURY ON COUMADIN -- NO LOC- NO OTHER COMPS OR INJURIES- PT CAME IN AS A TRAUMA LEVEL B- AVSS-  PULSE OX 94 % ON RA- INTERPRETATION IS LOW- NORMAL- AIRWAY INTACT- REST OF MANAGEMENT AS PER TRAUMA TEAM     ED Course         Critical Care Time  Procedures

## 2023-03-18 NOTE — DISCHARGE INSTR - AVS FIRST PAGE
Trauma Discharge Instructions:    Please follow-up as instructed  If you need a follow-up appointment, please call the office when you leave to schedule an appointment  Activity:  - PT and OT evaluation and treatment as indicated  - You may resume activity as tolerated  - Walking and normal light activities are encouraged  - Normal daily activities including climbing steps are okay  - No driving until no longer using pain medications  Diet:    - You may resume your normal diet  Medications:  - You should continue your current medication regimen after discharge unless otherwise instructed  Please refer to your discharge medication list for further details  - Please take the pain medications as directed  - You are encouraged to use non-narcotic pain medications first and whenever possible  Reserve the use of narcotic pain medication for moderate to severe pain not controlled by non-narcotic medications   - No driving while taking narcotic pain medications  - You may become constipated, especially if taking pain medications  You may take any over the counter stool softeners or laxatives as needed  Examples: Milk of Magnesia, Colace, Senna  Additional Instructions:  - May shower daily   - If you have any questions or concerns after discharge please call the office   - Call office or return to ER if fever greater than 101, chills, persistent nausea/vomiting, worsening/uncontrollable pain, develop productive cough, increasing shortness of breath, difficulty breathing, and/or increasing redness or purulent/foul smelling drainage from incision(s)

## 2023-03-18 NOTE — H&P
H&P - Trauma   Kierra Born 80 y o  female MRN: [id number]  Unit/Bed#: ED-08 Encounter: [id number]    Trauma Alert: Level B   Model of Arrival: Ambulance    Trauma Team: Attending Nina Gutierrez and ML Escalante  Consultants:     None     Assessment/Plan   Active Problems / Assessment:   S/p fall  Hand abrasion  Incidental finding of meningioma - pt aware     Plan:   Images negative for acute injury  No further imaging needed at this time  Pt ok to discharge back to facility if pain controlled, able to ambulate with walker (which she uses at baseline), and she tolerates po challenge  History of Present Illness     Chief Complaint: Fall at rehab  Mechanism:Fall     HPI:    Kierra Born is a 80 y o  female who presents s/p fall at rehab while on coumadin  Pt notes that she ambulates with a walker at baseline  The walker fell on top of her  She reports striking her head  She remembers the entire incident  GCS 15    Review of Systems   All other systems reviewed and are negative  12-point, complete review of systems was reviewed and negative except as stated above  Historical Information   Pmhx: anemia, Afib on coumadin, CHF, IBS  PShx: umbilical hernia repair, total knee replacement, bariatric surgery, cholecystectomy, foot surgery       Last Tetanus: ordered  Family History: Non-contributory    1  Before the illness or injury that brought you to the Emergency, did you need someone to help you on a regular basis? 1=Yes   2  Since the illness or injury that brought you to the Emergency, have you needed more help than usual to take care of yourself? 0=No   3  Have you been hospitalized for one or more nights during the past 6 months (excluding a stay in the Emergency Department)? 0=No   4  In general, do you see well? 0=Yes   5  In general, do you have serious problems with your memory? 0=No   6  Do you take more than three different medications everyday?  1=Yes   TOTAL   2     Did you order a geriatric consult if the score was 2 or greater?: no     Meds/Allergies   all current active meds have been reviewed  Allergies : amoxicillin, percocet, toradol, bactrim, sulfa  Objective   Initial Vitals:   Temperature: 98 6 °F (37 °C) (03/17/23 1838)  Pulse: 83 (03/17/23 1838)  Respirations: 16 (03/17/23 1838)  Blood Pressure: 135/61 (03/17/23 1838)    Primary Survey:   Airway:        Status: patent;        Pre-hospital Interventions: none        Hospital Interventions: none  Breathing:        Pre-hospital Interventions: none       Effort: normal       Right breath sounds: normal       Left breath sounds: normal  Circulation:        Rhythm: regular       Rate: regular   Right Pulses Left Pulses    R radial: 2+  R femoral: 2+  R pedal: 2+     L radial: 2+  L femoral: 2+  L pedal: 2+       Disability:        GCS: Eye: 4; Verbal: 5 Motor: 6 Total: 15       Right Pupil: round;  reactive         Left Pupil:  round;  reactive      R Motor Strength L Motor Strength    R : 5/5  R dorsiflex: 5/5  R plantarflex: 5/5 L : 5/5  L dorsiflex: 5/5  L plantarflex: 5/5          Exposure:       Completed: Yes      Secondary Survey:  Physical Exam  Constitutional:       Appearance: Normal appearance  She is normal weight  HENT:      Head: Normocephalic and atraumatic  Right Ear: External ear normal       Left Ear: External ear normal    Eyes:      Conjunctiva/sclera: Conjunctivae normal       Pupils: Pupils are equal, round, and reactive to light  Pulmonary:      Effort: Pulmonary effort is normal       Breath sounds: Normal breath sounds  Abdominal:      General: Abdomen is flat  There is no distension  Palpations: Abdomen is soft  Tenderness: There is no abdominal tenderness  Musculoskeletal:         General: No swelling, tenderness or deformity  Skin:     General: Skin is warm  Capillary Refill: Capillary refill takes less than 2 seconds        Comments: +very small abrasion at base of 3rd metacarpal on right hand   Neurological:      General: No focal deficit present  Mental Status: She is alert and oriented to person, place, and time  Mental status is at baseline  Coordination: Abnormal coordination:    Psychiatric:         Mood and Affect: Mood normal          Behavior: Behavior normal          Invasive Devices     Peripheral Intravenous Line  Duration           Peripheral IV 03/17/23 Left Antecubital <1 day              Lab Results: ISTAT: No components found for: VBG    Imaging Results: I have personally reviewed pertinent films in PACS with a Radiologist   Chest Xray(s): negative for acute findings   FAST exam(s): negative for acute findings   CT Scan(s): CT head negative for acute findings, positive for incidental meningioma  CT cspine negative for acute findings   Additional Xray(s): N/A     Other Studies: n/a    Code Status: No Order  Advance Directive and Living Will:      Power of :    POLST:      Upon my evaluation, this patient had a high probability of imminent or life-threatening deterioration due to fall on anticoagulation with head strike, which required my direct attention, intervention, and personal management  I have personally provided 30 minutes of critical care time over multiple evaluations, exclusive of procedures, teaching, family meetings, and any prior time recorded by providers other than myself

## 2023-03-18 NOTE — INCIDENTAL FINDINGS
The following findings require follow up:  Radiographic finding   Finding: Left parafalcine meningioma measuring up to 3 2 cm, previously 2 6 cm in December 2020  Follow up required: Neurosurgery    Follow up should be done within 1 month(s)    I personally explain these findings to the patient and encouraged her to follow-up with her neurosurgeon  Patient expressed understanding of findings of increased size of meningioma and importance of continued follow-up with neurosurgery

## 2023-03-18 NOTE — ED NOTES
Roundtrip application completed  Awaiting pickup time for patient to return home       Jh Shaw RN  03/18/23 [id number]

## 2023-03-18 NOTE — PROCEDURES
POC FAST US    Date/Time: 3/17/2023 8:10 PM  Performed by: Mago Rausch PA-C  Authorized by: Mago Rausch PA-C     Patient location:  Trauma  Procedure details:     Exam Type:  Diagnostic    Indications: blunt abdominal trauma and blunt chest trauma      Assess for:  Intra-abdominal fluid and pericardial effusion    Technique: FAST      Views obtained:  Heart - Pericardial sac, LUQ - Splenorenal space, Suprapubic - Pouch of Chadd and RUQ - Pablo's Pouch    Image quality: diagnostic      Image availability:  Images available in PACS and video obtained  FAST Findings:     RUQ (Hepatorenal) free fluid: absent      LUQ (Splenorenal) free fluid: absent      Suprapubic free fluid: absent      Cardiac wall motion: identified      Pericardial effusion: absent    Interpretation:     Impressions: negative

## 2023-03-18 NOTE — ED NOTES
[employer] confirmed  for [facility] @ [address], [place]UB[place]  03/18/23 4103 Clindamycin Counseling: I counseled the patient regarding use of clindamycin as an antibiotic for prophylactic and/or therapeutic purposes. Clindamycin is active against numerous classes of bacteria, including skin bacteria. Side effects may include nausea, diarrhea, gastrointestinal upset, rash, hives, yeast infections, and in rare cases, colitis.

## 2023-03-23 NOTE — PROGRESS NOTES
Assessment:  1  Lumbar radiculopathy    2  Low back pain with sciatica, sciatica laterality unspecified, unspecified back pain laterality, unspecified chronicity    3  Lumbar spondylosis    4  S/P lumbar fusion        Plan:  1  Patient will be scheduled for a right L3 and L4 TFESI to address the inflammatory component of the patient's pain  We will first request permission for patient to hold Coumadin for 5 days prior to procedure  Complete risks and benefits including bleeding, infection, tissue reaction, nerve injury and allergic reaction were discussed  The patient was agreeable and verbalized an understanding  2   Patient may benefit from an increase in gabapentin to 600 mg in the morning and 1200 mg at bedtime for pain complaints  3   Patient may continue duloxetine for mood as prescribed  4  Continue with physical therapy  5  Follow-up after procedure or sooner if needed    History of Present Illness: The patient is a 80 y o  female with a history of a right MAIA, L2-3 fusion, CKD, PVD, peripheral neuropathy, fibromyalgia, CHF status post pacemaker placement, atrial fibrillation on anticoagulation and hypertension last seen on 02/10/2023 who presents for a follow up office visit in regards to chronic low back pain that radiates into the anterior aspect of the right lower extremity to the dorsal aspect of the foot with associated subjective weakness  She denies any significant left lower extremity symptoms, bowel or bladder incontinence or saddle anesthesia  CT of her lumbar spine demonstrates stable fusion at L2-3  She has multilevel degenerative disc disease and arthritis with moderate central and severe bilateral foraminal stenosis at L3-4  Mild central and left foraminal stenosis at L4-5 with moderate to severe right foraminal stenosis  Mild to moderate left foraminal stenosis at L5-S1    She is currently managing her pain with gabapentin 600 mg twice a day, Tylenol as needed, and duloxetine 60 mg daily for mood with minimal relief  She rates her pain a 10 out of 10 on the numeric pain rating scale  She constantly has pain in the morning and in the evening which is described as sharp, pressure-like and shooting    I have personally reviewed and/or updated the patient's past medical history, past surgical history, family history, social history, current medications, allergies, and vital signs today  Review of Systems:    Review of Systems   Respiratory: Negative for shortness of breath  Cardiovascular: Negative for chest pain  Gastrointestinal: Positive for diarrhea  Negative for constipation, nausea and vomiting  Musculoskeletal: Positive for gait problem  Negative for arthralgias, joint swelling and myalgias  Skin: Negative for rash  Neurological: Negative for dizziness, seizures and weakness  All other systems reviewed and are negative  Past Medical History:   Diagnosis Date   • Abdominal bloating 8/1/2016   • Anemia    • Arthritis    • Cancer (HCC)     basal cell   • CHF (congestive heart failure) (HCC)    • COVID-19    • Disease of thyroid gland    • Disturbance of smell and taste     disturbance of taste   • Effusion of knee joint right    • Fibromyalgia    • Heart murmur     reported a previous heart murmur   • History of acute myocardial infarction    • History of atrial fibrillation    • History of bruising easily    • History of cancer    • History of dermatitis    • History of mammogram    • History of methicillin resistant staphylococcus aureus (MRSA)     Negative nasal culture, isolation discontinued 8/17/2018     • History of methicillin resistant staphylococcus aureus (MRSA) 08/17/2018    negative nasal culture-isolation and hx discontinued 8/17/2018   • History of obesity    • History of osteopenia    • History of sciatica    • History of shortness of breath    • History of sinusitis    • History of sore throat    • History of syncope    • History of umbilical hernia    • History of viral infection    • Irritable bowel syndrome (IBS)    • Joint pain, hip    • Limb pain    • Myalgia     myalgia and myositis   • Need for prophylactic vaccination against single diseases    • Need for prophylactic vaccination and inoculation against influenza    • Preoperative cardiovascular examination    • Primary osteoarthritis of right knee    • Right leg pain    • Vaginal Pap smear     reported pap smear       Past Surgical History:   Procedure Laterality Date   • ANKLE ARTHRODESIS Right    • BACK SURGERY     • BARIATRIC SURGERY     • CHOLECYSTECTOMY      x2   • COLONOSCOPY     • ESOPHAGOGASTRODUODENOSCOPY N/A 3/23/2018    Procedure: ESOPHAGOGASTRODUODENOSCOPY (EGD); Surgeon: Edith Light MD;  [facility]: [facility]; Service: Gastroenterology   • FOOT SURGERY     • HIP CLOSE REDUCTION Right 8/21/2016    Procedure: CLOSED REDUCTION RIGHT TOTAL HIP;  Surgeon: Tom Martin MD;  [facility];  Service:    • NISHA / Shama Awad / Marivel Amaral     • IR BIOPSY OTHER  8/1/2020   • JOINT REPLACEMENT      LEFT KNEE REPLACEMENT   • JOINT REPLACEMENT      Right HIP   • PILONIDAL CYST EXCISION      x2   • OR EXC B9 LESION MRGN XCP SK TG T/A/L 0 5 CM/< Right 7/24/2019    Procedure: REVISION SCAR EXTREMITY Rt Hip;   Surgeon: Filemon Capone MD;  [facility];  Service: Orthopedics   • OR REVJ TOT HIP ARTHRP [address] W/WO AGRFT/ALGRFT Right 4/15/2019    Procedure: ARTHROPLASTY HIP TOTAL ACETABULAR REVISION;  Surgeon: Filemon Capone MD;  [facility];  Service: Orthopedics   • REPLACEMENT TOTAL KNEE Right    • SILVANA-EN-Y PROCEDURE      x2   • TOTAL KNEE ARTHROPLASTY     • UMBILICAL HERNIA REPAIR      x2       Family History   Problem Relation Age of Onset   • Coronary artery disease Mother    • Heart attack Mother    • Hypertension Mother    • Coronary artery disease Father    • Heart attack Father    • Hypertension Father    • Lymphoma Sister         acute   • Rashes / Skin problems Sister         lymphomatoid papulosis   • Cancer Sister    • Coronary artery disease Brother    • Heart attack Brother         x2   • Aneurysm Neg Hx    • Hyperlipidemia Neg Hx        Social History     Occupational History   • Not on file   Tobacco Use   • Smoking status: Never   • Smokeless tobacco: Never   Vaping Use   • Vaping Use: Never used   Substance and Sexual Activity   • Alcohol use: Not Currently   • Drug use: No   • Sexual activity: Never         Current Outpatient Medications:   •  acetaminophen (TYLENOL) 500 mg tablet, Take 2 tablets (1,000 mg total) by mouth every 8 (eight) hours, Disp: 90 tablet, Rfl: 0  •  ascorbic acid (VITAMIN C) 500 mg tablet, Take 1 tablet (500 mg total) by mouth 2 (two) times a day, Disp: 60 tablet, Rfl: 0  •  Diclofenac Sodium (VOLTAREN) 1 %, Apply 2 g topically 4 (four) times a day, Disp: 350 g, Rfl: 3  •  DULoxetine (CYMBALTA) 60 mg delayed release capsule, TAKE 1 CAPSULE BY MOUTH EVERY DAY, Disp: 90 capsule, Rfl: 1  •  fluticasone (FLONASE) 50 mcg/act nasal spray, SPRAY 2 SPRAYS INTO EACH NOSTRIL EVERY DAY, Disp: 16 mL, Rfl: 0  •  levothyroxine 100 mcg tablet, TAKE 1 TABLET BY MOUTH EVERY DAY, Disp: 90 tablet, Rfl: 1  •  tiZANidine (ZANAFLEX) 2 mg tablet, TAKE 1 TABLET BY MOUTH EVERY 8 HOURS AS NEEDED FOR MUSCLE SPASM, Disp: 60 tablet, Rfl: 2  •  warfarin (COUMADIN) 2 mg tablet, Take 1 tablet daily or as instructed , Disp: 90 tablet, Rfl: 1  •  warfarin (COUMADIN) 2 5 mg tablet, , Disp: , Rfl:   •  warfarin (COUMADIN) 3 mg tablet, , Disp: , Rfl:   •  warfarin (COUMADIN) 4 mg tablet, TAKE ONE TABLET DAILY AS PREVIOUSLY DIRECTED BY CARDIOLOGY, Disp: 90 tablet, Rfl: 1  •  warfarin (COUMADIN) 5 mg tablet, TAKE 1 TABLET BY MOUTH EVERY DAY AS DIRECTED, Disp: 90 tablet, Rfl: 1  •  Cholecalciferol 125 MCG (5000 UT) TABS, Take 5,000 Units by mouth daily, Disp: , Rfl:   •  desoximetasone (TOPICORT) 0 25 % cream, APPLY TOPICALLY AS NEEDED FOR IRRITATION, Disp: 30 g, Rfl: 0  •  loperamide (IMODIUM) 2 mg capsule, Take 2 mg by mouth 4 (four) times a day as needed for diarrhea, Disp: , Rfl:   •  polyethylene glycol (MIRALAX) 17 g packet, Take 17 g by mouth daily, Disp: , Rfl:   •  potassium chloride 10% oral solution, Take 15 mL (20 mEq total) by mouth daily, Disp: 3800 mL, Rfl: 3  •  simethicone (MYLICON) 80 mg chewable tablet, Chew 80 mg every 6 (six) hours as needed for flatulence, Disp: , Rfl:   •  sucralfate (CARAFATE) 1 g tablet, TAKE 1 TABLET BY MOUTH 2 TIMES A DAY , Disp: 180 tablet, Rfl: 1  •  torsemide (DEMADEX) 10 mg tablet, Take 10 mg by mouth daily, Disp: , Rfl:   •  warfarin (COUMADIN) 1 mg tablet, TAKE 1 TABLET BY MOUTH DAILY OR AS DIRECTED, Disp: 90 tablet, Rfl: 1    Allergies   Allergen Reactions   • Amoxicillin      Reports nausea, headache, dizzy   • Codeine Vomiting and GI Intolerance     GI upset, ana m morphine  Reaction Date: 24Apr2012;    • Other      Other reaction(s): Other (See Comments)  ana m toradol in OR 6/06 sah   • Oxycodone-Acetaminophen      Other reaction(s): Other (See Comments)  GI upset; ana m vicodin   • Percocet [Oxycodone-Acetaminophen]    • Seasonal Ic  [Cholestatin] Allergic Rhinitis   • Trimethoprim    • Bactrim [Sulfamethoxazole-Trimethoprim] Rash   • Sulfa Antibiotics Rash     Other reaction(s): Other (See Comments)  takes lasix @home       Physical Exam:    /79   Pulse 85   Ht 5' (1 524 m)   Wt 75 8 kg (167 lb)   LMP  (LMP Unknown)   BMI 32 61 kg/m²     Constitutional:normal, well developed, well nourished, alert, in no distress and non-toxic and no overt pain behavior    Eyes:anicteric  HEENT:grossly intact  Neck:supple, symmetric, trachea midline and no masses   Pulmonary:even and unlabored  Cardiovascular:No edema or pitting edema present  Skin:Normal without rashes or lesions and well hydrated  Psychiatric:Mood and affect appropriate  Neurologic:Cranial Nerves II-XII grossly intact  Musculoskeletal:in wheelchair      Imaging  FL spine and pain procedure    (Results Pending)   CT LUMBAR SPINE WITHOUT CONTRAST   INDICATION: M54 16: Radiculopathy, lumbar region  COMPARISON: 7/28/2020   TECHNIQUE: Contiguous axial images through the lumbar spine were obtained  Sagittal and coronal reconstructions were performed  IV Contrast: Not administered   Radiation dose length product (DLP) for this visit: 1020 53 mGy-cm   This examination, like all CT scans performed in the [facility], was performed utilizing techniques to minimize radiation dose exposure, including the use of   iterative reconstruction and automated exposure control  IMAGE QUALITY: Diagnostic  FINDINGS:   ALIGNMENT: There are 5 lumbar type vertebral bodies  Moderate levoscoliosis mid lumbar spine centered at the L3 level  VERTEBRAE: Bilateral pedicle screws noted L2-L3  Orthopedic hardware well seated within bone  DEGENERATIVE CHANGES:   Lower Thoracic spine: Normal lower thoracic disc spaces  L1-2: There is bilateral facet hypertrophy  There is a vacuum disc phenomenon  No significant bony central canal or neural foraminal narrowing  L2-3: No significant bony central canal or neural foraminal narrowing  L3-4: There is a disc osteophyte complex  There is bilateral facet hypertrophy  There is a partially calcified disc herniation  There is infolding ligamentum flavum  Moderate central canal narrowing  Severe bilateral neural foraminal narrowing  L4-5: There is a vacuum disc phenomenon  There is a right neural foraminal disc protrusion  Mild central canal narrowing  Moderate severe right neural foraminal narrowing  Mild left neural foraminal narrowing  L5-S1: There is bilateral facet hypertrophy  There is a vacuum disc phenomenon  There is a left neural foraminal disc protrusion  Mild to moderate left neural foraminal narrowing  Central canal right neural foramen patent  PARASPINAL SOFT TISSUES: Partially imaged pacemaker wire     Atherosclerotic calcifications noted  Mild bibasilar strandy densities right greater than left likely scarring and/or atelectasis  The heart is enlarged on the  view   view demonstrates right hip prosthesis   IMPRESSION:   Multilevel spondylosis most pronounced at L3-4 and L4-5  Status post posterior fusion L2-3           Orders Placed This Encounter   Procedures   • FL spine and pain procedure

## 2023-03-24 ENCOUNTER — OFFICE VISIT (OUTPATIENT)
Dept: PAIN MEDICINE | Facility: CLINIC | Age: 88
End: 2023-03-24

## 2023-03-24 VITALS
BODY MASS INDEX: 32.79 KG/M2 | DIASTOLIC BLOOD PRESSURE: 79 MMHG | WEIGHT: 167 LBS | SYSTOLIC BLOOD PRESSURE: 146 MMHG | HEART RATE: 85 BPM | HEIGHT: 60 IN

## 2023-03-24 DIAGNOSIS — M54.16 LUMBAR RADICULOPATHY: Primary | ICD-10-CM

## 2023-03-24 DIAGNOSIS — M47.816 LUMBAR SPONDYLOSIS: ICD-10-CM

## 2023-03-24 DIAGNOSIS — M54.40 LOW BACK PAIN WITH SCIATICA, SCIATICA LATERALITY UNSPECIFIED, UNSPECIFIED BACK PAIN LATERALITY, UNSPECIFIED CHRONICITY: ICD-10-CM

## 2023-03-24 DIAGNOSIS — Z98.1 S/P LUMBAR FUSION: ICD-10-CM

## 2023-03-24 NOTE — PATIENT INSTRUCTIONS
Epidural Steroid Injection   AMBULATORY CARE:   What you need to know about an epidural steroid injection (BOOM):  An BOOM is a procedure to inject steroid medicine into the epidural space  The epidural space is between your spinal cord and vertebrae  Steroids reduce inflammation and fluid buildup in your spine that may be causing pain  You may be given pain medicine along with the steroids  How to prepare for an BOOM:  Your provider will talk to you about how to prepare for your procedure  He or she will tell you what medicines to take or not take on the day of your procedure  You may need to stop taking blood thinners or other medicines several days before your procedure  You may need to adjust any diabetes medicine you take on the day of your procedure  Steroid medicine can increase your blood sugar level  Arrange for someone to drive you home when you are discharged  What will happen during an BOOM:   You will be given medicine to numb the procedure area  You will be awake for the procedure, but you will not feel pain  You may also be given medicine to help you relax  Contrast liquid will be used to help your provider see the area better  Tell the provider if you have ever had an allergic reaction to contrast liquid  Your provider may place the needle into your neck area, middle of your back, or tailbone area  He or she may inject the medicine next to the nerves that are causing your pain  He or she may instead inject the medicine into a larger area of the epidural space  This helps the medicine spread to more nerves  Your provider will use a fluoroscope to help guide the needle to the right place  A fluoroscope is a type of x-ray  After the procedure, a bandage will be placed over the injection site to prevent infection  What will happen after an BOOM:  You will have a bandage over the injection site to prevent infection  Your provider will tell you when you can bathe and any activity guidelines   You will be able to go home  Risks of an BOOM:  You may have temporary or permanent nerve damage or paralysis  You may have bleeding or develop a serious infection, such as meningitis (swelling of the brain coverings)  An abscess may also develop  An abscess is a pus-filled area under the skin  You may need surgery to fix the abscess  You may have a seizure, anxiety, or trouble sleeping  If you are a man, you may have temporary erectile dysfunction (not able to have an erection)  Call your local emergency number (911 in the [address] [facility]) if:   You have a seizure  You have trouble moving your legs  Seek care immediately if:   Blood soaks through your bandage  You have a fever or chills, severe back pain, and the procedure area is sensitive to the touch  You cannot control when you urinate or have a bowel movement  Call your doctor if:   You have weakness or numbness in your legs  Your wound is red, swollen, or draining pus  You have nausea or are vomiting  Your face or neck is red and you feel warm  You have more pain than you had before the procedure  You have swelling in your hands or feet  You have questions or concerns about your condition or care  Care for your wound as directed: You may remove the bandage before you go to bed the day of your procedure  You may take a shower, but do not take a bath for at least 24 hours  Self-care:   Do not drive,  use machines, or do strenuous activity for 24 hours after your procedure or as directed  Continue other treatments  as directed  Steroid injections alone will not control your pain  The injections are meant to be used with other treatments, such as physical therapy  Follow up with your doctor as directed:  Write down your questions so you remember to ask them during your visits  © Copyright [employer] 2022 Information is for End User's use only and may not be sold, redistributed or otherwise used for commercial purposes    The above information is an  only  It is not intended as medical advice for individual conditions or treatments  Talk to your doctor, nurse or pharmacist before following any medical regimen to see if it is safe and effective for you

## 2023-03-28 ENCOUNTER — OFFICE VISIT (OUTPATIENT)
Dept: OBGYN CLINIC | Facility: HOSPITAL | Age: 88
End: 2023-03-28

## 2023-03-28 ENCOUNTER — HOSPITAL ENCOUNTER (OUTPATIENT)
Dept: RADIOLOGY | Facility: HOSPITAL | Age: 88
Discharge: HOME | End: 2023-03-28
Attending: ORTHOPAEDIC SURGERY

## 2023-03-28 VITALS
SYSTOLIC BLOOD PRESSURE: 134 MMHG | DIASTOLIC BLOOD PRESSURE: 76 MMHG | HEIGHT: 60 IN | HEART RATE: 88 BPM | BODY MASS INDEX: 32.61 KG/M2

## 2023-03-28 DIAGNOSIS — Z47.1 AFTERCARE FOLLOWING RIGHT HIP JOINT REPLACEMENT SURGERY: ICD-10-CM

## 2023-03-28 DIAGNOSIS — Z98.890 HISTORY OF LUMBAR SURGERY: Primary | ICD-10-CM

## 2023-03-28 DIAGNOSIS — Z96.641 HISTORY OF REVISION OF TOTAL REPLACEMENT OF RIGHT HIP JOINT: ICD-10-CM

## 2023-03-28 DIAGNOSIS — R26.2 AMBULATORY DYSFUNCTION: ICD-10-CM

## 2023-03-28 DIAGNOSIS — M54.16 RADICULOPATHY, LUMBAR REGION: ICD-10-CM

## 2023-03-28 DIAGNOSIS — Z96.641 AFTERCARE FOLLOWING RIGHT HIP JOINT REPLACEMENT SURGERY: ICD-10-CM

## 2023-03-28 DIAGNOSIS — M79.18 RIGHT BUTTOCK PAIN: ICD-10-CM

## 2023-03-28 RX ORDER — DICYCLOMINE HYDROCHLORIDE 10 MG/1
CAPSULE ORAL
COMMUNITY
Start: 2023-03-10

## 2023-03-28 NOTE — PROGRESS NOTES
Subjective;    63-year-old female revision right total hip arthroplasty  Has been quite sometime since she was last seen  Most recent x-rays of the right hip 2020  She has a history of lumbar spine fusion L2-3  History of right lower extremity radiculopathy well-documented and clinically  Just seen last week by spine and pain management, and fluoroscopic guided injections ordered, symptoms of right buttock pain anterior thigh to the level of the mid thigh, and leg weakness of the hip flexors and knee extensors right leg only  Is relatively been unable to ambulate or stand effectively since at least [place]  Now spends most of her time in a wheelchair  He is accompanied by a staff member from her facility,who provides very little additional information  New x-rays were obtained of the right hip on arrival to the office  Past Medical History:   Diagnosis Date   • Abdominal bloating 8/1/2016   • Anemia    • Arthritis    • Cancer (HCC)     basal cell   • CHF (congestive heart failure) (HCC)    • COVID-19    • Disease of thyroid gland    • Disturbance of smell and taste     disturbance of taste   • Effusion of knee joint right    • Fibromyalgia    • Heart murmur     reported a previous heart murmur   • History of acute myocardial infarction    • History of atrial fibrillation    • History of bruising easily    • History of cancer    • History of dermatitis    • History of mammogram    • History of methicillin resistant staphylococcus aureus (MRSA)     Negative nasal culture, isolation discontinued 8/17/2018     • History of methicillin resistant staphylococcus aureus (MRSA) 08/17/2018    negative nasal culture-isolation and hx discontinued 8/17/2018   • History of obesity    • History of osteopenia    • History of sciatica    • History of shortness of breath    • History of sinusitis    • History of sore throat    • History of syncope    • History of umbilical hernia    • History of viral infection    • Irritable bowel syndrome (IBS)    • Joint pain, hip    • Limb pain    • Myalgia     myalgia and myositis   • Need for prophylactic vaccination against single diseases    • Need for prophylactic vaccination and inoculation against influenza    • Preoperative cardiovascular examination    • Primary osteoarthritis of right knee    • Right leg pain    • Vaginal Pap smear     reported pap smear       Past Surgical History:   Procedure Laterality Date   • ANKLE ARTHRODESIS Right    • BACK SURGERY     • BARIATRIC SURGERY     • CHOLECYSTECTOMY      x2   • COLONOSCOPY     • ESOPHAGOGASTRODUODENOSCOPY N/A 3/23/2018    Procedure: ESOPHAGOGASTRODUODENOSCOPY (EGD); Surgeon: Guadalupe Doshi MD;  [facility]; Service: Gastroenterology   • FOOT SURGERY     • HIP CLOSE REDUCTION Right 8/21/2016    Procedure: CLOSED REDUCTION RIGHT TOTAL HIP;  Surgeon: Dominguez Cabral MD;  [facility];  Service:    • NISHA / Pavan Sequeira / Shannon Vargas     • IR BIOPSY OTHER  8/1/2020   • JOINT REPLACEMENT      LEFT KNEE REPLACEMENT   • JOINT REPLACEMENT      Right HIP   • PILONIDAL CYST EXCISION      x2   • DC EXC B9 LESION MRGN XCP SK TG T/A/L 0 5 CM/< Right 7/24/2019    Procedure: REVISION SCAR EXTREMITY Rt Hip;   Surgeon: Kya Pina MD;  [facility];  Service: Orthopedics   • DC REVJ TOT HIP ARTHRP [address] W/WO AGRFT/ALGRFT Right 4/15/2019    Procedure: ARTHROPLASTY HIP TOTAL ACETABULAR REVISION;  Surgeon: Kya Pina MD;  [facility];  Service: Orthopedics   • REPLACEMENT TOTAL KNEE Right    • SILVANA-EN-Y PROCEDURE      x2   • TOTAL KNEE ARTHROPLASTY     • UMBILICAL HERNIA REPAIR      x2       Family History   Problem Relation Age of Onset   • Coronary artery disease Mother    • Heart attack Mother    • Hypertension Mother    • Coronary artery disease Father    • Heart attack Father    • Hypertension Father    • Lymphoma Sister         acute   • Rashes / Skin problems Sister         lymphomatoid papulosis   • Cancer Sister    • Coronary artery disease Brother    • Heart attack Brother         x2   • Aneurysm Neg Hx    • Hyperlipidemia Neg Hx        Social History     Tobacco Use   • Smoking status: Never   • Smokeless tobacco: Never   Vaping Use   • Vaping Use: Never used   Substance Use Topics   • Alcohol use: Not Currently   • Drug use: No     Exam;    Adult female in no visual distress  She has an abdominal pannus that overlies her inguinal region  She is unable to perform hip flexion or knee extension  Assisted by the examiner she has difficulty maintaining the flexed hip or an extended knee right leg only  She holds the leg slightly internally rotated from the hip down  When the examiner assist her with abduction, or rotational maneuvers she has no ilioinguinal or articular hip pain  She has no proximal or mid thigh pain  CT of the lumbar spine shows evidence of previous bar fusion surgery, DDD, well as parents of the right total hip on the  films, with change in the placement of the acetabular component and the cortex of the proximal femur and this non hip dedicated study of January  X-rays of the right hip, no evidence of a change in the appearance of the acetabular component, has migrated superiorly, and is near vertical in its position  Additionally the lateral cortex of the femur shows erosion, and thinning, without signs of loosening of the femoral component  Impression;    Posterior buttock pain  Soft tissue pain of the right thigh, deviously documented patient with right lower extremity radiculopathy, and history of lumbar spine surgery and fusion  Ambulatory dysfunction  Deconditioning  Plan;    She has had fluoroscopic guided injections for her radiculopathy ordered by the spine and pain professionals  He should proceed with BOOM's versus selective nerve root blocks  Any severe change in pain of the right groin or the right hip x-rays and possibly a CT scan will be indicated    We intend to see her in follow-up in additional months  Facility form was completed  And presented to the accompanying staff member      Patient's entire experience was supervised by and plan formulated by the attending surgeon it was my privilege to assist him in the delivery of her care

## 2023-05-26 ENCOUNTER — TELEPHONE (OUTPATIENT)
Dept: FAMILY MEDICINE CLINIC | Facility: CLINIC | Age: 88
End: 2023-05-26

## 2023-05-30 ENCOUNTER — TELEPHONE (OUTPATIENT)
Dept: PAIN MEDICINE | Facility: CLINIC | Age: 88
End: 2023-05-30

## 2023-05-30 ENCOUNTER — OFFICE VISIT (OUTPATIENT)
Dept: OBGYN CLINIC | Facility: HOSPITAL | Age: 88
End: 2023-05-30

## 2023-05-30 VITALS
HEART RATE: 77 BPM | DIASTOLIC BLOOD PRESSURE: 91 MMHG | SYSTOLIC BLOOD PRESSURE: 134 MMHG | BODY MASS INDEX: 32.61 KG/M2 | HEIGHT: 60 IN

## 2023-05-30 DIAGNOSIS — M79.18 RIGHT BUTTOCK PAIN: ICD-10-CM

## 2023-05-30 DIAGNOSIS — Z79.01 CHRONIC ANTICOAGULATION: Primary | ICD-10-CM

## 2023-05-30 DIAGNOSIS — M54.16 RADICULOPATHY, LUMBAR REGION: Primary | ICD-10-CM

## 2023-05-30 DIAGNOSIS — Z96.641 HISTORY OF REVISION OF TOTAL REPLACEMENT OF RIGHT HIP JOINT: ICD-10-CM

## 2023-05-30 RX ORDER — DULOXETIN HYDROCHLORIDE 20 MG/1
CAPSULE, DELAYED RELEASE ORAL
COMMUNITY
Start: 2023-05-16

## 2023-05-30 RX ORDER — NYSTATIN 100000 [USP'U]/G
POWDER TOPICAL
COMMUNITY
Start: 2023-05-07

## 2023-05-30 RX ORDER — DULOXETIN HYDROCHLORIDE 30 MG/1
CAPSULE, DELAYED RELEASE ORAL
COMMUNITY
Start: 2023-05-16

## 2023-05-30 RX ORDER — GABAPENTIN 300 MG/1
CAPSULE ORAL
COMMUNITY
Start: 2023-05-07

## 2023-05-30 RX ORDER — CEFPODOXIME PROXETIL 100 MG/1
TABLET, FILM COATED ORAL
COMMUNITY
Start: 2023-05-01

## 2023-05-30 NOTE — TELEPHONE ENCOUNTER
Called back to make them aware that we are waiting for the form to be faxed back for the coumadin hold-   They asked we re fax it to Dr Petra Gillette at the facility who is taking care of her-   Faxed info to [phone]

## 2023-05-30 NOTE — PROGRESS NOTES
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  Z96 641           Plan:  · The patient must undergo injections with Dr Janett Marroquin team prior to returning to see Dr Yana Wiley  Once her pain due to radiculopathy is better controlled, we will be better able to assess the pain coming from the hip  · We will see her again after her injections  To do next visit:  After injections with Dr Fidel Fierro  The above stated was discussed in layman's terms and the patient expressed understanding  All questions were answered to the patient's satisfaction  Scribe Attestation    I,:  Richard Biggs PA-C am acting as a scribe while in the presence of the attending physician :       I,:  Kadie Santiago MD personally performed the services described in this documentation    as scribed in my presence :           Subjective:   Tirso Cordova is a 80 y o  female who presents for a follow-up appointment regarding right hip pain  She has had a right total hip replacement and a subsequent right acetabular revision on 4/15/19  At her last visit, she was advised to seek treatment for her back pain and radiculopathy and return to see if this improved her symptoms  She has never had her injection from spine and pain with Dr Fidel Fierro due to a miscommunication at her facility  Today she states she has severe pain which radiates from her lower back/buttocks down into her foot  It is no better than it was at her previous visit        Review of systems negative unless otherwise specified in HPI    Past Medical History:   Diagnosis Date   • Abdominal bloating 8/1/2016   • Anemia    • Arthritis    • Cancer (HCC)     basal cell   • CHF (congestive heart failure) (HCC)    • COVID-19    • Disease of thyroid gland    • Disturbance of smell and taste     disturbance of taste   • Effusion of knee joint right    • Fibromyalgia    • Heart murmur     reported a previous heart murmur   • History of acute myocardial infarction    • History of atrial fibrillation    • History of bruising easily    • History of cancer    • History of dermatitis    • History of mammogram    • History of methicillin resistant staphylococcus aureus (MRSA)     Negative nasal culture, isolation discontinued 8/17/2018  • History of methicillin resistant staphylococcus aureus (MRSA) 08/17/2018    negative nasal culture-isolation and hx discontinued 8/17/2018   • History of obesity    • History of osteopenia    • History of sciatica    • History of shortness of breath    • History of sinusitis    • History of sore throat    • History of syncope    • History of umbilical hernia    • History of viral infection    • Irritable bowel syndrome (IBS)    • Joint pain, hip    • Limb pain    • Myalgia     myalgia and myositis   • Need for prophylactic vaccination against single diseases    • Need for prophylactic vaccination and inoculation against influenza    • Preoperative cardiovascular examination    • Primary osteoarthritis of right knee    • Right leg pain    • Vaginal Pap smear     reported pap smear       Past Surgical History:   Procedure Laterality Date   • ANKLE ARTHRODESIS Right    • BACK SURGERY     • BARIATRIC SURGERY     • CHOLECYSTECTOMY      x2   • COLONOSCOPY     • ESOPHAGOGASTRODUODENOSCOPY N/A 3/23/2018    Procedure: ESOPHAGOGASTRODUODENOSCOPY (EGD); Surgeon: Sarah Beth Rome MD;  Location: [facility];   Service: Gastroenterology   • FOOT SURGERY     • HIP CLOSE REDUCTION Right 8/21/2016    Procedure: CLOSED REDUCTION RIGHT TOTAL HIP;  Surgeon: Vishal Anaya MD;  [facility];  Service:    • INSERT / Paticitaya Gordillo / Jose Richards     • IR BIOPSY OTHER  8/1/2020   • JOINT REPLACEMENT      LEFT KNEE REPLACEMENT   • JOINT REPLACEMENT      Right HIP   • PILONIDAL CYST EXCISION      x2   • MT EXC B9 LESION MRGN XCP SK TG T/A/L 0 5 CM/< Right 7/24/2019    Procedure: REVISION SCAR EXTREMITY Rt Hip;   Surgeon: Shirley Rueda MD;  Location: [facility];  Service: Orthopedics   • GA REVJ TOT HIP ARTHRP [address] W/WO AGRFT/ALGRFT Right 4/15/2019    Procedure: ARTHROPLASTY HIP TOTAL ACETABULAR REVISION;  Surgeon: Shirley Rueda MD;  [facility];  Service: Orthopedics   • REPLACEMENT TOTAL KNEE Right    • SILVANA-EN-Y PROCEDURE      x2   • TOTAL KNEE ARTHROPLASTY     • UMBILICAL HERNIA REPAIR      x2       Family History   Problem Relation Age of Onset   • Coronary artery disease Mother    • Heart attack Mother    • Hypertension Mother    • Coronary artery disease Father    • Heart attack Father    • Hypertension Father    • Lymphoma Sister         acute   • Rashes / Skin problems Sister         lymphomatoid papulosis   • Cancer Sister    • Coronary artery disease Brother    • Heart attack Brother         x2   • Aneurysm Neg Hx    • Hyperlipidemia Neg Hx        Social History     Occupational History   • Not on file   Tobacco Use   • Smoking status: Never   • Smokeless tobacco: Never   Vaping Use   • Vaping Use: Never used   Substance and Sexual Activity   • Alcohol use: Not Currently   • Drug use: No   • Sexual activity: Never         Current Outpatient Medications:   •  acetaminophen (TYLENOL) 500 mg tablet, Take 2 tablets (1,000 mg total) by mouth every 8 (eight) hours, Disp: 90 tablet, Rfl: 0  •  ascorbic acid (VITAMIN C) 500 mg tablet, Take 1 tablet (500 mg total) by mouth 2 (two) times a day, Disp: 60 tablet, Rfl: 0  •  cefpodoxime (VANTIN) 100 mg tablet, , Disp: , Rfl:   •  Cholecalciferol 125 MCG (5000 UT) TABS, Take 5,000 Units by mouth daily, Disp: , Rfl:   •  desoximetasone (TOPICORT) 0 25 % cream, APPLY TOPICALLY AS NEEDED FOR IRRITATION, Disp: 30 g, Rfl: 0  •  Diclofenac Sodium (VOLTAREN) 1 %, Apply 2 g topically 4 (four) times a day, Disp: 350 g, Rfl: 3  •  dicyclomine (BENTYL) 10 mg capsule, , Disp: , Rfl:   • DULoxetine (CYMBALTA) 20 mg capsule, , Disp: , Rfl:   •  DULoxetine (CYMBALTA) 30 mg delayed release capsule, , Disp: , Rfl:   •  DULoxetine (CYMBALTA) 60 mg delayed release capsule, TAKE 1 CAPSULE BY MOUTH EVERY DAY, Disp: 90 capsule, Rfl: 1  •  fluticasone (FLONASE) 50 mcg/act nasal spray, SPRAY 2 SPRAYS INTO EACH NOSTRIL EVERY DAY, Disp: 16 mL, Rfl: 0  •  gabapentin (NEURONTIN) 300 mg capsule, , Disp: , Rfl:   •  levothyroxine 100 mcg tablet, TAKE 1 TABLET BY MOUTH EVERY DAY, Disp: 90 tablet, Rfl: 1  •  loperamide (IMODIUM) 2 mg capsule, Take 2 mg by mouth 4 (four) times a day as needed for diarrhea, Disp: , Rfl:   •  nystatin powder, , Disp: , Rfl:   •  polyethylene glycol (MIRALAX) 17 g packet, Take 17 g by mouth daily, Disp: , Rfl:   •  potassium chloride 10% oral solution, Take 15 mL (20 mEq total) by mouth daily, Disp: 3800 mL, Rfl: 3  •  simethicone (MYLICON) 80 mg chewable tablet, Chew 80 mg every 6 (six) hours as needed for flatulence, Disp: , Rfl:   •  sucralfate (CARAFATE) 1 g tablet, TAKE 1 TABLET BY MOUTH 2 TIMES A DAY , Disp: 180 tablet, Rfl: 1  •  tiZANidine (ZANAFLEX) 2 mg tablet, TAKE 1 TABLET BY MOUTH EVERY 8 HOURS AS NEEDED FOR MUSCLE SPASM, Disp: 60 tablet, Rfl: 2  •  torsemide (DEMADEX) 10 mg tablet, Take 10 mg by mouth daily, Disp: , Rfl:   •  warfarin (COUMADIN) 1 mg tablet, TAKE 1 TABLET BY MOUTH DAILY OR AS DIRECTED, Disp: 90 tablet, Rfl: 1  •  warfarin (COUMADIN) 2 mg tablet, Take 1 tablet daily or as instructed , Disp: 90 tablet, Rfl: 1  •  warfarin (COUMADIN) 2 5 mg tablet, , Disp: , Rfl:   •  warfarin (COUMADIN) 3 mg tablet, , Disp: , Rfl:   •  warfarin (COUMADIN) 4 mg tablet, TAKE ONE TABLET DAILY AS PREVIOUSLY DIRECTED BY CARDIOLOGY, Disp: 90 tablet, Rfl: 1  •  warfarin (COUMADIN) 5 mg tablet, TAKE 1 TABLET BY MOUTH EVERY DAY AS DIRECTED, Disp: 90 tablet, Rfl: 1    Allergies   Allergen Reactions   • Amoxicillin      Reports nausea, headache, dizzy   • Codeine Vomiting and GI "Intolerance     GI upset, ana m morphine  Reaction Date: 24Apr2012;    • Other      Other reaction(s): Other (See Comments)  ana m toradol in OR 6/06 sah   • Oxycodone-Acetaminophen      Other reaction(s): Other (See Comments)  GI upset; ana m vicodin   • Percocet [Oxycodone-Acetaminophen]    • Seasonal Ic  [Cholestatin] Allergic Rhinitis   • Trimethoprim    • Bactrim [Sulfamethoxazole-Trimethoprim] Rash   • Sulfa Antibiotics Rash     Other reaction(s): Other (See Comments)  takes lasix @home            Vitals:    05/30/23 0907   BP: 134/91   Pulse: 77       Objective:    General:  Patient is WDWN, alert and oriented, appears stated age, and is in no acute distress  Musculoskeletal:    Right Hip:    Inspection:  She has an abdominal panus overlying the right inguinal region  Range of Motion:  Motion is limited  Palpation:  She has pain in the right buttocks  Diagnostics, reviewed and taken today if performed as documented:    None performed        Procedures, if performed today:    None performed      Portions of the record may have been created with voice recognition software  Occasional wrong word or \"sound a like\" substitutions may have occurred due to the inherent limitations of voice recognition software  Read the chart carefully and recognize, using context, where substitutions have occurred    "

## 2023-05-30 NOTE — TELEPHONE ENCOUNTER
Caller: [facility]     Doctor: Dr Antonina Hill     Reason for call: Schedule a procedure referred by Dr Tisha Pedraza     Call back#: [phone]

## 2023-05-31 ENCOUNTER — TELEPHONE (OUTPATIENT)
Dept: OBGYN CLINIC | Facility: HOSPITAL | Age: 88
End: 2023-05-31

## 2023-05-31 DIAGNOSIS — M54.16 LUMBAR RADICULOPATHY: Primary | ICD-10-CM

## 2023-05-31 NOTE — TELEPHONE ENCOUNTER
Caller: [facility] Home    Doctor: Tisha Pedraza    Reason for call: called office made an appt for patient 07/13 @ 3:45 for after patients injections    Call back#: na

## 2023-05-31 NOTE — TELEPHONE ENCOUNTER
We received the authorization for patient to hold her Coumadin from Dr Floridalma Nunez  [address] [facility], [address] and scheduled procedure with Peg  Reviewed that the last dose of Coumadin will be on June 15th  Also patient will need to have a STAT PT/INR done the day of the procedure on 6/21  She is requesting the script be faxed to [phone]  They will fax the results back as soon as they get them that day  Please let me know when order is placed and I will fax it over  Thank you!

## 2023-06-15 ENCOUNTER — APPOINTMENT (EMERGENCY)
Dept: RADIOLOGY | Facility: HOSPITAL | Age: 88
End: 2023-06-15
Payer: COMMERCIAL

## 2023-06-15 ENCOUNTER — HOSPITAL ENCOUNTER (INPATIENT)
Facility: HOSPITAL | Age: 88
LOS: 3 days | Discharge: NURSING, REHAB OR LONG-TERM CARE | End: 2023-06-18
Attending: EMERGENCY MEDICINE | Admitting: FAMILY MEDICINE
Payer: COMMERCIAL

## 2023-06-15 DIAGNOSIS — D72.825 BANDEMIA: ICD-10-CM

## 2023-06-15 DIAGNOSIS — D72.829 LEUKOCYTOSIS: ICD-10-CM

## 2023-06-15 DIAGNOSIS — M54.16 LUMBAR RADICULOPATHY: ICD-10-CM

## 2023-06-15 DIAGNOSIS — R26.2 AMBULATORY DYSFUNCTION: Primary | ICD-10-CM

## 2023-06-15 DIAGNOSIS — N39.0 UTI (URINARY TRACT INFECTION): ICD-10-CM

## 2023-06-15 DIAGNOSIS — S72.001A CLOSED FRACTURE OF RIGHT HIP, INITIAL ENCOUNTER (HCC): ICD-10-CM

## 2023-06-15 DIAGNOSIS — M25.551 RIGHT HIP PAIN: ICD-10-CM

## 2023-06-15 DIAGNOSIS — S73.004D CLOSED DISLOCATION OF RIGHT HIP, SUBSEQUENT ENCOUNTER: ICD-10-CM

## 2023-06-15 PROBLEM — N30.00 ACUTE CYSTITIS WITHOUT HEMATURIA: Status: ACTIVE | Noted: 2019-04-30

## 2023-06-15 PROBLEM — E87.1 HYPONATREMIA: Status: ACTIVE | Noted: 2023-06-15

## 2023-06-15 LAB
ALBUMIN SERPL BCP-MCNC: 1.5 G/DL (ref 3.5–5)
ALP SERPL-CCNC: 178 U/L (ref 46–116)
ALT SERPL W P-5'-P-CCNC: 20 U/L (ref 12–78)
ANION GAP SERPL CALCULATED.3IONS-SCNC: 5 MMOL/L (ref 4–13)
ANISOCYTOSIS BLD QL SMEAR: PRESENT
APTT PPP: 48 SECONDS (ref 23–37)
AST SERPL W P-5'-P-CCNC: 40 U/L (ref 5–45)
ATRIAL RATE: 87 BPM
BACTERIA UR QL AUTO: ABNORMAL /HPF
BASOPHILS # BLD MANUAL: 0 THOUSAND/UL (ref 0–0.1)
BASOPHILS NFR MAR MANUAL: 0 % (ref 0–1)
BILIRUB SERPL-MCNC: 0.56 MG/DL (ref 0.2–1)
BILIRUB UR QL STRIP: NEGATIVE
BUN SERPL-MCNC: 21 MG/DL (ref 5–25)
BURR CELLS BLD QL SMEAR: PRESENT
CALCIUM ALBUM COR SERPL-MCNC: 10.3 MG/DL (ref 8.3–10.1)
CALCIUM SERPL-MCNC: 8.3 MG/DL (ref 8.3–10.1)
CHLORIDE SERPL-SCNC: 103 MMOL/L (ref 96–108)
CLARITY UR: ABNORMAL
CO2 SERPL-SCNC: 25 MMOL/L (ref 21–32)
COLOR UR: ABNORMAL
CREAT SERPL-MCNC: 0.72 MG/DL (ref 0.6–1.3)
EOSINOPHIL # BLD MANUAL: 0 THOUSAND/UL (ref 0–0.4)
EOSINOPHIL NFR BLD MANUAL: 0 % (ref 0–6)
ERYTHROCYTE [DISTWIDTH] IN BLOOD BY AUTOMATED COUNT: 13.2 % (ref 11.6–15.1)
GFR SERPL CREATININE-BSD FRML MDRD: 74 ML/MIN/1.73SQ M
GLUCOSE SERPL-MCNC: 106 MG/DL (ref 65–140)
GLUCOSE UR STRIP-MCNC: NEGATIVE MG/DL
HCT VFR BLD AUTO: 29.3 % (ref 34.8–46.1)
HGB BLD-MCNC: 9.5 G/DL (ref 11.5–15.4)
HGB UR QL STRIP.AUTO: ABNORMAL
HYALINE CASTS #/AREA URNS LPF: ABNORMAL /LPF
INR PPP: 2.29 (ref 0.84–1.19)
KETONES UR STRIP-MCNC: NEGATIVE MG/DL
LACTATE SERPL-SCNC: 1.7 MMOL/L (ref 0.5–2)
LEUKOCYTE ESTERASE UR QL STRIP: ABNORMAL
LYMPHOCYTES # BLD AUTO: 0.88 THOUSAND/UL (ref 0.6–4.47)
LYMPHOCYTES # BLD AUTO: 3 % (ref 14–44)
MACROCYTES BLD QL AUTO: PRESENT
MCH RBC QN AUTO: 32.3 PG (ref 26.8–34.3)
MCHC RBC AUTO-ENTMCNC: 32.4 G/DL (ref 31.4–37.4)
MCV RBC AUTO: 100 FL (ref 82–98)
MONOCYTES # BLD AUTO: 0.88 THOUSAND/UL (ref 0–1.22)
MONOCYTES NFR BLD: 3 % (ref 4–12)
NEUTROPHILS # BLD MANUAL: 27.11 THOUSAND/UL (ref 1.85–7.62)
NEUTS BAND NFR BLD MANUAL: 11 % (ref 0–8)
NEUTS SEG NFR BLD AUTO: 81 % (ref 43–75)
NITRITE UR QL STRIP: POSITIVE
NON-SQ EPI CELLS URNS QL MICRO: ABNORMAL /HPF
PH UR STRIP.AUTO: 7 [PH]
PLATELET # BLD AUTO: 340 THOUSANDS/UL (ref 149–390)
PLATELET BLD QL SMEAR: ADEQUATE
PMV BLD AUTO: 8.7 FL (ref 8.9–12.7)
POIKILOCYTOSIS BLD QL SMEAR: PRESENT
POLYCHROMASIA BLD QL SMEAR: PRESENT
POTASSIUM SERPL-SCNC: 3.7 MMOL/L (ref 3.5–5.3)
PROCALCITONIN SERPL-MCNC: 1.32 NG/ML
PROT SERPL-MCNC: 5.5 G/DL (ref 6.4–8.4)
PROT UR STRIP-MCNC: ABNORMAL MG/DL
PROTHROMBIN TIME: 25.4 SECONDS (ref 11.6–14.5)
QRS AXIS: 5 DEGREES
QRSD INTERVAL: 96 MS
QT INTERVAL: 378 MS
QTC INTERVAL: 408 MS
RBC # BLD AUTO: 2.94 MILLION/UL (ref 3.81–5.12)
RBC #/AREA URNS AUTO: ABNORMAL /HPF
RBC MORPH BLD: PRESENT
SODIUM SERPL-SCNC: 133 MMOL/L (ref 135–147)
SP GR UR STRIP.AUTO: 1.01 (ref 1–1.03)
T WAVE AXIS: 150 DEGREES
TRANS CELLS #/AREA URNS HPF: PRESENT /[HPF]
UROBILINOGEN UR STRIP-ACNC: <2 MG/DL
VARIANT LYMPHS # BLD AUTO: 2 %
VENTRICULAR RATE: 70 BPM
WBC # BLD AUTO: 29.47 THOUSAND/UL (ref 4.31–10.16)
WBC #/AREA URNS AUTO: ABNORMAL /HPF
WBC CLUMPS # UR AUTO: PRESENT /UL

## 2023-06-15 PROCEDURE — 87186 SC STD MICRODIL/AGAR DIL: CPT | Performed by: EMERGENCY MEDICINE

## 2023-06-15 PROCEDURE — 73502 X-RAY EXAM HIP UNI 2-3 VIEWS: CPT

## 2023-06-15 PROCEDURE — 96365 THER/PROPH/DIAG IV INF INIT: CPT

## 2023-06-15 PROCEDURE — 85027 COMPLETE CBC AUTOMATED: CPT | Performed by: EMERGENCY MEDICINE

## 2023-06-15 PROCEDURE — 99285 EMERGENCY DEPT VISIT HI MDM: CPT

## 2023-06-15 PROCEDURE — 80053 COMPREHEN METABOLIC PANEL: CPT | Performed by: EMERGENCY MEDICINE

## 2023-06-15 PROCEDURE — 93005 ELECTROCARDIOGRAM TRACING: CPT

## 2023-06-15 PROCEDURE — 85610 PROTHROMBIN TIME: CPT | Performed by: EMERGENCY MEDICINE

## 2023-06-15 PROCEDURE — 36415 COLL VENOUS BLD VENIPUNCTURE: CPT | Performed by: EMERGENCY MEDICINE

## 2023-06-15 PROCEDURE — 93010 ELECTROCARDIOGRAM REPORT: CPT | Performed by: INTERNAL MEDICINE

## 2023-06-15 PROCEDURE — 81001 URINALYSIS AUTO W/SCOPE: CPT | Performed by: EMERGENCY MEDICINE

## 2023-06-15 PROCEDURE — 87077 CULTURE AEROBIC IDENTIFY: CPT | Performed by: EMERGENCY MEDICINE

## 2023-06-15 PROCEDURE — 96368 THER/DIAG CONCURRENT INF: CPT

## 2023-06-15 PROCEDURE — 84145 PROCALCITONIN (PCT): CPT | Performed by: EMERGENCY MEDICINE

## 2023-06-15 PROCEDURE — 85007 BL SMEAR W/DIFF WBC COUNT: CPT | Performed by: EMERGENCY MEDICINE

## 2023-06-15 PROCEDURE — 83605 ASSAY OF LACTIC ACID: CPT | Performed by: EMERGENCY MEDICINE

## 2023-06-15 PROCEDURE — 87181 SC STD AGAR DILUTION PER AGT: CPT | Performed by: EMERGENCY MEDICINE

## 2023-06-15 PROCEDURE — 85730 THROMBOPLASTIN TIME PARTIAL: CPT | Performed by: EMERGENCY MEDICINE

## 2023-06-15 PROCEDURE — 99285 EMERGENCY DEPT VISIT HI MDM: CPT | Performed by: EMERGENCY MEDICINE

## 2023-06-15 PROCEDURE — 87040 BLOOD CULTURE FOR BACTERIA: CPT | Performed by: EMERGENCY MEDICINE

## 2023-06-15 PROCEDURE — 87086 URINE CULTURE/COLONY COUNT: CPT | Performed by: EMERGENCY MEDICINE

## 2023-06-15 RX ORDER — HEPARIN SODIUM 5000 [USP'U]/ML
5000 INJECTION, SOLUTION INTRAVENOUS; SUBCUTANEOUS EVERY 8 HOURS SCHEDULED
Status: DISCONTINUED | OUTPATIENT
Start: 2023-06-15 | End: 2023-06-15

## 2023-06-15 RX ORDER — SODIUM CHLORIDE, SODIUM GLUCONATE, SODIUM ACETATE, POTASSIUM CHLORIDE, MAGNESIUM CHLORIDE, SODIUM PHOSPHATE, DIBASIC, AND POTASSIUM PHOSPHATE .53; .5; .37; .037; .03; .012; .00082 G/100ML; G/100ML; G/100ML; G/100ML; G/100ML; G/100ML; G/100ML
500 INJECTION, SOLUTION INTRAVENOUS ONCE
Status: COMPLETED | OUTPATIENT
Start: 2023-06-15 | End: 2023-06-15

## 2023-06-15 RX ORDER — TORSEMIDE 10 MG/1
10 TABLET ORAL DAILY
Status: DISCONTINUED | OUTPATIENT
Start: 2023-06-16 | End: 2023-06-18 | Stop reason: HOSPADM

## 2023-06-15 RX ORDER — LEVOTHYROXINE SODIUM 0.1 MG/1
100 TABLET ORAL
Status: DISCONTINUED | OUTPATIENT
Start: 2023-06-16 | End: 2023-06-18 | Stop reason: HOSPADM

## 2023-06-15 RX ORDER — MELATONIN
5000 DAILY
Status: DISCONTINUED | OUTPATIENT
Start: 2023-06-16 | End: 2023-06-18 | Stop reason: HOSPADM

## 2023-06-15 RX ORDER — GABAPENTIN 300 MG/1
300 CAPSULE ORAL
Status: DISCONTINUED | OUTPATIENT
Start: 2023-06-15 | End: 2023-06-18 | Stop reason: HOSPADM

## 2023-06-15 RX ORDER — POLYETHYLENE GLYCOL 3350 17 G/17G
17 POWDER, FOR SOLUTION ORAL DAILY
Status: DISCONTINUED | OUTPATIENT
Start: 2023-06-16 | End: 2023-06-18 | Stop reason: HOSPADM

## 2023-06-15 RX ORDER — ASCORBIC ACID 500 MG
500 TABLET ORAL 2 TIMES DAILY
Status: DISCONTINUED | OUTPATIENT
Start: 2023-06-15 | End: 2023-06-18 | Stop reason: HOSPADM

## 2023-06-15 RX ORDER — FLUTICASONE PROPIONATE 50 MCG
1 SPRAY, SUSPENSION (ML) NASAL DAILY
Status: DISCONTINUED | OUTPATIENT
Start: 2023-06-16 | End: 2023-06-18 | Stop reason: HOSPADM

## 2023-06-15 RX ORDER — HYDROMORPHONE HCL IN WATER/PF 6 MG/30 ML
0.2 PATIENT CONTROLLED ANALGESIA SYRINGE INTRAVENOUS EVERY 4 HOURS PRN
Status: DISCONTINUED | OUTPATIENT
Start: 2023-06-15 | End: 2023-06-17

## 2023-06-15 RX ORDER — WARFARIN SODIUM 3 MG/1
3 TABLET ORAL
Status: DISCONTINUED | OUTPATIENT
Start: 2023-06-16 | End: 2023-06-18 | Stop reason: HOSPADM

## 2023-06-15 RX ORDER — DICYCLOMINE HYDROCHLORIDE 10 MG/1
10 CAPSULE ORAL
Status: DISCONTINUED | OUTPATIENT
Start: 2023-06-16 | End: 2023-06-18 | Stop reason: HOSPADM

## 2023-06-15 RX ORDER — DULOXETIN HYDROCHLORIDE 60 MG/1
60 CAPSULE, DELAYED RELEASE ORAL DAILY
Status: DISCONTINUED | OUTPATIENT
Start: 2023-06-16 | End: 2023-06-18 | Stop reason: HOSPADM

## 2023-06-15 RX ORDER — LIDOCAINE 50 MG/G
1 PATCH TOPICAL ONCE
Status: COMPLETED | OUTPATIENT
Start: 2023-06-15 | End: 2023-06-16

## 2023-06-15 RX ORDER — WARFARIN SODIUM 1 MG/1
1 TABLET ORAL
Status: DISCONTINUED | OUTPATIENT
Start: 2023-06-16 | End: 2023-06-15

## 2023-06-15 RX ORDER — OXYCODONE HYDROCHLORIDE 5 MG/1
5 TABLET ORAL EVERY 6 HOURS PRN
Status: DISCONTINUED | OUTPATIENT
Start: 2023-06-15 | End: 2023-06-17

## 2023-06-15 RX ORDER — ACETAMINOPHEN 325 MG/1
650 TABLET ORAL EVERY 6 HOURS PRN
Status: DISCONTINUED | OUTPATIENT
Start: 2023-06-15 | End: 2023-06-16

## 2023-06-15 RX ADMIN — OXYCODONE HYDROCHLORIDE AND ACETAMINOPHEN 500 MG: 500 TABLET ORAL at 21:33

## 2023-06-15 RX ADMIN — GABAPENTIN 300 MG: 300 CAPSULE ORAL at 21:33

## 2023-06-15 RX ADMIN — CEFTRIAXONE SODIUM 1000 MG: 10 INJECTION, POWDER, FOR SOLUTION INTRAVENOUS at 19:07

## 2023-06-15 RX ADMIN — LIDOCAINE 1 PATCH: 50 PATCH CUTANEOUS at 16:50

## 2023-06-15 RX ADMIN — SODIUM CHLORIDE, SODIUM GLUCONATE, SODIUM ACETATE, POTASSIUM CHLORIDE, MAGNESIUM CHLORIDE, SODIUM PHOSPHATE, DIBASIC, AND POTASSIUM PHOSPHATE 500 ML: .53; .5; .37; .037; .03; .012; .00082 INJECTION, SOLUTION INTRAVENOUS at 18:39

## 2023-06-15 RX ADMIN — HYDROMORPHONE HYDROCHLORIDE 0.2 MG: 0.2 INJECTION, SOLUTION INTRAMUSCULAR; INTRAVENOUS; SUBCUTANEOUS at 21:33

## 2023-06-15 NOTE — SEPSIS NOTE
Sepsis Note   Salvador Lainez 80 y o  female MRN: [id number]  Unit/Bed#: ED 26 Encounter: [id number]       Initial Sepsis Screening     Row Name 06/15/23 Jagjit De Souza 06/15/23 2219             Is the patient's history suggestive of a new or worsening infection? Yes (Proceed)  -CL Yes (Proceed)  -CL       Suspected source of infection suspect infection, source unknown  -CL acute abdominal infection  -CL       Indicate SIRS criteria Leukocytosis (WBC > 11914 IJL) OR Leukopenia (WBC <4000 IJL) OR Bandemia (WBC >10% bands)  -CL --       Are two or more of the above signs & symptoms of infection both present and new to the patient? No  -CL --             User Key  (r) = Recorded By, (t) = Taken By, (c) = Cosigned By    [address] Name Provider Leah Amanda MD Physician                    There is no height or weight on file to calculate BMI    Wt Readings from Last 1 Encounters:   03/24/23 75 8 kg (167 lb)        Ideal body weight: 45 5 kg (100 lb 4 9 oz)  Adjusted ideal body weight: 57 6 kg (126 lb 15 8 oz)

## 2023-06-15 NOTE — DISCHARGE INSTRUCTIONS
You were evaluated in the emergency department for: right hip pain  You can access your current and pending results through [facility]  A radiologist will take a second look at your X-Rays, if you had any, and you will be contacted with any new findings  You should follow-up with your primary care provider, as soon as possible, for re-evaluation  If you do not have a primary care provider, I have referred you to [address] [place] [address]  You will be contacted about scheduling an appointment  Their phone number is also included on this paperwork  Your workup revealed no emergent features at this time; however, many disease processes are dynamic:    Please, return to the emergency department if you experience new or worsening symptoms, fever, chest pain, shortness of breath, difficulty breathing, dizziness, abdominal pain, persistent nausea/vomiting, syncope or passing out, blood in your urine or stool, coughing up blood, leg swelling/pain, urinary retention, bowel or bladder incontinence, numbness between your legs

## 2023-06-15 NOTE — ED ATTENDING ATTESTATION
"6/15/2023  I, José Miguel Meeks DO, saw and evaluated the patient  I have discussed the patient with the resident/non-physician practitioner and agree with the resident's/non-physician practitioner's findings, Plan of Care, and MDM as documented in the resident's/non-physician practitioner's note, except where noted  All available labs and Radiology studies were reviewed  I was present for key portions of any procedure(s) performed by the resident/non-physician practitioner and I was immediately available to provide assistance  At this point I agree with the current assessment done in the Emergency Department  I have conducted an independent evaluation of this patient a history and physical is as follows:      Patient is an 20-year-old female with a history of chronic atrial fibrillation on oral anticoagulation, CHF, increased BMI, previous right hip replacement, cognitive impairment, brought in from her skilled nursing facility  Per EMS patient's been having chronic hip pain in the right hip which is gotten worse over the last day or so  No falls or trauma  She is currently nonweightbearing  Patient herself says that she has mild pain in her low back and hip area going down the back of the leg  She is chronically nonambulatory, she denies any fever, denies any chills, denies dysuria or hematuria  Denies numbness or tingling  Per review of records patient was hospitalized June 8 through June 12 at outside facility for hip pain  There she had a hip x-ray which showed a dislocation hip, CT performed June 8 showed\"  Right hip total replacement  There is dislocation of the femoral head component   and acetabular component superiorly relative to the native acetabulum  The   femoral head component remains situated within the prosthetic acetabular   component  One of the securing screws from the acetabular component is broken   with the other portion remaining in the iliac bone   There is significant " "  osteolysis of the right acetabulum as well as the proximal right femoral shaft  There is significant fluid surrounding the hip replacement and insinuating into   the eroded portions of the acetabulum, with some interspersed calcific or   ossific densities  No acute fracture is seen  \"      Per discharge summary, patient was also treated with Macrobid for urinary tract infection and recommended to follow-up with her orthopedic surgeon at [place] [address]  Per review of records patient was seen by [facility] orthopedic surgery May 30, who felt that her hip and leg pain were secondary to lumbar radiculopathy, recommendations were observation alone for the right hip, and she would schedule a spine and pain injection  General:  Patient is well-appearing  Head:  Atraumatic  Eyes:  Conjunctiva pink  ENT:  Mucous membranes are moist  Neck:  Supple  Cardiac:  S1-S2, without murmurs  Lungs:  Clear to auscultation bilaterally  Abdomen:  Soft, nontender, normal bowel sounds, no CVA tenderness, no tympany, no rigidity, no guarding  Extremities: Right leg has a well-healing lateral surgical hip incision, no erythema or drainage or discharge  Her leg is shortened and externally rotated  She has normal sensation at the entire leg, she can weakly plantarflex dorsiflex the ankle and toes  She can fire her quadriceps but cannot lift the leg off the ground  She has normal sensation throughout both legs  She has no warmth or redness of the hip, knee or ankle  She has no spinal tenderness  Neurologic:  Awake, fluent speech, normal comprehension  AAOx3  Skin:  Pink warm and dry  Psychiatric:  Alert, pleasant, cooperative      ED Course     Plan is for CBC, INR, hip xray    Patient has no warmth or redness around the hip to suggest septic arthritis  Does sound like from previous imaging and previous notes the patient has a chronic dislocation of the hip and acetabular component    She is able to be nonweightbearing at her " facility  CBC is being obtained to assure that she does not have significant anemia  INR is pending to evaluate her anticoagulation status  Signed out to Dr Johnson Yost      DIAGNOSIS:  Acute exacerbation of chronic right leg pain, chronic right hip dislocation, chronic anticoagulation    MEDICAL DECISION MAKING CODING    The differential diagnosis before testing included (but is not limited to) chronic hip pain, periprosthetic fracture, worsening dislocation, supratherapeutic INR and severe life-threatening anemia which is a medical condition that poses a threat to life/function  Patient presents with acute new problem with:  Threat to life or bodily function      Chronic conditions affecting care:  As per HPI    COLLECTION AND INTERPRETATION OF DATA  Additional history obtained from: EMS  I reviewed prior external notes, including hospital discharge summary and outpatient orthopedic notes as noted above    I ordered each unique test        Critical Care Time  Procedures

## 2023-06-15 NOTE — ED PROVIDER NOTES
"History  Chief Complaint   Patient presents with   • Hip Pain     Per EMS, \"Pt is having R hip pain  No known cause  \"  Pt denies any trauma or injury  States that she has been having R hip pain for 2-3 weeks  Unsure if she has seen a doctor for the pain  Patient is a 61-year-old female, with a history significant for A-fib anticoagulated on warfarin, Ann Marie-en-Y, prosthetic right hip, who presents to the ED today, via EMS from her SNF, due to a 3-week history of atraumatic right hip pain that radiates down her right leg  There is no associated chest pain, dyspnea, abdominal pain, numbness, dysuria  Patient states she does not walk  On my review of the medical record, patient was admitted at outside hospital from 6/8 to 6/12 with right hip pain that radiates down the right lower extremity and CT imaging revealed: \"There is dislocation of the femoral head component and acetabular component superiorly relative to the native acetabulum  The femoral head component remains situated within the prosthetic acetabular component  One of the securing screws from the acetabular component is broken with the other portion remaining in the iliac bone  There is significant osteolysis of the right acetabulum as well as the proximal right femoral shaft  Per the discharge summary, patient was evaluated by orthopedic surgery: \"Chronic right hip dislocation: Ortho consulted  No intervention at this time  \"  Patient denies any change in the intensity or character of her pain  During this hospitalization, she was treated for UTI  Patient states she hasn't walked with a walker in weeks  Patient is without other concerns at this time  Patient is alert and oriented x4  Prior to Admission Medications   Prescriptions Last Dose Informant Patient Reported? Taking?    Cholecalciferol 125 MCG (5000 UT) TABS  Outside Facility (Specify) Yes No   Sig: Take 5,000 Units by mouth daily   DULoxetine (CYMBALTA) 20 mg capsule   Yes No " DULoxetine (CYMBALTA) 30 mg delayed release capsule   Yes No   DULoxetine (CYMBALTA) 60 mg delayed release capsule  Outside Facility (Specify) No Yes   Sig: TAKE 1 CAPSULE BY MOUTH EVERY DAY   Diclofenac Sodium (VOLTAREN) 1 %  Outside Facility (Specify) No No   Sig: Apply 2 g topically 4 (four) times a day   acetaminophen (TYLENOL) 500 mg tablet  Outside Facility (Specify) No Yes   Sig: Take 2 tablets (1,000 mg total) by mouth every 8 (eight) hours   ascorbic acid (VITAMIN C) 500 mg tablet  Outside Facility (Specify) No No   Sig: Take 1 tablet (500 mg total) by mouth 2 (two) times a day   cefpodoxime (VANTIN) 100 mg tablet   Yes No   desoximetasone (TOPICORT) 0 25 % cream  Outside Facility (Specify) No No   Sig: APPLY TOPICALLY AS NEEDED FOR IRRITATION   dicyclomine (BENTYL) 10 mg capsule   Yes No   fluticasone (FLONASE) 50 mcg/act nasal spray  Outside Facility (Specify) No No   Sig: SPRAY 2 SPRAYS INTO EACH NOSTRIL EVERY DAY   gabapentin (NEURONTIN) 300 mg capsule   Yes Yes   levothyroxine 100 mcg tablet  Outside Facility (Specify) No Yes   Sig: TAKE 1 TABLET BY MOUTH EVERY DAY   loperamide (IMODIUM) 2 mg capsule  Outside Facility (Specify) Yes No   Sig: Take 2 mg by mouth 4 (four) times a day as needed for diarrhea   nystatin powder   Yes No   polyethylene glycol (MIRALAX) 17 g packet  Outside Facility (Specify) Yes No   Sig: Take 17 g by mouth daily   potassium chloride 10% oral solution  Outside Facility (Specify) No No   Sig: Take 15 mL (20 mEq total) by mouth daily   simethicone (MYLICON) 80 mg chewable tablet  Outside Facility (Specify) Yes No   Sig: Chew 80 mg every 6 (six) hours as needed for flatulence   sucralfate (CARAFATE) 1 g tablet  Outside Facility (Specify) No No   Sig: TAKE 1 TABLET BY MOUTH 2 TIMES A DAY     tiZANidine (ZANAFLEX) 2 mg tablet  Outside Facility (Specify) No No   Sig: TAKE 1 TABLET BY MOUTH EVERY 8 HOURS AS NEEDED FOR MUSCLE SPASM   torsemide (DEMADEX) 10 mg tablet   Yes No   Sig: Take 10 mg by mouth daily   warfarin (COUMADIN) 1 mg tablet  Outside Facility (Specify) No No   Sig: TAKE 1 TABLET BY MOUTH DAILY OR AS DIRECTED   warfarin (COUMADIN) 2 mg tablet  Outside Facility (Specify) No No   Sig: Take 1 tablet daily or as instructed  warfarin (COUMADIN) 2 5 mg tablet  Outside Facility (Specify) Yes No   warfarin (COUMADIN) 3 mg tablet  Outside Facility (Specify) Yes Yes   warfarin (COUMADIN) 4 mg tablet  Outside Facility (Specify) No No   Sig: TAKE ONE TABLET DAILY AS PREVIOUSLY DIRECTED BY CARDIOLOGY   warfarin (COUMADIN) 5 mg tablet  Outside Facility (Specify) No No   Sig: TAKE 1 TABLET BY MOUTH EVERY DAY AS DIRECTED      Facility-Administered Medications: None       Past Medical History:   Diagnosis Date   • Abdominal bloating 8/1/2016   • Anemia    • Arthritis    • Cancer (HCC)     basal cell   • CHF (congestive heart failure) (HCC)    • COVID-19    • Disease of thyroid gland    • Disturbance of smell and taste     disturbance of taste   • Effusion of knee joint right    • Fibromyalgia    • Heart murmur     reported a previous heart murmur   • History of acute myocardial infarction    • History of atrial fibrillation    • History of bruising easily    • History of cancer    • History of dermatitis    • History of mammogram    • History of methicillin resistant staphylococcus aureus (MRSA)     Negative nasal culture, isolation discontinued 8/17/2018     • History of methicillin resistant staphylococcus aureus (MRSA) 08/17/2018    negative nasal culture-isolation and hx discontinued 8/17/2018   • History of obesity    • History of osteopenia    • History of sciatica    • History of shortness of breath    • History of sinusitis    • History of sore throat    • History of syncope    • History of umbilical hernia    • History of viral infection    • Irritable bowel syndrome (IBS)    • Joint pain, hip    • Limb pain    • Myalgia     myalgia and myositis   • Need for prophylactic vaccination against single diseases    • Need for prophylactic vaccination and inoculation against influenza    • Preoperative cardiovascular examination    • Primary osteoarthritis of right knee    • Right leg pain    • Vaginal Pap smear     reported pap smear       Past Surgical History:   Procedure Laterality Date   • ANKLE ARTHRODESIS Right    • BACK SURGERY     • BARIATRIC SURGERY     • CHOLECYSTECTOMY      x2   • COLONOSCOPY     • ESOPHAGOGASTRODUODENOSCOPY N/A 3/23/2018    Procedure: ESOPHAGOGASTRODUODENOSCOPY (EGD); Surgeon: Anup Rivera MD;  Location: [facility]; Service: Gastroenterology   • FOOT SURGERY     • HIP CLOSE REDUCTION Right 8/21/2016    Procedure: CLOSED REDUCTION RIGHT TOTAL HIP;  Surgeon: Lorraine Brady MD;  Location: [facility];  Service:    • INSERT / Loyde Earle / Bryan Both     • IR BIOPSY OTHER  8/1/2020   • JOINT REPLACEMENT      LEFT KNEE REPLACEMENT   • JOINT REPLACEMENT      Right HIP   • PILONIDAL CYST EXCISION      x2   • MI EXC B9 LESION MRGN XCP SK TG T/A/L 0 5 CM/< Right 7/24/2019    Procedure: REVISION SCAR EXTREMITY Rt Hip;   Surgeon: Vipin Gomez MD;  Location: [facility];  Service: Orthopedics   • MI REVJ TOT HIP ARTHRP [address] W/WO AGRFT/ALGRFT Right 4/15/2019    Procedure: ARTHROPLASTY HIP TOTAL ACETABULAR REVISION;  Surgeon: Vipin Gomez MD;  Location: [facility];  Service: Orthopedics   • REPLACEMENT TOTAL KNEE Right    • SILVANA-EN-Y PROCEDURE      x2   • TOTAL KNEE ARTHROPLASTY     • UMBILICAL HERNIA REPAIR      x2       Family History   Problem Relation Age of Onset   • Coronary artery disease Mother    • Heart attack Mother    • Hypertension Mother    • Coronary artery disease Father    • Heart attack Father    • Hypertension Father    • Lymphoma Sister         acute   • Rashes / Skin problems Sister         lymphomatoid papulosis   • Cancer Sister    • Coronary artery disease Brother    • Heart attack Brother         x2   • Aneurysm Neg Hx    • Hyperlipidemia Neg Hx      I have reviewed and agree with the history as documented  E-Cigarette/Vaping   • E-Cigarette Use Never User      E-Cigarette/Vaping Substances   • Nicotine No    • THC No    • CBD No    • Flavoring No    • Other No    • Unknown No      Social History     Tobacco Use   • Smoking status: Never   • Smokeless tobacco: Never   Vaping Use   • Vaping Use: Never used   Substance Use Topics   • Alcohol use: Not Currently   • Drug use: No        Review of Systems   Constitutional: Negative for fever  Respiratory: Negative for shortness of breath  Cardiovascular: Negative for chest pain  Gastrointestinal: Negative for abdominal pain  Genitourinary: Negative for dysuria  Musculoskeletal: Positive for arthralgias and gait problem  Allergic/Immunologic: Positive for environmental allergies  All other systems reviewed and are negative  Physical Exam  ED Triage Vitals   Temperature Pulse Respirations Blood Pressure SpO2   06/15/23 1415 06/15/23 1415 06/15/23 1415 06/15/23 1415 06/15/23 1415   98 5 °F (36 9 °C) 72 20 108/64 94 %      Temp Source Heart Rate Source Patient Position - Orthostatic VS BP Location FiO2 (%)   06/15/23 1415 06/15/23 1754 06/15/23 1754 06/15/23 1754 --   Oral Monitor Lying Left arm       Pain Score       --                    Orthostatic Vital Signs  Vitals:    06/15/23 1415 06/15/23 1754   BP: 108/64 (!) 100/49   Pulse: 72 70   Patient Position - Orthostatic VS:  Lying       Physical Exam  Vitals and nursing note reviewed  Exam conducted with a chaperone present  Constitutional:       General: She is not in acute distress  Appearance: She is ill-appearing (Chronic appearing)  She is not toxic-appearing or diaphoretic  HENT:      Head: Normocephalic and atraumatic  Right Ear: External ear normal       Left Ear: External ear normal       Nose: Nose normal  No rhinorrhea  Mouth/Throat:      Mouth: Mucous membranes are moist       Pharynx: Oropharynx is clear   No oropharyngeal exudate or posterior oropharyngeal erythema  Comments: Uvula midline  No oropharyngeal or submandibular mass/swelling  Eyes:      General: No scleral icterus  Right eye: No discharge  Left eye: No discharge  Conjunctiva/sclera: Conjunctivae normal       Pupils: Pupils are equal, round, and reactive to light  Neck:      Comments: Patient is spontaneously rotating their neck to the left and right during the history and physical exam interaction without difficulty or apparent discomfort    Cardiovascular:      Rate and Rhythm: Normal rate and regular rhythm  Pulses: Normal pulses  Heart sounds: Murmur (Previously documented  ) heard  No friction rub  No gallop  Comments: 2+ Radial and DP bilaterally  Pulmonary:      Effort: Pulmonary effort is normal  No respiratory distress  Breath sounds: Normal breath sounds  No stridor  No wheezing, rhonchi or rales  Abdominal:      General: Abdomen is flat  There is no distension  Palpations: Abdomen is soft  Tenderness: There is no abdominal tenderness  There is no right CVA tenderness, left CVA tenderness, guarding or rebound  Genitourinary:     General: Normal vulva  Musculoskeletal:         General: Tenderness present  No deformity  Cervical back: Neck supple  No tenderness  No muscular tenderness  Comments: No midline C, T, L-spine tenderness to palpation    Tenderness to palpation of the lateral right hip  Shortened and externally rotated right lower extremity   Lymphadenopathy:      Cervical: No cervical adenopathy  Skin:     General: Skin is warm and dry  Capillary Refill: Capillary refill takes less than 2 seconds  Comments: No skin defect over the right hip   Neurological:      Mental Status: She is alert  Comments: Patient is speaking clearly in complete sentences    Intact sensation in the superficial and deep peroneal nerves, dorsal lateral cutaneous nerve, saphenous nerve distributions bilaterally   Psychiatric:         Mood and Affect: Mood normal          Behavior: Behavior normal          ED Medications  Medications   lidocaine (LIDODERM) 5 % patch 1 patch (1 patch Topical Medication Applied 6/15/23 1650)   cefTRIAXone (ROCEPHIN) 1,000 mg in dextrose 5 % 50 mL IVPB (1,000 mg Intravenous New Bag 6/15/23 1907)   multi-electrolyte (ISOLYTE-S PH 7 4) bolus 500 mL (500 mL Intravenous New Bag 6/15/23 1839)       Diagnostic Studies  Results Reviewed     Procedure Component Value Units Date/Time    UA w Reflex to Microscopic w Reflex to Culture [[id number]]  (Abnormal) Collected: 06/15/23 1905    Lab Status: Final result Specimen: Urine, Straight Cath Updated: 06/15/23 1923     Color, UA Light Orange     Clarity, UA Extra Turbid     Specific [place], UA 1 013     pH, UA 7 0     Leukocytes, UA Large     Nitrite, UA Positive     Protein, UA 70 (1+) mg/dl      Glucose, UA Negative mg/dl      Ketones, UA Negative mg/dl      Urobilinogen, UA <2 0 mg/dl      Bilirubin, UA Negative     Occult Blood, UA Large    Urine Microscopic [[id number]] Collected: 06/15/23 1905    Lab Status: In process Specimen: Urine, Straight Cath Updated: 06/15/23 1923    APTT [[id number]]  (Abnormal) Collected: 06/15/23 1825    Lab Status: Final result Specimen: Blood from Arm, Left Updated: 06/15/23 1917     PTT 48 seconds     Blood culture #1 [[id number]] Collected: 06/15/23 1825    Lab Status: In process Specimen: Blood from Arm, Right Updated: 06/15/23 1836    Blood culture #2 [[id number]] Collected: 06/15/23 1825    Lab Status: In process Specimen: Blood from Arm, Left Updated: 06/15/23 1836    Lactic acid [[id number]] Collected: 06/15/23 1825    Lab Status: In process Specimen: Blood from Arm, Left Updated: 06/15/23 1836    Comprehensive metabolic panel [[id number]] Collected: 06/15/23 1825    Lab Status:  In process Specimen: Blood from Arm, Left Updated: 06/15/23 1836    Procalcitonin [[id number]] Collected: 06/15/23 1825    Lab Status: In process Specimen: Blood from Arm, Left Updated: 06/15/23 1836    CBC and differential [[id number]]  (Abnormal) Collected: 06/15/23 1653    Lab Status: Final result Specimen: Blood from Arm, Right Updated: 06/15/23 1747     WBC 29 47 Thousand/uL      RBC 2 94 Million/uL      Hemoglobin 9 5 g/dL      Hematocrit 29 3 %       fL      MCH 32 3 pg      MCHC 32 4 g/dL      RDW 13 2 %      MPV 8 7 fL      Platelets 801 Thousands/uL     Narrative: This is an appended report  These results have been appended to a previously verified report      Manual Differential(PHLEBS Do Not Order) [[id number]]  (Abnormal) Collected: 06/15/23 1653    Lab Status: Final result Specimen: Blood from Arm, Right Updated: 06/15/23 1747     Segmented % 81 %      Bands % 11 %      Lymphocytes % 3 %      Monocytes % 3 %      Eosinophils, % 0 %      Basophils % 0 %      Atypical Lymphocytes % 2 %      Absolute Neutrophils 27 11 Thousand/uL      Lymphocytes Absolute 0 88 Thousand/uL      Monocytes Absolute 0 88 Thousand/uL      Eosinophils Absolute 0 00 Thousand/uL      Basophils Absolute 0 00 Thousand/uL      Total Counted --     RBC Morphology Present     Anisocytosis Present     [place] Cells Present     Macrocytes Present     Poikilocytes Present     Polychromasia Present     Platelet Estimate Adequate    Protime-INR [[id number]]  (Abnormal) Collected: 06/15/23 1653    Lab Status: Final result Specimen: Blood from Arm, Right Updated: 06/15/23 1727     Protime 25 4 seconds      INR 2 29                 XR hip/pelv 2-3 vws right if performed   ED Interpretation by Brenda Short MD (06/15 1718)   Per my independent interpretation:  fracture deformity             Procedures  Procedures      ED Course  ED Course as of 06/15/23 1925   Thu Seamus 15, 2023   1716 Hemoglobin(!): 9 5  Low   1719 WBC(!): 29 47  High, more than doubled from 14 0 on 6/10   1734 POCT INR(!): 2 29  Therapeutic    1807 Bands "Relative(!): 11  High  Sepsis panel order set ordered and ceftriaxone ordered  Plan to admit    1823 I discussed with Dr Mario Alberto Rizzo from orthopaedic surgery  Patient can follow up outpatient for the hip  No operative management  1905 RDW: 13 2   1924 Nitrite, UA(!): Positive  Abnormal, likely UTI source of bandemia                           Initial Sepsis Screening     Row Name 06/15/23 1838 06/15/23 1803             Is the patient's history suggestive of a new or worsening infection? Yes (Proceed)  -CL Yes (Proceed)  -CL       Suspected source of infection suspect infection, source unknown  -CL acute abdominal infection  -CL       Indicate SIRS criteria Leukocytosis (WBC > 95002 IJL) OR Leukopenia (WBC <4000 IJL) OR Bandemia (WBC >10% bands)  -CL --       Are two or more of the above signs & symptoms of infection both present and new to the patient? No  -CL --             User Key  (r) = Recorded By, (t) = Taken By, (c) = Cosigned By    [address] Name Provider Markie Angel MD Physician                          Medical Decision Making  Patient is a 80-year-old female, with a history significant for A-fib anticoagulated on warfarin, Ann Marie-en-Y, prosthetic right hip, who presents to the ED today, via EMS from her SNF, due to a 3-week history of atraumatic right hip pain that radiates down her right leg  There is no associated chest pain, dyspnea, abdominal pain, numbness, dysuria  Patient states she does not walk  On my review of the medical record, patient was admitted at outside hospital from 6/8 to 6/12 with right hip pain that radiates down the right lower extremity and CT imaging revealed: \"There is dislocation of the femoral head component and acetabular component superiorly relative to the native acetabulum  The femoral head component remains situated within the prosthetic acetabular component   One of the securing screws from the acetabular component is broken with the other portion " "remaining in the iliac bone  There is significant osteolysis of the right acetabulum as well as the proximal right femoral shaft  Per the discharge summary, patient was evaluated by orthopedic surgery: \"Chronic right hip dislocation: Ortho consulted  No intervention at this time  \"  Patient denies any change in the intensity or character of her pain  During this hospitalization, she was treated for UTI  Patient states she hasn't walked with a walker in weeks  Patient is currently afebrile and hemodynamically stable  Her physical exam is notable for shortened and externally rotated right lower extremity without overlying skin defect over the tender right hip  Abdomen soft nontender, heart exam notable for murmur, lungs clear to auscultation, intact distal sensation in the lower extremities bilaterally  This presentation is concerning for: Chronic hip dislocation  I also considered hemarthrosis/bleeding  Will investigate with CBC, INR  Will consult orthopedic surgery  Will manage with Lidoderm and further based upon work-up  - No language barrier    - History obtained from patient and EMS  - There are no limitations to the history obtained  - External record review performed of previous charting was performed by me  - I independently reviewed and interpreted ordered labs and imaging from this encounter   - Patient's medication regimen reviewed  - Patient's comorbidities factored into diagnosis considerations and disposition planning    - I discussed the patient's need for admission with Dr Cho Necessary  Amount and/or Complexity of Data Reviewed  Labs: ordered  Decision-making details documented in ED Course  Radiology: ordered and independent interpretation performed  Risk  Prescription drug management  Decision regarding hospitalization              Disposition  Final diagnoses:   Right hip pain   Closed fracture of right hip, initial encounter ([facility] [address] )   Closed dislocation of right hip, subsequent " encounter   Bandemia   Leukocytosis     Time reflects when diagnosis was documented in both MDM as applicable and the Disposition within this note     Time User Action Codes Description Comment    6/15/2023  3:03 PM Render Seen A Add [M25 551] Right hip pain     6/15/2023  5:19 PM Render Seen A Add [S72 001A] Closed fracture of right hip, initial encounter ([facility] [address] )     6/15/2023  5:19 PM Render Seen A Add [S08 [id number]] Closed dislocation of right hip, subsequent encounter     6/15/2023  6:39 PM Render Seen Add [[id number]] Bandemia     6/15/2023  6:39 PM Render Seen A Add [D72 [id number]] Leukocytosis     6/15/2023  7:23 PM Render Seen Modify [C84 [id number]] Closed dislocation of right hip, subsequent encounter     6/15/2023  7:23 PM Render Seen Modify [[id number]] [facility]       ED Disposition     ED Disposition   Admit    Condition   Stable    Date/Time   Thu Seamus 15, 2023  7:23 PM    Comment   Case was discussed with Family Medicine and the patient's admission status was agreed to be Admission Status: inpatient status to the service of Dr Adhikari Letters   Follow-up Information     Follow up With Specialties Details Why Contact Ame Corona DO Family Medicine Schedule an appointment as soon as possible for a visit   [address] [place] [address]  [phone]            Current Discharge Medication List      CONTINUE these medications which have NOT CHANGED    Details   acetaminophen (TYLENOL) 500 mg tablet Take 2 tablets (1,000 mg total) by mouth every 8 (eight) hours  Qty: 90 tablet, Refills: 0    Associated Diagnoses: Trochanteric bursitis      !!  DULoxetine (CYMBALTA) 60 mg delayed release capsule TAKE 1 CAPSULE BY MOUTH EVERY DAY  Qty: 90 capsule, Refills: 1    Associated Diagnoses: Other chronic pain      gabapentin (NEURONTIN) 300 mg capsule       levothyroxine 100 mcg tablet TAKE 1 TABLET BY MOUTH EVERY DAY  Qty: 90 tablet, Refills: 1    Comments: DX Code Needed  PATIENT IS REQUESTING REFILL  Associated Diagnoses: Hypothyroidism, unspecified type      !! warfarin (COUMADIN) 3 mg tablet       ascorbic acid (VITAMIN C) 500 mg tablet Take 1 tablet (500 mg total) by mouth 2 (two) times a day  Qty: 60 tablet, Refills: 0    Associated Diagnoses: Dislocation of right hip, subsequent encounter      cefpodoxime (VANTIN) 100 mg tablet       Cholecalciferol 125 MCG (5000 UT) TABS Take 5,000 Units by mouth daily      desoximetasone (TOPICORT) 0 25 % cream APPLY TOPICALLY AS NEEDED FOR IRRITATION  Qty: 30 g, Refills: 0    Associated Diagnoses: Vulvar irritation      Diclofenac Sodium (VOLTAREN) 1 % Apply 2 g topically 4 (four) times a day  Qty: 350 g, Refills: 3    Associated Diagnoses: Right hip pain; Trochanteric bursitis of right hip      dicyclomine (BENTYL) 10 mg capsule       !! DULoxetine (CYMBALTA) 20 mg capsule       !! DULoxetine (CYMBALTA) 30 mg delayed release capsule       fluticasone (FLONASE) 50 mcg/act nasal spray SPRAY 2 SPRAYS INTO EACH NOSTRIL EVERY DAY  Qty: 16 mL, Refills: 0    Associated Diagnoses: Sore throat      loperamide (IMODIUM) 2 mg capsule Take 2 mg by mouth 4 (four) times a day as needed for diarrhea      nystatin powder       polyethylene glycol (MIRALAX) 17 g packet Take 17 g by mouth daily      potassium chloride 10% oral solution Take 15 mL (20 mEq total) by mouth daily  Qty: 3800 mL, Refills: 3    Associated Diagnoses: Benign essential HTN      simethicone (MYLICON) 80 mg chewable tablet Chew 80 mg every 6 (six) hours as needed for flatulence      sucralfate (CARAFATE) 1 g tablet TAKE 1 TABLET BY MOUTH 2 TIMES A DAY    Qty: 180 tablet, Refills: 1    Associated Diagnoses: Epigastric pain      tiZANidine (ZANAFLEX) 2 mg tablet TAKE 1 TABLET BY MOUTH EVERY 8 HOURS AS NEEDED FOR MUSCLE SPASM  Qty: 60 tablet, Refills: 2    Associated Diagnoses: Right hip pain; Trochanteric bursitis      torsemide (DEMADEX) 10 mg tablet Take 10 mg by mouth daily      !! warfarin (COUMADIN) 1 mg tablet TAKE 1 TABLET BY MOUTH DAILY OR AS DIRECTED  Qty: 90 tablet, Refills: 1    Associated Diagnoses: Atrial fibrillation, chronic (HCC)      ! ! warfarin (COUMADIN) 2 mg tablet Take 1 tablet daily or as instructed  Qty: 90 tablet, Refills: 1    Associated Diagnoses: Atrial fibrillation, chronic (HCC)      ! ! warfarin (COUMADIN) 2 5 mg tablet       !! warfarin (COUMADIN) 4 mg tablet TAKE ONE TABLET DAILY AS PREVIOUSLY DIRECTED BY CARDIOLOGY  Qty: 90 tablet, Refills: 1    Associated Diagnoses: Chronic atrial fibrillation ([address] )      ! ! warfarin (COUMADIN) 5 mg tablet TAKE 1 TABLET BY MOUTH EVERY DAY AS DIRECTED  Qty: 90 tablet, Refills: 1    Associated Diagnoses: Chronic atrial fibrillation ([address] )       ! ! - Potential duplicate medications found  Please discuss with provider  No discharge procedures on file  PDMP Review       Value Time User    PDMP Reviewed  Yes 4/17/2020 12:53 PM Holly Mujica MD           ED Provider  Attending physically available and evaluated Jazmin Tejeda  DEION managed the patient along with the ED Attending      Electronically Signed by         Rhonda Mcdonald MD  06/15/23 7018

## 2023-06-15 NOTE — CONSULTS
Orthopedics   Orestes Koo 80 y o  female MRN: [id number]  Unit/Bed#: X ray      Chief Complaint:   right hip pain    HPI:   80 y  o female nonambulator (wheelchair bound, for at least 6 months ago per record review) complaining of right hip pain  Atraumatic  Patient resides in SNF and reports that she has chronic lumbar spine and right hip pain which radiates down her right leg  States this has gotten slightly worse over last few days  She was admitted at 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  She was told to follow up outpatient with her [facility] orthopedist  Patient underwent R MAIA by Dr Sheree Riley (?) in [place] 2016, followed by R revision MAIA with Dr Isai Lindsey in 2019 for persistent instability  She was last seen in the office 3/28/23 for the first time in many years, where the aforementioned superior migration of the acetabular component was identified  She was instructed to pursue spinal care for lumbosacral pathology and follow up in 2 months  She has a history of L2-L3 fusion  Pain is radicular in character, Located right lumbar spine into buttock, acute on chronic in onset, constant in duration, severe in intensity  Exacerbating factors ROM, remitting factors rest  Radiates from lumbar spine into right buttock and down lateral and anterior aspect of right leg, no numbness, no tingling, no open wounds noted  No other complaints at this time  PMH significant for basal cell carcinoma, CHF, afib on coumadin, hypothyroidism, fibromyalgia, prior MI  Occupation retired      Review Of Systems:   · Skin: See HPI  · Neuro: See HPI  · Musculoskeletal: See HPI  · 14 point review of systems negative except as stated above     Past Medical History:   Past Medical History:   Diagnosis Date   • Abdominal bloating 8/1/2016   • Anemia    • Arthritis    • Cancer ([facility] [address] )     basal cell   • CHF (congestive heart failure) ([facility] [address] )    • COVID-19    • Disease of thyroid gland    • Disturbance of smell and taste     disturbance of taste   • Effusion of knee joint right    • Fibromyalgia    • Heart murmur     reported a previous heart murmur   • History of acute myocardial infarction    • History of atrial fibrillation    • History of bruising easily    • History of cancer    • History of dermatitis    • History of mammogram    • History of methicillin resistant staphylococcus aureus (MRSA)     Negative nasal culture, isolation discontinued 8/17/2018  • History of methicillin resistant staphylococcus aureus (MRSA) 08/17/2018    negative nasal culture-isolation and hx discontinued 8/17/2018   • History of obesity    • History of osteopenia    • History of sciatica    • History of shortness of breath    • History of sinusitis    • History of sore throat    • History of syncope    • History of umbilical hernia    • History of viral infection    • Irritable bowel syndrome (IBS)    • Joint pain, hip    • Limb pain    • Myalgia     myalgia and myositis   • Need for prophylactic vaccination against single diseases    • Need for prophylactic vaccination and inoculation against influenza    • Preoperative cardiovascular examination    • Primary osteoarthritis of right knee    • Right leg pain    • Vaginal Pap smear     reported pap smear       Past Surgical History:   Past Surgical History:   Procedure Laterality Date   • ANKLE ARTHRODESIS Right    • BACK SURGERY     • BARIATRIC SURGERY     • CHOLECYSTECTOMY      x2   • COLONOSCOPY     • ESOPHAGOGASTRODUODENOSCOPY N/A 3/23/2018    Procedure: ESOPHAGOGASTRODUODENOSCOPY (EGD); Surgeon: Santa Yost MD;  Location: [facility];   Service: Gastroenterology   • FOOT SURGERY     • HIP CLOSE REDUCTION Right 8/21/2016    Procedure: CLOSED REDUCTION RIGHT TOTAL HIP;  Surgeon: Ashanti Montanez MD;  Location[facility];  Service:    • Sagrario Fernández / Ted Wagoner / Vikki Fernando     • IR BIOPSY OTHER  8/1/2020   • JOINT REPLACEMENT      LEFT KNEE REPLACEMENT   • JOINT REPLACEMENT      Right HIP   • PILONIDAL CYST EXCISION      x2   • UT EXC B9 LESION MRGN XCP SK TG T/A/L 0 5 CM/< Right 7/24/2019    Procedure: REVISION SCAR EXTREMITY Rt Hip;   Surgeon: Robi Sellers MD;  Location: [facility];  Service: Orthopedics   • UT REVJ TOT HIP ARTHRP [address] W/WO AGRFT/ALGRFT Right 4/15/2019    Procedure: ARTHROPLASTY HIP TOTAL ACETABULAR REVISION;  Surgeon: Robi Sellers MD;  [facility];  Service: Orthopedics   • REPLACEMENT TOTAL KNEE Right    • SILVANA-EN-Y PROCEDURE      x2   • TOTAL KNEE ARTHROPLASTY     • UMBILICAL HERNIA REPAIR      x2       Family History:  Family history reviewed and non-contributory  Family History   Problem Relation Age of Onset   • Coronary artery disease Mother    • Heart attack Mother    • Hypertension Mother    • Coronary artery disease Father    • Heart attack Father    • Hypertension Father    • Lymphoma Sister         acute   • Rashes / Skin problems Sister         lymphomatoid papulosis   • Cancer Sister    • Coronary artery disease Brother    • Heart attack Brother         x2   • Aneurysm Neg Hx    • Hyperlipidemia Neg Hx        Social History:  Social History     Socioeconomic History   • Marital status: /Civil Union     Spouse name: None   • Number of children: None   • Years of education: None   • Highest education level: None   Occupational History   • None   Tobacco Use   • Smoking status: Never   • Smokeless tobacco: Never   Vaping Use   • Vaping Use: Never used   Substance and Sexual Activity   • Alcohol use: Not Currently   • Drug use: No   • Sexual activity: Never   Other Topics Concern   • None   Social History Narrative   • None     Social Determinants of Health     Financial Resource Strain: Not on file   Food Insecurity: Not on file   Transportation Needs: Not on file   Physical Activity: Not on file   Stress: Not on file   Social Connections: Not on file   Intimate Partner Violence: Not on file   Housing Stability: Not on file       Allergies: Allergies   Allergen Reactions   • Amoxicillin      Reports nausea, headache, dizzy   • Codeine Vomiting and GI Intolerance     GI upset, ana m morphine  Reaction Date: 24Apr2012;    • Other      Other reaction(s): Other (See Comments)  ana m toradol in OR 6/06 sah   • Oxycodone-Acetaminophen      Other reaction(s): Other (See Comments)  GI upset; ana m vicodin   • Percocet [Oxycodone-Acetaminophen]    • Seasonal Ic  [Cholestatin] Allergic Rhinitis   • Trimethoprim    • Bactrim [Sulfamethoxazole-Trimethoprim] Rash   • Sulfa Antibiotics Rash     Other reaction(s):  Other (See Comments)  takes lasix @home           Labs:  0   Lab Value Date/Time    CRP 8 2 (H) 04/02/2021 0833    ESR 36 (H) 04/02/2021 0833    HCT 29 3 (L) 06/15/2023 1653    HCT 31 (L) 03/17/2023 1846    HCT 38 0 06/08/2022 0655    HCT 36 7 04/02/2021 0833    HCT 37 0 04/20/2015 1109    HCT 38 0 04/20/2015 1109    HCT 37 2 04/03/2015 0831    HGB 9 5 (L) 06/15/2023 1653    HGB 10 5 (L) 03/17/2023 1846    HGB 11 6 06/08/2022 0655    HGB 11 0 (L) 04/02/2021 0833    HGB 12 0 04/20/2015 1109    HGB 11 6 04/03/2015 0831    HGB 12 1 11/04/2014 1025    INR 2 29 (H) 06/15/2023 1653    WBC 29 47 (H) 06/15/2023 1653    WBC 5 71 06/08/2022 0655    WBC 6 27 04/02/2021 0833    WBC 7 98 04/20/2015 1109    WBC 7 29 04/03/2015 0831    WBC 6 66 11/04/2014 1025       Meds:    Current Facility-Administered Medications:   •  lidocaine (LIDODERM) 5 % patch 1 patch, 1 patch, Topical, Once, Mando Jose MD, 1 patch at 06/15/23 1650    Current Outpatient Medications:   •  acetaminophen (TYLENOL) 500 mg tablet, Take 2 tablets (1,000 mg total) by mouth every 8 (eight) hours, Disp: 90 tablet, Rfl: 0  •  DULoxetine (CYMBALTA) 60 mg delayed release capsule, TAKE 1 CAPSULE BY MOUTH EVERY DAY, Disp: 90 capsule, Rfl: 1  •  gabapentin (NEURONTIN) 300 mg capsule, , Disp: , Rfl:   •  levothyroxine 100 mcg tablet, TAKE 1 TABLET BY MOUTH EVERY DAY, Disp: 90 tablet, Rfl: 1  •  warfarin (COUMADIN) 3 mg tablet, , Disp: , Rfl:   •  ascorbic acid (VITAMIN C) 500 mg tablet, Take 1 tablet (500 mg total) by mouth 2 (two) times a day, Disp: 60 tablet, Rfl: 0  •  cefpodoxime (VANTIN) 100 mg tablet, , Disp: , Rfl:   •  Cholecalciferol 125 MCG (5000 UT) TABS, Take 5,000 Units by mouth daily, Disp: , Rfl:   •  desoximetasone (TOPICORT) 0 25 % cream, APPLY TOPICALLY AS NEEDED FOR IRRITATION, Disp: 30 g, Rfl: 0  •  Diclofenac Sodium (VOLTAREN) 1 %, Apply 2 g topically 4 (four) times a day, Disp: 350 g, Rfl: 3  •  dicyclomine (BENTYL) 10 mg capsule, , Disp: , Rfl:   •  DULoxetine (CYMBALTA) 20 mg capsule, , Disp: , Rfl:   •  DULoxetine (CYMBALTA) 30 mg delayed release capsule, , Disp: , Rfl:   •  fluticasone (FLONASE) 50 mcg/act nasal spray, SPRAY 2 SPRAYS INTO EACH NOSTRIL EVERY DAY, Disp: 16 mL, Rfl: 0  •  loperamide (IMODIUM) 2 mg capsule, Take 2 mg by mouth 4 (four) times a day as needed for diarrhea, Disp: , Rfl:   •  nystatin powder, , Disp: , Rfl:   •  polyethylene glycol (MIRALAX) 17 g packet, Take 17 g by mouth daily, Disp: , Rfl:   •  potassium chloride 10% oral solution, Take 15 mL (20 mEq total) by mouth daily, Disp: 3800 mL, Rfl: 3  •  simethicone (MYLICON) 80 mg chewable tablet, Chew 80 mg every 6 (six) hours as needed for flatulence, Disp: , Rfl:   •  sucralfate (CARAFATE) 1 g tablet, TAKE 1 TABLET BY MOUTH 2 TIMES A DAY , Disp: 180 tablet, Rfl: 1  •  tiZANidine (ZANAFLEX) 2 mg tablet, TAKE 1 TABLET BY MOUTH EVERY 8 HOURS AS NEEDED FOR MUSCLE SPASM, Disp: 60 tablet, Rfl: 2  •  torsemide (DEMADEX) 10 mg tablet, Take 10 mg by mouth daily, Disp: , Rfl:   •  warfarin (COUMADIN) 1 mg tablet, TAKE 1 TABLET BY MOUTH DAILY OR AS DIRECTED, Disp: 90 tablet, Rfl: 1  •  warfarin (COUMADIN) 2 mg tablet, Take 1 tablet daily or as instructed , Disp: 90 tablet, Rfl: 1  •  warfarin (COUMADIN) 2 5 mg tablet, , Disp: , Rfl:   •  warfarin "(COUMADIN) 4 mg tablet, TAKE ONE TABLET DAILY AS PREVIOUSLY DIRECTED BY CARDIOLOGY, Disp: 90 tablet, Rfl: 1  •  warfarin (COUMADIN) 5 mg tablet, TAKE 1 TABLET BY MOUTH EVERY DAY AS DIRECTED, Disp: 90 tablet, Rfl: 1    Blood Culture:   No results found for: \"BLOODCX\"    Wound Culture:   No results found for: \"WOUNDCULT\"    Ins and Outs:  No intake/output data recorded  Physical Exam:   /64   Pulse 72   Temp 98 5 °F (36 9 °C) (Oral)   Resp 20   LMP  (LMP Unknown)   SpO2 94%   Gen: No acute distress, resting comfortably in bed  HEENT: Eyes clear, moist mucus membranes, hearing intact  Respiratory: No audible wheezing or stridor  Cardiovascular: Well Perfused peripherally, 2+ distal pulse  Abdomen: nondistended, no peritoneal signs  Musculoskeletal: right lower extremity  · Skin pink, dry, and intact, no erythema  · Painful range of motion of hip joint but no micromotion tenderness  · Sensation intact SPN, DPN, sural, saphenous, tibial distributions  · Motor intact ankle dorsi/plantar flexion, EHL/FHL  Minimal hip flexion/extension, knee flexion/extension  · Palpable DP/PT pulse  · Musculature is soft and compressible, no pain with passive stretch  · R leg short and externally rotated    Radiology:   I personally reviewed the films  X-rays AP/Lateral views of right hip shows superior migration of acetabular component of right total hip arthroplasty with femoral head concentrically located within migrated acetabular component  Osteolysis present in native acetabulum and proximal femur  Assessment:  80 y  o female with acute on chronic right hip pain in the setting of 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

## 2023-06-16 LAB
ANION GAP SERPL CALCULATED.3IONS-SCNC: 6 MMOL/L (ref 4–13)
BUN SERPL-MCNC: 20 MG/DL (ref 5–25)
CALCIUM SERPL-MCNC: 7.7 MG/DL (ref 8.3–10.1)
CHLORIDE SERPL-SCNC: 99 MMOL/L (ref 96–108)
CO2 SERPL-SCNC: 25 MMOL/L (ref 21–32)
CREAT SERPL-MCNC: 0.64 MG/DL (ref 0.6–1.3)
ERYTHROCYTE [DISTWIDTH] IN BLOOD BY AUTOMATED COUNT: 13.3 % (ref 11.6–15.1)
GFR SERPL CREATININE-BSD FRML MDRD: 79 ML/MIN/1.73SQ M
GLUCOSE SERPL-MCNC: 283 MG/DL (ref 65–140)
HCT VFR BLD AUTO: 26.4 % (ref 34.8–46.1)
HGB BLD-MCNC: 8.4 G/DL (ref 11.5–15.4)
INR PPP: 2.22 (ref 0.84–1.19)
MCH RBC QN AUTO: 32.2 PG (ref 26.8–34.3)
MCHC RBC AUTO-ENTMCNC: 31.8 G/DL (ref 31.4–37.4)
MCV RBC AUTO: 101 FL (ref 82–98)
PLATELET # BLD AUTO: 345 THOUSANDS/UL (ref 149–390)
PMV BLD AUTO: 8.7 FL (ref 8.9–12.7)
POTASSIUM SERPL-SCNC: 3.7 MMOL/L (ref 3.5–5.3)
PROTHROMBIN TIME: 24.9 SECONDS (ref 11.6–14.5)
RBC # BLD AUTO: 2.61 MILLION/UL (ref 3.81–5.12)
SODIUM SERPL-SCNC: 130 MMOL/L (ref 135–147)
WBC # BLD AUTO: 28.84 THOUSAND/UL (ref 4.31–10.16)

## 2023-06-16 PROCEDURE — 80048 BASIC METABOLIC PNL TOTAL CA: CPT

## 2023-06-16 PROCEDURE — 85610 PROTHROMBIN TIME: CPT

## 2023-06-16 PROCEDURE — 85027 COMPLETE CBC AUTOMATED: CPT

## 2023-06-16 PROCEDURE — NC001 PR NO CHARGE: Performed by: FAMILY MEDICINE

## 2023-06-16 PROCEDURE — 99222 1ST HOSP IP/OBS MODERATE 55: CPT | Performed by: ORTHOPAEDIC SURGERY

## 2023-06-16 PROCEDURE — 36415 COLL VENOUS BLD VENIPUNCTURE: CPT

## 2023-06-16 PROCEDURE — NC001 PR NO CHARGE: Performed by: ORTHOPAEDIC SURGERY

## 2023-06-16 PROCEDURE — 99222 1ST HOSP IP/OBS MODERATE 55: CPT | Performed by: FAMILY MEDICINE

## 2023-06-16 RX ORDER — ACETAMINOPHEN 325 MG/1
650 TABLET ORAL EVERY 6 HOURS SCHEDULED
Status: DISCONTINUED | OUTPATIENT
Start: 2023-06-16 | End: 2023-06-18 | Stop reason: HOSPADM

## 2023-06-16 RX ORDER — SODIUM CHLORIDE 9 MG/ML
50 INJECTION, SOLUTION INTRAVENOUS CONTINUOUS
Status: DISCONTINUED | OUTPATIENT
Start: 2023-06-16 | End: 2023-06-16

## 2023-06-16 RX ORDER — LIDOCAINE 50 MG/G
2 PATCH TOPICAL DAILY
Status: DISCONTINUED | OUTPATIENT
Start: 2023-06-16 | End: 2023-06-18 | Stop reason: HOSPADM

## 2023-06-16 RX ADMIN — LIDOCAINE 2 PATCH: 50 PATCH CUTANEOUS at 12:29

## 2023-06-16 RX ADMIN — OXYCODONE HYDROCHLORIDE 5 MG: 5 TABLET ORAL at 20:00

## 2023-06-16 RX ADMIN — ACETAMINOPHEN 650 MG: 325 TABLET, FILM COATED ORAL at 23:09

## 2023-06-16 RX ADMIN — CEFTRIAXONE SODIUM 2000 MG: 10 INJECTION, POWDER, FOR SOLUTION INTRAVENOUS at 18:01

## 2023-06-16 RX ADMIN — OXYCODONE HYDROCHLORIDE AND ACETAMINOPHEN 500 MG: 500 TABLET ORAL at 10:16

## 2023-06-16 RX ADMIN — POLYETHYLENE GLYCOL 3350 17 G: 17 POWDER, FOR SOLUTION ORAL at 10:16

## 2023-06-16 RX ADMIN — OXYCODONE HYDROCHLORIDE AND ACETAMINOPHEN 500 MG: 500 TABLET ORAL at 18:03

## 2023-06-16 RX ADMIN — DULOXETINE HYDROCHLORIDE 60 MG: 60 CAPSULE, DELAYED RELEASE ORAL at 10:16

## 2023-06-16 RX ADMIN — GABAPENTIN 300 MG: 300 CAPSULE ORAL at 23:09

## 2023-06-16 RX ADMIN — ACETAMINOPHEN 650 MG: 325 TABLET, FILM COATED ORAL at 18:03

## 2023-06-16 RX ADMIN — TORSEMIDE 10 MG: 10 TABLET ORAL at 10:16

## 2023-06-16 RX ADMIN — ACETAMINOPHEN 650 MG: 325 TABLET, FILM COATED ORAL at 12:28

## 2023-06-16 RX ADMIN — WARFARIN SODIUM 3 MG: 3 TABLET ORAL at 18:03

## 2023-06-16 RX ADMIN — DICYCLOMINE HYDROCHLORIDE 10 MG: 10 CAPSULE ORAL at 15:57

## 2023-06-16 RX ADMIN — Medication 5000 UNITS: at 10:16

## 2023-06-16 RX ADMIN — DICYCLOMINE HYDROCHLORIDE 10 MG: 10 CAPSULE ORAL at 12:29

## 2023-06-16 NOTE — ASSESSMENT & PLAN NOTE
Currently rate controlled  On warfarin 3mg at home per nursing home  INR in ED 2 29       COAG     Results from last 7 days   Lab Units 06/16/23  0439 06/15/23  1825 06/15/23  1653   PTT seconds  --  48*  --    PROTIME seconds 24 9*  --  25 4*   INR  2 22*  --  2 29*       · Continue warfarin 3mg  ·  f/u INR

## 2023-06-16 NOTE — PLAN OF CARE
Problem: MOBILITY - ADULT  Goal: Maintain or return to baseline ADL function  Description: INTERVENTIONS:  -  Assess patient's ability to carry out ADLs; assess patient's baseline for ADL function and identify physical deficits which impact ability to perform ADLs (bathing, care of mouth/teeth, toileting, grooming, dressing, etc )  - Assess/evaluate cause of self-care deficits   - Assess range of motion  - Assess patient's mobility; develop plan if impaired  - Assess patient's need for assistive devices and provide as appropriate  - Encourage maximum independence but intervene and supervise when necessary  - Involve family in performance of ADLs  - Assess for home care needs following discharge   - Consider OT consult to assist with ADL evaluation and planning for discharge  - Provide patient education as appropriate  Outcome: Progressing  Goal: Maintains/Returns to pre admission functional level  Description: INTERVENTIONS:  - Perform BMAT or MOVE assessment daily    - Set and communicate daily mobility goal to care team and patient/family/caregiver     - Collaborate with rehabilitation services on mobility goals if consulted  - Out of bed for toileting  - Record patient progress and toleration of activity level   Outcome: Progressing     Problem: Prexisting or High Potential for Compromised Skin Integrity  Goal: Skin integrity is maintained or improved  Description: INTERVENTIONS:  - Identify patients at risk for skin breakdown  - Assess and monitor skin integrity  - Assess and monitor nutrition and hydration status  - Monitor labs   - Assess for incontinence   - Turn and reposition patient  - Assist with mobility/ambulation  - Relieve pressure over bony prominences  - Avoid friction and shearing  - Provide appropriate hygiene as needed including keeping skin clean and dry  - Evaluate need for skin moisturizer/barrier cream  - Collaborate with interdisciplinary team   - Patient/family teaching  - Consider wound care consult   Outcome: Progressing     Problem: PAIN - ADULT  Goal: Verbalizes/displays adequate comfort level or baseline comfort level  Description: Interventions:  - Encourage patient to monitor pain and request assistance  - Assess pain using appropriate pain scale  - Administer analgesics based on type and severity of pain and evaluate response  - Implement non-pharmacological measures as appropriate and evaluate response  - Consider cultural and social influences on pain and pain management  - Notify physician/advanced practitioner if interventions unsuccessful or patient reports new pain  Outcome: Progressing     Problem: INFECTION - ADULT  Goal: Absence or prevention of progression during hospitalization  Description: INTERVENTIONS:  - Assess and monitor for signs and symptoms of infection  - Monitor lab/diagnostic results  - Monitor all insertion sites, i e  indwelling lines, tubes, and drains  - Monitor endotracheal if appropriate and nasal secretions for changes in amount and color  - [place] appropriate cooling/warming therapies per order  - Administer medications as ordered  - Instruct and encourage patient and family to use good hand hygiene technique  - Identify and instruct in appropriate isolation precautions for identified infection/condition  Outcome: Progressing  Goal: Absence of fever/infection during neutropenic period  Description: INTERVENTIONS:  - Monitor WBC    Outcome: Progressing     Problem: SAFETY ADULT  Goal: Maintain or return to baseline ADL function  Description: INTERVENTIONS:  -  Assess patient's ability to carry out ADLs; assess patient's baseline for ADL function and identify physical deficits which impact ability to perform ADLs (bathing, care of mouth/teeth, toileting, grooming, dressing, etc )  - Assess/evaluate cause of self-care deficits   - Assess range of motion  - Assess patient's mobility; develop plan if impaired  - Assess patient's need for assistive devices and provide as appropriate  - Encourage maximum independence but intervene and supervise when necessary  - Involve family in performance of ADLs  - Assess for home care needs following discharge   - Consider OT consult to assist with ADL evaluation and planning for discharge  - Provide patient education as appropriate  Outcome: Progressing  Goal: Maintains/Returns to pre admission functional level  Description: INTERVENTIONS:  - Perform BMAT or MOVE assessment daily    - Set and communicate daily mobility goal to care team and patient/family/caregiver     - Collaborate with rehabilitation services on mobility goals if consulted  - Out of bed for toileting  - Record patient progress and toleration of activity level   Outcome: Progressing  Goal: Patient will remain free of falls  Description: INTERVENTIONS:  - Educate patient/family on patient safety including physical limitations  - Instruct patient to call for assistance with activity   - Consult OT/PT to assist with strengthening/mobility   - Keep Call bell within reach  - Keep bed low and locked with side rails adjusted as appropriate  - Keep care items and personal belongings within reach  - Initiate and maintain comfort rounds  - Make Fall Risk Sign visible to staff  - Apply yellow socks and bracelet for high fall risk patients  - Consider moving patient to room near nurses station  Outcome: Progressing     Problem: DISCHARGE PLANNING  Goal: Discharge to home or other facility with appropriate resources  Description: INTERVENTIONS:  - Identify barriers to discharge w/patient and caregiver  - Arrange for needed discharge resources and transportation as appropriate  - Identify discharge learning needs (meds, wound care, etc )  - Arrange for interpretive services to assist at discharge as needed  - Refer to Case Management Department for coordinating discharge planning if the patient needs post-hospital services based on physician/advanced practitioner order or complex needs related to functional status, cognitive ability, or social support system  Outcome: Progressing     Problem: Knowledge Deficit  Goal: Patient/family/caregiver demonstrates understanding of disease process, treatment plan, medications, and discharge instructions  Description: Complete learning assessment and assess knowledge base    Interventions:  - Provide teaching at level of understanding  - Provide teaching via preferred learning methods  Outcome: Progressing

## 2023-06-16 NOTE — ASSESSMENT & PLAN NOTE
Wt Readings from Last 3 Encounters:   06/16/23 76 6 kg (168 lb 12 8 oz)   03/24/23 75 8 kg (167 lb)   03/17/23 77 kg (169 lb 12 1 oz)      I/O last 24 hours: In: 322 [P O :322]  Out: 100 [Urine:100]   Net IO Since Admission: 222 mL [06/17/23 1149]    HFpEF per cardiology notes  Last ECHO 4/27/2018 EF 55%, LV thickness upper limits normal, moderate mitral regurgitation, moderate tricuspid regurgitation       · Monitor I/O   · Continue torsemide 10mg

## 2023-06-16 NOTE — ASSESSMENT & PLAN NOTE
2-3 week history of chronic R hip pain  Has history of right hip replacement and follows with orthopedics outpatient  Most recently admitted at [facility] from 6/8-6/12 under the trauma service for chronic right hip dislocation  Orthopedics evaluated patient and stated no surgical intervention required  Discharged to SNF with outpatient orthopedics follow-up    XR right hip/pelvis (6/15/23):   1  Stable appearance of the right hip without acute fracture or dislocation  2  Postprocedural changes related to total right hip arthroplasty with stable marked superior acetabular and articulating femoral prosthetic component migration  3  Remote posttraumatic deformity involving the femur, acetabulum and inferior pubic ramus as noted       Significant pain and tenderness on exam today, unable to lift R leg    Plan:  · Lidocaine patch on R hip  · Pain regimen: wilbur tylenol 650 mg, Wilbur Oxy 2 5mg Q8h w/ PRN Oxy 2 5mg Q4h PRN  · F/u with ortho outpatient

## 2023-06-16 NOTE — ASSESSMENT & PLAN NOTE
BP mildly soft in 942T systolic         - Most recent BP Blood Pressure: 199/08   -  Systolic (80RBE), ARV:407 , Min:116 , EMM:365   - Diastolic (85YQM), CDB:57, Min:51, Max:53     Plan:  · Continue home Torsemide 10mg daily but with hold parameters for SBP < 110

## 2023-06-16 NOTE — ASSESSMENT & PLAN NOTE
Patient endorsing urinary frequency and dysuria  Patient had UTI during prior hospitalization at [facility] and was treated with Christos Toledo in June 2023  ED workup:  - UA shows + nitrites, large leukocytes  UM shows innumerable RBC, WBC, bacterial and 10-25 hyaline casts  - WBC 29 47, bands 11%, elevated procal 1 32, lactate WNL  - Urine cultures, blood cultures collected  - Ceftriaxone x1 dose    WCB & ANC     Results from last 7 days   Lab Units 06/17/23  0525 06/16/23  0439 06/15/23  1653   WBC Thousand/uL 32 90* 28 84* 29 47*      Assessment:  - Currently stable  Continues with suprapubic tenderness        Plan:  · IV ceftriaxone 2g q24  · F/u urine cultures and blood cultures (G- Rods), await sensitivities  · Monitor vitals   · Can transition to PO antibiotics once sensitivities emerge  · Stable for return to Nursing facility once transitioned to PO antibiotics

## 2023-06-16 NOTE — QUICK NOTE
Patient's daughter was called ([phone]) to provide patient updates  No answer  LVM  Daughter updated about patient's status  Still awaiting Urine and Blood cultures that will direct us to next steps in care  Ortho evaluated; recommended no interventions at this time

## 2023-06-16 NOTE — ASSESSMENT & PLAN NOTE
Sodium 133 in the ED, baseline around 143   Appears euvolemic on exam     Sodium       Results from last 7 days   Lab Units 06/17/23  0525 06/16/23  0439 06/15/23  1825   SODIUM mmol/L 132* 130* 133*      Clinically stable    · Monitor on BMP   · Follow diet, consider gentle fluids (hx of CHF)

## 2023-06-16 NOTE — H&P
H&P - Family Medicine Residency, [facility] 9/2/1933, 80 y o  female  MRN: [id number]    Unit/Bed#: ED 26 Encounter: [id number]  Primary Care Provider: Alesha Price DO      Admission Date: 6/15/2023 1410    Assessments & Plans:   Plans discussed with [facility] team and finalization is pending attending physician Dr Colton Genao attestation  * Acute cystitis without hematuria  Assessment & Plan  Patient endorsing urinary frequency and dysuria  Patient had UTI during prior hospitalization at [facility] and was treated with [employer] in June 2023  ED workup:  - UA shows + nitrites, large leukocytes  UM shows innumerable RBC, WBC, bacterial and 10-25 hyaline casts  - WBC 29 47, bands 11%, elevated procal 1 32, lactate WNL  - Urine cultures, blood cultures collected  - Ceftriaxone x1 dose    Assessment:  - Currently does not meet sepsis criteria  But given significant leukocytosis and bandemia, will monitor overnight with IV Abx    Plan:  · IV ceftriaxone 2g q24  · F/u urine cultures and blood cultures   · Monitor vitals   · Can transition to PO antibiotics once sensitivities emerge    Right hip pain  Assessment & Plan  2-3 week history of chronic R hip pain  Has history of right hip replacement and follows with orthopedics outpatient  Most recently admitted at [facility] from 6/8-6/12 under the trauma service for chronic right hip dislocation  Orthopedics evaluated patient and stated no surgical intervention required  Discharged to SNF with outpatient orthopedics follow-up    XR right hip/pelvis (6/15/23): Per ortho note, XR shows superior migration of the acetabular component of right revised MAIA  Plan:  · Ortho consult: no load bearing on the right extremity  Non-operative treatment     · Pain regimen: tylenol 650 mild pain, oxy 2 5 moderate, oxy 5 severe, dilaudid 0 2 breakthrough   · Follow-up XR final read  · F/u with ortho outpatient     Hyponatremia  Assessment & Plan  Sodium 133 in the ED, baseline around 143  Appears euvolemic on exam     Sodium       Results from last 7 days   Lab Units 06/15/23  1825   SODIUM mmol/L 133*     · Monitor on BMP     Lumbar spondylosis  Assessment & Plan  2/26/23 CT lumbar spine: Multilevel spondylosis most pronounced at L3-4 and L4-5  Status post posterior fusion L2-3  · Continue gabapentin 300mg qhs    Benign essential HTN  Assessment & Plan  BP mildly soft in 045L systolic  Continue home Torsemide 10mg daily but with hold parameters for SBP < 110     - Most recent BP Blood Pressure: (!) 100/49 (Dr Jose Snider aware)    or - 74D SBP Systolic (21GZR), GKS:532 , Min:100 , Max:108        Chronic diastolic CHF (congestive heart failure) (HCC)  Assessment & Plan  Wt Readings from Last 3 Encounters:   03/24/23 75 8 kg (167 lb)   03/17/23 77 kg (169 lb 12 1 oz)   08/03/22 73 9 kg (163 lb)     HFpEF per cardiology notes  Last ECHO 4/27/2018 EF 55%, LV thickness upper limits normal, moderate mitral regurgitation, moderate tricuspid regurgitation  · Monitor I/O   · Continue torsemide 10mg          Chronic atrial fibrillation (HCC)  Assessment & Plan  Currently rate controlled  On warfarin 3mg at home per nursing home  INR in ED 2 29       COAG     Results from last 7 days   Lab Units 06/15/23  1825 06/15/23  1653   PTT seconds 48*  --    PROTIME seconds  --  25 4*   INR   --  2 29*       · Continue warfarin 3mg  ·  f/u INR       Patient Active Problem List   Diagnosis   • Chronic atrial fibrillation (HCC)   • Anemia   • Chronic diastolic CHF (congestive heart failure) (HCC)   • Depression   • Class 1 obesity due to excess calories with serious comorbidity and body mass index (BMI) of 33 0 to 33 9 in adult   • Chronic pain   • Chronic pain of lower extremity, bilateral   • Fibromyalgia   • External hemorrhoids   • Irritable bowel syndrome with constipation   • Lumbar canal stenosis   • Moderate mitral regurgitation   • Osteoarthritis, multiple sites   • "Osteoporosis   • Peripheral neuropathy   • Primary osteoarthritis of right knee   • Synovial cyst of right popliteal space   • Pacemaker   • Hypothyroidism   • Macrocytosis   • Status post gastrectomy   • Status post right hip replacement   • Acute cystitis without hematuria   • Ambulatory dysfunction   • MCI (mild cognitive impairment)   • Right hip pain   • Cellulitis of right lower extremity   • Hypertrophic scar of skin   • S/P scar revision   • Elevated C-reactive protein (CRP)   • Fall   • Hematoma   • Ecchymosis of eye   • Meningioma (HCC)   • Trochanteric bursitis of right hip   • Stage 3a chronic kidney disease (HCC)   • Benign essential HTN   • Trochanteric bursitis   • Negative depression screening   • Peripheral vascular disease, unspecified (HCC)   • Lumbar radiculopathy   • Low back pain with sciatica   • Lumbar spondylosis   • S/P lumbar fusion   • Hyponatremia       Diet: Diet Regular; Regular House  VTE Pharm PPX: Warfarin (Coumadin)  VTE Mech PPX: sequential compression device    Code Status:  Level 1 - Full Code      Disposition: Admit to Inpatient under Med-surg   Consult: [address] [place] [address] CONSULT TO CASE MANAGEMENT    Chief Complaints:   Hip Pain (Per EMS, \"Pt is having R hip pain  No known cause  \"  Pt denies any trauma or injury  States that she has been having R hip pain for 2-3 weeks  Unsure if she has seen a doctor for the pain  )      History of Presenting Illness:     Patient with past medical history of HFpEF, a fib, HTN, lumbar spondylosis presents with 2-3 week history of chronic right hip pain  She is status post right hip replacement revision surgery 4/15/19  Patient follows with orthopedics outpatient and more recently was admitted to [facility] under the trauma service for the same complaint  She was evaluated by orthopedics there who recommended outpatient follow-up, no surgical intervention necessary  While in the ED, orthopedics was consulted   XR hip wet " read per ortho showed superior migration acetabular component of R revised MAIA  Non-operative management was suggested  Of note, Patient was noted to have UTI during her admission at [facility] last month  She was treated with nitrofurantoin  In the ED today, the patient endorsed frequency and dysuria, with UA showing large leukocytes and positive nitrites  She did not meet criteria for sepsis in the ED  Procalcitonin elevated but lactic normal  Patient received a 500mL bolus of isolyte and 2g of ceftriaxone in the ED  Urine culture and blood cultures were collected       ED Management:     ED Triage Vitals   Temperature Pulse Respirations Blood Pressure SpO2   06/15/23 1415 06/15/23 1415 06/15/23 1415 06/15/23 1415 06/15/23 1415   98 5 °F (36 9 °C) 72 20 108/64 94 %      Temp Source Heart Rate Source Patient Position - Orthostatic VS BP Location FiO2 (%)   06/15/23 1415 06/15/23 1754 06/15/23 1754 06/15/23 1754 --   Oral Monitor Lying Left arm       Pain Score       06/15/23 2133       10 - Worst Possible Pain         Medications   lidocaine (LIDODERM) 5 % patch 1 patch (1 patch Topical Medication Applied 6/15/23 1650)   ascorbic acid (VITAMIN C) tablet 500 mg (500 mg Oral Given 6/15/23 2133)   cholecalciferol (VITAMIN D3) tablet 5,000 Units (has no administration in time range)   dicyclomine (BENTYL) capsule 10 mg (has no administration in time range)   DULoxetine (CYMBALTA) delayed release capsule 60 mg (has no administration in time range)   fluticasone (FLONASE) 50 mcg/act nasal spray 1 spray (has no administration in time range)   gabapentin (NEURONTIN) capsule 300 mg (300 mg Oral Given 6/15/23 2133)   levothyroxine tablet 100 mcg (has no administration in time range)   polyethylene glycol (MIRALAX) packet 17 g (has no administration in time range)   torsemide (DEMADEX) tablet 10 mg (has no administration in time range)   acetaminophen (TYLENOL) tablet 650 mg (has no administration in time range) oxyCODONE (ROXICODONE) split tablet 2 5 mg (has no administration in time range)   oxyCODONE (ROXICODONE) IR tablet 5 mg (has no administration in time range)   HYDROmorphone HCl (DILAUDID) injection 0 2 mg (0 2 mg Intravenous Given 6/15/23 2133)   cefTRIAXone (ROCEPHIN) 2,000 mg in dextrose 5 % 50 mL IVPB (has no administration in time range)   warfarin (COUMADIN) tablet 3 mg (has no administration in time range)   cefTRIAXone (ROCEPHIN) 1,000 mg in dextrose 5 % 50 mL IVPB (0 mg Intravenous Stopped 6/15/23 1950)   multi-electrolyte (ISOLYTE-S PH 7 4) bolus 500 mL (0 mL Intravenous Stopped 6/15/23 1950)       Review of System:   Review of Systems   Constitutional: Negative  HENT: Negative  Eyes: Negative  Respiratory: Negative for chest tightness and shortness of breath  Cardiovascular: Negative for chest pain  Gastrointestinal: Negative for abdominal pain, nausea and vomiting  Genitourinary: Positive for frequency  Negative for decreased urine volume, difficulty urinating, dyspareunia, dysuria, hematuria and urgency  Neurological: Negative  Psychiatric/Behavioral: Negative  History:     Past Medical History:   Diagnosis Date   • Abdominal bloating 8/1/2016   • Anemia    • Arthritis    • Cancer (HCC)     basal cell   • CHF (congestive heart failure) (HCC)    • COVID-19    • Disease of thyroid gland    • Disturbance of smell and taste     disturbance of taste   • Effusion of knee joint right    • Fibromyalgia    • Heart murmur     reported a previous heart murmur   • History of acute myocardial infarction    • History of atrial fibrillation    • History of bruising easily    • History of cancer    • History of dermatitis    • History of mammogram    • History of methicillin resistant staphylococcus aureus (MRSA)     Negative nasal culture, isolation discontinued 8/17/2018     • History of methicillin resistant staphylococcus aureus (MRSA) 08/17/2018    negative nasal culture-isolation and hx discontinued 8/17/2018   • History of obesity    • History of osteopenia    • History of sciatica    • History of shortness of breath    • History of sinusitis    • History of sore throat    • History of syncope    • History of umbilical hernia    • History of viral infection    • Irritable bowel syndrome (IBS)    • Joint pain, hip    • Limb pain    • Myalgia     myalgia and myositis   • Need for prophylactic vaccination against single diseases    • Need for prophylactic vaccination and inoculation against influenza    • Preoperative cardiovascular examination    • Primary osteoarthritis of right knee    • Right leg pain    • Vaginal Pap smear     reported pap smear     Past Surgical History:   Procedure Laterality Date   • ANKLE ARTHRODESIS Right    • BACK SURGERY     • BARIATRIC SURGERY     • CHOLECYSTECTOMY      x2   • COLONOSCOPY     • ESOPHAGOGASTRODUODENOSCOPY N/A 3/23/2018    Procedure: ESOPHAGOGASTRODUODENOSCOPY (EGD); Surgeon: Jossie Blake MD;  Location: [facility]; Service: Gastroenterology   • FOOT SURGERY     • HIP CLOSE REDUCTION Right 8/21/2016    Procedure: CLOSED REDUCTION RIGHT TOTAL HIP;  Surgeon: Tomeka Gurrola MD;  Location[facility];  Service:    • NISHA / Sally Rutherford / Di Cortez     • IR BIOPSY OTHER  8/1/2020   • JOINT REPLACEMENT      LEFT KNEE REPLACEMENT   • JOINT REPLACEMENT      Right HIP   • PILONIDAL CYST EXCISION      x2   • MI EXC B9 LESION MRGN XCP SK TG T/A/L 0 5 CM/< Right 7/24/2019    Procedure: REVISION SCAR EXTREMITY Rt Hip;   Surgeon: Sania Kirkpatrick MD;  Location: [facility];  Service: Orthopedics   • MI REVJ TOT HIP ARTHRP [address] W/WO AGRFT/ALGRFT Right 4/15/2019    Procedure: ARTHROPLASTY HIP TOTAL ACETABULAR REVISION;  Surgeon: Sania Kirkpatrick MD;  Location: [facility];  Service: Orthopedics   • REPLACEMENT TOTAL KNEE Right    • SILVANA-EN-Y PROCEDURE      x2   • TOTAL KNEE ARTHROPLASTY     • UMBILICAL HERNIA REPAIR      x2     Social History     Socioeconomic History • Marital status: /Civil Union     Spouse name: None   • Number of children: None   • Years of education: None   • Highest education level: None   Occupational History   • None   Tobacco Use   • Smoking status: Never   • Smokeless tobacco: Never   Vaping Use   • Vaping Use: Never used   Substance and Sexual Activity   • Alcohol use: Not Currently   • Drug use: No   • Sexual activity: Never   Other Topics Concern   • None   Social History Narrative   • None     Social Determinants of Health     Financial Resource Strain: Not on file   Food Insecurity: Not on file   Transportation Needs: Not on file   Physical Activity: Not on file   Stress: Not on file   Social Connections: Not on file   Intimate Partner Violence: Not on file   Housing Stability: Not on file     Family History   Problem Relation Age of Onset   • Coronary artery disease Mother    • Heart attack Mother    • Hypertension Mother    • Coronary artery disease Father    • Heart attack Father    • Hypertension Father    • Lymphoma Sister         acute   • Rashes / Skin problems Sister         lymphomatoid papulosis   • Cancer Sister    • Coronary artery disease Brother    • Heart attack Brother         x2   • Aneurysm Neg Hx    • Hyperlipidemia Neg Hx        Medications & Allergies:   all medications and allergies reviewed  Amoxicillin  Codeine  Other  Oxycodone-Acetaminophen  Percocet [Oxycodone-Acetaminophen]  Seasonal Ic  [Cholestatin]  Trimethoprim  Bactrim [Sulfamethoxazole-Trimethoprim]  Sulfa Antibiotics    PTA Medications:  No current facility-administered medications on file prior to encounter       Current Outpatient Medications on File Prior to Encounter   Medication Sig Dispense Refill   • acetaminophen (TYLENOL) 500 mg tablet Take 2 tablets (1,000 mg total) by mouth every 8 (eight) hours 90 tablet 0   • DULoxetine (CYMBALTA) 60 mg delayed release capsule TAKE 1 CAPSULE BY MOUTH EVERY DAY 90 capsule 1   • gabapentin (NEURONTIN) 300 mg capsule      • levothyroxine 100 mcg tablet TAKE 1 TABLET BY MOUTH EVERY DAY 90 tablet 1   • warfarin (COUMADIN) 3 mg tablet      • ascorbic acid (VITAMIN C) 500 mg tablet Take 1 tablet (500 mg total) by mouth 2 (two) times a day 60 tablet 0   • cefpodoxime (VANTIN) 100 mg tablet      • Cholecalciferol 125 MCG (5000 UT) TABS Take 5,000 Units by mouth daily     • desoximetasone (TOPICORT) 0 25 % cream APPLY TOPICALLY AS NEEDED FOR IRRITATION 30 g 0   • Diclofenac Sodium (VOLTAREN) 1 % Apply 2 g topically 4 (four) times a day 350 g 3   • dicyclomine (BENTYL) 10 mg capsule      • DULoxetine (CYMBALTA) 20 mg capsule      • DULoxetine (CYMBALTA) 30 mg delayed release capsule      • fluticasone (FLONASE) 50 mcg/act nasal spray SPRAY 2 SPRAYS INTO EACH NOSTRIL EVERY DAY 16 mL 0   • loperamide (IMODIUM) 2 mg capsule Take 2 mg by mouth 4 (four) times a day as needed for diarrhea     • nystatin powder      • polyethylene glycol (MIRALAX) 17 g packet Take 17 g by mouth daily     • potassium chloride 10% oral solution Take 15 mL (20 mEq total) by mouth daily 3800 mL 3   • simethicone (MYLICON) 80 mg chewable tablet Chew 80 mg every 6 (six) hours as needed for flatulence     • sucralfate (CARAFATE) 1 g tablet TAKE 1 TABLET BY MOUTH 2 TIMES A DAY  180 tablet 1   • tiZANidine (ZANAFLEX) 2 mg tablet TAKE 1 TABLET BY MOUTH EVERY 8 HOURS AS NEEDED FOR MUSCLE SPASM 60 tablet 2   • torsemide (DEMADEX) 10 mg tablet Take 10 mg by mouth daily     • warfarin (COUMADIN) 1 mg tablet TAKE 1 TABLET BY MOUTH DAILY OR AS DIRECTED 90 tablet 1   • warfarin (COUMADIN) 2 mg tablet Take 1 tablet daily or as instructed   90 tablet 1   • warfarin (COUMADIN) 2 5 mg tablet      • warfarin (COUMADIN) 4 mg tablet TAKE ONE TABLET DAILY AS PREVIOUSLY DIRECTED BY CARDIOLOGY 90 tablet 1   • warfarin (COUMADIN) 5 mg tablet TAKE 1 TABLET BY MOUTH EVERY DAY AS DIRECTED 90 tablet 1   • [DISCONTINUED] methocarbamol (ROBAXIN) 750 mg tablet Take 1 tablet (750 mg total) by mouth every 8 (eight) hours as needed for muscle spasms 90 tablet 0         Objective & Vitals:     Vitals: BP (!) 100/49 (BP Location: Left arm) Comment: Dr Hortencia Ayers aware  Pulse 70   Temp 98 5 °F (36 9 °C) (Oral)   Resp 20   LMP  (LMP Unknown)   SpO2 97%     No intake or output data in the 24 hours ending 06/15/23 2301    Invasive Devices     Peripheral Intravenous Line  Duration           Peripheral IV 06/15/23 Distal;Right;Upper;Ventral (anterior) Arm <1 day                  Labs: I have personally reviewed pertinent reports      Recent Results (from the past 24 hour(s))   Protime-INR    Collection Time: 06/15/23  4:53 PM   Result Value Ref Range    Protime 25 4 (H) 11 6 - 14 5 seconds    INR 2 29 (H) 0 84 - 1 19   CBC and differential    Collection Time: 06/15/23  4:53 PM   Result Value Ref Range    WBC 29 47 (H) 4 31 - 10 16 Thousand/uL    RBC 2 94 (L) 3 81 - 5 12 Million/uL    Hemoglobin 9 5 (L) 11 5 - 15 4 g/dL    Hematocrit 29 3 (L) 34 8 - 46 1 %     (H) 82 - 98 fL    MCH 32 3 26 8 - 34 3 pg    MCHC 32 4 31 4 - 37 4 g/dL    RDW 13 2 11 6 - 15 1 %    MPV 8 7 (L) 8 9 - 12 7 fL    Platelets 596 670 - 863 Thousands/uL   Manual Differential(PHLEBS Do Not Order)    Collection Time: 06/15/23  4:53 PM   Result Value Ref Range    Segmented % 81 (H) 43 - 75 %    Bands % 11 (H) 0 - 8 %    Lymphocytes % 3 (L) 14 - 44 %    Monocytes % 3 (L) 4 - 12 %    Eosinophils, % 0 0 - 6 %    Basophils % 0 0 - 1 %    Atypical Lymphocytes % 2 (H) <=0 %    Absolute Neutrophils 27 11 (H) 1 85 - 7 62 Thousand/uL    Lymphocytes Absolute 0 88 0 60 - 4 47 Thousand/uL    Monocytes Absolute 0 88 0 00 - 1 22 Thousand/uL    Eosinophils Absolute 0 00 0 00 - 0 40 Thousand/uL    Basophils Absolute 0 00 0 00 - 0 10 Thousand/uL    Total Counted      RBC Morphology Present     Anisocytosis Present     Galloway Cells Present     Macrocytes Present     Poikilocytes Present     Polychromasia Present     Platelet Estimate Adequate Adequate   Comprehensive metabolic panel    Collection Time: 06/15/23  6:25 PM   Result Value Ref Range    Sodium 133 (L) 135 - 147 mmol/L    Potassium 3 7 3 5 - 5 3 mmol/L    Chloride 103 96 - 108 mmol/L    CO2 25 21 - 32 mmol/L    ANION GAP 5 4 - 13 mmol/L    BUN 21 5 - 25 mg/dL    Creatinine 0 72 0 60 - 1 30 mg/dL    Glucose 106 65 - 140 mg/dL    Calcium 8 3 8 3 - 10 1 mg/dL    Corrected Calcium 10 3 (H) 8 3 - 10 1 mg/dL    AST 40 5 - 45 U/L    ALT 20 12 - 78 U/L    Alkaline Phosphatase 178 (H) 46 - 116 U/L    Total Protein 5 5 (L) 6 4 - 8 4 g/dL    Albumin 1 5 (L) 3 5 - 5 0 g/dL    Total Bilirubin 0 56 0 20 - 1 00 mg/dL    eGFR 74 ml/min/1 73sq m   Lactic acid    Collection Time: 06/15/23  6:25 PM   Result Value Ref Range    LACTIC ACID 1 7 0 5 - 2 0 mmol/L   Procalcitonin    Collection Time: 06/15/23  6:25 PM   Result Value Ref Range    Procalcitonin 1 32 (H) <=0 25 ng/ml   APTT    Collection Time: 06/15/23  6:25 PM   Result Value Ref Range    PTT 48 (H) 23 - 37 seconds   Blood culture #1    Collection Time: 06/15/23  6:25 PM    Specimen: Arm, Right; Blood   Result Value Ref Range    Blood Culture Received in Microbiology Lab  Culture in Progress  Blood culture #2    Collection Time: 06/15/23  6:25 PM    Specimen: Arm, Left; Blood   Result Value Ref Range    Blood Culture Received in Microbiology Lab  Culture in Progress      ECG 12 lead    Collection Time: 06/15/23  6:43 PM   Result Value Ref Range    Ventricular Rate 70 BPM    Atrial Rate 87 BPM    TX Interval  ms    QRSD Interval 96 ms    QT Interval 378 ms    QTC Interval 408 ms    P Axis  degrees    QRS Axis 5 degrees    T Wave Axis 150 degrees   UA w Reflex to Microscopic w Reflex to Culture    Collection Time: 06/15/23  7:05 PM    Specimen: Urine, Straight Cath   Result Value Ref Range    Color, UA Light Orange     Clarity, UA Extra Turbid     Specific [place], UA 1 013 1 003 - 1 030    pH, UA 7 0 4 5, 5 0, 5 5, 6 0, 6 5, 7 0, 7 5, 8 0    Leukocytes, UA Large (A) Negative    Nitrite, UA Positive (A) Negative    Protein, UA 70 (1+) (A) Negative mg/dl    Glucose, UA Negative Negative mg/dl    Ketones, UA Negative Negative mg/dl    Urobilinogen, UA <2 0 <2 0 mg/dl mg/dl    Bilirubin, UA Negative Negative    Occult Blood, UA Large (A) Negative   Urine Microscopic    Collection Time: 06/15/23  7:05 PM   Result Value Ref Range    RBC, UA Innumerable (A) None Seen, 1-2 /hpf    WBC, UA Innumerable (A) None Seen, 1-2 /hpf    Epithelial Cells Occasional None Seen, Occasional /hpf    Bacteria, UA Innumerable (A) None Seen, Occasional /hpf    Hyaline Casts, UA 10-25 (A) None Seen /lpf    WBC Clumps Present     Transitional Epithelial Cells Present        Imaging:     I have personally reviewed pertinent reports  No results found  EKG, Pathology, and Other Studies:   I have personally reviewed pertinent reports  Physical Exam:   Physical Exam  Vitals and nursing note reviewed  Constitutional:       General: She is not in acute distress  Appearance: She is overweight  She is not toxic-appearing  Eyes:      General: Lids are normal  Gaze aligned appropriately  Cardiovascular:      Rate and Rhythm: Normal rate and regular rhythm  Pulses:           Radial pulses are 2+ on the right side  Heart sounds: S1 normal and S2 normal  Murmur heard  Systolic murmur is present with a grade of 3/6  No friction rub  No gallop  No S3 or S4 sounds  Pulmonary:      Effort: Pulmonary effort is normal  No tachypnea, bradypnea, accessory muscle usage or prolonged expiration  Comments: Breath sounds normal as auscultated on anterior chest, exam limited by patient positioning in bed  Abdominal:      General: Abdomen is flat  Palpations: Abdomen is soft  Tenderness: There is no abdominal tenderness  There is no guarding  Comments: History of umbilical hernia repair, noted to have surgical scar  No suprapubic tenderness      Skin: "General: Skin is warm  Coloration: Skin is not ashen, jaundiced or pale  Neurological:      Mental Status: She is alert  Psychiatric:         Attention and Perception: Attention and perception normal          Mood and Affect: Mood and affect normal          Speech: Speech normal            Sherita Gatica MD  PGY-2, Family Medicine  06/15/23  11:01 PM    Dear reader, please be aware that portions of my note contain dictated text  I have done my best to proof-read this note prior to signing  However, there may be occasional unnoticed errors pertaining to \"sound-alike\" words and/or grammar during my dictation process  If there is any words or information that is unclear or appears erroneous, please kindly let me know and I will clarify and/or addend my notes accordingly  Thank you for your understanding      "

## 2023-06-16 NOTE — PROGRESS NOTES
Pt arrived to unit from ED with 2 IV sites in each [facility] areas  Left AC site was documented that IV was removed but arrived with IV in place  IV remains intact and patent

## 2023-06-16 NOTE — ASSESSMENT & PLAN NOTE
2/26/23 CT lumbar spine: Multilevel spondylosis most pronounced at L3-4 and L4-5  Status post posterior fusion L2-3      · Continue gabapentin 300mg qhs

## 2023-06-16 NOTE — CASE MANAGEMENT
Case Management Assessment & Discharge Planning Note    Patient name Salvador Lainez  Location [facility] 810/[facility] 072-52 MRN [id number]  : 1933 Date 2023       Current Admission Date: 6/15/2023  Current Admission Diagnosis:Acute cystitis without hematuria   Patient Active Problem List    Diagnosis Date Noted   • Hyponatremia 06/15/2023   • Lumbar radiculopathy 02/10/2023   • Low back pain with sciatica 02/10/2023   • Lumbar spondylosis 02/10/2023   • S/P lumbar fusion 02/10/2023   • Peripheral vascular disease, unspecified ([facility] ) 2022   • Negative depression screening 2021   • Benign essential HTN 2021   • Trochanteric bursitis 2021   • Meningioma ([facility] ) 11/10/2020   • Trochanteric bursitis of right hip 11/10/2020   • Stage 3a chronic kidney disease ([facility] ) 11/10/2020   • Fall 10/13/2020   • Hematoma 10/13/2020   • Ecchymosis of eye 10/13/2020   • Elevated C-reactive protein (CRP) 2020   • S/P scar revision 2019   • Hypertrophic scar of skin 2019   • Cellulitis of right lower extremity 2019   • Right hip pain 2019   • MCI (mild cognitive impairment) 2019   • Ambulatory dysfunction 2019   • Acute cystitis without hematuria 2019   • Status post right hip replacement 2019   • Macrocytosis 2019   • Status post gastrectomy 2019   • Hypothyroidism 10/11/2018   • Pacemaker 2018   • Chronic diastolic CHF (congestive heart failure) ([facility] ) 2016   • Depression 2016   • Class 1 obesity due to excess calories with serious comorbidity and body mass index (BMI) of 33 0 to 33 9 in adult 2016   • Chronic pain 2016   • Chronic pain of lower extremity, bilateral 2016   • External hemorrhoids 2016   • Anemia 2016   • Chronic atrial fibrillation ([facility] ) 2016   • Irritable bowel syndrome with constipation 09/15/2015   • Synovial cyst of right popliteal space 2015   • Primary osteoarthritis of right knee 06/30/2015   • Osteoarthritis, multiple sites 06/26/2014   • Moderate mitral regurgitation 05/13/2014   • Osteoporosis 02/04/2014   • Fibromyalgia 05/31/2012   • Peripheral neuropathy 05/25/2012   • Lumbar canal stenosis 05/17/2012      LOS (days): 1  Geometric Mean LOS (GMLOS) (days): 3 70  Days to GMLOS:2 9     OBJECTIVE:    Risk of Unplanned Readmission Score: 20 81         Current admission status: Inpatient       Preferred Pharmacy:   [address], [address] [place] [address]  Phone: [phone] Fax: [phone], [address] [place] [address]  Phone: [phone] Fax: [phone]    UNKNOWN - FOLLOW UP PRIOR TO DISCHARGE TO E-PRESCRIBE  No address on file      Primary Care Provider: Cheryl Connolly DO    Primary Insurance: [facility]  Secondary Insurance:     ASSESSMENT:  Sonia Gilbert Proxies    There are no active Health Care Proxies on file  Readmission Root Cause  30 Day Readmission: No    Patient Information  Admitted from[de-identified] Facility  Mental Status: Alert  During Assessment patient was accompanied by: Not accompanied during assessment  Assessment information provided by[de-identified] Patient  Primary Caregiver: Other (Comment)  Caregiver's Name[de-identified] [address] Relationship to Patient[de-identified] Other (Specify) (Facility)  Support Systems: Other (Comment), Family members  [place] of Residence: [address] [place] [address] do you live in?: [address] entry access options   Select all that apply : No steps to enter home  Type of Current Residence: Facility  Upon entering residence, is there a bedroom on the main floor (no further steps)?: Yes  Upon entering residence, is there a bathroom on the main floor (no further steps)?: Yes  In the last 12 months, how many places have you lived?: 1  In the last 12 months, was there a time when you did not have a steady place to sleep or slept in a shelter (including now)?: No  Homeless/housing insecurity resource given?: N/A  Living Arrangements: Other (Comment) ([address] [place] [address] in [facility])  Is patient a ?: No    Activities of Daily Living Prior to Admission  Functional Status: Independent  Completes ADLs independently?: No  Level of ADL dependence: Assistance  Ambulates independently?: No  Level of ambulatory dependence: Assistance  Does patient use assisted devices?: Yes  Assisted Devices (DME) used: Wheelchair  Does patient currently own DME?: Yes  What DME does the patient currently own?: Wheelchair  Does patient have a history of Outpatient Therapy (PT/OT)?: No  Does the patient have a history of Short-Term Rehab?: No  Does patient have a history of HHC?: No  Does patient currently have [facility]?: No         Patient Information Continued  Income Source: SSI/SSD  Does patient have prescription coverage?: Yes  Within the past 12 months, you worried that your food would run out before you got the money to buy more : Never true  Within the past 12 months, the food you bought just didn't last and you didn't have money to get more : Never true  Food insecurity resource given?: N/A  Does patient receive dialysis treatments?: No  Does patient have a history of substance abuse?: No  Does patient have a history of Mental Health Diagnosis?: No         Means of Transportation  Means of Transport to Appts[de-identified] Other (Comment) (Tommy sets up transport when needed )  In the past 12 months, has lack of transportation kept you from medical appointments or from getting medications?: No  In the past 12 months, has lack of transportation kept you from meetings, work, or from getting things needed for daily living?: No  Was application for public transport provided?: N/A        DISCHARGE DETAILS:    Discharge planning discussed with[de-identified] Patient at bedside    Freedom of Choice: Yes  Comments - Freedom of Choice: Pt would like to return to [facility] on discharge  Referral sent to [facility] in [address]  Were Treatment Team discharge recommendations reviewed with patient/caregiver?: Yes  Did patient/caregiver verbalize understanding of patient care needs?: Yes  Were patient/caregiver advised of the risks associated with not following Treatment Team discharge recommendations?: Yes    Contacts  Patient Contacts: Mickie Hyatt  Relationship to Patient[de-identified] Other (Comment)  Contact Method: Other (Comment)  Reason/Outcome: Continuity of Care, Discharge [address]         Is the patient interested in [facility] at discharge?: No    DME Referral Provided  Referral made for DME?: No    Other Referral/Resources/Interventions Provided:  Interventions: Facility Return  Referral Comments: Referral sent to [address] [place] [address],  [address] in [address] [place] [address] as pt was resident prior to admission and would like to return    Programs[de-identified] CHF         Treatment Team Recommendation: Facility Return  Discharge Destination Plan[de-identified] Facility Return

## 2023-06-16 NOTE — PROGRESS NOTES
Progress Note - Orthopedics   Kathy Soliman 80 y o  female MRN: [id number]  Unit/Bed#: X ray      Subjective:    80 y  o female with R hip pain  No acute events, no new complaints  Patient doing well  Pain well controlled  Admitted to family medicine given bandemia  Urine and blood cultures pending  Started on rocephin  WBC from 29 5 to 28 8      Labs:  0   Lab Value Date/Time    HCT 26 4 (L) 06/16/2023 0439    HCT 29 3 (L) 06/15/2023 1653    HCT 31 (L) 03/17/2023 1846    HCT 38 0 06/08/2022 0655    HCT 37 0 04/20/2015 1109    HCT 38 0 04/20/2015 1109    HCT 37 2 04/03/2015 0831    HGB 8 4 (L) 06/16/2023 0439    HGB 9 5 (L) 06/15/2023 1653    HGB 10 5 (L) 03/17/2023 1846    HGB 11 6 06/08/2022 0655    HGB 12 0 04/20/2015 1109    HGB 11 6 04/03/2015 0831    HGB 12 1 11/04/2014 1025    INR 2 22 (H) 06/16/2023 0439    WBC 28 84 (H) 06/16/2023 0439    WBC 29 47 (H) 06/15/2023 1653    WBC 5 71 06/08/2022 0655    WBC 7 98 04/20/2015 1109    WBC 7 29 04/03/2015 0831    WBC 6 66 11/04/2014 1025    ESR 36 (H) 04/02/2021 0833    CRP 8 2 (H) 04/02/2021 0833       Meds:    Current Facility-Administered Medications:   •  acetaminophen (TYLENOL) tablet 650 mg, 650 mg, Oral, Q6H PRN, Corazon Ferrell MD  •  ascorbic acid (VITAMIN C) tablet 500 mg, 500 mg, Oral, BID, Corazon Ferrell MD, 500 mg at 06/15/23 2133  •  cefTRIAXone (ROCEPHIN) 2,000 mg in dextrose 5 % 50 mL IVPB, 2,000 mg, Intravenous, Q24H, Corazno Ferrell MD  •  cholecalciferol (VITAMIN D3) tablet 5,000 Units, 5,000 Units, Oral, Daily, Corazon Ferrell MD  •  dicyclomine (BENTYL) capsule 10 mg, 10 mg, Oral, TID AC, Corazon Ferrell MD  •  DULoxetine (CYMBALTA) delayed release capsule 60 mg, 60 mg, Oral, Daily, Corazon Ferrell MD  •  fluticasone (FLONASE) 50 mcg/act nasal spray 1 spray, 1 spray, Each Nare, Daily, Corazon Ferrell MD  •  gabapentin (NEURONTIN) capsule 300 mg, 300 mg, Oral, HS, Corazon Ferrell MD, 300 mg at 06/15/23 2132  •  HYDROmorphone HCl (DILAUDID) injection 0 2 mg, 0 2 mg, Intravenous, Q4H PRN, Corazon Ferrell MD, 0 2 mg at 06/15/23 2133  •  levothyroxine tablet 100 mcg, 100 mcg, Oral, Early Morning, Kassi Ballard MD  •  oxyCODONE (ROXICODONE) IR tablet 5 mg, 5 mg, Oral, Q6H PRN, Kassi Ballard MD  •  oxyCODONE (ROXICODONE) split tablet 2 5 mg, 2 5 mg, Oral, Q6H PRN, Kassi Ballard MD  •  polyethylene glycol (MIRALAX) packet 17 g, 17 g, Oral, Daily, Kassi Ballard MD  •  torsemide [facility]) tablet 10 mg, 10 mg, Oral, Daily, Kassi Ballard MD  •  warfarin (COUMADIN) tablet 3 mg, 3 mg, Oral, Daily (warfarin), Kassi Ballard MD    Current Outpatient Medications:   •  acetaminophen (TYLENOL) 500 mg tablet, Take 2 tablets (1,000 mg total) by mouth every 8 (eight) hours, Disp: 90 tablet, Rfl: 0  •  DULoxetine (CYMBALTA) 60 mg delayed release capsule, TAKE 1 CAPSULE BY MOUTH EVERY DAY, Disp: 90 capsule, Rfl: 1  •  gabapentin (NEURONTIN) 300 mg capsule, , Disp: , Rfl:   •  levothyroxine 100 mcg tablet, TAKE 1 TABLET BY MOUTH EVERY DAY, Disp: 90 tablet, Rfl: 1  •  warfarin (COUMADIN) 3 mg tablet, , Disp: , Rfl:   •  ascorbic acid (VITAMIN C) 500 mg tablet, Take 1 tablet (500 mg total) by mouth 2 (two) times a day, Disp: 60 tablet, Rfl: 0  •  cefpodoxime (VANTIN) 100 mg tablet, , Disp: , Rfl:   •  Cholecalciferol 125 MCG (5000 UT) TABS, Take 5,000 Units by mouth daily, Disp: , Rfl:   •  desoximetasone (TOPICORT) 0 25 % cream, APPLY TOPICALLY AS NEEDED FOR IRRITATION, Disp: 30 g, Rfl: 0  •  Diclofenac Sodium (VOLTAREN) 1 %, Apply 2 g topically 4 (four) times a day, Disp: 350 g, Rfl: 3  •  dicyclomine (BENTYL) 10 mg capsule, , Disp: , Rfl:   •  DULoxetine (CYMBALTA) 20 mg capsule, , Disp: , Rfl:   •  DULoxetine (CYMBALTA) 30 mg delayed release capsule, , Disp: , Rfl:   •  fluticasone (FLONASE) 50 mcg/act nasal spray, SPRAY 2 SPRAYS INTO EACH NOSTRIL EVERY DAY, Disp: 16 mL, Rfl: 0  •  loperamide (IMODIUM) 2 mg capsule, Take 2 mg by mouth 4 (four) times a day as needed for diarrhea, Disp: , Rfl:   •  nystatin powder, , Disp: , Rfl:   •  polyethylene glycol (MIRALAX) 17 g packet, Take 17 "g by mouth daily, Disp: , Rfl:   •  potassium chloride 10% oral solution, Take 15 mL (20 mEq total) by mouth daily, Disp: 3800 mL, Rfl: 3  •  simethicone (MYLICON) 80 mg chewable tablet, Chew 80 mg every 6 (six) hours as needed for flatulence, Disp: , Rfl:   •  sucralfate (CARAFATE) 1 g tablet, TAKE 1 TABLET BY MOUTH 2 TIMES A DAY , Disp: 180 tablet, Rfl: 1  •  tiZANidine (ZANAFLEX) 2 mg tablet, TAKE 1 TABLET BY MOUTH EVERY 8 HOURS AS NEEDED FOR MUSCLE SPASM, Disp: 60 tablet, Rfl: 2  •  torsemide (DEMADEX) 10 mg tablet, Take 10 mg by mouth daily, Disp: , Rfl:   •  warfarin (COUMADIN) 1 mg tablet, TAKE 1 TABLET BY MOUTH DAILY OR AS DIRECTED, Disp: 90 tablet, Rfl: 1  •  warfarin (COUMADIN) 2 mg tablet, Take 1 tablet daily or as instructed , Disp: 90 tablet, Rfl: 1  •  warfarin (COUMADIN) 2 5 mg tablet, , Disp: , Rfl:   •  warfarin (COUMADIN) 4 mg tablet, TAKE ONE TABLET DAILY AS PREVIOUSLY DIRECTED BY CARDIOLOGY, Disp: 90 tablet, Rfl: 1  •  warfarin (COUMADIN) 5 mg tablet, TAKE 1 TABLET BY MOUTH EVERY DAY AS DIRECTED, Disp: 90 tablet, Rfl: 1    Blood Culture:   Lab Results   Component Value Date    BLOODCX Received in Microbiology Lab  Culture in Progress  06/15/2023    BLOODCX Received in Microbiology Lab  Culture in Progress  06/15/2023       Wound Culture:   No results found for: \"WOUNDCULT\"    Ins and Outs:  No intake/output data recorded  Physical:  Vitals:    06/15/23 2333   BP: 108/52   Pulse: 69   Resp: 20   Temp:    SpO2: 95%     Musculoskeletal: right Lower Extremity  · Skin intact  No erythema or ecchymosis  · TTP R lumbar spine, hip and groin  · Sensation intact to saphenous, sural, tibial, superficial peroneal nerve, and deep peroneal  · Motor intact to +FHL/EHL, +ankle dorsi/plantar flexion  · 2+ DP pulse, symmetric bilaterally  · Digits warm and well perfused  · Capillary refill < 2 seconds    Assessment:    80 y  o female R hip pain   Patient undergoing treatment for likely infection per " primary service      Plan:  · NWB RLE  · Consider follow up with spine and pain  · Consider topical analgesia  · Consider NSAIDs if not medically contraindicated  · PT/OT  · DVT ppx  · Rest of care per primary team  · Dispo: Ortho signing off    Edward Amanda MD

## 2023-06-16 NOTE — PROGRESS NOTES
Progress Notes - Family Medicine Residency, Ozzie TROTTER Clementkd 9/2/1933, 80 y o  female  MRN: [id number]  Unit/Bed#: [facility] 810-01 Encounter: [id number]  Primary Care Provider: Wm Short DO      Admission Date: 6/15/2023 1410  Length of Stay: 1 days  Code Status:  Level 1 - Full Code  Disposition:   Consult:   IP CONSULT TO ORTHOPEDIC SURGERY  IP CONSULT TO CASE MANAGEMENT         Assessment/Plan:      Plans discussed with [facility] team and finalization is pending attending physician attestation  Please, call  for any clarification  * Acute cystitis without hematuria  Assessment & Plan  Patient endorsing urinary frequency and dysuria  Patient had UTI during prior hospitalization at [facility] and was treated with Kit Doll in June 2023  ED workup:  - UA shows + nitrites, large leukocytes  UM shows innumerable RBC, WBC, bacterial and 10-25 hyaline casts  - WBC 29 47, bands 11%, elevated procal 1 32, lactate WNL  - Urine cultures, blood cultures collected  - Ceftriaxone x1 dose    WCB & ANC     Results from last 7 days   Lab Units 06/16/23  0439 06/15/23  1653   WBC Thousand/uL 28 84* 29 47*     Assessment:  - Currently stable  Continues with suprapubic tenderness  Plan:  · IV ceftriaxone 2g q24  · F/u urine cultures and blood cultures   · Monitor vitals   · Can transition to PO antibiotics once sensitivities emerge    Right hip pain  Assessment & Plan  2-3 week history of chronic R hip pain  Has history of right hip replacement and follows with orthopedics outpatient  Most recently admitted at [facility] from 6/8-6/12 under the trauma service for chronic right hip dislocation  Orthopedics evaluated patient and stated no surgical intervention required  Discharged to SNF with outpatient orthopedics follow-up    XR right hip/pelvis (6/15/23): Per ortho note, XR shows superior migration of the acetabular component of right revised MAIA     Ortho consult: no load bearing on the right extremity  Non-operative treatment  Signed off  Pain regimen: tylenol 650 mild pain, oxy 2 5 moderate, oxy 5 severe, dilaudid 0 2 breakthrough     Significant pain and tenderness on exam today, unable to lift R leg    Plan:  · Lidocaine patch on R hip  · Pain regimen: dimitrios tylenol 650 mg, continue PRN regimen  · Follow-up XR final read  · F/u with ortho outpatient     Hyponatremia  Assessment & Plan  Sodium 133 in the ED, baseline around 143  Appears euvolemic on exam     Sodium       Results from last 7 days   Lab Units 23  0439 06/15/23  1825   SODIUM mmol/L 130* 133*     Clinically stable    · Monitor on BMP   · Follow diet, consider gentle fluids (hx of CHF)    Lumbar spondylosis  Assessment & Plan  23 CT lumbar spine: Multilevel spondylosis most pronounced at L3-4 and L4-5  Status post posterior fusion L2-3  · Continue gabapentin 300mg qhs     Benign essential HTN  Assessment & Plan  BP mildly soft in 072L systolic        - Most recent BP Blood Pressure: 362/90   -  Systolic (22QVG), JH , Min:100 , SWK:748   - Diastolic (57YTL), MLL:77, Min:46, Max:64    Plan:  · Continue home Torsemide 10mg daily but with hold parameters for SBP < 110     Chronic diastolic CHF (congestive heart failure) (HCC)  Assessment & Plan  Wt Readings from Last 3 Encounters:   23 76 6 kg (168 lb 12 8 oz)   23 75 8 kg (167 lb)   23 77 kg (169 lb 12 1 oz)     No intake/output data recorded  Net IO Since Admission: No IO data has been entered for this period [23 1200]    HFpEF per cardiology notes  Last ECHO 2018 EF 55%, LV thickness upper limits normal, moderate mitral regurgitation, moderate tricuspid regurgitation  · Monitor I/O   · Continue torsemide 10mg          Chronic atrial fibrillation (HCC)  Assessment & Plan  Currently rate controlled  On warfarin 3mg at home per nursing home  INR in ED 2 29       COAG     Results from last 7 days   Lab Units 23  0439 06/15/23  0847 06/15/23  1653   PTT seconds  --  48*  --    PROTIME seconds 24 9*  --  25 4*   INR  2 22*  --  2 29*       · Continue warfarin 3mg  ·  f/u INR         Diet: Diet Regular; Regular House    VTE Pharm PPX: Warfarin (Coumadin)  VTE [facility] PPX: reason for no mechanical VTE prophylaxis significant pain      Subjective:   80 y o  female admitted 6/15/2023 for UTI, R hip pain    Today 06/16/23, HD# 1    • Patient seen and examined at bedside  Patient looked uncomfortable this AM, continued with suprapubic and R hip pain  Patient answered questions appropriately  Stated she did not want to be in hospital for too long as she missed her friends at the nursing home  • Medical management and treatment was discussed with patient, patient understands and agrees with plan  Objective:     Vitals:    06/15/23 2333 06/16/23 0645 06/16/23 0914 06/16/23 0915   BP: 108/52 (!) 110/46 140/63 140/63   BP Location:  Left arm     Pulse: 69 68  60   Resp: 20 20 16    Temp:   98 3 °F (36 8 °C) 98 3 °F (36 8 °C)   TempSrc:       SpO2: 95% 100%  97%   Weight:    76 6 kg (168 lb 12 8 oz)   Height:    5' (1 524 m)     Temp:  [98 3 °F (36 8 °C)-98 5 °F (36 9 °C)] 98 3 °F (36 8 °C)  HR:  [60-72] 60  Resp:  [16-20] 16  BP: (100-140)/(46-64) 140/63  Weight (last 2 days)     Date/Time Weight    06/16/23 09:15:37 76 6 (168 8)        No intake or output data in the 24 hours ending 06/16/23 1209  Invasive Devices     Peripheral Intravenous Line  Duration           Peripheral IV 06/15/23 Distal;Right;Upper;Ventral (anterior) Arm <1 day                  Physical Exam:     Physical Exam  Vitals and nursing note reviewed  Constitutional:       General: She is not in acute distress  Appearance: She is well-developed  HENT:      Head: Normocephalic and atraumatic        Right Ear: External ear normal       Left Ear: External ear normal       Nose: Nose normal       Mouth/Throat:      Mouth: Mucous membranes are moist       Pharynx: Oropharynx is clear    Eyes:      Conjunctiva/sclera: Conjunctivae normal    Cardiovascular:      Rate and Rhythm: Normal rate and regular rhythm  Pulses: Normal pulses  Heart sounds: Normal heart sounds  No murmur heard  Pulmonary:      Effort: Pulmonary effort is normal  No respiratory distress  Breath sounds: Normal breath sounds  Abdominal:      General: Abdomen is flat  Bowel sounds are normal       Palpations: Abdomen is soft  Tenderness: There is abdominal tenderness  There is no right CVA tenderness or left CVA tenderness  Musculoskeletal:         General: Tenderness present  No swelling  Cervical back: Neck supple  Right lower leg: No edema  Left lower leg: No edema  Skin:     General: Skin is warm and dry  Capillary Refill: Capillary refill takes less than 2 seconds  Neurological:      General: No focal deficit present  Mental Status: She is alert and oriented to person, place, and time  Mental status is at baseline  Psychiatric:         Mood and Affect: Mood normal            Labs:     CBC:  Results from last 7 days   Lab Units 06/16/23  0439 06/15/23  1653   WBC Thousand/uL 28 84* 29 47*   HEMOGLOBIN g/dL 8 4* 9 5*   HEMATOCRIT % 26 4* 29 3*   PLATELETS Thousands/uL 345 340       CMP:  Results from last 7 days   Lab Units 06/16/23  0439 06/15/23  1825   POTASSIUM mmol/L 3 7 3 7   CHLORIDE mmol/L 99 103   CO2 mmol/L 25 25   BUN mg/dL 20 21   CREATININE mg/dL 0 64 0 72   CALCIUM mg/dL 7 7* 8 3   AST U/L  --  40   ALT U/L  --  20   ALK PHOS U/L  --  178*   EGFR ml/min/1 73sq m 79 74       Sepsis:  Results from last 7 days   Lab Units 06/15/23  1825   LACTIC ACID mmol/L 1 7   PROCALCITONIN ng/ml 1 32*       Micro:  Lab Results   Component Value Date/Time    Blood Culture Received in Microbiology Lab  Culture in Progress  06/15/2023 06:25 PM    Blood Culture Received in Microbiology Lab  Culture in Progress   06/15/2023 06:25 PM         Imaging:     No results found       Medications:     Current Facility-Administered Medications   Medication Dose Route Frequency   • acetaminophen (TYLENOL) tablet 650 mg  650 mg Oral Q6H [facility]   • ascorbic acid (VITAMIN C) tablet 500 mg  500 mg Oral BID   • cefTRIAXone (ROCEPHIN) 2,000 mg in dextrose 5 % 50 mL IVPB  2,000 mg Intravenous Q24H   • cholecalciferol (VITAMIN D3) tablet 5,000 Units  5,000 Units Oral Daily   • dicyclomine (BENTYL) capsule 10 mg  10 mg Oral TID AC   • DULoxetine (CYMBALTA) delayed release capsule 60 mg  60 mg Oral Daily   • fluticasone (FLONASE) 50 mcg/act nasal spray 1 spray  1 spray Each Nare Daily   • gabapentin (NEURONTIN) capsule 300 mg  300 mg Oral HS   • HYDROmorphone HCl (DILAUDID) injection 0 2 mg  0 2 mg Intravenous Q4H PRN   • levothyroxine tablet 100 mcg  100 mcg Oral Early Morning   • lidocaine (LIDODERM) 5 % patch 2 patch  2 patch Topical Daily   • oxyCODONE (ROXICODONE) IR tablet 5 mg  5 mg Oral Q6H PRN   • oxyCODONE (ROXICODONE) split tablet 2 5 mg  2 5 mg Oral Q6H PRN   • polyethylene glycol (MIRALAX) packet 17 g  17 g Oral Daily   • torsemide (DEMADEX) tablet 10 mg  10 mg Oral Daily   • warfarin (COUMADIN) tablet 3 mg  3 mg Oral Daily (warfarin)         Marina Rubio MD  Family Medicine, PGY-1  12:09 PM 6/16/2023

## 2023-06-16 NOTE — UTILIZATION REVIEW
"Initial Clinical Review    Admission: Date/Time/Statement:   Admission Orders (From admission, onward)     Ordered        06/15/23 1923  INPATIENT ADMISSION  Once                      Orders Placed This Encounter   Procedures   • INPATIENT ADMISSION     Standing Status:   Standing     Number of Occurrences:   1     Order Specific Question:   Level of Care     Answer:   Med Surg [16]     Order Specific Question:   Estimated length of stay     Answer:   More than 2 Midnights     Order Specific Question:   Certification     Answer:   I certify that inpatient services are medically necessary for this patient for a duration of greater than two midnights  See H&P and MD Progress Notes for additional information about the patient's course of treatment  ED Arrival Information     Expected   -    Arrival   6/15/2023 14:09    Acuity   Urgent            Means of arrival   Ambulance    Escorted by   [address] [place] [address]    Admission type   Emergency            Arrival complaint   hip pain           Chief Complaint   Patient presents with   • Hip Pain     Per EMS, \"Pt is having R hip pain  No known cause  \"  Pt denies any trauma or injury  States that she has been having R hip pain for 2-3 weeks  Unsure if she has seen a doctor for the pain  Initial Presentation: 80 y o  female who presented to [facility] ED via EMS admitted Inpatient status dt Acute cystitis  PMHx:  HFpEF, a fib, HTN, lumbar spondylosis  Presented due to 2-3 week history of chronic right hip pain  She is status post right hip replacement revision surgery 4/15/19  Patient follows with orthopedics outpatient and more recently was admitted to [facility] under the trauma service for the same complaint  She was evaluated by orthopedics there who recommended outpatient follow-up, no surgical intervention necessary  While in the ED, orthopedics was consulted   XR hip wet read per ortho showed superior migration " acetabular component of R revised MAIA  Non-operative management was suggested  Also UTI during previous admission, treated with nitrofurantoin  In the ED today, the patient endorsed frequency and dysuria, with UA showing large leukocytes and positive nitrites  Procalcitonin elevated  Patient received a 500mL bolus IVF and 2g of ceftriaxone in the ED  Urine culture and blood cultures were collected  Plan:  med surg, fu on cxs, continue IV abx, monitor vs, monitor electrolytes and replete prn, monitor IO and continue torsemide, pain control prn, ortho consult  6/15 Per Ortho: acute on chronic right hip pain in the setting of 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  Given risk benefit analysis, patient will likely benefit from continued non operative treatment  NWB RLE, continue pain control, PT OT eval  FU with ortho outpt  Date: 61/16   Day 2:   Continues with suprapubic and right hip pain  Continue above tx plan including IV ABX, fu on cxs, monitor vs, lidocaine patch on right hip, pain control, monitor electrolytes and replete prn, monitor IO      ED Triage Vitals   Temperature Pulse Respirations Blood Pressure SpO2   06/15/23 1415 06/15/23 1415 06/15/23 1415 06/15/23 1415 06/15/23 1415   98 5 °F (36 9 °C) 72 20 108/64 94 %      Temp Source Heart Rate Source Patient Position - Orthostatic VS BP Location FiO2 (%)   06/15/23 1415 06/15/23 1754 06/15/23 1754 06/15/23 1754 --   Oral Monitor Lying Left arm       Pain Score       06/15/23 2133       10 - Worst Possible Pain          Wt Readings from Last 1 Encounters:   06/16/23 76 6 kg (168 lb 12 8 oz)     Additional Vital Signs:   Date/Time Temp Pulse Resp BP MAP (mmHg) SpO2 O2 Device Patient Position - Orthostatic VS   06/16/23 09:15:37 98 3 °F (36 8 °C) 60 -- 140/63 89 97 % -- --   06/16/23 09:14:52 98 3 °F (36 8 °C) -- 16 140/63 89 -- -- --   06/16/23 0645 -- 68 20 110/46 Abnormal  65 100 % None (Room air) Lying   06/15/23 4370 -- 69 20 108/52 -- 95 % None (Room air) Lying   06/15/23 1754 -- 70 20 100/49 Abnormal   73 97 % None (Room air) Lying   BP: Dr Nicci Graves aware at 06/15/23 1754   06/15/23 1415 98 5 °F (36 9 °C) 72 20 108/64 -- 94 % -- --     Pertinent Labs/Diagnostic Test Results:   6/15 EKG: Electronic ventricular pacemaker    XR hip/pelv 2-3 vws right if performed   ED Interpretation by Gage Holliday MD (06/15 1718)   Per my independent interpretation:  fracture deformity             Results from last 7 days   Lab Units 06/16/23  0439 06/15/23  1653   WBC Thousand/uL 28 84* 29 47*   HEMOGLOBIN g/dL 8 4* 9 5*   HEMATOCRIT % 26 4* 29 3*   PLATELETS Thousands/uL 345 340   BANDS PCT %  --  11*         Results from last 7 days   Lab Units 06/16/23  0439 06/15/23  1825   SODIUM mmol/L 130* 133*   POTASSIUM mmol/L 3 7 3 7   CHLORIDE mmol/L 99 103   CO2 mmol/L 25 25   ANION GAP mmol/L 6 5   BUN mg/dL 20 21   CREATININE mg/dL 0 64 0 72   EGFR ml/min/1 73sq m 79 74   CALCIUM mg/dL 7 7* 8 3     Results from last 7 days   Lab Units 06/15/23  1825   AST U/L 40   ALT U/L 20   ALK PHOS U/L 178*   TOTAL PROTEIN g/dL 5 5*   ALBUMIN g/dL 1 5*   TOTAL BILIRUBIN mg/dL 0 56         Results from last 7 days   Lab Units 06/16/23  0439 06/15/23  1825   GLUCOSE RANDOM mg/dL 283* 106     Results from last 7 days   Lab Units 06/16/23  0439 06/15/23  1825 06/15/23  1653   PROTIME seconds 24 9*  --  25 4*   INR  2 22*  --  2 29*   PTT seconds  --  48*  --          Results from last 7 days   Lab Units 06/15/23  1825   PROCALCITONIN ng/ml 1 32*     Results from last 7 days   Lab Units 06/15/23  1825   LACTIC ACID mmol/L 1 7     Results from last 7 days   Lab Units 06/15/23  1905   CLARITY UA  Extra Turbid   COLOR UA  Light Orange   SPEC GRAV UA  1 013   PH UA  7 0   GLUCOSE UA mg/dl Negative   KETONES UA mg/dl Negative   BLOOD UA  Large*   PROTEIN UA mg/dl 70 (1+)*   NITRITE UA  Positive*   BILIRUBIN UA  Negative   UROBILINOGEN UA (BE) mg/dl <2 0 LEUKOCYTES UA  Large*   WBC UA /hpf Innumerable*   RBC UA /hpf Innumerable*   BACTERIA UA /hpf Innumerable*   EPITHELIAL CELLS WET PREP /hpf Occasional     Results from last 7 days   Lab Units 06/15/23  1825   BLOOD CULTURE  Received in Microbiology Lab  Culture in Progress  Received in Microbiology Lab  Culture in Progress  ED Treatment:   Medication Administration from 06/15/2023 1409 to 06/16/2023 0905       Date/Time Order Dose Route Action     06/15/2023 1650 EDT lidocaine (LIDODERM) 5 % patch 1 patch 1 patch Topical Medication Applied     06/15/2023 1907 EDT cefTRIAXone (ROCEPHIN) 1,000 mg in dextrose 5 % 50 mL IVPB 1,000 mg Intravenous New Bag     06/15/2023 1839 EDT multi-electrolyte (ISOLYTE-S PH 7 4) bolus 500 mL 500 mL Intravenous New Bag     06/15/2023 2133 EDT ascorbic acid (VITAMIN C) tablet 500 mg 500 mg Oral Given     06/15/2023 2133 EDT gabapentin (NEURONTIN) capsule 300 mg 300 mg Oral Given     06/15/2023 2133 EDT HYDROmorphone HCl (DILAUDID) injection 0 2 mg 0 2 mg Intravenous Given        Past Medical History:   Diagnosis Date   • Abdominal bloating 8/1/2016   • Anemia    • Arthritis    • Cancer (HCC)     basal cell   • CHF (congestive heart failure) ([facility] )    • COVID-19    • Disease of thyroid gland    • Disturbance of smell and taste     disturbance of taste   • Effusion of knee joint right    • Fibromyalgia    • Heart murmur     reported a previous heart murmur   • History of acute myocardial infarction    • History of atrial fibrillation    • History of bruising easily    • History of cancer    • History of dermatitis    • History of mammogram    • History of methicillin resistant staphylococcus aureus (MRSA)     Negative nasal culture, isolation discontinued 8/17/2018     • History of methicillin resistant staphylococcus aureus (MRSA) 08/17/2018    negative nasal culture-isolation and hx discontinued 8/17/2018   • History of obesity    • History of osteopenia    • History of sciatica • History of shortness of breath    • History of sinusitis    • History of sore throat    • History of syncope    • History of umbilical hernia    • History of viral infection    • Irritable bowel syndrome (IBS)    • Joint pain, hip    • Limb pain    • Myalgia     myalgia and myositis   • Need for prophylactic vaccination against single diseases    • Need for prophylactic vaccination and inoculation against influenza    • Preoperative cardiovascular examination    • Primary osteoarthritis of right knee    • Right leg pain    • Vaginal Pap smear     reported pap smear     Present on Admission:  • Benign essential HTN  • Chronic atrial fibrillation (HCC)  • Chronic diastolic CHF (congestive heart failure) (HCC)  • Lumbar spondylosis  • Right hip pain      Admitting Diagnosis: Leukocytosis [D72 829]  Hip injury [S79 919A]  Right hip pain [M25 551]  Bandemia [D72 825]  Closed fracture of right hip, [facility] ) [S72 001A]  Closed dislocation of right hip, subsequent encounter [S73 004D]  Ambulatory dysfunction [R26 2]  Age/Sex: 80 y o  female  Admission Orders:  Scheduled Medications:  ascorbic acid, 500 mg, Oral, BID  cefTRIAXone, 2,000 mg, Intravenous, Q24H  cholecalciferol, 5,000 Units, Oral, Daily  dicyclomine, 10 mg, Oral, TID AC  DULoxetine, 60 mg, Oral, Daily  fluticasone, 1 spray, Each Nare, Daily  gabapentin, 300 mg, Oral, HS  levothyroxine, 100 mcg, Oral, Early Morning  polyethylene glycol, 17 g, Oral, Daily  torsemide, 10 mg, Oral, Daily  warfarin, 3 mg, Oral, Daily (warfarin)      Continuous IV Infusions: none     PRN Meds:  acetaminophen, 650 mg, Oral, Q6H PRN  HYDROmorphone, 0 2 mg, Intravenous, Q4H PRN x1 thus far  oxyCODONE, 5 mg, Oral, Q6H PRN  oxyCODONE, 2 5 mg, Oral, Q6H PRN    scd    IP CONSULT TO ORTHOPEDIC SURGERY  IP CONSULT TO CASE MANAGEMENT    Network Utilization Review Department  ATTENTION: Please call with any questions or concerns to [phone] and carefully listen to the prompts so that you are directed to the right person  All voicemails are confidential   Isa Rasheed all requests for admission clinical reviews, approved or denied determinations and any other requests to dedicated fax number below belonging to the campus where the patient is receiving treatment   List of dedicated fax numbers for the Facilities:  [address] DENIALS (Administrative/Medical Necessity) [phone]   [address] (Maternity/NICU/Pediatrics) [phone]   [address]uevara [phone]   [facility]  [phone]   [id number] [place] [address] [phone]   1559 [facility] 134 815 [facility] [phone]

## 2023-06-17 LAB
ANION GAP SERPL CALCULATED.3IONS-SCNC: 8 MMOL/L (ref 4–13)
BASOPHILS # BLD AUTO: 0.06 THOUSANDS/ÂΜL (ref 0–0.1)
BASOPHILS NFR BLD AUTO: 0 % (ref 0–1)
BUN SERPL-MCNC: 20 MG/DL (ref 5–25)
CALCIUM SERPL-MCNC: 8 MG/DL (ref 8.3–10.1)
CHLORIDE SERPL-SCNC: 104 MMOL/L (ref 96–108)
CO2 SERPL-SCNC: 20 MMOL/L (ref 21–32)
CREAT SERPL-MCNC: 0.66 MG/DL (ref 0.6–1.3)
EOSINOPHIL # BLD AUTO: 0.04 THOUSAND/ÂΜL (ref 0–0.61)
EOSINOPHIL NFR BLD AUTO: 0 % (ref 0–6)
ERYTHROCYTE [DISTWIDTH] IN BLOOD BY AUTOMATED COUNT: 13.1 % (ref 11.6–15.1)
ERYTHROCYTE [DISTWIDTH] IN BLOOD BY AUTOMATED COUNT: 13.2 % (ref 11.6–15.1)
GFR SERPL CREATININE-BSD FRML MDRD: 78 ML/MIN/1.73SQ M
GLUCOSE SERPL-MCNC: 60 MG/DL (ref 65–140)
HCT VFR BLD AUTO: 25.9 % (ref 34.8–46.1)
HCT VFR BLD AUTO: 35.8 % (ref 34.8–46.1)
HGB BLD-MCNC: 10.8 G/DL (ref 11.5–15.4)
HGB BLD-MCNC: 8.4 G/DL (ref 11.5–15.4)
IMM GRANULOCYTES # BLD AUTO: >0.5 THOUSAND/UL (ref 0–0.2)
IMM GRANULOCYTES NFR BLD AUTO: 4 % (ref 0–2)
LYMPHOCYTES # BLD AUTO: 0.79 THOUSANDS/ÂΜL (ref 0.6–4.47)
LYMPHOCYTES NFR BLD AUTO: 3 % (ref 14–44)
MCH RBC QN AUTO: 31.3 PG (ref 26.8–34.3)
MCH RBC QN AUTO: 32.4 PG (ref 26.8–34.3)
MCHC RBC AUTO-ENTMCNC: 30.2 G/DL (ref 31.4–37.4)
MCHC RBC AUTO-ENTMCNC: 32.4 G/DL (ref 31.4–37.4)
MCV RBC AUTO: 100 FL (ref 82–98)
MCV RBC AUTO: 104 FL (ref 82–98)
MONOCYTES # BLD AUTO: 1.48 THOUSAND/ÂΜL (ref 0.17–1.22)
MONOCYTES NFR BLD AUTO: 5 % (ref 4–12)
NEUTROPHILS # BLD AUTO: 27.73 THOUSANDS/ÂΜL (ref 1.85–7.62)
NEUTS SEG NFR BLD AUTO: 88 % (ref 43–75)
NRBC BLD AUTO-RTO: 0 /100 WBCS
PLATELET # BLD AUTO: 380 THOUSANDS/UL (ref 149–390)
PLATELET # BLD AUTO: 397 THOUSANDS/UL (ref 149–390)
PMV BLD AUTO: 8.7 FL (ref 8.9–12.7)
PMV BLD AUTO: 9.1 FL (ref 8.9–12.7)
POTASSIUM SERPL-SCNC: 4 MMOL/L (ref 3.5–5.3)
RBC # BLD AUTO: 2.59 MILLION/UL (ref 3.81–5.12)
RBC # BLD AUTO: 3.45 MILLION/UL (ref 3.81–5.12)
SARS-COV-2 RNA RESP QL NAA+PROBE: NEGATIVE
SODIUM SERPL-SCNC: 132 MMOL/L (ref 135–147)
WBC # BLD AUTO: 31.27 THOUSAND/UL (ref 4.31–10.16)
WBC # BLD AUTO: 32.9 THOUSAND/UL (ref 4.31–10.16)

## 2023-06-17 PROCEDURE — 99232 SBSQ HOSP IP/OBS MODERATE 35: CPT | Performed by: FAMILY MEDICINE

## 2023-06-17 PROCEDURE — 85027 COMPLETE CBC AUTOMATED: CPT

## 2023-06-17 PROCEDURE — 80048 BASIC METABOLIC PNL TOTAL CA: CPT

## 2023-06-17 PROCEDURE — 87635 SARS-COV-2 COVID-19 AMP PRB: CPT

## 2023-06-17 PROCEDURE — 85025 COMPLETE CBC W/AUTO DIFF WBC: CPT

## 2023-06-17 RX ADMIN — DICYCLOMINE HYDROCHLORIDE 10 MG: 10 CAPSULE ORAL at 12:51

## 2023-06-17 RX ADMIN — LIDOCAINE 2 PATCH: 50 PATCH CUTANEOUS at 10:07

## 2023-06-17 RX ADMIN — DULOXETINE HYDROCHLORIDE 60 MG: 60 CAPSULE, DELAYED RELEASE ORAL at 10:07

## 2023-06-17 RX ADMIN — Medication 5000 UNITS: at 10:07

## 2023-06-17 RX ADMIN — ACETAMINOPHEN 650 MG: 325 TABLET, FILM COATED ORAL at 05:16

## 2023-06-17 RX ADMIN — GABAPENTIN 300 MG: 300 CAPSULE ORAL at 21:11

## 2023-06-17 RX ADMIN — Medication 2.5 MG: at 13:10

## 2023-06-17 RX ADMIN — OXYCODONE HYDROCHLORIDE 5 MG: 5 TABLET ORAL at 06:35

## 2023-06-17 RX ADMIN — LEVOTHYROXINE SODIUM 100 MCG: 100 TABLET ORAL at 05:17

## 2023-06-17 RX ADMIN — TORSEMIDE 10 MG: 10 TABLET ORAL at 10:07

## 2023-06-17 RX ADMIN — ACETAMINOPHEN 650 MG: 325 TABLET, FILM COATED ORAL at 17:41

## 2023-06-17 RX ADMIN — DICYCLOMINE HYDROCHLORIDE 10 MG: 10 CAPSULE ORAL at 17:41

## 2023-06-17 RX ADMIN — ACETAMINOPHEN 650 MG: 325 TABLET, FILM COATED ORAL at 12:51

## 2023-06-17 RX ADMIN — Medication 2.5 MG: at 21:11

## 2023-06-17 RX ADMIN — FLUTICASONE PROPIONATE 1 SPRAY: 50 SPRAY, METERED NASAL at 12:51

## 2023-06-17 RX ADMIN — CEFTRIAXONE SODIUM 2000 MG: 10 INJECTION, POWDER, FOR SOLUTION INTRAVENOUS at 17:41

## 2023-06-17 RX ADMIN — OXYCODONE HYDROCHLORIDE AND ACETAMINOPHEN 500 MG: 500 TABLET ORAL at 17:41

## 2023-06-17 RX ADMIN — DICYCLOMINE HYDROCHLORIDE 10 MG: 10 CAPSULE ORAL at 05:17

## 2023-06-17 RX ADMIN — OXYCODONE HYDROCHLORIDE AND ACETAMINOPHEN 500 MG: 500 TABLET ORAL at 10:07

## 2023-06-17 RX ADMIN — POLYETHYLENE GLYCOL 3350 17 G: 17 POWDER, FOR SOLUTION ORAL at 10:07

## 2023-06-17 RX ADMIN — WARFARIN SODIUM 3 MG: 3 TABLET ORAL at 17:41

## 2023-06-17 NOTE — DISCHARGE INSTR - AVS FIRST PAGE
Please take Tylneol 1000 mg every 8 hours scheduled and use Oxycodone only as needed every 8 hours for pain  \    Please finish course of Keflex 500 mg every 8 hours for the next 4 days (until medication runs out)

## 2023-06-17 NOTE — CASE MANAGEMENT
Case Management Discharge Planning Note    Patient name Quita Foster  Location [facility] 810/[facility] 442-22 MRN [id number]  : 1933 Date 2023       Current Admission Date: 6/15/2023  Current Admission Diagnosis:Acute cystitis without hematuria   Patient Active Problem List    Diagnosis Date Noted   • Hyponatremia 06/15/2023   • Lumbar radiculopathy 02/10/2023   • Low back pain with sciatica 02/10/2023   • Lumbar spondylosis 02/10/2023   • S/P lumbar fusion 02/10/2023   • Peripheral vascular disease, unspecified ([facility] ) 2022   • Negative depression screening 2021   • Benign essential HTN 2021   • Trochanteric bursitis 2021   • Meningioma ([facility] ) 11/10/2020   • Trochanteric bursitis of right hip 11/10/2020   • Stage 3a chronic kidney disease ([facility] ) 11/10/2020   • Fall 10/13/2020   • Hematoma 10/13/2020   • Ecchymosis of eye 10/13/2020   • Elevated C-reactive protein (CRP) 2020   • S/P scar revision 2019   • Hypertrophic scar of skin 2019   • Cellulitis of right lower extremity 2019   • Right hip pain 2019   • MCI (mild cognitive impairment) 2019   • Ambulatory dysfunction 2019   • Acute cystitis without hematuria 2019   • Status post right hip replacement 2019   • Macrocytosis 2019   • Status post gastrectomy 2019   • Hypothyroidism 10/11/2018   • Pacemaker 2018   • Chronic diastolic CHF (congestive heart failure) ([facility] ) 2016   • Depression 2016   • Class 1 obesity due to excess calories with serious comorbidity and body mass index (BMI) of 33 0 to 33 9 in adult 2016   • Chronic pain 2016   • Chronic pain of lower extremity, bilateral 2016   • External hemorrhoids 2016   • Anemia 2016   • Chronic atrial fibrillation ([facility] ) 2016   • Irritable bowel syndrome with constipation 09/15/2015   • Synovial cyst of right popliteal space 2015   • Primary osteoarthritis of right knee 06/30/2015   • Osteoarthritis, multiple sites 06/26/2014   • Moderate mitral regurgitation 05/13/2014   • Osteoporosis 02/04/2014   • Fibromyalgia 05/31/2012   • Peripheral neuropathy 05/25/2012   • Lumbar canal stenosis 05/17/2012      LOS (days): 2  Geometric Mean LOS (GMLOS) (days): 3 70  Days to GMLOS:1 9     OBJECTIVE:  Risk of Unplanned Readmission Score: 20 28         Current admission status: Inpatient   Preferred Pharmacy:   [id number] ,  [facility] [address] [place] [address]  Phone: [phone] Fax: [address] [address]  Phone: [phone] Fax: [phone]    UNKNOWN - FOLLOW UP PRIOR TO DISCHARGE TO E-PRESCRIBE  No address on file      Primary Care Provider: Cole Jones DO    Primary Insurance: [address] [place] [address]  Secondary Insurance:     DISCHARGE DETAILS:    Per resident pt is not medically stable for DC anymore due to urine cultures showing growing bacteria   CM cancelled transport and informed Eliel Mcdonald ( RN supervisor at [facility])

## 2023-06-17 NOTE — QUICK NOTE
Patients' preliminary urine cultures positive for ESBL E  Coli, Klebsiella and Aerococcus  Discussed with On Call Infectious Disease attending, at this time with patient having improving symptoms despite not covering ESBL, organism likely not contributing to suspected infection  Recommendation is currently to complete 3 days of IV Ceftriaxone or finish remaining course with Keflex  May transition to Bactrim 2/2 Susceptibility, please f/u in DCS       Johnathan Rutledge MD  Family Medicine PGY1

## 2023-06-17 NOTE — PLAN OF CARE
Problem: MOBILITY - ADULT  Goal: Maintain or return to baseline ADL function  Description: INTERVENTIONS:  -  Assess patient's ability to carry out ADLs; assess patient's baseline for ADL function and identify physical deficits which impact ability to perform ADLs (bathing, care of mouth/teeth, toileting, grooming, dressing, etc )  - Assess/evaluate cause of self-care deficits   - Assess range of motion  - Assess patient's mobility; develop plan if impaired  - Assess patient's need for assistive devices and provide as appropriate  - Encourage maximum independence but intervene and supervise when necessary  - Involve family in performance of ADLs  - Assess for home care needs following discharge   - Consider OT consult to assist with ADL evaluation and planning for discharge  - Provide patient education as appropriate  Outcome: Progressing  Goal: Maintains/Returns to pre admission functional level  Description: INTERVENTIONS:  - Perform BMAT or MOVE assessment daily    - Set and communicate daily mobility goal to care team and patient/family/caregiver     - Collaborate with rehabilitation services on mobility goals if consulted  - Out of bed for toileting  - Record patient progress and toleration of activity level   Outcome: Progressing     Problem: Prexisting or High Potential for Compromised Skin Integrity  Goal: Skin integrity is maintained or improved  Description: INTERVENTIONS:  - Identify patients at risk for skin breakdown  - Assess and monitor skin integrity  - Assess and monitor nutrition and hydration status  - Monitor labs   - Assess for incontinence   - Turn and reposition patient  - Assist with mobility/ambulation  - Relieve pressure over bony prominences  - Avoid friction and shearing  - Provide appropriate hygiene as needed including keeping skin clean and dry  - Evaluate need for skin moisturizer/barrier cream  - Collaborate with interdisciplinary team   - Patient/family teaching  - Consider wound care consult   Outcome: Progressing     Problem: INFECTION - ADULT  Goal: Absence or prevention of progression during hospitalization  Description: INTERVENTIONS:  - Assess and monitor for signs and symptoms of infection  - Monitor lab/diagnostic results  - Monitor all insertion sites, i e  indwelling lines, tubes, and drains  - Monitor endotracheal if appropriate and nasal secretions for changes in amount and color  - [place] appropriate cooling/warming therapies per order  - Administer medications as ordered  - Instruct and encourage patient and family to use good hand hygiene technique  - Identify and instruct in appropriate isolation precautions for identified infection/condition  Outcome: Progressing  Goal: Absence of fever/infection during neutropenic period  Description: INTERVENTIONS:  - Monitor WBC    Outcome: Progressing

## 2023-06-17 NOTE — PLAN OF CARE
Problem: MOBILITY - ADULT  Goal: Maintain or return to baseline ADL function  Description: INTERVENTIONS:  -  Assess patient's ability to carry out ADLs; assess patient's baseline for ADL function and identify physical deficits which impact ability to perform ADLs (bathing, care of mouth/teeth, toileting, grooming, dressing, etc )  - Assess/evaluate cause of self-care deficits   - Assess range of motion  - Assess patient's mobility; develop plan if impaired  - Assess patient's need for assistive devices and provide as appropriate  - Encourage maximum independence but intervene and supervise when necessary  - Involve family in performance of ADLs  - Assess for home care needs following discharge   - Consider OT consult to assist with ADL evaluation and planning for discharge  - Provide patient education as appropriate  Outcome: Progressing  Goal: Maintains/Returns to pre admission functional level  Description: INTERVENTIONS:  - Perform BMAT or MOVE assessment daily    - Set and communicate daily mobility goal to care team and patient/family/caregiver     - Collaborate with rehabilitation services on mobility goals if consulted  - Out of bed for toileting  - Record patient progress and toleration of activity level   Outcome: Progressing     Problem: Prexisting or High Potential for Compromised Skin Integrity  Goal: Skin integrity is maintained or improved  Description: INTERVENTIONS:  - Identify patients at risk for skin breakdown  - Assess and monitor skin integrity  - Assess and monitor nutrition and hydration status  - Monitor labs   - Assess for incontinence   - Turn and reposition patient  - Assist with mobility/ambulation  - Relieve pressure over bony prominences  - Avoid friction and shearing  - Provide appropriate hygiene as needed including keeping skin clean and dry  - Evaluate need for skin moisturizer/barrier cream  - Collaborate with interdisciplinary team   - Patient/family teaching  - Consider wound care consult   Outcome: Progressing     Problem: PAIN - ADULT  Goal: Verbalizes/displays adequate comfort level or baseline comfort level  Description: Interventions:  - Encourage patient to monitor pain and request assistance  - Assess pain using appropriate pain scale  - Administer analgesics based on type and severity of pain and evaluate response  - Implement non-pharmacological measures as appropriate and evaluate response  - Consider cultural and social influences on pain and pain management  - Notify physician/advanced practitioner if interventions unsuccessful or patient reports new pain  Outcome: Progressing     Problem: INFECTION - ADULT  Goal: Absence or prevention of progression during hospitalization  Description: INTERVENTIONS:  - Assess and monitor for signs and symptoms of infection  - Monitor lab/diagnostic results  - Monitor all insertion sites, i e  indwelling lines, tubes, and drains  - Monitor endotracheal if appropriate and nasal secretions for changes in amount and color  - [place] appropriate cooling/warming therapies per order  - Administer medications as ordered  - Instruct and encourage patient and family to use good hand hygiene technique  - Identify and instruct in appropriate isolation precautions for identified infection/condition  Outcome: Progressing  Goal: Absence of fever/infection during neutropenic period  Description: INTERVENTIONS:  - Monitor WBC    Outcome: Progressing     Problem: SAFETY ADULT  Goal: Maintain or return to baseline ADL function  Description: INTERVENTIONS:  -  Assess patient's ability to carry out ADLs; assess patient's baseline for ADL function and identify physical deficits which impact ability to perform ADLs (bathing, care of mouth/teeth, toileting, grooming, dressing, etc )  - Assess/evaluate cause of self-care deficits   - Assess range of motion  - Assess patient's mobility; develop plan if impaired  - Assess patient's need for assistive devices and provide as appropriate  - Encourage maximum independence but intervene and supervise when necessary  - Involve family in performance of ADLs  - Assess for home care needs following discharge   - Consider OT consult to assist with ADL evaluation and planning for discharge  - Provide patient education as appropriate  Outcome: Progressing  Goal: Maintains/Returns to pre admission functional level  Description: INTERVENTIONS:  - Perform BMAT or MOVE assessment daily    - Set and communicate daily mobility goal to care team and patient/family/caregiver     - Collaborate with rehabilitation services on mobility goals if consulted  - Out of bed for toileting  - Record patient progress and toleration of activity level   Outcome: Progressing  Goal: Patient will remain free of falls  Description: INTERVENTIONS:  - Educate patient/family on patient safety including physical limitations  - Instruct patient to call for assistance with activity   - Consult OT/PT to assist with strengthening/mobility   - Keep Call bell within reach  - Keep bed low and locked with side rails adjusted as appropriate  - Keep care items and personal belongings within reach  - Initiate and maintain comfort rounds  - Make Fall Risk Sign visible to staff  - Apply yellow socks and bracelet for high fall risk patients  - Consider moving patient to room near nurses station  Outcome: Progressing     Problem: DISCHARGE PLANNING  Goal: Discharge to home or other facility with appropriate resources  Description: INTERVENTIONS:  - Identify barriers to discharge w/patient and caregiver  - Arrange for needed discharge resources and transportation as appropriate  - Identify discharge learning needs (meds, wound care, etc )  - Arrange for interpretive services to assist at discharge as needed  - Refer to Case Management Department for coordinating discharge planning if the patient needs post-hospital services based on physician/advanced practitioner order or complex needs related to functional status, cognitive ability, or social support system  Outcome: Progressing     Problem: Knowledge Deficit  Goal: Patient/family/caregiver demonstrates understanding of disease process, treatment plan, medications, and discharge instructions  Description: Complete learning assessment and assess knowledge base    Interventions:  - Provide teaching at level of understanding  - Provide teaching via preferred learning methods  Outcome: Progressing

## 2023-06-17 NOTE — CASE MANAGEMENT
Case Management Discharge Planning Note    Patient name Delilah Iglesias  Location [facility] 810/[facility] 219-59 MRN [id number]  : 1933 Date 2023       Current Admission Date: 6/15/2023  Current Admission Diagnosis:Acute cystitis without hematuria   Patient Active Problem List    Diagnosis Date Noted   • Hyponatremia 06/15/2023   • Lumbar radiculopathy 02/10/2023   • Low back pain with sciatica 02/10/2023   • Lumbar spondylosis 02/10/2023   • S/P lumbar fusion 02/10/2023   • Peripheral vascular disease, unspecified ([facility] [address] ) 2022   • Negative depression screening 2021   • Benign essential HTN 2021   • Trochanteric bursitis 2021   • Meningioma ([facility] [address] ) 11/10/2020   • Trochanteric bursitis of right hip 11/10/2020   • Stage 3a chronic kidney disease ([facility] [address] ) 11/10/2020   • Fall 10/13/2020   • Hematoma 10/13/2020   • Ecchymosis of eye 10/13/2020   • Elevated C-reactive protein (CRP) 2020   • S/P scar revision 2019   • Hypertrophic scar of skin 2019   • Cellulitis of right lower extremity 2019   • Right hip pain 2019   • MCI (mild cognitive impairment) 2019   • Ambulatory dysfunction 2019   • Acute cystitis without hematuria 2019   • Status post right hip replacement 2019   • Macrocytosis 2019   • Status post gastrectomy 2019   • Hypothyroidism 10/11/2018   • Pacemaker 2018   • Chronic diastolic CHF (congestive heart failure) ([facility] [address] ) 2016   • Depression 2016   • Class 1 obesity due to excess calories with serious comorbidity and body mass index (BMI) of 33 0 to 33 9 in adult 2016   • Chronic pain 2016   • Chronic pain of lower extremity, bilateral 2016   • External hemorrhoids 2016   • Anemia 2016   • Chronic atrial fibrillation ([facility] [address] ) 2016   • Irritable bowel syndrome with constipation 09/15/2015   • Synovial cyst of right popliteal space 2015   • Primary osteoarthritis of right knee 06/30/2015   • Osteoarthritis, multiple sites 06/26/2014   • Moderate mitral regurgitation 05/13/2014   • Osteoporosis 02/04/2014   • Fibromyalgia 05/31/2012   • Peripheral neuropathy 05/25/2012   • Lumbar canal stenosis 05/17/2012      LOS (days): 2  Geometric Mean LOS (GMLOS) (days): 3 70  Days to GMLOS:2     OBJECTIVE:  Risk of Unplanned Readmission Score: 20 28         Current admission status: Inpatient   Preferred Pharmacy:   [id number],  [facility] [address] [place] [address]  Phone: [phone] Fax: [phone]    [id number], [address] [place] [address]  Phone: [phone] Fax: [phone]    UNKNOWN - FOLLOW UP PRIOR TO DISCHARGE TO E-PRESCRIBE  No address on file      Primary Care Provider: Carey Bain DO    Primary Insurance: [facility] REP  Secondary Insurance:     DISCHARGE DETAILS:    Discharge Destination Plan[de-identified] Facility Return  Transport at Discharge : [facility]  Dispatcher Contacted: Yes  Transported by (Company and Unit #): [employer]  ETA of Transport (Date): 06/17/23  ETA of Transport (Time): 1630  Transport Service Arrived: No  Transfer Mode: [address]  Name, [address] : [address]  Receiving Facility/Agency Phone Number: [phone]\  Facility/Agency Fax Number: [phone]      Medical team, Piter Bo (RN supervisor at [facility]) and Earnest Cranker (daughter) aware of transport time

## 2023-06-17 NOTE — PROGRESS NOTES
[facility]  Progress Note  Name: Modesto Preciado  MRN: [id number]  Unit/Bed#: [facility] 810-01 I Date of Admission: 6/15/2023   Date of Service: 6/17/2023 I Hospital Day: 2    Assessment/Plan   Hyponatremia  Assessment & Plan  Sodium 133 in the ED, baseline around 143  Appears euvolemic on exam     Sodium       Results from last 7 days   Lab Units 06/17/23  0525 06/16/23  0439 06/15/23  1825   SODIUM mmol/L 132* 130* 133*      Clinically stable    · Monitor on BMP   · Follow diet, consider gentle fluids (hx of CHF)    Lumbar spondylosis  Assessment & Plan  2/26/23 CT lumbar spine: Multilevel spondylosis most pronounced at L3-4 and L4-5  Status post posterior fusion L2-3  · Continue gabapentin 300mg qhs      Benign essential HTN  Assessment & Plan  BP mildly soft in 347V systolic         - Most recent BP Blood Pressure: 467/67   -  Systolic (51VDD), LNF:559 , Min:116 , VYF:223   - Diastolic (92AAO), HZV:30, Min:51, Max:53     Plan:  · Continue home Torsemide 10mg daily but with hold parameters for SBP < 110     Right hip pain  Assessment & Plan  2-3 week history of chronic R hip pain  Has history of right hip replacement and follows with orthopedics outpatient  Most recently admitted at [facility] from 6/8-6/12 under the trauma service for chronic right hip dislocation  Orthopedics evaluated patient and stated no surgical intervention required  Discharged to SNF with outpatient orthopedics follow-up    XR right hip/pelvis (6/15/23):   1  Stable appearance of the right hip without acute fracture or dislocation  2  Postprocedural changes related to total right hip arthroplasty with stable marked superior acetabular and articulating femoral prosthetic component migration  3  Remote posttraumatic deformity involving the femur, acetabulum and inferior pubic ramus as noted       Significant pain and tenderness on exam today, unable to lift R leg    Plan:  · Lidocaine patch on R hip  · Pain regimen: wilbur tylenol 650 mg, Wilbur Oxy 2 5mg Q8h w/ PRN Oxy 2 5mg Q4h PRN  · F/u with ortho outpatient     Chronic diastolic CHF (congestive heart failure) (HCC)  Assessment & Plan  Wt Readings from Last 3 Encounters:   06/16/23 76 6 kg (168 lb 12 8 oz)   03/24/23 75 8 kg (167 lb)   03/17/23 77 kg (169 lb 12 1 oz)      I/O last 24 hours: In: 322 [P O :322]  Out: 100 [Urine:100]   Net IO Since Admission: 222 mL [06/17/23 1149]    HFpEF per cardiology notes  Last ECHO 4/27/2018 EF 55%, LV thickness upper limits normal, moderate mitral regurgitation, moderate tricuspid regurgitation  · Monitor I/O   · Continue torsemide 10mg          Chronic atrial fibrillation (HCC)  Assessment & Plan  Currently rate controlled  On warfarin 3mg at home per nursing home  INR in ED 2 29  COAG     Results from last 7 days   Lab Units 06/16/23  0439 06/15/23  1825 06/15/23  1653   PTT seconds  --  48*  --    PROTIME seconds 24 9*  --  25 4*   INR  2 22*  --  2 29*       · Continue warfarin 3mg  ·  f/u INR     * Acute cystitis without hematuria  Assessment & Plan  Patient endorsing urinary frequency and dysuria  Patient had UTI during prior hospitalization at [facility] and was treated with Linda Rosa in June 2023  ED workup:  - UA shows + nitrites, large leukocytes  UM shows innumerable RBC, WBC, bacterial and 10-25 hyaline casts  - WBC 29 47, bands 11%, elevated procal 1 32, lactate WNL  - Urine cultures, blood cultures collected  - Ceftriaxone x1 dose    WCB & ANC     Results from last 7 days   Lab Units 06/17/23  0525 06/16/23  0439 06/15/23  1653   WBC Thousand/uL 32 90* 28 84* 29 47*      Assessment:  - Currently stable  Continues with suprapubic tenderness        Plan:  · IV ceftriaxone 2g q24  · F/u urine cultures and blood cultures (G- Rods), await sensitivities  · Monitor vitals   · Can transition to PO antibiotics once sensitivities emerge  · Stable for return to Nursing facility once transitioned to PO antibiotics         PPX: Coumadin  Diet: Regular House  Code Status: Full  Dispo: Nursing Facility    Plan D/W Dr Garland Anthony [facility] Team    Subjective:   Seen and assessed at bedside this AM  No OVN events, no acute complaints  Was having increasing pain this AM however resolved with Oxy administration  Says that sometimes she feels like the 2 5 doesn't do enough but the 5 knocks her right out  Is otherwise feeling well  Objective:     Vitals: Blood pressure 120/53, pulse 66, temperature 98 5 °F (36 9 °C), resp  rate 16, height 5' (1 524 m), weight 76 6 kg (168 lb 12 8 oz), SpO2 98 %, not currently breastfeeding  ,Body mass index is 32 97 kg/m²  Intake/Output Summary (Last 24 hours) at 6/17/2023 1151  Last data filed at 6/16/2023 1700  Gross per 24 hour   Intake 322 ml   Output 100 ml   Net 222 ml       Physical Exam:   Physical Exam  Vitals and nursing note reviewed  Constitutional:       General: She is not in acute distress  Appearance: She is overweight  She is not toxic-appearing  Eyes:      General: Lids are normal  Gaze aligned appropriately  Cardiovascular:      Rate and Rhythm: Normal rate and regular rhythm  Heart sounds: S1 normal and S2 normal  Murmur heard  Systolic murmur is present  No friction rub  No gallop  No S3 or S4 sounds  Pulmonary:      Effort: Pulmonary effort is normal  No accessory muscle usage or prolonged expiration  Abdominal:      General: Bowel sounds are normal       Palpations: Abdomen is soft  Tenderness: There is no abdominal tenderness  There is no guarding  Musculoskeletal:      Cervical back: Normal range of motion  Skin:     General: Skin is warm  Coloration: Skin is not ashen or jaundiced  Neurological:      Mental Status: She is alert     Psychiatric:         Attention and Perception: Attention and perception normal          Mood and Affect: Mood and affect normal          Speech: Speech normal        Invasive Devices Peripheral Intravenous Line  Duration           Peripheral IV 06/15/23 Distal;Right;Upper;Ventral (anterior) Arm 1 day    Peripheral IV 06/16/23 Left;Proximal;Ventral (anterior) Forearm <1 day          Drain  Duration           External Urinary Catheter <1 day                           Lab and other studies:  I have personally reviewed pertinent reports       Admission on 06/15/2023   Component Date Value   • Protime 06/15/2023 25 4 (H)    • INR 06/15/2023 2 29 (H)    • WBC 06/15/2023 29 47 (H)    • RBC 06/15/2023 2 94 (L)    • Hemoglobin 06/15/2023 9 5 (L)    • Hematocrit 06/15/2023 29 3 (L)    • MCV 06/15/2023 100 (H)    • MCH 06/15/2023 32 3    • MCHC 06/15/2023 32 4    • RDW 06/15/2023 13 2    • MPV 06/15/2023 8 7 (L)    • Platelets 85/34/3089 340    • Segmented % 06/15/2023 81 (H)    • Bands % 06/15/2023 11 (H)    • Lymphocytes % 06/15/2023 3 (L)    • Monocytes % 06/15/2023 3 (L)    • Eosinophils, % 06/15/2023 0    • Basophils % 06/15/2023 0    • Atypical Lymphocytes % 06/15/2023 2 (H)    • Absolute Neutrophils 06/15/2023 27 11 (H)    • Lymphocytes Absolute 06/15/2023 0 88    • Monocytes Absolute 06/15/2023 0 88    • Eosinophils Absolute 06/15/2023 0 00    • Basophils Absolute 06/15/2023 0 00    • RBC Morphology 06/15/2023 Present    • Anisocytosis 06/15/2023 Present    • Erwin Cells 06/15/2023 Present    • Macrocytes 06/15/2023 Present    • Poikilocytes 06/15/2023 Present    • Polychromasia 06/15/2023 Present    • Platelet Estimate 37/08/4231 Adequate    • Sodium 06/15/2023 133 (L)    • Potassium 06/15/2023 3 7    • Chloride 06/15/2023 103    • CO2 06/15/2023 25    • ANION GAP 06/15/2023 5    • BUN 06/15/2023 21    • Creatinine 06/15/2023 0 72    • Glucose 06/15/2023 106    • Calcium 06/15/2023 8 3    • Corrected Calcium 06/15/2023 10 3 (H)    • AST 06/15/2023 40    • ALT 06/15/2023 20    • Alkaline Phosphatase 06/15/2023 178 (H)    • Total Protein 06/15/2023 5 5 (L)    • Albumin 06/15/2023 1 5 (L)    • Total Bilirubin 06/15/2023 0 56    • eGFR 06/15/2023 74    • LACTIC ACID 06/15/2023 1 7    • Procalcitonin 06/15/2023 1 32 (H)    • PTT 06/15/2023 48 (H)    • Blood Culture 06/15/2023 No Growth at 24 hrs  • Blood Culture 06/15/2023 No Growth at 24 hrs      • Color, UA 06/15/2023 Light Orange    • Clarity, UA 06/15/2023 Extra Turbid    • Specific Gravity, UA 06/15/2023 1 013    • pH, UA 06/15/2023 7 0    • Leukocytes, UA 06/15/2023 Large (A)    • Nitrite, UA 06/15/2023 Positive (A)    • Protein, UA 06/15/2023 70 (1+) (A)    • Glucose, UA 06/15/2023 Negative    • Ketones, UA 06/15/2023 Negative    • Urobilinogen, UA 06/15/2023 <2 0    • Bilirubin, UA 06/15/2023 Negative    • Occult Blood, UA 06/15/2023 Large (A)    • Ventricular Rate 06/15/2023 70    • Atrial Rate 06/15/2023 87    • QRSD Interval 06/15/2023 96    • QT Interval 06/15/2023 378    • QTC Interval 06/15/2023 408    • QRS Axis 06/15/2023 5    • T Wave Axis 06/15/2023 150    • RBC, UA 06/15/2023 Innumerable (A)    • WBC, UA 06/15/2023 Innumerable (A)    • Epithelial Cells 06/15/2023 Occasional    • Bacteria, UA 06/15/2023 Innumerable (A)    • Hyaline Casts, UA 06/15/2023 10-25 (A)    • WBC Clumps 06/15/2023 Present    • Transitional Epithelial * 06/15/2023 Present    • Urine Culture 06/15/2023 >100,000 cfu/ml Gram Negative Bowen (A)    • WBC 06/16/2023 28 84 (H)    • RBC 06/16/2023 2 61 (L)    • Hemoglobin 06/16/2023 8 4 (L)    • Hematocrit 06/16/2023 26 4 (L)    • MCV 06/16/2023 101 (H)    • MCH 06/16/2023 32 2    • MCHC 06/16/2023 31 8    • RDW 06/16/2023 13 3    • MPV 06/16/2023 8 7 (L)    • Platelets 01/02/5282 345    • Sodium 06/16/2023 130 (L)    • Potassium 06/16/2023 3 7    • Chloride 06/16/2023 99    • CO2 06/16/2023 25    • ANION GAP 06/16/2023 6    • BUN 06/16/2023 20    • Creatinine 06/16/2023 0 64    • Glucose 06/16/2023 283 (H)    • Calcium 06/16/2023 7 7 (L)    • eGFR 06/16/2023 79    • Protime 06/16/2023 24 9 (H)    • INR 06/16/2023 2 22 (H)    • Sodium 06/17/2023 132 (L)    • Potassium 06/17/2023 4 0    • Chloride 06/17/2023 104    • CO2 06/17/2023 20 (L)    • ANION GAP 06/17/2023 8    • BUN 06/17/2023 20    • Creatinine 06/17/2023 0 66    • Glucose 06/17/2023 60 (L)    • Calcium 06/17/2023 8 0 (L)    • eGFR 06/17/2023 78    • WBC 06/17/2023 32 90 (HH)    • RBC 06/17/2023 3 45 (L)    • Hemoglobin 06/17/2023 10 8 (L)    • Hematocrit 06/17/2023 35 8    • MCV 06/17/2023 104 (H)    • MCH 06/17/2023 31 3    • MCHC 06/17/2023 30 2 (L)    • RDW 06/17/2023 13 2    • Platelets 02/53/4923 397 (H)    • MPV 06/17/2023 9 1        Recent Results (from the past 24 hour(s))   Basic metabolic panel    Collection Time: 06/17/23  5:25 AM   Result Value Ref Range    Sodium 132 (L) 135 - 147 mmol/L    Potassium 4 0 3 5 - 5 3 mmol/L    Chloride 104 96 - 108 mmol/L    CO2 20 (L) 21 - 32 mmol/L    ANION GAP 8 4 - 13 mmol/L    BUN 20 5 - 25 mg/dL    Creatinine 0 66 0 60 - 1 30 mg/dL    Glucose 60 (L) 65 - 140 mg/dL    Calcium 8 0 (L) 8 3 - 10 1 mg/dL    eGFR 78 ml/min/1 73sq m   CBC    Collection Time: 06/17/23  5:25 AM   Result Value Ref Range    WBC 32 90 (HH) 4 31 - 10 16 Thousand/uL    RBC 3 45 (L) 3 81 - 5 12 Million/uL    Hemoglobin 10 8 (L) 11 5 - 15 4 g/dL    Hematocrit 35 8 34 8 - 46 1 %     (H) 82 - 98 fL    MCH 31 3 26 8 - 34 3 pg    MCHC 30 2 (L) 31 4 - 37 4 g/dL    RDW 13 2 11 6 - 15 1 %    Platelets 658 (H) 375 - 390 Thousands/uL    MPV 9 1 8 9 - 12 7 fL     Blood Culture:   Lab Results   Component Value Date    BLOODCX No Growth at 24 hrs  06/15/2023    BLOODCX No Growth at 24 hrs  06/15/2023   ,   Urinalysis:   Lab Results   Component Value Date    COLORU Light Orange 06/15/2023    CLARITYU Extra Turbid 06/15/2023    SPECGRAV 1 013 06/15/2023    PHUR 7 0 06/15/2023    PHUR 6 0 08/20/2016    LEUKOCYTESUR Large (A) 06/15/2023    NITRITE Positive (A) 06/15/2023    GLUCOSEU Negative 06/15/2023    KETONESU Negative 06/15/2023    BILIRUBINUR Negative 06/15/2023 "BLOODU Large (A) 06/15/2023   ,   Urine Culture:   Lab Results   Component Value Date    URINECX >100,000 cfu/ml Gram Negative Bowen (A) 06/15/2023   ,   Wound Culure: No results found for: \"WOUNDCULT\"      Imaging:    XR hip/pelv 2-3 vws right if performed   ED Interpretation by Mando Jose MD (06/15 2351)   Per my independent interpretation:  fracture deformity       Final Result by Kane Davis MD (06/16 1209)         1  Stable appearance to the right hip without acute fracture or dislocation  2  Postprocedural changes related to total right hip arthroplasty with stable marked superior acetabular and articulating femoral prosthetic component migration  3  Remote posttraumatic deformity involving the femur, acetabulum and inferior pubic ramus as noted                    Workstation performed: [id number]           Current Facility-Administered Medications   Medication Dose Route Frequency   • acetaminophen (TYLENOL) tablet 650 mg  650 mg Oral Q6H [address]   • ascorbic acid (VITAMIN C) tablet 500 mg  500 mg Oral BID   • cefTRIAXone (ROCEPHIN) 2,000 mg in dextrose 5 % 50 mL IVPB  2,000 mg Intravenous Q24H   • cholecalciferol (VITAMIN D3) tablet 5,000 Units  5,000 Units Oral Daily   • dicyclomine (BENTYL) capsule 10 mg  10 mg Oral TID AC   • DULoxetine (CYMBALTA) delayed release capsule 60 mg  60 mg Oral Daily   • fluticasone (FLONASE) 50 mcg/act nasal spray 1 spray  1 spray Each Nare Daily   • gabapentin (NEURONTIN) capsule 300 mg  300 mg Oral HS   • levothyroxine tablet 100 mcg  100 mcg Oral Early Morning   • lidocaine (LIDODERM) 5 % patch 2 patch  2 patch Topical Daily   • oxyCODONE (ROXICODONE) split tablet 2 5 mg  2 5 mg Oral Q4H PRN   • oxyCODONE (ROXICODONE) split tablet 2 5 mg  2 5 mg Oral Q8H   • polyethylene glycol (MIRALAX) packet 17 g  17 g Oral Daily   • torsemide (DEMADEX) tablet 10 mg  10 mg Oral Daily   • warfarin (COUMADIN) tablet 3 mg  3 mg Oral Daily (warfarin)       Wicho Mckeon" Suzan Brooks MD  Family Medicine Resident PGY1

## 2023-06-17 NOTE — UTILIZATION REVIEW
Date: 6/17    Day 3: Has surpassed a 2nd midnight with active treatments and services  Continued tx of acute cystitis with hematuria with need for continued IV abx, f/u ucx and blood cx and monitoring    Date/Time Temp Pulse Resp BP MAP (mmHg) SpO2 O2 Device Patient Position - Orthostatic VS   06/17/23 08:01:51 98 5 °F (36 9 °C) -- 16 120/53 75 -- -- --   06/16/23 23:02:52 98 °F (36 7 °C) 66 18 116/51 73 98 % -- --         Results from last 7 days   Lab Units 06/17/23  0525 06/16/23  0439 06/15/23  1653   WBC Thousand/uL 32 90* 28 84* 29 47*   HEMOGLOBIN g/dL 10 8* 8 4* 9 5*   HEMATOCRIT % 35 8 26 4* 29 3*   PLATELETS Thousands/uL 397* 345 340   BANDS PCT %  --   --  11*     Results from last 7 days   Lab Units 06/17/23  0525 06/16/23  0439 06/15/23  1825   SODIUM mmol/L 132* 130* 133*   POTASSIUM mmol/L 4 0 3 7 3 7   CHLORIDE mmol/L 104 99 103   CO2 mmol/L 20* 25 25   ANION GAP mmol/L 8 6 5   BUN mg/dL 20 20 21   CREATININE mg/dL 0 66 0 64 0 72   EGFR ml/min/1 73sq m 78 79 74   CALCIUM mg/dL 8 0* 7 7* 8 3     Results from last 7 days   Lab Units 06/17/23  0525 06/16/23  0439 06/15/23  1825   GLUCOSE RANDOM mg/dL 60* 283* 106     Results from last 7 days   Lab Units 06/15/23  1905   CLARITY UA  Extra Turbid   COLOR UA  Light Orange   SPEC GRAV UA  1 013   PH UA  7 0   GLUCOSE UA mg/dl Negative   KETONES UA mg/dl Negative   BLOOD UA  Large*   PROTEIN UA mg/dl 70 (1+)*   NITRITE UA  Positive*   BILIRUBIN UA  Negative   UROBILINOGEN UA (BE) mg/dl <2 0   LEUKOCYTES UA  Large*   WBC UA /hpf Innumerable*   RBC UA /hpf Innumerable*   BACTERIA UA /hpf Innumerable*   EPITHELIAL CELLS WET PREP /hpf Occasional     Results from last 7 days   Lab Units 06/15/23  1905 06/15/23  1825   BLOOD CULTURE   --  No Growth at 24 hrs  No Growth at 24 hrs     URINE CULTURE  >100,000 cfu/ml Gram Negative Bowen*  --        Medication Administration - last 24 hours from 06/16/2023 1104 to 06/17/2023 1104       Date/Time Order Dose Route Action     06/17/2023 1007 EDT ascorbic acid (VITAMIN C) tablet 500 mg 500 mg Oral Given     06/16/2023 1803 EDT ascorbic acid (VITAMIN C) tablet 500 mg 500 mg Oral Given     06/17/2023 1007 EDT cholecalciferol (VITAMIN D3) tablet 5,000 Units 5,000 Units Oral Given     06/17/2023 0517 EDT dicyclomine (BENTYL) capsule 10 mg 10 mg Oral Given     06/16/2023 1557 EDT dicyclomine (BENTYL) capsule 10 mg 10 mg Oral Given     06/16/2023 1229 EDT dicyclomine (BENTYL) capsule 10 mg 10 mg Oral Given     06/17/2023 1007 EDT DULoxetine (CYMBALTA) delayed release capsule 60 mg 60 mg Oral Given     06/16/2023 2309 EDT gabapentin (NEURONTIN) capsule 300 mg 300 mg Oral Given     06/17/2023 0517 EDT levothyroxine tablet 100 mcg 100 mcg Oral Given     06/17/2023 1007 EDT polyethylene glycol (MIRALAX) packet 17 g 17 g Oral Given     06/17/2023 1007 EDT torsemide (DEMADEX) tablet 10 mg 10 mg Oral Given     06/17/2023 2218 EDT oxyCODONE (ROXICODONE) IR tablet 5 mg 5 mg Oral Given     06/16/2023 2000 EDT oxyCODONE (ROXICODONE) IR tablet 5 mg 5 mg Oral Given     06/16/2023 1801 EDT cefTRIAXone (ROCEPHIN) 2,000 mg in dextrose 5 % 50 mL IVPB 2,000 mg Intravenous New Bag     06/16/2023 1803 EDT warfarin (COUMADIN) tablet 3 mg 3 mg Oral Given     06/17/2023 0516 EDT acetaminophen (TYLENOL) tablet 650 mg 650 mg Oral Given     06/16/2023 2309 EDT acetaminophen (TYLENOL) tablet 650 mg 650 mg Oral Given     06/16/2023 1803 EDT acetaminophen (TYLENOL) tablet 650 mg 650 mg Oral Given     06/16/2023 1228 EDT acetaminophen (TYLENOL) tablet 650 mg 650 mg Oral Given     06/17/2023 1007 EDT lidocaine (LIDODERM) 5 % patch 2 patch 2 patch Topical Medication Applied     06/17/2023 0029 EDT lidocaine (LIDODERM) 5 % patch 2 patch 2 patch Topical Patch Removed     06/16/2023 1229 EDT lidocaine (LIDODERM) 5 % patch 2 patch 2 patch Topical Medication Applied

## 2023-06-18 ENCOUNTER — HOSPITAL ENCOUNTER (INPATIENT)
Facility: HOSPITAL | Age: 88
LOS: 2 days | Discharge: NURSING, REHAB OR LONG-TERM CARE | End: 2023-06-20
Attending: EMERGENCY MEDICINE | Admitting: FAMILY MEDICINE
Payer: COMMERCIAL

## 2023-06-18 VITALS
WEIGHT: 168.8 LBS | DIASTOLIC BLOOD PRESSURE: 85 MMHG | SYSTOLIC BLOOD PRESSURE: 118 MMHG | OXYGEN SATURATION: 100 % | RESPIRATION RATE: 16 BRPM | HEIGHT: 60 IN | TEMPERATURE: 97.6 F | BODY MASS INDEX: 33.14 KG/M2 | HEART RATE: 73 BPM

## 2023-06-18 DIAGNOSIS — A49.8 INFECTION DUE TO ESBL-PRODUCING ESCHERICHIA COLI: ICD-10-CM

## 2023-06-18 DIAGNOSIS — M25.551 RIGHT HIP PAIN: ICD-10-CM

## 2023-06-18 DIAGNOSIS — Z00.00 ADULT GENERAL MEDICAL EXAM: Primary | ICD-10-CM

## 2023-06-18 DIAGNOSIS — K59.00 CONSTIPATION, UNSPECIFIED CONSTIPATION TYPE: ICD-10-CM

## 2023-06-18 DIAGNOSIS — D64.9 ANEMIA, UNSPECIFIED TYPE: ICD-10-CM

## 2023-06-18 DIAGNOSIS — Z16.12 INFECTION DUE TO ESBL-PRODUCING ESCHERICHIA COLI: ICD-10-CM

## 2023-06-18 DIAGNOSIS — N39.0 UTI (URINARY TRACT INFECTION): ICD-10-CM

## 2023-06-18 LAB
ALBUMIN SERPL BCP-MCNC: 1.5 G/DL (ref 3.5–5)
ALP SERPL-CCNC: 184 U/L (ref 46–116)
ALT SERPL W P-5'-P-CCNC: 32 U/L (ref 12–78)
ANION GAP SERPL CALCULATED.3IONS-SCNC: 6 MMOL/L (ref 4–13)
ANION GAP SERPL CALCULATED.3IONS-SCNC: 7 MMOL/L (ref 4–13)
APTT PPP: 58 SECONDS (ref 23–37)
AST SERPL W P-5'-P-CCNC: 48 U/L (ref 5–45)
BACTERIA UR CULT: ABNORMAL
BASOPHILS # BLD MANUAL: 0 THOUSAND/UL (ref 0–0.1)
BASOPHILS NFR MAR MANUAL: 0 % (ref 0–1)
BILIRUB SERPL-MCNC: 0.4 MG/DL (ref 0.2–1)
BUN SERPL-MCNC: 22 MG/DL (ref 5–25)
BUN SERPL-MCNC: 23 MG/DL (ref 5–25)
CALCIUM ALBUM COR SERPL-MCNC: 10.1 MG/DL (ref 8.3–10.1)
CALCIUM SERPL-MCNC: 8.1 MG/DL (ref 8.3–10.1)
CALCIUM SERPL-MCNC: 8.2 MG/DL (ref 8.3–10.1)
CHLORIDE SERPL-SCNC: 102 MMOL/L (ref 96–108)
CHLORIDE SERPL-SCNC: 105 MMOL/L (ref 96–108)
CO2 SERPL-SCNC: 22 MMOL/L (ref 21–32)
CO2 SERPL-SCNC: 25 MMOL/L (ref 21–32)
CREAT SERPL-MCNC: 0.58 MG/DL (ref 0.6–1.3)
CREAT SERPL-MCNC: 0.6 MG/DL (ref 0.6–1.3)
EOSINOPHIL # BLD MANUAL: 0 THOUSAND/UL (ref 0–0.4)
EOSINOPHIL NFR BLD MANUAL: 0 % (ref 0–6)
ERYTHROCYTE [DISTWIDTH] IN BLOOD BY AUTOMATED COUNT: 13.2 % (ref 11.6–15.1)
ERYTHROCYTE [DISTWIDTH] IN BLOOD BY AUTOMATED COUNT: 13.3 % (ref 11.6–15.1)
GFR SERPL CREATININE-BSD FRML MDRD: 81 ML/MIN/1.73SQ M
GFR SERPL CREATININE-BSD FRML MDRD: 81 ML/MIN/1.73SQ M
GLUCOSE SERPL-MCNC: 68 MG/DL (ref 65–140)
GLUCOSE SERPL-MCNC: 96 MG/DL (ref 65–140)
HCT VFR BLD AUTO: 28 % (ref 34.8–46.1)
HCT VFR BLD AUTO: 28.7 % (ref 34.8–46.1)
HGB BLD-MCNC: 8.8 G/DL (ref 11.5–15.4)
HGB BLD-MCNC: 9.4 G/DL (ref 11.5–15.4)
INR PPP: 2.48 (ref 0.84–1.19)
LACTATE SERPL-SCNC: 1.3 MMOL/L (ref 0.5–2)
LYMPHOCYTES # BLD AUTO: 0.28 THOUSAND/UL (ref 0.6–4.47)
LYMPHOCYTES # BLD AUTO: 1 % (ref 14–44)
MCH RBC QN AUTO: 31 PG (ref 26.8–34.3)
MCH RBC QN AUTO: 32.4 PG (ref 26.8–34.3)
MCHC RBC AUTO-ENTMCNC: 31.4 G/DL (ref 31.4–37.4)
MCHC RBC AUTO-ENTMCNC: 32.8 G/DL (ref 31.4–37.4)
MCV RBC AUTO: 99 FL (ref 82–98)
MCV RBC AUTO: 99 FL (ref 82–98)
METAMYELOCYTES NFR BLD MANUAL: 1 % (ref 0–1)
MONOCYTES # BLD AUTO: 0.85 THOUSAND/UL (ref 0–1.22)
MONOCYTES NFR BLD: 3 % (ref 4–12)
NEUTROPHILS # BLD MANUAL: 26.22 THOUSAND/UL (ref 1.85–7.62)
NEUTS BAND NFR BLD MANUAL: 2 % (ref 0–8)
NEUTS SEG NFR BLD AUTO: 91 % (ref 43–75)
PLATELET # BLD AUTO: 480 THOUSANDS/UL (ref 149–390)
PLATELET # BLD AUTO: 498 THOUSANDS/UL (ref 149–390)
PLATELET BLD QL SMEAR: ABNORMAL
PMV BLD AUTO: 8.7 FL (ref 8.9–12.7)
PMV BLD AUTO: 9.1 FL (ref 8.9–12.7)
POTASSIUM SERPL-SCNC: 3.6 MMOL/L (ref 3.5–5.3)
POTASSIUM SERPL-SCNC: 4.3 MMOL/L (ref 3.5–5.3)
PROCALCITONIN SERPL-MCNC: 1.52 NG/ML
PROT SERPL-MCNC: 6.1 G/DL (ref 6.4–8.4)
PROTHROMBIN TIME: 27.1 SECONDS (ref 11.6–14.5)
RBC # BLD AUTO: 2.84 MILLION/UL (ref 3.81–5.12)
RBC # BLD AUTO: 2.9 MILLION/UL (ref 3.81–5.12)
RBC MORPH BLD: NORMAL
SODIUM SERPL-SCNC: 133 MMOL/L (ref 135–147)
SODIUM SERPL-SCNC: 134 MMOL/L (ref 135–147)
VARIANT LYMPHS # BLD AUTO: 2 %
WBC # BLD AUTO: 28.19 THOUSAND/UL (ref 4.31–10.16)
WBC # BLD AUTO: 28.26 THOUSAND/UL (ref 4.31–10.16)

## 2023-06-18 PROCEDURE — 85007 BL SMEAR W/DIFF WBC COUNT: CPT | Performed by: EMERGENCY MEDICINE

## 2023-06-18 PROCEDURE — 85027 COMPLETE CBC AUTOMATED: CPT | Performed by: EMERGENCY MEDICINE

## 2023-06-18 PROCEDURE — 99222 1ST HOSP IP/OBS MODERATE 55: CPT | Performed by: FAMILY MEDICINE

## 2023-06-18 PROCEDURE — 80048 BASIC METABOLIC PNL TOTAL CA: CPT

## 2023-06-18 PROCEDURE — 87040 BLOOD CULTURE FOR BACTERIA: CPT | Performed by: EMERGENCY MEDICINE

## 2023-06-18 PROCEDURE — 96365 THER/PROPH/DIAG IV INF INIT: CPT

## 2023-06-18 PROCEDURE — 85610 PROTHROMBIN TIME: CPT | Performed by: EMERGENCY MEDICINE

## 2023-06-18 PROCEDURE — 99238 HOSP IP/OBS DSCHRG MGMT 30/<: CPT | Performed by: FAMILY MEDICINE

## 2023-06-18 PROCEDURE — 83605 ASSAY OF LACTIC ACID: CPT | Performed by: EMERGENCY MEDICINE

## 2023-06-18 PROCEDURE — 85027 COMPLETE CBC AUTOMATED: CPT

## 2023-06-18 PROCEDURE — 85730 THROMBOPLASTIN TIME PARTIAL: CPT | Performed by: EMERGENCY MEDICINE

## 2023-06-18 PROCEDURE — 84145 PROCALCITONIN (PCT): CPT | Performed by: EMERGENCY MEDICINE

## 2023-06-18 PROCEDURE — 99285 EMERGENCY DEPT VISIT HI MDM: CPT

## 2023-06-18 PROCEDURE — 36415 COLL VENOUS BLD VENIPUNCTURE: CPT | Performed by: EMERGENCY MEDICINE

## 2023-06-18 PROCEDURE — 93005 ELECTROCARDIOGRAM TRACING: CPT

## 2023-06-18 PROCEDURE — 99285 EMERGENCY DEPT VISIT HI MDM: CPT | Performed by: EMERGENCY MEDICINE

## 2023-06-18 PROCEDURE — 80053 COMPREHEN METABOLIC PANEL: CPT | Performed by: EMERGENCY MEDICINE

## 2023-06-18 RX ORDER — WARFARIN SODIUM 3 MG/1
3 TABLET ORAL
Status: DISCONTINUED | OUTPATIENT
Start: 2023-06-18 | End: 2023-06-20 | Stop reason: HOSPADM

## 2023-06-18 RX ORDER — OXYCODONE HYDROCHLORIDE 5 MG/1
5 TABLET ORAL EVERY 4 HOURS PRN
Status: DISCONTINUED | OUTPATIENT
Start: 2023-06-18 | End: 2023-06-20 | Stop reason: HOSPADM

## 2023-06-18 RX ORDER — ACETAMINOPHEN 325 MG/1
650 TABLET ORAL EVERY 6 HOURS PRN
Status: DISCONTINUED | OUTPATIENT
Start: 2023-06-18 | End: 2023-06-19

## 2023-06-18 RX ORDER — SUCRALFATE 1 G/1
1 TABLET ORAL 2 TIMES DAILY
Status: DISCONTINUED | OUTPATIENT
Start: 2023-06-18 | End: 2023-06-20 | Stop reason: HOSPADM

## 2023-06-18 RX ORDER — LEVOTHYROXINE SODIUM 0.1 MG/1
100 TABLET ORAL
Status: DISCONTINUED | OUTPATIENT
Start: 2023-06-19 | End: 2023-06-20 | Stop reason: HOSPADM

## 2023-06-18 RX ORDER — ACETAMINOPHEN 500 MG
1000 TABLET ORAL EVERY 8 HOURS SCHEDULED
Qty: 30 TABLET | Refills: 0 | Status: SHIPPED
Start: 2023-06-18

## 2023-06-18 RX ORDER — POLYETHYLENE GLYCOL 3350 17 G/17G
17 POWDER, FOR SOLUTION ORAL DAILY
Status: DISCONTINUED | OUTPATIENT
Start: 2023-06-19 | End: 2023-06-19

## 2023-06-18 RX ORDER — OXYCODONE HYDROCHLORIDE 5 MG/1
2.5 TABLET ORAL EVERY 8 HOURS PRN
Qty: 15 TABLET | Refills: 0 | Status: SHIPPED | OUTPATIENT
Start: 2023-06-18 | End: 2023-06-20

## 2023-06-18 RX ORDER — TORSEMIDE 10 MG/1
10 TABLET ORAL DAILY
Status: DISCONTINUED | OUTPATIENT
Start: 2023-06-19 | End: 2023-06-20 | Stop reason: HOSPADM

## 2023-06-18 RX ORDER — SODIUM CHLORIDE AND POTASSIUM CHLORIDE 150; 900 MG/100ML; MG/100ML
100 INJECTION, SOLUTION INTRAVENOUS CONTINUOUS
Status: DISCONTINUED | OUTPATIENT
Start: 2023-06-18 | End: 2023-06-20

## 2023-06-18 RX ORDER — DULOXETIN HYDROCHLORIDE 60 MG/1
60 CAPSULE, DELAYED RELEASE ORAL DAILY
Status: DISCONTINUED | OUTPATIENT
Start: 2023-06-19 | End: 2023-06-20 | Stop reason: HOSPADM

## 2023-06-18 RX ORDER — ONDANSETRON 2 MG/ML
4 INJECTION INTRAMUSCULAR; INTRAVENOUS EVERY 6 HOURS PRN
Status: DISCONTINUED | OUTPATIENT
Start: 2023-06-18 | End: 2023-06-20 | Stop reason: HOSPADM

## 2023-06-18 RX ORDER — AMOXICILLIN AND CLAVULANATE POTASSIUM 875; 125 MG/1; MG/1
1 TABLET, FILM COATED ORAL ONCE
Status: COMPLETED | OUTPATIENT
Start: 2023-06-18 | End: 2023-06-18

## 2023-06-18 RX ORDER — CEPHALEXIN 500 MG/1
500 CAPSULE ORAL EVERY 8 HOURS SCHEDULED
Qty: 12 CAPSULE | Refills: 0 | Status: SHIPPED
Start: 2023-06-18 | End: 2023-06-18

## 2023-06-18 RX ORDER — AMOXICILLIN AND CLAVULANATE POTASSIUM 875; 125 MG/1; MG/1
1 TABLET, FILM COATED ORAL EVERY 12 HOURS
Qty: 14 TABLET | Refills: 0 | Status: SHIPPED | OUTPATIENT
Start: 2023-06-18 | End: 2023-06-19

## 2023-06-18 RX ADMIN — Medication 2.5 MG: at 05:20

## 2023-06-18 RX ADMIN — TORSEMIDE 10 MG: 10 TABLET ORAL at 09:56

## 2023-06-18 RX ADMIN — OXYCODONE HYDROCHLORIDE AND ACETAMINOPHEN 500 MG: 500 TABLET ORAL at 09:56

## 2023-06-18 RX ADMIN — Medication 2.5 MG: at 11:47

## 2023-06-18 RX ADMIN — OXYCODONE HYDROCHLORIDE 5 MG: 5 TABLET ORAL at 23:37

## 2023-06-18 RX ADMIN — PIPERACILLIN SODIUM AND TAZOBACTAM SODIUM 3.38 G: 36; 4.5 INJECTION, POWDER, LYOPHILIZED, FOR SOLUTION INTRAVENOUS at 19:15

## 2023-06-18 RX ADMIN — WARFARIN SODIUM 3 MG: 3 TABLET ORAL at 22:15

## 2023-06-18 RX ADMIN — DICYCLOMINE HYDROCHLORIDE 10 MG: 10 CAPSULE ORAL at 11:47

## 2023-06-18 RX ADMIN — ACETAMINOPHEN 650 MG: 325 TABLET, FILM COATED ORAL at 05:20

## 2023-06-18 RX ADMIN — Medication 1 SPRAY: at 09:56

## 2023-06-18 RX ADMIN — AMOXICILLIN AND CLAVULANATE POTASSIUM 1 TABLET: 875; 125 TABLET, FILM COATED ORAL at 17:23

## 2023-06-18 RX ADMIN — Medication 5000 UNITS: at 09:56

## 2023-06-18 RX ADMIN — SUCRALFATE 1 G: 1 TABLET ORAL at 21:57

## 2023-06-18 RX ADMIN — SODIUM CHLORIDE AND POTASSIUM CHLORIDE 100 ML/HR: .9; .15 SOLUTION INTRAVENOUS at 22:15

## 2023-06-18 RX ADMIN — DICYCLOMINE HYDROCHLORIDE 10 MG: 10 CAPSULE ORAL at 05:20

## 2023-06-18 RX ADMIN — Medication 2.5 MG: at 18:39

## 2023-06-18 RX ADMIN — DULOXETINE HYDROCHLORIDE 60 MG: 60 CAPSULE, DELAYED RELEASE ORAL at 09:56

## 2023-06-18 RX ADMIN — ACETAMINOPHEN 650 MG: 325 TABLET, FILM COATED ORAL at 11:49

## 2023-06-18 RX ADMIN — DICLOFENAC SODIUM 2 G: 10 GEL TOPICAL at 21:57

## 2023-06-18 RX ADMIN — POLYETHYLENE GLYCOL 3350 17 G: 17 POWDER, FOR SOLUTION ORAL at 09:56

## 2023-06-18 RX ADMIN — LEVOTHYROXINE SODIUM 100 MCG: 100 TABLET ORAL at 05:20

## 2023-06-18 RX ADMIN — FLUTICASONE PROPIONATE 1 SPRAY: 50 SPRAY, METERED NASAL at 09:56

## 2023-06-18 RX ADMIN — Medication 2.5 MG: at 14:35

## 2023-06-18 RX ADMIN — LIDOCAINE 2 PATCH: 50 PATCH CUTANEOUS at 09:56

## 2023-06-18 NOTE — ED PROVIDER NOTES
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 who presents for evaluation of abnormal vitals  Patient was recently in the hospital for the past 3 days  She was admitted due to concern for possible infection  She was found to have urinary tract infection and was treated with ceftriaxone  She was discharged to rehab today  When she got to the rehab facility, they checked her vitals and noticed her oxygen saturation was low and her heart rate was in the 100s  Patient did not have any complaints  Upon EMS arrival, patient's oxygen saturation was in the high 90s in room air  Her heart rate was in the 80s  Her blood pressure was 226 systolic  Patient denies any complaints at this time other than right hip pain which she has been having for several weeks  Prior to Admission Medications   Prescriptions Last Dose Informant Patient Reported? Taking?    Cholecalciferol 125 MCG (5000 UT) TABS  Outside Facility (Specify) Yes No   Sig: Take 5,000 Units by mouth daily   DULoxetine (CYMBALTA) 60 mg delayed release capsule  Outside Facility (Specify) No No   Sig: TAKE 1 CAPSULE BY MOUTH EVERY DAY   Diclofenac Sodium (VOLTAREN) 1 %  Outside Facility (Specify) No No   Sig: Apply 2 g topically 4 (four) times a day   acetaminophen (TYLENOL) 500 mg tablet   No No   Sig: Take 2 tablets (1,000 mg total) by mouth every 8 (eight) hours   desoximetasone (TOPICORT) 0 25 % cream  Outside Facility (Specify) No No   Sig: APPLY TOPICALLY AS NEEDED FOR IRRITATION   dicyclomine (BENTYL) 10 mg capsule   Yes No   fluticasone (FLONASE) 50 mcg/act nasal spray  Outside Facility (Specify) No No   Sig: SPRAY 2 SPRAYS INTO EACH NOSTRIL EVERY DAY gabapentin (NEURONTIN) 300 mg capsule   Yes No   levothyroxine 100 mcg tablet  Outside Facility (Specify) No No   Sig: TAKE 1 TABLET BY MOUTH EVERY DAY   loperamide (IMODIUM) 2 mg capsule  Outside Facility (Specify) Yes No   Sig: Take 2 mg by mouth 4 (four) times a day as needed for diarrhea   nystatin powder   Yes No   oxyCODONE (ROXICODONE) 5 immediate release tablet   No No   Sig: Take 0 5 tablets (2 5 mg total) by mouth every 8 (eight) hours as needed for moderate pain for up to 10 days Max Daily Amount: 7 5 mg   polyethylene glycol (MIRALAX) 17 g packet  Outside Facility (Specify) Yes No   Sig: Take 17 g by mouth daily   potassium chloride 10% oral solution  Outside Facility (Specify) No No   Sig: Take 15 mL (20 mEq total) by mouth daily   simethicone (MYLICON) 80 mg chewable tablet  Outside Facility (Specify) Yes No   Sig: Chew 80 mg every 6 (six) hours as needed for flatulence   sucralfate (CARAFATE) 1 g tablet  Outside Facility (Specify) No No   Sig: TAKE 1 TABLET BY MOUTH 2 TIMES A DAY  torsemide (DEMADEX) 10 mg tablet   Yes No   Sig: Take 10 mg by mouth daily   warfarin (COUMADIN) 3 mg tablet  Outside Facility (Specify) Yes No      Facility-Administered Medications: None       Past Medical History:   Diagnosis Date   • Abdominal bloating 8/1/2016   • Anemia    • Arthritis    • Cancer (HCC)     basal cell   • CHF (congestive heart failure) (HCC)    • COVID-19    • Disease of thyroid gland    • Disturbance of smell and taste     disturbance of taste   • Effusion of knee joint right    • Fibromyalgia    • Heart murmur     reported a previous heart murmur   • History of acute myocardial infarction    • History of atrial fibrillation    • History of bruising easily    • History of cancer    • History of dermatitis    • History of mammogram    • History of methicillin resistant staphylococcus aureus (MRSA)     Negative nasal culture, isolation discontinued 8/17/2018     • History of methicillin resistant staphylococcus aureus (MRSA) 08/17/2018    negative nasal culture-isolation and hx discontinued 8/17/2018   • History of obesity    • History of osteopenia    • History of sciatica    • History of shortness of breath    • History of sinusitis    • History of sore throat    • History of syncope    • History of umbilical hernia    • History of viral infection    • Irritable bowel syndrome (IBS)    • Joint pain, hip    • Limb pain    • Myalgia     myalgia and myositis   • Need for prophylactic vaccination against single diseases    • Need for prophylactic vaccination and inoculation against influenza    • Preoperative cardiovascular examination    • Primary osteoarthritis of right knee    • Right leg pain    • Vaginal Pap smear     reported pap smear       Past Surgical History:   Procedure Laterality Date   • ANKLE ARTHRODESIS Right    • BACK SURGERY     • BARIATRIC SURGERY     • CHOLECYSTECTOMY      x2   • COLONOSCOPY     • ESOPHAGOGASTRODUODENOSCOPY N/A 3/23/2018    Procedure: ESOPHAGOGASTRODUODENOSCOPY (EGD); Surgeon: Olinda Arguello MD;  Location: [facility]; Service: Gastroenterology   • FOOT SURGERY     • HIP CLOSE REDUCTION Right 8/21/2016    Procedure: CLOSED REDUCTION RIGHT TOTAL HIP;  Surgeon: Ruddy Denton MD;  Location: [facility];  Service:    • INSERT / Relay / IMVU Drivers     • IR BIOPSY OTHER  8/1/2020   • JOINT REPLACEMENT      LEFT KNEE REPLACEMENT   • JOINT REPLACEMENT      Right HIP   • PILONIDAL CYST EXCISION      x2   • FL EXC B9 LESION MRGN XCP SK TG T/A/L 0 5 CM/< Right 7/24/2019    Procedure: REVISION SCAR EXTREMITY Rt Hip;   Surgeon: Levar Mayfield MD;  Location: [facility];  Service: Orthopedics   • FL REVJ TOT HIP ARTHRP [address] W/WO AGRFT/ALGRFT Right 4/15/2019    Procedure: ARTHROPLASTY HIP TOTAL ACETABULAR REVISION;  Surgeon: Levar Mayfield MD;  Location: [facility];  Service: Orthopedics   • REPLACEMENT TOTAL KNEE Right    • SILVANA-EN-Y PROCEDURE      x2   • TOTAL KNEE ARTHROPLASTY • UMBILICAL HERNIA REPAIR      x2       Family History   Problem Relation Age of Onset   • Coronary artery disease Mother    • Heart attack Mother    • Hypertension Mother    • Coronary artery disease Father    • Heart attack Father    • Hypertension Father    • Lymphoma Sister         acute   • Rashes / Skin problems Sister         lymphomatoid papulosis   • Cancer Sister    • Coronary artery disease Brother    • Heart attack Brother         x2   • Aneurysm Neg Hx    • Hyperlipidemia Neg Hx      I have reviewed and agree with the history as documented  E-Cigarette/Vaping   • E-Cigarette Use Never User      E-Cigarette/Vaping Substances   • Nicotine No    • THC No    • CBD No    • Flavoring No    • Other No    • Unknown No      Social History     Tobacco Use   • Smoking status: Never   • Smokeless tobacco: Never   Vaping Use   • Vaping Use: Never used   Substance Use Topics   • Alcohol use: Not Currently   • Drug use: No        Review of Systems   Constitutional: Negative for appetite change, chills and fever  HENT: Negative for congestion, rhinorrhea and sore throat  Respiratory: Negative for cough and shortness of breath  Cardiovascular: Negative for chest pain  Gastrointestinal: Negative for abdominal pain, diarrhea, nausea and vomiting  Genitourinary: Negative for dysuria, frequency, hematuria and urgency  Musculoskeletal: Positive for arthralgias  Negative for myalgias  Skin: Negative for rash  Neurological: Negative for dizziness, weakness, light-headedness, numbness and headaches  All other systems reviewed and are negative        Physical Exam  ED Triage Vitals   Temperature Pulse Respirations Blood Pressure SpO2   06/18/23 1709 06/18/23 1654 06/18/23 1654 06/18/23 1654 06/18/23 1654   98 2 °F (36 8 °C) 82 16 122/78 96 %      Temp Source Heart Rate Source Patient Position - Orthostatic VS BP Location FiO2 (%)   06/18/23 1709 06/18/23 2000 06/18/23 2115 06/18/23 2115 --   Oral Monitor Lying Right arm       Pain Score       06/18/23 1654       10 - Worst Possible Pain             Orthostatic Vital Signs  Vitals:    06/19/23 1626 06/20/23 0031 06/20/23 0758 06/20/23 1445   BP: 91/61 94/58 123/55 (!) 99/47   Pulse:  70  69   Patient Position - Orthostatic VS:           Physical Exam  Vitals and nursing note reviewed  Constitutional:       General: She is not in acute distress  Appearance: Normal appearance  She is well-developed and normal weight  She is not ill-appearing, toxic-appearing or diaphoretic  HENT:      Head: Normocephalic and atraumatic  Right Ear: External ear normal       Left Ear: External ear normal       Nose: Nose normal       Mouth/Throat:      Mouth: Mucous membranes are moist       Pharynx: Oropharynx is clear  Eyes:      Extraocular Movements: Extraocular movements intact  Conjunctiva/sclera: Conjunctivae normal    Cardiovascular:      Rate and Rhythm: Normal rate and regular rhythm  Pulses: Normal pulses  Heart sounds: Normal heart sounds  No murmur heard  No friction rub  No gallop  Pulmonary:      Effort: Pulmonary effort is normal  No respiratory distress  Breath sounds: Normal breath sounds  No wheezing or rales  Abdominal:      General: There is no distension  Palpations: Abdomen is soft  Tenderness: There is no abdominal tenderness  There is no guarding or rebound  Musculoskeletal:         General: Deformity present  No tenderness  Cervical back: Neck supple  Comments: Right hip internally rotated and shortened  Skin:     General: Skin is warm and dry  Coloration: Skin is not pale  Findings: No erythema or rash  Neurological:      General: No focal deficit present  Mental Status: She is alert and oriented to person, place, and time  Cranial Nerves: No cranial nerve deficit  Sensory: No sensory deficit  Motor: No weakness     Psychiatric:         Mood and Affect: Mood normal          Behavior: Behavior normal          ED Medications  Medications   amoxicillin-clavulanate (AUGMENTIN) 875-125 mg per tablet 1 tablet (1 tablet Oral Given 6/18/23 1723)   oxyCODONE (ROXICODONE) split tablet 2 5 mg (2 5 mg Oral Given 6/18/23 1839)   piperacillin-tazobactam (ZOSYN) 3 375 g in sodium chloride 0 9 % 100 mL IVPB (0 g Intravenous Stopped 6/18/23 1945)   fosfomycin (MONUROL) packet 3 g (3 g Oral Given 6/19/23 1834)       Diagnostic Studies  Results Reviewed     Procedure Component Value Units Date/Time    Blood culture #1 [[id number]] Collected: 06/18/23 1846    Lab Status: Preliminary result Specimen: Blood from Arm, Right Updated: 06/19/23 2301     Blood Culture No Growth at 24 hrs  Blood culture #2 [[id number]] Collected: 06/18/23 1846    Lab Status: Preliminary result Specimen: Blood from Hand, Right Updated: 06/19/23 2301     Blood Culture No Growth at 24 hrs      Basic metabolic panel [[id number]]  (Abnormal) Collected: 06/19/23 0424    Lab Status: Final result Specimen: Blood from Arm, Right Updated: 06/19/23 0519     Sodium 134 mmol/L      Potassium 3 6 mmol/L      Chloride 107 mmol/L      CO2 22 mmol/L      ANION GAP 5 mmol/L      BUN 20 mg/dL      Creatinine 0 49 mg/dL      Glucose 85 mg/dL      Calcium 7 4 mg/dL      eGFR 86 ml/min/1 73sq m     Narrative:      [place] guidelines for Chronic Kidney Disease (CKD):   •  Stage 1 with normal or high GFR (GFR > 90 mL/min/1 73 square meters)  •  Stage 2 Mild CKD (GFR = 60-89 mL/min/1 73 square meters)  •  Stage 3A Moderate CKD (GFR = 45-59 mL/min/1 73 square meters)  •  Stage 3B Moderate CKD (GFR = 30-44 mL/min/1 73 square meters)  •  Stage 4 Severe CKD (GFR = 15-29 mL/min/1 73 square meters)  •  Stage 5 End Stage CKD (GFR <15 mL/min/1 73 square meters)  Note: GFR calculation is accurate only with a steady state creatinine    Procalcitonin [[id number]]  (Abnormal) Collected: 06/19/23 0424    Lab Status: Final result Specimen: Blood from Arm, Right Updated: 06/19/23 0507     Procalcitonin 1 22 ng/ml     CBC and differential [[id number]]  (Abnormal) Collected: 06/19/23 0424    Lab Status: Final result Specimen: Blood from Arm, Right Updated: 06/19/23 0435     WBC 24 01 Thousand/uL      RBC 2 46 Million/uL      Hemoglobin 7 6 g/dL      Hematocrit 25 5 %       fL      MCH 30 9 pg      MCHC 29 8 g/dL      RDW 13 3 %      MPV 8 6 fL      Platelets 957 Thousands/uL      nRBC 0 /100 WBCs     Narrative:      See Manual Diff Done Within 3 Days  This is an appended report  These results have been appended to a previously verified report      Urine Microscopic [[id number]]  (Abnormal) Collected: 06/19/23 0033    Lab Status: Final result Specimen: Urine, Other Updated: 06/19/23 0132     RBC, UA None Seen /hpf      WBC, UA 4-10 /hpf      Epithelial Cells Occasional /hpf      Bacteria, UA Innumerable /hpf      Amorphous Crystals, UA Occasional    UA w Reflex to Microscopic w Reflex to Culture [[id number]]  (Abnormal) Collected: 06/19/23 0033    Lab Status: Final result Specimen: Urine, Other Updated: 06/19/23 0119     Color, UA Light Orange     Clarity, UA Extra Turbid     Specific Eldred, UA 1 019     pH, UA 5 5     Leukocytes, UA Small     Nitrite, UA Negative     Protein, UA Trace mg/dl      Glucose, UA Negative mg/dl      Ketones, UA 10 (1+) mg/dl      Urobilinogen, UA <2 0 mg/dl      Bilirubin, UA Negative     Occult Blood, UA Negative    Procalcitonin [[id number]]  (Abnormal) Collected: 06/18/23 1846    Lab Status: Final result Specimen: Blood from Arm, Right Updated: 06/18/23 1925     Procalcitonin 1 52 ng/ml     Comprehensive metabolic panel [[id number]]  (Abnormal) Collected: 06/18/23 1846    Lab Status: Final result Specimen: Blood from Arm, Right Updated: 06/18/23 1919     Sodium 133 mmol/L      Potassium 3 6 mmol/L      Chloride 102 mmol/L      CO2 25 mmol/L      ANION GAP 6 mmol/L      BUN 22 mg/dL      Creatinine 0 60 mg/dL      Glucose 96 mg/dL      Calcium 8 1 mg/dL      Corrected Calcium 10 1 mg/dL      AST 48 U/L      ALT 32 U/L      Alkaline Phosphatase 184 U/L      Total Protein 6 1 g/dL      Albumin 1 5 g/dL      Total Bilirubin 0 40 mg/dL      eGFR 81 ml/min/1 73sq m     Narrative:      [place] guidelines for Chronic Kidney Disease (CKD):   •  Stage 1 with normal or high GFR (GFR > 90 mL/min/1 73 square meters)  •  Stage 2 Mild CKD (GFR = 60-89 mL/min/1 73 square meters)  •  Stage 3A Moderate CKD (GFR = 45-59 mL/min/1 73 square meters)  •  Stage 3B Moderate CKD (GFR = 30-44 mL/min/1 73 square meters)  •  Stage 4 Severe CKD (GFR = 15-29 mL/min/1 73 square meters)  •  Stage 5 End Stage CKD (GFR <15 mL/min/1 73 square meters)  Note: GFR calculation is accurate only with a steady state creatinine    Protime-INR [[id number]]  (Abnormal) Collected: 06/18/23 1846    Lab Status: Final result Specimen: Blood from Arm, Right Updated: 06/18/23 1919     Protime 27 1 seconds      INR 2 48    APTT [[id number]]  (Abnormal) Collected: 06/18/23 1846    Lab Status: Final result Specimen: Blood from Arm, Right Updated: 06/18/23 1919     PTT 58 seconds     CBC and differential [[id number]]  (Abnormal) Collected: 06/18/23 1846    Lab Status: Final result Specimen: Blood from Arm, Right Updated: 06/18/23 1918     WBC 28 19 Thousand/uL      RBC 2 84 Million/uL      Hemoglobin 8 8 g/dL      Hematocrit 28 0 %      MCV 99 fL      MCH 31 0 pg      MCHC 31 4 g/dL      RDW 13 2 %      MPV 8 7 fL      Platelets 013 Thousands/uL     Narrative: This is an appended report  These results have been appended to a previously verified report      Manual Differential(PHLEBS Do Not Order) [[id number]]  (Abnormal) Collected: 06/18/23 1846    Lab Status: Final result Specimen: Blood from Arm, Right Updated: 06/18/23 1918     Segmented % 91 %      Bands % 2 %      Lymphocytes % 1 %      Monocytes % 3 %      Eosinophils, % 0 % Basophils % 0 %      Metamyelocytes% 1 %      Atypical Lymphocytes % 2 %      Absolute Neutrophils 26 22 Thousand/uL      Lymphocytes Absolute 0 28 Thousand/uL      Monocytes Absolute 0 85 Thousand/uL      Eosinophils Absolute 0 00 Thousand/uL      Basophils Absolute 0 00 Thousand/uL      Total Counted --     RBC Morphology Normal     Platelet Estimate Increased    Lactic acid [[id number]]  (Normal) Collected: 06/18/23 1846    Lab Status: Final result Specimen: Blood from Arm, Right Updated: 06/18/23 1916     LACTIC ACID 1 3 mmol/L     Narrative:      Result may be elevated if tourniquet was used during collection  No orders to display         Procedures  Procedures      ED Course                                       MDM     66-year-old female with past medical history of A-fib on Coumadin, 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 who presents for evaluation of abnormal vitals  Patient was just discharged in the hospital after being admitted for UTI  Per facility, patient had abnormal blood pressure, heart rate and pulse ox  Spoke with patient's daughter, she states patient also had a fever  Reviewed labs, patient still had leukocytosis to 28,000 this morning  Reviewed urine culture, patient had ESBL E  coli, she was being inadequately treated with ceftriaxone and Keflex  Given fever and leukocytosis, will obtain septic work-up and start IV Zosyn  We will plan for admission to the hospital   Discussed plan with patient and her daughter  Discussed with family medicine for admission      Disposition  Final diagnoses:   Adult general medical exam   UTI (urinary tract infection)     Time reflects when diagnosis was documented in both MDM as applicable and the Disposition within this note     Time User Action Codes Description Comment    6/18/2023  5:13 PM Nidhi Fallon [T36 92] Adult general medical exam     6/18/2023  5:13 PM Dianna Cristobal Add [N39 0] UTI (urinary tract infection)     6/19/2023 10:52 AM Aparna Carmen Add [A49 8,  G25 98] Infection due to ESBL-producing Escherichia coli     6/19/2023 12:30 PM Richa Osman Modify [A49 8,  Z16 12] Infection due to ESBL-producing Escherichia coli     6/19/2023 12:30 PM Max Albrecht Add [M25 551] Right hip pain     6/20/2023  2:58 PM Alma Rosasalvador Pollock Add [D64 9] Anemia, unspecified type     6/20/2023  3:07 PM Alma Rosa Phill Add [K59 00] Constipation, unspecified constipation type       ED Disposition     ED Disposition   Admit    Condition   Stable    Date/Time   Sun Jun 18, 2023  7:35 PM    Comment   Case was discussed with family medicine and the patient's admission status was agreed to be Admission Status: inpatient status to the service of Dr Eileen Martinez MD Documentation    [facility], [address] [place] [address] Ne      RN Documentation    [facility], [address] [place] [address]      Follow-up Information     Follow up With Specialties Details Why Contact Info    Jaymie Granados DO Family Medicine Schedule an appointment as soon as possible for a visit   [address] [place] [id number]  [phone]            Discharge Medication List as of 6/20/2023  4:53 PM      CONTINUE these medications which have CHANGED    Details   oxyCODONE (ROXICODONE) 5 immediate release tablet Take 0 5 tablets (2 5 mg total) by mouth every 8 (eight) hours as needed for moderate pain for up to 10 days Max Daily Amount: 7 5 mg, Starting Mon 6/19/2023, Until Thu 6/29/2023 at 2359, Print      polyethylene glycol (MIRALAX) 17 g packet Take 17 g by mouth daily as needed (constipation), Starting Tue 6/20/2023, No Print         CONTINUE these medications which have NOT CHANGED    Details   acetaminophen (TYLENOL) 500 mg tablet Take 2 tablets (1,000 mg total) by mouth every 8 (eight) hours, Starting Sun 6/18/2023, No Print Cholecalciferol 125 MCG (5000 UT) TABS Take 5,000 Units by mouth daily, Historical Med      desoximetasone (TOPICORT) 0 25 % cream APPLY TOPICALLY AS NEEDED FOR IRRITATION, Starting Fri 9/16/2022, Normal      Diclofenac Sodium (VOLTAREN) 1 % Apply 2 g topically 4 (four) times a day, Starting Tue 1/25/2022, Normal      dicyclomine (BENTYL) 10 mg capsule Starting Fri 3/10/2023, Historical Med      DULoxetine (CYMBALTA) 60 mg delayed release capsule TAKE 1 CAPSULE BY MOUTH EVERY DAY, Normal      fluticasone (FLONASE) 50 mcg/act nasal spray SPRAY 2 SPRAYS INTO EACH NOSTRIL EVERY DAY, Normal      gabapentin (NEURONTIN) 300 mg capsule Starting Sun 5/7/2023, Historical Med      levothyroxine 100 mcg tablet TAKE 1 TABLET BY MOUTH EVERY DAY, Normal      loperamide (IMODIUM) 2 mg capsule Take 2 mg by mouth 4 (four) times a day as needed for diarrhea, Historical Med      nystatin powder Starting Sun 5/7/2023, Historical Med      potassium chloride 10% oral solution Take 15 mL (20 mEq total) by mouth daily, Starting Mon 2/8/2021, Normal      simethicone (MYLICON) 80 mg chewable tablet Chew 80 mg every 6 (six) hours as needed for flatulence, Historical Med      sucralfate (CARAFATE) 1 g tablet TAKE 1 TABLET BY MOUTH 2 TIMES A DAY , Normal      torsemide (DEMADEX) 10 mg tablet Take 10 mg by mouth daily, Historical Med      warfarin (COUMADIN) 3 mg tablet Historical Med           Outpatient Discharge Orders   CBC and differential   Standing Status: Future Standing Exp  Date: 06/20/24       PDMP Review       Value Time User    PDMP Reviewed  Yes 4/17/2020 12:53 PM Susana Dominguez MD           ED Provider  Attending physically available and evaluated Babita Tijerina  [facility] managed the patient along with the ED Attending      Electronically Signed by         Tomasa Cruz MD  06/20/23 2041

## 2023-06-18 NOTE — ASSESSMENT & PLAN NOTE
2-3 week history of chronic R hip pain  Has history of right hip replacement and follows with orthopedics outpatient  Most recently admitted at [facility] from 6/8-6/12 under the trauma service for chronic right hip dislocation  Orthopedics evaluated patient and stated no surgical intervention required  Discharged to SNF with outpatient orthopedics follow-up    XR right hip/pelvis (6/15/23):   1  Stable appearance of the right hip without acute fracture or dislocation  2  Postprocedural changes related to total right hip arthroplasty with stable marked superior acetabular and articulating femoral prosthetic component migration  3  Remote posttraumatic deformity involving the femur, acetabulum and inferior pubic ramus as noted       Significant pain and tenderness on exam today, unable to lift R leg    Plan:  · Lidocaine patch on R hip  · Pain regimen: dimitrios tylenol 1000 mg Q8H, oxy 2 5 mg Q8H prn on discharge  · F/u with ortho outpatient

## 2023-06-18 NOTE — PLAN OF CARE
Problem: MOBILITY - ADULT  Goal: Maintain or return to baseline ADL function  Description: INTERVENTIONS:  -  Assess patient's ability to carry out ADLs; assess patient's baseline for ADL function and identify physical deficits which impact ability to perform ADLs (bathing, care of mouth/teeth, toileting, grooming, dressing, etc )  - Assess/evaluate cause of self-care deficits   - Assess range of motion  - Assess patient's mobility; develop plan if impaired  - Assess patient's need for assistive devices and provide as appropriate  - Encourage maximum independence but intervene and supervise when necessary  - Involve family in performance of ADLs  - Assess for home care needs following discharge   - Consider OT consult to assist with ADL evaluation and planning for discharge  - Provide patient education as appropriate  Outcome: Progressing  Goal: Maintains/Returns to pre admission functional level  Description: INTERVENTIONS:  - Perform BMAT or MOVE assessment daily    - Set and communicate daily mobility goal to care team and patient/family/caregiver     - Collaborate with rehabilitation services on mobility goals if consulted  - Out of bed for toileting  - Record patient progress and toleration of activity level   Outcome: Progressing     Problem: Prexisting or High Potential for Compromised Skin Integrity  Goal: Skin integrity is maintained or improved  Description: INTERVENTIONS:  - Identify patients at risk for skin breakdown  - Assess and monitor skin integrity  - Assess and monitor nutrition and hydration status  - Monitor labs   - Assess for incontinence   - Turn and reposition patient  - Assist with mobility/ambulation  - Relieve pressure over bony prominences  - Avoid friction and shearing  - Provide appropriate hygiene as needed including keeping skin clean and dry  - Evaluate need for skin moisturizer/barrier cream  - Collaborate with interdisciplinary team   - Patient/family teaching  - Consider wound care consult   Outcome: Progressing     Problem: PAIN - ADULT  Goal: Verbalizes/displays adequate comfort level or baseline comfort level  Description: Interventions:  - Encourage patient to monitor pain and request assistance  - Assess pain using appropriate pain scale  - Administer analgesics based on type and severity of pain and evaluate response  - Implement non-pharmacological measures as appropriate and evaluate response  - Consider cultural and social influences on pain and pain management  - Notify physician/advanced practitioner if interventions unsuccessful or patient reports new pain  Outcome: Progressing

## 2023-06-18 NOTE — CASE MANAGEMENT
Case Management Discharge Planning Note    Patient name Jeremias Master  Location [facility] 810/[facility] 459-36 MRN [id number]  : 1933 Date 2023       Current Admission Date: 6/15/2023  Current Admission Diagnosis:Acute cystitis without hematuria   Patient Active Problem List    Diagnosis Date Noted   • Hyponatremia 06/15/2023   • Lumbar radiculopathy 02/10/2023   • Low back pain with sciatica 02/10/2023   • Lumbar spondylosis 02/10/2023   • S/P lumbar fusion 02/10/2023   • Peripheral vascular disease, unspecified ([facility] [address] ) 2022   • Negative depression screening 2021   • Benign essential HTN 2021   • Trochanteric bursitis 2021   • Meningioma ([facility] ) 11/10/2020   • Trochanteric bursitis of right hip 11/10/2020   • Stage 3a chronic kidney disease ([facility] ) 11/10/2020   • Fall 10/13/2020   • Hematoma 10/13/2020   • Ecchymosis of eye 10/13/2020   • Elevated C-reactive protein (CRP) 2020   • S/P scar revision 2019   • Hypertrophic scar of skin 2019   • Cellulitis of right lower extremity 2019   • Right hip pain 2019   • MCI (mild cognitive impairment) 2019   • Ambulatory dysfunction 2019   • Acute cystitis without hematuria 2019   • Status post right hip replacement 2019   • Macrocytosis 2019   • Status post gastrectomy 2019   • Hypothyroidism 10/11/2018   • Pacemaker 2018   • Chronic diastolic CHF (congestive heart failure) ([facility] [address] ) 2016   • Depression 2016   • Class 1 obesity due to excess calories with serious comorbidity and body mass index (BMI) of 33 0 to 33 9 in adult 2016   • Chronic pain 2016   • Chronic pain of lower extremity, bilateral 2016   • External hemorrhoids 2016   • Anemia 2016   • Chronic atrial fibrillation ([facility] [address] ) 2016   • Irritable bowel syndrome with constipation 09/15/2015   • Synovial cyst of right popliteal space 2015   • Primary osteoarthritis of right knee 06/30/2015   • Osteoarthritis, multiple sites 06/26/2014   • Moderate mitral regurgitation 05/13/2014   • Osteoporosis 02/04/2014   • Fibromyalgia 05/31/2012   • Peripheral neuropathy 05/25/2012   • Lumbar canal stenosis 05/17/2012      LOS (days): 3  Geometric Mean LOS (GMLOS) (days): 3 70  Days to GMLOS:1     OBJECTIVE:  Risk of Unplanned Readmission Score: 20 72         Current admission status: Inpatient   Preferred Pharmacy:   [id number] ,  [facility] [address] [place] [address]  Phone: [phone] Fax: [phone]    [id number], [address] [place] [id number]  Phone: [phone] Fax: [phone]    UNKNOWN - FOLLOW UP PRIOR TO DISCHARGE TO E-PRESCRIBE  No address on file      Primary Care Provider: Samira Borjas DO    Primary Insurance: [address] [place] [id number] 108:     DISCHARGE DETAILS:    Transported by (Company and Unit #): [facility]  ETA of Transport (Date): 06/18/23  ETA of Transport (Time): Chriss 224 Arrived: No  Transfer Mode: [address]  Name, [address] : [address]  Receiving Facility/Agency Phone Number: [phone]\  Facility/Agency Fax Number: [phone]      Medical team and Roma Villagran (RN at [facility]) made aware of p/u time

## 2023-06-18 NOTE — DISCHARGE INSTRUCTIONS
Please take augmentin two times per day for 7 days for UTI, based on your susceptibilities, Keflex does not treat your infection  Your vital signs are normal here, O2 sat 98%, HR 82, temp 98 2 F, /78

## 2023-06-18 NOTE — DISCHARGE SUMMARY
DISCHARGE SUMMARY - Family Medicine Residency, [facility] 9/2/1933, 80 y o  female  MRN: [id number]    Unit/Bed#: [facility] 810-01 Encounter: [id number]  Primary Care Provider: Ny Coreas DO      Admission Date: 6/15/23  Discharge Date: 06/18/23  Length of Stay: 3 days  Diagnosis:   Principal Problem:    Acute cystitis without hematuria  Active Problems:    Chronic atrial fibrillation (HCC)    Chronic diastolic CHF (congestive heart failure) (HCC)    Right hip pain    Benign essential HTN    Lumbar spondylosis    Hyponatremia        ASSESSMENTS & PLANS:   Plans discussed with [facility] team and finalization is pending attending physician attestation  Hyponatremia  Assessment & Plan  Sodium 133 in the ED, baseline around 143  Appears euvolemic on exam     Sodium       Results from last 7 days   Lab Units 06/18/23  0537 06/17/23  0525 06/16/23  0439 06/15/23  1825   SODIUM mmol/L 134* 132* 130* 133*      Clinically stable    · Monitor on BMP   · Follow diet, consider gentle fluids (hx of CHF)    Lumbar spondylosis  Assessment & Plan  2/26/23 CT lumbar spine: Multilevel spondylosis most pronounced at L3-4 and L4-5  Status post posterior fusion L2-3  · Continue gabapentin 300mg qhs      Benign essential HTN  Assessment & Plan  BP mildly soft in 249H systolic         - Most recent BP Blood Pressure: 429/84   -  Systolic (28ZZT), EZV:574 , Min:89 , VOV:071   - Diastolic (44KIM), SQR:86, Min:48, Max:85     Plan:  · Continue home Torsemide 10mg daily but with hold parameters for SBP < 110     Right hip pain  Assessment & Plan  2-3 week history of chronic R hip pain  Has history of right hip replacement and follows with orthopedics outpatient  Most recently admitted at [facility] from 6/8-6/12 under the trauma service for chronic right hip dislocation  Orthopedics evaluated patient and stated no surgical intervention required   Discharged to SNF with outpatient orthopedics follow-up    XR right hip/pelvis (6/15/23):   1  Stable appearance of the right hip without acute fracture or dislocation  2  Postprocedural changes related to total right hip arthroplasty with stable marked superior acetabular and articulating femoral prosthetic component migration  3  Remote posttraumatic deformity involving the femur, acetabulum and inferior pubic ramus as noted  Significant pain and tenderness on exam today, unable to lift R leg    Plan:  · Lidocaine patch on R hip  · Pain regimen: dimitrios tylenol 1000 mg Q8H, oxy 2 5 mg Q8H prn on discharge  · F/u with ortho outpatient     Chronic diastolic CHF (congestive heart failure) (HCC)  Assessment & Plan  Wt Readings from Last 3 Encounters:   06/16/23 76 6 kg (168 lb 12 8 oz)   03/24/23 75 8 kg (167 lb)   03/17/23 77 kg (169 lb 12 1 oz)      No intake/output data recorded  Net IO Since Admission: 222 mL [06/18/23 1411]    HFpEF per cardiology notes  Last ECHO 4/27/2018 EF 55%, LV thickness upper limits normal, moderate mitral regurgitation, moderate tricuspid regurgitation  · Monitor I/O   · Continue torsemide 10mg          Chronic atrial fibrillation (HCC)  Assessment & Plan  Currently rate controlled  On warfarin 3mg at home per nursing home  INR in ED 2 29  COAG     Results from last 7 days   Lab Units 06/16/23  0439 06/15/23  1825 06/15/23  1653   PTT seconds  --  48*  --    PROTIME seconds 24 9*  --  25 4*   INR  2 22*  --  2 29*       · Continue warfarin 3mg  ·  f/u INR     * Acute cystitis without hematuria  Assessment & Plan  Patient endorsing urinary frequency and dysuria  Patient had UTI during prior hospitalization at [facility] and was treated with Carrol Salazar in June 2023  ED workup:  - UA shows + nitrites, large leukocytes  UM shows innumerable RBC, WBC, bacterial and 10-25 hyaline casts  - WBC 29 47, bands 11%, elevated procal 1 32, lactate WNL     - Urine cultures, blood cultures collected  - Ceftriaxone x1 dose    WCB & ANC     Results from last 7 days   Lab Units 06/18/23  0537 06/17/23  1544 06/17/23  0525 06/16/23  0439 06/15/23  1653   WBC Thousand/uL 28 26* 31 27* 32 90* 28 84* 29 47*   NEUTROS ABS Thousands/µL  --  27 73*  --   --   --       Assessment:  - Currently stable  Continues with suprapubic tenderness        Plan:  · IV ceftriaxone 2g q24 x 3 while inpatient  · F/u urine cultures and blood cultures (G- Rods), await sensitivities  · Monitor vitals   · D/c on Keflex 500 mg Q8H x 4 days       Patient Active Problem List   Diagnosis   • Chronic atrial fibrillation ([facility])   • Anemia   • Chronic diastolic CHF (congestive heart failure) ([facility])   • Depression   • Class 1 obesity due to excess calories with serious comorbidity and body mass index (BMI) of 33 0 to 33 9 in adult   • Chronic pain   • Chronic pain of lower extremity, bilateral   • Fibromyalgia   • External hemorrhoids   • Irritable bowel syndrome with constipation   • Lumbar canal stenosis   • Moderate mitral regurgitation   • Osteoarthritis, multiple sites   • Osteoporosis   • Peripheral neuropathy   • Primary osteoarthritis of right knee   • Synovial cyst of right popliteal space   • Pacemaker   • Hypothyroidism   • Macrocytosis   • Status post gastrectomy   • Status post right hip replacement   • Acute cystitis without hematuria   • Ambulatory dysfunction   • MCI (mild cognitive impairment)   • Right hip pain   • Cellulitis of right lower extremity   • Hypertrophic scar of skin   • S/P scar revision   • Elevated C-reactive protein (CRP)   • Fall   • Hematoma   • Ecchymosis of eye   • Meningioma ([facility])   • Trochanteric bursitis of right hip   • Stage 3a chronic kidney disease ([facility])   • Benign essential HTN   • Trochanteric bursitis   • Negative depression screening   • Peripheral vascular disease, unspecified ([facility] [address] )   • Lumbar radiculopathy   • Low back pain with sciatica   • Lumbar spondylosis   • S/P lumbar fusion   • Hyponatremia         HPI (per admission H&P note on 6/15/23)     HPI per Dr Taran Quezada:   Patient with past medical history of HFpEF, a fib, HTN, lumbar spondylosis presents with 2-3 week history of chronic right hip pain  She is status post right hip replacement revision surgery 4/15/19  Patient follows with orthopedics outpatient and more recently was admitted to [facility] under the trauma service for the same complaint  She was evaluated by orthopedics there who recommended outpatient follow-up, no surgical intervention necessary  While in the ED, orthopedics was consulted  XR hip wet read per ortho showed superior migration acetabular component of R revised MAIA  Non-operative management was suggested       Of note, Patient was noted to have UTI during her admission at [facility] last month  She was treated with nitrofurantoin  In the ED today, the patient endorsed frequency and dysuria, with UA showing large leukocytes and positive nitrites  She did not meet criteria for sepsis in the ED  Procalcitonin elevated but lactic normal  Patient received a 500mL bolus of isolyte and 2g of ceftriaxone in the ED  Urine culture and blood cultures were collected  HOSPITAL COURSE:     Hospital Course:   80 y o  female admitted on 6/15/2023 for chronic right hip pain and UTI  Patient was evaluated by the orthopedics team on admission who noted her known right hip prosthetic dislocation on admission  No surgical intervention planned at this time and to follow with Dr Kamla Tam as needed only if status changes outpatient  Patient's chronic pain has been her biggest concern -started on a regimen for Tylenol dimitrios, Oxy 2 5/5 for mod/severe and dilaudid 0 2 for BT  It became clear that patient was in pain but not really asking for her pain regimen unless someone came into her room to ask her  On 6/17, we schedule oxycodone 2 5 every 8 hours for 1 day to help optimize pain regimen  Patient report that her pain significantly improved the next day       Of note, patient was also diagnosed with UTI with urine culture growing multiple strains (ESBL Ecoli, Klebsiella, Aeorcoccus urinae)  Labs were trended which showed a downtrending WBC on 6/18 (WBC from 31 27 > 28 26)  ID was curbsided and did not think that ESBL Ecoli was playing a role in her infection as patient was having improving symptoms  Due to allergy to sulfa drugs and poor tolerance of Amoxicillin, ID recommended finishing course of abx with Keflex  On 06/18/23, HD# 3, pt remains stable, pain control has improved but patient continues to report pain  She would like to go back to her living facility today  Pt will be discharged on Keflex 500 mg Q8H x 4 days to complete a 7 day course of antibiotics (patient received 3 days of IV Ceftriaxone in the hospital), tylenol 1000 mg Q8H scheduled for pain control and will provide oxy 2 5 mg Q8H prn for pain control for 2 weeks  She will need to follow with pain management or PCP at her facility and ortho going forward  She otherwise has no concerns or complaints  She understands and agrees to plan  COMPLICATIONS:     Complications: NONE       PROCEDURES:     Procedures Performed:   No orders of the defined types were placed in this encounter  SIGNIFICANT FINDINGS / ABNORMAL RESULTS:     Significant Findings/Abnormal Results with this admission:  · 6/15 UA: ESBL Ecoli, Klebsiella pneumoniae, Aerococcus urinae     XR hip/pelv 2-3 vws right if performed    Result Date: 6/16/2023  Narrative: RIGHT HIP INDICATION:   Right hip pain  COMPARISON: 3/28/2023 VIEWS:  XR HIP/PELV 2-3 VWS RIGHT W PELVIS IF PERFORMED FINDINGS: Patient is again noted to be status post previous total right hip arthroplasty with superior displacement of the acetabular and articulating prosthetic femoral components as noted previously, including fracture of one of the acetabular anchoring cancellous screws  Protrusio deformity of the native acetabulum noted once again   Femoral prosthesis bridges an intertrochanteric fracture with considerable osseous resorption about the fracture margins without definite evidence for solid osseous union  Overall appearance is stable when compared to the prior study of 3/28/2023  No definite acute fracture is noted  Probable remote fracture right inferior pubic ramus  Mild degenerative change left hip  Diffuse osteopenia present  No lytic or blastic osseous lesion  Diffuse vascular calcification is present  Postoperative change in the lumbar spine is partially imaged with degenerative change in the lower lumbar spine noted  Impression: 1  Stable appearance to the right hip without acute fracture or dislocation  2  Postprocedural changes related to total right hip arthroplasty with stable marked superior acetabular and articulating femoral prosthetic component migration  3  Remote posttraumatic deformity involving the femur, acetabulum and inferior pubic ramus as noted  Workstation performed: [id number]     CT HIP RIGHT WO CONTRAST    Result Date: 6/8/2023  Narrative: History: Hip fracture Unenhanced CT of the right hip was performed and compared to radiographs from earlier today  FINDINGS: Right hip total replacement  There is dislocation of the femoral head component and acetabular component superiorly relative to the native acetabulum  The femoral head component remains situated within the prosthetic acetabular component  One of the securing screws from the acetabular component is broken with the other portion remaining in the iliac bone  There is significant osteolysis of the right acetabulum as well as the proximal right femoral shaft  There is significant fluid surrounding the hip replacement and insinuating into the eroded portions of the acetabulum, with some interspersed calcific or ossific densities  No acute fracture is seen  Impression: IMPRESSION: Dislocated right hip replacement as described  Significant osteolysis and surrounding fluid   Findings may be due to particle disease and/or infection  CT examination performed with dose lowering protocol in accordance with [employer]ect  Workstation:[id number]    XR HIP 2 TO 3 VIEWS WITH PELVIS RIGHT    Result Date: 6/8/2023  Narrative: Study: 3 views right hip  COMPARISON: No prior study  HISTORY: Pain with no fall injury  Normal bony mineralization  No acute fractures  There is dislocation of the right femoral hip prosthesis at the acetabular fossa component with no fracture to the superior acetabular screw  The femoral head component remains midline within the acetabular cup fossa  Impression: IMPRESSION: Dislocated right hip prosthesis with the acetabulum without disruption to the femoral head/acetabular junction or femoral shaft component  Workstation:[id number]    XR KNEE 4+ VW RIGHT    Result Date: 6/8/2023  Narrative: Study: 4 views right knee  COMPARISON: No prior study  HISTORY: Right knee pain  No trauma Diffuse osteopenic bones  No acute fracture or dislocation  Marked degenerative joint space narrowing medially with spurring, lateral compartment comparatively spared  Spurring about the tibial spines  Moderate spurring upper pole of patella with joint effusion  Impression: IMPRESSION: Marked degenerative arthropathy medial compartment knee  Workstation:[id number]    XR CHEST 1 VW    Result Date: 6/8/2023  Narrative: Study: Single view supine chest  COMPARISON: March 17, 2009  HISTORY: Fatigue  No chest complaints  Right-sided venous line removed  Interval left-sided transvenous pacer unit with wires in satisfactory position with no discontinuities within its course  Underlying dextroscoliosis  Heart and vessels normal  Lung fields well aerated with no infiltrates, edema, or effusions      Impression: IMPRESSION: Unremarkable single view chest  Workstation:[id number]        VITALS ON DISCHARGE DATE:     Vitals  Blood Pressure: 118/85 (06/18/23 0803)  Temperature: 97 6 °F (36 4 °C) (06/17/23 2346)  Temp Source: Oral (06/15/23 1415)  Pulse: 73 (06/18/23 4385)  Respirations: 16 (06/17/23 2346)  SpO2: 100 % (06/18/23 0803)  Height: 5' (152 4 cm) (06/16/23 0915)  Weight - Scale: 76 6 kg (168 lb 12 8 oz) (06/16/23 0915)    Temp:  [97 6 °F (36 4 °C)-97 8 °F (36 6 °C)] 97 6 °F (36 4 °C)  HR:  [70-78] 73  Resp:  [16] 16  BP: ()/(48-85) 118/85    Weight (last 2 days)     Date/Time Weight    06/16/23 09:15:37 76 6 (168 8)          No intake or output data in the 24 hours ending 06/18/23 1413    Invasive Devices     Drain  Duration           External Urinary Catheter 2 days                  PHYSICAL EXAM ON DAY OF DISCHARGE:     Physical Exam  Vitals reviewed  Constitutional:       Appearance: She is obese  HENT:      Head: Normocephalic and atraumatic  Right Ear: External ear normal       Left Ear: External ear normal       Nose: Nose normal       Mouth/Throat:      Pharynx: Oropharynx is clear  Eyes:      Extraocular Movements: Extraocular movements intact  Conjunctiva/sclera: Conjunctivae normal    Cardiovascular:      Rate and Rhythm: Normal rate and regular rhythm  Pulses: Normal pulses  Heart sounds: Normal heart sounds  Pulmonary:      Effort: Pulmonary effort is normal       Breath sounds: Normal breath sounds  Abdominal:      Palpations: Abdomen is soft  Musculoskeletal:      Cervical back: Neck supple  Right lower leg: No edema  Left lower leg: No edema  Comments: R hip tenderness, warm extermities   Skin:     General: Skin is warm  Neurological:      Mental Status: She is alert  Mental status is at baseline  Psychiatric:         Mood and Affect: Mood normal           CONDITION AT DISCHARGE:   On day of discharge patient is seen and evaluated at bedside  Patient is stable and without concern  Patient denies any pain or SOB  Patient able to tolerate PO food without N/V/D and had bowel movement and baseline urine output   Patient is aware of current health status and understand plan of treatment and outpatient follow-up  The patient understood and agreed with the plan  All pertinent lab results, imaging studies, procedures, and/or any incidental findings have been disclosed to the patient  All pertinent questions are answered to patient's satisfaction  On day of discharge, the patient was hemodynamically stable and appropriate for discharge home  Condition at Discharge: stable       DISCHARGE MEDICATIONS:     Discharge Medications:  See after visit summary (AVS) for detailed reconciled discharge medications, which was provided to patient and family  Summary of medication changes made with this admission:    · START:  1  Tylenol 1000 mg Q8H dimitrios  2  Oxycodone 2 5 mg Q8H PRN for pain    · RESUME:  1  All other home medications      FOLLOW-UP APPOINTMENTS / INSTRUCTION :     Important Physician Related Follow Up:   · PCP  · Ortho  · Pain Management    Appointment confirmed:  Future Appointments   Date Time Provider Bharat Love   6/21/2023  8:30 AM DEVICE REMOTE [place] [facility]   6/21/2023  4:00 PM BE S&P 1 BE Pain [facility]   7/13/2023  3:45 PM Ivis Tejada MD [facility]   9/20/2023  8:30 AM DEVICE REMOTE [place] [facility]   12/20/2023  8:30 AM DEVICE REMOTE [place] [facility]   3/25/2024 10:30 AM DEVICE IN PERSON [facility]       Discharge instructions/Information to patient and family:   See after visit summary (AVS) for information provided to patient and family  Provisions for Follow-Up Care:  See after visit summary for information related to follow-up care and any pertinent home health orders  DISPOSITION:     Disposition: Assisted Living - [place]    Discharge Statement   I spent 30 minutes discharging the patient  This time was spent on the day of discharge  I had direct contact with the patient on the day of discharge  Additional documentation is required if more than 30 minutes were spent on discharge  "    Planned Readmission: No      Sammy Lopes, DO  PGY-2, Family Medicine  06/18/23  2:13 PM    Dear reader, please be aware that portions of my note contain dictated text  I have done my best to proof-read this note prior to signing  However, there may be occasional unnoticed errors pertaining to \"sound-alike\" words and/or grammar during my dictation process  If there is any words or information that is unclear or appears erroneous, please kindly let me know and I will clarify and/or addend my notes accordingly  Thank you for your understanding      "

## 2023-06-18 NOTE — ASSESSMENT & PLAN NOTE
Sodium 133 in the ED, baseline around 143   Appears euvolemic on exam     Sodium       Results from last 7 days   Lab Units 06/18/23  0537 06/17/23  0525 06/16/23  0439 06/15/23  1825   SODIUM mmol/L 134* 132* 130* 133*      Clinically stable    · Monitor on BMP   · Follow diet, consider gentle fluids (hx of CHF)

## 2023-06-18 NOTE — ASSESSMENT & PLAN NOTE
BP mildly soft in 959P systolic         - Most recent BP Blood Pressure: 302/60   -  Systolic (83QUQ), ZAV:510 , Min:89 , MOP:653   - Diastolic (31PRN), THE:76, Min:48, Max:85     Plan:  · Continue home Torsemide 10mg daily but with hold parameters for SBP < 110

## 2023-06-18 NOTE — ASSESSMENT & PLAN NOTE
Wt Readings from Last 3 Encounters:   06/16/23 76 6 kg (168 lb 12 8 oz)   03/24/23 75 8 kg (167 lb)   03/17/23 77 kg (169 lb 12 1 oz)      No intake/output data recorded  Net IO Since Admission: 222 mL [06/18/23 1411]    HFpEF per cardiology notes  Last ECHO 4/27/2018 EF 55%, LV thickness upper limits normal, moderate mitral regurgitation, moderate tricuspid regurgitation       · Monitor I/O   · Continue torsemide 10mg

## 2023-06-18 NOTE — ASSESSMENT & PLAN NOTE
Patient endorsing urinary frequency and dysuria  Patient had UTI during prior hospitalization at [facility] and was treated with [address] in June 2023  ED workup:  - UA shows + nitrites, large leukocytes  UM shows innumerable RBC, WBC, bacterial and 10-25 hyaline casts  - WBC 29 47, bands 11%, elevated procal 1 32, lactate WNL  - Urine cultures, blood cultures collected  - Ceftriaxone x1 dose    WCB & ANC     Results from last 7 days   Lab Units 06/18/23  0537 06/17/23  1544 06/17/23  0525 06/16/23  0439 06/15/23  1653   WBC Thousand/uL 28 26* 31 27* 32 90* 28 84* 29 47*   NEUTROS ABS Thousands/µL  --  27 73*  --   --   --       Assessment:  - Currently stable  Continues with suprapubic tenderness        Plan:  · IV ceftriaxone 2g q24 x 3 while inpatient  · F/u urine cultures and blood cultures (G- Rods), await sensitivities  · Monitor vitals   · D/c on Keflex 500 mg Q8H x 4 days

## 2023-06-19 ENCOUNTER — TRANSITIONAL CARE MANAGEMENT (OUTPATIENT)
Dept: FAMILY MEDICINE CLINIC | Facility: CLINIC | Age: 88
End: 2023-06-19

## 2023-06-19 LAB
AMORPH URATE CRY URNS QL MICRO: ABNORMAL
ANION GAP SERPL CALCULATED.3IONS-SCNC: 5 MMOL/L (ref 4–13)
ATRIAL RATE: 288 BPM
BACTERIA UR QL AUTO: ABNORMAL /HPF
BILIRUB UR QL STRIP: NEGATIVE
BUN SERPL-MCNC: 20 MG/DL (ref 5–25)
CALCIUM SERPL-MCNC: 7.4 MG/DL (ref 8.3–10.1)
CHLORIDE SERPL-SCNC: 107 MMOL/L (ref 96–108)
CLARITY UR: ABNORMAL
CO2 SERPL-SCNC: 22 MMOL/L (ref 21–32)
COLOR UR: ABNORMAL
CREAT SERPL-MCNC: 0.49 MG/DL (ref 0.6–1.3)
ERYTHROCYTE [DISTWIDTH] IN BLOOD BY AUTOMATED COUNT: 13.3 % (ref 11.6–15.1)
ERYTHROCYTE [DISTWIDTH] IN BLOOD BY AUTOMATED COUNT: 13.4 % (ref 11.6–15.1)
FOLATE SERPL-MCNC: 16.6 NG/ML
GFR SERPL CREATININE-BSD FRML MDRD: 86 ML/MIN/1.73SQ M
GLUCOSE SERPL-MCNC: 85 MG/DL (ref 65–140)
GLUCOSE UR STRIP-MCNC: NEGATIVE MG/DL
HCT VFR BLD AUTO: 25.5 % (ref 34.8–46.1)
HCT VFR BLD AUTO: 26.3 % (ref 34.8–46.1)
HGB BLD-MCNC: 7.6 G/DL (ref 11.5–15.4)
HGB BLD-MCNC: 8.1 G/DL (ref 11.5–15.4)
HGB UR QL STRIP.AUTO: NEGATIVE
KETONES UR STRIP-MCNC: ABNORMAL MG/DL
LEUKOCYTE ESTERASE UR QL STRIP: ABNORMAL
MCH RBC QN AUTO: 30.9 PG (ref 26.8–34.3)
MCH RBC QN AUTO: 30.9 PG (ref 26.8–34.3)
MCHC RBC AUTO-ENTMCNC: 29.8 G/DL (ref 31.4–37.4)
MCHC RBC AUTO-ENTMCNC: 30.8 G/DL (ref 31.4–37.4)
MCV RBC AUTO: 100 FL (ref 82–98)
MCV RBC AUTO: 104 FL (ref 82–98)
NITRITE UR QL STRIP: NEGATIVE
NON-SQ EPI CELLS URNS QL MICRO: ABNORMAL /HPF
NRBC BLD AUTO-RTO: 0 /100 WBCS
PH UR STRIP.AUTO: 5.5 [PH]
PLATELET # BLD AUTO: 409 THOUSANDS/UL (ref 149–390)
PLATELET # BLD AUTO: 444 THOUSANDS/UL (ref 149–390)
PMV BLD AUTO: 8.6 FL (ref 8.9–12.7)
PMV BLD AUTO: 8.6 FL (ref 8.9–12.7)
POTASSIUM SERPL-SCNC: 3.6 MMOL/L (ref 3.5–5.3)
PROCALCITONIN SERPL-MCNC: 1.22 NG/ML
PROT UR STRIP-MCNC: ABNORMAL MG/DL
QRS AXIS: 4 DEGREES
QRSD INTERVAL: 106 MS
QT INTERVAL: 380 MS
QTC INTERVAL: 412 MS
RBC # BLD AUTO: 2.46 MILLION/UL (ref 3.81–5.12)
RBC # BLD AUTO: 2.62 MILLION/UL (ref 3.81–5.12)
RBC #/AREA URNS AUTO: ABNORMAL /HPF
SODIUM SERPL-SCNC: 134 MMOL/L (ref 135–147)
SP GR UR STRIP.AUTO: 1.02 (ref 1–1.03)
T WAVE AXIS: 147 DEGREES
UROBILINOGEN UR STRIP-ACNC: <2 MG/DL
VENTRICULAR RATE: 71 BPM
VIT B12 SERPL-MCNC: 1649 PG/ML (ref 180–914)
WBC # BLD AUTO: 18.08 THOUSAND/UL (ref 4.31–10.16)
WBC # BLD AUTO: 24.01 THOUSAND/UL (ref 4.31–10.16)
WBC #/AREA URNS AUTO: ABNORMAL /HPF

## 2023-06-19 PROCEDURE — 97163 PT EVAL HIGH COMPLEX 45 MIN: CPT

## 2023-06-19 PROCEDURE — 82607 VITAMIN B-12: CPT

## 2023-06-19 PROCEDURE — 97167 OT EVAL HIGH COMPLEX 60 MIN: CPT

## 2023-06-19 PROCEDURE — NC001 PR NO CHARGE: Performed by: FAMILY MEDICINE

## 2023-06-19 PROCEDURE — 81001 URINALYSIS AUTO W/SCOPE: CPT | Performed by: EMERGENCY MEDICINE

## 2023-06-19 PROCEDURE — 85027 COMPLETE CBC AUTOMATED: CPT

## 2023-06-19 PROCEDURE — 84145 PROCALCITONIN (PCT): CPT | Performed by: FAMILY MEDICINE

## 2023-06-19 PROCEDURE — 93010 ELECTROCARDIOGRAM REPORT: CPT | Performed by: INTERNAL MEDICINE

## 2023-06-19 PROCEDURE — 85027 COMPLETE CBC AUTOMATED: CPT | Performed by: FAMILY MEDICINE

## 2023-06-19 PROCEDURE — 99223 1ST HOSP IP/OBS HIGH 75: CPT | Performed by: INTERNAL MEDICINE

## 2023-06-19 PROCEDURE — 36415 COLL VENOUS BLD VENIPUNCTURE: CPT | Performed by: FAMILY MEDICINE

## 2023-06-19 PROCEDURE — 80048 BASIC METABOLIC PNL TOTAL CA: CPT | Performed by: FAMILY MEDICINE

## 2023-06-19 PROCEDURE — 82746 ASSAY OF FOLIC ACID SERUM: CPT

## 2023-06-19 RX ORDER — OXYCODONE HYDROCHLORIDE 5 MG/1
2.5 TABLET ORAL EVERY 8 HOURS PRN
Qty: 15 TABLET | Refills: 0 | Status: SHIPPED | OUTPATIENT
Start: 2023-06-19 | End: 2023-07-08

## 2023-06-19 RX ORDER — GRANULES FOR ORAL 3 G/1
3 POWDER ORAL ONCE
Status: COMPLETED | OUTPATIENT
Start: 2023-06-19 | End: 2023-06-19

## 2023-06-19 RX ORDER — ACETAMINOPHEN 325 MG/1
650 TABLET ORAL EVERY 6 HOURS SCHEDULED
Status: DISCONTINUED | OUTPATIENT
Start: 2023-06-19 | End: 2023-06-20 | Stop reason: HOSPADM

## 2023-06-19 RX ADMIN — SUCRALFATE 1 G: 1 TABLET ORAL at 18:36

## 2023-06-19 RX ADMIN — DICLOFENAC SODIUM 2 G: 10 GEL TOPICAL at 22:25

## 2023-06-19 RX ADMIN — OXYCODONE HYDROCHLORIDE 5 MG: 5 TABLET ORAL at 11:51

## 2023-06-19 RX ADMIN — DICLOFENAC SODIUM 2 G: 10 GEL TOPICAL at 18:33

## 2023-06-19 RX ADMIN — WARFARIN SODIUM 3 MG: 3 TABLET ORAL at 18:36

## 2023-06-19 RX ADMIN — GRANULES FOR ORAL SOLUTION 3 G: 3 POWDER ORAL at 18:34

## 2023-06-19 RX ADMIN — ACETAMINOPHEN 650 MG: 325 TABLET ORAL at 23:49

## 2023-06-19 RX ADMIN — PIPERACILLIN SODIUM AND TAZOBACTAM SODIUM 4.5 G: 36; 4.5 INJECTION, POWDER, LYOPHILIZED, FOR SOLUTION INTRAVENOUS at 15:10

## 2023-06-19 RX ADMIN — LEVOTHYROXINE SODIUM 100 MCG: 100 TABLET ORAL at 06:33

## 2023-06-19 RX ADMIN — SUCRALFATE 1 G: 1 TABLET ORAL at 09:50

## 2023-06-19 RX ADMIN — SODIUM CHLORIDE AND POTASSIUM CHLORIDE 100 ML/HR: .9; .15 SOLUTION INTRAVENOUS at 23:50

## 2023-06-19 RX ADMIN — ACETAMINOPHEN 650 MG: 325 TABLET ORAL at 15:11

## 2023-06-19 RX ADMIN — DICLOFENAC SODIUM 2 G: 10 GEL TOPICAL at 09:55

## 2023-06-19 RX ADMIN — SODIUM CHLORIDE AND POTASSIUM CHLORIDE 100 ML/HR: .9; .15 SOLUTION INTRAVENOUS at 12:36

## 2023-06-19 RX ADMIN — PIPERACILLIN SODIUM AND TAZOBACTAM SODIUM 4.5 G: 36; 4.5 INJECTION, POWDER, LYOPHILIZED, FOR SOLUTION INTRAVENOUS at 09:42

## 2023-06-19 RX ADMIN — POLYETHYLENE GLYCOL 3350 17 G: 17 POWDER, FOR SOLUTION ORAL at 09:51

## 2023-06-19 RX ADMIN — DULOXETINE HYDROCHLORIDE 60 MG: 60 CAPSULE, DELAYED RELEASE ORAL at 09:50

## 2023-06-19 RX ADMIN — ACETAMINOPHEN 650 MG: 325 TABLET ORAL at 18:36

## 2023-06-19 RX ADMIN — DICLOFENAC SODIUM 2 G: 10 GEL TOPICAL at 15:13

## 2023-06-19 RX ADMIN — PIPERACILLIN SODIUM AND TAZOBACTAM SODIUM 4.5 G: 36; 4.5 INJECTION, POWDER, LYOPHILIZED, FOR SOLUTION INTRAVENOUS at 02:06

## 2023-06-19 NOTE — UTILIZATION REVIEW
Shy Brantleyco    Initial Clinical Review    Admission: Date/Time/Statement:   Admission Orders (From admission, onward)     Ordered        06/18/23 1935  INPATIENT ADMISSION  Once                      Orders Placed This Encounter   Procedures   • INPATIENT ADMISSION     Standing Status:   Standing     Number of Occurrences:   1     Order Specific Question:   Level of Care     Answer:   Med Surg [16]     Order Specific Question:   Estimated length of stay     Answer:   More than 2 Midnights     Order Specific Question:   Certification     Answer:   I certify that inpatient services are medically necessary for this patient for a duration of greater than two midnights  See H&P and MD Progress Notes for additional information about the patient's course of treatment  ED Arrival Information     Expected   -    Arrival   6/18/2023 16:46    Acuity   Non-Urgent            Means of arrival   Ambulance    Escorted by   [facility]    Admission type   Emergency            Arrival complaint   Medical Problem           Chief Complaint   Patient presents with   • Decreased Oxygen Level     Patient discharged from hospital today, arrived at [facility]  SNF called 911 immediately after arrival for 02 sat 89%  Initial Presentation: 80 y o  female presents to ed from SNF via ems for evaluation and treatment of hypoxia and fever  Hospitalized for 3 days due to UTI and treated with ceftriaxone  She went to SNF for inpatient rehab earlier today  Upon EMS arrival, patient's oxygen saturation was in the high 90s in room air  Her heart rate was in the 80s  Her blood pressure was 066 systolic  PMHX: SILVANA-EN -Y, Afib on coumadin, RTHR, [place] CHF, IBS  Clinical assessment significant for pain 10/10 related to chronic R hip dislocation ( no surgical intervention recommended by Ortho)  Procal 1 52, , WBC 28 19   Initially treated with po ampicillin, po oxycodone, x2, iv piperacillin x1, iv  9% ns with kcl 20 meq 100/hr     [facility]  Date: 6-19-23   Day 2: inpatient med surg   Temp 97 3 HB 8 8 > 7 6  NA stable 134  Procal 1 22  External urinary catheter in place  UA extra turbid and light orange  Continue iv fluids and iv zosyn q6 hr with contact isolation  Treat right hip pain with lidocaine patch, scheduled tylenol q8 hr, oxycodone prn  Obtain PT/OT evaluations            ED Triage Vitals   06/18/23 1709 06/18/23 1654 06/18/23 1654 06/18/23 1654 06/18/23 1654   98 2 °F (36 8 °C) 82 16 122/78 96 %      Oral Monitor         10 - Worst Possible Pain          06/16/23 76 6 kg (168 lb 12 8 oz)     Additional Vital Signs:    Date/Time Temp Pulse Resp BP MAP (mmHg) SpO2 O2 Device   06/19/23 09:03:51 -- 81 -- 106/55 72 97 % --   06/19/23 08:21:42 97 3 °F (36 3 °C)   Abnormal  70 18 105/56 72 92 % --   06/19/23 0600 -- 70 -- 93/55 71 96 % None (Room air)   06/19/23 0500 -- 69 -- 90/53 66 95 % None (Room air)   06/19/23 0300 -- 71 -- 108/54 78 96 % --   06/19/23 0200 -- 69 -- 105/55 76 95 % None (Room air)   06/19/23 0100 -- 70 -- 99/71 81 94 % None (Room air)   06/19/23 0030 -- 70 -- -- -- 95 % None (Room air)   06/19/23 0000 -- 70 18 101/50 69 96 % None (Room air)   06/18/23 2339 -- 73 18 110/58 74 -- --   06/18/23 2215 -- 71 -- -- -- 96 % None (Room air)   06/18/23 2200 -- 69 18 129/58 83 98 % None (Room air)   06/18/23 2115 -- 62 18 127/60 87 92 % None (Room air)   06/18/23 2000 -- 70 20 100/50 71 95 % None (Room air)   06/18/23 1900 -- 69 16 116/56 81 95 % --   06/18/23 1800 -- 57 16 106/53 76 97 % None (Room air)   06/18/23 1730 -- 70 16 113/58 83 96 % --   06/18/23 1709 98 2 °F (36 8 °C) -- -- -- -- -- --   06/18/23 1654 -- 82 16 122/78 -- 96 % None (Room air)           Pertinent Labs/Diagnostic Test Results:       Results from last 7 days   Lab Units 06/17/23  1252   SARS-COV-2  Negative     Results from last 7 days   Lab Units 06/19/23  0424 06/18/23  1846 06/18/23  0537 06/17/23  1544 06/17/23  0525 06/16/23  0439 06/15/23  1653   WBC Thousand/uL 24 01* 28 19* 28 26* 31 27* 32 90*   < > 29 47*   HEMOGLOBIN g/dL 7 6* 8 8* 9 4* 8 4* 10 8*   < > 9 5*   HEMATOCRIT % 25 5* 28 0* 28 7* 25 9* 35 8   < > 29 3*   PLATELETS Thousands/uL 409* 498* 480* 380 397*   < > 340   NEUTROS ABS Thousands/µL  --   --   --  27 73*  --   --   --    BANDS PCT %  --  2  --   --   --   --  11*    < > = values in this interval not displayed           Results from last 7 days   Lab Units 06/19/23  0424 06/18/23  1846 06/18/23  0537 06/17/23  0525 06/16/23  0439   SODIUM mmol/L 134* 133* 134* 132* 130*   POTASSIUM mmol/L 3 6 3 6 4 3 4 0 3 7   CHLORIDE mmol/L 107 102 105 104 99   CO2 mmol/L 22 25 22 20* 25   ANION GAP mmol/L 5 6 7 8 6   BUN mg/dL 20 22 23 20 20   CREATININE mg/dL 0 49* 0 60 0 58* 0 66 0 64   EGFR ml/min/1 73sq m 86 81 81 78 79   CALCIUM mg/dL 7 4* 8 1* 8 2* 8 0* 7 7*     Results from last 7 days   Lab Units 06/18/23  1846 06/15/23  1825   AST U/L 48* 40   ALT U/L 32 20   ALK PHOS U/L 184* 178*   TOTAL PROTEIN g/dL 6 1* 5 5*   ALBUMIN g/dL 1 5* 1 5*   TOTAL BILIRUBIN mg/dL 0 40 0 56         Results from last 7 days   Lab Units 06/19/23  0424 06/18/23  1846 06/18/23  0537 06/17/23  0525 06/16/23  0439 06/15/23  1825   GLUCOSE RANDOM mg/dL 85 96 68 60* 283* 106         Results from last 7 days   Lab Units 06/18/23  1846 06/16/23  0439 06/15/23  1825 06/15/23  1653   PROTIME seconds 27 1* 24 9*  --  25 4*   INR  2 48* 2 22*  --  2 29*   PTT seconds 58*  --  48*  --          Results from last 7 days   Lab Units 06/19/23  0424 06/18/23  1846 06/15/23  1825   PROCALCITONIN ng/ml 1 22* 1 52* 1 32*     Results from last 7 days   Lab Units 06/18/23  1846 06/15/23  1825   LACTIC ACID mmol/L 1 3 1 7         Results from last 7 days   Lab Units 06/19/23  0033 06/15/23  1905   CLARITY UA  Extra Turbid Extra Turbid   COLOR UA  Light Orange Light Orange   SPEC GRAV UA  1 019 1 013   PH UA  5 5 7 0   GLUCOSE UA mg/dl Negative Negative   KETONES UA mg/dl 10 (1+)* Negative   BLOOD UA  Negative Large*   PROTEIN UA mg/dl Trace* 70 (1+)*   NITRITE UA  Negative Positive*   BILIRUBIN UA  Negative Negative   UROBILINOGEN UA (BE) mg/dl <2 0 <2 0   LEUKOCYTES UA  Small* Large*   WBC UA /hpf 4-10* Innumerable*   RBC UA /hpf None Seen Innumerable*   BACTERIA UA /hpf Innumerable* Innumerable*   EPITHELIAL CELLS WET PREP /hpf Occasional Occasional       Results from last 7 days   Lab Units 06/18/23  1846 06/15/23  1905 06/15/23  1825   BLOOD CULTURE  Received in Microbiology Lab  Culture in Progress  Received in Microbiology Lab  Culture in Progress  --  No Growth at 72 hrs  No Growth at 72 hrs     URINE CULTURE   --  >100,000 cfu/ml Escherichia coli ESBL*  >100,000 cfu/ml Klebsiella pneumoniae*  >100,000 cfu/ml Aerococcus urinae*  --        ED Treatment:   Medication Administration from 06/18/2023 1646 to 06/19/2023 0733       Date/Time Order Dose Route Action     06/18/2023 1723 EDT amoxicillin-clavulanate (AUGMENTIN) 875-125 mg per tablet 1 tablet 1 tablet Oral Given     06/18/2023 1839 EDT oxyCODONE (ROXICODONE) split tablet 2 5 mg 2 5 mg Oral Given     06/18/2023 1915 EDT piperacillin-tazobactam (ZOSYN) 3 375 g in sodium chloride 0 9 % 100 mL IVPB 3 375 g Intravenous New Bag     06/18/2023 2157 EDT Diclofenac Sodium (VOLTAREN) 1 % topical gel 2 g 2 g Topical Given     06/19/2023 5054 EDT levothyroxine tablet 100 mcg 100 mcg Oral Given     06/18/2023 2157 EDT sucralfate (CARAFATE) tablet 1 g 1 g Oral Given     06/18/2023 2215 EDT warfarin (COUMADIN) tablet 3 mg 3 mg Oral Given     06/18/2023 2215 EDT sodium chloride 0 9 % with KCl 20 mEq/L infusion (premix) 100 mL/hr Intravenous New Bag     06/18/2023 2337 EDT oxyCODONE (ROXICODONE) IR tablet 5 mg 5 mg Oral Given     06/19/2023 0206 EDT piperacillin-tazobactam (ZOSYN) 4 5 g in sodium chloride 0 9 % 100 mL IVPB 4 5 g Intravenous New Bag        Past Medical History:   Diagnosis Date   • Abdominal bloating 8/1/2016   • Anemia    • Arthritis    • Cancer (HCC)     basal cell   • CHF (congestive heart failure) (HCC)    • COVID-19    • Disease of thyroid gland    • Disturbance of smell and taste     disturbance of taste   • Effusion of knee joint right    • Fibromyalgia    • Heart murmur     reported a previous heart murmur   • History of acute myocardial infarction    • History of atrial fibrillation    • History of bruising easily    • History of cancer    • History of dermatitis    • History of mammogram    • History of methicillin resistant staphylococcus aureus (MRSA)     Negative nasal culture, isolation discontinued 8/17/2018     • History of methicillin resistant staphylococcus aureus (MRSA) 08/17/2018    negative nasal culture-isolation and hx discontinued 8/17/2018   • History of obesity    • History of osteopenia    • History of sciatica    • History of shortness of breath    • History of sinusitis    • History of sore throat    • History of syncope    • History of umbilical hernia    • History of viral infection    • Irritable bowel syndrome (IBS)    • Joint pain, hip    • Limb pain    • Myalgia     myalgia and myositis   • Need for prophylactic vaccination against single diseases    • Need for prophylactic vaccination and inoculation against influenza    • Preoperative cardiovascular examination    • Primary osteoarthritis of right knee    • Right leg pain    • Vaginal Pap smear     reported pap smear     Present on Admission:  • Benign essential HTN  • Acute cystitis without hematuria  • Chronic atrial fibrillation (HCC)  • Chronic diastolic CHF (congestive heart failure) ([facility] [address] )  • Right hip pain      Admitting Diagnosis:     UTI (urinary tract infection) [N39 0]  Adult general medical exam [Z00 00]  Illness, unspecified [R69]    Age/Sex: 80 y o  female    Scheduled Medications:  Diclofenac Sodium, 2 g, Topical, 4x Daily  DULoxetine, 60 mg, Oral, Daily  levothyroxine, 100 mcg, Oral, Early Morning  piperacillin-tazobactam, 4 5 g, Intravenous, Q6H  polyethylene glycol, 17 g, Oral, Daily  sucralfate, 1 g, Oral, BID  torsemide, 10 mg, Oral, Daily  warfarin, 3 mg, Oral, Daily (warfarin)      Continuous IV Infusions:  sodium chloride 0 9 % with KCl 20 mEq/L, 100 mL/hr, Intravenous, Continuous      PRN Meds:  acetaminophen, 650 mg, Oral, Q6H PRN  ondansetron, 4 mg, Intravenous, Q6H PRN  oxyCODONE, 5 mg, Oral, Q4H PRN  oxyCODONE, 2 5 mg, Oral, Q4H PRN        None    Network Utilization Review Department  ATTENTION: Please call with any questions or concerns to [phone] and carefully listen to the prompts so that you are directed to the right person  All voicemails are confidential   [facility] Members all requests for admission clinical reviews, approved or denied determinations and any other requests to dedicated fax number below belonging to the campus where the patient is receiving treatment   List of dedicated fax numbers for the Facilities:  [address] DENIALS (Administrative/Medical Necessity) [phone]   [address] (Maternity/NICU/Pediatrics) [phone]   [address] [phone]   [facility]  [phone]   [address] [place] [address] Stephanie Xavier 28 [phone]   [address] [place] [address] 815 [facility] [phone]

## 2023-06-19 NOTE — ASSESSMENT & PLAN NOTE
Patient readmission after recent UTI, urine culture did show ESBL E  coli greater than 100,000 as well as Klebsiella pneumoniae and Aerococcus urinae both growing >100k  Patient was initially treated with ceftriaxone and discharged on Keflex after discussion with ID due to allergy to sulfa and poor tolerance of amoxicillin from previous records  Readmitted due to fever at rehab, and persistent leukocytosis which was normal earlier this month  Procalcitonin's elevated    -Patient received 1 dose of Zosyn and Augmentin in ED and pt continued on Zosyn for 1 day and d/con 6/19  - WBC trending down, procal trending  - ID was consulted due to concern if this was colonization vs true infection has patient had no symptoms    Plan:  -S/p fosfomycin x 1 on 6/19 per ID  -ID following, appreciate recs   -Pain control  -Monitor vitals

## 2023-06-19 NOTE — PLAN OF CARE
Problem: OCCUPATIONAL THERAPY ADULT  Goal: Performs self-care activities at highest level of function for planned discharge setting  See evaluation for individualized goals  Description: Treatment Interventions: ADL retraining, Functional transfer training, UE strengthening/ROM, Endurance training, Patient/family training, Equipment evaluation/education, Compensatory technique education, Continued evaluation, Energy conservation, Activityengagement          See flowsheet documentation for full assessment, interventions and recommendations  Note: Limitation: Decreased ADL status, Decreased UE strength, Decreased endurance, Decreased self-care trans, Decreased high-level ADLs  Prognosis: Fair  Assessment: Pt is a 79 yo female who presented to [facility] from [facility] with fever in which pt dx w/ infection due to ESBL-producing escherichia coli  Per chart review, pt w/ recent hospitalization 2* UTI and was d/c to rehab  Of note, pt is NWB in RLE per ortho 2* acute on chronic right hip pain in the setting of 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   Pt  has a past medical history of Abdominal bloating (8/1/2016), Anemia, Arthritis, Cancer ([address] ), CHF (congestive heart failure) ([facility] [address] ), COVID-19, Disease of thyroid gland, Disturbance of smell and taste, Effusion of knee joint right, Fibromyalgia, Heart murmur, History of acute myocardial infarction, History of atrial fibrillation, History of bruising easily, History of cancer, History of dermatitis, History of mammogram, History of methicillin resistant staphylococcus aureus (MRSA), History of methicillin resistant staphylococcus aureus (MRSA) (08/17/2018), History of obesity, History of osteopenia, History of sciatica, History of shortness of breath, History of sinusitis, History of sore throat, History of syncope, History of umbilical hernia, History of viral infection, Irritable bowel syndrome (IBS), Joint pain, hip, Limb pain, Myalgia, Need for prophylactic vaccination against single diseases, Need for prophylactic vaccination and inoculation against influenza, Preoperative cardiovascular examination, Primary osteoarthritis of right knee, Right leg pain, and Vaginal Pap smear  Pt with active OT orders in which OT consulted to assess pt's functional status and occupational performance to determine safe d/c needs  Pt presenting from [facility], receives assistance w/ ADLs/IADLs and uses w/c for functional mobility  (-) driving  Currently, pt performing bed mobility w/ Max A x2, UB ADLs w/ Mod A, and LB ADLs w/ Max A  Pt demonstrates the following limitations/impairments which impact the pt's ability to engage in valued occupations: NWB in RLE, balance, endurance/activity tolerance, standing tolerance, functional reach, postural/trunk control, strength, safety awareness, and pain  From an OT standpoint, recommend discharge to return to facility w/ rehab, once medically stable  Pt would benefit from skilled OT services 2-3x/wk to address immediate acute care needs and underlying performance skills to promote safety, decrease fall risk, and enhance occupational performance to return to OF  Goals to be met within the next 10-14 days       OT Discharge Recommendation: Return to facility with rehabilitation services

## 2023-06-19 NOTE — H&P
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-producing Escherichia coli  Assessment & Plan  Patient readmission after recent UTI, urine culture did show ESBL E  coli greater than 100,000  Patient was initially treated with ceftriaxone and discharged on Keflex  Readmitted due to fever at rehab, and persistent leukocytosis which was normal earlier this month   procalcitonin's elevated  -Patient received 1 dose of Zosyn and Augmentin in ED  -Continue Zosyn q6h  -Continue IV fluids  -Pain control  -Zofran as needed  -Monitor vitals    Benign essential HTN  Assessment & Plan  Continue monitor BP, stable currently   -Continued torsemide 10 mg daily    Right hip pain  Assessment & Plan  2-3 week history of chronic R hip pain  Has history of right hip replacement and follows with orthopedics outpatient  Most recently admitted at [facility] from 6/8-6/12 under the trauma service for chronic right hip dislocation  Orthopedics evaluated patient and stated no surgical intervention required  Discharged to SNF with outpatient orthopedics follow-up     XR right hip/pelvis (6/15/23):   1  Stable appearance of the right hip without acute fracture or dislocation  2  Postprocedural changes related to total right hip arthroplasty with stable marked superior acetabular and articulating femoral prosthetic component migration    3  Remote posttraumatic deformity involving the femur, acetabulum and inferior pubic ramus as noted       Significant pain and tenderness on exam today, unable to lift R leg     Plan:  • Lidocaine patch on R hip  • Pain regimen: dimitrios tylenol 1000 mg Q8H, oxy 2 5 mg Q8H prn on discharge  • F/u with ortho outpatient     Acute cystitis without hematuria  Assessment & Plan  Returning from rehab after fever on day of discharge, urine culture did show ESBL E  coli not susceptible to Keflex or ceftriaxone  ED workup:  -Leukocytosis, elevated Pro-Live  -1 dose Zosyn, 1 dose Augmentin       Plan:  • Continue Zosyn, consider transition to Augmentin  • Monitor vitals       Chronic diastolic CHF (congestive heart failure) (HCC)  Assessment & Plan  Wt Readings from Last 3 Encounters:   06/16/23 76 6 kg (168 lb 12 8 oz)   03/24/23 75 8 kg (167 lb)   03/17/23 77 kg (169 lb 12 1 oz)     HFpEF per cardiology notes  Last ECHO 4/27/2018 EF 55%, LV thickness upper limits normal, moderate mitral regurgitation, moderate tricuspid regurgitation       • Monitor I/O   • Continue torsemide 10mg        Chronic atrial fibrillation (HCC)  Assessment & Plan  Currently rate controlled  On daily warfarin 3 mg  -INR between 2-3   -Continue warfarin 3mg          H&P Exam - Salvador Lainez 80 y o  female MRN: [id number]  Unit/Bed#: QCA Encounter: [id number]  Admission status: Obs    DATE: 6/18/2023  TIME: 10:02 PM  Primary Care Physician:   Admitting Provider: Zenobia Acevedo MD    Diet: Regular  DVT Prophylaxis: Heparin SubQ  Code: level 1   Dispo: IV Abx for UTI    History of Present Illness     HPI:  Salvador Lainez is a 80 y o  female who presents with chronic A-fib on Coumadin, 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 and recent hospital admission for UTI  Patient readmitted after rehab stated the patient desatted as well as having fever upon arrival   Patient was then transferred back to the ED, vital signs normal in the ED  She received 1 dose of Zosyn and Augmentin after urine culture showed susceptibility to these 2 antibiotics, continue Zosyn  Patient is not complaining of any pain besides right hip pain at this time which is making her comfortable  ED Management: Abx, labs  Review of Systems   Constitutional: Negative for chills and fever  HENT: Negative for ear pain and sore throat      Eyes: Negative for pain and visual disturbance  Respiratory: Negative for cough and shortness of breath  Cardiovascular: Negative for chest pain and palpitations  Gastrointestinal: Negative for abdominal pain and vomiting  Genitourinary: Positive for dysuria  Negative for hematuria  Musculoskeletal: Positive for arthralgias  Negative for back pain  Skin: Negative for color change and rash  Neurological: Negative for seizures and syncope  All other systems reviewed and are negative  Historical Information   Past Medical History:   Diagnosis Date   • Abdominal bloating 8/1/2016   • Anemia    • Arthritis    • Cancer (HCC)     basal cell   • CHF (congestive heart failure) (HCC)    • COVID-19    • Disease of thyroid gland    • Disturbance of smell and taste     disturbance of taste   • Effusion of knee joint right    • Fibromyalgia    • Heart murmur     reported a previous heart murmur   • History of acute myocardial infarction    • History of atrial fibrillation    • History of bruising easily    • History of cancer    • History of dermatitis    • History of mammogram    • History of methicillin resistant staphylococcus aureus (MRSA)     Negative nasal culture, isolation discontinued 8/17/2018     • History of methicillin resistant staphylococcus aureus (MRSA) 08/17/2018    negative nasal culture-isolation and hx discontinued 8/17/2018   • History of obesity    • History of osteopenia    • History of sciatica    • History of shortness of breath    • History of sinusitis    • History of sore throat    • History of syncope    • History of umbilical hernia    • History of viral infection    • Irritable bowel syndrome (IBS)    • Joint pain, hip    • Limb pain    • Myalgia     myalgia and myositis   • Need for prophylactic vaccination against single diseases    • Need for prophylactic vaccination and inoculation against influenza    • Preoperative cardiovascular examination    • Primary osteoarthritis of right knee    • Right leg pain    • Vaginal Pap smear     reported pap smear     Past Surgical History:   Procedure Laterality Date   • ANKLE ARTHRODESIS Right    • BACK SURGERY     • BARIATRIC SURGERY     • CHOLECYSTECTOMY      x2   • COLONOSCOPY     • ESOPHAGOGASTRODUODENOSCOPY N/A 3/23/2018    Procedure: ESOPHAGOGASTRODUODENOSCOPY (EGD); Surgeon: Miguel Angel Dave MD;  Location: [facility]; Service: Gastroenterology   • FOOT SURGERY     • HIP CLOSE REDUCTION Right 8/21/2016    Procedure: CLOSED REDUCTION RIGHT TOTAL HIP;  Surgeon: Morrie Krabbe, MD;  Location: [facility];  Service:    • INSERT / Kalli Money / Lori Hence     • IR BIOPSY OTHER  8/1/2020   • JOINT REPLACEMENT      LEFT KNEE REPLACEMENT   • JOINT REPLACEMENT      Right HIP   • PILONIDAL CYST EXCISION      x2   • ND EXC B9 LESION MRGN XCP SK TG T/A/L 0 5 CM/< Right 7/24/2019    Procedure: REVISION SCAR EXTREMITY Rt Hip;   Surgeon: Deven Weathers MD;  Location: [facility];  Service: Orthopedics   • ND REVJ TOT HIP ARTHRP [address] W/WO AGRFT/ALGRFT Right 4/15/2019    Procedure: ARTHROPLASTY HIP TOTAL ACETABULAR REVISION;  Surgeon: Deven Weathers MD;  Location: [facility];  Service: Orthopedics   • REPLACEMENT TOTAL KNEE Right    • SILVANA-EN-Y PROCEDURE      x2   • TOTAL KNEE ARTHROPLASTY     • UMBILICAL HERNIA REPAIR      x2     Social History   Social History     Substance and Sexual Activity   Alcohol Use Not Currently     Social History     Substance and Sexual Activity   Drug Use No     Social History     Tobacco Use   Smoking Status Never   Smokeless Tobacco Never     Family History:   Family History   Problem Relation Age of Onset   • Coronary artery disease Mother    • Heart attack Mother    • Hypertension Mother    • Coronary artery disease Father    • Heart attack Father    • Hypertension Father    • Lymphoma Sister         acute   • Rashes / Skin problems Sister         lymphomatoid papulosis   • Cancer Sister    • Coronary artery disease Brother    • Heart attack Brother         x2   • Aneurysm Neg Hx    • Hyperlipidemia Neg Hx        Meds/Allergies   PTA meds:   Prior to Admission Medications   Prescriptions Last Dose Informant Patient Reported? Taking?    Cholecalciferol 125 MCG (5000 UT) TABS  Outside Facility (Specify) Yes No   Sig: Take 5,000 Units by mouth daily   DULoxetine (CYMBALTA) 60 mg delayed release capsule  Outside Facility (Specify) No No   Sig: TAKE 1 CAPSULE BY MOUTH EVERY DAY   Diclofenac Sodium (VOLTAREN) 1 %  Outside Facility (Specify) No No   Sig: Apply 2 g topically 4 (four) times a day   acetaminophen (TYLENOL) 500 mg tablet   No No   Sig: Take 2 tablets (1,000 mg total) by mouth every 8 (eight) hours   desoximetasone (TOPICORT) 0 25 % cream  Outside Facility (Specify) No No   Sig: APPLY TOPICALLY AS NEEDED FOR IRRITATION   dicyclomine (BENTYL) 10 mg capsule   Yes No   fluticasone (FLONASE) 50 mcg/act nasal spray  Outside Facility (Specify) No No   Sig: SPRAY 2 SPRAYS INTO EACH NOSTRIL EVERY DAY   gabapentin (NEURONTIN) 300 mg capsule   Yes No   levothyroxine 100 mcg tablet  Outside Facility (Specify) No No   Sig: TAKE 1 TABLET BY MOUTH EVERY DAY   loperamide (IMODIUM) 2 mg capsule  Outside Facility (Specify) Yes No   Sig: Take 2 mg by mouth 4 (four) times a day as needed for diarrhea   nystatin powder   Yes No   oxyCODONE (ROXICODONE) 5 immediate release tablet   No No   Sig: Take 0 5 tablets (2 5 mg total) by mouth every 8 (eight) hours as needed for moderate pain for up to 10 days Max Daily Amount: 7 5 mg   polyethylene glycol (MIRALAX) 17 g packet  Outside Facility (Specify) Yes No   Sig: Take 17 g by mouth daily   potassium chloride 10% oral solution  Outside Facility (Specify) No No   Sig: Take 15 mL (20 mEq total) by mouth daily   simethicone (MYLICON) 80 mg chewable tablet  Outside Facility (Specify) Yes No   Sig: Chew 80 mg every 6 (six) hours as needed for flatulence   sucralfate (CARAFATE) 1 g tablet  Outside Facility (Specify) No No   Sig: TAKE 1 TABLET BY MOUTH 2 TIMES A DAY  torsemide (DEMADEX) 10 mg tablet   Yes No   Sig: Take 10 mg by mouth daily   warfarin (COUMADIN) 3 mg tablet  Outside Facility (Specify) Yes No      Facility-Administered Medications: None     Allergies   Allergen Reactions   • Other      Other reaction(s): Other (See Comments)  ana m toradol in OR 6/06 sah   • Seasonal Ic  [Cholestatin] Allergic Rhinitis   • Trimethoprim    • Bactrim [Sulfamethoxazole-Trimethoprim] Rash   • Sulfa Antibiotics Rash     Other reaction(s): Other (See Comments)  takes lasix @home       Objective   Vitals: Blood pressure 127/60, pulse 62, temperature 98 2 °F (36 8 °C), temperature source Oral, resp  rate 18, SpO2 92 %, not currently breastfeeding  Intake/Output Summary (Last 24 hours) at 6/18/2023 2202  Last data filed at 6/18/2023 1945  Gross per 24 hour   Intake 100 ml   Output --   Net 100 ml       Invasive Devices     Peripheral Intravenous Line  Duration           Peripheral IV 06/18/23 Right Antecubital <1 day          Drain  Duration           External Urinary Catheter 2 days                Physical Exam  Vitals reviewed  Constitutional:       General: She is not in acute distress  Appearance: She is overweight  She is not toxic-appearing  Eyes:      General: Lids are normal  Gaze aligned appropriately  Cardiovascular:      Rate and Rhythm: Normal rate and regular rhythm  Heart sounds: S1 normal and S2 normal  Murmur heard  Systolic murmur is present  No friction rub  No gallop  No S3 or S4 sounds  Pulmonary:      Effort: Pulmonary effort is normal  No accessory muscle usage or prolonged expiration  Abdominal:      General: Bowel sounds are normal       Palpations: Abdomen is soft  Tenderness: There is no abdominal tenderness  There is no guarding  Musculoskeletal:      Cervical back: Normal range of motion  Skin:     General: Skin is warm  Coloration: Skin is not ashen or jaundiced  Neurological:      Mental Status: She is alert  Psychiatric:         Attention and Perception: Attention and perception normal          Mood and Affect: Mood and affect normal          Speech: Speech normal          Lab Results:   I have personally reviewed pertinent reports        Recent Results (from the past 24 hour(s))   CBC and differential    Collection Time: 06/18/23  5:37 AM   Result Value Ref Range    WBC 28 26 (H) 4 31 - 10 16 Thousand/uL    RBC 2 90 (L) 3 81 - 5 12 Million/uL    Hemoglobin 9 4 (L) 11 5 - 15 4 g/dL    Hematocrit 28 7 (L) 34 8 - 46 1 %    MCV 99 (H) 82 - 98 fL    MCH 32 4 26 8 - 34 3 pg    MCHC 32 8 31 4 - 37 4 g/dL    RDW 13 3 11 6 - 15 1 %    MPV 9 1 8 9 - 12 7 fL    Platelets 341 (H) 034 - 390 Thousands/uL   Basic metabolic panel    Collection Time: 06/18/23  5:37 AM   Result Value Ref Range    Sodium 134 (L) 135 - 147 mmol/L    Potassium 4 3 3 5 - 5 3 mmol/L    Chloride 105 96 - 108 mmol/L    CO2 22 21 - 32 mmol/L    ANION GAP 7 4 - 13 mmol/L    BUN 23 5 - 25 mg/dL    Creatinine 0 58 (L) 0 60 - 1 30 mg/dL    Glucose 68 65 - 140 mg/dL    Calcium 8 2 (L) 8 3 - 10 1 mg/dL    eGFR 81 ml/min/1 73sq m   ECG 12 lead    Collection Time: 06/18/23  6:39 PM   Result Value Ref Range    Ventricular Rate 71 BPM    Atrial Rate 288 BPM    WV Interval  ms    QRSD Interval 106 ms    QT Interval 380 ms    QTC Interval 412 ms    P Axis  degrees    QRS Axis 4 degrees    T Wave Axis 147 degrees   CBC and differential    Collection Time: 06/18/23  6:46 PM   Result Value Ref Range    WBC 28 19 (H) 4 31 - 10 16 Thousand/uL    RBC 2 84 (L) 3 81 - 5 12 Million/uL    Hemoglobin 8 8 (L) 11 5 - 15 4 g/dL    Hematocrit 28 0 (L) 34 8 - 46 1 %    MCV 99 (H) 82 - 98 fL    MCH 31 0 26 8 - 34 3 pg    MCHC 31 4 31 4 - 37 4 g/dL    RDW 13 2 11 6 - 15 1 %    MPV 8 7 (L) 8 9 - 12 7 fL    Platelets 289 (H) 687 - 390 Thousands/uL   Comprehensive metabolic panel    Collection Time: 06/18/23  6:46 PM   Result Value Ref Range Sodium 133 (L) 135 - 147 mmol/L    Potassium 3 6 3 5 - 5 3 mmol/L    Chloride 102 96 - 108 mmol/L    CO2 25 21 - 32 mmol/L    ANION GAP 6 4 - 13 mmol/L    BUN 22 5 - 25 mg/dL    Creatinine 0 60 0 60 - 1 30 mg/dL    Glucose 96 65 - 140 mg/dL    Calcium 8 1 (L) 8 3 - 10 1 mg/dL    Corrected Calcium 10 1 8 3 - 10 1 mg/dL    AST 48 (H) 5 - 45 U/L    ALT 32 12 - 78 U/L    Alkaline Phosphatase 184 (H) 46 - 116 U/L    Total Protein 6 1 (L) 6 4 - 8 4 g/dL    Albumin 1 5 (L) 3 5 - 5 0 g/dL    Total Bilirubin 0 40 0 20 - 1 00 mg/dL    eGFR 81 ml/min/1 73sq m   Lactic acid    Collection Time: 06/18/23  6:46 PM   Result Value Ref Range    LACTIC ACID 1 3 0 5 - 2 0 mmol/L   Procalcitonin    Collection Time: 06/18/23  6:46 PM   Result Value Ref Range    Procalcitonin 1 52 (H) <=0 25 ng/ml   Protime-INR    Collection Time: 06/18/23  6:46 PM   Result Value Ref Range    Protime 27 1 (H) 11 6 - 14 5 seconds    INR 2 48 (H) 0 84 - 1 19   APTT    Collection Time: 06/18/23  6:46 PM   Result Value Ref Range    PTT 58 (H) 23 - 37 seconds   Manual Differential(PHLEBS Do Not Order)    Collection Time: 06/18/23  6:46 PM   Result Value Ref Range    Segmented % 91 (H) 43 - 75 %    Bands % 2 0 - 8 %    Lymphocytes % 1 (L) 14 - 44 %    Monocytes % 3 (L) 4 - 12 %    Eosinophils, % 0 0 - 6 %    Basophils % 0 0 - 1 %    Metamyelocytes% 1 0 - 1 %    Atypical Lymphocytes % 2 (H) <=0 %    Absolute Neutrophils 26 22 (H) 1 85 - 7 62 Thousand/uL    Lymphocytes Absolute 0 28 (L) 0 60 - 4 47 Thousand/uL    Monocytes Absolute 0 85 0 00 - 1 22 Thousand/uL    Eosinophils Absolute 0 00 0 00 - 0 40 Thousand/uL    Basophils Absolute 0 00 0 00 - 0 10 Thousand/uL    Total Counted      RBC Morphology Normal     Platelet Estimate Increased (A) Adequate     Blood Culture:   Lab Results   Component Value Date    BLOODCX No Growth at 72 hrs  06/15/2023    BLOODCX No Growth at 72 hrs  06/15/2023   ,   Urinalysis:   Lab Results   Component Value Date    COLORU Light "[place] 06/15/2023    CLARITYU Extra Turbid 06/15/2023    SPECGRAV 1 013 06/15/2023    PHUR 7 0 06/15/2023    PHUR 6 0 08/20/2016    LEUKOCYTESUR Large (A) 06/15/2023    NITRITE Positive (A) 06/15/2023    GLUCOSEU Negative 06/15/2023    KETONESU Negative 06/15/2023    BILIRUBINUR Negative 06/15/2023    BLOODU Large (A) 06/15/2023   ,   Urine Culture:   Lab Results   Component Value Date    URINECX >100,000 cfu/ml Escherichia coli ESBL (A) 06/15/2023    URINECX >100,000 cfu/ml Klebsiella pneumoniae (A) 06/15/2023    URINECX >100,000 cfu/ml Aerococcus urinae (A) 06/15/2023   ,   Wound Culure: No results found for: \"WOUNDCULT\"    Imaging:   EKG, Pathology, and Other Studies: I have personally reviewed pertinent reports      EKG: unchanged    Code Status: Level 1 - Full Code  POLST:    VTE Prophylaxis: Heparin sequential compression device and foot pump applied  Admission Status: [address] [place] [address], [facility]  PGY-3      "

## 2023-06-19 NOTE — OCCUPATIONAL THERAPY NOTE
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  Active Problems:    Chronic atrial fibrillation (HCC)    Chronic diastolic CHF (congestive heart failure) (HCC)    Acute cystitis without hematuria    Right hip pain    Benign essential HTN    Past Medical History  Past Medical History:   Diagnosis Date    Abdominal bloating 8/1/2016    Anemia     Arthritis     Cancer (HCC)     basal cell    CHF (congestive heart failure) ([address] )     COVID-19     Disease of thyroid gland     Disturbance of smell and taste     disturbance of taste    Effusion of knee joint right     Fibromyalgia     Heart murmur     reported a previous heart murmur    History of acute myocardial infarction     History of atrial fibrillation     History of bruising easily     History of cancer     History of dermatitis     History of mammogram     History of methicillin resistant staphylococcus aureus (MRSA)     Negative nasal culture, isolation discontinued 8/17/2018      History of methicillin resistant staphylococcus aureus (MRSA) 08/17/2018    negative nasal culture-isolation and hx discontinued 8/17/2018    History of obesity     History of osteopenia     History of sciatica     History of shortness of breath     History of sinusitis     History of sore throat     History of syncope     History of umbilical hernia     History of viral infection     Irritable bowel syndrome (IBS)     Joint pain, hip     Limb pain     Myalgia     myalgia and myositis    Need for prophylactic vaccination against single diseases     Need for prophylactic vaccination and inoculation against influenza     Preoperative cardiovascular examination     Primary osteoarthritis of right knee     Right leg pain     Vaginal Pap smear     reported pap smear     Past Surgical History  Past Surgical History:   Procedure Laterality Date    ANKLE ARTHRODESIS Right     BACK SURGERY      BARIATRIC SURGERY      CHOLECYSTECTOMY      x2    COLONOSCOPY      ESOPHAGOGASTRODUODENOSCOPY N/A 3/23/2018    Procedure: ESOPHAGOGASTRODUODENOSCOPY (EGD); Surgeon: Santa Yost MD;  [facility]; Service: Gastroenterology    FOOT SURGERY      HIP CLOSE REDUCTION Right 8/21/2016    Procedure: CLOSED REDUCTION RIGHT TOTAL HIP;  Surgeon: Ashanti Montanez MD;  [facility];  Service:     Sagrario Pradeep / Jillene Baize / Vikki Flax      IR BIOPSY OTHER  8/1/2020    JOINT REPLACEMENT      LEFT KNEE REPLACEMENT    JOINT REPLACEMENT      Right HIP    PILONIDAL CYST EXCISION      x2    NJ EXC B9 LESION MRGN XCP SK TG T/A/L 0 5 CM/< Right 7/24/2019    Procedure: REVISION SCAR EXTREMITY Rt Hip; Surgeon: Dana Watson MD;  [facility];  Service: Orthopedics    NJ REVJ TOT HIP ARTHRP [address] W/WO AGRFT/ALGRFT Right 4/15/2019    Procedure: ARTHROPLASTY HIP TOTAL ACETABULAR REVISION;  Surgeon: Dana Watson MD;  [facility];  Service: Orthopedics    REPLACEMENT TOTAL KNEE Right     SILVANA-EN-Y PROCEDURE      x2    TOTAL KNEE ARTHROPLASTY      UMBILICAL HERNIA REPAIR      x2           06/19/23 0850   OT Last Visit   OT Visit Date 06/19/23   Note Type   Note type Evaluation   Additional Comments Pt seen w/ PT to increase safety, decrease fall risk, and maximize functional/occupational performance 2* medical complexity which is a regression from pt's functional baseline  Pain Assessment   Pain Assessment Tool 0-10   Pain Score 10 - Worst Possible Pain   Pain Location/Orientation Orientation: Right;Location: Leg;Location: Hip   Effect of Pain on Daily Activities Impacts ability to engage in valued occupations   Hospital Pain Intervention(s) Repositioned; Ambulation/increased activity; Emotional support   Restrictions/Precautions   Weight Bearing Precautions Per Order Yes   RLE Weight Bearing Per Order (S)  NWB  (Of note, pt w/ acute on chronic right hip pain in the "setting of 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; per ortho on 6/16, pt is NWB in RLE)   Other Precautions WBS; Multiple lines; Fall Risk;Pain   Home Living   Type of Home SNF  [facility])   Home Layout One level;Performs ADLs on one level; Able to live on main level with bedroom/bathroom; Access   Bathroom Accessibility Accessible   Home Equipment [facility]   Additional Comments Per chart review, pt presenting from [facility]   Prior Function   Level of [place] Needs assistance with ADLs; Needs assistance with functional mobility; Needs assistance with [address] staff   Receives Help From Personal care attendant   IADLs Family/Friend/Other provides transportation; Family/Friend/Other provides meals; Family/Friend/Other provides medication management   Falls in the last 6 months 0   Vocational Retired   Lifestyle   Autonomy PTA, pt recieves assistance w/ ADLs/IADLs and uses w/c for functional mobility; (-) driving   Reciprocal Relationships Lives w/ facility staff at [employer] to Others Retired   [address] Enjoys watching TV   General   Family/Caregiver Present Yes   Additional General Comments Pt's daughter present during eval, supportive/interactive w/ pt   Subjective   Subjective \"I want to brush my teeth  \"   ADL   Where Assessed Edge of bed   Eating Assistance 5  Supervision/Setup   Grooming Assistance 4  Minimal Assistance   UB Bathing Assistance 3  Moderate Assistance   LB Bathing Assistance 2  Maximal [address] 3  Moderate Assistance   LB Dressing Assistance 2  Maximal Assistance   Toileting Assistance  2  Maximal Assistance   Functional Assistance 2  Maximal Assistance   Bed Mobility   Rolling R 2  Maximal assistance   Additional items Assist x 2;HOB elevated; Bedrails; Increased time required;Verbal cues;LE management   Rolling L 2  Maximal assistance   Additional items Assist x 2;HOB " elevated; Bedrails; Increased time required;Verbal cues;LE management   Supine to Sit 2  Maximal assistance   Additional items Assist x 2;HOB elevated; Bedrails; Increased time required;Verbal cues;LE management   Sit to Supine 2  Maximal assistance   Additional items Assist x 2;HOB elevated; Bedrails; Increased time required;Verbal cues;LE management   Additional Comments Pt performing rolling R <> and supine <> sit with Max A x2 for UB postural support/LE management; pt sat EOB w/ varying CGA/supervision to complete seated ADLs; @ end of session, pt requiring Max Ax2 to return to supine position and was left with all functional needs in reach   Transfers   Additional Comments Did not assess @ this time 2* increased pain   Functional Mobility   Additional Comments Did not assess @ this time 2* increased pain   Balance   Static Sitting Fair   Dynamic Sitting Fair -   Activity Tolerance   Activity Tolerance Patient limited by pain   Medical Staff Made Aware Herman Wheatley DPT   Nurse Made Aware RN cleared   RUE Assessment   RUE Assessment WFL   LUE Assessment   LUE Assessment WFL   Hand Function   Gross Motor Coordination Functional   Fine Motor Coordination Functional   Psychosocial   Psychosocial (WDL) WDL   Cognition   Overall Cognitive Status WFL   Arousal/Participation Responsive;Arousable; Cooperative   Attention Within functional limits   Orientation Level Oriented X4   Memory Within functional limits   Following Commands Follows one step commands with increased time or repetition   Comments Pt pleasant and cooperative   Assessment   Limitation Decreased ADL status; Decreased UE strength;Decreased endurance;Decreased self-care trans;Decreased high-level ADLs   Prognosis Fair   Assessment Pt is a 81 yo female who presented to [facility] from [facility] with fever in which pt dx w/ infection due to ESBL-producing escherichia coli  Per chart review, pt w/ recent hospitalization 2* UTI and was d/c to rehab   Of note, pt is NWB in RLE per ortho 2* acute on chronic right hip pain in the setting of 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  Pt  has a past medical history of Abdominal bloating (8/1/2016), Anemia, Arthritis, Cancer ([facility] ), CHF (congestive heart failure) ([facility] ), COVID-19, Disease of thyroid gland, Disturbance of smell and taste, Effusion of knee joint right, Fibromyalgia, Heart murmur, History of acute myocardial infarction, History of atrial fibrillation, History of bruising easily, History of cancer, History of dermatitis, History of mammogram, History of methicillin resistant staphylococcus aureus (MRSA), History of methicillin resistant staphylococcus aureus (MRSA) (08/17/2018), History of obesity, History of osteopenia, History of sciatica, History of shortness of breath, History of sinusitis, History of sore throat, History of syncope, History of umbilical hernia, History of viral infection, Irritable bowel syndrome (IBS), Joint pain, hip, Limb pain, Myalgia, Need for prophylactic vaccination against single diseases, Need for prophylactic vaccination and inoculation against influenza, Preoperative cardiovascular examination, Primary osteoarthritis of right knee, Right leg pain, and Vaginal Pap smear  Pt with active OT orders in which OT consulted to assess pt's functional status and occupational performance to determine safe d/c needs  Pt presenting from [facility], receives assistance w/ ADLs/IADLs and uses w/c for functional mobility  (-) driving  Currently, pt performing bed mobility w/ Max A x2, UB ADLs w/ Mod A, and LB ADLs w/ Max A  Pt demonstrates the following limitations/impairments which impact the pt's ability to engage in valued occupations: NWB in RLE, balance, endurance/activity tolerance, standing tolerance, functional reach, postural/trunk control, strength, safety awareness, and pain  From an OT standpoint, recommend discharge to return to facility w/ rehab, once medically stable   Pt would benefit from skilled OT services 2-3x/wk to address immediate acute care needs and underlying performance skills to promote safety, decrease fall risk, and enhance occupational performance to return to PLOF  Goals to be met within the next 10-14 days  Goals   Patient Goals To get better   LTG Time Frame 10-14   Long Term Goal #1 See OT goals listed below   Plan   Treatment Interventions ADL retraining;Functional transfer training;UE strengthening/ROM; Endurance training;Patient/family training;Equipment evaluation/education; Compensatory technique education;Continued evaluation; Energy conservation; Activityengagement   Goal Expiration Date 07/03/23   OT Frequency 2-3x/wk   Recommendation   OT Discharge Recommendation Return to facility with rehabilitation services   Additional Comments  The patient's raw score on the AM-PAC Daily Activity Inpatient Short Form is 15  A raw score of less than 19 suggests the patient may benefit from discharge to post-acute rehabilitation services  Please refer to the recommendation of the Occupational Therapist for safe discharge planning  AM-PAC Daily Activity Inpatient   Lower Body Dressing 2   Bathing 2   Toileting 2   Upper Body Dressing 2   Grooming 3   Eating 4   Daily Activity Raw Score 15   Daily Activity Standardized Score (Calc for Raw Score >=11) 34 69   AM-[facility] Applied Cognition Inpatient   Following a Speech/Presentation 3   Understanding Ordinary Conversation 4   Taking Medications 3   Remembering Where Things Are Placed or Put Away 3   Remembering List of 4-5 Errands 3   Taking Care of Complicated Tasks 2   Applied Cognition Raw Score 18   Applied Cognition Standardized Score 38 07   End of Consult   Education Provided Yes;Family or social support of family present for education by provider   Patient Position at End of Consult Supine; All needs within reach   Nurse Communication Nurse aware of consult     OT GOALS:    OTR to see for functional transfers/mobility when appropriate  Pt will improve activity tolerance/functional endurance during ADL/IADL/leisure tasks for at least 20 minutes to improve occupational performance and engagement in valued occupations using AE/DME prn  Pt will engage in ongoing functional/formal cognitive assessments to assist with safe d/c planning and increase safety during functional tasks  Pt will improve static/dynamic sitting balance for at least 15 minutes with Mod I during functional tasks to decrease fall risk and improve independence and engagement in ADL/IADL/leisure activities  Pt will follow 100% of multi-step commands in ADL/IADL/leisure activities to improve functional cognition used in functional daily routines  Pt will complete all bed mobility tasks with Min A to serve as a prerequisite for EOB/OOB ADL/IADL/leisure tasks, optimize positioning/comfort, and increase functional independence  Pt will independently demonstrate good carryover of safety precautions, WB status, and education/training during ADL/IADL/leisure tasks with energy conservation techniques s/p skilled instruction without verbal cues  Pt will complete UB ADL tasks with Min A using AE/DME prn to increase functional independence in ADL/IADL/leisure tasks  Pt will complete LB ADL tasks with Mod A using AE/DME prn to increase functional independence in ADL/IADL/leisure tasks  Pt will complete toileting tasks with Mod A and good hygiene/thoroughness using AE/DME prn to increase functional independence  Pt will independently identify and utilize 2-3 positive coping strategies to enhance overall wellbeing and engagement in valued occupations      Maria De Jesus Valentin MS, OTR/L

## 2023-06-19 NOTE — CONSULTS
Consultation - Infectious Disease   Jeremias Spain 80 y o  female MRN: [id number]  Unit/Bed#: [facility] 917-01 Encounter: [id number]      IMPRESSION & RECOMMENDATIONS:   Impression/Recommendations:  Leukocytosis  Unclear etiology  Consider due to UTI although no  symptoms  No other appreciable source of infection  ROS and exam otherwise benign  COVID PCR, LFTs, blood cultures negative  Consider also leukemoid reaction to anemia, pain  Appears clinically stable and nontoxic  Rec:  · Trial of antibiotics for treatment of UTI as below  · Consider further work-up of anemia  · Follow temperatures closely  · Recheck CBC in a m  · Supportive care as per the primary service    ESBL E  coli UTI versus asymptomatic bacteriuria  Suspect contamination versus colonization in patient with morbid obesity, incontinence  Reasonable to consider treatment in setting of leukocytosis as above  Rec:  · Discontinue Zosyn  · Fosfomycin x1  · Follow  symptoms closely    Anemia  Appears new over past week  Baseline Hgb 10-11  Rec:  · Defer further work-up to primary service  · Recheck CBC in a m  Chronic right hip dislocation    The above impression and plan was discussed in detail with the patient and her family at the bedside as well as Dr Barbara Eden  Antibiotics:  Zosyn #1    Thank you for this consultation  We will follow along with you  HISTORY OF PRESENT ILLNESS:  Reason for Consult: UTI    HPI: Jeremias Spain is a 80 y o  female with a complicated past medical history including chronic right hip dislocation with recent admission to [facility] for pain  During recent hospitalization diagnosed with UTI treated with Keflex  Urine culture came back growing ESBL E  coli and patient was readmitted to the hospital yesterday  Started on Zosyn  Over past 2 hospitalizations patient has continued to have leukocytosis    Upon questioning patient denies any cough, chest pain, nausea, vomiting, abdominal or suprapubic pain, diarrhea, constipation, dysuria, frequency, hesitation  We are asked to comment on further evaluation and management  In performing this consult, I have reviewed prior admission and outpatient visit records in detail  REVIEW OF SYSTEMS:  As per HPI  A complete system-based review of systems is otherwise negative  PAST MEDICAL HISTORY:  Past Medical History:   Diagnosis Date   • Abdominal bloating 8/1/2016   • Anemia    • Arthritis    • Cancer (HCC)     basal cell   • CHF (congestive heart failure) (HCC)    • COVID-19    • Disease of thyroid gland    • Disturbance of smell and taste     disturbance of taste   • Effusion of knee joint right    • Fibromyalgia    • Heart murmur     reported a previous heart murmur   • History of acute myocardial infarction    • History of atrial fibrillation    • History of bruising easily    • History of cancer    • History of dermatitis    • History of mammogram    • History of methicillin resistant staphylococcus aureus (MRSA)     Negative nasal culture, isolation discontinued 8/17/2018     • History of methicillin resistant staphylococcus aureus (MRSA) 08/17/2018    negative nasal culture-isolation and hx discontinued 8/17/2018   • History of obesity    • History of osteopenia    • History of sciatica    • History of shortness of breath    • History of sinusitis    • History of sore throat    • History of syncope    • History of umbilical hernia    • History of viral infection    • Irritable bowel syndrome (IBS)    • Joint pain, hip    • Limb pain    • Myalgia     myalgia and myositis   • Need for prophylactic vaccination against single diseases    • Need for prophylactic vaccination and inoculation against influenza    • Preoperative cardiovascular examination    • Primary osteoarthritis of right knee    • Right leg pain    • Vaginal Pap smear     reported pap smear     Past Surgical History:   Procedure Laterality Date   • ANKLE ARTHRODESIS Right    • BACK SURGERY     • BARIATRIC SURGERY     • CHOLECYSTECTOMY      x2   • COLONOSCOPY     • ESOPHAGOGASTRODUODENOSCOPY N/A 3/23/2018    Procedure: ESOPHAGOGASTRODUODENOSCOPY (EGD); Surgeon: Isis Reeves MD;  [facility]; Service: Gastroenterology   • FOOT SURGERY     • HIP CLOSE REDUCTION Right 8/21/2016    Procedure: CLOSED REDUCTION RIGHT TOTAL HIP;  Surgeon: Gareth Florez MD;  [facility];  Service:    • INSERT / Elisa Pratt / Emerald Garvey     • IR BIOPSY OTHER  8/1/2020   • JOINT REPLACEMENT      LEFT KNEE REPLACEMENT   • JOINT REPLACEMENT      Right HIP   • PILONIDAL CYST EXCISION      x2   • FL EXC B9 LESION MRGN XCP SK TG T/A/L 0 5 CM/< Right 7/24/2019    Procedure: REVISION SCAR EXTREMITY Rt Hip; Surgeon: Fidel Salazar MD;  [facility];  Service: Orthopedics   • FL REVJ TOT HIP ARTHRP [address] W/WO AGRFT/ALGRFT Right 4/15/2019    Procedure: ARTHROPLASTY HIP TOTAL ACETABULAR REVISION;  Surgeon: Fidel Salazar MD;  [facility];  Service: Orthopedics   • REPLACEMENT TOTAL KNEE Right    • SILVANA-EN-Y PROCEDURE      x2   • TOTAL KNEE ARTHROPLASTY     • UMBILICAL HERNIA REPAIR      x2       FAMILY HISTORY:  Non-contributory    SOCIAL HISTORY:  Social History     Substance and Sexual Activity   Alcohol Use Not Currently     Social History     Substance and Sexual Activity   Drug Use No     Social History     Tobacco Use   Smoking Status Never   Smokeless Tobacco Never       ALLERGIES:  Allergies   Allergen Reactions   • Other      Other reaction(s): Other (See Comments)  ana m toradol in OR 6/06 sah   • Seasonal Ic  [Cholestatin] Allergic Rhinitis   • Trimethoprim    • Bactrim [Sulfamethoxazole-Trimethoprim] Rash   • Sulfa Antibiotics Rash     Other reaction(s): Other (See Comments)  takes lasix @home       MEDICATIONS:  All current active medications have been reviewed      PHYSICAL EXAM:  Vitals:  Temp:  [97 3 °F (36 3 °C)-98 2 °F (36 8 °C)] 97 3 °F (36 3 °C)  HR: [57-82] 81  Resp:  [16-20] 18  BP: ()/(50-78) 106/55  SpO2:  [92 %-98 %] 97 %  Temp (24hrs), Av 8 °F (36 6 °C), Min:97 3 °F (36 3 °C), Max:98 2 °F (36 8 °C)  Current: Temperature: (!) 97 3 °F (36 3 °C)     Physical Exam:  General:  Well-nourished, well-developed, in no acute distress  Eyes:  Conjunctive clear with no hemorrhages or effusions  Oropharynx:  No ulcers, no lesions  Neck:  Supple, no lymphadenopathy  Lungs:  Normal respiratory excursion, no accessory muscle use  Cardiac:  Regular rate and rhythm, extremities well perfused  Abdomen:  Soft, non-tender, non-distended  Extremities:  No peripheral cyanosis, clubbing  Right leg length discrepancy, right hip surgical scar intact  Skin:  No rashes, no ulcers  Neurological:  Moves all four extremities spontaneously, sensation grossly intact    LABS, IMAGING, & OTHER STUDIES:  Lab Results:  I have personally reviewed pertinent labs  Results from last 7 days   Lab Units 23  0537 23  0439 06/15/23  1825   POTASSIUM mmol/L 3 6 3 6 4 3   < > 3 7   CHLORIDE mmol/L 107 102 105   < > 103   CO2 mmol/L 22 25 22   < > 25   BUN mg/dL 20 22 23   < > 21   CREATININE mg/dL 0 49* 0 60 0 58*   < > 0 72   EGFR ml/min/1 73sq m 86 81 81   < > 74   CALCIUM mg/dL 7 4* 8 1* 8 2*   < > 8 3   AST U/L  --  48*  --   --  40   ALT U/L  --  32  --   --  20   ALK PHOS U/L  --  184*  --   --  178*    < > = values in this interval not displayed  Results from last 7 days   Lab Units 23  0537   WBC Thousand/uL 24 01* 28 19* 28 26*   HEMOGLOBIN g/dL 7 6* 8 8* 9 4*   PLATELETS Thousands/uL 409* 498* 480*     Results from last 7 days   Lab Units 06/18/23  1846 06/15/23  1905 06/15/23  1825   BLOOD CULTURE  Received in Microbiology Lab  Culture in Progress  Received in Microbiology Lab  Culture in Progress  --  No Growth at 72 hrs  No Growth at 72 hrs     URINE CULTURE   --  >100,000 cfu/ml Escherichia coli ESBL*  >100,000 cfu/ml Klebsiella pneumoniae*  >100,000 cfu/ml Aerococcus urinae*  --        Imaging Studies:   I have personally reviewed pertinent imaging study reports and images in PACS  Right hip x-ray no fracture    EKG, Pathology, and Other Studies:   I have personally reviewed pertinent reports

## 2023-06-19 NOTE — UTILIZATION REVIEW
NOTIFICATION OF ADMISSION DISCHARGE   This is a Notification of Discharge from [facility]  Please be advised that this patient has been discharge from our facility  Below you will find the admission and discharge date and time including the patient’s disposition  UTILIZATION REVIEW CONTACT:  Trevor Gasca  Utilization   Network Utilization Review Department  Phone: [phone] x carefully listen to the prompts  All voicemails are confidential   Email: [email or web address]     ADMISSION INFORMATION  PRESENTATION DATE: 6/15/2023  2:10 PM  OBERVATION ADMISSION DATE:  INPATIENT ADMISSION DATE: 6/15/23  7:23 PM   DISCHARGE DATE: 6/18/2023  3:06 PM   DISPOSITION:Home/Self Care    IMPORTANT INFORMATION:  Send all requests for admission clinical reviews, approved or denied determinations and any other requests to dedicated fax number below belonging to the campus where the patient is receiving treatment   List of dedicated fax numbers:  [address] DENIALS (Administrative/Medical Necessity) [phone]   [address] (Maternity/NICU/Pediatrics) [phone]   [facility] [phone]   [facility] [phone]   [address] [phone]   [address] [facility] [phone]   [address] [place] [address] [phone]   1466 [facility] 119 [phone]   [facility]  [phone]   [facility] [facility] [phone]   412 [facility] 850 [facility] [phone]

## 2023-06-19 NOTE — PLAN OF CARE
Problem: PHYSICAL THERAPY ADULT  Goal: Performs mobility at highest level of function for planned discharge setting  See evaluation for individualized goals  Description: Treatment/Interventions: ADL retraining, Functional transfer training, LE strengthening/ROM, Elevations, Therapeutic exercise, Endurance training, Bed mobility, Gait training, Spoke to nursing, OT          See flowsheet documentation for full assessment, interventions and recommendations  Note: Prognosis: Fair  Problem List: Decreased strength, Decreased range of motion, Decreased endurance, Decreased cognition, Impaired judgement, Decreased mobility, Impaired balance, Orthopedic restrictions, Pain  Assessment: PT orders received and acknowledged  Patient was seen today for high complexity PT evaluation  High complexity evaluation due to Ongoing medical management for primary dx, Decreased activity tolerance compared to baseline, Fall risk, Increased assistance needed from caregiver at current time, Cog status, Continuous pulse oximetry monitoring  Patient is a 80 y o  female who was admitted to [facility] on 6/18/23 with E-coli infection    Patients current diagnosis/problem list include chronic atrial fibrillation, R hip pain, hypertension, and CHF   Patient performed functional mobility as described above  Patients mobility was greatly limited by pain in RLE today  Patient reported that pain was in her R leg, hip, ankle, and knee throughout therapy session  Patient reported minimal discomfort at rest, but maximal with any RLE movements  Patient deferred attempting transfers today secondary to increased hip pain  PT educated patient about weight bearing status but reinforcement is needed  Patient currently presents below baseline with limitations in bed mobility and transfers  Patient will benefit from continued PT services while in hospital in order to address remaining limitations   The patients AM-PAC Basic Mobility Inpatient Short From Raw Score is 8   A Raw score of 8 suggests that the patient may benefit from discharge to post acute rehab   PT recommendation at this time is for post acute rehab   Please also refer to the recommendation of the Physical Therapist for safe discharge planning  Barriers to Discharge: Decreased caregiver support, Inaccessible home environment     PT Discharge Recommendation: Return to facility with rehabilitation services    See flowsheet documentation for full assessment

## 2023-06-19 NOTE — ASSESSMENT & PLAN NOTE
Returning from rehab after fever on day of discharge, urine culture did show ESBL E  coli not susceptible to Keflex or ceftriaxone    ED workup:  -Leukocytosis, elevated Pro-Live  -1 dose Zosyn, 1 dose Augmentin       Plan:  • Continue Zosyn, consider transition to Augmentin  • Monitor vitals

## 2023-06-19 NOTE — ASSESSMENT & PLAN NOTE
2-3 week history of chronic R hip pain  Has history of right hip replacement and follows with orthopedics outpatient  Most recently admitted at [facility] from 6/8-6/12 under the trauma service for chronic right hip dislocation  Orthopedics evaluated patient and stated no surgical intervention required  Discharged to SNF with outpatient orthopedics follow-up     XR right hip/pelvis (6/15/23):   1  Stable appearance of the right hip without acute fracture or dislocation  2  Postprocedural changes related to total right hip arthroplasty with stable marked superior acetabular and articulating femoral prosthetic component migration    3  Remote posttraumatic deformity involving the femur, acetabulum and inferior pubic ramus as noted       Significant pain and tenderness on exam today, unable to lift R leg     Plan:  • Lidocaine patch on R hip  • Pain regimen: dimitrios tylenol 1000 mg Q8H, oxy 2 5 mg Q8H prn on discharge  • F/u with ortho outpatient

## 2023-06-19 NOTE — ASSESSMENT & PLAN NOTE
Wt Readings from Last 3 Encounters:   06/16/23 76 6 kg (168 lb 12 8 oz)   03/24/23 75 8 kg (167 lb)   03/17/23 77 kg (169 lb 12 1 oz)     HFpEF per cardiology notes   Last ECHO 4/27/2018 EF 55%, LV thickness upper limits normal, moderate mitral regurgitation, moderate tricuspid regurgitation       • Monitor I/O   • Continue torsemide 10mg

## 2023-06-19 NOTE — CASE MANAGEMENT
Case Management Assessment    Patient name Quita Foster  Location [facility] 917/[facility] 379-69 MRN [id number]  : 1933 Date 2023       Current Admission Date: 2023  Current Admission Diagnosis:Infection due to ESBL-producing Escherichia coli   Patient Active Problem List    Diagnosis Date Noted   • Infection due to ESBL-producing Escherichia coli 2023   • Hyponatremia 06/15/2023   • Lumbar radiculopathy 02/10/2023   • Low back pain with sciatica 02/10/2023   • Lumbar spondylosis 02/10/2023   • S/P lumbar fusion 02/10/2023   • Peripheral vascular disease, unspecified ([address] ) 2022   • Negative depression screening 2021   • Benign essential HTN 2021   • Trochanteric bursitis 2021   • Meningioma ([address] ) 11/10/2020   • Trochanteric bursitis of right hip 11/10/2020   • Stage 3a chronic kidney disease ([address] ) 11/10/2020   • Fall 10/13/2020   • Hematoma 10/13/2020   • Ecchymosis of eye 10/13/2020   • Elevated C-reactive protein (CRP) 2020   • S/P scar revision 2019   • Hypertrophic scar of skin 2019   • Cellulitis of right lower extremity 2019   • Right hip pain 2019   • MCI (mild cognitive impairment) 2019   • Ambulatory dysfunction 2019   • Acute cystitis without hematuria 2019   • Status post right hip replacement 2019   • Macrocytosis 2019   • Status post gastrectomy 2019   • Hypothyroidism 10/11/2018   • Pacemaker 2018   • Chronic diastolic CHF (congestive heart failure) ([address] ) 2016   • Depression 2016   • Class 1 obesity due to excess calories with serious comorbidity and body mass index (BMI) of 33 0 to 33 9 in adult 2016   • Chronic pain 2016   • Chronic pain of lower extremity, bilateral 2016   • External hemorrhoids 2016   • Anemia 2016   • Chronic atrial fibrillation ([address] ) 2016   • Irritable bowel syndrome with constipation 09/15/2015   • Synovial cyst of right popliteal space 08/04/2015   • Primary osteoarthritis of right knee 06/30/2015   • Osteoarthritis, multiple sites 06/26/2014   • Moderate mitral regurgitation 05/13/2014   • Osteoporosis 02/04/2014   • Fibromyalgia 05/31/2012   • Peripheral neuropathy 05/25/2012   • Lumbar canal stenosis 05/17/2012      LOS (days): 1  Geometric Mean LOS (GMLOS) (days):   Days to GMLOS:     OBJECTIVE:  PATIENT READMITTED TO HOSPITAL  Risk of Unplanned Readmission Score: 23 36         Current admission status: Inpatient       Preferred Pharmacy:   [id number] , [address] [place] [address]  Phone: [phone] Fax: [address] [id number]  Phone: [phone] Fax: [phone]    Primary Care Provider: Lissette Ardon DO    Primary Insurance: [facility]  Secondary Insurance:     ASSESSMENT:  [address] 26 Proxies    There are no active Health Care Proxies on file  Readmission Root Cause  30 Day Readmission: Yes  Who directed you to return to the hospital?: Other (comment) (staff at [facility] rehab)  Patient was readmitted due to: Infection due to ESBL-producing Escherichia coli  Patient Information  Admitted from[de-identified] Facility [facility] rehab)  Mental Status: Alert  During Assessment patient was accompanied by: Not accompanied during assessment  Assessment information provided by[de-identified] Patient  Support Systems: Other (Comment), Family members  Home entry access options   Select all that apply : No steps to enter home  Type of Current Residence: Facility  Upon entering residence, is there a bedroom on the main floor (no further steps)?: Yes  Upon entering residence, is there a bathroom on the main floor (no further steps)?: Yes  Homeless/housing insecurity resource given?: N/A  Living Arrangements: Other (Comment) ([address] in [facility]    Activities of Daily Living Prior to Admission  Functional Status: Assistance  Completes ADLs independently?: No  Level of ADL dependence: Assistance  Ambulates independently?: No  Level of ambulatory dependence: Assistance  Does patient use assisted devices?: Yes  Assisted Devices (DME) used: Wheelchair  Does patient currently own DME?: Yes  What DME does the patient currently own?: Wheelchair  Does the patient have a history of Short-Term Rehab?: No  Does patient have a history of [facility]?: No  Does patient currently have [facility]?: No         Patient Information Continued  Income Source: SSI/SSD  Food insecurity resource given?: N/A  Does patient receive dialysis treatments?: No  Does patient have a history of substance abuse?: No  Does patient have a history of Mental Health Diagnosis?: No         Means of Transportation  Means of Transport to [facility][de-identified] Other (Comment) ([employer] sets up transport when needed )  Was application for public transport provided?: N/A    30 day readmit, assessment taken from 6/16/23

## 2023-06-19 NOTE — ASSESSMENT & PLAN NOTE
Patient readmission after recent UTI, urine culture did show ESBL E  coli greater than 100,000  Patient was initially treated with ceftriaxone and discharged on Keflex  Readmitted due to fever at rehab, and persistent leukocytosis which was normal earlier this month   procalcitonin's elevated    -Patient received 1 dose of Zosyn and Augmentin in ED  -Continue Zosyn q6h  -Continue IV fluids  -Pain control  -Zofran as needed  -Monitor vitals

## 2023-06-19 NOTE — ASSESSMENT & PLAN NOTE
Returning from rehab after fever on day of discharge, urine culture did show ESBL E  coli not susceptible to Keflex or ceftriaxone    ED workup:  -Leukocytosis, elevated Pro-Live  -1 dose Zosyn, 1 dose Augmentin in ED       Plan:  - see plan under

## 2023-06-19 NOTE — PROGRESS NOTES
Progress Notes - Family Medicine Residency, Lewis Coulter 9/2/1933, 80 y o  female  MRN: [id number]  Unit/Bed#: [facility] 917-01 Encounter: [id number]  Primary Care Provider: Johnathon Srivastava DO      Admission Date: 6/18/2023 1646  Length of Stay: 1 days  Code Status:  Level 1 - Full Code  Disposition: inpatient, consider dispo after ID eval  Consult:   IP CONSULT TO INFECTIOUS DISEASES     Assessment/Plan:      Plans discussed with [facility] team and finalization is pending attending physician attestation  Please, call  for any clarification  Benign essential HTN  Assessment & Plan  Continue monitor BP, stable currently   -Continued torsemide 10 mg daily    Right hip pain  Assessment & Plan  2-3 week history of chronic R hip pain  Has history of right hip replacement and follows with orthopedics outpatient  Most recently admitted at [facility] from 6/8-6/12 under the trauma service for chronic right hip dislocation  Orthopedics evaluated patient and stated no surgical intervention required  Discharged to SNF with outpatient orthopedics follow-up     XR right hip/pelvis (6/15/23):   1  Stable appearance of the right hip without acute fracture or dislocation  2  Postprocedural changes related to total right hip arthroplasty with stable marked superior acetabular and articulating femoral prosthetic component migration  3  Remote posttraumatic deformity involving the femur, acetabulum and inferior pubic ramus as noted       Significant pain and tenderness on exam today, unable to lift R leg     Plan:  • Lidocaine patch on R hip  • Pain regimen: dimitrios tylenol 1000 mg Q8H, oxy 2 5 mg Q8H prn on discharge  • F/u with ortho outpatient     Acute cystitis without hematuria  Assessment & Plan  Returning from rehab after fever on day of discharge, urine culture did show ESBL E  coli not susceptible to Keflex or ceftriaxone    ED workup:  -Leukocytosis, elevated Pro-Live  -1 dose Zosyn, 1 dose Augmentin Plan:  • Continue Zosyn Q6H, consider transition to Augmentin  • Monitor vitals       Chronic diastolic CHF (congestive heart failure) (HCC)  Assessment & Plan  Wt Readings from Last 3 Encounters:   06/16/23 76 6 kg (168 lb 12 8 oz)   03/24/23 75 8 kg (167 lb)   03/17/23 77 kg (169 lb 12 1 oz)     HFpEF per cardiology notes  Last ECHO 4/27/2018 EF 55%, LV thickness upper limits normal, moderate mitral regurgitation, moderate tricuspid regurgitation       • Monitor I/O   • Continue torsemide 10mg        Chronic atrial fibrillation (HCC)  Assessment & Plan  Currently rate controlled  On daily warfarin 3 mg  -INR between 2-3   -Continue warfarin 3mg    * Infection due to ESBL-producing Escherichia coli  Assessment & Plan  Patient readmission after recent UTI, urine culture did show ESBL E  coli greater than 100,000 as well as Klebsiella pneumoniae and Aerococcus urinae both growing >100k  Patient was initially treated with ceftriaxone and discharged on Keflex after discussion with ID due to allergy to sulfa and poor tolerance of amoxicillin from previous records  Readmitted due to fever at rehab, and persistent leukocytosis which was normal earlier this month  Procalcitonin's elevated  -Patient received 1 dose of Zosyn and Augmentin in ED  - WBC trending down,     Plan:  -Continue Zosyn q6h, will consider switching to Augmentin as Zosyn is not on sensitivity list for Klebsiella; due to concern for colonization vs actual infection and course of multiple abx, will consult ID for formal eval at this time   -Continue IV fluids  -Pain control  -Zofran as needed  -Monitor vitals        Diet: Diet Regular; Regular House    VTE Pharm PPX: Warfarin   VTE Mech PPX: sequential compression device      Subjective: Today 06/19/23, HD# 1    • Patient seen and examined at bedside and without questions or concerns  Denies any pain, dysuria, urinary frequency or urgency  • Spoke with family at bedside regarding re-admission  Unclear history about vitals at the nursing home and why there were no discharge instructions provided to the staff there  They are concerned about patient's allergy list and was reviewed and will be amended to reflect what they know - they deny that patient is allergic to amoxicillin or any narcotic medication  Formal ID consult pending considering multiple antibiotic changes and questionable colonization vs actual infection  Family would like for her to [facility] as [facility]  • Medical management and treatment was discussed with patient, patient understands and agrees with plan  Objective:     Vitals:    06/19/23 0500 06/19/23 0600 06/19/23 0821 06/19/23 0903   BP: 90/53 93/55 105/56 106/55   BP Location:       Pulse: 69 70 70 81   Resp:   18    Temp:   (!) 97 3 °F (36 3 °C)    TempSrc:       SpO2: 95% 96% 92% 97%     Temp:  [97 3 °F (36 3 °C)-98 2 °F (36 8 °C)] 97 3 °F (36 3 °C)  HR:  [57-82] 81  Resp:  [16-20] 18  BP: ()/(50-78) 106/55  Weight (last 2 days)     None          Intake/Output Summary (Last 24 hours) at 6/19/2023 1332  Last data filed at 6/19/2023 0236  Gross per 24 hour   Intake 200 ml   Output 200 ml   Net 0 ml     Invasive Devices     Peripheral Intravenous Line  Duration           Peripheral IV 06/18/23 Right Antecubital <1 day          Drain  Duration           External Urinary Catheter 3 days                  Physical Exam:     Physical Exam  Vitals reviewed  Constitutional:       Appearance: She is obese  HENT:      Head: Normocephalic and atraumatic  Right Ear: External ear normal       Left Ear: External ear normal       Nose: Nose normal       Mouth/Throat:      Pharynx: Oropharynx is clear  Eyes:      Extraocular Movements: Extraocular movements intact  Conjunctiva/sclera: Conjunctivae normal    Cardiovascular:      Rate and Rhythm: Normal rate and regular rhythm  Pulses: Normal pulses  Heart sounds: Normal heart sounds     Pulmonary: Effort: Pulmonary effort is normal       Breath sounds: Normal breath sounds  Abdominal:      Palpations: Abdomen is soft  Musculoskeletal:      Cervical back: Neck supple  Right lower leg: No edema  Left lower leg: No edema  Comments: R hip tenderness   Skin:     General: Skin is warm  Neurological:      Mental Status: She is alert and oriented to person, place, and time  Psychiatric:         Mood and Affect: Mood normal            Labs:     CBC:  Results from last 7 days   Lab Units 06/19/23  0424 06/18/23  1846 06/18/23  0537 06/17/23  1544 06/17/23  0525 06/16/23  0439 06/15/23  1653   WBC Thousand/uL 24 01* 28 19* 28 26* 31 27* 32 90* 28 84* 29 47*   HEMOGLOBIN g/dL 7 6* 8 8* 9 4* 8 4* 10 8* 8 4* 9 5*   HEMATOCRIT % 25 5* 28 0* 28 7* 25 9* 35 8 26 4* 29 3*   PLATELETS Thousands/uL 409* 498* 480* 380 397* 345 340   NEUTROS ABS Thousands/µL  --   --   --  27 73*  --   --   --        CMP:  Results from last 7 days   Lab Units 06/19/23  0424 06/18/23  1846 06/18/23  0537 06/17/23  0525 06/16/23  0439 06/15/23  1825   POTASSIUM mmol/L 3 6 3 6 4 3 4 0 3 7 3 7   CHLORIDE mmol/L 107 102 105 104 99 103   CO2 mmol/L 22 25 22 20* 25 25   BUN mg/dL 20 22 23 20 20 21   CREATININE mg/dL 0 49* 0 60 0 58* 0 66 0 64 0 72   CALCIUM mg/dL 7 4* 8 1* 8 2* 8 0* 7 7* 8 3   AST U/L  --  48*  --   --   --  40   ALT U/L  --  32  --   --   --  20   ALK PHOS U/L  --  184*  --   --   --  178*   EGFR ml/min/1 73sq m 86 81 81 78 79 74       Sepsis:  Results from last 7 days   Lab Units 06/19/23  0424 06/18/23  1846 06/15/23  1825   LACTIC ACID mmol/L  --  1 3 1 7   PROCALCITONIN ng/ml 1 22* 1 52* 1 32*       Micro:  Lab Results   Component Value Date/Time    Blood Culture Received in Microbiology Lab  Culture in Progress  06/18/2023 06:46 PM    Blood Culture Received in Microbiology Lab  Culture in Progress   06/18/2023 06:46 PM    Urine Culture >100,000 cfu/ml Escherichia coli ESBL (A) 06/15/2023 07:05 PM    Urine Culture >100,000 cfu/ml Klebsiella pneumoniae (A) 06/15/2023 07:05 PM    Urine Culture >100,000 cfu/ml Aerococcus urinae (A) 06/15/2023 07:05 PM         Imaging:     No results found        Medications:     Current Facility-Administered Medications   Medication Dose Route Frequency   • acetaminophen (TYLENOL) tablet 650 mg  650 mg Oral Q6H [address]   • Diclofenac Sodium (VOLTAREN) 1 % topical gel 2 g  2 g Topical 4x Daily   • DULoxetine (CYMBALTA) delayed release capsule 60 mg  60 mg Oral Daily   • levothyroxine tablet 100 mcg  100 mcg Oral Early Morning   • ondansetron (ZOFRAN) injection 4 mg  4 mg Intravenous Q6H PRN   • oxyCODONE (ROXICODONE) IR tablet 5 mg  5 mg Oral Q4H PRN   • oxyCODONE (ROXICODONE) split tablet 2 5 mg  2 5 mg Oral Q4H PRN   • piperacillin-tazobactam (ZOSYN) 4 5 g in sodium chloride 0 9 % 100 mL IVPB  4 5 g Intravenous Q6H   • polyethylene glycol (MIRALAX) packet 17 g  17 g Oral Daily   • sodium chloride 0 9 % with KCl 20 mEq/L infusion (premix)  100 mL/hr Intravenous Continuous   • sucralfate (CARAFATE) tablet 1 g  1 g Oral BID   • torsemide (DEMADEX) tablet 10 mg  10 mg Oral Daily   • warfarin (COUMADIN) tablet 3 mg  3 mg Oral Daily (warfarin)       Michelle Durant DO  Family Medicine, PGY-2  1:32 PM 6/19/2023

## 2023-06-19 NOTE — DISCHARGE INSTR - AVS FIRST PAGE
Patient was admitted for urinary tract infection after concerns of desaturation to 89% and reported fever at assisted facility/nursing home, [facility]  No documentation of vitals were provided on arrival to hospital  While hospitalized, patient has been afebrile, BP 110s/50s, HR 60-70, SpO2 > 95% on room air on average  Due to concerns of questionable colonization vs actual urine infection in setting of recent admissions and lack of symptoms, formal consult to Infectious Disease specialist was made and they did NOT recommend any further antibiotics on discharge  Patient's Hgb was slightly lower at 8-9 from a baseline of 10-11  WBC has been downtrending  We recommend follow up CBC in one week for the anemia and review with her PCP  Patient was also seen by ortho team when she first arrived on 6/17 who do no plan for any further surgical intervention  Recommending follow up with ortho outpatient as needed for any changes in status and with physical therapy as tolerated  For pain control, we recommend the following:  Tylenol 500 mg 2 tab every 8 hours scheduled as patient does not often report when she is in pain  Oxycodone 2 5 mg every 8 hours PRN, will only provide enough medication for 10 days - patient should be reassessed by facility physician and pain regimen adjusted by them  After speaking with family, patient is determined to NOT be allergic to oxycodone  She has taken that while previously hospitalized with no adverse reaction  Her allergy list was changed to reflect this  All this information was reviewed with patient's niece, Marvin Garcia, who is on her contact list as well as Nurse, Mel Iniguez, from [facility]

## 2023-06-19 NOTE — PHYSICAL THERAPY NOTE
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  Active Problems:    Chronic atrial fibrillation (HCC)    Chronic diastolic CHF (congestive heart failure) (HCC)    Acute cystitis without hematuria    Right hip pain    Benign essential HTN       Past Medical History  Past Medical History:   Diagnosis Date   • Abdominal bloating 8/1/2016   • Anemia    • Arthritis    • Cancer ([address] )     basal cell   • CHF (congestive heart failure) (HCC)    • COVID-19    • Disease of thyroid gland    • Disturbance of smell and taste     disturbance of taste   • Effusion of knee joint right    • Fibromyalgia    • Heart murmur     reported a previous heart murmur   • History of acute myocardial infarction    • History of atrial fibrillation    • History of bruising easily    • History of cancer    • History of dermatitis    • History of mammogram    • History of methicillin resistant staphylococcus aureus (MRSA)     Negative nasal culture, isolation discontinued 8/17/2018     • History of methicillin resistant staphylococcus aureus (MRSA) 08/17/2018    negative nasal culture-isolation and hx discontinued 8/17/2018   • History of obesity    • History of osteopenia    • History of sciatica    • History of shortness of breath    • History of sinusitis    • History of sore throat    • History of syncope    • History of umbilical hernia    • History of viral infection    • Irritable bowel syndrome (IBS)    • Joint pain, hip    • Limb pain    • Myalgia     myalgia and myositis   • Need for prophylactic vaccination against single diseases    • Need for prophylactic vaccination and inoculation against influenza    • Preoperative cardiovascular examination    • Primary osteoarthritis of right knee    • Right leg pain    • Vaginal Pap smear     reported pap smear Past Surgical History  Past Surgical History:   Procedure Laterality Date   • ANKLE ARTHRODESIS Right    • BACK SURGERY     • BARIATRIC SURGERY     • CHOLECYSTECTOMY      x2   • COLONOSCOPY     • ESOPHAGOGASTRODUODENOSCOPY N/A 3/23/2018    Procedure: ESOPHAGOGASTRODUODENOSCOPY (EGD); Surgeon: Anup Rivera MD;  Location: [facility]; Service: Gastroenterology   • FOOT SURGERY     • HIP CLOSE REDUCTION Right 8/21/2016    Procedure: CLOSED REDUCTION RIGHT TOTAL HIP;  Surgeon: Lorraine Brady MD;  [facility];  Service:    • INSERT / Loyde Earle / Bryan Both     • IR BIOPSY OTHER  8/1/2020   • JOINT REPLACEMENT      LEFT KNEE REPLACEMENT   • JOINT REPLACEMENT      Right HIP   • PILONIDAL CYST EXCISION      x2   • LA EXC B9 LESION MRGN XCP SK TG T/A/L 0 5 CM/< Right 7/24/2019    Procedure: REVISION SCAR EXTREMITY Rt Hip; Surgeon: Vipin Gomez MD;  Location: [facility];  Service: Orthopedics   • LA REVJ TOT HIP ARTHRP [address] W/WO AGRFT/ALGRFT Right 4/15/2019    Procedure: ARTHROPLASTY HIP TOTAL ACETABULAR REVISION;  Surgeon: Vipin Gomez MD;  [facility];  Service: Orthopedics   • REPLACEMENT TOTAL KNEE Right    • SILVANA-EN-Y PROCEDURE      x2   • TOTAL KNEE ARTHROPLASTY     • UMBILICAL HERNIA REPAIR      x2           06/19/23 0937   PT Last Visit   PT Visit Date 06/19/23   Note Type   Note type Evaluation   Pain Assessment   Pain Assessment Tool 0-10   Pain Score 10 - Worst Possible Pain   Pain Location/Orientation Orientation: Right;Location: Leg;Location: Hip   Patient's Stated Pain Goal No pain   Hospital Pain Intervention(s) Repositioned; Ambulation/increased activity   Restrictions/Precautions   Weight Bearing Precautions Per Order Yes   RLE Weight Bearing Per Order (S)  NWB  (Per orthopedic note dated 6/16)   Home Living   Type of Home SNF  [facility])   Home Layout One level;Performs ADLs on one level   Bathroom Shower/Tub Tub/shower unit   Bathroom Toilet Standard   Bathroom Accessibility [facility]   Prior Function   Level of [place] Needs assistance with ADLs; Needs assistance with functional mobility; Needs assistance with [address] staff   Receives Help From Other (Comment)  (Facility staff)   IADLs Family/Friend/Other provides transportation; Family/Friend/Other provides meals; Family/Friend/Other provides medication management   Falls in the last 6 months 0   Vocational Retired   Comments Per patients daughter, patient has been living at [address] [place] [address] since January and has been occasionally working with PT services  Daughter reports that patient would sit at edge of bed and occasionally complete standing activities   General   Family/Caregiver Present Yes  (daughter, son in law)   Cognition   Overall Cognitive Status Impaired   Arousal/Participation Alert   Orientation Level Oriented to person   Memory Decreased recall of recent events;Decreased recall of precautions   Following Commands Follows one step commands with increased time or repetition   Subjective   Subjective Patient pleasant and cooperative throughout therapy session  Patient received supine in bed   RLE Assessment   RLE Assessment X   Strength RLE   RLE Overall Strength 3+/5  (limited due to pain)   LLE Assessment   LLE Assessment X   Strength LLE   LLE Overall Strength 4/5   Bed Mobility   Rolling R 2  Maximal assistance   Additional items Assist x 2;HOB elevated; Bedrails;LE management;Verbal cues; Increased time required   Rolling L 2  Maximal assistance   Additional items Assist x 2;HOB elevated; Bedrails; Increased time required;Verbal cues;LE management   Supine to Sit 2  Maximal assistance   Additional items Assist x 2;HOB elevated; Bedrails; Increased time required;LE management;Verbal cues   Sit to Supine 2  Maximal assistance   Additional items Assist x 2;HOB elevated; Bedrails; Increased time required;Verbal cues;LE management   Transfers   Sit to Stand Unable to assess   Ambulation/Elevation   Gait pattern Not tested   Balance   Static Sitting Fair   Dynamic Sitting Fair -   Endurance Deficit   Endurance Deficit Yes   Endurance Deficit Description generalized weakness, decreased exercise tolerance   Activity Tolerance   Activity Tolerance Patient limited by pain   Medical Staff Made Aware andreia Mcfarland due to medical complexity and multiple comorbidities   Nurse Made Aware RN cleared and updated   Assessment   Prognosis Fair   Problem List Decreased strength;Decreased range of motion;Decreased endurance;Decreased cognition; Impaired judgement;Decreased mobility; Impaired balance;Orthopedic restrictions;Pain   Assessment PT orders received and acknowledged  Patient was seen today for high complexity PT evaluation  High complexity evaluation due to Ongoing medical management for primary dx, Decreased activity tolerance compared to baseline, Fall risk, Increased assistance needed from caregiver at current time, Cog status, Continuous pulse oximetry monitoring  Patient is a 80 y o  female who was admitted to [facility] on [facility]/18/23 with E-coli infection    Patients current diagnosis/problem list include chronic atrial fibrillation, R hip pain, hypertension, and CHF   Patient performed functional mobility as described above  Patients mobility was greatly limited by pain in RLE today  Patient reported that pain was in her R leg, hip, ankle, and knee throughout therapy session  Patient reported minimal discomfort at rest, but maximal with any RLE movements  Patient deferred attempting transfers today secondary to increased hip pain  PT educated patient about weight bearing status but reinforcement is needed  Patient currently presents below baseline with limitations in bed mobility and transfers  Patient will benefit from continued PT services while in hospital in order to address remaining limitations   The patients AM-PAC Basic Mobility Inpatient Short From Raw Score is 8   A Raw score of 8 suggests that the patient may benefit from discharge to post acute rehab   PT recommendation at this time is for post acute rehab   Please also refer to the recommendation of the Physical Therapist for safe discharge planning  Barriers to Discharge Decreased caregiver support; Inaccessible home environment   Goals   Patient Goals to go home   STG Expiration Date 07/03/23   Short Term Goal #1 Patient will be able to perform bed mobility tasks with supervision in order to improve overall functional mobility and assist in safe d/c  STG 2 Patient will sit EOB for at least 25 minutes at supervision level in order to strengthen abdominal musculature and assist in future transfers and ambulation  STG 3 Patient will be able to perform functional transfer with supervision in order to improve overall functional mobility and assist in safe discharge  STG 4 Patient will be able to ambulate at least 100 feet with least restrictive device with supervision assist in order to improve overall functional mobility and assist in safe discharge  STG 5 Patient will improve sitting/standing static/dynamic balance 1/2 grade in order to improve functional mobility and assist in safe discharge  STG 6 Patient will improve LE strength by 1/2 grade in order to improve functional mobility and assist in safe discharge  PT Treatment Day 0   Plan   Treatment/Interventions ADL retraining;Functional transfer training;LE strengthening/ROM; Elevations; Therapeutic exercise; Endurance training;Bed mobility;Gait training;Spoke to nursing;OT   PT Frequency 2-3x/wk   Recommendation   PT Discharge Recommendation Return to facility with rehabilitation services (Patient would benefit from rehab services to increase independence in bed mobility and transfers for safety of patient as well as decreased caregiver burden on facility staff   Patient to progress to rehab for standing and gait so that she may return to full functional mobility and independence  Main limitation at the current moment is pain in RLE and patient will benefit from rehab to decrease atrophy associated with non-movement )   AM-PAC Basic Mobility Inpatient   Turning in Flat Bed Without Bedrails 2   Lying on Back to Sitting on Edge of Flat Bed Without Bedrails 2   Moving Bed to Chair 1   Standing Up From Chair Using Arms 1   Walk in Room 1   Climb 3-5 Stairs With Railing 1   Basic Mobility Inpatient Raw Score 8   Highest Level Of Mobility   -HLM Goal 3: Sit at edge of bed   -HLM Achieved 3: Sit at edge of bed   End of Consult   Patient Position at End of Consult Supine;Bed/Chair alarm activated; All needs within reach       [place]eno Base PT, DPT

## 2023-06-19 NOTE — ASSESSMENT & PLAN NOTE
2-3 week history of chronic R hip pain  Has history of right hip replacement and follows with orthopedics outpatient  Most recently admitted at [facility] from 6/8-6/12 under the trauma service for chronic right hip dislocation  Orthopedics evaluated patient and stated no surgical intervention required  Discharged to SNF with outpatient orthopedics follow-up     XR right hip/pelvis (6/15/23):   1  Stable appearance of the right hip without acute fracture or dislocation  2  Postprocedural changes related to total right hip arthroplasty with stable marked superior acetabular and articulating femoral prosthetic component migration    3  Remote posttraumatic deformity involving the femur, acetabulum and inferior pubic ramus as noted       Significant pain and tenderness on exam today, unable to lift R leg     Plan:  • Lidocaine patch on R hip  • Pain regimen: dimitrios tylenol 1000 mg Q8H, oxy 2 5 mg Q8H prn on discharge  • F/u with ortho outpatient

## 2023-06-20 VITALS
SYSTOLIC BLOOD PRESSURE: 99 MMHG | OXYGEN SATURATION: 95 % | TEMPERATURE: 98 F | HEART RATE: 69 BPM | RESPIRATION RATE: 20 BRPM | DIASTOLIC BLOOD PRESSURE: 47 MMHG

## 2023-06-20 PROBLEM — N30.00 ACUTE CYSTITIS: Status: ACTIVE | Noted: 2023-06-18

## 2023-06-20 PROBLEM — N30.00 ACUTE CYSTITIS WITHOUT HEMATURIA: Status: RESOLVED | Noted: 2019-04-30 | Resolved: 2023-06-20

## 2023-06-20 PROBLEM — K52.9 CHRONIC DIARRHEA: Status: ACTIVE | Noted: 2023-06-20

## 2023-06-20 LAB
ANION GAP SERPL CALCULATED.3IONS-SCNC: 3 MMOL/L (ref 4–13)
BACTERIA BLD CULT: NORMAL
BACTERIA BLD CULT: NORMAL
BUN SERPL-MCNC: 19 MG/DL (ref 5–25)
CALCIUM SERPL-MCNC: 7.8 MG/DL (ref 8.3–10.1)
CHLORIDE SERPL-SCNC: 110 MMOL/L (ref 96–108)
CO2 SERPL-SCNC: 23 MMOL/L (ref 21–32)
CREAT SERPL-MCNC: 0.52 MG/DL (ref 0.6–1.3)
ERYTHROCYTE [DISTWIDTH] IN BLOOD BY AUTOMATED COUNT: 13.3 % (ref 11.6–15.1)
GFR SERPL CREATININE-BSD FRML MDRD: 84 ML/MIN/1.73SQ M
GLUCOSE SERPL-MCNC: 90 MG/DL (ref 65–140)
HCT VFR BLD AUTO: 27.1 % (ref 34.8–46.1)
HGB BLD-MCNC: 8.1 G/DL (ref 11.5–15.4)
MCH RBC QN AUTO: 30.7 PG (ref 26.8–34.3)
MCHC RBC AUTO-ENTMCNC: 29.9 G/DL (ref 31.4–37.4)
MCV RBC AUTO: 103 FL (ref 82–98)
NRBC BLD AUTO-RTO: 0 /100 WBCS
PLATELET # BLD AUTO: 475 THOUSANDS/UL (ref 149–390)
PMV BLD AUTO: 8.6 FL (ref 8.9–12.7)
POTASSIUM SERPL-SCNC: 3.4 MMOL/L (ref 3.5–5.3)
RBC # BLD AUTO: 2.64 MILLION/UL (ref 3.81–5.12)
SARS-COV-2 RNA RESP QL NAA+PROBE: NEGATIVE
SODIUM SERPL-SCNC: 136 MMOL/L (ref 135–147)
WBC # BLD AUTO: 15.63 THOUSAND/UL (ref 4.31–10.16)

## 2023-06-20 PROCEDURE — 97530 THERAPEUTIC ACTIVITIES: CPT

## 2023-06-20 PROCEDURE — 99238 HOSP IP/OBS DSCHRG MGMT 30/<: CPT | Performed by: FAMILY MEDICINE

## 2023-06-20 PROCEDURE — 85027 COMPLETE CBC AUTOMATED: CPT

## 2023-06-20 PROCEDURE — 99232 SBSQ HOSP IP/OBS MODERATE 35: CPT | Performed by: INTERNAL MEDICINE

## 2023-06-20 PROCEDURE — 97535 SELF CARE MNGMENT TRAINING: CPT

## 2023-06-20 PROCEDURE — 80048 BASIC METABOLIC PNL TOTAL CA: CPT

## 2023-06-20 PROCEDURE — 87635 SARS-COV-2 COVID-19 AMP PRB: CPT

## 2023-06-20 RX ORDER — DIPHENHYDRAMINE HYDROCHLORIDE AND LIDOCAINE HYDROCHLORIDE AND ALUMINUM HYDROXIDE AND MAGNESIUM HYDRO
10 KIT EVERY 6 HOURS PRN
Status: DISCONTINUED | OUTPATIENT
Start: 2023-06-20 | End: 2023-06-20 | Stop reason: HOSPADM

## 2023-06-20 RX ORDER — POLYETHYLENE GLYCOL 3350 17 G/17G
17 POWDER, FOR SOLUTION ORAL DAILY PRN
Qty: 20 EACH | Refills: 0
Start: 2023-06-20

## 2023-06-20 RX ADMIN — TORSEMIDE 10 MG: 10 TABLET ORAL at 10:30

## 2023-06-20 RX ADMIN — ACETAMINOPHEN 650 MG: 325 TABLET ORAL at 13:07

## 2023-06-20 RX ADMIN — DICLOFENAC SODIUM 2 G: 10 GEL TOPICAL at 13:08

## 2023-06-20 RX ADMIN — SUCRALFATE 1 G: 1 TABLET ORAL at 10:30

## 2023-06-20 RX ADMIN — OXYCODONE HYDROCHLORIDE 5 MG: 5 TABLET ORAL at 17:40

## 2023-06-20 RX ADMIN — ONDANSETRON 4 MG: 2 INJECTION INTRAMUSCULAR; INTRAVENOUS at 05:30

## 2023-06-20 RX ADMIN — DICLOFENAC SODIUM 2 G: 10 GEL TOPICAL at 10:29

## 2023-06-20 RX ADMIN — OXYCODONE HYDROCHLORIDE 5 MG: 5 TABLET ORAL at 10:40

## 2023-06-20 RX ADMIN — DULOXETINE HYDROCHLORIDE 60 MG: 60 CAPSULE, DELAYED RELEASE ORAL at 10:30

## 2023-06-20 RX ADMIN — DIPHENHYDRAMINE HYDROCHLORIDE AND LIDOCAINE HYDROCHLORIDE AND ALUMINUM HYDROXIDE AND MAGNESIUM HYDRO 10 ML: KIT at 13:08

## 2023-06-20 NOTE — RESTORATIVE TECHNICIAN NOTE
Restorative Technician Note      Patient Name: Araceli Stoddard     Restorative Tech Visit Date: 06/20/23  Note Type: Mobility  Patient Position Upon Consult: Supine  Activity Performed: Repositioned  Assistive Device: Other (Comment) (Ax1 to roll for ADLs)  Patient Position at End of Consult: Supine;  All needs within [facility]

## 2023-06-20 NOTE — DISCHARGE SUMMARY
DISCHARGE SUMMARY - Family Medicine Residency, [facility] 9/2/1933, 80 y o  female  MRN: [id number]    Unit/Bed#: [facility] 917-01 Encounter: [id number]  Primary Care Provider: James Molina DO      Admission Date: 6/18/23  Discharge Date: 06/20/23  Length of Stay: 2 days  Diagnosis:   Principal Problem:    Acute cystitis  Active Problems:    Right hip pain    Chronic atrial fibrillation (HCC)    Chronic diastolic CHF (congestive heart failure) (HCC)    Benign essential HTN    Chronic diarrhea        ASSESSMENTS & PLANS:   Plans discussed with [facility] team and finalization is pending attending physician attestation  * Acute cystitis  Assessment & Plan  Patient readmission after recent UTI, urine culture did show ESBL E  coli greater than 100,000 as well as Klebsiella pneumoniae and Aerococcus urinae both growing >100k  Patient was initially treated with ceftriaxone and discharged on Keflex after discussion with ID due to allergy to sulfa and poor tolerance of amoxicillin from previous records  Readmitted due to fever at rehab, and persistent leukocytosis which was normal earlier this month  Procalcitonin's elevated  -Patient received 1 dose of Zosyn and Augmentin in ED and pt continued on Zosyn for 1 day and d/con 6/19  - WBC trending down, procal trending  - ID was consulted due to concern if this was colonization vs true infection has patient had no symptoms    Plan:  -S/p fosfomycin x 1 on 6/19 per ID  -ID following, appreciate recs   -Pain control  -Monitor vitals    Right hip pain  Assessment & Plan  2-3 week history of chronic R hip pain  Has history of right hip replacement and follows with orthopedics outpatient  Most recently admitted at [facility] from 6/8-6/12 under the trauma service for chronic right hip dislocation  Orthopedics evaluated patient and stated no surgical intervention required   Discharged to SNF with outpatient orthopedics follow-up     XR right hip/pelvis (6/15/23):   1  Stable appearance of the right hip without acute fracture or dislocation  2  Postprocedural changes related to total right hip arthroplasty with stable marked superior acetabular and articulating femoral prosthetic component migration  3  Remote posttraumatic deformity involving the femur, acetabulum and inferior pubic ramus as noted       Significant pain and tenderness on exam today, unable to lift R leg     Plan:  • Lidocaine patch on R hip  • Pain regimen: dimitrios tylenol 1000 mg Q8H, oxy 2 5 mg Q8H prn on discharge  • F/u with ortho outpatient     Acute cystitis without hematuria-resolved as of 6/20/2023  Assessment & Plan  Returning from rehab after fever on day of discharge, urine culture did show ESBL E  coli not susceptible to Keflex or ceftriaxone  ED workup:  -Leukocytosis, elevated Pro-Live  -1 dose Zosyn, 1 dose Augmentin in ED       Plan:  - see plan under       Chronic diarrhea  Assessment & Plan  Ongoing issue for at least 2 years per patients daughter  Continues to have loose stools  Suspect exacerbating due to antibiotics and miralax  Low suspicion of C diff per [facility] BREANNE and ID team     Plan:  - d/c miralax at this time, continue prn when at facility  - continue to monitor    Benign essential HTN  Assessment & Plan  Continue monitor BP, stable currently   -Continued torsemide 10 mg daily    Chronic diastolic CHF (congestive heart failure) (HCC)  Assessment & Plan  Wt Readings from Last 3 Encounters:   06/16/23 76 6 kg (168 lb 12 8 oz)   03/24/23 75 8 kg (167 lb)   03/17/23 77 kg (169 lb 12 1 oz)     HFpEF per cardiology notes  Last ECHO 4/27/2018 EF 55%, LV thickness upper limits normal, moderate mitral regurgitation, moderate tricuspid regurgitation       • Monitor I/O   • Continue torsemide 10mg        Chronic atrial fibrillation (HCC)  Assessment & Plan  Currently rate controlled  On daily warfarin 3 mg    -INR between 2-3   -Continue warfarin 3mg      Patient Active Problem List "  Diagnosis   • Chronic atrial fibrillation (HCC)   • Anemia   • Chronic diastolic CHF (congestive heart failure) (HCC)   • Depression   • Class 1 obesity due to excess calories with serious comorbidity and body mass index (BMI) of 33 0 to 33 9 in adult   • Chronic pain   • Chronic pain of lower extremity, bilateral   • Fibromyalgia   • External hemorrhoids   • Irritable bowel syndrome with constipation   • Lumbar canal stenosis   • Moderate mitral regurgitation   • Osteoarthritis, multiple sites   • Osteoporosis   • Peripheral neuropathy   • Primary osteoarthritis of right knee   • Synovial cyst of right popliteal space   • Pacemaker   • Hypothyroidism   • Macrocytosis   • Status post gastrectomy   • Status post right hip replacement   • Ambulatory dysfunction   • MCI (mild cognitive impairment)   • Right hip pain   • Cellulitis of right lower extremity   • Hypertrophic scar of skin   • S/P scar revision   • Elevated C-reactive protein (CRP)   • Fall   • Hematoma   • Ecchymosis of eye   • Meningioma ([facility])   • Trochanteric bursitis of right hip   • Stage 3a chronic kidney disease (HCC)   • Benign essential HTN   • Trochanteric bursitis   • Negative depression screening   • Peripheral vascular disease, unspecified ([facility])   • Lumbar radiculopathy   • Low back pain with sciatica   • Lumbar spondylosis   • S/P lumbar fusion   • Hyponatremia   • Acute cystitis   • Chronic diarrhea         HPI (per admission H&P note on 6/18/23)     HPI per Dr Jenny Granados:   Jenny Wright is a 80 y o  female who presents with chronic A-fib on Coumadin, 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 and recent hospital admission for UTI  Patient readmitted after rehab stated the patient desatted as well as having fever upon arrival   Patient was then transferred back to the ED, vital signs normal in the ED    She received 1 dose of Zosyn and Augmentin after urine culture showed susceptibility to these 2 antibiotics, " "continue Zosyn  Patient is not complaining of any pain besides right hip pain at this time which is making her comfortable  \"      HOSPITAL COURSE:     Hospital Course:   80 y o  female admitted on 6/18/2023 for UTI after rehab facility reported desaturation to 89% and an undocumented fever upon arrival to their facility  She was transferred back to the ED for evaluation where vitals signs were all within normal limits and patient was saturation >90% on room air  After receiving a dose of Augmentin in the ED, she was started on Zosyn and admitted for further monitoring  Patient continued to deny any urinary symptoms or any other symptoms other than R hip pain  She was formally evaluated by the ID team due to concern if her positive urine culture was more colonization vs contamination vs true UTI in a patient with morbid obesity and incontinence  Zosyn was discontinued and pt was given fosfomycin x 1  She continues to deny any urinary symptoms  Patient was also noted to have anemia, baseline 10-11 but noted to be 8-9 recently  Anemia may be hemodilutional in setting of fluids during admission; should be monitored by PCP outpatient  On 06/20/23, HD# 2, pt remains stable  She has been afebrile and normotensive throughout hospitalization and maintaining saturation greater than 95% on room air  Patient continued to have loose BM which is normal for her per daughter, we held her Miralax and recommend use PRN  Infectious disease do not recommend any further antibiotics  Pt discharged with recommendations for scheduling tylenol as she does not complain about pain unless asked and oxycodone 2 5 mg every 8 hours PRN for pain control with only enough for 10 days, she should follow with ortho and her PCP for further pain management  CBC to be drawn in 1 week to follow up anemia, ordered with discharge orders   [facility] as well as patient's niece, Darrin Mcdaniels, with information regarding her " hospitalization and recommendations on discharge  Discharge instructions were also written out for patient and facility and pt's nurse notified of importance of handing papers to transport as her facility reported not receiving any documentation/plans from her last admission  All questions from patient, her niece and facility nurse were addressed to the best of my ability  COMPLICATIONS:     Complications: NONE       PROCEDURES:     Procedures Performed:   No orders of the defined types were placed in this encounter  SIGNIFICANT FINDINGS / ABNORMAL RESULTS:     Significant Findings/Abnormal Results with this admission:    XR hip/pelv 2-3 vws right if performed    Result Date: 6/16/2023  Narrative: RIGHT HIP INDICATION:   Right hip pain  COMPARISON: 3/28/2023 VIEWS:  XR HIP/PELV 2-3 VWS RIGHT W PELVIS IF PERFORMED FINDINGS: Patient is again noted to be status post previous total right hip arthroplasty with superior displacement of the acetabular and articulating prosthetic femoral components as noted previously, including fracture of one of the acetabular anchoring cancellous screws  Protrusio deformity of the native acetabulum noted once again  Femoral prosthesis bridges an intertrochanteric fracture with considerable osseous resorption about the fracture margins without definite evidence for solid osseous union  Overall appearance is stable when compared to the prior study of 3/28/2023  No definite acute fracture is noted  Probable remote fracture right inferior pubic ramus  Mild degenerative change left hip  Diffuse osteopenia present  No lytic or blastic osseous lesion  Diffuse vascular calcification is present  Postoperative change in the lumbar spine is partially imaged with degenerative change in the lower lumbar spine noted  Impression: 1  Stable appearance to the right hip without acute fracture or dislocation   2  Postprocedural changes related to total right hip arthroplasty with stable marked superior acetabular and articulating femoral prosthetic component migration  3  Remote posttraumatic deformity involving the femur, acetabulum and inferior pubic ramus as noted  Workstation performed: [id number]     CT HIP RIGHT WO CONTRAST    Result Date: 6/8/2023  Narrative: History: Hip fracture Unenhanced CT of the right hip was performed and compared to radiographs from earlier today  FINDINGS: Right hip total replacement  There is dislocation of the femoral head component and acetabular component superiorly relative to the native acetabulum  The femoral head component remains situated within the prosthetic acetabular component  One of the securing screws from the acetabular component is broken with the other portion remaining in the iliac bone  There is significant osteolysis of the right acetabulum as well as the proximal right femoral shaft  There is significant fluid surrounding the hip replacement and insinuating into the eroded portions of the acetabulum, with some interspersed calcific or ossific densities  No acute fracture is seen  Impression: IMPRESSION: Dislocated right hip replacement as described  Significant osteolysis and surrounding fluid  Findings may be due to particle disease and/or infection  CT examination performed with dose lowering protocol in accordance with [employer]  Workstation:[id number]    XR HIP 2 TO 3 VIEWS WITH PELVIS RIGHT    Result Date: 6/8/2023  Narrative: Study: 3 views right hip  COMPARISON: No prior study  HISTORY: Pain with no fall injury  Normal bony mineralization  No acute fractures  There is dislocation of the right femoral hip prosthesis at the acetabular fossa component with no fracture to the superior acetabular screw  The femoral head component remains midline within the acetabular cup fossa  Impression: IMPRESSION: Dislocated right hip prosthesis with the acetabulum without disruption to the femoral head/acetabular junction or femoral shaft component  Workstation:[id number]    XR KNEE 4+ VW RIGHT    Result Date: 6/8/2023  Narrative: Study: 4 views right knee  COMPARISON: No prior study  HISTORY: Right knee pain  No trauma Diffuse osteopenic bones  No acute fracture or dislocation  Marked degenerative joint space narrowing medially with spurring, lateral compartment comparatively spared  Spurring about the tibial spines  Moderate spurring upper pole of patella with joint effusion  Impression: IMPRESSION: Marked degenerative arthropathy medial compartment knee  Workstation:[id number]    XR CHEST 1 VW    Result Date: 6/8/2023  Narrative: Study: Single view supine chest  COMPARISON: March 17, 2009  HISTORY: Fatigue  No chest complaints  Right-sided venous line removed  Interval left-sided transvenous pacer unit with wires in satisfactory position with no discontinuities within its course  Underlying dextroscoliosis  Heart and vessels normal  Lung fields well aerated with no infiltrates, edema, or effusions  Impression: IMPRESSION: Unremarkable single view chest  Workstation:[id number]        VITALS ON DISCHARGE DATE:     Vitals  Blood Pressure: (!) 99/47 (06/20/23 1445)  Temperature: 98 °F (36 7 °C) (06/20/23 1445)  Temp Source: Oral (06/18/23 1709)  Pulse: 69 (06/20/23 1445)  Respirations: 20 (06/20/23 0031)  SpO2: 95 % (06/20/23 1445)    Temp:  [96 1 °F (35 6 °C)-98 1 °F (36 7 °C)] 98 °F (36 7 °C)  HR:  [69-70] 69  Resp:  [17-20] 20  BP: ()/(47-61) 99/47    Weight (last 2 days)     None            Intake/Output Summary (Last 24 hours) at 6/20/2023 1510  Last data filed at 6/20/2023 1307  Gross per 24 hour   Intake 2934 67 ml   Output 50 ml   Net 2884 67 ml       Invasive Devices     Peripheral Intravenous Line  Duration           Peripheral IV 06/20/23 Dorsal (posterior); Left Forearm <1 day          Drain  Duration           External Urinary Catheter 4 days                  PHYSICAL EXAM ON DAY OF DISCHARGE:     Physical Exam  Vitals reviewed  Constitutional:       Appearance: She is obese  HENT:      Head: Normocephalic and atraumatic  Right Ear: External ear normal       Left Ear: External ear normal       Nose: Nose normal       Mouth/Throat:      Pharynx: Oropharynx is clear  Eyes:      Extraocular Movements: Extraocular movements intact  Conjunctiva/sclera: Conjunctivae normal    Cardiovascular:      Rate and Rhythm: Normal rate and regular rhythm  Pulses: Normal pulses  Heart sounds: Normal heart sounds  Pulmonary:      Effort: Pulmonary effort is normal       Breath sounds: Normal breath sounds  Abdominal:      General: There is distension  Palpations: Abdomen is soft  Tenderness: There is abdominal tenderness (mild, diffuse)  Musculoskeletal:      Cervical back: Neck supple  Comments: R hip tenderness   Skin:     General: Skin is warm  Neurological:      Mental Status: She is alert and oriented to person, place, and time  Psychiatric:         Mood and Affect: Mood normal          Behavior: Behavior normal          Thought Content: Thought content normal          Judgment: Judgment normal           CONDITION AT DISCHARGE:   On day of discharge patient is seen and evaluated at bedside  Patient is stable and without concern  Patient denies any pain or SOB  Patient able to tolerate PO food without N/V/D and had bowel movement and baseline urine output  All pertinent lab results, imaging studies, procedures, and/or any incidental findings have been disclosed to the patient  All pertinent questions are answered to patient's satisfaction  On day of discharge, the patient was hemodynamically stable and appropriate for discharge home  Condition at Discharge: stable       DISCHARGE MEDICATIONS:     Discharge Medications:  See after visit summary (AVS) for detailed reconciled discharge medications, which was provided to patient and family   Summary of medication changes made with this "admission:    · START:  1  Oxycodone 2 5 mg every 8 hours as needed for moderate pain    · STOP:  1  None    · CHANGE:  1  Miralax daily to PRN    · RESUME:  1  All other home medications       FOLLOW-UP APPOINTMENTS / INSTRUCTION :     Important Physician Related Follow Up:   · PCP  · Ortho as needed    Appointment confirmed:  Future Appointments   Date Time Provider Bharat Love   6/21/2023  8:30 AM DEVICE REMOTE [place] [facility]   6/21/2023  4:00 PM BE S&P 1 [facility]   7/13/2023  3:45 PM Gianna Alonso MD ORTHO [facility] [place]   9/20/2023  8:30 AM DEVICE REMOTE [place] [facility]   12/20/2023  8:30 AM DEVICE REMOTE [place] C[facility]   3/25/2024 10:30 AM DEVICE IN PERSON [place] CARD [facility]       Discharge instructions/Information to patient and family:   See after visit summary (AVS) for information provided to patient and family  Provisions for Follow-Up Care:  See after visit summary for information related to follow-up care and any pertinent home health orders  DISPOSITION:     Disposition: Skilled nursing facility at [facility] Statement   I spent 30 minutes discharging the patient  This time was spent on the day of discharge  I had direct contact with the patient on the day of discharge  Additional documentation is required if more than 30 minutes were spent on discharge  Planned Readmission: Laury Dickinson DO  PGY-2, Family Medicine  06/20/23  3:10 PM    Dear reader, please be aware that portions of my note contain dictated text  I have done my best to proof-read this note prior to signing  However, there may be occasional unnoticed errors pertaining to \"sound-alike\" words and/or grammar during my dictation process  If there is any words or information that is unclear or appears erroneous, please kindly let me know and I will clarify and/or addend my notes accordingly   Thank you for your " understanding

## 2023-06-20 NOTE — PHYSICAL THERAPY NOTE
PHYSICAL THERAPY NOTE          Patient Name: Eze Arredondo  [id number] Date: 6/20/2023 06/20/23 1020   PT Last Visit   PT Visit Date 06/20/23   Note Type   Note Type Treatment   Pain Assessment   Pain Assessment Tool 0-10   Pain Score 10 - Worst Possible Pain   Pain Location/Orientation Orientation: Right;Location: Hip;Location: Leg   Patient's Stated Pain Goal No pain   Hospital Pain Intervention(s) Repositioned; Ambulation/increased activity   Restrictions/Precautions   Weight Bearing Precautions Per Order Yes   RLE Weight Bearing Per Order (S)  NWB   Other Precautions Pain; Fall Risk;Multiple lines;Telemetry; Bed Alarm; Chair Alarm;Cognitive;WBS   General   Chart Reviewed Yes   Response to Previous Treatment Patient with no complaints from previous session  Family/Caregiver Present No   Cognition   Overall Cognitive Status Impaired   Arousal/Participation Responsive;Arousable; Cooperative   Attention Within functional limits   Orientation Level Oriented X4   Memory Decreased recall of precautions   Following Commands Follows one step commands with increased time or repetition   Subjective   Subjective pt pleasant and cooperative throughout therapy session  pt received supine in bed   Bed Mobility   Rolling R 2  Maximal assistance   Additional items Assist x 1; Increased time required;Verbal cues;LE management   Rolling L 2  Maximal assistance   Additional items Assist x 1; Increased time required;Verbal cues;LE management   Endurance Deficit   Endurance Deficit Yes   Endurance Deficit Description generalized weakness, decreased exercise tolerance   Activity Tolerance   Activity Tolerance Patient limited by pain   Medical Staff Made Aware OT Berry Uriostegui co-treat due to need for assist x2 for bed mobility   Nurse Made Aware RN cleared and updated   Assessment   Prognosis Fair   Problem List Decreased strength;Decreased range of motion;Decreased endurance;Decreased cognition; Impaired judgement;Decreased mobility; Impaired balance;Orthopedic restrictions;Pain   Assessment Patient was received supine in bed today   Patient was agreeable to therapy services today  PT session focused on bed mobility today in order to improve functional mobility and independence  Functional mobility was performed as described above  Patient received calling for help for hygiene  Patient participated in rolling and static laying sidelying for hygiene cleaning  Patient reports increased pain in R hip with movements  Patient requires increased VCs for sequencing and safety during bed mobility  Patient led through several trials of rolling today, which patient was less eager to complete as pain increased  Patient will benefit from continued PT services while in hospital in order to address remaining limitations  The patients AM-PAC Basic Mobility Inpatient Short From Raw Score is 8   A Raw score of 8 suggests that the patient may benefit from discharge to post acute rehab  PT recommendation at this time is for post acute rehab  Please also refer to the recommendation of the Physical Therapist for safe discharge planning  Barriers to Discharge Decreased caregiver support; Inaccessible home environment   Goals   Patient Goals to go home   PT Treatment Day 1   Plan   Treatment/Interventions ADL retraining;Functional transfer training;LE strengthening/ROM; Elevations; Therapeutic exercise; Endurance training;Cognitive reorientation; Bed mobility;Gait training   Progress Slow progress, decreased activity tolerance   PT Frequency 2-3x/wk   Recommendation   PT Discharge Recommendation Return to facility with rehabilitation services   AM-PAC Basic Mobility Inpatient   Turning in Flat Bed Without Bedrails 2   Lying on Back to Sitting on Edge of Flat Bed Without Bedrails 2   Moving Bed to Chair 1   Standing Up From Chair Using Arms 1   Walk in Room 1   Climb 3-5 Stairs With Pramod 1   Basic Mobility Inpatient Raw Score 8   Highest Level Of Mobility   -HLM Goal 3: Sit at edge of bed   -HLM Achieved 2: Bed activities/Dependent transfer   Education   Education Provided Mobility training   Patient Reinforcement needed   End of Consult   Patient Position at End of Consult Supine; All needs within reach         Montserrat Birmingham PT, DPT

## 2023-06-20 NOTE — PROGRESS NOTES
Progress Note - Infectious Disease   Alexia Logan 80 y o  female MRN: [id number]  Unit/Bed#: [facility] 917-01 Encounter: [id number]      Impression/Recommendations:  Leukocytosis  Unclear etiology  Consider due to UTI although no  symptoms  No other appreciable source of infection  ROS and exam otherwise benign  COVID PCR, LFTs, blood cultures negative  Consider also leukemoid reaction to anemia, pain  Appears clinically stable and nontoxic  Improving  Rec:  • No additional antibiotics for now  • Consider further work-up of anemia  • Follow temperatures closely  • Recheck CBC in a m  • Supportive care as per the primary service     Polymicrobial UTI versus asymptomatic bacteriuria  With ESBL E  Coli, Klebsiella, Aerococcus  Suspect contamination versus colonization in patient with morbid obesity, incontinence  Reasonable to consider treatment in setting of leukocytosis as above  Status post 3 days IV ceftriaxone, Fosfomycin x1  Rec:  • No additional antibiotics  • Follow  symptoms closely     Diarrhea  Per chart noted to be chronic issue  Consider exacerbating role of antibiotics, miralax  Lower suspicion for C  Diff  Rec:  · D/C miralax   · Follow stool output closely    Anemia  Appears new over past week  Baseline Hgb 10-11  Rec:  • Defer further work-up to primary service  • Recheck CBC in a m      Chronic right hip dislocation     The patient is stable from an ID standpoint  Antibiotics:  Fosfomycin x1    Subjective:  Patient seen on AM rounds  Having abdominal cramping and loose stools  24 Hour Events:  No documented fevers, chills, sweats, nausea, vomiting  Multiple loose stools noted by nursing        Objective:  Vitals:  Temp:  [96 1 °F (35 6 °C)-98 1 °F (36 7 °C)] 97 4 °F (36 3 °C)  HR:  [70] 70  Resp:  [17-20] 20  BP: ()/(55-61) 123/55  SpO2:  [98 %] 98 %  Temp (24hrs), Av 2 °F (36 2 °C), Min:96 1 °F (35 6 °C), Max:98 1 °F (36 7 °C)  Current: Temperature: Zeny Blend ) 97 4 °F (36 3 °C)    Physical Exam:   General:  No acute distress  Psychiatric:  Awake and alert  Pulmonary:  Normal respiratory excursion without accessory muscle use  Abdomen:  Soft, obese  Extremities:  No edema  Skin:  No rashes    Lab Results:  I have personally reviewed pertinent labs  Results from last 7 days   Lab Units 06/20/23  0522 06/19/23  0424 06/18/23  1846 06/16/23  0439 06/15/23  1825   POTASSIUM mmol/L 3 4* 3 6 3 6   < > 3 7   CHLORIDE mmol/L 110* 107 102   < > 103   CO2 mmol/L 23 22 25   < > 25   BUN mg/dL 19 20 22   < > 21   CREATININE mg/dL 0 52* 0 49* 0 60   < > 0 72   EGFR ml/min/1 73sq m 84 86 81   < > 74   CALCIUM mg/dL 7 8* 7 4* 8 1*   < > 8 3   AST U/L  --   --  48*  --  40   ALT U/L  --   --  32  --  20   ALK PHOS U/L  --   --  184*  --  178*    < > = values in this interval not displayed  Results from last 7 days   Lab Units 06/20/23  0522 06/19/23  1734 06/19/23  0424   WBC Thousand/uL 15 63* 18 08* 24 01*   HEMOGLOBIN g/dL 8 1* 8 1* 7 6*   PLATELETS Thousands/uL 475* 444* 409*     Results from last 7 days   Lab Units 06/18/23  1846 06/15/23  1905 06/15/23  1825   BLOOD CULTURE  No Growth at 24 hrs  No Growth at 24 hrs   --  No Growth After 4 Days  No Growth After 4 Days  URINE CULTURE   --  >100,000 cfu/ml Escherichia coli ESBL*  >100,000 cfu/ml Klebsiella pneumoniae*  >100,000 cfu/ml Aerococcus urinae*  --        Imaging Studies:   I have personally reviewed pertinent imaging study reports and images in PACS  EKG, Pathology, and Other Studies:   I have personally reviewed pertinent reports

## 2023-06-20 NOTE — PROGRESS NOTES
Progress Notes - Family Medicine Residency, Jose Kady Vaughankd 9/2/1933, 80 y o  female  MRN: [id number]  [facility] Encounter: [id number]  Primary Care Provider: María Vaughn DO      Admission Date: 6/18/2023 1646  Length of Stay: 2 days  Code Status:  Level 1 - Full Code  Disposition: inpatient  Consult:   IP CONSULT TO INFECTIOUS DISEASES     Assessment/Plan:      Plans discussed with [facility] team and finalization is pending attending physician attestation  Please, call  for any clarification  * Acute cystitis  Assessment & Plan  Patient readmission after recent UTI, urine culture did show ESBL E  coli greater than 100,000 as well as Klebsiella pneumoniae and Aerococcus urinae both growing >100k  Patient was initially treated with ceftriaxone and discharged on Keflex after discussion with ID due to allergy to sulfa and poor tolerance of amoxicillin from previous records  Readmitted due to fever at rehab, and persistent leukocytosis which was normal earlier this month  Procalcitonin's elevated  -Patient received 1 dose of Zosyn and Augmentin in ED and pt continued on Zosyn for 1 day and d/con 6/19  - WBC trending down, procal trending  - ID was consulted due to concern if this was colonization vs true infection has patient had no symptoms    Plan:  -S/p fosfomycin x 1 on 6/19 per ID  -ID following, appreciate recs   -Continue IV fluids  -Pain control  -Zofran as needed  -Monitor vitals    Right hip pain  Assessment & Plan  2-3 week history of chronic R hip pain  Has history of right hip replacement and follows with orthopedics outpatient  Most recently admitted at [facility] from 6/8-6/12 under the trauma service for chronic right hip dislocation  Orthopedics evaluated patient and stated no surgical intervention required  Discharged to SNF with outpatient orthopedics follow-up     XR right hip/pelvis (6/15/23):   1   Stable appearance of the right hip without acute fracture or dislocation  2  Postprocedural changes related to total right hip arthroplasty with stable marked superior acetabular and articulating femoral prosthetic component migration  3  Remote posttraumatic deformity involving the femur, acetabulum and inferior pubic ramus as noted       Significant pain and tenderness on exam today, unable to lift R leg     Plan:  • Lidocaine patch on R hip  • Pain regimen: dimitrios tylenol 1000 mg Q8H, oxy 2 5 mg Q8H prn on discharge  • F/u with ortho outpatient     Benign essential HTN  Assessment & Plan  Continue monitor BP, stable currently   -Continued torsemide 10 mg daily    Chronic diastolic CHF (congestive heart failure) (HCC)  Assessment & Plan  Wt Readings from Last 3 Encounters:   06/16/23 76 6 kg (168 lb 12 8 oz)   03/24/23 75 8 kg (167 lb)   03/17/23 77 kg (169 lb 12 1 oz)     HFpEF per cardiology notes  Last ECHO 4/27/2018 EF 55%, LV thickness upper limits normal, moderate mitral regurgitation, moderate tricuspid regurgitation       • Monitor I/O   • Continue torsemide 10mg        Chronic atrial fibrillation (HCC)  Assessment & Plan  Currently rate controlled  On daily warfarin 3 mg  -INR between 2-3   -Continue warfarin 3mg    Chronic Diarrhea  Ongoing issue for at least 2 years per patients daughter  Continues to have loose stools  Suspect exacerbating due to antibiotics and miralax  Low suspicion of C diff per [facility]ANNE and ID team     Plan:  - d/c miralax at this time, continue prn when at facility  - continue to monitor          Diet: Diet Regular; Regular House    VTE Pharm PPX: on warfarin  VTE Mech PPX: sequential compression device      Subjective: Today 06/20/23, HD# 2    • Patient seen and examined at bedside, resting comfortably  Reported that throat felt a little dry, water was provided  Also has a small aphthous ulcer on lower lip  Patient denies CP, SOB, N/V  She has had some loose stools which has been happening on and off per patient   Spoke with nursing who confirmed this and stated that she spoke to her daughter that reported that patient has chronic diarrhea for years now and that this is not a new issue  Holding Miralax today - she has been getting that daily for the last several days  She otherwise has no concerns or complaints; she would like to go back to her facility when she can  Objective:     Vitals:    06/19/23 0903 06/19/23 1626 06/20/23 0031 06/20/23 0758   BP: 106/55 91/61 94/58 123/55   BP Location:       Pulse: 81  70    Resp:  17 20    Temp:  98 1 °F (36 7 °C) (!) 96 1 °F (35 6 °C) (!) 97 4 °F (36 3 °C)   TempSrc:       SpO2: 97%  98%      Temp:  [96 1 °F (35 6 °C)-98 1 °F (36 7 °C)] 97 4 °F (36 3 °C)  HR:  [70] 70  Resp:  [17-20] 20  BP: ()/(55-61) 123/55  Weight (last 2 days)     None          Intake/Output Summary (Last 24 hours) at 6/20/2023 1315  Last data filed at 6/20/2023 1050  Gross per 24 hour   Intake 2934 67 ml   Output 250 ml   Net 2684 67 ml     Invasive Devices     Peripheral Intravenous Line  Duration           Peripheral IV 06/20/23 Dorsal (posterior); Left Forearm <1 day          Drain  Duration           External Urinary Catheter 4 days                  Physical Exam:     Physical Exam  Vitals reviewed  Constitutional:       Appearance: She is obese  HENT:      Head: Normocephalic and atraumatic  Right Ear: External ear normal       Left Ear: External ear normal       Nose: Nose normal       Mouth/Throat:      Pharynx: Oropharynx is clear  Eyes:      Extraocular Movements: Extraocular movements intact  Conjunctiva/sclera: Conjunctivae normal    Cardiovascular:      Rate and Rhythm: Normal rate and regular rhythm  Pulses: Normal pulses  Heart sounds: Normal heart sounds  Pulmonary:      Effort: Pulmonary effort is normal       Breath sounds: Normal breath sounds  Abdominal:      General: There is distension  Palpations: Abdomen is soft  Tenderness:  There is abdominal tenderness (mild, diffuse)  Musculoskeletal:      Cervical back: Neck supple  Comments: R hip tenderness   Skin:     General: Skin is warm  Neurological:      Mental Status: She is alert and oriented to person, place, and time  Psychiatric:         Mood and Affect: Mood normal          Behavior: Behavior normal          Thought Content:  Thought content normal          Judgment: Judgment normal            Labs:     CBC:  Results from last 7 days   Lab Units 06/20/23  0522 06/19/23  1734 06/19/23  0424 06/18/23  1846 06/18/23  0537 06/17/23  1544 06/17/23  0525   WBC Thousand/uL 15 63* 18 08* 24 01* 28 19* 28 26* 31 27* 32 90*   HEMOGLOBIN g/dL 8 1* 8 1* 7 6* 8 8* 9 4* 8 4* 10 8*   HEMATOCRIT % 27 1* 26 3* 25 5* 28 0* 28 7* 25 9* 35 8   PLATELETS Thousands/uL 475* 444* 409* 498* 480* 380 397*   NEUTROS ABS Thousands/µL  --   --   --   --   --  27 73*  --        CMP:  Results from last 7 days   Lab Units 06/20/23  0522 06/19/23  0424 06/18/23  1846 06/18/23  0537 06/17/23  0525 06/16/23  0439 06/15/23  1825   POTASSIUM mmol/L 3 4* 3 6 3 6 4 3 4 0 3 7 3 7   CHLORIDE mmol/L 110* 107 102 105 104 99 103   CO2 mmol/L 23 22 25 22 20* 25 25   BUN mg/dL 19 20 22 23 20 20 21   CREATININE mg/dL 0 52* 0 49* 0 60 0 58* 0 66 0 64 0 72   CALCIUM mg/dL 7 8* 7 4* 8 1* 8 2* 8 0* 7 7* 8 3   AST U/L  --   --  48*  --   --   --  40   ALT U/L  --   --  32  --   --   --  20   ALK PHOS U/L  --   --  184*  --   --   --  178*   EGFR ml/min/1 73sq m 84 86 81 81 78 79 74       Sepsis:  Results from last 7 days   Lab Units 06/19/23  0424 06/18/23  1846 06/15/23  1825   LACTIC ACID mmol/L  --  1 3 1 7   PROCALCITONIN ng/ml 1 22* 1 52* 1 32*       Micro:  Lab Results   Component Value Date/Time    Blood Culture No Growth at 24 hrs  06/18/2023 06:46 PM    Blood Culture No Growth at 24 hrs  06/18/2023 06:46 PM    Urine Culture >100,000 cfu/ml Escherichia coli ESBL (A) 06/15/2023 07:05 PM    Urine Culture >100,000 cfu/ml Klebsiella pneumoniae (A) 06/15/2023 07:05 PM    Urine Culture >100,000 cfu/ml Aerococcus urinae (A) 06/15/2023 07:05 PM         Imaging:     No results found        Medications:     Current Facility-Administered Medications   Medication Dose Route Frequency   • acetaminophen (TYLENOL) tablet 650 mg  650 mg Oral Q6H [facility]   • Diclofenac Sodium (VOLTAREN) 1 % topical gel 2 g  2 g Topical 4x Daily   • diphenhydramine, lidocaine, Al/Mg hydroxide, simethicone (Magic Mouthwash) oral solution 10 mL  10 mL Swish & Spit Q6H PRN   • DULoxetine (CYMBALTA) delayed release capsule 60 mg  60 mg Oral Daily   • levothyroxine tablet 100 mcg  100 mcg Oral Early Morning   • ondansetron (ZOFRAN) injection 4 mg  4 mg Intravenous Q6H PRN   • oxyCODONE (ROXICODONE) IR tablet 5 mg  5 mg Oral Q4H PRN   • oxyCODONE (ROXICODONE) split tablet 2 5 mg  2 5 mg Oral Q4H PRN   • sucralfate (CARAFATE) tablet 1 g  1 g Oral BID   • torsemide (DEMADEX) tablet 10 mg  10 mg Oral Daily   • warfarin (COUMADIN) tablet 3 mg  3 mg Oral Daily (warfarin)       Yola Gardner DO  Family Medicine, PGY-2  1:15 PM 6/20/2023

## 2023-06-20 NOTE — CASE MANAGEMENT
Case Management Discharge Planning Note    Patient name Eric Brooks [facility] 917/[facility] 866-05 MRN [id number]  : 1933 Date 2023       Current Admission Date: 2023  Current Admission Diagnosis:Acute cystitis   Patient Active Problem List    Diagnosis Date Noted   • Chronic diarrhea 2023   • Acute cystitis 2023   • Hyponatremia 06/15/2023   • Lumbar radiculopathy 02/10/2023   • Low back pain with sciatica 02/10/2023   • Lumbar spondylosis 02/10/2023   • S/P lumbar fusion 02/10/2023   • Peripheral vascular disease, unspecified ([facility] ) 2022   • Negative depression screening 2021   • Benign essential HTN 2021   • Trochanteric bursitis 2021   • Meningioma ([facility] ) 11/10/2020   • Trochanteric bursitis of right hip 11/10/2020   • Stage 3a chronic kidney disease ([facility] ) 11/10/2020   • Fall 10/13/2020   • Hematoma 10/13/2020   • Ecchymosis of eye 10/13/2020   • Elevated C-reactive protein (CRP) 2020   • S/P scar revision 2019   • Hypertrophic scar of skin 2019   • Cellulitis of right lower extremity 2019   • Right hip pain 2019   • MCI (mild cognitive impairment) 2019   • Ambulatory dysfunction 2019   • Status post right hip replacement 2019   • Macrocytosis 2019   • Status post gastrectomy 2019   • Hypothyroidism 10/11/2018   • Pacemaker 2018   • Chronic diastolic CHF (congestive heart failure) ([address] ) 2016   • Depression 2016   • Class 1 obesity due to excess calories with serious comorbidity and body mass index (BMI) of 33 0 to 33 9 in adult 2016   • Chronic pain 2016   • Chronic pain of lower extremity, bilateral 2016   • External hemorrhoids 2016   • Anemia 2016   • Chronic atrial fibrillation ([address] ) 2016   • Irritable bowel syndrome with constipation 09/15/2015   • Synovial cyst of right popliteal space 2015   • Primary osteoarthritis of right knee 06/30/2015   • Osteoarthritis, multiple sites 06/26/2014   • Moderate mitral regurgitation 05/13/2014   • Osteoporosis 02/04/2014   • Fibromyalgia 05/31/2012   • Peripheral neuropathy 05/25/2012   • Lumbar canal stenosis 05/17/2012      LOS (days): 2  Geometric Mean LOS (GMLOS) (days): 2 80  Days to GMLOS:1     OBJECTIVE:  Risk of Unplanned Readmission Score: 22 83         Current admission status: Inpatient   Preferred Pharmacy:   [facility], [address] [place] [address]  Phone: [phone] Fax: [address] [address]  Phone: [phone] Fax: [phone]    Primary Care Provider: Clay Holcomb DO    Primary Insurance: [address] [place] [address]  Secondary Insurance:     DISCHARGE DETAILS:                                          Other Referral/Resources/Interventions Provided:  Referral Comments: Covid negative  AIDIN message sent to [facility] to update

## 2023-06-20 NOTE — CASE MANAGEMENT
Case Management Discharge Planning Note    Patient name Opal Mendoza  Location [facility] 917/[facility] 429-29 MRN [id number]  : 1933 Date 2023       Current Admission Date: 2023  Current Admission Diagnosis:Acute cystitis   Patient Active Problem List    Diagnosis Date Noted   • Chronic diarrhea 2023   • Acute cystitis 2023   • Hyponatremia 06/15/2023   • Lumbar radiculopathy 02/10/2023   • Low back pain with sciatica 02/10/2023   • Lumbar spondylosis 02/10/2023   • S/P lumbar fusion 02/10/2023   • Peripheral vascular disease, unspecified ([address] ) 2022   • Negative depression screening 2021   • Benign essential HTN 2021   • Trochanteric bursitis 2021   • Meningioma ([facility] ) 11/10/2020   • Trochanteric bursitis of right hip 11/10/2020   • Stage 3a chronic kidney disease ([facility] [address] ) 11/10/2020   • Fall 10/13/2020   • Hematoma 10/13/2020   • Ecchymosis of eye 10/13/2020   • Elevated C-reactive protein (CRP) 2020   • S/P scar revision 2019   • Hypertrophic scar of skin 2019   • Cellulitis of right lower extremity 2019   • Right hip pain 2019   • MCI (mild cognitive impairment) 2019   • Ambulatory dysfunction 2019   • Status post right hip replacement 2019   • Macrocytosis 2019   • Status post gastrectomy 2019   • Hypothyroidism 10/11/2018   • Pacemaker 2018   • Chronic diastolic CHF (congestive heart failure) ([address] ) 2016   • Depression 2016   • Class 1 obesity due to excess calories with serious comorbidity and body mass index (BMI) of 33 0 to 33 9 in adult 2016   • Chronic pain 2016   • Chronic pain of lower extremity, bilateral 2016   • External hemorrhoids 2016   • Anemia 2016   • Chronic atrial fibrillation ([address] ) 2016   • Irritable bowel syndrome with constipation 09/15/2015   • Synovial cyst of right popliteal space 2015   • Primary osteoarthritis of right knee 06/30/2015   • Osteoarthritis, multiple sites 06/26/2014   • Moderate mitral regurgitation 05/13/2014   • Osteoporosis 02/04/2014   • Fibromyalgia 05/31/2012   • Peripheral neuropathy 05/25/2012   • Lumbar canal stenosis 05/17/2012      LOS (days): 2  Geometric Mean LOS (GMLOS) (days): 2 80  Days to GMLOS:1     OBJECTIVE:  Risk of Unplanned Readmission Score: 22 83         Current admission status: Inpatient   Preferred Pharmacy:   [address], [facility] [place] [address]  Phone: [phone] Fax: 0030 [facility], [address] [place] [address] [address] [address]  Phone: [phone] Fax: [phone]    Primary Care Provider: Clay Holcomb DO    Primary Insurance: [facility]  Secondary Insurance:     DISCHARGE DETAILS:       Other Referral/Resources/Interventions Provided:  Referral Comments: S/w family medicine & informed pt can dc back to [address] [place] [address] today  TC to [facility] & s/w Radha in admissions & pt is a bed hold & can return today  Needs covid test  Family med aware  BLS requested  CM dc support request sent to check if bls Cliff Welch is needed  Family med is calling pt's family  Accepting Facility Name, [facility] : [address]      Report [phone]  Fax [phone]       1438 update  S/w pt & aware of dc  Asked who cm can call in family & she said her niece Noemy Pennington  CM called Noemy Pennington & updated her on dc back to [facility] today around 530pm   Family medicine informed they also s/w niece about dc plan today  1444 update  [facility] [address] set up for 530 pm today  No bls auth needed per cm dc support  RN, pt & facility aware of dc time

## 2023-06-20 NOTE — CASE MANAGEMENT
[facility] received request for transport authorization from Care Manager  Type of transport: BLS   Insurance: [employer]   Authorization for [employer] transport is not required by insurance  CM notified

## 2023-06-20 NOTE — QUICK NOTE
"QUICK NOTES - Family Medicine Residency, Damon Coulter 9/2/1933, 80 y o  female  MRN: [id number]    Unit/Bed#: [facility] 917-01 Encounter: [id number]  Primary Care Provider: Johnathon Srivastava DO    Patient is seen and evaluated at bedside during evening round  Patient is lying in bed, appears comfortable with normal respiratory effort and without apparent distress  she is sleeping soundly  Nursing staff was communicated with and do not have any concerns  Objective & Vitals:     Last vitals:  Blood Pressure: 91/61 (06/19/23 1626)  Temperature: 98 1 °F (36 7 °C) (06/19/23 1626)  Temp Source: Oral (06/18/23 1709)  Pulse: 81 (06/19/23 0903)  Respirations: 17 (06/19/23 1626)  SpO2: 97 % (06/19/23 0903)      Intake/Output Summary (Last 24 hours) at 6/19/2023 2109  Last data filed at 6/19/2023 1926  Gross per 24 hour   Intake 1000 ml   Output 450 ml   Net 550 ml       Physical Exam:     Physical Exam:  General: Pt observed lying comfortably in bed, NAD  Not toxic/ill-appearing  No cachectic or diaphoresis  No obvious sign of trauma or bleeding  Psych: sleeping soundly  Head: atraumatic, normocephalic  Extremities: +4 strengths b/l  Strong radial/pedal pulses  No weakness/paresthesia/edema  Assessments & Plans:     - Vitals stable  - Pain adequately controlled with current regimen  - Continue current management plan    Jakob Freire DO  PGY-1, Family Medicine  06/19/23  9:09 PM    Dear reader, please be aware that portions of my note contain dictated text  I have done my best to proof-read this note prior to signing  However, there may be occasional unnoticed errors pertaining to \"sound-alike\" words and/or grammar during my dictation process  If there is any words or information that is unclear or appears erroneous, please kindly let me know and I will clarify and/or addend my notes accordingly  Thank you for your understanding      "

## 2023-06-20 NOTE — OCCUPATIONAL THERAPY NOTE
Occupational Therapy Progress Note     Patient Name: Quita Foster  [id number] Date: 6/20/2023  Problem List  Principal Problem:    Acute cystitis  Active Problems:    Chronic atrial fibrillation (HCC)    Chronic diastolic CHF (congestive heart failure) (HCC)    Right hip pain    Benign essential HTN            06/20/23 0956   OT Last Visit   OT Visit Date 06/20/23   Note Type   Note Type Treatment   Pain Assessment   Pain Assessment Tool 0-10   Pain Score 10 - Worst Possible Pain   Pain Location/Orientation Orientation: Right;Location: Hip   Effect of Pain on Daily Activities Impacts ability to engage in valued occupations   Hospital Pain Intervention(s) Repositioned; Ambulation/increased activity; Emotional support   Restrictions/Precautions   Weight Bearing Precautions Per Order Yes   RLE Weight Bearing Per Order (S)  NWB  (Of note, pt w/ acute on chronic right hip pain in the setting of 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; per ortho on 6/16, pt is NWB in RLE)   Other Precautions Multiple lines; Fall Risk;Pain;WBS   Lifestyle   Autonomy PTA, pt recieves assistance w/ ADLs/IADLs and uses w/c for functional mobility; (-) driving   Reciprocal Relationships Lives w/ facility staff at [facility] to Others Retired   [address] Enjoys watching TV   ADL   Where Assessed Supine, bed   Toileting Assistance  1  Total Assistance   Toileting Deficit Clothing management up;Clothing management down;Perineal hygiene   Toileting Comments Upon arrival, pt found incontinent of diarherra while lying supine in bed; during bed mobility rolling R <> L, pt incontinent x2, requiring total assistance for posterior hygiene   Bed Mobility   Rolling R 2  Maximal assistance   Additional items Assist x 1;HOB elevated; Bedrails; Increased time required;Verbal cues;LE management   Rolling L 2  Maximal assistance   Additional items Assist x 1;HOB elevated; Bedrails; Increased time "required;Verbal cues   Supine to Sit Unable to assess   Sit to Supine Unable to assess   Additional Comments Upon arrival, pt found lying supine in bed, incontinent of diarhea; during rolling R <> L, pt incontinent x2, requiring total assistance for posterior hygiene; pt requiring Max A x1 for UB postural support/LE management with increased time, verbal cues for deep breathing/encourgament, and bed rails; @ end of session, pt left lying supine in bed with all functional needs in reach   Transfers   Additional Comments Did not assess @ this time   Functional Mobility   Additional Comments Did not assess @ this time   Subjective   Subjective \"I need to get cleaned  \"   Cognition   Overall Cognitive Status Impaired   Arousal/Participation Responsive;Arousable; Cooperative   Attention Within functional limits   Memory Decreased recall of precautions   Following Commands Follows one step commands with increased time or repetition   Comments Pt pleasant and cooperative, appreciative of session however @ end of session, pt w/ random speech @ times reporting \"my  \"; appears anxious with all increased functional activity, requiring verbal cues for education/encouragement   Activity Tolerance   Activity Tolerance Patient limited by pain   Medical Staff [address] STUART GuevaraT; [employer] [facility]   Assessment   Assessment Pt is a 81 yo female who actively participated in skilled OT session on 2023  Pt is NWB in RLE  Pt seen with PT to increase safety, decrease fall risk, and maximize functional/occupational performance 2* medical complexity which is a regression from pt's functional baseline  Upon arrival, pt found lying supine in bed, incontinent of stool  Pt completed rolling R <> with Max A x1 for increased time, use of bed rails, and w/ increased verbal cues for deep breathing/encouragment as pt appeared anxious during all increased activity   During bed mobility tasks, pt incontinent x2 of stool, " requiring total assistance for posterior hygiene tasks  At the end of the session, pt was left lying supine in bed with all functional needs in reach  Pt demonstrates gradual functional improvements towards OT goals however continues to be functioning below occupational baseline and is still limited by the following limitations/impairments which were addressed through skilled instruction: NWB in RLE, cognition, generalized weakness, balance, endurance/activity tolerance, postural/trunk control, strength, pain, and safety awareness  At this time, recommend discharge to return to facility w/ rehab, when medically stable  The patient's raw score on the AM-PAC Daily Activity Inpatient Short Form is 15  A raw score of less than 19 suggests the patient may benefit from discharge to post-acute rehabilitation services  Please refer to the recommendation of the Occupational Therapist for safe discharge planning  Established OT goals will be continued 2-3x/wk to address immediate acute care needs and underlying performance skills to maximize occupational performance and safety to return to OF  Plan   Treatment Interventions ADL retraining;Functional transfer training;UE strengthening/ROM; Endurance training;Cognitive reorientation;Patient/family training;Equipment evaluation/education; Compensatory technique education;Continued evaluation; Energy conservation; Activityengagement   Goal Expiration Date 07/03/23   OT Treatment Day 1   OT Frequency 2-3x/wk   Recommendation   OT Discharge Recommendation Return to facility with rehabilitation services   [facility] Daily Activity Inpatient   Lower Body Dressing 2   Bathing 2   Toileting 2   Upper Body Dressing 2   Grooming 3   Eating 4   Daily Activity Raw Score 15   Daily Activity Standardized Score (Calc for Raw Score >=11) 34 69   AM-[facility] Applied Cognition Inpatient   Following a Speech/Presentation 3   Understanding Ordinary Conversation 3   Taking Medications 3   Remembering Where Things Are Placed or Put Away 3   Remembering List of 4-5 Errands 3   Taking Care of Complicated Tasks 2   Applied Cognition Raw Score 17   Applied Cognition Standardized Score 36 52   End of Consult   Education Provided Yes   Patient Position at End of Consult Supine; All needs within reach   Nurse Communication Nurse aware of consult       Brenna Penaloza MS, OTR/L

## 2023-06-20 NOTE — PLAN OF CARE
Problem: PHYSICAL THERAPY ADULT  Goal: Performs mobility at highest level of function for planned discharge setting  See evaluation for individualized goals  Description: Treatment/Interventions: ADL retraining, Functional transfer training, LE strengthening/ROM, Elevations, Therapeutic exercise, Endurance training, Bed mobility, Gait training, Spoke to nursing, OT          See flowsheet documentation for full assessment, interventions and recommendations  Outcome: Progressing  Note: Prognosis: Fair  Problem List: Decreased strength, Decreased range of motion, Decreased endurance, Decreased cognition, Impaired judgement, Decreased mobility, Impaired balance, Orthopedic restrictions, Pain  Assessment: Patient was received supine in bed today   Patient was agreeable to therapy services today  PT session focused on bed mobility today in order to improve functional mobility and independence  Functional mobility was performed as described above  Patient received calling for help for hygiene  Patient participated in rolling and static laying sidelying for hygiene cleaning  Patient reports increased pain in R hip with movements  Patient requires increased VCs for sequencing and safety during bed mobility  Patient led through several trials of rolling today, which patient was less eager to complete as pain increased  Patient will benefit from continued PT services while in hospital in order to address remaining limitations  The patients AM-PAC Basic Mobility Inpatient Short From Raw Score is 8   A Raw score of 8 suggests that the patient may benefit from discharge to post acute rehab  PT recommendation at this time is for post acute rehab  Please also refer to the recommendation of the Physical Therapist for safe discharge planning    Barriers to Discharge: Decreased caregiver support, Inaccessible home environment     PT Discharge Recommendation: Return to facility with rehabilitation services    See flowsheet documentation for full assessment

## 2023-06-20 NOTE — ASSESSMENT & PLAN NOTE
Ongoing issue for at least 2 years per patients daughter  Continues to have loose stools  Suspect exacerbating due to antibiotics and miralax   [facility]     Plan:  - d/c miralax at this time, continue prn when at facility  - continue to monitor

## 2023-06-21 ENCOUNTER — TRANSITIONAL CARE MANAGEMENT (OUTPATIENT)
Dept: FAMILY MEDICINE CLINIC | Facility: CLINIC | Age: 88
End: 2023-06-21

## 2023-06-21 NOTE — TELEPHONE ENCOUNTER
S/W Kane LOMBARDI from [facility], where pt resides at this time. Pt was dc'd from hospital on 6/20 and possible LD antibiotics was 6/19 or 6/20 per Luisa Manuel. Nurse s/w Cristal Rizo who would like procedure cx due to pt status. Pt still symptomatic with incontinence at this time. Informed Hetal to CB once pt is feeling better and her procedure will be cx today. Luisa Manuel verbalized understanding and appreciative of call.     Please cx procedure today, TY.

## 2023-06-21 NOTE — UTILIZATION REVIEW
NOTIFICATION OF ADMISSION DISCHARGE   This is a Notification of Discharge from [address]  Please be advised that this patient has been discharge from our facility  Below you will find the admission and discharge date and time including the patient’s disposition  UTILIZATION REVIEW CONTACT:  [employer] [employer]  Utilization   Network Utilization Review Department  Phone: [phone] x carefully listen to the prompts  All voicemails are confidential   Email: [email or web address]     ADMISSION INFORMATION  PRESENTATION DATE: 6/18/2023  4:46 PM  OBERVATION ADMISSION DATE:   INPATIENT ADMISSION DATE: 6/18/23  7:35 PM   DISCHARGE DATE: 6/20/2023  6:13 PM   DISPOSITION:Home/Self Care    IMPORTANT INFORMATION:  Send all requests for admission clinical reviews, approved or denied determinations and any other requests to dedicated fax number below belonging to the campus where the patient is receiving treatment   List of dedicated fax numbers:  [address] DENIALS (Administrative/Medical Necessity) [phone]   [address] (Maternity/NICU/Pediatrics) [phone]   [facility] [phone]   [place] [address] [phone]   [address] [phone]   220 [facility] [phone]   90 [facility] [phone]   [address] [place] [address] 119 [phone]   [facility]  [phone]   4052 [facility] [phone]   412 [facility] 850 [facility] [phone]

## 2023-06-21 NOTE — TELEPHONE ENCOUNTER
Pt was just released from the hospital for having EBSL in her urine and was in isolation, nurse at [facility]  would like to know if pt is able to come for her procedure today       Please cb [phone] and ask for  nurse station

## 2023-06-22 NOTE — ED ATTENDING ATTESTATION
6/18/2023  IHeladio DO, saw and evaluated the patient  I have discussed the patient with the resident/non-physician practitioner and agree with the resident's/non-physician practitioner's findings, Plan of Care, and MDM as documented in the resident's/non-physician practitioner's note, except where noted  All available labs and Radiology studies were reviewed  I was present for key portions of any procedure(s) performed by the resident/non-physician practitioner and I was immediately available to provide assistance  At this point I agree with the current assessment done in the Emergency Department  I have conducted an independent evaluation of this patient a history and physical is as follows:    80-year-old female with a history of atrial fibrillation, chronically subluxed acetabular prosthesis presents for fever  Patient discharged today  Went to rehab noticed that she was hypoxic and tachycardic and febrile  Reviewing the chart the patient recently had urinalysis that showed ESBL  Was on Keflex and Rocephin but stopped  White count 28,000    Concern for worsening infection plan IV antibiotics labs admit    ED Course         Critical Care Time  Procedures

## 2023-06-23 LAB
BACTERIA BLD CULT: NORMAL
BACTERIA BLD CULT: NORMAL

## 2023-06-27 ENCOUNTER — APPOINTMENT (EMERGENCY)
Dept: RADIOLOGY | Facility: HOSPITAL | Age: 88
DRG: 464 | End: 2023-06-27
Payer: COMMERCIAL

## 2023-06-27 ENCOUNTER — HOSPITAL ENCOUNTER (INPATIENT)
Facility: HOSPITAL | Age: 88
LOS: 10 days | Discharge: NURSING, REHAB OR LONG-TERM CARE | DRG: 464 | End: 2023-07-08
Attending: EMERGENCY MEDICINE | Admitting: FAMILY MEDICINE
Payer: COMMERCIAL

## 2023-06-27 DIAGNOSIS — M86.9 OSTEOMYELITIS (HCC): ICD-10-CM

## 2023-06-27 DIAGNOSIS — D64.9 ANEMIA: ICD-10-CM

## 2023-06-27 DIAGNOSIS — I48.20 CHRONIC ATRIAL FIBRILLATION (HCC): ICD-10-CM

## 2023-06-27 DIAGNOSIS — T84.51XA INFECTION ASSOCIATED WITH INTERNAL RIGHT HIP PROSTHESIS, INITIAL ENCOUNTER (HCC): Primary | ICD-10-CM

## 2023-06-27 DIAGNOSIS — K52.9 CHRONIC DIARRHEA: ICD-10-CM

## 2023-06-27 DIAGNOSIS — M17.11 PRIMARY OSTEOARTHRITIS OF RIGHT KNEE: ICD-10-CM

## 2023-06-27 LAB
ALBUMIN SERPL BCP-MCNC: 1.5 G/DL (ref 3.5–5)
ALP SERPL-CCNC: 128 U/L (ref 46–116)
ALT SERPL W P-5'-P-CCNC: 13 U/L (ref 12–78)
ANION GAP SERPL CALCULATED.3IONS-SCNC: 5 MMOL/L
APTT PPP: 93 SECONDS (ref 23–37)
AST SERPL W P-5'-P-CCNC: 18 U/L (ref 5–45)
BASOPHILS # BLD AUTO: 0.05 THOUSANDS/ÂΜL (ref 0–0.1)
BASOPHILS NFR BLD AUTO: 0 % (ref 0–1)
BILIRUB SERPL-MCNC: 0.23 MG/DL (ref 0.2–1)
BUN SERPL-MCNC: 17 MG/DL (ref 5–25)
CALCIUM ALBUM COR SERPL-MCNC: 9.9 MG/DL (ref 8.3–10.1)
CALCIUM SERPL-MCNC: 7.9 MG/DL (ref 8.3–10.1)
CHLORIDE SERPL-SCNC: 107 MMOL/L (ref 96–108)
CO2 SERPL-SCNC: 26 MMOL/L (ref 21–32)
CREAT SERPL-MCNC: 0.66 MG/DL (ref 0.6–1.3)
EOSINOPHIL # BLD AUTO: 0.11 THOUSAND/ÂΜL (ref 0–0.61)
EOSINOPHIL NFR BLD AUTO: 1 % (ref 0–6)
ERYTHROCYTE [DISTWIDTH] IN BLOOD BY AUTOMATED COUNT: 14.6 % (ref 11.6–15.1)
GFR SERPL CREATININE-BSD FRML MDRD: 78 ML/MIN/1.73SQ M
GLUCOSE SERPL-MCNC: 98 MG/DL (ref 65–140)
HCT VFR BLD AUTO: 29.7 % (ref 34.8–46.1)
HGB BLD-MCNC: 9.3 G/DL (ref 11.5–15.4)
IMM GRANULOCYTES # BLD AUTO: 0.15 THOUSAND/UL (ref 0–0.2)
IMM GRANULOCYTES NFR BLD AUTO: 1 % (ref 0–2)
INR PPP: 5.26 (ref 0.84–1.19)
LACTATE SERPL-SCNC: 2 MMOL/L (ref 0.5–2)
LYMPHOCYTES # BLD AUTO: 1.36 THOUSANDS/ÂΜL (ref 0.6–4.47)
LYMPHOCYTES NFR BLD AUTO: 11 % (ref 14–44)
MCH RBC QN AUTO: 32 PG (ref 26.8–34.3)
MCHC RBC AUTO-ENTMCNC: 31.3 G/DL (ref 31.4–37.4)
MCV RBC AUTO: 102 FL (ref 82–98)
MONOCYTES # BLD AUTO: 1.25 THOUSAND/ÂΜL (ref 0.17–1.22)
MONOCYTES NFR BLD AUTO: 10 % (ref 4–12)
NEUTROPHILS # BLD AUTO: 10.07 THOUSANDS/ÂΜL (ref 1.85–7.62)
NEUTS SEG NFR BLD AUTO: 77 % (ref 43–75)
NRBC BLD AUTO-RTO: 0 /100 WBCS
PLATELET # BLD AUTO: 402 THOUSANDS/UL (ref 149–390)
PMV BLD AUTO: 8.7 FL (ref 8.9–12.7)
POTASSIUM SERPL-SCNC: 4 MMOL/L (ref 3.5–5.3)
PROCALCITONIN SERPL-MCNC: 4.52 NG/ML
PROT SERPL-MCNC: 5.6 G/DL (ref 6.4–8.4)
PROTHROMBIN TIME: 48.6 SECONDS (ref 11.6–14.5)
RBC # BLD AUTO: 2.91 MILLION/UL (ref 3.81–5.12)
SODIUM SERPL-SCNC: 138 MMOL/L (ref 135–147)
WBC # BLD AUTO: 12.99 THOUSAND/UL (ref 4.31–10.16)

## 2023-06-27 PROCEDURE — 93005 ELECTROCARDIOGRAM TRACING: CPT

## 2023-06-27 PROCEDURE — 85025 COMPLETE CBC W/AUTO DIFF WBC: CPT

## 2023-06-27 PROCEDURE — 96375 TX/PRO/DX INJ NEW DRUG ADDON: CPT

## 2023-06-27 PROCEDURE — 99285 EMERGENCY DEPT VISIT HI MDM: CPT | Performed by: EMERGENCY MEDICINE

## 2023-06-27 PROCEDURE — 74177 CT ABD & PELVIS W/CONTRAST: CPT

## 2023-06-27 PROCEDURE — 84145 PROCALCITONIN (PCT): CPT

## 2023-06-27 PROCEDURE — 36430 TRANSFUSION BLD/BLD COMPNT: CPT

## 2023-06-27 PROCEDURE — 36415 COLL VENOUS BLD VENIPUNCTURE: CPT

## 2023-06-27 PROCEDURE — 80053 COMPREHEN METABOLIC PANEL: CPT

## 2023-06-27 PROCEDURE — G1004 CDSM NDSC: HCPCS

## 2023-06-27 PROCEDURE — 86140 C-REACTIVE PROTEIN: CPT | Performed by: FAMILY MEDICINE

## 2023-06-27 PROCEDURE — 85730 THROMBOPLASTIN TIME PARTIAL: CPT

## 2023-06-27 PROCEDURE — 85610 PROTHROMBIN TIME: CPT

## 2023-06-27 PROCEDURE — 73701 CT LOWER EXTREMITY W/DYE: CPT

## 2023-06-27 PROCEDURE — 96361 HYDRATE IV INFUSION ADD-ON: CPT

## 2023-06-27 PROCEDURE — 83605 ASSAY OF LACTIC ACID: CPT

## 2023-06-27 PROCEDURE — 99284 EMERGENCY DEPT VISIT MOD MDM: CPT

## 2023-06-27 PROCEDURE — 87040 BLOOD CULTURE FOR BACTERIA: CPT

## 2023-06-27 RX ORDER — ONDANSETRON 2 MG/ML
4 INJECTION INTRAMUSCULAR; INTRAVENOUS ONCE
Status: COMPLETED | OUTPATIENT
Start: 2023-06-27 | End: 2023-06-27

## 2023-06-27 RX ORDER — MORPHINE SULFATE 10 MG/ML
6 INJECTION, SOLUTION INTRAMUSCULAR; INTRAVENOUS ONCE
Status: COMPLETED | OUTPATIENT
Start: 2023-06-27 | End: 2023-06-27

## 2023-06-27 RX ADMIN — MORPHINE SULFATE 6 MG: 10 INJECTION INTRAVENOUS at 22:22

## 2023-06-27 RX ADMIN — IOHEXOL 100 ML: 350 INJECTION, SOLUTION INTRAVENOUS at 23:55

## 2023-06-27 RX ADMIN — SODIUM CHLORIDE 500 ML: 0.9 INJECTION, SOLUTION INTRAVENOUS at 22:20

## 2023-06-27 RX ADMIN — ONDANSETRON 4 MG: 2 INJECTION INTRAMUSCULAR; INTRAVENOUS at 22:22

## 2023-06-28 ENCOUNTER — APPOINTMENT (INPATIENT)
Dept: NON INVASIVE DIAGNOSTICS | Facility: HOSPITAL | Age: 88
DRG: 464 | End: 2023-06-28
Payer: COMMERCIAL

## 2023-06-28 ENCOUNTER — APPOINTMENT (EMERGENCY)
Dept: RADIOLOGY | Facility: HOSPITAL | Age: 88
DRG: 464 | End: 2023-06-28
Payer: COMMERCIAL

## 2023-06-28 PROBLEM — M86.9 OSTEOMYELITIS (HCC): Status: ACTIVE | Noted: 2019-06-27

## 2023-06-28 PROBLEM — T84.51XA INFECTION OF RIGHT PROSTHETIC HIP JOINT (HCC): Status: ACTIVE | Noted: 2019-06-27

## 2023-06-28 PROBLEM — N18.2 STAGE 2 CHRONIC KIDNEY DISEASE: Status: ACTIVE | Noted: 2020-11-10

## 2023-06-28 LAB
ABO GROUP BLD: NORMAL
ANION GAP SERPL CALCULATED.3IONS-SCNC: 1 MMOL/L
AORTIC VALVE MEAN VELOCITY: 10.1 M/S
APICAL FOUR CHAMBER EJECTION FRACTION: 62 %
APTT PPP: 88 SECONDS (ref 23–37)
ATRIAL RATE: 258 BPM
AV AREA BY CONTINUOUS VTI: 1.4 CM2
AV AREA PEAK VELOCITY: 1.3 CM2
AV LVOT MEAN GRADIENT: 1 MMHG
AV LVOT PEAK GRADIENT: 3 MMHG
AV MEAN GRADIENT: 5 MMHG
AV PEAK GRADIENT: 9 MMHG
AV VALVE AREA: 1.44 CM2
AV VELOCITY RATIO: 0.53
BASOPHILS # BLD AUTO: 0.04 THOUSANDS/ÂΜL (ref 0–0.1)
BASOPHILS NFR BLD AUTO: 0 % (ref 0–1)
BLD GP AB SCN SERPL QL: NEGATIVE
BUN SERPL-MCNC: 17 MG/DL (ref 5–25)
CALCIUM SERPL-MCNC: 8 MG/DL (ref 8.3–10.1)
CHLORIDE SERPL-SCNC: 109 MMOL/L (ref 96–108)
CO2 SERPL-SCNC: 29 MMOL/L (ref 21–32)
CREAT SERPL-MCNC: 0.6 MG/DL (ref 0.6–1.3)
CRP SERPL QL: 92 MG/L
DOP CALC AO PEAK VEL: 1.53 M/S
DOP CALC AO VTI: 31.79 CM
DOP CALC LVOT AREA: 2.54 CM2
DOP CALC LVOT CARDIAC INDEX: 1.73 L/MIN/M2
DOP CALC LVOT CARDIAC OUTPUT: 3.01 L/MIN
DOP CALC LVOT DIAMETER: 1.8 CM
DOP CALC LVOT PEAK VEL VTI: 17.98 CM
DOP CALC LVOT PEAK VEL: 0.81 M/S
DOP CALC LVOT STROKE INDEX: 24.7 ML/M2
DOP CALC LVOT STROKE VOLUME: 45.73
DOP CALC MV VTI: 24.29 CM
EOSINOPHIL # BLD AUTO: 0.17 THOUSAND/ÂΜL (ref 0–0.61)
EOSINOPHIL NFR BLD AUTO: 2 % (ref 0–6)
ERYTHROCYTE [DISTWIDTH] IN BLOOD BY AUTOMATED COUNT: 14.7 % (ref 11.6–15.1)
ERYTHROCYTE [SEDIMENTATION RATE] IN BLOOD: 54 MM/HOUR (ref 0–29)
FRACTIONAL SHORTENING: 26 (ref 28–44)
GFR SERPL CREATININE-BSD FRML MDRD: 81 ML/MIN/1.73SQ M
GLUCOSE SERPL-MCNC: 95 MG/DL (ref 65–140)
HCT VFR BLD AUTO: 28.1 % (ref 34.8–46.1)
HGB BLD-MCNC: 8.9 G/DL (ref 11.5–15.4)
IMM GRANULOCYTES # BLD AUTO: 0.14 THOUSAND/UL (ref 0–0.2)
IMM GRANULOCYTES NFR BLD AUTO: 1 % (ref 0–2)
INR PPP: 1.4 (ref 0.84–1.19)
INR PPP: 2.07 (ref 0.84–1.19)
INR PPP: 5.4 (ref 0.84–1.19)
INTERVENTRICULAR SEPTUM IN DIASTOLE (PARASTERNAL SHORT AXIS VIEW): 1 CM
INTERVENTRICULAR SEPTUM: 1 CM (ref 0.6–1.1)
IVC: 18 MM
LAAS-AP2: 13.4 CM2
LAAS-AP4: 21.8 CM2
LEFT ATRIUM AREA SYSTOLE SINGLE PLANE A4C: 21.2 CM2
LEFT ATRIUM SIZE: 3.6 CM
LEFT ATRIUM VOLUME (MOD BIPLANE): 59 ML
LEFT INTERNAL DIMENSION IN SYSTOLE: 3.2 CM (ref 2.1–4)
LEFT VENTRICULAR INTERNAL DIMENSION IN DIASTOLE: 4.3 CM (ref 3.5–6)
LEFT VENTRICULAR POSTERIOR WALL IN END DIASTOLE: 1.1 CM
LEFT VENTRICULAR STROKE VOLUME: 45 ML
LVSV (TEICH): 45 ML
LYMPHOCYTES # BLD AUTO: 1.3 THOUSANDS/ÂΜL (ref 0.6–4.47)
LYMPHOCYTES NFR BLD AUTO: 12 % (ref 14–44)
MCH RBC QN AUTO: 32 PG (ref 26.8–34.3)
MCHC RBC AUTO-ENTMCNC: 31.7 G/DL (ref 31.4–37.4)
MCV RBC AUTO: 101 FL (ref 82–98)
MONOCYTES # BLD AUTO: 1.2 THOUSAND/ÂΜL (ref 0.17–1.22)
MONOCYTES NFR BLD AUTO: 11 % (ref 4–12)
MV EROA: 0.1 CM2
MV MEAN GRADIENT: 2 MMHG
MV PEAK GRADIENT: 5 MMHG
MV STENOSIS PRESSURE HALF TIME: 62 MS
MV VALVE AREA BY CONTINUITY EQUATION: 1.88 CM2
MV VALVE AREA P 1/2 METHOD: 3.55
NEUTROPHILS # BLD AUTO: 7.73 THOUSANDS/ÂΜL (ref 1.85–7.62)
NEUTS SEG NFR BLD AUTO: 74 % (ref 43–75)
NRBC BLD AUTO-RTO: 0 /100 WBCS
PISA MRMAX VEL: 0.3 M/S
PISA RADIUS: 0.4 CM
PLATELET # BLD AUTO: 379 THOUSANDS/UL (ref 149–390)
PMV BLD AUTO: 9 FL (ref 8.9–12.7)
POTASSIUM SERPL-SCNC: 3.7 MMOL/L (ref 3.5–5.3)
PROTHROMBIN TIME: 17.4 SECONDS (ref 11.6–14.5)
PROTHROMBIN TIME: 23.5 SECONDS (ref 11.6–14.5)
PROTHROMBIN TIME: 49.5 SECONDS (ref 11.6–14.5)
QRS AXIS: 13 DEGREES
QRSD INTERVAL: 106 MS
QT INTERVAL: 378 MS
QTC INTERVAL: 405 MS
RA PRESSURE ESTIMATED: 8 MMHG
RBC # BLD AUTO: 2.78 MILLION/UL (ref 3.81–5.12)
RH BLD: POSITIVE
RIGHT ATRIUM AREA SYSTOLE A4C: 20.3 CM2
RIGHT VENTRICLE ID DIMENSION: 4.8 CM
RV PSP: 31 MMHG
SL CV LEFT ATRIUM LENGTH A2C: 4 CM
SL CV LV EF: 60
SL CV PED ECHO LEFT VENTRICLE DIASTOLIC VOLUME (MOD BIPLANE) 2D: 85 ML
SL CV PED ECHO LEFT VENTRICLE SYSTOLIC VOLUME (MOD BIPLANE) 2D: 40 ML
SODIUM SERPL-SCNC: 139 MMOL/L (ref 135–147)
SPECIMEN EXPIRATION DATE: NORMAL
T WAVE AXIS: 156 DEGREES
T4 FREE SERPL-MCNC: 1.39 NG/DL (ref 0.61–1.12)
TR MAX PG: 23 MMHG
TR PEAK VELOCITY: 2.4 M/S
TRICUSPID VALVE PEAK REGURGITATION VELOCITY: 2.38 M/S
TSH SERPL DL<=0.05 MIU/L-ACNC: 6.87 UIU/ML (ref 0.45–4.5)
VENTRICULAR RATE: 69 BPM
WBC # BLD AUTO: 10.58 THOUSAND/UL (ref 4.31–10.16)

## 2023-06-28 PROCEDURE — 93306 TTE W/DOPPLER COMPLETE: CPT

## 2023-06-28 PROCEDURE — 84443 ASSAY THYROID STIM HORMONE: CPT

## 2023-06-28 PROCEDURE — 87070 CULTURE OTHR SPECIMN AEROBIC: CPT

## 2023-06-28 PROCEDURE — 36415 COLL VENOUS BLD VENIPUNCTURE: CPT

## 2023-06-28 PROCEDURE — 84439 ASSAY OF FREE THYROXINE: CPT

## 2023-06-28 PROCEDURE — 86923 COMPATIBILITY TEST ELECTRIC: CPT

## 2023-06-28 PROCEDURE — P9017 PLASMA 1 DONOR FRZ W/IN 8 HR: HCPCS

## 2023-06-28 PROCEDURE — 87205 SMEAR GRAM STAIN: CPT

## 2023-06-28 PROCEDURE — C9113 INJ PANTOPRAZOLE SODIUM, VIA: HCPCS

## 2023-06-28 PROCEDURE — 86900 BLOOD TYPING SEROLOGIC ABO: CPT

## 2023-06-28 PROCEDURE — 85730 THROMBOPLASTIN TIME PARTIAL: CPT

## 2023-06-28 PROCEDURE — 87081 CULTURE SCREEN ONLY: CPT

## 2023-06-28 PROCEDURE — 96376 TX/PRO/DX INJ SAME DRUG ADON: CPT

## 2023-06-28 PROCEDURE — 99223 1ST HOSP IP/OBS HIGH 75: CPT | Performed by: ORTHOPAEDIC SURGERY

## 2023-06-28 PROCEDURE — 85652 RBC SED RATE AUTOMATED: CPT | Performed by: FAMILY MEDICINE

## 2023-06-28 PROCEDURE — 96365 THER/PROPH/DIAG IV INF INIT: CPT

## 2023-06-28 PROCEDURE — 80048 BASIC METABOLIC PNL TOTAL CA: CPT

## 2023-06-28 PROCEDURE — 93306 TTE W/DOPPLER COMPLETE: CPT | Performed by: INTERNAL MEDICINE

## 2023-06-28 PROCEDURE — 86901 BLOOD TYPING SEROLOGIC RH(D): CPT

## 2023-06-28 PROCEDURE — 85610 PROTHROMBIN TIME: CPT

## 2023-06-28 PROCEDURE — 30233K1 TRANSFUSION OF NONAUTOLOGOUS FROZEN PLASMA INTO PERIPHERAL VEIN, PERCUTANEOUS APPROACH: ICD-10-PCS | Performed by: ORTHOPAEDIC SURGERY

## 2023-06-28 PROCEDURE — 71045 X-RAY EXAM CHEST 1 VIEW: CPT

## 2023-06-28 PROCEDURE — 96366 THER/PROPH/DIAG IV INF ADDON: CPT

## 2023-06-28 PROCEDURE — 96367 TX/PROPH/DG ADDL SEQ IV INF: CPT

## 2023-06-28 PROCEDURE — 86850 RBC ANTIBODY SCREEN: CPT

## 2023-06-28 PROCEDURE — 85025 COMPLETE CBC W/AUTO DIFF WBC: CPT

## 2023-06-28 PROCEDURE — 99223 1ST HOSP IP/OBS HIGH 75: CPT | Performed by: INTERNAL MEDICINE

## 2023-06-28 PROCEDURE — 99223 1ST HOSP IP/OBS HIGH 75: CPT | Performed by: FAMILY MEDICINE

## 2023-06-28 PROCEDURE — 93010 ELECTROCARDIOGRAM REPORT: CPT | Performed by: INTERNAL MEDICINE

## 2023-06-28 RX ORDER — POLYETHYLENE GLYCOL 3350 17 G/17G
17 POWDER, FOR SOLUTION ORAL DAILY
Status: DISCONTINUED | OUTPATIENT
Start: 2023-06-28 | End: 2023-06-29

## 2023-06-28 RX ORDER — ACETAMINOPHEN 325 MG/1
975 TABLET ORAL EVERY 6 HOURS PRN
Status: DISCONTINUED | OUTPATIENT
Start: 2023-06-28 | End: 2023-06-28

## 2023-06-28 RX ORDER — POLYETHYLENE GLYCOL 3350 17 G/17G
17 POWDER, FOR SOLUTION ORAL DAILY PRN
Status: DISCONTINUED | OUTPATIENT
Start: 2023-06-28 | End: 2023-07-08 | Stop reason: HOSPADM

## 2023-06-28 RX ORDER — TORSEMIDE 10 MG/1
10 TABLET ORAL DAILY
Status: DISCONTINUED | OUTPATIENT
Start: 2023-06-28 | End: 2023-07-08 | Stop reason: HOSPADM

## 2023-06-28 RX ORDER — MORPHINE SULFATE 10 MG/ML
6 INJECTION, SOLUTION INTRAMUSCULAR; INTRAVENOUS ONCE
Status: COMPLETED | OUTPATIENT
Start: 2023-06-28 | End: 2023-06-28

## 2023-06-28 RX ORDER — HYDROMORPHONE HCL IN WATER/PF 6 MG/30 ML
0.2 PATIENT CONTROLLED ANALGESIA SYRINGE INTRAVENOUS EVERY 4 HOURS PRN
Status: DISCONTINUED | OUTPATIENT
Start: 2023-06-28 | End: 2023-07-08 | Stop reason: HOSPADM

## 2023-06-28 RX ORDER — LEVOTHYROXINE SODIUM 0.1 MG/1
100 TABLET ORAL DAILY
Status: DISCONTINUED | OUTPATIENT
Start: 2023-06-28 | End: 2023-06-30

## 2023-06-28 RX ORDER — ONDANSETRON 2 MG/ML
4 INJECTION INTRAMUSCULAR; INTRAVENOUS EVERY 6 HOURS PRN
Status: DISCONTINUED | OUTPATIENT
Start: 2023-06-28 | End: 2023-07-08 | Stop reason: HOSPADM

## 2023-06-28 RX ORDER — DULOXETIN HYDROCHLORIDE 60 MG/1
60 CAPSULE, DELAYED RELEASE ORAL DAILY
Status: DISCONTINUED | OUTPATIENT
Start: 2023-06-28 | End: 2023-07-08 | Stop reason: HOSPADM

## 2023-06-28 RX ORDER — GABAPENTIN 300 MG/1
300 CAPSULE ORAL 2 TIMES DAILY
Status: DISCONTINUED | OUTPATIENT
Start: 2023-06-28 | End: 2023-07-08 | Stop reason: HOSPADM

## 2023-06-28 RX ORDER — ACETAMINOPHEN 325 MG/1
975 TABLET ORAL EVERY 8 HOURS PRN
Status: DISCONTINUED | OUTPATIENT
Start: 2023-06-28 | End: 2023-06-29

## 2023-06-28 RX ORDER — OXYCODONE HYDROCHLORIDE 5 MG/1
5 TABLET ORAL EVERY 4 HOURS PRN
Status: DISCONTINUED | OUTPATIENT
Start: 2023-06-28 | End: 2023-07-08 | Stop reason: HOSPADM

## 2023-06-28 RX ORDER — AMOXICILLIN 250 MG
1 CAPSULE ORAL
Status: DISCONTINUED | OUTPATIENT
Start: 2023-06-28 | End: 2023-07-08 | Stop reason: HOSPADM

## 2023-06-28 RX ORDER — PANTOPRAZOLE SODIUM 40 MG/10ML
40 INJECTION, POWDER, LYOPHILIZED, FOR SOLUTION INTRAVENOUS
Status: DISCONTINUED | OUTPATIENT
Start: 2023-06-28 | End: 2023-07-02

## 2023-06-28 RX ORDER — FLUTICASONE PROPIONATE 50 MCG
2 SPRAY, SUSPENSION (ML) NASAL DAILY
Status: DISCONTINUED | OUTPATIENT
Start: 2023-06-28 | End: 2023-07-08 | Stop reason: HOSPADM

## 2023-06-28 RX ORDER — SENNOSIDES 8.6 MG
1 TABLET ORAL DAILY
Status: DISCONTINUED | OUTPATIENT
Start: 2023-06-28 | End: 2023-06-28

## 2023-06-28 RX ORDER — SIMETHICONE 80 MG
80 TABLET,CHEWABLE ORAL EVERY 6 HOURS PRN
Status: DISCONTINUED | OUTPATIENT
Start: 2023-06-28 | End: 2023-07-08 | Stop reason: HOSPADM

## 2023-06-28 RX ORDER — MELATONIN
5000 DAILY
Status: DISCONTINUED | OUTPATIENT
Start: 2023-06-28 | End: 2023-07-08 | Stop reason: HOSPADM

## 2023-06-28 RX ADMIN — GABAPENTIN 300 MG: 300 CAPSULE ORAL at 09:31

## 2023-06-28 RX ADMIN — PHYTONADIONE 10 MG: 10 INJECTION, EMULSION INTRAMUSCULAR; INTRAVENOUS; SUBCUTANEOUS at 03:29

## 2023-06-28 RX ADMIN — Medication 5000 UNITS: at 12:22

## 2023-06-28 RX ADMIN — CYANOCOBALAMIN TAB 500 MCG 1000 MCG: 500 TAB at 12:22

## 2023-06-28 RX ADMIN — VANCOMYCIN HYDROCHLORIDE 1500 MG: 1 INJECTION, POWDER, LYOPHILIZED, FOR SOLUTION INTRAVENOUS at 01:33

## 2023-06-28 RX ADMIN — HYDROMORPHONE HYDROCHLORIDE 0.2 MG: 0.2 INJECTION, SOLUTION INTRAMUSCULAR; INTRAVENOUS; SUBCUTANEOUS at 06:15

## 2023-06-28 RX ADMIN — POLYETHYLENE GLYCOL 3350 17 G: 17 POWDER, FOR SOLUTION ORAL at 09:34

## 2023-06-28 RX ADMIN — CEFTRIAXONE 2000 MG: 10 INJECTION, POWDER, FOR SOLUTION INTRAVENOUS at 23:07

## 2023-06-28 RX ADMIN — SENNOSIDES AND DOCUSATE SODIUM 1 TABLET: 50; 8.6 TABLET ORAL at 21:19

## 2023-06-28 RX ADMIN — CEFTRIAXONE 2000 MG: 10 INJECTION, POWDER, FOR SOLUTION INTRAVENOUS at 00:27

## 2023-06-28 RX ADMIN — Medication 2.5 MG: at 18:33

## 2023-06-28 RX ADMIN — TORSEMIDE 10 MG: 10 TABLET ORAL at 09:33

## 2023-06-28 RX ADMIN — VANCOMYCIN HYDROCHLORIDE 1250 MG: 10 INJECTION, POWDER, LYOPHILIZED, FOR SOLUTION INTRAVENOUS at 13:52

## 2023-06-28 RX ADMIN — ONDANSETRON 4 MG: 2 INJECTION INTRAMUSCULAR; INTRAVENOUS at 04:19

## 2023-06-28 RX ADMIN — GABAPENTIN 300 MG: 300 CAPSULE ORAL at 17:18

## 2023-06-28 RX ADMIN — LEVOTHYROXINE SODIUM 100 MCG: 100 TABLET ORAL at 09:32

## 2023-06-28 RX ADMIN — PANTOPRAZOLE SODIUM 40 MG: 40 INJECTION, POWDER, FOR SOLUTION INTRAVENOUS at 09:32

## 2023-06-28 RX ADMIN — Medication 2.5 MG: at 09:31

## 2023-06-28 RX ADMIN — DULOXETINE HYDROCHLORIDE 60 MG: 60 CAPSULE, DELAYED RELEASE ORAL at 09:32

## 2023-06-28 RX ADMIN — FLUTICASONE PROPIONATE 2 SPRAY: 50 SPRAY, METERED NASAL at 09:33

## 2023-06-28 RX ADMIN — MORPHINE SULFATE 6 MG: 10 INJECTION INTRAVENOUS at 00:35

## 2023-06-28 RX ADMIN — OXYCODONE HYDROCHLORIDE 5 MG: 5 TABLET ORAL at 04:19

## 2023-06-28 RX ADMIN — PHYTONADIONE 5 MG: 10 INJECTION, EMULSION INTRAMUSCULAR; INTRAVENOUS; SUBCUTANEOUS at 12:23

## 2023-06-28 NOTE — ASSESSMENT & PLAN NOTE
Secondary to hip pain and multiple sites of OA. Pt presented from skilled nursing facility for PT/OT. S/p OR for I&D on 6/30.      Plan  - Pain control  - monitor for urine and BM  - PT/OT  - fall precautions  - CM referral for dispo

## 2023-06-28 NOTE — ASSESSMENT & PLAN NOTE
Lab Results   Component Value Date    EGFR 49 07/07/2023    EGFR 41 07/05/2023    EGFR 49 07/03/2023    CREATININE 1.01 07/07/2023    CREATININE 1.17 07/05/2023    CREATININE 1.01 07/03/2023     Caution with using nephrotoxic agents  Avoid NSAIDs given Elevated INR.

## 2023-06-28 NOTE — CASE MANAGEMENT
Case Management Discharge Planning Note    Patient name Cara Amador  Location [facility] 807/[facility] 574-43 MRN [id number]  : 1933 Date 2023       Current Admission Date: 2023  Current Admission Diagnosis:Osteomyelitis [facility])   Patient Active Problem List    Diagnosis Date Noted   • Chronic diarrhea 2023   • Acute cystitis 2023   • Hyponatremia 06/15/2023   • Lumbar radiculopathy 02/10/2023   • Low back pain with sciatica 02/10/2023   • Lumbar spondylosis 02/10/2023   • S/P lumbar fusion 02/10/2023   • Peripheral vascular disease, unspecified ([address]) 2022   • Negative depression screening 2021   • Benign essential HTN 2021   • Trochanteric bursitis 2021   • Meningioma ([address]) 11/10/2020   • Trochanteric bursitis of right hip 11/10/2020   • Stage 2 chronic kidney disease 11/10/2020   • Fall 10/13/2020   • Hematoma 10/13/2020   • Ecchymosis of eye 10/13/2020   • Elevated C-reactive protein (CRP) 2020   • S/P scar revision 2019   • Hypertrophic scar of skin 2019   • Cellulitis of right lower extremity 2019   • Osteomyelitis ([address]) 2019   • MCI (mild cognitive impairment) 2019   • Ambulatory dysfunction 2019   • Status post right hip replacement 2019   • Macrocytosis 2019   • Status post gastrectomy 2019   • Hypothyroidism 10/11/2018   • Pacemaker 2018   • Chronic diastolic CHF (congestive heart failure) ([address]) 2016   • Depression 2016   • Class 1 obesity due to excess calories with serious comorbidity and body mass index (BMI) of 33.0 to 33.9 in adult 2016   • Chronic pain 2016   • Chronic pain of lower extremity, bilateral 2016   • External hemorrhoids 2016   • Anemia 2016   • Chronic atrial fibrillation ([address]) 2016   • Irritable bowel syndrome with constipation 09/15/2015   • Synovial cyst of right popliteal space 2015   • Primary osteoarthritis of right knee 06/30/2015   • Osteoarthritis, multiple sites 06/26/2014   • Moderate mitral regurgitation 05/13/2014   • Osteoporosis 02/04/2014   • Fibromyalgia 05/31/2012   • Peripheral neuropathy 05/25/2012   • Lumbar canal stenosis 05/17/2012      LOS (days): 0  Geometric Mean LOS (GMLOS) (days): 3.70  Days to GMLOS:3.3     OBJECTIVE:  Risk of Unplanned Readmission Score: 30.17         Current admission status: Inpatient   Preferred Pharmacy:   [address], [address] [place] [facility] [address]  Phone: [phone] Fax: [phone], [address] [address]  Phone: [phone] Fax: 4935 [facility], [address] [place] [address] [address] [place] [address]  Phone: [phone] Fax: [phone]    Primary Care Provider: Dominguez Oliveira DO    Primary Insurance: [facility] 793 [facility],[address]:     DISCHARGE DETAILS:      Other Referral/Resources/Interventions Provided:  Interventions: Facility Return  Referral Comments: [facility] able to accept pt back when pt is medically stable for discharge. Accepting Facility Name, [address] [facility] [address] : [address] [facility]  Receiving Facility/Agency Phone Number: report number is [phone].   Facility/Agency Fax Number: fax number [phone]

## 2023-06-28 NOTE — ASSESSMENT & PLAN NOTE
Pt presents from [address] at [facility] for purulent discharge from her right hip noticed on day of admission. Her right hip was drained 1 L and the skilled nursing facility prior to ED presentation. · Pt has history of right hip replacement and follows with orthopedics outpatient. Recently admitted at [facility] from 6/8-6/12 under the trauma service for chronic right hip dislocation. · Pt is vitally stable, labs significant for leukocytosis. Imaging obtained. Blood cultures pending. CT Abdomen Pelvis (6/27/23)  1. Superior dislocation of right hip arthroplasty femoral stem and acetabular cup.  2. Collection of fluid and gas lateral to the femoral stem measuring 16.5 x 7.3 x 4.3 cm compatible with an abscess. Collection extends to the acetabular cup. 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 3. No evidence of intraperitoneal or retroperitoneal fluid collection. 4. Constipation.       S/p INR reversal with Vit K 10 mg and Platelet transfusion x2  S/p wash out in OR by ortho 6/30     Plan:  • Pain regimen: dimitrios tylenol 975 mg Q8H, oxy 2.5/5 mg Q8H prN  • F/u cultures  • ID:  recommendation appreciated  • patient to undergo 6 total weeks of IV antibiotic follow-up with possible 6 more weeks of p.o. antibiotic per ID; PICC care

## 2023-06-28 NOTE — ASSESSMENT & PLAN NOTE
Wt Readings from Last 3 Encounters:   06/28/23 76.2 kg (168 lb)   06/16/23 76.6 kg (168 lb 12.8 oz)   03/24/23 75.8 kg (167 lb)   Relatively stable weight  HFpEF per cardiology notes.  Last ECHO 4/27/2018 EF 55%, LV thickness upper limits normal, moderate mitral regurgitation, moderate tricuspid regurgitation.      • Monitor I/O   • Continue torsemide 10mg

## 2023-06-28 NOTE — CONSULTS
Orthopedics   Jimy Tena 80 y.o. female MRN: [id number]  Unit/Bed#: Cat Scan      Chief Complaint:   right hip pain    HPI:   80 y. o.female nonambulator complaining of right hip pain. She was reportedly at her assisted living facility when she had approximately 1 L of purulent fluid drained out of her right side near the hip. Negative Headstrike. negative LOC. On warfarin. Pain is dull in character, Located in the right hip, acute in onset, constant in duration, severe in intensity. Exacerbating factors include movement, remitting factors include rest. nonradiating, no numbness, no tingling, open draining wound noted near the right hip. No other complaints at this time. PMH significant for CHF, Afib, obesity, MI. 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 She also had a recent UTI on 6/20 but denies fever or chills in the last few days. Review Of Systems:   · Skin: red and draining rust colored fluid  · Neuro: See HPI  · Musculoskeletal: See HPI  · 14 point review of systems negative except as stated above     Past Medical History:   Past Medical History:   Diagnosis Date   • Abdominal bloating 8/1/2016   • Anemia    • Arthritis    • Cancer (HCC)     basal cell   • CHF (congestive heart failure) (HCC)    • COVID-19    • Disease of thyroid gland    • Disturbance of smell and taste     disturbance of taste   • Effusion of knee joint right    • Fibromyalgia    • Heart murmur     reported a previous heart murmur   • History of acute myocardial infarction    • History of atrial fibrillation    • History of bruising easily    • History of cancer    • History of dermatitis    • History of mammogram    • History of methicillin resistant staphylococcus aureus (MRSA)     Negative nasal culture, isolation discontinued 8/17/2018.    • History of methicillin resistant staphylococcus aureus (MRSA) 08/17/2018    negative nasal culture-isolation and hx discontinued 8/17/2018   • History of obesity    • History of osteopenia    • History of sciatica    • History of shortness of breath    • History of sinusitis    • History of sore throat    • History of syncope    • History of umbilical hernia    • History of viral infection    • Irritable bowel syndrome (IBS)    • Joint pain, hip    • Limb pain    • Myalgia     myalgia and myositis   • Need for prophylactic vaccination against single diseases    • Need for prophylactic vaccination and inoculation against influenza    • Preoperative cardiovascular examination    • Primary osteoarthritis of right knee    • Right leg pain    • Vaginal Pap smear     reported pap smear       Past Surgical History:   Past Surgical History:   Procedure Laterality Date   • ANKLE ARTHRODESIS Right    • BACK SURGERY     • BARIATRIC SURGERY     • CHOLECYSTECTOMY      x2   • COLONOSCOPY     • ESOPHAGOGASTRODUODENOSCOPY N/A 3/23/2018    Procedure: ESOPHAGOGASTRODUODENOSCOPY (EGD); Surgeon: Sanjuana Foster MD;  [facility]; Service: Gastroenterology   • FOOT SURGERY     • HIP CLOSE REDUCTION Right 8/21/2016    Procedure: CLOSED REDUCTION RIGHT TOTAL HIP;  Surgeon: Marianne Dodd MD;  [facility];  Service:    • INSERT / Tasha Lyman / Alberta Guerrero     • IR BIOPSY OTHER  8/1/2020   • JOINT REPLACEMENT      LEFT KNEE REPLACEMENT   • JOINT REPLACEMENT      Right HIP   • PILONIDAL CYST EXCISION      x2   • IN EXC B9 LESION MRGN XCP SK TG T/A/L 0.5 CM/< Right 7/24/2019    Procedure: REVISION SCAR EXTREMITY Rt Hip;   Surgeon: Debbie Talley MD;  [facility];  Service: Orthopedics   • IN REVJ TOT HIP ARTHRP [address] W/WO AGRFT/ALGRFT Right 4/15/2019    Procedure: ARTHROPLASTY HIP TOTAL ACETABULAR REVISION;  Surgeon: Debbie Talley MD;  [facility];  Service: Orthopedics   • REPLACEMENT TOTAL KNEE Right    • SILVANA-EN-Y PROCEDURE      x2   • TOTAL KNEE ARTHROPLASTY     • UMBILICAL HERNIA REPAIR      x2       Family History:  Family history reviewed and non-contributory  Family History   Problem Relation Age of Onset   • Coronary artery disease Mother    • Heart attack Mother    • Hypertension Mother    • Coronary artery disease Father    • Heart attack Father    • Hypertension Father    • Lymphoma Sister         acute   • Rashes / Skin problems Sister         lymphomatoid papulosis   • Cancer Sister    • Coronary artery disease Brother    • Heart attack Brother         x2   • Aneurysm Neg Hx    • Hyperlipidemia Neg Hx        Social History:  Social History     Socioeconomic History   • Marital status: /Civil Union     Spouse name: None   • Number of children: None   • Years of education: None   • Highest education level: None   Occupational History   • None   Tobacco Use   • Smoking status: Never   • Smokeless tobacco: Never   Vaping Use   • Vaping Use: Never used   Substance and Sexual Activity   • Alcohol use: Not Currently   • Drug use: No   • Sexual activity: Never   Other Topics Concern   • None   Social History Narrative   • None     Social Determinants of Health     Financial Resource Strain: Not on file   Food Insecurity: No Food Insecurity (6/16/2023)    Hunger Vital Sign    • Worried About Running Out of Food in the Last Year: Never true    • Ran Out of Food in the Last Year: Never true   Transportation Needs: No Transportation Needs (6/16/2023)    PRAPARE - Transportation    • Lack of Transportation (Medical): No    • Lack of Transportation (Non-Medical): No   Physical Activity: Not on file   Stress: Not on file   Social Connections: Not on file   Intimate Partner Violence: Not on file   Housing Stability: Unknown (6/16/2023)    Housing Stability Vital Sign    • Unable to Pay for Housing in the Last Year: Not on file    • Number of Places Lived in the Last Year: 1    • Unstable Housing in the Last Year: No       Allergies:    Allergies Allergen Reactions   • Other      Other reaction(s): Other (See Comments)  ana m toradol in OR 6/06 sah   • Seasonal Ic  [Cholestatin] Allergic Rhinitis   • Trimethoprim    • Bactrim [Sulfamethoxazole-Trimethoprim] Rash   • Sulfa Antibiotics Rash     Other reaction(s):  Other (See Comments)  takes lasix @home           Labs:  0   Lab Value Date/Time    HCT 29.7 (L) 06/27/2023 2206    HCT 27.1 (L) 06/20/2023 0522    HCT 26.3 (L) 06/19/2023 1734    HCT 37.0 04/20/2015 1109    HCT 38.0 04/20/2015 1109    HCT 37.2 04/03/2015 0831    HGB 9.3 (L) 06/27/2023 2206    HGB 8.1 (L) 06/20/2023 0522    HGB 8.1 (L) 06/19/2023 1734    HGB 12.0 04/20/2015 1109    HGB 11.6 04/03/2015 0831    HGB 12.1 11/04/2014 1025    INR 5.26 (HH) 06/27/2023 2206    WBC 12.99 (H) 06/27/2023 2206    WBC 15.63 (H) 06/20/2023 0522    WBC 18.08 (H) 06/19/2023 1734    WBC 7.98 04/20/2015 1109    WBC 7.29 04/03/2015 0831    WBC 6.66 11/04/2014 1025    ESR 36 (H) 04/02/2021 0833    CRP 8.2 (H) 04/02/2021 0833       Meds:    Current Facility-Administered Medications:   •  cefTRIAXone (ROCEPHIN) 2,000 mg in dextrose 5 % 50 mL IVPB, 2,000 mg, Intravenous, Once, Lenka Given, MD, Last Rate: 100 mL/hr at 06/28/23 0027, 2,000 mg at 06/28/23 0027  •  vancomycin (VANCOCIN) 1500 mg in sodium chloride 0.9% 250 mL IVPB, 25 mg/kg (Adjusted), Intravenous, Once, Lenka Gardner MD    Current Outpatient Medications:   •  acetaminophen (TYLENOL) 500 mg tablet, Take 2 tablets (1,000 mg total) by mouth every 8 (eight) hours, Disp: 30 tablet, Rfl: 0  •  Cholecalciferol 125 MCG (5000 UT) TABS, Take 5,000 Units by mouth daily, Disp: , Rfl:   •  desoximetasone (TOPICORT) 0.25 % cream, APPLY TOPICALLY AS NEEDED FOR IRRITATION, Disp: 30 g, Rfl: 0  •  Diclofenac Sodium (VOLTAREN) 1 %, Apply 2 g topically 4 (four) times a day, Disp: 350 g, Rfl: 3  •  dicyclomine (BENTYL) 10 mg capsule, , Disp: , Rfl:   •  DULoxetine (CYMBALTA) 60 mg delayed release capsule, TAKE 1 CAPSULE BY MOUTH EVERY DAY, Disp: 90 capsule, Rfl: 1  •  fluticasone (FLONASE) 50 mcg/act nasal spray, SPRAY 2 SPRAYS INTO EACH NOSTRIL EVERY DAY, Disp: 16 mL, Rfl: 0  •  gabapentin (NEURONTIN) 300 mg capsule, , Disp: , Rfl:   •  levothyroxine 100 mcg tablet, TAKE 1 TABLET BY MOUTH EVERY DAY, Disp: 90 tablet, Rfl: 1  •  loperamide (IMODIUM) 2 mg capsule, Take 2 mg by mouth 4 (four) times a day as needed for diarrhea, Disp: , Rfl:   •  nystatin powder, , Disp: , Rfl:   •  oxyCODONE (ROXICODONE) 5 immediate release tablet, Take 0.5 tablets (2.5 mg total) by mouth every 8 (eight) hours as needed for moderate pain for up to 10 days Max Daily Amount: 7.5 mg, Disp: 15 tablet, Rfl: 0  •  polyethylene glycol (MIRALAX) 17 g packet, Take 17 g by mouth daily as needed (constipation), Disp: 20 each, Rfl: 0  •  potassium chloride 10% oral solution, Take 15 mL (20 mEq total) by mouth daily, Disp: 3800 mL, Rfl: 3  •  simethicone (MYLICON) 80 mg chewable tablet, Chew 80 mg every 6 (six) hours as needed for flatulence, Disp: , Rfl:   •  sucralfate (CARAFATE) 1 g tablet, TAKE 1 TABLET BY MOUTH 2 TIMES A DAY., Disp: 180 tablet, Rfl: 1  •  torsemide (DEMADEX) 10 mg tablet, Take 10 mg by mouth daily, Disp: , Rfl:   •  warfarin (COUMADIN) 3 mg tablet, , Disp: , Rfl:     Blood Culture:   Lab Results   Component Value Date    BLOODCX No Growth After 5 Days. 06/18/2023    BLOODCX No Growth After 5 Days. 06/18/2023       Wound Culture:   No results found for: "WOUNDCULT"    Ins and Outs:  No intake/output data recorded.           Physical Exam:   BP 99/53 (BP Location: Left arm)   Pulse 72   Temp 97.8 °F (36.6 °C) (Oral)   Resp 18   LMP  (LMP Unknown)   SpO2 93%   Gen: No acute distress, resting comfortably in bed  HEENT: Eyes clear, moist mucus membranes, hearing intact  Respiratory: No audible wheezing or stridor  Cardiovascular: Well Perfused peripherally, 2+ distal pulse  Abdomen: nondistended, no peritoneal signs  Musculoskeletal: right lower extremity  · Small draining sinus tract around right hip near previous incision, moderate erythema  · Painful range of motion of hip joint which is at baseline for her due to chronic dislocation of MAIA components  · Sensation intact L3-S1  · Motor intact to ankle dorsi/plantar flexion, EHL, FHL  · Unable to Straight leg raise  · 2+ DP/PT pulse  · Musculature is soft and compressible, no pain with passive stretch  · Leg lengths equal     Radiology:   I personally reviewed the films. CT scan of the right hip shows large fluid collection surrounding her MAIA components extending into the acetabulum. Assessment:  80 y. o.female with right hip chronic prosthetic joint infection. She will benefit from operative intervention in the form of washout and antibiotic spacer placement.     Plan:   · NWB right lower extremity  · To OR for antibiotic spacer placement and I&D  · IV abx per medicine  · Cardiology consult, appreciate recs  · INR reversal with Vit K and FFP  · PT  · Pain control  · Dispo: Ortho will follow   Case was reviewed and discussed with senior resident and attending  Fatuma Blake MD  Orthopedic surgery resident   · 06/28/23      Fatuma Blake MD

## 2023-06-28 NOTE — ASSESSMENT & PLAN NOTE
Goal BP <130/80 per cardiology  Continue monitor BP, stable currently.   Most recent BP Blood Pressure: 975/34   98P SBP Systolic (29YBY), ZDD:523 , Min:103 , Max:129      Plan  - C/w home torsemide 10 mg daily

## 2023-06-28 NOTE — CONSULTS
Consultation - Cardiology   Michelle Murray 80 y.o. female MRN: [id number]  Unit/Bed#: ED 17 Encounter: [id number]      Assessment/Plan: Michelle Murray is a 80 y.o. female with a past medical history of hypertension, permanent atrial fibrillation s/p AV node ablation and MDT PPM, mitral regurgitation, chronic HFpEF who presents for prosthetic hip infection and cardiology is consulted for preoperative risk stratification. Preoperative Risk Stratification: The patient has no cardiopulmonary symptoms including no chest pain. However, she has not been the most active since her right hip issues. She has had a normal pharmacological stress in 2018 . but these tests only give prognostic information for really 2 years. Her RCRI score 0-1 points and Apryl Model risk is 0.5% Risk of myocardial infarction or cardiac arrest, intraoperatively or up to 30 days post-op. -She does not have any active cardiopulmonary issues going on and I do not believe she needs any stress testing prior   -She clinically does not appear to be in heart failure but I would recommend a TTE prior to surgery to evaluate her ejection fraction considering she has not had a TTE since her pacer was implanted and she paces 100 % with a more RV apical complex     Chronic atrial fibrillation ([address]): History of intolerance and bradycardia with AVN blocking agents. She underwent AV node ablation in 8/2018 and implantation of MDT single chamber His PPM - medtronic. 99% V-paced on last report. -appears to mid RV pacing complex  -Hold warfarin with her INR supra therapeutic. Unclear why her INR is so elevated with no changes in medications, no new medications, and no changes in diet recently. From what she knows she has had normal INR readings at her facility this past week. HFpEF diastolic dysfunction: V waves on exam but JVP around 7 with no AJR.   Overall the patient appears euvolemic on exam.  -continue home torsemide 10 mg daily   -recommend TTE as above        Recommendations:   -patient does not need any stress testing or ischemic evaluation proceeding surgery   -hold off on surgery considering supra therapeutic INR, continue to hold warfarin.  -Follow-up blood cultures considering infected prosthetic joint and the patient has a pacemaker implant  -would get TTE to evaluate for pacing induced cardiomyopathy considering patient has not had one since her AV node ablation and pacer implant years ago. Since she is pacing 99% at a closer RV apical location 1/15 for3 of these patients can develop a pacing induced cardiomyopathy. History of Present Illness   Physician Requesting Consult: Graeme Age, DO  Reason for Consult / Principal Problem: Preoperative Risk Stratification   HPI: Juanita Norton is a 80 y.o. female with a past medical history of hypertension, permanent atrial fibrillation s/p AV node ablation and MDT PPM, mitral regurgitation, chronic HFpEF who presents for prosthetic hip infection and cardiology is consulted for preoperative risk stratification. Patient she has a long pain associated with her hip and she has been recently fairly immobile. She is unsure  why her INR is so elevated with no changes in medications, no new medications, and no changes in diet recently. From what she knows she has had normal INR readings at her facility this past week. Denies fevers/chills, nausea/vomiting, abdominal pain, diarrhea, cough, chest pain, shortness of breath, PND, orthopnea, presyncopal symptoms, syncopal episodes. Inpatient consult to Cardiology  Consult performed by: David Lugo MD  Consult ordered by: Preethi Calvillo MD          Review of Systems:  Review of Systems   Constitutional: Positive for activity change. Negative for fatigue and fever. HENT: Negative. Eyes: Negative. Respiratory: Negative. Cardiovascular: Negative. Gastrointestinal: Negative. Endocrine: Negative. Musculoskeletal: Positive for joint swelling. Neurological: Negative. Hematological: Negative. Psychiatric/Behavioral: Negative. 14 systems reviewed and negative with the exception of the above and the following    Historical Information   Past Medical History:   Diagnosis Date   • Abdominal bloating 8/1/2016   • Anemia    • Arthritis    • Cancer (HCC)     basal cell   • CHF (congestive heart failure) (HCC)    • COVID-19    • Disease of thyroid gland    • Disturbance of smell and taste     disturbance of taste   • Effusion of knee joint right    • Fibromyalgia    • Heart murmur     reported a previous heart murmur   • History of acute myocardial infarction    • History of atrial fibrillation    • History of bruising easily    • History of cancer    • History of dermatitis    • History of mammogram    • History of methicillin resistant staphylococcus aureus (MRSA)     Negative nasal culture, isolation discontinued 8/17/2018.    • History of methicillin resistant staphylococcus aureus (MRSA) 08/17/2018    negative nasal culture-isolation and hx discontinued 8/17/2018   • History of obesity    • History of osteopenia    • History of sciatica    • History of shortness of breath    • History of sinusitis    • History of sore throat    • History of syncope    • History of umbilical hernia    • History of viral infection    • Irritable bowel syndrome (IBS)    • Joint pain, hip    • Limb pain    • Myalgia     myalgia and myositis   • Need for prophylactic vaccination against single diseases    • Need for prophylactic vaccination and inoculation against influenza    • Preoperative cardiovascular examination    • Primary osteoarthritis of right knee    • Right leg pain    • Vaginal Pap smear     reported pap smear     Past Surgical History:   Procedure Laterality Date   • ANKLE ARTHRODESIS Right    • BACK SURGERY     • BARIATRIC SURGERY     • CHOLECYSTECTOMY      x2   • COLONOSCOPY     • ESOPHAGOGASTRODUODENOSCOPY N/A 3/23/2018    Procedure: ESOPHAGOGASTRODUODENOSCOPY (EGD); Surgeon: John Kamara MD;  [facility]; Service: Gastroenterology   • FOOT SURGERY     • HIP CLOSE REDUCTION Right 8/21/2016    Procedure: CLOSED REDUCTION RIGHT TOTAL HIP;  Surgeon: Hattie Candelario MD;  [facility];  Service:    • INSERT / Lisa Reges / Raj Rout     • IR BIOPSY OTHER  8/1/2020   • JOINT REPLACEMENT      LEFT KNEE REPLACEMENT   • JOINT REPLACEMENT      Right HIP   • PILONIDAL CYST EXCISION      x2   • [place] EXC B9 LESION MRGN XCP SK TG T/A/L 0.5 CM/< Right 7/24/2019    Procedure: REVISION SCAR EXTREMITY Rt Hip; Surgeon: Robi Tinsley MD;  [facility];  Service: Orthopedics   • [place] REVJ TOT HIP ARTHRP [address] W/WO AGRFT/ALGRFT Right 4/15/2019    Procedure: ARTHROPLASTY HIP TOTAL ACETABULAR REVISION;  Surgeon: Robi Tinsley MD;  [facility];  Service: Orthopedics   • REPLACEMENT TOTAL KNEE Right    • SILVANA-EN-Y PROCEDURE      x2   • TOTAL KNEE ARTHROPLASTY     • UMBILICAL HERNIA REPAIR      x2     Social History     Substance and Sexual Activity   Alcohol Use Not Currently     Social History     Substance and Sexual Activity   Drug Use No     Social History     Tobacco Use   Smoking Status Never   Smokeless Tobacco Never     Family History: non-contributory    Meds/Allergies   all current active meds have been reviewed  Allergies   Allergen Reactions   • Other      Other reaction(s): Other (See Comments)  ana m toradol in OR 6/06 sah   • Seasonal Ic  [Cholestatin] Allergic Rhinitis   • Trimethoprim    • Bactrim [Sulfamethoxazole-Trimethoprim] Rash   • Sulfa Antibiotics Rash     Other reaction(s): Other (See Comments)  takes lasix @home       Objective   Vitals: Blood pressure 113/58, pulse 68, temperature 97.5 °F (36.4 °C), resp. rate 16, SpO2 100 %, not currently breastfeeding. , There is no height or weight on file to calculate BMI.,   Orthostatic Blood Pressures    Flowsheet Row Most Recent Value   Blood Pressure 113/58 filed at 06/28/2023 4017   Patient Position - Orthostatic VS Lying filed at 06/28/2023 0015            Intake/Output Summary (Last 24 hours) at 6/28/2023 8137  Last data filed at 6/28/2023 0446  Gross per 24 hour   Intake 295 ml   Output --   Net 295 ml       Invasive Devices     Peripheral Intravenous Line  Duration           Peripheral IV 06/27/23 Proximal;Right;Ventral (anterior) Forearm <1 day    Peripheral IV 06/28/23 Left Forearm <1 day                    Physical Exam:  Physical Exam  Vitals reviewed. Constitutional:       Appearance: Normal appearance. HENT:      Head: Normocephalic and atraumatic. Mouth/Throat:      Mouth: Mucous membranes are dry. Eyes:      Pupils: Pupils are equal, round, and reactive to light. Cardiovascular:      Rate and Rhythm: Normal rate and regular rhythm. Pulses: Normal pulses. Heart sounds: Normal heart sounds. Comments: V waves and JVP flat   Pulmonary:      Effort: Pulmonary effort is normal.      Breath sounds: Normal breath sounds. Abdominal:      General: Abdomen is flat. Palpations: Abdomen is soft. Musculoskeletal:         General: Normal range of motion. Skin:     General: Skin is warm. Neurological:      General: No focal deficit present. Mental Status: She is alert and oriented to person, place, and time. Gen: No acute distress  HEENT: anicteric, mucous membranes moist  Neck: supple, no jugular venous distention, or carotid bruit  Heart: regular, normal s1 and s2, no murmur/rub or gallop  Lungs :clear to auscultation bilaterally, no rales/rhonchi or wheeze  Abdomen: soft nontender, normoactive bowel sounds, no organomegaly  Ext: warm and perfused, normal femoral pulses, no edema, clubbing  Skin: warm, no rashes  Neuro: AAO x 3, no focal findings  Psychiatric: normal affect  Musculoskeletal: no obvious joint deformities.     Lab Results:     Lab Results   Component Value Date    TROPONINI <0.02 08/15/2018    TROPONINI <0.02 08/15/2018    TROPONINI <0.02 08/14/2018       Lab Results   Component Value Date    GLUCOSE 82 03/17/2023    CALCIUM 7.9 (L) 06/27/2023     04/03/2015    K 4.0 06/27/2023    CO2 26 06/27/2023     06/27/2023    BUN 17 06/27/2023    CREATININE 0.66 06/27/2023       Lab Results   Component Value Date    WBC 10.58 (H) 06/28/2023    HGB 8.9 (L) 06/28/2023    HCT 28.1 (L) 06/28/2023     (H) 06/28/2023     06/28/2023       Lab Results   Component Value Date    CHOL 197 08/29/2013     Lab Results   Component Value Date    HDL 69 06/08/2022    HDL 85 04/02/2021    HDL 64 03/01/2021     Lab Results   Component Value Date    LDLCALC 69 06/08/2022    LDLCALC 63 04/02/2021    LDLCALC 71 03/01/2021     Lab Results   Component Value Date    TRIG 63 06/08/2022    TRIG 65 04/02/2021    TRIG 87 03/01/2021       Lab Results   Component Value Date    ALT 13 06/27/2023    AST 18 06/27/2023    ALKPHOS 128 (H) 06/27/2023       Results from last 7 days   Lab Units 06/28/23  0313   INR  5.40*         Imaging: I have personally reviewed pertinent reports.       EKG:

## 2023-06-28 NOTE — OCCUPATIONAL THERAPY NOTE
Occupational Therapy Cancel Note     06/28/23 1303   OT Last Visit   OT Visit Date 06/28/23   Note Type   Note type Cancelled Session   Cancel Reasons Other       OT orders received and chart reviewed. Pt admitted from [address] [place] [address] with hip pain and with dx of infection of R prosthetic hip joint. Pt with plans for OR with ortho pending medical optimization from team on Friday 6/30 for "DEBRIDEMENT LOWER EXTREMITY ([facility])- I&D of right hip with explant of hardware; insertion of antibiotic beads." Not acute OT needs at this time. DC skilled OT services. Please re-consult post-op as appropriate. Thank you.     Sophei Carroll, MS, OTR/L

## 2023-06-28 NOTE — ASSESSMENT & PLAN NOTE
Thyroid    Lab Results   Component Value Date    QSF8VISEZAOP 6.873 (H) 06/28/2023    OTQ8FZXLZEWD 2.260 06/08/2022    UKS6PRWJZBIN 1.410 05/03/2021    VLG9EVQYWIWK 3.760 (H) 04/02/2021    XZT4IJKHCWSB 2.076 05/15/2020    [id number] 4.400 (H) 03/13/2020    QSH2VPPCYYUQ 3.713 01/22/2019    FREET4 1.39 (H) 06/28/2023    FREET4 0.96 06/23/2017    FREET4 1.00 03/10/2016     Plan  - TSH elevated, c/w increased levothyroxine dose 112mcg daily

## 2023-06-28 NOTE — H&P
H&P Exam - Cara Amador 80 y.o. female MRN: [id number]  Unit/Bed#: ED 17 Encounter: [id number]  Admission status: Inpatient    DATE: 6/28/2023  TIME: 4:51 AM  Primary Care Physician: Kianna Pierre DO  Admitting Provider: Vishal Dawn DO    Patient is a 80 y.o. female being admitted for R hip joint washout with orthopedics under [facility] with:     * Infection of right prosthetic hip joint [facility])  Assessment & Plan  Pt presents from [address] at [facility] for purulent discharge from her right hip noticed on day of admission. Her right hip was drained 1 L and the skilled nursing facility prior to ED presentation. · Pt has history of right hip replacement and follows with orthopedics outpatient. Recently admitted at [facility] from 6/8-6/12 under the trauma service for chronic right hip dislocation. · Pt is vitally stable, labs significant for leukocytosis. Imaging obtained. Blood cultures pending. CT Abdomen Pelvis (6/27/23)  1. Superior dislocation of right hip arthroplasty femoral stem and acetabular cup.  2. Collection of fluid and gas lateral to the femoral stem measuring 16.5 x 7.3 x 4.3 cm compatible with an abscess. Collection extends to the acetabular cup. 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 3. No evidence of intraperitoneal or retroperitoneal fluid collection. 4. Constipation.    - Orthopedics evaluated patient in ED and agrees for surgical intervention in the morning.          Plan:  • Pain regimen: dimitrios tylenol 975 mg Q8H, oxy 2.5/5 mg Q8H prn  • NPO for I&D per Ortho  • INR reversal with Vit K 10 mg and Platelet transfusion x2  • Recheck INR, hold warfarin  • Abx: Ceftriaxone, vancomycin  • F/u Blood cultures     Benign essential HTN  Assessment & Plan  Goal BP <130/80 per cardiology  Continue monitor BP, stable currently.   Most recent BP Blood Pressure: 909/56   19I SBP Systolic (17RTF), SVC:396 , Min:99 , Max:128      Plan  - Start home torsemide 10 mg daily    Stage 2 chronic kidney disease  Assessment & Plan  Lab Results   Component Value Date    EGFR 78 06/27/2023    EGFR 84 06/20/2023    EGFR 86 06/19/2023    CREATININE 0.66 06/27/2023    CREATININE 0.52 (L) 06/20/2023    CREATININE 0.49 (L) 06/19/2023     Caution with using nephrotoxic agents  Avoid NSAIDs given Elevated INR. Ambulatory dysfunction  Assessment & Plan  Secondary to hip pain and multiple sites of OA. Pt presented from skilled nursing facility for PT/OT. Plan  - OR for I&D  - Pain control  - PT/OT  - fall precautions  - CM referral for dispo    Hypothyroidism  Assessment & Plan  Thyroid    Lab Results   Component Value Date    IEM5GMTWOWZW 2.260 06/08/2022    UMG4QXOJRUCF 1.410 05/03/2021    JNN0PFSUIUXQ 3.760 (H) 04/02/2021    QBS8ZNQWCYUF 2.076 05/15/2020    IKS5PSJUMJNB 4.400 (H) 03/13/2020    BUJ4DOIJZLCN 3.713 01/22/2019    GJO4NVHXXNNT 2.990 11/29/2018    FREET4 0.96 06/23/2017    FREET4 1.00 03/10/2016     Plan  - C/w home levothyroxine 100 mcg daily  - Add on TSH     Pacemaker  Assessment & Plan  History of intolerance and bradycardia with AVN blocking agents for chronic atrial fibrillation. She underwent AV node ablation in 8/2018 and implantation of MDT single chamber His PPM - medtronic. 99%     Plan  - monitor vitals. Irritable bowel syndrome with constipation  Assessment & Plan  Plan  - Bowel regimen: Miralax and Sennakot scheduled. - monitor BMs, especially with acute pain and use of narcotics. Chronic diastolic CHF (congestive heart failure) (HCC)  Assessment & Plan  Wt Readings from Last 3 Encounters:   06/16/23 76.6 kg (168 lb 12.8 oz)   03/24/23 75.8 kg (167 lb)   03/17/23 77 kg (169 lb 12.1 oz)     HFpEF per cardiology notes. Last ECHO 4/27/2018 EF 55%, LV thickness upper limits normal, moderate mitral regurgitation, moderate tricuspid regurgitation.      • Monitor I/O   • Continue torsemide 10mg        Chronic atrial fibrillation (HCC)  Assessment & Plan  Currently rate controlled. On daily warfarin 3 mg.  - Goal INR between 2-3.  - INR currently 5.40    Plan  - in ED, pt is s/p Vit K 10 mg  - Ortho ordered 2 units of platelets. - Monitor INR  - Hold warfarin 3 mg at this time. - Hold NSAIDs given risk of bleeding        Diet:        Diet Orders   (From admission, onward)             Start     Ordered    06/28/23 0420  Diet NPO; Sips with meds  Diet effective now        References:    Adult Nutrition Support Algorithm    RD Therapeutic Diet Order Protocol   Question Answer Comment   Diet Type NPO    NPO Except: Sips with meds    RD to adjust diet per protocol? Yes        06/28/23 0422                Code Status: Level 1 - Full Code  POLST:    VTE Prophylaxis: Reason for no pharmacologic prophylaxis supratherapeutic INR sequential compression device and foot pump applied  Admission Status: INPATIENT   Dispo:    History of Present Illness     HPI:  Rianna Melchor is a 80 y.o. female who presents with suspected infection of R prosthetic hip joint. PMH includes CKD, CHF with presence of pacemaker, hypothyroidism, a fib on coumadin, polyneuropathy, and recurrent dislocation of R hip. Presented to the ED on 6/28 after she had 1 L of purulent fluid drained out of her R hip. Patient is a poor historian and reports that she has had issues and infections in this hip since she had a revision 3 years ago. She does not remember the events leading up to coming to the hospital. Per chart review: she did not have a fall. 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 She had a recent UTI on 6/20. At this time she states that the pain in her R hip is severe on movement and improve with rest. She denies numbness, tingling, lightheadedness, changes in vision. She subjectively feels febrile. She does not ambulate at baseline.      ED Management: IV antibiotics, CT AP, CT LE, CXR    Review of Systems   Constitutional: Positive for activity change and fever (subjective ). Respiratory: Negative for chest tightness and shortness of breath. Cardiovascular: Negative for chest pain and palpitations. Gastrointestinal: Positive for nausea. Negative for abdominal pain, constipation, diarrhea and vomiting. Musculoskeletal: Positive for arthralgias and joint swelling. Skin: Positive for color change (redness on R hip ). Neurological: Negative for dizziness, light-headedness and headaches. Historical Information   Past Medical History:   Diagnosis Date   • Abdominal bloating 8/1/2016   • Anemia    • Arthritis    • Cancer (HCC)     basal cell   • CHF (congestive heart failure) (HCC)    • COVID-19    • Disease of thyroid gland    • Disturbance of smell and taste     disturbance of taste   • Effusion of knee joint right    • Fibromyalgia    • Heart murmur     reported a previous heart murmur   • History of acute myocardial infarction    • History of atrial fibrillation    • History of bruising easily    • History of cancer    • History of dermatitis    • History of mammogram    • History of methicillin resistant staphylococcus aureus (MRSA)     Negative nasal culture, isolation discontinued 8/17/2018.    • History of methicillin resistant staphylococcus aureus (MRSA) 08/17/2018    negative nasal culture-isolation and hx discontinued 8/17/2018   • History of obesity    • History of osteopenia    • History of sciatica    • History of shortness of breath    • History of sinusitis    • History of sore throat    • History of syncope    • History of umbilical hernia    • History of viral infection    • Irritable bowel syndrome (IBS)    • Joint pain, hip    • Limb pain    • Myalgia     myalgia and myositis   • Need for prophylactic vaccination against single diseases    • Need for prophylactic vaccination and inoculation against influenza    • Preoperative cardiovascular examination    • Primary osteoarthritis of right knee    • Right leg pain    • Vaginal Pap smear reported pap smear     Past Surgical History:   Procedure Laterality Date   • ANKLE ARTHRODESIS Right    • BACK SURGERY     • BARIATRIC SURGERY     • CHOLECYSTECTOMY      x2   • COLONOSCOPY     • ESOPHAGOGASTRODUODENOSCOPY N/A 3/23/2018    Procedure: ESOPHAGOGASTRODUODENOSCOPY (EGD); Surgeon: Rick Cummings MD;  Location: [facility]; Service: Gastroenterology   • FOOT SURGERY     • HIP CLOSE REDUCTION Right 8/21/2016    Procedure: CLOSED REDUCTION RIGHT TOTAL HIP;  Surgeon: Gary Cormier MD;  Location: [facility];  Service:    • INSERT / Mikey Cull / Cade Poag     • IR BIOPSY OTHER  8/1/2020   • JOINT REPLACEMENT      LEFT KNEE REPLACEMENT   • JOINT REPLACEMENT      Right HIP   • PILONIDAL CYST EXCISION      x2   • NY EXC B9 LESION MRGN XCP SK TG T/A/L 0.5 CM/< Right 7/24/2019    Procedure: REVISION SCAR EXTREMITY Rt Hip; Surgeon: Maxwell Alvarado MD;  Location: [facility];  Service: Orthopedics   • NY REVJ TOT HIP ARTHRP [address] [facility] [address] W/WO AGRFT/ALGRFT Right 4/15/2019    Procedure: ARTHROPLASTY HIP TOTAL ACETABULAR REVISION;  Surgeon: Maxwell Alvarado MD;  Location: [facility];  Service: Orthopedics   • REPLACEMENT TOTAL KNEE Right    • SILVANA-EN-Y PROCEDURE      x2   • TOTAL KNEE ARTHROPLASTY     • UMBILICAL HERNIA REPAIR      x2     Social History   Social History     Substance and Sexual Activity   Alcohol Use Not Currently     Social History     Substance and Sexual Activity   Drug Use No     Social History     Tobacco Use   Smoking Status Never   Smokeless Tobacco Never     Family History: non-contributory    Meds/Allergies   all medications and allergies reviewed  Allergies   Allergen Reactions   • Other      Other reaction(s): Other (See Comments)  ana m toradol in OR 6/06 sah   • Seasonal Ic  [Cholestatin] Allergic Rhinitis   • Trimethoprim    • Bactrim [Sulfamethoxazole-Trimethoprim] Rash   • Sulfa Antibiotics Rash     Other reaction(s):  Other (See Comments)  takes lasix @home       Objective   Vitals: Blood pressure 128/60, pulse 69, temperature 97.6 °F (36.4 °C), temperature source Oral, resp. rate 16, SpO2 96 %, not currently breastfeeding. Intake/Output Summary (Last 24 hours) at 6/28/2023 0451  Last data filed at 6/28/2023 0446  Gross per 24 hour   Intake 295 ml   Output --   Net 295 ml       Invasive Devices     Peripheral Intravenous Line  Duration           Peripheral IV 06/27/23 Proximal;Right;Ventral (anterior) Forearm <1 day    Peripheral IV 06/28/23 Left Forearm <1 day                Physical Exam    Lab Results:   I have personally reviewed pertinent reports.       Recent Results (from the past 24 hour(s))   CBC and differential    Collection Time: 06/27/23 10:06 PM   Result Value Ref Range    WBC 12.99 (H) 4.31 - 10.16 Thousand/uL    RBC 2.91 (L) 3.81 - 5.12 Million/uL    Hemoglobin 9.3 (L) 11.5 - 15.4 g/dL    Hematocrit 29.7 (L) 34.8 - 46.1 %     (H) 82 - 98 fL    MCH 32.0 26.8 - 34.3 pg    MCHC 31.3 (L) 31.4 - 37.4 g/dL    RDW 14.6 11.6 - 15.1 %    MPV 8.7 (L) 8.9 - 12.7 fL    Platelets 491 (H) 094 - 390 Thousands/uL    nRBC 0 /100 WBCs    Neutrophils Relative 77 (H) 43 - 75 %    Immat GRANS % 1 0 - 2 %    Lymphocytes Relative 11 (L) 14 - 44 %    Monocytes Relative 10 4 - 12 %    Eosinophils Relative 1 0 - 6 %    Basophils Relative 0 0 - 1 %    Neutrophils Absolute 10.07 (H) 1.85 - 7.62 Thousands/µL    Immature Grans Absolute 0.15 0.00 - 0.20 Thousand/uL    Lymphocytes Absolute 1.36 0.60 - 4.47 Thousands/µL    Monocytes Absolute 1.25 (H) 0.17 - 1.22 Thousand/µL    Eosinophils Absolute 0.11 0.00 - 0.61 Thousand/µL    Basophils Absolute 0.05 0.00 - 0.10 Thousands/µL   Comprehensive metabolic panel    Collection Time: 06/27/23 10:06 PM   Result Value Ref Range    Sodium 138 135 - 147 mmol/L    Potassium 4.0 3.5 - 5.3 mmol/L    Chloride 107 96 - 108 mmol/L    CO2 26 21 - 32 mmol/L    ANION GAP 5 mmol/L    BUN 17 5 - 25 mg/dL    Creatinine 0.66 0.60 - 1.30 mg/dL    Glucose 98 65 - 140 mg/dL Calcium 7.9 (L) 8.3 - 10.1 mg/dL    Corrected Calcium 9.9 8.3 - 10.1 mg/dL    AST 18 5 - 45 U/L    ALT 13 12 - 78 U/L    Alkaline Phosphatase 128 (H) 46 - 116 U/L    Total Protein 5.6 (L) 6.4 - 8.4 g/dL    Albumin 1.5 (L) 3.5 - 5.0 g/dL    Total Bilirubin 0.23 0.20 - 1.00 mg/dL    eGFR 78 ml/min/1.73sq m   Lactic acid    Collection Time: 06/27/23 10:06 PM   Result Value Ref Range    LACTIC ACID 2.0 0.5 - 2.0 mmol/L   Procalcitonin    Collection Time: 06/27/23 10:06 PM   Result Value Ref Range    Procalcitonin 4.52 (H) <=0.25 ng/ml   Protime-INR    Collection Time: 06/27/23 10:06 PM   Result Value Ref Range    Protime 48.6 (H) 11.6 - 14.5 seconds    INR 5.26 (HH) 0.84 - 1.19   APTT    Collection Time: 06/27/23 10:06 PM   Result Value Ref Range    PTT 93 (H) 23 - 37 seconds   Blood culture #1    Collection Time: 06/27/23 10:06 PM    Specimen: Arm, Left; Blood   Result Value Ref Range    Blood Culture Received in Microbiology Lab. Culture in Progress. Blood culture #2    Collection Time: 06/27/23 10:06 PM    Specimen: Arm, Left; Blood   Result Value Ref Range    Blood Culture Received in Microbiology Lab. Culture in Progress.     Prepare Plasma: 2 Units    Collection Time: 06/28/23  2:46 AM   Result Value Ref Range    Unit Product Code C4129R34     Unit Number [id number]     Unit ABO O     Unit [facility] POS     Unit Dispense Status Crossmatched     Unit Product Volume 280 ml    Unit Product Code Q2873S35     Unit Number [id number]     Unit ABO O     Unit [facility] NEG     Unit Dispense Status Issued     Unit Product Volume 280 ml   Type and screen    Collection Time: 06/28/23  3:13 AM   Result Value Ref Range    ABO Grouping O     Rh Factor Positive     Antibody Screen Negative     Specimen Expiration Date [id number]    APTT    Collection Time: 06/28/23  3:13 AM   Result Value Ref Range    PTT 88 (H) 23 - 37 seconds   Protime-INR    Collection Time: 06/28/23  3:13 AM   Result Value Ref Range    Protime 49.5 (H) 11.6 - 14.5 seconds    INR 5.40 (HH) 0.84 - 1.19   CBC and differential    Collection Time: 06/28/23  4:19 AM   Result Value Ref Range    WBC 10.58 (H) 4.31 - 10.16 Thousand/uL    RBC 2.78 (L) 3.81 - 5.12 Million/uL    Hemoglobin 8.9 (L) 11.5 - 15.4 g/dL    Hematocrit 28.1 (L) 34.8 - 46.1 %     (H) 82 - 98 fL    MCH 32.0 26.8 - 34.3 pg    MCHC 31.7 31.4 - 37.4 g/dL    RDW 14.7 11.6 - 15.1 %    MPV 9.0 8.9 - 12.7 fL    Platelets 199 622 - 526 Thousands/uL    nRBC 0 /100 WBCs    Neutrophils Relative 74 43 - 75 %    Immat GRANS % 1 0 - 2 %    Lymphocytes Relative 12 (L) 14 - 44 %    Monocytes Relative 11 4 - 12 %    Eosinophils Relative 2 0 - 6 %    Basophils Relative 0 0 - 1 %    Neutrophils Absolute 7.73 (H) 1.85 - 7.62 Thousands/µL    Immature Grans Absolute 0.14 0.00 - 0.20 Thousand/uL    Lymphocytes Absolute 1.30 0.60 - 4.47 Thousands/µL    Monocytes Absolute 1.20 0.17 - 1.22 Thousand/µL    Eosinophils Absolute 0.17 0.00 - 0.61 Thousand/µL    Basophils Absolute 0.04 0.00 - 0.10 Thousands/µL     Blood Culture:   Lab Results   Component Value Date    BLOODCX Received in Microbiology Lab. Culture in Progress. 06/27/2023    BLOODCX Received in Microbiology Lab. Culture in Progress.  06/27/2023   ,   Urinalysis:   Lab Results   Component Value Date    COLORU Light Orange 06/19/2023    CLARITYU Extra Turbid 06/19/2023    SPECGRAV 1.019 06/19/2023    PHUR 5.5 06/19/2023    PHUR 6.0 08/20/2016    LEUKOCYTESUR Small (A) 06/19/2023    NITRITE Negative 06/19/2023    GLUCOSEU Negative 06/19/2023    KETONESU 10 (1+) (A) 06/19/2023    BILIRUBINUR Negative 06/19/2023    BLOODU Negative 06/19/2023   ,   Urine Culture:   Lab Results   Component Value Date    URINECX >100,000 cfu/ml Escherichia coli ESBL (A) 06/15/2023    URINECX >100,000 cfu/ml Klebsiella pneumoniae (A) 06/15/2023    URINECX >100,000 cfu/ml Aerococcus urinae (A) 06/15/2023   ,   Wound Culure: No results found for: "WOUNDCULT"    Imaging:   EKG, Pathology, and Other Studies: I have personally reviewed pertinent reports.       Surinder Castro, DO  PGY-1  [place]

## 2023-06-28 NOTE — ASSESSMENT & PLAN NOTE
Currently rate controlled.   On daily warfarin 3 mg.  - Goal INR between 2-3.  - INR currently 5.40  - In ED, pt is s/p Vit K and 2 units of FFP   -7/6/2023: INR is supratherapeutic at 3.5 after 7.5 mg of Coumadin last night    Plan  - Hold NSAIDs given risk of bleeding  -Hold 7/6  dose of Coumadin  -Recheck INR  -Adjusting Coumadin doses with goal 2-3

## 2023-06-28 NOTE — ED PROVIDER NOTES
History  Chief Complaint   Patient presents with   • Hip Pain     Large amount of rust colored discharge "gushed" out of 3 y/o hip surgery incision. 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 80-year-old woman with chronic right hip dislocation and atrial fibrillation on warfarin presents with concerns of right hip and lower side infection. Patient was at her facility when supposedly 1 L of rust colored, purulent fluid drained out of her right side. Patient has been having chills but denies fevers. She has not noticed the surrounding erythema of this wound, but thinks it may have developed today. Patient is not ambulatory secondary to her right hip dislocation. She recently was discharged on 6/20 for UTI. Prior to Admission Medications   Prescriptions Last Dose Informant Patient Reported? Taking?    Cholecalciferol 125 MCG (5000 UT) TABS  Outside Facility (Specify) Yes No   Sig: Take 5,000 Units by mouth daily   DULoxetine (CYMBALTA) 60 mg delayed release capsule  Outside Facility (Specify) No No   Sig: TAKE 1 CAPSULE BY MOUTH EVERY DAY   Diclofenac Sodium (VOLTAREN) 1 %  Outside Facility (Specify) No No   Sig: Apply 2 g topically 4 (four) times a day   acetaminophen (TYLENOL) 500 mg tablet   No No   Sig: Take 2 tablets (1,000 mg total) by mouth every 8 (eight) hours   desoximetasone (TOPICORT) 0.25 % cream  Outside Facility (Specify) No No   Sig: APPLY TOPICALLY AS NEEDED FOR IRRITATION   dicyclomine (BENTYL) 10 mg capsule   Yes No   fluticasone (FLONASE) 50 mcg/act nasal spray  Outside Facility (Specify) No No   Sig: SPRAY 2 SPRAYS INTO EACH NOSTRIL EVERY DAY   gabapentin (NEURONTIN) 300 mg capsule   Yes No   levothyroxine 100 mcg tablet  Outside Facility (Specify) No No   Sig: TAKE 1 TABLET BY MOUTH EVERY DAY   loperamide (IMODIUM) 2 mg capsule  Outside Facility (Specify) Yes No   Sig: Take 2 mg by mouth 4 (four) times a day as needed for diarrhea   nystatin powder   Yes No   oxyCODONE (ROXICODONE) 5 immediate release tablet   No No   Sig: Take 0.5 tablets (2.5 mg total) by mouth every 8 (eight) hours as needed for moderate pain for up to 10 days Max Daily Amount: 7.5 mg   polyethylene glycol (MIRALAX) 17 g packet   No No   Sig: Take 17 g by mouth daily as needed (constipation)   potassium chloride 10% oral solution  Outside Facility (Specify) No No   Sig: Take 15 mL (20 mEq total) by mouth daily   simethicone (MYLICON) 80 mg chewable tablet  Outside Facility (Specify) Yes No   Sig: Chew 80 mg every 6 (six) hours as needed for flatulence   sucralfate (CARAFATE) 1 g tablet  Outside Facility (Specify) No No   Sig: TAKE 1 TABLET BY MOUTH 2 TIMES A DAY. torsemide (DEMADEX) 10 mg tablet   Yes No   Sig: Take 10 mg by mouth daily   warfarin (COUMADIN) 3 mg tablet  Outside Facility (Specify) Yes No      Facility-Administered Medications: None       Past Medical History:   Diagnosis Date   • Abdominal bloating 8/1/2016   • Anemia    • Arthritis    • Cancer (HCC)     basal cell   • CHF (congestive heart failure) (HCC)    • COVID-19    • Disease of thyroid gland    • Disturbance of smell and taste     disturbance of taste   • Effusion of knee joint right    • Fibromyalgia    • Heart murmur     reported a previous heart murmur   • History of acute myocardial infarction    • History of atrial fibrillation    • History of bruising easily    • History of cancer    • History of dermatitis    • History of mammogram    • History of methicillin resistant staphylococcus aureus (MRSA)     Negative nasal culture, isolation discontinued 8/17/2018.    • History of methicillin resistant staphylococcus aureus (MRSA) 08/17/2018    negative nasal culture-isolation and hx discontinued 8/17/2018   • History of obesity    • History of osteopenia    • History of sciatica    • History of shortness of breath    • History of sinusitis    • History of sore throat    • History of syncope    • History of umbilical hernia    • History of viral infection    • Irritable bowel syndrome (IBS)    • Joint pain, hip    • Limb pain    • Myalgia     myalgia and myositis   • Need for prophylactic vaccination against single diseases    • Need for prophylactic vaccination and inoculation against influenza    • Preoperative cardiovascular examination    • Primary osteoarthritis of right knee    • Right leg pain    • Vaginal Pap smear     reported pap smear       Past Surgical History:   Procedure Laterality Date   • ANKLE ARTHRODESIS Right    • BACK SURGERY     • BARIATRIC SURGERY     • CHOLECYSTECTOMY      x2   • COLONOSCOPY     • ESOPHAGOGASTRODUODENOSCOPY N/A 3/23/2018    Procedure: ESOPHAGOGASTRODUODENOSCOPY (EGD); Surgeon: Sander Gardner MD;  Location: [facility]; Service: Gastroenterology   • FOOT SURGERY     • HIP CLOSE REDUCTION Right 8/21/2016    Procedure: CLOSED REDUCTION RIGHT TOTAL HIP;  Surgeon: Charmaine Fulton MD;  Location[facility];  Service:    • NISHA / Gisella Bain / Mac Lincoln     • IR BIOPSY OTHER  8/1/2020   • JOINT REPLACEMENT      LEFT KNEE REPLACEMENT   • JOINT REPLACEMENT      Right HIP   • PILONIDAL CYST EXCISION      x2   • MI EXC B9 LESION MRGN XCP SK TG T/A/L 0.5 CM/< Right 7/24/2019    Procedure: REVISION SCAR EXTREMITY Rt Hip;   Surgeon: Anita Meckel, MD;  Location: [facility];  Service: Orthopedics   • MI REVJ TOT HIP ARTHRP [address] W/WO AGRFT/ALGRFT Right 4/15/2019    Procedure: ARTHROPLASTY HIP TOTAL ACETABULAR REVISION;  Surgeon: Anita Meckel, MD;  Location[facility];  Service: Orthopedics   • REPLACEMENT TOTAL KNEE Right    • SILVANA-EN-Y PROCEDURE      x2   • TOTAL KNEE ARTHROPLASTY     • UMBILICAL HERNIA REPAIR      x2       Family History   Problem Relation Age of Onset   • Coronary artery disease Mother    • Heart attack Mother    • Hypertension Mother    • Coronary artery disease Father    • Heart attack Father    • Hypertension Father    • Lymphoma Sister         acute   • Rashes / Skin problems Sister         lymphomatoid papulosis   • Cancer Sister    • Coronary artery disease Brother    • Heart attack Brother         x2   • Aneurysm Neg Hx    • Hyperlipidemia Neg Hx      I have reviewed and agree with the history as documented. E-Cigarette/Vaping   • E-Cigarette Use Never User      E-Cigarette/Vaping Substances   • Nicotine No    • THC No    • CBD No    • Flavoring No    • Other No    • Unknown No      Social History     Tobacco Use   • Smoking status: Never   • Smokeless tobacco: Never   Vaping Use   • Vaping Use: Never used   Substance Use Topics   • Alcohol use: Not Currently   • Drug use: No        Review of Systems   Constitutional: Positive for chills. Negative for fever. HENT: Negative for ear pain and sore throat. Eyes: Negative for pain and visual disturbance. Respiratory: Negative for cough, shortness of breath and wheezing. Cardiovascular: Negative for chest pain and palpitations. Gastrointestinal: Negative for abdominal pain, constipation, diarrhea and vomiting. Genitourinary: Negative for dysuria, frequency, hematuria and vaginal bleeding. Musculoskeletal: Negative for arthralgias and back pain. Skin: Negative for color change and rash. Neurological: Negative for seizures, syncope and headaches. Psychiatric/Behavioral: Negative for agitation and confusion.        Physical Exam  ED Triage Vitals   Temperature Pulse Respirations Blood Pressure SpO2   06/27/23 2301 06/27/23 2148 06/27/23 2148 06/27/23 2206 06/27/23 2148   97.8 °F (36.6 °C) 72 16 113/56 97 %      Temp Source Heart Rate Source Patient Position - Orthostatic VS BP Location FiO2 (%)   06/27/23 2301 -- 06/28/23 0015 06/28/23 0015 --   Oral  Lying Left arm       Pain Score       06/28/23 0015       10 - Worst Possible Pain             Orthostatic Vital Signs  Vitals:    06/28/23 0015 06/28/23 0130 06/28/23 0300 06/28/23 0318   BP: 99/53 103/55 113/58 108/54   Pulse: 72 68 69 72   Patient Position - Orthostatic VS: Lying          Physical Exam  Vitals and nursing note reviewed. Constitutional:       General: She is not in acute distress. Appearance: Normal appearance. She is well-developed. She is obese. HENT:      Head: Normocephalic and atraumatic. Right Ear: External ear normal.      Left Ear: External ear normal.      Nose: Nose normal. No congestion. Cardiovascular:      Rate and Rhythm: Normal rate and regular rhythm. Pulmonary:      Effort: Pulmonary effort is normal. No respiratory distress. Breath sounds: Normal breath sounds. Abdominal:      Palpations: Abdomen is soft. Tenderness: There is no abdominal tenderness. There is no guarding or rebound. Musculoskeletal:         General: Normal range of motion. Cervical back: Normal range of motion and neck supple. Skin:     General: Skin is warm and dry. Comments: Large, warm, tender erythematous mass of the right lower side without active drainage. Pain with passive ROM of the right hip. Neurological:      Mental Status: She is alert and oriented to person, place, and time. Mental status is at baseline.    Psychiatric:         Mood and Affect: Mood normal.         Behavior: Behavior normal.             ED Medications  Medications   phytonadione (AQUA-MEPHYTON) 10 mg/mL 10 mg in sodium chloride 0.9 % 50 mL IVPB (has no administration in time range)   sodium chloride 0.9 % bolus 500 mL (0 mL Intravenous Stopped 6/28/23 0027)   morphine injection 6 mg (6 mg Intravenous Given 6/27/23 2222)   ondansetron (ZOFRAN) injection 4 mg (4 mg Intravenous Given 6/27/23 2222)   vancomycin (VANCOCIN) 1500 mg in sodium chloride 0.9% 250 mL IVPB (0 mg Intravenous Stopped 6/28/23 0311)   cefTRIAXone (ROCEPHIN) 2,000 mg in dextrose 5 % 50 mL IVPB (0 mg Intravenous Stopped 6/28/23 0132)   iohexol (OMNIPAQUE) 350 MG/ML injection (SINGLE-DOSE) 100 mL (100 mL Intravenous Given 6/27/23 2355)   morphine injection 6 mg (6 mg Intravenous Given 6/28/23 0035)       Diagnostic Studies  Results Reviewed     Procedure Component Value Units Date/Time    APTT [[id number]] Collected: 06/28/23 0313    Lab Status: In process Specimen: Blood from Arm, Left Updated: 06/28/23 0319    Nickolas Naranjo [[id number]] Collected: 06/28/23 0313    Lab Status: In process Specimen: Blood from Arm, Left Updated: 06/28/23 0319    Blood culture #1 [[id number]] Collected: 06/27/23 2206    Lab Status: Preliminary result Specimen: Blood from Arm, Left Updated: 06/28/23 0101     Blood Culture Received in Microbiology Lab. Culture in Progress. Blood culture #2 [[id number]] Collected: 06/27/23 2206    Lab Status: Preliminary result Specimen: Blood from Arm, Left Updated: 06/28/23 0101     Blood Culture Received in Microbiology Lab. Culture in Progress.     Protime-INR [[id number]]  (Abnormal) Collected: 06/27/23 2206    Lab Status: Final result Specimen: Blood from Arm, Left Updated: 06/27/23 2257     Protime 48.6 seconds      INR 5.26    APTT [[id number]]  (Abnormal) Collected: 06/27/23 2206    Lab Status: Final result Specimen: Blood from Arm, Left Updated: 06/27/23 2257     PTT 93 seconds     Procalcitonin [[id number]]  (Abnormal) Collected: 06/27/23 2206    Lab Status: Final result Specimen: Blood from Arm, Left Updated: 06/27/23 2254     Procalcitonin 4.52 ng/ml     Comprehensive metabolic panel [[id number]]  (Abnormal) Collected: 06/27/23 2206    Lab Status: Final result Specimen: Blood from Arm, Left Updated: 06/27/23 2250     Sodium 138 mmol/L      Potassium 4.0 mmol/L      Chloride 107 mmol/L      CO2 26 mmol/L      ANION GAP 5 mmol/L      BUN 17 mg/dL      Creatinine 0.66 mg/dL      Glucose 98 mg/dL      Calcium 7.9 mg/dL      Corrected Calcium 9.9 mg/dL      AST 18 U/L      ALT 13 U/L      Alkaline Phosphatase 128 U/L      Total Protein 5.6 g/dL      Albumin 1.5 g/dL      Total Bilirubin 0.23 mg/dL      eGFR 78 ml/min/1.73sq m     Narrative:      [place] guidelines for Chronic Kidney Disease (CKD):   •  Stage 1 with normal or high GFR (GFR > 90 mL/min/1.73 square meters)  •  Stage 2 Mild CKD (GFR = 60-89 mL/min/1.73 square meters)  •  Stage 3A Moderate CKD (GFR = 45-59 mL/min/1.73 square meters)  •  Stage 3B Moderate CKD (GFR = 30-44 mL/min/1.73 square meters)  •  Stage 4 Severe CKD (GFR = 15-29 mL/min/1.73 square meters)  •  Stage 5 End Stage CKD (GFR <15 mL/min/1.73 square meters)  Note: GFR calculation is accurate only with a steady state creatinine    Lactic acid [[id number]]  (Normal) Collected: 06/27/23 2206    Lab Status: Final result Specimen: Blood from Arm, Left Updated: 06/27/23 2250     LACTIC ACID 2.0 mmol/L     Narrative:      Result may be elevated if tourniquet was used during collection. CBC and differential [[id number]]  (Abnormal) Collected: 06/27/23 2206    Lab Status: Final result Specimen: Blood from Arm, Left Updated: 06/27/23 2231     WBC 12.99 Thousand/uL      RBC 2.91 Million/uL      Hemoglobin 9.3 g/dL      Hematocrit 29.7 %       fL      MCH 32.0 pg      MCHC 31.3 g/dL      RDW 14.6 %      MPV 8.7 fL      Platelets 252 Thousands/uL      nRBC 0 /100 WBCs      Neutrophils Relative 77 %      Immat GRANS % 1 %      Lymphocytes Relative 11 %      Monocytes Relative 10 %      Eosinophils Relative 1 %      Basophils Relative 0 %      Neutrophils Absolute 10.07 Thousands/µL      Immature Grans Absolute 0.15 Thousand/uL      Lymphocytes Absolute 1.36 Thousands/µL      Monocytes Absolute 1.25 Thousand/µL      Eosinophils Absolute 0.11 Thousand/µL      Basophils Absolute 0.05 Thousands/µL                  CT abdomen pelvis with contrast   Final Result by Amanda Frazier MD (06/28 6057)         1. Superior dislocation of right hip arthroplasty femoral stem and acetabular cup.   2. Collection of fluid and gas lateral to the femoral stem measuring 16.5 x 7.3 x 4.3 cm compatible with an abscess. Collection extends to the acetabular cup.  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 3. No evidence of intraperitoneal or retroperitoneal fluid collection. 4. Constipation. I personally discussed this study with Patricia Mendoza on 6/28/2023 2:06 AM.            Workstation performed: [id number]         CT lower extremity w contrast right    (Results Pending)   XR chest portable    (Results Pending)         Procedures  Procedures      ED Course               Identification of Seniors at [facility] Most Recent Value   (ISAR) Identification of Seniors at Risk    Before the illness or injury that brought you to the Emergency, did you need someone to help you on a regular basis? 1 Filed at: 06/27/2023 2151   In the last 24 hours, have you needed more help than usual? 0 Filed at: 06/27/2023 2151   Have you been hospitalized for one or more nights during the past 6 months? --   In general, do you see well? 0 Filed at: 06/27/2023 2151   In general, do you have serious problems with your memory? 0 Filed at: 06/27/2023 2151   Do you take more than three different medications every day? 1 Filed at: 06/27/2023 2151   ISAR Score 2 Filed at: 06/27/2023 2151                    SBIRT 22yo+    Flowsheet Row Most Recent Value   Initial Alcohol Screen: US AUDIT-C     1. How often do you have a drink containing alcohol? 0 Filed at: 06/27/2023 2340   2. How many drinks containing alcohol do you have on a typical day you are drinking? 0 Filed at: 06/27/2023 2340   3b. FEMALE Any Age, or MALE 65+: How often do you have 4 or more drinks on one occassion? 0 Filed at: 06/27/2023 2340   Audit-C Score 0 Filed at: 06/27/2023 2340   MARISSA: How many times in the past year have you. .. Used an illegal drug or used a prescription medication for non-medical reasons? Never Filed at: 06/27/2023 2340                Medical Decision Making  Presents with purulent drainage from right hip. On exam, there is a large and tender mass on her right lower side. I do not see active drainage. However, given the large amount of drainage at the nursing facility, I suspect a deep infection. Will check labs and obtain CT imaging. CT imaging suggestive of significant deep space infection with likely infection of the prosthesis and surrounding bone. Orthopedics was consulted with plans for I&D tomorrow in the OR. Patient to be admitted to family medicine. Patient in agreement with plan and questions were answered. Portions or all of this note were generated using voice recognition software. Occasional wrong word or "sound a like" substitutions may have occurred due to the inherent limitations of voice recognition software. Please interpret any errors within the intended context of the whole sentence or idea. Amount and/or Complexity of Data Reviewed  Labs: ordered. Radiology: ordered. Risk  Prescription drug management. Decision regarding hospitalization. Disposition  Final diagnoses:   Infection associated with internal right hip prosthesis, initial encounter [facility])     Time reflects when diagnosis was documented in both MDM as applicable and the Disposition within this note     Time User Action Codes Description Comment    6/28/2023 12:15 AM Seth Foreman Julia Fountain Infection associated with internal right hip prosthesis, initial encounter [facility])       ED Disposition     ED Disposition   Admit    Condition   Stable    Date/Time   Wed Jun 28, 2023  3:25 AM    Comment   Case was discussed with FM and the patient's admission status was agreed to be Admission Status: inpatient status to the service of Dr. Bernice Chow . Follow-up Information    None         Patient's Medications   Discharge Prescriptions    No medications on file     No discharge procedures on file. PDMP Review       Value Time User    PDMP Reviewed  Yes 4/17/2020 12:53 PM Mahnaz Mabry MD           ED Provider  Attending physically available and evaluated Larry Clifton.  I managed the patient along with the ED Attending.     Electronically Signed by         John Paul Nazario MD  06/28/23 6635

## 2023-06-28 NOTE — WOUND OSTOMY CARE
Consult Note - Wound   Phong Booth 80 y.o. 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 Patient has a PMH of CKD, CHF with presence of pacemaker, hypothyroidism, a fib on coumadin, polyneuropathy, and recurrent dislocation of R hip. Patient is a moderate assist for turning and repositioning. Patient is incontinent of bowel and bladder as seen on assessment - allyn care rendered for patient. On assessment, patient is lying on regular mattress- specialty mattress ordered for patient. Wound Care was consulted for sacro-buttocks wound and right hip wound. Assessment Findings:   B/L heels are dry intact and alexy with no skin loss or wounds present. Recommend preventative Allevyn foam dressings and proper offloading/ repositioning. 1. POA B/l Sacro-Buttocks Evolving DTPI: irregular in shape area of intact tissue and partial thickness skin loss measured together. Wound bed is mix of beefy red and dark purple non-blanchable tissue. Allyn-wound is hyperpigmented and fragile. Scant sanguinous drainage noted. Recommend calazime for area. Deep Tissue Pressure Injury wounds have the potential to evolve into unstageable, stage III or stage IV wounds. 2. POA Right Hip Wound: Wound is irregular in shape with at least full thickness skin loss- unable to probe true depth at this time. Wound bed is beefy red. Copious serosanguineous drainage noted from area- primary team made aware and of need of Infectious disease consultation. Allyn-wound is pink and fragile. Area was dressed with maxorb ag and heavy drainage pack. Orthopedic surgery to perform I&D of area potentially today. 3. Left Anterior Foot Toes: area intact with pink epithelial tissue. No wounds or drainage present. Leave MINNIE.      No induration, fluctuance, odor, warmth/temperature differences, redness, or purulence noted to the above noted wounds and skin areas assessed. New dressings applied per orders listed below. Patient tolerated well- no s/s of non-verbal pain or discomfort observed during the encounter. Bedside nurse aware of plan of care. See flow sheets for more detailed assessment findings. Orders listed below and wound care will continue to follow, call or tiger text with questions. Skin Care Plan:  1-Cleanse sacro-buttocks with soap and water. Apply Calazime to B/L Sacro-Buttocks TID and PRN episodes of incontinence  2-Turn/reposition q2h or when medically stable for pressure re-distribution on skin . 3-Elevate heels to offload pressure. 4-Moisturize skin daily with skin nourishing cream  5-P-500 Specialty Mattress Ordered for Patient  6-B/L Heels: cleanse area and pat dry. Apply allevyn foam dressings to area. Deo with P for Prevention and change every other day or PRN. Peel back and inspect skin Q-shift. 7-Right Hip: Cleanse area with NS and pat dry. Apply Maxorb Ag sheet to area and cover with Heavy Drainage packs. Change TID or PRN soilage/ displacement. Wounds:  Wound 06/16/23 Toe (Comment  which one) Anterior; Left (Active)   Wound Image   06/28/23 1108   Wound Description Intact 06/28/23 1200   Allyn-wound Assessment Intact 06/28/23 1200   Wound Length (cm) 0 cm 06/28/23 1200   Wound Width (cm) 0 cm 06/28/23 1200   Wound Depth (cm) 0 cm 06/28/23 1200   Wound Surface Area (cm^2) 0 cm^2 06/28/23 1200   Wound Volume (cm^3) 0 cm^3 06/28/23 1200   Calculated Wound Volume (cm^3) 0 cm^3 06/28/23 1200   Dressing Open to air 06/28/23 1200       Wound 06/28/23 Hip Right (Active)   Wound Image   06/28/23 1110   Wound Description Beefy red;Drainage;Fragile 06/28/23 1200   Allyn-wound Assessment Pink;Fragile 06/28/23 1200   Wound Length (cm) 1.5 cm 06/28/23 1110   Wound Width (cm) 1.5 cm 06/28/23 1110   Wound Depth (cm) 0.3 cm 06/28/23 1110   Wound Surface Area (cm^2) 2.25 cm^2 06/28/23 1110   Wound Volume (cm^3) 0.675 cm^3 06/28/23 1110   Calculated Wound Volume (cm^3) 0.68 cm^3 06/28/23 1110   Drainage Amount Copious 06/28/23 1200   Drainage Description Serosanguineous 06/28/23 1200   Non-staged Wound Description Full thickness 06/28/23 1200   Treatments Cleansed;Irrigation with NSS;Site care 06/28/23 1200   Dressing Calcium Alginate with Silver 06/28/23 1200   Dressing Changed New 06/28/23 1200   Patient Tolerance Tolerated well 06/28/23 1200   Dressing Status Clean;Dry; Intact 06/28/23 1200       Wound 06/28/23 Pressure Injury Sacrum (Active)   Wound Image   06/28/23 1111   Wound Description Beefy red;Non-blanchable erythema;Light purple 06/28/23 1111   Pressure Injury Stage DTPI 06/28/23 1111   Allyn-wound Assessment Hyperpigmented;Fragile 06/28/23 1111   Wound Length (cm) 8 cm 06/28/23 1111   Wound Width (cm) 6 cm 06/28/23 1111   Wound Depth (cm) 0.2 cm 06/28/23 1111   Wound Surface Area (cm^2) 48 cm^2 06/28/23 1111   Wound Volume (cm^3) 9.6 cm^3 06/28/23 1111   Calculated Wound Volume (cm^3) 9.6 cm^3 06/28/23 1111   Drainage Amount Scant 06/28/23 1111   Drainage Description Sanguineous 06/28/23 1111   Non-staged Wound Description Partial thickness 06/28/23 1111   Treatments Cleansed;Site care 06/28/23 1111   Dressing Protective barrier 06/28/23 1111   Dressing Changed New 06/28/23 1111   Patient Tolerance Tolerated well 06/28/23 1111   Dressing Status Intact 06/28/23 1111               Radha Blackmon RN, BSN, [facility]

## 2023-06-28 NOTE — PROGRESS NOTES
Teto Paulino is a 80 y.o. female who is currently ordered Vancomycin IV with management by the Pharmacy Consult service. Relevant clinical data and objective / subjective history reviewed. Vancomycin Assessment:  Indication and Goal AUC/Trough: Bone/joint infection (goal -600, trough >10)  Clinical Status: stable  Micro:   Pending lab analysis  Renal Function:  SCr: 0.66 mg/dL  CrCl: 52.8 mL/min  Renal replacement: Not on dialysis  Days of Therapy: 1  Current Dose: 1500mg IV once then 1250mg IV q12h  Vancomycin Plan:  New Dosinmg IV once then 1250mg IV q24h  Estimated AUC: 427 mcg*hr/mL  Estimated Trough: 12.1 mcg/mL  Next Level: 23 AM labs  Renal Function Monitoring: Daily BMP and [place] will continue to follow closely for s/sx of nephrotoxicity, infusion reactions and appropriateness of therapy. BMP and CBC will be ordered per protocol. We will continue to follow the patient’s culture results and clinical progress daily.     Susana Roman, Pharmacist

## 2023-06-28 NOTE — PLAN OF CARE
Problem: Potential for Falls  Goal: Patient will remain free of falls  Description: INTERVENTIONS:  - Educate patient/family on patient safety including physical limitations  - Instruct patient to call for assistance with activity   - Consult OT/PT to assist with strengthening/mobility   - Keep Call bell within reach  - Keep bed low and locked with side rails adjusted as appropriate  - Keep care items and personal belongings within reach  - Initiate and maintain comfort rounds  - Initiate/Maintain bed and chair alarm  - Apply yellow socks and bracelet for high fall risk patients  - Consider moving patient to room near nurses station  Outcome: Progressing     Problem: MOBILITY - ADULT  Goal: Maintain or return to baseline ADL function  Description: INTERVENTIONS:  -  Assess patient's ability to carry out ADLs; assess patient's baseline for ADL function and identify physical deficits which impact ability to perform ADLs (bathing, care of mouth/teeth, toileting, grooming, dressing, etc.)  - Assess/evaluate cause of self-care deficits   - Assess range of motion  - Assess patient's mobility; develop plan if impaired  - Assess patient's need for assistive devices and provide as appropriate  - Encourage maximum independence but intervene and supervise when necessary  - Involve family in performance of ADLs  - Assess for home care needs following discharge   - Consider OT consult to assist with ADL evaluation and planning for discharge  - Provide patient education as appropriate  Outcome: Progressing  Goal: Maintains/Returns to pre admission functional level  Description: INTERVENTIONS:  - Perform BMAT or MOVE assessment daily.   - Set and communicate daily mobility goal to care team and patient/family/caregiver. - Collaborate with rehabilitation services on mobility goals if consulted  - Reposition patient every 2 hours.   - Out of bed for toileting  - Record patient progress and toleration of activity level   Outcome: Progressing     Problem: Prexisting or High Potential for Compromised Skin Integrity  Goal: Skin integrity is maintained or improved  Description: INTERVENTIONS:  - Identify patients at risk for skin breakdown  - Assess and monitor skin integrity  - Assess and monitor nutrition and hydration status  - Monitor labs   - Assess for incontinence   - Turn and reposition patient  - Assist with mobility/ambulation  - Relieve pressure over bony prominences  - Avoid friction and shearing  - Provide appropriate hygiene as needed including keeping skin clean and dry  - Evaluate need for skin moisturizer/barrier cream  - Collaborate with interdisciplinary team   - Patient/family teaching  - Consider wound care consult   Outcome: Progressing     Problem: PAIN - ADULT  Goal: Verbalizes/displays adequate comfort level or baseline comfort level  Description: Interventions:  - Encourage patient to monitor pain and request assistance  - Assess pain using appropriate pain scale  - Administer analgesics based on type and severity of pain and evaluate response  - Implement non-pharmacological measures as appropriate and evaluate response  - Consider cultural and social influences on pain and pain management  - Notify physician/advanced practitioner if interventions unsuccessful or patient reports new pain  Outcome: Progressing     Problem: SKIN/TISSUE INTEGRITY - ADULT  Goal: Incision(s), wounds(s) or drain site(s) healing without S/S of infection  Description: INTERVENTIONS  - Assess and document dressing, incision, wound bed, drain sites and surrounding tissue  - Provide patient and family education  Outcome: Progressing  Goal: Pressure injury heals and does not worsen  Description: Interventions:  - Implement low air loss mattress or specialty surface (Criteria met)  - Apply silicone foam dressing  - Instruct/assist with weight shifting every 60 minutes when in chair   - Use special pressure reducing interventions such as EHOB when in chair   - Apply fecal or urinary incontinence containment device   - Perform passive or active ROM every shift  - Turn and reposition patient & offload bony prominences every 2 hours   - Utilize friction reducing device or surface for transfers   - Consider nutrition services referral as needed  Outcome: Progressing     Problem: MUSCULOSKELETAL - ADULT  Goal: Maintain or return mobility to safest level of function  Description: INTERVENTIONS:  - Assess patient's ability to carry out ADLs; assess patient's baseline for ADL function and identify physical deficits which impact ability to perform ADLs (bathing, care of mouth/teeth, toileting, grooming, dressing, etc.)  - Assess/evaluate cause of self-care deficits   - Assess range of motion  - Assess patient's mobility  - Assess patient's need for assistive devices and provide as appropriate  - Encourage maximum independence but intervene and supervise when necessary  - Involve family in performance of ADLs  - Assess for home care needs following discharge   - Consider OT consult to assist with ADL evaluation and planning for discharge  - Provide patient education as appropriate  Outcome: Progressing  Goal: Maintain proper alignment of affected body part  Description: INTERVENTIONS:  - Support, maintain and protect limb and body alignment  - Provide patient/ family with appropriate education  Outcome: Progressing

## 2023-06-29 LAB
ABO GROUP BLD BPU: NORMAL
ABO GROUP BLD BPU: NORMAL
ABO GROUP BLD: NORMAL
ANION GAP SERPL CALCULATED.3IONS-SCNC: 5 MMOL/L
APTT PPP: 37 SECONDS (ref 23–37)
BASOPHILS # BLD AUTO: 0.04 THOUSANDS/ÂΜL (ref 0–0.1)
BASOPHILS NFR BLD AUTO: 1 % (ref 0–1)
BLD GP AB SCN SERPL QL: NEGATIVE
BPU ID: NORMAL
BPU ID: NORMAL
BUN SERPL-MCNC: 15 MG/DL (ref 5–25)
CALCIUM SERPL-MCNC: 8.1 MG/DL (ref 8.3–10.1)
CHLORIDE SERPL-SCNC: 108 MMOL/L (ref 96–108)
CO2 SERPL-SCNC: 27 MMOL/L (ref 21–32)
CREAT SERPL-MCNC: 0.57 MG/DL (ref 0.6–1.3)
EOSINOPHIL # BLD AUTO: 0.18 THOUSAND/ÂΜL (ref 0–0.61)
EOSINOPHIL NFR BLD AUTO: 2 % (ref 0–6)
ERYTHROCYTE [DISTWIDTH] IN BLOOD BY AUTOMATED COUNT: 14.6 % (ref 11.6–15.1)
GFR SERPL CREATININE-BSD FRML MDRD: 82 ML/MIN/1.73SQ M
GLUCOSE SERPL-MCNC: 82 MG/DL (ref 65–140)
HCT VFR BLD AUTO: 27.9 % (ref 34.8–46.1)
HGB BLD-MCNC: 8.1 G/DL (ref 11.5–15.4)
IMM GRANULOCYTES # BLD AUTO: 0.11 THOUSAND/UL (ref 0–0.2)
IMM GRANULOCYTES NFR BLD AUTO: 1 % (ref 0–2)
INR PPP: 1.29 (ref 0.84–1.19)
LYMPHOCYTES # BLD AUTO: 1.02 THOUSANDS/ÂΜL (ref 0.6–4.47)
LYMPHOCYTES NFR BLD AUTO: 13 % (ref 14–44)
MCH RBC QN AUTO: 30.6 PG (ref 26.8–34.3)
MCHC RBC AUTO-ENTMCNC: 29 G/DL (ref 31.4–37.4)
MCV RBC AUTO: 105 FL (ref 82–98)
MONOCYTES # BLD AUTO: 0.93 THOUSAND/ÂΜL (ref 0.17–1.22)
MONOCYTES NFR BLD AUTO: 12 % (ref 4–12)
MRSA NOSE QL CULT: NORMAL
NEUTROPHILS # BLD AUTO: 5.35 THOUSANDS/ÂΜL (ref 1.85–7.62)
NEUTS SEG NFR BLD AUTO: 71 % (ref 43–75)
NRBC BLD AUTO-RTO: 0 /100 WBCS
PLATELET # BLD AUTO: 318 THOUSANDS/UL (ref 149–390)
PMV BLD AUTO: 8.8 FL (ref 8.9–12.7)
POTASSIUM SERPL-SCNC: 4.2 MMOL/L (ref 3.5–5.3)
PROTHROMBIN TIME: 16.3 SECONDS (ref 11.6–14.5)
RBC # BLD AUTO: 2.65 MILLION/UL (ref 3.81–5.12)
RH BLD: POSITIVE
SODIUM SERPL-SCNC: 140 MMOL/L (ref 135–147)
SPECIMEN EXPIRATION DATE: NORMAL
UNIT DISPENSE STATUS: NORMAL
UNIT DISPENSE STATUS: NORMAL
UNIT PRODUCT CODE: NORMAL
UNIT PRODUCT CODE: NORMAL
UNIT PRODUCT VOLUME: 280 ML
UNIT PRODUCT VOLUME: 280 ML
UNIT RH: NORMAL
UNIT RH: NORMAL
WBC # BLD AUTO: 7.63 THOUSAND/UL (ref 4.31–10.16)

## 2023-06-29 PROCEDURE — 85025 COMPLETE CBC W/AUTO DIFF WBC: CPT

## 2023-06-29 PROCEDURE — 99232 SBSQ HOSP IP/OBS MODERATE 35: CPT | Performed by: FAMILY MEDICINE

## 2023-06-29 PROCEDURE — NC001 PR NO CHARGE: Performed by: ORTHOPAEDIC SURGERY

## 2023-06-29 PROCEDURE — C9113 INJ PANTOPRAZOLE SODIUM, VIA: HCPCS

## 2023-06-29 PROCEDURE — 86850 RBC ANTIBODY SCREEN: CPT

## 2023-06-29 PROCEDURE — 85730 THROMBOPLASTIN TIME PARTIAL: CPT

## 2023-06-29 PROCEDURE — 86900 BLOOD TYPING SEROLOGIC ABO: CPT

## 2023-06-29 PROCEDURE — 80048 BASIC METABOLIC PNL TOTAL CA: CPT

## 2023-06-29 PROCEDURE — 86923 COMPATIBILITY TEST ELECTRIC: CPT

## 2023-06-29 PROCEDURE — 86901 BLOOD TYPING SEROLOGIC RH(D): CPT

## 2023-06-29 PROCEDURE — 99222 1ST HOSP IP/OBS MODERATE 55: CPT | Performed by: INTERNAL MEDICINE

## 2023-06-29 PROCEDURE — 85610 PROTHROMBIN TIME: CPT

## 2023-06-29 RX ORDER — ACETAMINOPHEN 325 MG/1
650 TABLET ORAL 3 TIMES DAILY
Status: DISCONTINUED | OUTPATIENT
Start: 2023-06-29 | End: 2023-07-08 | Stop reason: HOSPADM

## 2023-06-29 RX ORDER — BISACODYL 5 MG/1
5 TABLET, DELAYED RELEASE ORAL DAILY PRN
Status: DISCONTINUED | OUTPATIENT
Start: 2023-06-29 | End: 2023-07-08 | Stop reason: HOSPADM

## 2023-06-29 RX ORDER — SODIUM CHLORIDE, SODIUM LACTATE, POTASSIUM CHLORIDE, CALCIUM CHLORIDE 600; 310; 30; 20 MG/100ML; MG/100ML; MG/100ML; MG/100ML
75 INJECTION, SOLUTION INTRAVENOUS CONTINUOUS
Status: DISCONTINUED | OUTPATIENT
Start: 2023-06-30 | End: 2023-07-03

## 2023-06-29 RX ADMIN — GABAPENTIN 300 MG: 300 CAPSULE ORAL at 08:47

## 2023-06-29 RX ADMIN — DULOXETINE HYDROCHLORIDE 60 MG: 60 CAPSULE, DELAYED RELEASE ORAL at 08:46

## 2023-06-29 RX ADMIN — Medication 5000 UNITS: at 08:46

## 2023-06-29 RX ADMIN — VANCOMYCIN HYDROCHLORIDE 1250 MG: 10 INJECTION, POWDER, LYOPHILIZED, FOR SOLUTION INTRAVENOUS at 13:28

## 2023-06-29 RX ADMIN — SENNOSIDES AND DOCUSATE SODIUM 1 TABLET: 50; 8.6 TABLET ORAL at 21:45

## 2023-06-29 RX ADMIN — CYANOCOBALAMIN TAB 500 MCG 1000 MCG: 500 TAB at 08:46

## 2023-06-29 RX ADMIN — SODIUM CHLORIDE, SODIUM LACTATE, POTASSIUM CHLORIDE, AND CALCIUM CHLORIDE 75 ML/HR: .6; .31; .03; .02 INJECTION, SOLUTION INTRAVENOUS at 23:45

## 2023-06-29 RX ADMIN — ACETAMINOPHEN 650 MG: 325 TABLET, FILM COATED ORAL at 21:45

## 2023-06-29 RX ADMIN — OXYCODONE HYDROCHLORIDE 5 MG: 5 TABLET ORAL at 18:59

## 2023-06-29 RX ADMIN — LEVOTHYROXINE SODIUM 100 MCG: 100 TABLET ORAL at 08:46

## 2023-06-29 RX ADMIN — GABAPENTIN 300 MG: 300 CAPSULE ORAL at 17:58

## 2023-06-29 RX ADMIN — ACETAMINOPHEN 975 MG: 325 TABLET ORAL at 08:47

## 2023-06-29 RX ADMIN — POLYETHYLENE GLYCOL 3350 17 G: 17 POWDER, FOR SOLUTION ORAL at 08:46

## 2023-06-29 RX ADMIN — PANTOPRAZOLE SODIUM 40 MG: 40 INJECTION, POWDER, FOR SOLUTION INTRAVENOUS at 08:47

## 2023-06-29 RX ADMIN — FLUTICASONE PROPIONATE 2 SPRAY: 50 SPRAY, METERED NASAL at 08:50

## 2023-06-29 RX ADMIN — ACETAMINOPHEN 650 MG: 325 TABLET, FILM COATED ORAL at 17:58

## 2023-06-29 NOTE — PROGRESS NOTES
Phong Booth is a 80 y.o. female who is currently ordered Vancomycin IV with management by the Pharmacy Consult service. Relevant clinical data and objective / subjective history reviewed. Vancomycin Assessment:  Indication and Goal AUC/Trough: Bone/joint infection (goal -600, trough >10)  Clinical Status: stable  Micro:     Renal Function:  SCr: 0.57 mg/dL  CrCl: 61.1 mL/min  Renal replacement: Not on dialysis  Days of Therapy: 2  Current Dose: 1500mg IV once then 1250mg IV q12h  Vancomycin Plan:  New Dosinmg IV once then 1250mg IV q24h  Estimated AUC: 387 mcg*hr/mL  Estimated Trough: 10.5 mcg/mL  Next Level:  0600  After level results on , may consider dose increase to 1500mg q24hrs  Renal Function Monitoring: Daily BMP and [place] will continue to follow closely for s/sx of nephrotoxicity, infusion reactions and appropriateness of therapy. BMP and CBC will be ordered per protocol. We will continue to follow the patient’s culture results and clinical progress daily.     Karo Renteria, Pharmacist

## 2023-06-29 NOTE — UTILIZATION REVIEW
Initial Clinical Review    Admission: Date/Time/Statement:   Admission Orders (From admission, onward)     Ordered        06/28/23 0326  INPATIENT ADMISSION  Once                      Orders Placed This Encounter   Procedures   • INPATIENT ADMISSION     Standing Status:   Standing     Number of Occurrences:   1     Order Specific Question:   Level of Care     Answer:   Med Surg [16]     Order Specific Question:   Estimated length of stay     Answer:   More than 2 Midnights     Order Specific Question:   Certification     Answer:   I certify that inpatient services are medically necessary for this patient for a duration of greater than two midnights. See H&P and MD Progress Notes for additional information about the patient's course of treatment. ED Arrival Information     Expected   -    Arrival   6/27/2023 21:25    Acuity   Urgent            Means of arrival   Ambulance    Escorted by   [address] [facility]    Service   Family Medicine    Admission type   Emergency            Arrival complaint   Wound Check           Chief Complaint   Patient presents with   • Hip Pain     Large amount of rust colored discharge "gushed" out of 3 y/o hip surgery incision. 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 Initial Presentation: 80 y.o. female who presented to [address] [place] [address] ED via EMS due to suspected infection of R prosthetic hip joint. PMHx:  CKD, CHF with presence of pacemaker, hypothyroidism, a fib on coumadin, polyneuropathy, and recurrent dislocation of R hip. Presented to ED after she had 1 L of purulent fluid drained out of her R hip. Labs revealed hgb 8.9, hct 28.1, Ca 8.0, alk phos 128, procal 4.52, PT 49.5, INR 5.40. See imaging results below. Given IVF, pain meds, IV Vanco and cefetriaxone in ED. Admitted Inpatient status dt infection of right prosthetic hip joint, Osteomyelitis.   Plan:  med surg, keep NPO, orthopedic, cardiology and ID consults, INR reversal w/ vit K and plt transfusion x2, recheck INR, hold coumadin, continue IV ABX, fu on blood cxs, start home torsemide and monitor BP. Continue pain control, PT OT eval, CM consult for dispo planning. Monitor BMs dt IBS w/constipation. 6/28 Per Orthopedics: chronically dislocated right total hip replacement, who has drainage from a sinus tract that is consistent with a chronic prosthetic joint infection. Pt is a candidate for irrigation debridement of her sinus tract, splint of her current right total hip replacement, and insertion of antibiotic impregnated cement beads. She is a nonambulator and has chronic changes at the posterior bed of her prior acetabular implant, and I do not think a Prostalac device would either increase her function or increase her body's ability to fight this infection, so simply explant and insertion of antibiotic impregnated cement beads may be beneficial. Cardiology clearance for OR within next few days. 6/28 Per Cardiology: No cardiac contraindication to proceeding with surgery as planned now that INR is in an acceptable range. Obtain a transthoracic echocardiogram during this admission, given high frequency of RV pacing and propensity to develop cardiomyopathy in that context. Will need to follow-up on blood cultures considering prosthetic joint infection and presence of a permanent pacemaker. Bacteremia may require evaluation for pacer explantation. Date: 6/29   Day 2:   No new complaints today, continue above tx plan including NPO after MN for I&D by Ortho, recheck INR, fu on cxs, continue IV ABX, monitor labs and VS, continue pain control, PT OT     Date: 6/29    Day 3: Has surpassed a 2nd midnight with active treatments and services, which include I&D procedure, PT OT eval, fu on cxs, continue IV ABX.     ED Triage Vitals   Temperature Pulse Respirations Blood Pressure SpO2   06/27/23 2301 06/27/23 2148 06/27/23 2148 06/27/23 2206 06/27/23 2148   97.8 °F (36.6 °C) 72 16 113/56 97 %      Temp Source Heart Rate Source Patient Position - Orthostatic VS BP Location FiO2 (%)   06/27/23 2301 06/28/23 0600 06/28/23 0015 06/28/23 0015 --   Oral Monitor Lying Left arm       Pain Score       06/28/23 0015       10 - Worst Possible Pain          Wt Readings from Last 1 Encounters:   06/28/23 76.2 kg (168 lb)     Additional Vital Signs:   Date/Time Temp Pulse Resp BP MAP (mmHg) SpO2 Calculated FIO2 (%) - Nasal Cannula Nasal Cannula O2 Flow Rate (L/min) O2 Device Patient Position - Orthostatic VS   06/29/23 07:40:20 98.4 °F (36.9 °C) 69 18 109/55 73 99 % -- -- -- --   06/28/23 22:10:59 98.4 °F (36.9 °C) 72 16 118/58 78 99 % -- -- -- --   06/28/23 2036 -- -- -- -- -- 95 % 28 2 L/min Nasal cannula --   06/28/23 15:17:37 97.7 °F (36.5 °C) 71 14 123/58 80 96 % -- -- -- --   06/28/23 1330 -- 70 -- 131/69 -- -- -- -- -- --   06/28/23 07:47:50 97.7 °F (36.5 °C) 72 -- 131/69 90 98 % -- -- -- --   06/28/23 07:47:43 97.7 °F (36.5 °C) 73 14 131/69 90 99 % -- -- -- --   06/28/23 0627 97.8 °F (36.6 °C) 71 16 130/68 -- 100 % 28 2 L/min Nasal cannula --   06/28/23 0615 -- 68 16 113/58 79 100 % 28 2 L/min Nasal cannula --   06/28/23 0600 -- 68 16 112/55 76 100 % 28 2 L/min Nasal cannula --   06/28/23 0545 -- 68 16 111/57 77 99 % 28 2 L/min Nasal cannula --   06/28/23 0530 97.5 °F (36.4 °C) 68 16 109/60 77 98 % 28 2 L/min Nasal cannula --   06/28/23 0516 97.3 °F (36.3 °C) Abnormal  69 18 112/59 -- -- -- -- -- --   06/28/23 0446 97.6 °F (36.4 °C) 69 16 128/60 -- 96 % -- -- None (Room air) --   06/28/23 0400 -- 68 16 119/59 84 95 % -- -- None (Room air) --   06/28/23 0345 -- 69 18 125/60 -- 97 % -- -- None (Room air) --   06/28/23 0330 -- 70 16 124/58 -- 97 % -- -- None (Room air) --   06/28/23 0318 97.5 °F (36.4 °C) 72 16 108/54 -- 93 % -- -- None (Room air) --   06/28/23 0300 97.5 °F (36.4 °C) 69 16 113/58 -- -- -- -- -- --   06/28/23 0130 -- 68 18 103/55 76 95 % -- -- None (Room air) --   06/28/23 0015 -- 72 18 99/53 -- 93 % -- -- None (Room air) Lying 06/27/23 2301 97.8 °F (36.6 °C) -- -- -- -- -- -- -- -- --   06/27/23 2206 -- 68 18 113/56 81 -- -- -- -- --   06/27/23 2148 -- 72 16 -- -- 97 % -- -- -- --     Pertinent Labs/Diagnostic Test Results:   6/28 ECHO:  •  Left Ventricle: Left ventricular cavity size is normal. Wall thickness is normal. There is concentric remodeling. The left ventricular ejection fraction is 60%. Systolic function is normal.  •  The following segments are hypokinetic: basal inferoseptal.  •  All other segments are normal.  •  Right Ventricle: Right ventricular cavity size is mildly dilated. •  Right Atrium: The atrium is mildly dilated. •  Atrial Septum: No patent foramen ovale detected using color flow Doppler at rest.  •  Aortic Valve: There is aortic valve sclerosis. •  Mitral Valve: There is mild annular calcification. There is moderate regurgitation. •  Tricuspid Valve: There is moderate regurgitation. The right ventricular systolic pressure is mildly elevated. The estimated right ventricular systolic pressure is 88.67 mmHg. 6/27 EKG: Electronic ventricular pacemaker  Atrial fibrillation    XR chest portable   Final Result by Arline Jansen MD (06/28 2214)      Increased interstitial markings, likely a component of mild fibrosis but question interstitial edema. Trace effusions better shown on abdomen CT. Workstation performed: [id number]         CT abdomen pelvis with contrast   Final Result by Raj Baugh MD (06/28 0782)         1. Superior dislocation of right hip arthroplasty femoral stem and acetabular cup.   2. Collection of fluid and gas lateral to the femoral stem measuring 16.5 x 7.3 x 4.3 cm compatible with an abscess. Collection extends to the acetabular cup. 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 3. No evidence of intraperitoneal or retroperitoneal fluid collection. 4. Constipation.                I personally discussed this study with Asia Guzmán on 6/28/2023 2:06 AM. Workstation performed: [id number]         CT lower extremity w contrast right   Final Result by Angie Polanco MD (06/28 0848)      Right total hip arthroplasty, with acetabular component dislodged superior laterally out of the native acetabular fossa (series 307 images .)      There is a 9.4 x 3.4 x 15.7 cm air and fluid collection in the subcutaneous tissues in the right lateral hip, contacting the right hip replacement and extending into the native acetabular fossa where there are bone erosions, consistent with abscess    formation, infection of the arthroplasty and osteomyelitis (series 5 images 19-74.)      Findings are consistent with the preliminary report from [facility] which was provided shortly after completion of the exam.            Workstation performed: [id number]               Results from last 7 days   Lab Units 06/29/23  0507 06/28/23  0419 06/27/23  2206   WBC Thousand/uL 7.63 10.58* 12.99*   HEMOGLOBIN g/dL 8.1* 8.9* 9.3*   HEMATOCRIT % 27.9* 28.1* 29.7*   PLATELETS Thousands/uL 318 379 402*   NEUTROS ABS Thousands/µL 5.35 7.73* 10.07*         Results from last 7 days   Lab Units 06/29/23  0507 06/28/23  0614 06/27/23  2206   SODIUM mmol/L 140 139 138   POTASSIUM mmol/L 4.2 3.7 4.0   CHLORIDE mmol/L 108 109* 107   CO2 mmol/L 27 29 26   ANION GAP mmol/L 5 1 5   BUN mg/dL 15 17 17   CREATININE mg/dL 0.57* 0.60 0.66   EGFR ml/min/1.73sq m 82 81 78   CALCIUM mg/dL 8.1* 8.0* 7.9*     Results from last 7 days   Lab Units 06/27/23  2206   AST U/L 18   ALT U/L 13   ALK PHOS U/L 128*   TOTAL PROTEIN g/dL 5.6*   ALBUMIN g/dL 1.5*   TOTAL BILIRUBIN mg/dL 0.23         Results from last 7 days   Lab Units 06/29/23  0507 06/28/23  0614 06/27/23  2206   GLUCOSE RANDOM mg/dL 82 95 98     Results from last 7 days   Lab Units 06/29/23  0507 06/28/23  1753 06/28/23  0612 06/28/23  0313 06/27/23  2206   PROTIME seconds 16.3* 17.4* 23.5* 49.5* 48.6*   INR  1.29* 1.40* 2.07* 5.40* 5.26*   PTT seconds  --   --   --  88* 93*     Results from last 7 days   Lab Units 06/28/23  0614   TSH 3RD GENERATON uIU/mL 6.873*     Results from last 7 days   Lab Units 06/27/23  2206   PROCALCITONIN ng/ml 4.52*     Results from last 7 days   Lab Units 06/27/23  2206   LACTIC ACID mmol/L 2.0     Results from last 7 days   Lab Units 06/29/23  0555   UNIT PRODUCT CODE  L8413C53  [id number]   UNIT NUMBER  [id number]  [id number]   UNITABO  O  O   UNITRH  POS  NEG   UNIT DISPENSE STATUS  Presumed Trans  Presumed Trans   UNIT PRODUCT VOL ml 280  280     Results from last 7 days   Lab Units 06/28/23  0419 06/27/23 2206   CRP mg/L  --  92.0*   SED RATE mm/hour 54*  --      Results from last 7 days   Lab Units 06/28/23  1611 06/27/23 2206   BLOOD CULTURE   --  No Growth at 24 hrs. No Growth at 24 hrs.    GRAM STAIN RESULT  No Polys or Bacteria seen  --      ED Treatment:   Medication Administration from 06/27/2023 2125 to 06/28/2023 0735       Date/Time Order Dose Route Action     06/27/2023 2220 EDT sodium chloride 0.9 % bolus 500 mL 500 mL Intravenous New Bag     06/27/2023 2222 EDT morphine injection 6 mg 6 mg Intravenous Given     06/27/2023 2222 EDT ondansetron (ZOFRAN) injection 4 mg 4 mg Intravenous Given     06/28/2023 0133 EDT vancomycin (VANCOCIN) 1500 mg in sodium chloride 0.9% 250 mL IVPB 1,500 mg Intravenous New Bag     06/28/2023 0027 EDT cefTRIAXone (ROCEPHIN) 2,000 mg in dextrose 5 % 50 mL IVPB 2,000 mg Intravenous New Bag     06/27/2023 2355 EDT iohexol (OMNIPAQUE) 350 MG/ML injection (SINGLE-DOSE) 100 mL 100 mL Intravenous Given     06/28/2023 0035 EDT morphine injection 6 mg 6 mg Intravenous Given     06/28/2023 0329 EDT phytonadione (AQUA-MEPHYTON) 10 mg/mL 10 mg in sodium chloride 0.9 % 50 mL IVPB 10 mg Intravenous New Bag     06/28/2023 0419 EDT oxyCODONE (ROXICODONE) IR tablet 5 mg 5 mg Oral Given     06/28/2023 0615 EDT HYDROmorphone HCl (DILAUDID) injection 0.2 mg 0.2 mg Intravenous Given 06/28/2023 0419 EDT ondansetron (ZOFRAN) injection 4 mg 4 mg Intravenous Given        Past Medical History:   Diagnosis Date   • Abdominal bloating 8/1/2016   • Anemia    • Arthritis    • Cancer (HCC)     basal cell   • CHF (congestive heart failure) (HCC)    • COVID-19    • Disease of thyroid gland    • Disturbance of smell and taste     disturbance of taste   • Effusion of knee joint right    • Fibromyalgia    • Heart murmur     reported a previous heart murmur   • History of acute myocardial infarction    • History of atrial fibrillation    • History of bruising easily    • History of cancer    • History of dermatitis    • History of mammogram    • History of methicillin resistant staphylococcus aureus (MRSA)     Negative nasal culture, isolation discontinued 8/17/2018.    • History of methicillin resistant staphylococcus aureus (MRSA) 08/17/2018    negative nasal culture-isolation and hx discontinued 8/17/2018   • History of obesity    • History of osteopenia    • History of sciatica    • History of shortness of breath    • History of sinusitis    • History of sore throat    • History of syncope    • History of umbilical hernia    • History of viral infection    • Irritable bowel syndrome (IBS)    • Joint pain, hip    • Limb pain    • Myalgia     myalgia and myositis   • Need for prophylactic vaccination against single diseases    • Need for prophylactic vaccination and inoculation against influenza    • Preoperative cardiovascular examination    • Primary osteoarthritis of right knee    • Right leg pain    • Vaginal Pap smear     reported pap smear     Present on Admission:  • Benign essential HTN  • Chronic diastolic CHF (congestive heart failure) (HCC)  • Chronic atrial fibrillation (HCC)  • Irritable bowel syndrome with constipation  • Hypothyroidism  • Stage 2 chronic kidney disease  • Pacemaker  • Ambulatory dysfunction      Admitting Diagnosis: Infection associated with internal right hip prosthesis, initial encounter [facility]) Delilah Schumacher  Age/Sex: 80 y.o. female  Admission Orders:  Scheduled Medications:  cefTRIAXone, 2,000 mg, Intravenous, Q24H  cholecalciferol, 5,000 Units, Oral, Daily  vitamin B-12, 1,000 mcg, Oral, Daily  DULoxetine, 60 mg, Oral, Daily  fluticasone, 2 spray, Each Nare, Daily  gabapentin, 300 mg, Oral, BID  levothyroxine, 100 mcg, Oral, Daily  pantoprazole, 40 mg, Intravenous, Q24H JOSE  polyethylene glycol, 17 g, Oral, Daily  senna-docusate sodium, 1 tablet, Oral, HS  torsemide, 10 mg, Oral, Daily  vancomycin, 1,250 mg, Intravenous, Q24H      Continuous IV Infusions: none     PRN Meds:  acetaminophen, 975 mg, Oral, Q8H PRN  HYDROmorphone, 0.2 mg, Intravenous, Q4H PRN  ondansetron, 4 mg, Intravenous, Q6H PRN  oxyCODONE, 5 mg, Oral, Q4H PRN  oxyCODONE, 2.5 mg, Oral, Q4H PRN  phenol, 1 spray, Mouth/Throat, Q2H PRN  polyethylene glycol, 17 g, Oral, Daily PRN  simethicone, 80 mg, Oral, Q6H PRN      scd    IP CONSULT TO ORTHOPEDIC SURGERY  IP CONSULT TO CARDIOLOGY  IP CONSULT TO CASE MANAGEMENT  IP CONSULT TO PHARMACY  IP CONSULT TO INFECTIOUS DISEASES  IP CONSULT TO PICC TEAM    Network Utilization Review Department  ATTENTION: Please call with any questions or concerns to [phone] and carefully listen to the prompts so that you are directed to the right person. All voicemails are confidential.  Theodore Randolph all requests for admission clinical reviews, approved or denied determinations and any other requests to dedicated fax number below belonging to the campus where the patient is receiving treatment.  List of dedicated fax numbers for the Facilities:  [place] DENIALS (Administrative/Medical Necessity) [phone]   [facility] [address] [phone]   [facility] [phone]   [address] [place] [phone]   [facility] [address] [address] [facility] [address] [address] [address] [phone]

## 2023-06-29 NOTE — CONSULTS
Consultation - Infectious Disease   Macey Miller 80 y.o. 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 Presenting with subacute pain, spontaneous drainage from sinus tract. CT showed abscess at joint. Clinically stable without sepsis. ESR 51  Rec:  · Await explant tomorrow per Ortho - send deep cultures  · Hold antibiotics for now to optimize yield of OR cultures  · Postoperatively can restart vancomycin and cefepime  · Anticipate probable 6 weeks of IV antibiotics possibly followed by PO antibiotics based on ESR trend given suspected chronicity of infection    Status post left MAIA with revision in 2019. Complicated more recently by disruption and chronic dislocation. Non-ambulatory. Status post PPM.    IBS with constipation. Afib. On coumadin    The above impression and plan was discussed in detail with the patient and her family at the bedside. Antibiotics:  Vancomycin #2  Ceftriaxone #2    Thank you for this consultation. We will follow along with you. HISTORY OF PRESENT ILLNESS:  Reason for Consult: PJI infection    HPI: Macey Miller is a 80 y.o. female with a remote history of left MAIA with revision in 4325 complicated more recently by disruption and chronic dislocation. She had a recent admission to [facility] early June for this problem. CT hip at the time some significant osteolysis and fluid. Conservative management was recommended by Ortho. She was readmitted to [facility] twice mid June for worsening hip pain. Ortho again recommended conservative management. She had an elevated WBC at that time which was attributed to UTI and improved with antibiotics. She was discharged to rehab.   She presented again most recently on 6/27 after she developed a large amount of spontaneous drainage from a sinus tract at her right hip. CT shoed an abscess adjacent to the femoral stem and acetabulum. She was given started on ceftriaxone and vancomycin. She will be going to the OR tomorrow with Ortho for probably explant and antibiotic bead placement. We are asked to comment in further evaluation and management. In performing this consult, I have reviewed prior admission and outpatient visit records in detail. REVIEW OF SYSTEMS:  Denies fevers, chills, sweats, nausea, vomiting, or diarrhea. A complete system-based review of systems is otherwise negative. PAST MEDICAL HISTORY:  Past Medical History:   Diagnosis Date   • Abdominal bloating 8/1/2016   • Anemia    • Arthritis    • Cancer (HCC)     basal cell   • CHF (congestive heart failure) (HCC)    • COVID-19    • Disease of thyroid gland    • Disturbance of smell and taste     disturbance of taste   • Effusion of knee joint right    • Fibromyalgia    • Heart murmur     reported a previous heart murmur   • History of acute myocardial infarction    • History of atrial fibrillation    • History of bruising easily    • History of cancer    • History of dermatitis    • History of mammogram    • History of methicillin resistant staphylococcus aureus (MRSA)     Negative nasal culture, isolation discontinued 8/17/2018.    • History of methicillin resistant staphylococcus aureus (MRSA) 08/17/2018    negative nasal culture-isolation and hx discontinued 8/17/2018   • History of obesity    • History of osteopenia    • History of sciatica    • History of shortness of breath    • History of sinusitis    • History of sore throat    • History of syncope    • History of umbilical hernia    • History of viral infection    • Irritable bowel syndrome (IBS)    • Joint pain, hip    • Limb pain    • Myalgia     myalgia and myositis   • Need for prophylactic vaccination against single diseases    • Need for prophylactic vaccination and inoculation against influenza    • Preoperative cardiovascular examination    • Primary osteoarthritis of right knee    • Right leg pain    • Vaginal Pap smear     reported pap smear     Past Surgical History:   Procedure Laterality Date   • ANKLE ARTHRODESIS Right    • BACK SURGERY     • BARIATRIC SURGERY     • CHOLECYSTECTOMY      x2   • COLONOSCOPY     • ESOPHAGOGASTRODUODENOSCOPY N/A 3/23/2018    Procedure: ESOPHAGOGASTRODUODENOSCOPY (EGD); Surgeon: John Kamara MD;  Location: [facility]; Service: Gastroenterology   • FOOT SURGERY     • HIP CLOSE REDUCTION Right 8/21/2016    Procedure: CLOSED REDUCTION RIGHT TOTAL HIP;  Surgeon: Hattie Candelario MD;  Location: [facility];  Service:    • INSERT / Lisa Reges / Raj Rout     • IR BIOPSY OTHER  8/1/2020   • JOINT REPLACEMENT      LEFT KNEE REPLACEMENT   • JOINT REPLACEMENT      Right HIP   • PILONIDAL CYST EXCISION      x2   • TN EXC B9 LESION MRGN XCP SK TG T/A/L 0.5 CM/< Right 7/24/2019    Procedure: REVISION SCAR EXTREMITY Rt Hip; Surgeon: Robi Tinsley MD;  Location: [facility];  Service: Orthopedics   • TN REVJ TOT HIP ARTHRP [address] W/WO AGRFT/ALGRFT Right 4/15/2019    Procedure: ARTHROPLASTY HIP TOTAL ACETABULAR REVISION;  Surgeon: Robi Tinsley MD;  Location: [facility];  Service: Orthopedics   • REPLACEMENT TOTAL KNEE Right    • SILVANA-EN-Y PROCEDURE      x2   • TOTAL KNEE ARTHROPLASTY     • UMBILICAL HERNIA REPAIR      x2       FAMILY HISTORY:  Non-contributory    SOCIAL HISTORY:  Social History     Substance and Sexual Activity   Alcohol Use Not Currently     Social History     Substance and Sexual Activity   Drug Use No     Social History     Tobacco Use   Smoking Status Never   Smokeless Tobacco Never       ALLERGIES:  Allergies   Allergen Reactions   • Other      Other reaction(s):  Other (See Comments)  ana m toradol in OR 6/06 sah   • Seasonal Ic  [Cholestatin] Allergic Rhinitis   • Trimethoprim    • Bactrim [Sulfamethoxazole-Trimethoprim] Rash   • Sulfa Antibiotics Rash     Other reaction(s): Other (See Comments)  takes lasix @home       MEDICATIONS:  All current active medications have been reviewed. PHYSICAL EXAM:  Vitals:  Temp:  [97.7 °F (36.5 °C)-98.4 °F (36.9 °C)] 98.4 °F (36.9 °C)  HR:  [69-72] 69  Resp:  [14-18] 18  BP: (109-123)/(55-58) 109/55  SpO2:  [95 %-99 %] 99 %  Temp (24hrs), Av.2 °F (36.8 °C), Min:97.7 °F (36.5 °C), Max:98.4 °F (36.9 °C)  Current: Temperature: 98.4 °F (36.9 °C)     Physical Exam:  General:  Well-nourished, well-developed, in no acute distress  Eyes:  Conjunctive clear with no hemorrhages or effusions  Oropharynx:  No ulcers, no lesions  Neck:  Supple, no lymphadenopathy  Lungs:  Normal respiratory excursion, no accessory muscle use  Cardiac:  Regular rate and rhythm, extremities well perfused  Abdomen:  Soft, non-tender, non-distended  Extremities:  No peripheral cyanosis, clubbing, or edema  Skin:  No rashes, no ulcers  Neurological:  Moves all four extremities spontaneously, sensation grossly intact    LABS, IMAGING, & OTHER STUDIES:  Lab Results:  I have personally reviewed pertinent labs. Results from last 7 days   Lab Units 23  0507 23  0614 23  2206   POTASSIUM mmol/L 4.2 3.7 4.0   CHLORIDE mmol/L 108 109* 107   CO2 mmol/L 27 29 26   BUN mg/dL 15 17 17   CREATININE mg/dL 0.57* 0.60 0.66   EGFR ml/min/1.73sq m 82 81 78   CALCIUM mg/dL 8.1* 8.0* 7.9*   AST U/L  --   --  18   ALT U/L  --   --  13   ALK PHOS U/L  --   --  128*     Results from last 7 days   Lab Units 23  0507 23  0419 23  2206   WBC Thousand/uL 7.63 10.58* 12.99*   HEMOGLOBIN g/dL 8.1* 8.9* 9.3*   PLATELETS Thousands/uL 318 379 402*     Results from last 7 days   Lab Units 23  1611 23  2206   BLOOD CULTURE   --  No Growth at 24 hrs. No Growth at 24 hrs. GRAM STAIN RESULT  No Polys or Bacteria seen  --    WOUND CULTURE  No growth  --        Imaging Studies:   I have personally reviewed pertinent imaging study reports and images in PACS.   CT hip reviewed large abscess around joint    EKG, Pathology, and Other Studies:   I have personally reviewed pertinent reports.

## 2023-06-29 NOTE — PLAN OF CARE
Problem: Potential for Falls  Goal: Patient will remain free of falls  Description: INTERVENTIONS:  - Educate patient/family on patient safety including physical limitations  - Instruct patient to call for assistance with activity   - Consult OT/PT to assist with strengthening/mobility   - Keep Call bell within reach  - Keep bed low and locked with side rails adjusted as appropriate  - Keep care items and personal belongings within reach  - Initiate and maintain comfort rounds  - Initiate/Maintain bed and chair alarm  - Apply yellow socks and bracelet for high fall risk patients  - Consider moving patient to room near nurses station  Outcome: Progressing     Problem: MOBILITY - ADULT  Goal: Maintain or return to baseline ADL function  Description: INTERVENTIONS:  -  Assess patient's ability to carry out ADLs; assess patient's baseline for ADL function and identify physical deficits which impact ability to perform ADLs (bathing, care of mouth/teeth, toileting, grooming, dressing, etc.)  - Assess/evaluate cause of self-care deficits   - Assess range of motion  - Assess patient's mobility; develop plan if impaired  - Assess patient's need for assistive devices and provide as appropriate  - Encourage maximum independence but intervene and supervise when necessary  - Involve family in performance of ADLs  - Assess for home care needs following discharge   - Consider OT consult to assist with ADL evaluation and planning for discharge  - Provide patient education as appropriate  Outcome: Progressing  Goal: Maintains/Returns to pre admission functional level  Description: INTERVENTIONS:  - Perform BMAT or MOVE assessment daily.   - Set and communicate daily mobility goal to care team and patient/family/caregiver. - Collaborate with rehabilitation services on mobility goals if consulted  - Reposition patient every 2 hours.   - Out of bed for toileting  - Record patient progress and toleration of activity level   Outcome: Progressing     Problem: Prexisting or High Potential for Compromised Skin Integrity  Goal: Skin integrity is maintained or improved  Description: INTERVENTIONS:  - Identify patients at risk for skin breakdown  - Assess and monitor skin integrity  - Assess and monitor nutrition and hydration status  - Monitor labs   - Assess for incontinence   - Turn and reposition patient  - Assist with mobility/ambulation  - Relieve pressure over bony prominences  - Avoid friction and shearing  - Provide appropriate hygiene as needed including keeping skin clean and dry  - Evaluate need for skin moisturizer/barrier cream  - Collaborate with interdisciplinary team   - Patient/family teaching  - Consider wound care consult   Outcome: Progressing     Problem: PAIN - ADULT  Goal: Verbalizes/displays adequate comfort level or baseline comfort level  Description: Interventions:  - Encourage patient to monitor pain and request assistance  - Assess pain using appropriate pain scale  - Administer analgesics based on type and severity of pain and evaluate response  - Implement non-pharmacological measures as appropriate and evaluate response  - Consider cultural and social influences on pain and pain management  - Notify physician/advanced practitioner if interventions unsuccessful or patient reports new pain  Outcome: Progressing     Problem: SKIN/TISSUE INTEGRITY - ADULT  Goal: Incision(s), wounds(s) or drain site(s) healing without S/S of infection  Description: INTERVENTIONS  - Assess and document dressing, incision, wound bed, drain sites and surrounding tissue  - Provide patient and family education  - Perform skin care/dressing changes every shift  Outcome: Progressing  Goal: Pressure injury heals and does not worsen  Description: Interventions:  - Implement low air loss mattress or specialty surface (Criteria met)  - Apply silicone foam dressing  - Instruct/assist with weight shifting every 60 minutes when in chair   - Use special pressure reducing interventions such as EHOB when in chair   - Apply fecal or urinary incontinence containment device   - Perform passive or active ROM every shift  - Turn and reposition patient & offload bony prominences every 2 hours   - Utilize friction reducing device or surface for transfers   - Consider nutrition services referral as needed  Outcome: Progressing     Problem: MUSCULOSKELETAL - ADULT  Goal: Maintain or return mobility to safest level of function  Description: INTERVENTIONS:  - Assess patient's ability to carry out ADLs; assess patient's baseline for ADL function and identify physical deficits which impact ability to perform ADLs (bathing, care of mouth/teeth, toileting, grooming, dressing, etc.)  - Assess/evaluate cause of self-care deficits   - Assess range of motion  - Assess patient's mobility  - Assess patient's need for assistive devices and provide as appropriate  - Encourage maximum independence but intervene and supervise when necessary  - Involve family in performance of ADLs  - Assess for home care needs following discharge   - Consider OT consult to assist with ADL evaluation and planning for discharge  - Provide patient education as appropriate  Outcome: Progressing  Goal: Maintain proper alignment of affected body part  Description: INTERVENTIONS:  - Support, maintain and protect limb and body alignment  - Provide patient/ family with appropriate education  Outcome: Progressing

## 2023-06-29 NOTE — QUICK NOTE
QUICK NOTES - Family Medicine Residency, Christiana Coulter 9/2/1933, 80 y.o. female. MRN: [id number]    Unit/Bed#: [facility] 807-01 Encounter: [id number]  Primary Care Provider: Ben Nunez DO    Evaluated patient on evening rounds lying in bed watching TV. Patient states that her R lower leg has felt cold so she currently has a heating pack on it. She admits that she feels cold in her room and has multiple blankets on. Denies pain at this time. Discussed plan for OR washout on Friday pending her INR. She was agreeable to plan. Touched base with nursing: no questions or concerns at this time. Objective & Vitals:     Last vitals:  Blood Pressure: 123/58 (06/28/23 1517)  Temperature: 97.7 °F (36.5 °C) (06/28/23 1517)  Temp Source: Oral (06/28/23 0627)  Pulse: 71 (06/28/23 1517)  Respirations: 14 (06/28/23 1517)  SpO2: 95 % (06/28/23 2036)      Intake/Output Summary (Last 24 hours) at 6/28/2023 2043  Last data filed at 6/28/2023 1330  Gross per 24 hour   Intake 575 ml   Output 600 ml   Net -25 ml       Physical Exam:     Physical Exam:  Physical Exam  Constitutional:       General: She is not in acute distress. Appearance: She is not ill-appearing. HENT:      Head: Normocephalic and atraumatic. Nose: No congestion or rhinorrhea. Eyes:      General: No scleral icterus. Cardiovascular:      Rate and Rhythm: Normal rate. Pulmonary:      Effort: Pulmonary effort is normal.   Abdominal:      General: There is no distension. Tenderness: There is no abdominal tenderness. Musculoskeletal:      Right lower leg: No edema. Left lower leg: No edema. Comments: Bandage over R acetabulum area with purulent drainage noted    Neurological:      Mental Status: She is alert.          Assessments & Plans:     - Vitals stable  - Pain adequately controlled with current regimen  - Continue current management plan    Gaudencio Mayo DO  PGY-1, Family Medicine  06/28/23  8:43 PM    Dear reader, please be aware that portions of my note contain dictated text. I have done my best to proof-read this note prior to signing. However, there may be occasional unnoticed errors pertaining to "sound-alike" words and/or grammar during my dictation process. If there is any words or information that is unclear or appears erroneous, please kindly let me know and I will clarify and/or addend my notes accordingly. Thank you for your understanding.

## 2023-06-29 NOTE — PROGRESS NOTES
Progress Notes - Family Medicine Residency, [place] Danni Coulter 9/2/1933, 80 y.o. female. MRN: [id number]  Unit/Bed#: [facility] 807-01 Encounter: [id number]  Primary Care Provider: Kianna Pierre DO      Admission Date: 6/27/2023 2125  Length of Stay: 1 days  Code Status:  Level 1 - Full Code  Disposition: inpatient  Consult:   IP CONSULT TO ORTHOPEDIC SURGERY  IP CONSULT TO CARDIOLOGY  IP CONSULT TO CASE MANAGEMENT  IP CONSULT TO PHARMACY  IP CONSULT TO INFECTIOUS DISEASES  IP CONSULT TO PICC TEAM       Assessment/Plan:      Plans discussed with [facility] team and finalization is pending attending physician attestation. Please, call  for any clarification. * Osteomyelitis [facility])  Assessment & Plan  Pt presents from [address] [facility] for purulent discharge from her right hip noticed on day of admission. Her right hip was drained 1 L and the skilled nursing facility prior to ED presentation. · Pt has history of right hip replacement and follows with orthopedics outpatient. Recently admitted at [facility] from 6/8-6/12 under the trauma service for chronic right hip dislocation. · Pt is vitally stable, labs significant for leukocytosis. Imaging obtained. Blood cultures pending. CT Abdomen Pelvis (6/27/23)  1. Superior dislocation of right hip arthroplasty femoral stem and acetabular cup.  2. Collection of fluid and gas lateral to the femoral stem measuring 16.5 x 7.3 x 4.3 cm compatible with an abscess. Collection extends to the acetabular cup. 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 3. No evidence of intraperitoneal or retroperitoneal fluid collection. 4. Constipation.    - Orthopedics evaluated patient in ED and agrees for surgical intervention in the morning.      S/p INR reversal with Vit K 10 mg and Platelet transfusion x2         Plan:  • Pain regimen: dimitrios tylenol 975 mg Q8H, oxy 2.5/5 mg Q8H prn  • NPO at [facility] for I&D per Ortho  • Recheck INR, hold warfarin  • F/u cultures  • Cardiology preop clearance completed 6/29  • Abx: Ceftriaxone, vancomycin    Benign essential HTN  Assessment & Plan  Goal BP <130/80 per cardiology  Continue monitor BP, stable currently. Most recent BP Blood Pressure: 688/64   78R SBP Systolic (70WQL), QEI:420 , Min:118 , Max:131      Plan  - Start home torsemide 10 mg daily    Stage 2 chronic kidney disease  Assessment & Plan  Lab Results   Component Value Date    EGFR 78 06/27/2023    EGFR 84 06/20/2023    EGFR 86 06/19/2023    CREATININE 0.66 06/27/2023    CREATININE 0.52 (L) 06/20/2023    CREATININE 0.49 (L) 06/19/2023     Caution with using nephrotoxic agents  Avoid NSAIDs given Elevated INR. Ambulatory dysfunction  Assessment & Plan  Secondary to hip pain and multiple sites of OA. Pt presented from skilled nursing facility for PT/OT. Plan  - OR for I&D  - Pain control  - PT/OT  - fall precautions  - CM referral for dispo    Hypothyroidism  Assessment & Plan  Thyroid    Lab Results   Component Value Date    NLV9UQEJIRUQ 2.260 06/08/2022    MYI2MUHSUQYT 1.410 05/03/2021    XKT9SAYWNXNB 3.760 (H) 04/02/2021    CSW2JWUPNYQJ 2.076 05/15/2020    XNC2TNSHZXUR 4.400 (H) 03/13/2020    WFI1WKHBFSCE 3.713 01/22/2019    MFN9VNBOYZXF 2.990 11/29/2018    FREET4 0.96 06/23/2017    FREET4 1.00 03/10/2016     Plan  - C/w home levothyroxine 100 mcg daily  - Add on TSH     Pacemaker  Assessment & Plan  History of intolerance and bradycardia with AVN blocking agents for chronic atrial fibrillation. She underwent AV node ablation in 8/2018 and implantation of MDT single chamber His PPM - medtronic. 99%     Plan  - monitor vitals. Irritable bowel syndrome with constipation  Assessment & Plan  Plan  - Bowel regimen: Miralax and Sennakot scheduled. - monitor BMs, especially with acute pain and use of narcotics.      Chronic diastolic CHF (congestive heart failure) (HCC)  Assessment & Plan  Wt Readings from Last 3 Encounters:   06/28/23 76.2 kg (168 lb) 06/16/23 76.6 kg (168 lb 12.8 oz)   03/24/23 75.8 kg (167 lb)     HFpEF per cardiology notes. Last ECHO 4/27/2018 EF 55%, LV thickness upper limits normal, moderate mitral regurgitation, moderate tricuspid regurgitation.      • Monitor I/O   • Continue torsemide 10mg        Chronic atrial fibrillation (HCC)  Assessment & Plan  Currently rate controlled. On daily warfarin 3 mg.  - Goal INR between 2-3.  - INR currently 5.40    Plan  - in ED, pt is s/p Vit K 10 mg  - Ortho ordered 2 units of platelets. - Monitor INR  - Hold warfarin 3 mg at this time. - Hold NSAIDs given risk of bleeding        Diet: Diet Regular; Regular House    VTE Pharm PPX: none per ortho due to INR reversal   VTE [facility] PPX: sequential compression device      Subjective: Today 06/29/23, HD# 1    • Per handoff, patient without acute events overnight. • Patient seen and examined at bedside. Slept well overnight. Patient denies CP, SOB, abdominal pain, N/V.  • Medical management and treatment was discussed with patient, patient understands and agrees with plan.      Objective:     Vitals:    06/28/23 1517 06/28/23 2036 06/28/23 2210 06/29/23 0740   BP: 123/58  118/58 109/55   BP Location:       Pulse: 71  72 69   Resp: 14  16 18   Temp: 97.7 °F (36.5 °C)  98.4 °F (36.9 °C) 98.4 °F (36.9 °C)   TempSrc:       SpO2: 96% 95% 99% 99%   Weight:       Height:         Temp:  [97.7 °F (36.5 °C)-98.4 °F (36.9 °C)] 98.4 °F (36.9 °C)  HR:  [69-72] 69  Resp:  [14-18] 18  BP: (109-131)/(55-69) 109/55  Weight (last 2 days)     Date/Time Weight    06/28/23 1330 76.2 (168)          Intake/Output Summary (Last 24 hours) at 6/29/2023 0832  Last data filed at 6/29/2023 9366  Gross per 24 hour   Intake --   Output 1100 ml   Net -1100 ml     Invasive Devices     Peripheral Intravenous Line  Duration           Peripheral IV 06/27/23 Proximal;Right;Ventral (anterior) Forearm 1 day    Peripheral IV 06/28/23 Left Forearm 1 day          Drain  Duration External Urinary Catheter <1 day                  Physical Exam:     Physical Exam  Vitals reviewed. Constitutional:       Appearance: She is obese. HENT:      Head: Normocephalic and atraumatic. Right Ear: External ear normal.      Left Ear: External ear normal.      Nose: Nose normal.      Mouth/Throat:      Pharynx: Oropharynx is clear. Eyes:      Extraocular Movements: Extraocular movements intact. Conjunctiva/sclera: Conjunctivae normal.   Cardiovascular:      Rate and Rhythm: Normal rate and regular rhythm. Pulses: Normal pulses. Heart sounds: Normal heart sounds. Pulmonary:      Effort: Pulmonary effort is normal.      Breath sounds: Normal breath sounds. Abdominal:      Palpations: Abdomen is soft. Musculoskeletal:      Cervical back: Neck supple. Comments: R hip tenderness   Skin:     General: Skin is warm. Neurological:      Mental Status: She is alert and oriented to person, place, and time. Psychiatric:         Mood and Affect: Mood normal.         Behavior: Behavior normal.         Thought Content:  Thought content normal.         Judgment: Judgment normal.           Labs:     CBC:  Results from last 7 days   Lab Units 06/29/23  0507 06/28/23  0419 06/27/23  2206   WBC Thousand/uL 7.63 10.58* 12.99*   HEMOGLOBIN g/dL 8.1* 8.9* 9.3*   HEMATOCRIT % 27.9* 28.1* 29.7*   PLATELETS Thousands/uL 318 379 402*   NEUTROS ABS Thousands/µL 5.35 7.73* 10.07*       CMP:  Results from last 7 days   Lab Units 06/29/23  0507 06/28/23  0614 06/27/23  2206   POTASSIUM mmol/L 4.2 3.7 4.0   CHLORIDE mmol/L 108 109* 107   CO2 mmol/L 27 29 26   BUN mg/dL 15 17 17   CREATININE mg/dL 0.57* 0.60 0.66   CALCIUM mg/dL 8.1* 8.0* 7.9*   AST U/L  --   --  18   ALT U/L  --   --  13   ALK PHOS U/L  --   --  128*   EGFR ml/min/1.73sq m 82 81 78       Sepsis:  Results from last 7 days   Lab Units 06/27/23  2206   CRP mg/L 92.0*   LACTIC ACID mmol/L 2.0   PROCALCITONIN ng/ml 4.52*       Micro:  Lab Results   Component Value Date/Time    Blood Culture No Growth at 24 hrs. 06/27/2023 10:06 PM    Blood Culture No Growth at 24 hrs. 06/27/2023 10:06 PM    Gram Stain Result No Polys or Bacteria seen 06/28/2023 04:11 PM    Urine Culture >100,000 cfu/ml Escherichia coli ESBL (A) 06/15/2023 07:05 PM    Urine Culture >100,000 cfu/ml Klebsiella pneumoniae (A) 06/15/2023 07:05 PM    Urine Culture >100,000 cfu/ml Aerococcus urinae (A) 06/15/2023 07:05 PM         Imaging:     XR chest portable    Result Date: 6/28/2023  Impression: Increased interstitial markings, likely a component of mild fibrosis but question interstitial edema. Trace effusions better shown on abdomen CT. Workstation performed: [id number]     CT lower extremity w contrast right    Result Date: 6/28/2023  Impression: Right total hip arthroplasty, with acetabular component dislodged superior laterally out of the native acetabular fossa (series 307 images .) There is a 9.4 x 3.4 x 15.7 cm air and fluid collection in the subcutaneous tissues in the right lateral hip, contacting the right hip replacement and extending into the native acetabular fossa where there are bone erosions, consistent with abscess formation, infection of the arthroplasty and osteomyelitis (series 5 images 19-74.) Findings are consistent with the preliminary report from [facility] which was provided shortly after completion of the exam. Workstation performed: [id number]     CT abdomen pelvis with contrast    Result Date: 6/28/2023  Impression: 1. Superior dislocation of right hip arthroplasty femoral stem and acetabular cup. 2. Collection of fluid and gas lateral to the femoral stem measuring 16.5 x 7.3 x 4.3 cm compatible with an abscess. Collection extends to the acetabular cup. 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 3. No evidence of intraperitoneal or retroperitoneal fluid collection. 4. Constipation.  I personally discussed this study with Owen Llanos on 6/28/2023 2:06 AM. Workstation performed: [id number]         Medications:     Current Facility-Administered Medications   Medication Dose Route Frequency   • acetaminophen (TYLENOL) tablet 975 mg  975 mg Oral Q8H PRN   • cefTRIAXone (ROCEPHIN) 2,000 mg in dextrose 5 % 50 mL IVPB  2,000 mg Intravenous Q24H   • cholecalciferol (VITAMIN D3) tablet 5,000 Units  5,000 Units Oral Daily   • cyanocobalamin (VITAMIN B-12) tablet 1,000 mcg  1,000 mcg Oral Daily   • DULoxetine (CYMBALTA) delayed release capsule 60 mg  60 mg Oral Daily   • fluticasone (FLONASE) 50 mcg/act nasal spray 2 spray  2 spray Each Nare Daily   • gabapentin (NEURONTIN) capsule 300 mg  300 mg Oral BID   • HYDROmorphone HCl (DILAUDID) injection 0.2 mg  0.2 mg Intravenous Q4H PRN   • levothyroxine tablet 100 mcg  100 mcg Oral Daily   • ondansetron (ZOFRAN) injection 4 mg  4 mg Intravenous Q6H PRN   • oxyCODONE (ROXICODONE) IR tablet 5 mg  5 mg Oral Q4H PRN   • oxyCODONE (ROXICODONE) split tablet 2.5 mg  2.5 mg Oral Q4H PRN   • pantoprazole (PROTONIX) injection 40 mg  40 mg Intravenous Q24H JOSE   • phenol (CHLORASEPTIC) 1.4 % mucosal liquid 1 spray  1 spray Mouth/Throat Q2H PRN   • polyethylene glycol (MIRALAX) packet 17 g  17 g Oral Daily   • polyethylene glycol (MIRALAX) packet 17 g  17 g Oral Daily PRN   • senna-docusate sodium (SENOKOT S) 8.6-50 mg per tablet 1 tablet  1 tablet Oral HS   • simethicone (MYLICON) chewable tablet 80 mg  80 mg Oral Q6H PRN   • torsemide (DEMADEX) tablet 10 mg  10 mg Oral Daily   • vancomycin (VANCOCIN) 1,250 mg in sodium chloride 0.9 % 250 mL IVPB  1,250 mg Intravenous Q24H     Star Patterson DO  Family Medicine, PGY-2  8:32 AM 6/29/2023

## 2023-06-29 NOTE — QUICK NOTE
Discussed with attending Dr. Shirley Hoover echo results reviewed.  No management from previous note written yesterday [phone]

## 2023-06-29 NOTE — QUICK NOTE
Orthopedics PreOp Note  Rinana Melchor 80 y.o. female MRN: [id number]  Unit/Bed#: [facility] 807-01      Dx: Right hip prosthetic joint infection  Procedure: Right hip irrigation and debridement    Hgb: 8.1  Plt: 318  Wbc: 7.63    Na: 140  K: 4.2  Cl: 108  Co2: 27  BUN: 15  Cr: 0.57  Gluc: 82    PTT: 37  INR: 1.29  PT: 16.3    Other Labs: N/A    CXR: in chart  EKG: in chart    Abx: hold per ID until cx taken in OR - will resume vanc and cefepime post op  Type and Screen/Cross: obtained  NPO: @ midnight  Consent: obtained  POA Name/ Phone: George Pickens, cell: [phone]  Clearance: per cardiology - cleared  Anticoagulation: hold in AM

## 2023-06-29 NOTE — PROGRESS NOTES
Progress Note - Orthopedics   Eliz Ortega 80 y.o. female MRN: [id number]  Unit/Bed#: [facility] 807-01      Subjective:    80 y. o.female with R hip chronic PJI. No acute events, no new complaints. Patient remains resting comfortably in bed. Her pain is well controlled. We discussed speaking with her son/daughter about surgery on Friday.     Labs:  0   Lab Value Date/Time    HCT 28.1 (L) 06/28/2023 0419    HCT 29.7 (L) 06/27/2023 2206    HCT 27.1 (L) 06/20/2023 0522    HCT 37.0 04/20/2015 1109    HCT 38.0 04/20/2015 1109    HCT 37.2 04/03/2015 0831    HGB 8.9 (L) 06/28/2023 0419    HGB 9.3 (L) 06/27/2023 2206    HGB 8.1 (L) 06/20/2023 0522    HGB 12.0 04/20/2015 1109    HGB 11.6 04/03/2015 0831    HGB 12.1 11/04/2014 1025    INR 1.40 (H) 06/28/2023 1753    WBC 10.58 (H) 06/28/2023 0419    WBC 12.99 (H) 06/27/2023 2206    WBC 15.63 (H) 06/20/2023 0522    WBC 7.98 04/20/2015 1109    WBC 7.29 04/03/2015 0831    WBC 6.66 11/04/2014 1025    ESR 54 (H) 06/28/2023 0419    CRP 92.0 (H) 06/27/2023 2206       Meds:    Current Facility-Administered Medications:   •  acetaminophen (TYLENOL) tablet 975 mg, 975 mg, Oral, Q8H PRN, Kerri Downey DO  •  cefTRIAXone (ROCEPHIN) 2,000 mg in dextrose 5 % 50 mL IVPB, 2,000 mg, Intravenous, Q24H, Saundra Regan DO, Last Rate: 100 mL/hr at 06/28/23 2307, 2,000 mg at 06/28/23 2307  •  cholecalciferol (VITAMIN D3) tablet 5,000 Units, 5,000 Units, Oral, Daily, Torey Regan DO, 5,000 Units at 06/28/23 1222  •  cyanocobalamin (VITAMIN B-12) tablet 1,000 mcg, 1,000 mcg, Oral, Daily, Sharath Hays DO, 1,000 mcg at 06/28/23 1222  •  DULoxetine (CYMBALTA) delayed release capsule 60 mg, 60 mg, Oral, Daily, Saundra Regan DO, 60 mg at 06/28/23 0932  •  fluticasone (FLONASE) 50 mcg/act nasal spray 2 spray, 2 spray, Each Nare, Daily, Saundra Regan DO, 2 spray at 06/28/23 3220  •  gabapentin (NEURONTIN) capsule 300 mg, 300 mg, Oral, BID, Saundra Regan DO, 300 mg at 06/28/23 1718  •  HYDROmorphone HCl (DILAUDID) injection 0.2 mg, 0.2 mg, Intravenous, Q4H PRN, Marysol Record Yext, DO, 0.2 mg at 06/28/23 7988  •  levothyroxine tablet 100 mcg, 100 mcg, Oral, Daily, Saundra Adams Yext, DO, 100 mcg at 06/28/23 0932  •  ondansetron (ZOFRAN) injection 4 mg, 4 mg, Intravenous, Q6H PRN, Marysol Record Yext, DO, 4 mg at 06/28/23 0419  •  oxyCODONE (ROXICODONE) IR tablet 5 mg, 5 mg, Oral, Q4H PRN, Marysol Record Yext, DO, 5 mg at 06/28/23 0419  •  oxyCODONE (ROXICODONE) split tablet 2.5 mg, 2.5 mg, Oral, Q4H PRN, Marysol Record Yext, DO, 2.5 mg at 06/28/23 1833  •  pantoprazole (PROTONIX) injection 40 mg, 40 mg, Intravenous, Q24H [facility], Saundra Adams Yext, DO, 40 mg at 06/28/23 0932  •  phenol (CHLORASEPTIC) 1.4 % mucosal liquid 1 spray, 1 spray, Mouth/Throat, Q2H PRN, Sharath Hays, DO  •  polyethylene glycol (MIRALAX) packet 17 g, 17 g, Oral, Daily, Saundra Adams Yext, DO, 17 g at 06/28/23 8990  •  polyethylene glycol (MIRALAX) packet 17 g, 17 g, Oral, Daily PRN, Marysol Record Yext, DO  •  senna-docusate sodium (SENOKOT S) 8.6-50 mg per tablet 1 tablet, 1 tablet, Oral, HS, Saundra Adams Yext, DO, 1 tablet at 06/28/23 2119  •  simethicone (MYLICON) chewable tablet 80 mg, 80 mg, Oral, Q6H PRN, Marysol Record Yext, DO  •  torsemide (DEMADEX) tablet 10 mg, 10 mg, Oral, Daily, Saundra Adams Yext, DO, 10 mg at 06/28/23 7935  •  vancomycin (VANCOCIN) 1,250 mg in sodium chloride 0.9 % 250 mL IVPB, 1,250 mg, Intravenous, Q24H, Kerri DO Shayan, Last Rate: 166.7 mL/hr at 06/28/23 1352, 1,250 mg at 06/28/23 1352    Blood Culture:   Lab Results   Component Value Date    BLOODCX No Growth at 24 hrs. 06/27/2023    BLOODCX No Growth at 24 hrs. 06/27/2023       Wound Culture:   No results found for: "WOUNDCULT"    Ins and Outs:  I/O last 24 hours:   In: 575 [Blood:575]  Out: 600 [Urine:600]          Physical:  Vitals:    06/28/23 2210   BP: 118/58   Pulse: 72   Resp: 16   Temp: 98.4 °F (36.9 °C)   SpO2: 99%     Musculoskeletal: right lower extremity  • Dressing around right hip incision intact with mild strikethrough. • Painful range of motion of hip joint which is at baseline for her due to chronic dislocation of MAIA components  • Sensation intact L3-S1  • Motor intact to ankle dorsi/plantar flexion, EHL, FHL  • Unable to Straight leg raise  • 2+ DP/PT pulse  • Musculature is soft and compressible, no pain with passive stretch  • Right leg shortened and externally rotated    Assessment:    80 y. o.female with R hip chronic PJI. Cleared by cardiology for OR Friday for I&D and antibiotic bead placement with removal of MAIA components.     Plan:  · NWB RLE  · preop today  · OR Friday  · NPO mn  · IV abx per medicine  · INR goal of 1.5 for surgery  · Will monitor for ABLA and administer IVF/prbc as indicated for Greater than 2 gram drop or Hgb < 7  · PT/OT  · Pain control  · DVT ppx: none due to INR reversal  · Dispo: Ortho will follow    Avinash Perkins MD

## 2023-06-29 NOTE — PHYSICAL THERAPY NOTE
Physical Therapy Cancellation Note               06/29/23 0741   PT Last Visit   PT Visit Date 06/29/23   Note Type   Note type Cancelled Session   Cancel Reasons Other     PT orders received and chart reviewed. Pt admitted from [address] [place] [address] with hip pain and with dx of infection of R prosthetic hip joint. Pt with plans for OR with ortho pending medical optimization from team on Friday 6/30 for "DEBRIDEMENT LOWER EXTREMITY ([facility])- I&D of right hip with explant of hardware; insertion of antibiotic beads." Not acute PT needs at this time. DC skilled PT services. Please re-consult post-op as appropriate. Thank you.       Sadaf Billingsley PT, DPT

## 2023-06-30 ENCOUNTER — ANESTHESIA EVENT (INPATIENT)
Dept: PERIOP | Facility: HOSPITAL | Age: 88
DRG: 464 | End: 2023-06-30
Payer: COMMERCIAL

## 2023-06-30 ENCOUNTER — ANESTHESIA (INPATIENT)
Dept: PERIOP | Facility: HOSPITAL | Age: 88
DRG: 464 | End: 2023-06-30
Payer: COMMERCIAL

## 2023-06-30 LAB
ANION GAP SERPL CALCULATED.3IONS-SCNC: 0 MMOL/L
BUN SERPL-MCNC: 14 MG/DL (ref 5–25)
CALCIUM SERPL-MCNC: 7.8 MG/DL (ref 8.3–10.1)
CHLORIDE SERPL-SCNC: 109 MMOL/L (ref 96–108)
CO2 SERPL-SCNC: 28 MMOL/L (ref 21–32)
CREAT SERPL-MCNC: 0.5 MG/DL (ref 0.6–1.3)
ERYTHROCYTE [DISTWIDTH] IN BLOOD BY AUTOMATED COUNT: 14.6 % (ref 11.6–15.1)
GFR SERPL CREATININE-BSD FRML MDRD: 86 ML/MIN/1.73SQ M
GLUCOSE SERPL-MCNC: 77 MG/DL (ref 65–140)
HCT VFR BLD AUTO: 28 % (ref 34.8–46.1)
HGB BLD-MCNC: 8.3 G/DL (ref 11.5–15.4)
INR PPP: 1.23 (ref 0.84–1.19)
MCH RBC QN AUTO: 30.7 PG (ref 26.8–34.3)
MCHC RBC AUTO-ENTMCNC: 29.6 G/DL (ref 31.4–37.4)
MCV RBC AUTO: 104 FL (ref 82–98)
PLATELET # BLD AUTO: 279 THOUSANDS/UL (ref 149–390)
PMV BLD AUTO: 8.7 FL (ref 8.9–12.7)
POTASSIUM SERPL-SCNC: 4.3 MMOL/L (ref 3.5–5.3)
PROTHROMBIN TIME: 15.7 SECONDS (ref 11.6–14.5)
RBC # BLD AUTO: 2.7 MILLION/UL (ref 3.81–5.12)
SODIUM SERPL-SCNC: 137 MMOL/L (ref 135–147)
WBC # BLD AUTO: 8.78 THOUSAND/UL (ref 4.31–10.16)

## 2023-06-30 PROCEDURE — 27091 REMOVAL OF HIP PROSTHESIS: CPT | Performed by: ORTHOPAEDIC SURGERY

## 2023-06-30 PROCEDURE — 85610 PROTHROMBIN TIME: CPT

## 2023-06-30 PROCEDURE — 84295 ASSAY OF SERUM SODIUM: CPT

## 2023-06-30 PROCEDURE — 80048 BASIC METABOLIC PNL TOTAL CA: CPT

## 2023-06-30 PROCEDURE — 82803 BLOOD GASES ANY COMBINATION: CPT

## 2023-06-30 PROCEDURE — 82947 ASSAY GLUCOSE BLOOD QUANT: CPT

## 2023-06-30 PROCEDURE — 0SPB0JZ REMOVAL OF SYNTHETIC SUBSTITUTE FROM LEFT HIP JOINT, OPEN APPROACH: ICD-10-PCS | Performed by: ORTHOPAEDIC SURGERY

## 2023-06-30 PROCEDURE — 87070 CULTURE OTHR SPECIMN AEROBIC: CPT | Performed by: ORTHOPAEDIC SURGERY

## 2023-06-30 PROCEDURE — NC001 PR NO CHARGE: Performed by: ORTHOPAEDIC SURGERY

## 2023-06-30 PROCEDURE — 0S9B00Z DRAINAGE OF LEFT HIP JOINT WITH DRAINAGE DEVICE, OPEN APPROACH: ICD-10-PCS | Performed by: ORTHOPAEDIC SURGERY

## 2023-06-30 PROCEDURE — 84132 ASSAY OF SERUM POTASSIUM: CPT

## 2023-06-30 PROCEDURE — 99232 SBSQ HOSP IP/OBS MODERATE 35: CPT | Performed by: FAMILY MEDICINE

## 2023-06-30 PROCEDURE — 87075 CULTR BACTERIA EXCEPT BLOOD: CPT | Performed by: ORTHOPAEDIC SURGERY

## 2023-06-30 PROCEDURE — C1713 ANCHOR/SCREW BN/BN,TIS/BN: HCPCS | Performed by: ORTHOPAEDIC SURGERY

## 2023-06-30 PROCEDURE — C9113 INJ PANTOPRAZOLE SODIUM, VIA: HCPCS

## 2023-06-30 PROCEDURE — 82330 ASSAY OF CALCIUM: CPT

## 2023-06-30 PROCEDURE — 99231 SBSQ HOSP IP/OBS SF/LOW 25: CPT | Performed by: INTERNAL MEDICINE

## 2023-06-30 PROCEDURE — 85014 HEMATOCRIT: CPT

## 2023-06-30 PROCEDURE — 85027 COMPLETE CBC AUTOMATED: CPT

## 2023-06-30 PROCEDURE — 87205 SMEAR GRAM STAIN: CPT | Performed by: ORTHOPAEDIC SURGERY

## 2023-06-30 PROCEDURE — 3E0U029 INTRODUCTION OF OTHER ANTI-INFECTIVE INTO JOINTS, OPEN APPROACH: ICD-10-PCS | Performed by: ORTHOPAEDIC SURGERY

## 2023-06-30 DEVICE — STIMULAN® RAPID CURE PROVIDED STERILE FOR SINGLE PATIENT USE. STIMULAN® RAPID CURE CONTAINS CALCIUM SULFATE POWDER AND MIXING SOLUTION IN PRE-MEASURED QUANTITIES SO THAT WHEN MIXED TOGETHER IN A STERILE MIXING BOWL, THE RESULTANT PASTE IS TO BE DIGITALLY PACKED INTO OPEN BONE VOID/GAP TO SET INSITU OR PLACED INTO THE MOULD PROVIDED, THE MIXTURE SETS TO FORM BEADS. THE BIODEGRADABLE, RADIOPAQUE BEADS ARE RESORBED IN APPROXIMATELY 30 – 60 DAYS WHEN USED IN ACCORDANCE WITH THE DEVICE LABELLING. STIMULAN® RAPID CURE IS MANUFACTURED FROM SYNTHETIC IMPLANT GRADE CALCIUM SULFATE DIHYDRATE(CASO4.2H2O) THAT RESORBS AND IS REPLACED WITH BONE DURING THE HEALING PROCESS. ALSO, AS THE BONE VOID FILLER BEADS ARE BIODEGRADABLE AND BIOCOMPATIBLE, THEY MAY BE USED AT AN INFECTED SITE.
Type: IMPLANTABLE DEVICE | Site: HIP | Status: FUNCTIONAL
Brand: STIMULAN® RAPID CURE

## 2023-06-30 RX ORDER — ROCURONIUM BROMIDE 10 MG/ML
INJECTION, SOLUTION INTRAVENOUS AS NEEDED
Status: DISCONTINUED | OUTPATIENT
Start: 2023-06-30 | End: 2023-06-30

## 2023-06-30 RX ORDER — LEVOTHYROXINE SODIUM 112 UG/1
112 TABLET ORAL
Status: DISCONTINUED | OUTPATIENT
Start: 2023-06-30 | End: 2023-07-08 | Stop reason: HOSPADM

## 2023-06-30 RX ORDER — HYDROMORPHONE HCL IN WATER/PF 6 MG/30 ML
0.2 PATIENT CONTROLLED ANALGESIA SYRINGE INTRAVENOUS
Status: DISCONTINUED | OUTPATIENT
Start: 2023-06-30 | End: 2023-06-30 | Stop reason: HOSPADM

## 2023-06-30 RX ORDER — POVIDONE-IODINE 10 MG/G
OINTMENT TOPICAL AS NEEDED
Status: DISCONTINUED | OUTPATIENT
Start: 2023-06-30 | End: 2023-06-30 | Stop reason: HOSPADM

## 2023-06-30 RX ORDER — METOCLOPRAMIDE HYDROCHLORIDE 5 MG/ML
10 INJECTION INTRAMUSCULAR; INTRAVENOUS ONCE AS NEEDED
Status: DISCONTINUED | OUTPATIENT
Start: 2023-06-30 | End: 2023-06-30 | Stop reason: HOSPADM

## 2023-06-30 RX ORDER — TOBRAMYCIN 1.2 G/30ML
1200 INJECTION, POWDER, LYOPHILIZED, FOR SOLUTION INTRAVENOUS ONCE
Status: COMPLETED | OUTPATIENT
Start: 2023-06-30 | End: 2023-06-30

## 2023-06-30 RX ORDER — ONDANSETRON 2 MG/ML
4 INJECTION INTRAMUSCULAR; INTRAVENOUS ONCE AS NEEDED
Status: DISCONTINUED | OUTPATIENT
Start: 2023-06-30 | End: 2023-06-30 | Stop reason: HOSPADM

## 2023-06-30 RX ORDER — FENTANYL CITRATE/PF 50 MCG/ML
25 SYRINGE (ML) INJECTION
Status: COMPLETED | OUTPATIENT
Start: 2023-06-30 | End: 2023-06-30

## 2023-06-30 RX ORDER — ALBUTEROL SULFATE 2.5 MG/3ML
2.5 SOLUTION RESPIRATORY (INHALATION) ONCE AS NEEDED
Status: DISCONTINUED | OUTPATIENT
Start: 2023-06-30 | End: 2023-06-30 | Stop reason: HOSPADM

## 2023-06-30 RX ORDER — LIDOCAINE HYDROCHLORIDE 20 MG/ML
INJECTION, SOLUTION EPIDURAL; INFILTRATION; INTRACAUDAL; PERINEURAL AS NEEDED
Status: DISCONTINUED | OUTPATIENT
Start: 2023-06-30 | End: 2023-06-30

## 2023-06-30 RX ORDER — VANCOMYCIN HYDROCHLORIDE 1 G/200ML
INJECTION, SOLUTION INTRAVENOUS AS NEEDED
Status: DISCONTINUED | OUTPATIENT
Start: 2023-06-30 | End: 2023-06-30

## 2023-06-30 RX ORDER — PHENYLEPHRINE HCL IN 0.9% NACL 1 MG/10 ML
SYRINGE (ML) INTRAVENOUS AS NEEDED
Status: DISCONTINUED | OUTPATIENT
Start: 2023-06-30 | End: 2023-06-30

## 2023-06-30 RX ORDER — MAGNESIUM HYDROXIDE 1200 MG/15ML
LIQUID ORAL AS NEEDED
Status: DISCONTINUED | OUTPATIENT
Start: 2023-06-30 | End: 2023-06-30 | Stop reason: HOSPADM

## 2023-06-30 RX ORDER — FENTANYL CITRATE 50 UG/ML
INJECTION, SOLUTION INTRAMUSCULAR; INTRAVENOUS AS NEEDED
Status: DISCONTINUED | OUTPATIENT
Start: 2023-06-30 | End: 2023-06-30

## 2023-06-30 RX ORDER — SODIUM CHLORIDE, SODIUM LACTATE, POTASSIUM CHLORIDE, CALCIUM CHLORIDE 600; 310; 30; 20 MG/100ML; MG/100ML; MG/100ML; MG/100ML
INJECTION, SOLUTION INTRAVENOUS CONTINUOUS PRN
Status: DISCONTINUED | OUTPATIENT
Start: 2023-06-30 | End: 2023-06-30

## 2023-06-30 RX ORDER — VANCOMYCIN HYDROCHLORIDE 1 G/20ML
INJECTION, POWDER, LYOPHILIZED, FOR SOLUTION INTRAVENOUS AS NEEDED
Status: DISCONTINUED | OUTPATIENT
Start: 2023-06-30 | End: 2023-06-30 | Stop reason: HOSPADM

## 2023-06-30 RX ORDER — ONDANSETRON 2 MG/ML
INJECTION INTRAMUSCULAR; INTRAVENOUS AS NEEDED
Status: DISCONTINUED | OUTPATIENT
Start: 2023-06-30 | End: 2023-06-30

## 2023-06-30 RX ORDER — PROPOFOL 10 MG/ML
INJECTION, EMULSION INTRAVENOUS AS NEEDED
Status: DISCONTINUED | OUTPATIENT
Start: 2023-06-30 | End: 2023-06-30

## 2023-06-30 RX ORDER — TRANEXAMIC ACID 10 MG/ML
INJECTION, SOLUTION INTRAVENOUS AS NEEDED
Status: DISCONTINUED | OUTPATIENT
Start: 2023-06-30 | End: 2023-06-30

## 2023-06-30 RX ORDER — ENOXAPARIN SODIUM 100 MG/ML
40 INJECTION SUBCUTANEOUS DAILY
Status: DISCONTINUED | OUTPATIENT
Start: 2023-07-01 | End: 2023-07-03

## 2023-06-30 RX ADMIN — LIDOCAINE HYDROCHLORIDE 100 MG: 20 INJECTION, SOLUTION EPIDURAL; INFILTRATION; INTRACAUDAL; PERINEURAL at 18:06

## 2023-06-30 RX ADMIN — FENTANYL CITRATE 25 MCG: 50 INJECTION, SOLUTION INTRAMUSCULAR; INTRAVENOUS at 21:13

## 2023-06-30 RX ADMIN — SODIUM CHLORIDE, SODIUM LACTATE, POTASSIUM CHLORIDE, AND CALCIUM CHLORIDE 75 ML/HR: .6; .31; .03; .02 INJECTION, SOLUTION INTRAVENOUS at 13:04

## 2023-06-30 RX ADMIN — Medication 100 MCG: at 18:06

## 2023-06-30 RX ADMIN — Medication 5000 UNITS: at 10:07

## 2023-06-30 RX ADMIN — FENTANYL CITRATE 50 MCG: 50 INJECTION, SOLUTION INTRAMUSCULAR; INTRAVENOUS at 18:06

## 2023-06-30 RX ADMIN — FENTANYL CITRATE 25 MCG: 50 INJECTION, SOLUTION INTRAMUSCULAR; INTRAVENOUS at 21:22

## 2023-06-30 RX ADMIN — ROCURONIUM BROMIDE 50 MG: 10 INJECTION, SOLUTION INTRAVENOUS at 18:07

## 2023-06-30 RX ADMIN — CYANOCOBALAMIN TAB 500 MCG 1000 MCG: 500 TAB at 10:07

## 2023-06-30 RX ADMIN — DULOXETINE HYDROCHLORIDE 60 MG: 60 CAPSULE, DELAYED RELEASE ORAL at 10:07

## 2023-06-30 RX ADMIN — FLUTICASONE PROPIONATE 2 SPRAY: 50 SPRAY, METERED NASAL at 10:09

## 2023-06-30 RX ADMIN — FENTANYL CITRATE 50 MCG: 50 INJECTION, SOLUTION INTRAMUSCULAR; INTRAVENOUS at 19:15

## 2023-06-30 RX ADMIN — PANTOPRAZOLE SODIUM 40 MG: 40 INJECTION, POWDER, FOR SOLUTION INTRAVENOUS at 10:07

## 2023-06-30 RX ADMIN — ACETAMINOPHEN 650 MG: 325 TABLET, FILM COATED ORAL at 10:07

## 2023-06-30 RX ADMIN — ROCURONIUM BROMIDE 30 MG: 10 INJECTION, SOLUTION INTRAVENOUS at 18:58

## 2023-06-30 RX ADMIN — GABAPENTIN 300 MG: 300 CAPSULE ORAL at 10:07

## 2023-06-30 RX ADMIN — PROPOFOL 100 MG: 10 INJECTION, EMULSION INTRAVENOUS at 18:06

## 2023-06-30 RX ADMIN — SODIUM CHLORIDE, SODIUM LACTATE, POTASSIUM CHLORIDE, AND CALCIUM CHLORIDE: .6; .31; .03; .02 INJECTION, SOLUTION INTRAVENOUS at 18:00

## 2023-06-30 RX ADMIN — PROPOFOL 50 MG: 10 INJECTION, EMULSION INTRAVENOUS at 18:30

## 2023-06-30 RX ADMIN — VANCOMYCIN HYDROCHLORIDE 1000 MG: 1 INJECTION, SOLUTION INTRAVENOUS at 19:17

## 2023-06-30 RX ADMIN — ROCURONIUM BROMIDE 20 MG: 10 INJECTION, SOLUTION INTRAVENOUS at 19:11

## 2023-06-30 RX ADMIN — ONDANSETRON 4 MG: 2 INJECTION INTRAMUSCULAR; INTRAVENOUS at 20:05

## 2023-06-30 RX ADMIN — TRANEXAMIC ACID 1000 MG: 10 INJECTION, SOLUTION INTRAVENOUS at 19:00

## 2023-06-30 RX ADMIN — SUGAMMADEX 200 MG: 100 INJECTION, SOLUTION INTRAVENOUS at 20:47

## 2023-06-30 NOTE — PROGRESS NOTES
Pastoral Care Progress Note    2023  Patient: hPong Booth : 1933  Admission Date & Time: 2023  MRN: [id number] CSN: [id number]           23 1200   Clinical Encounter Type   Visited With Patient   Anglican Encounters   Anglican Needs Prayer   Sacramental Encounters   Communion Given Indicator Yes     Dave Champion met with the pt and provided prayers, blessings and the Sacrament of [employer]. No further needs were expressed at this time. Chaplains still remain available.

## 2023-06-30 NOTE — PROGRESS NOTES
Progress Note - Infectious Disease   Jimy Tena 80 y.o. 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 In setting of MAIA, revision, chronic dislocation as below. Presenting with subacute pain, spontaneous drainage from sinus tract. CT showed abscess at joint. Clinically stable without sepsis. ESR 51. Wound culture pending. Suspect Gram-positive pathogen. Rec:  • Hold antibiotics for now pending OR for explant  • Send deep OR cultures  • Postoperatively can restart vancomycin  • Anticipate probable 6 weeks of IV antibiotics possibly followed by PO antibiotics based on ESR trend given suspected chronicity of infection     Status post left MAIA with revision in 2019. Complicated more recently by disruption and chronic dislocation. Non-ambulatory.     Status post PPM.     IBS with constipation.     Afib. On coumadin     History of ESBL, MDRO. From recent urine cultures. Dr. Moise Hensley will reassess the patient on Monday 7/3. Please call in the interim with new questions.     Antibiotics:  Off antibiotics #1    Subjective:  Patient seen on AM rounds. Sleeping and not awakened. Offered no complaints to Ortho team on earlier AM rounds. 24 Hour Events:  No documented fevers, chills, sweats, nausea, vomiting, or diarrhea. Objective:  Vitals:  Temp:  [97.7 °F (36.5 °C)-98.3 °F (36.8 °C)] 97.7 °F (36.5 °C)  HR:  [68-71] 71  Resp:  [16-17] 17  BP: (104-108)/(54-56) 108/54  SpO2:  [98 %-100 %] 98 %  Temp (24hrs), Av.9 °F (36.6 °C), Min:97.7 °F (36.5 °C), Max:98.3 °F (36.8 °C)  Current: Temperature: 97.7 °F (36.5 °C)    Physical Exam:   General:  No acute distress  Psychiatric:  Sleeping  Pulmonary:  Normal respiratory excursion without accessory muscle use  Abdomen:  Nondistended  Skin:  No rashes    Lab Results:  I have personally reviewed pertinent labs.   Results from last 7 days   Lab Units 06/30/23  0456 06/29/23  0507 06/28/23  0614 06/27/23  2206   POTASSIUM mmol/L 4.3 4.2 3.7 4.0   CHLORIDE mmol/L 109* 108 109* 107   CO2 mmol/L 28 27 29 26   BUN mg/dL 14 15 17 17   CREATININE mg/dL 0.50* 0.57* 0.60 0.66   EGFR ml/min/1.73sq m 86 82 81 78   CALCIUM mg/dL 7.8* 8.1* 8.0* 7.9*   AST U/L  --   --   --  18   ALT U/L  --   --   --  13   ALK PHOS U/L  --   --   --  128*     Results from last 7 days   Lab Units 06/30/23  0456 06/29/23  0507 06/28/23  0419   WBC Thousand/uL 8.78 7.63 10.58*   HEMOGLOBIN g/dL 8.3* 8.1* 8.9*   PLATELETS Thousands/uL 279 318 379     Results from last 7 days   Lab Units 06/28/23  1611 06/28/23  0522 06/27/23  2206   BLOOD CULTURE   --   --  No Growth at 48 hrs. No Growth at 48 hrs. GRAM STAIN RESULT  No Polys or Bacteria seen  --   --    WOUND CULTURE  No growth  --   --    MRSA CULTURE ONLY   --  No Methicillin Resistant Staphlyococcus aureus (MRSA) isolated  --        Imaging Studies:   I have personally reviewed pertinent imaging study reports and images in PACS. EKG, Pathology, and Other Studies:   I have personally reviewed pertinent reports.

## 2023-06-30 NOTE — PROGRESS NOTES
Progress Note - Orthopedics   Eliz Ortega 80 y.o. female MRN: [id number]  Unit/Bed#: [facility] 807-01      Subjective:    80 y. o.female with R hip chronic PJI. No acute events, no new complaints. Patient remains resting comfortably in bed. Her pain is well controlled. She states she is ready for surgery today.     Labs:  0   Lab Value Date/Time    HCT 28.0 (L) 06/30/2023 0456    HCT 27.9 (L) 06/29/2023 0507    HCT 28.1 (L) 06/28/2023 0419    HCT 37.0 04/20/2015 1109    HCT 38.0 04/20/2015 1109    HCT 37.2 04/03/2015 0831    HGB 8.3 (L) 06/30/2023 0456    HGB 8.1 (L) 06/29/2023 0507    HGB 8.9 (L) 06/28/2023 0419    HGB 12.0 04/20/2015 1109    HGB 11.6 04/03/2015 0831    HGB 12.1 11/04/2014 1025    INR 1.29 (H) 06/29/2023 0507    WBC 8.78 06/30/2023 0456    WBC 7.63 06/29/2023 0507    WBC 10.58 (H) 06/28/2023 0419    WBC 7.98 04/20/2015 1109    WBC 7.29 04/03/2015 0831    WBC 6.66 11/04/2014 1025    ESR 54 (H) 06/28/2023 0419    CRP 92.0 (H) 06/27/2023 2206       Meds:    Current Facility-Administered Medications:   •  acetaminophen (TYLENOL) tablet 650 mg, 650 mg, Oral, TID, [facility], DO, 650 mg at 06/29/23 2145  •  bisacodyl (DULCOLAX) EC tablet 5 mg, 5 mg, Oral, Daily PRN, Jennifer Celestinborg, DO  •  cholecalciferol (VITAMIN D3) tablet 5,000 Units, 5,000 Units, Oral, Daily, Torey Bates Yext, DO, 5,000 Units at 06/29/23 8335  •  cyanocobalamin (VITAMIN B-12) tablet 1,000 mcg, 1,000 mcg, Oral, Daily, Sharath Hays DO, 1,000 mcg at 06/29/23 0846  •  DULoxetine (CYMBALTA) delayed release capsule 60 mg, 60 mg, Oral, Daily, Saundra Regan DO, 60 mg at 06/29/23 0846  •  fluticasone (FLONASE) 50 mcg/act nasal spray 2 spray, 2 spray, Each Nare, Daily, Saundra Regan DO, 2 spray at 06/29/23 1264  •  gabapentin (NEURONTIN) capsule 300 mg, 300 mg, Oral, BID, Saundra Regan DO, 300 mg at 06/29/23 1758  •  HYDROmorphone HCl (DILAUDID) injection 0.2 mg, 0.2 mg, Intravenous, Q4H PRN, Torey Regan DO, 0.2 mg at 06/28/23 1427  •  lactated ringers infusion, 75 mL/hr, Intravenous, Continuous, Guillermina Christianson MD, Last Rate: 75 mL/hr at 06/29/23 2345, 75 mL/hr at 06/29/23 2345  •  levothyroxine tablet 112 mcg, 112 mcg, Oral, Early Morning, Saundra Mobleyxt, DO  •  ondansetron (ZOFRAN) injection 4 mg, 4 mg, Intravenous, Q6H PRN, Franci Araceli Yext, DO, 4 mg at 06/28/23 0419  •  oxyCODONE (ROXICODONE) IR tablet 5 mg, 5 mg, Oral, Q4H PRN, Franci Araceli Yext, DO, 5 mg at 06/29/23 1859  •  oxyCODONE (ROXICODONE) split tablet 2.5 mg, 2.5 mg, Oral, Q4H PRN, Franci Araceli Yext, DO, 2.5 mg at 06/28/23 1833  •  pantoprazole (PROTONIX) injection 40 mg, 40 mg, Intravenous, Q24H [address], Saundra Adams Yext, DO, 40 mg at 06/29/23 0847  •  phenol (CHLORASEPTIC) 1.4 % mucosal liquid 1 spray, 1 spray, Mouth/Throat, Q2H PRN, Sharath Hays, DO  •  polyethylene glycol (MIRALAX) packet 17 g, 17 g, Oral, Daily PRN, Franci Bradenls Yext, DO  •  senna-docusate sodium (SENOKOT S) 8.6-50 mg per tablet 1 tablet, 1 tablet, Oral, HS, Saundra Mobleyxt, DO, 1 tablet at 06/29/23 2145  •  simethicone (MYLICON) chewable tablet 80 mg, 80 mg, Oral, Q6H PRN, Franci Bradenls Yext, DO  •  torsemide (DEMADEX) tablet 10 mg, 10 mg, Oral, Daily, Saundra Mobleyxt, DO, 10 mg at 06/28/23 8770    Blood Culture:   Lab Results   Component Value Date    BLOODCX No Growth at 48 hrs. 06/27/2023    BLOODCX No Growth at 48 hrs. 06/27/2023       Wound Culture:   Lab Results   Component Value Date    WOUNDCULT No growth 06/28/2023       Ins and Outs:  I/O last 24 hours: In: 360 [P.O.:360]  Out: 700 [Urine:700]          Physical:  Vitals:    06/29/23 2333   BP: 108/56   Pulse: 70   Resp: 16   Temp: 97.8 °F (36.6 °C)   SpO2: 98%     Musculoskeletal: right lower extremity  • Dressing around right hip incision intact with mild strikethrough.   • Painful range of motion of hip joint which is at baseline for her due to chronic dislocation of MAIA components  • Sensation intact L3-S1  • Motor intact to ankle dorsi/plantar flexion, EHL, FHL  • Unable to Straight leg raise  • 2+ DP/PT pulse  • Musculature is soft and compressible, no pain with passive stretch  • Right leg shortened and externally rotated  •   Assessment:    80 y. o.female with R hip chronic PJI . Patient doing well.      Plan:  · NWB RLE  · To OR today  · NPO until surgery  · IV abx per medicine  · Goal INR 1.5  · Will monitor for ABLA and administer IVF/prbc as indicated for Greater than 2 gram drop or Hgb < 7  · PT/OT  · Pain control  · DVT ppx none due to INR reversal  · Dispo: Ortho will follow    Avinash Perkins MD

## 2023-06-30 NOTE — PLAN OF CARE
Problem: Potential for Falls  Goal: Patient will remain free of falls  Description: INTERVENTIONS:  - Educate patient/family on patient safety including physical limitations  - Instruct patient to call for assistance with activity   - Consult OT/PT to assist with strengthening/mobility   - Keep Call bell within reach  - Keep bed low and locked with side rails adjusted as appropriate  - Keep care items and personal belongings within reach  - Initiate and maintain comfort rounds  - Initiate/Maintain bed and chair alarm  - Apply yellow socks and bracelet for high fall risk patients  - Consider moving patient to room near nurses station  Outcome: Progressing     Problem: MOBILITY - ADULT  Goal: Maintain or return to baseline ADL function  Description: INTERVENTIONS:  -  Assess patient's ability to carry out ADLs; assess patient's baseline for ADL function and identify physical deficits which impact ability to perform ADLs (bathing, care of mouth/teeth, toileting, grooming, dressing, etc.)  - Assess/evaluate cause of self-care deficits   - Assess range of motion  - Assess patient's mobility; develop plan if impaired  - Assess patient's need for assistive devices and provide as appropriate  - Encourage maximum independence but intervene and supervise when necessary  - Involve family in performance of ADLs  - Assess for home care needs following discharge   - Consider OT consult to assist with ADL evaluation and planning for discharge  - Provide patient education as appropriate  Outcome: Progressing  Goal: Maintains/Returns to pre admission functional level  Description: INTERVENTIONS:  - Perform BMAT or MOVE assessment daily.   - Set and communicate daily mobility goal to care team and patient/family/caregiver. - Collaborate with rehabilitation services on mobility goals if consulted  - Reposition patient every 2 hours.   - Out of bed for toileting  - Record patient progress and toleration of activity level   Outcome: Progressing     Problem: Prexisting or High Potential for Compromised Skin Integrity  Goal: Skin integrity is maintained or improved  Description: INTERVENTIONS:  - Identify patients at risk for skin breakdown  - Assess and monitor skin integrity  - Assess and monitor nutrition and hydration status  - Monitor labs   - Assess for incontinence   - Turn and reposition patient  - Assist with mobility/ambulation  - Relieve pressure over bony prominences  - Avoid friction and shearing  - Provide appropriate hygiene as needed including keeping skin clean and dry  - Evaluate need for skin moisturizer/barrier cream  - Collaborate with interdisciplinary team   - Patient/family teaching  - Consider wound care consult   Outcome: Progressing     Problem: PAIN - ADULT  Goal: Verbalizes/displays adequate comfort level or baseline comfort level  Description: Interventions:  - Encourage patient to monitor pain and request assistance  - Assess pain using appropriate pain scale  - Administer analgesics based on type and severity of pain and evaluate response  - Implement non-pharmacological measures as appropriate and evaluate response  - Consider cultural and social influences on pain and pain management  - Notify physician/advanced practitioner if interventions unsuccessful or patient reports new pain  Outcome: Progressing     Problem: SKIN/TISSUE INTEGRITY - ADULT  Goal: Incision(s), wounds(s) or drain site(s) healing without S/S of infection  Description: INTERVENTIONS  - Assess and document dressing, incision, wound bed, drain sites and surrounding tissue  - Provide patient and family education  - Perform skin care/dressing changes every shift  Outcome: Progressing  Goal: Pressure injury heals and does not worsen  Description: Interventions:  - Implement low air loss mattress or specialty surface (Criteria met)  - Apply silicone foam dressing  - Instruct/assist with weight shifting every 60 minutes when in chair   - Use special pressure reducing interventions such as EHOB when in chair   - Apply fecal or urinary incontinence containment device   - Perform passive or active ROM every shift  - Turn and reposition patient & offload bony prominences every 2 hours   - Utilize friction reducing device or surface for transfers   - Consider nutrition services referral as needed  Outcome: Progressing     Problem: MUSCULOSKELETAL - ADULT  Goal: Maintain or return mobility to safest level of function  Description: INTERVENTIONS:  - Assess patient's ability to carry out ADLs; assess patient's baseline for ADL function and identify physical deficits which impact ability to perform ADLs (bathing, care of mouth/teeth, toileting, grooming, dressing, etc.)  - Assess/evaluate cause of self-care deficits   - Assess range of motion  - Assess patient's mobility  - Assess patient's need for assistive devices and provide as appropriate  - Encourage maximum independence but intervene and supervise when necessary  - Involve family in performance of ADLs  - Assess for home care needs following discharge   - Consider OT consult to assist with ADL evaluation and planning for discharge  - Provide patient education as appropriate  Outcome: Progressing  Goal: Maintain proper alignment of affected body part  Description: INTERVENTIONS:  - Support, maintain and protect limb and body alignment  - Provide patient/ family with appropriate education  Outcome: Progressing     Problem: Nutrition/Hydration-ADULT  Goal: Nutrient/Hydration intake appropriate for improving, restoring or maintaining nutritional needs  Description: Monitor and assess patient's nutrition/hydration status for malnutrition. Collaborate with interdisciplinary team and initiate plan and interventions as ordered. Monitor patient's weight and dietary intake as ordered or per policy. Utilize nutrition screening tool and intervene as necessary.  Determine patient's food preferences and provide high-protein, high-caloric foods as appropriate.      INTERVENTIONS:  - Monitor oral intake, urinary output, labs, and treatment plans  - Assess nutrition and hydration status and recommend course of action  - Evaluate amount of meals eaten  - Assist patient with eating if necessary   - Allow adequate time for meals  - Recommend/ encourage appropriate diets, oral nutritional supplements, and vitamin/mineral supplements  - Order, calculate, and assess calorie counts as needed  - Recommend, monitor, and adjust tube feedings and TPN/PPN based on assessed needs  - Assess need for intravenous fluids  - Provide specific nutrition/hydration education as appropriate  - Include patient/family/caregiver in decisions related to nutrition  Outcome: Progressing

## 2023-06-30 NOTE — ANESTHESIA PREPROCEDURE EVALUATION
Procedure:  DEBRIDEMENT LOWER EXTREMITY [facility] OUT)- I&D of right hip with explant of hardware; insertion of antibiotic beads (Right: Leg Lower)    Relevant Problems   ANESTHESIA (within normal limits)      CARDIO   (+) Benign essential HTN   (+) Chronic atrial fibrillation (HCC)   (+) Moderate mitral regurgitation      ENDO   (+) Hypothyroidism      /RENAL   (+) Stage 2 chronic kidney disease      HEMATOLOGY   (+) Anemia      MUSCULOSKELETAL   (+) Fibromyalgia   (+) Low back pain with sciatica   (+) Lumbar spondylosis   (+) Osteoarthritis, multiple sites   (+) Primary osteoarthritis of right knee      NEURO/PSYCH   (+) Chronic pain   (+) Depression   (+) Fibromyalgia      Other   (+) Osteomyelitis ([address])        TTE 6/28/2023  •  Left Ventricle: Left ventricular cavity size is normal. Wall thickness is normal. There is concentric remodeling. The left ventricular ejection fraction is 60%. Systolic function is normal.  •  The following segments are hypokinetic: basal inferoseptal.  •  All other segments are normal.  •  Right Ventricle: Right ventricular cavity size is mildly dilated. •  Right Atrium: The atrium is mildly dilated. •  Atrial Septum: No patent foramen ovale detected using color flow Doppler at rest.  •  Aortic Valve: There is aortic valve sclerosis. •  Mitral Valve: There is mild annular calcification. There is moderate regurgitation. •  Tricuspid Valve: There is moderate regurgitation. The right ventricular systolic pressure is mildly elevated. The estimated right ventricular systolic pressure is 98.48 mmHg. Cardiac EP device report 3/16/2023  MDT SINGLE CHAMBER PPM - ACTIVE SYSTEM IS MRI CONDITIONAL   DEVICE INTERROGATED IN THE [place] OFFICE: BATTERY VOLTAGE ADEQUATE (2.6 YRS);  99.2% (DEPENDENT S/P AV NODE ABL; VVIR 70 PPM); ALL LEAD PARAMETERS WITHIN NORMAL LIMITS. NO SIGNIFICANT HIGH RATE EPISODES. CHRONIC AF & PATIENT TAKES WARFARIN; NO PROGRAMMING CHANGES MADE TO DEVICE PARAMETERS. PACEMAKER FUNCTIONING APPROPRIATELY.  ES       Physical Exam    Airway    Mallampati score: II  TM Distance: >3 FB  Neck ROM: limited     Dental       Cardiovascular      Pulmonary      Other Findings        Anesthesia Plan  ASA Score- 3     Anesthesia Type- general with ASA Monitors. Additional Monitors:   Airway Plan: ETT. Plan Factors-Exercise tolerance (METS): <4 METS. Chart reviewed. EKG reviewed. Existing labs reviewed. Patient summary reviewed. Patient is not a current smoker. Induction- intravenous. Postoperative Plan- Plan for postoperative opioid use. Informed Consent- Anesthetic plan and risks discussed with patient. I personally reviewed this patient with the CRNA. Discussed and agreed on the Anesthesia Plan with the CRNA. Wing Marte

## 2023-06-30 NOTE — CASE MANAGEMENT
Case Management Assessment & Discharge Planning Note    Patient name Michelle Murray  Location [facility] 807/[facility] 676-30 MRN [id number]  : 1933 Date 2023       Current Admission Date: 2023  Current Admission Diagnosis:Osteomyelitis [facility])   Patient Active Problem List    Diagnosis Date Noted   • Chronic diarrhea 2023   • Acute cystitis 2023   • Hyponatremia 06/15/2023   • Lumbar radiculopathy 02/10/2023   • Low back pain with sciatica 02/10/2023   • Lumbar spondylosis 02/10/2023   • S/P lumbar fusion 02/10/2023   • Peripheral vascular disease, unspecified ([address]) 2022   • Negative depression screening 2021   • Benign essential HTN 2021   • Trochanteric bursitis 2021   • Meningioma ([address]) 11/10/2020   • Trochanteric bursitis of right hip 11/10/2020   • Stage 2 chronic kidney disease 11/10/2020   • Fall 10/13/2020   • Hematoma 10/13/2020   • Ecchymosis of eye 10/13/2020   • Elevated C-reactive protein (CRP) 2020   • S/P scar revision 2019   • Hypertrophic scar of skin 2019   • Cellulitis of right lower extremity 2019   • Osteomyelitis ([address]) 2019   • MCI (mild cognitive impairment) 2019   • Ambulatory dysfunction 2019   • Status post right hip replacement 2019   • Macrocytosis 2019   • Status post gastrectomy 2019   • Hypothyroidism 10/11/2018   • Pacemaker 2018   • Chronic diastolic CHF (congestive heart failure) ([address]) 2016   • Depression 2016   • Class 1 obesity due to excess calories with serious comorbidity and body mass index (BMI) of 33.0 to 33.9 in adult 2016   • Chronic pain 2016   • Chronic pain of lower extremity, bilateral 2016   • External hemorrhoids 2016   • Anemia 2016   • Chronic atrial fibrillation ([address]) 2016   • Irritable bowel syndrome with constipation 09/15/2015   • Synovial cyst of right popliteal space 2015   • Primary osteoarthritis of right knee 06/30/2015   • Osteoarthritis, multiple sites 06/26/2014   • Moderate mitral regurgitation 05/13/2014   • Osteoporosis 02/04/2014   • Fibromyalgia 05/31/2012   • Peripheral neuropathy 05/25/2012   • Lumbar canal stenosis 05/17/2012      LOS (days): 2  Geometric Mean LOS (GMLOS) (days): 4.40  Days to GMLOS:2.1     OBJECTIVE:  PATIENT READMITTED TO HOSPITAL  Risk of Unplanned Readmission Score: 28.24         Current admission status: Inpatient       Preferred Pharmacy:   [address], [address] [address]  Phone: [phone] Fax: [address], [address] [facility]  [address] [address]  Phone: [phone] Fax: [address] [facility], [address]  [address] [place] [address]  Phone: [phone] Fax: [phone]    Primary Care Provider: Nicole Laird DO    Primary Insurance: [facility]  Secondary Insurance:     ASSESSMENT:  Sarah Proxies    There are no active Health Care Proxies on file. Readmission Root Cause  30 Day Readmission: Yes  Who directed you to return to the hospital?: Other (comment) [facility].)  Did you understand whom to contact if you had questions or problems?: Yes  Did you get your prescriptions before you left the hospital?: Yes  Were you able to get your prescriptions filled when you left the hospital?: Yes  Did you take your medications as prescribed?: Yes  Were you able to get to your follow-up appointments?: Yes  During previous admission, was a post-acute recommendation made?: No  Patient was readmitted due to: Osteomyelitis  Action Plan: Medication and medical management.     Patient Information  Admitted from[de-identified] Facility (LTC resident at [address] [place] [address])  Mental Status: Alert  During Assessment patient was accompanied by: Not accompanied during assessment  Assessment information provided by[de-identified] Patient  Primary Caregiver: [address] of Residence: [facility] [address] do you live in?: [address] entry access options. Select all that apply.: No steps to enter home  Type of Current Residence: Facility  Upon entering residence, is there a bedroom on the main floor (no further steps)?: Yes  Upon entering residence, is there a bathroom on the main floor (no further steps)?: Yes  In the last 12 months, was there a time when you did not have a steady place to sleep or slept in a shelter (including now)?: No  Homeless/housing insecurity resource given?: N/A  Living Arrangements: Other (Comment) ([facility])  Is patient a ?: No    Activities of Daily Living Prior to Admission  Functional Status: Independent  Completes ADLs independently?: No  Level of ADL dependence: Assistance  Ambulates independently?: No  Level of ambulatory dependence: Assistance  Does patient use assisted devices?: Yes  Assisted Devices (DME) used:  Wheelchair  Does patient currently own DME?: Yes  What DME does the patient currently own?: Wheelchair  Does patient have a history of Outpatient Therapy (PT/OT)?: No  Does the patient have a history of Short-Term Rehab?: Yes  Does patient have a history of HHC?: No  Does patient currently have [address]?: No         Patient Information Continued  Income Source: SSI/SSD  Does patient have prescription coverage?: Yes  Within the past 12 months, you worried that your food would run out before you got the money to buy more.: Never true  Within the past 12 months, the food you bought just didn't last and you didn't have money to get more.: Never true  Food insecurity resource given?: N/A  Does patient receive dialysis treatments?: No  Does patient have a history of substance abuse?: No  Does patient have a history of Mental Health Diagnosis?: No         Means of Transportation  [place] [facility] to [facility][de-identified] Other (Comment) (Facility transports when needed.)  In the past 12 months, has lack of transportation kept you from medical appointments or from getting medications?: No  In the past 12 months, has lack of transportation kept you from meetings, work, or from getting things needed for daily living?: No  Was application for public transport provided?: N/A        DISCHARGE DETAILS:    Discharge planning discussed with[de-identified] [address] of Choice: Yes  Comments - Freedom of Choice: Pt would like to return to [facility] on discharge. CM contacted family/caregiver?: Yes  Were Treatment Team discharge recommendations reviewed with patient/caregiver?: Yes  Did patient/caregiver verbalize understanding of patient care needs?: Yes  Were patient/caregiver advised of the risks associated with not following Treatment Team discharge recommendations?: Yes    Contacts  Patient Contacts: [address]  Relationship to Patient[de-identified] Other (Comment) (Facility)  Contact Method: Other (Comment) ([place].)  Reason/Outcome: Continuity of Care, Discharge Planning, Referral    Requested [address]         Is the patient interested in [address] at discharge?: No    DME Referral Provided  Referral made for DME?: No    Other Referral/Resources/Interventions Provided:  Interventions: Facility Return  Referral Comments: [facility] able to accept pt back when pt is medically stable for discharge. Treatment Team Recommendation: Facility Return  Discharge Destination Plan[de-identified] Facility Return  Transport at Discharge : [facility]  Dispatcher Contacted:  No

## 2023-06-30 NOTE — CONSULTS
Vancomycin IV Pharmacy-to-Dose Consultation    Bret Alpers is a 80 y.o. female who was receiving Vancomycin IV with management by the Pharmacy Consult service for treatment of Bone/joint infection (goal -600, trough >10). The patient's Vancomycin therapy has been discontinued. Thank you for allowing us to take part in this patient's care. Pharmacy will sign-off now; please call or re-consult if there are any questions.         Dayton Enriquez, PharmD  Pharmacist

## 2023-06-30 NOTE — PROGRESS NOTES
PROGRESS NOTE - Family Medicine Residency [facility] 9/2/1933, 80 y.o. female. MRN: [id number]    Unit/Bed#: [facility] 807-01 Encounter: [id number]  Primary Care Provider: Lexi Knapp DO      Admission Date: 6/27/2023  Length of Stay: 2 days  Code Status:  Level 1 - Full Code  Diet: Diet NPO; Sips with meds  Consult:   IP CONSULT TO ORTHOPEDIC SURGERY  IP CONSULT TO CARDIOLOGY  IP CONSULT TO CASE MANAGEMENT  IP CONSULT TO PHARMACY  IP CONSULT TO INFECTIOUS DISEASES  IP CONSULT TO PICC TEAM      Assessment & Plan:     Discussed with [facility] team and finalization is pending attending physician attestation. * Osteomyelitis [facility])  Assessment & Plan  Pt presents from [address] at [facility] for purulent discharge from her right hip noticed on day of admission. Her right hip was drained 1 L and the skilled nursing facility prior to ED presentation. · Pt has history of right hip replacement and follows with orthopedics outpatient. Recently admitted at [facility] from 6/8-6/12 under the trauma service for chronic right hip dislocation. · Pt is vitally stable, labs significant for leukocytosis. Imaging obtained. Blood cultures pending. CT Abdomen Pelvis (6/27/23)  1. Superior dislocation of right hip arthroplasty femoral stem and acetabular cup.  2. Collection of fluid and gas lateral to the femoral stem measuring 16.5 x 7.3 x 4.3 cm compatible with an abscess. Collection extends to the acetabular cup. 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 3. No evidence of intraperitoneal or retroperitoneal fluid collection. 4. Constipation.    - Orthopedics evaluated patient in ED and agrees for surgical intervention in the morning.      S/p INR reversal with Vit K 10 mg and Platelet transfusion x2         Plan:  • Pain regimen: dimitrios tylenol 975 mg Q8H, oxy 2.5/5 mg Q8H prn  • NPO at [facility] for I&D per Ortho  • Recheck INR, hold warfarin  • F/u cultures  • Cardiology preop clearance completed 6/29  • Abx: Ceftriaxone, vancomycin held at this time per ID to optimize OR cultures     Benign essential HTN  Assessment & Plan  Goal BP <130/80 per cardiology  Continue monitor BP, stable currently. Most recent BP Blood Pressure: 503/86   11O SBP Systolic (02HKU), FUW:743 , Min:104 , Max:109      Plan  - Start home torsemide 10 mg daily    Stage 2 chronic kidney disease  Assessment & Plan  Lab Results   Component Value Date    EGFR 82 06/29/2023    EGFR 81 06/28/2023    EGFR 78 06/27/2023    CREATININE 0.57 (L) 06/29/2023    CREATININE 0.60 06/28/2023    CREATININE 0.66 06/27/2023     Caution with using nephrotoxic agents  Avoid NSAIDs given Elevated INR. Ambulatory dysfunction  Assessment & Plan  Secondary to hip pain and multiple sites of OA. Pt presented from skilled nursing facility for PT/OT. Plan  - OR for I&D  - Pain control  - PT/OT  - fall precautions  - CM referral for dispo    Hypothyroidism  Assessment & Plan  Thyroid    Lab Results   Component Value Date    XGL8LPAOAWZV 6.873 (H) 06/28/2023    ITV7JRSOKZUH 2.260 06/08/2022    FVB5KHOSXTPY 1.410 05/03/2021    XKG5SJHBQHDF 3.760 (H) 04/02/2021    WIJ2HKFZMERK 2.076 05/15/2020    QQJ0UJGGQMYG 4.400 (H) 03/13/2020    QCJ1HPNQYJBD 3.713 01/22/2019    FREET4 1.39 (H) 06/28/2023    FREET4 0.96 06/23/2017    FREET4 1.00 03/10/2016     Plan  - Add on TSH elevated, will adjust levothyroxine dose from 100mcg to 112mcg daily     Pacemaker  Assessment & Plan  History of intolerance and bradycardia with AVN blocking agents for chronic atrial fibrillation. She underwent AV node ablation in 8/2018 and implantation of MDT single chamber [employer] PPM - medtronic. 99%     Plan  - monitor vitals. Irritable bowel syndrome with constipation  Assessment & Plan  Plan  - Bowel regimen: Miralax and Sennakot scheduled. - monitor BMs, especially with acute pain and use of narcotics.      Chronic diastolic CHF (congestive heart failure) ([address])  Assessment & Plan  Wt Readings from Last 3 Encounters:   06/28/23 76.2 kg (168 lb)   06/16/23 76.6 kg (168 lb 12.8 oz)   03/24/23 75.8 kg (167 lb)     HFpEF per cardiology notes. Last ECHO 4/27/2018 EF 55%, LV thickness upper limits normal, moderate mitral regurgitation, moderate tricuspid regurgitation.      • Monitor I/O   • Continue torsemide 10mg        Chronic atrial fibrillation (HCC)  Assessment & Plan  Currently rate controlled. On daily warfarin 3 mg.  - Goal INR between 2-3.  - INR currently 5.40    Plan  - in ED, pt is s/p Vit K   - Ortho ordered 2 units of platelets. - Monitor INR  - Hold warfarin 3 mg at this time. - Hold NSAIDs given risk of bleeding      Principal Problem:    Osteomyelitis (HCC)  Active Problems:    Chronic atrial fibrillation (HCC)    Chronic diastolic CHF (congestive heart failure) (HCC)    Irritable bowel syndrome with constipation    Pacemaker    Hypothyroidism    Ambulatory dysfunction    Stage 2 chronic kidney disease    Benign essential HTN      VTE Pharm PPX: Reason for no pharmacologic prophylaxis INR goal for surgical procedure  VTE [facility] PPX: sequential compression device and/or foot pump applied unless otherwise contraindicated    Subjective     Overnight/24hr events:  Overnight events: none. Concerns per nursing staff: none. Patient seen and examined at bedside. Sleeping soundly in NAD. VSS. Plan for surgery today.        Objective     Vitals:     Vitals:    06/28/23 2210 06/29/23 0740 06/29/23 1511 06/29/23 2333   BP: 118/58 109/55 104/56 108/56   BP Location:       Pulse: 72 69 68 70   Resp: 16 18 16 16   Temp: 98.4 °F (36.9 °C) 98.4 °F (36.9 °C) 98.3 °F (36.8 °C) 97.8 °F (36.6 °C)   TempSrc:       SpO2: 99% 99% 100% 98%   Weight:       Height:         Temp:  [97.8 °F (36.6 °C)-98.4 °F (36.9 °C)] 97.8 °F (36.6 °C)  HR:  [68-70] 70  Resp:  [16-18] 16  BP: (104-109)/(55-56) 108/56  Weight (last 2 days)     Date/Time Weight    06/28/23 1330 76.2 (168)          Intake/Output Summary (Last 24 hours) at 6/30/2023 5468  Last data filed at 6/29/2023 1801  Gross per 24 hour   Intake 360 ml   Output 200 ml   Net 160 ml     Invasive Devices     Peripheral Intravenous Line  Duration           Peripheral IV 06/27/23 Proximal;Right;Ventral (anterior) Forearm 2 days    Peripheral IV 06/28/23 Left Forearm 2 days          Drain  Duration           External Urinary Catheter 1 day                  Labs: I have personally reviewed pertinent reports. Results from last 7 days   Lab Units 06/30/23  0456 06/29/23  0507 06/28/23  0419 06/27/23  2206   WBC Thousand/uL 8.78 7.63 10.58* 12.99*   HEMOGLOBIN g/dL 8.3* 8.1* 8.9* 9.3*   HEMATOCRIT % 28.0* 27.9* 28.1* 29.7*   PLATELETS Thousands/uL 279 318 379 402*   NEUTROS ABS Thousands/µL  --  5.35 7.73* 10.07*       Results from last 7 days   Lab Units 06/29/23  0507 06/28/23  0614 06/27/23 2206   POTASSIUM mmol/L 4.2 3.7 4.0   CHLORIDE mmol/L 108 109* 107   CO2 mmol/L 27 29 26   BUN mg/dL 15 17 17   CREATININE mg/dL 0.57* 0.60 0.66   CALCIUM mg/dL 8.1* 8.0* 7.9*   AST U/L  --   --  18   ALT U/L  --   --  13   ALK PHOS U/L  --   --  128*   EGFR ml/min/1.73sq m 82 81 78       Results from last 7 days   Lab Units 06/27/23 2206   CRP mg/L 92.0*   LACTIC ACID mmol/L 2.0   PROCALCITONIN ng/ml 4.52*       Lab Results   Component Value Date/Time    Blood Culture No Growth at 48 hrs. 06/27/2023 10:06 PM    Blood Culture No Growth at 48 hrs. 06/27/2023 10:06 PM    Gram Stain Result No Polys or Bacteria seen 06/28/2023 04:11 PM    Urine Culture >100,000 cfu/ml Escherichia coli ESBL (A) 06/15/2023 07:05 PM    Urine Culture >100,000 cfu/ml Klebsiella pneumoniae (A) 06/15/2023 07:05 PM    Urine Culture >100,000 cfu/ml Aerococcus urinae (A) 06/15/2023 07:05 PM    Wound Culture No growth 06/28/2023 04:11 PM         EKG, Pathology, Imaging, and Other Studies:   I have personally reviewed pertinent reports.       XR chest portable    Result Date: 6/28/2023  Impression: Increased interstitial markings, likely a component of mild fibrosis but question interstitial edema. Trace effusions better shown on abdomen CT. Workstation performed: [id number]     CT lower extremity w contrast right    Result Date: 6/28/2023  Impression: Right total hip arthroplasty, with acetabular component dislodged superior laterally out of the native acetabular fossa (series 307 images .) There is a 9.4 x 3.4 x 15.7 cm air and fluid collection in the subcutaneous tissues in the right lateral hip, contacting the right hip replacement and extending into the native acetabular fossa where there are bone erosions, consistent with abscess formation, infection of the arthroplasty and osteomyelitis (series 5 images 19-74.) Findings are consistent with the preliminary report from [facility] which was provided shortly after completion of the exam. Workstation performed: [id number]     CT abdomen pelvis with contrast    Result Date: 6/28/2023  Impression: 1. Superior dislocation of right hip arthroplasty femoral stem and acetabular cup. 2. Collection of fluid and gas lateral to the femoral stem measuring 16.5 x 7.3 x 4.3 cm compatible with an abscess. Collection extends to the acetabular cup. 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 3. No evidence of intraperitoneal or retroperitoneal fluid collection. 4. Constipation. I personally discussed this study with Marguerite Gordon on 6/28/2023 2:06 AM. Workstation performed: [id number]         Meds/Allergies     All medications and allergies reviewed  Allergies   Allergen Reactions   • Other      Other reaction(s): Other (See Comments)  ana m toradol in OR 6/06 sah   • Seasonal Ic  [Cholestatin] Allergic Rhinitis   • Trimethoprim    • Bactrim [Sulfamethoxazole-Trimethoprim] Rash   • Sulfa Antibiotics Rash     Other reaction(s):  Other (See Comments)  takes lasix @home       Continuous Infusions:  lactated ringers, 75 mL/hr, Last Rate: 75 mL/hr (06/29/23 2345)        Scheduled Meds:  Current Facility-Administered Medications   Medication Dose Route Frequency Provider Last Rate   • acetaminophen  650 mg Oral TID Dava Fiscal, DO     • bisacodyl  5 mg Oral Daily PRN Amora Fiscal, DO     • cholecalciferol  5,000 Units Oral Daily Saundra Adams Yext, DO     • vitamin B-12  1,000 mcg Oral Daily Tu B Le, DO     • DULoxetine  60 mg Oral Daily Saundra Adams Yext, DO     • fluticasone  2 spray Each Nare Daily Saundra Adams Yext, DO     • gabapentin  300 mg Oral BID Mariely Padilla Yext, DO     • HYDROmorphone  0.2 mg Intravenous Q4H PRN Mariely Padilla Yext, DO     • lactated ringers  75 mL/hr Intravenous Continuous Harlan Dinero MD 75 mL/hr (06/29/23 2345)   • levothyroxine  112 mcg Oral Early Morning Saundra Adams Yext, DO     • ondansetron  4 mg Intravenous Q6H PRN Mariely Padilla Yext, DO     • oxyCODONE  5 mg Oral Q4H PRN Mariely Padilla Yext, DO     • oxyCODONE  2.5 mg Oral Q4H PRN Mariely Padilla Yext, DO     • pantoprazole  40 mg Intravenous Q24H [address] Saundra Adams Yext, DO     • phenol  1 spray Mouth/Throat Q2H PRN Tu B Le, DO     • polyethylene glycol  17 g Oral Daily PRN Mariely Padilla Yext, DO     • senna-docusate sodium  1 tablet Oral HS Saundra Adams Yext, DO     • simethicone  80 mg Oral Q6H PRN Mariely Padilla Yext, DO     • torsemide  10 mg Oral Daily Saundra Adams Yext, DO         PRN Meds:  •  bisacodyl  •  HYDROmorphone  •  ondansetron  •  oxyCODONE  •  oxyCODONE  •  phenol  •  polyethylene glycol  •  simethicone      Physical Exam   Physical Exam  Constitutional:       General: She is not in acute distress. Appearance: Normal appearance. She is obese. HENT:      Head: Normocephalic and atraumatic. Nose: Nose normal.      Mouth/Throat:      Mouth: Mucous membranes are moist.      Pharynx: Oropharynx is clear. Eyes:      General: No scleral icterus. Conjunctiva/sclera: Conjunctivae normal.      Pupils: Pupils are equal, round, and reactive to light.    Cardiovascular:      Rate and Rhythm: Normal rate and regular rhythm. Pulses: Normal pulses. Heart sounds: Normal heart sounds. No murmur heard. No friction rub. No gallop. Pulmonary:      Effort: Pulmonary effort is normal.      Breath sounds: Normal breath sounds. No wheezing, rhonchi or rales. Abdominal:      General: Abdomen is flat. Bowel sounds are normal.      Palpations: Abdomen is soft. There is no mass. Tenderness: There is no abdominal tenderness. Musculoskeletal:         General: No swelling. Cervical back: Neck supple. Right lower leg: No edema. Left lower leg: No edema. Comments: Draining R hip wound with dressing applied   Skin:     General: Skin is warm and dry. Neurological:      General: No focal deficit present. Mental Status: She is alert and oriented to person, place, and time.    Psychiatric:         Mood and Affect: Mood normal.         Behavior: Behavior normal.              Alpha Blade, DO  PGY-1, [facility]  06/30/23, 6:05 AM

## 2023-06-30 NOTE — PROGRESS NOTES
Progress Notes - Family Medicine Residency, [facility] SERGO Coulter 9/2/1933, 80 y.o. female. MRN: [id number]    Unit/Bed#: [facility] 807-01 Encounter: [id number]  Primary Care Provider: Jefferson Rivers DO      Admission Date: 6/27/2023 2125  Length of Stay: 2 days  Code Status:  Level 1 - Full Code  Consult:   IP CONSULT TO ORTHOPEDIC SURGERY  IP CONSULT TO CARDIOLOGY  IP CONSULT TO CASE MANAGEMENT  IP CONSULT TO PHARMACY  IP CONSULT TO INFECTIOUS DISEASES  IP CONSULT TO PICC TEAM      Assessments & Plans:   Plans discussed with [employer] team and finalization is pending attending physician Dr Leydi Reynolds DO attestation. Benign essential HTN  Assessment & Plan  Goal BP <130/80 per cardiology  Continue monitor BP, stable currently. Most recent BP Blood Pressure: 149/16   71H SBP Systolic (24PUN), ZCI:912 , Min:104 , Max:109      Plan  - C/w home torsemide 10 mg daily    Stage 2 chronic kidney disease  Assessment & Plan  Lab Results   Component Value Date    EGFR 86 06/30/2023    EGFR 82 06/29/2023    EGFR 81 06/28/2023    CREATININE 0.50 (L) 06/30/2023    CREATININE 0.57 (L) 06/29/2023    CREATININE 0.60 06/28/2023     Caution with using nephrotoxic agents  Avoid NSAIDs given Elevated INR. Ambulatory dysfunction  Assessment & Plan  Secondary to hip pain and multiple sites of OA. Pt presented from skilled nursing facility for PT/OT.      Plan  - OR for I&D  - Pain control  - PT/OT  - fall precautions  - CM referral for dispo    Hypothyroidism  Assessment & Plan  Thyroid    Lab Results   Component Value Date    XDT4GCRYMSYX 6.873 (H) 06/28/2023    HUF6OUBDMWGY 2.260 06/08/2022    IOU7LDAUGLMR 1.410 05/03/2021    FDF8MPFOMMGW 3.760 (H) 04/02/2021    RHA4QZHBELOV 2.076 05/15/2020    GCY3MKNGCXHM 4.400 (H) 03/13/2020    XPH0ARPJSUFV 3.713 01/22/2019    FREET4 1.39 (H) 06/28/2023    FREET4 0.96 06/23/2017    FREET4 1.00 03/10/2016     Plan  - TSH elevated, c/w increased levothyroxine dose 112mcg daily Pacemaker  Assessment & Plan  History of intolerance and bradycardia with AVN blocking agents for chronic atrial fibrillation. She underwent AV node ablation in 8/2018 and implantation of MDT single chamber His PPM - medtronic. 99%     Plan  - monitor vitals. Irritable bowel syndrome with constipation  Assessment & Plan  Plan  - Bowel regimen: Miralax and Sennakot scheduled. - monitor BMs, especially with acute pain and use of narcotics. Chronic diastolic CHF (congestive heart failure) (HCC)  Assessment & Plan  Wt Readings from Last 3 Encounters:   06/28/23 76.2 kg (168 lb)   06/16/23 76.6 kg (168 lb 12.8 oz)   03/24/23 75.8 kg (167 lb)     HFpEF per cardiology notes. Last ECHO 4/27/2018 EF 55%, LV thickness upper limits normal, moderate mitral regurgitation, moderate tricuspid regurgitation.      • Monitor I/O   • Continue torsemide 10mg        Chronic atrial fibrillation (HCC)  Assessment & Plan  Currently rate controlled. On daily warfarin 3 mg.  - Goal INR between 2-3.  - INR currently 5.40    Plan  - in ED, pt is s/p Vit K   - Ortho ordered 2 units of platelets. - Monitor INR  - Hold warfarin 3 mg at this time. - Hold NSAIDs given risk of bleeding    * Osteomyelitis [facility])  Assessment & Plan  Pt presents from [address] at [facility] for purulent discharge from her right hip noticed on day of admission. Her right hip was drained 1 L and the skilled nursing facility prior to ED presentation. · Pt has history of right hip replacement and follows with orthopedics outpatient. Recently admitted at [facility] from 6/8-6/12 under the trauma service for chronic right hip dislocation. · Pt is vitally stable, labs significant for leukocytosis. Imaging obtained. Blood cultures pending. CT Abdomen Pelvis (6/27/23)  1.  Superior dislocation of right hip arthroplasty femoral stem and acetabular cup.  2. Collection of fluid and gas lateral to the femoral stem measuring 16.5 x 7.3 x 4.3 cm compatible with an abscess. Collection extends to the acetabular cup. 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 3. No evidence of intraperitoneal or retroperitoneal fluid collection. 4. Constipation.    - Orthopedics evaluated patient in ED and agrees for surgical intervention in the morning.      S/p INR reversal with Vit K 10 mg and Platelet transfusion x2      Plan:  • Pain regimen: dimitrios tylenol 975 mg Q8H, oxy 2.5/5 mg Q8H prn  • NPO at [facility] for I&D per Ortho  • Recheck INR, hold warfarin  • F/u cultures  • Cardiology preop clearance completed 6/29  • Abx: Ceftriaxone, vancomycin held at this time per ID to optimize OR cultures         Patient Active Problem List   Diagnosis   • Chronic atrial fibrillation (HCC)   • Anemia   • Chronic diastolic CHF (congestive heart failure) ([facility])   • Depression   • Class 1 obesity due to excess calories with serious comorbidity and body mass index (BMI) of 33.0 to 33.9 in adult   • Chronic pain   • Chronic pain of lower extremity, bilateral   • Fibromyalgia   • External hemorrhoids   • Irritable bowel syndrome with constipation   • Lumbar canal stenosis   • Moderate mitral regurgitation   • Osteoarthritis, multiple sites   • Osteoporosis   • Peripheral neuropathy   • Primary osteoarthritis of right knee   • Synovial cyst of right popliteal space   • Pacemaker   • Hypothyroidism   • Macrocytosis   • Status post gastrectomy   • Status post right hip replacement   • Ambulatory dysfunction   • MCI (mild cognitive impairment)   • Osteomyelitis (HCC)   • Cellulitis of right lower extremity   • Hypertrophic scar of skin   • S/P scar revision   • Elevated C-reactive protein (CRP)   • Fall   • Hematoma   • Ecchymosis of eye   • Meningioma ([facility])   • Trochanteric bursitis of right hip   • Stage 2 chronic kidney disease   • Benign essential HTN   • Trochanteric bursitis   • Negative depression screening   • Peripheral vascular disease, unspecified ([address])   • Lumbar radiculopathy   • Low back pain with sciatica   • Lumbar spondylosis   • S/P lumbar fusion   • Hyponatremia   • Acute cystitis   • Chronic diarrhea       Diet: Diet NPO; Sips with meds  VTE Pharm PPX: RX contraindicated due to: OR today  VTE [facility] PPX: sequential compression device      Subjective:   • Patient without acute events overnight per handoff. • No concern or report per nursing staff. • Patient seen and examined at bedside and without questions or concerns. • Patient had a BM yesterday. • Patient understands and agrees with plan.     Vitals:     Vitals:    06/28/23 2210 06/29/23 0740 06/29/23 1511 06/29/23 2333   BP: 118/58 109/55 104/56 108/56   BP Location:       Pulse: 72 69 68 70   Resp: 16 18 16 16   Temp: 98.4 °F (36.9 °C) 98.4 °F (36.9 °C) 98.3 °F (36.8 °C) 97.8 °F (36.6 °C)   TempSrc:       SpO2: 99% 99% 100% 98%   Weight:       Height:         Temp:  [97.8 °F (36.6 °C)-98.4 °F (36.9 °C)] 97.8 °F (36.6 °C)  HR:  [68-70] 70  Resp:  [16-18] 16  BP: (104-109)/(55-56) 108/56  Weight (last 2 days)     Date/Time Weight    06/28/23 1330 76.2 (168)          Intake/Output Summary (Last 24 hours) at 6/30/2023 0648  Last data filed at 6/29/2023 1801  Gross per 24 hour   Intake 360 ml   Output 200 ml   Net 160 ml     Invasive Devices     Peripheral Intravenous Line  Duration           Peripheral IV 06/27/23 Proximal;Right;Ventral (anterior) Forearm 2 days    Peripheral IV 06/28/23 Left Forearm 2 days          Drain  Duration           External Urinary Catheter 1 day                Labs:     CBC:  Results from last 7 days   Lab Units 06/30/23  0456 06/29/23  0507 06/28/23  0419 06/27/23  2206   WBC Thousand/uL 8.78 7.63 10.58* 12.99*   HEMOGLOBIN g/dL 8.3* 8.1* 8.9* 9.3*   HEMATOCRIT % 28.0* 27.9* 28.1* 29.7*   PLATELETS Thousands/uL 279 318 379 402*   NEUTROS ABS Thousands/µL  --  5.35 7.73* 10.07*       CMP:  Results from last 7 days   Lab Units 06/30/23  0456 06/29/23  0507 06/28/23  0614 06/27/23  2206   POTASSIUM mmol/L 4.3 4.2 3.7 4. 0   CHLORIDE mmol/L 109* 108 109* 107   CO2 mmol/L 28 27 29 26   BUN mg/dL 14 15 17 17   CREATININE mg/dL 0.50* 0.57* 0.60 0.66   CALCIUM mg/dL 7.8* 8.1* 8.0* 7.9*   AST U/L  --   --   --  18   ALT U/L  --   --   --  13   ALK PHOS U/L  --   --   --  128*   EGFR ml/min/1.73sq m 86 82 81 78       Sepsis:  Results from last 7 days   Lab Units 06/27/23  2206   CRP mg/L 92.0*   LACTIC ACID mmol/L 2.0   PROCALCITONIN ng/ml 4.52*       Micro:  Lab Results   Component Value Date/Time    Blood Culture No Growth at 48 hrs. 06/27/2023 10:06 PM    Blood Culture No Growth at 48 hrs. 06/27/2023 10:06 PM    Gram Stain Result No Polys or Bacteria seen 06/28/2023 04:11 PM    Urine Culture >100,000 cfu/ml Escherichia coli ESBL (A) 06/15/2023 07:05 PM    Urine Culture >100,000 cfu/ml Klebsiella pneumoniae (A) 06/15/2023 07:05 PM    Urine Culture >100,000 cfu/ml Aerococcus urinae (A) 06/15/2023 07:05 PM    Wound Culture No growth 06/28/2023 04:11 PM       Imaging:   XR chest portable    Result Date: 6/28/2023  Impression: Increased interstitial markings, likely a component of mild fibrosis but question interstitial edema. Trace effusions better shown on abdomen CT.  Workstation performed: [id number]     CT lower extremity w contrast right    Result Date: 6/28/2023  Impression: Right total hip arthroplasty, with acetabular component dislodged superior laterally out of the native acetabular fossa (series 307 images .) There is a 9.4 x 3.4 x 15.7 cm air and fluid collection in the subcutaneous tissues in the right lateral hip, contacting the right hip replacement and extending into the native acetabular fossa where there are bone erosions, consistent with abscess formation, infection of the arthroplasty and osteomyelitis (series 5 images 19-74.) Findings are consistent with the preliminary report from [facility] which was provided shortly after completion of the exam. Workstation performed: [id number]     CT abdomen pelvis with contrast    Result Date: 6/28/2023  Impression: 1. Superior dislocation of right hip arthroplasty femoral stem and acetabular cup. 2. Collection of fluid and gas lateral to the femoral stem measuring 16.5 x 7.3 x 4.3 cm compatible with an abscess. Collection extends to the acetabular cup. 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 3. No evidence of intraperitoneal or retroperitoneal fluid collection. 4. Constipation.  I personally discussed this study with Katarina Mcclelland on 6/28/2023 2:06 AM. Workstation performed: [id number]       Medications:     Current Facility-Administered Medications   Medication Dose Route Frequency   • acetaminophen (TYLENOL) tablet 650 mg  650 mg Oral TID   • bisacodyl (DULCOLAX) EC tablet 5 mg  5 mg Oral Daily PRN   • cholecalciferol (VITAMIN D3) tablet 5,000 Units  5,000 Units Oral Daily   • cyanocobalamin (VITAMIN B-12) tablet 1,000 mcg  1,000 mcg Oral Daily   • DULoxetine (CYMBALTA) delayed release capsule 60 mg  60 mg Oral Daily   • fluticasone (FLONASE) 50 mcg/act nasal spray 2 spray  2 spray Each Nare Daily   • gabapentin (NEURONTIN) capsule 300 mg  300 mg Oral BID   • HYDROmorphone HCl (DILAUDID) injection 0.2 mg  0.2 mg Intravenous Q4H PRN   • lactated ringers infusion  75 mL/hr Intravenous Continuous   • levothyroxine tablet 112 mcg  112 mcg Oral Early Morning   • ondansetron (ZOFRAN) injection 4 mg  4 mg Intravenous Q6H PRN   • oxyCODONE (ROXICODONE) IR tablet 5 mg  5 mg Oral Q4H PRN   • oxyCODONE (ROXICODONE) split tablet 2.5 mg  2.5 mg Oral Q4H PRN   • pantoprazole (PROTONIX) injection 40 mg  40 mg Intravenous Q24H JOSE   • phenol (CHLORASEPTIC) 1.4 % mucosal liquid 1 spray  1 spray Mouth/Throat Q2H PRN   • polyethylene glycol (MIRALAX) packet 17 g  17 g Oral Daily PRN   • senna-docusate sodium (SENOKOT S) 8.6-50 mg per tablet 1 tablet  1 tablet Oral HS   • simethicone (MYLICON) chewable tablet 80 mg  80 mg Oral Q6H PRN   • torsemide (DEMADEX) tablet 10 mg  10 mg Oral Daily       Physical Exam:     General: Pt observed lying comfortably in bed, NAD. Not toxic/ill-appearing. No cachectic or diaphoresis. No obvious sign of trauma or bleeding. Psych: able to converse appropriately. Neuro: no gross neurological deficits. Head: atraumatic, normocephalic. Eyes: open spontaneously, EOM intact, conjunctiva non-injected, no scleral icterus, no discharge. Ear: normal external ear, no visible drainage at external auditory orifice  Nose: no epistaxis, no rhinorrhea. Throat: no hoarse voice, no cough. Neck: normal ROM. Heart: RRR, no murmur/distant heart sound appreciated. Lungs: LCTABL, nml respiratory effort, no agonal/labored breathing, no accessory muscle use.   Abdomen: soft, nontender, nondistended, normal bowel sound  Extremities: no peripheral edema    Wendy Copeland DO  PGY-2, Family Medicine  06/30/23  6:48 AM

## 2023-06-30 NOTE — PLAN OF CARE
Problem: Potential for Falls  Goal: Patient will remain free of falls  Description: INTERVENTIONS:  - Educate patient/family on patient safety including physical limitations  - Instruct patient to call for assistance with activity   - Consult OT/PT to assist with strengthening/mobility   - Keep Call bell within reach  - Keep bed low and locked with side rails adjusted as appropriate  - Keep care items and personal belongings within reach  - Initiate and maintain comfort rounds  - Initiate/Maintain bed and chair alarm  - Apply yellow socks and bracelet for high fall risk patients  - Consider moving patient to room near nurses station  Outcome: Progressing     Problem: MOBILITY - ADULT  Goal: Maintain or return to baseline ADL function  Description: INTERVENTIONS:  -  Assess patient's ability to carry out ADLs; assess patient's baseline for ADL function and identify physical deficits which impact ability to perform ADLs (bathing, care of mouth/teeth, toileting, grooming, dressing, etc.)  - Assess/evaluate cause of self-care deficits   - Assess range of motion  - Assess patient's mobility; develop plan if impaired  - Assess patient's need for assistive devices and provide as appropriate  - Encourage maximum independence but intervene and supervise when necessary  - Involve family in performance of ADLs  - Assess for home care needs following discharge   - Consider OT consult to assist with ADL evaluation and planning for discharge  - Provide patient education as appropriate  Outcome: Progressing  Goal: Maintains/Returns to pre admission functional level  Description: INTERVENTIONS:  - Perform BMAT or MOVE assessment daily.   - Set and communicate daily mobility goal to care team and patient/family/caregiver. - Collaborate with rehabilitation services on mobility goals if consulted  - Reposition patient every 2 hours.   - Out of bed for toileting  - Record patient progress and toleration of activity level   Outcome: Progressing     Problem: Prexisting or High Potential for Compromised Skin Integrity  Goal: Skin integrity is maintained or improved  Description: INTERVENTIONS:  - Identify patients at risk for skin breakdown  - Assess and monitor skin integrity  - Assess and monitor nutrition and hydration status  - Monitor labs   - Assess for incontinence   - Turn and reposition patient  - Assist with mobility/ambulation  - Relieve pressure over bony prominences  - Avoid friction and shearing  - Provide appropriate hygiene as needed including keeping skin clean and dry  - Evaluate need for skin moisturizer/barrier cream  - Collaborate with interdisciplinary team   - Patient/family teaching  - Consider wound care consult   Outcome: Progressing     Problem: PAIN - ADULT  Goal: Verbalizes/displays adequate comfort level or baseline comfort level  Description: Interventions:  - Encourage patient to monitor pain and request assistance  - Assess pain using appropriate pain scale  - Administer analgesics based on type and severity of pain and evaluate response  - Implement non-pharmacological measures as appropriate and evaluate response  - Consider cultural and social influences on pain and pain management  - Notify physician/advanced practitioner if interventions unsuccessful or patient reports new pain  Outcome: Progressing     Problem: SKIN/TISSUE INTEGRITY - ADULT  Goal: Incision(s), wounds(s) or drain site(s) healing without S/S of infection  Description: INTERVENTIONS  - Assess and document dressing, incision, wound bed, drain sites and surrounding tissue  - Provide patient and family education  - Perform skin care/dressing changes every shift  Outcome: Progressing  Goal: Pressure injury heals and does not worsen  Description: Interventions:  - Implement low air loss mattress or specialty surface (Criteria met)  - Apply silicone foam dressing  - Instruct/assist with weight shifting every 60 minutes when in chair   - Use special pressure reducing interventions such as EHOB when in chair   - Apply fecal or urinary incontinence containment device   - Perform passive or active ROM every shift  - Turn and reposition patient & offload bony prominences every 2 hours   - Utilize friction reducing device or surface for transfers   - Consider nutrition services referral as needed  Outcome: Progressing

## 2023-07-01 LAB
ANION GAP SERPL CALCULATED.3IONS-SCNC: 1 MMOL/L
BACTERIA SPEC ANAEROBE CULT: NORMAL
BACTERIA WND AEROBE CULT: NO GROWTH
BASE EXCESS BLDA CALC-SCNC: 2 MMOL/L (ref -2–3)
BASOPHILS # BLD AUTO: 0.04 THOUSANDS/ÂΜL (ref 0–0.1)
BASOPHILS NFR BLD AUTO: 0 % (ref 0–1)
BUN SERPL-MCNC: 11 MG/DL (ref 5–25)
CA-I BLD-SCNC: 1.19 MMOL/L (ref 1.12–1.32)
CALCIUM SERPL-MCNC: 7.5 MG/DL (ref 8.3–10.1)
CHLORIDE SERPL-SCNC: 112 MMOL/L (ref 96–108)
CO2 SERPL-SCNC: 25 MMOL/L (ref 21–32)
CREAT SERPL-MCNC: 0.41 MG/DL (ref 0.6–1.3)
EOSINOPHIL # BLD AUTO: 0.01 THOUSAND/ÂΜL (ref 0–0.61)
EOSINOPHIL NFR BLD AUTO: 0 % (ref 0–6)
ERYTHROCYTE [DISTWIDTH] IN BLOOD BY AUTOMATED COUNT: 14.6 % (ref 11.6–15.1)
GFR SERPL CREATININE-BSD FRML MDRD: 91 ML/MIN/1.73SQ M
GLUCOSE SERPL-MCNC: 77 MG/DL (ref 65–140)
GLUCOSE SERPL-MCNC: 87 MG/DL (ref 65–140)
GRAM STN SPEC: NORMAL
HCO3 BLDA-SCNC: 27 MMOL/L (ref 22–28)
HCT VFR BLD AUTO: 24.6 % (ref 34.8–46.1)
HCT VFR BLD CALC: 24 % (ref 34.8–46.1)
HGB BLD-MCNC: 7.5 G/DL (ref 11.5–15.4)
HGB BLDA-MCNC: 8.2 G/DL (ref 11.5–15.4)
IMM GRANULOCYTES # BLD AUTO: 0.15 THOUSAND/UL (ref 0–0.2)
IMM GRANULOCYTES NFR BLD AUTO: 1 % (ref 0–2)
LYMPHOCYTES # BLD AUTO: 0.85 THOUSANDS/ÂΜL (ref 0.6–4.47)
LYMPHOCYTES NFR BLD AUTO: 7 % (ref 14–44)
MCH RBC QN AUTO: 32.1 PG (ref 26.8–34.3)
MCHC RBC AUTO-ENTMCNC: 30.5 G/DL (ref 31.4–37.4)
MCV RBC AUTO: 105 FL (ref 82–98)
MONOCYTES # BLD AUTO: 1.15 THOUSAND/ÂΜL (ref 0.17–1.22)
MONOCYTES NFR BLD AUTO: 9 % (ref 4–12)
NEUTROPHILS # BLD AUTO: 10.73 THOUSANDS/ÂΜL (ref 1.85–7.62)
NEUTS SEG NFR BLD AUTO: 83 % (ref 43–75)
NRBC BLD AUTO-RTO: 0 /100 WBCS
PCO2 BLD: 28 MMOL/L (ref 21–32)
PCO2 BLD: 44.1 MM HG (ref 36–44)
PH BLD: 7.39 [PH] (ref 7.35–7.45)
PLATELET # BLD AUTO: 235 THOUSANDS/UL (ref 149–390)
PMV BLD AUTO: 8.9 FL (ref 8.9–12.7)
PO2 BLD: 242 MM HG (ref 75–129)
POTASSIUM BLD-SCNC: 4 MMOL/L (ref 3.5–5.3)
POTASSIUM SERPL-SCNC: 4.4 MMOL/L (ref 3.5–5.3)
RBC # BLD AUTO: 2.34 MILLION/UL (ref 3.81–5.12)
SAO2 % BLD FROM PO2: 100 % (ref 60–85)
SODIUM BLD-SCNC: 139 MMOL/L (ref 136–145)
SODIUM SERPL-SCNC: 138 MMOL/L (ref 135–147)
SPECIMEN SOURCE: ABNORMAL
WBC # BLD AUTO: 12.93 THOUSAND/UL (ref 4.31–10.16)

## 2023-07-01 PROCEDURE — 80048 BASIC METABOLIC PNL TOTAL CA: CPT

## 2023-07-01 PROCEDURE — 99232 SBSQ HOSP IP/OBS MODERATE 35: CPT | Performed by: FAMILY MEDICINE

## 2023-07-01 PROCEDURE — 97167 OT EVAL HIGH COMPLEX 60 MIN: CPT

## 2023-07-01 PROCEDURE — 85025 COMPLETE CBC W/AUTO DIFF WBC: CPT | Performed by: FAMILY MEDICINE

## 2023-07-01 PROCEDURE — NC001 PR NO CHARGE: Performed by: ORTHOPAEDIC SURGERY

## 2023-07-01 PROCEDURE — C9113 INJ PANTOPRAZOLE SODIUM, VIA: HCPCS

## 2023-07-01 PROCEDURE — 97163 PT EVAL HIGH COMPLEX 45 MIN: CPT

## 2023-07-01 RX ADMIN — SODIUM CHLORIDE, SODIUM LACTATE, POTASSIUM CHLORIDE, AND CALCIUM CHLORIDE 75 ML/HR: .6; .31; .03; .02 INJECTION, SOLUTION INTRAVENOUS at 09:34

## 2023-07-01 RX ADMIN — GABAPENTIN 300 MG: 300 CAPSULE ORAL at 17:11

## 2023-07-01 RX ADMIN — PANTOPRAZOLE SODIUM 40 MG: 40 INJECTION, POWDER, FOR SOLUTION INTRAVENOUS at 09:43

## 2023-07-01 RX ADMIN — TORSEMIDE 10 MG: 10 TABLET ORAL at 09:36

## 2023-07-01 RX ADMIN — Medication 5000 UNITS: at 09:35

## 2023-07-01 RX ADMIN — VANCOMYCIN HYDROCHLORIDE 1500 MG: 10 INJECTION, POWDER, LYOPHILIZED, FOR SOLUTION INTRAVENOUS at 09:38

## 2023-07-01 RX ADMIN — ACETAMINOPHEN 650 MG: 325 TABLET, FILM COATED ORAL at 22:53

## 2023-07-01 RX ADMIN — GABAPENTIN 300 MG: 300 CAPSULE ORAL at 09:35

## 2023-07-01 RX ADMIN — FLUTICASONE PROPIONATE 2 SPRAY: 50 SPRAY, METERED NASAL at 09:38

## 2023-07-01 RX ADMIN — DULOXETINE HYDROCHLORIDE 60 MG: 60 CAPSULE, DELAYED RELEASE ORAL at 09:35

## 2023-07-01 RX ADMIN — CYANOCOBALAMIN TAB 500 MCG 1000 MCG: 500 TAB at 09:35

## 2023-07-01 RX ADMIN — ACETAMINOPHEN 650 MG: 325 TABLET, FILM COATED ORAL at 17:11

## 2023-07-01 RX ADMIN — ENOXAPARIN SODIUM 40 MG: 40 INJECTION SUBCUTANEOUS at 09:37

## 2023-07-01 RX ADMIN — OXYCODONE HYDROCHLORIDE 5 MG: 5 TABLET ORAL at 06:16

## 2023-07-01 RX ADMIN — LEVOTHYROXINE SODIUM 112 MCG: 112 TABLET ORAL at 05:03

## 2023-07-01 RX ADMIN — ACETAMINOPHEN 650 MG: 325 TABLET, FILM COATED ORAL at 09:35

## 2023-07-01 NOTE — OP NOTE
OPERATIVE REPORT  PATIENT NAME: Rebeca Mark    :  1933  MRN: [id number]  Pt Location: BE OR ROOM 04    SURGERY DATE: 2023    Surgeon(s) and Role:     * Moon Em MD - Primary     * Aminata Limon MD - Assisting     * Patricia Albrecht MD - Assisting    Preop Diagnosis:  Infection associated with internal right hip prosthesis, initial encounter ([address]) Billy Rendon    Post-Op Diagnosis Codes:     * Infection associated with internal right hip prosthesis, initial encounter ([address]) Billy Rendon    Procedure(s):  Right - DEBRIDEMENT LOWER EXTREMITY ([facility])- I&D of right hip, resection arthroplasty; insertion of antibiotic beads    Specimen(s):  ID Type Source Tests Collected by Time Destination   A : right hip wound cultures Tissue Wound ANAEROBIC CULTURE AND GRAM STAIN, CULTURE, TISSUE AND GRAM STAIN Moon Em MD 2023 191        Estimated Blood Loss:   Minimal    Drains:  Closed/Suction Drain Right Hip Accordion 23 Fr. (Active)   Site Description Unable to view 23   Dressing Status Clean;Dry; Intact 23   Drainage Appearance Serosanguineous 23   Status Accordion suction 23   Number of days: 1       [REMOVED] External Urinary Catheter (Removed)   Collection Container Canister and suction tubing (For Female) 23 0605   Suction Pressure (mmHg) 80 mmHg 23 1230   Interventions Removed and skin assessed; Pericare performed;Suction canister changed;Device changed 23 8820   Output (mL) 200 mL 23 0900   Number of days: 2       Anesthesia Type:   General    Operative Indications:  Infection associated with internal right hip prosthesis, initial encounter ([address]) Billy Rendon  In brief, patient is a 55-year-old female who has history of right total hip arthroplasty revision 2019 with subsequent dislocation of the acetabular cup of the acetabulum, she recently presented with a new sinus tract.   After risk and benefits discussed, based on her multiple medical morbidities and nonambulatory status for a long time we proceeded to indicate her for right hip I&D, explantation of total hip components and placement of antibiotic beads; informed consent was obtained with family members. Operative Findings:  Sinus tract communicating directly to the deep space and components  Extensive amount of purulent fluid and necrotic tissue  Successful explantation hip arthroplasty components    Complications:   None    Procedure and Technique:  Patient was identified in the preop holding area and the operative extremity was marked, she was then taken to the operating room via stretcher. She underwent general anesthesia as per the anesthesia team and then she was transferred to the operating room table. She was placed in the lateral decubitus position using the beanbag with right hip up. Axillary roll was placed. All bony prominences were well-padded. The right lower extremity was prepped and draped in usual sterile fashion. A timeout was performed before starting, confirming the patient by name, date of birth, correct extremity correct laterality, antibiotics were held until cultures were obtained; all teams in the room agreed  to proceed. Her prior posterior lateral incision was opened up and instantly more than 1 L of purulent material expressed out of the hip, there was no deep layer there was directed medication to the bone and implants to the sinus tract. All the tissues had chronic inflammatory changes and significant scar, the scarred gluteus joe was divided. We then proceeded to identify the implant and the femoral head was dislodged with a mallet out of the trunnion, the femoral head as well as the sterile cup and liner came out in 1 piece. We then addressed to remove the stem portion, using flexible osteotomes were able to back slap the femoral stem. Thorough debridement was performed.   3 L of Pulsavac irrigation was utilized, we then proceeded to do a Betadine soak for 3 minutes and subsequently an Irrisept soak for another 3 minutes, this was then irrigated again with 3 L of normal saline solution using Pulsavac. Stimulan tobramycin antibiotic beads were then placed in the wound and a gram of Vanco powder. Deep layers were closed with 1-0 PDS. A 19 Khmer Hemovac was then placed superficially. Skin was closed with 2-0 PDS and reinforced with nylon's in horizontal mattress fashion. Drain was holding suction. Sterile dressings were applied. She was then transferred to the hospital stretcher and  she emerged from anesthesia and transferred to the PACU in stable condition.     Patient Disposition:  PACU     Dr. Noble Collet was present for all critical portion of the procedure    SIGNATURE: Mora Sanderson MD  DATE: July 1, 2023  TIME: 7:02 AM

## 2023-07-01 NOTE — PROGRESS NOTES
Progress Note - Orthopedics   Adria Dugan 80 y.o. female MRN: [id number]      Subjective:  No acute events overnight. No acute distress. Denies fevers, chills, SOB. Feels uncomfortable in bed    Objective:  A 10 point ROS was performed; negative except as noted above. Labs:  Lab Results   Component Value Date/Time    WBC 12.93 (H) 07/01/2023 04:24 AM    WBC 7.98 04/20/2015 11:09 AM    HGB 7.5 (L) 07/01/2023 04:24 AM    HGB 12.0 04/20/2015 11:09 AM       Physical:  Vitals:    07/01/23 0000   BP:    Pulse: 71   Resp:    Temp:    SpO2: 94%     Musculoskeletal: right Lower Extremity  · Dressing C/D/I   · HV holding suction  · TTP around thigh  · SILT george/saph/sp/dp/tib nerve distributions   · +FHL/EHL, +ankle DF/PF  · Toes WWP w BCR    Assessment:    80 y.o. female post op day #1 from Right MAIA explantation, extensive I&D, insertion of antibiotic beads    Plan:  · Stand to transfer RLE  · PT/OT  · Pain control  · DVT ppx per primary team, recommend 28 days   · Monitor anemia, transfuse as needed  · Abx as per ID  · Monitor HV  · Dispo: Ortho will follow      Arnaldo Eid MD    Please contact role [facility] for any questions or concerns.

## 2023-07-01 NOTE — ANESTHESIA POSTPROCEDURE EVALUATION
Post-Op Assessment Note    CV Status:  Stable  Pain Score: 0    Pain management: adequate     Mental Status:  Awake and alert   Hydration Status:  Stable   PONV Controlled:  None   Airway Patency:  Patent      Post Op Vitals Reviewed: Yes      Staff: CRNA         There were no known notable events for this encounter.     BP  91/55   Temp  97.9    Pulse 72   Resp 16   SpO2 96

## 2023-07-01 NOTE — PHYSICAL THERAPY NOTE
Physical Therapy Evaluation     Patient's Name: Marcie Martinez    Admitting Diagnosis  Infection associated with internal right hip prosthesis, initial encounter [facility]) [T84.51XA]    Problem List  Patient Active Problem List   Diagnosis    Chronic atrial fibrillation (HCC)    Anemia    Chronic diastolic CHF (congestive heart failure) (HCC)    Depression    Class 1 obesity due to excess calories with serious comorbidity and body mass index (BMI) of 33.0 to 33.9 in adult    Chronic pain    Chronic pain of lower extremity, bilateral    Fibromyalgia    External hemorrhoids    Irritable bowel syndrome with constipation    Lumbar canal stenosis    Moderate mitral regurgitation    Osteoarthritis, multiple sites    Osteoporosis    Peripheral neuropathy    Primary osteoarthritis of right knee    Synovial cyst of right popliteal space    Pacemaker    Hypothyroidism    Macrocytosis    Status post gastrectomy    Status post right hip replacement    Ambulatory dysfunction    MCI (mild cognitive impairment)    Osteomyelitis (HCC)    Cellulitis of right lower extremity    Hypertrophic scar of skin    S/P scar revision    Elevated C-reactive protein (CRP)    Fall    Hematoma    Ecchymosis of eye    Meningioma (HCC)    Trochanteric bursitis of right hip    Stage 2 chronic kidney disease    Benign essential HTN    Trochanteric bursitis    Negative depression screening    Peripheral vascular disease, unspecified (HCC)    Lumbar radiculopathy    Low back pain with sciatica    Lumbar spondylosis    S/P lumbar fusion    Hyponatremia    Acute cystitis    Chronic diarrhea       Past Medical History  Past Medical History:   Diagnosis Date    Abdominal bloating 8/1/2016    Anemia     Arthritis     Cancer (HCC)     basal cell    CHF (congestive heart failure) ([address])     COVID-19     Disease of thyroid gland     Disturbance of smell and taste     disturbance of taste    Effusion of knee joint right     Fibromyalgia     Heart murmur reported a previous heart murmur    History of acute myocardial infarction     History of atrial fibrillation     History of bruising easily     History of cancer     History of dermatitis     History of mammogram     History of methicillin resistant staphylococcus aureus (MRSA)     Negative nasal culture, isolation discontinued 8/17/2018. History of methicillin resistant staphylococcus aureus (MRSA) 08/17/2018    negative nasal culture-isolation and hx discontinued 8/17/2018    History of obesity     History of osteopenia     History of sciatica     History of shortness of breath     History of sinusitis     History of sore throat     History of syncope     History of umbilical hernia     History of viral infection     Irritable bowel syndrome (IBS)     Joint pain, hip     Limb pain     Myalgia     myalgia and myositis    Need for prophylactic vaccination against single diseases     Need for prophylactic vaccination and inoculation against influenza     Preoperative cardiovascular examination     Primary osteoarthritis of right knee     Right leg pain     Vaginal Pap smear     reported pap smear       Past Surgical History  Past Surgical History:   Procedure Laterality Date    ANKLE ARTHRODESIS Right     BACK SURGERY      BARIATRIC SURGERY      CHOLECYSTECTOMY      x2    COLONOSCOPY      ESOPHAGOGASTRODUODENOSCOPY N/A 3/23/2018    Procedure: ESOPHAGOGASTRODUODENOSCOPY (EGD); Surgeon: David Ospina MD;  Location: [facility];   Service: Gastroenterology    FOOT SURGERY      HIP CLOSE REDUCTION Right 8/21/2016    Procedure: CLOSED REDUCTION RIGHT TOTAL HIP;  Surgeon: Mulugeta Ramirez MD;  [facility];  Service:     Delvis Peterson / Anthony Sanderson / Janae Estrada      IR BIOPSY OTHER  8/1/2020    JOINT REPLACEMENT      LEFT KNEE REPLACEMENT    JOINT REPLACEMENT      Right HIP    PILONIDAL CYST EXCISION      x2    TX EXC B9 LESION MRGN XCP SK TG T/A/L 0.5 CM/< Right 7/24/2019    Procedure: REVISION SCAR EXTREMITY Rt Hip; Surgeon: Maxwell Alvarado MD;  Location: [facility];  Service: Orthopedics    IN REVJ TOT HIP ARTHRP [address] W/WO AGRFT/ALGRFT Right 4/15/2019    Procedure: ARTHROPLASTY HIP TOTAL ACETABULAR REVISION;  Surgeon: Maxwell Alvarado MD;  [facility];  Service: Orthopedics    REPLACEMENT TOTAL KNEE Right     SILVANA-EN-Y PROCEDURE      x2    TOTAL KNEE ARTHROPLASTY      UMBILICAL HERNIA REPAIR      x2        07/01/23 1052   PT Last Visit   PT Visit Date 07/01/23   Note Type   Note type Evaluation   Pain Assessment   Pain Assessment Tool 0-10   Pain Score 10 - Worst Possible Pain   Pain Location/Orientation Orientation: Right;Location: Hip;Location: Knee   Hospital Pain Intervention(s) Repositioned; Ambulation/increased activity; Elevated; Emotional support   Restrictions/Precautions   Weight Bearing Precautions Per Order Yes   RLE Weight Bearing Per Order NWB  (vs stand for transfers per ortho- will need to confirm)   Other Precautions Cognitive; Chair Alarm; Bed Alarm;WBS;Multiple lines; Fall Risk;Pain;Contact/isolation  (hemovac)   Home Living   Type of Home SNF  [facility])   Home Layout One level;Performs ADLs on one level; Able to live on main level with bedroom/bathroom   [place]; Wheelchair-manual  (primarily using WC)   Prior Function   Level of [place] Needs assistance with ADLs; Needs assistance with functional mobility; Needs assistance with [address] [place] [address] Rd staff   Receives Help From Personal care attendant   IADLs Family/Friend/Other provides transportation; Family/Friend/Other provides meals; Family/Friend/Other provides medication management   Falls in the last 6 months 0   Vocational Retired   Cognition   Arousal/Participation Alert   Orientation Level Oriented to person;Oriented to place;Oriented to time   Following Commands Follows one step commands with increased time or repetition   Comments pt pleasant and cooperative, occasionally requires cues to re-direct   RLE Assessment   RLE Assessment   (unable to assess 2* pain- minimal movement noted. Pt's RLE appeared to have decreased length as well as being ER- pt not allowing therapist to flex at knee 2* pain)   LLE Assessment   LLE Assessment   (functionally 3+/5)   Light Touch   RLE Light Touch Impaired  (pt reports numbness at R knee)   LLE Light Touch Grossly intact   Bed Mobility   Supine to Sit 2  Maximal assistance   Additional items Assist x 2;HOB elevated; Bedrails; Increased time required;Verbal cues;LE management   Sit to Supine 2  Maximal assistance   Additional items Assist x 2;HOB elevated; Bedrails; Increased time required;Verbal cues;LE management   Additional Comments pt supine in bed upon arrival. Pt sitting EOB with min A required to maintain foward posture and prevent posterior lean. Pt returned to supine in bed with all needs wihtin reach   Transfers   Sit to Stand Unable to assess   Stand to Sit Unable to assess   Additional Comments unable to progress at this time 2* increased RLE pain, poor sitting tolerance/ balance. PT to continue to follow and assess appropriate and able. Ambulation/Elevation   Gait pattern Not appropriate   Balance   Static Sitting Fair -   Dynamic Sitting Poor +   Endurance Deficit   Endurance Deficit Yes   Endurance Deficit Description generalized weakness, deconditioning, pain   Activity Tolerance   Activity Tolerance Patient limited by fatigue;Patient limited by pain   Medical Staff Made Aware RAMA Sanchez; andreia performed this date 2* increased medical complexity and multiple co-morbidities   Nurse Made Aware RN cleared pt to participate in therapy session   Assessment   Prognosis Fair   Problem List Decreased strength;Decreased range of motion;Decreased endurance; Impaired balance;Decreased mobility;Pain;Orthopedic restrictions   Assessment Pt is a 80 y.o. female seen for PT evaluation s/p admit to [facility] on 6/27/2023.  Pt was admitted with a primary dx of: osteomyelitis now s/p RTHA explantation, extensive I&D, insertion of antibiotic beads on 6/30. PT now consulted for assessment of mobility and d/c needs. Pt with Up and OOB as tolerated  orders. Pts current comorbidities effecting treatment include: a-fib, CHF, IBS, hypothyroidism, ambulatory dysfunction, CKD, HTN. Pts current clinical presentation is Unstable/ Unpredictable (high complexity) due to Ongoing medical management for primary dx, Increased reliance on more restrictive AD compared to baseline, Decreased activity tolerance compared to baseline, Fall risk, Increased assistance needed from caregiver at current time, Cog status, Current WBS, Trending lab values, Continuous pulse oximetry monitoring , GET drain in place at current time, s/p surgical intervention. Prior to admission, pt was residing at [facility] and was requiring A for ADLs/ IADLs, primarily using WC. Upon evaluation, pt currently is requiring max Ax2 for bed mobility. Pt presents at PT eval functioning below baseline and currently w/ overall mobility deficits 2* to: BLE weakness, decreased ROM, impaired balance, decreased endurance, impaired coordination, gait deviations, pain, decreased activity tolerance compared to baseline, decreased functional mobility tolerance compared to baseline, altered sensation, decreased safety awareness, impaired judgement, fall risk, orthopedic restrictions. Pt currently at a fall risk 2* to impairments listed above. Pt will continue to benefit from skilled acute PT interventions to address stated impairments; to maximize functional mobility; for ongoing pt/ family training; and DME needs. At conclusion of PT session pt returned BTB and bed alarm engaged with phone and call bell within reach. Pt denies any further questions at this time. The patient's AM-PAC Basic Mobility Inpatient Short Form Raw Score is 6. A Raw score of less than or equal to 16 suggests the patient may benefit from discharge to post-acute rehabilitation services. Please also refer to the recommendation of the Physical Therapist for safe discharge planning. Recommend return to facility with appropriate level of assistance (may benefit from STR if facility unable to provide adequate level of assistance) upon hospital D/C. Goals   Patient Goals to have less pain   STG Expiration Date 07/15/23   Short Term Goal #1 STG 1. Pt will be able to perform bed mobility tasks with min A in order to improve overall functional mobility and assist in safe d/c. STG 2. Pt with sit EOB for at least 25 minutes at S level in order to strengthen abdominal musculature and assist in future transfers/ ambulation. STG 4. Pt will improve sitting/standing static/dynamic balance 1/2 grade in order to improve functional mobility and assist in safe d/c. STG 5. Pt will improve LE strength by 1/2 grade in order to improve functional mobility and assist in safe d/c. PT to continue to follow and assess functional transfers as appropriate and able. PT Treatment Day 0   Plan   Treatment/Interventions Functional transfer training;LE strengthening/ROM; Therapeutic exercise; Endurance training;Patient/family training;Equipment eval/education; Bed mobility;Spoke to nursing;Spoke to case management;OT   PT Frequency 2-3x/wk   Recommendation   PT Discharge Recommendation Return to facility with rehabilitation services  (with appropriate level of assistance/ kami lift use vs STR if facility unable to provide physical assistance)   AM-PAC Basic Mobility Inpatient   Turning in Flat Bed Without Bedrails 1   Lying on Back to Sitting on Edge of Flat Bed Without Bedrails 1   Moving Bed to Chair 1   Standing Up From Chair Using Arms 1   Walk in Room 1   Climb 3-5 Stairs With Railing 1   Basic Mobility Inpatient Raw Score 6   Highest Level Of Mobility   JH-HLM Goal 2: Bed activities/Dependent transfer   JH-HLM Achieved 3: Sit at edge of bed   Modified Adriano Scale   Modified Newton Scale 4           Yo Webster, PT DPT

## 2023-07-01 NOTE — PLAN OF CARE
Problem: PHYSICAL THERAPY ADULT  Goal: Performs mobility at highest level of function for planned discharge setting. See evaluation for individualized goals. Description: Treatment/Interventions: Functional transfer training, LE strengthening/ROM, Therapeutic exercise, Endurance training, Patient/family training, Equipment eval/education, Bed mobility, Spoke to nursing, Spoke to case management, OT          See flowsheet documentation for full assessment, interventions and recommendations. Note: Prognosis: Fair  Problem List: Decreased strength, Decreased range of motion, Decreased endurance, Impaired balance, Decreased mobility, Pain, Orthopedic restrictions  Assessment: Pt is a 80 y.o. female seen for PT evaluation s/p admit to [address] [place] [address] on 6/27/2023. Pt was admitted with a primary dx of: osteomyelitis now s/p RTHA explantation, extensive I&D, insertion of antibiotic beads on 6/30. PT now consulted for assessment of mobility and d/c needs. Pt with Up and OOB as tolerated  orders. Pts current comorbidities effecting treatment include: a-fib, CHF, IBS, hypothyroidism, ambulatory dysfunction, CKD, HTN. Pts current clinical presentation is Unstable/ Unpredictable (high complexity) due to Ongoing medical management for primary dx, Increased reliance on more restrictive AD compared to baseline, Decreased activity tolerance compared to baseline, Fall risk, Increased assistance needed from caregiver at current time, Cog status, Current WBS, Trending lab values, Continuous pulse oximetry monitoring , GET drain in place at current time, s/p surgical intervention. Prior to admission, pt was residing at [facility] and was requiring A for ADLs/ IADLs, primarily using WC. Upon evaluation, pt currently is requiring max Ax2 for bed mobility.  Pt presents at PT eval functioning below baseline and currently w/ overall mobility deficits 2* to: BLE weakness, decreased ROM, impaired balance, decreased endurance, impaired coordination, gait deviations, pain, decreased activity tolerance compared to baseline, decreased functional mobility tolerance compared to baseline, altered sensation, decreased safety awareness, impaired judgement, fall risk, orthopedic restrictions. Pt currently at a fall risk 2* to impairments listed above. Pt will continue to benefit from skilled acute PT interventions to address stated impairments; to maximize functional mobility; for ongoing pt/ family training; and DME needs. At conclusion of PT session pt returned BTB and bed alarm engaged with phone and call bell within reach. Pt denies any further questions at this time. The patient's AM-PAC Basic Mobility Inpatient Short Form Raw Score is 6. A Raw score of less than or equal to 16 suggests the patient may benefit from discharge to post-acute rehabilitation services. Please also refer to the recommendation of the Physical Therapist for safe discharge planning. Recommend return to facility with appropriate level of assistance (may benefit from STR if facility unable to provide adequate level of assistance) upon hospital D/C. PT Discharge Recommendation: Return to facility with rehabilitation services (with appropriate level of assistance/ kami lift use vs STR if facility unable to provide physical assistance)    See flowsheet documentation for full assessment.

## 2023-07-01 NOTE — OCCUPATIONAL THERAPY NOTE
Occupational Therapy Evaluation     Patient Name: Master Marvin  HSBTJ'O Date: 7/1/2023  Problem List  Principal Problem:    Osteomyelitis [facility])  Active Problems:    Chronic atrial fibrillation (HCC)    Chronic diastolic CHF (congestive heart failure) (HCC)    Irritable bowel syndrome with constipation    Pacemaker    Hypothyroidism    Ambulatory dysfunction    Stage 2 chronic kidney disease    Benign essential HTN    Past Medical History  Past Medical History:   Diagnosis Date    Abdominal bloating 8/1/2016    Anemia     Arthritis     Cancer (HCC)     basal cell    CHF (congestive heart failure) ([address])     COVID-19     Disease of thyroid gland     Disturbance of smell and taste     disturbance of taste    Effusion of knee joint right     Fibromyalgia     Heart murmur     reported a previous heart murmur    History of acute myocardial infarction     History of atrial fibrillation     History of bruising easily     History of cancer     History of dermatitis     History of mammogram     History of methicillin resistant staphylococcus aureus (MRSA)     Negative nasal culture, isolation discontinued 8/17/2018.     History of methicillin resistant staphylococcus aureus (MRSA) 08/17/2018    negative nasal culture-isolation and hx discontinued 8/17/2018    History of obesity     History of osteopenia     History of sciatica     History of shortness of breath     History of sinusitis     History of sore throat     History of syncope     History of umbilical hernia     History of viral infection     Irritable bowel syndrome (IBS)     Joint pain, hip     Limb pain     Myalgia     myalgia and myositis    Need for prophylactic vaccination against single diseases     Need for prophylactic vaccination and inoculation against influenza     Preoperative cardiovascular examination     Primary osteoarthritis of right knee     Right leg pain     Vaginal Pap smear     reported pap smear     Past Surgical History  Past Surgical History:   Procedure Laterality Date    ANKLE ARTHRODESIS Right     BACK SURGERY      BARIATRIC SURGERY      CHOLECYSTECTOMY      x2    COLONOSCOPY      ESOPHAGOGASTRODUODENOSCOPY N/A 3/23/2018    Procedure: ESOPHAGOGASTRODUODENOSCOPY (EGD); Surgeon: Misty Juarez MD;  Location: [facility]; Service: Gastroenterology    FOOT SURGERY      HIP CLOSE REDUCTION Right 8/21/2016    Procedure: CLOSED REDUCTION RIGHT TOTAL HIP;  Surgeon: Kendal Barnard MD;  [facility];  Service:     Rupert Mancini / Jeff Gabriel / Marichuy Sotelo      IR BIOPSY OTHER  8/1/2020    JOINT REPLACEMENT      LEFT KNEE REPLACEMENT    JOINT REPLACEMENT      Right HIP    PILONIDAL CYST EXCISION      x2    MD EXC B9 LESION MRGN XCP SK TG T/A/L 0.5 CM/< Right 7/24/2019    Procedure: REVISION SCAR EXTREMITY Rt Hip; Surgeon: Ann Ruffin MD;  [facility];  Service: Orthopedics    MD REVJ TOT HIP ARTHRP [address] W/WO AGRFT/ALGRFT Right 4/15/2019    Procedure: ARTHROPLASTY HIP TOTAL ACETABULAR REVISION;  Surgeon: Ann Ruffin MD;  [facility];  Service: Orthopedics    REPLACEMENT TOTAL KNEE Right     SILVANA-EN-Y PROCEDURE      x2    TOTAL KNEE ARTHROPLASTY      UMBILICAL HERNIA REPAIR      x2             07/01/23 1051   OT Last Visit   OT Visit Date 07/01/23   Note Type   Note type Evaluation   Pain Assessment   Pain Assessment Tool 0-10   Pain Score 10 - Worst Possible Pain   Pain Location/Orientation Orientation: Right;Location: Hip   Pain Onset/Description Onset: Ongoing; Descriptor: Aching;Descriptor: Discomfort   Patient's Stated Pain Goal No pain   Hospital Pain Intervention(s) Repositioned; Ambulation/increased activity; Emotional support   Restrictions/Precautions   Weight Bearing Precautions Per Order Yes   RLE Weight Bearing Per Order (S)  NWB  (stand to transfer only per ortho)   Other Precautions Cognitive; Bed Alarm;Multiple lines;Telemetry; Fall Risk;Pain;Contact/isolation  (hemovac)   Home Living   Type of Home Other (Comment)  ([facility] [place])   Home Layout One level; Able to live on main level with bedroom/bathroom; Performs ADLs on one level   [place]; Wheelchair-manual   Additional Comments Pt is a long term care resident at [facility]   Prior Function   Level of [place] Needs assistance with IADLS;Needs assistance with ADLs; Needs assistance with functional mobility   Lives With Facility staff   Receives Help From Personal care attendant   IADLs Family/Friend/Other provides transportation; Family/Friend/Other provides meals; Family/Friend/Other provides medication management   Falls in the last 6 months 0   Vocational Retired   Comments (-)    Lifestyle   Autonomy Assist w/ ADLS/IADLS, transfers and functional mobility PTA   Reciprocal Relationships Pt lives w/ facility staff   Service to Others Retired   Intrinsic Gratification TV   ADL   Eating Assistance 2  Maximal Assistance   Grooming Assistance 2  Maximal [address] [facility] 2  Maximal Assistance    [address] 2  Maximal [place] 2  Maximal [place] 2  Maximal [address]  2  Maximal [facility] 2  Maximal Assistance   Functional Deficit Steadying;Verbal cueing;Supervision/safety; Increased time to complete   Bed Mobility   Supine to Sit 2  Maximal assistance   Additional items Assist x 2; Increased time required;Verbal cues;LE management   Sit to Supine 2  Maximal assistance   Additional items Assist x 2; Increased time required;LE management;Verbal cues   Additional Comments Pt sat EOB w/ Max A for sitting balance/trunk control   Transfers   Sit to Stand Unable to assess   Stand to Sit Unable to assess   Additional Comments unable to assess @ this time 2/2 10/10 RLE pain   Functional Mobility   Additional Comments Unable to assess   Balance   Static Sitting Poor +   Dynamic Sitting Poor   Static Standing Zero   Activity Tolerance   Activity Tolerance Patient limited by fatigue;Patient limited by pain   Medical Staff Made Aware PT, Martishant Elaine   Nurse Made Aware yes   RUE Assessment   RUE Assessment X  (poor ROM; hx of shoulder problems)   LUE Assessment   LUE Assessment X  (poor ROM; hx of shoulder problems)   Hand Function   Gross Motor Coordination Impaired   Fine Motor Coordination Impaired   Sensation   Light Touch No apparent deficits   Vision - Complex Assessment   Ocular Range of Motion Intact   Psychosocial   Psychosocial (WDL) WDL   Cognition   Overall Cognitive Status Impaired   Arousal/Participation Responsive; Cooperative   Attention Attends with cues to redirect   Orientation Level Oriented X4   Memory Decreased recall of precautions   Following Commands Follows one step commands without difficulty   Comments Pt is pleasant and cooperative; limited by pain   Assessment   Limitation Decreased ADL status; Decreased endurance;Decreased high-level ADLs; Decreased Safe judgement during ADL;Decreased UE strength;Decreased UE ROM; Decreased cognition;Decreased self-care trans   Prognosis Fair   Assessment Pt is a 81 y/o female seen for OT eval s/p adm to B w/ R prosthetic hip joint infection. Pt is dx'd w/ osteomyelitis. Pt is s/p the following procedure "Right - DEBRIDEMENT LOWER EXTREMITY [facility] OUT)- I&D of right hip, resection arthroplasty; insertion of antibiotic beads" performed on 6/30. Pt is stand to transfer only on R LE per ortho. Pt has extensive PMH; see EMR for detailed list. Pt with active OT orders and up with assistance  orders. Pt lives with facility staff @ [facility], pt is a long term care resident. Pt requires assist for ADLS and IADLS, does not drive, & required use of DME PTA including w/c and RW. Pt is currently demonstrating the following occupational deficits: Max A UB/LB ADLS, Max A x2 bed mobility, Max A EOB sitting.  These deficits that are impacting pt's baseline areas of occupation are a result of the following impairments: pain, endurance, activity tolerance, functional mobility, forward functional reach, balance, trunk control, functional standing tolerance, decreased I w/ ADLS/IADLS, strength, ROM, cognitive impairments, decreased safety awareness and decreased insight into deficits. The following Occupational Performance Areas to address include: bathing/shower, toilet hygiene, dressing, health maintenance, functional mobility, clothing management and household maintenance. Recommend inpatient rehab  upon D/C. Pt to continue to benefit from acute immediate OT services to address the following goals 2-3x/week to  w/in 10-14 days:   Goals   Patient Goals to have less pain   LTG Time Frame 10-   Long Term Goal #1 see below listed goals   Plan   Treatment Interventions ADL retraining;Functional transfer training;UE strengthening/ROM; Endurance training;Cognitive reorientation;Patient/family training;Equipment evaluation/education; Compensatory technique education;Continued evaluation; Energy conservation; Activityengagement   Goal Expiration Date 07/15/23   OT Frequency 2-3x/wk   Recommendation   OT Discharge Recommendation Post acute rehabilitation services   Additional Comments  The patient's raw score on the AM-PAC Daily Activity Inpatient Short Form is 11. A raw score of less than 19 suggests the patient may benefit from discharge to post-acute rehabilitation services. Please refer to the recommendation of the Occupational Therapist for safe discharge planning   Additional Comments 2 Pt seen as a co-session due to the patient's co-morbidities, clinically unstable presentation, and present impairments which are a regression from the patient's baseline.    [facility] Daily Activity Inpatient   Lower Body Dressing 2   Bathing 2   Toileting 2   Upper Body Dressing 2   Grooming 2   Eating 1   Daily Activity Raw Score 11   Daily Activity Standardized Score (Calc for Raw Score >=11) 29.04   AM-[facility] Applied Cognition Inpatient   Following a Speech/Presentation 3   Understanding Ordinary Conversation 4   Taking Medications 4   Remembering Where Things Are Placed or Put Away 4   Remembering List of 4-5 Errands 3   Taking Care of Complicated Tasks 3   Applied Cognition Raw Score 21   Applied Cognition Standardized Score 44.3   End of Consult   Education Provided Yes   Patient Position at End of Consult Supine; All needs within reach;Bed/Chair alarm activated   Nurse Communication Nurse aware of consult      GOALS    1) Pt will complete rolling left/right in bed with Mod assist, as prerequisite for further engagement in ADLS   2) Pt will complete supine to sit transfer with Mod A using B/L UEs to initiate bed mobility   3) Pt will tolerate sitting at EOB 20 minutes with S assist and stable vital signs, as prerequisite for further engagement in ADLS   4) Pt will complete grooming task with S assist and increased time to increase independence in functional tasks  5) Pt will increase B/L UE ROM 1/2 MMT to increase functional UB use during ADLS   6) Pt will complete UB ADLS with Min A and use of AD/DME as needed to increase independence in functional tasks  7) Pt will complete LB ADLS with Mod A and use of AD/DME as needed to increase independence in functional tasks  8) Pt will follow 100% simple one step verbal commands and be A/Ox4 consistently t/o use of external environmental cues to increase awareness for functional tasks  9) Pt will demonstrate 100% carryover of  WBS and E.C. techniques t/o fx'l I/ADL/ leisure tasks w/o cues s/p skilled education    ** OTR to assess transfers/functional mobility when medically stable    Leanne Cooper MS, OTR/L

## 2023-07-01 NOTE — PROGRESS NOTES
Progress Notes - Family Medicine Residency, Aleksandra Coulter 9/2/1933, 80 y.o. female. MRN: [id number]    Unit/Bed#: [facility] 807-01 Encounter: [id number]  Primary Care Provider: Keven Oneill DO      Admission Date: 6/27/2023 2125  Length of Stay: 3 days  Code Status:  Level 1 - Full Code  Consult:   IP CONSULT TO ORTHOPEDIC SURGERY  IP CONSULT TO CARDIOLOGY  IP CONSULT TO CASE MANAGEMENT  IP CONSULT TO PHARMACY  IP CONSULT TO INFECTIOUS DISEASES  IP CONSULT TO PICC TEAM      Assessments & Plans:   Plans discussed with [facility] team and finalization is pending attending physician Dr Jaimie James DO attestation. * Osteomyelitis [facility])  Assessment & Plan  Pt presents from [address] at [facility] for purulent discharge from her right hip noticed on day of admission. Her right hip was drained 1 L and the skilled nursing facility prior to ED presentation. · Pt has history of right hip replacement and follows with orthopedics outpatient. Recently admitted at [facility] from 6/8-6/12 under the trauma service for chronic right hip dislocation. · Pt is vitally stable, labs significant for leukocytosis. Imaging obtained. Blood cultures pending. CT Abdomen Pelvis (6/27/23)  1. Superior dislocation of right hip arthroplasty femoral stem and acetabular cup.  2. Collection of fluid and gas lateral to the femoral stem measuring 16.5 x 7.3 x 4.3 cm compatible with an abscess. Collection extends to the acetabular cup. 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 3. No evidence of intraperitoneal or retroperitoneal fluid collection. 4. Constipation.    - Orthopedics evaluated patient in ED and agrees for surgical intervention in the morning.      S/p INR reversal with Vit K 10 mg and Platelet transfusion x2  S/p wash out in OR by ortho 6/30    Plan:  • Pain regimen: dimitrios tylenol 975 mg Q8H, oxy 2.5/5 mg Q8H prn  • Hold warfarin  • F/u cultures  • Cardiology preop clearance completed 6/29  • Abx: Ceftriaxone held at this time per ID to optimize OR cultures  • Ortho restarted Vancomycin    Benign essential HTN  Assessment & Plan  Goal BP <130/80 per cardiology  Continue monitor BP, stable currently. Most recent BP Blood Pressure: 592/64   47F SBP Systolic (91MPU), XGM:200 , Min:91 , Max:123      Plan  - C/w home torsemide 10 mg daily    Stage 2 chronic kidney disease  Assessment & Plan  Lab Results   Component Value Date    EGFR 86 06/30/2023    EGFR 82 06/29/2023    EGFR 81 06/28/2023    CREATININE 0.50 (L) 06/30/2023    CREATININE 0.57 (L) 06/29/2023    CREATININE 0.60 06/28/2023     Caution with using nephrotoxic agents  Avoid NSAIDs given Elevated INR. Ambulatory dysfunction  Assessment & Plan  Secondary to hip pain and multiple sites of OA. Pt presented from skilled nursing facility for PT/OT. S/p OR for I&D on 6/30. Plan  - Pain control  - monitor for urine and BM  - PT/OT  - fall precautions  - CM referral for dispo    Hypothyroidism  Assessment & Plan  Thyroid    Lab Results   Component Value Date    TSN8AXUHDXUT 6.873 (H) 06/28/2023    UXG9XIWMMLZH 2.260 06/08/2022    QCW0MLPKVPRC 1.410 05/03/2021    LXP2DSKVGDBZ 3.760 (H) 04/02/2021    XSQ4GTAKSYAL 2.076 05/15/2020    ZCA1EVKQPEOF 4.400 (H) 03/13/2020    VCL8JYKMYHSO 3.713 01/22/2019    FREET4 1.39 (H) 06/28/2023    FREET4 0.96 06/23/2017    FREET4 1.00 03/10/2016     Plan  - TSH elevated, c/w increased levothyroxine dose 112mcg daily     Pacemaker  Assessment & Plan  History of intolerance and bradycardia with AVN blocking agents for chronic atrial fibrillation. She underwent AV node ablation in 8/2018 and implantation of MDT single chamber His PPM - medtronic. 99%     Plan  - monitor vitals. Irritable bowel syndrome with constipation  Assessment & Plan  Plan  - Bowel regimen: Miralax and Sennakot scheduled.    - monitor BMs, especially with acute pain, use of narcotics and surgery    Chronic diastolic CHF (congestive heart failure) [facility])  Assessment & Plan  Wt Readings from Last 3 Encounters:   06/28/23 76.2 kg (168 lb)   06/16/23 76.6 kg (168 lb 12.8 oz)   03/24/23 75.8 kg (167 lb)     HFpEF per cardiology notes. Last ECHO 4/27/2018 EF 55%, LV thickness upper limits normal, moderate mitral regurgitation, moderate tricuspid regurgitation.      • Monitor I/O   • Continue torsemide 10mg        Chronic atrial fibrillation (HCC)  Assessment & Plan  Currently rate controlled. On daily warfarin 3 mg.  - Goal INR between 2-3.  - INR currently 5.40  - In ED, pt is s/p Vit K and 2 units of FFP     Plan  - Monitor INR, goal INR <1.5 for OR  - Hold warfarin 3 mg at this time.    - Hold NSAIDs given risk of bleeding  - Restarted DVT ppx after OR        Patient Active Problem List   Diagnosis   • Chronic atrial fibrillation (HCC)   • Anemia   • Chronic diastolic CHF (congestive heart failure) ([facility])   • Depression   • Class 1 obesity due to excess calories with serious comorbidity and body mass index (BMI) of 33.0 to 33.9 in adult   • Chronic pain   • Chronic pain of lower extremity, bilateral   • Fibromyalgia   • External hemorrhoids   • Irritable bowel syndrome with constipation   • Lumbar canal stenosis   • Moderate mitral regurgitation   • Osteoarthritis, multiple sites   • Osteoporosis   • Peripheral neuropathy   • Primary osteoarthritis of right knee   • Synovial cyst of right popliteal space   • Pacemaker   • Hypothyroidism   • Macrocytosis   • Status post gastrectomy   • Status post right hip replacement   • Ambulatory dysfunction   • MCI (mild cognitive impairment)   • Osteomyelitis (HCC)   • Cellulitis of right lower extremity   • Hypertrophic scar of skin   • S/P scar revision   • Elevated C-reactive protein (CRP)   • Fall   • Hematoma   • Ecchymosis of eye   • Meningioma (HCC)   • Trochanteric bursitis of right hip   • Stage 2 chronic kidney disease   • Benign essential HTN   • Trochanteric bursitis   • Negative depression screening   • Peripheral vascular disease, unspecified ([address])   • Lumbar radiculopathy   • Low back pain with sciatica   • Lumbar spondylosis   • S/P lumbar fusion   • Hyponatremia   • Acute cystitis   • Chronic diarrhea       Diet: Diet Regular; Regular House  VTE Pharm PPX: Enoxaparin (Lovenox)  VTE [facility] PPX: sequential compression device      Subjective:   • Patient without acute events overnight per handoff. • Patient did not have a BM or urinate s/p surgery. Her only complaint is 8/10 hip pain. • Patient understands and agrees with plan.     Vitals:     Vitals:    06/30/23 2130 06/30/23 2152 06/30/23 2251 07/01/23 0000   BP:  101/57 107/57    Pulse:  (!) 40 69 71   Resp: 20 16     Temp:  97.5 °F (36.4 °C) 97.9 °F (36.6 °C)    TempSrc:  Oral     SpO2: 94%  96% 94%   Weight:       Height:         Temp:  [97.2 °F (36.2 °C)-98.1 °F (36.7 °C)] 97.9 °F (36.6 °C)  HR:  [40-76] 71  Resp:  [16-20] 16  BP: ()/(46-66) 107/57  Weight (last 2 days)     None          Intake/Output Summary (Last 24 hours) at 7/1/2023 0654  Last data filed at 6/30/2023 2047  Gross per 24 hour   Intake 1898.75 ml   Output --   Net 1898.75 ml     Invasive Devices     Peripheral Intravenous Line  Duration           Peripheral IV 06/27/23 Proximal;Right;Ventral (anterior) Forearm 3 days    Peripheral IV 06/28/23 Left Forearm 3 days          Drain  Duration           Closed/Suction Drain Right Hip Accordion 19 Fr. <1 day                Labs:     CBC:  Results from last 7 days   Lab Units 07/01/23  0424 06/30/23  0456 06/29/23  0507 06/28/23  0419 06/27/23  2206   WBC Thousand/uL 12.93* 8.78 7.63 10.58* 12.99*   HEMOGLOBIN g/dL 7.5* 8.3* 8.1* 8.9* 9.3*   HEMATOCRIT % 24.6* 28.0* 27.9* 28.1* 29.7*   PLATELETS Thousands/uL 235 279 318 379 402*   NEUTROS ABS Thousands/µL 10.73*  --  5.35 7.73* 10.07*       CMP:  Results from last 7 days   Lab Units 07/01/23  0500 06/30/23  0456 06/29/23  0507 06/28/23  0614 06/27/23  2206   POTASSIUM mmol/L 4.4 4.3 4.2 3.7 4. 0   CHLORIDE mmol/L 112* 109* 108 109* 107   CO2 mmol/L 25 28 27 29 26   BUN mg/dL 11 14 15 17 17   CREATININE mg/dL 0.41* 0.50* 0.57* 0.60 0.66   CALCIUM mg/dL 7.5* 7.8* 8.1* 8.0* 7.9*   AST U/L  --   --   --   --  18   ALT U/L  --   --   --   --  13   ALK PHOS U/L  --   --   --   --  128*   EGFR ml/min/1.73sq m 91 86 82 81 78       Sepsis:  Results from last 7 days   Lab Units 06/27/23  2206   CRP mg/L 92.0*   LACTIC ACID mmol/L 2.0   PROCALCITONIN ng/ml 4.52*       Micro:  Lab Results   Component Value Date/Time    Blood Culture No Growth at 72 hrs. 06/27/2023 10:06 PM    Blood Culture No Growth at 72 hrs. 06/27/2023 10:06 PM    Gram Stain Result 2+ Polys 06/30/2023 07:16 PM    Gram Stain Result No bacteria seen 06/30/2023 07:16 PM    Urine Culture >100,000 cfu/ml Escherichia coli ESBL (A) 06/15/2023 07:05 PM    Urine Culture >100,000 cfu/ml Klebsiella pneumoniae (A) 06/15/2023 07:05 PM    Urine Culture >100,000 cfu/ml Aerococcus urinae (A) 06/15/2023 07:05 PM    Wound Culture No growth 06/28/2023 04:11 PM       Imaging:   XR chest portable    Result Date: 6/28/2023  Impression: Increased interstitial markings, likely a component of mild fibrosis but question interstitial edema. Trace effusions better shown on abdomen CT.  Workstation performed: [id number]     CT lower extremity w contrast right    Result Date: 6/28/2023  Impression: Right total hip arthroplasty, with acetabular component dislodged superior laterally out of the native acetabular fossa (series 307 images .) There is a 9.4 x 3.4 x 15.7 cm air and fluid collection in the subcutaneous tissues in the right lateral hip, contacting the right hip replacement and extending into the native acetabular fossa where there are bone erosions, consistent with abscess formation, infection of the arthroplasty and osteomyelitis (series 5 images 19-74.) Findings are consistent with the preliminary report from [facility] which was provided shortly after completion of the exam. Workstation performed: [id number]     CT abdomen pelvis with contrast    Result Date: 6/28/2023  Impression: 1. Superior dislocation of right hip arthroplasty femoral stem and acetabular cup. 2. Collection of fluid and gas lateral to the femoral stem measuring 16.5 x 7.3 x 4.3 cm compatible with an abscess. Collection extends to the acetabular cup. 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 3. No evidence of intraperitoneal or retroperitoneal fluid collection. 4. Constipation.  I personally discussed this study with Dimas Cope on 6/28/2023 2:06 AM. Workstation performed: [id number]       Medications:     Current Facility-Administered Medications   Medication Dose Route Frequency   • acetaminophen (TYLENOL) tablet 650 mg  650 mg Oral TID   • bisacodyl (DULCOLAX) EC tablet 5 mg  5 mg Oral Daily PRN   • cholecalciferol (VITAMIN D3) tablet 5,000 Units  5,000 Units Oral Daily   • cyanocobalamin (VITAMIN B-12) tablet 1,000 mcg  1,000 mcg Oral Daily   • DULoxetine (CYMBALTA) delayed release capsule 60 mg  60 mg Oral Daily   • enoxaparin (LOVENOX) subcutaneous injection 40 mg  40 mg Subcutaneous Daily   • fluticasone (FLONASE) 50 mcg/act nasal spray 2 spray  2 spray Each Nare Daily   • gabapentin (NEURONTIN) capsule 300 mg  300 mg Oral BID   • HYDROmorphone HCl (DILAUDID) injection 0.2 mg  0.2 mg Intravenous Q4H PRN   • lactated ringers infusion  75 mL/hr Intravenous Continuous   • levothyroxine tablet 112 mcg  112 mcg Oral Early Morning   • ondansetron (ZOFRAN) injection 4 mg  4 mg Intravenous Q6H PRN   • oxyCODONE (ROXICODONE) IR tablet 5 mg  5 mg Oral Q4H PRN   • oxyCODONE (ROXICODONE) split tablet 2.5 mg  2.5 mg Oral Q4H PRN   • pantoprazole (PROTONIX) injection 40 mg  40 mg Intravenous Q24H JOSE   • phenol (CHLORASEPTIC) 1.4 % mucosal liquid 1 spray  1 spray Mouth/Throat Q2H PRN   • polyethylene glycol (MIRALAX) packet 17 g  17 g Oral Daily PRN   • senna-docusate sodium (SENOKOT S) 8.6-50 mg per tablet 1 tablet  1 tablet Oral HS   • simethicone (MYLICON) chewable tablet 80 mg  80 mg Oral Q6H PRN   • torsemide (DEMADEX) tablet 10 mg  10 mg Oral Daily   • vancomycin (VANCOCIN) 1500 mg in sodium chloride 0.9% 250 mL IVPB  1,500 mg Intravenous Q24H       Physical Exam:     General: Pt observed lying comfortably in bed, NAD. Not toxic/ill-appearing. No cachectic or diaphoresis. No obvious sign of trauma or bleeding. Psych: able to converse appropriately. Neuro: no gross neurological deficits. Head: atraumatic, normocephalic. Eyes: open spontaneously, EOM intact, conjunctiva non-injected, no scleral icterus, no discharge. Ear: normal external ear, no visible drainage at external auditory orifice  Nose: no epistaxis, no rhinorrhea. Throat: no hoarse voice, no cough. Neck: normal ROM. Heart: RRR, no murmur/distant heart sound appreciated. Lungs: LCTABL, nml respiratory effort, no agonal/labored breathing, no accessory muscle use.   Abdomen: soft, nontender, nondistended, hypoactive bowel sound    Demarcus Watkins, DO  PGY-3, Family Medicine  07/01/23  6:54 AM

## 2023-07-02 LAB
ABO GROUP BLD BPU: NORMAL
ABO GROUP BLD: NORMAL
ANION GAP SERPL CALCULATED.3IONS-SCNC: 1 MMOL/L
BASOPHILS # BLD AUTO: 0.05 THOUSANDS/ÂΜL (ref 0–0.1)
BASOPHILS NFR BLD AUTO: 0 % (ref 0–1)
BLD GP AB SCN SERPL QL: NEGATIVE
BPU ID: NORMAL
BUN SERPL-MCNC: 13 MG/DL (ref 5–25)
CALCIUM SERPL-MCNC: 8.2 MG/DL (ref 8.3–10.1)
CHLORIDE SERPL-SCNC: 106 MMOL/L (ref 96–108)
CO2 SERPL-SCNC: 25 MMOL/L (ref 21–32)
CREAT SERPL-MCNC: 0.8 MG/DL (ref 0.6–1.3)
CROSSMATCH: NORMAL
EOSINOPHIL # BLD AUTO: 0.06 THOUSAND/ÂΜL (ref 0–0.61)
EOSINOPHIL NFR BLD AUTO: 1 % (ref 0–6)
ERYTHROCYTE [DISTWIDTH] IN BLOOD BY AUTOMATED COUNT: 14.7 % (ref 11.6–15.1)
GFR SERPL CREATININE-BSD FRML MDRD: 65 ML/MIN/1.73SQ M
GLUCOSE SERPL-MCNC: 87 MG/DL (ref 65–140)
HCT VFR BLD AUTO: 23.4 % (ref 34.8–46.1)
HCT VFR BLD AUTO: 23.4 % (ref 34.8–46.1)
HGB BLD-MCNC: 7 G/DL (ref 11.5–15.4)
HGB BLD-MCNC: 7.3 G/DL (ref 11.5–15.4)
IMM GRANULOCYTES # BLD AUTO: 0.13 THOUSAND/UL (ref 0–0.2)
IMM GRANULOCYTES NFR BLD AUTO: 1 % (ref 0–2)
LYMPHOCYTES # BLD AUTO: 1.26 THOUSANDS/ÂΜL (ref 0.6–4.47)
LYMPHOCYTES NFR BLD AUTO: 10 % (ref 14–44)
MCH RBC QN AUTO: 31.3 PG (ref 26.8–34.3)
MCHC RBC AUTO-ENTMCNC: 29.9 G/DL (ref 31.4–37.4)
MCV RBC AUTO: 105 FL (ref 82–98)
MONOCYTES # BLD AUTO: 1.42 THOUSAND/ÂΜL (ref 0.17–1.22)
MONOCYTES NFR BLD AUTO: 12 % (ref 4–12)
NEUTROPHILS # BLD AUTO: 9.47 THOUSANDS/ÂΜL (ref 1.85–7.62)
NEUTS SEG NFR BLD AUTO: 76 % (ref 43–75)
NRBC BLD AUTO-RTO: 0 /100 WBCS
PLATELET # BLD AUTO: 228 THOUSANDS/UL (ref 149–390)
PMV BLD AUTO: 9.4 FL (ref 8.9–12.7)
POTASSIUM SERPL-SCNC: 4.4 MMOL/L (ref 3.5–5.3)
RBC # BLD AUTO: 2.24 MILLION/UL (ref 3.81–5.12)
RH BLD: POSITIVE
SODIUM SERPL-SCNC: 132 MMOL/L (ref 135–147)
SPECIMEN EXPIRATION DATE: NORMAL
UNIT DISPENSE STATUS: NORMAL
UNIT PRODUCT CODE: NORMAL
UNIT PRODUCT VOLUME: 280 ML
UNIT PRODUCT VOLUME: 280 ML
UNIT PRODUCT VOLUME: 350 ML
UNIT RH: NORMAL
WBC # BLD AUTO: 12.39 THOUSAND/UL (ref 4.31–10.16)

## 2023-07-02 PROCEDURE — 80048 BASIC METABOLIC PNL TOTAL CA: CPT

## 2023-07-02 PROCEDURE — 30233N1 TRANSFUSION OF NONAUTOLOGOUS RED BLOOD CELLS INTO PERIPHERAL VEIN, PERCUTANEOUS APPROACH: ICD-10-PCS

## 2023-07-02 PROCEDURE — 86901 BLOOD TYPING SEROLOGIC RH(D): CPT

## 2023-07-02 PROCEDURE — 85025 COMPLETE CBC W/AUTO DIFF WBC: CPT

## 2023-07-02 PROCEDURE — 85018 HEMOGLOBIN: CPT

## 2023-07-02 PROCEDURE — 99232 SBSQ HOSP IP/OBS MODERATE 35: CPT | Performed by: FAMILY MEDICINE

## 2023-07-02 PROCEDURE — P9016 RBC LEUKOCYTES REDUCED: HCPCS

## 2023-07-02 PROCEDURE — C9113 INJ PANTOPRAZOLE SODIUM, VIA: HCPCS

## 2023-07-02 PROCEDURE — NC001 PR NO CHARGE: Performed by: ORTHOPAEDIC SURGERY

## 2023-07-02 PROCEDURE — 86900 BLOOD TYPING SEROLOGIC ABO: CPT

## 2023-07-02 PROCEDURE — 86850 RBC ANTIBODY SCREEN: CPT

## 2023-07-02 PROCEDURE — 85014 HEMATOCRIT: CPT

## 2023-07-02 RX ORDER — PANTOPRAZOLE SODIUM 40 MG/1
40 TABLET, DELAYED RELEASE ORAL
Status: DISCONTINUED | OUTPATIENT
Start: 2023-07-02 | End: 2023-07-08 | Stop reason: HOSPADM

## 2023-07-02 RX ADMIN — FLUTICASONE PROPIONATE 2 SPRAY: 50 SPRAY, METERED NASAL at 08:27

## 2023-07-02 RX ADMIN — GABAPENTIN 300 MG: 300 CAPSULE ORAL at 17:04

## 2023-07-02 RX ADMIN — SODIUM CHLORIDE, SODIUM LACTATE, POTASSIUM CHLORIDE, AND CALCIUM CHLORIDE 75 ML/HR: .6; .31; .03; .02 INJECTION, SOLUTION INTRAVENOUS at 19:04

## 2023-07-02 RX ADMIN — DULOXETINE HYDROCHLORIDE 60 MG: 60 CAPSULE, DELAYED RELEASE ORAL at 08:25

## 2023-07-02 RX ADMIN — ENOXAPARIN SODIUM 40 MG: 40 INJECTION SUBCUTANEOUS at 08:26

## 2023-07-02 RX ADMIN — ACETAMINOPHEN 650 MG: 325 TABLET, FILM COATED ORAL at 17:04

## 2023-07-02 RX ADMIN — PANTOPRAZOLE SODIUM 40 MG: 40 INJECTION, POWDER, FOR SOLUTION INTRAVENOUS at 08:27

## 2023-07-02 RX ADMIN — Medication 5000 UNITS: at 08:25

## 2023-07-02 RX ADMIN — SODIUM CHLORIDE, SODIUM LACTATE, POTASSIUM CHLORIDE, AND CALCIUM CHLORIDE 75 ML/HR: .6; .31; .03; .02 INJECTION, SOLUTION INTRAVENOUS at 01:27

## 2023-07-02 RX ADMIN — VANCOMYCIN HYDROCHLORIDE 1500 MG: 10 INJECTION, POWDER, LYOPHILIZED, FOR SOLUTION INTRAVENOUS at 08:24

## 2023-07-02 RX ADMIN — LEVOTHYROXINE SODIUM 112 MCG: 112 TABLET ORAL at 06:14

## 2023-07-02 RX ADMIN — ACETAMINOPHEN 650 MG: 325 TABLET, FILM COATED ORAL at 22:42

## 2023-07-02 RX ADMIN — GABAPENTIN 300 MG: 300 CAPSULE ORAL at 08:25

## 2023-07-02 RX ADMIN — ACETAMINOPHEN 650 MG: 325 TABLET, FILM COATED ORAL at 08:25

## 2023-07-02 RX ADMIN — CYANOCOBALAMIN TAB 500 MCG 1000 MCG: 500 TAB at 08:25

## 2023-07-02 NOTE — PROGRESS NOTES
Progress Notes - Family Medicine Residency, Thao Coulter 9/2/1933, 80 y.o. female. MRN: [id number]    Unit/Bed#: [facility] 807-01 Encounter: [id number]  Primary Care Provider: Pinky Guajardo DO      Admission Date: 6/27/2023 2125  Length of Stay: 4 days  Code Status:  Level 1 - Full Code  Consult:   IP CONSULT TO ORTHOPEDIC SURGERY  IP CONSULT TO CARDIOLOGY  IP CONSULT TO CASE MANAGEMENT  IP CONSULT TO PHARMACY  IP CONSULT TO INFECTIOUS DISEASES  IP CONSULT TO PICC TEAM    * Osteomyelitis ([address])  Assessment & Plan  Pt presents from [address] at [facility] for purulent discharge from her right hip noticed on day of admission. Her right hip was drained 1 L and the skilled nursing facility prior to ED presentation. · Pt has history of right hip replacement and follows with orthopedics outpatient. Recently admitted at [facility] from 6/8-6/12 under the trauma service for chronic right hip dislocation. · Pt is vitally stable, labs significant for leukocytosis. Imaging obtained. Blood cultures pending. CT Abdomen Pelvis (6/27/23)  1. Superior dislocation of right hip arthroplasty femoral stem and acetabular cup.  2. Collection of fluid and gas lateral to the femoral stem measuring 16.5 x 7.3 x 4.3 cm compatible with an abscess. Collection extends to the acetabular cup. 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 3. No evidence of intraperitoneal or retroperitoneal fluid collection. 4. Constipation.       S/p INR reversal with Vit K 10 mg and Platelet transfusion x2  S/p wash out in OR by ortho 6/30    Plan:  • Pain regimen: dimitrios tylenol 975 mg Q8H, oxy 2.5/5 mg Q8H prn  • Ortho started pt on Lovenox and plan for 28 days course   • F/u cultures  • ID:  recommendation appreciated  • Follow-up with PICC consult, patient to undergo 6 total weeks of IV antibiotic follow-up with possible 6 more weeks of p.o. antibiotic per ID    Stage 2 chronic kidney disease  Assessment & Plan  Lab Results Component Value Date    EGFR 65 07/02/2023    EGFR 91 07/01/2023    EGFR 86 06/30/2023    CREATININE 0.80 07/02/2023    CREATININE 0.41 (L) 07/01/2023    CREATININE 0.50 (L) 06/30/2023     Caution with using nephrotoxic agents  Avoid NSAIDs given Elevated INR. Anemia  Assessment & Plan  Hgb / Hct       Results from last 7 days   Lab Units 07/02/23  0622 07/01/23  0424 06/30/23  1951 06/30/23  0456 06/29/23  0507 06/28/23  0419 06/27/23  2206   HEMOGLOBIN g/dL 7.0* 7.5*  --  8.3* 8.1* 8.9* 9.3*   I STAT HEMOGLOBIN g/dl  --   --  8.2*  --   --   --   --    HEMATOCRIT % 23.4* 24.6*  --  28.0* 27.9* 28.1* 29.7*   HEMATOCRIT, ISTAT %  --   --  24*  --   --   --   --    Downtrending hemoglobin status post Ortho washout 6/30  Unclear bleeding internally  -Order 1 unit packed red blood cell  -Continue follow-up with with H&H      Chronic atrial fibrillation ([address])  Assessment & Plan  Currently rate controlled. On daily warfarin 3 mg.  - Goal INR between 2-3.  - INR currently 5.40  - In ED, pt is s/p Vit K and 2 units of FFP     Plan  - Hold warfarin 3 mg at this time. - Hold NSAIDs given risk of bleeding  -Discussing options for DVT prophylaxis with Ortho, Lovenox versus warfarin    Pacemaker  Assessment & Plan  History of intolerance and bradycardia with AVN blocking agents for chronic atrial fibrillation. She underwent AV node ablation in 8/2018 and implantation of MDT single chamber His PPM - medtronic. 99%     Plan  - monitor vitals. Benign essential HTN  Assessment & Plan  Goal BP <130/80 per cardiology  Continue monitor BP, stable currently. Most recent BP Blood Pressure: 631/54   96T SBP Systolic (53EOJ), PXW:144 , Min:91 , Max:123      Plan  - C/w home torsemide 10 mg daily    Ambulatory dysfunction  Assessment & Plan  Secondary to hip pain and multiple sites of OA. Pt presented from skilled nursing facility for PT/OT. S/p OR for I&D on 6/30.      Plan  - Pain control  - monitor for urine and BM  - PT/OT  - fall precautions  - CM referral for dispo    Hypothyroidism  Assessment & Plan  Thyroid    Lab Results   Component Value Date    NGI8HBVGYSZQ 6.873 (H) 06/28/2023    EXK1VXYEUOJK 2.260 06/08/2022    MBR7CHWAGFJZ 1.410 05/03/2021    MTS5QOSYOPFL 3.760 (H) 04/02/2021    ZSW4ARNBIKUX 2.076 05/15/2020    UBL4ZPKSYKYW 4.400 (H) 03/13/2020    JKJ3CXFRHZLE 3.713 01/22/2019    FREET4 1.39 (H) 06/28/2023    FREET4 0.96 06/23/2017    FREET4 1.00 03/10/2016     Plan  - TSH elevated, c/w increased levothyroxine dose 112mcg daily     Irritable bowel syndrome with constipation  Assessment & Plan  -Patient had 1 BM 7/2  Plan  - Bowel regimen: Miralax and Sennakot scheduled. Chronic diastolic CHF (congestive heart failure) (HCC)  Assessment & Plan  Wt Readings from Last 3 Encounters:   06/28/23 76.2 kg (168 lb)   06/16/23 76.6 kg (168 lb 12.8 oz)   03/24/23 75.8 kg (167 lb)   Relatively stable weight  HFpEF per cardiology notes. Last ECHO 4/27/2018 EF 55%, LV thickness upper limits normal, moderate mitral regurgitation, moderate tricuspid regurgitation.      • Monitor I/O   • Continue torsemide 10mg            VTE Pharmacologic Prophylaxis: Enoxaparin (Lovenox)  VTE Mechanical Prophylaxis: sequential compression device  Diet: Regular diet  Code Status: Level 1 full code  Disposition: Treating for anemia, waiting for PICC line for IV antibiotic, postop day 2    Discussed with attending physician, Dejah Enciso, and [facility] team.    Subjective:   Seen and examined at bedside, patient doing well. She does complain a little bit of fatigue; no complaint of bleeding, pain is well controlled. Patient understands she will need blood transfusion due to hemoglobin level dropping this morning. Objective:     Vitals: Blood pressure (!) 98/44, pulse 72, temperature 98.3 °F (36.8 °C), resp. rate 16, height 5' (1.524 m), weight 76.2 kg (168 lb), SpO2 96 %, not currently breastfeeding. ,Body mass index is 32.81 kg/m². Intake/Output Summary (Last 24 hours) at 7/2/2023 1423  Last data filed at 7/2/2023 1200  Gross per 24 hour   Intake 1431.25 ml   Output 50 ml   Net 1381.25 ml       Physical Exam  Constitutional:       General: She is not in acute distress. Appearance: She is not toxic-appearing. HENT:      Head: Normocephalic and atraumatic. Right Ear: There is no impacted cerumen. Left Ear: There is no impacted cerumen. Eyes:      General: No scleral icterus. Cardiovascular:      Rate and Rhythm: Normal rate and regular rhythm. Heart sounds: No murmur heard. Pulmonary:      Effort: No respiratory distress. Breath sounds: Normal breath sounds. Abdominal:      General: There is no distension. Tenderness: There is no abdominal tenderness. Genitourinary:     Rectum: Guaiac result negative. Musculoskeletal:         General: No swelling. Normal range of motion. Skin:     Capillary Refill: Capillary refill takes less than 2 seconds. Coloration: Skin is not jaundiced. Comments: Surgical site on the right hip area dry clean intact   Neurological:      Mental Status: She is alert. Cranial Nerves: No cranial nerve deficit. Psychiatric:         Mood and Affect: Mood normal.         Invasive Devices     Peripheral Intravenous Line  Duration           Peripheral IV 07/02/23 Right;Ventral (anterior) Forearm <1 day          Drain  Duration           Closed/Suction Drain Right Hip Accordion 19 Fr. 1 day                           Lab and other studies:  I have personally reviewed pertinent reports. No results displayed because visit has over 200 results.           Recent Results (from the past 24 hour(s))   Basic metabolic panel    Collection Time: 07/02/23  6:22 AM   Result Value Ref Range    Sodium 132 (L) 135 - 147 mmol/L    Potassium 4.4 3.5 - 5.3 mmol/L    Chloride 106 96 - 108 mmol/L    CO2 25 21 - 32 mmol/L    ANION GAP 1 mmol/L    BUN 13 5 - 25 mg/dL    Creatinine 0. 80 0.60 - 1.30 mg/dL    Glucose 87 65 - 140 mg/dL    Calcium 8.2 (L) 8.3 - 10.1 mg/dL    eGFR 65 ml/min/1.73sq m   CBC and differential    Collection Time: 07/02/23  6:22 AM   Result Value Ref Range    WBC 12.39 (H) 4.31 - 10.16 Thousand/uL    RBC 2.24 (L) 3.81 - 5.12 Million/uL    Hemoglobin 7.0 (L) 11.5 - 15.4 g/dL    Hematocrit 23.4 (L) 34.8 - 46.1 %     (H) 82 - 98 fL    MCH 31.3 26.8 - 34.3 pg    MCHC 29.9 (L) 31.4 - 37.4 g/dL    RDW 14.7 11.6 - 15.1 %    MPV 9.4 8.9 - 12.7 fL    Platelets 945 465 - 413 Thousands/uL    nRBC 0 /100 WBCs    Neutrophils Relative 76 (H) 43 - 75 %    Immat GRANS % 1 0 - 2 %    Lymphocytes Relative 10 (L) 14 - 44 %    Monocytes Relative 12 4 - 12 %    Eosinophils Relative 1 0 - 6 %    Basophils Relative 0 0 - 1 %    Neutrophils Absolute 9.47 (H) 1.85 - 7.62 Thousands/µL    Immature Grans Absolute 0.13 0.00 - 0.20 Thousand/uL    Lymphocytes Absolute 1.26 0.60 - 4.47 Thousands/µL    Monocytes Absolute 1.42 (H) 0.17 - 1.22 Thousand/µL    Eosinophils Absolute 0.06 0.00 - 0.61 Thousand/µL    Basophils Absolute 0.05 0.00 - 0.10 Thousands/µL   Prepare Plasma: 2 Units    Collection Time: 07/02/23  6:26 AM   Result Value Ref Range    Unit Product Code C2635Z44     Unit Number [id number]     Unit ABO A     Unit RH POS     Unit Dispense Status Return to [facility]     Unit Product Volume 280 ml    Unit Product Code X2700V58     Unit Number [id number]     Unit ABO A     Unit RH POS     Unit Dispense Status Return to [facility]     Unit Product Volume 280 ml   Prepare Leukoreduced RBC: 2 Units, Leukoreduced    Collection Time: 07/02/23  6:39 AM   Result Value Ref Range    Unit Product Code H6951X42     Unit Number [id number]     Unit ABO O     Unit RH POS     Crossmatch Compatible     Unit Dispense Status Return to [facility]     Unit Product Volume 350 mL    Unit Product Code N9965L23     Unit Number [id number]     Unit ABO O     Unit [facility] POS     Crossmatch Compatible     Unit Dispense Status Return to [facility]     Unit Product Volume 350 mL    Unit Product Code B9933O93     Unit Number [id number]     Unit ABO O     Unit RH NEG     Crossmatch Compatible     Unit Dispense Status Return to [facility]     Unit Product Volume 350 mL   Type and screen    Collection Time: 07/02/23  8:44 AM   Result Value Ref Range    ABO Grouping O     Rh Factor Positive     Antibody Screen Negative     Specimen Expiration Date [id number]    Prepare Leukoreduced RBC: 1 Units    Collection Time: 07/02/23  1:50 PM   Result Value Ref Range    Unit Product Code Y2214M15     Unit Number [id number]     Unit ABO O     Unit RH POS     Crossmatch Compatible     Unit Dispense Status Issued     Unit Product Volume 350 mL     Blood Culture:   Lab Results   Component Value Date    BLOODCX No Growth After 4 Days. 06/27/2023    BLOODCX No Growth After 4 Days. 06/27/2023   ,   Urinalysis:   Lab Results   Component Value Date    COLORU Light Orange 06/19/2023    CLARITYU Extra Turbid 06/19/2023    SPECGRAV 1.019 06/19/2023    PHUR 5.5 06/19/2023    PHUR 6.0 08/20/2016    LEUKOCYTESUR Small (A) 06/19/2023    NITRITE Negative 06/19/2023    GLUCOSEU Negative 06/19/2023    KETONESU 10 (1+) (A) 06/19/2023    BILIRUBINUR Negative 06/19/2023    BLOODU Negative 06/19/2023   ,   Urine Culture:   Lab Results   Component Value Date    URINECX >100,000 cfu/ml Escherichia coli ESBL (A) 06/15/2023    URINECX >100,000 cfu/ml Klebsiella pneumoniae (A) 06/15/2023    URINECX >100,000 cfu/ml Aerococcus urinae (A) 06/15/2023   ,   Wound Culure:   Lab Results   Component Value Date    WOUNDCULT No growth 06/28/2023         Imaging:  None  XR chest portable    Result Date: 6/28/2023  Increased interstitial markings, likely a component of mild fibrosis but question interstitial edema. Trace effusions better shown on abdomen CT.  Workstation performed: [id number]     CT lower extremity w contrast right    Result Date: 6/28/2023  Right total hip arthroplasty, with acetabular component dislodged superior laterally out of the native acetabular fossa (series 307 images .) There is a 9.4 x 3.4 x 15.7 cm air and fluid collection in the subcutaneous tissues in the right lateral hip, contacting the right hip replacement and extending into the native acetabular fossa where there are bone erosions, consistent with abscess formation, infection of the arthroplasty and osteomyelitis (series 5 images 19-74.) Findings are consistent with the preliminary report from [facility] which was provided shortly after completion of the exam. Workstation performed: [id number]     CT abdomen pelvis with contrast    Result Date: 6/28/2023  1. Superior dislocation of right hip arthroplasty femoral stem and acetabular cup. 2. Collection of fluid and gas lateral to the femoral stem measuring 16.5 x 7.3 x 4.3 cm compatible with an abscess. Collection extends to the acetabular cup. 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 3. No evidence of intraperitoneal or retroperitoneal fluid collection. 4. Constipation.  I personally discussed this study with Ayanna Garcia on 6/28/2023 2:06 AM. Workstation performed: [id number]        Current Facility-Administered Medications   Medication Dose Route Frequency   • acetaminophen (TYLENOL) tablet 650 mg  650 mg Oral TID   • bisacodyl (DULCOLAX) EC tablet 5 mg  5 mg Oral Daily PRN   • cholecalciferol (VITAMIN D3) tablet 5,000 Units  5,000 Units Oral Daily   • cyanocobalamin (VITAMIN B-12) tablet 1,000 mcg  1,000 mcg Oral Daily   • DULoxetine (CYMBALTA) delayed release capsule 60 mg  60 mg Oral Daily   • enoxaparin (LOVENOX) subcutaneous injection 40 mg  40 mg Subcutaneous Daily   • fluticasone (FLONASE) 50 mcg/act nasal spray 2 spray  2 spray Each Nare Daily   • gabapentin (NEURONTIN) capsule 300 mg  300 mg Oral BID   • HYDROmorphone HCl (DILAUDID) injection 0.2 mg  0.2 mg Intravenous Q4H PRN   • lactated ringers infusion  75 mL/hr Intravenous Continuous • levothyroxine tablet 112 mcg  112 mcg Oral Early Morning   • ondansetron (ZOFRAN) injection 4 mg  4 mg Intravenous Q6H PRN   • oxyCODONE (ROXICODONE) IR tablet 5 mg  5 mg Oral Q4H PRN   • oxyCODONE (ROXICODONE) split tablet 2.5 mg  2.5 mg Oral Q4H PRN   • pantoprazole (PROTONIX) EC tablet 40 mg  40 mg Oral Early Morning   • phenol (CHLORASEPTIC) 1.4 % mucosal liquid 1 spray  1 spray Mouth/Throat Q2H PRN   • polyethylene glycol (MIRALAX) packet 17 g  17 g Oral Daily PRN   • senna-docusate sodium (SENOKOT S) 8.6-50 mg per tablet 1 tablet  1 tablet Oral HS   • simethicone (MYLICON) chewable tablet 80 mg  80 mg Oral Q6H PRN   • torsemide (DEMADEX) tablet 10 mg  10 mg Oral Daily   • vancomycin (VANCOCIN) 1500 mg in sodium chloride 0.9% 250 mL IVPB  1,500 mg Intravenous Q24H             Ivana Higuera,    Family Medicine

## 2023-07-02 NOTE — PLAN OF CARE
Problem: PAIN - ADULT  Goal: Verbalizes/displays adequate comfort level or baseline comfort level  Description: Interventions:  - Encourage patient to monitor pain and request assistance  - Assess pain using appropriate pain scale  - Administer analgesics based on type and severity of pain and evaluate response  - Implement non-pharmacological measures as appropriate and evaluate response  - Consider cultural and social influences on pain and pain management  - Notify physician/advanced practitioner if interventions unsuccessful or patient reports new pain  Outcome: Progressing     Problem: MOBILITY - ADULT  Goal: Maintain or return to baseline ADL function  Description: INTERVENTIONS:  -  Assess patient's ability to carry out ADLs; assess patient's baseline for ADL function and identify physical deficits which impact ability to perform ADLs (bathing, care of mouth/teeth, toileting, grooming, dressing, etc.)  - Assess/evaluate cause of self-care deficits   - Assess range of motion  - Assess patient's mobility; develop plan if impaired  - Assess patient's need for assistive devices and provide as appropriate  - Encourage maximum independence but intervene and supervise when necessary  - Involve family in performance of ADLs  - Assess for home care needs following discharge   - Consider OT consult to assist with ADL evaluation and planning for discharge  - Provide patient education as appropriate  Outcome: Progressing

## 2023-07-02 NOTE — PROGRESS NOTES
Progress Note - Orthopedics   Macey Miller 80 y.o. female MRN: [id number]      Subjective:  No acute events overnight. No acute distress. Denies fevers, chills, SOB. Objective:  A 10 point ROS was performed; negative except as noted above. Labs:  Lab Results   Component Value Date/Time    WBC 12.39 (H) 07/02/2023 06:22 AM    WBC 7.98 04/20/2015 11:09 AM    HGB 7.0 (L) 07/02/2023 06:22 AM    HGB 12.0 04/20/2015 11:09 AM       Physical:  Vitals:    07/02/23 0114   BP: (!) 92/45   Pulse:    Resp:    Temp:    SpO2:      Musculoskeletal: right Lower Extremity  • Dressing C/D/I   • HV holding suction  • TTP around thigh  • SILT george/saph/sp/dp/tib nerve distributions   • +FHL/EHL, +ankle DF/PF  • Toes WWP w BCR     Assessment:    80 y.o. female post op day #1 from Right MAIA explantation, extensive I&D, insertion of antibiotic beads     Plan:  • Stand to transfer RLE  • PT/OT  • Pain control  • DVT ppx per primary team, recommend 28 days   • Hgb 7.0, and some hypotension, recommend 1 unit of pRBCs  • Abx as per ID  • Monitor HV  • Dispo: Ortho will follow        Chyna Mcdonough MD     Please contact role [facility] Call for any questions or concerns.

## 2023-07-02 NOTE — ASSESSMENT & PLAN NOTE
Hgb / Hct       Results from last 7 days   Lab Units 07/06/23  0555 07/05/23  0456 07/04/23  0209 07/03/23  0906 07/03/23  0537 07/02/23  1914 07/02/23  0622   HEMOGLOBIN g/dL 7.5* 8.2* 7.8* 8.3* 8.4* 7.3* 7.0*   HEMATOCRIT % 25.5* 27.7* 25.2* 26.5* 27.2* 23.4* 23.4*   Downtrending hemoglobin status post Ortho washout 6/30  Unclear bleeding internally  Hemoglobin stable today status post 1 unit of packed red blood cell transfusion  -Patient on ferrous sulfate 325 mg daily  -We will follow-up with iron panel and CBC, monitoring INR

## 2023-07-03 ENCOUNTER — APPOINTMENT (INPATIENT)
Dept: RADIOLOGY | Facility: HOSPITAL | Age: 88
DRG: 464 | End: 2023-07-03
Payer: COMMERCIAL

## 2023-07-03 LAB
ABO GROUP BLD BPU: NORMAL
ANION GAP SERPL CALCULATED.3IONS-SCNC: 1 MMOL/L
APTT PPP: 43 SECONDS (ref 23–37)
BACTERIA BLD CULT: NORMAL
BACTERIA BLD CULT: NORMAL
BASOPHILS # BLD AUTO: 0.05 THOUSANDS/ÂΜL (ref 0–0.1)
BASOPHILS NFR BLD AUTO: 1 % (ref 0–1)
BPU ID: NORMAL
BUN SERPL-MCNC: 18 MG/DL (ref 5–25)
CALCIUM SERPL-MCNC: 9.3 MG/DL (ref 8.3–10.1)
CHLORIDE SERPL-SCNC: 109 MMOL/L (ref 96–108)
CO2 SERPL-SCNC: 28 MMOL/L (ref 21–32)
CREAT SERPL-MCNC: 1.01 MG/DL (ref 0.6–1.3)
CROSSMATCH: NORMAL
EOSINOPHIL # BLD AUTO: 0.15 THOUSAND/ÂΜL (ref 0–0.61)
EOSINOPHIL NFR BLD AUTO: 2 % (ref 0–6)
ERYTHROCYTE [DISTWIDTH] IN BLOOD BY AUTOMATED COUNT: 16.3 % (ref 11.6–15.1)
ERYTHROCYTE [DISTWIDTH] IN BLOOD BY AUTOMATED COUNT: 16.4 % (ref 11.6–15.1)
GFR SERPL CREATININE-BSD FRML MDRD: 49 ML/MIN/1.73SQ M
GLUCOSE SERPL-MCNC: 86 MG/DL (ref 65–140)
HCT VFR BLD AUTO: 26.5 % (ref 34.8–46.1)
HCT VFR BLD AUTO: 27.2 % (ref 34.8–46.1)
HGB BLD-MCNC: 8.3 G/DL (ref 11.5–15.4)
HGB BLD-MCNC: 8.4 G/DL (ref 11.5–15.4)
IMM GRANULOCYTES # BLD AUTO: 0.11 THOUSAND/UL (ref 0–0.2)
IMM GRANULOCYTES NFR BLD AUTO: 1 % (ref 0–2)
INR PPP: 1.35 (ref 0.84–1.19)
LYMPHOCYTES # BLD AUTO: 1.3 THOUSANDS/ÂΜL (ref 0.6–4.47)
LYMPHOCYTES NFR BLD AUTO: 14 % (ref 14–44)
MCH RBC QN AUTO: 30.7 PG (ref 26.8–34.3)
MCH RBC QN AUTO: 31.3 PG (ref 26.8–34.3)
MCHC RBC AUTO-ENTMCNC: 30.9 G/DL (ref 31.4–37.4)
MCHC RBC AUTO-ENTMCNC: 31.3 G/DL (ref 31.4–37.4)
MCV RBC AUTO: 100 FL (ref 82–98)
MCV RBC AUTO: 99 FL (ref 82–98)
MONOCYTES # BLD AUTO: 1.38 THOUSAND/ÂΜL (ref 0.17–1.22)
MONOCYTES NFR BLD AUTO: 14 % (ref 4–12)
NEUTROPHILS # BLD AUTO: 6.58 THOUSANDS/ÂΜL (ref 1.85–7.62)
NEUTS SEG NFR BLD AUTO: 68 % (ref 43–75)
NRBC BLD AUTO-RTO: 0 /100 WBCS
PLATELET # BLD AUTO: 198 THOUSANDS/UL (ref 149–390)
PLATELET # BLD AUTO: 205 THOUSANDS/UL (ref 149–390)
PMV BLD AUTO: 8.9 FL (ref 8.9–12.7)
PMV BLD AUTO: 9.3 FL (ref 8.9–12.7)
POTASSIUM SERPL-SCNC: 4.3 MMOL/L (ref 3.5–5.3)
PROTHROMBIN TIME: 16.9 SECONDS (ref 11.6–14.5)
RBC # BLD AUTO: 2.65 MILLION/UL (ref 3.81–5.12)
RBC # BLD AUTO: 2.74 MILLION/UL (ref 3.81–5.12)
SODIUM SERPL-SCNC: 138 MMOL/L (ref 135–147)
UNIT DISPENSE STATUS: NORMAL
UNIT PRODUCT CODE: NORMAL
UNIT PRODUCT VOLUME: 350 ML
UNIT RH: NORMAL
WBC # BLD AUTO: 10.68 THOUSAND/UL (ref 4.31–10.16)
WBC # BLD AUTO: 9.57 THOUSAND/UL (ref 4.31–10.16)

## 2023-07-03 PROCEDURE — 36569 INSJ PICC 5 YR+ W/O IMAGING: CPT

## 2023-07-03 PROCEDURE — 85730 THROMBOPLASTIN TIME PARTIAL: CPT

## 2023-07-03 PROCEDURE — 85027 COMPLETE CBC AUTOMATED: CPT

## 2023-07-03 PROCEDURE — 71045 X-RAY EXAM CHEST 1 VIEW: CPT

## 2023-07-03 PROCEDURE — 85610 PROTHROMBIN TIME: CPT

## 2023-07-03 PROCEDURE — 80048 BASIC METABOLIC PNL TOTAL CA: CPT

## 2023-07-03 PROCEDURE — NC001 PR NO CHARGE: Performed by: ORTHOPAEDIC SURGERY

## 2023-07-03 PROCEDURE — 99233 SBSQ HOSP IP/OBS HIGH 50: CPT | Performed by: FAMILY MEDICINE

## 2023-07-03 PROCEDURE — 99232 SBSQ HOSP IP/OBS MODERATE 35: CPT | Performed by: INTERNAL MEDICINE

## 2023-07-03 PROCEDURE — 85025 COMPLETE CBC W/AUTO DIFF WBC: CPT

## 2023-07-03 PROCEDURE — 02HV33Z INSERTION OF INFUSION DEVICE INTO SUPERIOR VENA CAVA, PERCUTANEOUS APPROACH: ICD-10-PCS

## 2023-07-03 PROCEDURE — C1751 CATH, INF, PER/CENT/MIDLINE: HCPCS

## 2023-07-03 RX ORDER — WARFARIN SODIUM 5 MG/1
5 TABLET ORAL
Status: DISCONTINUED | OUTPATIENT
Start: 2023-07-03 | End: 2023-07-03

## 2023-07-03 RX ORDER — HEPARIN SODIUM 10000 [USP'U]/100ML
3-20 INJECTION, SOLUTION INTRAVENOUS
Status: DISCONTINUED | OUTPATIENT
Start: 2023-07-03 | End: 2023-07-06

## 2023-07-03 RX ORDER — WARFARIN SODIUM 5 MG/1
5 TABLET ORAL
Status: COMPLETED | OUTPATIENT
Start: 2023-07-03 | End: 2023-07-03

## 2023-07-03 RX ORDER — FERROUS SULFATE 325(65) MG
325 TABLET ORAL
Status: DISCONTINUED | OUTPATIENT
Start: 2023-07-03 | End: 2023-07-08 | Stop reason: HOSPADM

## 2023-07-03 RX ADMIN — FERROUS SULFATE TAB 325 MG (65 MG ELEMENTAL FE) 325 MG: 325 (65 FE) TAB at 09:31

## 2023-07-03 RX ADMIN — CYANOCOBALAMIN TAB 500 MCG 1000 MCG: 500 TAB at 08:13

## 2023-07-03 RX ADMIN — ACETAMINOPHEN 650 MG: 325 TABLET, FILM COATED ORAL at 08:13

## 2023-07-03 RX ADMIN — PANTOPRAZOLE SODIUM 40 MG: 40 TABLET, DELAYED RELEASE ORAL at 05:02

## 2023-07-03 RX ADMIN — Medication 5000 UNITS: at 08:13

## 2023-07-03 RX ADMIN — LEVOTHYROXINE SODIUM 112 MCG: 112 TABLET ORAL at 05:02

## 2023-07-03 RX ADMIN — ACETAMINOPHEN 650 MG: 325 TABLET, FILM COATED ORAL at 17:14

## 2023-07-03 RX ADMIN — ENOXAPARIN SODIUM 40 MG: 40 INJECTION SUBCUTANEOUS at 08:14

## 2023-07-03 RX ADMIN — WARFARIN SODIUM 5 MG: 5 TABLET ORAL at 20:20

## 2023-07-03 RX ADMIN — DULOXETINE HYDROCHLORIDE 60 MG: 60 CAPSULE, DELAYED RELEASE ORAL at 08:13

## 2023-07-03 RX ADMIN — VANCOMYCIN HYDROCHLORIDE 1500 MG: 10 INJECTION, POWDER, LYOPHILIZED, FOR SOLUTION INTRAVENOUS at 09:28

## 2023-07-03 RX ADMIN — GABAPENTIN 300 MG: 300 CAPSULE ORAL at 08:14

## 2023-07-03 RX ADMIN — GABAPENTIN 300 MG: 300 CAPSULE ORAL at 17:14

## 2023-07-03 RX ADMIN — FLUTICASONE PROPIONATE 2 SPRAY: 50 SPRAY, METERED NASAL at 08:14

## 2023-07-03 RX ADMIN — HEPARIN SODIUM 12 UNITS/KG/HR: 10000 INJECTION, SOLUTION INTRAVENOUS at 19:59

## 2023-07-03 RX ADMIN — ACETAMINOPHEN 650 MG: 325 TABLET, FILM COATED ORAL at 22:19

## 2023-07-03 RX ADMIN — OXYCODONE HYDROCHLORIDE 5 MG: 5 TABLET ORAL at 08:15

## 2023-07-03 NOTE — NURSING NOTE
New order for Heparin drip placed by MD. Pt just received am dose of SQ Lovenox. Per Dr. Moon Freedman, wait 12 hours to start heparin drip. Drip re timed to start at 2000.

## 2023-07-03 NOTE — PROGRESS NOTES
Progress Note - Infectious Disease   Dov Packer 80 y.o. female MRN: [id number]  Unit/Bed#: 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 In setting of MAIA, revision, chronic dislocation as below.  Presenting with subacute pain, spontaneous drainage from sinus tract.  CT showed abscess at joint.  Clinically stable without sepsis.  ESR 51. Suspect gram-positive pathogen. Status post MAIA explantation, extensive I&D, insertion of antibiotic beads on 6/30. Extensive purulent fluid and necrotic tissue noted intraoperatively. OR cultures are negative thus far. Rec:  • Continue IV vancomycin. Consult pharmacy for dosing recommendations. • Follow-up OR cultures, which are being held for 14 days. • Anticipate 6 weeks of postoperative IV antibiotic, through 8/11. • Check weekly CBC with differential, creatinine, vancomycin trough, ESR while on IV antibiotic. • Outpatient ID follow-up in 2 weeks. Office is aware. Dr. Gabriel Dawson is supervising physician. • Discontinue PICC line after last dose of IV antibiotic. • After completion of IV course, consider PO antibiotic based on ESR trend given suspected chronicity of infection.     Status post left MAIA with revision in 2019. Complicated more recently by disruption and chronic dislocation.  Non-ambulatory.     Status post PPM.     IBS with constipation.     Afib. On coumadin     History of ESBL, MDRO. From recent urine cultures.     Antibiotics:  Vancomycin #6  POD #3    I discussed above plan with Family Medicine service. Also spoke with micro lab. Subjective:  Patient states she feels well with no new complaints. Pain is controlled. No fevers. Tolerating oral intake. New PICC line was placed.     Objective:  Vitals:  Temp:  [98 °F (36.7 °C)-98.4 °F (36.9 °C)] 98 °F (36.7 °C)  HR:  [70-78] 70  Resp:  [14-16] 14  BP: ()/(44-54) 106/49  SpO2:  [94 %-96 %] 96 %  Temp (24hrs), Av.2 °F (36.8 °C), Min:98 °F (36.7 °C), Max:98.4 °F (36.9 °C)  Current: Temperature: 98 °F (36.7 °C)    Physical Exam:   General: Elderly, awake and alert  Eyes:  Conjunctive clear with no hemorrhages or effusions  Oropharynx:  No ulcers, no lesions  Neck:  Supple, trachea mid  Lungs: Nonlabored respirations  Abdomen:  Soft, nondistended  Extremities: Hip dressing intact  PICC line in place  Skin:  No diffuse rash  Neurological:  Moves all four extremities spontaneously    Lab Results:  I have personally reviewed pertinent labs. Results from last 7 days   Lab Units 23  0537 23  0623  0500 23  0623  2206   POTASSIUM mmol/L 4.3 4.4 4.4  --    < > 4.0   CHLORIDE mmol/L 109* 106 112*  --    < > 107   CO2 mmol/L 28 25 25  --    < > 26   CO2, I-STAT mmol/L  --   --   --  28  --   --    BUN mg/dL 18 13 11  --    < > 17   CREATININE mg/dL 1.01 0.80 0.41*  --    < > 0.66   EGFR ml/min/1.73sq m 49 65 91  --    < > 78   GLUCOSE, ISTAT mg/dl  --   --   --  87  --   --    CALCIUM mg/dL 9.3 8.2* 7.5*  --    < > 7.9*   AST U/L  --   --   --   --   --  18   ALT U/L  --   --   --   --   --  13   ALK PHOS U/L  --   --   --   --   --  128*    < > = values in this interval not displayed. Results from last 7 days   Lab Units 23  0906 23  0537 23   WBC Thousand/uL 10.68* 9.57  --  12.39*   HEMOGLOBIN g/dL 8.3* 8.4* 7.3* 7.0*   PLATELETS Thousands/uL 198 205  --  228     Results from last 7 days   Lab Units 23  1916 23  1611 23  0522 23  2206   BLOOD CULTURE   --   --   --  No Growth After 5 Days. No Growth After 5 Days.    GRAM STAIN RESULT  2+ Polys  No bacteria seen No Polys or Bacteria seen  --   --    WOUND CULTURE   --  No growth  --   --    MRSA CULTURE ONLY   --   --  No Methicillin Resistant Staphlyococcus aureus (MRSA) isolated  --        Imaging Studies:   I have personally reviewed pertinent imaging study reports and images in PACS. EKG, Pathology, and Other Studies:   I have personally reviewed pertinent reports. Operative report was personally reviewed. Sinus tract communicating directly to the deep space and components with extensive amount of purulent fluid and necrotic tissue.

## 2023-07-03 NOTE — PROGRESS NOTES
PICC line placed at bedside showing IJ per CXR image. Exchanged at bedside, same length, lot number and reference number of PICC.   Repeat CXR ordered to confirm placement of new FXPY    0997:  CXR shows PICC tip in azygos vein, unable to reposition at bedside, spoke to IR, order placed for IR repo, will be done 7/5 am.  Will notify primary RN

## 2023-07-03 NOTE — PROGRESS NOTES
Progress Note - Orthopedics   Lizabeth Branch 80 y.o. female MRN: [id number]  Unit/Bed#: [facility] 807-01      Subjective:    80 y. o.female POD 3 right MAIA explantation, extensive I&D, insertion of antibiotic beads. No acute events, no new complaints. Patient doing well. Pain well controlled.      Labs:  0   Lab Value Date/Time    HCT 23.4 (L) 07/02/2023 1914    HCT 23.4 (L) 07/02/2023 0622    HCT 24.6 (L) 07/01/2023 0424    HCT 37.0 04/20/2015 1109    HCT 38.0 04/20/2015 1109    HCT 37.2 04/03/2015 0831    HGB 7.3 (L) 07/02/2023 1914    HGB 7.0 (L) 07/02/2023 0622    HGB 7.5 (L) 07/01/2023 0424    HGB 12.0 04/20/2015 1109    HGB 11.6 04/03/2015 0831    HGB 12.1 11/04/2014 1025    INR 1.23 (H) 06/30/2023 0456    WBC 12.39 (H) 07/02/2023 0622    WBC 12.93 (H) 07/01/2023 0424    WBC 8.78 06/30/2023 0456    WBC 7.98 04/20/2015 1109    WBC 7.29 04/03/2015 0831    WBC 6.66 11/04/2014 1025    ESR 54 (H) 06/28/2023 0419    CRP 92.0 (H) 06/27/2023 2206       Meds:    Current Facility-Administered Medications:   •  acetaminophen (TYLENOL) tablet 650 mg, 650 mg, Oral, TID, Kim Powell MD, 650 mg at 07/02/23 2242  •  bisacodyl (DULCOLAX) EC tablet 5 mg, 5 mg, Oral, Daily PRN, Kim Powell MD  •  cholecalciferol (VITAMIN D3) tablet 5,000 Units, 5,000 Units, Oral, Daily, Kim Powell MD, 5,000 Units at 07/02/23 0825  •  cyanocobalamin (VITAMIN B-12) tablet 1,000 mcg, 1,000 mcg, Oral, Daily, Kim Powell MD, 1,000 mcg at 07/02/23 0825  •  DULoxetine (CYMBALTA) delayed release capsule 60 mg, 60 mg, Oral, Daily, Kim Powell MD, 60 mg at 07/02/23 0825  •  enoxaparin (LOVENOX) subcutaneous injection 40 mg, 40 mg, Subcutaneous, Daily, Kim Powell MD, 40 mg at 07/02/23 5100  •  fluticasone (FLONASE) 50 mcg/act nasal spray 2 spray, 2 spray, Each Nare, Daily, Kim Powell MD, 2 spray at 07/02/23 0827  •  gabapentin (NEURONTIN) capsule 300 mg, 300 mg, Oral, BID, Kim Powell MD, 300 mg at 07/02/23 1704  •  HYDROmorphone HCl (DILAUDID) injection 0.2 mg, 0.2 mg, Intravenous, Q4H PRN, Jesús Leon MD, 0.2 mg at 06/28/23 0615  •  lactated ringers infusion, 75 mL/hr, Intravenous, Continuous, Jesús Leon MD, Last Rate: 75 mL/hr at 07/02/23 1904, 75 mL/hr at 07/02/23 1904  •  levothyroxine tablet 112 mcg, 112 mcg, Oral, Early Morning, Jesús Leon MD, 112 mcg at 07/03/23 0502  •  ondansetron [facility]) injection 4 mg, 4 mg, Intravenous, Q6H PRN, Jesús Leon MD, 4 mg at 06/28/23 0419  •  oxyCODONE (ROXICODONE) IR tablet 5 mg, 5 mg, Oral, Q4H PRN, Jesús Leon MD, 5 mg at 07/01/23 9239  •  oxyCODONE (ROXICODONE) split tablet 2.5 mg, 2.5 mg, Oral, Q4H PRN, Jesús Leon MD, 2.5 mg at 06/28/23 1833  •  pantoprazole (PROTONIX) EC tablet 40 mg, 40 mg, Oral, Early Morning, Sharath Hays DO, 40 mg at 07/03/23 0502  •  phenol (CHLORASEPTIC) 1.4 % mucosal liquid 1 spray, 1 spray, Mouth/Throat, Q2H PRN, Jesús Leon MD  •  polyethylene glycol (MIRALAX) packet 17 g, 17 g, Oral, Daily PRN, Jesús Leon MD  •  senna-docusate sodium (SENOKOT S) 8.6-50 mg per tablet 1 tablet, 1 tablet, Oral, HS, Jesús Leon MD, 1 tablet at 06/29/23 2145  •  simethicone (MYLICON) chewable tablet 80 mg, 80 mg, Oral, Q6H PRN, Jesús Leon MD  •  torsemide (DEMADEX) tablet 10 mg, 10 mg, Oral, Daily, Jesús Leon MD, 10 mg at 07/01/23 0936  •  vancomycin (VANCOCIN) 1500 mg in sodium chloride 0.9% 250 mL IVPB, 1,500 mg, Intravenous, Q24H, Jesús Leon MD, 1,500 mg at 07/02/23 6505    Blood Culture:   Lab Results   Component Value Date    BLOODCX No Growth After 5 Days. 06/27/2023    BLOODCX No Growth After 5 Days. 06/27/2023       Wound Culture:   Lab Results   Component Value Date    WOUNDCULT No growth 06/28/2023       Ins and Outs:  I/O last 24 hours: In: 5253 [P.O.:240;  I.V.:2170; Blood:348]  Out: 50 [Drains:50]          Physical:  Vitals:    07/02/23 2147   BP: (!) 106/48   Pulse: 70   Resp: 16   Temp: 98.4 °F (36.9 °C)   SpO2: 94%     Musculoskeletal: right Lower Extremity  · Dressing c/d/i  · TTP humberto-incisionally  · HV in place  · Sensation intact to saphenous, sural, tibial, superficial peroneal nerve, and deep peroneal  · Motor intact to +FHL/EHL, +ankle dorsi/plantar flexion  · 2+ DP pulse  · Digits warm and well perfused    Assessment:    80 y. o.female POD 3 right MAIA explantation, extensive I&D, insertion of antibiotic beads. Patient doing well.      Plan:  · Stand to transfer RLE  · PT/OT  · Will monitor for ABLA and administer IVF/prbc as indicated for Greater than 2 gram drop or Hgb < 7  · PT/OT  · Pain control  · DVT ppx - recommend 28 days  · Monitor HV  · Dispo: Ortho will follow    Yolande Melton MD

## 2023-07-03 NOTE — PROCEDURES
Insert Complex Venous Access Line    Date/Time: 7/3/2023 10:29 AM    Performed by: Hailey Willingham RN  Authorized by: Yasmany Durate MD    Patient location:  Bedside  Other Assisting Provider: Yes (comment) Jeane Brittle E.,[employer])    Consent:     Consent obtained:  Written (Physician obtained)    Consent given by:  Patient    Procedural risks discussed: with MD and [facility] RN. Alternatives discussed:  No treatment  Universal protocol:     Procedure explained and questions answered to patient or proxy's satisfaction: yes      Relevant documents present and verified: yes      Test results available and properly labeled: yes      Radiology Images displayed and confirmed. If images not available, report reviewed: yes      Required blood products, implants, devices, and special equipment available: yes      Site/side marked: yes      Immediately prior to procedure, a time out was called: yes      Patient identity confirmed:  Verbally with patient, arm band, provided demographic data and hospital-assigned identification number  Pre-procedure details:     Hand hygiene: Hand hygiene performed prior to insertion      Sterile barrier technique: All elements of maximal sterile technique followed      Skin preparation:  ChloraPrep    Skin preparation agent: Skin preparation agent completely dried prior to procedure    Indications:     PICC line indications: long term antibiotics    Anesthesia (see MAR for exact dosages):      Anesthesia method:  Local infiltration    Local anesthetic:  Lidocaine 1% w/o epi (2 ml)  Procedure details:     Location:  Basilic    Vessel type: vein      Laterality:  Right    Approach: percutaneous technique used      Patient position:  Flat    Procedural supplies:  Single lumen    Catheter size:  4 Fr    Landmarks identified: yes      Ultrasound guidance: yes      Ultrasound image availability:  Not saved    Sterile ultrasound techniques: Sterile gel and sterile probe covers were used      Number of attempts:  1    Successful placement: yes      Vessel of catheter tip end:  Chest Xray needed to confirm placement    Total catheter length (cm):  42    Catheter out on skin (cm):  0    Max flow rate:  999    Arm circumference:  23  Post-procedure details:     Post-procedure:  Dressing applied and securement device placed    Assessment:  Blood return through all ports, free fluid flow and placement verification pending x-ray result    Post-procedure complications: none      Patient tolerance of procedure:   Tolerated well, no immediate complications  Comments:      CXR needed to confirm placement  Ref [id number]  Lot [id number]  Exp 2024-05-31

## 2023-07-03 NOTE — PROGRESS NOTES
Progress Notes - Family Medicine Residency, Mayank Gary Coulter 9/2/1933, 80 y.o. female. MRN: [id number]    Unit/Bed#: [facility] 807-01 Encounter: [id number]  Primary Care Provider: Margareth Kate DO      Admission Date: 6/27/2023 2125  Length of Stay: 5 days  Code Status:  Level 1 - Full Code  Consult:   IP CONSULT TO ORTHOPEDIC SURGERY  IP CONSULT TO CARDIOLOGY  IP CONSULT TO CASE MANAGEMENT  IP CONSULT TO PHARMACY  IP CONSULT TO INFECTIOUS DISEASES  IP CONSULT TO PICC TEAM    * Osteomyelitis ([address])  Assessment & Plan  Pt presents from [address] [facility] [place] [facility] for purulent discharge from her right hip noticed on day of admission. Her right hip was drained 1 L and the skilled nursing facility prior to ED presentation. · Pt has history of right hip replacement and follows with orthopedics outpatient. Recently admitted at [facility] from 6/8-6/12 under the trauma service for chronic right hip dislocation. · Pt is vitally stable, labs significant for leukocytosis. Imaging obtained. Blood cultures pending. CT Abdomen Pelvis (6/27/23)  1. Superior dislocation of right hip arthroplasty femoral stem and acetabular cup.  2. Collection of fluid and gas lateral to the femoral stem measuring 16.5 x 7.3 x 4.3 cm compatible with an abscess. Collection extends to the acetabular cup. 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 3. No evidence of intraperitoneal or retroperitoneal fluid collection. 4. Constipation.       S/p INR reversal with Vit K 10 mg and Platelet transfusion x2  S/p wash out in OR by ortho 6/30    Plan:  • Pain regimen: dimitrios tylenol 975 mg Q8H, oxy 2.5/5 mg Q8H prn  • For anticoagulation, per discussion with orthosurgery we will bridge patient to warfarin  • Patient has previous home dose of warfarin at 3 mg, however she does have a history of supratherapeutic INR requiring vitamin K reversal  • Will assess INR and adjusting initial dose of warfarin accordingly  • F/u cultures  • ID: recommendation appreciated  • Follow-up with PICC consult, patient to undergo 6 total weeks of IV antibiotic follow-up with possible 6 more weeks of p.o. antibiotic per ID    Stage 2 chronic kidney disease  Assessment & Plan  Lab Results   Component Value Date    EGFR 49 07/03/2023    EGFR 65 07/02/2023    EGFR 91 07/01/2023    CREATININE 1.01 07/03/2023    CREATININE 0.80 07/02/2023    CREATININE 0.41 (L) 07/01/2023     Caution with using nephrotoxic agents  Avoid NSAIDs given Elevated INR. Anemia  Assessment & Plan  Hgb / Hct       Results from last 7 days   Lab Units 07/03/23  0906 07/03/23  0537 07/02/23  1914 07/02/23  0622 07/01/23  0424 06/30/23 1951 06/30/23  0456   HEMOGLOBIN g/dL 8.3* 8.4* 7.3* 7.0* 7.5*  --  8.3*   I STAT HEMOGLOBIN g/dl  --   --   --   --   --  8.2*  --    HEMATOCRIT % 26.5* 27.2* 23.4* 23.4* 24.6*  --  28.0*   HEMATOCRIT, ISTAT %  --   --   --   --   --  24*  --    Downtrending hemoglobin status post Ortho washout 6/30  Unclear bleeding internally  Hemoglobin stable today status post 1 unit of packed red blood cell transfusion  -We will start patient on ferrous sulfate 2025 mg daily  -Continue monitoring CBC      Chronic atrial fibrillation ([address])  Assessment & Plan  Currently rate controlled. On daily warfarin 3 mg.  - Goal INR between 2-3.  - INR currently 5.40  - In ED, pt is s/p Vit K and 2 units of FFP     Plan  -We will start warfarin once INR resulted   - Hold NSAIDs given risk of bleeding  -Discussing options for DVT prophylaxis with Ortho, which patient to warfarin. Patient has 1 dose of Lovenox today hence we will start her heparin drip at 8 PM tonight. Pacemaker  Assessment & Plan  History of intolerance and bradycardia with AVN blocking agents for chronic atrial fibrillation. She underwent AV node ablation in 8/2018 and implantation of MDT single chamber His PPM - medtr[employer].  99%     Plan  - monitor vitals    Benign essential HTN  Assessment & Plan  Goal BP <130/80 per cardiology  Continue monitor BP, stable currently. Most recent BP Blood Pressure: (!) 113/98   73Z SBP Systolic (49APX), RKY:119 , Min:98 , Max:108      Plan  - C/w home torsemide 10 mg daily    Ambulatory dysfunction  Assessment & Plan  Secondary to hip pain and multiple sites of OA. Pt presented from skilled nursing facility for PT/OT. S/p OR for I&D on 6/30. Plan  - Pain control  - monitor for urine and BM  - PT/OT  - fall precautions  - CM referral for dispo    Hypothyroidism  Assessment & Plan  Thyroid    Lab Results   Component Value Date    JQH9HOEINAYC 6.873 (H) 06/28/2023    EQX9WUDGDRJA 2.260 06/08/2022    OLR4OWAWEEIF 1.410 05/03/2021    RFV8LCMWRYRJ 3.760 (H) 04/02/2021    VFD2PHLOMWUP 2.076 05/15/2020    OFM5SDSWELNT 4.400 (H) 03/13/2020    CVG7FWCOPRAI 3.713 01/22/2019    FREET4 1.39 (H) 06/28/2023    FREET4 0.96 06/23/2017    FREET4 1.00 03/10/2016     Plan  - TSH elevated, c/w increased levothyroxine dose 112mcg daily     Irritable bowel syndrome with constipation  Assessment & Plan  -Patient had 1 BM 7/2  Plan  - Bowel regimen: Miralax and Sennakot scheduled. Chronic diastolic CHF (congestive heart failure) (HCC)  Assessment & Plan  Wt Readings from Last 3 Encounters:   06/28/23 76.2 kg (168 lb)   06/16/23 76.6 kg (168 lb 12.8 oz)   03/24/23 75.8 kg (167 lb)   Relatively stable weight  HFpEF per cardiology notes.  Last ECHO 4/27/2018 EF 55%, LV thickness upper limits normal, moderate mitral regurgitation, moderate tricuspid regurgitation.      • Monitor I/O   • Continue torsemide 10mg              VTE Pharmacologic Prophylaxis: Enoxaparin (Lovenox)   VTE Mechanical Prophylaxis: sequential compression device  Diet: Regular diet  Code Status: Full code  Disposition: Waiting for PICC line, bridging to warfarin for DVT prophylaxis post Ortho surgery as well as A-fib    Discussed with attending physician, , and [facility] team.    Subjective:   Patient seen and examined bedside, she has no acute complaint. Feeling okay no complaint of fatigue. Objective:     Vitals: Blood pressure (!) 106/49, pulse 70, temperature 98 °F (36.7 °C), resp. rate 14, height 5' (1.524 m), weight 76.2 kg (168 lb), SpO2 96 %, not currently breastfeeding. ,Body mass index is 32.81 kg/m². Intake/Output Summary (Last 24 hours) at 7/3/2023 0920  Last data filed at 7/3/2023 0815  Gross per 24 hour   Intake 1946.75 ml   Output 692 ml   Net 1254.75 ml       Physical Exam  HENT:      Head: Normocephalic and atraumatic. Right Ear: External ear normal.      Left Ear: External ear normal.      Nose: No congestion. Mouth/Throat:      Pharynx: No oropharyngeal exudate. Eyes:      General: No scleral icterus. Cardiovascular:      Rate and Rhythm: Normal rate and regular rhythm. Heart sounds: Normal heart sounds. No murmur heard. Pulmonary:      Effort: No respiratory distress. Breath sounds: Normal breath sounds. Abdominal:      General: There is no distension. Palpations: Abdomen is soft. Musculoskeletal:         General: No swelling or signs of injury. Cervical back: No rigidity. Lymphadenopathy:      Cervical: No cervical adenopathy. Skin:     Capillary Refill: Capillary refill takes less than 2 seconds. Findings: Lesion present. Comments: Right hip surgical lesion status post washout   Neurological:      General: No focal deficit present. Mental Status: She is alert. Psychiatric:         Mood and Affect: Mood normal.         Invasive Devices     Peripheral Intravenous Line  Duration           Peripheral IV 07/03/23 Distal;Dorsal (posterior); Right Forearm <1 day          Drain  Duration           Closed/Suction Drain Right Hip Accordion 19 Fr. 2 days                           Lab and other studies:  I have personally reviewed pertinent reports. No results displayed because visit has over 200 results.           Recent Results (from the past 24 hour(s)) Hemoglobin and hematocrit, blood    Collection Time: 07/02/23  7:14 PM   Result Value Ref Range    Hemoglobin 7.3 (L) 11.5 - 15.4 g/dL    Hematocrit 23.4 (L) 34.8 - 46.1 %   Basic metabolic panel    Collection Time: 07/03/23  5:37 AM   Result Value Ref Range    Sodium 138 135 - 147 mmol/L    Potassium 4.3 3.5 - 5.3 mmol/L    Chloride 109 (H) 96 - 108 mmol/L    CO2 28 21 - 32 mmol/L    ANION GAP 1 mmol/L    BUN 18 5 - 25 mg/dL    Creatinine 1.01 0.60 - 1.30 mg/dL    Glucose 86 65 - 140 mg/dL    Calcium 9.3 8.3 - 10.1 mg/dL    eGFR 49 ml/min/1.73sq m   CBC and differential    Collection Time: 07/03/23  5:37 AM   Result Value Ref Range    WBC 9.57 4.31 - 10.16 Thousand/uL    RBC 2.74 (L) 3.81 - 5.12 Million/uL    Hemoglobin 8.4 (L) 11.5 - 15.4 g/dL    Hematocrit 27.2 (L) 34.8 - 46.1 %    MCV 99 (H) 82 - 98 fL    MCH 30.7 26.8 - 34.3 pg    MCHC 30.9 (L) 31.4 - 37.4 g/dL    RDW 16.3 (H) 11.6 - 15.1 %    MPV 9.3 8.9 - 12.7 fL    Platelets 939 545 - 231 Thousands/uL    nRBC 0 /100 WBCs    Neutrophils Relative 68 43 - 75 %    Immat GRANS % 1 0 - 2 %    Lymphocytes Relative 14 14 - 44 %    Monocytes Relative 14 (H) 4 - 12 %    Eosinophils Relative 2 0 - 6 %    Basophils Relative 1 0 - 1 %    Neutrophils Absolute 6.58 1.85 - 7.62 Thousands/µL    Immature Grans Absolute 0.11 0.00 - 0.20 Thousand/uL    Lymphocytes Absolute 1.30 0.60 - 4.47 Thousands/µL    Monocytes Absolute 1.38 (H) 0.17 - 1.22 Thousand/µL    Eosinophils Absolute 0.15 0.00 - 0.61 Thousand/µL    Basophils Absolute 0.05 0.00 - 0.10 Thousands/µL   Prepare Leukoreduced RBC: 1 Units    Collection Time: 07/03/23  5:55 AM   Result Value Ref Range    Unit Product Code H6142H88     Unit Number [id number]     Unit ABO O     Unit [facility] POS     Crossmatch Compatible     Unit Dispense Status Presumed Trans     Unit Product Volume 350 mL   CBC    Collection Time: 07/03/23  9:06 AM   Result Value Ref Range    WBC 10.68 (H) 4.31 - 10.16 Thousand/uL    RBC 2.65 (L) 3.81 - 5.12 Million/uL    Hemoglobin 8.3 (L) 11.5 - 15.4 g/dL    Hematocrit 26.5 (L) 34.8 - 46.1 %     (H) 82 - 98 fL    MCH 31.3 26.8 - 34.3 pg    MCHC 31.3 (L) 31.4 - 37.4 g/dL    RDW 16.4 (H) 11.6 - 15.1 %    Platelets 241 980 - 095 Thousands/uL    MPV 8.9 8.9 - 12.7 fL     Blood Culture:   Lab Results   Component Value Date    BLOODCX No Growth After 5 Days. 06/27/2023    BLOODCX No Growth After 5 Days. 06/27/2023   ,   Urinalysis:   Lab Results   Component Value Date    COLORU Light Orange 06/19/2023    CLARITYU Extra Turbid 06/19/2023    SPECGRAV 1.019 06/19/2023    PHUR 5.5 06/19/2023    PHUR 6.0 08/20/2016    LEUKOCYTESUR Small (A) 06/19/2023    NITRITE Negative 06/19/2023    GLUCOSEU Negative 06/19/2023    KETONESU 10 (1+) (A) 06/19/2023    BILIRUBINUR Negative 06/19/2023    BLOODU Negative 06/19/2023   ,   Urine Culture:   Lab Results   Component Value Date    URINECX >100,000 cfu/ml Escherichia coli ESBL (A) 06/15/2023    URINECX >100,000 cfu/ml Klebsiella pneumoniae (A) 06/15/2023    URINECX >100,000 cfu/ml Aerococcus urinae (A) 06/15/2023   ,   Wound Culure:   Lab Results   Component Value Date    WOUNDCULT No growth 06/28/2023         Imaging:  None  XR chest portable    Result Date: 6/28/2023  Increased interstitial markings, likely a component of mild fibrosis but question interstitial edema. Trace effusions better shown on abdomen CT.  Workstation performed: [id number]     CT lower extremity w contrast right    Result Date: 6/28/2023  Right total hip arthroplasty, with acetabular component dislodged superior laterally out of the native acetabular fossa (series 307 images .) There is a 9.4 x 3.4 x 15.7 cm air and fluid collection in the subcutaneous tissues in the right lateral hip, contacting the right hip replacement and extending into the native acetabular fossa where there are bone erosions, consistent with abscess formation, infection of the arthroplasty and osteomyelitis (series 5 images 19-74.) Findings are consistent with the preliminary report from [facility] which was provided shortly after completion of the exam. Workstation performed: [id number]     CT abdomen pelvis with contrast    Result Date: 6/28/2023  1. Superior dislocation of right hip arthroplasty femoral stem and acetabular cup. 2. Collection of fluid and gas lateral to the femoral stem measuring 16.5 x 7.3 x 4.3 cm compatible with an abscess. Collection extends to the acetabular cup. 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 3. No evidence of intraperitoneal or retroperitoneal fluid collection. 4. Constipation.  I personally discussed this study with Aisha Zhou on 6/28/2023 2:06 AM. Workstation performed: [id number]        Current Facility-Administered Medications   Medication Dose Route Frequency   • acetaminophen (TYLENOL) tablet 650 mg  650 mg Oral TID   • bisacodyl (DULCOLAX) EC tablet 5 mg  5 mg Oral Daily PRN   • cholecalciferol (VITAMIN D3) tablet 5,000 Units  5,000 Units Oral Daily   • cyanocobalamin (VITAMIN B-12) tablet 1,000 mcg  1,000 mcg Oral Daily   • DULoxetine (CYMBALTA) delayed release capsule 60 mg  60 mg Oral Daily   • ferrous sulfate tablet 325 mg  325 mg Oral Daily With Breakfast   • fluticasone (FLONASE) 50 mcg/act nasal spray 2 spray  2 spray Each Nare Daily   • gabapentin (NEURONTIN) capsule 300 mg  300 mg Oral BID   • heparin (porcine) 25,000 units in 0.45% NaCl 250 mL infusion (premix)  3-20 Units/kg/hr (Order-Specific) Intravenous Titrated   • HYDROmorphone HCl (DILAUDID) injection 0.2 mg  0.2 mg Intravenous Q4H PRN   • levothyroxine tablet 112 mcg  112 mcg Oral Early Morning   • ondansetron (ZOFRAN) injection 4 mg  4 mg Intravenous Q6H PRN   • oxyCODONE (ROXICODONE) IR tablet 5 mg  5 mg Oral Q4H PRN   • oxyCODONE (ROXICODONE) split tablet 2.5 mg  2.5 mg Oral Q4H PRN   • pantoprazole (PROTONIX) EC tablet 40 mg  40 mg Oral Early Morning   • phenol (CHLORASEPTIC) 1.4 % mucosal liquid 1 spray  1 spray Mouth/Throat Q2H PRN   • polyethylene glycol (MIRALAX) packet 17 g  17 g Oral Daily PRN   • senna-docusate sodium (SENOKOT S) 8.6-50 mg per tablet 1 tablet  1 tablet Oral HS   • simethicone (MYLICON) chewable tablet 80 mg  80 mg Oral Q6H PRN   • torsemide (DEMADEX) tablet 10 mg  10 mg Oral Daily   • vancomycin (VANCOCIN) 1500 mg in sodium chloride 0.9% 250 mL IVPB  1,500 mg Intravenous Q24H             Fazal Jones DO   Family Medicine

## 2023-07-04 LAB
APTT PPP: 77 SECONDS (ref 23–37)
APTT PPP: 80 SECONDS (ref 23–37)
BASOPHILS # BLD AUTO: 0.05 THOUSANDS/ÂΜL (ref 0–0.1)
BASOPHILS NFR BLD AUTO: 0 % (ref 0–1)
EOSINOPHIL # BLD AUTO: 0.28 THOUSAND/ÂΜL (ref 0–0.61)
EOSINOPHIL NFR BLD AUTO: 2 % (ref 0–6)
ERYTHROCYTE [DISTWIDTH] IN BLOOD BY AUTOMATED COUNT: 16 % (ref 11.6–15.1)
HCT VFR BLD AUTO: 25.2 % (ref 34.8–46.1)
HGB BLD-MCNC: 7.8 G/DL (ref 11.5–15.4)
IMM GRANULOCYTES # BLD AUTO: 0.15 THOUSAND/UL (ref 0–0.2)
IMM GRANULOCYTES NFR BLD AUTO: 1 % (ref 0–2)
INR PPP: 1.39 (ref 0.84–1.19)
LYMPHOCYTES # BLD AUTO: 1.39 THOUSANDS/ÂΜL (ref 0.6–4.47)
LYMPHOCYTES NFR BLD AUTO: 11 % (ref 14–44)
MCH RBC QN AUTO: 31 PG (ref 26.8–34.3)
MCHC RBC AUTO-ENTMCNC: 31 G/DL (ref 31.4–37.4)
MCV RBC AUTO: 100 FL (ref 82–98)
MONOCYTES # BLD AUTO: 1.18 THOUSAND/ÂΜL (ref 0.17–1.22)
MONOCYTES NFR BLD AUTO: 10 % (ref 4–12)
NEUTROPHILS # BLD AUTO: 9.21 THOUSANDS/ÂΜL (ref 1.85–7.62)
NEUTS SEG NFR BLD AUTO: 76 % (ref 43–75)
NRBC BLD AUTO-RTO: 0 /100 WBCS
PLATELET # BLD AUTO: 188 THOUSANDS/UL (ref 149–390)
PMV BLD AUTO: 8.8 FL (ref 8.9–12.7)
PROTHROMBIN TIME: 17.3 SECONDS (ref 11.6–14.5)
RBC # BLD AUTO: 2.52 MILLION/UL (ref 3.81–5.12)
WBC # BLD AUTO: 12.26 THOUSAND/UL (ref 4.31–10.16)

## 2023-07-04 PROCEDURE — 85730 THROMBOPLASTIN TIME PARTIAL: CPT | Performed by: FAMILY MEDICINE

## 2023-07-04 PROCEDURE — 85025 COMPLETE CBC W/AUTO DIFF WBC: CPT | Performed by: FAMILY MEDICINE

## 2023-07-04 PROCEDURE — 85610 PROTHROMBIN TIME: CPT | Performed by: FAMILY MEDICINE

## 2023-07-04 PROCEDURE — 99232 SBSQ HOSP IP/OBS MODERATE 35: CPT | Performed by: FAMILY MEDICINE

## 2023-07-04 RX ORDER — WARFARIN SODIUM 7.5 MG/1
7.5 TABLET ORAL
Status: COMPLETED | OUTPATIENT
Start: 2023-07-04 | End: 2023-07-04

## 2023-07-04 RX ORDER — WARFARIN SODIUM 5 MG/1
5 TABLET ORAL
Status: DISCONTINUED | OUTPATIENT
Start: 2023-07-04 | End: 2023-07-04

## 2023-07-04 RX ADMIN — FERROUS SULFATE TAB 325 MG (65 MG ELEMENTAL FE) 325 MG: 325 (65 FE) TAB at 09:23

## 2023-07-04 RX ADMIN — ACETAMINOPHEN 650 MG: 325 TABLET, FILM COATED ORAL at 16:58

## 2023-07-04 RX ADMIN — CYANOCOBALAMIN TAB 500 MCG 1000 MCG: 500 TAB at 09:23

## 2023-07-04 RX ADMIN — Medication 5000 UNITS: at 09:23

## 2023-07-04 RX ADMIN — ACETAMINOPHEN 650 MG: 325 TABLET, FILM COATED ORAL at 21:08

## 2023-07-04 RX ADMIN — WARFARIN SODIUM 7.5 MG: 7.5 TABLET ORAL at 21:08

## 2023-07-04 RX ADMIN — TORSEMIDE 10 MG: 10 TABLET ORAL at 09:24

## 2023-07-04 RX ADMIN — ACETAMINOPHEN 650 MG: 325 TABLET, FILM COATED ORAL at 09:23

## 2023-07-04 RX ADMIN — HEPARIN SODIUM 12 UNITS/KG/HR: 10000 INJECTION, SOLUTION INTRAVENOUS at 21:08

## 2023-07-04 RX ADMIN — OXYCODONE HYDROCHLORIDE 5 MG: 5 TABLET ORAL at 10:52

## 2023-07-04 RX ADMIN — VANCOMYCIN HYDROCHLORIDE 1500 MG: 10 INJECTION, POWDER, LYOPHILIZED, FOR SOLUTION INTRAVENOUS at 09:41

## 2023-07-04 RX ADMIN — GABAPENTIN 300 MG: 300 CAPSULE ORAL at 16:59

## 2023-07-04 RX ADMIN — FLUTICASONE PROPIONATE 2 SPRAY: 50 SPRAY, METERED NASAL at 09:22

## 2023-07-04 RX ADMIN — DULOXETINE HYDROCHLORIDE 60 MG: 60 CAPSULE, DELAYED RELEASE ORAL at 09:23

## 2023-07-04 RX ADMIN — LEVOTHYROXINE SODIUM 112 MCG: 112 TABLET ORAL at 05:46

## 2023-07-04 RX ADMIN — GABAPENTIN 300 MG: 300 CAPSULE ORAL at 09:23

## 2023-07-04 RX ADMIN — PANTOPRAZOLE SODIUM 40 MG: 40 TABLET, DELAYED RELEASE ORAL at 05:46

## 2023-07-04 NOTE — PROGRESS NOTES
[address] [place] [address] ClementFoster 9/2/1933, 80 y.o. female. MRN: [id number]  Unit/Bed#: [facility] 807-01 Encounter: [id number]  Primary Care Provider: Alli Livingston DO      Admission Date: 6/27/2023 2125  Length of Stay: 6 days  Code Status: Level 1 - Full Code  Diet: Diet Regular; Regular House    Consult:   IP CONSULT TO ORTHOPEDIC SURGERY  IP CONSULT TO CARDIOLOGY  IP CONSULT TO CASE MANAGEMENT  IP CONSULT TO PHARMACY  IP CONSULT TO INFECTIOUS DISEASES  IP CONSULT TO PICC TEAM    Assessments & Plans:     * Osteomyelitis ([address] )  Assessment & Plan  Pt presents from [address] at [facility] for purulent discharge from her right hip noticed on day of admission. Her right hip was drained 1 L and the skilled nursing facility prior to ED presentation. · Pt has history of right hip replacement and follows with orthopedics outpatient. Recently admitted at [facility] from 6/8-6/12 under the trauma service for chronic right hip dislocation. · Pt is vitally stable, labs significant for leukocytosis. Imaging obtained. Blood cultures pending. CT Abdomen Pelvis (6/27/23)  1. Superior dislocation of right hip arthroplasty femoral stem and acetabular cup.  2. Collection of fluid and gas lateral to the femoral stem measuring 16.5 x 7.3 x 4.3 cm compatible with an abscess. Collection extends to the acetabular cup. 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 3. No evidence of intraperitoneal or retroperitoneal fluid collection. 4. Constipation.       S/p INR reversal with Vit K 10 mg and Platelet transfusion x2  S/p wash out in OR by ortho 6/30    Plan:  • Pain regimen: dimitrios tylenol 975 mg Q8H, oxy 2.5/5 mg Q8H prn  • For anticoagulation, per discussion with orthosurgery we will bridge patient to warfarin  • Patient has previous home dose of warfarin at 3 mg, however she does have a history of supratherapeutic INR requiring vitamin K reversal  • Will assess INR and adjusting initial dose of warfarin accordingly  • F/u cultures  • ID:  recommendation appreciated  • Follow-up with PICC consult, patient to undergo 6 total weeks of IV antibiotic follow-up with possible 6 more weeks of p.o. antibiotic per ID    Benign essential HTN  Assessment & Plan  Goal BP <130/80 per cardiology  Continue monitor BP, stable currently. Most recent BP Blood Pressure: 023/70   29E SBP Systolic (62KHT), IFI:797 , Min:110 , Max:118      Plan  - C/w home torsemide 10 mg daily    Stage 2 chronic kidney disease  Assessment & Plan  Lab Results   Component Value Date    EGFR 49 07/03/2023    EGFR 65 07/02/2023    EGFR 91 07/01/2023    CREATININE 1.01 07/03/2023    CREATININE 0.80 07/02/2023    CREATININE 0.41 (L) 07/01/2023     Caution with using nephrotoxic agents  Avoid NSAIDs given Elevated INR. Ambulatory dysfunction  Assessment & Plan  Secondary to hip pain and multiple sites of OA. Pt presented from skilled nursing facility for PT/OT. S/p OR for I&D on 6/30. Plan  - Pain control  - monitor for urine and BM  - PT/OT  - fall precautions  - CM referral for dispo    Hypothyroidism  Assessment & Plan  Thyroid    Lab Results   Component Value Date    TNK2QIVTDBGV 6.873 (H) 06/28/2023    MUH8IGFPTXZY 2.260 06/08/2022    HFC6LCLNOAVY 1.410 05/03/2021    GSA7GJEMBYAA 3.760 (H) 04/02/2021    BUR8EZDRLXGR 2.076 05/15/2020    SBF6VMNIAWUK 4.400 (H) 03/13/2020    RRL3IOEEUVPN 3.713 01/22/2019    FREET4 1.39 (H) 06/28/2023    FREET4 0.96 06/23/2017    FREET4 1.00 03/10/2016     Plan  - TSH elevated, c/w increased levothyroxine dose 112mcg daily     Pacemaker  Assessment & Plan  History of intolerance and bradycardia with AVN blocking agents for chronic atrial fibrillation. She underwent AV node ablation in 8/2018 and implantation of MDT single chamber His PPM - medtronic.  99%     Plan  - monitor vitals    Irritable bowel syndrome with constipation  Assessment & Plan  -Patient had 1 BM 7/2  Plan  - Bowel regimen: Miralax and Sennakot scheduled. Chronic diastolic CHF (congestive heart failure) (HCC)  Assessment & Plan  Wt Readings from Last 3 Encounters:   06/28/23 76.2 kg (168 lb)   06/16/23 76.6 kg (168 lb 12.8 oz)   03/24/23 75.8 kg (167 lb)   Relatively stable weight  HFpEF per cardiology notes. Last ECHO 4/27/2018 EF 55%, LV thickness upper limits normal, moderate mitral regurgitation, moderate tricuspid regurgitation.      • Monitor I/O   • Continue torsemide 10mg        Anemia  Assessment & Plan  Hgb / Hct       Results from last 7 days   Lab Units 07/04/23  0209 07/03/23  0906 07/03/23  0537 07/02/23  1914 07/02/23  0622 07/01/23  0424 06/30/23  1951   HEMOGLOBIN g/dL 7.8* 8.3* 8.4* 7.3* 7.0* 7.5*  --    I STAT HEMOGLOBIN g/dl  --   --   --   --   --   --  8.2*   HEMATOCRIT % 25.2* 26.5* 27.2* 23.4* 23.4* 24.6*  --    HEMATOCRIT, ISTAT %  --   --   --   --   --   --  24*   Downtrending hemoglobin status post Ortho washout 6/30  Unclear bleeding internally  Hemoglobin stable today status post 1 unit of packed red blood cell transfusion  -Patient on ferrous sulfate 2025 mg daily  -Continue monitoring CBC      Chronic atrial fibrillation (HCC)  Assessment & Plan  Currently rate controlled. On daily warfarin 3 mg.  - Goal INR between 2-3.  - INR currently 5.40  - In ED, pt is s/p Vit K and 2 units of FFP     Plan  - Hold NSAIDs given risk of bleeding  - DVT prophylaxis dicussed with Ortho; recommended we use warfarin. - 5 mg warfarin tonight with INR goal of 2-3        Subjective:   No acute events overnight per handoff. Patient seen and examined at bedside. Not in any acute distress. Patient denies fever, N/V, CP, SOB, palpitations or headaches. Patient is tolerating with normal bowel habits. No concern or reports per nursing staff.      Vitals:     Vitals:    07/03/23 1521 07/03/23 2231 07/04/23 0710 07/04/23 0710   BP: 110/54 111/55 118/65 118/65   Pulse: 70 69 70 70   Resp: 16 12 Temp: 97.9 °F (36.6 °C) 97.7 °F (36.5 °C) 97.6 °F (36.4 °C) 97.6 °F (36.4 °C)   TempSrc:       SpO2: 99% 96% 98% 98%   Weight:       Height:         Temp:  [97.6 °F (36.4 °C)-97.9 °F (36.6 °C)] 97.6 °F (36.4 °C)  HR:  [69-70] 70  Resp:  [12-16] 12  BP: (110-118)/(54-65) 118/65  Weight (last 2 days)     None          Intake/Output Summary (Last 24 hours) at 7/4/2023 0840  Last data filed at 7/3/2023 1000  Gross per 24 hour   Intake 1225 ml   Output --   Net 1225 ml     Invasive Devices     Peripherally Inserted Central Catheter Line  Duration           PICC Line 34/02/32 Right Basilic <1 day          Peripheral Intravenous Line  Duration           Peripheral IV 07/03/23 Dorsal (posterior); Left Wrist <1 day                Physical Exam:   Physical Exam  Vitals and nursing note reviewed. Constitutional:       General: She is not in acute distress. Appearance: She is well-developed. HENT:      Head: Normocephalic and atraumatic. Eyes:      Conjunctiva/sclera: Conjunctivae normal.   Cardiovascular:      Rate and Rhythm: Normal rate and regular rhythm. Heart sounds: No murmur heard. Pulmonary:      Effort: Pulmonary effort is normal. No respiratory distress. Breath sounds: Normal breath sounds. Abdominal:      Palpations: Abdomen is soft. Tenderness: There is no abdominal tenderness. Musculoskeletal:         General: No swelling. Cervical back: Neck supple. Comments: Healing right hip lesion status post washout   Skin:     General: Skin is warm and dry. Capillary Refill: Capillary refill takes less than 2 seconds. Neurological:      Mental Status: She is alert.    Psychiatric:         Mood and Affect: Mood normal.            Labs:     CBC:  Results from last 7 days   Lab Units 07/04/23  0209 07/03/23  0906 07/03/23  0537 07/02/23  1914 07/02/23  0622 07/01/23  0424 06/30/23  1951 06/30/23  0456 06/29/23  0507 06/28/23  0419 06/27/23  2206   WBC Thousand/uL 12.26* 10.68* 9.57  --  12.39* 12.93*  --  8.78 7.63 10.58* 12.99*   HEMOGLOBIN g/dL 7.8* 8.3* 8.4* 7.3* 7.0* 7.5*  --  8.3* 8.1* 8.9* 9.3*   I STAT HEMOGLOBIN g/dl  --   --   --   --   --   --  8.2*  --   --   --   --    HEMATOCRIT % 25.2* 26.5* 27.2* 23.4* 23.4* 24.6*  --  28.0* 27.9* 28.1* 29.7*   HEMATOCRIT, ISTAT %  --   --   --   --   --   --  24*  --   --   --   --    PLATELETS Thousands/uL 188 198 205  --  228 235  --  279 318 379 402*   NEUTROS ABS Thousands/µL 9.21*  --  6.58  --  9.47* 10.73*  --   --  5.35 7.73* 10.07*       CMP:  Results from last 7 days   Lab Units 07/03/23  0537 07/02/23  0622 07/01/23  0500 06/30/23  1951 06/30/23  0456 06/29/23  0507 06/28/23  0614 06/27/23  2206   POTASSIUM mmol/L 4.3 4.4 4.4  --  4.3 4.2 3.7 4.0   CHLORIDE mmol/L 109* 106 112*  --  109* 108 109* 107   CO2 mmol/L 28 25 25  --  28 27 29 26   CO2, I-STAT mmol/L  --   --   --  28  --   --   --   --    BUN mg/dL 18 13 11  --  14 15 17 17   CREATININE mg/dL 1.01 0.80 0.41*  --  0.50* 0.57* 0.60 0.66   GLUCOSE, ISTAT mg/dl  --   --   --  87  --   --   --   --    CALCIUM mg/dL 9.3 8.2* 7.5*  --  7.8* 8.1* 8.0* 7.9*   AST U/L  --   --   --   --   --   --   --  18   ALT U/L  --   --   --   --   --   --   --  13   ALK PHOS U/L  --   --   --   --   --   --   --  128*   EGFR ml/min/1.73sq m 49 65 91  --  86 82 81 78       Sepsis:  Results from last 7 days   Lab Units 06/27/23  2206   CRP mg/L 92.0*   LACTIC ACID mmol/L 2.0   PROCALCITONIN ng/ml 4.52*       Micro:  Lab Results   Component Value Date/Time    Blood Culture No Growth After 5 Days. 06/27/2023 10:06 PM    Blood Culture No Growth After 5 Days.  06/27/2023 10:06 PM    Gram Stain Result 2+ Polys 06/30/2023 07:16 PM    Gram Stain Result No bacteria seen 06/30/2023 07:16 PM    Urine Culture >100,000 cfu/ml Escherichia coli ESBL (A) 06/15/2023 07:05 PM    Urine Culture >100,000 cfu/ml Klebsiella pneumoniae (A) 06/15/2023 07:05 PM    Urine Culture >100,000 cfu/ml Aerococcus urinae (A) 06/15/2023 07:05 PM    Wound Culture No growth 06/28/2023 04:11 PM       Imaging:   XR chest portable    Result Date: 6/28/2023  Impression: Increased interstitial markings, likely a component of mild fibrosis but question interstitial edema. Trace effusions better shown on abdomen CT. Workstation performed: [id number]     CT lower extremity w contrast right    Result Date: 6/28/2023  Impression: Right total hip arthroplasty, with acetabular component dislodged superior laterally out of the native acetabular fossa (series 307 images .) There is a 9.4 x 3.4 x 15.7 cm air and fluid collection in the subcutaneous tissues in the right lateral hip, contacting the right hip replacement and extending into the native acetabular fossa where there are bone erosions, consistent with abscess formation, infection of the arthroplasty and osteomyelitis (series 5 images 19-74.) Findings are consistent with the preliminary report from [facility] which was provided shortly after completion of the exam. Workstation performed: [id number]     CT abdomen pelvis with contrast    Result Date: 6/28/2023  Impression: 1. Superior dislocation of right hip arthroplasty femoral stem and acetabular cup. 2. Collection of fluid and gas lateral to the femoral stem measuring 16.5 x 7.3 x 4.3 cm compatible with an abscess. Collection extends to the acetabular cup. 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 3. No evidence of intraperitoneal or retroperitoneal fluid collection. 4. Constipation.  I personally discussed this study with Miracle Davis on 6/28/2023 2:06 AM. Workstation performed: [id number]       Medications:     Current Facility-Administered Medications   Medication Dose Route Frequency   • acetaminophen (TYLENOL) tablet 650 mg  650 mg Oral TID   • bisacodyl (DULCOLAX) EC tablet 5 mg  5 mg Oral Daily PRN   • cholecalciferol (VITAMIN D3) tablet 5,000 Units  5,000 Units Oral Daily   • cyanocobalamin (VITAMIN B-12) tablet 1,000 mcg  1,000 mcg Oral Daily   • DULoxetine (CYMBALTA) delayed release capsule 60 mg  60 mg Oral Daily   • ferrous sulfate tablet 325 mg  325 mg Oral Daily With Breakfast   • fluticasone (FLONASE) 50 mcg/act nasal spray 2 spray  2 spray Each Nare Daily   • gabapentin (NEURONTIN) capsule 300 mg  300 mg Oral BID   • heparin (porcine) 25,000 units in 0.45% NaCl 250 mL infusion (premix)  3-20 Units/kg/hr (Order-Specific) Intravenous Titrated   • HYDROmorphone HCl (DILAUDID) injection 0.2 mg  0.2 mg Intravenous Q4H PRN   • levothyroxine tablet 112 mcg  112 mcg Oral Early Morning   • ondansetron (ZOFRAN) injection 4 mg  4 mg Intravenous Q6H PRN   • oxyCODONE (ROXICODONE) IR tablet 5 mg  5 mg Oral Q4H PRN   • oxyCODONE (ROXICODONE) split tablet 2.5 mg  2.5 mg Oral Q4H PRN   • pantoprazole (PROTONIX) EC tablet 40 mg  40 mg Oral Early Morning   • phenol (CHLORASEPTIC) 1.4 % mucosal liquid 1 spray  1 spray Mouth/Throat Q2H PRN   • polyethylene glycol (MIRALAX) packet 17 g  17 g Oral Daily PRN   • senna-docusate sodium (SENOKOT S) 8.6-50 mg per tablet 1 tablet  1 tablet Oral HS   • simethicone (MYLICON) chewable tablet 80 mg  80 mg Oral Q6H PRN   • torsemide (DEMADEX) tablet 10 mg  10 mg Oral Daily   • vancomycin (VANCOCIN) 1500 mg in sodium chloride 0.9% 250 mL IVPB  1,500 mg Intravenous Q24H   • warfarin (COUMADIN) tablet 5 mg  5 mg Oral Once (warfarin)       Patient was discussed with [facility] Attending on-call, Dr. Nicki Galaviz MD. See attestation above.       Neda Oviedo MD  PGY-1 [facility]   07/04/23

## 2023-07-04 NOTE — PLAN OF CARE
Problem: Potential for Falls  Goal: Patient will remain free of falls  Description: INTERVENTIONS:  - Educate patient/family on patient safety including physical limitations  - Instruct patient to call for assistance with activity   - Consult OT/PT to assist with strengthening/mobility   - Keep Call bell within reach  - Keep bed low and locked with side rails adjusted as appropriate  - Keep care items and personal belongings within reach  - Initiate and maintain comfort rounds  - Initiate/Maintain bed and chair alarm  - Apply yellow socks and bracelet for high fall risk patients  - Consider moving patient to room near nurses station  Outcome: Progressing     Problem: MOBILITY - ADULT  Goal: Maintain or return to baseline ADL function  Description: INTERVENTIONS:  -  Assess patient's ability to carry out ADLs; assess patient's baseline for ADL function and identify physical deficits which impact ability to perform ADLs (bathing, care of mouth/teeth, toileting, grooming, dressing, etc.)  - Assess/evaluate cause of self-care deficits   - Assess range of motion  - Assess patient's mobility; develop plan if impaired  - Assess patient's need for assistive devices and provide as appropriate  - Encourage maximum independence but intervene and supervise when necessary  - Involve family in performance of ADLs  - Assess for home care needs following discharge   - Consider OT consult to assist with ADL evaluation and planning for discharge  - Provide patient education as appropriate  Outcome: Progressing  Goal: Maintains/Returns to pre admission functional level  Description: INTERVENTIONS:  - Perform BMAT or MOVE assessment daily.   - Set and communicate daily mobility goal to care team and patient/family/caregiver. - Collaborate with rehabilitation services on mobility goals if consulted  - Reposition patient every 2 hours.   - Out of bed for toileting  - Record patient progress and toleration of activity level   Outcome: Progressing     Problem: Prexisting or High Potential for Compromised Skin Integrity  Goal: Skin integrity is maintained or improved  Description: INTERVENTIONS:  - Identify patients at risk for skin breakdown  - Assess and monitor skin integrity  - Assess and monitor nutrition and hydration status  - Monitor labs   - Assess for incontinence   - Turn and reposition patient  - Assist with mobility/ambulation  - Relieve pressure over bony prominences  - Avoid friction and shearing  - Provide appropriate hygiene as needed including keeping skin clean and dry  - Evaluate need for skin moisturizer/barrier cream  - Collaborate with interdisciplinary team   - Patient/family teaching  - Consider wound care consult   Outcome: Progressing     Problem: PAIN - ADULT  Goal: Verbalizes/displays adequate comfort level or baseline comfort level  Description: Interventions:  - Encourage patient to monitor pain and request assistance  - Assess pain using appropriate pain scale  - Administer analgesics based on type and severity of pain and evaluate response  - Implement non-pharmacological measures as appropriate and evaluate response  - Consider cultural and social influences on pain and pain management  - Notify physician/advanced practitioner if interventions unsuccessful or patient reports new pain  Outcome: Progressing     Problem: SKIN/TISSUE INTEGRITY - ADULT  Goal: Incision(s), wounds(s) or drain site(s) healing without S/S of infection  Description: INTERVENTIONS  - Assess and document dressing, incision, wound bed, drain sites and surrounding tissue  - Provide patient and family education  - Perform skin care/dressing changes every shift  Outcome: Progressing  Goal: Pressure injury heals and does not worsen  Description: Interventions:  - Implement low air loss mattress or specialty surface (Criteria met)  - Apply silicone foam dressing  - Instruct/assist with weight shifting every 60 minutes when in chair   - Use special pressure reducing interventions such as EHOB when in chair   - Apply fecal or urinary incontinence containment device   - Perform passive or active ROM every shift  - Turn and reposition patient & offload bony prominences every 2 hours   - Utilize friction reducing device or surface for transfers   - Consider nutrition services referral as needed  Outcome: Progressing     Problem: MUSCULOSKELETAL - ADULT  Goal: Maintain or return mobility to safest level of function  Description: INTERVENTIONS:  - Assess patient's ability to carry out ADLs; assess patient's baseline for ADL function and identify physical deficits which impact ability to perform ADLs (bathing, care of mouth/teeth, toileting, grooming, dressing, etc.)  - Assess/evaluate cause of self-care deficits   - Assess range of motion  - Assess patient's mobility  - Assess patient's need for assistive devices and provide as appropriate  - Encourage maximum independence but intervene and supervise when necessary  - Involve family in performance of ADLs  - Assess for home care needs following discharge   - Consider OT consult to assist with ADL evaluation and planning for discharge  - Provide patient education as appropriate  Outcome: Progressing  Goal: Maintain proper alignment of affected body part  Description: INTERVENTIONS:  - Support, maintain and protect limb and body alignment  - Provide patient/ family with appropriate education  Outcome: Progressing     Problem: Nutrition/Hydration-ADULT  Goal: Nutrient/Hydration intake appropriate for improving, restoring or maintaining nutritional needs  Description: Monitor and assess patient's nutrition/hydration status for malnutrition. Collaborate with interdisciplinary team and initiate plan and interventions as ordered. Monitor patient's weight and dietary intake as ordered or per policy. Utilize nutrition screening tool and intervene as necessary.  Determine patient's food preferences and provide high-protein, high-caloric foods as appropriate. INTERVENTIONS:  - Monitor oral intake, urinary output, labs, and treatment plans  - Assess nutrition and hydration status and recommend course of action  - Evaluate amount of meals eaten  - Assist patient with eating if necessary   - Allow adequate time for meals  - Recommend/ encourage appropriate diets, oral nutritional supplements, and vitamin/mineral supplements  - Order, calculate, and assess calorie counts as needed  - Recommend, monitor, and adjust tube feedings and TPN/PPN based on assessed needs  - Assess need for intravenous fluids  - Provide specific nutrition/hydration education as appropriate  - Include patient/family/caregiver in decisions related to nutrition  Outcome: Progressing     Problem: Knowledge Deficit  Goal: Patient/family/caregiver demonstrates understanding of disease process, treatment plan, medications, and discharge instructions  Description: Complete learning assessment and assess knowledge base.   Interventions:  - Provide teaching at level of understanding  - Provide teaching via preferred learning methods  Outcome: Progressing

## 2023-07-05 ENCOUNTER — APPOINTMENT (INPATIENT)
Dept: RADIOLOGY | Facility: HOSPITAL | Age: 88
DRG: 464 | End: 2023-07-05
Payer: COMMERCIAL

## 2023-07-05 LAB
ANION GAP SERPL CALCULATED.3IONS-SCNC: 3 MMOL/L
APTT PPP: 199 SECONDS (ref 23–37)
APTT PPP: 200 SECONDS (ref 23–37)
APTT PPP: 39 SECONDS (ref 23–37)
BACTERIA SPEC ANAEROBE CULT: NO GROWTH
BACTERIA TISS AEROBE CULT: NO GROWTH
BUN SERPL-MCNC: 24 MG/DL (ref 5–25)
CALCIUM SERPL-MCNC: 9 MG/DL (ref 8.3–10.1)
CHLORIDE SERPL-SCNC: 108 MMOL/L (ref 96–108)
CO2 SERPL-SCNC: 27 MMOL/L (ref 21–32)
CREAT SERPL-MCNC: 1.17 MG/DL (ref 0.6–1.3)
ERYTHROCYTE [DISTWIDTH] IN BLOOD BY AUTOMATED COUNT: 16.1 % (ref 11.6–15.1)
GFR SERPL CREATININE-BSD FRML MDRD: 41 ML/MIN/1.73SQ M
GLUCOSE SERPL-MCNC: 75 MG/DL (ref 65–140)
GRAM STN SPEC: NORMAL
GRAM STN SPEC: NORMAL
HCT VFR BLD AUTO: 27.7 % (ref 34.8–46.1)
HGB BLD-MCNC: 8.2 G/DL (ref 11.5–15.4)
INR PPP: 1.79 (ref 0.84–1.19)
MCH RBC QN AUTO: 30.4 PG (ref 26.8–34.3)
MCHC RBC AUTO-ENTMCNC: 29.6 G/DL (ref 31.4–37.4)
MCV RBC AUTO: 103 FL (ref 82–98)
PLATELET # BLD AUTO: 198 THOUSANDS/UL (ref 149–390)
PMV BLD AUTO: 10.2 FL (ref 8.9–12.7)
POTASSIUM SERPL-SCNC: 4.2 MMOL/L (ref 3.5–5.3)
PROTHROMBIN TIME: 21 SECONDS (ref 11.6–14.5)
RBC # BLD AUTO: 2.7 MILLION/UL (ref 3.81–5.12)
SODIUM SERPL-SCNC: 138 MMOL/L (ref 135–147)
WBC # BLD AUTO: 9.82 THOUSAND/UL (ref 4.31–10.16)

## 2023-07-05 PROCEDURE — 97110 THERAPEUTIC EXERCISES: CPT

## 2023-07-05 PROCEDURE — NC001 PR NO CHARGE: Performed by: STUDENT IN AN ORGANIZED HEALTH CARE EDUCATION/TRAINING PROGRAM

## 2023-07-05 PROCEDURE — 76000 FLUOROSCOPY <1 HR PHYS/QHP: CPT

## 2023-07-05 PROCEDURE — 99232 SBSQ HOSP IP/OBS MODERATE 35: CPT | Performed by: FAMILY MEDICINE

## 2023-07-05 PROCEDURE — 97530 THERAPEUTIC ACTIVITIES: CPT

## 2023-07-05 PROCEDURE — 99232 SBSQ HOSP IP/OBS MODERATE 35: CPT | Performed by: INTERNAL MEDICINE

## 2023-07-05 PROCEDURE — 85027 COMPLETE CBC AUTOMATED: CPT

## 2023-07-05 PROCEDURE — 85730 THROMBOPLASTIN TIME PARTIAL: CPT | Performed by: FAMILY MEDICINE

## 2023-07-05 PROCEDURE — 80048 BASIC METABOLIC PNL TOTAL CA: CPT

## 2023-07-05 PROCEDURE — 85610 PROTHROMBIN TIME: CPT

## 2023-07-05 RX ORDER — WARFARIN SODIUM 7.5 MG/1
7.5 TABLET ORAL
Status: DISCONTINUED | OUTPATIENT
Start: 2023-07-05 | End: 2023-07-05

## 2023-07-05 RX ORDER — WARFARIN SODIUM 7.5 MG/1
7.5 TABLET ORAL
Status: COMPLETED | OUTPATIENT
Start: 2023-07-05 | End: 2023-07-05

## 2023-07-05 RX ADMIN — PANTOPRAZOLE SODIUM 40 MG: 40 TABLET, DELAYED RELEASE ORAL at 05:00

## 2023-07-05 RX ADMIN — LEVOTHYROXINE SODIUM 112 MCG: 112 TABLET ORAL at 05:00

## 2023-07-05 RX ADMIN — FLUTICASONE PROPIONATE 2 SPRAY: 50 SPRAY, METERED NASAL at 09:02

## 2023-07-05 RX ADMIN — GABAPENTIN 300 MG: 300 CAPSULE ORAL at 17:03

## 2023-07-05 RX ADMIN — VANCOMYCIN HYDROCHLORIDE 1500 MG: 10 INJECTION, POWDER, LYOPHILIZED, FOR SOLUTION INTRAVENOUS at 09:04

## 2023-07-05 RX ADMIN — CYANOCOBALAMIN TAB 500 MCG 1000 MCG: 500 TAB at 09:02

## 2023-07-05 RX ADMIN — ACETAMINOPHEN 650 MG: 325 TABLET, FILM COATED ORAL at 17:03

## 2023-07-05 RX ADMIN — TORSEMIDE 10 MG: 10 TABLET ORAL at 09:02

## 2023-07-05 RX ADMIN — SENNOSIDES AND DOCUSATE SODIUM 1 TABLET: 50; 8.6 TABLET ORAL at 21:57

## 2023-07-05 RX ADMIN — ACETAMINOPHEN 650 MG: 325 TABLET, FILM COATED ORAL at 21:57

## 2023-07-05 RX ADMIN — WARFARIN SODIUM 7.5 MG: 7.5 TABLET ORAL at 21:57

## 2023-07-05 RX ADMIN — FERROUS SULFATE TAB 325 MG (65 MG ELEMENTAL FE) 325 MG: 325 (65 FE) TAB at 09:02

## 2023-07-05 RX ADMIN — GABAPENTIN 300 MG: 300 CAPSULE ORAL at 09:02

## 2023-07-05 RX ADMIN — Medication 5000 UNITS: at 09:02

## 2023-07-05 RX ADMIN — ACETAMINOPHEN 650 MG: 325 TABLET, FILM COATED ORAL at 09:02

## 2023-07-05 RX ADMIN — DULOXETINE HYDROCHLORIDE 60 MG: 60 CAPSULE, DELAYED RELEASE ORAL at 09:02

## 2023-07-05 NOTE — SEDATION DOCUMENTATION
Left message for daughter, Yuridia, with Echo results. No changes. F/u July 2017.    PICC line in correct position as visualized by IR imaging. Able to be aspirated and flushed without difficulty or resistance.

## 2023-07-05 NOTE — PLAN OF CARE
Problem: PHYSICAL THERAPY ADULT  Goal: Performs mobility at highest level of function for planned discharge setting. See evaluation for individualized goals. Description: Treatment/Interventions: Functional transfer training, LE strengthening/ROM, Therapeutic exercise, Endurance training, Patient/family training, Equipment eval/education, Bed mobility, Spoke to nursing, Spoke to case management, OT          See flowsheet documentation for full assessment, interventions and recommendations. Outcome: Progressing  Note: Prognosis: Fair  Problem List: Decreased strength, Decreased range of motion, Decreased endurance, Impaired balance, Decreased mobility, Pain, Orthopedic restrictions  Assessment: Patient was received supine . Patient was agreeable to therapy services today. PT session focused on functional mobility and therapeutic exercises today in order to improve functional mobility and independence. Functional mobility was performed as described above. Pt was eager to participate in therapy today. Patient reported decreased RLE pain today compared to previous therapy session. Pt reported pain throughout transfer, but decreased pain once sitting EOB. Pt completed several therapeutic exercises seated at EOB. Pt was returned to supine and boosted up in bed at completion of session. Patient will benefit from continued PT services while in hospital in order to address remaining limitations. The patients AM-PAC Basic Mobility Inpatient Short From Raw Score is 6 . A Raw score of 6 suggests that the patient may benefit from discharge to post acute rehab. PT recommendation at this time is for return to facility with PT services. Please also refer to the recommendation of the Physical Therapist for safe discharge planning.   Barriers to Discharge: Decreased caregiver support, Inaccessible home environment     PT Discharge Recommendation: Return to facility with rehabilitation services (with appropriate level of assist vs STR if facility unable to provide physical assist)    See flowsheet documentation for full assessment.

## 2023-07-05 NOTE — PROGRESS NOTES
-- Patient: Reema Lee  -- MRN: [id number]  -- Aidin Request ID: [id number]  -- Level of care reserved: [address]  -- Partner Reserved: [facility] [phone]  -- Clinical needs requested:  -- Geography searched: 10 miles around [facility]  -- Start of Service:  -- Request sent: 9:37am EDT on 6/28/2023 by Yumiko Gregorio  -- Partner reserved: 12:18pm EDT on 7/5/2023 by [employer]  -- Choice list shared: 12:18pm EDT on 7/5/2023 by [employer]

## 2023-07-05 NOTE — PLAN OF CARE
Problem: OCCUPATIONAL THERAPY ADULT  Goal: Performs self-care activities at highest level of function for planned discharge setting. See evaluation for individualized goals. Description: Treatment Interventions: ADL retraining, Functional transfer training, UE strengthening/ROM, Endurance training, Cognitive reorientation, Patient/family training, Equipment evaluation/education, Compensatory technique education, Continued evaluation, Energy conservation, Activityengagement          See flowsheet documentation for full assessment, interventions and recommendations. Outcome: Progressing  Note: Limitation: Decreased ADL status, Decreased endurance, Decreased high-level ADLs, Decreased Safe judgement during ADL, Decreased UE strength, Decreased UE ROM, Decreased cognition, Decreased self-care trans  Prognosis: Fair  Assessment: Pt greeted bedside for OT treatment on 7/5/2023 focusing on maximizing independence with ADLs. Pt requires max AX2 with bed mobility and able to sit on EOB x10 min with min A for trunk control. Pt engages in forward functional reach crossing midline working on sitting with unilateral support and unilateral weight shifting with R/L shoulder flexion in order to maximize functional participation in ADLs. Limitations that impact functional performance include decreased ADL status, decreased UE ROM, decreased UE strength, decreased safe judgement during ADLs, decreased cognition, decreased endurance, decreased self care transfers, decreased high level ADLs and pain. Occupational performance areas to address ADL retraining, UE strengthening/ROM, endurance training, cognitive reorientation, Pt/caregiver education, equipment evaluation/education, compensatory technique education, energy conservation and activity engagement . Pt would benefit from continued skilled OT services while in hospital to maximize independence with ADLs. Will continue to follow Pt's goals and progress.  Pt would benefit from post acute rehabilitation services upon DC to maximize safety and independence with ADLs and functional tasks of choice.      OT Discharge Recommendation: Post acute rehabilitation services

## 2023-07-05 NOTE — PROGRESS NOTES
Progress Note - Infectious Disease   Master Marvin 80 y.o. female MRN: [id number]  Unit/Bed#: [facility] 807-01 Encounter: [id number]      Impression/Plan:  Right hip PJI infection.    With associated acetabular osteomyelitis.  In setting of MAIA, revision, chronic dislocation as below.  Presenting with subacute pain, spontaneous drainage from sinus tract.  CT showed abscess at joint.  Clinically stable without sepsis.  ESR 51. Suspect gram-positive pathogen. Status post MAIA explantation, extensive I&D, insertion of antibiotic beads on 6/30. Extensive purulent fluid and necrotic tissue noted intraoperatively. OR cultures are negative thus far. Rec:  • Continue vancomycin through at least 8/11/2023 to complete 6 weeks of antibiotics postoperatively  • Pharmacy follow-up for vancomycin dose management  • Follow-up OR cultures, which are being held for 14 days. • Check weekly CBC with differential, creatinine, vancomycin trough, ESR while on IV antibiotic. • Outpatient ID follow-up in 2 weeks. Office is aware. Dr. Heidy Islas is supervising physician. • Discontinue PICC line after last dose of IV antibiotic. • After completion of IV course, consider PO antibiotic based on ESR trend given suspected chronicity of infection.     Status post left MAIA with revision in 2019. Complicated more recently by disruption and chronic dislocation.  Non-ambulatory.     Status post PPM.     IBS with constipation.     Afib. On coumadin     History of ESBL, MDRO. From recent urine cultures. Discussed the above management plan with the primary service    Antibiotics:  Vancomycin 8  Postop day 5    Subjective:  Patient has no fever, chills, sweats; no nausea, vomiting, diarrhea; no cough, shortness of breath; no increased pain. No new symptoms.     Objective:  Vitals:  Temp:  [97.5 °F (36.4 °C)-97.8 °F (36.6 °C)] 97.5 °F (36.4 °C)  HR:  [71-80] 72  Resp:  [14-16] 16  BP: (109-123)/(52-77) 123/77  SpO2:  [89 %-97 %] 89 %  Temp (24hrs), Av.6 °F (36.4 °C), Min:97.5 °F (36.4 °C), Max:97.8 °F (36.6 °C)  Current: Temperature: 97.5 °F (36.4 °C)    Physical Exam:   General Appearance:  Alert, interactive, nontoxic, no acute distress. Throat: Oropharynx moist without lesions. Lungs:   Clear to auscultation bilaterally; no wheezes, rhonchi or rales; respirations unlabored   Heart:  RRR; no murmur, rub or gallop   Abdomen:   Soft, non-tender, non-distended, positive bowel sounds. Extremities: No clubbing, cyanosis. Right hip without erythema. Some drainage from the previous drain site but no purulence. Skin: No new rashes or lesions. No draining wounds noted. Labs, Imaging, & Other studies:   All pertinent labs and imaging studies were personally reviewed  Results from last 7 days   Lab Units 23  0456 23  0209 23  0906   WBC Thousand/uL 9.82 12.26* 10.68*   HEMOGLOBIN g/dL 8.2* 7.8* 8.3*   PLATELETS Thousands/uL 198 188 198     Results from last 7 days   Lab Units 23  0456 23  0537 23  0622 23  0500 23  1951   SODIUM mmol/L 138 138 132*   < >  --    POTASSIUM mmol/L 4.2 4.3 4.4   < >  --    CHLORIDE mmol/L 108 109* 106   < >  --    CO2 mmol/L 27 28 25   < >  --    CO2, I-STAT mmol/L  --   --   --   --  28   BUN mg/dL 24 18 13   < >  --    CREATININE mg/dL 1.17 1.01 0.80   < >  --    EGFR ml/min/1.73sq m 41 49 65   < >  --    GLUCOSE, ISTAT mg/dl  --   --   --   --  87   CALCIUM mg/dL 9.0 9.3 8.2*   < >  --     < > = values in this interval not displayed.      Results from last 7 days   Lab Units 23  1916 23  1611   GRAM STAIN RESULT  2+ Polys  No bacteria seen No Polys or Bacteria seen   WOUND CULTURE   --  No growth

## 2023-07-05 NOTE — PLAN OF CARE
Problem: Potential for Falls  Goal: Patient will remain free of falls  Description: INTERVENTIONS:  - Educate patient/family on patient safety including physical limitations  - Instruct patient to call for assistance with activity   - Consult OT/PT to assist with strengthening/mobility   - Keep Call bell within reach  - Keep bed low and locked with side rails adjusted as appropriate  - Keep care items and personal belongings within reach  - Initiate and maintain comfort rounds  - Initiate/Maintain bed and chair alarm  - Apply yellow socks and bracelet for high fall risk patients  - Consider moving patient to room near nurses station  Outcome: Progressing     Problem: MOBILITY - ADULT  Goal: Maintain or return to baseline ADL function  Description: INTERVENTIONS:  -  Assess patient's ability to carry out ADLs; assess patient's baseline for ADL function and identify physical deficits which impact ability to perform ADLs (bathing, care of mouth/teeth, toileting, grooming, dressing, etc.)  - Assess/evaluate cause of self-care deficits   - Assess range of motion  - Assess patient's mobility; develop plan if impaired  - Assess patient's need for assistive devices and provide as appropriate  - Encourage maximum independence but intervene and supervise when necessary  - Involve family in performance of ADLs  - Assess for home care needs following discharge   - Consider OT consult to assist with ADL evaluation and planning for discharge  - Provide patient education as appropriate  Outcome: Progressing  Goal: Maintains/Returns to pre admission functional level  Description: INTERVENTIONS:  - Perform BMAT or MOVE assessment daily.   - Set and communicate daily mobility goal to care team and patient/family/caregiver. - Collaborate with rehabilitation services on mobility goals if consulted  - Reposition patient every 2 hours.   - Out of bed for toileting  - Record patient progress and toleration of activity level   Outcome: Progressing     Problem: Prexisting or High Potential for Compromised Skin Integrity  Goal: Skin integrity is maintained or improved  Description: INTERVENTIONS:  - Identify patients at risk for skin breakdown  - Assess and monitor skin integrity  - Assess and monitor nutrition and hydration status  - Monitor labs   - Assess for incontinence   - Turn and reposition patient  - Assist with mobility/ambulation  - Relieve pressure over bony prominences  - Avoid friction and shearing  - Provide appropriate hygiene as needed including keeping skin clean and dry  - Evaluate need for skin moisturizer/barrier cream  - Collaborate with interdisciplinary team   - Patient/family teaching  - Consider wound care consult   Outcome: Progressing     Problem: PAIN - ADULT  Goal: Verbalizes/displays adequate comfort level or baseline comfort level  Description: Interventions:  - Encourage patient to monitor pain and request assistance  - Assess pain using appropriate pain scale  - Administer analgesics based on type and severity of pain and evaluate response  - Implement non-pharmacological measures as appropriate and evaluate response  - Consider cultural and social influences on pain and pain management  - Notify physician/advanced practitioner if interventions unsuccessful or patient reports new pain  Outcome: Progressing     Problem: SKIN/TISSUE INTEGRITY - ADULT  Goal: Incision(s), wounds(s) or drain site(s) healing without S/S of infection  Description: INTERVENTIONS  - Assess and document dressing, incision, wound bed, drain sites and surrounding tissue  - Provide patient and family education  - Perform skin care/dressing changes every shift  Outcome: Progressing  Goal: Pressure injury heals and does not worsen  Description: Interventions:  - Implement low air loss mattress or specialty surface (Criteria met)  - Apply silicone foam dressing  - Instruct/assist with weight shifting every 60 minutes when in chair   - Use special pressure reducing interventions such as EHOB when in chair   - Apply fecal or urinary incontinence containment device   - Perform passive or active ROM every shift  - Turn and reposition patient & offload bony prominences every 2 hours   - Utilize friction reducing device or surface for transfers   - Consider nutrition services referral as needed  Outcome: Progressing     Problem: Nutrition/Hydration-ADULT  Goal: Nutrient/Hydration intake appropriate for improving, restoring or maintaining nutritional needs  Description: Monitor and assess patient's nutrition/hydration status for malnutrition. Collaborate with interdisciplinary team and initiate plan and interventions as ordered. Monitor patient's weight and dietary intake as ordered or per policy. Utilize nutrition screening tool and intervene as necessary. Determine patient's food preferences and provide high-protein, high-caloric foods as appropriate. INTERVENTIONS:  - Monitor oral intake, urinary output, labs, and treatment plans  - Assess nutrition and hydration status and recommend course of action  - Evaluate amount of meals eaten  - Assist patient with eating if necessary   - Allow adequate time for meals  - Recommend/ encourage appropriate diets, oral nutritional supplements, and vitamin/mineral supplements  - Order, calculate, and assess calorie counts as needed  - Recommend, monitor, and adjust tube feedings and TPN/PPN based on assessed needs  - Assess need for intravenous fluids  - Provide specific nutrition/hydration education as appropriate  - Include patient/family/caregiver in decisions related to nutrition  Outcome: Progressing     Problem: Knowledge Deficit  Goal: Patient/family/caregiver demonstrates understanding of disease process, treatment plan, medications, and discharge instructions  Description: Complete learning assessment and assess knowledge base.   Interventions:  - Provide teaching at level of understanding  - Provide teaching via preferred learning methods  Outcome: Progressing     Problem: MUSCULOSKELETAL - ADULT  Goal: Maintain or return mobility to safest level of function  Description: INTERVENTIONS:  - Assess patient's ability to carry out ADLs; assess patient's baseline for ADL function and identify physical deficits which impact ability to perform ADLs (bathing, care of mouth/teeth, toileting, grooming, dressing, etc.)  - Assess/evaluate cause of self-care deficits   - Assess range of motion  - Assess patient's mobility  - Assess patient's need for assistive devices and provide as appropriate  - Encourage maximum independence but intervene and supervise when necessary  - Involve family in performance of ADLs  - Assess for home care needs following discharge   - Consider OT consult to assist with ADL evaluation and planning for discharge  - Provide patient education as appropriate  Outcome: Not Progressing  Goal: Maintain proper alignment of affected body part  Description: INTERVENTIONS:  - Support, maintain and protect limb and body alignment  - Provide patient/ family with appropriate education  Outcome: Not Progressing

## 2023-07-05 NOTE — OCCUPATIONAL THERAPY NOTE
Occupational Therapy Progress Note     Patient Name: Adria Dugan  [id number] Date: 7/5/2023  Problem List  Principal Problem:    Osteomyelitis [facility])  Active Problems:    Chronic atrial fibrillation (HCC)    Anemia    Chronic diastolic CHF (congestive heart failure) (HCC)    Irritable bowel syndrome with constipation    Pacemaker    Hypothyroidism    Ambulatory dysfunction    Stage 2 chronic kidney disease    Benign essential HTN            07/05/23 1423   OT Last Visit   OT Visit Date 07/05/23   Note Type   Note Type Treatment   Pain Assessment   Pain Assessment Tool 0-10   Pain Score 4   Pain Location/Orientation Orientation: Right;Location: Knee; Location: Hip   Hospital Pain Intervention(s) Repositioned; Ambulation/increased activity   Restrictions/Precautions   Weight Bearing Precautions Per Order Yes   RLE Weight Bearing Per Order (S)  NWB  (stand to transfer RLE per ortho note)   Other Precautions Cognitive; Chair Alarm; Bed Alarm;WBS;Pain; Fall Risk;Contact/isolation   Lifestyle   Autonomy Assist w/ ADLS/IADLS, transfers and functional mobility PTA   Reciprocal Relationships Pt lives w/ facility staff   Service to Others Retired   Intrinsic Gratification TV   ADL   Where Assessed Supine, bed   Eating Assistance 5  Supervision/Setup   Eating Deficit Beverage management   Bed Mobility   Supine to Sit 2  Maximal assistance   Additional items Assist x 2; Increased time required;Verbal cues;LE management   Sit to Supine 2  Maximal assistance   Additional items Assist x 2; Increased time required;LE management;Verbal cues   Additional Comments Pt greeted supine in bed. Pt able to tolerate sitting on EOB x10 min with min A for trunk control. Therapeutic Exercise - ROM   UE-ROM Yes   ROM- Right Upper Extremities   R Shoulder AROM; Flexion   R Position Seated   R Weight/Reps/Sets 2x10   ROM - Left Upper Extremities    L Shoulder AROM; Flexion   L Position Seated   L Weight/Reps/Sets 2x10   LUE ROM Comment Pt engages in forward functional reach crossing midline working on sitting with unilateral support and unilateral weight shifting with R/L shoulder flexion in order to maximize functional participation in ADLs. Activity Tolerance   Activity Tolerance Patient limited by fatigue;Patient limited by pain   Medical Staff Made Aware Co-tx with Demi Garcias DPT 2* to Pt's medical complexity and decreased endurance. Assessment   Assessment Pt greeted bedside for OT treatment on 7/5/2023 focusing on maximizing independence with ADLs. Pt requires max AX2 with bed mobility and able to sit on EOB x10 min with min A for trunk control. Pt engages in forward functional reach crossing midline working on sitting with unilateral support and unilateral weight shifting with R/L shoulder flexion in order to maximize functional participation in ADLs. Limitations that impact functional performance include decreased ADL status, decreased UE ROM, decreased UE strength, decreased safe judgement during ADLs, decreased cognition, decreased endurance, decreased self care transfers, decreased high level ADLs and pain. Occupational performance areas to address ADL retraining, UE strengthening/ROM, endurance training, cognitive reorientation, Pt/caregiver education, equipment evaluation/education, compensatory technique education, energy conservation and activity engagement . Pt would benefit from continued skilled OT services while in hospital to maximize independence with ADLs. Will continue to follow Pt's goals and progress. Pt would benefit from post acute rehabilitation services upon DC to maximize safety and independence with ADLs and functional tasks of choice. Plan   Treatment Interventions ADL retraining;Functional transfer training;UE strengthening/ROM; Endurance training;Patient/family training;Cognitive reorientation;Equipment evaluation/education; Compensatory technique education; Energy conservation; Activityengagement   Goal Expiration Date 07/15/23 OT Treatment Day 1   OT Frequency 2-3x/wk   Recommendation   OT Discharge Recommendation Post acute rehabilitation services   Additional Comments 2 The patient's raw score on the AM-PAC Daily Activity Inpatient Short Form is 13. A raw score of less than 19 suggests the patient may benefit from discharge to post-acute rehabilitation services. Please refer to the recommendation of the Occupational Therapist for safe discharge planning. AM-PAC Daily Activity Inpatient   Lower Body Dressing 2   Bathing 2   Toileting 2   Upper Body Dressing 2   Grooming 2   Eating 3   Daily Activity Raw Score 13   Daily Activity Standardized Score (Calc for Raw Score >=11) 32.03   AM-PAC Applied Cognition Inpatient   Following a Speech/Presentation 3   Understanding Ordinary Conversation 4   Taking Medications 4   Remembering Where Things Are Placed or Put Away 4   Remembering List of 4-5 Errands 3   Taking Care of Complicated Tasks 3   Applied Cognition Raw Score 21   Applied Cognition Standardized Score 44.3   End of Consult   Education Provided Yes   Patient Position at End of Consult Supine; All needs within reach;Bed/Chair alarm activated   Nurse Communication Nurse aware of consult         Kimberly Lozano MS, OTR/L

## 2023-07-05 NOTE — WOUND OSTOMY CARE
Progress Note - Wound   Tennie Stands 80 y.o. female MRN: [id number]  Unit/Bed#: [facility] 807-01 Encounter: [id number]        Assessment:   Patient is seen for wound care follow-up. Patient is a mod assist for turning and repositioning on p-500 specialty offloading mattress. Grossly incontinent of bowel and bladder as seen on assessment - humberto care rendered. Patient underwent right MAIA explanatation and I&D with drain placement- area managed by orthopedic surgery team     Findings:  B/L heels are dry intact and alexy with no skin loss or wounds present. Recommend continuing with preventative allevyn foam dressing and proper offloading/ repositioning. 1. POA Mid Sacro-Buttocks Evolving DTPI: area has fully evolved and now presents as an Unstageable Pressure Injury: likely moisture/ pressure in etiology. Scattered areas of full thickness skin loss measured together. Wound beds are 80% yellow slough tissue and 20% beefy red/pink tissue. Humberto-wounds area pink and fragile. Scant serosanguineous drainage noted. Recommend switching to triad paste for area. No induration, fluctuance, odor, warmth/temperature differences, redness, or purulence noted to the above noted wounds and skin areas assessed. New dressings applied per orders listed below. Patient tolerated well- no s/s of non-verbal pain or discomfort observed during the encounter. Bedside nurse aware of plan of care. See flow sheets for more detailed assessment findings. Orders listed below and wound care will continue to follow, call or tiger text with questions. Skin Care Plan:  1-Cleanse sacro-buttocks with soap and water. Apply Triad Paste to B/L Sacro-Buttocks Wounds TID and PRN episodes of incontinence  2-Turn/reposition q2h or when medically stable for pressure re-distribution on skin . 3-Elevate heels to offload pressure.   4-Moisturize skin daily with skin nourishing cream  5-P-500 Specialty Mattress   6-B/L Heels: cleanse area and pat dry. Apply allevyn foam dressings to area. Deo with P for Prevention and change every other day or PRN. Peel back and inspect skin Q-shift. 7-Right Hip: per Orthopedic Surgery Team      WOUNDS:  Wound 06/28/23 Pressure Injury Sacrum (Active)   Wound Image   07/05/23 0937   Wound Description Yellow;Slough; Beefy red 07/05/23 1400   Pressure Injury Stage U 07/05/23 1400   Allyn-wound Assessment Fragile;[place] 07/05/23 1400   Wound Length (cm) 17 cm 07/05/23 0946   Wound Width (cm) 8 cm 07/05/23 0946   Wound Depth (cm) 0.2 cm 07/05/23 0946   Wound Surface Area (cm^2) 136 cm^2 07/05/23 0946   Wound Volume (cm^3) 27.2 cm^3 07/05/23 0946   Calculated Wound Volume (cm^3) 27.2 cm^3 07/05/23 0946   Change in Wound Size % -183.33 07/05/23 0946   Drainage Amount Scant 07/05/23 1400   Drainage Description Serosanguineous 07/05/23 1400   Non-staged Wound Description Full thickness 07/05/23 1400   Treatments Cleansed;Irrigation with NSS;Site care 07/05/23 1400   Dressing Protective barrier 07/05/23 1400   Dressing Changed New 07/05/23 1400   Patient Tolerance Tolerated well 07/05/23 1400   Dressing Status Intact 07/05/23 1400                Kandi Celestin RN, BSN, [facility]

## 2023-07-05 NOTE — PROGRESS NOTES
Progress Notes - Family Medicine Residency, Perfecto Johnny TROTTER Cadenskd 9/2/1933, 80 y.o. female. MRN: [id number]    Unit/Bed#: [facility] 807-01 Encounter: [id number]  Primary Care Provider: Huber Rodriguez DO      Admission Date: 6/27/2023 2125  Length of Stay: 7 days  Code Status:  Level 1 - Full Code  Consult:   IP CONSULT TO ORTHOPEDIC SURGERY  IP CONSULT TO CARDIOLOGY  IP CONSULT TO CASE MANAGEMENT  IP CONSULT TO PHARMACY  IP CONSULT TO INFECTIOUS DISEASES  IP CONSULT TO PICC TEAM    * Osteomyelitis ([address])  Assessment & Plan  Pt presents from [address] at [facility] for purulent discharge from her right hip noticed on day of admission. Her right hip was drained 1 L and the skilled nursing facility prior to ED presentation. · Pt has history of right hip replacement and follows with orthopedics outpatient. Recently admitted at [facility] from 6/8-6/12 under the trauma service for chronic right hip dislocation. · Pt is vitally stable, labs significant for leukocytosis. Imaging obtained. Blood cultures pending. CT Abdomen Pelvis (6/27/23)  1. Superior dislocation of right hip arthroplasty femoral stem and acetabular cup.  2. Collection of fluid and gas lateral to the femoral stem measuring 16.5 x 7.3 x 4.3 cm compatible with an abscess. Collection extends to the acetabular cup. 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 3. No evidence of intraperitoneal or retroperitoneal fluid collection. 4. Constipation.       S/p INR reversal with Vit K 10 mg and Platelet transfusion x2  S/p wash out in OR by ortho 6/30    Plan:  • Pain regimen: dimitrios tylenol 975 mg Q8H, oxy 2.5/5 mg Q8H prN  • F/u cultures  • ID:  recommendation appreciated  • patient to undergo 6 total weeks of IV antibiotic follow-up with possible 6 more weeks of p.o. antibiotic per ID; PICC care    Stage 2 chronic kidney disease  Assessment & Plan  Lab Results   Component Value Date    EGFR 41 07/05/2023    EGFR 49 07/03/2023    EGFR 65 07/02/2023    CREATININE 1.17 07/05/2023    CREATININE 1.01 07/03/2023    CREATININE 0.80 07/02/2023     Caution with using nephrotoxic agents  Avoid NSAIDs given Elevated INR. Anemia  Assessment & Plan  Hgb / Hct       Results from last 7 days   Lab Units 07/05/23  0456 07/04/23  0209 07/03/23  0906 07/03/23  0537 07/02/23  1914 07/02/23  0622 07/01/23  0424   HEMOGLOBIN g/dL 8.2* 7.8* 8.3* 8.4* 7.3* 7.0* 7.5*   HEMATOCRIT % 27.7* 25.2* 26.5* 27.2* 23.4* 23.4* 24.6*   Downtrending hemoglobin status post Ortho washout 6/30  Unclear bleeding internally  Hemoglobin stable today status post 1 unit of packed red blood cell transfusion  -Patient on ferrous sulfate 325 mg daily  -We will follow-up with iron panel and CBC, monitoring INR      Chronic atrial fibrillation ([address])  Assessment & Plan  Currently rate controlled. On daily warfarin 3 mg.  - Goal INR between 2-3.  - INR currently 5.40  - In ED, pt is s/p Vit K and 2 units of FFP     Plan  - Hold NSAIDs given risk of bleeding  - DVT prophylaxis dicussed with Ortho; recommended we use warfarin. - 5 mg warfarin tonight with INR goal of 2-3    Pacemaker  Assessment & Plan  History of intolerance and bradycardia with AVN blocking agents for chronic atrial fibrillation. She underwent AV node ablation in 8/2018 and implantation of MDT single chamber His PPM - medtronic. 99%     Plan  - monitor vitals    Benign essential HTN  Assessment & Plan  Goal BP <130/80 per cardiology  Continue monitor BP, stable currently. Most recent BP Blood Pressure: 300/60   97V SBP Systolic (97QVR), OIB:588 , Min:109 , Max:127      Plan  - C/w home torsemide 10 mg daily    Ambulatory dysfunction  Assessment & Plan  Secondary to hip pain and multiple sites of OA. Pt presented from skilled nursing facility for PT/OT. S/p OR for I&D on 6/30.      Plan  - Pain control  - monitor for urine and BM  - PT/OT  - fall precautions  -  referral for dispo    Hypothyroidism  Assessment & Plan  Thyroid    Lab Results   Component Value Date    MKS9MOHFKNHH 6.873 (H) 06/28/2023    VSE8REIAFVOU 2.260 06/08/2022    MLU0SNBLUDMX 1.410 05/03/2021    UIR1HFUHHUMA 3.760 (H) 04/02/2021    IYK3IDVAFUZZ 2.076 05/15/2020    AHU0USLVBQUZ 4.400 (H) 03/13/2020    MSJ3SKXPQREG 3.713 01/22/2019    FREET4 1.39 (H) 06/28/2023    FREET4 0.96 06/23/2017    FREET4 1.00 03/10/2016     Plan  - TSH elevated, c/w increased levothyroxine dose 112mcg daily     Irritable bowel syndrome with constipation  Assessment & Plan  -Patient had 1 BM 7/2  Plan  - Bowel regimen: Miralax and Sennakot scheduled. Chronic diastolic CHF (congestive heart failure) (HCC)  Assessment & Plan  Wt Readings from Last 3 Encounters:   06/28/23 76.2 kg (168 lb)   06/16/23 76.6 kg (168 lb 12.8 oz)   03/24/23 75.8 kg (167 lb)   Relatively stable weight  HFpEF per cardiology notes. Last ECHO 4/27/2018 EF 55%, LV thickness upper limits normal, moderate mitral regurgitation, moderate tricuspid regurgitation.      • Monitor I/O   • Continue torsemide 10mg               VTE Pharmacologic Prophylaxis: Warfarin (Coumadin)  VTE Mechanical Prophylaxis: sequential compression device  Diet: Regular diet  Code Status: Level 1 full code  Disposition: Bridging warfarin    Discussed with attending physician,  , and [facility] team.    Subjective:   Seen and examined bedside this morning, drain removed 2 days ago. Still has some leaking from the drain site patient is afebrile. Objective:     Vitals: Blood pressure 123/77, pulse 72, temperature 97.5 °F (36.4 °C), resp. rate 16, height 5' (1.524 m), weight 76.2 kg (168 lb), SpO2 (!) 89 %, not currently breastfeeding. ,Body mass index is 32.81 kg/m². Intake/Output Summary (Last 24 hours) at 7/5/2023 0856  Last data filed at 7/5/2023 0501  Gross per 24 hour   Intake 609.3 ml   Output 1150 ml   Net -540.7 ml       Physical Exam  HENT:      Head: Normocephalic and atraumatic.       Right Ear: External ear normal.      Left Ear: External ear normal.      Nose: No congestion. Mouth/Throat:      Pharynx: No oropharyngeal exudate or posterior oropharyngeal erythema. Eyes:      General: No scleral icterus. Cardiovascular:      Rate and Rhythm: Normal rate and regular rhythm. Heart sounds: Normal heart sounds. No murmur heard. Pulmonary:      Effort: No respiratory distress. Breath sounds: Normal breath sounds. Abdominal:      General: Abdomen is flat. Palpations: Abdomen is soft. Musculoskeletal:         General: No swelling, tenderness or deformity. Cervical back: No rigidity. Lymphadenopathy:      Cervical: No cervical adenopathy. Skin:     Capillary Refill: Capillary refill takes less than 2 seconds. Findings: Lesion present. Comments: Drain site on right hip, leaking. Neurological:      General: No focal deficit present. Mental Status: She is alert. Psychiatric:         Mood and Affect: Mood normal.         Behavior: Behavior normal.         Invasive Devices     Peripherally Inserted Central Catheter Line  Duration           PICC Line 89/83/86 Right Basilic 1 day          Peripheral Intravenous Line  Duration           Peripheral IV 07/03/23 Dorsal (posterior); Left Wrist 1 day    Peripheral IV 07/04/23 Left;Ventral (anterior) <1 day          Drain  Duration           External Urinary Catheter 1 day                           Lab and other studies:  I have personally reviewed pertinent reports. No results displayed because visit has over 200 results.           Recent Results (from the past 24 hour(s))   CBC and Platelet    Collection Time: 07/05/23  4:56 AM   Result Value Ref Range    WBC 9.82 4.31 - 10.16 Thousand/uL    RBC 2.70 (L) 3.81 - 5.12 Million/uL    Hemoglobin 8.2 (L) 11.5 - 15.4 g/dL    Hematocrit 27.7 (L) 34.8 - 46.1 %     (H) 82 - 98 fL    MCH 30.4 26.8 - 34.3 pg    MCHC 29.6 (L) 31.4 - 37.4 g/dL    RDW 16.1 (H) 11.6 - 15.1 % Platelets 781 253 - 800 Thousands/uL    MPV 10.2 8.9 - 12.7 fL   Basic metabolic panel    Collection Time: 07/05/23  4:56 AM   Result Value Ref Range    Sodium 138 135 - 147 mmol/L    Potassium 4.2 3.5 - 5.3 mmol/L    Chloride 108 96 - 108 mmol/L    CO2 27 21 - 32 mmol/L    ANION GAP 3 mmol/L    BUN 24 5 - 25 mg/dL    Creatinine 1.17 0.60 - 1.30 mg/dL    Glucose 75 65 - 140 mg/dL    Calcium 9.0 8.3 - 10.1 mg/dL    eGFR 41 ml/min/1.73sq m   APTT    Collection Time: 07/05/23  5:16 AM   Result Value Ref Range    PTT 39 (H) 23 - 37 seconds   Protime-INR    Collection Time: 07/05/23  5:16 AM   Result Value Ref Range    Protime 21.0 (H) 11.6 - 14.5 seconds    INR 1.79 (H) 0.84 - 1.19     Blood Culture:   Lab Results   Component Value Date    BLOODCX No Growth After 5 Days. 06/27/2023    BLOODCX No Growth After 5 Days. 06/27/2023   ,   Urinalysis:   Lab Results   Component Value Date    COLORU Light Orange 06/19/2023    CLARITYU Extra Turbid 06/19/2023    SPECGRAV 1.019 06/19/2023    PHUR 5.5 06/19/2023    PHUR 6.0 08/20/2016    LEUKOCYTESUR Small (A) 06/19/2023    NITRITE Negative 06/19/2023    GLUCOSEU Negative 06/19/2023    KETONESU 10 (1+) (A) 06/19/2023    BILIRUBINUR Negative 06/19/2023    BLOODU Negative 06/19/2023   ,   Urine Culture:   Lab Results   Component Value Date    URINECX >100,000 cfu/ml Escherichia coli ESBL (A) 06/15/2023    URINECX >100,000 cfu/ml Klebsiella pneumoniae (A) 06/15/2023    URINECX >100,000 cfu/ml Aerococcus urinae (A) 06/15/2023   ,   Wound Culure:   Lab Results   Component Value Date    WOUNDCULT No growth 06/28/2023         Imaging:  None  XR chest PICC line portable    Result Date: 7/3/2023  Right PICC terminating in the azygos vein on the latest provided image. Recommend repositioning if clinically necessary. The results were reported to the ordering provider, 7/3/2023 at 1413.  Workstation performed: [id number]     XR chest PICC line portable    Result Date: 7/3/2023  Right PICC terminating in the azygos vein on the latest provided image. Recommend repositioning if clinically necessary. The results were reported to the ordering provider, 7/3/2023 at 1413.  Workstation performed: [id number]        Current Facility-Administered Medications   Medication Dose Route Frequency   • acetaminophen (TYLENOL) tablet 650 mg  650 mg Oral TID   • bisacodyl (DULCOLAX) EC tablet 5 mg  5 mg Oral Daily PRN   • cholecalciferol (VITAMIN D3) tablet 5,000 Units  5,000 Units Oral Daily   • cyanocobalamin (VITAMIN B-12) tablet 1,000 mcg  1,000 mcg Oral Daily   • DULoxetine (CYMBALTA) delayed release capsule 60 mg  60 mg Oral Daily   • ferrous sulfate tablet 325 mg  325 mg Oral Daily With Breakfast   • fluticasone (FLONASE) 50 mcg/act nasal spray 2 spray  2 spray Each Nare Daily   • gabapentin (NEURONTIN) capsule 300 mg  300 mg Oral BID   • heparin (porcine) 25,000 units in 0.45% NaCl 250 mL infusion (premix)  3-20 Units/kg/hr (Order-Specific) Intravenous Titrated   • HYDROmorphone HCl (DILAUDID) injection 0.2 mg  0.2 mg Intravenous Q4H PRN   • levothyroxine tablet 112 mcg  112 mcg Oral Early Morning   • ondansetron (ZOFRAN) injection 4 mg  4 mg Intravenous Q6H PRN   • oxyCODONE (ROXICODONE) IR tablet 5 mg  5 mg Oral Q4H PRN   • oxyCODONE (ROXICODONE) split tablet 2.5 mg  2.5 mg Oral Q4H PRN   • pantoprazole (PROTONIX) EC tablet 40 mg  40 mg Oral Early Morning   • phenol (CHLORASEPTIC) 1.4 % mucosal liquid 1 spray  1 spray Mouth/Throat Q2H PRN   • polyethylene glycol (MIRALAX) packet 17 g  17 g Oral Daily PRN   • senna-docusate sodium (SENOKOT S) 8.6-50 mg per tablet 1 tablet  1 tablet Oral HS   • simethicone (MYLICON) chewable tablet 80 mg  80 mg Oral Q6H PRN   • torsemide (DEMADEX) tablet 10 mg  10 mg Oral Daily   • vancomycin (VANCOCIN) 1500 mg in sodium chloride 0.9% 250 mL IVPB  1,500 mg Intravenous Q24H   • warfarin (COUMADIN) tablet 7.5 mg  7.5 mg Oral Once (warfarin)             DO [facility]

## 2023-07-05 NOTE — PHYSICAL THERAPY NOTE
PHYSICAL THERAPY NOTE          Patient Name: Phong Booth  WJTUB'T Date: 7/5/2023 07/05/23 1422   PT Last Visit   PT Visit Date 07/05/23   Note Type   Note Type Treatment   Pain Assessment   Pain Assessment Tool 0-10   Pain Score 4   Pain Location/Orientation Orientation: Right;Location: Knee; Location: Hip   Restrictions/Precautions   Weight Bearing Precautions Per Order Yes   RLE Weight Bearing Per Order (S)  NWB   Other Precautions Cognitive; Chair Alarm; Bed Alarm;WBS;O2;Pain; Fall Risk;Multiple lines  (2L O2)   General   Chart Reviewed Yes   Response to Previous Treatment Patient with no complaints from previous session. Family/Caregiver Present No   Cognition   Overall Cognitive Status Impaired   Arousal/Participation Responsive; Cooperative   Attention Attends with cues to redirect   Orientation Level Oriented X4   Memory Decreased recall of precautions   Following Commands Follows one step commands with increased time or repetition   Subjective   Subjective pt pleasant and cooperative throughout therapy session   Bed Mobility   Supine to Sit 2  Maximal assistance   Additional items Assist x 2;HOB elevated; Increased time required;LE management   Sit to Supine 2  Maximal assistance   Additional items Assist x 2;HOB elevated; Increased time required;Verbal cues;LE management   Transfers   Sit to Stand Unable to assess   Ambulation/Elevation   Gait pattern Not appropriate   Balance   Static Sitting Fair -   Dynamic Sitting Poor +   Endurance Deficit   Endurance Deficit Yes   Endurance Deficit Description generalized weakness, decreased exercise tolerance   Activity Tolerance   Activity Tolerance Patient limited by pain; Patient tolerated treatment well   Medical Staff Made Aware RAMA Reynolds, co-treat due to medical complexity and multiple comorbidities   Nurse Made Aware RN cleared and updated   Exercises   Knee AROM Long Arc Quad Sitting;10 reps;AROM; Bilateral   Ankle Pumps Supine;10 reps;AROM; Bilateral   Assessment   Prognosis Fair   Problem List Decreased strength;Decreased range of motion;Decreased endurance; Impaired balance;Decreased mobility;Pain;Orthopedic restrictions   Assessment Patient was received supine . Patient was agreeable to therapy services today. PT session focused on functional mobility and therapeutic exercises today in order to improve functional mobility and independence. Functional mobility was performed as described above. Pt was eager to participate in therapy today. Patient reported decreased RLE pain today compared to previous therapy session. Pt reported pain throughout transfer, but decreased pain once sitting EOB. Pt completed several therapeutic exercises seated at EOB. Pt was returned to supine and boosted up in bed at completion of session. Patient will benefit from continued PT services while in hospital in order to address remaining limitations. The patients AM-PAC Basic Mobility Inpatient Short From Raw Score is 6 . A Raw score of 6 suggests that the patient may benefit from discharge to post acute rehab. PT recommendation at this time is for return to facility with PT services. Please also refer to the recommendation of the Physical Therapist for safe discharge planning. Barriers to Discharge Decreased caregiver support; Inaccessible home environment   Goals   Patient Goals to feel better   [facility] Expiration Date 07/15/23   PT Treatment Day 1   Plan   Treatment/Interventions ADL retraining;Functional transfer training;LE strengthening/ROM; Elevations; Therapeutic exercise; Endurance training;Bed mobility;Gait training   Progress Progressing toward goals   PT Frequency 2-3x/wk   Recommendation   PT Discharge Recommendation Return to facility with rehabilitation services  (with appropriate level of assist vs STR if facility unable to provide physical assist)   [facility] Mobility Inpatient   Turning in Flat Bed Without Bedrails 1   Lying on Back to Sitting on Edge of Flat Bed Without Bedrails 1   Moving Bed to Chair 1   Standing Up From Chair Using Arms 1   Walk in Room 1   Climb 3-5 Stairs With Railing 1   Basic Mobility Inpatient Raw Score 6   Highest Level Of Mobility   JH-HLM Goal 2: Bed activities/Dependent transfer   JH-HLM Achieved 3: Sit at edge of bed   Education   Education Provided Mobility training   Patient Demonstrates acceptance/verbal understanding   End of Consult   Patient Position at End of Consult Supine;Bed/Chair alarm activated     Jose Luis Waters PT, DPT

## 2023-07-06 LAB
APTT PPP: 113 SECONDS (ref 23–37)
BASOPHILS # BLD AUTO: 0.06 THOUSANDS/ÂΜL (ref 0–0.1)
BASOPHILS NFR BLD AUTO: 1 % (ref 0–1)
EOSINOPHIL # BLD AUTO: 0.25 THOUSAND/ÂΜL (ref 0–0.61)
EOSINOPHIL NFR BLD AUTO: 3 % (ref 0–6)
ERYTHROCYTE [DISTWIDTH] IN BLOOD BY AUTOMATED COUNT: 15.9 % (ref 11.6–15.1)
FLUAV RNA RESP QL NAA+PROBE: NEGATIVE
FLUBV RNA RESP QL NAA+PROBE: NEGATIVE
HCT VFR BLD AUTO: 25.5 % (ref 34.8–46.1)
HGB BLD-MCNC: 7.5 G/DL (ref 11.5–15.4)
IMM GRANULOCYTES # BLD AUTO: 0.12 THOUSAND/UL (ref 0–0.2)
IMM GRANULOCYTES NFR BLD AUTO: 1 % (ref 0–2)
INR PPP: 3.51 (ref 0.84–1.19)
LYMPHOCYTES # BLD AUTO: 1.44 THOUSANDS/ÂΜL (ref 0.6–4.47)
LYMPHOCYTES NFR BLD AUTO: 16 % (ref 14–44)
MCH RBC QN AUTO: 30.6 PG (ref 26.8–34.3)
MCHC RBC AUTO-ENTMCNC: 29.4 G/DL (ref 31.4–37.4)
MCV RBC AUTO: 104 FL (ref 82–98)
MONOCYTES # BLD AUTO: 0.96 THOUSAND/ÂΜL (ref 0.17–1.22)
MONOCYTES NFR BLD AUTO: 11 % (ref 4–12)
NEUTROPHILS # BLD AUTO: 5.95 THOUSANDS/ÂΜL (ref 1.85–7.62)
NEUTS SEG NFR BLD AUTO: 68 % (ref 43–75)
NRBC BLD AUTO-RTO: 0 /100 WBCS
PLATELET # BLD AUTO: 204 THOUSANDS/UL (ref 149–390)
PMV BLD AUTO: 9.1 FL (ref 8.9–12.7)
PROTHROMBIN TIME: 35.4 SECONDS (ref 11.6–14.5)
RBC # BLD AUTO: 2.45 MILLION/UL (ref 3.81–5.12)
RSV RNA RESP QL NAA+PROBE: NEGATIVE
SARS-COV-2 RNA RESP QL NAA+PROBE: NEGATIVE
WBC # BLD AUTO: 8.78 THOUSAND/UL (ref 4.31–10.16)

## 2023-07-06 PROCEDURE — 99232 SBSQ HOSP IP/OBS MODERATE 35: CPT | Performed by: FAMILY MEDICINE

## 2023-07-06 PROCEDURE — 85730 THROMBOPLASTIN TIME PARTIAL: CPT | Performed by: FAMILY MEDICINE

## 2023-07-06 PROCEDURE — 85610 PROTHROMBIN TIME: CPT

## 2023-07-06 PROCEDURE — 0241U HB NFCT DS VIR RESP RNA 4 TRGT: CPT

## 2023-07-06 PROCEDURE — 99232 SBSQ HOSP IP/OBS MODERATE 35: CPT | Performed by: INTERNAL MEDICINE

## 2023-07-06 PROCEDURE — 85025 COMPLETE CBC W/AUTO DIFF WBC: CPT

## 2023-07-06 RX ORDER — WARFARIN SODIUM 3 MG/1
TABLET ORAL
Qty: 30 TABLET | Refills: 0
Start: 2023-07-11

## 2023-07-06 RX ORDER — BISACODYL 5 MG/1
5 TABLET, DELAYED RELEASE ORAL DAILY PRN
Qty: 30 TABLET | Refills: 0
Start: 2023-07-06 | End: 2023-07-06

## 2023-07-06 RX ORDER — FERROUS SULFATE 325(65) MG
325 TABLET ORAL
Qty: 30 TABLET | Refills: 0
Start: 2023-07-07 | End: 2023-07-06 | Stop reason: SDUPTHER

## 2023-07-06 RX ORDER — FERROUS SULFATE 325(65) MG
325 TABLET ORAL
Qty: 30 TABLET | Refills: 0
Start: 2023-07-07 | End: 2023-08-06

## 2023-07-06 RX ORDER — PANTOPRAZOLE SODIUM 40 MG/1
40 TABLET, DELAYED RELEASE ORAL
Qty: 30 TABLET | Refills: 2
Start: 2023-07-07 | End: 2023-10-05

## 2023-07-06 RX ADMIN — ACETAMINOPHEN 650 MG: 325 TABLET, FILM COATED ORAL at 17:05

## 2023-07-06 RX ADMIN — LEVOTHYROXINE SODIUM 112 MCG: 112 TABLET ORAL at 05:54

## 2023-07-06 RX ADMIN — DULOXETINE HYDROCHLORIDE 60 MG: 60 CAPSULE, DELAYED RELEASE ORAL at 08:39

## 2023-07-06 RX ADMIN — CYANOCOBALAMIN TAB 500 MCG 1000 MCG: 500 TAB at 08:39

## 2023-07-06 RX ADMIN — GABAPENTIN 300 MG: 300 CAPSULE ORAL at 17:05

## 2023-07-06 RX ADMIN — FLUTICASONE PROPIONATE 2 SPRAY: 50 SPRAY, METERED NASAL at 08:44

## 2023-07-06 RX ADMIN — PANTOPRAZOLE SODIUM 40 MG: 40 TABLET, DELAYED RELEASE ORAL at 05:54

## 2023-07-06 RX ADMIN — HEPARIN SODIUM 10 UNITS/KG/HR: 10000 INJECTION, SOLUTION INTRAVENOUS at 00:35

## 2023-07-06 RX ADMIN — ACETAMINOPHEN 650 MG: 325 TABLET, FILM COATED ORAL at 08:39

## 2023-07-06 RX ADMIN — FERROUS SULFATE TAB 325 MG (65 MG ELEMENTAL FE) 325 MG: 325 (65 FE) TAB at 08:39

## 2023-07-06 RX ADMIN — VANCOMYCIN HYDROCHLORIDE 1500 MG: 10 INJECTION, POWDER, LYOPHILIZED, FOR SOLUTION INTRAVENOUS at 08:40

## 2023-07-06 RX ADMIN — SENNOSIDES AND DOCUSATE SODIUM 1 TABLET: 50; 8.6 TABLET ORAL at 21:43

## 2023-07-06 RX ADMIN — GABAPENTIN 300 MG: 300 CAPSULE ORAL at 08:39

## 2023-07-06 RX ADMIN — Medication 5000 UNITS: at 08:39

## 2023-07-06 RX ADMIN — ACETAMINOPHEN 650 MG: 325 TABLET, FILM COATED ORAL at 21:43

## 2023-07-06 NOTE — RESTORATIVE TECHNICIAN NOTE
Restorative Technician Note      Patient Name: Ramirez Mcdonough     Restorative Tech Visit Date: 07/06/23  Note Type: Mobility  Patient Position Upon Consult: Supine  Activity Performed: Range of motion; Repositioned; Other (Comment) (bed mobility)  Education Provided: Yes  Patient Position at End of Consult: Supine;  All needs within reach    Tomie Oppenheim  DPT, Restorative Technician

## 2023-07-06 NOTE — CASE MANAGEMENT
Case Management Discharge Planning Note    Patient name Michelle Murray  Location [facility] 807/[facility] 275-48 MRN [id number]  : 1933 Date 2023       Current Admission Date: 2023  Current Admission Diagnosis:Osteomyelitis [facility])   Patient Active Problem List    Diagnosis Date Noted   • Chronic diarrhea 2023   • Acute cystitis 2023   • Hyponatremia 06/15/2023   • Lumbar radiculopathy 02/10/2023   • Low back pain with sciatica 02/10/2023   • Lumbar spondylosis 02/10/2023   • S/P lumbar fusion 02/10/2023   • Peripheral vascular disease, unspecified ([address]) 2022   • Negative depression screening 2021   • Benign essential HTN 2021   • Trochanteric bursitis 2021   • Meningioma ([address]) 11/10/2020   • Trochanteric bursitis of right hip 11/10/2020   • Stage 2 chronic kidney disease 11/10/2020   • Fall 10/13/2020   • Hematoma 10/13/2020   • Ecchymosis of eye 10/13/2020   • Elevated C-reactive protein (CRP) 2020   • S/P scar revision 2019   • Hypertrophic scar of skin 2019   • Cellulitis of right lower extremity 2019   • Osteomyelitis ([address]) 2019   • MCI (mild cognitive impairment) 2019   • Ambulatory dysfunction 2019   • Status post right hip replacement 2019   • Macrocytosis 2019   • Status post gastrectomy 2019   • Hypothyroidism 10/11/2018   • Pacemaker 2018   • Chronic diastolic CHF (congestive heart failure) ([address]) 2016   • Depression 2016   • Class 1 obesity due to excess calories with serious comorbidity and body mass index (BMI) of 33.0 to 33.9 in adult 2016   • Chronic pain 2016   • Chronic pain of lower extremity, bilateral 2016   • External hemorrhoids 2016   • Anemia 2016   • Chronic atrial fibrillation ([address]) 2016   • Irritable bowel syndrome with constipation 09/15/2015   • Synovial cyst of right popliteal space 2015   • Primary osteoarthritis of right knee 06/30/2015   • Osteoarthritis, multiple sites 06/26/2014   • Moderate mitral regurgitation 05/13/2014   • Osteoporosis 02/04/2014   • Fibromyalgia 05/31/2012   • Peripheral neuropathy 05/25/2012   • Lumbar canal stenosis 05/17/2012      LOS (days): 8  Geometric Mean LOS (GMLOS) (days): 5.50  Days to GMLOS:-3     OBJECTIVE:  Risk of Unplanned Readmission Score: 28.14         Current admission status: Inpatient   Preferred Pharmacy:   [address], [address]  Phone: [phone] Fax: [phone], [address] [facility]  [address] [facility] [address]  Phone: [phone] Fax: [address] [facility], [address] [address]  Phone: [phone] Fax: [address] [place] [address]  Phone: [phone] Fax: [phone]    Primary Care Provider: Nelly Villarreal DO    Primary Insurance: [address] [facility] REP  Secondary Insurance:     DISCHARGE DETAILS:                  Additional Comments: Patient cleared for d/c back to [facility] with IV antibiotics, awaiting insurance authorization. Spoke to Radha at [facility], forwarded PICC information and confirmed pharmacy. CM to follow.

## 2023-07-06 NOTE — PLAN OF CARE
Problem: Potential for Falls  Goal: Patient will remain free of falls  Description: INTERVENTIONS:  - Educate patient/family on patient safety including physical limitations  - Instruct patient to call for assistance with activity   - Consult OT/PT to assist with strengthening/mobility   - Keep Call bell within reach  - Keep bed low and locked with side rails adjusted as appropriate  - Keep care items and personal belongings within reach  - Initiate and maintain comfort rounds  - Initiate/Maintain bed and chair alarm  - Apply yellow socks and bracelet for high fall risk patients  - Consider moving patient to room near nurses station  Outcome: Progressing     Problem: MOBILITY - ADULT  Goal: Maintain or return to baseline ADL function  Description: INTERVENTIONS:  -  Assess patient's ability to carry out ADLs; assess patient's baseline for ADL function and identify physical deficits which impact ability to perform ADLs (bathing, care of mouth/teeth, toileting, grooming, dressing, etc.)  - Assess/evaluate cause of self-care deficits   - Assess range of motion  - Assess patient's mobility; develop plan if impaired  - Assess patient's need for assistive devices and provide as appropriate  - Encourage maximum independence but intervene and supervise when necessary  - Involve family in performance of ADLs  - Assess for home care needs following discharge   - Consider OT consult to assist with ADL evaluation and planning for discharge  - Provide patient education as appropriate  Outcome: Progressing  Goal: Maintains/Returns to pre admission functional level  Description: INTERVENTIONS:  - Perform BMAT or MOVE assessment daily.   - Set and communicate daily mobility goal to care team and patient/family/caregiver. - Collaborate with rehabilitation services on mobility goals if consulted  - Reposition patient every 2 hours.   - Out of bed for toileting  - Record patient progress and toleration of activity level   Outcome: Progressing     Problem: Prexisting or High Potential for Compromised Skin Integrity  Goal: Skin integrity is maintained or improved  Description: INTERVENTIONS:  - Identify patients at risk for skin breakdown  - Assess and monitor skin integrity  - Assess and monitor nutrition and hydration status  - Monitor labs   - Assess for incontinence   - Turn and reposition patient  - Assist with mobility/ambulation  - Relieve pressure over bony prominences  - Avoid friction and shearing  - Provide appropriate hygiene as needed including keeping skin clean and dry  - Evaluate need for skin moisturizer/barrier cream  - Collaborate with interdisciplinary team   - Patient/family teaching  - Consider wound care consult   Outcome: Progressing     Problem: PAIN - ADULT  Goal: Verbalizes/displays adequate comfort level or baseline comfort level  Description: Interventions:  - Encourage patient to monitor pain and request assistance  - Assess pain using appropriate pain scale  - Administer analgesics based on type and severity of pain and evaluate response  - Implement non-pharmacological measures as appropriate and evaluate response  - Consider cultural and social influences on pain and pain management  - Notify physician/advanced practitioner if interventions unsuccessful or patient reports new pain  Outcome: Progressing     Problem: Knowledge Deficit  Goal: Patient/family/caregiver demonstrates understanding of disease process, treatment plan, medications, and discharge instructions  Description: Complete learning assessment and assess knowledge base.   Interventions:  - Provide teaching at level of understanding  - Provide teaching via preferred learning methods  Outcome: Progressing     Problem: SKIN/TISSUE INTEGRITY - ADULT  Goal: Incision(s), wounds(s) or drain site(s) healing without S/S of infection  Description: INTERVENTIONS  - Assess and document dressing, incision, wound bed, drain sites and surrounding tissue  - Provide patient and family education  - Perform skin care/dressing changes every shift  Outcome: Progressing  Goal: Pressure injury heals and does not worsen  Description: Interventions:  - Implement low air loss mattress or specialty surface (Criteria met)  - Apply silicone foam dressing  - Instruct/assist with weight shifting every 60 minutes when in chair   - Use special pressure reducing interventions such as EHOB when in chair   - Apply fecal or urinary incontinence containment device   - Perform passive or active ROM every shift  - Turn and reposition patient & offload bony prominences every 2 hours   - Utilize friction reducing device or surface for transfers   - Consider nutrition services referral as needed  Outcome: Progressing

## 2023-07-06 NOTE — ED ATTENDING ATTESTATION
6/27/2023  I, Carry Ache, DO, saw and evaluated the patient. I have discussed the patient with the resident/non-physician practitioner and agree with the resident's/non-physician practitioner's findings, Plan of Care, and MDM as documented in the resident's/non-physician practitioner's note, except where noted. All available labs and Radiology studies were reviewed. I was present for key portions of any procedure(s) performed by the resident/non-physician practitioner and I was immediately available to provide assistance. At this point I agree with the current assessment done in the Emergency Department. I have conducted an independent evaluation of this patient a history and physical is as follows:    80 yof with right hip chronic dislocation, wound burst open today. Serosanguinous fluid from right MAIA 3 yearr old wound. m abscess likely. Patient feels fine other than some chills. No fevers. Was just dced after uti    On exam, obvious right maia wound infection. Plan spetic workup. Ct for nec fasc, abscess.  IV abx, admit    ED Course         Critical Care Time  Procedures

## 2023-07-06 NOTE — CASE MANAGEMENT
[address] [facility] [address] received request for authorization from Care Manager. Authorization request for: SNF  Facility Name: [facility]. NPI: [id number]  Facility MD: Mary Maya. NPI: [id number]  Authorization initiated by contacting insurance: [employer] Via: Phone  Pending Reference #: [id number]  Clinicals submitted via:  Fax

## 2023-07-06 NOTE — DISCHARGE INSTR - AVS FIRST PAGE
Please check INR on 7/10/2023     Discharge Instructions - Bairon Coulter 80 y.o. female MRN: [id number]  Unit/Bed#: [facility] 807-01    Weight Bearing Status:                                           Do not bear weight on your right lower extremity. DVT prophylaxis  Continue home warfarin. Pain:  Continue analgesics as directed. Dressing Instructions:   Please keep clean, dry and intact until follow up. If saturated, please exchange for new dressing. The incision requires a mepilex. The drain site requires the following: Place 4x4, then abd and dress with chest tube tape to create a pressure dressing. These dressing changes will likely need to be performed every other day. Appt Instructions: If you do not have your appointment, please call the clinic at [phone] to follow up with Dr Yasemin Tate in 2 weeks. Otherwise followup as scheduled     Contact the office sooner if you experience any increased numbness/tingling in the extremities. Miscellaneous:  Continue IV vancomycin per ID's instructions.

## 2023-07-06 NOTE — PROGRESS NOTES
Progress Note - Infectious Disease   Glenn Cohen 80 y.o. 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  In setting of MAIA, revision, chronic dislocation as below.  Presenting with subacute pain, spontaneous drainage from sinus tract.  CT showed abscess at joint.  Clinically stable without sepsis.  ESR 51. Suspect gram-positive pathogen.  Status post MAIA explantation, extensive I&D, insertion of antibiotic beads on 6/30.  Extensive purulent fluid and necrotic tissue noted intraoperatively.  OR cultures are negative thus far. Rec:  • Continue vancomycin through at least 8/11/2023 to complete 6 weeks of antibiotics postoperatively  • Pharmacy follow-up for vancomycin dose management  • Follow-up OR cultures, which are being held for 14 days. • Check weekly CBC with differential, creatinine, vancomycin trough, ESR while on IV antibiotic. • Outpatient ID follow-up in 2 weeks.  Office is aware. Dr. Mihir Loomis is supervising physician. • Discontinue PICC line after last dose of IV antibiotic. • After completion of IV course, consider PO antibiotic based on ESR trend given suspected chronicity of infection.     Status post left MAIA with revision in 2019. Complicated more recently by disruption and chronic dislocation.  Non-ambulatory.     Status post PPM.     IBS with constipation.     Afib. On coumadin     History of ESBL, MDRO. From recent urine cultures. Discussed the above management plan with the primary service    Antibiotics:  Vancomycin 9  Postop day 6    Subjective:  Patient has no fever, chills, sweats; no nausea, vomiting, diarrhea; no cough, shortness of breath; no pain. No new symptoms.     Objective:  Vitals:  Temp:  [97.4 °F (36.3 °C)-97.5 °F (36.4 °C)] 97.4 °F (36.3 °C)  HR:  [65-72] 70  Resp:  [16-18] 17  BP: (103-129)/(50-68) 106/53  SpO2:  [90 %-99 %] 99 %  Temp (24hrs), Av.4 °F (36.3 °C), Min:97.4 °F (36.3 °C), Max:97.5 °F (36.4 °C)  Current: Temperature: (!) 97.4 °F (36.3 °C)    Physical Exam:   General Appearance:  Alert, interactive, nontoxic, no acute distress. Throat: Oropharynx moist without lesions. Lungs:   Clear to auscultation bilaterally; no wheezes, rhonchi or rales; respirations unlabored   Heart:  RRR; no murmur, rub or gallop   Abdomen:   Soft, non-tender, non-distended, positive bowel sounds. Extremities: No clubbing, cyanosis or edema. Right hip with decreased drainage   Skin: No new rashes or lesions. No draining wounds noted. Labs, Imaging, & Other studies:   All pertinent labs and imaging studies were personally reviewed  Results from last 7 days   Lab Units 23  0555 23  0456 23  0209   WBC Thousand/uL 8.78 9.82 12.26*   HEMOGLOBIN g/dL 7.5* 8.2* 7.8*   PLATELETS Thousands/uL 204 198 188     Results from last 7 days   Lab Units 23  0456 23  0537 23  0622 23  0500 23  1951   SODIUM mmol/L 138 138 132*   < >  --    POTASSIUM mmol/L 4.2 4.3 4.4   < >  --    CHLORIDE mmol/L 108 109* 106   < >  --    CO2 mmol/L 27 28 25   < >  --    CO2, I-STAT mmol/L  --   --   --   --  28   BUN mg/dL 24 18 13   < >  --    CREATININE mg/dL 1.17 1.01 0.80   < >  --    EGFR ml/min/1.73sq m 41 49 65   < >  --    GLUCOSE, ISTAT mg/dl  --   --   --   --  87   CALCIUM mg/dL 9.0 9.3 8.2*   < >  --     < > = values in this interval not displayed.      Results from last 7 days   Lab Units 23  1916   GRAM STAIN RESULT  2+ Polys  No bacteria seen

## 2023-07-06 NOTE — PROGRESS NOTES
Progress Notes - Family Medicine Residency, Ramyawillembrielle Coulter 9/2/1933, 80 y.o. female. MRN: [id number]    [facility]07[facility] Encounter: [id number]  Primary Care Provider: Tere Srivastava DO      Admission Date: 6/27/2023 2125  Length of Stay: 8 days  Code Status:  Level 1 - Full Code  Consult:   IP CONSULT TO ORTHOPEDIC SURGERY  IP CONSULT TO CARDIOLOGY  IP CONSULT TO CASE MANAGEMENT  IP CONSULT TO PHARMACY  IP CONSULT TO INFECTIOUS DISEASES  IP CONSULT TO PICC TEAM    * Osteomyelitis ([address])  Assessment & Plan  Pt presents from [address] at [facility] for purulent discharge from her right hip noticed on day of admission. Her right hip was drained 1 L and the skilled nursing facility prior to ED presentation. · Pt has history of right hip replacement and follows with orthopedics outpatient. Recently admitted at [facility] from 6/8-6/12 under the trauma service for chronic right hip dislocation. · Pt is vitally stable, labs significant for leukocytosis. Imaging obtained. Blood cultures pending. CT Abdomen Pelvis (6/27/23)  1. Superior dislocation of right hip arthroplasty femoral stem and acetabular cup.  2. Collection of fluid and gas lateral to the femoral stem measuring 16.5 x 7.3 x 4.3 cm compatible with an abscess. Collection extends to the acetabular cup. 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 3. No evidence of intraperitoneal or retroperitoneal fluid collection. 4. Constipation.       S/p INR reversal with Vit K 10 mg and Platelet transfusion x2  S/p wash out in OR by ortho 6/30     Plan:  • Pain regimen: dimitrios tylenol 975 mg Q8H, oxy 2.5/5 mg Q8H prN  • F/u cultures  • ID:  recommendation appreciated  • patient to undergo 6 total weeks of IV antibiotic follow-up with possible 6 more weeks of p.o. antibiotic per ID; PICC care    Stage 2 chronic kidney disease  Assessment & Plan  Lab Results   Component Value Date    EGFR 41 07/05/2023    EGFR 49 07/03/2023    EGFR 65 07/02/2023    CREATININE 1.17 07/05/2023    CREATININE 1.01 07/03/2023    CREATININE 0.80 07/02/2023     Caution with using nephrotoxic agents  Avoid NSAIDs given Elevated INR. Anemia  Assessment & Plan  Hgb / Hct       Results from last 7 days   Lab Units 07/06/23  0555 07/05/23  0456 07/04/23  0209 07/03/23  0906 07/03/23  0537 07/02/23  1914 07/02/23  0622   HEMOGLOBIN g/dL 7.5* 8.2* 7.8* 8.3* 8.4* 7.3* 7.0*   HEMATOCRIT % 25.5* 27.7* 25.2* 26.5* 27.2* 23.4* 23.4*   Downtrending hemoglobin status post Ortho washout 6/30  Unclear bleeding internally  Hemoglobin stable today status post 1 unit of packed red blood cell transfusion  -Patient on ferrous sulfate 325 mg daily  -We will follow-up with iron panel and CBC, monitoring INR      Chronic atrial fibrillation ([address])  Assessment & Plan  Currently rate controlled. On daily warfarin 3 mg.  - Goal INR between 2-3.  - INR currently 5.40  - In ED, pt is s/p Vit K and 2 units of FFP   -7/6/2023: INR is supratherapeutic at 3.5 after 7.5 mg of Coumadin last night    Plan  - Hold NSAIDs given risk of bleeding  -Hold 7/6  dose of Coumadin  -Recheck INR  -Adjusting Coumadin doses with goal 2-3    Pacemaker  Assessment & Plan  History of intolerance and bradycardia with AVN blocking agents for chronic atrial fibrillation. She underwent AV node ablation in 8/2018 and implantation of MDT single chamber His PPM - medtronic. 99%     Plan   - monitor vitals    Benign essential HTN  Assessment & Plan  Goal BP <130/80 per cardiology  Continue monitor BP, stable currently. Most recent BP Blood Pressure: 730/22   65B SBP Systolic (27HVQ), SXP:081 , Min:103 , Max:129      Plan  - C/w home torsemide 10 mg daily    Ambulatory dysfunction  Assessment & Plan  Secondary to hip pain and multiple sites of OA. Pt presented from skilled nursing facility for PT/OT. S/p OR for I&D on 6/30.      Plan  - Pain control  - monitor for urine and BM  - PT/OT  - fall precautions  - CM referral for dispo    Hypothyroidism  Assessment & Plan  Thyroid    Lab Results   Component Value Date    FBE2OALGRQVT 6.873 (H) 06/28/2023    PFH9ZQBPDIZF 2.260 06/08/2022    NES6BZIWEICJ 1.410 05/03/2021    QZP8RGZPARNJ 3.760 (H) 04/02/2021    ZBI9UOMCLJJV 2.076 05/15/2020    ZLV4HRPABGBT 4.400 (H) 03/13/2020    EMP3BTDKMIEZ 3.713 01/22/2019    FREET4 1.39 (H) 06/28/2023    FREET4 0.96 06/23/2017    FREET4 1.00 03/10/2016     Plan  - TSH elevated, c/w increased levothyroxine dose 112mcg daily     Irritable bowel syndrome with constipation  Assessment & Plan  -Patient had 1 BM 7/2  Plan  - Bowel regimen: Miralax and Sennakot scheduled. Chronic diastolic CHF (congestive heart failure) (HCC)  Assessment & Plan  Wt Readings from Last 3 Encounters:   06/28/23 76.2 kg (168 lb)   06/16/23 76.6 kg (168 lb 12.8 oz)   03/24/23 75.8 kg (167 lb)   Relatively stable weight  HFpEF per cardiology notes. Last ECHO 4/27/2018 EF 55%, LV thickness upper limits normal, moderate mitral regurgitation, moderate tricuspid regurgitation.      • Monitor I/O   • Continue torsemide 10mg              VTE Pharmacologic Prophylaxis: Warfarin (Coumadin)  VTE Mechanical Prophylaxis: sequential compression device  Diet: Regular diet  Code Status: Level 1 full code  Disposition: Adjusting dosage for Coumadin, INR currently supratherapeutic    Discussed with attending physician, Dr. Katarina Berger, and [facility] team.        Subjective:   Patient seen examined bedside, hemoglobin stable, she has no acute complaint. She does not feel fatigued tired. No signs of overt bleeding. She understands a plan of readjusting her Coumadin level to get to therapeutic goal.  Her facility [facility] has been communicated to  set up for IV antibiotic and patient return to her residence. Objective:     Vitals: Blood pressure 106/53, pulse 70, temperature (!) 97.4 °F (36.3 °C), resp.  rate 17, height 5' (1.524 m), weight 76.2 kg (168 lb), SpO2 99 %, not currently breastfeeding. ,Body mass index is 32.81 kg/m². Intake/Output Summary (Last 24 hours) at 7/6/2023 1234  Last data filed at 7/6/2023 0800  Gross per 24 hour   Intake 238 ml   Output 200 ml   Net 38 ml       Physical Exam  Constitutional:       General: She is not in acute distress. Appearance: She is not ill-appearing or toxic-appearing. HENT:      Head: Normocephalic and atraumatic. Right Ear: External ear normal.      Left Ear: External ear normal.      Nose: No congestion. Mouth/Throat:      Pharynx: No oropharyngeal exudate or posterior oropharyngeal erythema. Eyes:      General: No scleral icterus. Cardiovascular:      Rate and Rhythm: Normal rate and regular rhythm. Heart sounds: No murmur heard. Pulmonary:      Effort: No respiratory distress. Abdominal:      General: There is no distension. Palpations: There is no mass. Musculoskeletal:         General: No swelling or deformity. Comments: Right hip incision DCI, no notable saturation through the dressing   Skin:     General: Skin is warm and dry. Capillary Refill: Capillary refill takes less than 2 seconds. Findings: No bruising. Neurological:      General: No focal deficit present. Mental Status: She is alert and oriented to person, place, and time. Cranial Nerves: No cranial nerve deficit. Motor: No weakness. Psychiatric:         Mood and Affect: Mood normal.         Behavior: Behavior normal.         Invasive Devices     Peripherally Inserted Central Catheter Line  Duration           PICC Line 03/75/90 Right Basilic 3 days          Peripheral Intravenous Line  Duration           Peripheral IV 07/03/23 Dorsal (posterior); Left Wrist 2 days    Peripheral IV 07/04/23 Left;Ventral (anterior) 2 days          Drain  Duration           External Urinary Catheter 2 days                           Lab and other studies:  I have personally reviewed pertinent reports. No results displayed because visit has over 200 results. Recent Results (from the past 24 hour(s))   APTT    Collection Time: 07/05/23  2:58 PM   Result Value Ref Range     (HH) 23 - 37 seconds   APTT    Collection Time: 07/05/23  9:56 PM   Result Value Ref Range     (HH) 23 - 37 seconds   Protime-INR    Collection Time: 07/06/23  5:55 AM   Result Value Ref Range    Protime 35.4 (H) 11.6 - 14.5 seconds    INR 3.51 (H) 0.84 - 1.19   CBC and differential    Collection Time: 07/06/23  5:55 AM   Result Value Ref Range    WBC 8.78 4.31 - 10.16 Thousand/uL    RBC 2.45 (L) 3.81 - 5.12 Million/uL    Hemoglobin 7.5 (L) 11.5 - 15.4 g/dL    Hematocrit 25.5 (L) 34.8 - 46.1 %     (H) 82 - 98 fL    MCH 30.6 26.8 - 34.3 pg    MCHC 29.4 (L) 31.4 - 37.4 g/dL    RDW 15.9 (H) 11.6 - 15.1 %    MPV 9.1 8.9 - 12.7 fL    Platelets 042 541 - 061 Thousands/uL    nRBC 0 /100 WBCs    Neutrophils Relative 68 43 - 75 %    Immat GRANS % 1 0 - 2 %    Lymphocytes Relative 16 14 - 44 %    Monocytes Relative 11 4 - 12 %    Eosinophils Relative 3 0 - 6 %    Basophils Relative 1 0 - 1 %    Neutrophils Absolute 5.95 1.85 - 7.62 Thousands/µL    Immature Grans Absolute 0.12 0.00 - 0.20 Thousand/uL    Lymphocytes Absolute 1.44 0.60 - 4.47 Thousands/µL    Monocytes Absolute 0.96 0.17 - 1.22 Thousand/µL    Eosinophils Absolute 0.25 0.00 - 0.61 Thousand/µL    Basophils Absolute 0.06 0.00 - 0.10 Thousands/µL   APTT    Collection Time: 07/06/23  5:55 AM   Result Value Ref Range     (H) 23 - 37 seconds     Blood Culture:   Lab Results   Component Value Date    BLOODCX No Growth After 5 Days. 06/27/2023    BLOODCX No Growth After 5 Days.  06/27/2023   ,   Urinalysis:   Lab Results   Component Value Date    COLORU Light Orange 06/19/2023    CLARITYU Extra Turbid 06/19/2023    SPECGRAV 1.019 06/19/2023    PHUR 5.5 06/19/2023    PHUR 6.0 08/20/2016    LEUKOCYTESUR Small (A) 06/19/2023    NITRITE Negative 06/19/2023    GLUCOSEU Negative 06/19/2023    KETONESU 10 (1+) (A) 06/19/2023    BILIRUBINUR Negative 06/19/2023    BLOODU Negative 06/19/2023   ,   Urine Culture:   Lab Results   Component Value Date    URINECX >100,000 cfu/ml Escherichia coli ESBL (A) 06/15/2023    URINECX >100,000 cfu/ml Klebsiella pneumoniae (A) 06/15/2023    URINECX >100,000 cfu/ml Aerococcus urinae (A) 06/15/2023   ,   Wound Culure:   Lab Results   Component Value Date    WOUNDCULT No growth 06/28/2023         Imaging:  None  IR PICC reposition    Result Date: 7/5/2023  Interpretation of fluoroscopic images of image-guided PICC placement. Workstation performed: [id number]     XR chest PICC line portable    Result Date: 7/3/2023  Right PICC terminating in the azygos vein on the latest provided image. Recommend repositioning if clinically necessary. The results were reported to the ordering provider, 7/3/2023 at 1413. Workstation performed: [id number]     XR chest PICC line portable    Result Date: 7/3/2023  Right PICC terminating in the azygos vein on the latest provided image. Recommend repositioning if clinically necessary. The results were reported to the ordering provider, 7/3/2023 at 1413.  Workstation performed: [id number]        Current Facility-Administered Medications   Medication Dose Route Frequency   • acetaminophen (TYLENOL) tablet 650 mg  650 mg Oral TID   • bisacodyl (DULCOLAX) EC tablet 5 mg  5 mg Oral Daily PRN   • cholecalciferol (VITAMIN D3) tablet 5,000 Units  5,000 Units Oral Daily   • cyanocobalamin (VITAMIN B-12) tablet 1,000 mcg  1,000 mcg Oral Daily   • DULoxetine (CYMBALTA) delayed release capsule 60 mg  60 mg Oral Daily   • ferrous sulfate tablet 325 mg  325 mg Oral Daily With Breakfast   • fluticasone (FLONASE) 50 mcg/act nasal spray 2 spray  2 spray Each Nare Daily   • gabapentin (NEURONTIN) capsule 300 mg  300 mg Oral BID   • HYDROmorphone HCl (DILAUDID) injection 0.2 mg  0.2 mg Intravenous Q4H PRN   • levothyroxine tablet 112 mcg  112 mcg Oral Early Morning   • ondansetron (ZOFRAN) injection 4 mg  4 mg Intravenous Q6H PRN   • oxyCODONE (ROXICODONE) IR tablet 5 mg  5 mg Oral Q4H PRN   • oxyCODONE (ROXICODONE) split tablet 2.5 mg  2.5 mg Oral Q4H PRN   • pantoprazole (PROTONIX) EC tablet 40 mg  40 mg Oral Early Morning   • phenol (CHLORASEPTIC) 1.4 % mucosal liquid 1 spray  1 spray Mouth/Throat Q2H PRN   • polyethylene glycol (MIRALAX) packet 17 g  17 g Oral Daily PRN   • senna-docusate sodium (SENOKOT S) 8.6-50 mg per tablet 1 tablet  1 tablet Oral HS   • simethicone (MYLICON) chewable tablet 80 mg  80 mg Oral Q6H PRN   • torsemide (DEMADEX) tablet 10 mg  10 mg Oral Daily   • vancomycin (VANCOCIN) 1500 mg in sodium chloride 0.9% 250 mL IVPB  1,500 mg Intravenous Q24H             Clovis Snellen, DO   Family Medicine

## 2023-07-07 DIAGNOSIS — Z96.649 PROSTHETIC HIP INFECTION (HCC): Primary | ICD-10-CM

## 2023-07-07 DIAGNOSIS — T84.59XA PROSTHETIC HIP INFECTION (HCC): Primary | ICD-10-CM

## 2023-07-07 LAB
ANION GAP SERPL CALCULATED.3IONS-SCNC: 4 MMOL/L
BUN SERPL-MCNC: 28 MG/DL (ref 5–25)
CALCIUM SERPL-MCNC: 8.4 MG/DL (ref 8.3–10.1)
CHLORIDE SERPL-SCNC: 109 MMOL/L (ref 96–108)
CO2 SERPL-SCNC: 26 MMOL/L (ref 21–32)
CREAT SERPL-MCNC: 1.01 MG/DL (ref 0.6–1.3)
ERYTHROCYTE [DISTWIDTH] IN BLOOD BY AUTOMATED COUNT: 15.9 % (ref 11.6–15.1)
GFR SERPL CREATININE-BSD FRML MDRD: 49 ML/MIN/1.73SQ M
GLUCOSE SERPL-MCNC: 82 MG/DL (ref 65–140)
HCT VFR BLD AUTO: 25.5 % (ref 34.8–46.1)
HGB BLD-MCNC: 8 G/DL (ref 11.5–15.4)
INR PPP: 4.42 (ref 0.84–1.19)
INR PPP: 4.68 (ref 0.84–1.19)
MCH RBC QN AUTO: 31.6 PG (ref 26.8–34.3)
MCHC RBC AUTO-ENTMCNC: 31.4 G/DL (ref 31.4–37.4)
MCV RBC AUTO: 101 FL (ref 82–98)
PLATELET # BLD AUTO: 190 THOUSANDS/UL (ref 149–390)
PMV BLD AUTO: 8.6 FL (ref 8.9–12.7)
POTASSIUM SERPL-SCNC: 3.8 MMOL/L (ref 3.5–5.3)
PROTHROMBIN TIME: 42.4 SECONDS (ref 11.6–14.5)
PROTHROMBIN TIME: 44.3 SECONDS (ref 11.6–14.5)
RBC # BLD AUTO: 2.53 MILLION/UL (ref 3.81–5.12)
SODIUM SERPL-SCNC: 139 MMOL/L (ref 135–147)
WBC # BLD AUTO: 8.83 THOUSAND/UL (ref 4.31–10.16)

## 2023-07-07 PROCEDURE — NC001 PR NO CHARGE: Performed by: FAMILY MEDICINE

## 2023-07-07 PROCEDURE — 85610 PROTHROMBIN TIME: CPT

## 2023-07-07 PROCEDURE — 80048 BASIC METABOLIC PNL TOTAL CA: CPT

## 2023-07-07 PROCEDURE — 84597 ASSAY OF VITAMIN K: CPT

## 2023-07-07 PROCEDURE — 99232 SBSQ HOSP IP/OBS MODERATE 35: CPT | Performed by: INTERNAL MEDICINE

## 2023-07-07 PROCEDURE — 85027 COMPLETE CBC AUTOMATED: CPT

## 2023-07-07 RX ORDER — DIPHENHYDRAMINE HYDROCHLORIDE 50 MG/ML
25 INJECTION INTRAMUSCULAR; INTRAVENOUS EVERY 6 HOURS PRN
Status: DISCONTINUED | OUTPATIENT
Start: 2023-07-07 | End: 2023-07-07

## 2023-07-07 RX ORDER — PHYTONADIONE 5 MG/1
2.5 TABLET ORAL ONCE
Status: COMPLETED | OUTPATIENT
Start: 2023-07-07 | End: 2023-07-07

## 2023-07-07 RX ADMIN — ACETAMINOPHEN 650 MG: 325 TABLET, FILM COATED ORAL at 08:23

## 2023-07-07 RX ADMIN — CYANOCOBALAMIN TAB 500 MCG 1000 MCG: 500 TAB at 08:23

## 2023-07-07 RX ADMIN — VANCOMYCIN HYDROCHLORIDE 1500 MG: 10 INJECTION, POWDER, LYOPHILIZED, FOR SOLUTION INTRAVENOUS at 08:24

## 2023-07-07 RX ADMIN — ACETAMINOPHEN 650 MG: 325 TABLET, FILM COATED ORAL at 21:24

## 2023-07-07 RX ADMIN — FERROUS SULFATE TAB 325 MG (65 MG ELEMENTAL FE) 325 MG: 325 (65 FE) TAB at 08:23

## 2023-07-07 RX ADMIN — GABAPENTIN 300 MG: 300 CAPSULE ORAL at 08:23

## 2023-07-07 RX ADMIN — LEVOTHYROXINE SODIUM 112 MCG: 112 TABLET ORAL at 05:07

## 2023-07-07 RX ADMIN — FLUTICASONE PROPIONATE 2 SPRAY: 50 SPRAY, METERED NASAL at 08:24

## 2023-07-07 RX ADMIN — GABAPENTIN 300 MG: 300 CAPSULE ORAL at 17:32

## 2023-07-07 RX ADMIN — ACETAMINOPHEN 650 MG: 325 TABLET, FILM COATED ORAL at 17:32

## 2023-07-07 RX ADMIN — PANTOPRAZOLE SODIUM 40 MG: 40 TABLET, DELAYED RELEASE ORAL at 05:07

## 2023-07-07 RX ADMIN — Medication 5000 UNITS: at 08:23

## 2023-07-07 RX ADMIN — PHYTONADIONE 2.5 MG: 5 TABLET ORAL at 10:15

## 2023-07-07 RX ADMIN — DULOXETINE HYDROCHLORIDE 60 MG: 60 CAPSULE, DELAYED RELEASE ORAL at 08:23

## 2023-07-07 NOTE — PROGRESS NOTES
Nursing report given to Dominique Cortes RN at [facility]. Patient to be discharged at 1630. Discharge paperwork faxed and peripheral IV's removed. Patient to be discharged with PICC line to continue IV antibiotics through 8/11.

## 2023-07-07 NOTE — PROGRESS NOTES
Progress Note - Infectious Disease   Kalpesh Valero 80 y.o. female MRN: [id number]  Unit/Bed#: [facility] 807-01 Encounter: [id number]      Impression/Plan:  Right hip PJI infection.    With associated acetabular osteomyelitis.  In setting of MAIA, revision, chronic dislocation as below.  Presenting with subacute pain, spontaneous drainage from sinus tract.  CT showed abscess at joint.  Clinically stable without sepsis.  ESR 51. Suspect gram-positive pathogen.  Status post MAIA explantation, extensive I&D, insertion of antibiotic beads on 6/30.  Extensive purulent fluid and necrotic tissue noted intraoperatively.  OR cultures are negative thus far. Rec:  • Continue vancomycin through at least 8/11/2023 to complete 6 weeks of antibiotics postoperatively  • Pharmacy follow-up for vancomycin dose management  • Follow-up OR cultures, which are being held for 14 days. • Check weekly CBC with differential, creatinine, vancomycin trough, ESR while on IV antibiotic. • Outpatient ID follow-up in 2 weeks.  Office is aware. Dr. Dagoberto Davis is supervising physician. • Discontinue PICC line after last dose of IV antibiotic. • After completion of IV course, consider PO antibiotic based on ESR trend given suspected chronicity of infection.     Status post left MAIA with revision in 2019. Complicated more recently by disruption and chronic dislocation.  Non-ambulatory.     Status post PPM.     IBS with constipation.     Afib. On coumadin     History of ESBL, MDRO. From recent urine cultures. Discussed the above management plan with the primary service. Patient for discharge today. Antibiotics:  Vancomycin 10  Postop day 7    Subjective:  Patient has no fever, chills, sweats; no nausea, vomiting, diarrhea; no cough, shortness of breath; no pain. No new symptoms.     Objective:  Vitals:  Temp:  [97.5 °F (36.4 °C)-98 °F (36.7 °C)] 97.5 °F (36.4 °C)  HR:  [52-62] 62  Resp:  [16] 16  BP: (109-126)/(54-72) 126/72  SpO2:  [92 %-100 %] 92 %  Temp (24hrs), Av.7 °F (36.5 °C), Min:97.5 °F (36.4 °C), Max:98 °F (36.7 °C)  Current: Temperature: 97.5 °F (36.4 °C)    Physical Exam:   General Appearance:  Alert, interactive, nontoxic, no acute distress. Throat: Oropharynx moist without lesions. Lungs:   Clear to auscultation bilaterally; no wheezes, rhonchi or rales; respirations unlabored   Heart:  RRR; no murmur, rub or gallop   Abdomen:   Soft, non-tender, non-distended, positive bowel sounds. Extremities: No clubbing, cyanosis. Right hip dressed with a dry dressing in place. Skin: No new rashes or lesions. No draining wounds noted. Labs, Imaging, & Other studies:   All pertinent labs and imaging studies were personally reviewed  Results from last 7 days   Lab Units 23  0507 23  0555 23  0456   WBC Thousand/uL 8.83 8.78 9.82   HEMOGLOBIN g/dL 8.0* 7.5* 8.2*   PLATELETS Thousands/uL 190 204 198     Results from last 7 days   Lab Units 23  0507 23  0456 23  0537 23  0500 23  1951   SODIUM mmol/L 139 138 138   < >  --    POTASSIUM mmol/L 3.8 4.2 4.3   < >  --    CHLORIDE mmol/L 109* 108 109*   < >  --    CO2 mmol/L 26 27 28   < >  --    CO2, I-STAT mmol/L  --   --   --   --  28   BUN mg/dL 28* 24 18   < >  --    CREATININE mg/dL 1.01 1.17 1.01   < >  --    EGFR ml/min/1.73sq m 49 41 49   < >  --    GLUCOSE, ISTAT mg/dl  --   --   --   --  87   CALCIUM mg/dL 8.4 9.0 9.3   < >  --     < > = values in this interval not displayed.      Results from last 7 days   Lab Units 23  1916   GRAM STAIN RESULT  2+ Polys  No bacteria seen

## 2023-07-07 NOTE — CASE MANAGEMENT
Case Management Discharge Planning Note    Patient name Macey Miller  Location [facility] 807/[facility] 363-22 MRN [id number]  : 1933 Date 2023       Current Admission Date: 2023  Current Admission Diagnosis:Osteomyelitis [facility])   Patient Active Problem List    Diagnosis Date Noted   • Chronic diarrhea 2023   • Acute cystitis 2023   • Hyponatremia 06/15/2023   • Lumbar radiculopathy 02/10/2023   • Low back pain with sciatica 02/10/2023   • Lumbar spondylosis 02/10/2023   • S/P lumbar fusion 02/10/2023   • Peripheral vascular disease, unspecified ([address]) 2022   • Negative depression screening 2021   • Benign essential HTN 2021   • Trochanteric bursitis 2021   • Meningioma ([address]) 11/10/2020   • Trochanteric bursitis of right hip 11/10/2020   • Stage 2 chronic kidney disease 11/10/2020   • Fall 10/13/2020   • Hematoma 10/13/2020   • Ecchymosis of eye 10/13/2020   • Elevated C-reactive protein (CRP) 2020   • S/P scar revision 2019   • Hypertrophic scar of skin 2019   • Cellulitis of right lower extremity 2019   • Osteomyelitis ([address]) 2019   • MCI (mild cognitive impairment) 2019   • Ambulatory dysfunction 2019   • Status post right hip replacement 2019   • Macrocytosis 2019   • Status post gastrectomy 2019   • Hypothyroidism 10/11/2018   • Pacemaker 2018   • Chronic diastolic CHF (congestive heart failure) ([address]) 2016   • Depression 2016   • Class 1 obesity due to excess calories with serious comorbidity and body mass index (BMI) of 33.0 to 33.9 in adult 2016   • Chronic pain 2016   • Chronic pain of lower extremity, bilateral 2016   • External hemorrhoids 2016   • Anemia 2016   • Chronic atrial fibrillation ([address]) 2016   • Irritable bowel syndrome with constipation 09/15/2015   • Synovial cyst of right popliteal space 2015   • Primary osteoarthritis of right knee 06/30/2015   • Osteoarthritis, multiple sites 06/26/2014   • Moderate mitral regurgitation 05/13/2014   • Osteoporosis 02/04/2014   • Fibromyalgia 05/31/2012   • Peripheral neuropathy 05/25/2012   • Lumbar canal stenosis 05/17/2012      LOS (days): 9  Geometric Mean LOS (GMLOS) (days): 5.50  Days to GMLOS:-3.7     OBJECTIVE:  Risk of Unplanned Readmission Score: 31.37         Current admission status: Inpatient   Preferred Pharmacy:   [address], [address] [facility] [address]  [address] [address]  Phone: [phone] Fax: 1 [address]  [address] [facility] [address]  Phone: [phone] Fax: [address] [facility], [address] [address]  Phone: [phone] Fax: [facility] [address] [facility] [address] [facility]  Phone: [phone] Fax: [phone]    Primary Care Provider: Dominguze Oliveira DO    Primary Insurance: [address] [facility]:     [address] [facility] [address] Number: [id number]

## 2023-07-07 NOTE — CASE MANAGEMENT
[address] [facility] [address] has received approved authorization from insurance: [address] received for: SNF  Facility: [facility]  Authorization #: [id number]  Start of Care: 7/6  Next Review Date: 7/10  Continued Stay Care Coordinator: none assigned yet  P#: [phone]  Submit next review to: [phone]   Care Manager notified: Randolph Leavitt

## 2023-07-07 NOTE — PROGRESS NOTES
OPAT NOTE    Supervising/Discharge physician: Vidhya    Diagnosis: Rt Hip PJI Infection    Drug: Vancomycin    Dose/Frequency: 1500mg Q24    End Date: 8/11    Vanco Trough Range: TBD    Infusion/VNA contact: Yola Garcia    Next appointment: 7/20        MA assigned: Frieda Franco

## 2023-07-07 NOTE — NURSING NOTE
Patient was supposed to be picked up at 1630 via [facility] . Allowed time due to weather. Called [facility] and was informed there was a duplicate pickup and cancellation on pt pickup. Rep states she is unsure why there was a cancellation   Spoke with reps Huey Alexis and Esteban Jett  Was notified by [facility] that they cant accept admissions past 7pm.     Arranged pt pickup with [facility] for 0800  per facility stating 9am is the earliest. Verified with Esteban Gallego. Informed pt and service.

## 2023-07-07 NOTE — PLAN OF CARE
Problem: Potential for Falls  Goal: Patient will remain free of falls  Description: INTERVENTIONS:  - Educate patient/family on patient safety including physical limitations  - Instruct patient to call for assistance with activity   - Consult OT/PT to assist with strengthening/mobility   - Keep Call bell within reach  - Keep bed low and locked with side rails adjusted as appropriate  - Keep care items and personal belongings within reach  - Initiate and maintain comfort rounds  - Initiate/Maintain bed and chair alarm  - Apply yellow socks and bracelet for high fall risk patients  - Consider moving patient to room near nurses station  7/7/2023 0951 by Leonel Layne, RN  Outcome: Progressing  7/7/2023 0950 by Leonel Layne, RN  Outcome: Progressing

## 2023-07-07 NOTE — DISCHARGE SUMMARY
DISCHARGE SUMMARY - Family Medicine Residency, [facility] 9/2/1933, 80 y.o. female. MRN: [id number]    Unit/Bed#: [facility] 807-01 Encounter: [id number]  Primary Care Provider: Negar Avila DO      Admission Date: 6/28/2023  Discharge Date: 7/8/2023  Length of Stay: 10 days  Diagnosis:   Principal Problem:    Osteomyelitis ([address])  Active Problems:    Chronic atrial fibrillation (HCC)    Anemia    Stage 2 chronic kidney disease    Pacemaker    Chronic diastolic CHF (congestive heart failure) (HCC)    Irritable bowel syndrome with constipation    Hypothyroidism    Ambulatory dysfunction    Benign essential HTN        ASSESSMENTS & PLANS:   Plans discussed with [facility] team and finalization is pending attending physician attestation. * Osteomyelitis [facility])  Assessment & Plan  Pt presents from [address] at [facility] for purulent discharge from her right hip noticed on day of admission. Her right hip was drained 1 L and the skilled nursing facility prior to ED presentation. · Pt has history of right hip replacement and follows with orthopedics outpatient. Recently admitted at [facility] from 6/8-6/12 under the trauma service for chronic right hip dislocation. · Pt is vitally stable, labs significant for leukocytosis. Imaging obtained. Blood cultures pending. CT Abdomen Pelvis (6/27/23)  1. Superior dislocation of right hip arthroplasty femoral stem and acetabular cup.  2. Collection of fluid and gas lateral to the femoral stem measuring 16.5 x 7.3 x 4.3 cm compatible with an abscess. Collection extends to the acetabular cup. 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 3. No evidence of intraperitoneal or retroperitoneal fluid collection. 4. Constipation.       S/p INR reversal with Vit K 10 mg and Platelet transfusion x2  S/p wash out in OR by ortho 6/30     Plan:  • Pain regimen: dimitrios tylenol 975 mg Q8H, oxy 2.5/5 mg Q8H prN  • F/u cultures  • ID:  recommendation appreciated  • patient to undergo 6 total weeks of IV antibiotic follow-up with possible 6 more weeks of p.o. antibiotic per ID; PICC care    Stage 2 chronic kidney disease  Assessment & Plan  Lab Results   Component Value Date    EGFR 49 07/07/2023    EGFR 41 07/05/2023    EGFR 49 07/03/2023    CREATININE 1.01 07/07/2023    CREATININE 1.17 07/05/2023    CREATININE 1.01 07/03/2023     Caution with using nephrotoxic agents  Avoid NSAIDs given Elevated INR. Anemia  Assessment & Plan  Hgb / Hct       Results from last 7 days   Lab Units 07/06/23  0555 07/05/23  0456 07/04/23  0209 07/03/23  0906 07/03/23  0537 07/02/23  1914 07/02/23  0622   HEMOGLOBIN g/dL 7.5* 8.2* 7.8* 8.3* 8.4* 7.3* 7.0*   HEMATOCRIT % 25.5* 27.7* 25.2* 26.5* 27.2* 23.4* 23.4*   Downtrending hemoglobin status post Ortho washout 6/30  Unclear bleeding internally  Hemoglobin stable today status post 1 unit of packed red blood cell transfusion  -Patient on ferrous sulfate 325 mg daily  -We will follow-up with iron panel and CBC, monitoring INR      Chronic atrial fibrillation ([address])  Assessment & Plan  Currently rate controlled. On daily warfarin 3 mg.  - Goal INR between 2-3.  - INR currently 5.40  - In ED, pt is s/p Vit K and 2 units of FFP   -7/6/2023: INR is supratherapeutic at 3.5 after 7.5 mg of Coumadin last night    Plan  - Hold NSAIDs given risk of bleeding  -Hold 7/6  dose of Coumadin  -Recheck INR  -Adjusting Coumadin doses with goal 2-3    Pacemaker  Assessment & Plan  History of intolerance and bradycardia with AVN blocking agents for chronic atrial fibrillation. She underwent AV node ablation in 8/2018 and implantation of MDT single chamber His PPM - medtronic. 99%     Plan   - monitor vitals    Benign essential HTN  Assessment & Plan  Goal BP <130/80 per cardiology  Continue monitor BP, stable currently.   Most recent BP Blood Pressure: 049/51   34C SBP Systolic (24PGC), FNX:734 , Min:103 , Max:129      Plan  - C/w home torsemide 10 mg daily    Ambulatory dysfunction  Assessment & Plan  Secondary to hip pain and multiple sites of OA. Pt presented from skilled nursing facility for PT/OT. S/p OR for I&D on 6/30. Plan  - Pain control  - monitor for urine and BM  - PT/OT  - fall precautions  - CM referral for dispo    Hypothyroidism  Assessment & Plan  Thyroid    Lab Results   Component Value Date    NPP1WJNKNMQX 6.873 (H) 06/28/2023    HBT3SAYIPCJJ 2.260 06/08/2022    ERE0SWHLTGUH 1.410 05/03/2021    XYJ5EUMANIOI 3.760 (H) 04/02/2021    GAK1VAMXLQXY 2.076 05/15/2020    ZPE7DHMZTELP 4.400 (H) 03/13/2020    YFR3VUMCMNJB 3.713 01/22/2019    FREET4 1.39 (H) 06/28/2023    FREET4 0.96 06/23/2017    FREET4 1.00 03/10/2016     Plan  - TSH elevated, c/w increased levothyroxine dose 112mcg daily     Irritable bowel syndrome with constipation  Assessment & Plan  -Patient had 1 BM 7/2  Plan  - Bowel regimen: Miralax and Sennakot scheduled. Chronic diastolic CHF (congestive heart failure) (HCC)  Assessment & Plan  Wt Readings from Last 3 Encounters:   06/28/23 76.2 kg (168 lb)   06/16/23 76.6 kg (168 lb 12.8 oz)   03/24/23 75.8 kg (167 lb)   Relatively stable weight  HFpEF per cardiology notes.  Last ECHO 4/27/2018 EF 55%, LV thickness upper limits normal, moderate mitral regurgitation, moderate tricuspid regurgitation.      • Monitor I/O   • Continue torsemide 10mg          Patient Active Problem List   Diagnosis   • Chronic atrial fibrillation (HCC)   • Anemia   • Chronic diastolic CHF (congestive heart failure) (HCC)   • Depression   • Class 1 obesity due to excess calories with serious comorbidity and body mass index (BMI) of 33.0 to 33.9 in adult   • Chronic pain   • Chronic pain of lower extremity, bilateral   • Fibromyalgia   • External hemorrhoids   • Irritable bowel syndrome with constipation   • Lumbar canal stenosis   • Moderate mitral regurgitation   • Osteoarthritis, multiple sites   • Osteoporosis   • Peripheral neuropathy   • Primary osteoarthritis of right knee   • Synovial cyst of right popliteal space   • Pacemaker   • Hypothyroidism   • Macrocytosis   • Status post gastrectomy   • Status post right hip replacement   • Ambulatory dysfunction   • MCI (mild cognitive impairment)   • Osteomyelitis (HCC)   • Cellulitis of right lower extremity   • Hypertrophic scar of skin   • S/P scar revision   • Elevated C-reactive protein (CRP)   • Fall   • Hematoma   • Ecchymosis of eye   • Meningioma (HCC)   • Trochanteric bursitis of right hip   • Stage 2 chronic kidney disease   • Benign essential HTN   • Trochanteric bursitis   • Negative depression screening   • Peripheral vascular disease, unspecified (HCC)   • Lumbar radiculopathy   • Low back pain with sciatica   • Lumbar spondylosis   • S/P lumbar fusion   • Hyponatremia   • Acute cystitis   • Chronic diarrhea         HPI (per admission H&P note on 6/28/2023)     HPI: Swathi Camara is a 80 y.o. female who presents with suspected infection of R prosthetic hip joint. PMH includes CKD, CHF with presence of pacemaker, hypothyroidism, a fib on coumadin, polyneuropathy, and recurrent dislocation of R hip. Presented to the ED on 6/28 after she had 1 L of purulent fluid drained out of her R hip. Patient is a poor historian and reports that she has had issues and infections in this hip since she had a revision 3 years ago. She does not remember the events leading up to coming to the hospital. Per chart review: she did not have a fall. 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 She had a recent UTI on 6/20. At this time she states that the pain in her R hip is severe on movement and improve with rest. She denies numbness, tingling, lightheadedness, changes in vision. She subjectively feels febrile. She does not ambulate at baseline.          HOSPITAL COURSE:     Hospital Course:   80 y.o. female admitted on 6/27/2023 for septic joint operative right prosthetic hip; requiring Ortho explantation. Patient in INR on presentation was supratherapeutic require vitamin K reversal.  Underwent procedure have drain put in on right hip started on IV antibiotic, there was concerning for acetabular osteomyelitis ID was consulted. Patient put on a long course IV antibiotic with end date on 8/11. Patient received PICC line, as well as starting plan to transition her back to warfarin for DVT prophylaxis as well as her history of A-fib. She was put on heparin drip while transitioning to warfarin. 7/6; INR became supratherapeutic after receiving 7.5 mg of warfarin, Coumadin was held on 7/6, her INR continue to be elevated the next day requiring 2.5 mg vitamin K reversal.  In the meantime [facility] where patient came from [facility] can handle IV antibiotic. During hospital course patient developing anemia, requiring . 1 unit of PRBC.  working on disposition, supplies for IV antibiotics. Preauthorization process went through, patient stable on 7/7.  7/8 She is ready to be discharged back to [facility] to continue IV antibiotic with weekly labs and follow-up with ID and Ortho. COMPLICATIONS:     Complications: NONE       PROCEDURES:     Procedures Performed:   -Explantation of the right prosthetic hips  -PICC line placement        SIGNIFICANT FINDINGS / ABNORMAL RESULTS:     Significant Findings/Abnormal Results with this admission:  · Septic right prosthetic hip, concerning for acetabular osteomyelitis  · Supratherapeutic INR    XR chest portable    Result Date: 6/28/2023  Impression: Increased interstitial markings, likely a component of mild fibrosis but question interstitial edema. Trace effusions better shown on abdomen CT.  Workstation performed: [id number]     CT lower extremity w contrast right    Result Date: 6/28/2023  Impression: Right total hip arthroplasty, with acetabular component dislodged superior laterally out of the native acetabular fossa (series 307 images .) There is a 9.4 x 3.4 x 15.7 cm air and fluid collection in the subcutaneous tissues in the right lateral hip, contacting the right hip replacement and extending into the native acetabular fossa where there are bone erosions, consistent with abscess formation, infection of the arthroplasty and osteomyelitis (series 5 images 19-74.) Findings are consistent with the preliminary report from [facility] which was provided shortly after completion of the exam. Workstation performed: [id number]     CT abdomen pelvis with contrast    Result Date: 6/28/2023  Impression: 1. Superior dislocation of right hip arthroplasty femoral stem and acetabular cup. 2. Collection of fluid and gas lateral to the femoral stem measuring 16.5 x 7.3 x 4.3 cm compatible with an abscess. Collection extends to the acetabular cup. 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 3. No evidence of intraperitoneal or retroperitoneal fluid collection. 4. Constipation.  I personally discussed this study with Jeff Gant on 6/28/2023 2:06 AM. Workstation performed: [id number]         VITALS ON DISCHARGE DATE:     Vitals  Blood Pressure: 126/72 (07/07/23 0724)  Temperature: 97.5 °F (36.4 °C) (07/07/23 0724)  Temp Source: Oral (06/30/23 2152)  Pulse: 62 (07/07/23 0724)  Respirations: 16 (07/07/23 0724)  SpO2: 92 % (07/07/23 0724)  Height: 5' (152.4 cm) (06/28/23 1330)  Weight - Scale: 76.2 kg (168 lb) (06/28/23 1330)    Temp:  [97.5 °F (36.4 °C)-98 °F (36.7 °C)] 97.5 °F (36.4 °C)  HR:  [52-62] 62  Resp:  [16] 16  BP: (109-126)/(54-72) 126/72    Weight (last 2 days)     None            Intake/Output Summary (Last 24 hours) at 7/7/2023 1248  Last data filed at 7/7/2023 0724  Gross per 24 hour   Intake 400 ml   Output --   Net 400 ml       Invasive Devices     Peripherally Inserted Central Catheter Line  Duration           PICC Line 10/86/13 Right Basilic 4 days          Peripheral Intravenous Line Duration           Peripheral IV 07/04/23 Left;Ventral (anterior) 3 days          Drain  Duration           External Urinary Catheter 3 days                  PHYSICAL EXAM ON DAY OF DISCHARGE:         Physical Exam:     General: Pt observed lying comfortably in bed, NAD. Not toxic/ill-appearing. No cachectic or diaphoresis. No obvious sign of trauma or bleeding. Psych: AAOx4, able to converse appropriately. Denies SH/SI. Neuro: no gross neurological deficits, CN 2-12 grossly intact  Head: atraumatic, normocephalic. Eyes: open spontaneously, EOM intact, DAVID, conjunctiva non-injected, no scleral icterus, no discharge. Ear: normal external ear, no visible drainage at external auditory orifice  Nose: clear, no epistaxis, no rhinorrhea. Throat: clear, no hoarse voice, no cough. Neck: supple, normal ROM. Heart: RRR, no murmur/distant heart sound appreciated. Lungs: LCTABL, nml respiratory effort, no agonal/labored breathing, no accessory muscle use. Abdomen: soft, nontender, nondistended, normal bowel sound, right hip surgical site dressed, decreased drainage  Extremities: Unable to move in the right hip, otherwise no cyanosis no edema. CONDITION AT DISCHARGE:   On day of discharge patient is seen and evaluated at bedside. Patient is stable and without concern. Patient denies any pain or SOB. Patient able to tolerate PO food without N/V/D and had bowel movement and baseline urine output. Patient able to ambulate independently without assistance. Patient is aware of current health status and understand plan of treatment and outpatient follow-up. The patient understood and agreed with the plan. All pertinent lab results, imaging studies, procedures, and/or any incidental findings have been disclosed to the patient. All pertinent questions are answered to patient's satisfaction. On day of discharge, the patient was hemodynamically stable and appropriate for discharge home.     Condition at Discharge: stable DISCHARGE MEDICATIONS:     Discharge Medications:  See after visit summary (AVS) for detailed reconciled discharge medications, which was provided to patient and family. Summary of medication changes made with this admission:    · START:  1. IV antibiotic for a total course of 6 weeks till at least 8/11/2023. Patient will need weekly CBC with differential, BMP, vancomycin trough, ESR  2. Continue with daily iron supplement started in the hospital  · CHANGE:  1. Holding Coumadin until INR reaches therapeutic level, as when his restarting the warfarin is up to facility physician; patient will use warfarin as DVT prophylaxis per agreement with Ortho surgery    · RESUME:  1. All other home medications       FOLLOW-UP APPOINTMENTS / INSTRUCTION :     Important Physician Related Follow Up:     Appointment confirmed:  Future Appointments   Date Time Provider [address]   7/13/2023  3:45 PM Walter Infante MD [facility]   7/20/2023 10:00 AM MD MARY AuILION DIS BE Hospitalist   9/20/2023  8:30 AM DEVICE REMOTE [place] [facility]   12/20/2023  8:30 [facility]   3/25/2024 10:30 AM DEVICE IN PERSON [place] C[facility]       Discharge instructions/Information to patient and family:   See after visit summary (AVS) for information provided to patient and family. Provisions for Follow-Up Care:  See after visit summary for information related to follow-up care and any pertinent home health orders. DISPOSITION:     Disposition: Extended care facility [facility]     Discharge Statement   I spent 30  minutes discharging the patient. This time was spent on the day of discharge. I had direct contact with the patient on the day of discharge. Additional documentation is required if more than 30 minutes were spent on discharge.      Planned Readmission: Laury Snowden DO  07/07/23  12:48 PM    Dear reader, please be aware that portions of my note contain dictated text. I have done my best to proof-read this note prior to signing. However, there may be occasional unnoticed errors pertaining to "sound-alike" words and/or grammar during my dictation process. If there is any words or information that is unclear or appears erroneous, please kindly let me know and I will clarify and/or addend my notes accordingly. Thank you for your understanding.

## 2023-07-08 VITALS
RESPIRATION RATE: 16 BRPM | BODY MASS INDEX: 32.98 KG/M2 | SYSTOLIC BLOOD PRESSURE: 128 MMHG | WEIGHT: 168 LBS | TEMPERATURE: 99 F | HEART RATE: 74 BPM | OXYGEN SATURATION: 95 % | HEIGHT: 60 IN | DIASTOLIC BLOOD PRESSURE: 57 MMHG

## 2023-07-08 PROCEDURE — 99238 HOSP IP/OBS DSCHRG MGMT 30/<: CPT | Performed by: FAMILY MEDICINE

## 2023-07-08 RX ADMIN — PANTOPRAZOLE SODIUM 40 MG: 40 TABLET, DELAYED RELEASE ORAL at 05:03

## 2023-07-08 RX ADMIN — CYANOCOBALAMIN TAB 500 MCG 1000 MCG: 500 TAB at 08:02

## 2023-07-08 RX ADMIN — TORSEMIDE 10 MG: 10 TABLET ORAL at 08:02

## 2023-07-08 RX ADMIN — ACETAMINOPHEN 650 MG: 325 TABLET, FILM COATED ORAL at 08:01

## 2023-07-08 RX ADMIN — FERROUS SULFATE TAB 325 MG (65 MG ELEMENTAL FE) 325 MG: 325 (65 FE) TAB at 08:01

## 2023-07-08 RX ADMIN — Medication 5000 UNITS: at 08:02

## 2023-07-08 RX ADMIN — FLUTICASONE PROPIONATE 2 SPRAY: 50 SPRAY, METERED NASAL at 08:04

## 2023-07-08 RX ADMIN — LEVOTHYROXINE SODIUM 112 MCG: 112 TABLET ORAL at 05:04

## 2023-07-08 RX ADMIN — DULOXETINE HYDROCHLORIDE 60 MG: 60 CAPSULE, DELAYED RELEASE ORAL at 08:01

## 2023-07-08 RX ADMIN — GABAPENTIN 300 MG: 300 CAPSULE ORAL at 08:01

## 2023-07-08 NOTE — PROGRESS NOTES
Progress Notes - Family Medicine Residency, Callie Coulter 9/2/1933, 80 y.o. female. MRN: [id number]    Unit/Bed#: [facility] 807-01 Encounter: [id number]  Primary Care Provider: Radha Ardon DO      Admission Date: 6/27/2023 2125  Length of Stay: 10 days  Code Status:  Level 1 - Full Code  Consult:   IP CONSULT TO ORTHOPEDIC SURGERY  IP CONSULT TO CARDIOLOGY  IP CONSULT TO CASE MANAGEMENT  IP CONSULT TO PHARMACY  IP CONSULT TO INFECTIOUS DISEASES  IP CONSULT TO PICC TEAM      Assessments & Plans:   Plans discussed with [facility] team and finalization is pending attending physician Dr Deacon Poe attestation. * Osteomyelitis [facility])  Assessment & Plan  Pt presents from [address] at [facility] for purulent discharge from her right hip noticed on day of admission. Her right hip was drained 1 L and the skilled nursing facility prior to ED presentation. · Pt has history of right hip replacement and follows with orthopedics outpatient. Recently admitted at [facility] from 6/8-6/12 under the trauma service for chronic right hip dislocation. · Pt is vitally stable, labs significant for leukocytosis. Imaging obtained. Blood cultures pending. CT Abdomen Pelvis (6/27/23)  1. Superior dislocation of right hip arthroplasty femoral stem and acetabular cup.  2. Collection of fluid and gas lateral to the femoral stem measuring 16.5 x 7.3 x 4.3 cm compatible with an abscess. Collection extends to the acetabular cup. 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 3. No evidence of intraperitoneal or retroperitoneal fluid collection. 4. Constipation.       S/p INR reversal with Vit K 10 mg and Platelet transfusion x2  S/p wash out in OR by ortho 6/30     Plan:  • Pain regimen: dimitrios tylenol 975 mg Q8H, oxy 2.5/5 mg Q8H prN  • F/u cultures  • ID:  recommendation appreciated  • patient to undergo 6 total weeks of IV antibiotic follow-up with possible 6 more weeks of p.o. antibiotic per ID; PICC care    Benign essential HTN  Assessment & Plan  Goal BP <130/80 per cardiology  Continue monitor BP, stable currently. Most recent BP Blood Pressure: 741/81   97T SBP Systolic (69WOM), DNQ:219 , Min:103 , Max:129      Plan  - C/w home torsemide 10 mg daily    Stage 2 chronic kidney disease  Assessment & Plan  Lab Results   Component Value Date    EGFR 49 07/07/2023    EGFR 41 07/05/2023    EGFR 49 07/03/2023    CREATININE 1.01 07/07/2023    CREATININE 1.17 07/05/2023    CREATININE 1.01 07/03/2023     Caution with using nephrotoxic agents  Avoid NSAIDs given Elevated INR. Ambulatory dysfunction  Assessment & Plan  Secondary to hip pain and multiple sites of OA. Pt presented from skilled nursing facility for PT/OT. S/p OR for I&D on 6/30. Plan  - Pain control  - monitor for urine and BM  - PT/OT  - fall precautions  - CM referral for dispo    Hypothyroidism  Assessment & Plan  Thyroid    Lab Results   Component Value Date    XHU1EWRKUZBD 6.873 (H) 06/28/2023    WEK1IECWPPFK 2.260 06/08/2022    HNN9NVVFJDAY 1.410 05/03/2021    ZTR1EJYRJKEF 3.760 (H) 04/02/2021    RUB1PDWZKIPO 2.076 05/15/2020    XFY7JEBJASMP 4.400 (H) 03/13/2020    GCC8MRSOYHHX 3.713 01/22/2019    FREET4 1.39 (H) 06/28/2023    FREET4 0.96 06/23/2017    FREET4 1.00 03/10/2016     Plan  - TSH elevated, c/w increased levothyroxine dose 112mcg daily     Pacemaker  Assessment & Plan  History of intolerance and bradycardia with AVN blocking agents for chronic atrial fibrillation. She underwent AV node ablation in 8/2018 and implantation of MDT single chamber His PPM - medtronic. 99%     Plan   - monitor vitals    Irritable bowel syndrome with constipation  Assessment & Plan  -Patient had 1 BM 7/2  Plan  - Bowel regimen: Miralax and Sennakot scheduled.        Chronic diastolic CHF (congestive heart failure) (HCC)  Assessment & Plan  Wt Readings from Last 3 Encounters:   06/28/23 76.2 kg (168 lb)   06/16/23 76.6 kg (168 lb 12.8 oz)   03/24/23 75.8 kg (167 lb)   Relatively stable weight  HFpEF per cardiology notes. Last ECHO 4/27/2018 EF 55%, LV thickness upper limits normal, moderate mitral regurgitation, moderate tricuspid regurgitation.      • Monitor I/O   • Continue torsemide 10mg        Anemia  Assessment & Plan  Hgb / Hct       Results from last 7 days   Lab Units 07/06/23  0555 07/05/23  0456 07/04/23  0209 07/03/23  0906 07/03/23  0537 07/02/23  1914 07/02/23  0622   HEMOGLOBIN g/dL 7.5* 8.2* 7.8* 8.3* 8.4* 7.3* 7.0*   HEMATOCRIT % 25.5* 27.7* 25.2* 26.5* 27.2* 23.4* 23.4*   Downtrending hemoglobin status post Ortho washout 6/30  Unclear bleeding internally  Hemoglobin stable today status post 1 unit of packed red blood cell transfusion  -Patient on ferrous sulfate 325 mg daily  -We will follow-up with iron panel and CBC, monitoring INR      Chronic atrial fibrillation ([address])  Assessment & Plan  Currently rate controlled.   On daily warfarin 3 mg.  - Goal INR between 2-3.  - INR currently 5.40  - In ED, pt is s/p Vit K and 2 units of FFP   -7/6/2023: INR is supratherapeutic at 3.5 after 7.5 mg of Coumadin last night    Plan  - Hold NSAIDs given risk of bleeding  -Hold 7/6  dose of Coumadin  -Recheck INR  -Adjusting Coumadin doses with goal 2-3        Patient Active Problem List   Diagnosis   • Chronic atrial fibrillation (HCC)   • Anemia   • Chronic diastolic CHF (congestive heart failure) (HCC)   • Depression   • Class 1 obesity due to excess calories with serious comorbidity and body mass index (BMI) of 33.0 to 33.9 in adult   • Chronic pain   • Chronic pain of lower extremity, bilateral   • Fibromyalgia   • External hemorrhoids   • Irritable bowel syndrome with constipation   • Lumbar canal stenosis   • Moderate mitral regurgitation   • Osteoarthritis, multiple sites   • Osteoporosis   • Peripheral neuropathy   • Primary osteoarthritis of right knee   • Synovial cyst of right popliteal space   • Pacemaker   • Hypothyroidism   • Macrocytosis   • Status post gastrectomy   • Status post right hip replacement   • Ambulatory dysfunction   • MCI (mild cognitive impairment)   • Osteomyelitis (HCC)   • Cellulitis of right lower extremity   • Hypertrophic scar of skin   • S/P scar revision   • Elevated C-reactive protein (CRP)   • Fall   • Hematoma   • Ecchymosis of eye   • Meningioma (HCC)   • Trochanteric bursitis of right hip   • Stage 2 chronic kidney disease   • Benign essential HTN   • Trochanteric bursitis   • Negative depression screening   • Peripheral vascular disease, unspecified (HCC)   • Lumbar radiculopathy   • Low back pain with sciatica   • Lumbar spondylosis   • S/P lumbar fusion   • Hyponatremia   • Acute cystitis   • Chronic diarrhea       Diet: Diet Regular; Regular House  VTE Pharm PPX: RX contraindicated due to: INR  [facility]      Subjective:   • Unfortunately transport cancelled for unknown reason delaying discharge until tomorrow morning. Pt is disappointed however no new symptoms or concerns.      Vitals:     Vitals:    07/06/23 2246 07/07/23 0724 07/07/23 1523 07/07/23 2303   BP: 117/61 126/72 121/69 112/55   Pulse: (!) 52 62 63    Resp: 16 16 20 16   Temp: 98 °F (36.7 °C) 97.5 °F (36.4 °C) 97.7 °F (36.5 °C) 99 °F (37.2 °C)   TempSrc:       SpO2: 100% 92% (!) 82%    Weight:       Height:         Temp:  [97.5 °F (36.4 °C)-99 °F (37.2 °C)] 99 °F (37.2 °C)  HR:  [62-63] 63  Resp:  [16-20] 16  BP: (112-126)/(55-72) 112/55  Weight (last 2 days)     None          Intake/Output Summary (Last 24 hours) at 7/8/2023 8965  Last data filed at 7/7/2023 1200  Gross per 24 hour   Intake 360 ml   Output --   Net 360 ml     Invasive Devices     Peripherally Inserted Central Catheter Line  Duration           PICC Line 11/59/81 Right Basilic 4 days          Drain  Duration           External Urinary Catheter 4 days                Labs:     CBC:  Results from last 7 days   Lab Units 07/07/23  0507 07/06/23  0555 07/05/23  0456 07/04/23  0209 07/03/23  0906 07/03/23  0537 07/02/23  1914 07/02/23  0622 07/01/23  0424   WBC Thousand/uL 8.83 8.78 9.82 12.26* 10.68* 9.57  --  12.39* 12.93*   HEMOGLOBIN g/dL 8.0* 7.5* 8.2* 7.8* 8.3* 8.4* 7.3* 7.0* 7.5*   HEMATOCRIT % 25.5* 25.5* 27.7* 25.2* 26.5* 27.2* 23.4* 23.4* 24.6*   PLATELETS Thousands/uL 190 204 198 188 198 205  --  228 235   NEUTROS ABS Thousands/µL  --  5.95  --  9.21*  --  6.58  --  9.47* 10.73*       CMP:  Results from last 7 days   Lab Units 07/07/23  0507 07/05/23  0456 07/03/23  0537 07/02/23  0622 07/01/23  0500   POTASSIUM mmol/L 3.8 4.2 4.3 4.4 4.4   CHLORIDE mmol/L 109* 108 109* 106 112*   CO2 mmol/L 26 27 28 25 25   BUN mg/dL 28* 24 18 13 11   CREATININE mg/dL 1.01 1.17 1.01 0.80 0.41*   CALCIUM mg/dL 8.4 9.0 9.3 8.2* 7.5*   EGFR ml/min/1.73sq m 49 41 49 65 91       Sepsis:        Micro:  Lab Results   Component Value Date/Time    Blood Culture No Growth After 5 Days. 06/27/2023 10:06 PM    Blood Culture No Growth After 5 Days. 06/27/2023 10:06 PM    Gram Stain Result 2+ Polys 06/30/2023 07:16 PM    Gram Stain Result No bacteria seen 06/30/2023 07:16 PM    Urine Culture >100,000 cfu/ml Escherichia coli ESBL (A) 06/15/2023 07:05 PM    Urine Culture >100,000 cfu/ml Klebsiella pneumoniae (A) 06/15/2023 07:05 PM    Urine Culture >100,000 cfu/ml Aerococcus urinae (A) 06/15/2023 07:05 PM    Wound Culture No growth 06/28/2023 04:11 PM       Imaging:   XR chest portable    Result Date: 6/28/2023  Impression: Increased interstitial markings, likely a component of mild fibrosis but question interstitial edema. Trace effusions better shown on abdomen CT.  Workstation performed: [id number]     CT lower extremity w contrast right    Result Date: 6/28/2023  Impression: Right total hip arthroplasty, with acetabular component dislodged superior laterally out of the native acetabular fossa (series 307 images .) There is a 9.4 x 3.4 x 15.7 cm air and fluid collection in the subcutaneous tissues in the right lateral hip, contacting the right hip replacement and extending into the native acetabular fossa where there are bone erosions, consistent with abscess formation, infection of the arthroplasty and osteomyelitis (series 5 images 19-74.) Findings are consistent with the preliminary report from [facility] which was provided shortly after completion of the exam. Workstation performed: [id number]     CT abdomen pelvis with contrast    Result Date: 6/28/2023  Impression: 1. Superior dislocation of right hip arthroplasty femoral stem and acetabular cup. 2. Collection of fluid and gas lateral to the femoral stem measuring 16.5 x 7.3 x 4.3 cm compatible with an abscess. Collection extends to the acetabular cup. 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 3. No evidence of intraperitoneal or retroperitoneal fluid collection. 4. Constipation.  I personally discussed this study with Jeff Gant on 6/28/2023 2:06 AM. Workstation performed: [id number]       Medications:     Current Facility-Administered Medications   Medication Dose Route Frequency   • acetaminophen (TYLENOL) tablet 650 mg  650 mg Oral TID   • bisacodyl (DULCOLAX) EC tablet 5 mg  5 mg Oral Daily PRN   • cholecalciferol (VITAMIN D3) tablet 5,000 Units  5,000 Units Oral Daily   • cyanocobalamin (VITAMIN B-12) tablet 1,000 mcg  1,000 mcg Oral Daily   • DULoxetine (CYMBALTA) delayed release capsule 60 mg  60 mg Oral Daily   • ferrous sulfate tablet 325 mg  325 mg Oral Daily With Breakfast   • fluticasone (FLONASE) 50 mcg/act nasal spray 2 spray  2 spray Each Nare Daily   • gabapentin (NEURONTIN) capsule 300 mg  300 mg Oral BID   • HYDROmorphone HCl (DILAUDID) injection 0.2 mg  0.2 mg Intravenous Q4H PRN   • levothyroxine tablet 112 mcg  112 mcg Oral Early Morning   • ondansetron (ZOFRAN) injection 4 mg  4 mg Intravenous Q6H PRN   • oxyCODONE (ROXICODONE) IR tablet 5 mg  5 mg Oral Q4H PRN   • oxyCODONE (ROXICODONE) split tablet 2.5 mg  2.5 mg Oral Q4H PRN   • pantoprazole (PROTONIX) EC tablet 40 mg  40 mg Oral Early Morning   • phenol (CHLORASEPTIC) 1.4 % mucosal liquid 1 spray  1 spray Mouth/Throat Q2H PRN   • polyethylene glycol (MIRALAX) packet 17 g  17 g Oral Daily PRN   • senna-docusate sodium (SENOKOT S) 8.6-50 mg per tablet 1 tablet  1 tablet Oral HS   • simethicone (MYLICON) chewable tablet 80 mg  80 mg Oral Q6H PRN   • torsemide (DEMADEX) tablet 10 mg  10 mg Oral Daily   • vancomycin (VANCOCIN) 1500 mg in sodium chloride 0.9% 250 mL IVPB  1,500 mg Intravenous Q24H       Physical Exam:     General: Pt observed lying comfortably in bed, NAD. Not toxic/ill-appearing. No cachectic or diaphoresis. No obvious sign of trauma or bleeding. Obese  Psych: able to converse appropriately. Neuro: no gross neurological deficits. Head: atraumatic, normocephalic. Eyes: open spontaneously, EOM intact, conjunctiva non-injected, no scleral icterus, no discharge. Ear: normal external ear, no visible drainage at external auditory orifice  Nose: no epistaxis, no rhinorrhea. Throat: no hoarse voice, no cough. Neck: normal ROM. Heart: RRR, no murmur/distant heart sound appreciated. Lungs: LCTABL, nml respiratory effort, no agonal/labored breathing, no accessory muscle use. Abdomen: soft, nontender, nondistended, normal bowel sound  Extremities: No paresthesia/edema. Unable to move right hip.      Stas Aggarwal DO  PGY-3, Family Medicine  07/08/23  2:12 AM

## 2023-07-10 ENCOUNTER — TRANSITIONAL CARE MANAGEMENT (OUTPATIENT)
Dept: FAMILY MEDICINE CLINIC | Facility: CLINIC | Age: 88
End: 2023-07-10

## 2023-07-10 ENCOUNTER — TELEPHONE (OUTPATIENT)
Dept: INFECTIOUS DISEASES | Facility: CLINIC | Age: 88
End: 2023-07-10

## 2023-07-10 NOTE — UTILIZATION REVIEW
NOTIFICATION OF ADMISSION DISCHARGE   This is a Notification of Discharge from [address] [place] [address]. Please be advised that this patient has been discharge from our facility. Below you will find the admission and discharge date and time including the patient’s disposition. UTILIZATION REVIEW CONTACT:  Richard Gamboa  Utilization   Network Utilization Review Department  Phone: [phone] x carefully listen to the prompts. All voicemails are confidential.  Email: [email or web address]     ADMISSION INFORMATION  PRESENTATION DATE: 6/27/2023  9:25 PM  OBERVATION ADMISSION DATE:   INPATIENT ADMISSION DATE: 6/28/23  3:26 AM   DISCHARGE DATE: 7/8/2023  9:30 AM   DISPOSITION:Discharged/Transferred to Long Term Care/Personal Care Home/Assisted Living    IMPORTANT INFORMATION:  Send all requests for admission clinical reviews, approved or denied determinations and any other requests to dedicated fax number below belonging to the campus where the patient is receiving treatment.  List of dedicated fax numbers:  [place] DENIALS (Administrative/Medical Necessity) [phone] [address] [facility] (Maternity/NICU/Pediatrics) [phone]   [facility] [phone]   [facility] [phone] [address] [facility] [phone]   [address] [place] [address] [phone]   [facility] [phone]   [address] [place] [address] [phone]   [address] [place] [address] [phone]   [address] [facility] [phone]   [address] [facility] [address] [phone]

## 2023-07-10 NOTE — TELEPHONE ENCOUNTER
Called [facility] and spoke with [place]. Informed Elayne I was calling to go over antibiotic plan. Elayne confirms [place] is on Vancomycin [id number] and patient does at [facility] patient will need weekly CBCD, Cre, ERS, VT starting 7/11 prior to AM dose. Also informed Elayne patient has a follow up appointment 7/20/23 at 10AM with Dr. Ankita Winter at the [facility] office. Informed Elayne if there are any further questions or concerns to call the office. Elayne verbalizes understanding at this time.

## 2023-07-11 ENCOUNTER — TELEPHONE (OUTPATIENT)
Dept: INFECTIOUS DISEASES | Facility: CLINIC | Age: 88
End: 2023-07-11

## 2023-07-11 DIAGNOSIS — T84.59XA PROSTHETIC HIP INFECTION (HCC): Primary | ICD-10-CM

## 2023-07-11 DIAGNOSIS — Z96.649 PROSTHETIC HIP INFECTION (HCC): Primary | ICD-10-CM

## 2023-07-11 LAB — PHYTONADIONE SERPL-MCNC: 0.1 NG/ML (ref 0.1–2.2)

## 2023-07-11 NOTE — TELEPHONE ENCOUNTER
Routed patients labs to Dr. Manuel Hall for review. Called [facility]   Vanco Dose:[id number]  Dose Time (s):9AM  Missed doses?:NO  Was trough drawn prior to dose?: drawn between 6am and 7am    Cre:   Vanco Trough:  Range (see OPAT note)  End Date: 8/11    Infusion Pharmacy/VNA:  OR  SNF: [facility]    Provider:Vidhya    We will route this message to the provider managing your antibiotic and give you a call back with further vancomycin orders. Do not hold any doses unless we otherwise specify.

## 2023-07-11 NOTE — TELEPHONE ENCOUNTER
Called [facility] and spoke with Parveen ceballos. Informed Elayne per Dr. Augusto Gr patients vancomycin to be held until further advised. Draw random vanco and Cre on 7/13 in the morning. Elayne verbalizes understanding at this time. Orders faxed to facility at this time.

## 2023-07-13 NOTE — TELEPHONE ENCOUNTER
Called [facility] and spoke with [facility] today. Informed [facility] I was calling regarding pats repeat vanco random and Cre that were to be drawn this morning. [facility] states they were drawn this morning and the nurse that draws their labs just left the facility. Provided [facility] with office fax and phone number to get results back to office.

## 2023-07-14 LAB
BACTERIA SPEC ANAEROBE CULT: NO GROWTH
BACTERIA TISS AEROBE CULT: NO GROWTH
GRAM STN SPEC: NORMAL
GRAM STN SPEC: NORMAL

## 2023-07-14 NOTE — TELEPHONE ENCOUNTER
Mary from [facility] calls the office and states they have results of vanco trough but there was not creatinin  drawn with it.      Vanco trough was faxed to our office and is at 24.9

## 2023-07-14 NOTE — TELEPHONE ENCOUNTER
As trough is still high, they should continue to hold all further vancomycin doses. Please check a random vancomycin and BMP on Sunday AM.  We will advise Monday as to further dosing recommendations once we see those results. Thank you.

## 2023-07-14 NOTE — TELEPHONE ENCOUNTER
Called [facility] and informed Geri Meckel that per Dr. Hui Villa Hugo II pt's vanco trough is still high so they should continue to hold any further vancomycin doses and check a random vancomycin and bmp on Sunday am. Once we see those results we will contact them with recommendations as to further dosing.     Mary verbalizes understanding and gives thanks

## 2023-07-17 ENCOUNTER — TELEPHONE (OUTPATIENT)
Dept: INFECTIOUS DISEASES | Facility: CLINIC | Age: 88
End: 2023-07-17

## 2023-07-17 NOTE — TELEPHONE ENCOUNTER
Spoke with Aga Mcmillan at [facility] regarding restarting and repeating bloodwork Thursday. Faxed new order to: [phone]. [place] Esquivel RN also confirms verbal order.

## 2023-07-20 ENCOUNTER — OFFICE VISIT (OUTPATIENT)
Dept: INFECTIOUS DISEASES | Facility: CLINIC | Age: 88
End: 2023-07-20
Payer: COMMERCIAL

## 2023-07-20 ENCOUNTER — TELEPHONE (OUTPATIENT)
Dept: INFECTIOUS DISEASES | Facility: CLINIC | Age: 88
End: 2023-07-20

## 2023-07-20 ENCOUNTER — OFFICE VISIT (OUTPATIENT)
Dept: OBGYN CLINIC | Facility: HOSPITAL | Age: 88
End: 2023-07-20

## 2023-07-20 ENCOUNTER — TELEPHONE (OUTPATIENT)
Dept: OBGYN CLINIC | Facility: HOSPITAL | Age: 88
End: 2023-07-20

## 2023-07-20 VITALS
BODY MASS INDEX: 32.81 KG/M2 | DIASTOLIC BLOOD PRESSURE: 77 MMHG | SYSTOLIC BLOOD PRESSURE: 112 MMHG | HEART RATE: 71 BPM | HEIGHT: 60 IN

## 2023-07-20 VITALS
RESPIRATION RATE: 17 BRPM | SYSTOLIC BLOOD PRESSURE: 128 MMHG | DIASTOLIC BLOOD PRESSURE: 70 MMHG | HEIGHT: 60 IN | HEART RATE: 56 BPM | TEMPERATURE: 97.7 F | OXYGEN SATURATION: 95 % | BODY MASS INDEX: 32.81 KG/M2

## 2023-07-20 DIAGNOSIS — Z89.621 ACQUIRED ABSENCE OF RIGHT HIP JOINT FOLLOWING EXPLANTATION OF JOINT PROSTHESIS WITHOUT PRESENCE OF ANTIBIOTIC-IMPREGNATED CEMENT SPACER: Primary | ICD-10-CM

## 2023-07-20 DIAGNOSIS — M86.9 OSTEOMYELITIS (HCC): ICD-10-CM

## 2023-07-20 DIAGNOSIS — Z96.641 STATUS POST RIGHT HIP REPLACEMENT: ICD-10-CM

## 2023-07-20 DIAGNOSIS — T84.51XD INFECTION ASSOCIATED WITH INTERNAL RIGHT HIP PROSTHESIS, SUBSEQUENT ENCOUNTER: Primary | ICD-10-CM

## 2023-07-20 PROCEDURE — 99214 OFFICE O/P EST MOD 30 MIN: CPT | Performed by: INTERNAL MEDICINE

## 2023-07-20 PROCEDURE — 99024 POSTOP FOLLOW-UP VISIT: CPT | Performed by: ORTHOPAEDIC SURGERY

## 2023-07-20 NOTE — TELEPHONE ENCOUNTER
Antonia Frank calls office an states that pt will be coming with a nurse due to vancomycin still hanging

## 2023-07-20 NOTE — TELEPHONE ENCOUNTER
Elayne calls the office back. States she just found the order from Monday but no one at the facility never placed the lab orders so the lab service never came to draw the labs. Elayne states patient doses at 9am. Informed Elayne to dose patient and further lab orders will be given at appointment today.

## 2023-07-20 NOTE — PROGRESS NOTES
Assessment:   Diagnosis ICD-10-CM Associated Orders   1. 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 Sutures removed bedside today. Follow up in 1 month for re-evaluation. To do next visit:  No follow-ups on file. The above stated was discussed in layman's terms and the patient expressed understanding. All questions were answered to the patient's satisfaction. Subjective:   Juanita Norton is a 80 y.o. female who presents 3 weeks status post removal right total hip replacement and implantation of antibiotic beads. She has been continuing on IV antibiotics. She states that she has been having some drainage from the incision site since surgery. She is in minimal pain. She is nonambulatory at baseline but is out of bed at times. Review of systems negative unless otherwise specified in HPI    Past Medical History:   Diagnosis Date   • Abdominal bloating 8/1/2016   • Anemia    • Arthritis    • Cancer (HCC)     basal cell   • CHF (congestive heart failure) (HCC)    • COVID-19    • Disease of thyroid gland    • Disturbance of smell and taste     disturbance of taste   • Effusion of knee joint right    • Fibromyalgia    • Heart murmur     reported a previous heart murmur   • History of acute myocardial infarction    • History of atrial fibrillation    • History of bruising easily    • History of cancer    • History of dermatitis    • History of mammogram    • History of methicillin resistant staphylococcus aureus (MRSA)     Negative nasal culture, isolation discontinued 8/17/2018.    • History of methicillin resistant staphylococcus aureus (MRSA) 08/17/2018    negative nasal culture-isolation and hx discontinued 8/17/2018   • History of obesity    • History of osteopenia    • History of sciatica    • History of shortness of breath    • History of sinusitis    • History of sore throat    • History of syncope    • History of umbilical hernia    • History of viral infection    • Irritable bowel syndrome (IBS)    • Joint pain, hip    • Limb pain    • Myalgia     myalgia and myositis   • Need for prophylactic vaccination against single diseases    • Need for prophylactic vaccination and inoculation against influenza    • Preoperative cardiovascular examination    • Primary osteoarthritis of right knee    • Right leg pain    • Vaginal Pap smear     reported pap smear       Past Surgical History:   Procedure Laterality Date   • ANKLE ARTHRODESIS Right    • BACK SURGERY     • BARIATRIC SURGERY     • CHOLECYSTECTOMY      x2   • COLONOSCOPY     • ESOPHAGOGASTRODUODENOSCOPY N/A 3/23/2018    Procedure: ESOPHAGOGASTRODUODENOSCOPY (EGD); Surgeon: Shabnam Henriquez MD;  Location: [facility]; Service: Gastroenterology   • FOOT SURGERY     • HIP CLOSE REDUCTION Right 8/21/2016    Procedure: CLOSED REDUCTION RIGHT TOTAL HIP;  Surgeon: Liz Simmons MD;  [facility];  Service:    • INSERT / Tasha Pineda / Alysa Nick     • IR BIOPSY OTHER  8/1/2020   • IR PICC REPOSITION  7/5/2023   • JOINT REPLACEMENT      LEFT KNEE REPLACEMENT   • JOINT REPLACEMENT      Right HIP   • PILONIDAL CYST EXCISION      x2   • FL EXC B9 LESION MRGN XCP SK TG T/A/L 0.5 CM/< Right 7/24/2019    Procedure: REVISION SCAR EXTREMITY Rt Hip;   Surgeon: Edinson Ny MD;  Location: [facility];  Service: Orthopedics   • FL REVJ TOT HIP ARTHRP [facility] W/WO AGRFT/ALGRFT Right 4/15/2019    Procedure: ARTHROPLASTY HIP TOTAL ACETABULAR REVISION;  Surgeon: Edinson Ny MD;  [facility];  Service: Orthopedics   • REPLACEMENT TOTAL KNEE Right    • SILVANA-EN-Y PROCEDURE      x2   • TOTAL KNEE ARTHROPLASTY     • UMBILICAL HERNIA REPAIR      x2   • WOUND DEBRIDEMENT Right 6/30/2023    Procedure: DEBRIDEMENT LOWER EXTREMITY ([id number])- I&D of right hip with explant of hardware; insertion of antibiotic beads;  Surgeon: Mikala Patrick MD;  [facility];  Service: Orthopedics       Family History   Problem Relation Age of Onset   • Coronary artery disease Mother    • Heart attack Mother    • Hypertension Mother    • Coronary artery disease Father    • Heart attack Father    • Hypertension Father    • Lymphoma Sister         acute   • Rashes / Skin problems Sister         lymphomatoid papulosis   • Cancer Sister    • Coronary artery disease Brother    • Heart attack Brother         x2   • Aneurysm Neg Hx    • Hyperlipidemia Neg Hx        Social History     Occupational History   • Not on file   Tobacco Use   • Smoking status: Never   • Smokeless tobacco: Never   Vaping Use   • Vaping Use: Never used   Substance and Sexual Activity   • Alcohol use: Not Currently   • Drug use: No   • Sexual activity: Never         Current Outpatient Medications:   •  acetaminophen (TYLENOL) 500 mg tablet, Take 2 tablets (1,000 mg total) by mouth every 8 (eight) hours, Disp: 30 tablet, Rfl: 0  •  Cholecalciferol 125 MCG (5000 UT) TABS, Take 5,000 Units by mouth daily, Disp: , Rfl:   •  desoximetasone (TOPICORT) 0.25 % cream, APPLY TOPICALLY AS NEEDED FOR IRRITATION, Disp: 30 g, Rfl: 0  •  Diclofenac Sodium (VOLTAREN) 1 %, Apply 2 g topically 4 (four) times a day, Disp: 350 g, Rfl: 3  •  dicyclomine (BENTYL) 10 mg capsule, , Disp: , Rfl:   •  DULoxetine (CYMBALTA) 60 mg delayed release capsule, TAKE 1 CAPSULE BY MOUTH EVERY DAY, Disp: 90 capsule, Rfl: 1  •  ferrous sulfate 325 (65 Fe) mg tablet, Take 1 tablet (325 mg total) by mouth daily with breakfast Do not start before July 7, 2023., Disp: 30 tablet, Rfl: 0  •  fluticasone (FLONASE) 50 mcg/act nasal spray, SPRAY 2 SPRAYS INTO EACH NOSTRIL EVERY DAY, Disp: 16 mL, Rfl: 0  •  gabapentin (NEURONTIN) 300 mg capsule, , Disp: , Rfl:   •  levothyroxine 100 mcg tablet, TAKE 1 TABLET BY MOUTH EVERY DAY, Disp: 90 tablet, Rfl: 1  •  loperamide (IMODIUM) 2 mg capsule, Take 2 mg by mouth 4 (four) times a day as needed for diarrhea, Disp: , Rfl:   •  nystatin powder, , Disp: , Rfl:   •  pantoprazole (PROTONIX) 40 mg tablet, Take 1 tablet (40 mg total) by mouth daily in the early morning Do not start before July 7, 2023., Disp: 30 tablet, Rfl: 2  •  polyethylene glycol (MIRALAX) 17 g packet, Take 17 g by mouth daily as needed (constipation), Disp: 20 each, Rfl: 0  •  potassium chloride 10% oral solution, Take 15 mL (20 mEq total) by mouth daily, Disp: 3800 mL, Rfl: 3  •  simethicone (MYLICON) 80 mg chewable tablet, Chew 80 mg every 6 (six) hours as needed for flatulence, Disp: , Rfl:   •  sucralfate (CARAFATE) 1 g tablet, TAKE 1 TABLET BY MOUTH 2 TIMES A DAY., Disp: 180 tablet, Rfl: 1  •  torsemide (DEMADEX) 10 mg tablet, Take 10 mg by mouth daily, Disp: , Rfl:   •  vancomycin (VANCOCIN), Inject 125 mL (750 mg total) into a catheter in a vein over 90 minutes every 24 hours, Disp: , Rfl:   •  warfarin (COUMADIN) 3 mg tablet, Please check your INR on Monday 7/10/2023; if INR 2-3, can resume 3 mg Coumadin daily and frequent INR check per protocol Do not start before July 11, 2023., Disp: 30 tablet, Rfl: 0    Allergies   Allergen Reactions   • Lac Bovis Diarrhea   • Other      Other reaction(s): Other (See Comments)  ana m toradol in OR 6/06 sah   • Seasonal Ic  [Cholestatin] Allergic Rhinitis   • Trimethoprim    • Bactrim [Sulfamethoxazole-Trimethoprim] Rash   • Sulfa Antibiotics Rash     Other reaction(s):  Other (See Comments)  takes lasix @home            Vitals:    07/20/23 1511   BP: 112/77   Pulse: 71       Objective:    /77   Pulse 71   Ht 5' (1.524 m)   LMP  (LMP Unknown)   BMI 32.81 kg/m²   Gen: No acute distress, resting comfortably in bed  HEENT: Eyes clear, moist mucus membranes, hearing intact  Respiratory: No audible wheezing or stridor  Cardiovascular: Well Perfused peripherally, 2+ distal pulse  Abdomen: nondistended, no peritoneal signs    MSK RLE  Incision intact with small amount of fibrinous exudate at various portions of the incision. Mild amount of erythema around the wound. No tenderness to palpation around incision site  Sensation intact distally but mildly reduced  Motor intact to dorsiflexors plantar flexors EHL FHL  Palpable DP pulse      Diagnostics, reviewed and taken today if performed as documented:    None performed      The attending physician has personally reviewed the pertinent films in PACS and interpretation is as follows:      Procedures, if performed today:    Procedures    None performed      Portions of the record may have been created with voice recognition software. Occasional wrong word or "sound a like" substitutions may have occurred due to the inherent limitations of voice recognition software. Read the chart carefully and recognize, using context, where substitutions have occurred.

## 2023-07-20 NOTE — PROGRESS NOTES
Follow-Up Note - Infectious Disease   Macey Miller 80 y.o. 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  In setting of MAIA, revision, chronic dislocation as below.  Presenting with subacute pain, spontaneous drainage from sinus tract.  CT showed abscess at joint.  Clinically stable without sepsis.  ESR 51 > 30. Status post MAIA explantation, extensive I&D, insertion of antibiotic beads on 6/30.  Extensive purulent fluid and necrotic tissue noted intraoperatively.  OR cultures are negative. Suspect gram-positive pathogen.    Rec:  • Continue vancomycin through 8/11/2023 to complete 6 weeks of antibiotics postoperatively  • Check weekly CBC with differential, creatinine, vancomycin trough while on IV antibiotic. • Outpatient ID follow-up in 3 weeks  • Discontinue PICC line after last dose of IV antibiotic. • After completion of IV course, consider PO antibiotic given suspected chronicity of infection. • Referral to Ortho for assessment of wound drainage - ID office staff has contacted Ortho; needs Denise lift     Status post left MAIA with revision in 2019. Complicated more recently by disruption and chronic dislocation.  Non-ambulatory.     Status post PPM.     IBS with constipation.     Afib. On coumadin     History of ESBL, MDRO. From recent urine cultures.     Antibiotics:  Vancomycin #23  POD #20    Subjective:  Patient is here for follow-up of IV antibiotics. RN and aide from SNF attend visit and help provide history. Having persistent wound drainage, ABD pad from ? last night soaked with purulent, malodorous drainage. Feeling nausea, occasional lightheadedness, but didn't eat this AM.  Denies fevers, chills, or sweats. Denies vomiting, or diarrhea.       The following portions of the patient's history were reviewed and updated as appropriate: allergies, current medications, past medical history, past social history, past surgical history and problem list.    Objective:  Vitals:  /70   Pulse 56   Temp 97.7 °F (36.5 °C)   Resp 17   Ht 5' (1.524 m)   LMP  (LMP Unknown)   SpO2 95%   BMI 32.81 kg/m²     Physical Exam:   General:  No acute distress  Eyes:  Normal lids and conjunctivae  ENT:  Normal external ears and nose  Neck:  Neck symmetric with midline trachea  Pulmonary:  Normal respiratory effort without accessory muscle use  Cardiovascular:  Regular rate and rhythm; no peripheral edema  Gastrointestinal:  No tenderness or distention  Musculoskeletal:  No digital clubbing or cyanosis  Skin:  No visible rashes; No palpable nodules  Neurologic:  Sensation grossly intact to light touch  Psychiatric:  Alert and oriented; Normal mood  Right arm:  PICC intact  Right hip:  Wound moist with fibrinous exudate. ABD soaked with purulent, malodorous drainage with some blue-green hue          Lab Results:  I have personally reviewed pertinent labs. Labs 7/17  Cr 1.24, WBC 6.9, ANC 4.3, Plts 277, ESR 30, Vanco random 13.2    Imaging Studies:   I have personally reviewed pertinent imaging study reports and images in PACS. EKG, Pathology, and Other Studies:   I have personally reviewed pertinent reports.

## 2023-07-20 NOTE — TELEPHONE ENCOUNTER
Elayne calls regarding appointment. Elayne ask if patient needs to be in a wheelchair or stretcher for the appointment. Informed Elayne patient is just coming to the office for her visit. Also asked Elayne if patients repeat labs were drawn this morning. Informed Elayne new orders for the repeat labs and to start vanco was faced to facility on Monday. Elayne states she does not see lab orders for today but she will ask and find out if they were drawn.

## 2023-07-20 NOTE — TELEPHONE ENCOUNTER
Hello,  Please advise if the following patient can be forced onto the schedule:    Patient: Gita Ribera    : 1933    MRN: [id number]    Call back #: [phone]    Insurance:     Reason for appointment: p/o for R hip, also has infection - pictures in media tab    Requested doctor/location: [facility]      Thank you.

## 2023-07-20 NOTE — TELEPHONE ENCOUNTER
Called [facility] and spoke with Tianna Wright. Informed Tianna Wright I was calling to get patient scheduled for a follow up appointment as her last one was cancelled. Tianna Wright states she needs to send this to the practice administer to get patient scheduled as Dr. Galileo Goss is booked out. Tianna Wright states their office will reach out to the facility to get patient scheduled. Also informed Tianna Wright there is media of patiens incision site from today's visit.

## 2023-07-20 NOTE — TELEPHONE ENCOUNTER
Sydney Nathan calls from [facility]. She states pt was seen today and has a f/u appt on 8/10 @10am and was wondering if we had anything later in the day due to them being booked up for morning transportation.

## 2023-07-20 NOTE — TELEPHONE ENCOUNTER
Zenobia Willoughby calls back today to let us know that if they hang the vancomycin patient will be late as the infusion takes 90 minutes. Advised that they may need to start it and send a staff who can tend to the antibiotic because patient has a hx of abnormal drug level and was just restarted. Stressed the importance of staying on top of dosing schedule so that patient avoids any complications or side effects from vancomycin administrations. Marlen Lemos understanding and will need to talk with administration regarding sending a staff with patient so that no vancomycin dose is missed.

## 2023-07-21 ENCOUNTER — TELEPHONE (OUTPATIENT)
Dept: INFECTIOUS DISEASES | Facility: CLINIC | Age: 88
End: 2023-07-21

## 2023-07-21 NOTE — TELEPHONE ENCOUNTER
Spoke with Dr. Asia Sheridan via TT. Per Dr. Asia Sheridan patient to continue vancomycin as ordered and further dosing will be advised once labs are resulted. Called [facility] and spoke with [employer]. Informed Verito per Dr. Asia Sheridan  patient to continue vancomycin as ordered and further dosing will be advised once labs are resulted. Verito verbalizes understanding at this time.

## 2023-07-21 NOTE — TELEPHONE ENCOUNTER
Called [facility] and spoke with Logan Jennings regarding patients labs. Logan Jennings states patient did have her labs drawn today at [address] states they did not receive labs results at this time. Logan Jennings states patient vanco dose is given at [address] states patients vanco was held today because of time labs would be drawn. Informed Logan Jennings will route message to provider for further guidance and call the facility back.

## 2023-07-24 NOTE — TELEPHONE ENCOUNTER
Called [facility] and spoke with [place]. Jeimy Holly was calling regarding patient Vancomycin. Shy Gibbons did receive and review patients labs from 7/21/23. Per Dr. James Gibbons continue vancomycin [id number]. Per Dr. James Gibbons draw CBCD, Cre and VT on 7/27/23. Per Dr. Trudy Nguyen not hold vancomycin dosing for results of labs. Elayne verbalizes understanding at this time.

## 2023-07-25 ENCOUNTER — HOSPITAL ENCOUNTER (EMERGENCY)
Facility: HOSPITAL | Age: 88
Discharge: HOME | End: 2023-07-25
Attending: EMERGENCY MEDICINE | Admitting: EMERGENCY MEDICINE
Payer: COMMERCIAL

## 2023-07-25 ENCOUNTER — APPOINTMENT (EMERGENCY)
Dept: RADIOLOGY | Facility: HOSPITAL | Age: 88
End: 2023-07-25
Payer: COMMERCIAL

## 2023-07-25 VITALS
SYSTOLIC BLOOD PRESSURE: 144 MMHG | DIASTOLIC BLOOD PRESSURE: 93 MMHG | OXYGEN SATURATION: 99 % | HEART RATE: 76 BPM | TEMPERATURE: 97.3 F | RESPIRATION RATE: 20 BRPM

## 2023-07-25 DIAGNOSIS — Z87.39 HISTORY OF OSTEOMYELITIS: ICD-10-CM

## 2023-07-25 DIAGNOSIS — L76.82 POSTOPERATIVE COMPLICATION OF SKIN INVOLVING DRAINAGE FROM SURGICAL WOUND: Primary | ICD-10-CM

## 2023-07-25 LAB
ANION GAP SERPL CALCULATED.3IONS-SCNC: 7 MMOL/L
BASOPHILS # BLD AUTO: 0.05 THOUSANDS/ÂΜL (ref 0–0.1)
BASOPHILS NFR BLD AUTO: 1 % (ref 0–1)
BUN SERPL-MCNC: 26 MG/DL (ref 5–25)
CALCIUM SERPL-MCNC: 8.5 MG/DL (ref 8.3–10.1)
CHLORIDE SERPL-SCNC: 115 MMOL/L (ref 96–108)
CO2 SERPL-SCNC: 20 MMOL/L (ref 21–32)
CREAT SERPL-MCNC: 1.11 MG/DL (ref 0.6–1.3)
CRP SERPL QL: 17.6 MG/L
EOSINOPHIL # BLD AUTO: 0.17 THOUSAND/ÂΜL (ref 0–0.61)
EOSINOPHIL NFR BLD AUTO: 2 % (ref 0–6)
ERYTHROCYTE [DISTWIDTH] IN BLOOD BY AUTOMATED COUNT: 17.4 % (ref 11.6–15.1)
ERYTHROCYTE [SEDIMENTATION RATE] IN BLOOD: 39 MM/HOUR (ref 0–29)
GFR SERPL CREATININE-BSD FRML MDRD: 44 ML/MIN/1.73SQ M
GLUCOSE SERPL-MCNC: 81 MG/DL (ref 65–140)
HCT VFR BLD AUTO: 30.7 % (ref 34.8–46.1)
HGB BLD-MCNC: 9.3 G/DL (ref 11.5–15.4)
IMM GRANULOCYTES # BLD AUTO: 0.11 THOUSAND/UL (ref 0–0.2)
IMM GRANULOCYTES NFR BLD AUTO: 1 % (ref 0–2)
LYMPHOCYTES # BLD AUTO: 1.69 THOUSANDS/ÂΜL (ref 0.6–4.47)
LYMPHOCYTES NFR BLD AUTO: 20 % (ref 14–44)
MCH RBC QN AUTO: 31.5 PG (ref 26.8–34.3)
MCHC RBC AUTO-ENTMCNC: 30.3 G/DL (ref 31.4–37.4)
MCV RBC AUTO: 104 FL (ref 82–98)
MONOCYTES # BLD AUTO: 0.76 THOUSAND/ÂΜL (ref 0.17–1.22)
MONOCYTES NFR BLD AUTO: 9 % (ref 4–12)
NEUTROPHILS # BLD AUTO: 5.57 THOUSANDS/ÂΜL (ref 1.85–7.62)
NEUTS SEG NFR BLD AUTO: 67 % (ref 43–75)
NRBC BLD AUTO-RTO: 0 /100 WBCS
PLATELET # BLD AUTO: 245 THOUSANDS/UL (ref 149–390)
PMV BLD AUTO: 8.8 FL (ref 8.9–12.7)
POTASSIUM SERPL-SCNC: 4.3 MMOL/L (ref 3.5–5.3)
RBC # BLD AUTO: 2.95 MILLION/UL (ref 3.81–5.12)
SODIUM SERPL-SCNC: 142 MMOL/L (ref 135–147)
WBC # BLD AUTO: 8.35 THOUSAND/UL (ref 4.31–10.16)

## 2023-07-25 PROCEDURE — 93005 ELECTROCARDIOGRAM TRACING: CPT

## 2023-07-25 PROCEDURE — 80048 BASIC METABOLIC PNL TOTAL CA: CPT

## 2023-07-25 PROCEDURE — 85652 RBC SED RATE AUTOMATED: CPT

## 2023-07-25 PROCEDURE — 36415 COLL VENOUS BLD VENIPUNCTURE: CPT

## 2023-07-25 PROCEDURE — 85025 COMPLETE CBC W/AUTO DIFF WBC: CPT

## 2023-07-25 PROCEDURE — NC001 PR NO CHARGE: Performed by: ORTHOPAEDIC SURGERY

## 2023-07-25 PROCEDURE — 86140 C-REACTIVE PROTEIN: CPT

## 2023-07-25 PROCEDURE — 71045 X-RAY EXAM CHEST 1 VIEW: CPT

## 2023-07-25 RX ORDER — ACETAMINOPHEN 325 MG/1
975 TABLET ORAL ONCE
Status: COMPLETED | OUTPATIENT
Start: 2023-07-25 | End: 2023-07-25

## 2023-07-25 RX ADMIN — ACETAMINOPHEN 975 MG: 325 TABLET, FILM COATED ORAL at 12:10

## 2023-07-25 NOTE — ED NOTES
[facility] rehab informed of pt returning to facility after speaking with ED and ortho resident.      Gregory Fulton RN  07/25/23 1631

## 2023-07-25 NOTE — CONSULTS
Orthopedics   Callie Prude 80 y.o. female MRN: [id number]  Unit/Bed#: ED 16      Chief Complaint:   right hip drainage    HPI:   80 y. o.female nonambulator complaining of right hip drainage 4 weeks s/p explant of R MAIA and implantation of abx beads. She has been draining from the wound intermittently for the last 4 weeks and had increased drainage per the nurse at her facility for which she was transferred to the ED. Denies pan. Located right hip, subacute in onset. No other complaints at this time. PMH significant for CHF, Afib, obesity, MI. Hx of R MAIA done by Dr. Geena Srivastava in April of 2019 and Revision done in July of 2019. She was recently seen in clinic 5 days ago and had sutures removed. Review Of Systems:   · Skin: Redness around surgical site unchanged from clinic visit  · Neuro: See HPI  · Musculoskeletal: See HPI  · 14 point review of systems negative except as stated above     Past Medical History:   Past Medical History:   Diagnosis Date   • Abdominal bloating 8/1/2016   • Anemia    • Arthritis    • Cancer ([address])     basal cell   • CHF (congestive heart failure) ([address])    • COVID-19    • Disease of thyroid gland    • Disturbance of smell and taste     disturbance of taste   • Effusion of knee joint right    • Fibromyalgia    • Heart murmur     reported a previous heart murmur   • History of acute myocardial infarction    • History of atrial fibrillation    • History of bruising easily    • History of cancer    • History of dermatitis    • History of mammogram    • History of methicillin resistant staphylococcus aureus (MRSA)     Negative nasal culture, isolation discontinued 8/17/2018.    • History of methicillin resistant staphylococcus aureus (MRSA) 08/17/2018    negative nasal culture-isolation and hx discontinued 8/17/2018   • History of obesity    • History of osteopenia    • History of sciatica    • History of shortness of breath    • History of sinusitis    • History of sore throat    • History of syncope    • History of umbilical hernia    • History of viral infection    • Irritable bowel syndrome (IBS)    • Joint pain, hip    • Limb pain    • Myalgia     myalgia and myositis   • Need for prophylactic vaccination against single diseases    • Need for prophylactic vaccination and inoculation against influenza    • Preoperative cardiovascular examination    • Primary osteoarthritis of right knee    • Right leg pain    • Vaginal Pap smear     reported pap smear       Past Surgical History:   Past Surgical History:   Procedure Laterality Date   • ANKLE ARTHRODESIS Right    • BACK SURGERY     • BARIATRIC SURGERY     • CHOLECYSTECTOMY      x2   • COLONOSCOPY     • ESOPHAGOGASTRODUODENOSCOPY N/A 3/23/2018    Procedure: ESOPHAGOGASTRODUODENOSCOPY (EGD); Surgeon: Brenda Perez MD;  Location: [facility]; Service: Gastroenterology   • FOOT SURGERY     • HIP CLOSE REDUCTION Right 8/21/2016    Procedure: CLOSED REDUCTION RIGHT TOTAL HIP;  Surgeon: Amarilys Burk MD;  Location[facility];  Service:    • INSERT / Marcell Lozoya / Itz Ryder     • IR BIOPSY OTHER  8/1/2020   • IR PICC REPOSITION  7/5/2023   • JOINT REPLACEMENT      LEFT KNEE REPLACEMENT   • JOINT REPLACEMENT      Right HIP   • PILONIDAL CYST EXCISION      x2   • IN EXC B9 LESION MRGN XCP SK TG T/A/L 0.5 CM/< Right 7/24/2019    Procedure: REVISION SCAR EXTREMITY Rt Hip;   Surgeon: Merced Guerrero MD;  Location: [facility];  Service: Orthopedics   • IN REVJ TOT HIP ARTHRP [address] W/WO AGRFT/ALGRFT Right 4/15/2019    Procedure: ARTHROPLASTY HIP TOTAL ACETABULAR REVISION;  Surgeon: Merced Guerrero MD;  Location[facility];  Service: Orthopedics   • REPLACEMENT TOTAL KNEE Right    • SILVANA-EN-Y PROCEDURE      x2   • TOTAL KNEE ARTHROPLASTY     • UMBILICAL HERNIA REPAIR      x2   • WOUND DEBRIDEMENT Right 6/30/2023    Procedure: DEBRIDEMENT LOWER EXTREMITY ([address] [facility])- I&D of right hip with explant of hardware; insertion of antibiotic beads;  Surgeon: Barron Garrison MD;  [facility];  Service: Orthopedics       Family History:  Family history reviewed and non-contributory  Family History   Problem Relation Age of Onset   • Coronary artery disease Mother    • Heart attack Mother    • Hypertension Mother    • Coronary artery disease Father    • Heart attack Father    • Hypertension Father    • Lymphoma Sister         acute   • Rashes / Skin problems Sister         lymphomatoid papulosis   • Cancer Sister    • Coronary artery disease Brother    • Heart attack Brother         x2   • Aneurysm Neg Hx    • Hyperlipidemia Neg Hx        Social History:  Social History     Socioeconomic History   • Marital status: /Civil Union     Spouse name: None   • Number of children: None   • Years of education: None   • Highest education level: None   Occupational History   • None   Tobacco Use   • Smoking status: Never   • Smokeless tobacco: Never   Vaping Use   • Vaping Use: Never used   Substance and Sexual Activity   • Alcohol use: Not Currently   • Drug use: No   • Sexual activity: Never   Other Topics Concern   • None   Social History Narrative   • None     Social Determinants of Health     Financial Resource Strain: Not on file   Food Insecurity: No Food Insecurity (6/30/2023)    Hunger Vital Sign    • Worried About Running Out of Food in the Last Year: Never true    • Ran Out of Food in the Last Year: Never true   Transportation Needs: No Transportation Needs (6/30/2023)    PRAPARE - Transportation    • Lack of Transportation (Medical): No    • Lack of Transportation (Non-Medical):  No   Physical Activity: Not on file   Stress: Not on file   Social Connections: Not on file   Intimate Partner Violence: Not on file   Housing Stability: Unknown (6/30/2023)    Housing Stability Vital Sign    • Unable to Pay for Housing in the Last Year: Not on file    • Number of Places Lived in the Last Year: 1    • Unstable Housing in the Last Year: No       Allergies: Allergies   Allergen Reactions   • Lac Bovis Diarrhea   • Other      Other reaction(s): Other (See Comments)  ana m toradol in OR 6/06 sah   • Seasonal Ic  [Cholestatin] Allergic Rhinitis   • Trimethoprim    • Bactrim [Sulfamethoxazole-Trimethoprim] Rash   • Sulfa Antibiotics Rash     Other reaction(s): Other (See Comments)  takes lasix @home           Labs:  0   Lab Value Date/Time    HCT 30.7 (L) 07/25/2023 1133    HCT 25.5 (L) 07/07/2023 0507    HCT 25.5 (L) 07/06/2023 0555    HCT 37.0 04/20/2015 1109    HCT 38.0 04/20/2015 1109    HCT 37.2 04/03/2015 0831    HGB 9.3 (L) 07/25/2023 1133    HGB 8.0 (L) 07/07/2023 0507    HGB 7.5 (L) 07/06/2023 0555    HGB 12.0 04/20/2015 1109    HGB 11.6 04/03/2015 0831    HGB 12.1 11/04/2014 1025    INR 4.42 (H) 07/07/2023 1205    WBC 8.35 07/25/2023 1133    WBC 8.83 07/07/2023 0507    WBC 8.78 07/06/2023 0555    WBC 7.98 04/20/2015 1109    WBC 7.29 04/03/2015 0831    WBC 6.66 11/04/2014 1025    ESR 39 (H) 07/25/2023 1133    CRP 17.6 (H) 07/25/2023 1133       Meds:  No current facility-administered medications for this encounter.     Current Outpatient Medications:   •  acetaminophen (TYLENOL) 500 mg tablet, Take 2 tablets (1,000 mg total) by mouth every 8 (eight) hours, Disp: 30 tablet, Rfl: 0  •  Cholecalciferol 125 MCG (5000 UT) TABS, Take 5,000 Units by mouth daily, Disp: , Rfl:   •  desoximetasone (TOPICORT) 0.25 % cream, APPLY TOPICALLY AS NEEDED FOR IRRITATION, Disp: 30 g, Rfl: 0  •  Diclofenac Sodium (VOLTAREN) 1 %, Apply 2 g topically 4 (four) times a day, Disp: 350 g, Rfl: 3  •  dicyclomine (BENTYL) 10 mg capsule, , Disp: , Rfl:   •  DULoxetine (CYMBALTA) 60 mg delayed release capsule, TAKE 1 CAPSULE BY MOUTH EVERY DAY, Disp: 90 capsule, Rfl: 1  •  ferrous sulfate 325 (65 Fe) mg tablet, Take 1 tablet (325 mg total) by mouth daily with breakfast Do not start before July 7, 2023., Disp: 30 tablet, Rfl: 0  •  fluticasone (FLONASE) 50 mcg/act nasal spray, SPRAY 2 SPRAYS INTO EACH NOSTRIL EVERY DAY, Disp: 16 mL, Rfl: 0  •  gabapentin (NEURONTIN) 300 mg capsule, , Disp: , Rfl:   •  levothyroxine 100 mcg tablet, TAKE 1 TABLET BY MOUTH EVERY DAY, Disp: 90 tablet, Rfl: 1  •  loperamide (IMODIUM) 2 mg capsule, Take 2 mg by mouth 4 (four) times a day as needed for diarrhea, Disp: , Rfl:   •  nystatin powder, , Disp: , Rfl:   •  pantoprazole (PROTONIX) 40 mg tablet, Take 1 tablet (40 mg total) by mouth daily in the early morning Do not start before July 7, 2023., Disp: 30 tablet, Rfl: 2  •  polyethylene glycol (MIRALAX) 17 g packet, Take 17 g by mouth daily as needed (constipation), Disp: 20 each, Rfl: 0  •  potassium chloride 10% oral solution, Take 15 mL (20 mEq total) by mouth daily, Disp: 3800 mL, Rfl: 3  •  simethicone (MYLICON) 80 mg chewable tablet, Chew 80 mg every 6 (six) hours as needed for flatulence, Disp: , Rfl:   •  sucralfate (CARAFATE) 1 g tablet, TAKE 1 TABLET BY MOUTH 2 TIMES A DAY., Disp: 180 tablet, Rfl: 1  •  torsemide (DEMADEX) 10 mg tablet, Take 10 mg by mouth daily, Disp: , Rfl:   •  vancomycin (VANCOCIN), Inject 125 mL (750 mg total) into a catheter in a vein over 90 minutes every 24 hours, Disp: , Rfl:   •  warfarin (COUMADIN) 3 mg tablet, Please check your INR on Monday 7/10/2023; if INR 2-3, can resume 3 mg Coumadin daily and frequent INR check per protocol Do not start before July 11, 2023., Disp: 30 tablet, Rfl: 0    Blood Culture:   Lab Results   Component Value Date    BLOODCX No Growth After 5 Days. 06/27/2023    BLOODCX No Growth After 5 Days. 06/27/2023       Wound Culture:   Lab Results   Component Value Date    WOUNDCULT No growth 06/28/2023       Ins and Outs:  No intake/output data recorded.           Physical Exam:   /93   Pulse 76   Temp (!) 97.3 °F (36.3 °C) (Oral)   Resp 20   LMP  (LMP Unknown)   SpO2 99%   Gen: No acute distress, resting comfortably in bed  HEENT: Eyes clear, moist mucus membranes, hearing intact  Respiratory: No audible wheezing or stridor  Cardiovascular: Well Perfused peripherally, 2+ distal pulse  Abdomen: nondistended, no peritoneal signs    MSK RLE  Incision intact with small amount of fibrinous exudate at various portions of the incision. No drainage expressible. Mild amount of erythema around the wound. No tenderness to palpation around incision site  Sensation intact distally but mildly reduced  Motor intact to dorsiflexors, plantar flexors, EHL, FHL  Palpable DP pulse      Assessment:  80 y. o.female with right hip drainage at nursing facility s/p explant of R MAIA and implantation of abx beads.      Plan:   · NWB right lower extremity  · Continue IV abx per ID (vanc)  · TID dry dressing changes at nursing facility (gauze pads, ABD, tape)  · F/u with Dr. Thao Ray as scheduled  · PT  · Pain control  · Dispo: Denise Barnett for discharge from ortho perspective   Case was reviewed and discussed with senior resident and attending  Laith Dawson MD  Orthopedic surgery resident   · 07/25/23  ·       Laith Dawson MD

## 2023-07-25 NOTE — ED NOTES
Updated [address] [facility] running late, updated  time @ [address] [place] [address]  07/25/23 6691

## 2023-07-25 NOTE — ED PROVIDER NOTES
History  Chief Complaint   Patient presents with   • Post-op Problem     Pt reports having R hip surgery and now having drainage from incision site. Also being treated for c diff at rehab facility. Patient is an 70-year-old female with a significant past medical history of osteomyelitis of the right acetabulum status post debridement with incision and drainage and washout by orthopedics on 6/30/2023 presenting for evaluation of increased drainage from her surgical site noticed by nursing staff. As per report, the patient has been at a assisted living facility since her procedure. She was noticed to have some increased green/brown drainage coming from her surgical site today which concerned that nursing staff and sent her to the emergency department for evaluation. She states that her pain is relatively unchanged. She has not noticed any warmth over the area. She denies any fevers. She otherwise denies acute complaints. Prior to Admission Medications   Prescriptions Last Dose Informant Patient Reported? Taking? Cholecalciferol 125 MCG (5000 UT) TABS  Outside Facility (Specify) Yes No   Sig: Take 5,000 Units by mouth daily   DULoxetine (CYMBALTA) 60 mg delayed release capsule  Outside Facility (Specify) No No   Sig: TAKE 1 CAPSULE BY MOUTH EVERY DAY   Diclofenac Sodium (VOLTAREN) 1 %  Outside Facility (Specify) No No   Sig: Apply 2 g topically 4 (four) times a day   acetaminophen (TYLENOL) 500 mg tablet   No No   Sig: Take 2 tablets (1,000 mg total) by mouth every 8 (eight) hours   desoximetasone (TOPICORT) 0.25 % cream  Outside Facility (Specify) No No   Sig: APPLY TOPICALLY AS NEEDED FOR IRRITATION   dicyclomine (BENTYL) 10 mg capsule   Yes No   ferrous sulfate 325 (65 Fe) mg tablet   No No   Sig: Take 1 tablet (325 mg total) by mouth daily with breakfast Do not start before July 7, 2023.    fluticasone (FLONASE) 50 mcg/act nasal spray  Outside Facility (Specify) No No   Sig: SPRAY 2 SPRAYS INTO EACH NOSTRIL EVERY DAY   gabapentin (NEURONTIN) 300 mg capsule   Yes No   levothyroxine 100 mcg tablet  Outside Facility (Specify) No No   Sig: TAKE 1 TABLET BY MOUTH EVERY DAY   loperamide (IMODIUM) 2 mg capsule  Outside Facility (Specify) Yes No   Sig: Take 2 mg by mouth 4 (four) times a day as needed for diarrhea   nystatin powder   Yes No   pantoprazole (PROTONIX) 40 mg tablet   No No   Sig: Take 1 tablet (40 mg total) by mouth daily in the early morning Do not start before July 7, 2023. polyethylene glycol (MIRALAX) 17 g packet   No No   Sig: Take 17 g by mouth daily as needed (constipation)   potassium chloride 10% oral solution  Outside Facility (Specify) No No   Sig: Take 15 mL (20 mEq total) by mouth daily   simethicone (MYLICON) 80 mg chewable tablet  Outside Facility (Specify) Yes No   Sig: Chew 80 mg every 6 (six) hours as needed for flatulence   sucralfate (CARAFATE) 1 g tablet  Outside Facility (Specify) No No   Sig: TAKE 1 TABLET BY MOUTH 2 TIMES A DAY. torsemide (DEMADEX) 10 mg tablet   Yes No   Sig: Take 10 mg by mouth daily   vancomycin (VANCOCIN)   No No   Sig: Inject 125 mL (750 mg total) into a catheter in a vein over 90 minutes every 24 hours   warfarin (COUMADIN) 3 mg tablet   No No   Sig: Please check your INR on Monday 7/10/2023; if INR 2-3, can resume 3 mg Coumadin daily and frequent INR check per protocol Do not start before July 11, 2023.       Facility-Administered Medications: None       Past Medical History:   Diagnosis Date   • Abdominal bloating 8/1/2016   • Anemia    • Arthritis    • Cancer (HCC)     basal cell   • CHF (congestive heart failure) (HCC)    • COVID-19    • Disease of thyroid gland    • Disturbance of smell and taste     disturbance of taste   • Effusion of knee joint right    • Fibromyalgia    • Heart murmur     reported a previous heart murmur   • History of acute myocardial infarction    • History of atrial fibrillation    • History of bruising easily    • History of cancer    • History of dermatitis    • History of mammogram    • History of methicillin resistant staphylococcus aureus (MRSA)     Negative nasal culture, isolation discontinued 8/17/2018. • History of methicillin resistant staphylococcus aureus (MRSA) 08/17/2018    negative nasal culture-isolation and hx discontinued 8/17/2018   • History of obesity    • History of osteopenia    • History of sciatica    • History of shortness of breath    • History of sinusitis    • History of sore throat    • History of syncope    • History of umbilical hernia    • History of viral infection    • Irritable bowel syndrome (IBS)    • Joint pain, hip    • Limb pain    • Myalgia     myalgia and myositis   • Need for prophylactic vaccination against single diseases    • Need for prophylactic vaccination and inoculation against influenza    • Preoperative cardiovascular examination    • Primary osteoarthritis of right knee    • Right leg pain    • Vaginal Pap smear     reported pap smear       Past Surgical History:   Procedure Laterality Date   • ANKLE ARTHRODESIS Right    • BACK SURGERY     • BARIATRIC SURGERY     • CHOLECYSTECTOMY      x2   • COLONOSCOPY     • ESOPHAGOGASTRODUODENOSCOPY N/A 3/23/2018    Procedure: ESOPHAGOGASTRODUODENOSCOPY (EGD); Surgeon: Sanjuana Foster MD;  Location: [facility]; Service: Gastroenterology   • FOOT SURGERY     • HIP CLOSE REDUCTION Right 8/21/2016    Procedure: CLOSED REDUCTION RIGHT TOTAL HIP;  Surgeon: Marianne Dodd MD;  [facility];  Service:    • INSERT / Tasha Lyman / Alberta Guerrero     • IR BIOPSY OTHER  8/1/2020   • IR PICC REPOSITION  7/5/2023   • JOINT REPLACEMENT      LEFT KNEE REPLACEMENT   • JOINT REPLACEMENT      Right HIP   • PILONIDAL CYST EXCISION      x2   • NH EXC B9 LESION MRGN XCP SK TG T/A/L 0.5 CM/< Right 7/24/2019    Procedure: REVISION SCAR EXTREMITY Rt Hip;   Surgeon: Debbie Talley MD;  Location: [facility];  Service: Orthopedics   • NH REVJ TOT HIP ARTHRP [facility] W/WO AGRFT/ALGRFT Right 4/15/2019    Procedure: ARTHROPLASTY HIP TOTAL ACETABULAR REVISION;  Surgeon: Apryl Nuñez MD;  [facility];  Service: Orthopedics   • REPLACEMENT TOTAL KNEE Right    • SILVANA-EN-Y PROCEDURE      x2   • TOTAL KNEE ARTHROPLASTY     • UMBILICAL HERNIA REPAIR      x2   • WOUND DEBRIDEMENT Right 6/30/2023    Procedure: DEBRIDEMENT LOWER EXTREMITY ([facility])- I&D of right hip with explant of hardware; insertion of antibiotic beads;  Surgeon: Venice Talbot MD;  [facility]: [facility];  Service: Orthopedics       Family History   Problem Relation Age of Onset   • Coronary artery disease Mother    • Heart attack Mother    • Hypertension Mother    • Coronary artery disease Father    • Heart attack Father    • Hypertension Father    • Lymphoma Sister         acute   • Rashes / Skin problems Sister         lymphomatoid papulosis   • Cancer Sister    • Coronary artery disease Brother    • Heart attack Brother         x2   • Aneurysm Neg Hx    • Hyperlipidemia Neg Hx      I have reviewed and agree with the history as documented. E-Cigarette/Vaping   • E-Cigarette Use Never User      E-Cigarette/Vaping Substances   • Nicotine No    • THC No    • CBD No    • Flavoring No    • Other No    • Unknown No      Social History     Tobacco Use   • Smoking status: Never   • Smokeless tobacco: Never   Vaping Use   • Vaping Use: Never used   Substance Use Topics   • Alcohol use: Not Currently   • Drug use: No        Review of Systems   Constitutional: Negative for fever. Respiratory: Negative for shortness of breath. Cardiovascular: Negative for chest pain. Gastrointestinal: Negative for abdominal pain, nausea and vomiting. Skin: Positive for wound. All other systems reviewed and are negative.       Physical Exam  ED Triage Vitals [07/25/23 1109]   Temperature Pulse Respirations Blood Pressure SpO2   (!) 97.3 °F (36.3 °C) 79 20 147/65 95 %      Temp Source Heart Rate Source Patient Position - Orthostatic VS BP Location FiO2 (%)   Oral Monitor Sitting Left arm --      Pain Score       5             Orthostatic Vital Signs  Vitals:    07/25/23 1109 07/25/23 1300 07/25/23 1330   BP: 147/65  144/93   Pulse: 79 68 76   Patient Position - Orthostatic VS: Sitting  Sitting       Physical Exam  Vitals and nursing note reviewed. Constitutional:       General: She is not in acute distress. Appearance: Normal appearance. She is not ill-appearing or toxic-appearing. HENT:      Head: Normocephalic and atraumatic. Right Ear: External ear normal.      Left Ear: External ear normal.      Nose: Nose normal.   Eyes:      General: No scleral icterus. Right eye: No discharge. Left eye: No discharge. Extraocular Movements: Extraocular movements intact. Conjunctiva/sclera: Conjunctivae normal.   Cardiovascular:      Rate and Rhythm: Normal rate. Heart sounds: Normal heart sounds. No murmur heard. No friction rub. No gallop. Pulmonary:      Effort: Pulmonary effort is normal. No respiratory distress. Breath sounds: Normal breath sounds. Abdominal:      General: Abdomen is flat. There is no distension. Palpations: Abdomen is soft. There is no mass. Tenderness: There is no abdominal tenderness. Genitourinary:     Comments: Deferred  Skin:     Comments: Surgical site in the right acetabular area clean, dry, intact. No active drainage noticed. No overlying warmth. Minimal tenderness to palpation. No crepitus. No skin changes. Neurological:      General: No focal deficit present. Mental Status: She is alert.          ED Medications  Medications   acetaminophen (TYLENOL) tablet 975 mg (975 mg Oral Given 7/25/23 1210)       Diagnostic Studies  Results Reviewed     Procedure Component Value Units Date/Time    Basic metabolic panel [[id number]]  (Abnormal) Collected: 07/25/23 1133    Lab Status: Final result Specimen: Blood from Arm, Left Updated: 07/25/23 1220     Sodium 142 mmol/L      Potassium 4.3 mmol/L      Chloride 115 mmol/L      CO2 20 mmol/L      ANION GAP 7 mmol/L      BUN 26 mg/dL      Creatinine 1.11 mg/dL      Glucose 81 mg/dL      Calcium 8.5 mg/dL      eGFR 44 ml/min/1.73sq m     Narrative:      [facility] guidelines for Chronic Kidney Disease (CKD):   •  Stage 1 with normal or high GFR (GFR > 90 mL/min/1.73 square meters)  •  Stage 2 Mild CKD (GFR = 60-89 mL/min/1.73 square meters)  •  Stage 3A Moderate CKD (GFR = 45-59 mL/min/1.73 square meters)  •  Stage 3B Moderate CKD (GFR = 30-44 mL/min/1.73 square meters)  •  Stage 4 Severe CKD (GFR = 15-29 mL/min/1.73 square meters)  •  Stage 5 End Stage CKD (GFR <15 mL/min/1.73 square meters)  Note: GFR calculation is accurate only with a steady state creatinine    C-reactive protein [[id number]]  (Abnormal) Collected: 07/25/23 1133    Lab Status: Final result Specimen: Blood from Arm, Left Updated: 07/25/23 1220     CRP 17.6 mg/L     CBC and differential [[id number]]  (Abnormal) Collected: 07/25/23 1133    Lab Status: Final result Specimen: Blood from Arm, Left Updated: 07/25/23 1152     WBC 8.35 Thousand/uL      RBC 2.95 Million/uL      Hemoglobin 9.3 g/dL      Hematocrit 30.7 %       fL      MCH 31.5 pg      MCHC 30.3 g/dL      RDW 17.4 %      MPV 8.8 fL      Platelets 558 Thousands/uL      nRBC 0 /100 WBCs      Neutrophils Relative 67 %      Immat GRANS % 1 %      Lymphocytes Relative 20 %      Monocytes Relative 9 %      Eosinophils Relative 2 %      Basophils Relative 1 %      Neutrophils Absolute 5.57 Thousands/µL      Immature Grans Absolute 0.11 Thousand/uL      Lymphocytes Absolute 1.69 Thousands/µL      Monocytes Absolute 0.76 Thousand/µL      Eosinophils Absolute 0.17 Thousand/µL      Basophils Absolute 0.05 Thousands/µL     Sedimentation rate, automated [[id number]]  (Abnormal) Collected: 07/25/23 1133    Lab Status: Final result Specimen: Blood from Arm, Left Updated: 07/25/23 1144     Sed Rate 39 mm/hour                  XR chest 1 view portable   ED Interpretation by Lidya Jimenez DO (07/25 1203)   PICC line in place. No acute cardiopulmonary disease on my independent interpretation. Final Result by Davis Saini MD (07/25 1757)      Right arm PICC line tip in the superior vena cava. Workstation performed: [id number]               Procedures  Procedures      ED Course  ED Course as of 07/26/23 1031   Tue Jul 25, 2023   1223 WBC: 8.35   1224 C-REACTIVE PROTEIN(!): 17.6  Improved from previous   1224 Sed Rate(!): 39  Improved from previous   1534 Discussed with orthopedics who is recommending discharge after their evaluation                             SBIRT 20yo+    Flowsheet Row Most Recent Value   Initial Alcohol Screen: US AUDIT-C     1. How often do you have a drink containing alcohol? 0 Filed at: 07/25/2023 1109   2. How many drinks containing alcohol do you have on a typical day you are drinking? 0 Filed at: 07/25/2023 1109   3a. Male UNDER 65: How often do you have five or more drinks on one occasion? 0 Filed at: 07/25/2023 1109   3b. FEMALE Any Age, or MALE 65+: How often do you have 4 or more drinks on one occassion? 0 Filed at: 07/25/2023 1109   Audit-C Score 0 Filed at: 07/25/2023 1109   MARISSA: How many times in the past year have you. .. Used an illegal drug or used a prescription medication for non-medical reasons? Never Filed at: 07/25/2023 1109                Medical Decision Making  Patient is an 19-year-old female presenting for evaluation of increased drainage from her surgical site. Based on history and evaluation, differential diagnosis includes but is not limited to: Normal postoperative drainage/pain, post operative infection. Plan: CBC, BMP, ESR, CRP, orthopedic consult, chest x-ray for PICC placement confirmation, reassessment    Labs unremarkable. Improved WBC, and inflammatory markers from previous.   Patient was consulted and evaluated patient. They have been following her closely in clinic and are familiar with her. They were able to compare her wounds directly to how they look last week and feel like they are relatively unchanged. They do not have any concern for acute infection or worsening disease process. They are recommending discharge with continued wound care by nursing facility and ongoing intravenous antibiotics which the patient has already been receiving. Patient seems understand this plan and is agreeable. All questions answered. Patient discharged to nursing facility with return precautions. I independently reviewed this patient's chart including her recent surgical procedure. I considered her chronic past medical history including her history of osteomyelitis my medical decision making. I obtained historical information from the nursing staff over the phone not otherwise provided to me by the patient herself. Amount and/or Complexity of Data Reviewed  Labs: ordered. Decision-making details documented in ED Course. Radiology: ordered and independent interpretation performed. Risk  OTC drugs.             Disposition  Final diagnoses:   History of osteomyelitis   Postoperative complication of skin involving drainage from surgical wound     Time reflects when diagnosis was documented in both MDM as applicable and the Disposition within this note     Time User Action Codes Description Comment    7/25/2023 11:41 AM Faraz Foreman [Z87.39] History of osteomyelitis     7/25/2023  3:31 PM Faraz Young Add [L76.82] Postoperative complication of skin involving drainage from surgical wound     7/25/2023  3:31 PM Marzette Primer [Z87.39] History of osteomyelitis     7/25/2023  3:31 PM Marzette Primer [L76.82] Postoperative complication of skin involving drainage from surgical wound       ED Disposition     ED Disposition   Discharge    Condition   Stable    Date/Time   Tue Jul 25, 2023  3:30 PM    Comment   Glenn Vicki discharge to home/self care. Follow-up Information     Follow up With Specialties Details Why Contact Ame Cardona, DO Family Medicine Go in 2 days  [address]   [address] [facility] [address] [phone]            Discharge Medication List as of 7/25/2023  3:34 PM      CONTINUE these medications which have NOT CHANGED    Details   acetaminophen (TYLENOL) 500 mg tablet Take 2 tablets (1,000 mg total) by mouth every 8 (eight) hours, Starting Sun 6/18/2023, No Print      Cholecalciferol 125 MCG (5000 UT) TABS Take 5,000 Units by mouth daily, Historical Med      desoximetasone (TOPICORT) 0.25 % cream APPLY TOPICALLY AS NEEDED FOR IRRITATION, Starting Fri 9/16/2022, Normal      Diclofenac Sodium (VOLTAREN) 1 % Apply 2 g topically 4 (four) times a day, Starting Tue 1/25/2022, Normal      dicyclomine (BENTYL) 10 mg capsule Starting Fri 3/10/2023, Historical Med      DULoxetine (CYMBALTA) 60 mg delayed release capsule TAKE 1 CAPSULE BY MOUTH EVERY DAY, Normal      ferrous sulfate 325 (65 Fe) mg tablet Take 1 tablet (325 mg total) by mouth daily with breakfast Do not start before July 7, 2023., Starting Fri 7/7/2023, Until Sun 8/6/2023, No Print      fluticasone (FLONASE) 50 mcg/act nasal spray SPRAY 2 SPRAYS INTO EACH NOSTRIL EVERY DAY, Normal      gabapentin (NEURONTIN) 300 mg capsule Starting Sun 5/7/2023, Historical Med      levothyroxine 100 mcg tablet TAKE 1 TABLET BY MOUTH EVERY DAY, Normal      loperamide (IMODIUM) 2 mg capsule Take 2 mg by mouth 4 (four) times a day as needed for diarrhea, Historical Med      nystatin powder Starting Sun 5/7/2023, Historical Med      pantoprazole (PROTONIX) 40 mg tablet Take 1 tablet (40 mg total) by mouth daily in the early morning Do not start before July 7, 2023., Starting Fri 7/7/2023, Until Thu 10/5/2023, No Print      polyethylene glycol (MIRALAX) 17 g packet Take 17 g by mouth daily as needed (constipation), Starting Tue 6/20/2023, No Print      potassium chloride 10% oral solution Take 15 mL (20 mEq total) by mouth daily, Starting Mon 2/8/2021, Normal      simethicone (MYLICON) 80 mg chewable tablet Chew 80 mg every 6 (six) hours as needed for flatulence, Historical Med      sucralfate (CARAFATE) 1 g tablet TAKE 1 TABLET BY MOUTH 2 TIMES A DAY., Normal      torsemide (DEMADEX) 10 mg tablet Take 10 mg by mouth daily, Historical Med      vancomycin (VANCOCIN) Inject 125 mL (750 mg total) into a catheter in a vein over 90 minutes every 24 hours, Starting Thu 7/20/2023, Until Thu 8/24/2023, No Print      warfarin (COUMADIN) 3 mg tablet Please check your INR on Monday 7/10/2023; if INR 2-3, can resume 3 mg Coumadin daily and frequent INR check per protocol Do not start before July 11, 2023., No Print           No discharge procedures on file. PDMP Review       Value Time User    PDMP Reviewed  Yes 4/17/2020 12:53 PM Jose Quintero MD           ED Provider  Attending physically available and evaluated Macey Miller. I managed the patient along with the ED Attending.     Electronically Signed by         Cassandra Small DO  07/26/23 8644

## 2023-07-25 NOTE — DISCHARGE INSTRUCTIONS
Your evaluation suggests that your symptoms are due to a non emergent cause of drainage from your wound. Please follow up with your primary care physician within two days. Continue to follow up with Dr. Galileo Goss as scheduled. Return to the Emergency Department if you experience worsening or concerning symptoms. Thank you for choosing us for your care.

## 2023-07-26 ENCOUNTER — VBI (OUTPATIENT)
Dept: ADMINISTRATIVE | Facility: OTHER | Age: 88
End: 2023-07-26

## 2023-07-26 LAB
ATRIAL RATE: 187 BPM
QRS AXIS: -9 DEGREES
QRSD INTERVAL: 138 MS
QT INTERVAL: 364 MS
QTC INTERVAL: 440 MS
T WAVE AXIS: 166 DEGREES
VENTRICULAR RATE: 88 BPM

## 2023-07-26 PROCEDURE — 93010 ELECTROCARDIOGRAM REPORT: CPT | Performed by: INTERNAL MEDICINE

## 2023-07-26 NOTE — ED ATTENDING ATTESTATION
7/25/2023  I, Sanjuanita Garcia MD, saw and evaluated the patient. I have discussed the patient with the resident/non-physician practitioner and agree with the resident's/non-physician practitioner's findings, Plan of Care, and MDM as documented in the resident's/non-physician practitioner's note, except where noted. All available labs and Radiology studies were reviewed. I was present for key portions of any procedure(s) performed by the resident/non-physician practitioner and I was immediately available to provide assistance. At this point I agree with the current assessment done in the Emergency Department. I have conducted an independent evaluation of this patient a history and physical is as follows:    ED Course     66-year-old female with history of right acetabular osteomyelitis followed by orthopedics for same currently on antibiotics presents for evaluation wound. Per facility, patient had drainage from incision site today. Patient currently being treated for C. difficile at rehab facility per notes. Vitals reviewed patient reports no complaints at this time. Surgical site in right acetabular elbow appears clean dry intact no active drainage noted no warmth minimal tenderness to palpation no crepitus no skin changes. Impression: Encounter for wound check. Differential diagnosis: Doubt cellulitis, doubt deep space infection, doubt wound dehiscence    Plan to check BMP CBC CRP sed rate we will consult with orthopedics on-call regarding recommendations    Chest x-ray performed to identify the PICC line in place to facilitate lab draws. Chest x-ray independently interpreted by me PICC line appears to be in place no acute cardiopulmonary disease. Labs reviewed sed rate elevated but improved from baseline. CBC remarkable for mild anemia. CRP elevated 17.6 but down from baseline. Basic metabolic panel remarkable for elevated BUN.       Case discussed with orthopedic service patient seen and evaluated by orthopedic surgery in ED. No further intervention indicated patient may be discharged home follow-up with orthopedics as scheduled.           Critical Care Time  Procedures

## 2023-07-26 NOTE — TELEPHONE ENCOUNTER
07/26/23 11:20 AM    Patient contacted post ED visit, pt is currently in SNF/ Rehab facility. Thank you.   Rebeca STILES [facility]

## 2023-07-27 ENCOUNTER — TELEPHONE (OUTPATIENT)
Dept: INFECTIOUS DISEASES | Facility: CLINIC | Age: 88
End: 2023-07-27

## 2023-07-27 NOTE — TELEPHONE ENCOUNTER
Routed patient labs to Dr. Marietta Arriaza for review. Received TT from Dr. Marietta Arriaza. Per Dr. Marietta Arriaza keep same vancomycin dose and repeat labs in one week. [place] [facility] [place] Chitra Vinson. Eleuterio Arriaza reviewed patients labs. Per Dr. Marietta Arriaza keep same vancomycin does and repeat labs in one week. Chitra Vinosn verbalizes understanding at this time.

## 2023-08-03 ENCOUNTER — TELEPHONE (OUTPATIENT)
Dept: INFECTIOUS DISEASES | Facility: CLINIC | Age: 88
End: 2023-08-03

## 2023-08-03 NOTE — TELEPHONE ENCOUNTER
Received patient lab results. Called facility and spoke with Bianca Meredith. Vanco Dose: [id number]  Dose Time (s):11am   Missed doses?:no   Was trough drawn prior to dose?:yes, 30 minutes per factilty    Cre:1.10   Vanco Trough:24.5  Range (see OPAT note) 15-20  End Date: 8/11    Infusion Pharmacy/[facility]:  OR  SNF: [facility]    Provider:Vidhya     We will route this message to the provider managing your antibiotic and give you a call back with further vancomycin orders. Do not hold any doses unless we otherwise specify.

## 2023-08-03 NOTE — TELEPHONE ENCOUNTER
[place] [facility] [place] Stanton Salinas. Informed Stanton Lundberg received and reviewed patients lab results. Informed Stanton Salinas  Per Dr. Garfield Lundberg decrease vancomycin to [id number] and repeat vanco trough on 8/7/23 prior to dose. Informed Stanton Salinas order was faxed to facility at this time. Stanton Salinas verbalizes understanding at this time.

## 2023-08-07 ENCOUNTER — TELEPHONE (OUTPATIENT)
Dept: INFECTIOUS DISEASES | Facility: CLINIC | Age: 88
End: 2023-08-07

## 2023-08-07 NOTE — TELEPHONE ENCOUNTER
Called [facility] and spoke with Mac. Informed Camille per Dr. Schumacher Single patient to continue with current vancomycin plan. Mac verbalizes understanding at this time.

## 2023-08-07 NOTE — TELEPHONE ENCOUNTER
Called [facility] and spoke to Ennice. Vanco Dose:[id number]  Dose Time (s):  Missed doses?:NO   Was trough drawn prior to dose?:yes     Cre: 1.18  Vanco Trough:23.9  Range (see OPAT MLNS)57-83  End Date:8/11    Infusion Pharmacy/[facility]:  OR  SNF: [facility]     Provider: Leigh Rea     We will route this message to the provider managing your antibiotic and give you a call back with further vancomycin orders. Do not hold any doses unless we otherwise specify.

## 2023-08-10 ENCOUNTER — OFFICE VISIT (OUTPATIENT)
Dept: INFECTIOUS DISEASES | Facility: CLINIC | Age: 88
End: 2023-08-10
Payer: COMMERCIAL

## 2023-08-10 VITALS
RESPIRATION RATE: 17 BRPM | DIASTOLIC BLOOD PRESSURE: 68 MMHG | SYSTOLIC BLOOD PRESSURE: 108 MMHG | TEMPERATURE: 97.1 F | BODY MASS INDEX: 32.81 KG/M2 | HEIGHT: 60 IN

## 2023-08-10 DIAGNOSIS — T84.51XD INFECTION AND INFLAMMATORY REACTION DUE TO INTERNAL RIGHT HIP PROSTHESIS, SUBSEQUENT ENCOUNTER: Primary | ICD-10-CM

## 2023-08-10 PROCEDURE — 99213 OFFICE O/P EST LOW 20 MIN: CPT | Performed by: INTERNAL MEDICINE

## 2023-08-10 NOTE — PROGRESS NOTES
Follow-Up Note - Infectious Disease   Kalpesh Valero 80 y.o. 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  In setting of MAIA, revision, chronic dislocation as below.  Presenting with subacute pain, spontaneous drainage from sinus tract.  CT showed abscess at joint.  Clinically stable without sepsis.  ESR 51 > 30 >13. Status post MAIA explantation, extensive I&D, insertion of antibiotic beads on 6/30.  Extensive purulent fluid and necrotic tissue noted intraoperatively.  OR cultures are negative. Suspect gram-positive pathogen.    Rec:  • Continue vancomycin through 8/11/2023 to complete 6 weeks of antibiotics postoperatively  • Discontinue PICC line after last dose of IV antibiotic. • Given suspected chronicity of infection, transition to doxycycline 100 mg PO Q12 on 8/12/2023 to continue for at least 6 more weeks  • Consider Zofran PRN for nausea, to help with antibiotic tolerability  • Outpatient follow-up with me in 1 month - needs stretcher to be able to visualize wound     Nonhealing right hip surgical wound. Unable to visualize but aide reports open areas with persistent drainage. Consider due to persistent infection versus pressure on wound versus edema versus poor nutritional status. Rec:  · Outpatient follow-up with Ortho next week for wound assessment - needs stretcher to be able to visualize wound    Status post left MAIA with revision in 2019. Complicated more recently by disruption and chronic dislocation.  Non-ambulatory.     Status post PPM.     IBS with constipation.     Afib. On coumadin     History of ESBL, MDRO. From recent urine cultures.     Antibiotics:  Vancomycin #44  POD #41    Subjective:  Patient is here for follow-up of IV antibiotics. Persistent nausea. Chronic intermittent diarrhea alternating with constipation.   Aide accompanies her today and helps provide history - reports hip wound continues to drain with some areas being "packed". Patient comes to appointment in wheelchair - unable to stand or rotate for wound visualization. Denies fevers, chills, or sweats. The following portions of the patient's history were reviewed and updated as appropriate: allergies, current medications, past medical history, past social history, past surgical history and problem list.    Objective:  Vitals:  /68   Temp (!) 97.1 °F (36.2 °C)   Resp 17   Ht 5' (1.524 m)   LMP  (LMP Unknown)   BMI 32.81 kg/m²     Physical Exam:   General:  No acute distress  Eyes:  Normal lids and conjunctivae  ENT:  Normal external ears and nose  Neck:  Neck symmetric with midline trachea  Pulmonary:  Normal respiratory effort without accessory muscle use  Cardiovascular:  Regular rate and rhythm; 2+ pitting edema bilateral legs  Gastrointestinal:  No tenderness or distention  Musculoskeletal:  No digital clubbing or cyanosis  Skin:  No visible rashes; No palpable nodules  Neurologic:  Sensation grossly intact to light touch  Psychiatric:  Alert and oriented; Normal mood    Lab Results:  I have personally reviewed pertinent labs. Labs 8/7 ESR 13, Cr 1.2, WBC 6.5, Plts 204    Imaging Studies:   I have personally reviewed pertinent imaging study reports and images in PACS. EKG, Pathology, and Other Studies:   I have personally reviewed pertinent reports.

## 2023-08-11 PROBLEM — T84.51XA INFECTION OF RIGHT PROSTHETIC HIP JOINT (HCC): Status: ACTIVE | Noted: 2023-08-11

## 2023-08-14 ENCOUNTER — HOSPITAL ENCOUNTER (OUTPATIENT)
Dept: INFUSION CENTER | Facility: HOSPITAL | Age: 88
Discharge: HOME | End: 2023-08-14
Attending: INTERNAL MEDICINE

## 2023-08-14 ENCOUNTER — TELEPHONE (OUTPATIENT)
Age: 88
End: 2023-08-14

## 2023-08-14 DIAGNOSIS — T84.51XD INFECTION ASSOCIATED WITH INTERNAL RIGHT HIP PROSTHESIS, SUBSEQUENT ENCOUNTER: Primary | ICD-10-CM

## 2023-08-14 NOTE — TELEPHONE ENCOUNTER
Caller: [facility]    Doctor: Mehran Montez    Reason for call: calling to ask if patient should be in wheelchair or on stretcher for upcoming post op appt    Call back#: [phone]

## 2023-08-14 NOTE — PROGRESS NOTES
PICC line removed, pressure held for 5 minutes and pressure dressing applied. Patient and aide aware to remove dressing in 24 hours and if any bleeding apply pressure to site.

## 2023-08-17 ENCOUNTER — OFFICE VISIT (OUTPATIENT)
Dept: OBGYN CLINIC | Facility: HOSPITAL | Age: 88
End: 2023-08-17

## 2023-08-17 VITALS — HEIGHT: 60 IN | BODY MASS INDEX: 32.81 KG/M2

## 2023-08-17 DIAGNOSIS — T81.31XS DEHISCENCE OF OPERATIVE WOUND, SEQUELA: Primary | ICD-10-CM

## 2023-08-17 DIAGNOSIS — Z89.621 ACQUIRED ABSENCE OF RIGHT HIP JOINT FOLLOWING EXPLANTATION OF JOINT PROSTHESIS WITHOUT PRESENCE OF ANTIBIOTIC-IMPREGNATED CEMENT SPACER: ICD-10-CM

## 2023-08-17 PROCEDURE — 99024 POSTOP FOLLOW-UP VISIT: CPT | Performed by: ORTHOPAEDIC SURGERY

## 2023-08-17 RX ORDER — CHLORHEXIDINE GLUCONATE 0.12 MG/ML
15 RINSE ORAL ONCE
OUTPATIENT
Start: 2023-08-17 | End: 2023-08-17

## 2023-08-17 RX ORDER — SODIUM CHLORIDE, SODIUM LACTATE, POTASSIUM CHLORIDE, CALCIUM CHLORIDE 600; 310; 30; 20 MG/100ML; MG/100ML; MG/100ML; MG/100ML
125 INJECTION, SOLUTION INTRAVENOUS CONTINUOUS
OUTPATIENT
Start: 2023-08-17

## 2023-08-17 RX ORDER — CEFAZOLIN SODIUM 2 G/50ML
2000 SOLUTION INTRAVENOUS ONCE
OUTPATIENT
Start: 2023-08-17 | End: 2023-08-17

## 2023-08-17 NOTE — PROGRESS NOTES
Assessment:   Diagnosis ICD-10-CM Associated Orders   1. Dehiscence of operative wound, sequela  T81.31XS       2. 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 • In the presence of her sinus tract recommendation is to undergo irrigation debridement with wound closure next week. Risks and benefits of an irrigation debridement of her right hip with wound closure were discussed. Consents obtained. We will recommend holding her Coumadin several days prior to her surgery. • She will continue with daily dry dressing changes. Remain nonweightbearing right lower extremity. To do next visit:  Return for post-op. The above stated was discussed in layman's terms and the patient expressed understanding. All questions were answered to the patient's satisfaction. Scribe Attestation    I,:  Vivien Odonnell am acting as a scribe while in the presence of the attending physician.:       I,:  Mahnaz Mabry MD personally performed the services described in this documentation    as scribed in my presence.:             Subjective:   Larry Clifton is a 80 y.o. female who presents via medical transport on a stretcher for office evaluation of her right hip. She is status post irrigation debridement with explantation of right total hip prosthesis with antibiotic beads, 6/30/2023. She is currently in a nursing home.   She is a Coumadin independent individual.      Review of systems negative unless otherwise specified in HPI  Review of Systems    Past Medical History:   Diagnosis Date   • Abdominal bloating 8/1/2016   • Anemia    • Arthritis    • Cancer (HCC)     basal cell   • CHF (congestive heart failure) (HCC)    • COVID-19    • Disease of thyroid gland    • Disturbance of smell and taste     disturbance of taste   • Effusion of knee joint right    • Fibromyalgia    • Heart murmur     reported a previous heart murmur   • History of acute myocardial infarction    • History of atrial fibrillation    • History of bruising easily    • History of cancer    • History of dermatitis    • History of mammogram    • History of methicillin resistant staphylococcus aureus (MRSA)     Negative nasal culture, isolation discontinued 8/17/2018. 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   • Irritable bowel syndrome (IBS)    • Joint pain, hip    • Limb pain    • Myalgia     myalgia and myositis   • Need for prophylactic vaccination against single diseases    • Need for prophylactic vaccination and inoculation against influenza    • Preoperative cardiovascular examination    • Primary osteoarthritis of right knee    • Right leg pain    • Vaginal Pap smear     reported pap smear       Past Surgical History:   Procedure Laterality Date   • ANKLE ARTHRODESIS Right    • BACK SURGERY     • BARIATRIC SURGERY     • CHOLECYSTECTOMY      x2   • COLONOSCOPY     • ESOPHAGOGASTRODUODENOSCOPY N/A 3/23/2018    Procedure: ESOPHAGOGASTRODUODENOSCOPY (EGD); Surgeon: Dwayne Carlton MD;  [facility];   Service: Gastroenterology   • FOOT SURGERY     • HIP CLOSE REDUCTION Right 8/21/2016    Procedure: CLOSED REDUCTION RIGHT TOTAL HIP;  Surgeon: Cameron Dumont MD;  [facility];  Service:    • [facility] / Juan Jose Watson / Sharyle Rains     • IR BIOPSY OTHER  8/1/2020   • IR PICC REPOSITION  7/5/2023   • JOINT REPLACEMENT      LEFT KNEE REPLACEMENT   • JOINT REPLACEMENT      Right HIP   • PILONIDAL CYST EXCISION      x2   • MO EXC B9 LESION MRGN XCP SK TG T/A/L 0.5 CM/< Right 7/24/2019    Procedure: REVISION SCAR EXTREMITY Rt Hip;   Surgeon: Lori Braden MD;  [facility];  Service: Orthopedics   • MO REVJ TOT HIP ARTHRP [address] W/WO AGRFT/ALGRFT Right 4/15/2019    Procedure: ARTHROPLASTY HIP TOTAL ACETABULAR REVISION;  Surgeon: Lori Braden MD;  [facility];  Service: Orthopedics   • REPLACEMENT TOTAL KNEE Right    • SILVANA-EN-Y PROCEDURE      x2   • TOTAL KNEE ARTHROPLASTY     • UMBILICAL HERNIA REPAIR      x2   • WOUND DEBRIDEMENT Right 6/30/2023    Procedure: DEBRIDEMENT LOWER EXTREMITY ([address] [facility])- I&D of right hip with explant of hardware; insertion of antibiotic beads;  Surgeon: Michelle Drake MD;  [facility];  Service: Orthopedics       Family History   Problem Relation Age of Onset   • Coronary artery disease Mother    • Heart attack Mother    • Hypertension Mother    • Coronary artery disease Father    • Heart attack Father    • Hypertension Father    • Lymphoma Sister         acute   • Rashes / Skin problems Sister         lymphomatoid papulosis   • Cancer Sister    • Coronary artery disease Brother    • Heart attack Brother         x2   • Aneurysm Neg Hx    • Hyperlipidemia Neg Hx        Social History     Occupational History   • Not on file   Tobacco Use   • Smoking status: Never   • Smokeless tobacco: Never   Vaping Use   • Vaping Use: Never used   Substance and Sexual Activity   • Alcohol use: Not Currently   • Drug use: No   • Sexual activity: Never         Current Outpatient Medications:   •  acetaminophen (TYLENOL) 500 mg tablet, Take 2 tablets (1,000 mg total) by mouth every 8 (eight) hours, Disp: 30 tablet, Rfl: 0  •  Cholecalciferol 125 MCG (5000 UT) TABS, Take 5,000 Units by mouth daily, Disp: , Rfl:   •  desoximetasone (TOPICORT) 0.25 % cream, APPLY TOPICALLY AS NEEDED FOR IRRITATION, Disp: 30 g, Rfl: 0  •  Diclofenac Sodium (VOLTAREN) 1 %, Apply 2 g topically 4 (four) times a day, Disp: 350 g, Rfl: 3  •  dicyclomine (BENTYL) 10 mg capsule, , Disp: , Rfl:   •  DULoxetine (CYMBALTA) 60 mg delayed release capsule, TAKE 1 CAPSULE BY MOUTH EVERY DAY, Disp: 90 capsule, Rfl: 1  •  ferrous sulfate 325 (65 Fe) mg tablet, Take 1 tablet (325 mg total) by mouth daily with breakfast Do not start before July 7, 2023., Disp: 30 tablet, Rfl: 0  •  fluticasone (FLONASE) 50 mcg/act nasal spray, SPRAY 2 SPRAYS INTO EACH NOSTRIL EVERY DAY, Disp: 16 mL, Rfl: 0  •  gabapentin (NEURONTIN) 300 mg capsule, , Disp: , Rfl:   •  levothyroxine 100 mcg tablet, TAKE 1 TABLET BY MOUTH EVERY DAY, Disp: 90 tablet, Rfl: 1  •  loperamide (IMODIUM) 2 mg capsule, Take 2 mg by mouth 4 (four) times a day as needed for diarrhea, Disp: , Rfl:   •  nystatin powder, , Disp: , Rfl:   •  pantoprazole (PROTONIX) 40 mg tablet, Take 1 tablet (40 mg total) by mouth daily in the early morning Do not start before July 7, 2023., Disp: 30 tablet, Rfl: 2  •  polyethylene glycol (MIRALAX) 17 g packet, Take 17 g by mouth daily as needed (constipation), Disp: 20 each, Rfl: 0  •  potassium chloride 10% oral solution, Take 15 mL (20 mEq total) by mouth daily, Disp: 3800 mL, Rfl: 3  •  simethicone (MYLICON) 80 mg chewable tablet, Chew 80 mg every 6 (six) hours as needed for flatulence, Disp: , Rfl:   •  sucralfate (CARAFATE) 1 g tablet, TAKE 1 TABLET BY MOUTH 2 TIMES A DAY., Disp: 180 tablet, Rfl: 1  •  torsemide (DEMADEX) 10 mg tablet, Take 10 mg by mouth daily, Disp: , Rfl:   •  vancomycin (VANCOCIN), Inject 125 mL (750 mg total) into a catheter in a vein over 90 minutes every 24 hours, Disp: , Rfl:   •  warfarin (COUMADIN) 3 mg tablet, Please check your INR on Monday 7/10/2023; if INR 2-3, can resume 3 mg Coumadin daily and frequent INR check per protocol Do not start before July 11, 2023., Disp: 30 tablet, Rfl: 0    Allergies Allergen Reactions   • Milk (Cow) Diarrhea   • Other      Other reaction(s): Other (See Comments)  ana m toradol in OR 6/06 sah   • Seasonal Ic  [Cholestatin] Allergic Rhinitis   • Trimethoprim    • Bactrim [Sulfamethoxazole-Trimethoprim] Rash   • Sulfa Antibiotics Rash     Other reaction(s): Other (See Comments)  takes lasix @home            Vitals:       Objective:                    Right Hip Exam     Comments:    Posterior lateral incision with multiple openings and drainage. Iodoform packing removed. Expressible drainage appreciated. Modest swelling. Mild warmth. Surrounding erythematous discoloration. Diagnostics, reviewed and taken today if performed as documented:    None performed          Procedures, if performed today:    Procedures    None performed      Portions of the record may have been created with voice recognition software. Occasional wrong word or "sound a like" substitutions may have occurred due to the inherent limitations of voice recognition software. Read the chart carefully and recognize, using context, where substitutions have occurred.

## 2023-08-17 NOTE — H&P (VIEW-ONLY)
Assessment:   Diagnosis ICD-10-CM Associated Orders   1. Dehiscence of operative wound, sequela  T81.31XS       2. 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 • In the presence of her sinus tract recommendation is to undergo irrigation debridement with wound closure next week. Risks and benefits of an irrigation debridement of her right hip with wound closure were discussed. Consents obtained. We will recommend holding her Coumadin several days prior to her surgery. • She will continue with daily dry dressing changes. Remain nonweightbearing right lower extremity. To do next visit:  Return for post-op. The above stated was discussed in layman's terms and the patient expressed understanding. All questions were answered to the patient's satisfaction. Scribe Attestation    I,:  Natalia Nolasco am acting as a scribe while in the presence of the attending physician.:       I,:  Justin Ramirez MD personally performed the services described in this documentation    as scribed in my presence.:             Subjective:   Flaquito Brice is a 80 y.o. female who presents via medical transport on a stretcher for office evaluation of her right hip. She is status post irrigation debridement with explantation of right total hip prosthesis with antibiotic beads, 6/30/2023. She is currently in a nursing home.   She is a Coumadin independent individual.      Review of systems negative unless otherwise specified in HPI  Review of Systems    Past Medical History:   Diagnosis Date   • Abdominal bloating 8/1/2016   • Anemia    • Arthritis    • Cancer (HCC)     basal cell   • CHF (congestive heart failure) (HCC)    • COVID-19    • Disease of thyroid gland    • Disturbance of smell and taste     disturbance of taste   • Effusion of knee joint right    • Fibromyalgia    • Heart murmur     reported a previous heart murmur   • History of acute myocardial infarction    • History of atrial fibrillation    • History of bruising easily    • History of cancer    • History of dermatitis    • History of mammogram    • History of methicillin resistant staphylococcus aureus (MRSA)     Negative nasal culture, isolation discontinued 8/17/2018. 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   • Irritable bowel syndrome (IBS)    • Joint pain, hip    • Limb pain    • Myalgia     myalgia and myositis   • Need for prophylactic vaccination against single diseases    • Need for prophylactic vaccination and inoculation against influenza    • Preoperative cardiovascular examination    • Primary osteoarthritis of right knee    • Right leg pain    • Vaginal Pap smear     reported pap smear       Past Surgical History:   Procedure Laterality Date   • ANKLE ARTHRODESIS Right    • BACK SURGERY     • BARIATRIC SURGERY     • CHOLECYSTECTOMY      x2   • COLONOSCOPY     • ESOPHAGOGASTRODUODENOSCOPY N/A 3/23/2018    Procedure: ESOPHAGOGASTRODUODENOSCOPY (EGD); Surgeon: Munir Welch MD;  [facility];   Service: Gastroenterology   • FOOT SURGERY     • HIP CLOSE REDUCTION Right 8/21/2016    Procedure: CLOSED REDUCTION RIGHT TOTAL HIP;  Surgeon: Alyssa Lawton MD;  [facility];  Service:    • [facility] / [facility] / [facility]     • IR BIOPSY OTHER  8/1/2020   • IR PICC REPOSITION  7/5/2023   • JOINT REPLACEMENT      LEFT KNEE REPLACEMENT   • JOINT REPLACEMENT      Right HIP   • PILONIDAL CYST EXCISION      x2   • TN EXC B9 LESION MRGN XCP SK TG T/A/L 0.5 CM/< Right 7/24/2019    Procedure: REVISION SCAR EXTREMITY Rt Hip;   Surgeon: Franky Barnes MD;  [facility];  Service: Orthopedics   • TN REVJ TOT HIP ARTHRP [address] W/WO AGRFT/ALGRFT Right 4/15/2019    Procedure: ARTHROPLASTY HIP TOTAL ACETABULAR REVISION;  Surgeon: Franky Barnes MD;  [facility];  Service: Orthopedics   • REPLACEMENT TOTAL KNEE Right    • SILVANA-EN-Y PROCEDURE      x2   • TOTAL KNEE ARTHROPLASTY     • UMBILICAL HERNIA REPAIR      x2   • WOUND DEBRIDEMENT Right 6/30/2023    Procedure: DEBRIDEMENT LOWER EXTREMITY ([address] [facility])- I&D of right hip with explant of hardware; insertion of antibiotic beads;  Surgeon: Amber Rashid MD;  [facility];  Service: Orthopedics       Family History   Problem Relation Age of Onset   • Coronary artery disease Mother    • Heart attack Mother    • Hypertension Mother    • Coronary artery disease Father    • Heart attack Father    • Hypertension Father    • Lymphoma Sister         acute   • Rashes / Skin problems Sister         lymphomatoid papulosis   • Cancer Sister    • Coronary artery disease Brother    • Heart attack Brother         x2   • Aneurysm Neg Hx    • Hyperlipidemia Neg Hx        Social History     Occupational History   • Not on file   Tobacco Use   • Smoking status: Never   • Smokeless tobacco: Never   Vaping Use   • Vaping Use: Never used   Substance and Sexual Activity   • Alcohol use: Not Currently   • Drug use: No   • Sexual activity: Never         Current Outpatient Medications:   •  acetaminophen (TYLENOL) 500 mg tablet, Take 2 tablets (1,000 mg total) by mouth every 8 (eight) hours, Disp: 30 tablet, Rfl: 0  •  Cholecalciferol 125 MCG (5000 UT) TABS, Take 5,000 Units by mouth daily, Disp: , Rfl:   •  desoximetasone (TOPICORT) 0.25 % cream, APPLY TOPICALLY AS NEEDED FOR IRRITATION, Disp: 30 g, Rfl: 0  •  Diclofenac Sodium (VOLTAREN) 1 %, Apply 2 g topically 4 (four) times a day, Disp: 350 g, Rfl: 3  •  dicyclomine (BENTYL) 10 mg capsule, , Disp: , Rfl:   •  DULoxetine (CYMBALTA) 60 mg delayed release capsule, TAKE 1 CAPSULE BY MOUTH EVERY DAY, Disp: 90 capsule, Rfl: 1  •  ferrous sulfate 325 (65 Fe) mg tablet, Take 1 tablet (325 mg total) by mouth daily with breakfast Do not start before July 7, 2023., Disp: 30 tablet, Rfl: 0  •  fluticasone (FLONASE) 50 mcg/act nasal spray, SPRAY 2 SPRAYS INTO EACH NOSTRIL EVERY DAY, Disp: 16 mL, Rfl: 0  •  gabapentin (NEURONTIN) 300 mg capsule, , Disp: , Rfl:   •  levothyroxine 100 mcg tablet, TAKE 1 TABLET BY MOUTH EVERY DAY, Disp: 90 tablet, Rfl: 1  •  loperamide (IMODIUM) 2 mg capsule, Take 2 mg by mouth 4 (four) times a day as needed for diarrhea, Disp: , Rfl:   •  nystatin powder, , Disp: , Rfl:   •  pantoprazole (PROTONIX) 40 mg tablet, Take 1 tablet (40 mg total) by mouth daily in the early morning Do not start before July 7, 2023., Disp: 30 tablet, Rfl: 2  •  polyethylene glycol (MIRALAX) 17 g packet, Take 17 g by mouth daily as needed (constipation), Disp: 20 each, Rfl: 0  •  potassium chloride 10% oral solution, Take 15 mL (20 mEq total) by mouth daily, Disp: 3800 mL, Rfl: 3  •  simethicone (MYLICON) 80 mg chewable tablet, Chew 80 mg every 6 (six) hours as needed for flatulence, Disp: , Rfl:   •  sucralfate (CARAFATE) 1 g tablet, TAKE 1 TABLET BY MOUTH 2 TIMES A DAY., Disp: 180 tablet, Rfl: 1  •  torsemide (DEMADEX) 10 mg tablet, Take 10 mg by mouth daily, Disp: , Rfl:   •  vancomycin (VANCOCIN), Inject 125 mL (750 mg total) into a catheter in a vein over 90 minutes every 24 hours, Disp: , Rfl:   •  warfarin (COUMADIN) 3 mg tablet, Please check your INR on Monday 7/10/2023; if INR 2-3, can resume 3 mg Coumadin daily and frequent INR check per protocol Do not start before July 11, 2023., Disp: 30 tablet, Rfl: 0    Allergies Allergen Reactions   • Milk (Cow) Diarrhea   • Other      Other reaction(s): Other (See Comments)  ana m toradol in OR 6/06 sah   • Seasonal Ic  [Cholestatin] Allergic Rhinitis   • Trimethoprim    • Bactrim [Sulfamethoxazole-Trimethoprim] Rash   • Sulfa Antibiotics Rash     Other reaction(s): Other (See Comments)  takes lasix @home            Vitals:       Objective:                    Right Hip Exam     Comments:    Posterior lateral incision with multiple openings and drainage. Iodoform packing removed. Expressible drainage appreciated. Modest swelling. Mild warmth. Surrounding erythematous discoloration. Diagnostics, reviewed and taken today if performed as documented:    None performed          Procedures, if performed today:    Procedures    None performed      Portions of the record may have been created with voice recognition software. Occasional wrong word or "sound a like" substitutions may have occurred due to the inherent limitations of voice recognition software. Read the chart carefully and recognize, using context, where substitutions have occurred.

## 2023-08-19 PROBLEM — N30.00 ACUTE CYSTITIS: Status: RESOLVED | Noted: 2023-06-18 | Resolved: 2023-08-19

## 2023-08-21 ENCOUNTER — TELEPHONE (OUTPATIENT)
Age: 88
End: 2023-08-21

## 2023-08-21 ENCOUNTER — ANESTHESIA EVENT (OUTPATIENT)
Dept: PERIOP | Facility: HOSPITAL | Age: 88
DRG: 856 | End: 2023-08-21
Payer: COMMERCIAL

## 2023-08-21 NOTE — TELEPHONE ENCOUNTER
Caller: Home     Doctor: Ignacio Bernheim    Reason for call: Asked if patient was scheduled for surgery, information was given

## 2023-08-22 RX ORDER — YOHIMBE BARK 500 MG
CAPSULE ORAL
COMMUNITY

## 2023-08-22 RX ORDER — SENNA AND DOCUSATE SODIUM 50; 8.6 MG/1; MG/1
1 TABLET, FILM COATED ORAL DAILY
COMMUNITY
End: 2023-09-05

## 2023-08-22 RX ORDER — DOXYCYCLINE 100 MG/1
100 TABLET ORAL 2 TIMES DAILY
COMMUNITY
End: 2023-09-05

## 2023-08-22 RX ORDER — TRAMADOL HYDROCHLORIDE 50 MG/1
50 TABLET ORAL EVERY 8 HOURS PRN
COMMUNITY

## 2023-08-22 RX ORDER — BISACODYL 10 MG
10 SUPPOSITORY, RECTAL RECTAL DAILY
COMMUNITY
End: 2023-09-05

## 2023-08-22 NOTE — PRE-PROCEDURE INSTRUCTIONS
Pre-Surgery Instructions:   Medication Instructions   • bisacodyl (DULCOLAX) 10 mg suppository Hold day of surgery. • Cholecalciferol 125 MCG (5000 UT) TABS Hold starting today 8/22   • desoximetasone (TOPICORT) 0.25 % cream Hold after evening surgical shower 8/22   • Diclofenac Sodium (VOLTAREN) 1 % Hold starting today 8/22   • dicyclomine (BENTYL) 10 mg capsule Hold day of surgery. • doxycycline (ADOXA) 100 MG tablet Hold day of surgery. • Lactobacillus (Acidophilus) 100 MG CAPS Hold starting today 8/22   • pantoprazole (PROTONIX) 40 mg tablet  Take day of surgery with tiny sip of water   • potassium chloride 10% oral solution Hold starting today 8/22   • senna-docusate sodium (SENOKOT-S) 8.6-50 mg per tablet Hold day of surgery. • simethicone (MYLICON) 80 mg chewable tablet Hold day of surgery. • sucralfate (CARAFATE) 1 g tablet Take day of surgery. • torsemide (DEMADEX) 10 mg tablet Hold day of surgery. • traMADol (ULTRAM) 50 mg tablet Uses PRN- OK to take day of surgery   • warfarin (COUMADIN) 3 mg tablet Follow MD instructions on holding and resuming      Nursing home paper work completed and faxed to the facility at the fax number provided. Pre-procedure instructions, including medication instructions, faxed to facility as well.

## 2023-08-23 ENCOUNTER — ANESTHESIA (OUTPATIENT)
Dept: PERIOP | Facility: HOSPITAL | Age: 88
DRG: 856 | End: 2023-08-23
Payer: COMMERCIAL

## 2023-08-24 ENCOUNTER — REMOTE DEVICE CLINIC VISIT (OUTPATIENT)
Dept: CARDIOLOGY CLINIC | Facility: CLINIC | Age: 88
End: 2023-08-24
Payer: COMMERCIAL

## 2023-08-24 DIAGNOSIS — Z95.0 CARDIAC PACEMAKER IN SITU: Primary | ICD-10-CM

## 2023-08-24 PROCEDURE — 93296 REM INTERROG EVL PM/IDS: CPT | Performed by: INTERNAL MEDICINE

## 2023-08-24 PROCEDURE — 93294 REM INTERROG EVL PM/LDLS PM: CPT | Performed by: INTERNAL MEDICINE

## 2023-08-24 NOTE — PROGRESS NOTES
Results for orders placed or performed in visit on 08/24/23   Cardiac EP device report    Narrative    MDT SINGLE CHAMBER PPM - ACTIVE SYSTEM IS MRI CONDITIONAL  CARELINK TRANSMISSION: BATTERY VOLTAGE ADEQUATE (2.4 YRS). -98% (DPENDENT). ALL AVAILABLE LEAD PARAMETERS WITHIN NORMAL LIMITS. NO SIGNIFICANT HIGH RATE EPISODES. NORMAL DEVICE FUNCTION.  GV

## 2023-08-28 ENCOUNTER — HOSPITAL ENCOUNTER (INPATIENT)
Facility: HOSPITAL | Age: 88
DRG: 856 | End: 2023-08-28
Attending: ORTHOPAEDIC SURGERY | Admitting: SURGERY
Payer: COMMERCIAL

## 2023-08-28 ENCOUNTER — TELEPHONE (OUTPATIENT)
Age: 88
End: 2023-08-28

## 2023-08-28 DIAGNOSIS — I10 BENIGN ESSENTIAL HTN: ICD-10-CM

## 2023-08-28 DIAGNOSIS — T84.51XD INFECTION ASSOCIATED WITH INTERNAL RIGHT HIP PROSTHESIS, SUBSEQUENT ENCOUNTER: ICD-10-CM

## 2023-08-28 DIAGNOSIS — T81.31XS DEHISCENCE OF OPERATIVE WOUND, SEQUELA: ICD-10-CM

## 2023-08-28 DIAGNOSIS — T07.XXXA MULTIPLE WOUNDS: ICD-10-CM

## 2023-08-28 DIAGNOSIS — Z89.621 ACQUIRED ABSENCE OF RIGHT HIP JOINT FOLLOWING EXPLANTATION OF JOINT PROSTHESIS WITHOUT PRESENCE OF ANTIBIOTIC-IMPREGNATED CEMENT SPACER: ICD-10-CM

## 2023-08-28 DIAGNOSIS — I48.20 CHRONIC ATRIAL FIBRILLATION (HCC): ICD-10-CM

## 2023-08-28 DIAGNOSIS — N18.2 STAGE 2 CHRONIC KIDNEY DISEASE: ICD-10-CM

## 2023-08-28 DIAGNOSIS — R09.81 CHRONIC NASAL CONGESTION: ICD-10-CM

## 2023-08-28 DIAGNOSIS — R11.0 NAUSEA: ICD-10-CM

## 2023-08-28 DIAGNOSIS — R60.9 SWELLING: ICD-10-CM

## 2023-08-28 DIAGNOSIS — Z90.3 STATUS POST GASTRECTOMY: ICD-10-CM

## 2023-08-28 DIAGNOSIS — T84.51XA INFECTION ASSOCIATED WITH INTERNAL RIGHT HIP PROSTHESIS, INITIAL ENCOUNTER (HCC): Primary | ICD-10-CM

## 2023-08-28 DIAGNOSIS — K64.4 EXTERNAL HEMORRHOIDS: ICD-10-CM

## 2023-08-28 DIAGNOSIS — E03.9 HYPOTHYROIDISM, UNSPECIFIED TYPE: ICD-10-CM

## 2023-08-28 LAB
ALBUMIN SERPL BCP-MCNC: 2.3 G/DL (ref 3.5–5)
ALP SERPL-CCNC: 102 U/L (ref 34–104)
ALT SERPL W P-5'-P-CCNC: 10 U/L (ref 7–52)
ANION GAP SERPL CALCULATED.3IONS-SCNC: 7 MMOL/L
ANION GAP SERPL CALCULATED.3IONS-SCNC: 8 MMOL/L
AST SERPL W P-5'-P-CCNC: 11 U/L (ref 13–39)
BASOPHILS # BLD AUTO: 0.02 THOUSANDS/ÂΜL (ref 0–0.1)
BASOPHILS NFR BLD AUTO: 0 % (ref 0–1)
BILIRUB SERPL-MCNC: 0.35 MG/DL (ref 0.2–1)
BUN SERPL-MCNC: 32 MG/DL (ref 5–25)
BUN SERPL-MCNC: 35 MG/DL (ref 5–25)
CA-I BLD-SCNC: 1.09 MMOL/L (ref 1.12–1.32)
CALCIUM ALBUM COR SERPL-MCNC: 9 MG/DL (ref 8.3–10.1)
CALCIUM SERPL-MCNC: 7.5 MG/DL (ref 8.4–10.2)
CALCIUM SERPL-MCNC: 7.6 MG/DL (ref 8.4–10.2)
CHLORIDE SERPL-SCNC: 106 MMOL/L (ref 96–108)
CHLORIDE SERPL-SCNC: 106 MMOL/L (ref 96–108)
CO2 SERPL-SCNC: 22 MMOL/L (ref 21–32)
CO2 SERPL-SCNC: 22 MMOL/L (ref 21–32)
CREAT SERPL-MCNC: 1.23 MG/DL (ref 0.6–1.3)
CREAT SERPL-MCNC: 1.35 MG/DL (ref 0.6–1.3)
EOSINOPHIL # BLD AUTO: 0.05 THOUSAND/ÂΜL (ref 0–0.61)
EOSINOPHIL NFR BLD AUTO: 0 % (ref 0–6)
ERYTHROCYTE [DISTWIDTH] IN BLOOD BY AUTOMATED COUNT: 14.4 % (ref 11.6–15.1)
ERYTHROCYTE [DISTWIDTH] IN BLOOD BY AUTOMATED COUNT: 14.6 % (ref 11.6–15.1)
GFR SERPL CREATININE-BSD FRML MDRD: 34 ML/MIN/1.73SQ M
GFR SERPL CREATININE-BSD FRML MDRD: 38 ML/MIN/1.73SQ M
GLUCOSE P FAST SERPL-MCNC: 79 MG/DL (ref 65–99)
GLUCOSE SERPL-MCNC: 129 MG/DL (ref 65–140)
GLUCOSE SERPL-MCNC: 43 MG/DL (ref 65–140)
GLUCOSE SERPL-MCNC: 67 MG/DL (ref 65–140)
GLUCOSE SERPL-MCNC: 69 MG/DL (ref 65–140)
GLUCOSE SERPL-MCNC: 78 MG/DL (ref 65–140)
GLUCOSE SERPL-MCNC: 79 MG/DL (ref 65–140)
GLUCOSE SERPL-MCNC: 80 MG/DL (ref 65–140)
GLUCOSE SERPL-MCNC: 89 MG/DL (ref 65–140)
HCT VFR BLD AUTO: 25.8 % (ref 34.8–46.1)
HCT VFR BLD AUTO: 31 % (ref 34.8–46.1)
HGB BLD-MCNC: 7.7 G/DL (ref 11.5–15.4)
HGB BLD-MCNC: 9.8 G/DL (ref 11.5–15.4)
IMM GRANULOCYTES # BLD AUTO: 0.11 THOUSAND/UL (ref 0–0.2)
IMM GRANULOCYTES NFR BLD AUTO: 1 % (ref 0–2)
INR PPP: 2.27 (ref 0.84–1.19)
LACTATE SERPL-SCNC: 1.4 MMOL/L (ref 0.5–2)
LACTATE SERPL-SCNC: 2.3 MMOL/L (ref 0.5–2)
LYMPHOCYTES # BLD AUTO: 1.59 THOUSANDS/ÂΜL (ref 0.6–4.47)
LYMPHOCYTES NFR BLD AUTO: 13 % (ref 14–44)
MAGNESIUM SERPL-MCNC: 1.4 MG/DL (ref 1.9–2.7)
MCH RBC QN AUTO: 32.2 PG (ref 26.8–34.3)
MCH RBC QN AUTO: 32.7 PG (ref 26.8–34.3)
MCHC RBC AUTO-ENTMCNC: 29.8 G/DL (ref 31.4–37.4)
MCHC RBC AUTO-ENTMCNC: 31.6 G/DL (ref 31.4–37.4)
MCV RBC AUTO: 103 FL (ref 82–98)
MCV RBC AUTO: 108 FL (ref 82–98)
MONOCYTES # BLD AUTO: 0.98 THOUSAND/ÂΜL (ref 0.17–1.22)
MONOCYTES NFR BLD AUTO: 8 % (ref 4–12)
NEUTROPHILS # BLD AUTO: 9.6 THOUSANDS/ÂΜL (ref 1.85–7.62)
NEUTS SEG NFR BLD AUTO: 78 % (ref 43–75)
NRBC BLD AUTO-RTO: 0 /100 WBCS
PHOSPHATE SERPL-MCNC: 3.2 MG/DL (ref 2.3–4.1)
PLATELET # BLD AUTO: 205 THOUSANDS/UL (ref 149–390)
PLATELET # BLD AUTO: 226 THOUSANDS/UL (ref 149–390)
PMV BLD AUTO: 8.7 FL (ref 8.9–12.7)
PMV BLD AUTO: 9.1 FL (ref 8.9–12.7)
POTASSIUM SERPL-SCNC: 3.1 MMOL/L (ref 3.5–5.3)
POTASSIUM SERPL-SCNC: 3.8 MMOL/L (ref 3.5–5.3)
PROT SERPL-MCNC: 4.7 G/DL (ref 6.4–8.4)
PROTHROMBIN TIME: 25.3 SECONDS (ref 11.6–14.5)
RBC # BLD AUTO: 2.39 MILLION/UL (ref 3.81–5.12)
RBC # BLD AUTO: 3 MILLION/UL (ref 3.81–5.12)
SODIUM SERPL-SCNC: 135 MMOL/L (ref 135–147)
SODIUM SERPL-SCNC: 136 MMOL/L (ref 135–147)
WBC # BLD AUTO: 11.28 THOUSAND/UL (ref 4.31–10.16)
WBC # BLD AUTO: 12.35 THOUSAND/UL (ref 4.31–10.16)

## 2023-08-28 PROCEDURE — 85025 COMPLETE CBC W/AUTO DIFF WBC: CPT

## 2023-08-28 PROCEDURE — 93005 ELECTROCARDIOGRAM TRACING: CPT

## 2023-08-28 PROCEDURE — 82948 REAGENT STRIP/BLOOD GLUCOSE: CPT

## 2023-08-28 PROCEDURE — 85610 PROTHROMBIN TIME: CPT | Performed by: ORTHOPAEDIC SURGERY

## 2023-08-28 PROCEDURE — 87102 FUNGUS ISOLATION CULTURE: CPT | Performed by: ORTHOPAEDIC SURGERY

## 2023-08-28 PROCEDURE — 87070 CULTURE OTHR SPECIMN AEROBIC: CPT | Performed by: ORTHOPAEDIC SURGERY

## 2023-08-28 PROCEDURE — 83735 ASSAY OF MAGNESIUM: CPT | Performed by: PHYSICIAN ASSISTANT

## 2023-08-28 PROCEDURE — 85025 COMPLETE CBC W/AUTO DIFF WBC: CPT | Performed by: PHYSICIAN ASSISTANT

## 2023-08-28 PROCEDURE — 84100 ASSAY OF PHOSPHORUS: CPT | Performed by: PHYSICIAN ASSISTANT

## 2023-08-28 PROCEDURE — 87075 CULTR BACTERIA EXCEPT BLOOD: CPT | Performed by: ORTHOPAEDIC SURGERY

## 2023-08-28 PROCEDURE — 87206 SMEAR FLUORESCENT/ACID STAI: CPT | Performed by: ORTHOPAEDIC SURGERY

## 2023-08-28 PROCEDURE — 10180 I&D COMPLEX PO WOUND INFCTJ: CPT | Performed by: ORTHOPAEDIC SURGERY

## 2023-08-28 PROCEDURE — 11042 DBRDMT SUBQ TIS 1ST 20SQCM/<: CPT | Performed by: ORTHOPAEDIC SURGERY

## 2023-08-28 PROCEDURE — 87077 CULTURE AEROBIC IDENTIFY: CPT | Performed by: ORTHOPAEDIC SURGERY

## 2023-08-28 PROCEDURE — 99223 1ST HOSP IP/OBS HIGH 75: CPT | Performed by: SURGERY

## 2023-08-28 PROCEDURE — 82330 ASSAY OF CALCIUM: CPT | Performed by: PHYSICIAN ASSISTANT

## 2023-08-28 PROCEDURE — 80053 COMPREHEN METABOLIC PANEL: CPT | Performed by: PHYSICIAN ASSISTANT

## 2023-08-28 PROCEDURE — 85027 COMPLETE CBC AUTOMATED: CPT | Performed by: ORTHOPAEDIC SURGERY

## 2023-08-28 PROCEDURE — 02HV33Z INSERTION OF INFUSION DEVICE INTO SUPERIOR VENA CAVA, PERCUTANEOUS APPROACH: ICD-10-PCS | Performed by: ORTHOPAEDIC SURGERY

## 2023-08-28 PROCEDURE — 30233N1 TRANSFUSION OF NONAUTOLOGOUS RED BLOOD CELLS INTO PERIPHERAL VEIN, PERCUTANEOUS APPROACH: ICD-10-PCS | Performed by: ORTHOPAEDIC SURGERY

## 2023-08-28 PROCEDURE — 87252 VIRUS INOCULATION TISSUE: CPT | Performed by: ORTHOPAEDIC SURGERY

## 2023-08-28 PROCEDURE — 87205 SMEAR GRAM STAIN: CPT | Performed by: ORTHOPAEDIC SURGERY

## 2023-08-28 PROCEDURE — 0JBL0ZZ EXCISION OF RIGHT UPPER LEG SUBCUTANEOUS TISSUE AND FASCIA, OPEN APPROACH: ICD-10-PCS | Performed by: ORTHOPAEDIC SURGERY

## 2023-08-28 PROCEDURE — 80048 BASIC METABOLIC PNL TOTAL CA: CPT | Performed by: ORTHOPAEDIC SURGERY

## 2023-08-28 PROCEDURE — 87116 MYCOBACTERIA CULTURE: CPT | Performed by: ORTHOPAEDIC SURGERY

## 2023-08-28 PROCEDURE — 99024 POSTOP FOLLOW-UP VISIT: CPT | Performed by: ORTHOPAEDIC SURGERY

## 2023-08-28 PROCEDURE — 87081 CULTURE SCREEN ONLY: CPT | Performed by: ORTHOPAEDIC SURGERY

## 2023-08-28 PROCEDURE — 87186 SC STD MICRODIL/AGAR DIL: CPT | Performed by: ORTHOPAEDIC SURGERY

## 2023-08-28 PROCEDURE — 83605 ASSAY OF LACTIC ACID: CPT | Performed by: PHYSICIAN ASSISTANT

## 2023-08-28 RX ORDER — FENTANYL CITRATE/PF 50 MCG/ML
25 SYRINGE (ML) INJECTION
Status: DISCONTINUED | OUTPATIENT
Start: 2023-08-28 | End: 2023-08-28 | Stop reason: HOSPADM

## 2023-08-28 RX ORDER — CHLORHEXIDINE GLUCONATE 0.12 MG/ML
15 RINSE ORAL ONCE
Status: DISCONTINUED | OUTPATIENT
Start: 2023-08-28 | End: 2023-08-28

## 2023-08-28 RX ORDER — MAGNESIUM SULFATE HEPTAHYDRATE 40 MG/ML
2 INJECTION, SOLUTION INTRAVENOUS ONCE
Status: COMPLETED | OUTPATIENT
Start: 2023-08-28 | End: 2023-08-28

## 2023-08-28 RX ORDER — ACETAMINOPHEN 325 MG/1
975 TABLET ORAL EVERY 8 HOURS SCHEDULED
Status: DISCONTINUED | OUTPATIENT
Start: 2023-08-28 | End: 2023-08-30

## 2023-08-28 RX ORDER — CEFAZOLIN SODIUM 2 G/50ML
2000 SOLUTION INTRAVENOUS ONCE
Status: DISCONTINUED | OUTPATIENT
Start: 2023-08-28 | End: 2023-08-28 | Stop reason: HOSPADM

## 2023-08-28 RX ORDER — SODIUM CHLORIDE, SODIUM LACTATE, POTASSIUM CHLORIDE, CALCIUM CHLORIDE 600; 310; 30; 20 MG/100ML; MG/100ML; MG/100ML; MG/100ML
125 INJECTION, SOLUTION INTRAVENOUS CONTINUOUS
Status: DISCONTINUED | OUTPATIENT
Start: 2023-08-28 | End: 2023-08-28

## 2023-08-28 RX ORDER — SODIUM CHLORIDE, SODIUM GLUCONATE, SODIUM ACETATE, POTASSIUM CHLORIDE, MAGNESIUM CHLORIDE, SODIUM PHOSPHATE, DIBASIC, AND POTASSIUM PHOSPHATE .53; .5; .37; .037; .03; .012; .00082 G/100ML; G/100ML; G/100ML; G/100ML; G/100ML; G/100ML; G/100ML
500 INJECTION, SOLUTION INTRAVENOUS ONCE
Status: COMPLETED | OUTPATIENT
Start: 2023-08-28 | End: 2023-08-28

## 2023-08-28 RX ORDER — PROPOFOL 10 MG/ML
INJECTION, EMULSION INTRAVENOUS AS NEEDED
Status: DISCONTINUED | OUTPATIENT
Start: 2023-08-28 | End: 2023-08-28

## 2023-08-28 RX ORDER — PANTOPRAZOLE SODIUM 40 MG/1
40 TABLET, DELAYED RELEASE ORAL
Status: DISCONTINUED | OUTPATIENT
Start: 2023-08-29 | End: 2023-08-31

## 2023-08-28 RX ORDER — LEVOTHYROXINE SODIUM 112 UG/1
112 TABLET ORAL
Status: DISCONTINUED | OUTPATIENT
Start: 2023-08-29 | End: 2023-08-31

## 2023-08-28 RX ORDER — HEPARIN SODIUM 5000 [USP'U]/ML
5000 INJECTION, SOLUTION INTRAVENOUS; SUBCUTANEOUS EVERY 8 HOURS SCHEDULED
Status: DISCONTINUED | OUTPATIENT
Start: 2023-08-29 | End: 2023-09-03

## 2023-08-28 RX ORDER — AMOXICILLIN 250 MG
CAPSULE ORAL DAILY
Status: DISCONTINUED | OUTPATIENT
Start: 2023-08-28 | End: 2023-09-02

## 2023-08-28 RX ORDER — DEXTROSE MONOHYDRATE 25 G/50ML
25 INJECTION, SOLUTION INTRAVENOUS ONCE
Status: COMPLETED | OUTPATIENT
Start: 2023-08-28 | End: 2023-08-29

## 2023-08-28 RX ORDER — POTASSIUM CHLORIDE 14.9 MG/ML
20 INJECTION INTRAVENOUS
Status: COMPLETED | OUTPATIENT
Start: 2023-08-28 | End: 2023-08-29

## 2023-08-28 RX ORDER — DEXTROSE MONOHYDRATE 25 G/50ML
25 INJECTION, SOLUTION INTRAVENOUS ONCE
Status: COMPLETED | OUTPATIENT
Start: 2023-08-28 | End: 2023-08-28

## 2023-08-28 RX ORDER — TORSEMIDE 20 MG/1
40 TABLET ORAL DAILY
Status: DISCONTINUED | OUTPATIENT
Start: 2023-08-29 | End: 2023-08-29

## 2023-08-28 RX ORDER — FERROUS SULFATE 325(65) MG
325 TABLET ORAL
Status: DISCONTINUED | OUTPATIENT
Start: 2023-08-29 | End: 2023-09-01

## 2023-08-28 RX ORDER — ONDANSETRON 2 MG/ML
4 INJECTION INTRAMUSCULAR; INTRAVENOUS ONCE AS NEEDED
Status: DISCONTINUED | OUTPATIENT
Start: 2023-08-28 | End: 2023-08-28 | Stop reason: HOSPADM

## 2023-08-28 RX ORDER — CEFAZOLIN SODIUM 1 G/3ML
INJECTION, POWDER, FOR SOLUTION INTRAMUSCULAR; INTRAVENOUS AS NEEDED
Status: DISCONTINUED | OUTPATIENT
Start: 2023-08-28 | End: 2023-08-28

## 2023-08-28 RX ORDER — SODIUM CHLORIDE, SODIUM LACTATE, POTASSIUM CHLORIDE, CALCIUM CHLORIDE 600; 310; 30; 20 MG/100ML; MG/100ML; MG/100ML; MG/100ML
INJECTION, SOLUTION INTRAVENOUS CONTINUOUS PRN
Status: DISCONTINUED | OUTPATIENT
Start: 2023-08-28 | End: 2023-08-28

## 2023-08-28 RX ORDER — ONDANSETRON 2 MG/ML
INJECTION INTRAMUSCULAR; INTRAVENOUS AS NEEDED
Status: DISCONTINUED | OUTPATIENT
Start: 2023-08-28 | End: 2023-08-28

## 2023-08-28 RX ORDER — CEFAZOLIN SODIUM 2 G/50ML
2000 SOLUTION INTRAVENOUS EVERY 8 HOURS
Status: DISCONTINUED | OUTPATIENT
Start: 2023-08-28 | End: 2023-08-28

## 2023-08-28 RX ORDER — CHLORHEXIDINE GLUCONATE 0.12 MG/ML
15 RINSE ORAL EVERY 12 HOURS SCHEDULED
Status: DISCONTINUED | OUTPATIENT
Start: 2023-08-28 | End: 2023-09-05 | Stop reason: HOSPADM

## 2023-08-28 RX ORDER — ENOXAPARIN SODIUM 100 MG/ML
40 INJECTION SUBCUTANEOUS DAILY
Status: DISCONTINUED | OUTPATIENT
Start: 2023-08-28 | End: 2023-08-28

## 2023-08-28 RX ORDER — SIMETHICONE 80 MG
80 TABLET,CHEWABLE ORAL EVERY 6 HOURS PRN
Status: DISCONTINUED | OUTPATIENT
Start: 2023-08-28 | End: 2023-09-01

## 2023-08-28 RX ORDER — DULOXETIN HYDROCHLORIDE 60 MG/1
60 CAPSULE, DELAYED RELEASE ORAL DAILY
Status: DISCONTINUED | OUTPATIENT
Start: 2023-08-28 | End: 2023-09-05 | Stop reason: HOSPADM

## 2023-08-28 RX ORDER — ALBUMIN, HUMAN INJ 5% 5 %
SOLUTION INTRAVENOUS CONTINUOUS PRN
Status: DISCONTINUED | OUTPATIENT
Start: 2023-08-28 | End: 2023-08-28

## 2023-08-28 RX ORDER — FENTANYL CITRATE 50 UG/ML
INJECTION, SOLUTION INTRAMUSCULAR; INTRAVENOUS AS NEEDED
Status: DISCONTINUED | OUTPATIENT
Start: 2023-08-28 | End: 2023-08-28

## 2023-08-28 RX ADMIN — DULOXETINE HYDROCHLORIDE 60 MG: 60 CAPSULE, DELAYED RELEASE ORAL at 15:44

## 2023-08-28 RX ADMIN — ONDANSETRON 4 MG: 2 INJECTION INTRAMUSCULAR; INTRAVENOUS at 11:58

## 2023-08-28 RX ADMIN — DEXTROSE MONOHYDRATE 25 ML: 25 INJECTION, SOLUTION INTRAVENOUS at 13:27

## 2023-08-28 RX ADMIN — PROPOFOL 90 MG: 10 INJECTION, EMULSION INTRAVENOUS at 11:30

## 2023-08-28 RX ADMIN — SODIUM CHLORIDE, SODIUM LACTATE, POTASSIUM CHLORIDE, AND CALCIUM CHLORIDE: .6; .31; .03; .02 INJECTION, SOLUTION INTRAVENOUS at 11:21

## 2023-08-28 RX ADMIN — ACETAMINOPHEN 975 MG: 325 TABLET, FILM COATED ORAL at 21:47

## 2023-08-28 RX ADMIN — PIPERACILLIN SODIUM AND TAZOBACTAM SODIUM 4.5 G: 36; 4.5 INJECTION, POWDER, LYOPHILIZED, FOR SOLUTION INTRAVENOUS at 20:22

## 2023-08-28 RX ADMIN — ACETAMINOPHEN 975 MG: 325 TABLET, FILM COATED ORAL at 15:44

## 2023-08-28 RX ADMIN — ENOXAPARIN SODIUM 40 MG: 40 INJECTION SUBCUTANEOUS at 15:44

## 2023-08-28 RX ADMIN — POTASSIUM CHLORIDE 20 MEQ: 14.9 INJECTION, SOLUTION INTRAVENOUS at 22:39

## 2023-08-28 RX ADMIN — POTASSIUM CHLORIDE 20 MEQ: 14.9 INJECTION, SOLUTION INTRAVENOUS at 19:52

## 2023-08-28 RX ADMIN — PROPOFOL 60 MCG/KG/MIN: 10 INJECTION, EMULSION INTRAVENOUS at 11:34

## 2023-08-28 RX ADMIN — CHLORHEXIDINE GLUCONATE 15 ML: 1.2 SOLUTION ORAL at 20:23

## 2023-08-28 RX ADMIN — FENTANYL CITRATE 25 MCG: 50 INJECTION, SOLUTION INTRAMUSCULAR; INTRAVENOUS at 11:58

## 2023-08-28 RX ADMIN — MAGNESIUM SULFATE HEPTAHYDRATE 2 G: 40 INJECTION, SOLUTION INTRAVENOUS at 18:01

## 2023-08-28 RX ADMIN — SODIUM CHLORIDE, SODIUM GLUCONATE, SODIUM ACETATE, POTASSIUM CHLORIDE, MAGNESIUM CHLORIDE, SODIUM PHOSPHATE, DIBASIC, AND POTASSIUM PHOSPHATE 500 ML: .53; .5; .37; .037; .03; .012; .00082 INJECTION, SOLUTION INTRAVENOUS at 20:21

## 2023-08-28 RX ADMIN — SODIUM CHLORIDE, SODIUM LACTATE, POTASSIUM CHLORIDE, AND CALCIUM CHLORIDE 125 ML/HR: .6; .31; .03; .02 INJECTION, SOLUTION INTRAVENOUS at 08:13

## 2023-08-28 RX ADMIN — POTASSIUM CHLORIDE 20 MEQ: 14.9 INJECTION, SOLUTION INTRAVENOUS at 18:01

## 2023-08-28 RX ADMIN — DEXTROSE MONOHYDRATE 25 ML: 25 INJECTION, SOLUTION INTRAVENOUS at 12:55

## 2023-08-28 RX ADMIN — ALBUMIN (HUMAN): 12.5 INJECTION, SOLUTION INTRAVENOUS at 11:31

## 2023-08-28 RX ADMIN — PHENYLEPHRINE HYDROCHLORIDE 60 MCG/MIN: 10 INJECTION INTRAVENOUS at 11:34

## 2023-08-28 RX ADMIN — PIPERACILLIN SODIUM AND TAZOBACTAM SODIUM 4.5 G: 36; 4.5 INJECTION, POWDER, LYOPHILIZED, FOR SOLUTION INTRAVENOUS at 16:42

## 2023-08-28 RX ADMIN — SENNOSIDES AND DOCUSATE SODIUM 1 TABLET: 50; 8.6 TABLET ORAL at 15:44

## 2023-08-28 RX ADMIN — ALBUMIN (HUMAN): 12.5 INJECTION, SOLUTION INTRAVENOUS at 11:47

## 2023-08-28 RX ADMIN — CEFAZOLIN 2000 MG: 1 INJECTION, POWDER, FOR SOLUTION INTRAMUSCULAR; INTRAVENOUS at 11:56

## 2023-08-28 NOTE — ANESTHESIA POSTPROCEDURE EVALUATION
Post-Op Assessment Note    CV Status:  Stable    Pain management: adequate     Mental Status:  Sleepy   Hydration Status:  Stable   PONV Controlled:  Controlled   Airway Patency:  Patent      Post Op Vitals Reviewed: Yes      Staff: CRNA, Anesthesiologist         No notable events documented.     /58 (08/28/23 1245)    Temp (!) 97.4 °F (36.3 °C) (08/28/23 1245)    Pulse 72 (08/28/23 1245)   Resp 16 (08/28/23 1245)    SpO2 100 % (08/28/23 1245)

## 2023-08-28 NOTE — INTERVAL H&P NOTE
H&P reviewed. After examining the patient I find no changes in the patients condition since the H&P had been written. 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   Patient is familiar with the risks and benefits

## 2023-08-28 NOTE — ANESTHESIA PREPROCEDURE EVALUATION
Procedure:  INCISION AND DRAINAGE (I&D) with closure to right hip wound (Right: Hip)    Relevant Problems   CARDIO   (+) Benign essential HTN   (+) Chronic atrial fibrillation (HCC)   (+) Moderate mitral regurgitation      ENDO   (+) Hypothyroidism      /RENAL   (+) Stage 2 chronic kidney disease      HEMATOLOGY   (+) Anemia      MUSCULOSKELETAL   (+) Fibromyalgia   (+) Low back pain with sciatica   (+) Lumbar spondylosis   (+) Osteoarthritis, multiple sites   (+) Primary osteoarthritis of right knee      NEURO/PSYCH   (+) Chronic pain   (+) Depression   (+) Fibromyalgia      Other   (+) Osteomyelitis (HCC)     Hemoglobin    9.3             Physical Exam    Airway    Mallampati score: II  TM Distance: >3 FB  Neck ROM: full     Dental       Cardiovascular      Pulmonary      Other Findings        Anesthesia Plan  ASA Score- 3     Anesthesia Type- general with ASA Monitors. Additional Monitors:   Airway Plan: LMA. Comment: Propofol gtt. Plan Factors-    Chart reviewed. Induction- intravenous. Postoperative Plan-     Informed Consent- Anesthetic plan and risks discussed with patient. I personally reviewed this patient with the CRNA. Discussed and agreed on the Anesthesia Plan with the CRNA. Vickey Flynn

## 2023-08-28 NOTE — PROGRESS NOTES
Progress Note - Orthopedics   Toney Buckner 80 y.o. female MRN: [id number]  Unit/Bed#: PACU      Subjective:    80 y. o.female POD 0 R Hip I&D and closure. No acute events, no new complaints.      Labs:  0   Lab Value Date/Time    HCT 25.8 (L) 08/28/2023 1659    HCT 31.0 (L) 08/28/2023 0759    HCT 30.7 (L) 07/25/2023 1133    HCT 37.0 04/20/2015 1109    HCT 38.0 04/20/2015 1109    HCT 37.2 04/03/2015 0831    HGB 7.7 (L) 08/28/2023 1659    HGB 9.8 (L) 08/28/2023 0759    HGB 9.3 (L) 07/25/2023 1133    HGB 12.0 04/20/2015 1109    HGB 11.6 04/03/2015 0831    HGB 12.1 11/04/2014 1025    INR 2.27 (H) 08/28/2023 0759    WBC 12.35 (H) 08/28/2023 1659    WBC 11.28 (H) 08/28/2023 0759    WBC 8.35 07/25/2023 1133    WBC 7.98 04/20/2015 1109    WBC 7.29 04/03/2015 0831    WBC 6.66 11/04/2014 1025    ESR 39 (H) 07/25/2023 1133    CRP 17.6 (H) 07/25/2023 1133       Meds:    Current Facility-Administered Medications:   •  acetaminophen (TYLENOL) tablet 975 mg, 975 mg, Oral, Q8H [address] PA-C, 975 mg at 08/28/23 1544  •  chlorhexidine (PERIDEX) 0.12 % oral rinse 15 mL, 15 mL, Mouth/Throat, Q12H [address] PA-C  •  DULoxetine (CYMBALTA) delayed release capsule 60 mg, 60 mg, Oral, Daily, Magdaleno Lizama PA-C, 60 mg at 08/28/23 1544  •  [START ON 8/29/2023] ferrous sulfate tablet 325 mg, 325 mg, Oral, Daily With Breakfast, Magdaleno Lizama PA-C  •  [START ON 8/29/2023] heparin (porcine) subcutaneous injection 5,000 Units, 5,000 Units, Subcutaneous, Q8H [address], Magdaleno Lizama PA-C  •  [START ON 8/29/2023] levothyroxine tablet 112 mcg, 112 mcg, Oral, Early Morning, Magdaleno Lizama PA-C  •  magnesium sulfate 2 g/50 mL IVPB (premix) 2 g, 2 g, Intravenous, Once, TEJAS Weiner, Last Rate: 25 mL/hr at 08/28/23 1801, 2 g at 08/28/23 1801  •  [START ON 8/29/2023] pantoprazole (PROTONIX) EC tablet 40 mg, 40 mg, Oral, Early Morning, Magdaleno Lizama PA-C  •  piperacillin-tazobactam (ZOSYN) 4.5 g in sodium chloride 0.9 % 100 mL IVPB (EXTENDED INFUSION), 4.5 g, Intravenous, Q8H, Shahla Cordova MD  •  potassium chloride 20 mEq IVPB (premix), 20 mEq, Intravenous, Q2H, TEJAS Weiner, Last Rate: 50 mL/hr at 08/28/23 1801, 20 mEq at 08/28/23 1801  •  senna-docusate sodium (SENOKOT S) 8.6-50 mg per tablet, , Oral, Daily, KASSANDRA Rincon-C, 1 tablet at 08/28/23 1544  •  simethicone (MYLICON) chewable tablet 80 mg, 80 mg, Oral, Q6H PRN, Lestine Pehuey, PA-C  •  [START ON 8/29/2023] torsemide (DEMADEX) tablet 40 mg, 40 mg, Oral, Daily, Lestine Pehuey, PA-C    Blood Culture:   Lab Results   Component Value Date    BLOODCX No Growth After 5 Days. 06/27/2023    BLOODCX No Growth After 5 Days. 06/27/2023       Wound Culture:   Lab Results   Component Value Date    WOUNDCULT No growth 06/28/2023       Ins and Outs:  I/O last 24 hours: In: 2550 [P.O.:220; I.V.:1700; IV Piggyback:630]  Out: 275 [Urine:225; Blood:50]          Physical:  Vitals:    08/28/23 1800   BP: 98/56   Pulse: 69   Resp: 19   Temp:    SpO2: 95%     Musculoskeletal: right Lower Extremity    · Skin warm, . No erythema or ecchymosis. · Dressing with moderate amount of blood   · TTP humberto-incisionally  · Sensation intact to saphenous, sural, tibial, superficial peroneal nerve, and deep peroneal  · Motor intact to +FHL/EHL, +ankle dorsi/plantar flexion  · 2+ DP pulse  · Digits warm and well perfused  · Capillary refill < 2 seconds    Assessment:    80 y. o.female POD 0 R hip I&D and closure . Patient doing well.      Plan:  · NWB RLE  · F/u cx  · IV abx per SICU  · Will monitor for ABLA and administer IVF/prbc as indicated for Greater than 2 gram drop or Hgb < 7  · PT/OT  · Pain control  · DVT ppx SQH  · Dispo: Ortho will follow    Marcel Amaral MD

## 2023-08-28 NOTE — CONSULTS
[address] [place] [address]  Consult: Critical Care  Name: Aditya Caballero 80 y.o. female I MRN: [id number]  Unit/Bed#: [facility] 798-17 I Date of Admission: 8/28/2023   Date of Service: 8/28/2023 I Hospital Day: 0      Consults  Assessment/Plan   Neuro:   · Diagnosis: I&D R hip septic joint with wound closure   · Plan: Dilaudid PRN, tylenol 975 Q8H        CV:   · Diagnosis: HFpEF  · Continue home torsemide 40mg; can consider IV lasix 2x home dose given BLE pitting edema       · Diagnosis: A-fib  · Pacemaker in place  · On coumadin; plan to discontinue today in favor of lovenox. goal INR 2-3. · Lovenox for DVT ppx      CV:   · Diagnosis:  HTN (benign)  Goal BP < 130/80  · If elevated can consider dietary restriction of salt / fluid; no active home meds for HTN        Pulm:  · Diagnosis: No active dx  · Post op incentive spirometry if needed       GI:   · Diagnosis: GERD  · Plan: Continue home PPI; 40mg protonix  · Bowel regimen       :   · Diagnosis: No active dx. · Plan:  Burger in place and will consider removal tomorrow  · Monitor I/O post-op      F/E/N:   · F:  with plan to DC for oral fluids  · E: monitor and replete as needed; hx of hyponatremia   · N: Regular diet, as tolerated; consider sodium restriction for consistently elevated BP    Heme/Onc:   · Diagnosis: anemia  · Plan: monitor H/H  · Consider transfusion for symptomatic Hb < 7    DVT ppx with lovenox with plan to resume coumadin prior to DC      Endo:   · Diagnosis: Hypothyroidism  · Plan: continue home dose of 112mcg levothyroxine      ID:   · Diagnosis: Septic joint of R hip s/p prosthesis removal  · Plan: Zosyn 3.375 Q6H s/p procedure  · ID consulted   · Consider need for outpt abx       MSK/Skin:  · Diagnosis: open  I&D septic R hip  · Plan: Keep incision clean and dry; monitor for signs of infection  · Continue IV abx  · Dressing changes PRN  · Pain control with dilaudid and PO tylenol for OA  · Remain non-weight bearing/ non-ambulatory      Disposition: Critical care     History of Present Illness     HPI: Nely Lee is a 80 y.o. who presents with history of HFpEF, HTN, Mitral and tricuspid regurgitation, atrial fibrillation on warfarin with pacemaker placement (S/P AV node ablation), stage 2 CKD, hypothyroidism, IBS, anemia, and R hip replacement with recurrent dislocations and infections of the joint , presents for repeat R hip I&D. She had been on abx for 6 weeks and was scheduled for joint debridement today in the OR. She tolerated the procedure well but will need to be inpatient for continued treatment. History obtained from chart review. Review of Systems  Historical Information   Past Medical History:  8/1/2016: Abdominal bloating  No date: Anemia  No date: Arthritis  No date: Cancer ([address])      Comment:  basal cell  No date: CHF (congestive heart failure) (HCC)  No date: COVID-19  No date: Disease of thyroid gland  No date: Disturbance of smell and taste      Comment:  disturbance of taste  No date: Effusion of knee joint right  No date: Fibromyalgia  No date: Heart murmur      Comment:  reported a previous heart murmur  No date: History of acute myocardial infarction  No date: History of atrial fibrillation  No date: History of bruising easily  No date: History of cancer  No date: History of dermatitis  No date: History of mammogram  No date: History of methicillin resistant staphylococcus aureus (MRSA)      Comment:  Negative nasal culture, isolation discontinued                8/17/2018.  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)  No date: Irritable bowel syndrome (IBS)  No date: Joint pain, hip  No date: Limb pain  No date: Myalgia      Comment:  myalgia and myositis  No date: Need for prophylactic vaccination against single diseases  No date: Need for prophylactic vaccination and inoculation against   influenza  No date: Preoperative cardiovascular examination  No date: Primary osteoarthritis of right knee  No date: Right leg pain  No date: Vaginal Pap smear      Comment:  reported pap smear Past Surgical History:  No date: ANKLE ARTHRODESIS; Right  No date: BACK SURGERY  No date: BARIATRIC SURGERY  No date: CHOLECYSTECTOMY      Comment:  x2  No date: COLONOSCOPY  3/23/2018: ESOPHAGOGASTRODUODENOSCOPY; N/A      Comment:  Procedure: ESOPHAGOGASTRODUODENOSCOPY (EGD); Surgeon:                Tom Hernadez MD;  Location: [facility]; Service:                Gastroenterology  No date: FOOT SURGERY  8/21/2016: HIP CLOSE REDUCTION; Right      Comment:  Procedure: CLOSED REDUCTION RIGHT TOTAL HIP;  Surgeon:                Alfonzo Dee MD;  Location[facility];  Service:   No date: Molly March / Maritza Salter / Mono Turkmen  8/1/2020: IR BIOPSY OTHER  7/5/2023: IR PICC REPOSITION  No date: JOINT REPLACEMENT      Comment:  LEFT KNEE REPLACEMENT  No date: JOINT REPLACEMENT      Comment:  Right HIP  No date: PILONIDAL CYST EXCISION      Comment:  x2  7/24/2019: AZ EXC B9 LESION MRGN XCP SK TG T/A/L 0.5 CM/<; Right      Comment:  Procedure: REVISION SCAR EXTREMITY Rt Hip; Surgeon:                Bryan Dos Santos MD;  Location[facility];  Service:                Orthopedics  4/15/2019: AZ REVJ TOT HIP ARTHRP [address] [facility] W/WO AGRFT/ALGRFT;  Right      Comment:  Procedure: ARTHROPLASTY HIP TOTAL ACETABULAR REVISION;                 Surgeon: Bryan Dos Santos MD;  Location[facility];                 Service: Orthopedics  No date: REPLACEMENT TOTAL KNEE; Right  No date: SILVANA-EN-Y PROCEDURE      Comment:  x2  No date: TOTAL KNEE ARTHROPLASTY  No date: UMBILICAL HERNIA REPAIR      Comment:  x2  6/30/2023: WOUND DEBRIDEMENT; Right      Comment:  Procedure: DEBRIDEMENT LOWER EXTREMITY ([address] [facility] Damon)- I&D                of right hip with explant of hardware; insertion of                antibiotic beads;  Surgeon: Arlena Alpers, MD;  Location:               [facility];  Service: Orthopedics   Current Outpatient Medications   Medication Instructions   • acetaminophen (TYLENOL) 1,000 mg, Oral, Every 8 hours scheduled   • bisacodyl (DULCOLAX) 10 mg, Rectal, Daily   • Cholecalciferol 5,000 Units, Oral, Daily   • desoximetasone (TOPICORT) 0.25 % cream Topical, As needed   • Diclofenac Sodium (VOLTAREN) 2 g, Topical, 4 times daily   • dicyclomine (BENTYL) 10 mg capsule No dose, route, or frequency recorded. • doxycycline (ADOXA) 100 mg, Oral, 2 times daily   • DULoxetine (CYMBALTA) 60 mg delayed release capsule TAKE 1 CAPSULE BY MOUTH EVERY DAY   • ferrous sulfate 325 mg, Oral, Daily with breakfast   • fluticasone (FLONASE) 50 mcg/act nasal spray SPRAY 2 SPRAYS INTO EACH NOSTRIL EVERY DAY   • gabapentin (NEURONTIN) 300 mg capsule No dose, route, or frequency recorded. • Lactobacillus (Acidophilus) 100 MG CAPS Oral   • levothyroxine 100 mcg tablet TAKE 1 TABLET BY MOUTH EVERY DAY   • loperamide (IMODIUM) 2 mg, Oral, 4 times daily PRN   • nystatin powder No dose, route, or frequency recorded. • pantoprazole (PROTONIX) 40 mg, Oral, Daily (early morning)   • polyethylene glycol (MIRALAX) 17 g, Oral, Daily PRN   • potassium chloride 10% oral solution 20 mEq, Oral, Daily   • senna-docusate sodium (SENOKOT-S) 8.6-50 mg per tablet 1 tablet, Oral, Daily   • simethicone (MYLICON) 80 mg, Oral, Every 6 hours PRN   • sucralfate (CARAFATE) 1 g tablet TAKE 1 TABLET BY MOUTH 2 TIMES A DAY.    • torsemide (DEMADEX) 40 mg, Oral, Daily   • traMADol (ULTRAM) 50 mg, Oral, Every 8 hours PRN, Take 0.5 tab PO every 8 hrs as needed. • warfarin (COUMADIN) 3 mg tablet Please check your INR on Monday 7/10/2023; if INR 2-3, can resume 3 mg Coumadin daily and frequent INR check per protocol    Allergies   Allergen Reactions   • Codeine Vomiting and GI Intolerance     GI upset, ana m morphine  Reaction Date: 24Apr2012;    • Lactose - Food Allergy GI Intolerance   • Milk (Cow) Diarrhea   • Nsaids GI Intolerance   • Other      Other reaction(s): Other (See Comments)  ana m toradol in OR 6/06 sah   • Seasonal Ic  [Cholestatin] Allergic Rhinitis   • Trimethoprim    • Bactrim [Sulfamethoxazole-Trimethoprim] Rash   • Sulfa Antibiotics Rash     Other reaction(s):  Other (See Comments)  takes lasix @home      Social History     Tobacco Use   • Smoking status: Never   • Smokeless tobacco: Never   Vaping Use   • Vaping Use: Never used   Substance Use Topics   • Alcohol use: Not Currently   • Drug use: No    Family History   Problem Relation Age of Onset   • Coronary artery disease Mother    • Heart attack Mother    • Hypertension Mother    • Coronary artery disease Father    • Heart attack Father    • Hypertension Father    • Lymphoma Sister         acute   • Rashes / Skin problems Sister         lymphomatoid papulosis   • Cancer Sister    • Coronary artery disease Brother    • Heart attack Brother         x2   • Aneurysm Neg Hx    • Hyperlipidemia Neg Hx           Objective                            Vitals I/O      Most Recent Min/Max in 24hrs   Temp 97.5 °F (36.4 °C) Temp  Min: 97.4 °F (36.3 °C)  Max: 98.5 °F (36.9 °C)   Pulse 74 Pulse  Min: 68  Max: 74   Resp 20 Resp  Min: 14  Max: 21   BP 98/59 BP  Min: 98/59  Max: 127/58   O2 Sat 98 % SpO2  Min: 87 %  Max: 100 %      Intake/Output Summary (Last 24 hours) at 8/28/2023 1532  Last data filed at 8/28/2023 1430  Gross per 24 hour   Intake 1985.42 ml   Output 225 ml   Net 1760.42 ml         No diet orders on file     Invasive Monitoring Physical exam   PA Catheter   Most Recent  Min/Max in 24hrs    PAP   No data recorded   CVP   No data recorded   CI    No data recorded   SVR   No data recorded          Physical Exam  Eyes:      General: Vision grossly intact. Neglect     Extraocular Movements: Extraocular movements intact. Skin:     General: Skin is warm and dry. Findings: Wound present. Comments: New surgical incision covered with bandage: Posterior right hip. Blood noted contained under dressing, and border of blood line was marked to monitor progression/active bleeding   HENT:      Head: Normocephalic and atraumatic. Nose: No nasal deformity, epistaxis or nasogastric tube. Mouth/Throat:      Mouth: Mucous membranes are dry. Neck:      Vascular: No JVD. No midline tenderness Cardiovascular:      Rate and Rhythm: Normal rate and regular rhythm. Pulses: Normal pulses. Heart sounds: Normal heart sounds. Musculoskeletal:         General: Tenderness present. Right lower le+ Edema present. Left lower leg: 3+ Edema present. Comments: Tenderness palpation of the posterior right hip near surgical site   Abdominal:      Palpations: Abdomen is soft. Tenderness: There is no abdominal tenderness. There is no guarding. Constitutional:       General: She is not in acute distress. Appearance: She is well-developed. She is not ill-appearing or toxic-appearing. Interventions: She is not sedated and not intubated. Pulmonary:      Effort: Pulmonary effort is normal. No respiratory distress. She is not intubated. Breath sounds: Normal breath sounds. No wheezing, rhonchi or rales. Secretions are normal.Psychiatric:         Speech: Speech is not no receptive aphasia or no expressive aphasia. Neurological:      General: No focal deficit present. Mental Status: She is alert and oriented to person, place and time. Mental status is at baseline. She is calm. Cranial Nerves: No dysarthria or facial asymmetry.       Sensory: Sensation is intact. Motor: Strength full and intact in all extremities. Genitourinary/Anorectal:  FoleyVitals reviewed.           Diagnostic Studies      EKG: reviewed   Imaging: none pertinent      Medications:  Scheduled PRN   acetaminophen, 975 mg, Q8H JOSE  cefazolin, 2,000 mg, Q8H  chlorhexidine, 15 mL, Q12H JOSE  DULoxetine, 60 mg, Daily  enoxaparin, 40 mg, Daily  [START ON 8/29/2023] ferrous sulfate, 325 mg, Daily With Breakfast  [START ON 8/29/2023] levothyroxine, 112 mcg, Early Morning  [START ON 8/29/2023] pantoprazole, 40 mg, Early Morning  piperacillin-tazobactam, 3.375 g, Q6H  senna-docusate sodium, , Daily  [START ON 8/29/2023] torsemide, 40 mg, Daily      lactated ringers, 1,000 mL, Once PRN   And  lactated ringers, 1,000 mL, Once PRN  simethicone, 80 mg, Q6H PRN  sodium chloride, 1,000 mL, Once PRN   And  sodium chloride, 1,000 mL, Once PRN       Continuous    lactated ringers, 125 mL/hr, Last Rate: 125 mL/hr (08/28/23 0813)         Labs:    CBC    Recent Labs     08/28/23  0759   WBC 11.28*   HGB 9.8*   HCT 31.0*        BMP    Recent Labs     08/28/23  0759   SODIUM 135   K 3.8      CO2 22   AGAP 7   BUN 35*   CREATININE 1.35*   CALCIUM 7.5*       Coags    Recent Labs     08/28/23  0759   INR 2.27*        Additional Electrolytes  No recent results       Blood Gas    No recent results  No recent results LFTs  No recent results    Infectious  No recent results  Glucose  Recent Labs     08/28/23  0759   GLUC 79              Keith Mendoza MD

## 2023-08-28 NOTE — TELEPHONE ENCOUNTER
Caller: Hien riddle/ [facility] – [place], [place]     Doctor:      Reason for call: Patient had procedure with  and they thought the patient would be back after the procedure but has yet to return. Wanting to find out when she will be returning to facility.      Please advise     Call back#: [phone]

## 2023-08-28 NOTE — PROGRESS NOTES
Progress Note - Orthopedics   Moo Nguyen 80 y.o. female MRN: [id number]  Unit/Bed#: PACU      Subjective:    80 y. o.female POD 1 R hip I&D and closure. No acute events, no new complaints.      Labs:  0   Lab Value Date/Time    HCT 25.8 (L) 08/28/2023 1659    HCT 31.0 (L) 08/28/2023 0759    HCT 30.7 (L) 07/25/2023 1133    HCT 37.0 04/20/2015 1109    HCT 38.0 04/20/2015 1109    HCT 37.2 04/03/2015 0831    HGB 7.7 (L) 08/28/2023 1659    HGB 9.8 (L) 08/28/2023 0759    HGB 9.3 (L) 07/25/2023 1133    HGB 12.0 04/20/2015 1109    HGB 11.6 04/03/2015 0831    HGB 12.1 11/04/2014 1025    INR 2.27 (H) 08/28/2023 0759    WBC 12.35 (H) 08/28/2023 1659    WBC 11.28 (H) 08/28/2023 0759    WBC 8.35 07/25/2023 1133    WBC 7.98 04/20/2015 1109    WBC 7.29 04/03/2015 0831    WBC 6.66 11/04/2014 1025    ESR 39 (H) 07/25/2023 1133    CRP 17.6 (H) 07/25/2023 1133       Meds:    Current Facility-Administered Medications:   •  acetaminophen (TYLENOL) tablet 975 mg, 975 mg, Oral, Q8H [address], Jamel Lyles PA-C, 975 mg at 08/28/23 1544  •  chlorhexidine (PERIDEX) 0.12 % oral rinse 15 mL, 15 mL, Mouth/Throat, Q12H [address], Jamel Lyles PA-C  •  DULoxetine (CYMBALTA) delayed release capsule 60 mg, 60 mg, Oral, Daily, Jamel Lyles PA-C, 60 mg at 08/28/23 1544  •  [START ON 8/29/2023] ferrous sulfate tablet 325 mg, 325 mg, Oral, Daily With Breakfast, Jamel Lyles PA-C  •  [START ON 8/29/2023] heparin (porcine) subcutaneous injection 5,000 Units, 5,000 Units, Subcutaneous, Q8H [address], Jamel Lyles PA-C  •  [START ON 8/29/2023] levothyroxine tablet 112 mcg, 112 mcg, Oral, Early Morning, Jamel Lyles PA-C  •  magnesium sulfate 2 g/50 mL IVPB (premix) 2 g, 2 g, Intravenous, Once, TEJAS Weiner, Last Rate: 25 mL/hr at 08/28/23 1801, 2 g at 08/28/23 1801  •  [START ON 8/29/2023] pantoprazole (PROTONIX) EC tablet 40 mg, 40 mg, Oral, Early Morning, Jamel Lyles PA-C  •  piperacillin-tazobactam (ZOSYN) 4.5 g in sodium chloride 0.9 % 100 mL IVPB (EXTENDED INFUSION), 4.5 g, Intravenous, Q8H, Elsa Martino MD  •  potassium chloride 20 mEq IVPB (premix), 20 mEq, Intravenous, Q2H, TEJAS Weiner, Last Rate: 50 mL/hr at 08/28/23 1801, 20 mEq at 08/28/23 1801  •  senna-docusate sodium (SENOKOT S) 8.6-50 mg per tablet, , Oral, Daily, Gibbonsd Castleman, PA-C, 1 tablet at 08/28/23 1544  •  simethicone (MYLICON) chewable tablet 80 mg, 80 mg, Oral, Q6H PRN, Gibbonsd Castleman, PA-C  •  [START ON 8/29/2023] torsemide (DEMADEX) tablet 40 mg, 40 mg, Oral, Daily, Gibbonsd Castleman, PA-C    Blood Culture:   Lab Results   Component Value Date    BLOODCX No Growth After 5 Days. 06/27/2023    BLOODCX No Growth After 5 Days. 06/27/2023       Wound Culture:   Lab Results   Component Value Date    WOUNDCULT No growth 06/28/2023       Ins and Outs:  I/O last 24 hours: In: 2550 [P.O.:220; I.V.:1700; IV Piggyback:630]  Out: 275 [Urine:225; Blood:50]          Physical:  Vitals:    08/28/23 1800   BP: 98/56   Pulse: 69   Resp: 19   Temp:    SpO2: 95%     Musculoskeletal: right Lower Extremity     • Skin warm, . No erythema or ecchymosis. • Dressing with moderate amount of blood   • TTP humberto-incisionally  • Sensation intact to saphenous, sural, tibial, superficial peroneal nerve, and deep peroneal  • Motor intact to +FHL/EHL, +ankle dorsi/plantar flexion  • 2+ DP pulse  • Digits warm and well perfused  • Capillary refill < 2 seconds    Assessment:    80 y. o.female POD 1 R hip I&D and closure . Patient doing well.      Plan:  · NWB RLE  · F/u cx  · IV abx per SICU  · Will monitor for ABLA and administer IVF/prbc as indicated for Greater than 2 gram drop or Hgb < 7  · PT/OT  · Pain control  · DVT ppx SQH  · Dispo: pending PT and med clearance    Chino Asher MD

## 2023-08-28 NOTE — PLAN OF CARE
Problem: Prexisting or High Potential for Compromised Skin Integrity  Goal: Skin integrity is maintained or improved  Description: INTERVENTIONS:  - Identify patients at risk for skin breakdown  - Assess and monitor skin integrity  - Assess and monitor nutrition and hydration status  - Monitor labs   - Assess for incontinence   - Turn and reposition patient  - Assist with mobility/ambulation  - Relieve pressure over bony prominences  - Avoid friction and shearing  - Provide appropriate hygiene as needed including keeping skin clean and dry  - Evaluate need for skin moisturizer/barrier cream  - Collaborate with interdisciplinary team   - Patient/family teaching  - Consider wound care consult   Outcome: Progressing     Problem: MOBILITY - ADULT  Goal: Maintain or return to baseline ADL function  Description: INTERVENTIONS:  -  Assess patient's ability to carry out ADLs; assess patient's baseline for ADL function and identify physical deficits which impact ability to perform ADLs (bathing, care of mouth/teeth, toileting, grooming, dressing, etc.)  - Assess/evaluate cause of self-care deficits   - Assess range of motion  - Assess patient's mobility; develop plan if impaired  - Assess patient's need for assistive devices and provide as appropriate  - Encourage maximum independence but intervene and supervise when necessary  - Involve family in performance of ADLs  - Assess for home care needs following discharge   - Consider OT consult to assist with ADL evaluation and planning for discharge  - Provide patient education as appropriate  Outcome: Progressing  Goal: Maintains/Returns to pre admission functional level  Description: INTERVENTIONS:  - Perform BMAT or MOVE assessment daily.   - Set and communicate daily mobility goal to care team and patient/family/caregiver.    - Collaborate with rehabilitation services on mobility goals if consulted  - Out of bed for toileting  - Record patient progress and toleration of activity level   Outcome: Progressing

## 2023-08-28 NOTE — OP NOTE
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 without presence of antibiotic-impregnated cement spacer [Z89.621]    Procedure(s):  Right - INCISION AND DRAINAGE (I&D) with closure to right hip wound    Specimen(s):  ID Type Source Tests Collected by Time Destination   A :  Body Fluid Joint, Right Hip ANAEROBIC CULTURE AND GRAM STAIN, FUNGAL CULTURE, VIRUS CULTURE, BODY FLUID CULTURE AND GRAM STAIN, AFB CULTURE WITH STAIN Ghazala Michel MD 2023 1207    B :  Tissue Joint, Right Hip ANAEROBIC CULTURE AND GRAM STAIN, FUNGAL CULTURE, VIRUS CULTURE, CULTURE, TISSUE AND GRAM STAIN, AFB CULTURE WITH STAIN Ghazala Michel MD 2023 1208        Estimated Blood Loss:   50 mL    Drains:  External Urinary Catheter (Active)   Number of days: 55       Anesthesia Type:   General    Operative Indications:  Dehiscence of operative wound, sequela [T81.31XS]  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 Next a roll was placed into the left axilla. Right hip and lateral thigh were then prepped Lala sterilely. Antibiotics administered. Her pre-existing surgical scar was opened up in the right hip. More proximally, during the 3 skip lesions that were 3 sinus tracts, healthy tissue was excised around the sinus tract. Upon excising the sinus tracts, the sinus tracts tracked deep to the fascia to the cavity formed by the absence of a right hip. Fluid was cultured, deep tissue was cultured. The area was then cleaned out utilizing a curette of granulation tissue. The wound was then Pulsavac with 3 as it was held the pulse advised. Layered closure began. Fascia was closed with #1 Vicryl suture. The skin and subcu tissue closed wit #2  Sutures in mattress fashion. Sterile dressings were applied. She was then awakened from general anesthesia and taken recovery room in fair condition with plans to include a monitored bed overnight, she will receive an infectious disease consultation and surgical critical care attention overnight as well   I was present for the entire procedure.     Patient Disposition:  PACU         SIGNATURE: Ghazala Michel MD  DATE: August 28, 2023  TIME: 12:37 PM

## 2023-08-29 ENCOUNTER — APPOINTMENT (INPATIENT)
Dept: RADIOLOGY | Facility: HOSPITAL | Age: 88
DRG: 856 | End: 2023-08-29
Payer: COMMERCIAL

## 2023-08-29 LAB
ABO GROUP BLD: NORMAL
ALBUMIN SERPL BCP-MCNC: 1.6 G/DL (ref 3.5–5)
ALBUMIN SERPL BCP-MCNC: 1.8 G/DL (ref 3.5–5)
ALP SERPL-CCNC: 69 U/L (ref 34–104)
ALP SERPL-CCNC: 82 U/L (ref 34–104)
ALT SERPL W P-5'-P-CCNC: 7 U/L (ref 7–52)
ALT SERPL W P-5'-P-CCNC: 8 U/L (ref 7–52)
ANION GAP SERPL CALCULATED.3IONS-SCNC: 23 MMOL/L
ANION GAP SERPL CALCULATED.3IONS-SCNC: 3 MMOL/L
ANION GAP SERPL CALCULATED.3IONS-SCNC: 6 MMOL/L
AST SERPL W P-5'-P-CCNC: 11 U/L (ref 13–39)
AST SERPL W P-5'-P-CCNC: 12 U/L (ref 13–39)
ATRIAL RATE: 52 BPM
ATRIAL RATE: 79 BPM
BASOPHILS # BLD AUTO: 0.01 THOUSANDS/ÂΜL (ref 0–0.1)
BASOPHILS # BLD AUTO: 0.02 THOUSANDS/ÂΜL (ref 0–0.1)
BASOPHILS NFR BLD AUTO: 0 % (ref 0–1)
BASOPHILS NFR BLD AUTO: 0 % (ref 0–1)
BILIRUB SERPL-MCNC: 0.25 MG/DL (ref 0.2–1)
BILIRUB SERPL-MCNC: 0.28 MG/DL (ref 0.2–1)
BLD GP AB SCN SERPL QL: NEGATIVE
BUN SERPL-MCNC: 25 MG/DL (ref 5–25)
BUN SERPL-MCNC: 26 MG/DL (ref 5–25)
BUN SERPL-MCNC: 30 MG/DL (ref 5–25)
CALCIUM ALBUM COR SERPL-MCNC: 7.6 MG/DL (ref 8.3–10.1)
CALCIUM ALBUM COR SERPL-MCNC: 8.9 MG/DL (ref 8.3–10.1)
CALCIUM SERPL-MCNC: 5.7 MG/DL (ref 8.4–10.2)
CALCIUM SERPL-MCNC: 7 MG/DL (ref 8.4–10.2)
CALCIUM SERPL-MCNC: 7.1 MG/DL (ref 8.4–10.2)
CHLORIDE SERPL-SCNC: 109 MMOL/L (ref 96–108)
CHLORIDE SERPL-SCNC: 109 MMOL/L (ref 96–108)
CHLORIDE SERPL-SCNC: 111 MMOL/L (ref 96–108)
CO2 SERPL-SCNC: 16 MMOL/L (ref 21–32)
CO2 SERPL-SCNC: 20 MMOL/L (ref 21–32)
CO2 SERPL-SCNC: 23 MMOL/L (ref 21–32)
CREAT SERPL-MCNC: 0.98 MG/DL (ref 0.6–1.3)
CREAT SERPL-MCNC: 1.1 MG/DL (ref 0.6–1.3)
CREAT SERPL-MCNC: 1.16 MG/DL (ref 0.6–1.3)
EOSINOPHIL # BLD AUTO: 0.02 THOUSAND/ÂΜL (ref 0–0.61)
EOSINOPHIL # BLD AUTO: 0.03 THOUSAND/ÂΜL (ref 0–0.61)
EOSINOPHIL NFR BLD AUTO: 0 % (ref 0–6)
EOSINOPHIL NFR BLD AUTO: 0 % (ref 0–6)
ERYTHROCYTE [DISTWIDTH] IN BLOOD BY AUTOMATED COUNT: 14.4 % (ref 11.6–15.1)
ERYTHROCYTE [DISTWIDTH] IN BLOOD BY AUTOMATED COUNT: 14.6 % (ref 11.6–15.1)
GFR SERPL CREATININE-BSD FRML MDRD: 41 ML/MIN/1.73SQ M
GFR SERPL CREATININE-BSD FRML MDRD: 44 ML/MIN/1.73SQ M
GFR SERPL CREATININE-BSD FRML MDRD: 51 ML/MIN/1.73SQ M
GLUCOSE SERPL-MCNC: 107 MG/DL (ref 65–140)
GLUCOSE SERPL-MCNC: 115 MG/DL (ref 65–140)
GLUCOSE SERPL-MCNC: 132 MG/DL (ref 65–140)
GLUCOSE SERPL-MCNC: 26 MG/DL (ref 65–140)
GLUCOSE SERPL-MCNC: 26 MG/DL (ref 65–140)
GLUCOSE SERPL-MCNC: 27 MG/DL (ref 65–140)
GLUCOSE SERPL-MCNC: 424 MG/DL (ref 65–140)
GLUCOSE SERPL-MCNC: 47 MG/DL (ref 65–140)
GLUCOSE SERPL-MCNC: 50 MG/DL (ref 65–140)
GLUCOSE SERPL-MCNC: 61 MG/DL (ref 65–140)
GLUCOSE SERPL-MCNC: 64 MG/DL (ref 65–140)
GLUCOSE SERPL-MCNC: 73 MG/DL (ref 65–140)
GLUCOSE SERPL-MCNC: 76 MG/DL (ref 65–140)
GLUCOSE SERPL-MCNC: 78 MG/DL (ref 65–140)
GLUCOSE SERPL-MCNC: 81 MG/DL (ref 65–140)
GLUCOSE SERPL-MCNC: 83 MG/DL (ref 65–140)
HCT VFR BLD AUTO: 21.1 % (ref 34.8–46.1)
HCT VFR BLD AUTO: 21.8 % (ref 34.8–46.1)
HGB BLD-MCNC: 6.7 G/DL (ref 11.5–15.4)
HGB BLD-MCNC: 7 G/DL (ref 11.5–15.4)
HGB BLD-MCNC: 9.1 G/DL (ref 11.5–15.4)
IMM GRANULOCYTES # BLD AUTO: 0.08 THOUSAND/UL (ref 0–0.2)
IMM GRANULOCYTES # BLD AUTO: 0.09 THOUSAND/UL (ref 0–0.2)
IMM GRANULOCYTES NFR BLD AUTO: 1 % (ref 0–2)
IMM GRANULOCYTES NFR BLD AUTO: 1 % (ref 0–2)
INR PPP: 2.35 (ref 0.84–1.19)
LYMPHOCYTES # BLD AUTO: 1.16 THOUSANDS/ÂΜL (ref 0.6–4.47)
LYMPHOCYTES # BLD AUTO: 1.55 THOUSANDS/ÂΜL (ref 0.6–4.47)
LYMPHOCYTES NFR BLD AUTO: 12 % (ref 14–44)
LYMPHOCYTES NFR BLD AUTO: 17 % (ref 14–44)
MAGNESIUM SERPL-MCNC: 1.9 MG/DL (ref 1.9–2.7)
MCH RBC QN AUTO: 32.8 PG (ref 26.8–34.3)
MCH RBC QN AUTO: 33.5 PG (ref 26.8–34.3)
MCHC RBC AUTO-ENTMCNC: 31.8 G/DL (ref 31.4–37.4)
MCHC RBC AUTO-ENTMCNC: 32.1 G/DL (ref 31.4–37.4)
MCV RBC AUTO: 103 FL (ref 82–98)
MCV RBC AUTO: 104 FL (ref 82–98)
MONOCYTES # BLD AUTO: 0.79 THOUSAND/ÂΜL (ref 0.17–1.22)
MONOCYTES # BLD AUTO: 0.82 THOUSAND/ÂΜL (ref 0.17–1.22)
MONOCYTES NFR BLD AUTO: 8 % (ref 4–12)
MONOCYTES NFR BLD AUTO: 9 % (ref 4–12)
MRSA NOSE QL CULT: NORMAL
NEUTROPHILS # BLD AUTO: 6.74 THOUSANDS/ÂΜL (ref 1.85–7.62)
NEUTROPHILS # BLD AUTO: 7.55 THOUSANDS/ÂΜL (ref 1.85–7.62)
NEUTS SEG NFR BLD AUTO: 73 % (ref 43–75)
NEUTS SEG NFR BLD AUTO: 79 % (ref 43–75)
NRBC BLD AUTO-RTO: 0 /100 WBCS
NRBC BLD AUTO-RTO: 0 /100 WBCS
PHOSPHATE SERPL-MCNC: 3 MG/DL (ref 2.3–4.1)
PLATELET # BLD AUTO: 158 THOUSANDS/UL (ref 149–390)
PLATELET # BLD AUTO: 163 THOUSANDS/UL (ref 149–390)
PMV BLD AUTO: 9.1 FL (ref 8.9–12.7)
PMV BLD AUTO: 9.6 FL (ref 8.9–12.7)
POTASSIUM SERPL-SCNC: 3 MMOL/L (ref 3.5–5.3)
POTASSIUM SERPL-SCNC: 3.6 MMOL/L (ref 3.5–5.3)
POTASSIUM SERPL-SCNC: 5.4 MMOL/L (ref 3.5–5.3)
PROT SERPL-MCNC: 3.9 G/DL (ref 6.4–8.4)
PROT SERPL-MCNC: 4 G/DL (ref 6.4–8.4)
PROTHROMBIN TIME: 26 SECONDS (ref 11.6–14.5)
QRS AXIS: 26 DEGREES
QRS AXIS: 33 DEGREES
QRSD INTERVAL: 104 MS
QRSD INTERVAL: 108 MS
QT INTERVAL: 382 MS
QT INTERVAL: 390 MS
QTC INTERVAL: 412 MS
QTC INTERVAL: 423 MS
RBC # BLD AUTO: 2.04 MILLION/UL (ref 3.81–5.12)
RBC # BLD AUTO: 2.09 MILLION/UL (ref 3.81–5.12)
RH BLD: POSITIVE
RHODAMINE-AURAMINE STN SPEC: NORMAL
RHODAMINE-AURAMINE STN SPEC: NORMAL
SODIUM SERPL-SCNC: 135 MMOL/L (ref 135–147)
SODIUM SERPL-SCNC: 137 MMOL/L (ref 135–147)
SODIUM SERPL-SCNC: 148 MMOL/L (ref 135–147)
SPECIMEN EXPIRATION DATE: NORMAL
T WAVE AXIS: 202 DEGREES
T WAVE AXIS: 211 DEGREES
VENTRICULAR RATE: 70 BPM
VENTRICULAR RATE: 71 BPM
WBC # BLD AUTO: 9.24 THOUSAND/UL (ref 4.31–10.16)
WBC # BLD AUTO: 9.62 THOUSAND/UL (ref 4.31–10.16)

## 2023-08-29 PROCEDURE — 80053 COMPREHEN METABOLIC PANEL: CPT | Performed by: PHYSICIAN ASSISTANT

## 2023-08-29 PROCEDURE — 80048 BASIC METABOLIC PNL TOTAL CA: CPT | Performed by: PHYSICIAN ASSISTANT

## 2023-08-29 PROCEDURE — 93005 ELECTROCARDIOGRAM TRACING: CPT

## 2023-08-29 PROCEDURE — 99222 1ST HOSP IP/OBS MODERATE 55: CPT | Performed by: INTERNAL MEDICINE

## 2023-08-29 PROCEDURE — C1751 CATH, INF, PER/CENT/MIDLINE: HCPCS

## 2023-08-29 PROCEDURE — 97163 PT EVAL HIGH COMPLEX 45 MIN: CPT

## 2023-08-29 PROCEDURE — 36573 INSJ PICC RS&I 5 YR+: CPT

## 2023-08-29 PROCEDURE — 86923 COMPATIBILITY TEST ELECTRIC: CPT

## 2023-08-29 PROCEDURE — 80053 COMPREHEN METABOLIC PANEL: CPT

## 2023-08-29 PROCEDURE — 99291 CRITICAL CARE FIRST HOUR: CPT | Performed by: SURGERY

## 2023-08-29 PROCEDURE — 84100 ASSAY OF PHOSPHORUS: CPT | Performed by: PHYSICIAN ASSISTANT

## 2023-08-29 PROCEDURE — 82948 REAGENT STRIP/BLOOD GLUCOSE: CPT

## 2023-08-29 PROCEDURE — 85018 HEMOGLOBIN: CPT | Performed by: STUDENT IN AN ORGANIZED HEALTH CARE EDUCATION/TRAINING PROGRAM

## 2023-08-29 PROCEDURE — P9040 RBC LEUKOREDUCED IRRADIATED: HCPCS

## 2023-08-29 PROCEDURE — 85025 COMPLETE CBC W/AUTO DIFF WBC: CPT | Performed by: PHYSICIAN ASSISTANT

## 2023-08-29 PROCEDURE — 93010 ELECTROCARDIOGRAM REPORT: CPT | Performed by: INTERNAL MEDICINE

## 2023-08-29 PROCEDURE — 85610 PROTHROMBIN TIME: CPT | Performed by: PHYSICIAN ASSISTANT

## 2023-08-29 PROCEDURE — 83735 ASSAY OF MAGNESIUM: CPT | Performed by: PHYSICIAN ASSISTANT

## 2023-08-29 PROCEDURE — 97167 OT EVAL HIGH COMPLEX 60 MIN: CPT

## 2023-08-29 PROCEDURE — 86850 RBC ANTIBODY SCREEN: CPT | Performed by: PHYSICIAN ASSISTANT

## 2023-08-29 PROCEDURE — 99223 1ST HOSP IP/OBS HIGH 75: CPT | Performed by: INTERNAL MEDICINE

## 2023-08-29 PROCEDURE — 76937 US GUIDE VASCULAR ACCESS: CPT

## 2023-08-29 PROCEDURE — 86901 BLOOD TYPING SEROLOGIC RH(D): CPT | Performed by: PHYSICIAN ASSISTANT

## 2023-08-29 PROCEDURE — C1769 GUIDE WIRE: HCPCS

## 2023-08-29 PROCEDURE — 36573 INSJ PICC RS&I 5 YR+: CPT | Performed by: RADIOLOGY

## 2023-08-29 PROCEDURE — 99024 POSTOP FOLLOW-UP VISIT: CPT | Performed by: ORTHOPAEDIC SURGERY

## 2023-08-29 PROCEDURE — 86900 BLOOD TYPING SEROLOGIC ABO: CPT | Performed by: PHYSICIAN ASSISTANT

## 2023-08-29 RX ORDER — DEXTROSE MONOHYDRATE 25 G/50ML
INJECTION, SOLUTION INTRAVENOUS
Status: COMPLETED
Start: 2023-08-29 | End: 2023-08-29

## 2023-08-29 RX ORDER — CALCIUM GLUCONATE 20 MG/ML
2 INJECTION, SOLUTION INTRAVENOUS ONCE
Status: COMPLETED | OUTPATIENT
Start: 2023-08-29 | End: 2023-08-29

## 2023-08-29 RX ORDER — DEXTROSE AND SODIUM CHLORIDE 5; .9 G/100ML; G/100ML
100 INJECTION, SOLUTION INTRAVENOUS CONTINUOUS
Status: DISCONTINUED | OUTPATIENT
Start: 2023-08-29 | End: 2023-08-30

## 2023-08-29 RX ORDER — MAGNESIUM SULFATE HEPTAHYDRATE 40 MG/ML
2 INJECTION, SOLUTION INTRAVENOUS ONCE
Status: COMPLETED | OUTPATIENT
Start: 2023-08-29 | End: 2023-08-29

## 2023-08-29 RX ORDER — ALBUMIN, HUMAN INJ 5% 5 %
25 SOLUTION INTRAVENOUS ONCE
Status: COMPLETED | OUTPATIENT
Start: 2023-08-29 | End: 2023-08-29

## 2023-08-29 RX ADMIN — ACETAMINOPHEN 975 MG: 325 TABLET, FILM COATED ORAL at 14:04

## 2023-08-29 RX ADMIN — ERTAPENEM SODIUM 1000 MG: 1 INJECTION, POWDER, LYOPHILIZED, FOR SOLUTION INTRAMUSCULAR; INTRAVENOUS at 13:11

## 2023-08-29 RX ADMIN — DEXTROSE AND SODIUM CHLORIDE 75 ML/HR: 5; .9 INJECTION, SOLUTION INTRAVENOUS at 22:44

## 2023-08-29 RX ADMIN — CHLORHEXIDINE GLUCONATE 15 ML: 1.2 SOLUTION ORAL at 09:29

## 2023-08-29 RX ADMIN — HEPARIN SODIUM 5000 UNITS: 5000 INJECTION INTRAVENOUS; SUBCUTANEOUS at 22:13

## 2023-08-29 RX ADMIN — LEVOTHYROXINE SODIUM 112 MCG: 112 TABLET ORAL at 05:28

## 2023-08-29 RX ADMIN — ACETAMINOPHEN 975 MG: 325 TABLET, FILM COATED ORAL at 22:13

## 2023-08-29 RX ADMIN — POTASSIUM CHLORIDE 20 MEQ: 14.9 INJECTION, SOLUTION INTRAVENOUS at 01:36

## 2023-08-29 RX ADMIN — ALBUMIN (HUMAN) 25 G: 12.5 INJECTION, SOLUTION INTRAVENOUS at 22:55

## 2023-08-29 RX ADMIN — DEXTROSE MONOHYDRATE 25 ML: 25 INJECTION, SOLUTION INTRAVENOUS at 05:35

## 2023-08-29 RX ADMIN — DEXTROSE MONOHYDRATE 50 ML: 25 INJECTION, SOLUTION INTRAVENOUS at 08:14

## 2023-08-29 RX ADMIN — ACETAMINOPHEN 975 MG: 325 TABLET, FILM COATED ORAL at 05:28

## 2023-08-29 RX ADMIN — DEXTROSE AND SODIUM CHLORIDE 75 ML/HR: 5; .9 INJECTION, SOLUTION INTRAVENOUS at 09:46

## 2023-08-29 RX ADMIN — PANTOPRAZOLE SODIUM 40 MG: 40 TABLET, DELAYED RELEASE ORAL at 05:28

## 2023-08-29 RX ADMIN — MAGNESIUM SULFATE HEPTAHYDRATE 2 G: 40 INJECTION, SOLUTION INTRAVENOUS at 07:45

## 2023-08-29 RX ADMIN — DAPTOMYCIN 475 MG: 500 INJECTION, POWDER, LYOPHILIZED, FOR SOLUTION INTRAVENOUS at 14:05

## 2023-08-29 RX ADMIN — DEXTROSE MONOHYDRATE 25 ML: 25 INJECTION, SOLUTION INTRAVENOUS at 08:47

## 2023-08-29 RX ADMIN — HEPARIN SODIUM 5000 UNITS: 5000 INJECTION INTRAVENOUS; SUBCUTANEOUS at 14:04

## 2023-08-29 RX ADMIN — PIPERACILLIN SODIUM AND TAZOBACTAM SODIUM 4.5 G: 36; 4.5 INJECTION, POWDER, LYOPHILIZED, FOR SOLUTION INTRAVENOUS at 03:36

## 2023-08-29 RX ADMIN — FERROUS SULFATE TAB 325 MG (65 MG ELEMENTAL FE) 325 MG: 325 (65 FE) TAB at 09:19

## 2023-08-29 RX ADMIN — DEXTROSE MONOHYDRATE 25 ML: 25 INJECTION, SOLUTION INTRAVENOUS at 09:23

## 2023-08-29 RX ADMIN — IOHEXOL 5 ML: 350 INJECTION, SOLUTION INTRAVENOUS at 17:41

## 2023-08-29 RX ADMIN — CHLORHEXIDINE GLUCONATE 15 ML: 1.2 SOLUTION ORAL at 22:00

## 2023-08-29 RX ADMIN — CALCIUM GLUCONATE 2 G: 20 INJECTION, SOLUTION INTRAVENOUS at 03:36

## 2023-08-29 NOTE — PLAN OF CARE
Problem: Prexisting or High Potential for Compromised Skin Integrity  Goal: Skin integrity is maintained or improved  Description: INTERVENTIONS:  - Identify patients at risk for skin breakdown  - Assess and monitor skin integrity  - Assess and monitor nutrition and hydration status  - Monitor labs   - Assess for incontinence   - Turn and reposition patient  - Assist with mobility/ambulation  - Relieve pressure over bony prominences  - Avoid friction and shearing  - Provide appropriate hygiene as needed including keeping skin clean and dry  - Evaluate need for skin moisturizer/barrier cream  - Collaborate with interdisciplinary team   - Patient/family teaching  - Consider wound care consult   Outcome: Progressing     Problem: MOBILITY - ADULT  Goal: Maintain or return to baseline ADL function  Description: INTERVENTIONS:  -  Assess patient's ability to carry out ADLs; assess patient's baseline for ADL function and identify physical deficits which impact ability to perform ADLs (bathing, care of mouth/teeth, toileting, grooming, dressing, etc.)  - Assess/evaluate cause of self-care deficits   - Assess range of motion  - Assess patient's mobility; develop plan if impaired  - Assess patient's need for assistive devices and provide as appropriate  - Encourage maximum independence but intervene and supervise when necessary  - Involve family in performance of ADLs  - Assess for home care needs following discharge   - Consider OT consult to assist with ADL evaluation and planning for discharge  - Provide patient education as appropriate  Outcome: Progressing  Goal: Maintains/Returns to pre admission functional level  Description: INTERVENTIONS:  - Perform BMAT or MOVE assessment daily.   - Set and communicate daily mobility goal to care team and patient/family/caregiver. - Collaborate with rehabilitation services on mobility goals if consulted  - Perform Range of Motion  times a day.   - Reposition patient every hours.  - Dangle patient  times a day  - Stand patient  times a day  - Ambulate patient  times a day  - Out of bed to chair  times a day   - Out of bed for meals  times a day  - Out of bed for toileting  - Record patient progress and toleration of activity level   Outcome: Progressing     Problem: PAIN - ADULT  Goal: Verbalizes/displays adequate comfort level or baseline comfort level  Description: Interventions:  - Encourage patient to monitor pain and request assistance  - Assess pain using appropriate pain scale  - Administer analgesics based on type and severity of pain and evaluate response  - Implement non-pharmacological measures as appropriate and evaluate response  - Consider cultural and social influences on pain and pain management  - Notify physician/advanced practitioner if interventions unsuccessful or patient reports new pain  Outcome: Progressing     Problem: INFECTION - ADULT  Goal: Absence or prevention of progression during hospitalization  Description: INTERVENTIONS:  - Assess and monitor for signs and symptoms of infection  - Monitor lab/diagnostic results  - Monitor all insertion sites, i.e. indwelling lines, tubes, and drains  - Monitor endotracheal if appropriate and nasal secretions for changes in amount and color  - [place] appropriate cooling/warming therapies per order  - Administer medications as ordered  - Instruct and encourage patient and family to use good hand hygiene technique  - Identify and instruct in appropriate isolation precautions for identified infection/condition  Outcome: Progressing  Goal: Absence of fever/infection during neutropenic period  Description: INTERVENTIONS:  - Monitor WBC    Outcome: Progressing     Problem: SAFETY ADULT  Goal: Maintain or return to baseline ADL function  Description: INTERVENTIONS:  -  Assess patient's ability to carry out ADLs; assess patient's baseline for ADL function and identify physical deficits which impact ability to perform ADLs (bathing, care of mouth/teeth, toileting, grooming, dressing, etc.)  - Assess/evaluate cause of self-care deficits   - Assess range of motion  - Assess patient's mobility; develop plan if impaired  - Assess patient's need for assistive devices and provide as appropriate  - Encourage maximum independence but intervene and supervise when necessary  - Involve family in performance of ADLs  - Assess for home care needs following discharge   - Consider OT consult to assist with ADL evaluation and planning for discharge  - Provide patient education as appropriate  Outcome: Progressing  Goal: Maintains/Returns to pre admission functional level  Description: INTERVENTIONS:  - Perform BMAT or MOVE assessment daily.   - Set and communicate daily mobility goal to care team and patient/family/caregiver. - Collaborate with rehabilitation services on mobility goals if consulted  - Perform Range of Motion  times a day. - Reposition patient every  hours.   - Dangle patient  times a day  - Stand patient  times a day  - Ambulate patient  times a day  - Out of bed to chair  times a day   - Out of bed for meals  times a day  - Out of bed for toileting  - Record patient progress and toleration of activity level   Outcome: Progressing  Goal: Patient will remain free of falls  Description: INTERVENTIONS:  - Educate patient/family on patient safety including physical limitations  - Instruct patient to call for assistance with activity   - Consult OT/PT to assist with strengthening/mobility   - Keep Call bell within reach  - Keep bed low and locked with side rails adjusted as appropriate  - Keep care items and personal belongings within reach  - Initiate and maintain comfort rounds  - Make Fall Risk Sign visible to staff  - Offer Toileting every  Hours, in advance of need  - Initiate/Maintain alarm  - Obtain necessary fall risk management equipment:   - Apply yellow socks and bracelet for high fall risk patients  - Consider moving patient to room near nurses station  Outcome: Progressing     Problem: DISCHARGE PLANNING  Goal: Discharge to home or other facility with appropriate resources  Description: INTERVENTIONS:  - Identify barriers to discharge w/patient and caregiver  - Arrange for needed discharge resources and transportation as appropriate  - Identify discharge learning needs (meds, wound care, etc.)  - Arrange for interpretive services to assist at discharge as needed  - Refer to Case Management Department for coordinating discharge planning if the patient needs post-hospital services based on physician/advanced practitioner order or complex needs related to functional status, cognitive ability, or social support system  Outcome: Progressing     Problem: Knowledge Deficit  Goal: Patient/family/caregiver demonstrates understanding of disease process, treatment plan, medications, and discharge instructions  Description: Complete learning assessment and assess knowledge base.   Interventions:  - Provide teaching at level of understanding  - Provide teaching via preferred learning methods  Outcome: Progressing     Problem: CARDIOVASCULAR - ADULT  Goal: Maintains optimal cardiac output and hemodynamic stability  Description: INTERVENTIONS:  - Monitor I/O, vital signs and rhythm  - Monitor for S/S and trends of decreased cardiac output  - Administer and titrate ordered vasoactive medications to optimize hemodynamic stability  - Assess quality of pulses, skin color and temperature  - Assess for signs of decreased coronary artery perfusion  - Instruct patient to report change in severity of symptoms  Outcome: Progressing  Goal: Absence of cardiac dysrhythmias or at baseline rhythm  Description: INTERVENTIONS:  - Continuous cardiac monitoring, vital signs, obtain 12 lead EKG if ordered  - Administer antiarrhythmic and heart rate control medications as ordered  - Monitor electrolytes and administer replacement therapy as ordered  Outcome: Progressing Problem: Nutrition/Hydration-ADULT  Goal: Nutrient/Hydration intake appropriate for improving, restoring or maintaining nutritional needs  Description: Monitor and assess patient's nutrition/hydration status for malnutrition. Collaborate with interdisciplinary team and initiate plan and interventions as ordered. Monitor patient's weight and dietary intake as ordered or per policy. Utilize nutrition screening tool and intervene as necessary. Determine patient's food preferences and provide high-protein, high-caloric foods as appropriate.      INTERVENTIONS:  - Monitor oral intake, urinary output, labs, and treatment plans  - Assess nutrition and hydration status and recommend course of action  - Evaluate amount of meals eaten  - Assist patient with eating if necessary   - Allow adequate time for meals  - Recommend/ encourage appropriate diets, oral nutritional supplements, and vitamin/mineral supplements  - Order, calculate, and assess calorie counts as needed  - Recommend, monitor, and adjust tube feedings and TPN/PPN based on assessed needs  - Assess need for intravenous fluids  - Provide specific nutrition/hydration education as appropriate  - Include patient/family/caregiver in decisions related to nutrition  Outcome: Progressing

## 2023-08-29 NOTE — NURSING NOTE
Received notification that Nephrology, Vicki Beverly approved PICC placement. Reviewed pt's chart regarding last PICC placement in early July. Two attempts for bedside placement failed and required IR intervention to repo PICC. Discussed with IR [facility] RN, Latoya, to see if they could place PICC given her historical complications, who agreed that IR could place likely this afternoon. Order placed for IR PICC placement. Shirley Jackson and primary RN, Cory Jacques, updated with plan via TT. [facility] consult will be completed at this time.

## 2023-08-29 NOTE — CONSULTS
Consultation - Nephrology   Lizzie Guillermo 80 y.o. female MRN: [id number]  Unit/Bed#: [facility] 491-42 Encounter: [id number]    ASSESSMENT and PLAN:  Chronic kidney disease IIIA:    Etiology: Suspect secondary to hypertension nephrosclerosis, age-related nephron loss long with history of atrial fibrillation CAD and permanent pacemaker  • Baseline creatinine 0.7-1.0 dating back to 2019;   • Mild BRITTANY on admission creatinine 1.35 on 8/28/2023-likely in the setting of recent antibiotic therapy, and relative hypotension-improved  • Current creatinine 1.16 today  • Not on ACE or ARB at home  • On torsemide 40 mg daily outpatient -currently on hold  • On IV fluids  • Does not follow with nephrology as outpatient  • Given GFR greater than 40 renal function stable okay for PICC line via PICC team for access and antibiotic use  o Discussed with primary team the patient cleared for PICC line and agreement with this plan    Blood pressure/hypertension:  • Current blood pressure fluctuates  • Home medications include:  Torsemide 40 mg daily  • Current medications include: none  • Maximize hemodynamics to maintain MAP >65  • Avoid hypotension or fluctuations in blood pressure  • Will continue to trend    H&H/anemia:   • Current hemoglobin 6.7 today and receiving 1 unit of packed cells  • On oral iron   • Avoid IV iron in the setting of IV ABX for infection  • No recent iron stores-recommend repeating iron stores  • Management per primary team  • Transfuse if hemoglobin less than 7.0    Hypoalbuminemia  • Current albumin 1.8 and total protein 3.9  • Encourage nutritional supplements  • Consider albumin for volume expanding    History of congestive heart failure  • Recent echocardiogram on 6/28/2023 feels EF 60% with right ventricular cavity mildly dilated  • Monitor volume status closely given IV fluids  • Diurets currently on hold    Status post right MAIA with revision in 8860  · Complicated with dehiscence and chronic dislocation/displacement-nonambulatory  · Status post I&D and closure of left hip on 8/28/2028  · IV antibiotics    Other medical isues  Atrial fibrillation on anticoagulation therapy  Status post PPM  History of ESBL/MDRO      Medical records through [address] has been reviewed for this patient encounter    HISTORY OF PRESENT ILLNESS:  Requesting Physician: Wolf Jeffers MD  Reason for Consult: PICC line placement in the setting of CKD IIIA    Pedrito Griffin is a 80 y.o. female who has PMH of CKD IIIA hypertension, CHF, CAD, atrial fibrillation on Coumadin, status post pacemaker history of AV node ablation, mitral and tricuspid regurgitation, IBS, hypothyroidism, anemia, previous right hip replacement with recurrent dislocations and infections requiring repeated right hip I&D with explantation of joint prosthesis and recent 6 weeks antibiotics; now presented and underwent right I&D with closure of right hip wound 8/25/2023 and needs PICC line. A renal consultation is requested today for PICC line recommendation. PAST MEDICAL HISTORY:  Past Medical History:   Diagnosis Date   • Abdominal bloating 8/1/2016   • Anemia    • Arthritis    • Cancer (HCC)     basal cell   • CHF (congestive heart failure) (HCC)    • COVID-19    • Disease of thyroid gland    • Disturbance of smell and taste     disturbance of taste   • Effusion of knee joint right    • Fibromyalgia    • Heart murmur     reported a previous heart murmur   • History of acute myocardial infarction    • History of atrial fibrillation    • History of bruising easily    • History of cancer    • History of dermatitis    • History of mammogram    • History of methicillin resistant staphylococcus aureus (MRSA)     Negative nasal culture, isolation discontinued 8/17/2018.    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   • Irritable bowel syndrome (IBS)    • Joint pain, hip    • Limb pain    • Myalgia     myalgia and myositis   • Need for prophylactic vaccination against single diseases    • Need for prophylactic vaccination and inoculation against influenza    • Preoperative cardiovascular examination    • Primary osteoarthritis of right knee    • Right leg pain    • Vaginal Pap smear     reported pap smear       PAST SURGICAL HISTORY:  Past Surgical History:   Procedure Laterality Date   • ANKLE ARTHRODESIS Right    • BACK SURGERY     • BARIATRIC SURGERY     • CHOLECYSTECTOMY      x2   • COLONOSCOPY     • ESOPHAGOGASTRODUODENOSCOPY N/A 3/23/2018    Procedure: ESOPHAGOGASTRODUODENOSCOPY (EGD); Surgeon: Raji Salgado MD;  Location: [facility]; Service: Gastroenterology   • FOOT SURGERY     • HIP CLOSE REDUCTION Right 8/21/2016    Procedure: CLOSED REDUCTION RIGHT TOTAL HIP;  Surgeon: Enrique Solorzano MD;  [facility];  Service:    • NISHA / Bev Mchugh / Caity Berumen     • IR BIOPSY OTHER  8/1/2020   • IR PICC REPOSITION  7/5/2023   • JOINT REPLACEMENT      LEFT KNEE REPLACEMENT   • JOINT REPLACEMENT      Right HIP   • PILONIDAL CYST EXCISION      x2   • IN EXC B9 LESION MRGN XCP SK TG T/A/L 0.5 CM/< Right 7/24/2019    Procedure: REVISION SCAR EXTREMITY Rt Hip;   Surgeon: Franky Barnes MD;  Location: [facility];  Service: Orthopedics   • IN REVJ TOT HIP ARTHRP [address] W/WO AGRFT/ALGRFT Right 4/15/2019    Procedure: ARTHROPLASTY HIP TOTAL ACETABULAR REVISION;  Surgeon: Franky Barnes MD;  Location[facility];  Service: Orthopedics   • REPLACEMENT TOTAL KNEE Right    • SILVANA-EN-Y PROCEDURE      x2   • TOTAL KNEE ARTHROPLASTY     • UMBILICAL HERNIA REPAIR      x2   • WOUND DEBRIDEMENT Right 6/30/2023    Procedure: DEBRIDEMENT LOWER EXTREMITY ([address] [facility])- I&D of right hip with explant of hardware; insertion of antibiotic beads;  Surgeon: Ros Hernandez MD;  [facility];  Service: Orthopedics   • WOUND DEBRIDEMENT Right 8/28/2023    Procedure: INCISION AND DRAINAGE (I&D) with closure to right hip wound;  Surgeon: Yamel Baca MD;  [facility];  Service: Orthopedics       ALLERGIES:  Allergies   Allergen Reactions   • Codeine Vomiting and GI Intolerance     GI upset, ana m morphine  Reaction Date: 24Apr2012;    • Lactose - Food Allergy GI Intolerance   • Milk (Cow) Diarrhea   • Nsaids GI Intolerance   • Other      Other reaction(s): Other (See Comments)  ana m toradol in OR 6/06 sah   • Seasonal Ic  [Cholestatin] Allergic Rhinitis   • Trimethoprim    • Bactrim [Sulfamethoxazole-Trimethoprim] Rash   • Sulfa Antibiotics Rash     Other reaction(s):  Other (See Comments)  takes lasix @home       SOCIAL HISTORY:  Social History     Substance and Sexual Activity   Alcohol Use Not Currently     Social History     Substance and Sexual Activity   Drug Use No     Social History     Tobacco Use   Smoking Status Never   Smokeless Tobacco Never       FAMILY HISTORY:  Family History   Problem Relation Age of Onset   • Coronary artery disease Mother    • Heart attack Mother    • Hypertension Mother    • Coronary artery disease Father    • Heart attack Father    • Hypertension Father    • Lymphoma Sister         acute   • Rashes / Skin problems Sister         lymphomatoid papulosis   • Cancer Sister    • Coronary artery disease Brother    • Heart attack Brother         x2   • Aneurysm Neg Hx    • Hyperlipidemia Neg Hx        MEDICATIONS:    Current Facility-Administered Medications:   •  acetaminophen (TYLENOL) tablet 975 mg, 975 mg, Oral, Q8H [address] Jaz Samson PA-C, 975 mg at 08/29/23 0528  •  chlorhexidine (PERIDEX) 0.12 % oral rinse 15 mL, 15 mL, Mouth/Throat, Q12H [address], Collins Torres PA-C, 15 mL at 08/29/23 1961  •  DAPTOmycin (CUBICIN) 475 mg in sodium chloride 0.9 % 50 mL IVPB, 6 mg/kg, Intravenous, Q24H, Felicia Bright MD  •  dextrose 5 % and sodium chloride 0.9 % infusion, 75 mL/hr, Intravenous, Continuous, Kristian Soto PA-C, Last Rate: 75 mL/hr at 08/29/23 0946, 75 mL/hr at 08/29/23 0946  •  DULoxetine (CYMBALTA) delayed release capsule 60 mg, 60 mg, Oral, Daily, Collins Torres PA-C, 60 mg at 08/28/23 1544  •  ertapenem (INVanz) 1,000 mg in sodium chloride 0.9 % 50 mL IVPB, 1,000 mg, Intravenous, Q24H, Felicia Bright MD  •  ferrous sulfate tablet 325 mg, 325 mg, Oral, Daily With Breakfast, Collins Torres PA-C, 325 mg at 08/29/23 0919  •  glucagon (GLUCAGEN) 1 mg injection **ADS Override Pull**, , , ,   •  heparin (porcine) subcutaneous injection 5,000 Units, 5,000 Units, Subcutaneous, Q8H [address], Collins Torres PA-C  •  levothyroxine tablet 112 mcg, 112 mcg, Oral, Early Morning, Collins Torres PA-C, 112 mcg at 08/29/23 7729  •  pantoprazole (PROTONIX) EC tablet 40 mg, 40 mg, Oral, Early Morning, Collins Torres PA-C, 40 mg at 08/29/23 2614  •  senna-docusate sodium (SENOKOT S) 8.6-50 mg per tablet, , Oral, Daily, Collins Torres PA-C, 1 tablet at 08/28/23 1544  •  simethicone (MYLICON) chewable tablet 80 mg, 80 mg, Oral, Q6H PRN, Collins Torres PA-C      REVIEW OF SYSTEMS:  Patient seen and examined at bedside  Review of Systems      PHYSICAL EXAM:  Current weight: Weight - Scale: 81 kg (178 lb 9.2 oz)  Vitals:    08/29/23 1130 08/29/23 1131 08/29/23 1145 08/29/23 1200   BP: 103/55 103/55 107/53 (!) 82/49   BP Location:       Pulse: 69 72 69 73   Resp: (!) 26 20 (!) 27 18   Temp:  (!) 97.1 °F (36.2 °C) (!) 97.3 °F (36.3 °C) (!) 97.4 °F (36.3 °C)   TempSrc:  Oral Oral Oral   SpO2: 100% 100% 100% 100%   Weight:       Height:           Physical Exam      Invasive Devices:   Urethral Catheter (Active)   Site Assessment Clean;Skin intact 08/29/23 0400 Burger Care Done 08/29/23 0900   Collection Container Standard drainage bag 08/29/23 0400   Securement Method Securing device (Describe) 08/29/23 0400   Irrigant Sterile water 08/28/23 2035   Urethral Irrigation Intake (mL) 40 mL 08/28/23 2035   Output (mL) 150 mL 08/29/23 0540       Lab Results:   Results from last 7 days   Lab Units 08/29/23  0443 08/29/23  0050 08/28/23  2354 08/28/23  1659   WBC Thousand/uL 9.24  --  9.62 12.35*   HEMOGLOBIN g/dL 6.7*  --  7.0* 7.7*   HEMATOCRIT % 21.1*  --  21.8* 25.8*   PLATELETS Thousands/uL 163  --  158 205   SODIUM mmol/L 135 148*  --  136   POTASSIUM mmol/L 5.4* 3.0*  --  3.1*   CHLORIDE mmol/L 109* 109*  --  106   CO2 mmol/L 23 16*  --  22   BUN mg/dL 30* 25  --  32*   CREATININE mg/dL 1.16 0.98  --  1.23   CALCIUM mg/dL 7.1* 5.7*  --  7.6*   MAGNESIUM mg/dL 1.9  --   --  1.4*   PHOSPHORUS mg/dL 3.0  --   --  3.2   ALK PHOS U/L 82 69  --  102   ALT U/L 8 7  --  10   AST U/L 11* 12*  --  11*         Portions of the record may have been created with voice recognition software. Occasional wrong word or "sound a like" substitutions may have occurred due to the inherent limitations of voice recognition software.  Read the chart carefully and recognize,

## 2023-08-29 NOTE — DISCHARGE INSTR - OTHER ORDERS
Skin Care Plan:  1-Cleanse sacro-buttocks with soap and water. Apply Calazime Cream to B/L Sacro-Buttocks BID and PRN episodes of incontinence  2-Turn/reposition q2h or when medically stable for pressure re-distribution on skin . 3-Elevate heels to offload pressure. 4-Moisturize skin daily with skin nourishing cream  5-Ehob cushion in chair when out of bed. 6-B/L Heels: cleanse area and pat dry. Apply allevyn foam dressings to area. Deo with P for Prevention and change every 3 Days or PRN. Peel back and inspect skin Q-shift. 7-Nursing Communication: Please order patient P-500 Specialty Low Air Loss Mattress once transferred out of CCU.

## 2023-08-29 NOTE — OCCUPATIONAL THERAPY NOTE
Occupational Therapy Evaluation     Patient Name: Mitchell LUNA Date: 8/29/2023  Problem List  Active Problems: There are no active Hospital Problems. Past Medical History  Past Medical History:   Diagnosis Date    Abdominal bloating 8/1/2016    Anemia     Arthritis     Cancer (HCC)     basal cell    CHF (congestive heart failure) ([address])     COVID-19     Disease of thyroid gland     Disturbance of smell and taste     disturbance of taste    Effusion of knee joint right     Fibromyalgia     Heart murmur     reported a previous heart murmur    History of acute myocardial infarction     History of atrial fibrillation     History of bruising easily     History of cancer     History of dermatitis     History of mammogram     History of methicillin resistant staphylococcus aureus (MRSA)     Negative nasal culture, isolation discontinued 8/17/2018.     History of methicillin resistant staphylococcus aureus (MRSA) 08/17/2018    negative nasal culture-isolation and hx discontinued 8/17/2018    History of obesity     History of osteopenia     History of sciatica     History of shortness of breath     History of sinusitis     History of sore throat     History of syncope     History of umbilical hernia     History of viral infection     Infection of right prosthetic hip joint ([address]) 8/11/2023    Irritable bowel syndrome (IBS)     Joint pain, hip     Limb pain     Myalgia     myalgia and myositis    Need for prophylactic vaccination against single diseases     Need for prophylactic vaccination and inoculation against influenza     Preoperative cardiovascular examination     Primary osteoarthritis of right knee     Right leg pain     Vaginal Pap smear     reported pap smear     Past Surgical History  Past Surgical History:   Procedure Laterality Date    ANKLE ARTHRODESIS Right     BACK SURGERY      BARIATRIC SURGERY      CHOLECYSTECTOMY      x2    COLONOSCOPY      ESOPHAGOGASTRODUODENOSCOPY N/A 3/23/2018 Procedure: ESOPHAGOGASTRODUODENOSCOPY (EGD); Surgeon: Hawa Sapp MD;  Location: [facility]; Service: Gastroenterology    FOOT SURGERY      HIP CLOSE REDUCTION Right 8/21/2016    Procedure: CLOSED REDUCTION RIGHT TOTAL HIP;  Surgeon: Kareen Bolanos MD;  [facility];  [facility]     [facility] Rudy Leana      IR BIOPSY OTHER  8/1/2020    IR PICC REPOSITION  7/5/2023    JOINT REPLACEMENT      LEFT KNEE REPLACEMENT    JOINT REPLACEMENT      Right HIP    PILONIDAL CYST EXCISION      x2    IN EXC B9 LESION MRGN XCP SK TG T/A/L 0.5 CM/< Right 7/24/2019    Procedure: REVISION SCAR EXTREMITY Rt Hip;   Surgeon: Juan Manuel Colon MD;  [facility];  Service: Orthopedics    IN REVJ TOT HIP ARTHRP [address] W/WO AGRFT/ALGRFT Right 4/15/2019    Procedure: ARTHROPLASTY HIP TOTAL ACETABULAR REVISION;  Surgeon: Juan Manuel Colon MD;  [facility];  Service: Orthopedics    REPLACEMENT TOTAL KNEE Right     SILVANA-EN-Y PROCEDURE      x2    TOTAL KNEE ARTHROPLASTY      UMBILICAL HERNIA REPAIR      x2    WOUND DEBRIDEMENT Right 6/30/2023    Procedure: DEBRIDEMENT LOWER EXTREMITY ([facility])- I&D of right hip with explant of hardware; insertion of antibiotic beads;  Surgeon: Gaurav Dodd MD;  [facility];  Service: Orthopedics    WOUND DEBRIDEMENT Right 8/28/2023    Procedure: INCISION AND DRAINAGE (I&D) with closure to right hip wound;  Surgeon: Dinora Ferrer MD;  [facility];  Service: Orthopedics         08/29/23 1127   OT Last Visit   OT Visit Date 08/29/23   Note Type   Note type Evaluation   Pain Assessment   Pain Assessment Tool FLACC   Pain Rating: FLACC (Rest) - Face 0   Pain Rating: FLACC (Rest) - Legs 0   Pain Rating: FLACC (Rest) - Activity 0   Pain Rating: FLACC (Rest) - Cry 0   Pain Rating: FLACC (Rest) - Consolability 0   Score: FLACC (Rest) 0   Pain Rating: FLACC (Activity) - Face 0   Pain Rating: FLACC (Activity) - Legs 1   Pain Rating: FLACC (Activity) - Activity 1 Pain Rating: FLACC (Activity) - Cry 1   Pain Rating: FLACC (Activity) - Consolability 1   Score: FLACC (Activity) 4   Restrictions/Precautions   Weight Bearing Precautions Per Order Yes   RLE Weight Bearing Per Order (S)  NWB  (s/p MAIA explant 6/30/23, R hip I&D and clsure 8/2823.)   Braces or Orthoses   (none)   Other Precautions O2;Fall Risk;Pain;Multiple lines;Telemetry;WBS;Cognitive; Chair Alarm; Bed Alarm   Home Living   Type of Home SNF  ([facility])   Home Layout One level   Home Equipment Mechanical lift  (occasionally uses)   Prior Function   Level of [place] Needs assistance with ADLs; Needs assistance with functional mobility; Needs assistance with [address] staff   Receives Help From   (facility staff)   IADLs Family/Friend/Other provides transportation; Family/Friend/Other provides meals; Family/Friend/Other provides medication management   Falls in the last 6 months 0   Lifestyle   Autonomy pt reports she required assistance for adl/iadl, used kami for OOB recently, but reports that she has been sitting EOB/performing STS w therapy team as able, while maintaining NWB status. Rec[employer]hips supportive staff at SNF   Service to Others retired   Intrinsic Gratification did not state at this time. Subjective   Subjective "I'll give it a try"   ADL   Where Assessed Supine, bed   Eating Assistance 4  Minimal Assistance   Grooming Assistance 3  Moderate Assistance   UB Bathing Assistance 2  Maximal Assistance   LB Bathing Assistance 1  Total Assistance   UB Dressing Assistance 2  Maximal Assistance   LB Dressing Assistance 1  Total [address] [place]  1  Total Assistance   Functional Assistance 1  Total Assistance   Bed Mobility   Supine to Sit 1  Dependent   Additional items Assist x 2; Increased time required;Verbal cues;LE management   Sit to Supine 1  Dependent   Additional items Assist x 2; Increased time required;Verbal cues;LE management   Additional Comments dep x2 for long sitting in bed. planned to attempt to EOB sitting, however once pt was in long sit, she reported inc nausea, dizziness. BP taken and was 94/56. at end of session, /55. Transfers   Sit to Stand Unable to assess   Stand to Sit Unable to assess   Functional Mobility   Additional Comments unable to asses at this time given pts current cognitive status. Balance   Static Sitting Poor -   Dynamic Sitting Poor -   Activity Tolerance   Activity Tolerance Treatment limited secondary to medical complications (Comment)  (nausea, dizziness. BP prior to start of session: 97/54, long sitting 94/53, once returned to supine in bed, 103/55.)   Medical Staff Made Aware DPT Shanique 2' pts med complexity, comorbidities and regression from baseline. Nurse Made Aware ok per RN   RUE Assessment   RUE Assessment X  (2+/5)   LUE Assessment   LUE Assessment X  (2+/5)   Hand Function   Gross Motor Coordination Functional   Fine Motor Coordination Functional   Hand Function Comments overall fxnl, would anticipate she would have difficulty with fine motor control. Psychosocial   Psychosocial (WDL) WDL   Cognition   Overall Cognitive Status Impaired   Arousal/Participation Alert   Attention Attends with cues to redirect   Orientation Level Oriented X4   Memory Decreased recall of precautions;Decreased recall of recent events;Decreased short term memory   Following Commands Follows one step commands with increased time or repetition   Comments pt pleasant and cooperative, she has fair safety awareness/insight to condition t/o session. while she was A&Ox4, she did have some confusion when describing her recent medical hx and fxnl status. Assessment   Limitation Decreased ADL status; Decreased UE ROM; Decreased UE strength;Decreased Safe judgement during ADL;Decreased endurance;Decreased cognition;Decreased self-care trans;Decreased high-level ADLs   Prognosis Fair   Assessment Pt is a 80 y.o. female admitted 8/28/23 from R hip I&D and closure, is NWB in RLE at this time. She had previously undergone explant of R MAIA 6/30/23, and reports that prior to this admission, she was requiring assistance for ADL/IADL, using kami lift for OOB mobility, but was working with therapy to sit EOB and beginning to practice stands. Pt reports she lives at [facility]. Pt w active OT eval and treat orders at this time. PMH includes  has a past medical history of Abdominal bloating, Anemia, Arthritis, Cancer ([address]), CHF (congestive heart failure) ([address]), COVID-19, Disease of thyroid gland, Disturbance of smell and taste, Effusion of knee joint right, Fibromyalgia, Heart murmur, History of acute myocardial infarction, History of atrial fibrillation, History of bruising easily, History of cancer, History of dermatitis, History of mammogram, History of methicillin resistant staphylococcus aureus (MRSA), History of methicillin resistant staphylococcus aureus (MRSA), History of obesity, History of osteopenia, History of sciatica, History of shortness of breath, History of sinusitis, History of sore throat, History of syncope, History of umbilical hernia, History of viral infection, Infection of right prosthetic hip joint ([address]), Irritable bowel syndrome (IBS), Joint pain, hip, Limb pain, Myalgia, Need for prophylactic vaccination against single diseases, Need for prophylactic vaccination and inoculation against influenza, Preoperative cardiovascular examination, Primary osteoarthritis of right knee, Right leg pain, and Vaginal Pap smear. .  Currently, pt is Max Ax1 for UB ADL, total A for LB ADL, and completed transfers/FM w dependence x2. Pt is limited at this time 2* decreased endurance/activtiy tolerance, decreased cognition, decreased ADL/High-level ADL status, decreased self-care trans, decreased safety awareness, limited home support and is a fall risk.  This impacts pt's ability to complete UB and LB dressing and bathing, toileting, transfers, functional mobility, community mobility, home and health maintenance, and safe engagement in typical daily routine. Goals   Patient Goals rest   LTG Time Frame 10-14   Long Term Goal #1 see below   Plan   Treatment Interventions ADL retraining;Functional transfer training; Endurance training;UE strengthening/ROM; Cognitive reorientation;Patient/family training;Equipment evaluation/education; Neuromuscular reeducation; Compensatory technique education; Activityengagement; Energy conservation   Goal Expiration Date 09/12/23   OT Frequency 1-2x/wk   Recommendation   OT Discharge Recommendation Return to facility with rehabilitation services   AM-PAC Daily Activity Inpatient   Lower Body Dressing 1   Bathing 2   Toileting 2   Upper Body Dressing 2   Grooming 2   Eating 2   Daily Activity Raw Score 11   Daily Activity Standardized Score (Calc for Raw Score >=11) 29.04   AM-PAC Applied Cognition Inpatient   Following a Speech/Presentation 2   Understanding Ordinary Conversation 2   Taking Medications 1   Remembering Where Things Are Placed or Put Away 1   Remembering List of 4-5 Errands 1   Taking Care of Complicated Tasks 1   Applied Cognition Raw Score 8   Applied Cognition Standardized Score 19.32   Modified Adriano Scale   Modified Hanson Scale 5   End of Consult   Education Provided Yes   Patient Position at End of Consult Bed/Chair alarm activated;Supine; All needs within reach   Nurse Communication Nurse aware of consult      The patient's raw score on the AM-PAC Daily Activity inpatient short form is 11, standardized score is 29.04, less than 39.4. Patients at this level are likely to benefit from discharge to post-acute rehabilitation services. Please refer to the recommendation of the Occupational Therapist for safe discharge planning. From OT standpoint, pt should return to facility with rehab services when medically stable.  Pt will benefit from continued acute OT services 1-2 x/wk for 10-14 days to meet goals.      OTR for FM And STS tx's. Pt will complete UB dressing and bathing with Min A I using appropriate DME as needed. Pt will complete LB dressing and bathing with Min A using appropriate DME as needed. Pt will complete transfers with Min A using appropriate DME as needed. Pt will complete toileting with Min A using appropriate DME as needed. Pt will demonstrate increased safety awareness during functional tasks/ADL's s/p skilled education. Pt will increase activity tolerance to 30 minutes in order to complete ADL's/ functional tasks, using appropriate DME as needed. Pt will engage in ongoing cog assessment w/ G participation to assist with safe d/c planning. Pt will inc UE ROM +10 degrees b/l in order to increase overall ability to engage in typical daily routine. Pt will inc UE strength +1 MMT in order to increase overall ability to engage in typical daily routine.          Kane Morgan, MOT, OTR/L

## 2023-08-29 NOTE — PLAN OF CARE
Problem: OCCUPATIONAL THERAPY ADULT  Goal: Performs self-care activities at highest level of function for planned discharge setting. See evaluation for individualized goals. Description: Treatment Interventions: ADL retraining, Functional transfer training, Endurance training, UE strengthening/ROM, Cognitive reorientation, Patient/family training, Equipment evaluation/education, Neuromuscular reeducation, Compensatory technique education, Activityengagement, Energy conservation          See flowsheet documentation for full assessment, interventions and recommendations. Note: Limitation: Decreased ADL status, Decreased UE ROM, Decreased UE strength, Decreased Safe judgement during ADL, Decreased endurance, Decreased cognition, Decreased self-care trans, Decreased high-level ADLs  Prognosis: Fair  Assessment: Pt is a 80 y.o. female admitted 8/28/23 from R hip I&D and closure, is NWB in RLE at this time. She had previously undergone explant of R MAIA 6/30/23, and reports that prior to this admission, she was requiring assistance for ADL/IADL, using kami lift for OOB mobility, but was working with therapy to sit EOB and beginning to practice stands. Pt reports she lives at [facility]. Pt w active OT eval and treat orders at this time.  PMH includes  has a past medical history of Abdominal bloating, Anemia, Arthritis, Cancer ([address]), CHF (congestive heart failure) ([address]), COVID-19, Disease of thyroid gland, Disturbance of smell and taste, Effusion of knee joint right, Fibromyalgia, Heart murmur, History of acute myocardial infarction, History of atrial fibrillation, History of bruising easily, History of cancer, History of dermatitis, History of mammogram, History of methicillin resistant staphylococcus aureus (MRSA), History of methicillin resistant staphylococcus aureus (MRSA), History of obesity, History of osteopenia, History of sciatica, History of shortness of breath, History of sinusitis, History of sore throat, History of syncope, History of umbilical hernia, History of viral infection, Infection of right prosthetic hip joint ([address]), Irritable bowel syndrome (IBS), Joint pain, hip, Limb pain, Myalgia, Need for prophylactic vaccination against single diseases, Need for prophylactic vaccination and inoculation against influenza, Preoperative cardiovascular examination, Primary osteoarthritis of right knee, Right leg pain, and Vaginal Pap smear. .  Currently, pt is Max Ax1 for UB ADL, total A for LB ADL, and completed transfers/FM w dependence x2. Pt is limited at this time 2* decreased endurance/activtiy tolerance, decreased cognition, decreased ADL/High-level ADL status, decreased self-care trans, decreased safety awareness, limited home support and is a fall risk. This impacts pt's ability to complete UB and LB dressing and bathing, toileting, transfers, functional mobility, community mobility, home and health maintenance, and safe engagement in typical daily routine.      OT Discharge Recommendation: Return to facility with rehabilitation services

## 2023-08-29 NOTE — UTILIZATION REVIEW
Initial Clinical Review    Elective outpatient procedure, converted to inpatient admission for continued treatment of R hip septic joint    Age/Sex: 80 y.o. female  Surgery Date: 8/28/2023  Procedure: Right - INCISION AND DRAINAGE (I&D) with closure to right hip wound  Anesthesia: General  Operative Findings: Washout and layered delayed primary closure of right hip wound which had complex postsurgical wound infection    POD#1 Progress Note: Pt s/p irrigation debridement and closure of a draining sinus sinus surgical site infection R hip on 08/28. She was transferred to critical care after procedure yesterday for close op monitoring. Plan: NWMIRNA RLE. F/u cx. Cont IV abx. PT/OT. Pain control. DVT ppx SQH.     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 S/p I&D, layered DPC on 08/28. Intraop findings notable for cavity due to absence of hip. Cultures pending. Change antibiotics to daptomycin, ertapenem. Follow up OR cultures and tailor antibiotics as indicated. Follow temperatures closely. Recheck CBC in AM    Nephrology Consult: Pt w/ CKD 3 w/ BRITTANY on admission w/ cr 1.35, baseline 0.7-1.0 1.16 today. Cleared for PICC line placement, mon creatinine. Date: 08/30 Day 3: Has surpassed a 2nd midnight with active treatments and services, which include: Cont IV abx. Pain control prn. Mon labs. NWB RLE.      Admission Orders: Date/Time/Statement:   Admission Orders (From admission, onward)     Ordered        08/28/23 1507  Inpatient Admission  Once                      Orders Placed This Encounter   Procedures   • Inpatient Admission     Standing Status:   Standing     Number of Occurrences:   1     Order Specific Question:   Level of Care     Answer:   Level 1 Stepdown [13]     Order Specific Question:   Estimated length of stay     Answer:   More than 2 Midnights     Order Specific Question:   Certification     Answer:   I certify that inpatient services are medically necessary for this patient for a duration of greater than two midnights. See H&P and MD Progress Notes for additional information about the patient's course of treatment.      Vital Signs: /60   Pulse 69   Temp 97.6 °F (36.4 °C) (Oral)   Resp 15   Ht 5' (1.524 m)   Wt 81 kg (178 lb 9.2 oz)   LMP  (LMP Unknown)   SpO2 98%   BMI 34.88 kg/m²     Pertinent Labs/Diagnostic Test Results:   No orders to display         Results from last 7 days   Lab Units 08/29/23 0443 08/28/23  2354 08/28/23  1659 08/28/23  0759   WBC Thousand/uL 9.24 9.62 12.35* 11.28*   HEMOGLOBIN g/dL 6.7* 7.0* 7.7* 9.8*   HEMATOCRIT % 21.1* 21.8* 25.8* 31.0*   PLATELETS Thousands/uL 163 158 205 226   NEUTROS ABS Thousands/µL 6.74 7.55 9.60*  --          Results from last 7 days   Lab Units 08/29/23 0443 08/29/23 0050 08/28/23  1659 08/28/23  0759   SODIUM mmol/L 135 148* 136 135   POTASSIUM mmol/L 5.4* 3.0* 3.1* 3.8   CHLORIDE mmol/L 109* 109* 106 106   CO2 mmol/L 23 16* 22 22   ANION GAP mmol/L 3 23 8 7   BUN mg/dL 30* 25 32* 35*   CREATININE mg/dL 1.16 0.98 1.23 1.35*   EGFR ml/min/1.73sq m 41 51 38 34   CALCIUM mg/dL 7.1* 5.7* 7.6* 7.5*   CALCIUM, IONIZED mmol/L  --   --  1.09*  --    MAGNESIUM mg/dL 1.9  --  1.4*  --    PHOSPHORUS mg/dL 3.0  --  3.2  --      Results from last 7 days   Lab Units 08/29/23 0443 08/29/23 0050 08/28/23  1659   AST U/L 11* 12* 11*   ALT U/L 8 7 10   ALK PHOS U/L 82 69 102   TOTAL PROTEIN g/dL 3.9* 4.0* 4.7*   ALBUMIN g/dL 1.8* 1.6* 2.3*   TOTAL BILIRUBIN mg/dL 0.28 0.25 0.35     Results from last 7 days   Lab Units 08/29/23  0838 08/29/23  0837 08/29/23  0817 08/29/23  0810 08/29/23  0610 08/29/23  0529 08/28/23  2342 08/28/23  1809 08/28/23  1727 08/28/23  1355 08/28/23  1319 08/28/23  1251   POC GLUCOSE mg/dl 64* 26* 76 26* 107 50* 129 78 69 80 67 43*     Results from last 7 days   Lab Units 08/29/23  0443 08/29/23  0050 08/28/23  1659 08/28/23  0759   GLUCOSE RANDOM mg/dL 83 78 89 79           Results from last 7 days   Lab Units 08/29/23  0443 08/28/23  0759   PROTIME seconds 26.0* 25.3*   INR  2.35* 2.27*             Results from last 7 days   Lab Units 08/28/23 2016 08/28/23  1659   LACTIC ACID mmol/L 1.4 2.3*           Results from last 7 days   Lab Units 08/28/23  1207   GRAM STAIN RESULT  4+ Polys  No bacteria seen         Diet: Regular House  Mobility: OOB to chair, NWB RLE  DVT Prophylaxis: SCD, Heparin SC    Medications/Pain Control:   Scheduled Medications:  acetaminophen, 975 mg, Oral, Q8H [address]  chlorhexidine, 15 mL, Mouth/Throat, Q12H JOSE  DULoxetine, 60 mg, Oral, Daily  ferrous sulfate, 325 mg, Oral, Daily With Breakfast  heparin (porcine), 5,000 Units, Subcutaneous, Q8H JOSE  levothyroxine, 112 mcg, Oral, Early Morning  magnesium sulfate, 2 g, Intravenous, Once  pantoprazole, 40 mg, Oral, Early Morning  piperacillin-tazobactam, 4.5 g, Intravenous, Q8H  senna-docusate sodium, , Oral, Daily  torsemide, 40 mg, Oral, Daily      Continuous IV Infusions:  lactated ringers infusion  Rate: 125 mL/hr Dose: 125 mL/hr  Freq: Continuous Route: IV  Indications of Use: IV Hydration  Last Dose: Stopped (08/28/23 1756)  Start: 08/28/23 0600 End: 08/28/23 1754     PRN Meds:  glucagon, , ,   simethicone, 80 mg, Oral, Q6H PRN        Network Utilization Review Department  ATTENTION: Please call with any questions or concerns to [phone] and carefully listen to the prompts so that you are directed to the right person. All voicemails are confidential.  Tyson Watkins all requests for admission clinical reviews, approved or denied determinations and any other requests to dedicated fax number below belonging to the campus where the patient is receiving treatment.  List of dedicated fax numbers for the Facilities:  FACILITY NAME UR FAX NUMBER   ADMISSION DENIALS (Administrative/Medical Necessity) [phone] 230[address] [facility] (Maternity/NICU/Pediatrics) [address] [place] [address] [phone]   [facility] [address] [phone] 1505 [facility] [address] [place] [id number] [facility] [phone]   [address] [place] [address] [phone]

## 2023-08-29 NOTE — CONSULTS
Consultation - Infectious Disease   Naun Kaiser 80 y.o. 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  In setting of MAIA, revision, chronic dislocation/displacement as below.  Presented June with subacute pain, spontaneous drainage from sinus tract.  CT showed abscess at joint.  Status post MAIA explantation, extensive I&D, insertion of antibiotic beads (no spacer placed) on 6/30.  Extensive purulent fluid and necrotic tissue noted intraoperatively.  OR cultures negative.  Status post 6 weeks IV vancomycin, transitioned to PO doxycycline. Had persistent wound drainage, sinus tract. Now status post I&D, layered [facility] 8/28. Intraoperative findings notable for cavity due to absence of hip. Cultures pending. Unclear if ongoing drainage due to infection versus dead space. Rec:  • Change antibiotics to daptomycin, ertapenem  • Follow up OR cultures and tailor antibiotics as indicated  • Follow temperatures closely  • Recheck CBC in AM  • Will discuss with Dr. Cassy Golden level of suspicion for ongoing infection     Status post right MAIA with revision in 2019. Complicated more recently by disruption and chronic dislocation/displacement.  Non-ambulatory.     Status post PPM.     IBS with constipation.     Afib. On coumadin     History of ESBL, MDRO. From recent urine cultures. CKD. Baseline Cr 1.0, CrCl ~30. Rec:  · Follow creatinine closely and dose-adjust antibiotics as indicated  · Recheck BMP in AM    The above impression and plan was discussed in detail with the patient and ML Silva with CC. Antibiotics:  Zosyn  POD #1    Thank you for this consultation. We will follow along with you.     HISTORY OF PRESENT ILLNESS:  Reason for Consult: PJI infection    HPI: Naun Kaiser is a 80 y.o. female with multiple medical problems well-known to me for complicated history of right hip PJI. Had remote right MAIA with revision in 2019. More recently developed chronic dislocation, displacement of the hip, ultimately found to have spontaneous drainage, abscess. Taken to the OR 6/30/2023 for joint explantation and placement of antibiotic beads. No spacer was placed. Intraoperative cultures were negative after prolonged incubation. She was treated with 6 weeks of IV vancomycin which was complicated by challenging dosing as an outpatient. She was transition to doxycycline. She developed early/continued postoperative wound drainage and ultimately found to have sinus tract and wound dehiscence. Was taken to the OR yesterday for washout. Fluid found in cavity due to absence of hip joint. Cultures were sent. She was started on Zosyn. We are asked to comment on further evaluation and management. In performing this consult, I have reviewed prior admission and outpatient visit records in detail. REVIEW OF SYSTEMS:  Denies fevers, chills, sweats, nausea, vomiting, or diarrhea. A complete system-based review of systems is otherwise negative. PAST MEDICAL HISTORY:  Past Medical History:   Diagnosis Date   • Abdominal bloating 8/1/2016   • Anemia    • Arthritis    • Cancer (HCC)     basal cell   • CHF (congestive heart failure) (HCC)    • COVID-19    • Disease of thyroid gland    • Disturbance of smell and taste     disturbance of taste   • Effusion of knee joint right    • Fibromyalgia    • Heart murmur     reported a previous heart murmur   • History of acute myocardial infarction    • History of atrial fibrillation    • History of bruising easily    • History of cancer    • History of dermatitis    • History of mammogram    • History of methicillin resistant staphylococcus aureus (MRSA)     Negative nasal culture, isolation discontinued 8/17/2018.    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   • Irritable bowel syndrome (IBS)    • Joint pain, hip    • Limb pain    • Myalgia     myalgia and myositis   • Need for prophylactic vaccination against single diseases    • Need for prophylactic vaccination and inoculation against influenza    • Preoperative cardiovascular examination    • Primary osteoarthritis of right knee    • Right leg pain    • Vaginal Pap smear     reported pap smear     Past Surgical History:   Procedure Laterality Date   • ANKLE ARTHRODESIS Right    • BACK SURGERY     • BARIATRIC SURGERY     • CHOLECYSTECTOMY      x2   • COLONOSCOPY     • ESOPHAGOGASTRODUODENOSCOPY N/A 3/23/2018    Procedure: ESOPHAGOGASTRODUODENOSCOPY (EGD); Surgeon: Jackie Villegas MD;  Location: [facility]; Service: Gastroenterology   • FOOT SURGERY     • HIP CLOSE REDUCTION Right 8/21/2016    Procedure: CLOSED REDUCTION RIGHT TOTAL HIP;  Surgeon: Ke Marte MD;  Location: [facility];  Service:    • INSERT / Lisa Chao / Jatin Morel     • IR BIOPSY OTHER  8/1/2020   • IR PICC REPOSITION  7/5/2023   • JOINT REPLACEMENT      LEFT KNEE REPLACEMENT   • JOINT REPLACEMENT      Right HIP   • PILONIDAL CYST EXCISION      x2   • NE EXC B9 LESION MRGN XCP SK TG T/A/L 0.5 CM/< Right 7/24/2019    Procedure: REVISION SCAR EXTREMITY Rt Hip;   Surgeon: Kamilah Elizalde MD;  Location: [facility];  Service: Orthopedics   • NE REVJ TOT [place]RP [facility]AL[place] Right 4/15/2019    Procedure: ARTHROPLASTY HIP TOTAL ACETABULAR REVISION;  Surgeon: Kamilah Elizalde MD;  Location: [facility];  Service: Orthopedics   • REPLACEMENT TOTAL KNEE Right    • SILVANA-EN-Y PROCEDURE x2   • TOTAL KNEE ARTHROPLASTY     • UMBILICAL HERNIA REPAIR      x2   • WOUND DEBRIDEMENT Right 2023    Procedure: DEBRIDEMENT LOWER EXTREMITY [facility])- I&D of right hip with explant of hardware; insertion of antibiotic beads;  Surgeon: Angel Augustin MD;  Location: [facility];  Service: Orthopedics       FAMILY HISTORY:  Non-contributory    SOCIAL HISTORY:  Social History     Substance and Sexual Activity   Alcohol Use Not Currently     Social History     Substance and Sexual Activity   Drug Use No     Social History     Tobacco Use   Smoking Status Never   Smokeless Tobacco Never       ALLERGIES:  Allergies   Allergen Reactions   • Codeine Vomiting and GI Intolerance     GI upset, ana m morphine  Reaction Date: 2012;    • Lactose - Food Allergy GI Intolerance   • Milk (Cow) Diarrhea   • Nsaids GI Intolerance   • Other      Other reaction(s): Other (See Comments)  ana m toradol in OR  sah   • Seasonal Ic  [Cholestatin] Allergic Rhinitis   • Trimethoprim    • Bactrim [Sulfamethoxazole-Trimethoprim] Rash   • Sulfa Antibiotics Rash     Other reaction(s): Other (See Comments)  takes lasix @home       MEDICATIONS:  All current active medications have been reviewed.     PHYSICAL EXAM:  Vitals:  Temp:  [97 °F (36.1 °C)-98.1 °F (36.7 °C)] 97.6 °F (36.4 °C)  HR:  [] 69  Resp:  [12-34] 15  BP: ()/(45-70) 108/60  SpO2:  [87 %-100 %] 98 %  Temp (24hrs), Av.5 °F (36.4 °C), Min:97 °F (36.1 °C), Max:98.1 °F (36.7 °C)  Current: Temperature: 97.6 °F (36.4 °C)     Physical Exam:  General:  Well-nourished, well-developed, in no acute distress  Eyes:  Conjunctive clear with no hemorrhages or effusions  Oropharynx:  No ulcers, no lesions  Neck:  Supple, no lymphadenopathy  Lungs:  Normal respiratory excursion, no accessory muscle use  Cardiac:  Regular rate and rhythm, extremities well perfused  Abdomen:  Soft, non-tender, non-distended  Extremities:  No peripheral cyanosis, clubbing, or edema  Skin:  No rashes, no ulcers  Neurological:  Moves upper extremities spontaneously, sensation grossly intact    LABS, IMAGING, & OTHER STUDIES:  Lab Results:  I have personally reviewed pertinent labs. Results from last 7 days   Lab Units 08/29/23  0443 08/29/23  0050 08/28/23  1659   POTASSIUM mmol/L 5.4* 3.0* 3.1*   CHLORIDE mmol/L 109* 109* 106   CO2 mmol/L 23 16* 22   BUN mg/dL 30* 25 32*   CREATININE mg/dL 1.16 0.98 1.23   EGFR ml/min/1.73sq m 41 51 38   CALCIUM mg/dL 7.1* 5.7* 7.6*   AST U/L 11* 12* 11*   ALT U/L 8 7 10   ALK PHOS U/L 82 69 102     Results from last 7 days   Lab Units 08/29/23  0443 08/28/23  2354 08/28/23  1659   WBC Thousand/uL 9.24 9.62 12.35*   HEMOGLOBIN g/dL 6.7* 7.0* 7.7*   PLATELETS Thousands/uL 163 158 205     Results from last 7 days   Lab Units 08/28/23  1208 08/28/23  1207   GRAM STAIN RESULT  No Polys or Bacteria seen 4+ Polys  No bacteria seen       Imaging Studies:   I have personally reviewed pertinent imaging study reports and images in PACS. Xray right hip June reviewed personally showing displacement of acetabular component of hip joint. EKG, Pathology, and Other Studies:   I have personally reviewed pertinent reports.

## 2023-08-29 NOTE — WOUND OSTOMY CARE
Consult Note - Wound   Odean Bee 80 y.o. female MRN: [id number]  Unit/Bed#: [facility] 499-48 Encounter: [id number]        History and Present Illness:  Patient is an 79 yo female that was admitted to [facility] for treatment of dehiscence of operative wound. Patient has a PMH of CHF, a-fib, right hip prosthetic. Patient is a moderate assist for turning and repositioning. Patient is incontinent of bowel and bladder managed via internal urinary catheter. On assessment, patient is seen on critical care low air loss mattress. Wound Care was consulted for sacral wounds     Assessment Findings:   B/L heels are dry intact and alexy with no skin loss or wounds present. Recommend preventative allevyn foam dressings and proper offloading/ repositioning. 1. POA B/l Sacro-Buttocks Evolving DTPI: scattered areas of intact and partial thickness skin loss measured together. Wound beds are 60% dark purple non-blanchable tissue and 40% beefy red bleeding tissue. Allyn-wounds are fragile and hyperpigmented. Scant sanguinous drainage noted. Recommend calazime cream. Calazime applied. Deep Tissue Pressure Injury wounds have the potential to evolve into unstageable, stage III or stage IV wounds. No induration, fluctuance, odor, warmth/temperature differences, redness, or purulence noted to the above noted wounds and skin areas assessed. New dressings applied per orders listed below. Patient tolerated well- no s/s of non-verbal pain or discomfort observed during the encounter. Bedside nurse aware of plan of care. See flow sheets for more detailed assessment findings. Orders listed below and wound care will continue to follow, call or tiger text with questions. Skin Care Plan:  1-Cleanse sacro-buttocks with soap and water. Apply Calazime Cream to B/L Sacro-Buttocks BID and PRN episodes of incontinence  2-Turn/reposition q2h or when medically stable for pressure re-distribution on skin .   3-Elevate heels to offload pressure. 4-Moisturize skin daily with skin nourishing cream  5-Ehob cushion in chair when out of bed. 6-B/L Heels: cleanse area and pat dry. Apply allevyn foam dressings to area. Deo with P for Prevention and change every 3 Days or PRN. Peel back and inspect skin Q-shift. 7-Nursing Communication: Please order patient P-500 Specialty Low Air Loss Mattress once transferred out of CCU. Wounds:  Wound 06/28/23 Pressure Injury Sacrum (Active)   Wound Image   08/29/23 1210   Wound Description Beefy red;Light purple;Non-blanchable erythema 08/29/23 1400   Pressure Injury Stage DTPI 08/29/23 1400   Allyn-wound Assessment Fragile; Hyperpigmented 08/29/23 1400   Wound Length (cm) 8 cm 08/29/23 1210   Wound Width (cm) 6 cm 08/29/23 1210   Wound Depth (cm) 0.1 cm 08/29/23 1210   Wound Surface Area (cm^2) 48 cm^2 08/29/23 1210   Wound Volume (cm^3) 4.8 cm^3 08/29/23 1210   Calculated Wound Volume (cm^3) 4.8 cm^3 08/29/23 1210   Change in Wound Size % 50 08/29/23 1210   Wound Site Closure CARINE 08/28/23 1628   Drainage Amount Scant 08/29/23 1400   Drainage Description Sanguineous 08/29/23 1400   Non-staged Wound Description Partial thickness 08/29/23 1400   Treatments Cleansed;Site care 08/29/23 1400   Dressing Protective barrier 08/29/23 1400   Dressing Changed New 08/29/23 1400   Patient Tolerance Tolerated well 08/29/23 1400   Dressing Status Intact 08/29/23 1400               Angel Foster RN, BSN, [facility]

## 2023-08-29 NOTE — PROCEDURES
PICC Line Insertion    Date/Time: 8/29/2023 5:05 PM    Performed by: Braxton Gamino MD  Authorized by: David Goins MD    Patient location:  IR  Other Assisting Provider: Yes (comment) (Eugenia Linares RT)    Consent:     Consent obtained:  Verbal and written    Consent given by:  Patient    Risks discussed:  Arterial puncture, bleeding, infection, incorrect placement, nerve damage and pneumothorax    Alternatives discussed:  Delayed treatment and alternative treatment  Universal protocol:     Procedure explained and questions answered to patient or proxy's satisfaction: yes      Relevant documents present and verified: yes      Test results available and properly labeled: yes      Radiology Images displayed and confirmed. If images not available, report reviewed: yes      Required blood products, implants, devices, and special equipment available: yes      Site/side marked: yes      Immediately prior to procedure, a time out was called: yes      Patient identity confirmed:  Verbally with patient, arm band, provided demographic data and hospital-assigned identification number  Pre-procedure details:     Hand hygiene: Hand hygiene performed prior to insertion      Sterile barrier technique: All elements of maximal sterile technique followed      Skin preparation:  ChloraPrep    Skin preparation agent: Skin preparation agent completely dried prior to procedure    Indications:     PICC line indications: long term antibiotics    Sedation:     Sedation type: Other (comment) (no sedation required)  Anesthesia (see MAR for exact dosages):      Anesthesia method:  Local infiltration    Local anesthetic:  Lidocaine 1% w/o epi  Procedure details:     Location:  [place]    Vessel type: vein      Laterality:  Right    Site selection rationale:  Largest, most patent vessel    Approach: percutaneous technique used      Patient position:  Flat    Procedural supplies:  Double lumen    Catheter size:  5 Fr Landmarks identified: yes      Ultrasound guidance: yes      Ultrasound image availability:  Images available in PACS    Sterile ultrasound techniques: Sterile gel and sterile probe covers were used      Number of attempts:  1    Successful placement: yes      Cath access vessel: placed under fluoroscopy. Total catheter length (cm):  43    Catheter out on skin (cm):  0    Max flow rate:  999ml/hr    Arm circumference:  25  Post-procedure details:     Post-procedure:  Dressing applied    Assessment:  Blood return through all ports and free fluid flow    Post-procedure complications: none      Patient tolerance of procedure:   Tolerated well, no immediate complications    Observer: Yes      Observer name:  Gricel Catsañeda RN

## 2023-08-29 NOTE — NURSING NOTE
Received consult for a midline. However, pt has CKD w/ GFR 41 with most recent trends 30's-40's. Reached out to ordering provider, Genevieve Valentin, to ask if she could reach out to renal to get their approval for midline placement given pt's hx. Per ID, it is uncertain whether pt will require LTabx or not, however is likely. Will await nephrology's input and if approval is received to place a line, will plan to place PICC given likely need for stable access and potential Lt abx. Primary provider, Renee Arndt, agreeable with plan.

## 2023-08-29 NOTE — PHYSICAL THERAPY NOTE
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 without presence of antibiotic-impregnated cement spacer [Z89.621]    Problem List  Patient Active Problem List   Diagnosis    Chronic atrial fibrillation (HCC)    Anemia    Chronic diastolic CHF (congestive heart failure) (HCC)    Depression    Class 1 obesity due to excess calories with serious comorbidity and body mass index (BMI) of 33.0 to 33.9 in adult    Chronic pain    Chronic pain of lower extremity, bilateral    Fibromyalgia    External hemorrhoids    Irritable bowel syndrome with constipation    Lumbar canal stenosis    Moderate mitral regurgitation    Osteoarthritis, multiple sites    Osteoporosis    Peripheral neuropathy    Primary osteoarthritis of right knee    Synovial cyst of right popliteal space    Pacemaker    Hypothyroidism    Macrocytosis    Status post gastrectomy    Status post right hip replacement    Ambulatory dysfunction    MCI (mild cognitive impairment)    Osteomyelitis (HCC)    Cellulitis of right lower extremity    Hypertrophic scar of skin    S/P scar revision    Elevated C-reactive protein (CRP)    Fall    Hematoma    Ecchymosis of eye    Meningioma (HCC)    Trochanteric bursitis of right hip    Stage 2 chronic kidney disease    Benign essential HTN    Trochanteric bursitis    Negative depression screening    Peripheral vascular disease, unspecified (HCC)    Lumbar radiculopathy    Low back pain with sciatica    Lumbar spondylosis    S/P lumbar fusion    Hyponatremia    Chronic diarrhea    Infection of right prosthetic hip joint [facility])       Past Medical History  Past Medical History:   Diagnosis Date    Abdominal bloating 8/1/2016    Anemia     Arthritis     Cancer (HCC)     basal cell    CHF (congestive heart failure) ([address])     COVID-19     Disease of thyroid gland     Disturbance of smell and taste     disturbance of taste    Effusion of knee joint right     Fibromyalgia     Heart murmur     reported a previous heart murmur    History of acute myocardial infarction     History of atrial fibrillation     History of bruising easily     History of cancer     History of dermatitis     History of mammogram     History of methicillin resistant staphylococcus aureus (MRSA)     Negative nasal culture, isolation discontinued 8/17/2018. History of methicillin resistant staphylococcus aureus (MRSA) 08/17/2018    negative nasal culture-isolation and hx discontinued 8/17/2018    History of obesity     History of osteopenia     History of sciatica     History of shortness of breath     History of sinusitis     History of sore throat     History of syncope     History of umbilical hernia     History of viral infection     Infection of right prosthetic hip joint ([address]) 8/11/2023    Irritable bowel syndrome (IBS)     Joint pain, hip     Limb pain     Myalgia     myalgia and myositis    Need for prophylactic vaccination against single diseases     Need for prophylactic vaccination and inoculation against influenza     Preoperative cardiovascular examination     Primary osteoarthritis of right knee     Right leg pain     Vaginal Pap smear     reported pap smear       Past Surgical History  Past Surgical History:   Procedure Laterality Date    ANKLE ARTHRODESIS Right     BACK SURGERY      BARIATRIC SURGERY      CHOLECYSTECTOMY      x2    COLONOSCOPY      ESOPHAGOGASTRODUODENOSCOPY N/A 3/23/2018    Procedure: ESOPHAGOGASTRODUODENOSCOPY (EGD); Surgeon: Miryam Tatum MD;  Location: [facility];   Service: Gastroenterology    FOOT SURGERY      HIP CLOSE REDUCTION Right 8/21/2016    Procedure: CLOSED REDUCTION RIGHT TOTAL HIP;  Surgeon: Taz Brown MD;  [facility];  Service:     Norma Haddad / Mirna Courtney / Jordi Caputo      IR BIOPSY OTHER  8/1/2020    IR PICC REPOSITION  7/5/2023 JOINT REPLACEMENT      LEFT KNEE REPLACEMENT    JOINT REPLACEMENT      Right HIP    PILONIDAL CYST EXCISION      x2    OR EXC B9 LESION MRGN XCP SK TG T/A/L 0.5 CM/< Right 7/24/2019    Procedure: REVISION SCAR EXTREMITY Rt Hip; Surgeon: Cal Umana MD;  Location[facility];  Service: Orthopedics    OR REVJ TOT HIP ARTHRP [address] W/WO AGRFT/ALGRFT Right 4/15/2019    Procedure: ARTHROPLASTY HIP TOTAL ACETABULAR REVISION;  Surgeon: Cal Umana MD;  [facility];  Service: Orthopedics    REPLACEMENT TOTAL KNEE Right     SILVANA-EN-Y PROCEDURE      x2    TOTAL KNEE ARTHROPLASTY      UMBILICAL HERNIA REPAIR      x2    WOUND DEBRIDEMENT Right 6/30/2023    Procedure: DEBRIDEMENT LOWER EXTREMITY ([address] [facility])- I&D of right hip with explant of hardware; insertion of antibiotic beads;  Surgeon: Jose Delgado MD;  [facility];  Service: Orthopedics        08/29/23 1126   PT Last Visit   PT Visit Date 08/29/23   Note Type   Note type Evaluation   Pain Assessment   Pain Assessment Tool FLACC   Pain Location/Orientation Orientation: Right;Location: Leg   Pain Rating: FLACC (Rest) - Face 0   Pain Rating: FLACC (Rest) - Legs 0   Pain Rating: FLACC (Rest) - Activity 0   Pain Rating: FLACC (Rest) - Cry 0   Pain Rating: FLACC (Rest) - Consolability 0   Score: FLACC (Rest) 0   Pain Rating: FLACC (Activity) - Face 1   Pain Rating: FLACC (Activity) - Legs 0   Pain Rating: FLACC (Activity) - Activity 0   Pain Rating: FLACC (Activity) - Cry 1   Pain Rating: FLACC (Activity) - Consolability 1   Score: FLACC (Activity) 3   Restrictions/Precautions   Weight Bearing Precautions Per Order Yes   RLE Weight Bearing Per Order (S)  NWB  (explant of MAIA on 6/30; R hip I&D and closure 8/28)   Braces or Orthoses   (none)   Other Precautions Pain; Fall Risk;O2;Telemetry;Multiple lines;WBS   Home Living   Type of Home SNF   Home Layout One level   Home Equipment Mechanical lift   Additional Comments Per chart review, patient was previously at [facility]) since 01/2023 where she would occasionally work with PT. Patient would sit EOB and occasionally stand prior to 06/2023. Most recently, patient did confirm that she is participating in PT services and last week sat EOB. Occasionally she is hoyered out of bed. States she feeds herself. Prior Function   Level of [place] Needs assistance with ADLs; Needs assistance with functional mobility; Needs assistance with [address] staff   Receives Help From Other (Comment)  (staff)   IADLs Family/Friend/Other provides medication management; Family/Friend/Other provides meals; Family/Friend/Other provides transportation   Falls in the last 6 months 0   General   Additional Pertinent History 80 y.o. female admitted to [address] [place] [address] on 8/28/2023 for the following planned procedure: R hip I&D and closure. Post-op she is NWB R LE. Patient has h/o R hip replacement with recurrent dislocations and infections. Most recently on 6/30/2023 she underwent explant of total hip prosthesis with antibiotic beads.    Family/Caregiver Present No   Cognition   Arousal/Participation Alert   Orientation Level Oriented X4   Memory Decreased short term memory;Decreased recall of recent events   Subjective   Subjective "I am too nauseous"   RUE Assessment   RUE Assessment   (see OT eval)   LUE Assessment   LUE Assessment   (see OT eval)   RLE Assessment   RLE Assessment X  (minimal active bed level mobility observed)   LLE Assessment   LLE Assessment X  (2-/5 grossly)   Bed Mobility   Supine to Sit 1  Dependent   Additional items Assist x 2   Sit to Supine 1  Dependent   Additional items Assist x 2   Additional Comments dependent X2 long sitting in bed; unable to assess supine<-->sit transfer due to hypotension and nausea   Endurance Deficit   Endurance Deficit Yes   Endurance Deficit Description O2, gross weakness and activity tolerance, hypotension   Activity Tolerance   Activity Tolerance Treatment limited secondary to medical complications (Comment)  (nausea, hypotension (supine in bed prior to mobility 97/54, HOB elevated to approx 70 degrees 94/53, supine in bed with HOB 45 degrees 103/55)   Medical Staff Made Aware This high complexity evaluation was performed with an occupational therapist due to the patient's co-morbidities, clinically unstable presentation, and present impairments which are a regression from the patient's baseline. Nurse Made Aware aracelis to see per RUBEN Lopez   Assessment   Prognosis Guarded   Problem List Decreased strength;Decreased range of motion;Decreased endurance; Impaired balance;Decreased mobility;Pain;Orthopedic restrictions   Assessment PT completed evaluation of 80 y.o. female admitted to [facility] on 8/28/2023 for the following planned procedure: R hip I&D and closure. Post-op she is NWB R LE. Patient has h/o R hip replacement with recurrent dislocations and infections. Most recently on 6/30/2023 she underwent explant of total hip prosthesis with antibiotic beads. Patient's current status instabilities include ongoing admission to critical care, pain, continuous O2/HR monitoring, and a regression in function from baseline. PMH is significant for CHF, afib, pacemaker, CKD. Per chart review, patient was previously at [facility]) since 01/2023 where she would occasionally work with PT. Patient would sit EOB and occasionally stand prior to 06/2023. Most recently, patient did confirm that she is participating in PT services and last week sat EOB. Occasionally she is hoyered out of bed. States she feeds herself. Patient presents at time of PT evaluation functioning below baseline and currently w/ overall mobility deficits 2* to: decreased endurance, decreased activity tolerance and skin integrity and fall risk. During PT evaluation, patient required dependent AX2 for long sitting in bed and demonstrated minimal active movement of b/l LE.  Unable to attempt sitting EOB due to hypotension w/ associated nausea. PT d/c recommendation is for return to facility with rehab. Patient will continue to benefit from continued skilled PT this admission to achieve maximal function and safety. Goals   Patient Goals to feel less sick   LT Expiration Date 09/12/23   Long Term Goal #1 1) Perform bed mobility mod-AX2 to participate in frequent repositioning and improve skin integrity; 2) Improve b/l LE strength by 1/2 grade in order to improve efficiency of tranfers; 3) Improve balance by 1 grade to reduce risk for falls; 4) Improve overall activity tolerance to 60 minutes in order to increase patient's ability to engage in mobility tasks; 5) Maintain sitting EOB x 30 minutes S level in order to improve core/strength and stability   PT Treatment Day 0   Plan   Treatment/Interventions LE strengthening/ROM; Therapeutic exercise; Endurance training;Patient/family training;Equipment eval/education; Bed mobility;OT;Spoke to nursing   PT Frequency 1-2x/wk   Recommendation   PT Discharge Recommendation Return to facility with rehabilitation services   AM-PAC Basic Mobility Inpatient   Turning in Flat Bed Without Bedrails 1   Lying on Back to Sitting on Edge of Flat Bed Without Bedrails 1   Moving Bed to Chair 1   Standing Up From Chair Using Arms 1   Walk in Room 1   Climb 3-5 Stairs With Railing 1   Basic Mobility Inpatient Raw Score 6   Turning Head Towards Sound 3   Follow Simple Instructions 3   Low Function Basic Mobility Raw Score  12   Low Function Basic Mobility Standardized Score  18.33   Highest Level Of Mobility   JH-HLM Goal 2: Bed activities/Dependent transfer   JH-HLM Achieved 2: Bed activities/Dependent transfer     The patient's AM-PAC Basic Mobility Inpatient Standardized Score is less than 42.9, suggesting this patient may benefit from discharge to post-acute rehabilitation services.  Please also refer to the recommendation of the Physical Therapist for safe discharge planning.         Jay Shipman, PT, DPT

## 2023-08-29 NOTE — PLAN OF CARE
Problem: PHYSICAL THERAPY ADULT  Goal: Performs mobility at highest level of function for planned discharge setting. See evaluation for individualized goals. Description: Treatment/Interventions: LE strengthening/ROM, Therapeutic exercise, Endurance training, Patient/family training, Equipment eval/education, Bed mobility, OT, Spoke to nursing          See flowsheet documentation for full assessment, interventions and recommendations. Note: Prognosis: Guarded  Problem List: Decreased strength, Decreased range of motion, Decreased endurance, Impaired balance, Decreased mobility, Pain, Orthopedic restrictions  Assessment: PT completed evaluation of 80 y.o. female admitted to [address] [place] [address] on 8/28/2023 for the following planned procedure: R hip I&D and closure. Post-op she is NWB R LE. Patient has h/o R hip replacement with recurrent dislocations and infections. Most recently on 6/30/2023 she underwent explant of total hip prosthesis with antibiotic beads. Patient's current status instabilities include ongoing admission to critical care, pain, continuous O2/HR monitoring, and a regression in function from baseline. PMH is significant for CHF, afib, pacemaker, CKD. Per chart review, patient was previously at [facility] since 01/2023 where she would occasionally work with PT. Patient would sit EOB and occasionally stand prior to 06/2023. Most recently, patient did confirm that she is participating in PT services and last week sat EOB. Occasionally she is hoyered out of bed. States she feeds herself. Patient presents at time of PT evaluation functioning below baseline and currently w/ overall mobility deficits 2* to: decreased endurance, decreased activity tolerance and skin integrity and fall risk. During PT evaluation, patient required dependent AX2 for long sitting in bed and demonstrated minimal active movement of b/l LE. Unable to attempt sitting EOB due to hypotension w/ associated nausea.  PT d/c recommendation is for return to facility with rehab. Patient will continue to benefit from continued skilled PT this admission to achieve maximal function and safety. PT Discharge Recommendation: Return to facility with rehabilitation services    See flowsheet documentation for full assessment.

## 2023-08-29 NOTE — PROGRESS NOTES
[address] [place] [address]  Progress Note: Critical Care  Name: Ewa Bassett 80 y.o. female I MRN: [id number]  Unit/Bed#: [facility] 337-95 I Date of Admission: 8/28/2023   Date of Service: 8/29/2023 I Hospital Day: 1    Assessment/Plan      Neuro:   • Diagnosis: I&D R hip septic joint with wound closure   ? Plan: Dilaudid PRN, tylenol 975 Q8H        CV:   • Diagnosis: HFpEF  ? Patient showing clinical signs of dehydration; Hold home torsemide 40mg given apparent hypovolemic state, despite bilateral pitting edema from heart failure. ? Continue IV fluid with oral supplementation as tolerated. ? Once stable, can resume diuresis         • Diagnosis: A-fib  ? Pacemaker in place  ? Continue lovenox. Plan to resume home dose of coumadin prior to discharge with goal INR 2-3.   ? Lovenox for DVT ppx        CV:   ? Diagnosis:  HTN (benign)  Goal BP < 130/80  ? If elevated can consider dietary restriction of salt / fluid; no active home meds for HTN                  Pulm:  • Diagnosis: No active dx  ? Post op incentive spirometry if needed         GI:   • Diagnosis: GERD  ? Plan: Continue home PPI; 40mg protonix  ? Bowel regimen         :   • Diagnosis: No active dx. ? Plan: Burger in place and will consider removal as soon as practical given fresh surgical wound and inability to ambulate at baseline   ? Monitor I/O post-op        F/E/N:   • F: 5% dex + NS @ 75 ml/hr   • E: monitor and replete as needed   • N: Regular diet, as tolerated     Heme/Onc:   • Diagnosis: anemia ; bled through first dressing in first 12 hours post op     ? Plan: monitor H/H  ? Consider transfusion for symptomatic Hb < 7  ? Hb dropped to 6.7 ; plan to transfuse 1 unit PRBC and recheck [facility]  ? HH checks BID  ? Fe supplementation   ?  If Hb continues to drop and she bleeds through more dressings, plan to hold DVT ppx     DVT ppx with lovenox with plan to resume coumadin prior to DC        Endo:   • Diagnosis: Hypothyroidism  ? Plan: continue home dose of 112mcg levothyroxine        ID:   • Diagnosis: Septic joint of R hip s/p prosthesis removal  ? ID consulted; ertapenem prescibed, zosyn DCd  ? Consider need for outpt abx         MSK/Skin:  • Diagnosis: open  I&D septic R hip  ? Plan: Keep incision clean and dry; monitor for signs of infection  ? Continue IV abx  ? Dressing changes PRN  ? Pain control with dilaudid and PO tylenol for OA  ? Remain non-weight bearing/ non-ambulatory        Disposition: Critical care    ICU Core Measures     A: Assess, Prevent, and Manage Pain · Has pain been assessed? Yes  · Need for changes to pain regimen? No   B: Both SAT/SAT  · N/A   C: Choice of Sedation · RASS Goal: -1 Drowsy or 0 Alert and Calm  · Need for changes to sedation or analgesia regimen? No   D: Delirium · CAM-ICU: Negative   E: Early Mobility  · Plan for early mobility? No   F: Family Engagement · Plan for family engagement today? Yes       Antibiotic Review: Infectious disease consulted    Review of Invasive Devices: Burger Plan: Continue for accurate I/O monitoring for 48 hours        Prophylaxis:  VTE VTE covered by:  heparin (porcine), Subcutaneous       Stress Ulcer  covered bypantoprazole (PROTONIX) EC tablet 40 mg [[id number]]          Subjective   HPI/24hr events: Patient became hypoglycemic down to a low of 27. Fluctuate between 27 and 64. She was evaluated at the time and found to have an elevated lactate as well as reduced urine output in the Burger catheter bag. Ultrasound was performed and found that in conjunction with physical exam the patient was dry evidenced by dry mucous membranes and compressible IVC. 500 cc bolus of LR was given, Burger catheter flushed, and urine output resumed. I/O overnight was 1270/445. Dextrose infusion was started which slowly normalized the blood glucose level. This morning, the patient was found to have a hemoglobin of 6.7 which represents a greater than 2.0 drop.   The patient was typed and screened and 1 unit of red blood cells was ordered. Fe supp started. Bled though first post op dressing within 12 hours of the procedure            Objective                            Vitals I/O      Most Recent Min/Max in 24hrs   Temp (!) 97.3 °F (36.3 °C) Temp  Min: 97 °F (36.1 °C)  Max: 98.1 °F (36.7 °C)   Pulse 69 Pulse  Min: 68  Max: 108   Resp (!) 27 Resp  Min: 12  Max: 38   /53 BP  Min: 87/60  Max: 141/58   O2 Sat 100 % SpO2  Min: 83 %  Max: 100 %      Intake/Output Summary (Last 24 hours) at 2023 1151  Last data filed at 2023 3178  Gross per 24 hour   Intake 3570 ml   Output 720 ml   Net 2850 ml         Diet Regular; Regular House     Invasive Monitoring Physical exam   Burger cath Physical Exam  Eyes:      General: Neglect  Skin:     General: Skin is warm and dry. Findings: Wound present. Comments: Surgical incision posterior right hip covered with fresh dressing with small area of bleeding visible through dressing. Mild tenderness to palpation around the wound. HENT:      Head: Normocephalic and atraumatic. Mouth/Throat:      Mouth: Mucous membranes are dry. Neck:     Vascular: No JVD. Cardiovascular:      Rate and Rhythm: Normal rate and regular rhythm. Pulses: Normal pulses. Heart sounds: Normal heart sounds. Musculoskeletal:      Right lower le+ Edema present. Left lower leg: 3+ Edema present. Abdominal:      Palpations: Abdomen is soft. Tenderness: There is no abdominal tenderness. There is no guarding. Constitutional:       General: She is not in acute distress. Appearance: She is well-developed. She is not ill-appearing or toxic-appearing. Interventions: She is not sedated, not intubated and not restrained. Pulmonary:      Effort: Pulmonary effort is normal. No respiratory distress. She is not intubated. Breath sounds: Normal breath sounds.    Psychiatric:         Speech: Speech is not no receptive aphasia or no expressive aphasia. Neurological:      General: No focal deficit present. Mental Status: She is oriented to person, place and time. She is somnolent. She is CAM ICU negative. Cranial Nerves: No dysarthria or facial asymmetry. Sensory: No sensory deficit. Motor: No motor deficit. Genitourinary/Anorectal:  FoleyVitals reviewed.           Diagnostic Studies      EKG: reviewed   Imaging: N/A     Medications:  Scheduled PRN   acetaminophen, 975 mg, Q8H JOSE  chlorhexidine, 15 mL, Q12H [facility]  DAPTOmycin, 6 mg/kg, Q24H  DULoxetine, 60 mg, Daily  ertapenem, 1,000 mg, Q24H  ferrous sulfate, 325 mg, Daily With Breakfast  glucagon, ,   heparin (porcine), 5,000 Units, Q8H [facility]  levothyroxine, 112 mcg, Early Morning  pantoprazole, 40 mg, Early Morning  senna-docusate sodium, , Daily      glucagon, ,   simethicone, 80 mg, Q6H PRN       Continuous    dextrose 5 % and sodium chloride 0.9 %, 75 mL/hr, Last Rate: 75 mL/hr (08/29/23 0946)         Labs:    CBC    Recent Labs     08/28/23  2354 08/29/23  0443   WBC 9.62 9.24   HGB 7.0* 6.7*   HCT 21.8* 21.1*    163     BMP    Recent Labs     08/29/23  0050 08/29/23  0443   SODIUM 148* 135   K 3.0* 5.4*   * 109*   CO2 16* 23   AGAP 23 3   BUN 25 30*   CREATININE 0.98 1.16   CALCIUM 5.7* 7.1*       Coags    Recent Labs     08/28/23  0759 08/29/23  0443   INR 2.27* 2.35*        Additional Electrolytes  Recent Labs     08/28/23  1659 08/29/23  0443   MG 1.4* 1.9   PHOS 3.2 3.0   CAIONIZED 1.09*  --           Blood Gas    No recent results  No recent results LFTs  Recent Labs     08/29/23  0050 08/29/23  0443   ALT 7 8   AST 12* 11*   ALKPHOS 69 82   ALB 1.6* 1.8*   TBILI 0.25 0.28       Infectious  No recent results  Glucose  Recent Labs     08/28/23  0759 08/28/23  1659 08/29/23  0050 08/29/23  0443   GLUC 79 89 78 83             Mehul Enrique MD

## 2023-08-30 ENCOUNTER — APPOINTMENT (INPATIENT)
Dept: NON INVASIVE DIAGNOSTICS | Facility: HOSPITAL | Age: 88
DRG: 856 | End: 2023-08-30
Payer: COMMERCIAL

## 2023-08-30 ENCOUNTER — APPOINTMENT (OUTPATIENT)
Dept: RADIOLOGY | Facility: HOSPITAL | Age: 88
DRG: 856 | End: 2023-08-30
Payer: COMMERCIAL

## 2023-08-30 PROBLEM — E16.2 HYPOGLYCEMIA: Status: ACTIVE | Noted: 2023-08-30

## 2023-08-30 PROBLEM — K21.9 GASTROESOPHAGEAL REFLUX DISEASE WITHOUT ESOPHAGITIS: Status: ACTIVE | Noted: 2023-08-30

## 2023-08-30 LAB
ABO GROUP BLD BPU: NORMAL
ALBUMIN SERPL BCP-MCNC: 2.3 G/DL (ref 3.5–5)
ALP SERPL-CCNC: 71 U/L (ref 34–104)
ALT SERPL W P-5'-P-CCNC: 7 U/L (ref 7–52)
ANION GAP SERPL CALCULATED.3IONS-SCNC: 6 MMOL/L
ANION GAP SERPL CALCULATED.3IONS-SCNC: 7 MMOL/L
APTT PPP: 52 SECONDS (ref 23–37)
AST SERPL W P-5'-P-CCNC: 17 U/L (ref 13–39)
BACTERIA SPEC ANAEROBE CULT: NORMAL
BACTERIA SPEC ANAEROBE CULT: NORMAL
BACTERIA SPEC BFLD CULT: ABNORMAL
BACTERIA TISS AEROBE CULT: ABNORMAL
BASOPHILS # BLD AUTO: 0.02 THOUSANDS/ÂΜL (ref 0–0.1)
BASOPHILS NFR BLD AUTO: 0 % (ref 0–1)
BILIRUB DIRECT SERPL-MCNC: 0.55 MG/DL (ref 0–0.2)
BILIRUB SERPL-MCNC: 1.23 MG/DL (ref 0.2–1)
BLD SMEAR INTERP: NORMAL
BPU ID: NORMAL
BUN SERPL-MCNC: 26 MG/DL (ref 5–25)
BUN SERPL-MCNC: 26 MG/DL (ref 5–25)
CA-I BLD-SCNC: 1.17 MMOL/L (ref 1.12–1.32)
CALCIUM SERPL-MCNC: 7.4 MG/DL (ref 8.4–10.2)
CALCIUM SERPL-MCNC: 7.5 MG/DL (ref 8.4–10.2)
CFFFLEV: 386.9 MG/DL (ref 278–581)
CFFMA (FUNCTIONAL FIBRINOGEN MAX AMPLITUDE): 18.9 MM (ref 15–32)
CFFMA (FUNCTIONAL FIBRINOGEN MAX AMPLITUDE): 21.2 MM (ref 15–32)
CHLORIDE SERPL-SCNC: 110 MMOL/L (ref 96–108)
CHLORIDE SERPL-SCNC: 111 MMOL/L (ref 96–108)
CKA(ANGLE): 69.5 DEG (ref 63–78)
CKHR(HEPARINASE REACTION TIME): 8.8 MIN (ref 4.3–8.3)
CKK(CLOT KINETICS): 1.4 MIN (ref 0.8–2.1)
CKLY30: 0.5 % (ref 0–2.6)
CKMA(MAX AMPLITUDE): 58.1 MM (ref 52–69)
CKR(REACTION TIME): 10.2 MIN (ref 4.6–9.1)
CKR(REACTION TIME): 9.3 MIN (ref 4.6–9.1)
CO2 SERPL-SCNC: 20 MMOL/L (ref 21–32)
CO2 SERPL-SCNC: 22 MMOL/L (ref 21–32)
CORTIS SERPL-MCNC: 15 UG/DL
CREAT SERPL-MCNC: 1.07 MG/DL (ref 0.6–1.3)
CREAT SERPL-MCNC: 1.1 MG/DL (ref 0.6–1.3)
CROSSMATCH: NORMAL
CRTMA(RAPIDTEG MAX AMPLITUDE): 59.3 MM (ref 52–70)
CRTMA(RAPIDTEG MAX AMPLITUDE): 59.7 MM (ref 52–70)
EOSINOPHIL # BLD AUTO: 0.03 THOUSAND/ÂΜL (ref 0–0.61)
EOSINOPHIL NFR BLD AUTO: 0 % (ref 0–6)
ERYTHROCYTE [DISTWIDTH] IN BLOOD BY AUTOMATED COUNT: 18.7 % (ref 11.6–15.1)
GFR SERPL CREATININE-BSD FRML MDRD: 44 ML/MIN/1.73SQ M
GFR SERPL CREATININE-BSD FRML MDRD: 46 ML/MIN/1.73SQ M
GLUCOSE SERPL-MCNC: 105 MG/DL (ref 65–140)
GLUCOSE SERPL-MCNC: 154 MG/DL (ref 65–140)
GLUCOSE SERPL-MCNC: 163 MG/DL (ref 65–140)
GLUCOSE SERPL-MCNC: 195 MG/DL (ref 65–140)
GLUCOSE SERPL-MCNC: 204 MG/DL (ref 65–140)
GLUCOSE SERPL-MCNC: 214 MG/DL (ref 65–140)
GLUCOSE SERPL-MCNC: 30 MG/DL (ref 65–140)
GLUCOSE SERPL-MCNC: 52 MG/DL (ref 65–140)
GLUCOSE SERPL-MCNC: 55 MG/DL (ref 65–140)
GLUCOSE SERPL-MCNC: 76 MG/DL (ref 65–140)
GRAM STN SPEC: ABNORMAL
HCT VFR BLD AUTO: 21.3 % (ref 34.8–46.1)
HGB BLD-MCNC: 7 G/DL (ref 11.5–15.4)
HGB BLD-MCNC: 8.6 G/DL (ref 11.5–15.4)
IMM GRANULOCYTES # BLD AUTO: 0.11 THOUSAND/UL (ref 0–0.2)
IMM GRANULOCYTES NFR BLD AUTO: 1 % (ref 0–2)
INR PPP: 2.44 (ref 0.84–1.19)
LYMPHOCYTES # BLD AUTO: 1.13 THOUSANDS/ÂΜL (ref 0.6–4.47)
LYMPHOCYTES NFR BLD AUTO: 12 % (ref 14–44)
MAGNESIUM SERPL-MCNC: 1.8 MG/DL (ref 1.9–2.7)
MAGNESIUM SERPL-MCNC: 2.1 MG/DL (ref 1.9–2.7)
MCH RBC QN AUTO: 32.4 PG (ref 26.8–34.3)
MCHC RBC AUTO-ENTMCNC: 32.2 G/DL (ref 31.4–37.4)
MCV RBC AUTO: 101 FL (ref 82–98)
MONOCYTES # BLD AUTO: 0.75 THOUSAND/ÂΜL (ref 0.17–1.22)
MONOCYTES NFR BLD AUTO: 8 % (ref 4–12)
NEUTROPHILS # BLD AUTO: 7.68 THOUSANDS/ÂΜL (ref 1.85–7.62)
NEUTS SEG NFR BLD AUTO: 79 % (ref 43–75)
NRBC BLD AUTO-RTO: 0 /100 WBCS
PHOSPHATE SERPL-MCNC: 2.4 MG/DL (ref 2.3–4.1)
PLATELET # BLD AUTO: 137 THOUSANDS/UL (ref 149–390)
PMV BLD AUTO: 9.4 FL (ref 8.9–12.7)
POTASSIUM SERPL-SCNC: 3.2 MMOL/L (ref 3.5–5.3)
POTASSIUM SERPL-SCNC: 3.3 MMOL/L (ref 3.5–5.3)
PROT SERPL-MCNC: 4.1 G/DL (ref 6.4–8.4)
PROTHROMBIN TIME: 26.8 SECONDS (ref 11.6–14.5)
RBC # BLD AUTO: 2.13 MILLION/UL (ref 3.81–5.12)
SODIUM SERPL-SCNC: 137 MMOL/L (ref 135–147)
SODIUM SERPL-SCNC: 139 MMOL/L (ref 135–147)
UNIT DISPENSE STATUS: NORMAL
UNIT PRODUCT CODE: NORMAL
UNIT PRODUCT VOLUME: 350 ML
UNIT RH: NORMAL
WBC # BLD AUTO: 9.72 THOUSAND/UL (ref 4.31–10.16)

## 2023-08-30 PROCEDURE — 85025 COMPLETE CBC W/AUTO DIFF WBC: CPT | Performed by: PHYSICIAN ASSISTANT

## 2023-08-30 PROCEDURE — 71045 X-RAY EXAM CHEST 1 VIEW: CPT

## 2023-08-30 PROCEDURE — 85610 PROTHROMBIN TIME: CPT

## 2023-08-30 PROCEDURE — 85397 CLOTTING FUNCT ACTIVITY: CPT | Performed by: STUDENT IN AN ORGANIZED HEALTH CARE EDUCATION/TRAINING PROGRAM

## 2023-08-30 PROCEDURE — 87040 BLOOD CULTURE FOR BACTERIA: CPT | Performed by: STUDENT IN AN ORGANIZED HEALTH CARE EDUCATION/TRAINING PROGRAM

## 2023-08-30 PROCEDURE — 74018 RADEX ABDOMEN 1 VIEW: CPT

## 2023-08-30 PROCEDURE — 85018 HEMOGLOBIN: CPT

## 2023-08-30 PROCEDURE — 99232 SBSQ HOSP IP/OBS MODERATE 35: CPT | Performed by: INTERNAL MEDICINE

## 2023-08-30 PROCEDURE — 85730 THROMBOPLASTIN TIME PARTIAL: CPT

## 2023-08-30 PROCEDURE — 83735 ASSAY OF MAGNESIUM: CPT | Performed by: STUDENT IN AN ORGANIZED HEALTH CARE EDUCATION/TRAINING PROGRAM

## 2023-08-30 PROCEDURE — 80048 BASIC METABOLIC PNL TOTAL CA: CPT | Performed by: PHYSICIAN ASSISTANT

## 2023-08-30 PROCEDURE — 80076 HEPATIC FUNCTION PANEL: CPT | Performed by: STUDENT IN AN ORGANIZED HEALTH CARE EDUCATION/TRAINING PROGRAM

## 2023-08-30 PROCEDURE — 85576 BLOOD PLATELET AGGREGATION: CPT | Performed by: STUDENT IN AN ORGANIZED HEALTH CARE EDUCATION/TRAINING PROGRAM

## 2023-08-30 PROCEDURE — 82330 ASSAY OF CALCIUM: CPT | Performed by: PHYSICIAN ASSISTANT

## 2023-08-30 PROCEDURE — 85347 COAGULATION TIME ACTIVATED: CPT | Performed by: STUDENT IN AN ORGANIZED HEALTH CARE EDUCATION/TRAINING PROGRAM

## 2023-08-30 PROCEDURE — 84100 ASSAY OF PHOSPHORUS: CPT | Performed by: PHYSICIAN ASSISTANT

## 2023-08-30 PROCEDURE — 93970 EXTREMITY STUDY: CPT | Performed by: SURGERY

## 2023-08-30 PROCEDURE — 82948 REAGENT STRIP/BLOOD GLUCOSE: CPT

## 2023-08-30 PROCEDURE — 99291 CRITICAL CARE FIRST HOUR: CPT | Performed by: SURGERY

## 2023-08-30 PROCEDURE — NC001 PR NO CHARGE: Performed by: ORTHOPAEDIC SURGERY

## 2023-08-30 PROCEDURE — P9016 RBC LEUKOCYTES REDUCED: HCPCS

## 2023-08-30 PROCEDURE — 80048 BASIC METABOLIC PNL TOTAL CA: CPT | Performed by: STUDENT IN AN ORGANIZED HEALTH CARE EDUCATION/TRAINING PROGRAM

## 2023-08-30 PROCEDURE — 83735 ASSAY OF MAGNESIUM: CPT | Performed by: PHYSICIAN ASSISTANT

## 2023-08-30 PROCEDURE — 85384 FIBRINOGEN ACTIVITY: CPT | Performed by: STUDENT IN AN ORGANIZED HEALTH CARE EDUCATION/TRAINING PROGRAM

## 2023-08-30 PROCEDURE — 93970 EXTREMITY STUDY: CPT

## 2023-08-30 PROCEDURE — 82533 TOTAL CORTISOL: CPT | Performed by: PHYSICIAN ASSISTANT

## 2023-08-30 RX ORDER — POTASSIUM CHLORIDE 14.9 MG/ML
20 INJECTION INTRAVENOUS
Status: DISCONTINUED | OUTPATIENT
Start: 2023-08-30 | End: 2023-08-30

## 2023-08-30 RX ORDER — POTASSIUM CHLORIDE 29.8 MG/ML
40 INJECTION INTRAVENOUS ONCE
Status: COMPLETED | OUTPATIENT
Start: 2023-08-30 | End: 2023-08-30

## 2023-08-30 RX ORDER — DEXTROSE, SODIUM CHLORIDE, SODIUM LACTATE, POTASSIUM CHLORIDE, AND CALCIUM CHLORIDE 5; .6; .31; .03; .02 G/100ML; G/100ML; G/100ML; G/100ML; G/100ML
100 INJECTION, SOLUTION INTRAVENOUS CONTINUOUS
Status: DISCONTINUED | OUTPATIENT
Start: 2023-08-30 | End: 2023-08-30

## 2023-08-30 RX ORDER — FUROSEMIDE 10 MG/ML
40 INJECTION INTRAMUSCULAR; INTRAVENOUS ONCE
Status: COMPLETED | OUTPATIENT
Start: 2023-08-30 | End: 2023-08-30

## 2023-08-30 RX ORDER — DEXTROSE MONOHYDRATE 25 G/50ML
INJECTION, SOLUTION INTRAVENOUS
Status: COMPLETED
Start: 2023-08-30 | End: 2023-08-30

## 2023-08-30 RX ORDER — ACETAMINOPHEN 160 MG/5ML
975 SUSPENSION ORAL EVERY 8 HOURS SCHEDULED
Status: DISCONTINUED | OUTPATIENT
Start: 2023-08-30 | End: 2023-09-01

## 2023-08-30 RX ORDER — TORSEMIDE 20 MG/1
40 TABLET ORAL ONCE
Status: COMPLETED | OUTPATIENT
Start: 2023-08-30 | End: 2023-08-30

## 2023-08-30 RX ORDER — TORSEMIDE 20 MG/1
40 TABLET ORAL DAILY
Status: DISCONTINUED | OUTPATIENT
Start: 2023-08-31 | End: 2023-09-01

## 2023-08-30 RX ORDER — DEXTROSE MONOHYDRATE 25 G/50ML
50 INJECTION, SOLUTION INTRAVENOUS ONCE
Status: COMPLETED | OUTPATIENT
Start: 2023-08-30 | End: 2023-08-30

## 2023-08-30 RX ADMIN — CHLORHEXIDINE GLUCONATE 15 ML: 1.2 SOLUTION ORAL at 09:11

## 2023-08-30 RX ADMIN — DULOXETINE HYDROCHLORIDE 60 MG: 60 CAPSULE, DELAYED RELEASE ORAL at 09:06

## 2023-08-30 RX ADMIN — TORSEMIDE 40 MG: 20 TABLET ORAL at 00:57

## 2023-08-30 RX ADMIN — LEVOTHYROXINE SODIUM 112 MCG: 112 TABLET ORAL at 05:22

## 2023-08-30 RX ADMIN — SODIUM CHLORIDE 50 ML/HR: 4 INJECTION, SOLUTION, CONCENTRATE INTRAVENOUS at 15:32

## 2023-08-30 RX ADMIN — CEFEPIME 2000 MG: 2 INJECTION, POWDER, FOR SOLUTION INTRAVENOUS at 20:05

## 2023-08-30 RX ADMIN — POTASSIUM CHLORIDE 40 MEQ: 29.8 INJECTION, SOLUTION INTRAVENOUS at 11:24

## 2023-08-30 RX ADMIN — ACETAMINOPHEN 975 MG: 650 SUSPENSION ORAL at 22:27

## 2023-08-30 RX ADMIN — POTASSIUM CHLORIDE 40 MEQ: 29.8 INJECTION, SOLUTION INTRAVENOUS at 13:58

## 2023-08-30 RX ADMIN — FERROUS SULFATE TAB 325 MG (65 MG ELEMENTAL FE) 325 MG: 325 (65 FE) TAB at 09:07

## 2023-08-30 RX ADMIN — HYDROCORTISONE SODIUM SUCCINATE 100 MG: 100 INJECTION, POWDER, FOR SOLUTION INTRAMUSCULAR; INTRAVENOUS at 11:27

## 2023-08-30 RX ADMIN — DEXTROSE, SODIUM CHLORIDE, SODIUM LACTATE, POTASSIUM CHLORIDE, AND CALCIUM CHLORIDE 100 ML/HR: 5; .6; .31; .03; .02 INJECTION, SOLUTION INTRAVENOUS at 06:29

## 2023-08-30 RX ADMIN — DEXTROSE MONOHYDRATE 25 ML: 25 INJECTION, SOLUTION INTRAVENOUS at 12:39

## 2023-08-30 RX ADMIN — HEPARIN SODIUM 5000 UNITS: 5000 INJECTION INTRAVENOUS; SUBCUTANEOUS at 14:36

## 2023-08-30 RX ADMIN — PANTOPRAZOLE SODIUM 40 MG: 40 TABLET, DELAYED RELEASE ORAL at 05:22

## 2023-08-30 RX ADMIN — HEPARIN SODIUM 5000 UNITS: 5000 INJECTION INTRAVENOUS; SUBCUTANEOUS at 05:22

## 2023-08-30 RX ADMIN — ACETAMINOPHEN 975 MG: 325 TABLET, FILM COATED ORAL at 05:22

## 2023-08-30 RX ADMIN — FUROSEMIDE 40 MG: 10 INJECTION, SOLUTION INTRAMUSCULAR; INTRAVENOUS at 11:27

## 2023-08-30 RX ADMIN — CHLORHEXIDINE GLUCONATE 15 ML: 1.2 SOLUTION ORAL at 22:21

## 2023-08-30 RX ADMIN — CEFEPIME 2000 MG: 2 INJECTION, POWDER, FOR SOLUTION INTRAVENOUS at 08:54

## 2023-08-30 RX ADMIN — DEXTROSE MONOHYDRATE 50 ML: 25 INJECTION, SOLUTION INTRAVENOUS at 00:10

## 2023-08-30 RX ADMIN — FUROSEMIDE 40 MG: 10 INJECTION, SOLUTION INTRAMUSCULAR; INTRAVENOUS at 18:34

## 2023-08-30 RX ADMIN — HEPARIN SODIUM 5000 UNITS: 5000 INJECTION INTRAVENOUS; SUBCUTANEOUS at 22:22

## 2023-08-30 RX ADMIN — HYDROCORTISONE SODIUM SUCCINATE 100 MG: 100 INJECTION, POWDER, FOR SOLUTION INTRAMUSCULAR; INTRAVENOUS at 22:27

## 2023-08-30 NOTE — PROGRESS NOTES
-- Patient: Gregory Jimenez  -- MRN: [id number]  -- Aidin Request ID: [id number]  -- Level of care reserved: [address]  -- Partner Reserved: [facility] [phone]  -- Clinical needs requested:  -- Geography searched: 10 miles around [facility]  -- Start of Service:  -- Request sent: 4:39pm EDT on 8/29/2023 by Shaylee Osorio  -- Partner reserved: 9:51am EDT on 8/30/2023 by Shaylee Osorio  -- Choice list shared: 9:50am EDT on 8/30/2023 by Shaylee Osorio

## 2023-08-30 NOTE — PROGRESS NOTES
NEPHROLOGY PROGRESS NOTE   Simeon Knox 80 y.o. female MRN: [id number]  Unit/Bed#: [facility] 258-44 Encounter: [id number]        SUBJECTIVE:    Patient is having episodes of hypoglycemia  And now is on D5 LR  Patient's torsemide is being restarted tomorrow patient was given a dose of furosemide and albumin due oxygen requirements increasing  Does have a urethral catheter in place positive fluid balance remains somewhat oliguric  Patient's weight is up and blood pressures are adequate    12 point review of systems was otherwise negative besides what is mentioned above.     Medications:    Current Facility-Administered Medications:   •  acetaminophen (TYLENOL) tablet 975 mg, 975 mg, Oral, Q8H [facility], Melvi Mtz PA-C, 975 mg at 08/30/23 0522  •  cefepime (MAXIPIME) 2 g/50 mL dextrose IVPB, 2,000 mg, Intravenous, Q12H, Mary Alice Fuentes MD, Last Rate: 100 mL/hr at 08/30/23 0854, 2,000 mg at 08/30/23 0854  •  chlorhexidine (PERIDEX) 0.12 % oral rinse 15 mL, 15 mL, Mouth/Throat, Q12H [facility], Melvi Mtz PA-C, 15 mL at 08/30/23 0911  •  dextrose 5 % in lactated Ringer's infusion, 100 mL/hr, Intravenous, Continuous, Gisell Nur MD, Last Rate: 100 mL/hr at 08/30/23 0629, 100 mL/hr at 08/30/23 0988  •  DULoxetine (CYMBALTA) delayed release capsule 60 mg, 60 mg, Oral, Daily, Melvi Mtz PA-C, 60 mg at 08/30/23 4310  •  ferrous sulfate tablet 325 mg, 325 mg, Oral, Daily With Breakfast, Melvi Mtz PA-C, 325 mg at 08/30/23 5416  •  heparin (porcine) subcutaneous injection 5,000 Units, 5,000 Units, Subcutaneous, Q8H [facility], Melvi Mtz PA-C, 5,000 Units at 08/30/23 0522  •  hydrocortisone (Solu-CORTEF) injection 100 mg, 100 mg, Intravenous, Q12H [facility], Deatra Arbour, 100 mg at 08/30/23 1127  •  levothyroxine tablet 112 mcg, 112 mcg, Oral, Early Morning, KASSANDRA Albright-C, 112 mcg at 08/30/23 0522  •  pantoprazole (PROTONIX) EC tablet 40 mg, 40 mg, Oral, Early Morning, Melvi Mtz PA-C, 40 mg at 08/30/23 0522  •  [ZNFRLRBIA] potassium chloride 40 mEq IVPB (premix), 40 mEq, Intravenous, Once, Last Rate: 50 mL/hr at 08/30/23 1124, 40 mEq at 08/30/23 1124 **FOLLOWED BY** potassium chloride 40 mEq IVPB (premix), 40 mEq, Intravenous, Once, Malou Cabral  •  senna-docusate sodium (SENOKOT S) 8.6-50 mg per tablet, , Oral, Daily, Ruth Mason PA-C, 1 tablet at 08/28/23 1544  •  simethicone (MYLICON) chewable tablet 80 mg, 80 mg, Oral, Q6H PRN, Ruth Mason PA-C  •  [START ON 8/31/2023] torsemide (DEMADEX) tablet 40 mg, 40 mg, Oral, Daily, [facility]     OBJECTIVE:    Vitals:    08/30/23 1030 08/30/23 1100 08/30/23 1130 08/30/23 1200   BP:   137/64 108/52   BP Location:    Right arm   Pulse: 69 69 69 69   Resp: 15 20 14 15   Temp: 98.2 °F (36.8 °C) 98.4 °F (36.9 °C) 98.4 °F (36.9 °C) 98.4 °F (36.9 °C)   TempSrc:    Rectal   SpO2: 97% 97% 100% 100%   Weight:       Height:            Temp:  [96.6 °F (35.9 °C)-98.4 °F (36.9 °C)] 98.4 °F (36.9 °C)  HR:  [69-91] 69  Resp:  [12-44] 15  BP: ()/(39-73) 108/52  SpO2:  [86 %-100 %] 100 %     Body mass index is 36.12 kg/m². Weight (last 2 days)     Date/Time Weight    08/30/23 0532 83.9 (184.97)    08/29/23 0600 81 (178.57)    08/28/23 1508 80.9 (178.35)    08/28/23 0915 80.3 (177)     Weight: snf at 08/28/23 0915          I/O last 3 completed shifts: In: 1865 [I.V.:2375; Blood:400; IV Piggyback:1300]  Out: 9896 [LKK:1683]    I/O this shift:  In: 1203.3 [P.O.:100;  I.V.:653.3; Blood:400; IV Piggyback:50]  Out: 825 [Urine:825]      Physical exam:    General: no acute distress, cooperative  Eyes: conjunctivae pink, anicteric sclerae  ENT: lips and mucous membranes moist, no exudates, normal external ears  Neck: ROM intact, no JVD  Chest: No respiratory distress, no accessory muscle use  CVS: normal rate, non pericardial friction rub  Abdomen: soft, non-tender, non-distended, normoactive bowel sounds  Extremities: no edema of both legs  Skin: no rash  Neuro: awake, alert, oriented, grossly intact  Psych:  Pleasant affect    Invasive Devices:   Urethral Catheter (Active)   Reasons to continue Urinary Catheter  Accurate I&O assessment in critically ill patients (48 hr. max) 08/30/23 1200   Goal for Removal Voiding trial when ambulation improves 08/30/23 1200   Site Assessment Clean;Skin intact 08/30/23 1200   Burger Care Done 08/30/23 0900   Collection Container Standard drainage bag 08/30/23 1200   Securement Method Securing device (Describe) 08/30/23 1200   Irrigant Sterile water 08/28/23 2035   Urethral Irrigation Intake (mL) 40 mL 08/28/23 2035   Output (mL) 450 mL 08/30/23 1200     Lab Results:   Results from last 7 days   Lab Units 08/30/23  1122 08/30/23  0425 08/30/23  0000 08/29/23  1806 08/29/23  1423 08/29/23  0443 08/29/23  0050 08/28/23  2354 08/28/23  1659 08/28/23  0759   WBC Thousand/uL  --  9.72  --   --   --  9.24  --  9.62 12.35* 11.28*   HEMOGLOBIN g/dL 8.6* 7.0*  --   --  9.1* 6.7*  --  7.0* 7.7* 9.8*   HEMATOCRIT %  --  21.3*  --   --   --  21.1*  --  21.8* 25.8* 31.0*   PLATELETS Thousands/uL  --  137*  --   --   --  163  --  158 205 226   POTASSIUM mmol/L  --   --  3.2* 3.6  --  5.4* 3.0*  --  3.1* 3.8   CHLORIDE mmol/L  --   --  110* 111*  --  109* 109*  --  106 106   CO2 mmol/L  --   --  20* 20*  --  23 16*  --  22 22   BUN mg/dL  --   --  26* 26*  --  30* 25  --  32* 35*   CREATININE mg/dL  --   --  1.07 1.10  --  1.16 0.98  --  1.23 1.35*   CALCIUM mg/dL  --   --  7.5* 7.0*  --  7.1* 5.7*  --  7.6* 7.5*   MAGNESIUM mg/dL  --   --  2.1  --   --  1.9  --   --  1.4*  --    PHOSPHORUS mg/dL  --   --  2.4  --   --  3.0  --   --  3.2  --    ALK PHOS U/L  --   --   --   --   --  82 69  --  102  --    ALT U/L  --   --   --   --   --  8 7  --  10  --    AST U/L  --   --   --   --   --  11* 12*  --  11*  --        ASSESSMENT & PLAN    1. Stage IIIa chronic kidney disease-overall renal function is stable with a creatinine that is relatively within baseline low muscle mass given age and comorbidities overestimates GFR    2. Hypertension-blood pressures are at goal, keeping MAP above 65 continue torsemide 40 mg daily, avoid hypotension    3. Anemia-volume expansion with blood and hemoglobin has improved    4. History of congestive heart failure-cautious IV fluid administration although remains hypoglycemic, increased oxygen requirements, agree with intravenous Lasix as needed, continue oral torsemide    5. Right hip PJI infection/acetabular osteomyelitis-infectious disease following, orthopedic surgery following, on surgical critical care service       Portions of the record may have been created with voice recognition software. Occasional wrong word or "sound a like" substitutions may have occurred due to the inherent limitations of voice recognition software. Read the chart carefully and recognize, using context, where substitutions have occurred. If you have any questions, please contact the dictating provider.

## 2023-08-30 NOTE — PROGRESS NOTES
Progress Note - Orthopedics   Luz Lopez 80 y.o. female MRN: [id number]  Unit/Bed#: cardiology      Subjective:    80 y. o.female POD 3 R hip I&D and closure. No acute events, no new complaints. Patient doing well. Pain well controlled. Denies fevers, chills, CP, SOB, N/V, numbness or tingling. Patient reports no issues with urination or bowel movements. Patient states she is doing alright. VSS overnight.  Transfused 1 unit for Hgb <7.    Labs:  0   Lab Value Date/Time    HCT 21.3 (L) 08/30/2023 0425    HCT 21.1 (L) 08/29/2023 0443    HCT 21.8 (L) 08/28/2023 2354    HCT 37.0 04/20/2015 1109    HCT 38.0 04/20/2015 1109    HCT 37.2 04/03/2015 0831    HGB 8.6 (L) 08/30/2023 1122    HGB 7.0 (L) 08/30/2023 0425    HGB 9.1 (L) 08/29/2023 1423    HGB 12.0 04/20/2015 1109    HGB 11.6 04/03/2015 0831    HGB 12.1 11/04/2014 1025    INR 2.44 (H) 08/30/2023 1122    WBC 9.72 08/30/2023 0425    WBC 9.24 08/29/2023 0443    WBC 9.62 08/28/2023 2354    WBC 7.98 04/20/2015 1109    WBC 7.29 04/03/2015 0831    WBC 6.66 11/04/2014 1025    ESR 39 (H) 07/25/2023 1133    CRP 17.6 (H) 07/25/2023 1133       Meds:    Current Facility-Administered Medications:   •  acetaminophen (TYLENOL) tablet 975 mg, 975 mg, Oral, Q8H [facility], Jt Vargas PA-C, 975 mg at 08/30/23 0522  •  cefepime (MAXIPIME) 2 g/50 mL dextrose IVPB, 2,000 mg, Intravenous, Q12H, Nahun Rubio MD, Last Rate: 100 mL/hr at 08/30/23 0854, 2,000 mg at 08/30/23 0854  •  chlorhexidine (PERIDEX) 0.12 % oral rinse 15 mL, 15 mL, Mouth/Throat, Q12H [facility], Jt Vargas PA-C, 15 mL at 08/30/23 0911  •  dextrose 10 % and normal saline infusion, 50 mL/hr, Intravenous, Continuous, Edson Celestin MD, Last Rate: 50 mL/hr at 08/30/23 1532, 50 mL/hr at 08/30/23 1532  •  DULoxetine (CYMBALTA) delayed release capsule 60 mg, 60 mg, Oral, Daily, Jt Vargas PA-C, 60 mg at 08/30/23 2954  •  ferrous sulfate tablet 325 mg, 325 mg, Oral, Daily With Breakfast, Jt Vargas PA-C, 325 mg at 08/30/23 7991  •  heparin (porcine) subcutaneous injection 5,000 Units, 5,000 Units, Subcutaneous, Q8H [address], Jaz David PA-C, 5,000 Units at 08/30/23 1436  •  hydrocortisone (Solu-CORTEF) injection 100 mg, 100 mg, Intravenous, Q12H [address], Divina Quitter, 100 mg at 08/30/23 1127  •  levothyroxine tablet 112 mcg, 112 mcg, Oral, Early Morning, KASSANDRA Hernandes-LAUREN, 112 mcg at 08/30/23 0522  •  pantoprazole (PROTONIX) EC tablet 40 mg, 40 mg, Oral, Early Morning, KASSANDRA Hernandes-LAUREN, 40 mg at 08/30/23 0522  •  senna-docusate sodium (SENOKOT S) 8.6-50 mg per tablet, , Oral, Daily, Jaz David PA-C, 1 tablet at 08/28/23 1544  •  simethicone (MYLICON) chewable tablet 80 mg, 80 mg, Oral, Q6H PRN, ALEXANDRA HernandesC  •  [START ON 8/31/2023] torsemide (DEMADEX) tablet 40 mg, 40 mg, Oral, Daily, Anup Kaitlyn, DO    Blood Culture:   Lab Results   Component Value Date    BLOODCX Received in Microbiology Lab. Culture in Progress. 08/30/2023    BLOODCX Received in Microbiology Lab. Culture in Progress. 08/30/2023       Wound Culture:   Lab Results   Component Value Date    WOUNDCULT No growth 06/28/2023       Ins and Outs:  I/O last 24 hours: In: 0 [P.O.:100; I.V.:2880; Blood:800; NG/GT:56; IV Piggyback:900]  Out: 2178 [Urine:3185]          Physical:  Vitals:    08/30/23 1800   BP: 107/52   Pulse: 69   Resp: 15   Temp: 99 °F (37.2 °C)   SpO2:      Musculoskeletal: right Lower Extremity     • Skin warm, . No erythema or ecchymosis. Pitting edema to the ankle  • Dressing with moderate amount of blood   • TTP humberto-incisionally  • Sensation intact to saphenous, sural, tibial, superficial peroneal nerve, and deep peroneal  • Motor intact to +FHL/EHL, +ankle dorsi/plantar flexion  • 2+ DP pulse  • Digits warm and well perfused  • Capillary refill < 2 seconds       Assessment:    80 y. o.female POD 3 R hip I&D and closure.      Plan:  · NWB RLE  · IV abx per ID  · Will monitor for ABLA and administer IVF/prbc as indicated for Greater than 2 gram drop or Hgb < 7  · PT/OT  · Pain control  · DVT ppx SQH  · Dispo: pending PT and med clearance    Ralf Hoffmann MD

## 2023-08-30 NOTE — PLAN OF CARE
Problem: Prexisting or High Potential for Compromised Skin Integrity  Goal: Skin integrity is maintained or improved  Description: INTERVENTIONS:  - Identify patients at risk for skin breakdown  - Assess and monitor skin integrity  - Assess and monitor nutrition and hydration status  - Monitor labs   - Assess for incontinence   - Turn and reposition patient  - Assist with mobility/ambulation  - Relieve pressure over bony prominences  - Avoid friction and shearing  - Provide appropriate hygiene as needed including keeping skin clean and dry  - Evaluate need for skin moisturizer/barrier cream  - Collaborate with interdisciplinary team   - Patient/family teaching  - Consider wound care consult   Outcome: Progressing     Problem: MOBILITY - ADULT  Goal: Maintain or return to baseline ADL function  Description: INTERVENTIONS:  -  Assess patient's ability to carry out ADLs; assess patient's baseline for ADL function and identify physical deficits which impact ability to perform ADLs (bathing, care of mouth/teeth, toileting, grooming, dressing, etc.)  - Assess/evaluate cause of self-care deficits   - Assess range of motion  - Assess patient's mobility; develop plan if impaired  - Assess patient's need for assistive devices and provide as appropriate  - Encourage maximum independence but intervene and supervise when necessary  - Involve family in performance of ADLs  - Assess for home care needs following discharge   - Consider OT consult to assist with ADL evaluation and planning for discharge  - Provide patient education as appropriate  Outcome: Progressing  Goal: Maintains/Returns to pre admission functional level  Description: INTERVENTIONS:  - Perform BMAT or MOVE assessment daily.   - Set and communicate daily mobility goal to care team and patient/family/caregiver. - Collaborate with rehabilitation services on mobility goals if consulted  - Perform Range of Motion  times a day.   - Reposition patient every hours.  - Dangle patient  times a day  - Stand patient  times a day  - Ambulate patient  times a day  - Out of bed to chair  times a day   - Out of bed for meals  times a day  - Out of bed for toileting  - Record patient progress and toleration of activity level   Outcome: Progressing     Problem: PAIN - ADULT  Goal: Verbalizes/displays adequate comfort level or baseline comfort level  Description: Interventions:  - Encourage patient to monitor pain and request assistance  - Assess pain using appropriate pain scale  - Administer analgesics based on type and severity of pain and evaluate response  - Implement non-pharmacological measures as appropriate and evaluate response  - Consider cultural and social influences on pain and pain management  - Notify physician/advanced practitioner if interventions unsuccessful or patient reports new pain  Outcome: Progressing     Problem: INFECTION - ADULT  Goal: Absence or prevention of progression during hospitalization  Description: INTERVENTIONS:  - Assess and monitor for signs and symptoms of infection  - Monitor lab/diagnostic results  - Monitor all insertion sites, i.e. indwelling lines, tubes, and drains  - Monitor endotracheal if appropriate and nasal secretions for changes in amount and color  - [place] appropriate cooling/warming therapies per order  - Administer medications as ordered  - Instruct and encourage patient and family to use good hand hygiene technique  - Identify and instruct in appropriate isolation precautions for identified infection/condition  Outcome: Progressing  Goal: Absence of fever/infection during neutropenic period  Description: INTERVENTIONS:  - Monitor WBC    Outcome: Progressing     Problem: SAFETY ADULT  Goal: Maintain or return to baseline ADL function  Description: INTERVENTIONS:  -  Assess patient's ability to carry out ADLs; assess patient's baseline for ADL function and identify physical deficits which impact ability to perform ADLs (bathing, care of mouth/teeth, toileting, grooming, dressing, etc.)  - Assess/evaluate cause of self-care deficits   - Assess range of motion  - Assess patient's mobility; develop plan if impaired  - Assess patient's need for assistive devices and provide as appropriate  - Encourage maximum independence but intervene and supervise when necessary  - Involve family in performance of ADLs  - Assess for home care needs following discharge   - Consider OT consult to assist with ADL evaluation and planning for discharge  - Provide patient education as appropriate  Outcome: Progressing  Goal: Maintains/Returns to pre admission functional level  Description: INTERVENTIONS:  - Perform BMAT or MOVE assessment daily.   - Set and communicate daily mobility goal to care team and patient/family/caregiver. - Collaborate with rehabilitation services on mobility goals if consulted  - Perform Range of Motion  times a day. - Reposition patient every  hours.   - Dangle patient  times a day  - Stand patient  times a day  - Ambulate patient  times a day  - Out of bed to chair  times a day   - Out of bed for meals  times a day  - Out of bed for toileting  - Record patient progress and toleration of activity level   Outcome: Progressing  Goal: Patient will remain free of falls  Description: INTERVENTIONS:  - Educate patient/family on patient safety including physical limitations  - Instruct patient to call for assistance with activity   - Consult OT/PT to assist with strengthening/mobility   - Keep Call bell within reach  - Keep bed low and locked with side rails adjusted as appropriate  - Keep care items and personal belongings within reach  - Initiate and maintain comfort rounds  - Make Fall Risk Sign visible to staff  - Offer Toileting every  Hours, in advance of need  - Initiate/Maintain alarm  - Obtain necessary fall risk management equipment:   - Apply yellow socks and bracelet for high fall risk patients  - Consider moving patient to room near nurses station  Outcome: Progressing     Problem: DISCHARGE PLANNING  Goal: Discharge to home or other facility with appropriate resources  Description: INTERVENTIONS:  - Identify barriers to discharge w/patient and caregiver  - Arrange for needed discharge resources and transportation as appropriate  - Identify discharge learning needs (meds, wound care, etc.)  - Arrange for interpretive services to assist at discharge as needed  - Refer to Case Management Department for coordinating discharge planning if the patient needs post-hospital services based on physician/advanced practitioner order or complex needs related to functional status, cognitive ability, or social support system  Outcome: Progressing     Problem: Knowledge Deficit  Goal: Patient/family/caregiver demonstrates understanding of disease process, treatment plan, medications, and discharge instructions  Description: Complete learning assessment and assess knowledge base.   Interventions:  - Provide teaching at level of understanding  - Provide teaching via preferred learning methods  Outcome: Progressing     Problem: CARDIOVASCULAR - ADULT  Goal: Maintains optimal cardiac output and hemodynamic stability  Description: INTERVENTIONS:  - Monitor I/O, vital signs and rhythm  - Monitor for S/S and trends of decreased cardiac output  - Administer and titrate ordered vasoactive medications to optimize hemodynamic stability  - Assess quality of pulses, skin color and temperature  - Assess for signs of decreased coronary artery perfusion  - Instruct patient to report change in severity of symptoms  Outcome: Progressing  Goal: Absence of cardiac dysrhythmias or at baseline rhythm  Description: INTERVENTIONS:  - Continuous cardiac monitoring, vital signs, obtain 12 lead EKG if ordered  - Administer antiarrhythmic and heart rate control medications as ordered  - Monitor electrolytes and administer replacement therapy as ordered  Outcome: Progressing Problem: Nutrition/Hydration-ADULT  Goal: Nutrient/Hydration intake appropriate for improving, restoring or maintaining nutritional needs  Description: Monitor and assess patient's nutrition/hydration status for malnutrition. Collaborate with interdisciplinary team and initiate plan and interventions as ordered. Monitor patient's weight and dietary intake as ordered or per policy. Utilize nutrition screening tool and intervene as necessary. Determine patient's food preferences and provide high-protein, high-caloric foods as appropriate.      INTERVENTIONS:  - Monitor oral intake, urinary output, labs, and treatment plans  - Assess nutrition and hydration status and recommend course of action  - Evaluate amount of meals eaten  - Assist patient with eating if necessary   - Allow adequate time for meals  - Recommend/ encourage appropriate diets, oral nutritional supplements, and vitamin/mineral supplements  - Order, calculate, and assess calorie counts as needed  - Recommend, monitor, and adjust tube feedings and TPN/PPN based on assessed needs  - Assess need for intravenous fluids  - Provide specific nutrition/hydration education as appropriate  - Include patient/family/caregiver in decisions related to nutrition  Outcome: Progressing

## 2023-08-30 NOTE — PROGRESS NOTES
Progress Note - Infectious Disease   Luz Lopez 80 y.o. female MRN: [id number]  Unit/Bed#: [facility] 190-64 Encounter: [id number]      Impression/Recommendations:  Right hip PJI infection/acetabular osteomyelitis.    In setting of MAIA, revision, chronic dislocation/displacement as below.  Presented June with subacute pain, spontaneous drainage from sinus tract.  CT showed abscess at joint.  Status post MAIA explantation, extensive I&D, insertion of antibiotic beads (no spacer placed) on 6/30.  Extensive purulent fluid and necrotic tissue noted intraoperatively.  OR cultures negative.  Status post 6 weeks IV vancomycin, transitioned to PO doxycycline. ESR 54 >> 13. Had persistent wound drainage, sinus tract. Now status post I&D, layered [facility] 8/28. Intraoperative findings notable for infected cavity due to absence of hip. Cultures now with Pseudomonas, likely new superinfection as expect would have grown during initial I&D 6/30. Rec:  • Change antibiotics to cefepime  • Follow up final OR cultures and tailor antibiotics as indicated  • Check weekly CBC-diff, Cr, ESR while on IV antibiotics  • Recheck ESR in AM to establish new baseline  • Will need 6 weeks IV antibiotics, possibly followed by PO depending on clinical course and ESR trend  • D/C PICC after last dose IV antibiotics  • D/C planning for SNF     Status post right MAIA with revision in 2019. Complicated more recently by disruption and chronic dislocation/displacement.  Non-ambulatory.     Status post PPM.     IBS with constipation.     Afib. On coumadin     History of ESBL, MDRO. From recent urine cultures.     CKD. Baseline Cr 1.0, CrCl ~30. Rec:  • Follow creatinine closely and dose-adjust antibiotics as indicated  • Recheck BMP in AM     The above impression and plan was discussed in detail with the patient and ML Silva with CC.     Antibiotics:  Daptomycin #2  Ertapenem #2  POD #2    Subjective:  Patient seen on AM rounds.   Sleeping and not awakened. No events noted overnight. 24 Hour Events:  No documented fevers, chills, sweats, nausea, vomiting, or diarrhea. Objective:  Vitals:  Temp:  [96.6 °F (35.9 °C)-98.1 °F (36.7 °C)] 98.1 °F (36.7 °C)  HR:  [69-91] 69  Resp:  [12-44] 21  BP: ()/(41-73) 99/72  SpO2:  [83 %-100 %] 100 %  Temp (24hrs), Av.4 °F (36.3 °C), Min:96.6 °F (35.9 °C), Max:98.1 °F (36.7 °C)  Current: Temperature: 98.1 °F (36.7 °C)    Physical Exam:   General:  No acute distress  Psychiatric:  Awake and alert  Pulmonary:  Normal respiratory excursion without accessory muscle use  Abdomen:  Soft, nontender  Extremities:  No edema  Skin:  No rashes    Lab Results:  I have personally reviewed pertinent labs. Results from last 7 days   Lab Units 23  0000 23  1806 23  0443 23  0050 23  1659   POTASSIUM mmol/L 3.2* 3.6 5.4* 3.0* 3.1*   CHLORIDE mmol/L 110* 111* 109* 109* 106   CO2 mmol/L 20* 20* 23 16* 22   BUN mg/dL 26* 26* 30* 25 32*   CREATININE mg/dL 1.07 1.10 1.16 0.98 1.23   EGFR ml/min/1.73sq m 46 44 41 51 38   CALCIUM mg/dL 7.5* 7.0* 7.1* 5.7* 7.6*   AST U/L  --   --  11* 12* 11*   ALT U/L  --   --  8 7 10   ALK PHOS U/L  --   --  82 69 102     Results from last 7 days   Lab Units 23  0425 23  1423 23  0443 23  2354   WBC Thousand/uL 9.72  --  9.24 9.62   HEMOGLOBIN g/dL 7.0* 9.1* 6.7* 7.0*   PLATELETS Thousands/uL 137*  --  163 158     Results from last 7 days   Lab Units 23  1727 23  1208 23  1207   GRAM STAIN RESULT   --  No Polys or Bacteria seen 4+ Polys  No bacteria seen   BODY FLUID CULTURE, STERILE   --   --  2+ Growth of Pseudomonas aeruginosa*   MRSA CULTURE ONLY  No Methicillin Resistant Staphlyococcus aureus (MRSA) isolated  --   --        Imaging Studies:   I have personally reviewed pertinent imaging study reports and images in PACS. EKG, Pathology, and Other Studies:   I have personally reviewed pertinent reports.

## 2023-08-30 NOTE — PROGRESS NOTES
Progress Note - Orthopedics   Mara Villa 80 y.o. female MRN: [id number]  Unit/Bed#: Interventional Radiology      Subjective:    80 y. o.female POD 2 R hip I&D and closure. No acute events, no new complaints. Patient doing well. Pain well controlled. Denies fevers, chills, CP, SOB, N/V, numbness or tingling. Patient reports no issues with urination or bowel movements. Patient states she is doing alright. BP was in the 80/40s overnight.     Labs:  0   Lab Value Date/Time    HCT 21.1 (L) 08/29/2023 0443    HCT 21.8 (L) 08/28/2023 2354    HCT 25.8 (L) 08/28/2023 1659    HCT 37.0 04/20/2015 1109    HCT 38.0 04/20/2015 1109    HCT 37.2 04/03/2015 0831    HGB 9.1 (L) 08/29/2023 1423    HGB 6.7 (LL) 08/29/2023 0443    HGB 7.0 (L) 08/28/2023 2354    HGB 12.0 04/20/2015 1109    HGB 11.6 04/03/2015 0831    HGB 12.1 11/04/2014 1025    INR 2.35 (H) 08/29/2023 0443    WBC 9.24 08/29/2023 0443    WBC 9.62 08/28/2023 2354    WBC 12.35 (H) 08/28/2023 1659    WBC 7.98 04/20/2015 1109    WBC 7.29 04/03/2015 0831    WBC 6.66 11/04/2014 1025    ESR 39 (H) 07/25/2023 1133    CRP 17.6 (H) 07/25/2023 1133       Meds:    Current Facility-Administered Medications:   •  acetaminophen (TYLENOL) tablet 975 mg, 975 mg, Oral, Q8H [address] PA-C, 975 mg at 08/29/23 2213  •  albumin human (FLEXBUMIN) 5 % injection 25 g, 25 g, Intravenous, Once, Justin Maya PA-C  •  chlorhexidine (PERIDEX) 0.12 % oral rinse 15 mL, 15 mL, Mouth/Throat, Q12H [address], Jaz David PA-C, 15 mL at 08/29/23 2200  •  DAPTOmycin (CUBICIN) 475 mg in sodium chloride 0.9 % 50 mL IVPB, 6 mg/kg, Intravenous, Q24H, Jersey Philip MD, Last Rate: 100 mL/hr at 08/29/23 1405, 475 mg at 08/29/23 1405  •  dextrose 5 % and sodium chloride 0.9 % infusion, 75 mL/hr, Intravenous, Continuous, Julian Durant PA-C, Last Rate: 75 mL/hr at 08/29/23 2244, 75 mL/hr at 08/29/23 2244  •  DULoxetine (CYMBALTA) delayed release capsule 60 mg, 60 mg, Oral, Daily, Jaz David, PA-C, 60 mg at 08/28/23 1544  •  ertapenem (INVanz) 1,000 mg in sodium chloride 0.9 % 50 mL IVPB, 1,000 mg, Intravenous, Q24H, Al Purdy MD, Last Rate: 100 mL/hr at 08/29/23 1311, 1,000 mg at 08/29/23 1311  •  ferrous sulfate tablet 325 mg, 325 mg, Oral, Daily With Breakfast, Miracle Danette, PA-C, 325 mg at 08/29/23 0498  •  heparin (porcine) subcutaneous injection 5,000 Units, 5,000 Units, Subcutaneous, Q8H [facility], Miracle Garre, PA-C, 5,000 Units at 08/29/23 2213  •  levothyroxine tablet 112 mcg, 112 mcg, Oral, Early Morning, [place] Garre, PA-C, 112 mcg at 08/29/23 6032  •  pantoprazole (PROTONIX) EC tablet 40 mg, 40 mg, Oral, Early Morning, [place] Garre, PA-C, 40 mg at 08/29/23 8691  •  senna-docusate sodium (SENOKOT S) 8.6-50 mg per tablet, , Oral, Daily, Miracle Garre, PA-C, 1 tablet at 08/28/23 1544  •  simethicone (MYLICON) chewable tablet 80 mg, 80 mg, Oral, Q6H PRN, [place] Garre, PA-C    Blood Culture:   Lab Results   Component Value Date    BLOODCX No Growth After 5 Days. 06/27/2023    BLOODCX No Growth After 5 Days. 06/27/2023       Wound Culture:   Lab Results   Component Value Date    WOUNDCULT No growth 06/28/2023       Ins and Outs:  I/O last 24 hours: In: 2677.5 [I.V.:1477.5; Blood:400; IV Piggyback:800]  Out: 841 [Urine:905]          Physical:  Vitals:    08/29/23 2200   BP: 121/59   Pulse: 81   Resp: (!) 44   Temp:    SpO2: (!) 89%     Musculoskeletal: right Lower Extremity     • Skin warm, . No erythema or ecchymosis. Pitting edema to the ankle  • Dressing with moderate amount of blood   • TTP humberto-incisionally  • Sensation intact to saphenous, sural, tibial, superficial peroneal nerve, and deep peroneal  • Motor intact to +FHL/EHL, +ankle dorsi/plantar flexion  • 2+ DP pulse  • Digits warm and well perfused  • Capillary refill < 2 seconds    Assessment:    80 y. o.female POD 2 R hip I&D and closure. Patient doing well.      Plan:  · NWB RLE  · F/U cx  · IV abx per ID  · Will monitor for ABLA and administer IVF/prbc as indicated for Greater than 2 gram drop or Hgb < 7  · PT/OT  · Pain control  · DVT ppx SQH  · Dispo: pending PT and med clearance    Lazara Casey MD

## 2023-08-31 ENCOUNTER — APPOINTMENT (INPATIENT)
Dept: NON INVASIVE DIAGNOSTICS | Facility: HOSPITAL | Age: 88
DRG: 856 | End: 2023-08-31
Payer: COMMERCIAL

## 2023-08-31 ENCOUNTER — APPOINTMENT (INPATIENT)
Dept: RADIOLOGY | Facility: HOSPITAL | Age: 88
DRG: 856 | End: 2023-08-31
Payer: COMMERCIAL

## 2023-08-31 ENCOUNTER — APPOINTMENT (OUTPATIENT)
Dept: RADIOLOGY | Facility: HOSPITAL | Age: 88
DRG: 856 | End: 2023-08-31
Payer: COMMERCIAL

## 2023-08-31 PROBLEM — N18.30 STAGE 3 CHRONIC KIDNEY DISEASE (HCC): Status: ACTIVE | Noted: 2020-11-10

## 2023-08-31 PROBLEM — R41.82 ALTERED MENTAL STATUS: Status: ACTIVE | Noted: 2023-08-31

## 2023-08-31 PROBLEM — T07.XXXA MULTIPLE WOUNDS: Status: ACTIVE | Noted: 2023-08-31

## 2023-08-31 PROBLEM — M79.602 PAIN AND SWELLING OF LEFT UPPER EXTREMITY: Status: ACTIVE | Noted: 2023-08-31

## 2023-08-31 PROBLEM — S31.000A SACRAL WOUND: Status: ACTIVE | Noted: 2023-08-31

## 2023-08-31 PROBLEM — E16.2 HYPOGLYCEMIA: Status: RESOLVED | Noted: 2023-08-30 | Resolved: 2023-08-31

## 2023-08-31 PROBLEM — E46 MALNUTRITION (HCC): Status: ACTIVE | Noted: 2023-08-31

## 2023-08-31 PROBLEM — M79.89 PAIN AND SWELLING OF LEFT UPPER EXTREMITY: Status: ACTIVE | Noted: 2023-08-31

## 2023-08-31 PROBLEM — R73.9 HYPERGLYCEMIA: Status: ACTIVE | Noted: 2023-08-31

## 2023-08-31 LAB
ABO GROUP BLD BPU: NORMAL
ANION GAP SERPL CALCULATED.3IONS-SCNC: 6 MMOL/L
AORTIC VALVE MEAN VELOCITY: 8.4 M/S
APICAL FOUR CHAMBER EJECTION FRACTION: 66 %
AV AREA BY CONTINUOUS VTI: 1.7 CM2
AV AREA PEAK VELOCITY: 1.5 CM2
AV LVOT MEAN GRADIENT: 1 MMHG
AV LVOT PEAK GRADIENT: 3 MMHG
AV MEAN GRADIENT: 3 MMHG
AV PEAK GRADIENT: 6 MMHG
AV VALVE AREA: 1.68 CM2
AV VELOCITY RATIO: 0.66
BASOPHILS # BLD AUTO: 0.01 THOUSANDS/ÂΜL (ref 0–0.1)
BASOPHILS NFR BLD AUTO: 0 % (ref 0–1)
BPU ID: NORMAL
BUN SERPL-MCNC: 26 MG/DL (ref 5–25)
CALCIUM SERPL-MCNC: 7.5 MG/DL (ref 8.4–10.2)
CHLORIDE SERPL-SCNC: 113 MMOL/L (ref 96–108)
CO2 SERPL-SCNC: 22 MMOL/L (ref 21–32)
CREAT SERPL-MCNC: 1.09 MG/DL (ref 0.6–1.3)
CROSSMATCH: NORMAL
DOP CALC AO PEAK VEL: 1.24 M/S
DOP CALC AO VTI: 22.79 CM
DOP CALC LVOT AREA: 2.27 CM2
DOP CALC LVOT CARDIAC INDEX: 1.59 L/MIN/M2
DOP CALC LVOT CARDIAC OUTPUT: 2.87 L/MIN
DOP CALC LVOT DIAMETER: 1.7 CM
DOP CALC LVOT PEAK VEL VTI: 16.86 CM
DOP CALC LVOT PEAK VEL: 0.82 M/S
DOP CALC LVOT STROKE INDEX: 22.1 ML/M2
DOP CALC LVOT STROKE VOLUME: 38.25
EOSINOPHIL # BLD AUTO: 0 THOUSAND/ÂΜL (ref 0–0.61)
EOSINOPHIL NFR BLD AUTO: 0 % (ref 0–6)
ERYTHROCYTE [DISTWIDTH] IN BLOOD BY AUTOMATED COUNT: 19.9 % (ref 11.6–15.1)
ERYTHROCYTE [SEDIMENTATION RATE] IN BLOOD: <1 MM/HOUR (ref 0–29)
FRACTIONAL SHORTENING: 36 (ref 28–44)
GFR SERPL CREATININE-BSD FRML MDRD: 45 ML/MIN/1.73SQ M
GLUCOSE SERPL-MCNC: 186 MG/DL (ref 65–140)
GLUCOSE SERPL-MCNC: 204 MG/DL (ref 65–140)
GLUCOSE SERPL-MCNC: 208 MG/DL (ref 65–140)
GLUCOSE SERPL-MCNC: 234 MG/DL (ref 65–140)
GLUCOSE SERPL-MCNC: 246 MG/DL (ref 65–140)
GLUCOSE SERPL-MCNC: 92 MG/DL (ref 65–140)
HCT VFR BLD AUTO: 27 % (ref 34.8–46.1)
HGB BLD-MCNC: 9 G/DL (ref 11.5–15.4)
IMM GRANULOCYTES # BLD AUTO: 0.11 THOUSAND/UL (ref 0–0.2)
IMM GRANULOCYTES NFR BLD AUTO: 1 % (ref 0–2)
INR PPP: 1.77 (ref 0.84–1.19)
INTERVENTRICULAR SEPTUM IN DIASTOLE (PARASTERNAL SHORT AXIS VIEW): 0.9 CM
INTERVENTRICULAR SEPTUM: 0.9 CM (ref 0.6–1.1)
LEFT ATRIUM SIZE: 4.2 CM
LEFT INTERNAL DIMENSION IN SYSTOLE: 2.9 CM (ref 2.1–4)
LEFT VENTRICULAR INTERNAL DIMENSION IN DIASTOLE: 4.5 CM (ref 3.5–6)
LEFT VENTRICULAR POSTERIOR WALL IN END DIASTOLE: 0.9 CM
LEFT VENTRICULAR STROKE VOLUME: 60 ML
LVSV (TEICH): 60 ML
LYMPHOCYTES # BLD AUTO: 0.78 THOUSANDS/ÂΜL (ref 0.6–4.47)
LYMPHOCYTES NFR BLD AUTO: 8 % (ref 14–44)
MAGNESIUM SERPL-MCNC: 2.5 MG/DL (ref 1.9–2.7)
MCH RBC QN AUTO: 31.9 PG (ref 26.8–34.3)
MCHC RBC AUTO-ENTMCNC: 33.3 G/DL (ref 31.4–37.4)
MCV RBC AUTO: 96 FL (ref 82–98)
MONOCYTES # BLD AUTO: 0.25 THOUSAND/ÂΜL (ref 0.17–1.22)
MONOCYTES NFR BLD AUTO: 3 % (ref 4–12)
NEUTROPHILS # BLD AUTO: 8.71 THOUSANDS/ÂΜL (ref 1.85–7.62)
NEUTS SEG NFR BLD AUTO: 88 % (ref 43–75)
NRBC BLD AUTO-RTO: 0 /100 WBCS
PHOSPHATE SERPL-MCNC: 1.9 MG/DL (ref 2.3–4.1)
PLATELET # BLD AUTO: 155 THOUSANDS/UL (ref 149–390)
PMV BLD AUTO: 9.6 FL (ref 8.9–12.7)
POTASSIUM SERPL-SCNC: 3.7 MMOL/L (ref 3.5–5.3)
PROTHROMBIN TIME: 20.8 SECONDS (ref 11.6–14.5)
RBC # BLD AUTO: 2.82 MILLION/UL (ref 3.81–5.12)
SL CV LV EF: 65
SL CV PED ECHO LEFT VENTRICLE DIASTOLIC VOLUME (MOD BIPLANE) 2D: 93 ML
SL CV PED ECHO LEFT VENTRICLE SYSTOLIC VOLUME (MOD BIPLANE) 2D: 33 ML
SODIUM SERPL-SCNC: 141 MMOL/L (ref 135–147)
TR MAX PG: 24 MMHG
TR PEAK VELOCITY: 2.5 M/S
TRICUSPID ANNULAR PLANE SYSTOLIC EXCURSION: 1.7 CM
TRICUSPID VALVE PEAK REGURGITATION VELOCITY: 2.45 M/S
UNIT DISPENSE STATUS: NORMAL
UNIT PRODUCT CODE: NORMAL
UNIT PRODUCT VOLUME: 350 ML
UNIT RH: NORMAL
WBC # BLD AUTO: 9.86 THOUSAND/UL (ref 4.31–10.16)

## 2023-08-31 PROCEDURE — NC001 PR NO CHARGE

## 2023-08-31 PROCEDURE — 80048 BASIC METABOLIC PNL TOTAL CA: CPT | Performed by: STUDENT IN AN ORGANIZED HEALTH CARE EDUCATION/TRAINING PROGRAM

## 2023-08-31 PROCEDURE — 83735 ASSAY OF MAGNESIUM: CPT | Performed by: STUDENT IN AN ORGANIZED HEALTH CARE EDUCATION/TRAINING PROGRAM

## 2023-08-31 PROCEDURE — 93325 DOPPLER ECHO COLOR FLOW MAPG: CPT | Performed by: INTERNAL MEDICINE

## 2023-08-31 PROCEDURE — 85652 RBC SED RATE AUTOMATED: CPT | Performed by: STUDENT IN AN ORGANIZED HEALTH CARE EDUCATION/TRAINING PROGRAM

## 2023-08-31 PROCEDURE — 99232 SBSQ HOSP IP/OBS MODERATE 35: CPT | Performed by: INTERNAL MEDICINE

## 2023-08-31 PROCEDURE — 99222 1ST HOSP IP/OBS MODERATE 55: CPT | Performed by: PHYSICIAN ASSISTANT

## 2023-08-31 PROCEDURE — 71045 X-RAY EXAM CHEST 1 VIEW: CPT

## 2023-08-31 PROCEDURE — G1004 CDSM NDSC: HCPCS

## 2023-08-31 PROCEDURE — 74018 RADEX ABDOMEN 1 VIEW: CPT

## 2023-08-31 PROCEDURE — NC001 PR NO CHARGE: Performed by: FAMILY MEDICINE

## 2023-08-31 PROCEDURE — 92610 EVALUATE SWALLOWING FUNCTION: CPT

## 2023-08-31 PROCEDURE — 84100 ASSAY OF PHOSPHORUS: CPT

## 2023-08-31 PROCEDURE — NC001 PR NO CHARGE: Performed by: ORTHOPAEDIC SURGERY

## 2023-08-31 PROCEDURE — 82948 REAGENT STRIP/BLOOD GLUCOSE: CPT

## 2023-08-31 PROCEDURE — 93321 DOPPLER ECHO F-UP/LMTD STD: CPT | Performed by: INTERNAL MEDICINE

## 2023-08-31 PROCEDURE — 99291 CRITICAL CARE FIRST HOUR: CPT | Performed by: SURGERY

## 2023-08-31 PROCEDURE — 85610 PROTHROMBIN TIME: CPT

## 2023-08-31 PROCEDURE — 93308 TTE F-UP OR LMTD: CPT

## 2023-08-31 PROCEDURE — 93321 DOPPLER ECHO F-UP/LMTD STD: CPT

## 2023-08-31 PROCEDURE — 85025 COMPLETE CBC W/AUTO DIFF WBC: CPT | Performed by: STUDENT IN AN ORGANIZED HEALTH CARE EDUCATION/TRAINING PROGRAM

## 2023-08-31 PROCEDURE — 93308 TTE F-UP OR LMTD: CPT | Performed by: INTERNAL MEDICINE

## 2023-08-31 PROCEDURE — 93325 DOPPLER ECHO COLOR FLOW MAPG: CPT

## 2023-08-31 PROCEDURE — C9113 INJ PANTOPRAZOLE SODIUM, VIA: HCPCS

## 2023-08-31 PROCEDURE — 70450 CT HEAD/BRAIN W/O DYE: CPT

## 2023-08-31 RX ORDER — LIDOCAINE 50 MG/G
1 PATCH TOPICAL DAILY
Status: DISCONTINUED | OUTPATIENT
Start: 2023-09-01 | End: 2023-09-05 | Stop reason: HOSPADM

## 2023-08-31 RX ORDER — LORAZEPAM 2 MG/ML
0.5 INJECTION INTRAMUSCULAR ONCE
Status: DISCONTINUED | OUTPATIENT
Start: 2023-08-31 | End: 2023-08-31

## 2023-08-31 RX ORDER — POLYETHYLENE GLYCOL 3350 17 G/17G
17 POWDER, FOR SOLUTION ORAL DAILY
Status: DISCONTINUED | OUTPATIENT
Start: 2023-09-01 | End: 2023-09-02

## 2023-08-31 RX ORDER — ONDANSETRON 2 MG/ML
4 INJECTION INTRAMUSCULAR; INTRAVENOUS EVERY 4 HOURS PRN
Status: DISCONTINUED | OUTPATIENT
Start: 2023-08-31 | End: 2023-09-05 | Stop reason: HOSPADM

## 2023-08-31 RX ORDER — POTASSIUM CHLORIDE 14.9 MG/ML
20 INJECTION INTRAVENOUS
Status: COMPLETED | OUTPATIENT
Start: 2023-08-31 | End: 2023-08-31

## 2023-08-31 RX ORDER — MAGNESIUM SULFATE HEPTAHYDRATE 40 MG/ML
2 INJECTION, SOLUTION INTRAVENOUS ONCE
Status: COMPLETED | OUTPATIENT
Start: 2023-08-31 | End: 2023-08-31

## 2023-08-31 RX ORDER — INSULIN LISPRO 100 [IU]/ML
1-6 INJECTION, SOLUTION INTRAVENOUS; SUBCUTANEOUS EVERY 6 HOURS SCHEDULED
Status: DISCONTINUED | OUTPATIENT
Start: 2023-08-31 | End: 2023-09-01

## 2023-08-31 RX ORDER — HYDROMORPHONE HCL IN WATER/PF 6 MG/30 ML
0.2 PATIENT CONTROLLED ANALGESIA SYRINGE INTRAVENOUS EVERY 4 HOURS PRN
Status: DISCONTINUED | OUTPATIENT
Start: 2023-08-31 | End: 2023-09-05 | Stop reason: HOSPADM

## 2023-08-31 RX ORDER — ECHINACEA PURPUREA EXTRACT 125 MG
1 TABLET ORAL
Status: DISCONTINUED | OUTPATIENT
Start: 2023-08-31 | End: 2023-09-05 | Stop reason: HOSPADM

## 2023-08-31 RX ORDER — LEVOTHYROXINE SODIUM 112 UG/1
112 TABLET ORAL
Status: DISCONTINUED | OUTPATIENT
Start: 2023-08-31 | End: 2023-09-01

## 2023-08-31 RX ORDER — OXYCODONE HCL 5 MG/5 ML
2.5 SOLUTION, ORAL ORAL EVERY 4 HOURS PRN
Status: DISCONTINUED | OUTPATIENT
Start: 2023-08-31 | End: 2023-09-05 | Stop reason: HOSPADM

## 2023-08-31 RX ORDER — OXYCODONE HCL 5 MG/5 ML
5 SOLUTION, ORAL ORAL EVERY 4 HOURS PRN
Status: DISCONTINUED | OUTPATIENT
Start: 2023-08-31 | End: 2023-09-05 | Stop reason: HOSPADM

## 2023-08-31 RX ORDER — POTASSIUM CHLORIDE 20 MEQ/1
40 TABLET, EXTENDED RELEASE ORAL ONCE
Status: DISCONTINUED | OUTPATIENT
Start: 2023-08-31 | End: 2023-08-31

## 2023-08-31 RX ORDER — POTASSIUM CHLORIDE 20MEQ/15ML
40 LIQUID (ML) ORAL ONCE
Status: COMPLETED | OUTPATIENT
Start: 2023-08-31 | End: 2023-08-31

## 2023-08-31 RX ORDER — PANTOPRAZOLE SODIUM 40 MG/10ML
40 INJECTION, POWDER, LYOPHILIZED, FOR SOLUTION INTRAVENOUS DAILY
Status: DISCONTINUED | OUTPATIENT
Start: 2023-08-31 | End: 2023-09-01

## 2023-08-31 RX ADMIN — CHLORHEXIDINE GLUCONATE 15 ML: 1.2 SOLUTION ORAL at 08:04

## 2023-08-31 RX ADMIN — POTASSIUM CHLORIDE 20 MEQ: 14.9 INJECTION, SOLUTION INTRAVENOUS at 11:29

## 2023-08-31 RX ADMIN — TORSEMIDE 40 MG: 20 TABLET ORAL at 08:03

## 2023-08-31 RX ADMIN — HEPARIN SODIUM 5000 UNITS: 5000 INJECTION INTRAVENOUS; SUBCUTANEOUS at 06:04

## 2023-08-31 RX ADMIN — CEFEPIME 1000 MG: 1 INJECTION, POWDER, FOR SOLUTION INTRAMUSCULAR; INTRAVENOUS at 22:52

## 2023-08-31 RX ADMIN — CEFEPIME 2000 MG: 2 INJECTION, POWDER, FOR SOLUTION INTRAVENOUS at 08:04

## 2023-08-31 RX ADMIN — INSULIN LISPRO 3 UNITS: 100 INJECTION, SOLUTION INTRAVENOUS; SUBCUTANEOUS at 12:15

## 2023-08-31 RX ADMIN — PANTOPRAZOLE SODIUM 40 MG: 40 INJECTION, POWDER, FOR SOLUTION INTRAVENOUS at 08:03

## 2023-08-31 RX ADMIN — POTASSIUM CHLORIDE 40 MEQ: 1.5 SOLUTION ORAL at 03:06

## 2023-08-31 RX ADMIN — ACETAMINOPHEN 975 MG: 650 SUSPENSION ORAL at 22:52

## 2023-08-31 RX ADMIN — POTASSIUM CHLORIDE 20 MEQ: 14.9 INJECTION, SOLUTION INTRAVENOUS at 09:37

## 2023-08-31 RX ADMIN — POTASSIUM CHLORIDE 20 MEQ: 14.9 INJECTION, SOLUTION INTRAVENOUS at 01:56

## 2023-08-31 RX ADMIN — HEPARIN SODIUM 5000 UNITS: 5000 INJECTION INTRAVENOUS; SUBCUTANEOUS at 14:05

## 2023-08-31 RX ADMIN — INSULIN LISPRO 2 UNITS: 100 INJECTION, SOLUTION INTRAVENOUS; SUBCUTANEOUS at 06:15

## 2023-08-31 RX ADMIN — MAGNESIUM SULFATE HEPTAHYDRATE 2 G: 40 INJECTION, SOLUTION INTRAVENOUS at 01:56

## 2023-08-31 RX ADMIN — SODIUM PHOSPHATE, MONOBASIC, MONOHYDRATE AND SODIUM PHOSPHATE, DIBASIC, ANHYDROUS 21 MMOL: 142; 276 INJECTION, SOLUTION INTRAVENOUS at 09:37

## 2023-08-31 RX ADMIN — POTASSIUM CHLORIDE 20 MEQ: 14.9 INJECTION, SOLUTION INTRAVENOUS at 04:32

## 2023-08-31 RX ADMIN — ACETAMINOPHEN 975 MG: 650 SUSPENSION ORAL at 14:05

## 2023-08-31 RX ADMIN — HEPARIN SODIUM 5000 UNITS: 5000 INJECTION INTRAVENOUS; SUBCUTANEOUS at 22:52

## 2023-08-31 RX ADMIN — INSULIN LISPRO 2 UNITS: 100 INJECTION, SOLUTION INTRAVENOUS; SUBCUTANEOUS at 02:06

## 2023-08-31 RX ADMIN — LEVOTHYROXINE SODIUM 112 MCG: 112 TABLET ORAL at 06:04

## 2023-08-31 RX ADMIN — ACETAMINOPHEN 975 MG: 650 SUSPENSION ORAL at 06:04

## 2023-08-31 NOTE — PROGRESS NOTES
Critical Care Interval Transfer Note:    Please refer to progress note from earlier today for full details. Changes to Plan:   · CTH w/o acute infarct, q4 neuro checks while awake, allow rest at night  · Of note patient can be difficult to arouse and sometimes requires painful stimulation to arouse but has a non-focal neuro exam  · Continue TF, advance to goal  · Palliative care c/s for [address] [place] discussions, given complex medical hx and poor nutritional status w/ likely prolonged hospital course and possible need for long term feeding access. · Continue home Torsemide and PRN IV lasix to keep goal even. Barriers to discharge:   · Palliative care consultation  · PT/OT evaluations- likely SNF given hx  · Enteral access/feeding plan given prior poor PO intake  · Long term abx plan     Consults: IP CONSULT TO CASE MANAGEMENT  IP CONSULT TO INFECTIOUS DISEASES  IP CONSULT TO NEPHROLOGY  IP CONSULT TO PICC TEAM  IP CONSULT TO NUTRITION SERVICES  IP CONSULT TO PALLIATIVE CARE     Discharge Plan: Anticipate discharge in >72 hrs to discharge location to be determined pending rehab evaluations. Central access plan: Patient requires PICC secondary to long term abx, poor vascular access. PT Recommendations: Return to facility with rehabilitation services  OT Recommendations: Return to facility with rehabilitation services      Patient seen and evaluated by Critical Care today and deemed to be appropriate for transfer to Stepdown Level 2. Spoke to [place] from [facility] to accept transfer. Critical care can be contacted via [employer] with any questions or concerns.

## 2023-08-31 NOTE — ASSESSMENT & PLAN NOTE
· S/p AV node ablation in 8/2018 as well as placement of PPM  · On warfarin 3 mg daily for [facility] at home with goal INR of 2-3. · CHADS-VASC score: 5 points (Age, Female, CHF, HTN)    Currently stable    Plan:  · Monitor HR  · Continue heparin. Will need bridge to coumadin prior to discharge.

## 2023-08-31 NOTE — ASSESSMENT & PLAN NOTE
Malnutrition Findings:                                 BMI Findings: Body mass index is 35.94 kg/m².      - Patient on TF via NGT, placed 8/29/23  - ICU ordered nutritional consult; following  - Jevity 1.2, continuous: 50    PLAN:  · Continue TF at goal for adequate nutrition  · Nutrition team following; appreciate recommendations

## 2023-08-31 NOTE — ASSESSMENT & PLAN NOTE
- LUE: erythematous, edematous, and hot to the touch, concerning for possible clot vs thrombophlebitis.  Weak pulses  - Doppler LUE 8/30 showed severe edema but no evidence of acute DVT    PLAN:  · Continue to monitor

## 2023-08-31 NOTE — ASSESSMENT & PLAN NOTE
- Episode of AMS on morning of 8/31/23 for minimal responsiveness  - CTH 8/31/23: no abnormalities  - Per ICU notes, patient difficult to arouse, requires painful stimulation   - Did not open eyes, but nodded appropriately to yes/no questions  - Episodes of hypoglycemia during hospital stay  - Palliative care consult ordered by ICU  - Code status: Level 1  - Echo performed 8/31/23: EF 65%, mild-mod Aortic, Mitral, Tricuspid regurgitation     PLAN:  · Neuro checks per shift  · Fall precautions  · Currently on non-violent restraints  · Consider d/c  · Palliative Care recommendations:  · [address] [place] conversation

## 2023-08-31 NOTE — PROGRESS NOTES
NEPHROLOGY PROGRESS NOTE   Talya Moura 80 y.o. female MRN: [id number]  Unit/Bed#: [facility] 049-13 Encounter: [id number]        SUBJECTIVE:    The patient was seen today  Resting comfortably  No acute chest pain or shortness of breath  Urine output has improved    12 point review of systems was otherwise negative besides what is mentioned above.     Medications:    Current Facility-Administered Medications:   •  acetaminophen (TYLENOL) oral suspension 975 mg, 975 mg, Per NG Tube, Q8H [facility], [place], 975 mg at 08/31/23 1405  •  cefepime (MAXIPIME) 1,000 mg in dextrose 5 % 50 mL IVPB, 1,000 mg, Intravenous, Q12H, [place]  •  chlorhexidine (PERIDEX) 0.12 % oral rinse 15 mL, 15 mL, Mouth/Throat, Q12H [facility], [place], 15 mL at 08/31/23 0804  •  DULoxetine (CYMBALTA) delayed release capsule 60 mg, 60 mg, Oral, Daily, Amina Pee, 60 mg at 08/30/23 2829  •  ferrous sulfate tablet 325 mg, 325 mg, Oral, Daily With Breakfast, Amina Pee, 325 mg at 08/30/23 0907  •  heparin (porcine) subcutaneous injection 5,000 Units, 5,000 Units, Subcutaneous, Q8H [facility], Amina Pee, 5,000 Units at 08/31/23 1405  •  insulin lispro (HumaLOG) 100 units/mL subcutaneous injection 1-6 Units, 1-6 Units, Subcutaneous, Q6H [facility], 3 Units at 08/31/23 1215 **AND** Fingerstick Glucose (POCT), , , Q6H, [place]  •  levothyroxine tablet 112 mcg, 112 mcg, Per NG Tube, Early Morning, Amina Pee, 112 mcg at 08/31/23 0604  •  pantoprazole (PROTONIX) injection 40 mg, 40 mg, Intravenous, Daily, Amina Pee, 40 mg at 08/31/23 9186  •  senna-docusate sodium (SENOKOT S) 8.6-50 mg per tablet, , Oral, Daily, Amina Pee, 1 tablet at 08/28/23 1544  •  simethicone (MYLICON) chewable tablet 80 mg, 80 mg, Oral, Q6H PRN, Amina Fisher  •  sodium chloride (OCEAN) 0.65 % nasal spray 1 spray, 1 spray, Each Nare, Q1H PRN, Page Dos Santos DO  •  torsemide (DEMADEX) tablet 40 mg, 40 mg, Oral, Daily, [place], 40 mg at 08/31/23 0803    OBJECTIVE:    Vitals:    08/31/23 0900 08/31/23 1000 08/31/23 1100 08/31/23 1200   BP: 141/62  102/51 125/69   Pulse: 73 69 69 69   Resp: (!) 33 (!) 23 18 21   Temp:  98.9 °F (37.2 °C)     TempSrc:  Oral     SpO2: 97% 100% 98% 100%   Weight:       Height:            Temp:  [98.6 °F (37 °C)-99 °F (37.2 °C)] 98.9 °F (37.2 °C)  HR:  [69-79] 69  Resp:  [14-46] 21  BP: ()/(51-69) 125/69  SpO2:  [82 %-100 %] 100 %     Body mass index is 35.94 kg/m². Weight (last 2 days)     Date/Time Weight    08/31/23 0745 83.5 (184)    08/31/23 0600 83.9 (184.97)    08/30/23 0532 83.9 (184.97)    08/29/23 0600 81 (178.57)          I/O last 3 completed shifts: In: 4095.5 [P.O.:100; I.V.:2322.5;  Blood:400; NG/GT:313; IV Piggyback:960]  Out: 6579 [Urine:4185]    I/O this shift:  In: 600 [NG/GT:270; IV Piggyback:330]  Out: 950 [Urine:950]      Physical exam:    General: no acute distress, cooperative  Eyes: conjunctivae pink, anicteric sclerae  ENT: lips and mucous membranes moist, no exudates, normal external ears  Neck: ROM intact, no JVD  Chest: No respiratory distress, no accessory muscle use  CVS: normal rate, non pericardial friction rub  Abdomen: Anasarca  Extremities: no edema of both legs  Skin: no rash  Neuro: awake, alert, oriented, grossly intact  Psych:  Pleasant affect    Invasive Devices:      Lab Results:   Results from last 7 days   Lab Units 08/31/23  0423 08/30/23  2112 08/30/23  1122 08/30/23  1019 08/30/23  0425 08/30/23  0000 08/29/23  1806 08/29/23  1423 08/29/23  0443 08/29/23  0050 08/28/23  2354 08/28/23  1659   WBC Thousand/uL 9.86  --   --   --  9.72  --   --   --  9.24  --  9.62 12.35*   HEMOGLOBIN g/dL 9.0*  --  8.6*  --  7.0*  --   --  9.1* 6.7*  --  7.0* 7.7*   HEMATOCRIT % 27.0*  --   --   --  21.3*  --   --   --  21.1*  --  21.8* 25.8*   PLATELETS Thousands/uL 155  --   --   --  137*  --   --   --  163  --  158 205   POTASSIUM mmol/L 3.7 3.3*  --   --   --  3.2* 3.6  --  5.4* 3.0* --  3.1*   CHLORIDE mmol/L 113* 111*  --   --   --  110* 111*  --  109* 109*  --  106   CO2 mmol/L 22 22  --   --   --  20* 20*  --  23 16*  --  22   BUN mg/dL 26* 26*  --   --   --  26* 26*  --  30* 25  --  32*   CREATININE mg/dL 1.09 1.10  --   --   --  1.07 1.10  --  1.16 0.98  --  1.23   CALCIUM mg/dL 7.5* 7.4*  --   --   --  7.5* 7.0*  --  7.1* 5.7*  --  7.6*   MAGNESIUM mg/dL 2.5 1.8*  --   --   --  2.1  --   --  1.9  --   --  1.4*   PHOSPHORUS mg/dL 1.9*  --   --   --   --  2.4  --   --  3.0  --   --  3.2   ALK PHOS U/L  --   --   --   --   --  71  --   --  82 69  --  102   ALT U/L  --   --   --   --   --  7  --   --  8 7  --  10   AST U/L  --   --   --   --   --  17  --   --  11* 12*  --  11*   BLOOD CULTURE   --   --   --  No Growth at 24 hrs. No Growth at 24 hrs.  --   --   --   --   --   --   --   --        ASSESSMENT & PLAN     1. Stage IIIa chronic kidney disease-overall renal function is stable with a creatinine that is relatively within baseline low muscle mass given age and comorbidities overestimates GFR. Overall this is remained stable now on oral torsemide     2. Hypertension-blood pressures are at goal, keeping MAP above 65 continue torsemide 40 mg daily, avoid hypotension. Stable we will continue to monitor     3. Anemia-volume expansion with blood and hemoglobin has improved     4. History of congestive heart failure-continue oral torsemide over 3 L of urine output     5. 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 Given multiple comorbidities palliative care has been consulted     DISCUSSION:    Patient's renal function is stable, remains on oral torsemide, palliative care has been consulted to address goals of care. No active issues being managed by nephrology therefore we will see as needed.   Please call with any questions or concerns    Portions of the record may have been created with voice recognition software. Occasional wrong word or "sound a like" substitutions may have occurred due to the inherent limitations of voice recognition software. Read the chart carefully and recognize, using context, where substitutions have occurred. If you have any questions, please contact the dictating provider.

## 2023-08-31 NOTE — ASSESSMENT & PLAN NOTE
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 · S/p Joint explantation of right total hip prosthesis and placement of abx beads on 6/30/23 due to spontaneous drainage and abscess formation. Antibiotic beads were placed at time of procedure. Patient was placed on 6 week abx course of IV vanc which was switched to doxycycline. · Patient developed sinus tract and wound dehiscence. S/p I&D and washout on 8/29/23. · Ortho following appreciate recommendations  · ID following, Ortho following, appreciate recommendations. · PICC placed: 8/29  · Antibiotics started on 8/28      Plan:   · ID recommendations:  · F/U blood cultures 8/30/23: negative for 24 hours  · F/U cultures from OR  · Pseudomonas aeruoginosa  · Cefepime 1g IV Q12H (started 8/30/23)  · Will need 6 week iv abx possibly followed by PO pending clinical course. Weekly CBC w/ diff, ESR, and CR while on IV abx.    · F/U outpatient with ID 2 weeks post D/C  · Ortho recommendations  · Remain non-weight bearing/ non- ambulatory  · Dressing changes PRN

## 2023-08-31 NOTE — SPEECH THERAPY NOTE
Speech Language/Pathology  Speech-Language Pathology Bedside Swallow Evaluation      Patient Name: Della Porras    [id number] Date: 8/31/2023     Problem List  Principal Problem:    Infection of right prosthetic hip joint ([address])  Active Problems:    Chronic atrial fibrillation (HCC)    Anemia    Chronic heart failure with preserved ejection fraction (HCC)    Pacemaker    Hypothyroidism    Status post right hip replacement    Stage 3 chronic kidney disease (HCC)    Benign essential HTN    Gastroesophageal reflux disease without esophagitis    Hypoglycemia      Past Medical History  Past Medical History:   Diagnosis Date   • Abdominal bloating 8/1/2016   • Anemia    • Arthritis    • Cancer (HCC)     basal cell   • CHF (congestive heart failure) (HCC)    • COVID-19    • Disease of thyroid gland    • Disturbance of smell and taste     disturbance of taste   • Effusion of knee joint right    • Fibromyalgia    • Heart murmur     reported a previous heart murmur   • History of acute myocardial infarction    • History of atrial fibrillation    • History of bruising easily    • History of cancer    • History of dermatitis    • History of mammogram    • History of methicillin resistant staphylococcus aureus (MRSA)     Negative nasal culture, isolation discontinued 8/17/2018.    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   • Irritable bowel syndrome (IBS)    • Joint pain, hip    • Limb pain    • Myalgia     myalgia and myositis   • Need for prophylactic vaccination against single diseases    • Need for prophylactic vaccination and inoculation against influenza    • Preoperative cardiovascular examination    • Primary osteoarthritis of right knee    • Right leg pain    • Vaginal Pap smear     reported pap smear       Past Surgical History  Past Surgical History:   Procedure Laterality Date   • ANKLE ARTHRODESIS Right    • BACK SURGERY     • BARIATRIC SURGERY     • CHOLECYSTECTOMY      x2   • COLONOSCOPY     • ESOPHAGOGASTRODUODENOSCOPY N/A 3/23/2018    Procedure: ESOPHAGOGASTRODUODENOSCOPY (EGD); Surgeon: Miryam Tatum MD;  Location: [facility]; Service: Gastroenterology   • FOOT SURGERY     • HIP CLOSE REDUCTION Right 8/21/2016    Procedure: CLOSED REDUCTION RIGHT TOTAL HIP;  Surgeon: Taz Brown MD;  [facility];  Service:    • INSERT / Scharlene Real / Jordi Matas     • IR BIOPSY OTHER  8/1/2020   • IR PICC REPOSITION  7/5/2023   • JOINT REPLACEMENT      LEFT KNEE REPLACEMENT   • JOINT REPLACEMENT      Right HIP   • PILONIDAL CYST EXCISION      x2   • MD EXC B9 LESION MRGN XCP SK TG T/A/L 0.5 CM/< Right 7/24/2019    Procedure: REVISION SCAR EXTREMITY Rt Hip;   Surgeon: Carmen Rooney MD;  [facility];  Service: Orthopedics   • [place] REV[place] [facility] [place] Right 4/15/2019    Procedure: ARTHROPLASTY HIP TOTAL ACETABULAR REVISION;  Surgeon: Carmen Rooney MD;  [facility];  Service: Orthopedics   • REPLACEMENT TOTAL KNEE Right    • SILVANA-EN-Y PROCEDURE      x2   • TOTAL KNEE ARTHROPLASTY     • UMBILICAL HERNIA REPAIR      x2   • WOUND DEBRIDEMENT Right 6/30/2023    Procedure: DEBRIDEMENT LOWER EXTREMITY ([facility])- I&D of right hip with explant of hardware; insertion of antibiotic beads;  Surgeon: Lb Infante MD;  [facility];  Service: Orthopedics   • WOUND DEBRIDEMENT Right 8/28/2023    Procedure: INCISION AND DRAINAGE (I&D) with closure to right hip wound;  Surgeon: Jayla Knight MD;  [facility];  Service: Orthopedics       Summary   Pt presented with s/s suggestive of minimal oral and suspected minimal pharyngeal dysphagia. Symptoms or concerns included minimal, mild, decreased bolus propulsion and decreased bolus formation suspected pharyngeal swallow delay and multiple swallows. She has poor seal on the cup rim and difficulty maintaining. She is able to draw from the straw. Coughing noted at end of trials w/ c/o material regurgitating. Risk/s for Aspiration: mentation, ?able regurgitation     Recommended Diet: NPO except ice chips and sips water   Recommended Form of Meds: non-oral if possible   Aspiration precautions and swallowing strategies: upright posture, only feed when fully alert, slow rate of feeding, small bites/sips and alternating bites and sips  Other Recommendations: Continue frequent oral care, will follow        Current Medical Status  Pt is a 80 y.o. female who presented to [address] [place] [address] 8/28/23  for irrigation debridement  and closure of her R hip. She is several weeks following resection arthroplasty and has an infected R total hip replacement.      Current Precautions:  Fall  Aspiration    Allergies:  No known food allergies    Past medical history:  Please see H&P for details    Special Studies:  CT head  IMPRESSION:   No acute intracranial abnormality.   Unchanged 2.7 cm meningioma in left parafalcine frontal vertex with slight mass effect on adjacent left anterior paramedian frontal lobe.       Social/Education/Vocational Hx:  Pt lives in SNF/ECF, [facility] Information   Current Risks for Dysphagia & Aspiration: AMS  Current Symptoms/Concerns: reduced mentation, poor alertness  Current Diet: NPO with tube feeds   Baseline Diet: regular diet and thin liquids      Baseline Assessment   Behavior/Cognition: lethargic, waxing and waning arousal level and keeps eyes closed  Speech/Language Status: able to participate in basic conversation and able to follow commands inconsistently  Patient Positioning: upright in bed  Pain Status/Interventions/Response to Interventions:   No report of or nonverbal indications of pain. Swallow Mechanism Exam  Facial: reduced movement in general  Labial: bilateral decreased ROM and decreased strength  Lingual: bilateral decreased strength and decreased coordination  Velum: unable to visualize  Mandible: adequate ROM  Dentition: adequate  Vocal quality:weak   Volitional Cough: weak   Respiratory Status: on 4L NC      Consistencies Assessed and Performance   Consistencies Administered: ice chips, thin liquids, puree and jello    Oral Stage: mild, decreased bolus acceptance and decreased bolus propulsion  Reduced closure and labial seal for all trials. Noted pt w/ poor seal, damon on the cup. Able to draw from the straw. Pharyngeal Stage: suspected, mild, suspected decreased hyolaryngeal elevation upon palpation, suspected pharyngeal residue and effortful swallow  Swallow Mechanics:  Swallowing initiation appeared fairly prompt for most trials. Laryngeal rise was palpated and judged to be reduced. Cough at end of session, pt feels as if material is regurgitating. Esophageal Concerns: ? regurgitation    Strategies and Efficacy: -    Summary and Recommendations (see above)    Results Reviewed with: patient and RN     Treatment Recommended: yes     Frequency of treatment: as able     Patient Stated Goal:-    Dysphagia LTG  -Patient will demonstrate safe and effective oral intake (without overt s/s significant oral/pharyngeal dysphagia including s/s penetration or aspiration) for the highest appropriate diet level.      Short Term Goals:  -Pt will tolerate small sips water and ice  with no significant s/s oral or pharyngeal dysphagia across 1-3 diagnostic session/s      -Patient will tolerate trials of upgraded food and/or liquid texture with no significant s/s of oral or pharyngeal dysphagia including aspiration across 1-3 diagnostic sessions     -Patient will comply with a Video/Modified Barium Swallow study for more complete assessment of swallowing anatomy/physiology/aspiration risk and to assess efficacy of treatment techniques so as to best guide treatment plan    Speech Therapy Prognosis   Prognosis: fair    Prognosis Considerations: age, medical status, prior medical history and cognitive status

## 2023-08-31 NOTE — PROGRESS NOTES
[address] [facility]  Transfer Note  Name: Blake Taylor  MRN: [id number]  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 Patient was transferred to ICU for post-op monitoring for extensive surgery and anemia requiring transfusions. During ICU stay, patient presented with acute encephalopathy with waxing/waning symptoms on morning of 8/31/23; CTH performed same day was normal. Of note, patient requires painful stimuli to awake. Episodes of hypoglycemia also noted during ICU stay; patient getting nutrition via TF via NGT. Ortho, ID, Nephrology, Wound care and Palliative Care will continue to follow. Patient is currently Level 1 for code status. Family Medicine service accepted patient on 8/31/23. Admission Date: 8/28/2023 0542  Length of Stay: 3 days  Code Status:  Level 1 - Full Code  Disposition:   Consult:   IP CONSULT TO CASE MANAGEMENT  IP CONSULT TO INFECTIOUS DISEASES  IP CONSULT TO NEPHROLOGY  IP CONSULT TO PICC TEAM  IP CONSULT TO NUTRITION SERVICES  IP CONSULT TO PALLIATIVE CARE         Assessment/Plan:      Plans discussed with [facility] team and finalization is pending attending physician attestation. Please, call  for any clarification. * Infection of right prosthetic hip joint [facility])  Assessment & Plan  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 · S/p Joint explantation of right total hip prosthesis and placement of abx beads on 6/30/23 due to spontaneous drainage and abscess formation. Antibiotic beads were placed at time of procedure. Patient was placed on 6 week abx course of IV vanc which was switched to doxycycline. · Patient developed sinus tract and wound dehiscence. S/p I&D and washout on 8/29/23. · Ortho following appreciate recommendations  · ID following, Ortho following, appreciate recommendations. · PICC placed: 8/29  · Antibiotics started on 8/28      Plan:   · ID recommendations:  · F/U blood cultures 8/30/23: negative for 24 hours  · F/U cultures from OR  · Pseudomonas aeruoginosa  · Cefepime 1g IV Q12H (started 8/30/23)  · Will need 6 week iv abx possibly followed by PO pending clinical course. Weekly CBC w/ diff, ESR, and CR while on IV abx.    · F/U outpatient with ID 2 weeks post D/C  · Ortho recommendations  · Remain non-weight bearing/ non- ambulatory  · Dressing changes PRN      Altered mental status  Assessment & Plan  - Episode of AMS on morning of 8/31/23 for minimal responsiveness  - [facility] 8/31/23: no abnormalities  - Per ICU notes, patient difficult to arouse, requires painful stimulation   - Did not open eyes, but nodded appropriately to yes/no questions  - Episodes of hypoglycemia during hospital stay  - Palliative care consult ordered by ICU  - Code status: Level 1  - Echo performed 8/31/23: EF 65%, mild-mod Aortic, Mitral, Tricuspid regurgitation     PLAN:  · Neuro checks per shift  · Fall precautions  · Currently on non-violent restraints  · Consider d/c  · Palliative Care recommendations:  · [address] [place] conversation     Hyperglycemia  Assessment & Plan  - Last A1C 2022: 5.3%  - Hyperglycemia during hospitalization with 2 episodes of hypoglycemia  - No known history of Diabetes  - Not on diabetic medications at home    BMP Gluc       Results from last 7 days   Lab Units 08/31/23  0423 08/30/23  2112 08/30/23  0000 08/29/23  1806 08/29/23  0443 08/29/23  0050 08/28/23  1659   GLUCOSE RANDOM mg/dL 186* 204* 163* 424* 83 78 89    or POC BG       Results from last 7 days Lab Units 23  1214 23  0714 23  0615 23  0034 23  2112 23  1839 23  1809   POC GLUCOSE mg/dl 234* 246* 208* 204* 195* 105 214*     PLAN:  · Sliding Scale Insulin Algorithm #3  · Glucose checks q6 hours  · Monitor for hypoglycemia        Multiple wounds  Assessment & Plan  - Sacral wound, heel wound  - Wound care consult placed by ICU    PLAN:   · Wound care recommendations:  · Cleanse sacro-buttocks with soap and water. Apply Calazime Cream to B/L Sacro-Buttocks BID and PRN episodes of incontinence  · Turn/reposition q2h or when medically stable for pressure re-distribution on skin . · Elevate heels to offload pressure. · Moisturize skin daily with skin nourishing cream  · Ehob cushion in chair when out of bed. · B/L Heels: cleanse area and pat dry. Apply allevyn foam dressings to area. Deo with P for Prevention and change every 3 Days or PRN. Peel back and inspect skin Q-shift. · Nursing Communication: Please order patient P-500 Specialty Low Air Loss Mattress once transferred out of CCU.        Pain and swelling of left upper extremity  Assessment & Plan  - LUE: erythematous, edematous, and hot to the touch, concerning for possible clot vs thrombophlebitis. Weak pulses  - Doppler LUE  showed severe edema but no evidence of acute DVT    PLAN:  · Continue to monitor    Malnutrition [facility])  Assessment & Plan  Malnutrition Findings:                                 BMI Findings: Body mass index is 35.94 kg/m². - Patient on TF via NGT  - ICU ordered nutritional consult; following  - Jevity 1.2, continuous: 50    PLAN:  · Continue TF at goal for adequate nutrition  · Nutrition team following; appreciate recommendations      Gastroesophageal reflux disease without esophagitis  Assessment & Plan  Continue home medication,  Protonix 40 mg daily. Benign essential HTN  Assessment & Plan  Home medications:  Torsemide 40 mg daily    Systolic (18RGS), GW , Min:95 , CRT:140   Diastolic (11CLQ), RDM:30, Min:51, Max:69  - Most recent BP Blood Pressure: 125/69     Currently stable    PLAN:  · Goal BP <130/80 per cardiology  · MAP >65  · Continue home torsemide 40 mg daily  · Consider Lasix 40 mg PRN    Stage 3 chronic kidney disease [facility])  Assessment & Plan  Lab Results   Component Value Date    EGFR 44 08/30/2023    EGFR 46 08/30/2023    EGFR 44 08/29/2023    CREATININE 1.10 08/30/2023    CREATININE 1.07 08/30/2023    CREATININE 1.10 08/29/2023   Nephrology following; appreciate recommendations  Baseline Cr: 0.9-1.1  On urinary smith catheter:     Plan:  · Avoid nephrotoxic agents  · Monitor with daily BMP     Status post right hip replacement  Assessment & Plan  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 S/p Joint explantation and placement of abx beads on 6/30/23 due to spontaneous drainage and abscess formation. Hypothyroidism  Assessment & Plan  Most recent TSH (6/28/23): 6.873, T4 1.39   Continue home medicaiton levothyroxine 112 mcg daily. Pacemaker  Assessment & Plan  · Hx of intolerance and bradycardia with AVN blocking agents for chronic a.fib  · S/p placement of single chamber PPM on 8/2018. Chronic heart failure with preserved ejection fraction [facility])  Assessment & Plan  Wt Readings from Last 3 Encounters:   08/30/23 83.9 kg (184 lb 15.5 oz)   06/28/23 76.2 kg (168 lb)   06/16/23 76.6 kg (168 lb 12.8 oz)   ·     Intake/Output Summary (Last 24 hours) at 8/31/2023 1259  Last data filed at 8/31/2023 1200  Gross per 24 hour   Intake 1944.66 ml   Output 3750 ml   Net -1805.34 ml   I/O last 24 hours: In: 3148 [P.O.:100; I.V.:1275; Blood:400; NG/GT:583; IV Piggyback:790]  Out: 0467 [Urine:4575]    - Last Echo 6/2023: LV cavity size is normal. Wall thickness is normal. There is concentric remodeling. LVEF 60%. Systolic function is normal. Basal inferoseptal segment is hypokinetic. RV nd RA mildly dilated. - Most recent Echo 8/31/23: LVEF 65%, mild-mod aortic, mitral and tricuspid regurgitaton    Plan:  · Continue home medication, torsemide 40 mg daily. · Monitor accurate I/O's           Anemia  Assessment & Plan  Iron Panel        Lab Results   Component Value Date    CONCFE 20 06/12/2020    CONCFE 28 03/20/2019    TIBC 347 06/12/2020    TIBC 293 03/20/2019    IRON 68 06/12/2020    IRON 82 03/20/2019    FERRITIN 34 06/12/2020    FERRITIN 169 03/20/2019     S/p transfusion 8/29 and 8/30; total of two units of PRBC's  Was on iron supplements at home; ferrous sulfate 325 daily. Plan:  · Monitor CBC; replete for hgb <7.0   · Continue iron supplementation   · Recommended for CBC w/diff weekly per ID    Chronic atrial fibrillation (HCC)  Assessment & Plan  · S/p AV node ablation in 8/2018 as well as placement of PPM  · On warfarin 3 mg daily for [facility] at home with goal INR of 2-3. · CHADS-VASC score: 5 points (Age, Female, CHF, HTN)    Currently stable    Plan:  · Monitor HR  · Continue heparin. Will need bridge to coumadin prior to discharge. Hypoglycemia-resolved as of 8/31/2023  Assessment & Plan  ? Unclear etiology- typically lowest in early AM  ? AM cortisol was unremarkable  ? Given cyclical nature of hypoglycemia stress dose steroids were started in the CCU. ? Continue 100ml/hr D5 LR infusion  ? Frequent finger stick glucose measurement with subsequent treatment with dextrose as clinically indicated   ? Target glucose > 70        Diet: Diet Enteral/Parenteral; Tube Feeding No Oral Diet; Jevity 1.2 Live; Continuous; 50    VTE Pharm PPX: Heparin  VTE Mech PPX: sequential compression device      Subjective:   80 y.o. female admitted 8/28/2023 for infection of R prosthetic hip joint.      Today 08/31/23, HD# 3    • Patient was transferred to the [address] [place] [address] today  • Patient was able to be aroused with minimal effort; answered questions appropriately  • Complained of stomach pain and SOB due to pain, denied chest pain    Objective:     Vitals:    08/31/23 1100 08/31/23 1200 08/31/23 1300 08/31/23 1400   BP: 102/51 125/69 122/56 123/67   Pulse: 69 69 69 79   Resp: 18 21 17 (!) 24   Temp:    98.7 °F (37.1 °C)   TempSrc:    Oral   SpO2: 98% 100% 97% 93%   Weight:       Height:         Temp:  [98.7 °F (37.1 °C)-99 °F (37.2 °C)] 98.7 °F (37.1 °C)  HR:  [69-79] 79  Resp:  [15-46] 24  BP: ()/(51-69) 123/67  Weight (last 2 days)     Date/Time Weight    08/31/23 0745 83.5 (184)    08/31/23 0600 83.9 (184.97)    08/30/23 0532 83.9 (184.97)    08/29/23 0600 81 (178.57)          Intake/Output Summary (Last 24 hours) at 8/31/2023 1527  Last data filed at 8/31/2023 1400  Gross per 24 hour   Intake 1874.66 ml   Output 3500 ml   Net -1625.34 ml     Invasive Devices     Peripherally Inserted Central Catheter Line  Duration           PICC Line 08/29/23 1 day          Peripheral Intravenous Line  Duration           Peripheral IV 08/28/23 Dorsal (posterior); Right Wrist 3 days    Peripheral IV 08/28/23 Right;Ventral (anterior) Forearm 2 days          Drain  Duration           NG/OG/Enteral Tube Other (Comment) Right nare 1 day    External Urinary Catheter <1 day                  Physical Exam:     Physical Exam  Vitals reviewed. Constitutional:       Appearance: She is ill-appearing. Comments: TF via NGT   HENT:      Head: Normocephalic and atraumatic. Eyes:      Extraocular Movements: Extraocular movements intact. Conjunctiva/sclera: Conjunctivae normal.   Cardiovascular:      Rate and Rhythm: Normal rate and regular rhythm. Pulses: Normal pulses. Heart sounds: Normal heart sounds. No murmur heard. Comments: PICC line 8/29/23    Pulmonary:      Effort: Pulmonary effort is normal. No respiratory distress. Breath sounds: Normal breath sounds. No wheezing. Abdominal:      General: Abdomen is flat. Bowel sounds are normal.      Palpations: Abdomen is soft. Tenderness:  There is generalized abdominal tenderness. Comments: Surgical scar above umbilicus with foreign body palpated subcutanously; non mobile   Musculoskeletal:         General: Swelling present. Right lower le+ Pitting Edema present. Left lower le+ Pitting Edema present. Comments: LUE swelling with ecchymosis    Skin:     General: Skin is warm. Capillary Refill: Capillary refill takes 2 to 3 seconds. Findings: Bruising present. Neurological:      Mental Status: She is easily aroused. Mental status is at baseline. Psychiatric:         Mood and Affect: Mood normal.         Behavior: Behavior normal. Behavior is cooperative.            Labs:     CBC:  Results from last 7 days   Lab Units 23  0423 23  1122 23  0425 23  1423 23  0443 23  2354 23  1659 23  0759   WBC Thousand/uL 9.86  --  9.72  --  9.24 9.62 12.35* 11.28*   HEMOGLOBIN g/dL 9.0* 8.6* 7.0* 9.1* 6.7* 7.0* 7.7* 9.8*   HEMATOCRIT % 27.0*  --  21.3*  --  21.1* 21.8* 25.8* 31.0*   PLATELETS Thousands/uL 155  --  137*  --  163 158 205 226   NEUTROS ABS Thousands/µL 8.71*  --  7.68*  --  6.74 7.55 9.60*  --        CMP:  Results from last 7 days   Lab Units 23  0423 23  2112 23  0000 23  1806 23  0443 23  0050 23  1659   POTASSIUM mmol/L 3.7 3.3* 3.2* 3.6 5.4* 3.0* 3.1*   CHLORIDE mmol/L 113* 111* 110* 111* 109* 109* 106   CO2 mmol/L 22 22 20* 20* 23 16* 22   BUN mg/dL 26* 26* 26* 26* 30* 25 32*   CREATININE mg/dL 1.09 1.10 1.07 1.10 1.16 0.98 1.23   CALCIUM mg/dL 7.5* 7.4* 7.5* 7.0* 7.1* 5.7* 7.6*   AST U/L  --   --  17  --  11* 12* 11*   ALT U/L  --   --  7  --  8 7 10   ALK PHOS U/L  --   --  71  --  82 69 102   EGFR ml/min/1.73sq m 45 44 46 44 41 51 38   MAGNESIUM mg/dL 2.5 1.8* 2.1  --  1.9  --  1.4*   PHOSPHORUS mg/dL 1.9*  --  2.4  --  3.0  --  3.2       Sepsis:  Results from last 7 days   Lab Units 23  2016 23  5615   LACTIC ACID mmol/L 1.4 2.3*       Micro:  Lab Results   Component Value Date/Time    Blood Culture No Growth at 24 hrs. 08/30/2023 10:19 AM    Blood Culture No Growth at 24 hrs. 08/30/2023 10:19 AM    Gram Stain Result No Polys or Bacteria seen 08/28/2023 12:08 PM    Urine Culture >100,000 cfu/ml Escherichia coli ESBL (A) 06/15/2023 07:05 PM    Urine Culture >100,000 cfu/ml Klebsiella pneumoniae (A) 06/15/2023 07:05 PM    Urine Culture >100,000 cfu/ml Aerococcus urinae (A) 06/15/2023 07:05 PM    Wound Culture No growth 06/28/2023 04:11 PM    Body Fluid Culture, Sterile 2+ Growth of Pseudomonas aeruginosa (A) 08/28/2023 12:07 PM         Imaging:     CT head wo contrast    Result Date: 8/31/2023  Impression: No acute intracranial abnormality. Unchanged 2.7 cm meningioma in left parafalcine frontal vertex with slight mass effect on adjacent left anterior paramedian frontal lobe. Unchanged mild chronic microangiopathy. Workstation performed: [id number]     XR chest 1 view    Result Date: 8/30/2023  Impression: Feeding tube tip in the mid thorax along the course of the esophagus. Workstation performed: [id number]     XR abdomen 1 view kub    Result Date: 8/30/2023  Impression: Feeding tube tip projects over the gastric body. Nonobstructive bowel gas pattern as visualized. Workstation performed: [id number]     XR chest portable ICU    Result Date: 8/30/2023  Impression: Limited study, specifically assessment of the left lung base. Cannot reliably exclude left pleural effusion. No overt pulmonary edema or consolidation within limitations. Follow-up PA and lateral views recommended for any persistent or worsening symptoms.  Workstation performed: [id number]         Medications:     Current Facility-Administered Medications   Medication Dose Route Frequency   • acetaminophen (TYLENOL) oral suspension 975 mg  975 mg Per NG Tube Q8H [address]   • cefepime (MAXIPIME) 1,000 mg in dextrose 5 % 50 mL IVPB  1,000 mg Intravenous Q12H   • chlorhexidine (PERIDEX) 0.12 % oral rinse 15 mL  15 mL Mouth/Throat Q12H [address]   • DULoxetine (CYMBALTA) delayed release capsule 60 mg  60 mg Oral Daily   • ferrous sulfate tablet 325 mg  325 mg Oral Daily With Breakfast   • heparin (porcine) subcutaneous injection 5,000 Units  5,000 Units Subcutaneous Q8H [address]   • insulin lispro (HumaLOG) 100 units/mL subcutaneous injection 1-6 Units  1-6 Units Subcutaneous Q6H [address]   • levothyroxine tablet 112 mcg  112 mcg Per NG Tube Early Morning   • pantoprazole (PROTONIX) injection 40 mg  40 mg Intravenous Daily   • senna-docusate sodium (SENOKOT S) 8.6-50 mg per tablet   Oral Daily   • simethicone (MYLICON) chewable tablet 80 mg  80 mg Oral Q6H PRN   • sodium chloride (OCEAN) 0.65 % nasal spray 1 spray  1 spray Each Nare Q1H PRN   • torsemide (DEMADEX) tablet 40 mg  40 mg Oral Daily         Ron Chavarria MD  Family Medicine, PGY-1  3:27 PM 8/31/2023

## 2023-08-31 NOTE — ASSESSMENT & PLAN NOTE
· Hx of intolerance and bradycardia with AVN blocking agents for chronic a.fib  · S/p placement of single chamber PPM on 8/2018.

## 2023-08-31 NOTE — ASSESSMENT & PLAN NOTE
? Unclear etiology- typically lowest in early AM  ? AM cortisol was unremarkable  ? Given cyclical nature of hypoglycemia stress dose steroids were started in the CCU. ? Continue 100ml/hr D5 LR infusion  ? Frequent finger stick glucose measurement with subsequent treatment with dextrose as clinically indicated   ?  Target glucose > 70

## 2023-08-31 NOTE — PROGRESS NOTES
[address] [place] [address]  Progress Note: Critical Care  Name: Nathanael Sotelo 80 y.o. female I MRN: [id number]  Unit/Bed#: [facility] 258-58 I Date of Admission: 8/28/2023   Date of Service: 8/31/2023 I Hospital Day: 3    Assessment/Plan   Neuro:   • Diagnosis: pain control   ? Tylenol 975 mg q8hr scheduled  ? Duloxetine 60 mg PO daily  · Diagnosis: altered mental status  · Regulate sleep wake cycle  · Orientation as needed  · Patient acutely more altered, 11 but was 14 yesterday. Will obtain CT head    CV:   • Diagnosis: HFpEF  ? Patient overall fluid positive for this admission  ? Got 80 mg lasix on 8/30, restarted home torsemide   ? Continue monitoring urine output  ? Echo pending   • Diagnosis: A-fib  ? Pacemaker in place  ? Continue heparin. Plan to resume home dose of coumadin prior to discharge with goal INR 2-3.   ? heparin for DVT ppx given CKD  • Diagnosis: Hypotension   ? Continue IV fluids  ? Transfuse as needed for hemoglobin <7 or decrease of >2  ? Goal systolic BP > 633    Pulm:   • Diagnosis: Increased O2 requirement  ? No requirement at home, has gone from 2L to 5L via NC max. Now back to 3.5 L O2  ? CXR on 8/30 to investigate potential cause of increased O2 demand. May be related to fluid overload; restarted home torsemide and given 40mg IV lasix  ? Continue to monitor strict I/O with net negative goal of 0.5-1L  ? Post op incentive spirometry if needed     GI:   • Diagnosis: GERD  ? Plan: Continue home PPI - switched to IV because patient unable to tolerate PO  ? Bowel regimen     :   • Diagnosis: No active dx.   ? Plan: Burger in place and will consider removal as soon as practical given fresh surgical wound and inability to ambulate at baseline   ? Monitor I/O post-op    F/E/N:   • F: no continuous fluids  • E: monitor and replete as needed   • N: Jevity 50 tube feeds - running at goal    Heme/Onc:    • Diagnosis: anemia  ? Plan: monitor H/H  ?  Consider transfusion for symptomatic Hb < 7  ? 1U PRBC on 8/30   ? Fe supplementation     Endo:   • Diagnosis: Hypothyroidism              Plan: continue home dose of 112mcg levothyroxine  • Diagnosis: hypoglycemia  ? Unclear etiology- typically lowest in early AM  ? AM cortisol was unremarkable  ? Given cyclical nature of hypoglycemia was started on stress dose steroids  ? Evening of 8/30 into morning of 8/31 - glucose drawn from PICC line and glucose was repeatedly elevated. Stopped dextrose, sugars remained high, started sliding scale insulin  ? Target glucose > 70    ID:   • Diagnosis: Septic joint of R hip s/p prosthesis removal  ? ID consulted; cultures, cefepime 2g Q12 single coverage started for pseudomonas coverage   ? Outpt abx selection TBD    MSK/Skin:   • Diagnosis: open  I&D septic R hip  ? Plan: Keep incision clean and dry; monitor for signs of infection  ? Continue IV abx  ? Dressing changes PRN  ? Pain control with dilaudid and PO tylenol for OA  ? Remain non-weight bearing/ non-ambulatory     • Diagnosis: LUE pain  ? LUE appears erythematous, edematous, and hot to the touch, concerning for possible clot vs thrombophlebitis. Weak pulses  ? Doppler LUE 8/30 showed severe edema but no evidence of acute DVT  ? Continue to monitor    Disposition: Critical care    ICU Core Measures     A: Assess, Prevent, and Manage Pain · Has pain been assessed? Yes  · Need for changes to pain regimen? No   B: Both SAT/SAT  · N/A   C: Choice of Sedation · RASS Goal: 0 Alert and Calm  · Need for changes to sedation or analgesia regimen? No   D: Delirium · CAM-ICU: Negative   E: Early Mobility  · Plan for early mobility? Yes   F: Family Engagement · Plan for family engagement today? Yes       Antibiotic Review: Awaiting culture results. Review of Invasive Devices:     Tao Plan: Continue for accurate I/O monitoring for 48 hours  Central access plan: Medications requiring central line      Prophylaxis:  VTE VTE covered by:  heparin (porcine), Subcutaneous, 5,000 Units at 08/31/23 0604       Stress Ulcer  covered bypantoprazole (PROTONIX) injection 40 mg [[id number]]          Subjective   HPI  Mitchell Eagle is a 80 y.o. 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 She had been on abx for 6 weeks and was scheduled for joint debridement in the OR. She tolerated the procedure well but remained inpatient for treatment. 24hr events:   Overnight patients blood glucose was routinely elevated via PICC line. Stopped d5 and started on insulin. This morning patient became more acutely altered, GCS 11, obtained CT head which is pending. Review of Systems   Unable to perform ROS: Acuity of condition          Objective                            Vitals I/O      Most Recent Min/Max in 24hrs   Temp 98.8 °F (37.1 °C) Temp  Min: 98.1 °F (36.7 °C)  Max: 99 °F (37.2 °C)   Pulse 69 Pulse  Min: 69  Max: 79   Resp 16 Resp  Min: 13  Max: 46   /59 BP  Min: 95/51  Max: 141/63   O2 Sat 100 % SpO2  Min: 82 %  Max: 100 %      Intake/Output Summary (Last 24 hours) at 8/31/2023 0820  Last data filed at 8/31/2023 0800  Gross per 24 hour   Intake 2364.66 ml   Output 3625 ml   Net -1260.34 ml         Diet Enteral/Parenteral; Tube Feeding No Oral Diet; Jevity 1.2 Live; Continuous; 50     Invasive Monitoring Physical exam    Physical Exam  Eyes:      Pupils: Pupils are equal, round, and reactive to light. Skin:     General: Skin is cool. Comments: Fingers bilaterally are cool and dusky. Wounds bilateral lower extremities, dressing in place   HENT:      Head: Atraumatic. Cardiovascular:      Rate and Rhythm: Normal rate. Abdominal:      Palpations: Abdomen is soft. Tenderness:  There is no abdominal tenderness. Constitutional:       Appearance: She is ill-appearing. Pulmonary:      Effort: No respiratory distress. Breath sounds: Normal breath sounds. Neurological:      Mental Status: She is somnolent. Comments: Patient responsive to painful stimuli. Does move all extremities to pain    Genitourinary/Anorectal:  FoleyVitals and nursing note reviewed. Diagnostic Studies      EKG:   Imaging: CT head pending I have personally reviewed pertinent reports.    and I have personally reviewed pertinent films in PACS     Medications:  Scheduled PRN   acetaminophen, 975 mg, Q8H [address]  cefepime, 2,000 mg, Q12H  chlorhexidine, 15 mL, Q12H JOSE  DULoxetine, 60 mg, Daily  ferrous sulfate, 325 mg, Daily With Breakfast  heparin (porcine), 5,000 Units, Q8H [address]  insulin lispro, 1-6 Units, Q6H [address]  levothyroxine, 112 mcg, Early Morning  pantoprazole, 40 mg, Daily  senna-docusate sodium, , Daily  torsemide, 40 mg, Daily      simethicone, 80 mg, Q6H PRN       Continuous          Labs:    CBC    Recent Labs     08/30/23  0425 08/30/23  1122 08/31/23  0423   WBC 9.72  --  9.86   HGB 7.0* 8.6* 9.0*   HCT 21.3*  --  27.0*   *  --  155     BMP    Recent Labs     08/30/23 2112 08/31/23  0423   SODIUM 139 141   K 3.3* 3.7   * 113*   CO2 22 22   AGAP 6 6   BUN 26* 26*   CREATININE 1.10 1.09   CALCIUM 7.4* 7.5*       Coags    Recent Labs     08/30/23  1122   INR 2.44*   PTT 52*        Additional Electrolytes  Recent Labs     08/30/23  0000 08/30/23  0416 08/30/23  2112 08/31/23  0423   MG 2.1  --  1.8* 2.5   PHOS 2.4  --   --  1.9*   CAIONIZED  --  1.17  --   --           Blood Gas    No recent results  No recent results LFTs  Recent Labs     08/30/23  0000   ALT 7   AST 17   ALKPHOS 71   ALB 2.3*   TBILI 1.23*       Infectious  No recent results  Glucose  Recent Labs     08/29/23  1806 08/30/23  0000 08/30/23  2112 08/31/23  0423   GLUC 424* 163* 204* [address], DO

## 2023-08-31 NOTE — ASSESSMENT & PLAN NOTE
Iron Panel        Lab Results   Component Value Date    CONCFE 20 06/12/2020    CONCFE 28 03/20/2019    TIBC 347 06/12/2020    TIBC 293 03/20/2019    IRON 68 06/12/2020    IRON 82 03/20/2019    FERRITIN 34 06/12/2020    FERRITIN 169 03/20/2019     S/p transfusion 8/29 and 8/30; total of two units of PRBC's  Was on iron supplements at home; ferrous sulfate 325 daily.       Plan:  · Monitor CBC; replete for hgb <7.0   · Continue iron supplementation   · Recommended for CBC w/diff weekly per ID

## 2023-08-31 NOTE — PROGRESS NOTES
Progress Note - Infectious Disease   Martin Reyes 80 y.o. female MRN: [id number]  Unit/Bed#: [facility] 136-26 Encounter: [id number]      Impression/Recommendations:  Right hip PJI infection/acetabular osteomyelitis.    In setting of MAIA, revision, chronic dislocation/displacement as below.  Presented June with subacute pain, spontaneous drainage from sinus tract.  CT showed abscess at joint.  Status post MAIA explantation, extensive I&D, insertion of antibiotic beads (no spacer placed) on 6/30.  Extensive purulent fluid and necrotic tissue noted intraoperatively.  OR cultures negative.  Status post 6 weeks IV vancomycin, transitioned to PO doxycycline.  ESR 54 >> 13. Had persistent wound drainage, sinus tract.  Now status post I&D, layered [facility] 8/28.  Intraoperative findings notable for infected cavity due to absence of hip.  Cultures now with Pseudomonas, likely new superinfection as expect would have grown during initial I&D 6/30. ESR now <1  Rec:  • Continue cefepime for now  • Check weekly CBC-diff, Cr while on IV antibiotics  • Will need 6 weeks IV antibiotics, possibly followed by PO depending on clinical course and ESR trend  • D/C planning for SNF  • Outpatient follow-up with me 2 weeks after D/C  • D/C PICC after last dose IV antibiotics     Acute encephalopathy. Worse. Likely multifactorial.  CT head negative. Consider role of high-dose cefepime although has only received 3 doses. No other sedating medications noted. Rec:  · As CrCl ~30 and may be overestimated, will dose reduce cefepime to 1g IV Q12  · Follow mental status closely     Status post right MAIA with revision in 2019. Complicated more recently by disruption and chronic dislocation/displacement.  Non-ambulatory.     Status post PPM.     IBS with constipation.     Afib. On coumadin     History of ESBL, MDRO. From recent urine cultures.     CKD. Baseline Cr 1.0, CrCl ~30 but may be overestimated.   Rec:  • Follow creatinine closely and dose-adjust antibiotics as indicated  • Recheck BMP in AM     Dr. Bradford Chirinos will assume care .     Antibiotics:  Cefepime #2  Antibiotics #3  POD #3    Subjective:  Patient seen on AM rounds. Very sleeping will wake up but doesn't open eyes but answers questions ? appropriately with head nod. 24 Hour Events:  No documented fevers, chills, sweats, nausea, vomiting, or diarrhea. Noted to be more lethargic. Objective:  Vitals:  Temp:  [98.4 °F (36.9 °C)-99 °F (37.2 °C)] 98.9 °F (37.2 °C)  HR:  [69-79] 69  Resp:  [14-46] 18  BP: ()/(51-63) 102/51  SpO2:  [82 %-100 %] 98 %  Temp (24hrs), Av.8 °F (37.1 °C), Min:98.4 °F (36.9 °C), Max:99 °F (37.2 °C)  Current: Temperature: 98.9 °F (37.2 °C)    Physical Exam:   General:  No acute distress  Psychiatric:  Sleeping, arousable but remains very sleepy with eyes closed  Pulmonary:  Normal respiratory excursion without accessory muscle use  Abdomen:  Soft, obese  Extremities:  No edema  Skin:  No rashes    Lab Results:  I have personally reviewed pertinent labs. Results from last 7 days   Lab Units 23  0423 23  2112 23  0000 23  1806 23  0443 23  0050   POTASSIUM mmol/L 3.7 3.3* 3.2*   < > 5.4* 3.0*   CHLORIDE mmol/L 113* 111* 110*   < > 109* 109*   CO2 mmol/L 22 22 20*   < > 23 16*   BUN mg/dL 26* 26* 26*   < > 30* 25   CREATININE mg/dL 1.09 1.10 1.07   < > 1.16 0.98   EGFR ml/min/1.73sq m 45 44 46   < > 41 51   CALCIUM mg/dL 7.5* 7.4* 7.5*   < > 7.1* 5.7*   AST U/L  --   --  17  --  11* 12*   ALT U/L  --   --  7  --  8 7   ALK PHOS U/L  --   --  71  --  82 69    < > = values in this interval not displayed. Results from last 7 days   Lab Units 23  0423 23  1122 23  0425 23  1423 23  0443   WBC Thousand/uL 9.86  --  9.72  --  9.24   HEMOGLOBIN g/dL 9.0* 8.6* 7.0*   < > 6.7*   PLATELETS Thousands/uL 155  --  137*  --  163    < > = values in this interval not displayed.      Results from last 7 days   Lab Units 08/30/23  1019 08/28/23  1727 08/28/23  1208 08/28/23  1207   BLOOD CULTURE  Received in Microbiology Lab. Culture in Progress. Received in Microbiology Lab. Culture in Progress. --   --   --    GRAM STAIN RESULT   --   --  No Polys or Bacteria seen 4+ Polys  No bacteria seen   BODY FLUID CULTURE, STERILE   --   --   --  2+ Growth of Pseudomonas aeruginosa*   MRSA CULTURE ONLY   --  No Methicillin Resistant Staphlyococcus aureus (MRSA) isolated  --   --        Imaging Studies:   I have personally reviewed pertinent imaging study reports and images in PACS. EKG, Pathology, and Other Studies:   I have personally reviewed pertinent reports.

## 2023-08-31 NOTE — QUICK NOTE
Received TT from patient's nurse that patient had loosened out of soft restraints and removed the second placed kofed. Nursing staff states patient is now tearful and agitated. Patient seen and examined at bedside, she is sitting up in bed crying upon entering the room. Patient is able to state her name, where she is, and why she is here at this time. Patient had also stated to nursing staff her right foot was hurting her. Patient denies foot pain or pain anywhere when asked at this time. On physical exam, right foot is edematous with 2+ pitting edema and ecchymosis (unchanged from previous) but not warm to the touch or erythematous. No tenderness to palpation of calf. Doppler was placed on dorsalis pedis artery and arterial pulse was heard. Plan for Richard Chavez, discussed with attending physician, Dr. Jenny Estrella. Will give 0.5 mg of ativan prior to reinsertion of Kofed. Need Kofed replaced as this I how patient is receiving medications as well as nutrition. Will also order soft mitts and kofed ordered.

## 2023-08-31 NOTE — CONSULTS
Consultation - Palliative and Suhas Coulter 80 y.o. female [id number]    Patient Active Problem List   Diagnosis   • Chronic atrial fibrillation (HCC)   • Anemia   • Chronic heart failure with preserved ejection fraction (HCC)   • Depression   • Class 1 obesity due to excess calories with serious comorbidity and body mass index (BMI) of 33.0 to 33.9 in adult   • Chronic pain   • Chronic pain of lower extremity, bilateral   • Fibromyalgia   • External hemorrhoids   • Irritable bowel syndrome with constipation   • Lumbar canal stenosis   • Moderate mitral regurgitation   • Osteoarthritis, multiple sites   • Osteoporosis   • Peripheral neuropathy   • Primary osteoarthritis of right knee   • Synovial cyst of right popliteal space   • Pacemaker   • Hypothyroidism   • Macrocytosis   • Status post gastrectomy   • Status post right hip replacement   • Ambulatory dysfunction   • MCI (mild cognitive impairment)   • Osteomyelitis (HCC)   • Cellulitis of right lower extremity   • Hypertrophic scar of skin   • S/P scar revision   • Elevated C-reactive protein (CRP)   • Fall   • Hematoma   • Ecchymosis of eye   • Meningioma (HCC)   • Trochanteric bursitis of right hip   • Stage 3 chronic kidney disease (HCC)   • Benign essential HTN   • Trochanteric bursitis   • Negative depression screening   • Peripheral vascular disease, unspecified ([address])   • Lumbar radiculopathy   • Low back pain with sciatica   • Lumbar spondylosis   • S/P lumbar fusion   • Hyponatremia   • Chronic diarrhea   • 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 Symptom management -   • Per primary team     2. Goals -   • Unable to reach patient's niece (primary contact) or daughter (Secondary contact).  LMOM for niece Lxe Mendiola). Unable to leave message for daughter. Asked RN to page palliative if family arrives throughout the day. Code Status: full - Level 1   Decisional apparatus:  Patient is not competent on my exam today. If competence is lost, patient's substitute decision maker would default to spouse by [place] 169. Advance Directive / Living Will / POLST:  None on file    3. Social support  • Time spent providing supportive listening   • Patient remains confused to her whereabouts and situation but recognizes that family brought her velasco at bedside    4. Follow-up  • Palliative care will continue to follow to provide support and discuss goals of care     I have reviewed the patient's controlled substance dispensing history in the Prescription Drug Monitoring Program in compliance with the [facility] regulations before prescribing any controlled substances. We appreciate the invitation to be involved in this patient's care. We will continue to follow. Please do not hesitate to reach our on call provider through our clinic answering service at [phone] should you have acute symptom control concerns. Javad Vail PA-C  Palliative and Supportive Care  Clinic/Answering Service: [phone]  You can find me on [employer]! IDENTIFICATION:  Consults  Physician Requesting Consult: Dany Valdez MD  Reason for Consult / Principal Problem: goals of care, support  Hx and PE limited by: poor historian, confusion. Supplemented by chart review and communication with RN    HISTORY OF PRESENT ILLNESS:       Della Porras is a 80 y.o. female with CKD3a, CHF, a. Fib on coumandin, and recent explant of total hip prostehesis placement of antibiotic beads in June presented to B on 8/28 for revision due to dehiscence. Patient went to the OR on 8/28 and was admitted to the ICU post-op for close monitoring. Marine Pian was placed due to poor PO intake.  This AM patient had a CTH for period of minimal responsiveness. However, by time of encounter patient is more alert and communicative, yet remains confused. CTH unremarkable with exception to unchanged meningioma. ID, nephrology, and ortho following. Palliative care consulted for support and assistance with goals of care. During time of encounter patient is somewhat drowsy but awakens and answers questions (despite being disoriented to place and situaiton). She does endorse discomfort in abdomen and in nose related to keofeed. RN reports mentation improved compared to this AM when [facility] was ordered. Review of Systems   Unable to perform ROS: mental status change   HENT:        Nose pain from keofeed   Respiratory: Negative for shortness of breath. Gastrointestinal: Positive for abdominal pain. Psychiatric/Behavioral: Positive for memory loss. Past Medical History:   Diagnosis Date   • Abdominal bloating 8/1/2016   • Anemia    • Arthritis    • Cancer (HCC)     basal cell   • CHF (congestive heart failure) (HCC)    • COVID-19    • Disease of thyroid gland    • Disturbance of smell and taste     disturbance of taste   • Effusion of knee joint right    • Fibromyalgia    • Heart murmur     reported a previous heart murmur   • History of acute myocardial infarction    • History of atrial fibrillation    • History of bruising easily    • History of cancer    • History of dermatitis    • History of mammogram    • History of methicillin resistant staphylococcus aureus (MRSA)     Negative nasal culture, isolation discontinued 8/17/2018.    • History of methicillin resistant staphylococcus aureus (MRSA) 08/17/2018    negative nasal culture-isolation and hx discontinued 8/17/2018   • History of obesity    • History of osteopenia    • History of sciatica    • History of shortness of breath    • History of sinusitis    • History of sore throat    • History of syncope    • History of umbilical hernia    • History of viral infection    • Infection of right prosthetic hip joint ([address]) 8/11/2023   • Irritable bowel syndrome (IBS)    • Joint pain, hip    • Limb pain    • Myalgia     myalgia and myositis   • Need for prophylactic vaccination against single diseases    • Need for prophylactic vaccination and inoculation against influenza    • Preoperative cardiovascular examination    • Primary osteoarthritis of right knee    • Right leg pain    • Vaginal Pap smear     reported pap smear     Past Surgical History:   Procedure Laterality Date   • ANKLE ARTHRODESIS Right    • BACK SURGERY     • BARIATRIC SURGERY     • CHOLECYSTECTOMY      x2   • COLONOSCOPY     • ESOPHAGOGASTRODUODENOSCOPY N/A 3/23/2018    Procedure: ESOPHAGOGASTRODUODENOSCOPY (EGD); Surgeon: Arleth Sutherland MD;  Location: [facility]; Service: Gastroenterology   • FOOT SURGERY     • HIP CLOSE REDUCTION Right 8/21/2016    Procedure: CLOSED REDUCTION RIGHT TOTAL HIP;  Surgeon: Violet Mccarthy MD;  [facility];  Service:    • NISHA / Skyla Snyder / Kailyn Come     • IR BIOPSY OTHER  8/1/2020   • IR PICC REPOSITION  7/5/2023   • JOINT REPLACEMENT      LEFT KNEE REPLACEMENT   • JOINT REPLACEMENT      Right HIP   • PILONIDAL CYST EXCISION      x2   • NY EXC B9 LESION MRGN XCP SK TG T/A/L 0.5 CM/< Right 7/24/2019    Procedure: REVISION SCAR EXTREMITY Rt Hip;   Surgeon: Woody López MD;  [facility];  Service: Orthopedics   • NY REVJ TOT HIP ARTHRP [address] W/WO AGRFT/ALGRFT Right 4/15/2019    Procedure: ARTHROPLASTY HIP TOTAL ACETABULAR REVISION;  Surgeon: Woody López MD;  [facility];  Service: Orthopedics   • REPLACEMENT TOTAL KNEE Right    • SILVANA-EN-Y PROCEDURE      x2   • TOTAL KNEE ARTHROPLASTY     • UMBILICAL HERNIA REPAIR      x2   • WOUND DEBRIDEMENT Right 6/30/2023    Procedure: DEBRIDEMENT LOWER EXTREMITY ([facility])- I&D of right hip with explant of hardware; insertion of antibiotic beads;  Surgeon: Jose J Packer MD;  [facility];  Service: Orthopedics   • WOUND DEBRIDEMENT Right 8/28/2023    Procedure: INCISION AND DRAINAGE (I&D) with closure to right hip wound;  Surgeon: Graham Jennings MD;  [facility];  Service: Orthopedics     Social History     Socioeconomic History   • Marital status: /Civil Union     Spouse name: Not on file   • Number of children: Not on file   • Years of education: Not on file   • Highest education level: Not on file   Occupational History   • Not on file   Tobacco Use   • Smoking status: Never   • Smokeless tobacco: Never   Vaping Use   • Vaping Use: Never used   Substance and Sexual Activity   • Alcohol use: Not Currently   • Drug use: No   • Sexual activity: Never   Other Topics Concern   • Not on file   Social History Narrative   • Not on file     Social Determinants of Health     Financial Resource Strain: Not on file   Food Insecurity: No Food Insecurity (6/30/2023)    Hunger Vital Sign    • Worried About Running Out of Food in the Last Year: Never true    • Ran Out of Food in the Last Year: Never true   Transportation Needs: No Transportation Needs (6/30/2023)    PRAPARE - Transportation    • Lack of Transportation (Medical): No    • Lack of Transportation (Non-Medical):  No   Physical Activity: Not on file   Stress: Not on file   Social Connections: Not on file   Intimate Partner Violence: Not on file   Housing Stability: Unknown (6/30/2023)    Housing Stability Vital Sign    • Unable to Pay for Housing in the Last Year: Not on file    • Number of Places Lived in the Last Year: 1    • Unstable Housing in the Last Year: No     Family History   Problem Relation Age of Onset   • Coronary artery disease Mother    • Heart attack Mother    • Hypertension Mother    • Coronary artery disease Father    • Heart attack Father    • Hypertension Father    • Lymphoma Sister         acute   • Rashes / Skin problems Sister         lymphomatoid papulosis   • Cancer Sister    • Coronary artery disease Brother    • Heart attack Brother x2   • Aneurysm Neg Hx    • Hyperlipidemia Neg Hx        MEDICATIONS / ALLERGIES:    all current active meds have been reviewed    Allergies   Allergen Reactions   • Codeine Vomiting and GI Intolerance     GI upset, ana m morphine  Reaction Date: 24Apr2012;    • Lactose - Food Allergy GI Intolerance   • Milk (Cow) Diarrhea   • Nsaids GI Intolerance   • Other      Other reaction(s): Other (See Comments)  ana m toradol in OR 6/06 sah   • Seasonal Ic  [Cholestatin] Allergic Rhinitis   • Trimethoprim    • Bactrim [Sulfamethoxazole-Trimethoprim] Rash   • Sulfa Antibiotics Rash     Other reaction(s): Other (See Comments)  takes lasix @home       OBJECTIVE:    Physical Exam  Physical Exam  Constitutional:       General: She is not in acute distress. Appearance: She is ill-appearing. HENT:      Head: Normocephalic and atraumatic. Eyes:      Conjunctiva/sclera: Conjunctivae normal.   Cardiovascular:      Rate and Rhythm: Normal rate. Pulmonary:      Effort: No respiratory distress. Comments: O2 via NC  Abdominal:      Tenderness: There is abdominal tenderness (lower abdomen, around well healed abdominal scar). There is no guarding. Genitourinary:     Comments: purewick in place  Musculoskeletal:         General: Swelling present. Skin:     General: Skin is warm. Comments: Ecchymosis on UE   Neurological:      Comments: Oriented to self only, not place, time, or situation   Psychiatric:      Comments: Withdrawn, no agitation         Lab Results:  Results from last 7 days   Lab Units 08/31/23  0423 08/30/23  1122 08/30/23  0425 08/29/23  1423 08/29/23  0443   WBC Thousand/uL 9.86  --  9.72  --  9.24   HEMOGLOBIN g/dL 9.0* 8.6* 7.0*   < > 6.7*   HEMATOCRIT % 27.0*  --  21.3*  --  21.1*   PLATELETS Thousands/uL 155  --  137*  --  163   NEUTROS PCT % 88*  --  79*  --  73   MONOS PCT % 3*  --  8  --  9   EOS PCT % 0  --  0  --  0    < > = values in this interval not displayed.      Results from last 7 days   Lab Units 08/31/23  0423 08/30/23  2112 08/30/23  0000 08/29/23  1806 08/29/23  0443 08/29/23  0050   POTASSIUM mmol/L 3.7 3.3* 3.2*   < > 5.4* 3.0*   CHLORIDE mmol/L 113* 111* 110*   < > 109* 109*   CO2 mmol/L 22 22 20*   < > 23 16*   BUN mg/dL 26* 26* 26*   < > 30* 25   CREATININE mg/dL 1.09 1.10 1.07   < > 1.16 0.98   CALCIUM mg/dL 7.5* 7.4* 7.5*   < > 7.1* 5.7*   ALK PHOS U/L  --   --  71  --  82 69   ALT U/L  --   --  7  --  8 7   AST U/L  --   --  17  --  11* 12*    < > = values in this interval not displayed. Imaging Studies: Reviewed pertinent studies  EKG, Pathology, and Other Studies: reviewed pertinent studies    Counseling / Coordination of Care    Total floor / unit time spent today 35 minutes. Greater than 50% of total time was spent with the patient and / or family counseling and / or coordination of care. A description of the counseling / coordination of care: time spent assessing patient, communicating with RN, attempts to contact family.       This note was not shared with the patient due to privacy exception: note includes other individuals

## 2023-08-31 NOTE — ASSESSMENT & PLAN NOTE
- Last A1C 2022: 5.3%  - Hyperglycemia during hospitalization with 2 episodes of hypoglycemia  - No known history of Diabetes  - Not on diabetic medications at home    BMP Gluc       Results from last 7 days   Lab Units 08/31/23  0423 08/30/23  2112 08/30/23  0000 08/29/23  1806 08/29/23  0443 08/29/23  0050 08/28/23  1659   GLUCOSE RANDOM mg/dL 186* 204* 163* 424* 83 78 89    or POC BG       Results from last 7 days   Lab Units 08/31/23  1214 08/31/23  0714 08/31/23  0615 08/31/23  0034 08/30/23  2112 08/30/23  1839 08/30/23  1809   POC GLUCOSE mg/dl 234* 246* 208* 204* 195* 105 214*     PLAN:  · Sliding Scale Insulin Algorithm #3  · Glucose checks q6 hours  · Monitor for hypoglycemia

## 2023-08-31 NOTE — ASSESSMENT & PLAN NOTE
- Sacral wound, heel wound  - Wound care consult placed by ICU    PLAN:   · Wound care recommendations:  · Cleanse sacro-buttocks with soap and water. Apply Calazime Cream to B/L Sacro-Buttocks BID and PRN episodes of incontinence  · Turn/reposition q2h or when medically stable for pressure re-distribution on skin . · Elevate heels to offload pressure. · Moisturize skin daily with skin nourishing cream  · Ehob cushion in chair when out of bed. · B/L Heels: cleanse area and pat dry. Apply allevyn foam dressings to area. Deo with P for Prevention and change every 3 Days or PRN. Peel back and inspect skin Q-shift.   · Nursing Communication: Please order patient P-500 Specialty Low Air Loss Mattress once transferred out of CCU.

## 2023-08-31 NOTE — OCCUPATIONAL THERAPY NOTE
Occupational Therapy cx        Patient Name: Allyssa Gallegos  AQFFZ'X Date: 8/31/2023 08/31/23 1008   OT Last Visit   OT Visit Date 08/31/23   Note Type   Note Type Treatment   Cancel Reasons Medical status  (pts inappro for OT tx at this time 2' her overall medical status.  will hold and address as clinical course allows.)         Kamla Aly, JONY, OTR/L

## 2023-08-31 NOTE — ASSESSMENT & PLAN NOTE
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 S/p Joint explantation and placement of abx beads on 6/30/23 due to spontaneous drainage and abscess formation.

## 2023-08-31 NOTE — CONSULTS
Consult for nutrition assessment, malnutrition. PT was unarousable at time of visit. PT currently on TF via NG tube, PT is meeting estimated nutrient needs via current TF order. Limited data at this time to assess for malnutrition, unable to perform physical focused nutrition exam, unable to obtain diet hx, reviewed weight hx records, PT with significant edema, wt. increasing, 8/31/23 184lbs, 8/29/23 178lbs, 6/28/23 168 lbs, 3/24/23 167 lbs, 7/8/22 168 lbs. Will continue to monitor, labs, weight and need for any TF adjustments.

## 2023-08-31 NOTE — ASSESSMENT & PLAN NOTE
Home medications:  Torsemide 40 mg daily    Systolic (39XSO), XDB:468 , Min:95 , OTM:683   Diastolic (11ALY), JET:64, Min:51, Max:69  - Most recent BP Blood Pressure: 125/69     Currently stable    PLAN:  · Goal BP <130/80 per cardiology  · MAP >65  · Continue home torsemide 40 mg daily  · Consider Lasix 40 mg PRN

## 2023-08-31 NOTE — ASSESSMENT & PLAN NOTE
Lab Results   Component Value Date    EGFR 44 08/30/2023    EGFR 46 08/30/2023    EGFR 44 08/29/2023    CREATININE 1.10 08/30/2023    CREATININE 1.07 08/30/2023    CREATININE 1.10 08/29/2023   Nephrology following; appreciate recommendations  Baseline Cr: 0.9-1.1  On urinary smith catheter: 8/30/31    Plan:  · Avoid nephrotoxic agents  · Monitor with daily BMP

## 2023-09-01 ENCOUNTER — APPOINTMENT (OUTPATIENT)
Dept: RADIOLOGY | Facility: HOSPITAL | Age: 88
DRG: 856 | End: 2023-09-01
Payer: COMMERCIAL

## 2023-09-01 PROBLEM — E44.0 MODERATE PROTEIN-CALORIE MALNUTRITION (HCC): Status: ACTIVE | Noted: 2023-09-01

## 2023-09-01 LAB
ANION GAP SERPL CALCULATED.3IONS-SCNC: 7 MMOL/L
BUN SERPL-MCNC: 27 MG/DL (ref 5–25)
CA-I BLD-SCNC: 1.12 MMOL/L (ref 1.12–1.32)
CALCIUM SERPL-MCNC: 7.6 MG/DL (ref 8.4–10.2)
CHLORIDE SERPL-SCNC: 116 MMOL/L (ref 96–108)
CO2 SERPL-SCNC: 22 MMOL/L (ref 21–32)
CREAT SERPL-MCNC: 1.15 MG/DL (ref 0.6–1.3)
ERYTHROCYTE [DISTWIDTH] IN BLOOD BY AUTOMATED COUNT: 19.5 % (ref 11.6–15.1)
GFR SERPL CREATININE-BSD FRML MDRD: 42 ML/MIN/1.73SQ M
GLUCOSE SERPL-MCNC: 111 MG/DL (ref 65–140)
GLUCOSE SERPL-MCNC: 112 MG/DL (ref 65–140)
GLUCOSE SERPL-MCNC: 128 MG/DL (ref 65–140)
GLUCOSE SERPL-MCNC: 204 MG/DL (ref 65–140)
GLUCOSE SERPL-MCNC: 54 MG/DL (ref 65–140)
GLUCOSE SERPL-MCNC: 60 MG/DL (ref 65–140)
GLUCOSE SERPL-MCNC: 62 MG/DL (ref 65–140)
GLUCOSE SERPL-MCNC: 72 MG/DL (ref 65–140)
GLUCOSE SERPL-MCNC: 97 MG/DL (ref 65–140)
HCT VFR BLD AUTO: 27.5 % (ref 34.8–46.1)
HGB BLD-MCNC: 8.8 G/DL (ref 11.5–15.4)
MAGNESIUM SERPL-MCNC: 2.3 MG/DL (ref 1.9–2.7)
MCH RBC QN AUTO: 31.7 PG (ref 26.8–34.3)
MCHC RBC AUTO-ENTMCNC: 32 G/DL (ref 31.4–37.4)
MCV RBC AUTO: 99 FL (ref 82–98)
PHOSPHATE SERPL-MCNC: 3 MG/DL (ref 2.3–4.1)
PLATELET # BLD AUTO: 136 THOUSANDS/UL (ref 149–390)
PMV BLD AUTO: 9.8 FL (ref 8.9–12.7)
POTASSIUM SERPL-SCNC: 3.1 MMOL/L (ref 3.5–5.3)
RBC # BLD AUTO: 2.78 MILLION/UL (ref 3.81–5.12)
SODIUM SERPL-SCNC: 145 MMOL/L (ref 135–147)
WBC # BLD AUTO: 11.71 THOUSAND/UL (ref 4.31–10.16)

## 2023-09-01 PROCEDURE — 97530 THERAPEUTIC ACTIVITIES: CPT

## 2023-09-01 PROCEDURE — 85027 COMPLETE CBC AUTOMATED: CPT

## 2023-09-01 PROCEDURE — 92526 ORAL FUNCTION THERAPY: CPT

## 2023-09-01 PROCEDURE — C9113 INJ PANTOPRAZOLE SODIUM, VIA: HCPCS

## 2023-09-01 PROCEDURE — 99233 SBSQ HOSP IP/OBS HIGH 50: CPT | Performed by: FAMILY MEDICINE

## 2023-09-01 PROCEDURE — NC001 PR NO CHARGE: Performed by: ORTHOPAEDIC SURGERY

## 2023-09-01 PROCEDURE — 80048 BASIC METABOLIC PNL TOTAL CA: CPT

## 2023-09-01 PROCEDURE — 97535 SELF CARE MNGMENT TRAINING: CPT

## 2023-09-01 PROCEDURE — 84100 ASSAY OF PHOSPHORUS: CPT

## 2023-09-01 PROCEDURE — 99232 SBSQ HOSP IP/OBS MODERATE 35: CPT | Performed by: PHYSICIAN ASSISTANT

## 2023-09-01 PROCEDURE — 83735 ASSAY OF MAGNESIUM: CPT

## 2023-09-01 PROCEDURE — 82330 ASSAY OF CALCIUM: CPT

## 2023-09-01 PROCEDURE — 82948 REAGENT STRIP/BLOOD GLUCOSE: CPT

## 2023-09-01 PROCEDURE — 97112 NEUROMUSCULAR REEDUCATION: CPT

## 2023-09-01 PROCEDURE — 74018 RADEX ABDOMEN 1 VIEW: CPT

## 2023-09-01 PROCEDURE — 99232 SBSQ HOSP IP/OBS MODERATE 35: CPT | Performed by: INTERNAL MEDICINE

## 2023-09-01 RX ORDER — ACETAMINOPHEN 160 MG/5ML
975 SUSPENSION ORAL EVERY 8 HOURS SCHEDULED
Status: DISCONTINUED | OUTPATIENT
Start: 2023-09-01 | End: 2023-09-05 | Stop reason: HOSPADM

## 2023-09-01 RX ORDER — DEXTROSE MONOHYDRATE 25 G/50ML
INJECTION, SOLUTION INTRAVENOUS
Status: DISPENSED
Start: 2023-09-01 | End: 2023-09-01

## 2023-09-01 RX ORDER — FUROSEMIDE 10 MG/ML
40 INJECTION INTRAMUSCULAR; INTRAVENOUS DAILY
Status: DISCONTINUED | OUTPATIENT
Start: 2023-09-01 | End: 2023-09-01

## 2023-09-01 RX ORDER — INSULIN LISPRO 100 [IU]/ML
1-6 INJECTION, SOLUTION INTRAVENOUS; SUBCUTANEOUS
Status: DISCONTINUED | OUTPATIENT
Start: 2023-09-01 | End: 2023-09-01

## 2023-09-01 RX ORDER — TORSEMIDE 20 MG/1
40 TABLET ORAL DAILY
Status: DISCONTINUED | OUTPATIENT
Start: 2023-09-02 | End: 2023-09-05 | Stop reason: HOSPADM

## 2023-09-01 RX ORDER — DEXTROSE MONOHYDRATE 25 G/50ML
INJECTION, SOLUTION INTRAVENOUS
Status: COMPLETED
Start: 2023-09-01 | End: 2023-09-01

## 2023-09-01 RX ORDER — POTASSIUM CHLORIDE 29.8 MG/ML
40 INJECTION INTRAVENOUS ONCE
Status: COMPLETED | OUTPATIENT
Start: 2023-09-01 | End: 2023-09-01

## 2023-09-01 RX ORDER — NYSTATIN 100000 [USP'U]/G
POWDER TOPICAL 2 TIMES DAILY
Status: DISCONTINUED | OUTPATIENT
Start: 2023-09-01 | End: 2023-09-01

## 2023-09-01 RX ORDER — PANTOPRAZOLE SODIUM 40 MG/1
40 TABLET, DELAYED RELEASE ORAL
Status: DISCONTINUED | OUTPATIENT
Start: 2023-09-02 | End: 2023-09-05 | Stop reason: HOSPADM

## 2023-09-01 RX ORDER — FERROUS SULFATE 7.5 MG/0.5
300 SYRINGE (EA) ORAL DAILY
Status: DISCONTINUED | OUTPATIENT
Start: 2023-09-01 | End: 2023-09-04 | Stop reason: ALTCHOICE

## 2023-09-01 RX ORDER — SIMETHICONE 20 MG/.3ML
80 EMULSION ORAL EVERY 6 HOURS PRN
Status: DISCONTINUED | OUTPATIENT
Start: 2023-09-01 | End: 2023-09-05 | Stop reason: HOSPADM

## 2023-09-01 RX ORDER — INSULIN LISPRO 100 [IU]/ML
1-6 INJECTION, SOLUTION INTRAVENOUS; SUBCUTANEOUS
Status: DISCONTINUED | OUTPATIENT
Start: 2023-09-01 | End: 2023-09-04

## 2023-09-01 RX ADMIN — CEFEPIME 1000 MG: 1 INJECTION, POWDER, FOR SOLUTION INTRAMUSCULAR; INTRAVENOUS at 09:42

## 2023-09-01 RX ADMIN — CHLORHEXIDINE GLUCONATE 15 ML: 1.2 SOLUTION ORAL at 20:53

## 2023-09-01 RX ADMIN — LEVOTHYROXINE SODIUM 112 MCG: 100 TABLET ORAL at 06:31

## 2023-09-01 RX ADMIN — HEPARIN SODIUM 5000 UNITS: 5000 INJECTION INTRAVENOUS; SUBCUTANEOUS at 06:10

## 2023-09-01 RX ADMIN — POTASSIUM CHLORIDE 40 MEQ: 29.8 INJECTION, SOLUTION INTRAVENOUS at 09:31

## 2023-09-01 RX ADMIN — ACETAMINOPHEN 975 MG: 650 SUSPENSION ORAL at 06:10

## 2023-09-01 RX ADMIN — POTASSIUM CHLORIDE 40 MEQ: 29.8 INJECTION, SOLUTION INTRAVENOUS at 12:49

## 2023-09-01 RX ADMIN — ACETAMINOPHEN 975 MG: 650 SUSPENSION ORAL at 21:00

## 2023-09-01 RX ADMIN — PANTOPRAZOLE SODIUM 40 MG: 40 INJECTION, POWDER, FOR SOLUTION INTRAVENOUS at 09:37

## 2023-09-01 RX ADMIN — HEPARIN SODIUM 5000 UNITS: 5000 INJECTION INTRAVENOUS; SUBCUTANEOUS at 21:07

## 2023-09-01 RX ADMIN — CHLORHEXIDINE GLUCONATE 15 ML: 1.2 SOLUTION ORAL at 09:37

## 2023-09-01 RX ADMIN — FUROSEMIDE 40 MG: 10 INJECTION, SOLUTION INTRAMUSCULAR; INTRAVENOUS at 12:33

## 2023-09-01 RX ADMIN — CEFEPIME 1000 MG: 1 INJECTION, POWDER, FOR SOLUTION INTRAMUSCULAR; INTRAVENOUS at 21:00

## 2023-09-01 RX ADMIN — DEXTROSE MONOHYDRATE 25 ML: 25 INJECTION, SOLUTION INTRAVENOUS at 06:09

## 2023-09-01 RX ADMIN — HEPARIN SODIUM 5000 UNITS: 5000 INJECTION INTRAVENOUS; SUBCUTANEOUS at 16:16

## 2023-09-01 NOTE — ASSESSMENT & PLAN NOTE
Malnutrition Findings:   Adult Malnutrition type: Chronic illness  Adult Degree of Malnutrition: Malnutrition of moderate degree  Malnutrition Characteristics: Fat loss, Muscle loss                  360 Statement: moderate pro, beltran malnutrition d/t conditions as evidence by moderate fat and muscle depletion at temples, clavicles, orbitals, treated currently with TF, pending ST evaluation for potential diet advancement, most likely will benefit from oral nutrition supplements, can start with ensure compact any flavor BID. BMI Findings: Body mass index is 35.05 kg/m². Assessment:   Moderate protein calorie malnutrition as evidenced by recent weight loss, decreased PO intake  in the setting of multiple comorbidities    Plan:  · Nutrition following, appreciate recs  · Pending swallow eval, if pt tolerates can resume PO intake and D/C tube feeds >>> Discontinued on 9/1. Pt back on PO.

## 2023-09-01 NOTE — PLAN OF CARE
PT with NG tube receiving TF. Currently awaiting ST evaluation to see if able initiate po diet, if able to initiate diet will monitor po intake, if requiring oral nutrition supplements can order ensure compact vanilla/chocolate BID. If unable to initiate po, continue current TF order. Problem: Nutrition/Hydration-ADULT  Goal: Nutrient/Hydration intake appropriate for improving, restoring or maintaining nutritional needs  Description: Monitor and assess patient's nutrition/hydration status for malnutrition. Collaborate with interdisciplinary team and initiate plan and interventions as ordered. Monitor patient's weight and dietary intake as ordered or per policy. Utilize nutrition screening tool and intervene as necessary. Determine patient's food preferences and provide high-protein, high-caloric foods as appropriate.      INTERVENTIONS:  - Monitor oral intake, urinary output, labs, and treatment plans  - Assess nutrition and hydration status and recommend course of action  - Evaluate amount of meals eaten  - Assist patient with eating if necessary   - Allow adequate time for meals  - Recommend/ encourage appropriate diets, oral nutritional supplements, and vitamin/mineral supplements  - Order, calculate, and assess calorie counts as needed  - Recommend, monitor, and adjust tube feedings and TPN/PPN based on assessed needs  - Assess need for intravenous fluids  - Provide specific nutrition/hydration education as appropriate  - Include patient/family/caregiver in decisions related to nutrition  Outcome: Progressing

## 2023-09-01 NOTE — ASSESSMENT & PLAN NOTE
Wt Readings from Last 3 Encounters:   09/01/23 81.4 kg (179 lb 7.3 oz)   06/28/23 76.2 kg (168 lb)   06/16/23 76.6 kg (168 lb 12.8 oz)       Intake/Output Summary (Last 24 hours) at 9/1/2023 0834  Last data filed at 9/1/2023 0701  Gross per 24 hour   Intake 1344 ml   Output 1151 ml   Net 193 ml   I/O last 24 hours: In: 3851 [NG/GT:395; IV Piggyback:610; Feedings:379]  Out: 1301 [Urine:1301]    - Last Echo 6/2023: LV cavity size is normal. Wall thickness is normal. There is concentric remodeling. LVEF 60%. Systolic function is normal. Basal inferoseptal segment is hypokinetic. RV nd RA mildly dilated. - Most recent Echo 8/31/23: LVEF 65%, mild-mod aortic, mitral and tricuspid regurgitaton. No wall motion abnormalities. Normal wall thickness. Plan:  · Continue home medication, torsemide 40 mg daily.   · Monitor accurate I/O's

## 2023-09-01 NOTE — PLAN OF CARE
Problem: PHYSICAL THERAPY ADULT  Goal: Performs mobility at highest level of function for planned discharge setting. See evaluation for individualized goals. Description: Treatment/Interventions: ADL retraining, Functional transfer training, LE strengthening/ROM, Therapeutic exercise, Endurance training, Cognitive reorientation, Patient/family training, Equipment eval/education, Bed mobility, Gait training, Spoke to nursing, Spoke to case management, OT          See flowsheet documentation for full assessment, interventions and recommendations. Note: Prognosis: Guarded  Problem List: Decreased strength, Decreased range of motion, Decreased endurance, Impaired balance, Decreased mobility, Pain, Orthopedic restrictions  Assessment: Patient received in bed. Patient agreeable to therapy. Patient performs supine >sit with Max Ax2. Patient sits EOB with Min-Mod Ax1 for upright sitting. As patient fatigues increased assistance needed to maintain sitting posture. Patient sits x18-20' with poor to poor+ stability. Patient utilizes bed rails to assist with maintaining sitting posture. Patient also performs sit > supine with Max Ax2. Patient performs rolling to L side with Max A to assist with gown and bed pad change. Patient unable to perform transfer at this time d/t safety. PT will continue following to increased strength and stability needed for future transfers. Patient left in bed with all needs met and call bell/personal items within reach. The patient's AM-PAC Basic Mobility Inpatient Short Form Raw Score is 7 showing further need for skilled PT services in order to improve functional mobility, decrease need for assistance, and return to PLOF. A Raw score of less than 16 suggests the patient may benefit from discharge to post-acute rehabilitation services. PT continues to recommend return to facility with services on d/c. Will continue to follow as able.         PT Discharge Recommendation: Return to facility with rehabilitation services    See flowsheet documentation for full assessment.

## 2023-09-01 NOTE — QUICK NOTE
Confirmed with SLP.  updated diet orders after their evaluation of the pt: puree and thins    · Keofed tube discontinued  · Soft restraints discontinued

## 2023-09-01 NOTE — ASSESSMENT & PLAN NOTE
Home medications:  Torsemide 40 mg daily    Systolic (03TEA), GQH:902 , Min:101 , FWJ:544   Diastolic (16WVT), MSK:73, Min:51, Max:69  - Most recent BP Blood Pressure: 123/59     Currently stable    PLAN:  · Goal BP <130/80 per cardiology  · MAP >65  · Continue home torsemide 40 mg daily

## 2023-09-01 NOTE — PROGRESS NOTES
Progress note - Palliative and Supportive Care   Monse Vivar 80 y.o. female [id number]    Patient Active Problem List   Diagnosis   • Chronic atrial fibrillation (HCC)   • Anemia   • Chronic heart failure with preserved ejection fraction (HCC)   • Depression   • Class 1 obesity due to excess calories with serious comorbidity and body mass index (BMI) of 33.0 to 33.9 in adult   • Chronic pain   • Chronic pain of lower extremity, bilateral   • Fibromyalgia   • External hemorrhoids   • Irritable bowel syndrome with constipation   • Lumbar canal stenosis   • Moderate mitral regurgitation   • Osteoarthritis, multiple sites   • Osteoporosis   • Peripheral neuropathy   • Primary osteoarthritis of right knee   • Synovial cyst of right popliteal space   • Pacemaker   • Hypothyroidism   • Macrocytosis   • Status post gastrectomy   • Status post right hip replacement   • Ambulatory dysfunction   • MCI (mild cognitive impairment)   • Osteomyelitis (HCC)   • Cellulitis of right lower extremity   • Hypertrophic scar of skin   • S/P scar revision   • Elevated C-reactive protein (CRP)   • Fall   • Hematoma   • Ecchymosis of eye   • Meningioma (HCC)   • Trochanteric bursitis of right hip   • Stage 3 chronic kidney disease (HCC)   • Benign essential HTN   • Trochanteric bursitis   • Negative depression screening   • Peripheral vascular disease, unspecified ([address])   • Lumbar radiculopathy   • Low back pain with sciatica   • Lumbar spondylosis   • S/P lumbar fusion   • Hyponatremia   • Chronic diarrhea   • 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 Symptom management -   • Delirium precautions: please minimize interruptions and prioritize sleep at night. No TV nor screen time at night. Shades drawn at night. During day, shades up, minimize napping, and encourage meals in chair. • Acetaminophen 975 mg p.o. every 8 hours scheduled  • Cymbalta 60 mg daily  • Lidoderm patches to right hip and foot  • Oxy IR 2.5 to 5 mg p.o. every 4 hours. Moderate to severe pain  • Dilaudid 0.2 mg IV every 4 hours as needed breakthrough pain  • Bowel regimen: MiraLAX daily, Senokot daily  • Zofran 4 mg IV every 4 hours as needed nausea and vomiting    2. Goals -   • Ongoing medical optimization without limits at this time  • Spoke with Bradley domingo, at length. She reports that Shruti Glass was briefly on hospice prior to admission but does not feel that this was in line with patient's or family's wishes. She is hopeful for ongoing medical management geared towards improving any reversible processes namely infections. • Bradley Richard does feel that a DNR/DNI would best represent patient's wishes. However, rather good nights is that she is not [place]'s healthcare representative or agent. • Shruti Glass is not competent to participate in this discussion at the present time. Attempted to call daughter, Zachary Lin, via phone to provide medical update and discuss CODE STATUS. Left message on machine. • Should patient not be able to tolerate sufficient caloric intake, may need to revisit goals of care regarding nutrition     Code Status: Full- Level 1   Decisional apparatus:  Patient is not competent on my exam today. If competence is lost, patient's substitute decision maker would default to adult children by [place] Act 169. Advance Directive / Living Will / POLST: None on file. Patient thought that she completed an advance directive naming Bradley domingo, as healthcare agent.   But Bradley Richard states that no such document exists. 3. Social support-  • Patient has a son and daughter. However, reports her primary support comes from niece, Chey Bermudez, as she is a roommate with patient's sister/Britta's mother in the skilled nursing facility. Chey Bermudez is a  and has good health literacy as well as serving as a good patient advocate. 4. Follow-up  • Palliative care will return on 9/5 unless patient develops symptom management or goals of care needs over the weekend. Page with questions or concerns. Interval history:       Patient was transferred out of the ICU overnight. She removed Keofed after which it was replaced. This morning she is more alert, conversant, though still confused. She does not recall overnight events, but is redirectable and understanding why she needs to leave the WE[place]ON in place. Spoke with niece, Chey Bermudez, at length as documented above. After update to Chey Bermudez called her from inside the patient's room so that they could talk on the phone. Patient continues to appear comfortable, but develops right lower extremity pain with any movement. MEDICATIONS / ALLERGIES:     all current active meds have been reviewed    Allergies   Allergen Reactions   • Codeine Vomiting and GI Intolerance     GI upset, ana m morphine  Reaction Date: 24Apr2012;    • Lactose - Food Allergy GI Intolerance   • Milk (Cow) Diarrhea   • Nsaids GI Intolerance   • Other      Other reaction(s): Other (See Comments)  ana m toradol in OR 6/06 sah   • Seasonal Ic  [Cholestatin] Allergic Rhinitis   • Trimethoprim    • Bactrim [Sulfamethoxazole-Trimethoprim] Rash   • Sulfa Antibiotics Rash     Other reaction(s): Other (See Comments)  takes lasix @home       OBJECTIVE:    Physical Exam  Physical Exam  HENT:      Head: Normocephalic and atraumatic. Eyes:      Conjunctiva/sclera: Conjunctivae normal.   Cardiovascular:      Rate and Rhythm: Normal rate. Pulmonary:      Effort: No respiratory distress. Abdominal:      Tenderness:  There is no guarding. Musculoskeletal:         General: Swelling present. Right lower leg: Edema present. Left lower leg: Edema present. Skin:     General: Skin is dry. Comments: Hands and feet are cool, scattered ecchymosis   Neurological:      Mental Status: She is alert. Comments: Pleasantly confused, more conversant than yesterday. Psychiatric:         Mood and Affect: Mood normal.         Thought Content: Thought content normal.         Lab Results:   Results from last 7 days   Lab Units 09/01/23  0440 08/31/23  0423 08/30/23  1122 08/30/23  0425 08/29/23  1423 08/29/23  0443   WBC Thousand/uL 11.71* 9.86  --  9.72  --  9.24   HEMOGLOBIN g/dL 8.8* 9.0* 8.6* 7.0*   < > 6.7*   HEMATOCRIT % 27.5* 27.0*  --  21.3*  --  21.1*   PLATELETS Thousands/uL 136* 155  --  137*  --  163   NEUTROS PCT %  --  88*  --  79*  --  73   MONOS PCT %  --  3*  --  8  --  9   EOS PCT %  --  0  --  0  --  0    < > = values in this interval not displayed. Results from last 7 days   Lab Units 09/01/23  0440 08/31/23  0423 08/30/23  2112 08/30/23  0000 08/29/23  1806 08/29/23  0443 08/29/23  0050   POTASSIUM mmol/L 3.1* 3.7 3.3* 3.2*   < > 5.4* 3.0*   CHLORIDE mmol/L 116* 113* 111* 110*   < > 109* 109*   CO2 mmol/L 22 22 22 20*   < > 23 16*   BUN mg/dL 27* 26* 26* 26*   < > 30* 25   CREATININE mg/dL 1.15 1.09 1.10 1.07   < > 1.16 0.98   CALCIUM mg/dL 7.6* 7.5* 7.4* 7.5*   < > 7.1* 5.7*   ALK PHOS U/L  --   --   --  71  --  82 69   ALT U/L  --   --   --  7  --  8 7   AST U/L  --   --   --  17  --  11* 12*    < > = values in this interval not displayed. Imaging Studies: reviewed pertinent studies   EKG, Pathology, and Other Studies: reviewed pertinent studies    Counseling / Coordination of Care    Total floor / unit time spent today 55 minutes. Greater than 50% of total time was spent with the patient and / or family counseling and / or coordination of care.  A description of the counseling / coordination of care: Time spent assessing patient, communicating with RN, primary team, conversation with Steph domingo, regarding medical update, goals of care, obtaining further history, attempt to call daughter, Analia Mayfield.     This note was not shared with the patient due to privacy exception: note includes other individuals

## 2023-09-01 NOTE — SPEECH THERAPY NOTE
Speech Language/Pathology  Speech-Language Pathology Progress Note      Patient Name: Luz OCONNELLKS'LAUREN Date: 9/1/2023      Subjective:  Pt was awake and alert. She was sitting at the edge of bed. Patient awake,alert, today talking and smiling. Objective:  Pt was seen today for dysphagia therapy. Current diet is NPO, . Pt was on room air. Oral care was completed with use of mouth swabs and oral care kits. Focus of today's session was to maximize PO intake safety, determine potential for diet texture advancement and determine potential for liquid consistency advancement. Textures offered today included puree, soft solid and thin liquid via straw. Pt continues to have the keofeed in place  Swallow function:   Pt is able to draw from the straw, oral cavity is dry to begin even after oral care. Pt required mult sips water to moisten. Cough noted on the initial sips of thin offered,then pt cleared and no other cough was noted until she had her solid material.  Prolonged mastication and reduced oral clearing w/ the solids. She attempted to take a sip of thin to clear and had coughing. This was inconsistent but did continue to occur more as trials progressed. Assessment:  Pt w/ improved mentation today, improved manipulation and transfer w/ puree/thin. She is very dry requiring sips and oral care to improve. Continues w/ reduced clearing of solids with cough when she attempts to clear w/thin. Anticipate she will improve as she hydrates. Plan:  Change diet texture to puree and thin liquids. Continue ST follow up. Subsequent sessions to focus on maximizing PO intake safety and determining potential for diet texture advancement.

## 2023-09-01 NOTE — ASSESSMENT & PLAN NOTE
Malnutrition Findings:                                 BMI Findings: Body mass index is 35.05 kg/m². - Patient on TF via NGT, placed 8/29/23>>> Now on PO 9/1   - ICU ordered nutritional consult; following    PLAN:  · Pt PO now was of 9/1.    · Nutrition team following; appreciate recommendations

## 2023-09-01 NOTE — OCCUPATIONAL THERAPY NOTE
Occupational Therapy Progress Note     Patient Name: Jen Fitzgerald  JDZHB'H Date: 9/1/2023  Problem List  Principal Problem:    Infection of right prosthetic hip joint ([address])  Active Problems:    Chronic atrial fibrillation (HCC)    Anemia    Chronic heart failure with preserved ejection fraction (HCC)    Pacemaker    Hypothyroidism    Status post right hip replacement    Stage 3 chronic kidney disease (HCC)    Benign essential HTN    Gastroesophageal reflux disease without esophagitis    Hyperglycemia    Altered mental status    Malnutrition (HCC)    Pain and swelling of left upper extremity    Multiple wounds    Moderate protein-calorie malnutrition ([address])        09/01/23 1449   OT Last Visit   OT Visit Date 09/01/23   Note Type   Note Type Treatment   Pain Assessment   Pain Assessment Tool 0-10   Pain Score 2   Pain Location/Orientation Orientation: Left; Location: Hip   Patient's Stated Pain Goal No pain   Hospital Pain Intervention(s) Repositioned; Ambulation/increased activity; Emotional support   Restrictions/Precautions   Weight Bearing Precautions Per Order Yes   RLE Weight Bearing Per Order (S)  NWB   Other Precautions O2;Fall Risk;Pain;Cognitive; Chair Alarm; Bed Alarm;WBS;Multiple lines;Telemetry   ADL   Where Assessed Supine, bed   UB Dressing Assistance 4  Minimal Assistance   UB Dressing Deficit Setup; Increased time to complete; Thread RUE; Thread LUE   UB Dressing Comments changed gown supine in bed 2' wet gown/urination. Pt rq min a for threading arms. Toileting Assistance  2  Maximal Assistance   Toileting Deficit Perineal hygiene   Bed Mobility   Rolling L 2  Maximal assistance   Additional items Assist x 1; Increased time required;Verbal cues;LE management   Supine to Sit 2  Maximal assistance   Additional items Assist x 2; Increased time required;Verbal cues;LE management   Sit to Supine 2  Maximal assistance   Additional items Assist x 2; Increased time required;Verbal cues;LE management Additional Comments requireed max Ax1 for rolling for perineal hygiene. Max Ax2 to achieve EOB sitting, required min-mod Ax1 to maintain EOB sitting while engaging in session w/ speech therapy. Subjective   Subjective "I'm going to be 89 tomorrow!"   Cognition   Overall Cognitive Status Impaired   Arousal/Participation Alert; Responsive; Cooperative   Attention Within functional limits   Orientation Level Oriented to person;Disoriented to situation;Oriented to place;Oriented to time   Memory Decreased recall of precautions   Following Commands Follows one step commands without difficulty   Comments pt w great improvements in cognition as compared to previous session- required less time to follow one step commands, had overall G safety awareness and insight to condition t/o session. Activity Tolerance   Activity Tolerance Patient tolerated treatment well   Medical Staff Made Aware DPT Solomon Ferro 2' pts med complexity, comorbidities and regression from baseline. Assessment   Assessment Pt seen on this date for OT session focusing on ADL retraining, cognitive reorientation, body mechanics, transfer retraining, increasing activity tolerance/endurance and EOB sitting to increase ability to participate in ADL/functional tasks. Pt was found in bed and was left in bed w/ all needs within reach, bed alarm on. Pt completed rolling to the L w max Ax1, sup<>sit performed with max Ax2, sat EOB w min-mod Ax1 while engaging in dynamic sitting tasks. Required min a for UB dressing and max A for perineal hygiene. Pt w/ improvements in activity tolerance, transfer ability, ADL task completion, however is still limited 2* decreased ADL/High-level ADL status, decreased activity tolerance/endurance, decreased cognition, decreased self-care trans, decreased safety awareness and insight to condition. The patient's raw score on the AM-PAC Daily Activity Inpatient Short Form is 11.  A raw score of less than 19 suggests the patient may benefit from discharge to post-acute rehabilitation services. Please refer to the recommendation of the Occupational Therapist for safe discharge planning. Recommending pt D/C to STR when medically stable. Pt will continue to benefit from acute OT services to meet goals. Plan   Treatment Interventions ADL retraining;Functional transfer training; Endurance training;Patient/family training; Activityengagement; Energy conservation;Cognitive reorientation   Goal Expiration Date 09/12/23   OT Treatment Day 1   OT Frequency 1-2x/wk   Recommendation   OT Discharge Recommendation Return to facility with rehabilitation services   AM-PAC Daily Activity Inpatient   Lower Body Dressing 1   Bathing 2   Toileting 2   Upper Body Dressing 2   Grooming 2   Eating 2   Daily Activity Raw Score 11   Daily Activity Standardized Score (Calc for Raw Score >=11) 29.04   AM-PAC Applied Cognition Inpatient   Following a Speech/Presentation 2   Understanding Ordinary Conversation 2   Taking Medications 1   Remembering Where Things Are Placed or Put Away 1   Remembering List of 4-5 Errands 1   Taking Care of Complicated Tasks 1   Applied Cognition Raw Score 8   Applied Cognition Standardized Score 19.32   Modified [place] Scale   Modified Adriano Scale 5   End of Consult   Education Provided Yes   Patient Position at End of Consult Supine;Bed/Chair alarm activated; All needs within reach   Nurse Communication Nurse aware of consult         Benedict Pitch, MOT, OTR/L

## 2023-09-01 NOTE — PLAN OF CARE
Problem: OCCUPATIONAL THERAPY ADULT  Goal: Performs self-care activities at highest level of function for planned discharge setting. See evaluation for individualized goals. Description: Treatment Interventions: ADL retraining, Functional transfer training, Endurance training, UE strengthening/ROM, Cognitive reorientation, Patient/family training, Equipment evaluation/education, Neuromuscular reeducation, Compensatory technique education, Activityengagement, Energy conservation          See flowsheet documentation for full assessment, interventions and recommendations. Outcome: Progressing  Note: Limitation: Decreased ADL status, Decreased UE ROM, Decreased UE strength, Decreased Safe judgement during ADL, Decreased endurance, Decreased cognition, Decreased self-care trans, Decreased high-level ADLs  Prognosis: Fair  Assessment: Pt seen on this date for OT session focusing on ADL retraining, cognitive reorientation, body mechanics, transfer retraining, increasing activity tolerance/endurance and EOB sitting to increase ability to participate in ADL/functional tasks. Pt was found in bed and was left in bed w/ all needs within reach, bed alarm on. Pt completed rolling to the L w max Ax1, sup<>sit performed with max Ax2, sat EOB w min-mod Ax1 while engaging in dynamic sitting tasks. Required min a for UB dressing and max A for perineal hygiene. Pt w/ improvements in activity tolerance, transfer ability, ADL task completion, however is still limited 2* decreased ADL/High-level ADL status, decreased activity tolerance/endurance, decreased cognition, decreased self-care trans, decreased safety awareness and insight to condition. The patient's raw score on the -PAC Daily Activity Inpatient Short Form is 11. A raw score of less than 19 suggests the patient may benefit from discharge to post-acute rehabilitation services. Please refer to the recommendation of the Occupational Therapist for safe discharge planning. Recommending pt D/C to STR when medically stable. Pt will continue to benefit from acute OT services to meet goals.      OT Discharge Recommendation: Return to facility with rehabilitation services

## 2023-09-01 NOTE — ASSESSMENT & PLAN NOTE
- Episode of AMS on morning of 8/31/23 for minimal responsiveness. Much improved 9/3 AO x3.  - CTH 8/31/23: no abnormalities  - Per ICU notes, patient difficult to arouse, requires painful stimulation >>> improved 9/3. Responsive to voice and verbal inquiry   - Episodes of hypoglycemia during hospital stay  - Palliative care consult ordered by ICU>>> recommended goals of care discussion with family and as of 8/31 determined that the pt does not have capacity. Follow up discussions regarding code status are ongoing   - Code status: Level 1  - Echo performed 8/31/23: EF 65%, mild-mod Aortic, Mitral, Tricuspid regurgitation. No wall motion abnormalities. Normal wall thickness.      PLAN:  · Neuro checks per shift  · Fall precautions  · Palliative Care recommendations:  · [address] [place] conversation ongoing Danay Robin - BREE

## 2023-09-01 NOTE — ASSESSMENT & PLAN NOTE
· S/p AV node ablation in 8/2018 as well as placement of PPM  · On warfarin 3 mg daily for [facility] at home with goal INR of 2-3. · CHADS-VASC score: 5 points (Age, Female, CHF, HTN)  · Warfarin held on admission due to ortho procedure. Currently stable    Plan:  · Monitor HR  · Continue heparin. Will price check xarelto and eliquis prior to discharge.

## 2023-09-01 NOTE — PROGRESS NOTES
[address] [facility]  Progress Note  Name: You Martino  MRN: [id number]  Unit/Bed#: PPHP 399-65 I Date of Admission: 8/28/2023   Date of Service: 9/1/2023 I Hospital Day: 4  Primary Care Provider: Jere Viveros DO         Admission Date: 8/28/2023 0542  Length of Stay: 4 days  Code Status:  Level 1 - Full Code  Disposition:   Consult:   IP CONSULT TO CASE MANAGEMENT  IP CONSULT TO INFECTIOUS DISEASES  IP CONSULT TO NEPHROLOGY  IP CONSULT TO PICC TEAM  IP CONSULT TO NUTRITION SERVICES  IP CONSULT TO PALLIATIVE CARE         Assessment/Plan:      Plans discussed with [facility] team and finalization is pending attending physician attestation. Please, call  for any clarification. 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 · S/p Joint explantation of right total hip prosthesis and placement of abx beads on 6/30/23 due to spontaneous drainage and abscess formation. Antibiotic beads were placed at time of procedure. Patient was placed on 6 week abx course of IV vanc which was switched to doxycycline. · Patient developed sinus tract and wound dehiscence. S/p I&D and washout on 8/29/23. · Ortho following appreciate recommendations  · ID following, Ortho following, appreciate recommendations. · PICC placed: 8/29  · Antibiotics started on 8/28      Plan:   · ID recommendations:  · F/U blood cultures 8/30/23: negative for now 48 hours as of 9/1  · F/U cultures from OR  · Pseudomonas aeruoginosa  · Cefepime 1g IV Q12H (started 8/30/23)  · Will need 6 week iv abx possibly followed by PO pending clinical course. Weekly CBC w/ diff, ESR, and CR while on IV abx.    · F/U outpatient with ID 2 weeks post D/C  · Ortho recommendations  · Remain non-weight bearing/ non- ambulatory  · Dressing changes PRN      Altered mental status  Assessment & Plan  - Episode of AMS on morning of 8/31/23 for minimal responsiveness. Much improved 9/1 AO x3.  - CTH 8/31/23: no abnormalities  - Per ICU notes, patient difficult to arouse, requires painful stimulation >>> improved 9/1. Responsive to voice and verbal inquiry   - Episodes of hypoglycemia during hospital stay  - Palliative care consult ordered by ICU>>> recommended goals of care discussion with family and as of 8/31 determined that the pt does not have capacity. However, Mental status much improved 9/1 and would benefit from follow up discussion with pt. - Code status: Level 1  - Echo performed 8/31/23: EF 65%, mild-mod Aortic, Mitral, Tricuspid regurgitation. No wall motion abnormalities. Normal wall thickness. PLAN:  · Neuro checks per shift  · Fall precautions  · Currently on non-violent restraints due to removal of kophed. Pt to remain in restraints 9/1  · Palliative Care recommendations:  · [address] conversation ongoing    Chronic heart failure with preserved ejection fraction [facility])  Assessment & Plan  Wt Readings from Last 3 Encounters:   09/01/23 81.4 kg (179 lb 7.3 oz)   06/28/23 76.2 kg (168 lb)   06/16/23 76.6 kg (168 lb 12.8 oz)       Intake/Output Summary (Last 24 hours) at 9/1/2023 0834  Last data filed at 9/1/2023 0701  Gross per 24 hour   Intake 1344 ml   Output 1151 ml   Net 193 ml   I/O last 24 hours: In: 6110 [NG/GT:395; IV Piggyback:610; Feedings:379]  Out: 1301 [Urine:1301]    - Last Echo 6/2023: LV cavity size is normal. Wall thickness is normal. There is concentric remodeling. LVEF 60%. Systolic function is normal. Basal inferoseptal segment is hypokinetic. RV nd RA mildly dilated. - Most recent Echo 8/31/23: LVEF 65%, mild-mod aortic, mitral and tricuspid regurgitaton. No wall motion abnormalities. Normal wall thickness. Plan:  · Continue home medication, torsemide 40 mg daily.  9/1>>> switched to IV lasix 40mg due to pt not able to tolerate PO.   · Monitor accurate I/O's           Chronic atrial fibrillation [facility])  Assessment & Plan  · S/p AV node ablation in 8/2018 as well as placement of PPM  · On warfarin 3 mg daily for [facility] at home with goal INR of 2-3. · CHADS-VASC score: 5 points (Age, Female, CHF, HTN)    Currently stable    Plan:  · Monitor HR  · Continue heparin. Will need bridge to coumadin prior to discharge. Anemia  Assessment & Plan  Iron Panel        Lab Results   Component Value Date    CONCFE 20 06/12/2020    CONCFE 28 03/20/2019    TIBC 347 06/12/2020    TIBC 293 03/20/2019    IRON 68 06/12/2020    IRON 82 03/20/2019    FERRITIN 34 06/12/2020    FERRITIN 169 03/20/2019     S/p transfusion 8/29 and 8/30; total of two units of PRBC's  Was on iron supplements at home; ferrous sulfate 325 daily. Plan:  · Monitor CBC; replete for hgb <7.0   · Continue iron supplementation   · Recommended for CBC w/diff weekly per ID    Multiple wounds  Assessment & Plan  - Sacral wound, heel wound  - Wound care consult placed by ICU    PLAN:   · Wound care recommendations:  · Cleanse sacro-buttocks with soap and water. Apply Calazime Cream to B/L Sacro-Buttocks BID and PRN episodes of incontinence  · Turn/reposition q2h or when medically stable for pressure re-distribution on skin . · Elevate heels to offload pressure. · Moisturize skin daily with skin nourishing cream  · Ehob cushion in chair when out of bed. · B/L Heels: cleanse area and pat dry. Apply allevyn foam dressings to area. Deo with P for Prevention and change every 3 Days or PRN. Peel back and inspect skin Q-shift. · Nursing Communication: Please order patient P-500 Specialty Low Air Loss Mattress once transferred out of CCU.        Pain and swelling of left upper extremity  Assessment & Plan  - LUE: erythematous, edematous, and hot to the touch, concerning for possible clot vs thrombophlebitis.  Weak pulses  - Doppler LUE 8/30 showed severe edema but no evidence of acute DVT    PLAN:  · Continue to monitor    Malnutrition [facility])  Assessment & Plan  Malnutrition Findings:                                 BMI Findings: Body mass index is 35.05 kg/m². - Patient on TF via NGT, placed 8/29/23  - ICU ordered nutritional consult; following  - Jevity 1.2, continuous: 50    PLAN:  · Continue TF at goal for adequate nutrition  · Nutrition team following; appreciate recommendations      Hyperglycemia  Assessment & Plan  - Last A1C 2022: 5.3%  - Hyperglycemia during hospitalization with 2 episodes of hypoglycemia  - No known history of Diabetes  - Not on diabetic medications at home    BMP Gluc       Results from last 7 days   Lab Units 09/01/23  0440 08/31/23  0423 08/30/23  2112 08/30/23  0000 08/29/23  1806 08/29/23  0443 08/29/23  0050   GLUCOSE RANDOM mg/dL 111 186* 204* 163* 424* 83 78    or POC BG       Results from last 7 days   Lab Units 09/01/23  0645 09/01/23  0632 09/01/23  0601 09/01/23  0005 08/31/23  1805 08/31/23  1214 08/31/23  0714   POC GLUCOSE mg/dl 204* 60* 54* 97 92 234* 246*     PLAN:  · Sliding Scale Insulin Algorithm #3  · Glucose checks q6 hours  · Monitor for hypoglycemia        Gastroesophageal reflux disease without esophagitis  Assessment & Plan  Continue home medication,  Protonix 40 mg daily. Benign essential HTN  Assessment & Plan  Home medications:  Torsemide 40 mg daily    Systolic (36ZRP), ITU:527 , Min:101 , TERRA:594   Diastolic (98CGX), MNY:62, Min:51, Max:69  - Most recent BP Blood Pressure: 123/59     Currently stable    PLAN:  · Goal BP <130/80 per cardiology  · MAP >65  · Continue home torsemide 40 mg daily  · Consider Lasix 40 mg PRN    Stage 3 chronic kidney disease [facility])  Assessment & Plan  Lab Results   Component Value Date    EGFR 42 09/01/2023    EGFR 45 08/31/2023    EGFR 44 08/30/2023    CREATININE 1.15 09/01/2023    CREATININE 1.09 08/31/2023    CREATININE 1.10 08/30/2023   Nephrology following; appreciate recommendations  Baseline Cr: 0.9-1.1  On urinary smith catheter: 8/30/31    Plan:  · Avoid nephrotoxic agents  · Monitor with daily BMP     Status post right hip replacement  Assessment & Plan  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 S/p Joint explantation and placement of abx beads on 6/30/23 due to spontaneous drainage and abscess formation. Hypothyroidism  Assessment & Plan  Most recent TSH (6/28/23): 6.873, T4 1.39   Continue home medicaiton levothyroxine 112 mcg daily. Pacemaker  Assessment & Plan  · Hx of intolerance and bradycardia with AVN blocking agents for chronic a.fib  · S/p placement of single chamber PPM on 8/2018. Diet: Diet Enteral/Parenteral; Tube Feeding No Oral Diet; Jevity 1.2 Live; Continuous; 50    VTE Pharm PPX: Heparin  VTE Mech PPX: sequential compression device      Subjective:   80 y.o. female admitted 8/28/2023 for infection of R prosthetic hip joint. Today 09/01/23, HD# 4    Patient seen and examined at bedside. She is much more alert today compared to yesterday and is able to tell me where she is at, what month it is, and what specific date it is. She is also able to tell me that her birthday is tomorrow and is able to answer all verbal inquiries appropriately. She is spontaneously aroused by voice and states to me that she is not in any pain and does not have any current requests. I discussed with her the necessity for the Kophed tube as the patient is still in soft restraints at this time and I reiterated the importance that the tube remain until the patient is cleared by speech therapy to resume p.o. intake. Patient agreeable to plan.     Objective:     Vitals:    08/31/23 2310 09/01/23 0300 09/01/23 0600 09/01/23 0811   BP: 101/51 113/53  123/59   BP Location: Right arm Right arm     Pulse: 69 69  73   Resp: (!) 24 (!) 23  17   Temp: 98 °F (36.7 °C) 97.5 °F (36.4 °C)  97.8 °F (36.6 °C)   TempSrc: Axillary Axillary  Oral   SpO2: 100% 99%  100%   Weight:   81.4 kg (179 lb 7.3 oz)    Height: Temp:  [97.5 °F (36.4 °C)-98.7 °F (37.1 °C)] 97.8 °F (36.6 °C)  HR:  [69-81] 73  Resp:  [17-24] 17  BP: (101-125)/(51-69) 123/59  Weight (last 2 days)     Date/Time Weight    23 0600 81.4 (179.45)    23 0745 83.5 (184)    23 0600 83.9 (184.97)    23 0532 83.9 (184.97)          Intake/Output Summary (Last 24 hours) at 2023 1027  Last data filed at 2023 0701  Gross per 24 hour   Intake 1214 ml   Output 801 ml   Net 413 ml     Invasive Devices     Peripherally Inserted Central Catheter Line  Duration           PICC Line 23 2 days          Peripheral Intravenous Line  Duration           Peripheral IV 23 Dorsal (posterior); Right Wrist 4 days    Peripheral IV 23 Right;Ventral (anterior) Forearm 3 days          Drain  Duration           NG/OG/Enteral Tube Other (Comment) Right nare <1 day                  Physical Exam:     Physical Exam  Vitals reviewed. Constitutional:       Appearance: She is ill-appearing. Comments: TF via NGT   HENT:      Head: Normocephalic and atraumatic. Eyes:      Extraocular Movements: Extraocular movements intact. Conjunctiva/sclera: Conjunctivae normal.   Cardiovascular:      Rate and Rhythm: Normal rate and regular rhythm. Pulses: Normal pulses. Heart sounds: Normal heart sounds. No murmur heard. Comments: PICC line 23    Pulmonary:      Effort: Pulmonary effort is normal. No respiratory distress. Breath sounds: Normal breath sounds. No wheezing. Abdominal:      General: Abdomen is flat. Bowel sounds are normal.      Palpations: Abdomen is soft. Tenderness: There is generalized abdominal tenderness. Comments: Surgical scar above umbilicus with foreign body palpated subcutanously; non mobile   Musculoskeletal:         General: Swelling present. Right lower le+ Pitting Edema present. Left lower le+ Pitting Edema present.       Comments: LUE swelling with ecchymosis    Skin: General: Skin is warm. Capillary Refill: Capillary refill takes 2 to 3 seconds. Findings: Bruising present. Neurological:      Mental Status: She is easily aroused. Mental status is at baseline. Psychiatric:         Mood and Affect: Mood normal.         Behavior: Behavior normal. Behavior is cooperative.            Labs:     CBC:  Results from last 7 days   Lab Units 09/01/23  0440 08/31/23  0423 08/30/23  1122 08/30/23  0425 08/29/23  1423 08/29/23  0443 08/28/23  2354 08/28/23  1659 08/28/23  0759   WBC Thousand/uL 11.71* 9.86  --  9.72  --  9.24 9.62 12.35* 11.28*   HEMOGLOBIN g/dL 8.8* 9.0* 8.6* 7.0* 9.1* 6.7* 7.0* 7.7* 9.8*   HEMATOCRIT % 27.5* 27.0*  --  21.3*  --  21.1* 21.8* 25.8* 31.0*   PLATELETS Thousands/uL 136* 155  --  137*  --  163 158 205 226   NEUTROS ABS Thousands/µL  --  8.71*  --  7.68*  --  6.74 7.55 9.60*  --        CMP:  Results from last 7 days   Lab Units 09/01/23  0440 08/31/23  0423 08/30/23  2112 08/30/23  0000 08/29/23  1806 08/29/23  0443 08/29/23  0050 08/28/23  1659   POTASSIUM mmol/L 3.1* 3.7 3.3* 3.2* 3.6 5.4* 3.0* 3.1*   CHLORIDE mmol/L 116* 113* 111* 110* 111* 109* 109* 106   CO2 mmol/L 22 22 22 20* 20* 23 16* 22   BUN mg/dL 27* 26* 26* 26* 26* 30* 25 32*   CREATININE mg/dL 1.15 1.09 1.10 1.07 1.10 1.16 0.98 1.23   CALCIUM mg/dL 7.6* 7.5* 7.4* 7.5* 7.0* 7.1* 5.7* 7.6*   AST U/L  --   --   --  17  --  11* 12* 11*   ALT U/L  --   --   --  7  --  8 7 10   ALK PHOS U/L  --   --   --  71  --  82 69 102   EGFR ml/min/1.73sq m 42 45 44 46 44 41 51 38   MAGNESIUM mg/dL 2.3 2.5 1.8* 2.1  --  1.9  --  1.4*   PHOSPHORUS mg/dL 3.0 1.9*  --  2.4  --  3.0  --  3.2       Sepsis:  Results from last 7 days   Lab Units 08/28/23 2016 08/28/23  1659   LACTIC ACID mmol/L 1.4 2.3*       Micro:  Lab Results   Component Value Date/Time    Blood Culture No Growth at 24 hrs. 08/30/2023 10:19 AM    Blood Culture No Growth at 24 hrs. 08/30/2023 10:19 AM    Gram Stain Result No Polys or Bacteria seen 08/28/2023 12:08 PM    Urine Culture >100,000 cfu/ml Escherichia coli ESBL (A) 06/15/2023 07:05 PM    Urine Culture >100,000 cfu/ml Klebsiella pneumoniae (A) 06/15/2023 07:05 PM    Urine Culture >100,000 cfu/ml Aerococcus urinae (A) 06/15/2023 07:05 PM    Wound Culture No growth 06/28/2023 04:11 PM    Body Fluid Culture, Sterile 2+ Growth of Pseudomonas aeruginosa (A) 08/28/2023 12:07 PM         Imaging:     CT head wo contrast    Result Date: 8/31/2023  Impression: No acute intracranial abnormality. Unchanged 2.7 cm meningioma in left parafalcine frontal vertex with slight mass effect on adjacent left anterior paramedian frontal lobe. Unchanged mild chronic microangiopathy. Workstation performed: [id number]     XR chest 1 view    Result Date: 8/30/2023  Impression: Feeding tube tip in the mid thorax along the course of the esophagus. Workstation performed: [id number]     XR abdomen 1 view kub    Result Date: 8/30/2023  Impression: Feeding tube tip projects over the gastric body. Nonobstructive bowel gas pattern as visualized. Workstation performed: [id number]     XR chest portable ICU    Result Date: 8/30/2023  Impression: Limited study, specifically assessment of the left lung base. Cannot reliably exclude left pleural effusion. No overt pulmonary edema or consolidation within limitations. Follow-up PA and lateral views recommended for any persistent or worsening symptoms.  Workstation performed: [id number]         Medications:     Current Facility-Administered Medications   Medication Dose Route Frequency   • acetaminophen (TYLENOL) oral suspension 975 mg  975 mg Per NG Tube Q8H [address]   • cefepime (MAXIPIME) 1,000 mg in dextrose 5 % 50 mL IVPB  1,000 mg Intravenous Q12H   • chlorhexidine (PERIDEX) 0.12 % oral rinse 15 mL  15 mL Mouth/Throat Q12H [address]   • dextrose 50 % IV solution **ADS Override Pull**       • dextrose 50 % IV solution **ADS Override Pull**       • DULoxetine (CYMBALTA) delayed release capsule 60 mg  60 mg Oral Daily   • ferrous sulfate (MIKE-IN-SOL) oral solution 300 mg  300 mg Oral Daily   • furosemide (LASIX) injection 40 mg  40 mg Intravenous Daily   • heparin (porcine) subcutaneous injection 5,000 Units  5,000 Units Subcutaneous Q8H [facility]   • HYDROmorphone HCl (DILAUDID) injection 0.2 mg  0.2 mg Intravenous Q4H PRN   • insulin lispro (HumaLOG) 100 units/mL subcutaneous injection 1-6 Units  1-6 Units Subcutaneous Q6H [facility]   • levothyroxine (Synthroid) suspension 112 mcg  112 mcg Oral Early Morning   • lidocaine (LIDODERM) 5 % patch 1 patch  1 patch Topical Daily   • lidocaine (LIDODERM) 5 % patch 1 patch  1 patch Topical Daily   • ondansetron (ZOFRAN) injection 4 mg  4 mg Intravenous Q4H PRN   • oxyCODONE (ROXICODONE) oral solution 2.5 mg  2.5 mg Oral Q4H PRN   • oxyCODONE (ROXICODONE) oral solution 5 mg  5 mg Oral Q4H PRN   • pantoprazole (PROTONIX) injection 40 mg  40 mg Intravenous Daily   • polyethylene glycol (MIRALAX) packet 17 g  17 g Oral Daily   • potassium chloride 40 mEq IVPB (premix)  40 mEq Intravenous Once    Followed by   • potassium chloride 40 mEq IVPB (premix)  40 mEq Intravenous Once   • senna-docusate sodium (SENOKOT S) 8.6-50 mg per tablet   Oral Daily   • simethicone (MYLICON) oral suspension 80 mg  80 mg Oral Q6H PRN   • sodium chloride (OCEAN) 0.65 % nasal spray 1 spray  1 spray Each Nare Q1H PRN         Greg Patient, DO  Family Medicine, PGY-1  10:27 AM 9/1/2023

## 2023-09-01 NOTE — PHYSICAL THERAPY NOTE
PHYSICAL THERAPY NOTE          Patient Name: Genaro Martinez  XCUEF'I Date: 9/1/2023 09/01/23 1448   PT Last Visit   PT Visit Date 09/01/23   Note Type   Note Type Treatment   Pain Assessment   Pain Assessment Tool 0-10   Pain Score 2   Pain Location/Orientation Orientation: Left; Location: Hip   Pain Onset/Description Onset: Ongoing;Frequency: Intermittent   Effect of Pain on Daily Activities limits comfort and mobility   Patient's Stated Pain Goal No pain   Hospital Pain Intervention(s) Repositioned; Ambulation/increased activity; Emotional support   Restrictions/Precautions   Weight Bearing Precautions Per Order Yes   RLE Weight Bearing Per Order (S)  NWB   Other Precautions Fall Risk;Pain;Multiple lines;Telemetry; Chair Alarm; Bed Alarm;WBS;Cognitive  (NGT)   General   Chart Reviewed Yes   Family/Caregiver Present No   Cognition   Overall Cognitive Status Impaired   Arousal/Participation Alert   Attention Within functional limits   Orientation Level Oriented to person;Disoriented to situation;Oriented to place;Oriented to time   Memory Decreased recall of precautions;Decreased recall of recent events;Decreased short term memory   Following Commands Follows one step commands without difficulty   Comments Patient pleasant and cooperative. Fair safety awareness. Increased arousal and participation. Subjective   Subjective Patient agreeable to PT tx   Bed Mobility   Rolling L 2  Maximal assistance   Additional items Assist x 1; Increased time required;Verbal cues;LE management   Supine to Sit 2  Maximal assistance   Additional items Assist x 2; Increased time required;Verbal cues;LE management;HOB elevated   Sit to Supine 2  Maximal assistance   Additional items Assist x 2; Increased time required;Verbal cues;LE management   Additional Comments Patient sits EOB x18-20' with ability to maintain balance with Min-Mod A.  Patient with intermittent posterior lateral lean, requiring physical assist and cues to imrpove upright sitting. Patient utilizes bed rails to assist with positioning. Transfers   Sit to Stand Unable to assess   Stand to Sit Unable to assess   Additional Comments not safe to perform @ this time   Balance   Static Sitting Poor   Dynamic Sitting Poor -   Endurance Deficit   Endurance Deficit Yes   Activity Tolerance   Activity Tolerance Patient tolerated treatment well;Patient limited by fatigue;Patient limited by pain   Medical Staff Made Aware OT [address] [facility]  RN cleared   Exercises   Balance training  sits EOB x18-20' with ability to maintain balance with Min-Mod A. Patient with intermittent posterior lateral lean, requiring physical assist and cues to imrpove upright sitting. Patient with Poor-Poor+ sitting balance. Increased assist needed as patient fatigued. Assessment   Prognosis Guarded   Problem List Decreased strength;Decreased range of motion;Decreased endurance; Impaired balance;Decreased mobility;Pain;Orthopedic restrictions   Assessment Patient received in bed. Patient agreeable to therapy. Patient performs supine >sit with Max Ax2. Patient sits EOB with Min-Mod Ax1 for upright sitting. As patient fatigues increased assistance needed to maintain sitting posture. Patient sits x18-20' with poor to poor+ stability. Patient utilizes bed rails to assist with maintaining sitting posture. Patient also performs sit > supine with Max Ax2. Patient performs rolling to L side with Max A to assist with gown and bed pad change. Patient unable to perform transfer at this time d/t safety. PT will continue following to increased strength and stability needed for future transfers. Patient left in bed with all needs met and call bell/personal items within reach.  The patient's AM-PAC Basic Mobility Inpatient Short Form Raw Score is 7 showing further need for skilled PT services in order to improve functional mobility, decrease need for assistance, and return to PLOF. A Raw score of less than 16 suggests the patient may benefit from discharge to post-acute rehabilitation services. PT continues to recommend return to facility with services on d/c. Will continue to follow as able. Goals   Patient Goals to feel better   PT Treatment Day 1   Plan   Treatment/Interventions ADL retraining;Functional transfer training;LE strengthening/ROM; Therapeutic exercise; Endurance training;Cognitive reorientation;Patient/family training;Equipment eval/education; Bed mobility;Gait training;Spoke to nursing;Spoke to case management;OT   Progress Slow progress, decreased activity tolerance   PT Frequency 1-2x/wk   Recommendation   PT Discharge Recommendation Return to facility with rehabilitation services   AM-PAC Basic Mobility Inpatient   Turning in Flat Bed Without Bedrails 2   Lying on Back to Sitting on Edge of Flat Bed Without Bedrails 1   Moving Bed to Chair 1   Standing Up From Chair Using Arms 1   Walk in Room 1   Climb 3-5 Stairs With Railing 1   Basic Mobility Inpatient Raw Score 7   Highest Level Of Mobility   JH-HLM Goal 2: Bed activities/Dependent transfer   JH-HLM Achieved 3: Sit at edge of bed   End of Consult   Patient Position at End of Consult Supine; All needs within reach;Bed/Chair alarm activated       Jonna Goins, PT, DPT

## 2023-09-01 NOTE — ASSESSMENT & PLAN NOTE
Lab Results   Component Value Date    EGFR 42 09/01/2023    EGFR 45 08/31/2023    EGFR 44 08/30/2023    CREATININE 1.15 09/01/2023    CREATININE 1.09 08/31/2023    CREATININE 1.10 08/30/2023   Nephrology following; appreciate recommendations  Baseline Cr: 0.9-1.1  On urinary smith catheter: 8/30/31    Plan:  · Avoid nephrotoxic agents  · Monitor with daily BMP

## 2023-09-01 NOTE — PROGRESS NOTES
Progress Note - Orthopedics   Mara Villa 80 y.o. female MRN: [id number]  Unit/Bed#: [facility] 507-01      Subjective:    80 y. o.female POD 4 R Hip I&D and closure. No acute events, no new complaints. Patient doing well. Pain well controlled. Denies fevers, chills, CP, SOB, N/V, numbness or tingling. Patient reports no issues with urination or bowel movements. Patient states she is doing alright.     Labs:  0   Lab Value Date/Time    HCT 27.0 (L) 08/31/2023 0423    HCT 21.3 (L) 08/30/2023 0425    HCT 21.1 (L) 08/29/2023 0443    HCT 37.0 04/20/2015 1109    HCT 38.0 04/20/2015 1109    HCT 37.2 04/03/2015 0831    HGB 9.0 (L) 08/31/2023 0423    HGB 8.6 (L) 08/30/2023 1122    HGB 7.0 (L) 08/30/2023 0425    HGB 12.0 04/20/2015 1109    HGB 11.6 04/03/2015 0831    HGB 12.1 11/04/2014 1025    INR 1.77 (H) 08/31/2023 0947    WBC 9.86 08/31/2023 0423    WBC 9.72 08/30/2023 0425    WBC 9.24 08/29/2023 0443    WBC 7.98 04/20/2015 1109    WBC 7.29 04/03/2015 0831    WBC 6.66 11/04/2014 1025    ESR <1 08/31/2023 0423    CRP 17.6 (H) 07/25/2023 1133       Meds:    Current Facility-Administered Medications:   •  acetaminophen (TYLENOL) oral suspension 975 mg, 975 mg, Per NG Tube, Q8H [address], Bc Yip MD, 975 mg at 08/31/23 2252  •  cefepime (MAXIPIME) 1,000 mg in dextrose 5 % 50 mL IVPB, 1,000 mg, Intravenous, Q12H, Bc Yip MD, Last Rate: 100 mL/hr at 08/31/23 2252, 1,000 mg at 08/31/23 2252  •  chlorhexidine (PERIDEX) 0.12 % oral rinse 15 mL, 15 mL, Mouth/Throat, Q12H [address], Bc Yip MD, 15 mL at 08/31/23 0804  •  DULoxetine (CYMBALTA) delayed release capsule 60 mg, 60 mg, Oral, Daily, Bc Yip MD, 60 mg at 08/30/23 7921  •  ferrous sulfate tablet 325 mg, 325 mg, Oral, Daily With Breakfast, Bc Yip MD, 325 mg at 08/30/23 0907  •  heparin (porcine) subcutaneous injection 5,000 Units, 5,000 Units, Subcutaneous, Q8H [address], Bc Yip MD, 5,000 Units at 08/31/23 2252  • HYDROmorphone HCl (DILAUDID) injection 0.2 mg, 0.2 mg, Intravenous, Q4H PRN, Andres Pierce MD  •  insulin lispro (HumaLOG) 100 units/mL subcutaneous injection 1-6 Units, 1-6 Units, Subcutaneous, Q6H [address], 3 Units at 08/31/23 1215 **AND** Fingerstick Glucose (POCT), , , Q6H, Andres Pierce MD  •  levothyroxine tablet 112 mcg, 112 mcg, Per NG Tube, Early Morning, Andres Pierce MD, 112 mcg at 08/31/23 0604  •  [START ON 9/1/2023] lidocaine (LIDODERM) 5 % patch 1 patch, 1 patch, Topical, Daily, Andres Pierce MD  •  [START ON 9/1/2023] lidocaine (LIDODERM) 5 % patch 1 patch, 1 patch, Topical, Daily, Sally Lacey MD  •  [facility] injection 4 mg, 4 mg, Intravenous, Q4H PRN, Andres Pierce MD  •  oxyCODONE (ROXICODONE) oral solution 2.5 mg, 2.5 mg, Oral, Q4H PRN, Andres Pierce MD  •  oxyCODONE (ROXICODONE) oral solution 5 mg, 5 mg, Oral, Q4H PRN, Andres Pierce MD  •  pantoprazole (PROTONIX) injection 40 mg, 40 mg, Intravenous, Daily, Andres Pierce MD, 40 mg at 08/31/23 0803  •  [START ON 9/1/2023] polyethylene glycol (MIRALAX) packet 17 g, 17 g, Oral, Daily, Andres Pierce MD  •  senna-docusate sodium (SENOKOT S) 8.6-50 mg per tablet, , Oral, Daily, Andres Pierce MD, 1 tablet at 08/28/23 1544  •  simethicone (MYLICON) chewable tablet 80 mg, 80 mg, Oral, Q6H PRN, Andres Pierce MD  •  sodium chloride (OCEAN) 0.65 % nasal spray 1 spray, 1 spray, Each Nare, Q1H PRN, Andres Pierce MD  •  torsemide (DEMADEX) tablet 40 mg, 40 mg, Oral, Daily, Andres Pierce MD, 40 mg at 08/31/23 1570    Blood Culture:   Lab Results   Component Value Date    BLOODCX No Growth at 24 hrs. 08/30/2023    BLOODCX No Growth at 24 hrs. 08/30/2023       Wound Culture:   Lab Results   Component Value Date    WOUNDCULT No growth 06/28/2023       Ins and Outs:  I/O last 24 hours:   In: 8296 [I.V.:150; NG/GT:552; IV Piggyback:820; Feedings:194]  Out: 8247 [Urine:2650]          Physical:  Vitals:    08/31/23 2310   BP:    Pulse:    Resp:    Temp: 98 °F (36.7 °C)   SpO2: 100%     Musculoskeletal: right Lower Extremity     • Skin warm, . No erythema or ecchymosis. Pitting edema to the ankle  • Dressing c/d/i  • TTP humberto-incisionally  • Sensation intact to saphenous, sural, tibial, superficial peroneal nerve, and deep peroneal  • Motor intact to +FHL/EHL, +ankle dorsi/plantar flexion  • 2+ DP pulse  • Digits warm and well perfused  • Capillary refill < 2 seconds       Assessment:    80 y. o.female POD 4 R hip I&D and closure .      Plan:  · NWB RLE  · IV abx per ID  · Will monitor for ABLA and administer IVF/prbc as indicated for Greater than 2 gram drop or Hgb < 7  · PT/OT  · Pain control  · DVT ppx SHQ  · Dispo: Ortho will follow    Jarad Beltre MD

## 2023-09-01 NOTE — PROGRESS NOTES
Progress Note - Infectious Disease   Mitchell Eagle 80 y.o. female MRN: [id number]  Unit/Bed#: [facility] 507-01 Encounter: [id number]      Impression/Recommendations:  Right hip PJI infection/acetabular osteomyelitis.    In setting of MAIA, revision, chronic dislocation/displacement as below.  Presented June with subacute pain, spontaneous drainage from sinus tract.  CT showed abscess at joint.  Status post MAIA explantation, extensive I&D, insertion of antibiotic beads (no spacer placed) on 6/30.  Extensive purulent fluid and necrotic tissue noted intraoperatively.  OR cultures negative.  Status post 6 weeks IV vancomycin, transitioned to PO doxycycline.  ESR 54 >> 13.  Had persistent wound drainage, sinus tract.  Now status post I&D, layered [facility] 8/28.  Intraoperative findings notable for infected cavity due to absence of hip.  Cultures now with Pseudomonas, likely new superinfection as expect would have grown during initial I&D 6/30. ESR now <1  Rec:  • Continue cefepime for now  • Check weekly CBC-diff, Cr while on IV antibiotics  • Will need 6 weeks IV antibiotics, possibly followed by PO depending on clinical course and ESR trend  • D/C planning for SNF  • Outpatient follow-up with me 2 weeks after D/C  • D/C PICC after last dose IV antibiotics     Acute encephalopathy. Worse. Likely multifactorial.  CT head negative. Consider role of high-dose cefepime although has only received 3 doses. No other sedating medications noted. Mental status is a little improved today, with patient able to answer simple questions appropriately. Rec:  • As CrCl ~30 and may be overestimated, will dose reduce cefepime to 1g IV Q12  • Follow mental status closely      Status post right MAIA with revision in 2019. Complicated more recently by disruption and chronic dislocation/displacement.  Non-ambulatory.     Status post PPM.     IBS with constipation.     Afib. On coumadin     History of ESBL, MDRO.   From recent urine cultures.     CKD. Baseline Cr 1.0, CrCl ~30 but may be overestimated. Rec:  • Follow creatinine closely and dose-adjust antibiotics as indicated     Discussed with patient in detail regarding the above plan.     Antibiotics:  Cefepime #3  Antibiotics #4  POD #4     Subjective:  Patient is more awake and alert today. She answers some questions but with some confusion. Stable pain in right hip, mild. Temperature stays down. No chills. She is tolerating antibiotic well. No nausea, vomiting or diarrhea.     Objective:  Vitals:  Temp:  [97.5 °F (36.4 °C)-98 °F (36.7 °C)] 97.8 °F (36.6 °C)  HR:  [69-81] 69  Resp:  [16-24] 16  BP: (101-124)/(51-65) 123/60  SpO2:  [91 %-100 %] 100 %  Temp (24hrs), Av.8 °F (36.6 °C), Min:97.5 °F (36.4 °C), Max:98 °F (36.7 °C)  Current: Temperature: 97.8 °F (36.6 °C)    Physical Exam:     General: Sleepy but arousable. Answer simple questions. Mild confusion. Comfortable. Nontoxic. Neck:  Supple. No mass. No lymphadenopathy. Lungs: Expansion symmetric, no rales, no wheezing, respirations unlabored. Heart:  Regular rate and rhythm, S1 and S2 normal, no murmur. Abdomen: Soft, nondistended, non-tender, bowel sounds active all four quadrants, no masses, no organomegaly. Extremities: No edema. No erythema/warmth. No ulcer. Nontender to palpation. Skin:  No rash. Neuro: Moves all extremities. Invasive Devices     Peripherally Inserted Central Catheter Line  Duration           PICC Line 23 2 days          Drain  Duration           NG/OG/Enteral Tube Other (Comment) Right nare <1 day                Labs studies:   I have personally reviewed pertinent labs.   Results from last 7 days   Lab Units 23  0440 23  0423 23  2112 23  0000 23  1806 23  0443 23  0050   POTASSIUM mmol/L 3.1* 3.7 3.3* 3.2*   < > 5.4* 3.0*   CHLORIDE mmol/L 116* 113* 111* 110*   < > 109* 109*   CO2 mmol/L 22 22 22 20*   < > 23 16*   BUN mg/dL 27* 26* 26* 26*   < > 30* 25   CREATININE mg/dL 1.15 1.09 1.10 1.07   < > 1.16 0.98   EGFR ml/min/1.73sq m 42 45 44 46   < > 41 51   CALCIUM mg/dL 7.6* 7.5* 7.4* 7.5*   < > 7.1* 5.7*   AST U/L  --   --   --  17  --  11* 12*   ALT U/L  --   --   --  7  --  8 7   ALK PHOS U/L  --   --   --  71  --  82 69    < > = values in this interval not displayed. Results from last 7 days   Lab Units 09/01/23  0440 08/31/23  0423 08/30/23  1122 08/30/23  0425   WBC Thousand/uL 11.71* 9.86  --  9.72   HEMOGLOBIN g/dL 8.8* 9.0* 8.6* 7.0*   PLATELETS Thousands/uL 136* 155  --  137*     Results from last 7 days   Lab Units 08/30/23  1019 08/28/23  1727 08/28/23  1208 08/28/23  1207   BLOOD CULTURE  No Growth at 48 hrs. No Growth at 48 hrs.  --   --   --    GRAM STAIN RESULT   --   --  No Polys or Bacteria seen 4+ Polys  No bacteria seen   BODY FLUID CULTURE, STERILE   --   --   --  2+ Growth of Pseudomonas aeruginosa*   MRSA CULTURE ONLY   --  No Methicillin Resistant Staphlyococcus aureus (MRSA) isolated  --   --        Imaging Studies:   I have personally reviewed pertinent imaging study reports and images in PACS. EKG, Pathology, and Other Studies:   I have personally reviewed pertinent reports.

## 2023-09-01 NOTE — PROGRESS NOTES
PT was more awake today, able to answer questions PT is from NH, reported appetite was good, reported completing her meals there, typically had eggs and toast for B, bigger meal at L which included pro, vegetable, starch and D was usually a ham and cheese sandwich, fruit. Uses a walker, would eat in dining koo but more recently was eating in her room. PT is showing signs of moderate fat depletion at orbitals, moderate muscle depletion at temples, weight has increased d/t significant edema/CHF 3+ edema LE;s, 1+ edema UE's, weight now decreasing, 9/1/23 179lbs, 8/31/23 184lbs, 8/29/23 178lbs, 6/28/23 168 lbs, 3/24/23 167 lbs, 7/8/22 168 lbs. Reviewed labs: 8/30 albumin 2.3 (albumin is not an indicator for nutritional status as research shows albumin doesn't reflect po intake, albumin indicated more for stress/inflammatory state), 9/1 K 3.1, BUN 26, Ca 7.6, ionized CA 1.12 wnl, BG 97, 54, 60, 204, receiving KCl. PT with NG tube receiving TF. Currently awaiting ST evaluation to see if able initiate po diet, if able to initiate diet will monitor po intake, if requiring oral nutrition supplements can order ensure compact vanilla/chocolate BID. If unable to initiate po, continue current TF order.

## 2023-09-01 NOTE — ASSESSMENT & PLAN NOTE
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 · S/p Joint explantation of right total hip prosthesis and placement of abx beads on 6/30/23 due to spontaneous drainage and abscess formation. Antibiotic beads were placed at time of procedure. Patient was placed on 6 week abx course of IV vanc which was switched to doxycycline. · Patient developed sinus tract and wound dehiscence. S/p I&D and washout on 8/29/23. · Ortho following appreciate recommendations  · ID following, Ortho following, appreciate recommendations. · PICC placed: 8/29  · Antibiotics started on 8/28      Plan:   · ID recommendations:  · F/U blood cultures 8/30/23: negative for now 72 hours as of 9/3  · F/U cultures from OR  · Pseudomonas aeruoginosa  · Cefepime 1g IV Q12H (started 8/30/23)  · Will need 6 week iv abx possibly followed by PO pending clinical course. Weekly CBC w/ diff, ESR, and CR while on IV abx.    · F/U outpatient with ID 2 weeks post D/C  · Ortho recommendations  · Remain non-weight bearing/ non- ambulatory  · Dressing changes PRN

## 2023-09-01 NOTE — CASE MANAGEMENT
Case Management Assessment    Patient name Genaro Martinez  Location [address] [place] [address] [address] [facility] 596-04 MRN [id number]  : 1933 Date 2023       Current Admission Date: 2023  Current Admission Diagnosis:Infection of right prosthetic hip joint [facility])   Patient Active Problem List    Diagnosis Date Noted   • Hyperglycemia 2023   • Altered mental status 2023   • Malnutrition ([address]) 2023   • Pain and swelling of left upper extremity 2023   • Multiple wounds 2023   • Gastroesophageal reflux disease without esophagitis 2023   • Infection of right prosthetic hip joint ([address]) 2023   • Chronic diarrhea 2023   • Hyponatremia 06/15/2023   • Lumbar radiculopathy 02/10/2023   • Low back pain with sciatica 02/10/2023   • Lumbar spondylosis 02/10/2023   • S/P lumbar fusion 02/10/2023   • Peripheral vascular disease, unspecified ([address]) 2022   • Negative depression screening 2021   • Benign essential HTN 2021   • Trochanteric bursitis 2021   • Meningioma ([address]) 11/10/2020   • Trochanteric bursitis of right hip 11/10/2020   • Stage 3 chronic kidney disease ([address]) 11/10/2020   • Fall 10/13/2020   • Hematoma 10/13/2020   • Ecchymosis of eye 10/13/2020   • Elevated C-reactive protein (CRP) 2020   • S/P scar revision 2019   • Hypertrophic scar of skin 2019   • Cellulitis of right lower extremity 2019   • Osteomyelitis ([address]) 2019   • MCI (mild cognitive impairment) 2019   • Ambulatory dysfunction 2019   • Status post right hip replacement 2019   • Macrocytosis 2019   • Status post gastrectomy 2019   • Hypothyroidism 10/11/2018   • Pacemaker 2018   • Chronic heart failure with preserved ejection fraction ([address]) 2016   • Depression 2016   • Class 1 obesity due to excess calories with serious comorbidity and body mass index (BMI) of 33.0 to 33.9 in adult 2016   • Chronic pain 2016 • Chronic pain of lower extremity, bilateral 06/23/2016   • External hemorrhoids 03/25/2016   • Anemia 03/09/2016   • Chronic atrial fibrillation ([address]) 03/07/2016   • Irritable bowel syndrome with constipation 09/15/2015   • Synovial cyst of right popliteal space 08/04/2015   • Primary osteoarthritis of right knee 06/30/2015   • Osteoarthritis, multiple sites 06/26/2014   • Moderate mitral regurgitation 05/13/2014   • Osteoporosis 02/04/2014   • Fibromyalgia 05/31/2012   • Peripheral neuropathy 05/25/2012   • Lumbar canal stenosis 05/17/2012      LOS (days): 4  Geometric Mean LOS (GMLOS) (days): 3.40  Days to GMLOS:-0.5     OBJECTIVE:    Risk of Unplanned Readmission Score: 49.29         Current admission status: Inpatient    Preferred Pharmacy:   [address] [place] [address]  Phone: [phone] Fax: [phone]    Primary Care Provider: Jered Turner DO    Primary Insurance: [address] [place] [address]  Secondary Insurance:     ASSESSMENT:  Sarah Proxies    There are no active Health Care Proxies on file. Readmission Root Cause  30 Day Readmission: No    Patient Information  Admitted from[de-identified] Facility  Mental Status: Alert  During Assessment patient was accompanied by: Not accompanied during assessment  Assessment information provided by[de-identified] Patient  Primary Caregiver: Other (Comment)  Caregiver's Name[de-identified] Facility staff at [address] [place] [address] Loop: Family members, [facility] of Residence: [address] do you live in?: [address] entry access options.  Select all that apply.: No steps to enter home  Type of Current Residence: Facility  Upon entering residence, is there a bedroom on the main floor (no further steps)?: Yes  Upon entering residence, is there a bathroom on the main floor (no further steps)?: Yes  In the last 12 months, was there a time when you were not able to pay the mortgage or rent on time?: No  In the last 12 months, how many places have you lived?: 1  In the last 12 months, was there a time when you did not have a steady place to sleep or slept in a shelter (including now)?: No  Homeless/housing insecurity resource given?: N/A  Living Arrangements: Other (Comment) (LTC resident at [facility])  Is patient a ?: No    Activities of Daily Living Prior to Admission  Functional Status: Total dependent  Completes ADLs independently?: No  Level of ADL dependence: Total Dependent  Ambulates independently?: No  Level of ambulatory dependence:  Total Dependent  Does patient use assisted devices?: Yes  Assisted Devices (DME) used: [employer]  Does patient currently own DME?: Yes  What DME does the patient currently own?: [employer]  Does patient have a history of Outpatient Therapy (PT/OT)?: No  Does the patient have a history of Short-Term Rehab?: Yes  Does patient have a history of HHC?: No  Does patient currently have [facility]?: No    Patient Information Continued  Income Source: Pension/FDC  Does patient have prescription coverage?: Yes  Within the past 12 months, you worried that your food would run out before you got the money to buy more.: Never true  Within the past 12 months, the food you bought just didn't last and you didn't have money to get more.: Never true  Food insecurity resource given?: N/A  Does patient receive dialysis treatments?: No  Does patient have a history of substance abuse?: No  Does patient have a history of Mental Health Diagnosis?: No    Means of Transportation  Means of Transport to [facility][de-identified] Other (Comment) (Facility transport.)  In the past 12 months, has lack of transportation kept you from medical appointments or from getting medications?: No  In the past 12 months, has lack of transportation kept you from meetings, work, or from getting things needed for daily living?: No  Was application for public transport provided?: N/A        Summary: Met with patient at bedside. Patient confirmed she is a long term resident at [facility] and will return on DC.

## 2023-09-01 NOTE — ASSESSMENT & PLAN NOTE
- Last A1C 2022: 5.3%  - Hyperglycemia during hospitalization with 2 episodes of hypoglycemia  - No known history of Diabetes  - Not on diabetic medications at home    BMP Gluc       Results from last 7 days   Lab Units 09/01/23  0440 08/31/23  0423 08/30/23  2112 08/30/23  0000 08/29/23  1806 08/29/23  0443 08/29/23  0050   GLUCOSE RANDOM mg/dL 111 186* 204* 163* 424* 83 78    or POC BG       Results from last 7 days   Lab Units 09/01/23  0645 09/01/23  0632 09/01/23  0601 09/01/23  0005 08/31/23  1805 08/31/23  1214 08/31/23  0714   POC GLUCOSE mg/dl 204* 60* 54* 97 92 234* 246*     PLAN:  · Sliding Scale Insulin Algorithm #3  · Glucose checks q6 hours  · Monitor for hypoglycemia

## 2023-09-02 LAB
ANION GAP SERPL CALCULATED.3IONS-SCNC: 7 MMOL/L
BASOPHILS # BLD AUTO: 0.01 THOUSANDS/ÂΜL (ref 0–0.1)
BASOPHILS NFR BLD AUTO: 0 % (ref 0–1)
BUN SERPL-MCNC: 28 MG/DL (ref 5–25)
CALCIUM SERPL-MCNC: 7.5 MG/DL (ref 8.4–10.2)
CHLORIDE SERPL-SCNC: 112 MMOL/L (ref 96–108)
CO2 SERPL-SCNC: 23 MMOL/L (ref 21–32)
CREAT SERPL-MCNC: 1.12 MG/DL (ref 0.6–1.3)
EOSINOPHIL # BLD AUTO: 0.04 THOUSAND/ÂΜL (ref 0–0.61)
EOSINOPHIL NFR BLD AUTO: 0 % (ref 0–6)
ERYTHROCYTE [DISTWIDTH] IN BLOOD BY AUTOMATED COUNT: 18.6 % (ref 11.6–15.1)
GFR SERPL CREATININE-BSD FRML MDRD: 43 ML/MIN/1.73SQ M
GLUCOSE SERPL-MCNC: 127 MG/DL (ref 65–140)
GLUCOSE SERPL-MCNC: 144 MG/DL (ref 65–140)
GLUCOSE SERPL-MCNC: 81 MG/DL (ref 65–140)
HCT VFR BLD AUTO: 25 % (ref 34.8–46.1)
HGB BLD-MCNC: 7.9 G/DL (ref 11.5–15.4)
IMM GRANULOCYTES # BLD AUTO: 0.08 THOUSAND/UL (ref 0–0.2)
IMM GRANULOCYTES NFR BLD AUTO: 1 % (ref 0–2)
LYMPHOCYTES # BLD AUTO: 1.49 THOUSANDS/ÂΜL (ref 0.6–4.47)
LYMPHOCYTES NFR BLD AUTO: 16 % (ref 14–44)
MCH RBC QN AUTO: 32.1 PG (ref 26.8–34.3)
MCHC RBC AUTO-ENTMCNC: 31.6 G/DL (ref 31.4–37.4)
MCV RBC AUTO: 102 FL (ref 82–98)
MONOCYTES # BLD AUTO: 0.65 THOUSAND/ÂΜL (ref 0.17–1.22)
MONOCYTES NFR BLD AUTO: 7 % (ref 4–12)
NEUTROPHILS # BLD AUTO: 7.17 THOUSANDS/ÂΜL (ref 1.85–7.62)
NEUTS SEG NFR BLD AUTO: 76 % (ref 43–75)
NRBC BLD AUTO-RTO: 0 /100 WBCS
PLATELET # BLD AUTO: 108 THOUSANDS/UL (ref 149–390)
PMV BLD AUTO: 9.7 FL (ref 8.9–12.7)
POTASSIUM SERPL-SCNC: 3.1 MMOL/L (ref 3.5–5.3)
RBC # BLD AUTO: 2.46 MILLION/UL (ref 3.81–5.12)
SODIUM SERPL-SCNC: 142 MMOL/L (ref 135–147)
WBC # BLD AUTO: 9.44 THOUSAND/UL (ref 4.31–10.16)

## 2023-09-02 PROCEDURE — 80048 BASIC METABOLIC PNL TOTAL CA: CPT

## 2023-09-02 PROCEDURE — 99232 SBSQ HOSP IP/OBS MODERATE 35: CPT | Performed by: FAMILY MEDICINE

## 2023-09-02 PROCEDURE — 82948 REAGENT STRIP/BLOOD GLUCOSE: CPT

## 2023-09-02 PROCEDURE — 85025 COMPLETE CBC W/AUTO DIFF WBC: CPT

## 2023-09-02 RX ORDER — LOPERAMIDE HYDROCHLORIDE 2 MG/1
2 CAPSULE ORAL ONCE
Status: COMPLETED | OUTPATIENT
Start: 2023-09-02 | End: 2023-09-02

## 2023-09-02 RX ORDER — POTASSIUM CHLORIDE 20 MEQ/1
40 TABLET, EXTENDED RELEASE ORAL 2 TIMES DAILY
Status: COMPLETED | OUTPATIENT
Start: 2023-09-02 | End: 2023-09-02

## 2023-09-02 RX ORDER — POLYETHYLENE GLYCOL 3350 17 G/17G
17 POWDER, FOR SOLUTION ORAL DAILY PRN
Status: DISCONTINUED | OUTPATIENT
Start: 2023-09-02 | End: 2023-09-03

## 2023-09-02 RX ADMIN — LIDOCAINE 1 PATCH: 700 PATCH TOPICAL at 09:07

## 2023-09-02 RX ADMIN — LEVOTHYROXINE SODIUM 112 MCG: 100 TABLET ORAL at 05:05

## 2023-09-02 RX ADMIN — TORSEMIDE 40 MG: 20 TABLET ORAL at 09:07

## 2023-09-02 RX ADMIN — CHLORHEXIDINE GLUCONATE 15 ML: 1.2 SOLUTION ORAL at 09:07

## 2023-09-02 RX ADMIN — ACETAMINOPHEN 975 MG: 650 SUSPENSION ORAL at 21:51

## 2023-09-02 RX ADMIN — CEFEPIME 1000 MG: 1 INJECTION, POWDER, FOR SOLUTION INTRAMUSCULAR; INTRAVENOUS at 21:55

## 2023-09-02 RX ADMIN — HEPARIN SODIUM 5000 UNITS: 5000 INJECTION INTRAVENOUS; SUBCUTANEOUS at 21:52

## 2023-09-02 RX ADMIN — ACETAMINOPHEN 975 MG: 650 SUSPENSION ORAL at 13:23

## 2023-09-02 RX ADMIN — HEPARIN SODIUM 5000 UNITS: 5000 INJECTION INTRAVENOUS; SUBCUTANEOUS at 13:25

## 2023-09-02 RX ADMIN — CHLORHEXIDINE GLUCONATE 15 ML: 1.2 SOLUTION ORAL at 21:52

## 2023-09-02 RX ADMIN — MICONAZOLE NITRATE 1 APPLICATION: 20 POWDER TOPICAL at 17:57

## 2023-09-02 RX ADMIN — DULOXETINE HYDROCHLORIDE 60 MG: 60 CAPSULE, DELAYED RELEASE ORAL at 09:07

## 2023-09-02 RX ADMIN — OXYCODONE HYDROCHLORIDE 2.5 MG: 5 SOLUTION ORAL at 11:04

## 2023-09-02 RX ADMIN — POTASSIUM CHLORIDE 40 MEQ: 1500 TABLET, EXTENDED RELEASE ORAL at 17:58

## 2023-09-02 RX ADMIN — MICONAZOLE NITRATE 1 APPLICATION: 20 POWDER TOPICAL at 09:10

## 2023-09-02 RX ADMIN — CEFEPIME 1000 MG: 1 INJECTION, POWDER, FOR SOLUTION INTRAMUSCULAR; INTRAVENOUS at 10:14

## 2023-09-02 RX ADMIN — LOPERAMIDE HYDROCHLORIDE 2 MG: 2 CAPSULE ORAL at 11:04

## 2023-09-02 RX ADMIN — POTASSIUM CHLORIDE 40 MEQ: 1500 TABLET, EXTENDED RELEASE ORAL at 10:14

## 2023-09-02 RX ADMIN — ACETAMINOPHEN 975 MG: 650 SUSPENSION ORAL at 05:02

## 2023-09-02 RX ADMIN — Medication 300 MG: at 09:11

## 2023-09-02 RX ADMIN — HEPARIN SODIUM 5000 UNITS: 5000 INJECTION INTRAVENOUS; SUBCUTANEOUS at 05:02

## 2023-09-02 NOTE — ASSESSMENT & PLAN NOTE
Hx of right MAIA with revision in 2019. C/B chronic dislocation and displacement. S/p Joint explantation and placement of abx beads on 6/30/23 due to spontaneous drainage and abscess formation. Pt continues to have pain at right hip.     Plan:  -continue pain regimen: Tylenol 975mg dimitrios q8; Oxy 2.5/5 PRN q4, Dilaudid 0.2 PRN q4 for breakthrough pain,

## 2023-09-02 NOTE — ASSESSMENT & PLAN NOTE
- Last A1C 2022: 5.3%  - Hyperglycemia during hospitalization with 2 episodes of hypoglycemia  - No known history of Diabetes  - Not on diabetic medications at home    BMP Gluc       Results from last 7 days   Lab Units 09/02/23  0453 09/01/23  0440 08/31/23  0423 08/30/23  2112 08/30/23  0000 08/29/23  1806 08/29/23  0443   GLUCOSE RANDOM mg/dL 81 111 186* 204* 163* 424* 83    or POC BG       Results from last 7 days   Lab Units 09/02/23  0605 09/01/23  2356 09/01/23  2052 09/01/23  2004 09/01/23  1232 09/01/23  0645 09/01/23  0632   POC GLUCOSE mg/dl 144* 62* 128 72 112 204* 60*     PLAN:  · Sliding Scale Insulin Algorithm #3  · Glucose checks q6 hours  · Monitor for hypoglycemia  · Obtain glucose readings FROM PICC LINE!

## 2023-09-02 NOTE — ASSESSMENT & PLAN NOTE
· S/p AV node ablation in 8/2018 as well as placement of PPM  · On warfarin 3 mg daily for [facility] at home with goal INR of 2-3. · CHADS-VASC score: 5 points (Age, Female, CHF, HTN)    Currently stable. Regular rhythm and rate-controlled. Plan:  · Monitor HR  · Continue heparin DVT ppx.  Will need bridge to coumadin prior to discharge OR consider Eliquis/Xarelto -- CM price checking

## 2023-09-02 NOTE — ASSESSMENT & PLAN NOTE
- LUE: erythematous, edematous, and hot to the touch, concerning for possible clot vs thrombophlebitis.  Weak pulses  - Doppler LUE 8/30 showed severe edema but no evidence of acute DVT  - Symptoms decreased on 9/2    PLAN:  · Continue to monitor

## 2023-09-02 NOTE — ASSESSMENT & PLAN NOTE
Malnutrition Findings:   Adult Malnutrition type: Chronic illness  Adult Degree of Malnutrition: Malnutrition of moderate degree  Malnutrition Characteristics: Fat loss, Muscle loss                  360 Statement: moderate pro, beltran malnutrition d/t conditions as evidence by moderate fat and muscle depletion at temples, clavicles, orbitals, treated currently with TF, pending ST evaluation for potential diet advancement, most likely will benefit from oral nutrition supplements, can start with ensure compact any flavor BID. BMI Findings: Body mass index is 34.4 kg/m².      - Patient on puree and thin liquids per SLP as of 9/1  - ICU ordered nutritional consult; following      PLAN:  · Continue puree and thin liquids and nutrition supplements  · Nutrition team following; appreciate recommendations

## 2023-09-02 NOTE — ASSESSMENT & PLAN NOTE
Most recent TSH (6/28/23): 6.873, T4 1.39. On Levothyroxine 112mcg daily at home. Continue home regimen.

## 2023-09-02 NOTE — ASSESSMENT & PLAN NOTE
- Episode of AMS on morning of 8/31/23 for minimal responsiveness. Much improved 9/1 and 9/2; AO x3.  - CTH 8/31/23: no abnormalities  - Per ICU notes, patient difficult to arouse, requires painful stimulation >>> improved 9/1. Responsive to voice and verbal inquiry. 9/2: continues to be responsive and alert to voice. - Episodes of hypoglycemia during hospital stay  - Palliative care consult ordered by ICU>>> recommended goals of care discussion with family and as of 8/31 determined that the pt does not have capacity. However, mental status much improved 9/1 and would benefit from follow up discussion with pt. 9/2: pt is AOx3 and able to carry conversation; pt requesting the daughter to be called  - Code status: Level 1  - Echo performed 8/31/23: EF 65%, mild-mod Aortic, Mitral, Tricuspid regurgitation. No wall motion abnormalities. Normal wall thickness.      PLAN:  · Neuro checks per shift  · Fall precautions  · Palliative Care recommendations:  · [address] [place] conversation ongoing  · Reach out to daughter about code status

## 2023-09-02 NOTE — PLAN OF CARE
Problem: Prexisting or High Potential for Compromised Skin Integrity  Goal: Skin integrity is maintained or improved  Description: INTERVENTIONS:  - Identify patients at risk for skin breakdown  - Assess and monitor skin integrity  - Assess and monitor nutrition and hydration status  - Monitor labs   - Assess for incontinence   - Turn and reposition patient  - Assist with mobility/ambulation  - Relieve pressure over bony prominences  - Avoid friction and shearing  - Provide appropriate hygiene as needed including keeping skin clean and dry  - Evaluate need for skin moisturizer/barrier cream  - Collaborate with interdisciplinary team   - Patient/family teaching  - Consider wound care consult   Outcome: Progressing     Problem: MOBILITY - ADULT  Goal: Maintain or return to baseline ADL function  Description: INTERVENTIONS:  -  Assess patient's ability to carry out ADLs; assess patient's baseline for ADL function and identify physical deficits which impact ability to perform ADLs (bathing, care of mouth/teeth, toileting, grooming, dressing, etc.)  - Assess/evaluate cause of self-care deficits   - Assess range of motion  - Assess patient's mobility; develop plan if impaired  - Assess patient's need for assistive devices and provide as appropriate  - Encourage maximum independence but intervene and supervise when necessary  - Involve family in performance of ADLs  - Assess for home care needs following discharge   - Consider OT consult to assist with ADL evaluation and planning for discharge  - Provide patient education as appropriate  Outcome: Progressing  Goal: Maintains/Returns to pre admission functional level  Description: INTERVENTIONS:  - Perform BMAT or MOVE assessment daily.   - Set and communicate daily mobility goal to care team and patient/family/caregiver. - Collaborate with rehabilitation services on mobility goals if consulted  - Perform Range of Motion  times a day.   - Reposition patient every hours.  - Dangle patient  times a day  - Stand patient times a day  - Ambulate patient  times a day  - Out of bed to chair  times a day   - Out of bed for meals  times a day  - Out of bed for toileting  - Record patient progress and toleration of activity level   Outcome: Progressing     Problem: PAIN - ADULT  Goal: Verbalizes/displays adequate comfort level or baseline comfort level  Description: Interventions:  - Encourage patient to monitor pain and request assistance  - Assess pain using appropriate pain scale  - Administer analgesics based on type and severity of pain and evaluate response  - Implement non-pharmacological measures as appropriate and evaluate response  - Consider cultural and social influences on pain and pain management  - Notify physician/advanced practitioner if interventions unsuccessful or patient reports new pain  Outcome: Progressing     Problem: INFECTION - ADULT  Goal: Absence or prevention of progression during hospitalization  Description: INTERVENTIONS:  - Assess and monitor for signs and symptoms of infection  - Monitor lab/diagnostic results  - Monitor all insertion sites, i.e. indwelling lines, tubes, and drains  - Monitor endotracheal if appropriate and nasal secretions for changes in amount and color  - [place] appropriate cooling/warming therapies per order  - Administer medications as ordered  - Instruct and encourage patient and family to use good hand hygiene technique  - Identify and instruct in appropriate isolation precautions for identified infection/condition  Outcome: Progressing  Goal: Absence of fever/infection during neutropenic period  Description: INTERVENTIONS:  - Monitor WBC    Outcome: Progressing     Problem: SAFETY ADULT  Goal: Maintain or return to baseline ADL function  Description: INTERVENTIONS:  -  Assess patient's ability to carry out ADLs; assess patient's baseline for ADL function and identify physical deficits which impact ability to perform ADLs (bathing, care of mouth/teeth, toileting, grooming, dressing, etc.)  - Assess/evaluate cause of self-care deficits   - Assess range of motion  - Assess patient's mobility; develop plan if impaired  - Assess patient's need for assistive devices and provide as appropriate  - Encourage maximum independence but intervene and supervise when necessary  - Involve family in performance of ADLs  - Assess for home care needs following discharge   - Consider OT consult to assist with ADL evaluation and planning for discharge  - Provide patient education as appropriate  Outcome: Progressing  Goal: Maintains/Returns to pre admission functional level  Description: INTERVENTIONS:  - Perform BMAT or MOVE assessment daily.   - Set and communicate daily mobility goal to care team and patient/family/caregiver. - Collaborate with rehabilitation services on mobility goals if consulted  - Perform Range of Motion  times a day. - Reposition patient every  hours.   - Dangle patient  times a day  - Stand patient times a day  - Ambulate patient  times a day  - Out of bed to chair  times a day   - Out of bed for meals  times a day  - Out of bed for toileting  - Record patient progress and toleration of activity level   Outcome: Progressing  Goal: Patient will remain free of falls  Description: INTERVENTIONS:  - Educate patient/family on patient safety including physical limitations  - Instruct patient to call for assistance with activity   - Consult OT/PT to assist with strengthening/mobility   - Keep Call bell within reach  - Keep bed low and locked with side rails adjusted as appropriate  - Keep care items and personal belongings within reach  - Initiate and maintain comfort rounds  - Make Fall Risk Sign visible to staff  - Offer Toileting every  Hours, in advance of need  - Initiate/Maintain alarm  - Obtain necessary fall risk management equipment  - Apply yellow socks and bracelet for high fall risk patients  - Consider moving patient to room near nurses station  Outcome: Progressing     Problem: DISCHARGE PLANNING  Goal: Discharge to home or other facility with appropriate resources  Description: INTERVENTIONS:  - Identify barriers to discharge w/patient and caregiver  - Arrange for needed discharge resources and transportation as appropriate  - Identify discharge learning needs (meds, wound care, etc.)  - Arrange for interpretive services to assist at discharge as needed  - Refer to Case Management Department for coordinating discharge planning if the patient needs post-hospital services based on physician/advanced practitioner order or complex needs related to functional status, cognitive ability, or social support system  Outcome: Progressing     Problem: Knowledge Deficit  Goal: Patient/family/caregiver demonstrates understanding of disease process, treatment plan, medications, and discharge instructions  Description: Complete learning assessment and assess knowledge base.   Interventions:  - Provide teaching at level of understanding  - Provide teaching via preferred learning methods  Outcome: Progressing     Problem: CARDIOVASCULAR - ADULT  Goal: Maintains optimal cardiac output and hemodynamic stability  Description: INTERVENTIONS:  - Monitor I/O, vital signs and rhythm  - Monitor for S/S and trends of decreased cardiac output  - Administer and titrate ordered vasoactive medications to optimize hemodynamic stability  - Assess quality of pulses, skin color and temperature  - Assess for signs of decreased coronary artery perfusion  - Instruct patient to report change in severity of symptoms  Outcome: Progressing  Goal: Absence of cardiac dysrhythmias or at baseline rhythm  Description: INTERVENTIONS:  - Continuous cardiac monitoring, vital signs, obtain 12 lead EKG if ordered  - Administer antiarrhythmic and heart rate control medications as ordered  - Monitor electrolytes and administer replacement therapy as ordered  Outcome: Progressing Problem: Nutrition/Hydration-ADULT  Goal: Nutrient/Hydration intake appropriate for improving, restoring or maintaining nutritional needs  Description: Monitor and assess patient's nutrition/hydration status for malnutrition. Collaborate with interdisciplinary team and initiate plan and interventions as ordered. Monitor patient's weight and dietary intake as ordered or per policy. Utilize nutrition screening tool and intervene as necessary. Determine patient's food preferences and provide high-protein, high-caloric foods as appropriate.      INTERVENTIONS:  - Monitor oral intake, urinary output, labs, and treatment plans  - Assess nutrition and hydration status and recommend course of action  - Evaluate amount of meals eaten  - Assist patient with eating if necessary   - Allow adequate time for meals  - Recommend/ encourage appropriate diets, oral nutritional supplements, and vitamin/mineral supplements  - Order, calculate, and assess calorie counts as needed  - Recommend, monitor, and adjust tube feedings and TPN/PPN based on assessed needs  - Assess need for intravenous fluids  - Provide specific nutrition/hydration education as appropriate  - Include patient/family/caregiver in decisions related to nutrition  Outcome: Progressing     Problem: SAFETY,RESTRAINT: NV/NON-SELF DESTRUCTIVE BEHAVIOR  Goal: Remains free of harm/injury (restraint for non violent/non self-detsructive behavior)  Description: INTERVENTIONS:  - Instruct patient/family regarding restraint use   - Assess and monitor physiologic and psychological status   - Provide interventions and comfort measures to meet assessed patient needs   - Identify and implement measures to help patient regain control  - Assess readiness for release of restraint   Outcome: Progressing  Goal: Returns to optimal restraint-free functioning  Description: INTERVENTIONS:  - Assess the patient's behavior and symptoms that indicate continued need for restraint  - Identify and implement measures to help patient regain control  - Assess readiness for release of restraint   Outcome: Progressing

## 2023-09-02 NOTE — QUICK NOTE
Spoke with daughter, Grzegorz Rankin, and Adriana's , outside patient's room. All questions answered. Gave updates and current plan. Spoke at length about patient's CODE STATUS. Per Grzegorz Rankin and patient's niece, Steve Pal, they believe that the patient would wish to be DNR/DNI, however she is unsure. She said that she will talk with the patient, her brother, and Steve [place] to decide the best course of action with regard to [address] [place] [address]. At this time, patient will remain Level 1 Full Code. Of note, Adriana's phone number is correct in the system, however, calls appear to be going straight to . An attempt to reach Grzegorz Rankin by phone was made earlier this afternoon prior to the above discussion, and a VM was left for callback. Grzegorz Rankin states that she will regularly check her VMs and call back as needed.

## 2023-09-02 NOTE — ASSESSMENT & PLAN NOTE
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 · S/p Joint explantation of right total hip prosthesis and placement of abx beads on 6/30/23 due to spontaneous drainage and abscess formation. Antibiotic beads were placed at time of procedure. Patient was placed on 6 week abx course of IV vanc which was switched to doxycycline. · Patient developed sinus tract and wound dehiscence. S/p I&D and washout on 8/29/23. · Ortho following appreciate recommendations  · ID following, Ortho following, appreciate recommendations. · PICC placed: 8/29  · Antibiotics started on 8/28    · WBC downtrending and wnl as of 9/2    [facility]     Results from last 7 days   Lab Units 09/02/23  0453 09/01/23  0440 08/31/23  0423 08/30/23  0425 08/29/23  0443 08/28/23  2354 08/28/23  1659   WBC Thousand/uL 9.44 11.71* 9.86 9.72 9.24 9.62 12.35*   NEUTROS ABS Thousands/µL 7.17  --  8.71* 7.68* 6.74 7.55 9.60*         Plan:   · ID recommendations:  · F/U blood cultures 8/30/23: negative for now 48 hours as of 9/1  · F/U cultures from OR  · Pseudomonas aeruoginosa  · Cefepime 1g IV Q12H (started 8/30/23)  · Will need 6 week iv abx possibly followed by PO pending clinical course. Weekly CBC w/ diff, ESR, and CR while on IV abx.    · F/U outpatient with ID 2 weeks post D/C  · Ortho recommendations  · Remain non-weight bearing/ non- ambulatory  · Dressing changes PRN  · F/u with recs for dispo per Ortho and ID

## 2023-09-02 NOTE — ASSESSMENT & PLAN NOTE
Home medications: Torsemide 40 mg daily    Systolic (94ADC), TFX:000 , Min:106 , KWC:762   Diastolic (27LEQ), UGD:78, Min:49, Max:86  - Most recent BP Blood Pressure: 124/57     Currently stable and well-controlled.     PLAN:  · Goal BP <130/80 per cardiology  · MAP >65  · Continue home torsemide 40 mg daily  · Consider Lasix 40 mg PRN

## 2023-09-02 NOTE — ASSESSMENT & PLAN NOTE
Iron Panel        Lab Results   Component Value Date    CONCFE 20 06/12/2020    CONCFE 28 03/20/2019    TIBC 347 06/12/2020    TIBC 293 03/20/2019    IRON 68 06/12/2020    IRON 82 03/20/2019    FERRITIN 34 06/12/2020    FERRITIN 169 03/20/2019     Hgb / Hct       Results from last 7 days   Lab Units 09/02/23  0453 09/01/23  0440 08/31/23  0423 08/30/23  1122 08/30/23  0425 08/29/23  1423 08/29/23  0443 08/28/23  2354 08/28/23  1659   HEMOGLOBIN g/dL 7.9* 8.8* 9.0* 8.6* 7.0* 9.1* 6.7* 7.0* 7.7*   HEMATOCRIT % 25.0* 27.5* 27.0*  --  21.3*  --  21.1* 21.8* 25.8*         S/p transfusion 8/29 and 8/30; total of two units of PRBC's  Was on iron supplements at home; ferrous sulfate 325 daily. Hgb continues to downtrend as of 9/2.       Plan:  · Monitor CBC; replete for hgb <7.0   · Continue iron supplementation   · Recommended for CBC w/diff weekly per ID

## 2023-09-02 NOTE — ASSESSMENT & PLAN NOTE
Wt Readings from Last 3 Encounters:   09/02/23 79.9 kg (176 lb 2.4 oz)   06/28/23 76.2 kg (168 lb)   06/16/23 76.6 kg (168 lb 12.8 oz)       Intake/Output Summary (Last 24 hours) at 9/2/2023 0852  Last data filed at 9/2/2023 0851  Gross per 24 hour   Intake 240 ml   Output 389 ml   Net -149 ml   I/O last 24 hours: In: 240 [P.O.:240]  Out: 46 [Urine:390]    - Last Echo 6/2023: LV cavity size is normal. Wall thickness is normal. There is concentric remodeling. LVEF 60%. Systolic function is normal. Basal inferoseptal segment is hypokinetic. RV nd RA mildly dilated. - Most recent Echo 8/31/23: LVEF 65%, mild-mod aortic, mitral and tricuspid regurgitaton. No wall motion abnormalities. Normal wall thickness. Plan:  · Continue home medication, torsemide 40 mg daily.  9/1>>> switched to IV lasix 40mg due to pt not able to tolerate PO.   · Placed back on PO Torsemide for 9/2  · Monitor accurate I/O's

## 2023-09-02 NOTE — ASSESSMENT & PLAN NOTE
Lab Results   Component Value Date    EGFR 43 09/02/2023    EGFR 42 09/01/2023    EGFR 45 08/31/2023    CREATININE 1.12 09/02/2023    CREATININE 1.15 09/01/2023    CREATININE 1.09 08/31/2023     Nephrology following; appreciate recommendations  Baseline Cr: 0.9-1.1      Plan:  · Avoid nephrotoxic agents  · Monitor with daily BMP

## 2023-09-02 NOTE — ASSESSMENT & PLAN NOTE
Malnutrition Findings:   Adult Malnutrition type: Chronic illness  Adult Degree of Malnutrition: Malnutrition of moderate degree  Malnutrition Characteristics: Fat loss, Muscle loss                  360 Statement: moderate pro, beltran malnutrition d/t conditions as evidence by moderate fat and muscle depletion at temples, clavicles, orbitals, treated currently with TF, pending ST evaluation for potential diet advancement, most likely will benefit from oral nutrition supplements, can start with ensure compact any flavor BID. BMI Findings: Body mass index is 34.4 kg/m².        Assessment:   Moderate protein calorie malnutrition as evidenced by recent weight loss, decreased PO intake  in the setting of multiple comorbidities    Plan:  · Nutrition following, appreciate recs  · On puree and thin liquid diet per SLP, as well as nutrition supplements since 9/1

## 2023-09-02 NOTE — PROGRESS NOTES
Progress Notes - Family Medicine Residency, Tisha Coulter 9/2/1933, 80 y.o. female. 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 · S/p Joint explantation of right total hip prosthesis and placement of abx beads on 6/30/23 due to spontaneous drainage and abscess formation. Antibiotic beads were placed at time of procedure. Patient was placed on 6 week abx course of IV vanc which was switched to doxycycline. · Patient developed sinus tract and wound dehiscence. S/p I&D and washout on 8/29/23. · Ortho following appreciate recommendations  · ID following, Ortho following, appreciate recommendations. · PICC placed: 8/29  · Antibiotics started on 8/28    · WBC downtrending and wnl as of 9/2    [facility]     Results from last 7 days   Lab Units 09/02/23  0453 09/01/23  0440 08/31/23  0423 08/30/23  0425 08/29/23  0443 08/28/23  2354 08/28/23  1659   WBC Thousand/uL 9.44 11.71* 9.86 9.72 9.24 9.62 12.35*   NEUTROS ABS Thousands/µL 7.17  --  8.71* 7.68* 6.74 7.55 9.60*         Plan:   · ID recommendations:  · F/U blood cultures 8/30/23: negative for now 48 hours as of 9/1  · F/U cultures from OR  · Pseudomonas aeruoginosa  · Cefepime 1g IV Q12H (started 8/30/23)  · Will need 6 week iv abx possibly followed by PO pending clinical course. Weekly CBC w/ diff, ESR, and CR while on IV abx.    · F/U outpatient with ID 2 weeks post D/C  · Ortho recommendations  · Remain non-weight bearing/ non- ambulatory  · Dressing changes PRN  · F/u with recs for dispo per Ortho and ID      Altered mental status  Assessment & Plan  - Episode of AMS on morning of 8/31/23 for minimal responsiveness. Much improved 9/1 and 9/2; AO x3.  - CTH 8/31/23: no abnormalities  - Per ICU notes, patient difficult to arouse, requires painful stimulation >>> improved 9/1. Responsive to voice and verbal inquiry. 9/2: continues to be responsive and alert to voice. - Episodes of hypoglycemia during hospital stay  - Palliative care consult ordered by ICU>>> recommended goals of care discussion with family and as of 8/31 determined that the pt does not have capacity. However, mental status much improved 9/1 and would benefit from follow up discussion with pt. 9/2: pt is AOx3 and able to carry conversation; pt requesting the daughter to be called  - Code status: Level 1  - Echo performed 8/31/23: EF 65%, mild-mod Aortic, Mitral, Tricuspid regurgitation. No wall motion abnormalities. Normal wall thickness. PLAN:  · Neuro checks per shift  · Fall precautions  · Palliative Care recommendations:  · [address] [place] conversation ongoing  · Reach out to daughter about code status    Benign essential HTN  Assessment & Plan  Home medications: Torsemide 40 mg daily    Systolic (68UZZ), ESU:701 , Min:106 , KZC:456   Diastolic (29AXP), QLM:41, Min:49, Max:86  - Most recent BP Blood Pressure: 124/57     Currently stable and well-controlled. PLAN:  · Goal BP <130/80 per cardiology  · MAP >65  · Continue home torsemide 40 mg daily  · Consider Lasix 40 mg PRN    Chronic heart failure with preserved ejection fraction [facility])  Assessment & Plan    Wt Readings from Last 3 Encounters:   09/02/23 79.9 kg (176 lb 2.4 oz)   06/28/23 76.2 kg (168 lb)   06/16/23 76.6 kg (168 lb 12.8 oz)       Intake/Output Summary (Last 24 hours) at 9/2/2023 0852  Last data filed at 9/2/2023 0851  Gross per 24 hour   Intake 240 ml   Output 389 ml   Net -149 ml   I/O last 24 hours:   In: 240 [P.O.:240]  Out: 390 [Urine:390]    - Last Echo 6/2023: LV cavity size is normal. Wall thickness is normal. There is concentric remodeling. LVEF 60%. Systolic function is normal. Basal inferoseptal segment is hypokinetic. RV nd RA mildly dilated. - Most recent Echo 8/31/23: LVEF 65%, mild-mod aortic, mitral and tricuspid regurgitaton. No wall motion abnormalities. Normal wall thickness. Plan:  · Continue home medication, torsemide 40 mg daily. 9/1>>> switched to IV lasix 40mg due to pt not able to tolerate PO.   · Placed back on PO Torsemide for 9/2  · Monitor accurate I/O's                 Anemia  Assessment & Plan  Iron Panel        Lab Results   Component Value Date    CONCFE 20 06/12/2020    CONCFE 28 03/20/2019    TIBC 347 06/12/2020    TIBC 293 03/20/2019    IRON 68 06/12/2020    IRON 82 03/20/2019    FERRITIN 34 06/12/2020    FERRITIN 169 03/20/2019     Hgb / Hct       Results from last 7 days   Lab Units 09/02/23  0453 09/01/23  0440 08/31/23  0423 08/30/23  1122 08/30/23  0425 08/29/23  1423 08/29/23  0443 08/28/23  2354 08/28/23  1659   HEMOGLOBIN g/dL 7.9* 8.8* 9.0* 8.6* 7.0* 9.1* 6.7* 7.0* 7.7*   HEMATOCRIT % 25.0* 27.5* 27.0*  --  21.3*  --  21.1* 21.8* 25.8*         S/p transfusion 8/29 and 8/30; total of two units of PRBC's  Was on iron supplements at home; ferrous sulfate 325 daily. Hgb continues to downtrend as of 9/2. Plan:  · Monitor CBC; replete for hgb <7.0   · Continue iron supplementation   · Recommended for CBC w/diff weekly per ID    Chronic atrial fibrillation (HCC)  Assessment & Plan  · S/p AV node ablation in 8/2018 as well as placement of PPM  · On warfarin 3 mg daily for [facility] at home with goal INR of 2-3. · CHADS-VASC score: 5 points (Age, Female, CHF, HTN)    Currently stable. Regular rhythm and rate-controlled. Plan:  · Monitor HR  · Continue heparin DVT ppx.  Will need bridge to coumadin prior to discharge OR consider Eliquis/Xarelto -- CM price checking    Malnutrition ([address])  Assessment & Plan  Malnutrition Findings:   Adult Malnutrition type: Chronic illness  Adult Degree of Malnutrition: Malnutrition of moderate degree  Malnutrition Characteristics: Fat loss, Muscle loss                  360 Statement: moderate pro, beltran malnutrition d/t conditions as evidence by moderate fat and muscle depletion at temples, clavicles, orbitals, treated currently with TF, pending ST evaluation for potential diet advancement, most likely will benefit from oral nutrition supplements, can start with ensure compact any flavor BID. BMI Findings: Body mass index is 34.4 kg/m². - Patient on puree and thin liquids per SLP as of 9/1  - ICU ordered nutritional consult; following      PLAN:  · Continue puree and thin liquids and nutrition supplements  · Nutrition team following; appreciate recommendations      Hyperglycemia  Assessment & Plan  - Last A1C 2022: 5.3%  - Hyperglycemia during hospitalization with 2 episodes of hypoglycemia  - No known history of Diabetes  - Not on diabetic medications at home    BMP Gluc       Results from last 7 days   Lab Units 09/02/23  0453 09/01/23  0440 08/31/23  0423 08/30/23  2112 08/30/23  0000 08/29/23  1806 08/29/23  0443   GLUCOSE RANDOM mg/dL 81 111 186* 204* 163* 424* 83    or POC BG       Results from last 7 days   Lab Units 09/02/23  0605 09/01/23  2356 09/01/23  2052 09/01/23  2004 09/01/23  1232 09/01/23  0645 09/01/23  0632   POC GLUCOSE mg/dl 144* 62* 128 72 112 204* 60*     PLAN:  · Sliding Scale Insulin Algorithm #3  · Glucose checks q6 hours  · Monitor for hypoglycemia  · Obtain glucose readings FROM PICC LINE! Gastroesophageal reflux disease without esophagitis  Assessment & Plan  Symptoms controlled at home with Protonix 40mg daily.     Continue home medication     Stage 3 chronic kidney disease [facility])  Assessment & Plan  Lab Results   Component Value Date    EGFR 43 09/02/2023    EGFR 42 09/01/2023    EGFR 45 08/31/2023    CREATININE 1.12 09/02/2023    CREATININE 1.15 09/01/2023    CREATININE 1.09 08/31/2023     Nephrology following; appreciate recommendations  Baseline Cr: 0.9-1.1      Plan:  · Avoid nephrotoxic agents  · Monitor with daily BMP     Multiple wounds  Assessment & Plan  - Sacral wound, heel wound  - Wound care consult placed by ICU    PLAN:   · Wound care recommendations:  · Cleanse sacro-buttocks with soap and water. Apply Calazime Cream to B/L Sacro-Buttocks BID and PRN episodes of incontinence  · Turn/reposition q2h or when medically stable for pressure re-distribution on skin . · Elevate heels to offload pressure. · Moisturize skin daily with skin nourishing cream  · Ehob cushion in chair when out of bed. · B/L Heels: cleanse area and pat dry. Apply allevyn foam dressings to area. Deo with P for Prevention and change every 3 Days or PRN. Peel back and inspect skin Q-shift. · Nursing Communication: Please order patient P-500 Specialty Low Air Loss Mattress once transferred out of CCU.        Status post right hip replacement  Assessment & Plan  Hx of right MAIA with revision in 2019. C/B chronic dislocation and displacement. S/p Joint explantation and placement of abx beads on 6/30/23 due to spontaneous drainage and abscess formation. Pt continues to have pain at right hip. Plan:  -continue pain regimen: Tylenol 975mg dimitrios q8; Oxy 2.5/5 PRN q4, Dilaudid 0.2 PRN q4 for breakthrough pain,      Pacemaker  Assessment & Plan  · Hx of intolerance and bradycardia with AVN blocking agents for chronic a.fib  · S/p placement of single chamber PPM on 8/2018.      Moderate protein-calorie malnutrition ([address])  Assessment & Plan    Malnutrition Findings:   Adult Malnutrition type: Chronic illness  Adult Degree of Malnutrition: Malnutrition of moderate degree  Malnutrition Characteristics: Fat loss, Muscle loss                  360 Statement: moderate pro, beltran malnutrition d/t conditions as evidence by moderate fat and muscle depletion at temples, clavicles, orbitals, treated currently with TF, pending ST evaluation for potential diet advancement, most likely will benefit from oral nutrition supplements, can start with ensure compact any flavor BID. BMI Findings: Body mass index is 34.4 kg/m². Assessment:   Moderate protein calorie malnutrition as evidenced by recent weight loss, decreased PO intake  in the setting of multiple comorbidities    Plan:  · Nutrition following, appreciate recs  · On puree and thin liquid diet per SLP, as well as nutrition supplements since 9/1    Pain and swelling of left upper extremity  Assessment & Plan  - LUE: erythematous, edematous, and hot to the touch, concerning for possible clot vs thrombophlebitis. Weak pulses  - Doppler LUE 8/30 showed severe edema but no evidence of acute DVT  - Symptoms decreased on 9/2    PLAN:  · Continue to monitor    Hypothyroidism  Assessment & Plan  Most recent TSH (6/28/23): 6.873, T4 1.39. On Levothyroxine 112mcg daily at home. Continue home regimen. VTE Pharmacologic Prophylaxis: Heparin  VTE Mechanical Prophylaxis: sequential compression device  Diet: Diet Dysphagia/Modified Consistency; Dysphagia 1-Pureed; Thin Liquid; Sodium 2 GM; Nutrition Supplements  Code Status: Level 1  Disposition: Inpatient    Discussed with attending physician, Dr. Elli Merrill, and [facility] team.    Hospital Course:      Subjective:   Patient seen and examined at bedside this morning. No acute overnight events, and nursing states that she did not have any overt delirious episodes overnight. This morning, patient states that she feels overall okay, and knows where she is, who she is, today's date, and that it is her birthday. Patient does endorse some pain of the right hip, mostly to palpation and movement. Patient also endorses several episodes of watery bowel movements, which she says started after the antibiotics.   Nursing states that there were 3 since this morning, and several since last night. It does not appear to be related to C. Difficile. Pt did eat breakfast this morning, thin liquids/pureed foods. Patient denies chest pain, shortness of breath, N/V/abdominal pain, headache, lightheadedness, dizziness. Objective:     Vitals: Blood pressure 124/57, pulse 69, temperature (!) 97.4 °F (36.3 °C), resp. rate 18, height 5' (1.524 m), weight 79.9 kg (176 lb 2.4 oz), SpO2 99 %, not currently breastfeeding. ,Body mass index is 34.4 kg/m². Intake/Output Summary (Last 24 hours) at 9/2/2023 1038  Last data filed at 9/2/2023 0851  Gross per 24 hour   Intake 240 ml   Output 389 ml   Net -149 ml       Physical Exam  Constitutional:       General: She is not in acute distress. Appearance: Normal appearance. She is well-developed. She is not ill-appearing. HENT:      Head: Normocephalic and atraumatic. Nose: Nose normal.      Mouth/Throat:      Mouth: Mucous membranes are moist.      Pharynx: Oropharynx is clear. Eyes:      Extraocular Movements: Extraocular movements intact. Conjunctiva/sclera: Conjunctivae normal.      Pupils: Pupils are equal, round, and reactive to light. Cardiovascular:      Rate and Rhythm: Normal rate and regular rhythm. Heart sounds: Normal heart sounds. No murmur heard. No friction rub. No gallop. Pulmonary:      Effort: Pulmonary effort is normal.      Breath sounds: Normal breath sounds. Abdominal:      General: Abdomen is flat. Bowel sounds are normal. There is no distension. Palpations: Abdomen is soft. Tenderness: There is no abdominal tenderness. There is no guarding or rebound. Musculoskeletal:         General: Normal range of motion. Right hip: Tenderness present. Decreased strength. Left hip: Normal.      Comments: 1+ pitting edema b/l LE   Skin:     General: Skin is warm and dry.    Neurological:      Mental Status: She is alert and oriented to person, place, and time. Psychiatric:         Mood and Affect: Mood normal.         Behavior: Behavior normal.         Invasive Devices     Peripherally Inserted Central Catheter Line  Duration           PICC Line 08/29/23 3 days                           Lab and other studies:  I have personally reviewed pertinent reports. No results displayed because visit has over 200 results.           Recent Results (from the past 24 hour(s))   Fingerstick Glucose (POCT)    Collection Time: 09/01/23 12:32 PM   Result Value Ref Range    POC Glucose 112 65 - 140 mg/dl   Fingerstick Glucose (POCT)    Collection Time: 09/01/23  8:04 PM   Result Value Ref Range    POC Glucose 72 65 - 140 mg/dl   Fingerstick Glucose (POCT)    Collection Time: 09/01/23  8:52 PM   Result Value Ref Range    POC Glucose 128 65 - 140 mg/dl   Fingerstick Glucose (POCT)    Collection Time: 09/01/23 11:56 PM   Result Value Ref Range    POC Glucose 62 (L) 65 - 140 mg/dl   CBC and differential    Collection Time: 09/02/23  4:53 AM   Result Value Ref Range    WBC 9.44 4.31 - 10.16 Thousand/uL    RBC 2.46 (L) 3.81 - 5.12 Million/uL    Hemoglobin 7.9 (L) 11.5 - 15.4 g/dL    Hematocrit 25.0 (L) 34.8 - 46.1 %     (H) 82 - 98 fL    MCH 32.1 26.8 - 34.3 pg    MCHC 31.6 31.4 - 37.4 g/dL    RDW 18.6 (H) 11.6 - 15.1 %    MPV 9.7 8.9 - 12.7 fL    Platelets 055 (L) 180 - 390 Thousands/uL    nRBC 0 /100 WBCs    Neutrophils Relative 76 (H) 43 - 75 %    Immat GRANS % 1 0 - 2 %    Lymphocytes Relative 16 14 - 44 %    Monocytes Relative 7 4 - 12 %    Eosinophils Relative 0 0 - 6 %    Basophils Relative 0 0 - 1 %    Neutrophils Absolute 7.17 1.85 - 7.62 Thousands/µL    Immature Grans Absolute 0.08 0.00 - 0.20 Thousand/uL    Lymphocytes Absolute 1.49 0.60 - 4.47 Thousands/µL    Monocytes Absolute 0.65 0.17 - 1.22 Thousand/µL    Eosinophils Absolute 0.04 0.00 - 0.61 Thousand/µL    Basophils Absolute 0.01 0.00 - 0.10 Thousands/µL   Basic metabolic panel    Collection Time: 09/02/23  4:53 AM   Result Value Ref Range    Sodium 142 135 - 147 mmol/L    Potassium 3.1 (L) 3.5 - 5.3 mmol/L    Chloride 112 (H) 96 - 108 mmol/L    CO2 23 21 - 32 mmol/L    ANION GAP 7 mmol/L    BUN 28 (H) 5 - 25 mg/dL    Creatinine 1.12 0.60 - 1.30 mg/dL    Glucose 81 65 - 140 mg/dL    Calcium 7.5 (L) 8.4 - 10.2 mg/dL    eGFR 43 ml/min/1.73sq m   Fingerstick Glucose (POCT)    Collection Time: 09/02/23  6:05 AM   Result Value Ref Range    POC Glucose 144 (H) 65 - 140 mg/dl     Blood Culture:   Lab Results   Component Value Date    BLOODCX No Growth at 48 hrs. 08/30/2023    BLOODCX No Growth at 48 hrs. 08/30/2023   ,   Urinalysis:   Lab Results   Component Value Date    COLORU Light Orange 06/19/2023    CLARITYU Extra Turbid 06/19/2023    SPECGRAV 1.019 06/19/2023    PHUR 5.5 06/19/2023    PHUR 6.0 08/20/2016    LEUKOCYTESUR Small (A) 06/19/2023    NITRITE Negative 06/19/2023    GLUCOSEU Negative 06/19/2023    KETONESU 10 (1+) (A) 06/19/2023    BILIRUBINUR Negative 06/19/2023    BLOODU Negative 06/19/2023   ,   Urine Culture:   Lab Results   Component Value Date    URINECX >100,000 cfu/ml Escherichia coli ESBL (A) 06/15/2023    URINECX >100,000 cfu/ml Klebsiella pneumoniae (A) 06/15/2023    URINECX >100,000 cfu/ml Aerococcus urinae (A) 06/15/2023   ,   Wound Culure:   Lab Results   Component Value Date    WOUNDCULT No growth 06/28/2023         Imaging:    XR abdomen 1 view kub    Result Date: 9/1/2023  The tip of the enteric tube projects over the proximal stomach just distal to the gastroesophageal junction. Recommend advancement. Workstation performed: [id number]     IR PICC placement double lumen    Result Date: 9/1/2023  Peripherally inserted central catheter placement. Workstation performed: [id number]     XR abdomen 1 view kub    Result Date: 9/1/2023  On the final radiograph, the tip of the enteric tube projects at the stomach.  Workstation performed: [id number]     XR abdomen 1 view kub    Result Date: 9/1/2023  On the final radiograph, the tip of the enteric tube projects at the stomach. Workstation performed: [id number]     XR chest 1 view    Result Date: 9/1/2023  Feeding tube in upper esophagus, subsequently advanced into the stomach. No acute disease. Workstation performed: [id number]     XR chest portable    Result Date: 9/1/2023  Feeding tube in lower esophagus, subsequently advanced into the stomach. . Workstation performed: [id number]     CT head wo contrast    Result Date: 8/31/2023  No acute intracranial abnormality. Unchanged 2.7 cm meningioma in left parafalcine frontal vertex with slight mass effect on adjacent left anterior paramedian frontal lobe. Unchanged mild chronic microangiopathy. Workstation performed: [id number]     XR chest 1 view    Result Date: 8/30/2023  Feeding tube tip in the mid thorax along the course of the esophagus. Workstation performed: [id number]     XR abdomen 1 view kub    Result Date: 8/30/2023  Feeding tube tip projects over the gastric body. Nonobstructive bowel gas pattern as visualized. Workstation performed: [id number]     XR chest portable ICU    Result Date: 8/30/2023  Limited study, specifically assessment of the left lung base. Cannot reliably exclude left pleural effusion. No overt pulmonary edema or consolidation within limitations. Follow-up PA and lateral views recommended for any persistent or worsening symptoms.  Workstation performed: [id number]        Current Facility-Administered Medications   Medication Dose Route Frequency   • acetaminophen (TYLENOL) oral suspension 975 mg  975 mg Oral Q8H [address]   • cefepime (MAXIPIME) 1,000 mg in dextrose 5 % 50 mL IVPB  1,000 mg Intravenous Q12H   • chlorhexidine (PERIDEX) 0.12 % oral rinse 15 mL  15 mL Mouth/Throat Q12H [address]   • DULoxetine (CYMBALTA) delayed release capsule 60 mg  60 mg Oral Daily   • ferrous sulfate (MIKE-IN-SOL) oral solution 300 mg  300 mg Oral Daily   • heparin (porcine) subcutaneous injection 5,000 Units  5,000 Units Subcutaneous Q8H [address]   • HYDROmorphone HCl (DILAUDID) injection 0.2 mg  0.2 mg Intravenous Q4H PRN   • insulin lispro (HumaLOG) 100 units/mL subcutaneous injection 1-6 Units  1-6 Units Subcutaneous 4x Daily (with meals and at bedtime)   • levothyroxine (Synthroid) suspension 112 mcg  112 mcg Oral Early Morning   • lidocaine (LIDODERM) 5 % patch 1 patch  1 patch Topical Daily   • lidocaine (LIDODERM) 5 % patch 1 patch  1 patch Topical Daily   • loperamide (IMODIUM) capsule 2 mg  2 mg Oral Once   • miconazole (MICOTIN) 2 % powder 1 Application  1 Application Topical BID   • ondansetron (ZOFRAN) injection 4 mg  4 mg Intravenous Q4H PRN   • oxyCODONE (ROXICODONE) oral solution 2.5 mg  2.5 mg Oral Q4H PRN   • oxyCODONE (ROXICODONE) oral solution 5 mg  5 mg Oral Q4H PRN   • pantoprazole (PROTONIX) EC tablet 40 mg  40 mg Oral Early Morning   • polyethylene glycol (MIRALAX) packet 17 g  17 g Oral Daily PRN   • potassium chloride (K-DUR,KLOR-CON) CR tablet 40 mEq  40 mEq Oral BID   • simethicone (MYLICON) oral suspension 80 mg  80 mg Oral Q6H PRN   • sodium chloride (OCEAN) 0.65 % nasal spray 1 spray  1 spray Each Nare Q1H PRN   • torsemide (DEMADEX) tablet 40 mg  40 mg Oral Daily             Felipe Cortes DO   Family Medicine

## 2023-09-03 VITALS
SYSTOLIC BLOOD PRESSURE: 95 MMHG | TEMPERATURE: 97.8 F | BODY MASS INDEX: 34.63 KG/M2 | HEART RATE: 70 BPM | HEIGHT: 60 IN | DIASTOLIC BLOOD PRESSURE: 45 MMHG | RESPIRATION RATE: 20 BRPM | WEIGHT: 176.37 LBS | OXYGEN SATURATION: 100 %

## 2023-09-03 LAB
ANION GAP SERPL CALCULATED.3IONS-SCNC: 5 MMOL/L
BACTERIA BLD CULT: NORMAL
BACTERIA BLD CULT: NORMAL
BASOPHILS # BLD AUTO: 0.01 THOUSANDS/ÂΜL (ref 0–0.1)
BASOPHILS NFR BLD AUTO: 0 % (ref 0–1)
BUN SERPL-MCNC: 27 MG/DL (ref 5–25)
CALCIUM SERPL-MCNC: 7.3 MG/DL (ref 8.4–10.2)
CHLORIDE SERPL-SCNC: 109 MMOL/L (ref 96–108)
CO2 SERPL-SCNC: 24 MMOL/L (ref 21–32)
CREAT SERPL-MCNC: 1.15 MG/DL (ref 0.6–1.3)
EOSINOPHIL # BLD AUTO: 0.09 THOUSAND/ÂΜL (ref 0–0.61)
EOSINOPHIL NFR BLD AUTO: 1 % (ref 0–6)
ERYTHROCYTE [DISTWIDTH] IN BLOOD BY AUTOMATED COUNT: 17.8 % (ref 11.6–15.1)
GFR SERPL CREATININE-BSD FRML MDRD: 41 ML/MIN/1.73SQ M
GLUCOSE SERPL-MCNC: 103 MG/DL (ref 65–140)
GLUCOSE SERPL-MCNC: 103 MG/DL (ref 65–140)
GLUCOSE SERPL-MCNC: 106 MG/DL (ref 65–140)
GLUCOSE SERPL-MCNC: 85 MG/DL (ref 65–140)
GLUCOSE SERPL-MCNC: 88 MG/DL (ref 65–140)
HCT VFR BLD AUTO: 25.4 % (ref 34.8–46.1)
HGB BLD-MCNC: 8.3 G/DL (ref 11.5–15.4)
IMM GRANULOCYTES # BLD AUTO: 0.1 THOUSAND/UL (ref 0–0.2)
IMM GRANULOCYTES NFR BLD AUTO: 1 % (ref 0–2)
INR PPP: 1.37 (ref 0.84–1.19)
LYMPHOCYTES # BLD AUTO: 1.57 THOUSANDS/ÂΜL (ref 0.6–4.47)
LYMPHOCYTES NFR BLD AUTO: 17 % (ref 14–44)
MAGNESIUM SERPL-MCNC: 1.9 MG/DL (ref 1.9–2.7)
MCH RBC QN AUTO: 32.3 PG (ref 26.8–34.3)
MCHC RBC AUTO-ENTMCNC: 32.7 G/DL (ref 31.4–37.4)
MCV RBC AUTO: 99 FL (ref 82–98)
MONOCYTES # BLD AUTO: 0.71 THOUSAND/ÂΜL (ref 0.17–1.22)
MONOCYTES NFR BLD AUTO: 8 % (ref 4–12)
NEUTROPHILS # BLD AUTO: 6.96 THOUSANDS/ÂΜL (ref 1.85–7.62)
NEUTS SEG NFR BLD AUTO: 73 % (ref 43–75)
NRBC BLD AUTO-RTO: 0 /100 WBCS
PHOSPHATE SERPL-MCNC: 2.7 MG/DL (ref 2.3–4.1)
PLATELET # BLD AUTO: 107 THOUSANDS/UL (ref 149–390)
PMV BLD AUTO: 10 FL (ref 8.9–12.7)
POTASSIUM SERPL-SCNC: 2.8 MMOL/L (ref 3.5–5.3)
POTASSIUM SERPL-SCNC: 3.5 MMOL/L (ref 3.5–5.3)
PROTHROMBIN TIME: 17.1 SECONDS (ref 11.6–14.5)
RBC # BLD AUTO: 2.57 MILLION/UL (ref 3.81–5.12)
SODIUM SERPL-SCNC: 138 MMOL/L (ref 135–147)
WBC # BLD AUTO: 9.44 THOUSAND/UL (ref 4.31–10.16)

## 2023-09-03 PROCEDURE — 85610 PROTHROMBIN TIME: CPT

## 2023-09-03 PROCEDURE — 80048 BASIC METABOLIC PNL TOTAL CA: CPT

## 2023-09-03 PROCEDURE — 85025 COMPLETE CBC W/AUTO DIFF WBC: CPT

## 2023-09-03 PROCEDURE — 84100 ASSAY OF PHOSPHORUS: CPT

## 2023-09-03 PROCEDURE — 83735 ASSAY OF MAGNESIUM: CPT

## 2023-09-03 PROCEDURE — 99232 SBSQ HOSP IP/OBS MODERATE 35: CPT | Performed by: FAMILY MEDICINE

## 2023-09-03 PROCEDURE — 84132 ASSAY OF SERUM POTASSIUM: CPT

## 2023-09-03 PROCEDURE — 82948 REAGENT STRIP/BLOOD GLUCOSE: CPT

## 2023-09-03 RX ORDER — WARFARIN SODIUM 2.5 MG/1
2.5 TABLET ORAL
Status: DISCONTINUED | OUTPATIENT
Start: 2023-09-03 | End: 2023-09-05 | Stop reason: HOSPADM

## 2023-09-03 RX ORDER — POTASSIUM CHLORIDE 20 MEQ/1
40 TABLET, EXTENDED RELEASE ORAL ONCE
Status: COMPLETED | OUTPATIENT
Start: 2023-09-03 | End: 2023-09-03

## 2023-09-03 RX ORDER — POTASSIUM CHLORIDE 29.8 MG/ML
40 INJECTION INTRAVENOUS ONCE
Status: COMPLETED | OUTPATIENT
Start: 2023-09-03 | End: 2023-09-03

## 2023-09-03 RX ORDER — POTASSIUM CHLORIDE 20MEQ/15ML
40 LIQUID (ML) ORAL DAILY
Status: DISCONTINUED | OUTPATIENT
Start: 2023-09-04 | End: 2023-09-04

## 2023-09-03 RX ORDER — POTASSIUM CHLORIDE 20MEQ/15ML
40 LIQUID (ML) ORAL ONCE
Status: DISCONTINUED | OUTPATIENT
Start: 2023-09-03 | End: 2023-09-03

## 2023-09-03 RX ORDER — LOPERAMIDE HYDROCHLORIDE 2 MG/1
2 CAPSULE ORAL ONCE
Status: COMPLETED | OUTPATIENT
Start: 2023-09-03 | End: 2023-09-05

## 2023-09-03 RX ORDER — MAGNESIUM SULFATE HEPTAHYDRATE 40 MG/ML
2 INJECTION, SOLUTION INTRAVENOUS ONCE
Status: COMPLETED | OUTPATIENT
Start: 2023-09-03 | End: 2023-09-03

## 2023-09-03 RX ORDER — LOPERAMIDE HYDROCHLORIDE 2 MG/1
2 CAPSULE ORAL DAILY PRN
Status: DISCONTINUED | OUTPATIENT
Start: 2023-09-03 | End: 2023-09-04

## 2023-09-03 RX ADMIN — OXYCODONE HYDROCHLORIDE 5 MG: 5 SOLUTION ORAL at 21:02

## 2023-09-03 RX ADMIN — MAGNESIUM SULFATE HEPTAHYDRATE 2 G: 40 INJECTION, SOLUTION INTRAVENOUS at 10:16

## 2023-09-03 RX ADMIN — LOPERAMIDE HYDROCHLORIDE 2 MG: 2 CAPSULE ORAL at 10:19

## 2023-09-03 RX ADMIN — ACETAMINOPHEN 975 MG: 650 SUSPENSION ORAL at 21:01

## 2023-09-03 RX ADMIN — MICONAZOLE NITRATE 1 APPLICATION: 20 POWDER TOPICAL at 08:34

## 2023-09-03 RX ADMIN — ACETAMINOPHEN 975 MG: 650 SUSPENSION ORAL at 14:47

## 2023-09-03 RX ADMIN — TORSEMIDE 40 MG: 20 TABLET ORAL at 08:35

## 2023-09-03 RX ADMIN — CEFEPIME 1000 MG: 1 INJECTION, POWDER, FOR SOLUTION INTRAMUSCULAR; INTRAVENOUS at 21:02

## 2023-09-03 RX ADMIN — LIDOCAINE 1 PATCH: 700 PATCH TOPICAL at 08:50

## 2023-09-03 RX ADMIN — CHLORHEXIDINE GLUCONATE 15 ML: 1.2 SOLUTION ORAL at 08:34

## 2023-09-03 RX ADMIN — LIDOCAINE 1 PATCH: 700 PATCH TOPICAL at 08:49

## 2023-09-03 RX ADMIN — OXYCODONE HYDROCHLORIDE 2.5 MG: 5 SOLUTION ORAL at 10:25

## 2023-09-03 RX ADMIN — POTASSIUM CHLORIDE 40 MEQ: 1500 TABLET, EXTENDED RELEASE ORAL at 08:32

## 2023-09-03 RX ADMIN — CHLORHEXIDINE GLUCONATE 15 ML: 1.2 SOLUTION ORAL at 21:02

## 2023-09-03 RX ADMIN — MICONAZOLE NITRATE 1 APPLICATION: 20 POWDER TOPICAL at 18:13

## 2023-09-03 RX ADMIN — PANTOPRAZOLE SODIUM 40 MG: 40 TABLET, DELAYED RELEASE ORAL at 05:58

## 2023-09-03 RX ADMIN — CEFEPIME 1000 MG: 1 INJECTION, POWDER, FOR SOLUTION INTRAMUSCULAR; INTRAVENOUS at 13:12

## 2023-09-03 RX ADMIN — WARFARIN SODIUM 2.5 MG: 2.5 TABLET ORAL at 17:23

## 2023-09-03 RX ADMIN — LEVOTHYROXINE SODIUM 112 MCG: 100 TABLET ORAL at 08:36

## 2023-09-03 RX ADMIN — ACETAMINOPHEN 975 MG: 650 SUSPENSION ORAL at 05:58

## 2023-09-03 RX ADMIN — HEPARIN SODIUM 5000 UNITS: 5000 INJECTION INTRAVENOUS; SUBCUTANEOUS at 05:58

## 2023-09-03 RX ADMIN — POTASSIUM CHLORIDE 40 MEQ: 1500 TABLET, EXTENDED RELEASE ORAL at 17:25

## 2023-09-03 RX ADMIN — DULOXETINE HYDROCHLORIDE 60 MG: 60 CAPSULE, DELAYED RELEASE ORAL at 08:33

## 2023-09-03 RX ADMIN — Medication 300 MG: at 08:34

## 2023-09-03 RX ADMIN — POTASSIUM CHLORIDE 40 MEQ: 29.8 INJECTION, SOLUTION INTRAVENOUS at 08:34

## 2023-09-03 NOTE — QUICK NOTE
ID Plan of Care Note:    Tissue cultures from 8/28/23 are growing Pseudomonas aeruginosa. Patient is undergoing treatment for right hip PJI infection/acetabular osteomyelitis with cefepime. She is afebrile, no leukocytosis, creatinine is stable. Recommendations:  -Continue IV cefepime 1g every 12 hours for a 6-week course from last I&D for right hip PJI infection/acetabular osteomyelitis, through 10/8/2023  -Check weekly CBC with differential, creatinine while on IV antibiotics  -We will schedule ID follow-up 2 weeks after discharge  -Remove PICC after last dose of IV antibiotics    ID will see again 9/5/23 if she remains inpatient.  Please call with questions

## 2023-09-03 NOTE — DISCHARGE SUMMARY
DISCHARGE SUMMARY - Family Medicine Residency, [address] [facility] 9/2/1933, 80 y.o. female. MRN: [id number]    Unit/Bed#: [facility] 507-01 Encounter: [id number]  Primary Care Provider: Nick Parker DO      Admission Date: 8/28/23  Discharge Date: 09/03/23  Length of Stay: 6 days  Diagnosis:   Principal Problem:    Infection of right prosthetic hip joint ([address])  Active Problems:    Altered mental status    Chronic heart failure with preserved ejection fraction (HCC)    Chronic atrial fibrillation (HCC)    Anemia    Pacemaker    Hypothyroidism    Status post right hip replacement    Stage 3 chronic kidney disease (HCC)    Benign essential HTN    Gastroesophageal reflux disease without esophagitis    Hyperglycemia    Malnutrition (HCC)    Pain and swelling of left upper extremity    Multiple wounds    Moderate protein-calorie malnutrition ([address])        ASSESSMENTS & PLANS:   Plans discussed with [facility] team and finalization is pending attending physician attestation. * Infection of right prosthetic hip joint [facility])  Assessment & Plan  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 · S/p Joint explantation of right total hip prosthesis and placement of abx beads on 6/30/23 due to spontaneous drainage and abscess formation. Antibiotic beads were placed at time of procedure. Patient was placed on 6 week abx course of IV vanc which was switched to doxycycline. · Patient developed sinus tract and wound dehiscence. S/p I&D and washout on 8/29/23. · Ortho following appreciate recommendations  · ID following, Ortho following, appreciate recommendations.    · PICC placed: 8/29  · Antibiotics started on 8/28      Plan:   · ID recommendations:  · F/U blood cultures 8/30/23: negative for now 72 hours as of 9/3  · F/U cultures from OR  · Pseudomonas aeruoginosa  · Cefepime 1g IV Q12H (started 8/30/23)  · Will need 6 week iv abx possibly followed by PO pending clinical course. Weekly CBC w/ diff, ESR, and CR while on IV abx. · F/U outpatient with ID 2 weeks post D/C  · Ortho recommendations  · Remain non-weight bearing/ non- ambulatory  · Dressing changes PRN      Altered mental status  Assessment & Plan  - Episode of AMS on morning of 8/31/23 for minimal responsiveness. Much improved 9/3 AO x3.  - CTH 8/31/23: no abnormalities  - Per ICU notes, patient difficult to arouse, requires painful stimulation >>> improved 9/3. Responsive to voice and verbal inquiry   - Episodes of hypoglycemia during hospital stay  - Palliative care consult ordered by ICU>>> recommended goals of care discussion with family and as of 8/31 determined that the pt does not have capacity. Follow up discussions regarding code status are ongoing   - Code status: Level 1  - Echo performed 8/31/23: EF 65%, mild-mod Aortic, Mitral, Tricuspid regurgitation. No wall motion abnormalities. Normal wall thickness. PLAN:  · Neuro checks per shift  · Fall precautions  · Palliative Care recommendations:  · [address] [place] conversation ongoing    Chronic heart failure with preserved ejection fraction [facility])  Assessment & Plan  Wt Readings from Last 3 Encounters:   09/01/23 81.4 kg (179 lb 7.3 oz)   06/28/23 76.2 kg (168 lb)   06/16/23 76.6 kg (168 lb 12.8 oz)       Intake/Output Summary (Last 24 hours) at 9/1/2023 0834  Last data filed at 9/1/2023 0701  Gross per 24 hour   Intake 1344 ml   Output 1151 ml   Net 193 ml   I/O last 24 hours: In: 2402 [NG/GT:395; IV Piggyback:610; Feedings:379]  Out: 1301 [Urine:1301]    - Last Echo 6/2023: LV cavity size is normal. Wall thickness is normal. There is concentric remodeling. LVEF 60%. Systolic function is normal. Basal inferoseptal segment is hypokinetic. RV nd RA mildly dilated. - Most recent Echo 8/31/23: LVEF 65%, mild-mod aortic, mitral and tricuspid regurgitaton. No wall motion abnormalities. Normal wall thickness.      Plan:  · Continue home medication, torsemide 40 mg daily.  · Monitor accurate I/O's            Chronic atrial fibrillation [facility])  Assessment & Plan  · S/p AV node ablation in 8/2018 as well as placement of PPM  · On warfarin 3 mg daily for [facility] at home with goal INR of 2-3. · CHADS-VASC score: 5 points (Age, Female, CHF, HTN)  · Warfarin held on admission due to ortho procedure. Currently stable    Plan:  · Monitor HR  · Continue heparin. Will price check xarelto and eliquis prior to discharge. Anemia  Assessment & Plan  Iron Panel        Lab Results   Component Value Date    CONCFE 20 06/12/2020    CONCFE 28 03/20/2019    TIBC 347 06/12/2020    TIBC 293 03/20/2019    IRON 68 06/12/2020    IRON 82 03/20/2019    FERRITIN 34 06/12/2020    FERRITIN 169 03/20/2019     S/p transfusion 8/29 and 8/30; total of two units of PRBC's  Was on iron supplements at home; ferrous sulfate 325 daily. Plan:  · Monitor CBC; replete for hgb <7.0   · Continue iron supplementation   · Recommended for CBC w/diff weekly per ID    Moderate protein-calorie malnutrition (HCC)  Assessment & Plan    Malnutrition Findings:   Adult Malnutrition type: Chronic illness  Adult Degree of Malnutrition: Malnutrition of moderate degree  Malnutrition Characteristics: Fat loss, Muscle loss                  360 Statement: moderate pro, beltran malnutrition d/t conditions as evidence by moderate fat and muscle depletion at temples, clavicles, orbitals, treated currently with TF, pending ST evaluation for potential diet advancement, most likely will benefit from oral nutrition supplements, can start with ensure compact any flavor BID. BMI Findings: Body mass index is 35.05 kg/m². Assessment:   Moderate protein calorie malnutrition as evidenced by recent weight loss, decreased PO intake  in the setting of multiple comorbidities    Plan:  · Nutrition following, appreciate recs  · Pending swallow eval, if pt tolerates can resume PO intake and D/C tube feeds >>> Discontinued on 9/1.  Pt back on PO. Multiple wounds  Assessment & Plan  - Sacral wound, heel wound  - Wound care consult placed by ICU    PLAN:   · Wound care recommendations:  · Cleanse sacro-buttocks with soap and water. Apply Calazime Cream to B/L Sacro-Buttocks BID and PRN episodes of incontinence  · Turn/reposition q2h or when medically stable for pressure re-distribution on skin . · Elevate heels to offload pressure. · Moisturize skin daily with skin nourishing cream  · Ehob cushion in chair when out of bed. · B/L Heels: cleanse area and pat dry. Apply allevyn foam dressings to area. Deo with P for Prevention and change every 3 Days or PRN. Peel back and inspect skin Q-shift. · Nursing Communication: Please order patient P-500 Specialty Low Air Loss Mattress once transferred out of CCU.        Pain and swelling of left upper extremity  Assessment & Plan  - LUE: erythematous, edematous, and hot to the touch, concerning for possible clot vs thrombophlebitis. Weak pulses  - Doppler LUE 8/30 showed severe edema but no evidence of acute DVT    PLAN:  · Continue to monitor    Malnutrition [facility])  Assessment & Plan  Malnutrition Findings:                                 BMI Findings: Body mass index is 35.05 kg/m². - Patient on TF via NGT, placed 8/29/23>>> Now on PO 9/1   - ICU ordered nutritional consult; following    PLAN:  · Pt PO now was of 9/1.    · Nutrition team following; appreciate recommendations      Hyperglycemia  Assessment & Plan  - Last A1C 2022: 5.3%  - Hyperglycemia during hospitalization with 2 episodes of hypoglycemia  - No known history of Diabetes  - Not on diabetic medications at home    BMP Gluc       Results from last 7 days   Lab Units 09/01/23  0440 08/31/23  0423 08/30/23  2112 08/30/23  0000 08/29/23  1806 08/29/23  0443 08/29/23  0050   GLUCOSE RANDOM mg/dL 111 186* 204* 163* 424* 83 78    or POC BG       Results from last 7 days   Lab Units 09/01/23  0645 09/01/23  0337 09/01/23  0601 09/01/23  0005 08/31/23  1805 08/31/23  1214 08/31/23  0714   POC GLUCOSE mg/dl 204* 60* 54* 97 92 234* 246*     PLAN:  · Sliding Scale Insulin Algorithm #3  · Glucose checks q6 hours  · Monitor for hypoglycemia        Gastroesophageal reflux disease without esophagitis  Assessment & Plan  Continue home medication,  Protonix 40 mg daily. Benign essential HTN  Assessment & Plan  Home medications: Torsemide 40 mg daily    Systolic (69VST), PHF:584 , Min:101 , NPS:871   Diastolic (51RRB), DKU:85, Min:51, Max:69  - Most recent BP Blood Pressure: 123/59     Currently stable    PLAN:  · Goal BP <130/80 per cardiology  · MAP >65  · Continue home torsemide 40 mg daily    Stage 3 chronic kidney disease [facility])  Assessment & Plan  Lab Results   Component Value Date    EGFR 42 09/01/2023    EGFR 45 08/31/2023    EGFR 44 08/30/2023    CREATININE 1.15 09/01/2023    CREATININE 1.09 08/31/2023    CREATININE 1.10 08/30/2023   Nephrology following; appreciate recommendations  Baseline Cr: 0.9-1.1  On urinary smith catheter: 8/30/31    Plan:  · Avoid nephrotoxic agents  · Monitor with daily BMP     Status post right hip replacement  Assessment & Plan  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 S/p Joint explantation and placement of abx beads on 6/30/23 due to spontaneous drainage and abscess formation. Hypothyroidism  Assessment & Plan  Most recent TSH (6/28/23): 6.873, T4 1.39   Continue home medicaiton levothyroxine 112 mcg daily. Pacemaker  Assessment & Plan  · Hx of intolerance and bradycardia with AVN blocking agents for chronic a.fib  · S/p placement of single chamber PPM on 8/2018.          Patient Active Problem List   Diagnosis   • Chronic atrial fibrillation (HCC)   • Anemia   • Chronic heart failure with preserved ejection fraction (HCC)   • Depression   • Class 1 obesity due to excess calories with serious comorbidity and body mass index (BMI) of 33.0 to 33.9 in adult   • Chronic pain   • Chronic pain of lower extremity, bilateral   • Fibromyalgia   • External hemorrhoids   • Irritable bowel syndrome with constipation   • Lumbar canal stenosis   • Moderate mitral regurgitation   • Osteoarthritis, multiple sites   • Osteoporosis   • Peripheral neuropathy   • Primary osteoarthritis of right knee   • Synovial cyst of right popliteal space   • Pacemaker   • Hypothyroidism   • Macrocytosis   • Status post gastrectomy   • Status post right hip replacement   • Ambulatory dysfunction   • MCI (mild cognitive impairment)   • Osteomyelitis (HCC)   • Cellulitis of right lower extremity   • Hypertrophic scar of skin   • S/P scar revision   • Elevated C-reactive protein (CRP)   • Fall   • Hematoma   • Ecchymosis of eye   • Meningioma (HCC)   • Trochanteric bursitis of right hip   • Stage 3 chronic kidney disease (HCC)   • Benign essential HTN   • Trochanteric bursitis   • Negative depression screening   • Peripheral vascular disease, unspecified (HCC)   • Lumbar radiculopathy   • Low back pain with sciatica   • Lumbar spondylosis   • S/P lumbar fusion   • Hyponatremia   • Chronic diarrhea   • Infection of right prosthetic hip joint (HCC)   • Gastroesophageal reflux disease without esophagitis   • Hyperglycemia   • Altered mental status   • Malnutrition (HCC)   • Pain and swelling of left upper extremity   • Multiple wounds   • Moderate protein-calorie malnutrition (HCC)         HPI (per admission H&P note on 8/17)     HPI:   Meliton Echevarria is a 80 y.o. female who presents via medical transport on a stretcher for office evaluation of her right hip. She is status post irrigation debridement with explantation of right total hip prosthesis with antibiotic beads, 6/30/2023. She is currently in a nursing home.   She is a Coumadin independent individual.      HOSPITAL COURSE:     Hospital Course:   80 y.o. female admitted on 8/28/2023 for postsurgical management of her multiple medical comorbidities following orthopedic incision and drainage of prior right hip prosthesis abscess and subsequent infection with cultures now growing Pseudomonas from tissue samples from the surgical site. She was subsequently started on cefepime and IV antibiotic therapy which she responded well to. She was transferred to the ICU postsurgery and developed transient episodes of altered mental status where she was only alert to painful stimuli with unknown etiology. The patient self improved over the course of subsequent days and became alert and oriented x3 and recovered back to her baseline mental function. There were ongoing discussions about goals of care, however patient remained level 1 CODE STATUS throughout her entire hospitalization. Blood cultures remain negative for greater than 72 hours other than surgical site Pseudomonas culture. She will continue her IV antibiotics in the outpatient setting at her skilled nursing facility where which she resides per infectious disease recommendations. The patient did develop multiple episodes of watery runny stools, however the patient is incontinent of urine and her bowels at her baseline and states that they are minimally worsened due to IV antibiotic therapy. For this, as needed loperamide was given with positive benefit. C. Difficile testing was not done and found to be unnecessary. Patient encountered daily hypokalemia as a result of this loose stools and was repleted as such on a daily basis. The patient was originally on NG tube feeds as her mental status deteriorated throughout the ICU, however she was taken off the tube feeds as well as soft restraints as her mental function continued to improve and she subsequently passed speech evaluation swallow testing and was able to resume p.o. medications and caloric intake.     Her anticoagulation of warfarin from home was originally held in presence of her surgical intervention as well as now status post 2 units of packed red blood cell transfusion following the procedure. At the time of discharge, she remains with a stable hemoglobin level and did not require any further transfusions. Price checking was done on Eliquis and Xarelto, however found to be too expensive for the patient and subsequent resumption of Coumadin therapy was reinitiated upon discharge 2.5 mg oral with normal normal PT/INR on discharge    At the day of discharge, her mental function is much improved compared to my first encounter with the patient. She will continue her IV antibiotics into the outpatient setting and will follow-up with infectious disease on an outpatient basis regarding her pseudomonal joint infection ***      COMPLICATIONS:     Complications: Pseudomonal right joint infection, altered mental status with recovery. Status post Vern fed tube feedings and soft restraints. PROCEDURES:     Procedures Performed:   No orders of the defined types were placed in this encounter. SIGNIFICANT FINDINGS / ABNORMAL RESULTS:     Significant Findings/Abnormal Results with this admission:  · Pseudomonal right hip joint infection    XR abdomen 1 view kub    Result Date: 9/1/2023  Narrative: ABDOMEN INDICATION:   keofed tube placement. COMPARISON: Radiograph 8/31/2023. VIEWS:  AP supine 1 FINDINGS: Enteric tube courses over the diaphragm with tip projecting over the proximal stomach just distal to the gastroesophageal junction. There is a nonobstructive visualized bowel gas pattern in the visualized upper abdomen. No discernible free air on this supine study. Upright or left lateral decubitus imaging is more sensitive to detect subtle free air in the appropriate setting. No pathologic calcifications or soft tissue masses. Cholecystectomy clips. Posterior spinal fusion hardware. Right PICC terminates at the cavoatrial junction. Left chest wall single-lead pacemaker. Visualized lung bases are clear.  Visualized osseous structures are unchanged. Impression: The tip of the enteric tube projects over the proximal stomach just distal to the gastroesophageal junction. Recommend advancement. Workstation performed: [id number]     IR PICC placement double lumen    Result Date: 9/1/2023  Narrative: PROCEDURE: 1. Ultrasound-guided access of the right brachial vein 2. Peripherally inserted central catheter (PICC) placement STAFF: Martin Lay M.D. CONTRAST: None. FLUOROSCOPY TIME: 3.8 minutes. NUMBER OF IMAGES: Multiple. COMPLICATIONS: None. MEDICATIONS: 1% lidocaine subcutaneous. INDICATION: 77-year-old female requiring IV access for long-term IV antibiotics. PROCEDURE: The right arm was prepped and draped in the usual sterile fashion. All elements of maximal sterile barrier technique were followed (cap, mask, sterile gown, sterile gloves, large sterile sheet, hand hygiene, and 2% chlorhexidine for cutaneous antisepsis). Using real-time ultrasound guidance utilizing a sterile ultrasound cover and gel, the right brachial vein was punctured. An image was stored in PACS. Difficulty advancing the wire into the central vasculature. A right upper extremity venogram demonstrated a small patent vein with tortuosity in the subclavian/brachiocephalic region. Using this access, a 5 Turkmen 43 cm dual lumen peripherally inserted central catheter was placed under fluoroscopic guidance with its tip at the caval atrial junction. The catheter may be used immediately. FINDINGS: Real-time ultrasound showed an anechoic and compressible right brachial vein. Impression: Peripherally inserted central catheter placement. Workstation performed: [id number]     XR abdomen 1 view kub    Result Date: 9/1/2023  Narrative: ABDOMEN x 2 INDICATION:   Keofed placement. COMPARISON: 8/30/2023 VIEWS:  AP supine FINDINGS: 1752: The tip of the enteric tube projects at the stomach. There is a nonobstructive visualized bowel gas pattern.  No discernible free air on this supine study.  Upright or left lateral decubitus imaging is more sensitive to detect subtle free air in the appropriate setting. No pathologic calcifications or soft tissue masses. Cholecystectomy clips. Visualized lung bases are clear. Visualized osseous structures are unchanged. 86230: The tip of the enteric tube projects at the stomach. The other findings are unchanged. Impression: On the final radiograph, the tip of the enteric tube projects at the stomach. Workstation performed: [id number]     XR abdomen 1 view kub    Result Date: 9/1/2023  Narrative: ABDOMEN x 2 INDICATION:   Keofed placement. COMPARISON: 8/30/2023 VIEWS:  AP supine FINDINGS: 1752: The tip of the enteric tube projects at the stomach. There is a nonobstructive visualized bowel gas pattern. No discernible free air on this supine study. Upright or left lateral decubitus imaging is more sensitive to detect subtle free air in the appropriate setting. No pathologic calcifications or soft tissue masses. Cholecystectomy clips. Visualized lung bases are clear. Visualized osseous structures are unchanged. 21175: The tip of the enteric tube projects at the stomach. The other findings are unchanged. Impression: On the final radiograph, the tip of the enteric tube projects at the stomach. Workstation performed: [id number]     XR chest 1 view    Result Date: 9/1/2023  Narrative: CHEST INDICATION:   keofed placement. COMPARISON: CXR and abdominal radiograph 8/31/2023, abdomen CT 6/27/2023, chest CT 3/7/2016. EXAM PERFORMED/VIEWS:  XR CHEST 1 VIEW. FINDINGS: Feeding tube in upper esophagus. Right PICC at cavoatrial junction. Mild cardiomegaly with left subclavian pacemaker lead in the right ventricle. Lungs clear. No effusion or pneumothorax. Clips in the right breast. Upper abdomen normal. Mild curvature of the spine. Lumbar fusion. Impression: Feeding tube in upper esophagus, subsequently advanced into the stomach. No acute disease.  Workstation performed: [id number]     XR chest portable    Result Date: 9/1/2023  Narrative: CHEST INDICATION:   Keofed placement. COMPARISON: CXR 8/30/2023, abdomen CT 6/27/2023, chest CT 3/7/2016. Abdominal radiograph 8/31/2023. EXAM PERFORMED/VIEWS:  XR CHEST PORTABLE. FINDINGS: Right PICC in lower SVC. Feeding tube in lower esophagus with tip pointing cephalad. Mild cardiomegaly with left subclavian pacemaker lead in right ventricle. Lungs clear. Trace effusions. No pneumothorax. Upper abdomen normal. Cholecystectomy. Mild dextrocurvature of the spine. Lumbar fusion. Impression: Feeding tube in lower esophagus, subsequently advanced into the stomach. . Workstation performed: [id number]     Echo follow up/limited w/ contrast if indicated    Result Date: 8/31/2023  Narrative: •  Left Ventricle: Left ventricular cavity size is normal. Wall thickness is normal. The left ventricular ejection fraction is 65%. Systolic function is normal. Wall motion is normal. •  Left Atrium: The atrium is mildly dilated. •  Aortic Valve: There is aortic valve sclerosis. •  Mitral Valve: There is mild annular calcification. There is moderate regurgitation with a posteriorly directed jet. •  Tricuspid Valve: There is moderate regurgitation. CT head wo contrast    Result Date: 8/31/2023  Narrative: CT BRAIN - WITHOUT CONTRAST INDICATION:   encephalopathy. COMPARISON: CT head without contrast 3/17/2023. MRI brain with and without contrast 12/8/2020. TECHNIQUE:  CT examination of the brain was performed. Multiplanar 2D reformatted images were created from the source data. Radiation dose length product (DLP) for this visit:  894.73 mGy-cm . This examination, like all CT scans performed in the [facility], was performed utilizing techniques to minimize radiation dose exposure, including the use of iterative  reconstruction and automated exposure control. IMAGE QUALITY:  Diagnostic.  FINDINGS: PARENCHYMA AND EXTRA-AXIAL SPACES: Decreased attenuation is noted in periventricular and subcortical white matter demonstrating an appearance that is statistically most likely to represent mild microangiopathic change. No CT signs of acute infarction. Unchanged 2.7 x 2.1 cm left parafalcine frontal vertex extra-axial mass with slight mass effect on adjacent left anterior paramedian frontal lobe (2:36), likely meningioma. No intra-axial massor midline shift. No acute parenchymal hemorrhage. Arterial calcifications of carotid siphons and left intradural vertebral artery. VENTRICLES:  Normal for the patient's age. VISUALIZED ORBITS: Sequela of bilateral cataract surgery. PARANASAL SINUSES: Normal visualized paranasal sinuses. CALVARIUM AND EXTRACRANIAL SOFT TISSUES: Partially imaged right nasoenteric tube. No acute calvarial abnormality. Osteoarthritis of left temporomandibular joint. Impression: No acute intracranial abnormality. Unchanged 2.7 cm meningioma in left parafalcine frontal vertex with slight mass effect on adjacent left anterior paramedian frontal lobe. Unchanged mild chronic microangiopathy. Workstation performed: [id number]     VAS upper limb venous duplex scan, complete, bilateral    Result Date: 8/30/2023  Narrative:  [facility] REPORT CLINICAL: Indications: Patient presents with bilateral "dusky" fingers and left arm swelling. She is being treated for infection of right prosthetic hip joint. Physician wants to rule out deep and superficial thrombus. Operative History: Gastric bypass No prior heart or vascular surgery Risk Factors Anemia, arthritis, cancer, CHF, COVID-19 virus, thyroid disease, fibromyalgia, heart murmur, MI, AFIB, MRSA. She is a non-smoker. Height:  60 inches. Weigh: 184 lbs.   FINDINGS:  Segment                           Right       Left                                                                Impression  Impression  Bas Upper                                     Thrombus    Bas Mid Arm Thrombus    Basilic Upper Arm Distal                      Thrombus    Cephalic Upper Arm Distal         Thrombus                Bas AC                                        Thrombus    Basilic Vein - Forearm Proximal   Thrombus    Thrombus    Ceph AC                           Thrombus                Cephalic Vein - Forearm Proximal  Thrombus                Bas Mid Forearm                   Thrombus    Thrombus    Basilic Vein - Forearm Distal     Thrombus    Thrombus    Ceph Mid Forearm                  Thrombus                Cephalic Vein - Forearm Distal    Thrombus                Bas Wrist                         Thrombus    Thrombus    Ceph Wrist                        Thrombus                   CONCLUSION:  Impression  RIGHT UPPER LIMB: ABNORMAL: Evaluation shows superficial thrombophlebitis in the basilic vein throughout the forearm and in the cephalic vein from the distal upper arm to the wrist. No evidence of acute deep vein thrombosis. Waveforms are triphasic at the distal brachial, ulnar and radial arteries Tech note: Severe edema was identified throughout the arm. Unable to evaluate veins at the PICC line/dressing site and IV site at the wrist.  LEFT UPPER LIMB: ABNORMAL: Evaluation shows superficial thrombophlebitis in the basilic vein from the proximal upper arm to the wrist. No evidence of acute deep vein thrombosis. Waveforms are triphasic at the distal brachial, ulnar and radial arteries Tech note:  Severe edema was identified throughout the arm. Technically difficult critical care bedside study. Technical findings given to critical care staff and posted on EPIC. SIGNATURE: Electronically Signed by: Steven Munguia MD on 2023-08-30 06:03:14 PM    XR chest 1 view    Result Date: 8/30/2023  Narrative: CHEST INDICATION:  Keofed placement.  COMPARISON: 8/30/2023, CT chest 3/7/2016 EXAM PERFORMED/VIEWS:  XR CHEST 1 VIEW  AP semierect Images: 2 (labeled keofed 1) FINDINGS: -Interval placement of feeding tube with tip at the mid thorax below the level of the frank within the region of the esophagus. -Right PICC tip at the distal SVC. Cardiomediastinal silhouette appears stable. Left subclavian pacer with lead tip projecting at the right ventricle. The lungs are grossly clear. Limited evaluation left lung base. No pneumothorax or large pleural effusion. Lower thoracic dextroscoliosis. Degenerative changes bilateral shoulders. Impression: Feeding tube tip in the mid thorax along the course of the esophagus. Workstation performed: [id number]     XR abdomen 1 view kub    Result Date: 8/30/2023  Narrative: ABDOMEN INDICATION:  Keofed placement. COMPARISON: Obstruction series 5/18/2010,  view CT abdomen pelvis 6/27/2023 VIEWS:  AP semierect (image labeled keofed 2) Images: 1 *(centered over the thoracoabdominal junction as per protocol for this indication); The lower abdomen and pelvis were excluded from the field-of-view. FINDINGS: Feeding tube tip projects over the gastric body. There is a nonobstructive bowel gas pattern. No discernible free air on this limited portable study. No pathologic calcifications or soft tissue masses. Cholecystectomy clips. Clips along the inferolateral right chest wall. Visualized lung bases are clear. Pacemaker lead projects over the right ventricle. L2-3 lumbar fusion hardware. S-shaped scoliosis. Impression: Feeding tube tip projects over the gastric body. Nonobstructive bowel gas pattern as visualized. Workstation performed: [id number]     XR chest portable ICU    Result Date: 8/30/2023  Narrative: CHEST INDICATION:  Hypoxia. COMPARISON: 7/25/2023, CT chest 3/7/2016 EXAM PERFORMED/VIEWS:  XR CHEST PORTABLE ICU Images: 2 FINDINGS: -Right PICC tip terminates at the cavoatrial junction. Cardiomediastinal silhouette appears stable, noting calcified uncoiled thoracic aorta.  Left subclavian pacer with lead tip projecting over the right ventricle. Limited evaluation of the left base. The lungs are otherwise grossly clear. No pneumothorax. Mild left apical pleural thickening. Cannot exclude left pleural effusion. Mild thoracic dextroscoliosis with degenerative changes and incompletely visualized lumbar fixation hardware. Degenerative changes bilateral AC joints. Surgical clips right inferolateral chest wall. Impression: Limited study, specifically assessment of the left lung base. Cannot reliably exclude left pleural effusion. No overt pulmonary edema or consolidation within limitations. Follow-up PA and lateral views recommended for any persistent or worsening symptoms. Workstation performed: [id number]     US bedside procedure    Result Date: 8/29/2023  Narrative: [id number]    Cardiac EP device report    Result Date: 8/24/2023  Narrative: MDT SINGLE CHAMBER PPM - ACTIVE SYSTEM IS MRI CONDITIONAL CARELINK TRANSMISSION: BATTERY VOLTAGE ADEQUATE (2.4 YRS). -98% (DPENDENT). ALL AVAILABLE LEAD PARAMETERS WITHIN NORMAL LIMITS. NO SIGNIFICANT HIGH RATE EPISODES. NORMAL DEVICE FUNCTION.  GV         VITALS ON DISCHARGE DATE:     Vitals  Blood Pressure: 136/66 (09/03/23 0835)  Temperature: 97.5 °F (36.4 °C) (09/03/23 0721)  Temp Source: Oral (09/03/23 0721)  Pulse: 69 (09/03/23 0835)  Respirations: 18 (09/03/23 0721)  SpO2: 100 % (09/03/23 0835)  Height: 5' (152.4 cm) (08/31/23 0745)  Weight - Scale: 80 kg (176 lb 5.9 oz) (09/03/23 0605)    Temp:  [97.3 °F (36.3 °C)-97.5 °F (36.4 °C)] 97.5 °F (36.4 °C)  HR:  [68-70] 69  Resp:  [16-18] 18  BP: ()/(52-66) 136/66    Weight (last 2 days)     Date/Time Weight    09/03/23 0605 80 (176.37)    09/02/23 0502 79.9 (176.15)    09/01/23 0600 81.4 (179.45)            Intake/Output Summary (Last 24 hours) at 9/3/2023 1021  Last data filed at 9/3/2023 0846  Gross per 24 hour   Intake 1248 ml   Output --   Net 1248 ml       Invasive Devices     Peripherally Inserted Central Catheter Line  Duration           PICC Line 08/29/23 4 days                  PHYSICAL EXAM ON DAY OF DISCHARGE:     Physical Exam  Constitutional:       General: She is not in acute distress. Appearance: Normal appearance. She is obese. She is not ill-appearing, toxic-appearing or diaphoretic. Cardiovascular:      Rate and Rhythm: Normal rate and regular rhythm. Pulses: Normal pulses. Heart sounds: Normal heart sounds. No murmur heard. Pulmonary:      Effort: Pulmonary effort is normal. No respiratory distress. Breath sounds: Normal breath sounds. No stridor. No wheezing, rhonchi or rales. Abdominal:      General: Abdomen is flat. Bowel sounds are normal. There is no distension. Palpations: Abdomen is soft. Tenderness: There is no abdominal tenderness. There is no guarding. Musculoskeletal:         General: Tenderness present. Comments: Patient is bedbound at baseline and nonambulatory    Tenderness noted at postsurgical site right hip   Skin:     General: Skin is warm and dry. Capillary Refill: Capillary refill takes less than 2 seconds. Coloration: Skin is not jaundiced. Findings: Lesion present. Comments: Multiple wounds on heels   Neurological:      General: No focal deficit present. Mental Status: She is alert and oriented to person, place, and time. Mental status is at baseline. Psychiatric:         Mood and Affect: Mood normal.         Behavior: Behavior normal.         Thought Content: Thought content normal.         Judgment: Judgment normal.          CONDITION AT DISCHARGE:   On day of discharge patient is seen and evaluated at bedside. Patient is stable and without concern. Patient denies any pain or SOB. Patient able to tolerate PO food without N/V/D and had bowel movement and baseline urine output. Patient able to ambulate independently without assistance.  Patient is aware of current health status and understand plan of treatment and outpatient follow-up. The patient understood and agreed with the plan. All pertinent lab results, imaging studies, procedures, and/or any incidental findings have been disclosed to the patient. All pertinent questions are answered to patient's satisfaction. On day of discharge, the patient was hemodynamically stable and appropriate for discharge home    Condition at Discharge: stable       DISCHARGE MEDICATIONS:     Discharge Medications:  See after visit summary (AVS) for detailed reconciled discharge medications, which was provided to patient and family. Summary of medication changes made with this admission:    · START:  1. ***    · STOP:  1. ***    · CHANGE:  1. ***    · RESUME:  1. Warfarin 2.5 mg daily  2. Intravenous cefepime 1 g every 12 for total of 6-week course through October 8, 2023      FOLLOW-UP APPOINTMENTS / INSTRUCTION :     Important Physician Related Follow Up:   · Infectious disease 2 weeks from discharge date  · Primary care provider    Appointment confirmed:  Future Appointments   Date Time Provider [address]   9/5/2023  3:45 PM Jayla Knight MD [facility]   11/29/2023  3:00 PM DEVICE REMOTE [facility]   3/25/2024 10:30 AM DEVICE IN PERSON [place] [facility]       Discharge instructions/Information to patient and family:   See after visit summary (AVS) for information provided to patient and family. Provisions for Follow-Up Care:  See after visit summary for information related to follow-up care and any pertinent home health orders. DISPOSITION:     Disposition: Skilled nursing facility at [facility] ([facility]) Statement   I spent 30 minutes discharging the patient. This time was spent on the day of discharge. I had direct contact with the patient on the day of discharge. Additional documentation is required if more than 30 minutes were spent on discharge.      Planned Readmission: No        Checo Womack,   PGY1, Family Medicine  09/03/23  10:21 AM    Dear reader, please be aware that portions of my note contain dictated text. I have done my best to proof-read this note prior to signing. However, there may be occasional unnoticed errors pertaining to "sound-alike" words and/or grammar during my dictation process. If there is any words or information that is unclear or appears erroneous, please kindly let me know and I will clarify and/or addend my notes accordingly. Thank you for your understanding. *** Did you refresh AND change the service date/time?

## 2023-09-03 NOTE — PROGRESS NOTES
Progress Notes - Family Medicine Residency, Jewels Coulter 9/2/1933, 80 y.o. female. MRN: [id number]    Unit/Bed#: [facility] 870-93 Encounter: [id number]  Primary Care Provider: Jere Viveros DO      Admission Date: 8/28/2023 0542  Length of Stay: 6 days  Code Status:  Level 1 - Full Code  Consult:   IP CONSULT TO CASE MANAGEMENT  IP CONSULT TO INFECTIOUS DISEASES  IP CONSULT TO NEPHROLOGY  IP CONSULT TO PICC TEAM  IP CONSULT TO NUTRITION SERVICES  IP CONSULT TO PALLIATIVE CARE    * Infection of right prosthetic hip joint ([address])  Assessment & Plan  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 · S/p Joint explantation of right total hip prosthesis and placement of abx beads on 6/30/23 due to spontaneous drainage and abscess formation. Antibiotic beads were placed at time of procedure. Patient was placed on 6 week abx course of IV vanc which was switched to doxycycline. · Patient developed sinus tract and wound dehiscence. S/p I&D and washout on 8/29/23. · Ortho following appreciate recommendations  · ID following, Ortho following, appreciate recommendations. · PICC placed: 8/29  · Antibiotics started on 8/28      Plan:   · ID recommendations:  · F/U blood cultures 8/30/23: negative for now 72 hours as of 9/3  · F/U cultures from OR  · Pseudomonas aeruoginosa  · Cefepime 1g IV Q12H (started 8/30/23)  · Will need 6 week iv abx possibly followed by PO pending clinical course. Weekly CBC w/ diff, ESR, and CR while on IV abx. · F/U outpatient with ID 2 weeks post D/C  · Ortho recommendations  · Remain non-weight bearing/ non- ambulatory  · Dressing changes PRN      Altered mental status  Assessment & Plan  - Episode of AMS on morning of 8/31/23 for minimal responsiveness. Much improved 9/3 AO x3.  - CTH 8/31/23: no abnormalities  - Per ICU notes, patient difficult to arouse, requires painful stimulation >>> improved 9/3.  Responsive to voice and verbal inquiry   - Episodes of hypoglycemia during hospital stay  - Palliative care consult ordered by ICU>>> recommended goals of care discussion with family and as of 8/31 determined that the pt does not have capacity. Follow up discussions regarding code status are ongoing   - Code status: Level 1  - Echo performed 8/31/23: EF 65%, mild-mod Aortic, Mitral, Tricuspid regurgitation. No wall motion abnormalities. Normal wall thickness. PLAN:  · Neuro checks per shift  · Fall precautions  · Palliative Care recommendations:  · [address] [place] conversation ongoing    Chronic heart failure with preserved ejection fraction [facility])  Assessment & Plan  Wt Readings from Last 3 Encounters:   09/01/23 81.4 kg (179 lb 7.3 oz)   06/28/23 76.2 kg (168 lb)   06/16/23 76.6 kg (168 lb 12.8 oz)       Intake/Output Summary (Last 24 hours) at 9/1/2023 0834  Last data filed at 9/1/2023 0701  Gross per 24 hour   Intake 1344 ml   Output 1151 ml   Net 193 ml   I/O last 24 hours: In: 6884 [NG/GT:395; IV Piggyback:610; Feedings:379]  Out: 1301 [Urine:1301]    - Last Echo 6/2023: LV cavity size is normal. Wall thickness is normal. There is concentric remodeling. LVEF 60%. Systolic function is normal. Basal inferoseptal segment is hypokinetic. RV nd RA mildly dilated. - Most recent Echo 8/31/23: LVEF 65%, mild-mod aortic, mitral and tricuspid regurgitaton. No wall motion abnormalities. Normal wall thickness. Plan:  · Continue home medication, torsemide 40 mg daily. · Monitor accurate I/O's            Chronic atrial fibrillation [facility])  Assessment & Plan  · S/p AV node ablation in 8/2018 as well as placement of PPM  · On warfarin 3 mg daily for [facility] at home with goal INR of 2-3. · CHADS-VASC score: 5 points (Age, Female, CHF, HTN)  · Warfarin held on admission due to ortho procedure. Currently stable    Plan:  · Monitor HR  · Continue heparin. Will price check xarelto and eliquis prior to discharge.      Anemia  Assessment & Plan  Iron Panel Lab Results   Component Value Date    CONCFE 20 06/12/2020    CONCFE 28 03/20/2019    TIBC 347 06/12/2020    TIBC 293 03/20/2019    IRON 68 06/12/2020    IRON 82 03/20/2019    FERRITIN 34 06/12/2020    FERRITIN 169 03/20/2019     S/p transfusion 8/29 and 8/30; total of two units of PRBC's  Was on iron supplements at home; ferrous sulfate 325 daily. Plan:  · Monitor CBC; replete for hgb <7.0   · Continue iron supplementation   · Recommended for CBC w/diff weekly per ID    Moderate protein-calorie malnutrition (HCC)  Assessment & Plan    Malnutrition Findings:   Adult Malnutrition type: Chronic illness  Adult Degree of Malnutrition: Malnutrition of moderate degree  Malnutrition Characteristics: Fat loss, Muscle loss                  360 Statement: moderate pro, beltran malnutrition d/t conditions as evidence by moderate fat and muscle depletion at temples, clavicles, orbitals, treated currently with TF, pending ST evaluation for potential diet advancement, most likely will benefit from oral nutrition supplements, can start with ensure compact any flavor BID. BMI Findings: Body mass index is 35.05 kg/m². Assessment:   Moderate protein calorie malnutrition as evidenced by recent weight loss, decreased PO intake  in the setting of multiple comorbidities    Plan:  · Nutrition following, appreciate recs  · Pending swallow eval, if pt tolerates can resume PO intake and D/C tube feeds >>> Discontinued on 9/1. Pt back on PO. Multiple wounds  Assessment & Plan  - Sacral wound, heel wound  - Wound care consult placed by ICU    PLAN:   · Wound care recommendations:  · Cleanse sacro-buttocks with soap and water. Apply Calazime Cream to B/L Sacro-Buttocks BID and PRN episodes of incontinence  · Turn/reposition q2h or when medically stable for pressure re-distribution on skin . · Elevate heels to offload pressure.   · Moisturize skin daily with skin nourishing cream  · Ehob cushion in chair when out of bed.  · B/L Heels: cleanse area and pat dry. Apply allevyn foam dressings to area. Deo with P for Prevention and change every 3 Days or PRN. Peel back and inspect skin Q-shift. · Nursing Communication: Please order patient P-500 Specialty Low Air Loss Mattress once transferred out of CCU.        Pain and swelling of left upper extremity  Assessment & Plan  - LUE: erythematous, edematous, and hot to the touch, concerning for possible clot vs thrombophlebitis. Weak pulses  - Doppler LUE 8/30 showed severe edema but no evidence of acute DVT    PLAN:  · Continue to monitor    Malnutrition [facility])  Assessment & Plan  Malnutrition Findings:                                 BMI Findings: Body mass index is 35.05 kg/m². - Patient on TF via NGT, placed 8/29/23>>> Now on PO 9/1   - ICU ordered nutritional consult; following    PLAN:  · Pt PO now was of 9/1. · Nutrition team following; appreciate recommendations      Hyperglycemia  Assessment & Plan  - Last A1C 2022: 5.3%  - Hyperglycemia during hospitalization with 2 episodes of hypoglycemia  - No known history of Diabetes  - Not on diabetic medications at home    BMP Gluc       Results from last 7 days   Lab Units 09/01/23  0440 08/31/23  0423 08/30/23  2112 08/30/23  0000 08/29/23  1806 08/29/23  0443 08/29/23  0050   GLUCOSE RANDOM mg/dL 111 186* 204* 163* 424* 83 78    or POC BG       Results from last 7 days   Lab Units 09/01/23  0645 09/01/23  0632 09/01/23  0601 09/01/23  0005 08/31/23  1805 08/31/23  1214 08/31/23  0714   POC GLUCOSE mg/dl 204* 60* 54* 97 92 234* 246*     PLAN:  · Sliding Scale Insulin Algorithm #3  · Glucose checks q6 hours  · Monitor for hypoglycemia        Gastroesophageal reflux disease without esophagitis  Assessment & Plan  Continue home medication,  Protonix 40 mg daily. Benign essential HTN  Assessment & Plan  Home medications:  Torsemide 40 mg daily    Systolic (52BDB), WEC:904 , Min:101 , OLL:573   Diastolic (15RBK), Av, Min:51, Max:69  - Most recent BP Blood Pressure: 123/59     Currently stable    PLAN:  · Goal BP <130/80 per cardiology  · MAP >65  · Continue home torsemide 40 mg daily    Stage 3 chronic kidney disease [facility])  Assessment & Plan  Lab Results   Component Value Date    EGFR 42 2023    EGFR 45 2023    EGFR 44 2023    CREATININE 1.15 2023    CREATININE 1.09 2023    CREATININE 1.10 2023   Nephrology following; appreciate recommendations  Baseline Cr: 0.9-1.1  On urinary smith catheter: 31    Plan:  · Avoid nephrotoxic agents  · Monitor with daily BMP     Status post right hip replacement  Assessment & Plan  Remote hx of right MAIA with revision in . C/B chronic dislocation and displacement. S/p Joint explantation and placement of abx beads on 23 due to spontaneous drainage and abscess formation. Hypothyroidism  Assessment & Plan  Most recent TSH (23): 6.873, T4 1.39   Continue home medicaiton levothyroxine 112 mcg daily. Pacemaker  Assessment & Plan  · Hx of intolerance and bradycardia with AVN blocking agents for chronic a.fib  · S/p placement of single chamber PPM on 2018. VTE Pharmacologic Prophylaxis: Heparin  VTE Mechanical Prophylaxis: sequential compression device  Diet: Diet Dysphagia/Modified Consistency; Dysphagia 1-Pureed; Thin Liquid; Sodium 2 GM; Nutrition Supplements  Code Status: Level 1  Disposition: Inpatient    Discussed with attending physician, Dr. Stephany Abebe, and [facility] team.    Hospital Course:      Subjective:   Patient seen and examined at bedside this morning. No acute overnight events, and nursing states that she did not have any overt delirious episodes overnight. This morning, patient states that she feels overall okay, and knows where she is, who she is, today's date, and that it was her birthday yesterday. Patient does endorse some pain of the right hip, mostly to palpation and movement.   Patient also endorses several episodes of watery bowel movements, which she says started after the antibiotics, however she is chronically incontinent at the nursing home, and notes it is not that different compared to her baseline . Pt did eat breakfast this morning, thin liquids/pureed foods. Patient denies chest pain, shortness of breath, N/V/abdominal pain, headache, lightheadedness, dizziness. Objective:     Vitals: Blood pressure 99/53, pulse 69, temperature 97.5 °F (36.4 °C), resp. rate 18, height 5' (1.524 m), weight 80 kg (176 lb 5.9 oz), SpO2 100 %, not currently breastfeeding. ,Body mass index is 34.44 kg/m². Intake/Output Summary (Last 24 hours) at 9/3/2023 0817  Last data filed at 9/2/2023 2216  Gross per 24 hour   Intake 1370 ml   Output --   Net 1370 ml       Physical Exam  Constitutional:       General: She is not in acute distress. Appearance: Normal appearance. She is well-developed. She is not ill-appearing. HENT:      Head: Normocephalic and atraumatic. Nose: Nose normal.      Mouth/Throat:      Mouth: Mucous membranes are moist.      Pharynx: Oropharynx is clear. Eyes:      Extraocular Movements: Extraocular movements intact. Conjunctiva/sclera: Conjunctivae normal.      Pupils: Pupils are equal, round, and reactive to light. Cardiovascular:      Rate and Rhythm: Normal rate and regular rhythm. Heart sounds: Normal heart sounds. No murmur heard. No friction rub. No gallop. Pulmonary:      Effort: Pulmonary effort is normal.      Breath sounds: Normal breath sounds. Abdominal:      General: Abdomen is flat. Bowel sounds are normal. There is no distension. Palpations: Abdomen is soft. Tenderness: There is no abdominal tenderness. There is no guarding or rebound. Musculoskeletal:         General: Normal range of motion. Right hip: Tenderness present. Decreased strength.       Left hip: Normal.      Comments: 1+ pitting edema b/l LE   Skin:     General: Skin is warm and dry. Neurological:      Mental Status: She is alert and oriented to person, place, and time. Psychiatric:         Mood and Affect: Mood normal.         Behavior: Behavior normal.         Invasive Devices     Peripherally Inserted Central Catheter Line  Duration           PICC Line 08/29/23 4 days                           Lab and other studies:  I have personally reviewed pertinent reports. No results displayed because visit has over 200 results.           Recent Results (from the past 24 hour(s))   Fingerstick Glucose (POCT)    Collection Time: 09/02/23 11:14 AM   Result Value Ref Range    POC Glucose 127 65 - 140 mg/dl   Fingerstick Glucose (POCT)    Collection Time: 09/02/23  9:47 PM   Result Value Ref Range    POC Glucose 103 65 - 140 mg/dl   Basic metabolic panel    Collection Time: 09/03/23  5:51 AM   Result Value Ref Range    Sodium 138 135 - 147 mmol/L    Potassium 2.8 (L) 3.5 - 5.3 mmol/L    Chloride 109 (H) 96 - 108 mmol/L    CO2 24 21 - 32 mmol/L    ANION GAP 5 mmol/L    BUN 27 (H) 5 - 25 mg/dL    Creatinine 1.15 0.60 - 1.30 mg/dL    Glucose 85 65 - 140 mg/dL    Calcium 7.3 (L) 8.4 - 10.2 mg/dL    eGFR 41 ml/min/1.73sq m   Magnesium    Collection Time: 09/03/23  5:51 AM   Result Value Ref Range    Magnesium 1.9 1.9 - 2.7 mg/dL   Phosphorus    Collection Time: 09/03/23  5:51 AM   Result Value Ref Range    Phosphorus 2.7 2.3 - 4.1 mg/dL   CBC and differential    Collection Time: 09/03/23  5:51 AM   Result Value Ref Range    WBC 9.44 4.31 - 10.16 Thousand/uL    RBC 2.57 (L) 3.81 - 5.12 Million/uL    Hemoglobin 8.3 (L) 11.5 - 15.4 g/dL    Hematocrit 25.4 (L) 34.8 - 46.1 %    MCV 99 (H) 82 - 98 fL    MCH 32.3 26.8 - 34.3 pg    MCHC 32.7 31.4 - 37.4 g/dL    RDW 17.8 (H) 11.6 - 15.1 %    MPV 10.0 8.9 - 12.7 fL    Platelets 260 (L) 975 - 390 Thousands/uL    nRBC 0 /100 WBCs    Neutrophils Relative 73 43 - 75 %    Immat GRANS % 1 0 - 2 %    Lymphocytes Relative 17 14 - 44 %    Monocytes Relative 8 4 - 12 %    Eosinophils Relative 1 0 - 6 %    Basophils Relative 0 0 - 1 %    Neutrophils Absolute 6.96 1.85 - 7.62 Thousands/µL    Immature Grans Absolute 0.10 0.00 - 0.20 Thousand/uL    Lymphocytes Absolute 1.57 0.60 - 4.47 Thousands/µL    Monocytes Absolute 0.71 0.17 - 1.22 Thousand/µL    Eosinophils Absolute 0.09 0.00 - 0.61 Thousand/µL    Basophils Absolute 0.01 0.00 - 0.10 Thousands/µL     Blood Culture:   Lab Results   Component Value Date    BLOODCX No Growth at 72 hrs. 08/30/2023    BLOODCX No Growth at 72 hrs. 08/30/2023   ,   Urinalysis:   Lab Results   Component Value Date    COLORU Light Orange 06/19/2023    CLARITYU Extra Turbid 06/19/2023    SPECGRAV 1.019 06/19/2023    PHUR 5.5 06/19/2023    PHUR 6.0 08/20/2016    LEUKOCYTESUR Small (A) 06/19/2023    NITRITE Negative 06/19/2023    GLUCOSEU Negative 06/19/2023    KETONESU 10 (1+) (A) 06/19/2023    BILIRUBINUR Negative 06/19/2023    BLOODU Negative 06/19/2023   ,   Urine Culture:   Lab Results   Component Value Date    URINECX >100,000 cfu/ml Escherichia coli ESBL (A) 06/15/2023    URINECX >100,000 cfu/ml Klebsiella pneumoniae (A) 06/15/2023    URINECX >100,000 cfu/ml Aerococcus urinae (A) 06/15/2023   ,   Wound Culure:   Lab Results   Component Value Date    WOUNDCULT No growth 06/28/2023         Imaging:    XR abdomen 1 view kub    Result Date: 9/1/2023  The tip of the enteric tube projects over the proximal stomach just distal to the gastroesophageal junction. Recommend advancement. Workstation performed: [id number]     IR PICC placement double lumen    Result Date: 9/1/2023  Peripherally inserted central catheter placement. Workstation performed: [id number]     XR abdomen 1 view kub    Result Date: 9/1/2023  On the final radiograph, the tip of the enteric tube projects at the stomach.  Workstation performed: [id number]     XR abdomen 1 view kub    Result Date: 9/1/2023  On the final radiograph, the tip of the enteric tube projects at the stomach. Workstation performed: [id number]     XR chest 1 view    Result Date: 9/1/2023  Feeding tube in upper esophagus, subsequently advanced into the stomach. No acute disease. Workstation performed: [id number]     XR chest portable    Result Date: 9/1/2023  Feeding tube in lower esophagus, subsequently advanced into the stomach. . Workstation performed: [id number]     CT head wo contrast    Result Date: 8/31/2023  No acute intracranial abnormality. Unchanged 2.7 cm meningioma in left parafalcine frontal vertex with slight mass effect on adjacent left anterior paramedian frontal lobe. Unchanged mild chronic microangiopathy. Workstation performed: [id number]     XR chest 1 view    Result Date: 8/30/2023  Feeding tube tip in the mid thorax along the course of the esophagus. Workstation performed: [id number]     XR abdomen 1 view kub    Result Date: 8/30/2023  Feeding tube tip projects over the gastric body. Nonobstructive bowel gas pattern as visualized. Workstation performed: [id number]     XR chest portable ICU    Result Date: 8/30/2023  Limited study, specifically assessment of the left lung base. Cannot reliably exclude left pleural effusion. No overt pulmonary edema or consolidation within limitations. Follow-up PA and lateral views recommended for any persistent or worsening symptoms.  Workstation performed: [id number]        Current Facility-Administered Medications   Medication Dose Route Frequency   • acetaminophen (TYLENOL) oral suspension 975 mg  975 mg Oral Q8H [address]   • cefepime (MAXIPIME) 1,000 mg in dextrose 5 % 50 mL IVPB  1,000 mg Intravenous Q12H   • chlorhexidine (PERIDEX) 0.12 % oral rinse 15 mL  15 mL Mouth/Throat Q12H [address]   • DULoxetine (CYMBALTA) delayed release capsule 60 mg  60 mg Oral Daily   • ferrous sulfate (MIKE-IN-SOL) oral solution 300 mg  300 mg Oral Daily   • heparin (porcine) subcutaneous injection 5,000 Units  5,000 Units Subcutaneous Q8H [address]   • HYDROmorphone HCl (DILAUDID) injection 0.2 mg  0.2 mg Intravenous Q4H PRN   • insulin lispro (HumaLOG) 100 units/mL subcutaneous injection 1-6 Units  1-6 Units Subcutaneous 4x Daily (with meals and at bedtime)   • levothyroxine (Synthroid) suspension 112 mcg  112 mcg Oral Early Morning   • lidocaine (LIDODERM) 5 % patch 1 patch  1 patch Topical Daily   • lidocaine (LIDODERM) 5 % patch 1 patch  1 patch Topical Daily   • miconazole (MICOTIN) 2 % powder 1 Application  1 Application Topical BID   • ondansetron (ZOFRAN) injection 4 mg  4 mg Intravenous Q4H PRN   • oxyCODONE (ROXICODONE) oral solution 2.5 mg  2.5 mg Oral Q4H PRN   • oxyCODONE (ROXICODONE) oral solution 5 mg  5 mg Oral Q4H PRN   • pantoprazole (PROTONIX) EC tablet 40 mg  40 mg Oral Early Morning   • potassium chloride (K-DUR,KLOR-CON) CR tablet 40 mEq  40 mEq Oral Once   • potassium chloride 40 mEq IVPB (premix)  40 mEq Intravenous Once   • simethicone (MYLICON) oral suspension 80 mg  80 mg Oral Q6H PRN   • sodium chloride (OCEAN) 0.65 % nasal spray 1 spray  1 spray Each Nare Q1H PRN   • torsemide (DEMADEX) tablet 40 mg  40 mg Oral Daily             Ashley Barone DO   Family Medicine, PGY-1

## 2023-09-04 LAB
ANION GAP SERPL CALCULATED.3IONS-SCNC: 5 MMOL/L
BACTERIA BLD CULT: NORMAL
BACTERIA BLD CULT: NORMAL
BASOPHILS # BLD AUTO: 0.01 THOUSANDS/ÂΜL (ref 0–0.1)
BASOPHILS NFR BLD AUTO: 0 % (ref 0–1)
BUN SERPL-MCNC: 27 MG/DL (ref 5–25)
CALCIUM SERPL-MCNC: 7.3 MG/DL (ref 8.4–10.2)
CHLORIDE SERPL-SCNC: 110 MMOL/L (ref 96–108)
CO2 SERPL-SCNC: 24 MMOL/L (ref 21–32)
CREAT SERPL-MCNC: 1.18 MG/DL (ref 0.6–1.3)
EOSINOPHIL # BLD AUTO: 0.11 THOUSAND/ÂΜL (ref 0–0.61)
EOSINOPHIL NFR BLD AUTO: 1 % (ref 0–6)
ERYTHROCYTE [DISTWIDTH] IN BLOOD BY AUTOMATED COUNT: 17.3 % (ref 11.6–15.1)
GFR SERPL CREATININE-BSD FRML MDRD: 40 ML/MIN/1.73SQ M
GLUCOSE SERPL-MCNC: 103 MG/DL (ref 65–140)
GLUCOSE SERPL-MCNC: 104 MG/DL (ref 65–140)
GLUCOSE SERPL-MCNC: 82 MG/DL (ref 65–140)
GLUCOSE SERPL-MCNC: 84 MG/DL (ref 65–140)
HCT VFR BLD AUTO: 25.3 % (ref 34.8–46.1)
HGB BLD-MCNC: 7.9 G/DL (ref 11.5–15.4)
IMM GRANULOCYTES # BLD AUTO: 0.11 THOUSAND/UL (ref 0–0.2)
IMM GRANULOCYTES NFR BLD AUTO: 1 % (ref 0–2)
INR PPP: 1.41 (ref 0.84–1.19)
LYMPHOCYTES # BLD AUTO: 1.37 THOUSANDS/ÂΜL (ref 0.6–4.47)
LYMPHOCYTES NFR BLD AUTO: 14 % (ref 14–44)
MAGNESIUM SERPL-MCNC: 2.2 MG/DL (ref 1.9–2.7)
MCH RBC QN AUTO: 32 PG (ref 26.8–34.3)
MCHC RBC AUTO-ENTMCNC: 31.2 G/DL (ref 31.4–37.4)
MCV RBC AUTO: 102 FL (ref 82–98)
MONOCYTES # BLD AUTO: 0.86 THOUSAND/ÂΜL (ref 0.17–1.22)
MONOCYTES NFR BLD AUTO: 9 % (ref 4–12)
NEUTROPHILS # BLD AUTO: 7.2 THOUSANDS/ÂΜL (ref 1.85–7.62)
NEUTS SEG NFR BLD AUTO: 75 % (ref 43–75)
NRBC BLD AUTO-RTO: 0 /100 WBCS
PHOSPHATE SERPL-MCNC: 2.6 MG/DL (ref 2.3–4.1)
PLATELET # BLD AUTO: 101 THOUSANDS/UL (ref 149–390)
PMV BLD AUTO: 10.8 FL (ref 8.9–12.7)
POTASSIUM SERPL-SCNC: 3 MMOL/L (ref 3.5–5.3)
PROTHROMBIN TIME: 17.5 SECONDS (ref 11.6–14.5)
RBC # BLD AUTO: 2.47 MILLION/UL (ref 3.81–5.12)
SODIUM SERPL-SCNC: 139 MMOL/L (ref 135–147)
WBC # BLD AUTO: 9.66 THOUSAND/UL (ref 4.31–10.16)

## 2023-09-04 PROCEDURE — NC001 PR NO CHARGE: Performed by: ORTHOPAEDIC SURGERY

## 2023-09-04 PROCEDURE — 80048 BASIC METABOLIC PNL TOTAL CA: CPT

## 2023-09-04 PROCEDURE — 82948 REAGENT STRIP/BLOOD GLUCOSE: CPT

## 2023-09-04 PROCEDURE — 85610 PROTHROMBIN TIME: CPT

## 2023-09-04 PROCEDURE — 85025 COMPLETE CBC W/AUTO DIFF WBC: CPT

## 2023-09-04 PROCEDURE — 99232 SBSQ HOSP IP/OBS MODERATE 35: CPT | Performed by: FAMILY MEDICINE

## 2023-09-04 PROCEDURE — 84100 ASSAY OF PHOSPHORUS: CPT

## 2023-09-04 PROCEDURE — 83735 ASSAY OF MAGNESIUM: CPT

## 2023-09-04 RX ORDER — POTASSIUM CHLORIDE 20MEQ/15ML
40 LIQUID (ML) ORAL 2 TIMES DAILY
Status: DISCONTINUED | OUTPATIENT
Start: 2023-09-04 | End: 2023-09-05 | Stop reason: HOSPADM

## 2023-09-04 RX ORDER — POTASSIUM CHLORIDE 20MEQ/15ML
80 LIQUID (ML) ORAL DAILY
Status: DISCONTINUED | OUTPATIENT
Start: 2023-09-04 | End: 2023-09-04

## 2023-09-04 RX ORDER — LOPERAMIDE HYDROCHLORIDE 2 MG/1
2 CAPSULE ORAL 2 TIMES DAILY PRN
Status: DISCONTINUED | OUTPATIENT
Start: 2023-09-04 | End: 2023-09-05 | Stop reason: HOSPADM

## 2023-09-04 RX ORDER — FERROUS SULFATE 300 MG/5ML
300 LIQUID (ML) ORAL
Status: DISCONTINUED | OUTPATIENT
Start: 2023-09-05 | End: 2023-09-05 | Stop reason: HOSPADM

## 2023-09-04 RX ADMIN — MICONAZOLE NITRATE 1 G: 20 POWDER TOPICAL at 17:50

## 2023-09-04 RX ADMIN — LIDOCAINE 1 PATCH: 700 PATCH TOPICAL at 09:23

## 2023-09-04 RX ADMIN — PANTOPRAZOLE SODIUM 40 MG: 40 TABLET, DELAYED RELEASE ORAL at 05:11

## 2023-09-04 RX ADMIN — DULOXETINE HYDROCHLORIDE 60 MG: 60 CAPSULE, DELAYED RELEASE ORAL at 09:23

## 2023-09-04 RX ADMIN — ACETAMINOPHEN 975 MG: 650 SUSPENSION ORAL at 05:11

## 2023-09-04 RX ADMIN — CHLORHEXIDINE GLUCONATE 15 ML: 1.2 SOLUTION ORAL at 09:22

## 2023-09-04 RX ADMIN — Medication 300 MG: at 09:25

## 2023-09-04 RX ADMIN — WARFARIN SODIUM 2.5 MG: 2.5 TABLET ORAL at 17:50

## 2023-09-04 RX ADMIN — POTASSIUM CHLORIDE 40 MEQ: 1.5 SOLUTION ORAL at 09:22

## 2023-09-04 RX ADMIN — LIDOCAINE 1 PATCH: 700 PATCH TOPICAL at 09:44

## 2023-09-04 RX ADMIN — ACETAMINOPHEN 975 MG: 650 SUSPENSION ORAL at 21:35

## 2023-09-04 RX ADMIN — LOPERAMIDE HYDROCHLORIDE 2 MG: 2 CAPSULE ORAL at 21:35

## 2023-09-04 RX ADMIN — CEFEPIME 1000 MG: 1 INJECTION, POWDER, FOR SOLUTION INTRAMUSCULAR; INTRAVENOUS at 09:44

## 2023-09-04 RX ADMIN — OXYCODONE HYDROCHLORIDE 5 MG: 5 SOLUTION ORAL at 12:36

## 2023-09-04 RX ADMIN — POTASSIUM CHLORIDE 40 MEQ: 1.5 SOLUTION ORAL at 17:49

## 2023-09-04 RX ADMIN — MICONAZOLE NITRATE 1 G: 20 POWDER TOPICAL at 09:29

## 2023-09-04 RX ADMIN — LOPERAMIDE HYDROCHLORIDE 2 MG: 2 CAPSULE ORAL at 09:23

## 2023-09-04 RX ADMIN — CEFEPIME 1000 MG: 1 INJECTION, POWDER, FOR SOLUTION INTRAMUSCULAR; INTRAVENOUS at 21:35

## 2023-09-04 RX ADMIN — LEVOTHYROXINE SODIUM 112 MCG: 100 TABLET ORAL at 05:11

## 2023-09-04 RX ADMIN — TORSEMIDE 40 MG: 20 TABLET ORAL at 09:23

## 2023-09-04 NOTE — PROGRESS NOTES
Progress Notes - Family Medicine Residency, Jewels Coulter 9/2/1933, 80 y.o. female. MRN: [id number]  Unit/Bed#: [facility] 216-19 Encounter: [id number]  Primary Care Provider: Jere Viveros DO      Admission Date: 8/28/2023 0542  Length of Stay: 7 days  Code Status:  Level 1 - Full Code  Disposition:   Consult:   IP CONSULT TO CASE MANAGEMENT  IP CONSULT TO INFECTIOUS DISEASES  IP CONSULT TO NEPHROLOGY  IP CONSULT TO PICC TEAM  IP CONSULT TO NUTRITION SERVICES  IP CONSULT TO PALLIATIVE CARE         Assessment/Plan:      Plans discussed with [facility] team and finalization is pending attending physician attestation. Please, call  for any clarification. Moderate protein-calorie malnutrition ([address])  Assessment & Plan    Malnutrition Findings:   Adult Malnutrition type: Chronic illness  Adult Degree of Malnutrition: Malnutrition of moderate degree  Malnutrition Characteristics: Fat loss, Muscle loss                  360 Statement: moderate pro, beltran malnutrition d/t conditions as evidence by moderate fat and muscle depletion at temples, clavicles, orbitals, treated currently with TF, pending ST evaluation for potential diet advancement, most likely will benefit from oral nutrition supplements, can start with ensure compact any flavor BID. BMI Findings: Body mass index is 35.05 kg/m². Assessment:   Moderate protein calorie malnutrition as evidenced by recent weight loss, decreased PO intake  in the setting of multiple comorbidities    Plan:  · Nutrition following, appreciate recs  · Pending swallow eval, if pt tolerates can resume PO intake and D/C tube feeds >>> Discontinued on 9/1. Pt back on PO. Multiple wounds  Assessment & Plan  - Sacral wound, heel wound  - Wound care consult placed by ICU    PLAN:   · Wound care recommendations:  · Cleanse sacro-buttocks with soap and water.  Apply Calazime Cream to B/L Sacro-Buttocks BID and PRN episodes of incontinence  · Turn/reposition q2h or when medically stable for pressure re-distribution on skin . · Elevate heels to offload pressure. · Moisturize skin daily with skin nourishing cream  · Ehob cushion in chair when out of bed. · B/L Heels: cleanse area and pat dry. Apply allevyn foam dressings to area. Deo with P for Prevention and change every 3 Days or PRN. Peel back and inspect skin Q-shift. · Nursing Communication: Please order patient P-500 Specialty Low Air Loss Mattress once transferred out of CCU.        Pain and swelling of left upper extremity  Assessment & Plan  - LUE: erythematous, edematous, and hot to the touch, concerning for possible clot vs thrombophlebitis. Weak pulses  - Doppler LUE 8/30 showed severe edema but no evidence of acute DVT    PLAN:  · Continue to monitor    Malnutrition [facility])  Assessment & Plan  Malnutrition Findings:                                 BMI Findings: Body mass index is 35.05 kg/m². - Patient on TF via NGT, placed 8/29/23>>> Now on PO 9/1   - ICU ordered nutritional consult; following    PLAN:  · Pt PO now was of 9/1. · Nutrition team following; appreciate recommendations      Altered mental status  Assessment & Plan  - Episode of AMS on morning of 8/31/23 for minimal responsiveness. Much improved 9/3 AO x3.  - CTH 8/31/23: no abnormalities  - Per ICU notes, patient difficult to arouse, requires painful stimulation >>> improved 9/3. Responsive to voice and verbal inquiry   - Episodes of hypoglycemia during hospital stay  - Palliative care consult ordered by ICU>>> recommended goals of care discussion with family and as of 8/31 determined that the pt does not have capacity. Follow up discussions regarding code status are ongoing   - Code status: Level 1  - Echo performed 8/31/23: EF 65%, mild-mod Aortic, Mitral, Tricuspid regurgitation. No wall motion abnormalities. Normal wall thickness.      PLAN:  · Neuro checks per shift  · Fall precautions  · Palliative Care recommendations:  · [address] conversation ongoing    Hyperglycemia  Assessment & Plan  - Last A1C 2022: 5.3%  - Hyperglycemia during hospitalization with 2 episodes of hypoglycemia  - No known history of Diabetes  - Not on diabetic medications at home    BMP Gluc       Results from last 7 days   Lab Units 09/01/23  0440 08/31/23  0423 08/30/23  2112 08/30/23  0000 08/29/23  1806 08/29/23  0443 08/29/23  0050   GLUCOSE RANDOM mg/dL 111 186* 204* 163* 424* 83 78    or POC BG       Results from last 7 days   Lab Units 09/01/23  0645 09/01/23  0632 09/01/23  0601 09/01/23  0005 08/31/23  1805 08/31/23  1214 08/31/23  0714   POC GLUCOSE mg/dl 204* 60* 54* 97 92 234* 246*     PLAN:  · Sliding Scale Insulin Algorithm #3  · Glucose checks q6 hours  · Monitor for hypoglycemia        Gastroesophageal reflux disease without esophagitis  Assessment & Plan  Continue home medication,  Protonix 40 mg daily. Benign essential HTN  Assessment & Plan  Home medications: Torsemide 40 mg daily    Systolic (43FQC), KYLE:633 , Min:87 , ESK:110   Diastolic (94XIJ), RPT:13, Min:41, Max:66  - Most recent BP Blood Pressure: (!) 87/41     Currently stable    PLAN:  · Goal BP <130/80 per cardiology  · MAP >65  · Continue home torsemide 40 mg daily    Stage 3 chronic kidney disease [facility])  Assessment & Plan  Lab Results   Component Value Date    EGFR 42 09/01/2023    EGFR 45 08/31/2023    EGFR 44 08/30/2023    CREATININE 1.15 09/01/2023    CREATININE 1.09 08/31/2023    CREATININE 1.10 08/30/2023   Nephrology following; appreciate recommendations  Baseline Cr: 0.9-1.1  On urinary smith catheter: 8/30/31    Plan:  · Avoid nephrotoxic agents  · Monitor with daily BMP     Status post right hip replacement  Assessment & Plan  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 S/p Joint explantation and placement of abx beads on 6/30/23 due to spontaneous drainage and abscess formation. Hypothyroidism  Assessment & Plan  Most recent TSH (6/28/23): 6.873, T4 1.39   Continue home medicaiton levothyroxine 112 mcg daily. Pacemaker  Assessment & Plan  · Hx of intolerance and bradycardia with AVN blocking agents for chronic a.fib  · S/p placement of single chamber PPM on 8/2018. Chronic heart failure with preserved ejection fraction [facility])  Assessment & Plan  Wt Readings from Last 3 Encounters:   09/01/23 81.4 kg (179 lb 7.3 oz)   06/28/23 76.2 kg (168 lb)   06/16/23 76.6 kg (168 lb 12.8 oz)       Intake/Output Summary (Last 24 hours) at 9/1/2023 0834  Last data filed at 9/1/2023 0701  Gross per 24 hour   Intake 1344 ml   Output 1151 ml   Net 193 ml   I/O last 24 hours: In: 9047 [NG/GT:395; IV Piggyback:610; Feedings:379]  Out: 1301 [Urine:1301]    - Last Echo 6/2023: LV cavity size is normal. Wall thickness is normal. There is concentric remodeling. LVEF 60%. Systolic function is normal. Basal inferoseptal segment is hypokinetic. RV nd RA mildly dilated. - Most recent Echo 8/31/23: LVEF 65%, mild-mod aortic, mitral and tricuspid regurgitaton. No wall motion abnormalities. Normal wall thickness. Plan:  · Continue home medication, torsemide 40 mg daily. · Monitor accurate I/O's            Anemia  Assessment & Plan  Iron Panel        Lab Results   Component Value Date    CONCFE 20 06/12/2020    CONCFE 28 03/20/2019    TIBC 347 06/12/2020    TIBC 293 03/20/2019    IRON 68 06/12/2020    IRON 82 03/20/2019    FERRITIN 34 06/12/2020    FERRITIN 169 03/20/2019     S/p transfusion 8/29 and 8/30; total of two units of PRBC's  Was on iron supplements at home; ferrous sulfate 325 daily. Plan:  · Monitor CBC; replete for hgb <7.0   · Continue iron supplementation   · Recommended for CBC w/diff weekly per ID    Chronic atrial fibrillation (HCC)  Assessment & Plan  · S/p AV node ablation in 8/2018 as well as placement of PPM  · On warfarin 3 mg daily for [facility] at home with goal INR of 2-3. · CHADS-VASC score: 5 points (Age, Female, CHF, HTN)  · Warfarin held on admission due to ortho procedure. 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 · S/p Joint explantation of right total hip prosthesis and placement of abx beads on 6/30/23 due to spontaneous drainage and abscess formation. Antibiotic beads were placed at time of procedure. Patient was placed on 6 week abx course of IV vanc which was switched to doxycycline. · Patient developed sinus tract and wound dehiscence. S/p I&D and washout on 8/29/23. · Ortho following appreciate recommendations  · ID following, Ortho following, appreciate recommendations. · PICC placed: 8/29  · Antibiotics started on 8/28      Plan:   · ID recommendations:  · F/U blood cultures 8/30/23: negative for now 72 hours as of 9/3  · F/U cultures from OR  · Pseudomonas aeruoginosa  · Cefepime 1g IV Q12H (started 8/30/23)  · Will need 6 week iv abx possibly followed by PO pending clinical course. Weekly CBC w/ diff, ESR, and CR while on IV abx. · F/U outpatient with ID 2 weeks post D/C  · Ortho recommendations  · Remain non-weight bearing/ non- ambulatory  · Dressing changes PRN          Diet: Diet Dysphagia/Modified Consistency; Dysphagia 1-Pureed; Thin Liquid; Sodium 2 GM    VTE Pharm PPX: Warfarin (Coumadin)  VTE Mech PPX: sequential compression device      Subjective: Today 09/04/23, HD# 7    Patient without acute events overnight. Patient seen and examined at bedside.  Patient in bed and eating breakfast, requests glass of ice water. Patient oriented only to self but knows her birthday occurred recently on [birth year]. Unable to recall who gave her flowers. Patient complaining of left leg pain and new onset abdominal pain. Patient had 3 episodes of diarrhea overnight and one episode this AM.     Objective:     Vitals:    09/04/23 0732 09/04/23 0740 09/04/23 0744 09/04/23 0850   BP:  (!) 87/41  110/71   Pulse:  73 70 70   Resp:   16    Temp: (!) 96.4 °F (35.8 °C)      TempSrc:       SpO2:  97% 97% 99%   Weight:       Height:         Temp:  [96.4 °F (35.8 °C)-97.8 °F (36.6 °C)] 96.4 °F (35.8 °C)  HR:  [70-73] 70  Resp:  [16-20] 16  BP: ()/(41-71) 110/71  Weight (last 2 days)     Date/Time Weight    09/04/23 0600 77.8 (171.5)    09/03/23 0605 80 (176.37)    09/02/23 0502 79.9 (176.15)          Intake/Output Summary (Last 24 hours) at 9/4/2023 0953  Last data filed at 9/4/2023 0230  Gross per 24 hour   Intake 1280 ml   Output --   Net 1280 ml     Invasive Devices     Peripherally Inserted Central Catheter Line  Duration           PICC Line 08/29/23 5 days                  Physical Exam:     Physical Exam  HENT:      Head: Normocephalic and atraumatic. Mouth/Throat:      Mouth: Mucous membranes are moist.   Cardiovascular:      Rate and Rhythm: Normal rate and regular rhythm. Pulses: Normal pulses. Heart sounds: Normal heart sounds. No murmur heard. No friction rub. No gallop. Pulmonary:      Effort: Pulmonary effort is normal.   Abdominal:      Tenderness: There is abdominal tenderness. Musculoskeletal:      Right lower leg: Edema (trace edema) present. Left lower leg: Edema (3+ edema) present. Skin:     General: Skin is warm and dry.    Neurological:      Comments: Oriented only to self  Knows birthday was [birth year]  States [facility] is at [facility]   Psychiatric:         Mood and Affect: Mood normal.           Labs:     CBC:  Results from last 7 days   Lab Units 09/04/23  0507 09/03/23  0551 09/02/23  0453 09/01/23  0440 08/31/23  0423 08/30/23  1122 08/30/23  0425 08/29/23  1423 08/29/23  0443 08/28/23  2354   WBC Thousand/uL 9.66 9.44 9.44 11.71* 9.86  --  9.72  --  9.24 9.62   HEMOGLOBIN g/dL 7.9* 8.3* 7.9* 8.8* 9.0* 8.6* 7.0*   < > 6.7* 7.0*   HEMATOCRIT % 25.3* 25.4* 25.0* 27.5* 27.0*  --  21.3*  --  21.1* 21.8*   PLATELETS Thousands/uL 101* 107* 108* 136* 155  --  137*  --  163 158   NEUTROS ABS Thousands/µL 7.20 6.96 7.17  --  8.71*  --  7.68*  --  6.74 7.55    < > = values in this interval not displayed. CMP:  Results from last 7 days   Lab Units 09/04/23  0507 09/03/23  1317 09/03/23  0551 09/02/23  0453 09/01/23  0440 08/31/23  0423 08/30/23  2112 08/30/23  0000 08/29/23  1806 08/29/23  0443 08/29/23  0050 08/28/23  1659   POTASSIUM mmol/L 3.0* 3.5 2.8* 3.1* 3.1* 3.7 3.3* 3.2*   < > 5.4* 3.0* 3.1*   CHLORIDE mmol/L 110*  --  109* 112* 116* 113* 111* 110*   < > 109* 109* 106   CO2 mmol/L 24  --  24 23 22 22 22 20*   < > 23 16* 22   BUN mg/dL 27*  --  27* 28* 27* 26* 26* 26*   < > 30* 25 32*   CREATININE mg/dL 1.18  --  1.15 1.12 1.15 1.09 1.10 1.07   < > 1.16 0.98 1.23   CALCIUM mg/dL 7.3*  --  7.3* 7.5* 7.6* 7.5* 7.4* 7.5*   < > 7.1* 5.7* 7.6*   AST U/L  --   --   --   --   --   --   --  17  --  11* 12* 11*   ALT U/L  --   --   --   --   --   --   --  7  --  8 7 10   ALK PHOS U/L  --   --   --   --   --   --   --  71  --  82 69 102   EGFR ml/min/1.73sq m 40  --  41 43 42 45 44 46   < > 41 51 38   MAGNESIUM mg/dL 2.2  --  1.9  --  2.3 2.5 1.8* 2.1  --  1.9  --  1.4*   PHOSPHORUS mg/dL 2.6  --  2.7  --  3.0 1.9*  --  2.4  --  3.0  --  3.2    < > = values in this interval not displayed. Sepsis:  Results from last 7 days   Lab Units 08/28/23  2016 08/28/23  1659   LACTIC ACID mmol/L 1.4 2.3*       Micro:  Lab Results   Component Value Date/Time    Blood Culture No Growth After 4 Days. 08/30/2023 10:19 AM    Blood Culture No Growth After 4 Days.  08/30/2023 10:19 AM    Gram Stain Result No Polys or Bacteria seen 08/28/2023 12:08 PM    Urine Culture >100,000 cfu/ml Escherichia coli ESBL (A) 06/15/2023 07:05 PM    Urine Culture >100,000 cfu/ml Klebsiella pneumoniae (A) 06/15/2023 07:05 PM    Urine Culture >100,000 cfu/ml Aerococcus urinae (A) 06/15/2023 07:05 PM    Wound Culture No growth 06/28/2023 04:11 PM    Body Fluid Culture, Sterile 2+ Growth of Pseudomonas aeruginosa (A) 08/28/2023 12:07 PM         Imaging:     XR abdomen 1 view kub    Result Date: 9/1/2023  Impression: The tip of the enteric tube projects over the proximal stomach just distal to the gastroesophageal junction. Recommend advancement. Workstation performed: [id number]     IR PICC placement double lumen    Result Date: 9/1/2023  Impression: Peripherally inserted central catheter placement. Workstation performed: [id number]     XR abdomen 1 view kub    Result Date: 9/1/2023  Impression: On the final radiograph, the tip of the enteric tube projects at the stomach. Workstation performed: [id number]     XR abdomen 1 view kub    Result Date: 9/1/2023  Impression: On the final radiograph, the tip of the enteric tube projects at the stomach. Workstation performed: [id number]     XR chest 1 view    Result Date: 9/1/2023  Impression: Feeding tube in upper esophagus, subsequently advanced into the stomach. No acute disease. Workstation performed: [id number]     XR chest portable    Result Date: 9/1/2023  Impression: Feeding tube in lower esophagus, subsequently advanced into the stomach. . Workstation performed: [id number]     CT head wo contrast    Result Date: 8/31/2023  Impression: No acute intracranial abnormality. Unchanged 2.7 cm meningioma in left parafalcine frontal vertex with slight mass effect on adjacent left anterior paramedian frontal lobe. Unchanged mild chronic microangiopathy.  Workstation performed: [id number]     XR chest 1 view    Result Date: 8/30/2023  Impression: Feeding tube tip in the mid thorax along the course of the esophagus. Workstation performed: [id number]     XR abdomen 1 view kub    Result Date: 8/30/2023  Impression: Feeding tube tip projects over the gastric body. Nonobstructive bowel gas pattern as visualized. Workstation performed: [id number]     XR chest portable ICU    Result Date: 8/30/2023  Impression: Limited study, specifically assessment of the left lung base. Cannot reliably exclude left pleural effusion. No overt pulmonary edema or consolidation within limitations. Follow-up PA and lateral views recommended for any persistent or worsening symptoms.  Workstation performed: [id number]         Medications:     Current Facility-Administered Medications   Medication Dose Route Frequency   • acetaminophen (TYLENOL) oral suspension 975 mg  975 mg Oral Q8H [facility]   • cefepime (MAXIPIME) 1,000 mg in dextrose 5 % 50 mL IVPB  1,000 mg Intravenous Q12H   • chlorhexidine (PERIDEX) 0.12 % oral rinse 15 mL  15 mL Mouth/Throat Q12H [facility]   • DULoxetine (CYMBALTA) delayed release capsule 60 mg  60 mg Oral Daily   • ferrous sulfate (MIKE-IN-SOL) oral solution 300 mg  300 mg Oral Daily   • HYDROmorphone HCl (DILAUDID) injection 0.2 mg  0.2 mg Intravenous Q4H PRN   • levothyroxine (Synthroid) suspension 112 mcg  112 mcg Oral Early Morning   • lidocaine (LIDODERM) 5 % patch 1 patch  1 patch Topical Daily   • lidocaine (LIDODERM) 5 % patch 1 patch  1 patch Topical Daily   • loperamide (IMODIUM) capsule 2 mg  2 mg Oral Daily PRN   • loperamide (IMODIUM) capsule 2 mg  2 mg Oral Once   • miconazole (MICOTIN) 2 % powder 1 Application  1 Application Topical BID   • ondansetron (ZOFRAN) injection 4 mg  4 mg Intravenous Q4H PRN   • oxyCODONE (ROXICODONE) oral solution 2.5 mg  2.5 mg Oral Q4H PRN   • oxyCODONE (ROXICODONE) oral solution 5 mg  5 mg Oral Q4H PRN   • pantoprazole (PROTONIX) EC tablet 40 mg  40 mg Oral Early Morning   • potassium chloride oral solution 40 mEq  40 mEq Oral BID   • simethicone (MYLICON) oral suspension 80 mg  80 mg Oral Q6H PRN   • sodium chloride (OCEAN) 0.65 % nasal spray 1 spray  1 spray Each Nare Q1H PRN   • torsemide (DEMADEX) tablet 40 mg  40 mg Oral Daily   • warfarin (COUMADIN) tablet 2.5 mg  2.5 mg Oral Daily (warfarin)                   Tevin Carvajal MD  Family Medicine, PGY-1  9:53 AM 9/4/2023

## 2023-09-04 NOTE — QUICK NOTE
Patient seen at bedside during evening rounds. Awake and alert. Patient complaining of R leg pain. Did not answer orientation questions; distracted by asking for plastic bags to place things in and boxes to put her bedside flowers in.   /58   Pulse 70   Temp 97.6 °F (36.4 °C)   Resp 16   Ht 5' (1.524 m)   Wt 80 kg (176 lb 5.9 oz)   LMP  (LMP Unknown)   SpO2 100%   BMI 34.44 kg/m²    Transitioned today to warfarin 2.5 mg this evening; first dose at 6 PM.   Currently stable but mentally sundowning. Plan: Continue with scheduled and PRN pain medications. INR in AM. Continue to monitor K with labs tomorrow AM. Most likely discharge with K tablets to maintain wnl. Current plan for possible discharge tomorrow or Tuesday. Continue with plan for IV abx through PICC line; follow up with ID outpatient.

## 2023-09-04 NOTE — PLAN OF CARE
Problem: Prexisting or High Potential for Compromised Skin Integrity  Goal: Skin integrity is maintained or improved  Description: INTERVENTIONS:  - Identify patients at risk for skin breakdown  - Assess and monitor skin integrity  - Assess and monitor nutrition and hydration status  - Monitor labs   - Assess for incontinence   - Turn and reposition patient  - Assist with mobility/ambulation  - Relieve pressure over bony prominences  - Avoid friction and shearing  - Provide appropriate hygiene as needed including keeping skin clean and dry  - Evaluate need for skin moisturizer/barrier cream  - Collaborate with interdisciplinary team   - Patient/family teaching  - Consider wound care consult   Outcome: Progressing     Problem: MOBILITY - ADULT  Goal: Maintain or return to baseline ADL function  Description: INTERVENTIONS:  -  Assess patient's ability to carry out ADLs; assess patient's baseline for ADL function and identify physical deficits which impact ability to perform ADLs (bathing, care of mouth/teeth, toileting, grooming, dressing, etc.)  - Assess/evaluate cause of self-care deficits   - Assess range of motion  - Assess patient's mobility; develop plan if impaired  - Assess patient's need for assistive devices and provide as appropriate  - Encourage maximum independence but intervene and supervise when necessary  - Involve family in performance of ADLs  - Assess for home care needs following discharge   - Consider OT consult to assist with ADL evaluation and planning for discharge  - Provide patient education as appropriate  Outcome: Progressing  Goal: Maintains/Returns to pre admission functional level  Description: INTERVENTIONS:  - Perform BMAT or MOVE assessment daily.   - Set and communicate daily mobility goal to care team and patient/family/caregiver. - Collaborate with rehabilitation services on mobility goals if consulted  - Perform Range of Motion 3 times a day.   - Reposition patient every 3 hours.  - Dangle patient 3 times a day  - Stand patient 3 times a day  - Ambulate patient 3 times a day  - Out of bed to chair 3 times a day   - Out of bed for meals 3 times a day  - Out of bed for toileting  - Record patient progress and toleration of activity level   Outcome: Progressing     Problem: PAIN - ADULT  Goal: Verbalizes/displays adequate comfort level or baseline comfort level  Description: Interventions:  - Encourage patient to monitor pain and request assistance  - Assess pain using appropriate pain scale  - Administer analgesics based on type and severity of pain and evaluate response  - Implement non-pharmacological measures as appropriate and evaluate response  - Consider cultural and social influences on pain and pain management  - Notify physician/advanced practitioner if interventions unsuccessful or patient reports new pain  Outcome: Progressing     Problem: INFECTION - ADULT  Goal: Absence or prevention of progression during hospitalization  Description: INTERVENTIONS:  - Assess and monitor for signs and symptoms of infection  - Monitor lab/diagnostic results  - Monitor all insertion sites, i.e. indwelling lines, tubes, and drains  - Monitor endotracheal if appropriate and nasal secretions for changes in amount and color  - [place] appropriate cooling/warming therapies per order  - Administer medications as ordered  - Instruct and encourage patient and family to use good hand hygiene technique  - Identify and instruct in appropriate isolation precautions for identified infection/condition  Outcome: Progressing  Goal: Absence of fever/infection during neutropenic period  Description: INTERVENTIONS:  - Monitor WBC    Outcome: Progressing     Problem: SAFETY ADULT  Goal: Maintain or return to baseline ADL function  Description: INTERVENTIONS:  -  Assess patient's ability to carry out ADLs; assess patient's baseline for ADL function and identify physical deficits which impact ability to perform ADLs (bathing, care of mouth/teeth, toileting, grooming, dressing, etc.)  - Assess/evaluate cause of self-care deficits   - Assess range of motion  - Assess patient's mobility; develop plan if impaired  - Assess patient's need for assistive devices and provide as appropriate  - Encourage maximum independence but intervene and supervise when necessary  - Involve family in performance of ADLs  - Assess for home care needs following discharge   - Consider OT consult to assist with ADL evaluation and planning for discharge  - Provide patient education as appropriate  Outcome: Progressing  Goal: Maintains/Returns to pre admission functional level  Description: INTERVENTIONS:  - Perform BMAT or MOVE assessment daily.   - Set and communicate daily mobility goal to care team and patient/family/caregiver. - Collaborate with rehabilitation services on mobility goals if consulted  - Perform Range of Motion 3 times a day. - Reposition patient every 3 hours.   - Dangle patient 3 times a day  - Stand patient 3 times a day  - Ambulate patient 3 times a day  - Out of bed to chair 3 times a day   - Out of bed for meals 3 times a day  - Out of bed for toileting  - Record patient progress and toleration of activity level   Outcome: Progressing  Goal: Patient will remain free of falls  Description: INTERVENTIONS:  - Educate patient/family on patient safety including physical limitations  - Instruct patient to call for assistance with activity   - Consult OT/PT to assist with strengthening/mobility   - Keep Call bell within reach  - Keep bed low and locked with side rails adjusted as appropriate  - Keep care items and personal belongings within reach  - Initiate and maintain comfort rounds  - Make Fall Risk Sign visible to staff  - Apply yellow socks and bracelet for high fall risk patients  - Consider moving patient to room near nurses station  Outcome: Progressing     Problem: DISCHARGE PLANNING  Goal: Discharge to home or other facility with appropriate resources  Description: INTERVENTIONS:  - Identify barriers to discharge w/patient and caregiver  - Arrange for needed discharge resources and transportation as appropriate  - Identify discharge learning needs (meds, wound care, etc.)  - Arrange for interpretive services to assist at discharge as needed  - Refer to Case Management Department for coordinating discharge planning if the patient needs post-hospital services based on physician/advanced practitioner order or complex needs related to functional status, cognitive ability, or social support system  Outcome: Progressing     Problem: Knowledge Deficit  Goal: Patient/family/caregiver demonstrates understanding of disease process, treatment plan, medications, and discharge instructions  Description: Complete learning assessment and assess knowledge base.   Interventions:  - Provide teaching at level of understanding  - Provide teaching via preferred learning methods  Outcome: Progressing     Problem: CARDIOVASCULAR - ADULT  Goal: Maintains optimal cardiac output and hemodynamic stability  Description: INTERVENTIONS:  - Monitor I/O, vital signs and rhythm  - Monitor for S/S and trends of decreased cardiac output  - Administer and titrate ordered vasoactive medications to optimize hemodynamic stability  - Assess quality of pulses, skin color and temperature  - Assess for signs of decreased coronary artery perfusion  - Instruct patient to report change in severity of symptoms  Outcome: Progressing  Goal: Absence of cardiac dysrhythmias or at baseline rhythm  Description: INTERVENTIONS:  - Continuous cardiac monitoring, vital signs, obtain 12 lead EKG if ordered  - Administer antiarrhythmic and heart rate control medications as ordered  - Monitor electrolytes and administer replacement therapy as ordered  Outcome: Progressing     Problem: Nutrition/Hydration-ADULT  Goal: Nutrient/Hydration intake appropriate for improving, restoring or maintaining nutritional needs  Description: Monitor and assess patient's nutrition/hydration status for malnutrition. Collaborate with interdisciplinary team and initiate plan and interventions as ordered. Monitor patient's weight and dietary intake as ordered or per policy. Utilize nutrition screening tool and intervene as necessary. Determine patient's food preferences and provide high-protein, high-caloric foods as appropriate.      INTERVENTIONS:  - Monitor oral intake, urinary output, labs, and treatment plans  - Assess nutrition and hydration status and recommend course of action  - Evaluate amount of meals eaten  - Assist patient with eating if necessary   - Allow adequate time for meals  - Recommend/ encourage appropriate diets, oral nutritional supplements, and vitamin/mineral supplements  - Order, calculate, and assess calorie counts as needed  - Recommend, monitor, and adjust tube feedings and TPN/PPN based on assessed needs  - Assess need for intravenous fluids  - Provide specific nutrition/hydration education as appropriate  - Include patient/family/caregiver in decisions related to nutrition  Outcome: Progressing     Problem: SAFETY,RESTRAINT: NV/NON-SELF DESTRUCTIVE BEHAVIOR  Goal: Remains free of harm/injury (restraint for non violent/non self-detsructive behavior)  Description: INTERVENTIONS:  - Instruct patient/family regarding restraint use   - Assess and monitor physiologic and psychological status   - Provide interventions and comfort measures to meet assessed patient needs   - Identify and implement measures to help patient regain control  - Assess readiness for release of restraint   Outcome: Progressing  Goal: Returns to optimal restraint-free functioning  Description: INTERVENTIONS:  - Assess the patient's behavior and symptoms that indicate continued need for restraint  - Identify and implement measures to help patient regain control  - Assess readiness for release of restraint   Outcome: Progressing

## 2023-09-04 NOTE — PROGRESS NOTES
Progress Note - Orthopedics   Lizzie Guillermo 80 y.o. female MRN: [id number]  Unit/Bed#: [facility] 507-01      Subjective:    80 y. o.female POD 7 R hip I&D. No acute events, no new complaints. Patient doing well. Pain well controlled. Denies fevers, chills, CP, SOB, N/V, numbness or tingling. Patient reports no issues with urination or bowel movements. Patient states she is doing well.     Labs:  0   Lab Value Date/Time    HCT 25.3 (L) 09/04/2023 0507    HCT 25.4 (L) 09/03/2023 0551    HCT 25.0 (L) 09/02/2023 0453    HCT 37.0 04/20/2015 1109    HCT 38.0 04/20/2015 1109    HCT 37.2 04/03/2015 0831    HGB 7.9 (L) 09/04/2023 0507    HGB 8.3 (L) 09/03/2023 0551    HGB 7.9 (L) 09/02/2023 0453    HGB 12.0 04/20/2015 1109    HGB 11.6 04/03/2015 0831    HGB 12.1 11/04/2014 1025    INR 1.37 (H) 09/03/2023 1317    WBC 9.66 09/04/2023 0507    WBC 9.44 09/03/2023 0551    WBC 9.44 09/02/2023 0453    WBC 7.98 04/20/2015 1109    WBC 7.29 04/03/2015 0831    WBC 6.66 11/04/2014 1025    ESR <1 08/31/2023 0423    CRP 17.6 (H) 07/25/2023 1133       Meds:    Current Facility-Administered Medications:   •  acetaminophen (TYLENOL) oral suspension 975 mg, 975 mg, Oral, Q8H [address], Greg Patient, DO, 975 mg at 09/04/23 0511  •  cefepime (MAXIPIME) 1,000 mg in dextrose 5 % 50 mL IVPB, 1,000 mg, Intravenous, Q12H, Nahun Nunez MD, Stopped at 09/03/23 2201  •  chlorhexidine (PERIDEX) 0.12 % oral rinse 15 mL, 15 mL, Mouth/Throat, Q12H [address], Selam Luevano MD, 15 mL at 09/03/23 2102  •  DULoxetine (CYMBALTA) delayed release capsule 60 mg, 60 mg, Oral, Daily, Selam Luevano MD, 60 mg at 09/03/23 1868  •  ferrous sulfate (MIKE-IN-SOL) oral solution 300 mg, 300 mg, Oral, Daily, Yohana James MD, 300 mg at 09/03/23 9497  •  HYDROmorphone HCl (DILAUDID) injection 0.2 mg, 0.2 mg, Intravenous, Q4H PRN, Selam Luevano MD  •  insulin lispro (HumaLOG) 100 units/mL subcutaneous injection 1-6 Units, 1-6 Units, Subcutaneous, 4x Daily (with meals and at bedtime) **AND** Fingerstick Glucose (POCT), , , 4x Daily AC and at bedtime, Colt Williamson MD  •  levothyroxine (Synthroid) suspension 112 mcg, 112 mcg, Oral, Early Morning, Sally Leiva MD, 112 mcg at 09/04/23 0511  •  lidocaine (LIDODERM) 5 % patch 1 patch, 1 patch, Topical, Daily, Wilber Ivy MD, 1 patch at 09/03/23 0850  •  lidocaine (LIDODERM) 5 % patch 1 patch, 1 patch, Topical, Daily, Colt Williamson MD, 1 patch at 09/03/23 2359  •  loperamide (IMODIUM) capsule 2 mg, 2 mg, Oral, Daily PRN, Huong Bowles MD, 2 mg at 09/03/23 1019  •  loperamide (IMODIUM) capsule 2 mg, 2 mg, Oral, Once, Huong Bowles MD  •  miconazole (MICOTIN) 2 % powder 1 Application, 1 Application, Topical, BID, Rupert Palacio DO, 1 Application at 26/59/17 1813  •  ondansetron (ZOFRAN) injection 4 mg, 4 mg, Intravenous, Q4H PRN, Wilber Ivy MD  •  oxyCODONE (ROXICODONE) oral solution 2.5 mg, 2.5 mg, Oral, Q4H PRN, Wilber Ivy MD, 2.5 mg at 09/03/23 1025  •  oxyCODONE (ROXICODONE) oral solution 5 mg, 5 mg, Oral, Q4H PRN, Wilber Ivy MD, 5 mg at 09/03/23 2102  •  pantoprazole (PROTONIX) EC tablet 40 mg, 40 mg, Oral, Early Morning, Rupert Palacio DO, 40 mg at 09/04/23 2118  •  potassium chloride oral solution 40 mEq, 40 mEq, Oral, Daily, Rupert Palacio DO  •  simethicone (MYLICON) oral suspension 80 mg, 80 mg, Oral, Q6H PRN, Colt Williamson MD  •  sodium chloride (OCEAN) 0.65 % nasal spray 1 spray, 1 spray, Each Nare, Q1H PRN, Wilber Ivy MD  •  torsemide (DEMADEX) tablet 40 mg, 40 mg, Oral, Daily, Rupert Hakeem DO, 40 mg at 09/03/23 5389  •  warfarin (COUMADIN) tablet 2.5 mg, 2.5 mg, Oral, Daily (warfarin), Rupert Palacio DO, 2.5 mg at 09/03/23 1723    Blood Culture:   Lab Results   Component Value Date    BLOODCX No Growth After 4 Days. 08/30/2023    BLOODCX No Growth After 4 Days.  08/30/2023       Wound Culture:   Lab Results   Component Value Date WOUNDCULT No growth 06/28/2023       Ins and Outs:  I/O last 24 hours: In: 5460 [P.O.:958; I.V.:30; IV Piggyback:250]  Out: -           Physical:  Vitals:    09/04/23 0212   BP: 99/63   Resp:    Temp:    SpO2: 100%     Musculoskeletal: right Lower Extremity     • Skin warm, . No erythema or ecchymosis. Pitting edema to the ankle  • Dressing with mild strikethrough  • TTP humberto-incisionally  • Sensation intact to saphenous, sural, tibial, superficial peroneal nerve, and deep peroneal  • Motor intact to +FHL/EHL, +ankle dorsi/plantar flexion  • 2+ DP pulse  • Digits warm and well perfused  • Capillary refill < 2 seconds    Assessment:    89 y. o.female POD 7 R Hip I&D and closure. Patient doing well.      Plan:  · NWB RLE  · IV abx per ID  · Will monitor for ABLA and administer IVF/prbc as indicated for Greater than 2 gram drop or Hgb < 7  · PT/OT  · Pain control  · DVT ppx SQH  · Dispo: Ortho will follow    Harvey Starr MD

## 2023-09-05 ENCOUNTER — TRANSITIONAL CARE MANAGEMENT (OUTPATIENT)
Dept: FAMILY MEDICINE CLINIC | Facility: CLINIC | Age: 88
End: 2023-09-05

## 2023-09-05 VITALS
WEIGHT: 169.09 LBS | OXYGEN SATURATION: 97 % | DIASTOLIC BLOOD PRESSURE: 52 MMHG | SYSTOLIC BLOOD PRESSURE: 110 MMHG | HEART RATE: 69 BPM | TEMPERATURE: 97.8 F | BODY MASS INDEX: 33.2 KG/M2 | HEIGHT: 60 IN | RESPIRATION RATE: 22 BRPM

## 2023-09-05 PROBLEM — R41.82 ALTERED MENTAL STATUS: Status: RESOLVED | Noted: 2023-08-31 | Resolved: 2023-09-05

## 2023-09-05 LAB
ANION GAP SERPL CALCULATED.3IONS-SCNC: 4 MMOL/L
BASOPHILS # BLD AUTO: 0.02 THOUSANDS/ÂΜL (ref 0–0.1)
BASOPHILS NFR BLD AUTO: 0 % (ref 0–1)
BUN SERPL-MCNC: 26 MG/DL (ref 5–25)
CALCIUM SERPL-MCNC: 7.1 MG/DL (ref 8.4–10.2)
CHLORIDE SERPL-SCNC: 110 MMOL/L (ref 96–108)
CO2 SERPL-SCNC: 23 MMOL/L (ref 21–32)
CREAT SERPL-MCNC: 1.2 MG/DL (ref 0.6–1.3)
DACRYOCYTES BLD QL SMEAR: PRESENT
EOSINOPHIL # BLD AUTO: 0.16 THOUSAND/ÂΜL (ref 0–0.61)
EOSINOPHIL NFR BLD AUTO: 2 % (ref 0–6)
ERYTHROCYTE [DISTWIDTH] IN BLOOD BY AUTOMATED COUNT: 17.2 % (ref 11.6–15.1)
FUNGUS SPEC CULT: NORMAL
FUNGUS SPEC CULT: NORMAL
GFR SERPL CREATININE-BSD FRML MDRD: 39 ML/MIN/1.73SQ M
GLUCOSE SERPL-MCNC: 85 MG/DL (ref 65–140)
HCT VFR BLD AUTO: 23 % (ref 34.8–46.1)
HGB BLD-MCNC: 7.6 G/DL (ref 11.5–15.4)
IMM GRANULOCYTES # BLD AUTO: 0.13 THOUSAND/UL (ref 0–0.2)
IMM GRANULOCYTES NFR BLD AUTO: 1 % (ref 0–2)
INR PPP: 1.91 (ref 0.84–1.19)
LYMPHOCYTES # BLD AUTO: 1.79 THOUSANDS/ÂΜL (ref 0.6–4.47)
LYMPHOCYTES NFR BLD AUTO: 18 % (ref 14–44)
MACROCYTES BLD QL AUTO: PRESENT
MAGNESIUM SERPL-MCNC: 2.1 MG/DL (ref 1.9–2.7)
MCH RBC QN AUTO: 32.8 PG (ref 26.8–34.3)
MCHC RBC AUTO-ENTMCNC: 33 G/DL (ref 31.4–37.4)
MCV RBC AUTO: 99 FL (ref 82–98)
MONOCYTES # BLD AUTO: 0.96 THOUSAND/ÂΜL (ref 0.17–1.22)
MONOCYTES NFR BLD AUTO: 10 % (ref 4–12)
NEUTROPHILS # BLD AUTO: 6.84 THOUSANDS/ÂΜL (ref 1.85–7.62)
NEUTS SEG NFR BLD AUTO: 69 % (ref 43–75)
NRBC BLD AUTO-RTO: 0 /100 WBCS
OVALOCYTES BLD QL SMEAR: PRESENT
PHOSPHATE SERPL-MCNC: 2.5 MG/DL (ref 2.3–4.1)
PLATELET # BLD AUTO: 95 THOUSANDS/UL (ref 149–390)
PLATELET BLD QL SMEAR: ABNORMAL
PMV BLD AUTO: 10.1 FL (ref 8.9–12.7)
POIKILOCYTOSIS BLD QL SMEAR: PRESENT
POTASSIUM SERPL-SCNC: 3.2 MMOL/L (ref 3.5–5.3)
PROTHROMBIN TIME: 22.2 SECONDS (ref 11.6–14.5)
RBC # BLD AUTO: 2.32 MILLION/UL (ref 3.81–5.12)
RBC MORPH BLD: PRESENT
SARS-COV-2 RNA RESP QL NAA+PROBE: NEGATIVE
SODIUM SERPL-SCNC: 137 MMOL/L (ref 135–147)
WBC # BLD AUTO: 9.9 THOUSAND/UL (ref 4.31–10.16)

## 2023-09-05 PROCEDURE — 99232 SBSQ HOSP IP/OBS MODERATE 35: CPT | Performed by: PHYSICIAN ASSISTANT

## 2023-09-05 PROCEDURE — 85025 COMPLETE CBC W/AUTO DIFF WBC: CPT

## 2023-09-05 PROCEDURE — 84100 ASSAY OF PHOSPHORUS: CPT

## 2023-09-05 PROCEDURE — 99238 HOSP IP/OBS DSCHRG MGMT 30/<: CPT | Performed by: FAMILY MEDICINE

## 2023-09-05 PROCEDURE — 83735 ASSAY OF MAGNESIUM: CPT

## 2023-09-05 PROCEDURE — 99233 SBSQ HOSP IP/OBS HIGH 50: CPT | Performed by: INTERNAL MEDICINE

## 2023-09-05 PROCEDURE — 80048 BASIC METABOLIC PNL TOTAL CA: CPT

## 2023-09-05 PROCEDURE — 85610 PROTHROMBIN TIME: CPT

## 2023-09-05 PROCEDURE — 87635 SARS-COV-2 COVID-19 AMP PRB: CPT

## 2023-09-05 PROCEDURE — NC001 PR NO CHARGE: Performed by: PHYSICIAN ASSISTANT

## 2023-09-05 RX ORDER — POTASSIUM CHLORIDE 20MEQ/15ML
40 LIQUID (ML) ORAL 2 TIMES DAILY
Qty: 3800 ML | Refills: 0 | Status: SHIPPED | OUTPATIENT
Start: 2023-09-05 | End: 2023-09-16 | Stop reason: CLARIF

## 2023-09-05 RX ORDER — ONDANSETRON 2 MG/ML
4 INJECTION INTRAMUSCULAR; INTRAVENOUS EVERY 4 HOURS PRN
Qty: 25 ML | Refills: 0 | Status: SHIPPED | OUTPATIENT
Start: 2023-09-05 | End: 2023-09-16 | Stop reason: CLARIF

## 2023-09-05 RX ORDER — LEVOTHYROXINE SODIUM 112 UG/1
112 TABLET ORAL DAILY
Qty: 90 TABLET | Refills: 0 | Status: SHIPPED | OUTPATIENT
Start: 2023-09-05 | End: 2023-09-16 | Stop reason: CLARIF

## 2023-09-05 RX ORDER — CHLORHEXIDINE GLUCONATE 0.12 MG/ML
15 RINSE ORAL EVERY 12 HOURS SCHEDULED
Qty: 120 ML | Refills: 0 | Status: SHIPPED | OUTPATIENT
Start: 2023-09-05 | End: 2023-09-16 | Stop reason: CLARIF

## 2023-09-05 RX ORDER — WARFARIN SODIUM 2.5 MG/1
TABLET ORAL
Qty: 30 TABLET | Refills: 0 | Status: SHIPPED | OUTPATIENT
Start: 2023-09-05 | End: 2023-09-16 | Stop reason: CLARIF

## 2023-09-05 RX ADMIN — POTASSIUM CHLORIDE 40 MEQ: 1.5 SOLUTION ORAL at 08:06

## 2023-09-05 RX ADMIN — ACETAMINOPHEN 975 MG: 650 SUSPENSION ORAL at 05:11

## 2023-09-05 RX ADMIN — LOPERAMIDE HYDROCHLORIDE 2 MG: 2 CAPSULE ORAL at 11:41

## 2023-09-05 RX ADMIN — TORSEMIDE 40 MG: 20 TABLET ORAL at 08:06

## 2023-09-05 RX ADMIN — LIDOCAINE 1 PATCH: 700 PATCH TOPICAL at 08:22

## 2023-09-05 RX ADMIN — CEFEPIME 1000 MG: 1 INJECTION, POWDER, FOR SOLUTION INTRAMUSCULAR; INTRAVENOUS at 10:37

## 2023-09-05 RX ADMIN — CHLORHEXIDINE GLUCONATE 15 ML: 1.2 SOLUTION ORAL at 08:06

## 2023-09-05 RX ADMIN — MICONAZOLE NITRATE 1 APPLICATION: 20 POWDER TOPICAL at 08:09

## 2023-09-05 RX ADMIN — DULOXETINE HYDROCHLORIDE 60 MG: 60 CAPSULE, DELAYED RELEASE ORAL at 08:06

## 2023-09-05 RX ADMIN — PANTOPRAZOLE SODIUM 40 MG: 40 TABLET, DELAYED RELEASE ORAL at 05:11

## 2023-09-05 RX ADMIN — LEVOTHYROXINE SODIUM 112 MCG: 100 TABLET ORAL at 06:04

## 2023-09-05 RX ADMIN — Medication 300 MG: at 08:08

## 2023-09-05 NOTE — PROGRESS NOTES
Progress note - Palliative and Supportive Care   Ewa Bassett 80 y.o. female [id number]    Patient Active Problem List   Diagnosis   • Chronic atrial fibrillation (HCC)   • Anemia   • Chronic heart failure with preserved ejection fraction (HCC)   • Depression   • Class 1 obesity due to excess calories with serious comorbidity and body mass index (BMI) of 33.0 to 33.9 in adult   • Chronic pain   • Chronic pain of lower extremity, bilateral   • Fibromyalgia   • External hemorrhoids   • Irritable bowel syndrome with constipation   • Lumbar canal stenosis   • Moderate mitral regurgitation   • Osteoarthritis, multiple sites   • Osteoporosis   • Peripheral neuropathy   • Primary osteoarthritis of right knee   • Synovial cyst of right popliteal space   • Pacemaker   • Hypothyroidism   • Macrocytosis   • Status post gastrectomy   • Status post right hip replacement   • Ambulatory dysfunction   • MCI (mild cognitive impairment)   • Osteomyelitis (HCC)   • Cellulitis of right lower extremity   • Hypertrophic scar of skin   • S/P scar revision   • Elevated C-reactive protein (CRP)   • Fall   • Hematoma   • Ecchymosis of eye   • Meningioma (HCC)   • Trochanteric bursitis of right hip   • Stage 3 chronic kidney disease (HCC)   • Benign essential HTN   • Trochanteric bursitis   • Negative depression screening   • Peripheral vascular disease, unspecified ([address])   • Lumbar radiculopathy   • Low back pain with sciatica   • Lumbar spondylosis   • S/P lumbar fusion   • Hyponatremia   • Chronic diarrhea   • 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 Symptom management -   • Delirium precautions: please minimize interruptions and prioritize sleep at night. No TV nor screen time at night. Shades drawn at night. During day, shades up, minimize napping, and encourage meals in chair. • Acetaminophen 975 mg p.o. every 8 hours scheduled  • Cymbalta 60 mg daily  • Lidoderm patches to right hip and foot  • Oxy IR 2.5 to 5 mg p.o. every 4 hours. Moderate to severe pain  • Dilaudid 0.2 mg IV every 4 hours as needed breakthrough pain  • Bowel regimen: MiraLAX daily, Senokot daily  • Zofran 4 mg IV every 4 hours as needed nausea and vomiting    2. Goals -   • Ongoing medical optimization without limits at this time. See ACP note for more details  • Plan for discharge to skilled nursing facility as early as today     Code Status: Full- Level 1   Decisional apparatus:  Patient is not competent on my exam today. If competence is lost, patient's substitute decision maker would default to adult children by [place] Act 169. Advance Directive / Living Will / POLST: None on file. Patient thought that she completed an advance directive naming Romie domingo, as healthcare agent. But Romie Mandel states that no such document exists. 3. Social support-  • Patient has a son and daughter. However, reports her primary support comes from Romie domingo. Romie Mandel is a  and has good health literacy as well as has experience serving as a good patient advocate. • Did provide an update to her daughter, Alma Kamara, today. 4. Follow-up  • Anticipate discharge to skilled nursing facility later today. • Follow-up with palliative medicine on an as-needed basis. Call with questions or concerns. Interval history:       No events overnight. Patient received 1 dose of oxyIR in the past 24H. Having frequent stools for which imodium was ordered PRN by primary.   This is felt to be secondary to cefepime and not likely infectious. Patient admits to vomiting once this morning. RN states that it was a gagging episode following administration of potassium but no emesis. Patient denies hip pain but does have abdominal discomfort, also may be GI side effect from cefepime. Oriented to herself and place but poor memory otherwise. Spoke with daughter as documented above and in ACP note. MEDICATIONS / ALLERGIES:     all current active meds have been reviewed    Allergies   Allergen Reactions   • Codeine Vomiting and GI Intolerance     GI upset, ana m morphine  Reaction Date: 24Apr2012;    • Lactose - Food Allergy GI Intolerance   • Milk (Cow) Diarrhea   • Nsaids GI Intolerance   • Other      Other reaction(s): Other (See Comments)  ana m toradol in OR 6/06 sah   • Seasonal Ic  [Cholestatin] Allergic Rhinitis   • Trimethoprim    • Bactrim [Sulfamethoxazole-Trimethoprim] Rash   • Sulfa Antibiotics Rash     Other reaction(s): Other (See Comments)  takes lasix @home       OBJECTIVE:    Physical Exam  Physical Exam  HENT:      Head: Atraumatic. Mouth/Throat:      Comments: Missing front tooth  Eyes:      Conjunctiva/sclera: Conjunctivae normal.   Cardiovascular:      Rate and Rhythm: Normal rate. Pulmonary:      Effort: No respiratory distress. Abdominal:      Tenderness: There is no guarding. Genitourinary:     Comments: Incontinent   Musculoskeletal:         General: No swelling. Skin:     General: Skin is dry. Comments: Fingertips are dusky   Neurological:      Mental Status: She is alert.       Comments: Pleasantly confused, poor memory   Psychiatric:      Comments: Calm during time of encounter         Lab Results:   Results from last 7 days   Lab Units 09/05/23  0428 09/04/23  0507 09/03/23  0551   WBC Thousand/uL 9.90 9.66 9.44   HEMOGLOBIN g/dL 7.6* 7.9* 8.3*   HEMATOCRIT % 23.0* 25.3* 25.4*   PLATELETS Thousands/uL 95* 101* 107*   NEUTROS PCT % 69 75 73   MONOS PCT % 10 9 8   EOS PCT % 2 1 1     Results from last 7 days   Lab Units 09/05/23  0428 09/04/23  0507 09/03/23  1317 09/03/23  0551 08/30/23  2112 08/30/23  0000   POTASSIUM mmol/L 3.2* 3.0* 3.5 2.8*   < > 3.2*   CHLORIDE mmol/L 110* 110*  --  109*   < > 110*   CO2 mmol/L 23 24  --  24   < > 20*   BUN mg/dL 26* 27*  --  27*   < > 26*   CREATININE mg/dL 1.20 1.18  --  1.15   < > 1.07   CALCIUM mg/dL 7.1* 7.3*  --  7.3*   < > 7.5*   ALK PHOS U/L  --   --   --   --   --  71   ALT U/L  --   --   --   --   --  7   AST U/L  --   --   --   --   --  17    < > = values in this interval not displayed. Imaging Studies: reviewed pertinent studies   EKG, Pathology, and Other Studies: reviewed pertinent studies    Counseling / Coordination of Care    Total floor / unit time spent today 35 minutes. Greater than 50% of total time was spent with the patient and / or family counseling and / or coordination of care.  A description of the counseling / coordination of care: Time spent assessing patient, communicating with primary team, RN, discussing goals of care with daughter via phone    This note was not shared with the patient due to privacy exception: note includes other individuals

## 2023-09-05 NOTE — WOUND OSTOMY CARE
Consult Note - Wound   Monse Vivar 80 y.o. female MRN: [id number]  Unit/Bed#: [facility] 444-69 Encounter: [id number]        History and Present Illness:  Patient is a 81 yo female that was admitted to [facility] for treatment of Infection of Right Prosthetic Joint. Patient is a moderate assist for turning and repositioning. Patient is grossly incontinent of liquid bowel multiple times during assessment and bladder -  Allyn -care rendered. On assessment, patient is lying on p-500 specialty mattress. Wound Care was reconsulted for multiple wounds on RLE and pannus. Assessment Findings:   1. HA Left Heel DTPI: irregular in shape area of intact dark purple, non-blanchable tissue. No skin loss or drainage present. Allyn-wound is fragile and erythematic. No drainage noted. Recommend allevyn foam dressing to area and offloading boot. Deep Tissue Pressure Injury wounds have the potential to evolve into unstageable, stage III or stage IV wounds. 2. Right Heel is dry, intact, boggy and blanchable. No wounds or skin loss present in area. allevyn foam dressing applied for protection and prevention and offloading boot ordered. 3. HA Right Lateral Lower Leg Evolving Deep Pressure Injury: patient laterally rotates leg on assessment. Wound bed is irregular in shape with at least full thickness skin loss over bony prominence. Wound bed is 80% yellow slough tissue and 20% purple non-blanchable tissue - cannot appreciate true depth related slough tissue obscuring wound bed. Allyn-wound is erythematic. Scant serosanguineous drainage noted from area. Recommend triad paste and allevyn foam dressing to area for treatment. Deep Tissue Pressure Injury wounds have the potential to evolve into unstageable, stage III or stage IV wounds. 4. Right Abdomen/Pannus MASD/ITD: Irregular in shape area of partial thickness skin loss. Wound bed is mix of beefy red and bleeding and yellow tissue. Allyn-wound is fragile. Scant sanguinous drainage noted. Recommend maxorb rope to area. maxorb rope applied. 5. POA B/L Sacro-Buttocks Evolving DTPI: irregular in shape area of intact dark purple non-blanchable tissue and full thickness skin loss with a beefy red wound bed measured together. Allyn-wound is fragile and pink in color. Small serosanguineous drainage noted. Recommend switching to triad paste. Deep Tissue Pressure Injury wounds have the potential to evolve into unstageable, stage III or stage IV wounds. 6. Left Abdomen/ Pannus MASD/ITD: irregular in shape area of partial thickness skin loss located in skin fold. Wound bed is beefy red and bleeding. Allyn-wound is fragile. Scant sanguinous drainage noted. Recommend maxorb rope to area. maxorb rope applied. No induration, fluctuance, odor, warmth/temperature differences, redness, or purulence noted to the above noted wounds and skin areas assessed. New dressings applied per orders listed below. Patient tolerated well- no s/s of non-verbal pain or discomfort observed during the encounter. Bedside nurse aware of plan of care. See flow sheets for more detailed assessment findings. Orders listed below and wound care will continue to follow, call or tiger text with questions. Skin Care Plan:  1-Cleanse sacro-buttocks with soap and water. Apply triad Paste to B/L Sacro-Buttocks TID and PRN episodes of Incontinence. 2-Turn/reposition q2h or when medically stable for pressure re-distribution on skin . 3-Elevate heels to offload pressure. Apply offloading boots to B/L feet. 4-Moisturize skin daily with skin nourishing cream  5-Ehob cushion in chair when out of bed. Continue with P-500 Specialty Mattress. 6-B/L Heels: cleanse areas with soap and water and pat dry. Apply allevyn foam dressing to b/l heels. Kristina left heel with T for treatment and kristina right heel with P for Prevention. Change every other day or PRN soilage/ displacement.   7-B/L Abdomen/Pannus Folds: Cleanse areas with soap and water. Pat dry. Apply maxorb rope to areas. Change daily or PRN soilage/displacement. 8-Right Lateral Lower Leg Wound: Cleanse with NSS and pat dry. Apply Triad Paste to wound bed and cover with allevyn foam dressing. Deo with T for Treatment and change daily or PRN soilage/displacement. Wounds:  Wound 06/28/23 Pressure Injury Sacrum (Active)   Wound Image   09/05/23 1118   Wound Description Light purple;Non-blanchable erythema; Beefy red 09/05/23 1633   Pressure Injury Stage DTPI 09/05/23 1633   Allyn-wound Assessment Fragile;Pink 09/05/23 1633   Wound Length (cm) 8 cm 09/05/23 1118   Wound Width (cm) 6 cm 09/05/23 1118   Wound Depth (cm) 0.2 cm 09/05/23 1118   Wound Surface Area (cm^2) 48 cm^2 09/05/23 1118   Wound Volume (cm^3) 9.6 cm^3 09/05/23 1118   Calculated Wound Volume (cm^3) 9.6 cm^3 09/05/23 1118   Change in Wound Size % 0 09/05/23 1118   Wound Site Closure CARINE 09/05/23 1633   Drainage Amount Small 09/05/23 1633   Drainage Description Serosanguineous 09/05/23 1633   Non-staged Wound Description Full thickness 09/05/23 1633   Treatments Cleansed;Irrigation with NSS;Site care 09/05/23 1633   Dressing Other (Comment) 09/05/23 1633   Dressing Changed New 09/05/23 1633   Patient Tolerance Tolerated well 09/05/23 1633   Dressing Status Intact 09/05/23 1633       Wound 06/30/23 MASD Abdomen Left (Active)   Wound Image   09/05/23 1117   Wound Description Beefy red;Bleeding 09/05/23 1600   Allyn-wound Assessment Fragile 09/05/23 1600   Wound Length (cm) 0.9 cm 09/05/23 1117   Wound Width (cm) 5 cm 09/05/23 1117   Wound Depth (cm) 0.1 cm 09/05/23 1117   Wound Surface Area (cm^2) 4.5 cm^2 09/05/23 1117   Wound Volume (cm^3) 0.45 cm^3 09/05/23 1117   Calculated Wound Volume (cm^3) 0.45 cm^3 09/05/23 1117   Drainage Amount Scant 09/05/23 1600   Drainage Description Sanguineous 09/05/23 1600   Non-staged Wound Description Partial thickness 09/05/23 1600   Treatments Cleansed;Irrigation with NSS;Site care 09/05/23 1600   Dressing Calcium Alginate 09/05/23 1600   Dressing Changed New 09/05/23 1600   Patient Tolerance Tolerated well 09/05/23 1600   Dressing Status Intact;Dry;Clean 09/05/23 1600       Wound 09/05/23 Heel Left (Active)   Wound Image   09/05/23 1121   Wound Description Non-blanchable erythema;Light purple 09/05/23 1600   Pressure Injury Stage DTPI 09/05/23 1600   Allyn-wound Assessment Fragile;Erythema 09/05/23 1600   Wound Length (cm) 3 cm 09/05/23 1121   Wound Width (cm) 2 cm 09/05/23 1121   Wound Depth (cm) 0 cm 09/05/23 1121   Wound Surface Area (cm^2) 6 cm^2 09/05/23 1121   Wound Volume (cm^3) 0 cm^3 09/05/23 1121   Calculated Wound Volume (cm^3) 0 cm^3 09/05/23 1121   Drainage Amount None 09/05/23 1600   Non-staged Wound Description Not applicable 19/38/55 9138   Treatments Cleansed;Site care;Elevated 09/05/23 1600   Dressing Foam, Silicon (eg. Allevyn, etc) 09/05/23 1600   Dressing Changed New 09/05/23 1600   Patient Tolerance Tolerated well 09/05/23 1600   Dressing Status Clean;Dry; Intact 09/05/23 1600       Wound 09/05/23 Pressure Injury Pretibial Proximal;Right;Lateral (Active)   Wound Image   09/05/23 1124   Wound Description Yellow;Slough 09/05/23 1600   Pressure Injury Stage U 09/05/23 1600   Allyn-wound Assessment Erythema 09/05/23 1600   Wound Length (cm) 1.5 cm 09/05/23 1124   Wound Width (cm) 3 cm 09/05/23 1124   Wound Depth (cm) 0.2 cm 09/05/23 1124   Wound Surface Area (cm^2) 4.5 cm^2 09/05/23 1124   Wound Volume (cm^3) 0.9 cm^3 09/05/23 1124   Calculated Wound Volume (cm^3) 0.9 cm^3 09/05/23 1124   Drainage Amount Scant 09/05/23 1600   Drainage Description Serosanguineous 09/05/23 1600   Non-staged Wound Description Full thickness 09/05/23 1600   Treatments Cleansed;Irrigation with NSS;Site care 09/05/23 1600   Dressing Foam, Silicon (eg. Allevyn, etc); Other (Comment) 09/05/23 1600   Dressing Changed New 09/05/23 1600   Patient Tolerance Tolerated well 09/05/23 1600   Dressing Status Clean;Dry; Intact 09/05/23 1600       Wound 09/05/23 MASD Abdomen Right (Active)   Wound Image   09/05/23 1633   Wound Description Beefy red;Bleeding;Yellow 09/05/23 1633   Allyn-wound Assessment Fragile 09/05/23 1633   Wound Length (cm) 0.5 cm 09/05/23 1633   Wound Width (cm) 4 cm 09/05/23 1633   Wound Depth (cm) 0.1 cm 09/05/23 1633   Wound Surface Area (cm^2) 2 cm^2 09/05/23 1633   Wound Volume (cm^3) 0.2 cm^3 09/05/23 1633   Calculated Wound Volume (cm^3) 0.2 cm^3 09/05/23 1633   Drainage Amount Scant 09/05/23 1633   Drainage Description Sanguineous 09/05/23 1633   Non-staged Wound Description Partial thickness 09/05/23 1633   Treatments Cleansed;Irrigation with NSS;Site care 09/05/23 1633   Dressing Calcium Alginate 09/05/23 1633   Wound packed?  No 09/05/23 1633   Dressing Changed New 09/05/23 1633   Patient Tolerance Tolerated well 09/05/23 1633   Dressing Status Intact;Dry;Clean 09/05/23 [address] [place] [place]

## 2023-09-05 NOTE — DISCHARGE INSTR - AVS FIRST PAGE
You were hospitalized for an infection in your hip. You will require long term IV antibiotics on a daily basis until October 8th 2023 and will need to follow up outpatient with the infectious disease physicians. On 9/13 at 2:30 PM. You will require weekly CBC, BMP and ESR as you continue your IV antibiotic therapy. You will also require a follow up appointment with the orthopedic doctors. You also have daily potassium levels that were low. You will require daily potassium supplemetation at the rate of 40meq twice a day while still having the diarrhea as that can make your potassium levels low     You also have medicine available to you to make the diarrhea less severe as a result of the antibiotics. Frequent wound checks and turning and repositioning are imperative.

## 2023-09-05 NOTE — PROGRESS NOTES
Progress Note - Infectious Disease   Mara Villa 80 y.o. female MRN: [id number]  Unit/Bed#: [facility] 507-01 Encounter: [id number]      Impression/Recommendations:  Right hip PJI infection/acetabular osteomyelitis.    In setting of MAIA, revision, chronic dislocation/displacement as below.  Presented June with subacute pain, spontaneous drainage from sinus tract.  CT showed abscess at joint.  Status post MAIA explantation, extensive I&D, insertion of antibiotic beads (no spacer placed) on 6/30.  Extensive purulent fluid and necrotic tissue noted intraoperatively.  OR cultures negative.  Status post 6 weeks IV vancomycin, transitioned to PO doxycycline.  ESR 54 >> 13.  Had persistent wound drainage, sinus tract.  Now status post I&D, layered [facility] 8/28.  Intraoperative findings notable for infected cavity due to absence of hip.  Cultures now with Pseudomonas, likely new superinfection as expect would have grown during initial I&D 6/30.  ESR now <1  Rec:  • Continue cefepime for 6 weeks total through 10/8/23  • Check weekly CBC-diff, Cr, ESR while on IV antibiotics  • May need PO antibiotics depending on clinical course and ESR trend  • D/C planning for SNF  • Outpatient follow-up with me 2 weeks after D/C - office notified  • D/C PICC after last dose IV antibiotics     Acute on chronic thrombocytopenia. Suspect multifactorial due to infection, antibiotics. Baseline seems to be 150-200. Rec:  • Check CBC weekly as above  • If platelets drop <11 may need to consider change to levofloxacin     Status post right MAIA with revision in 2019. Complicated more recently by disruption and chronic dislocation/displacement.  Non-ambulatory.     Status post PPM.     IBS with constipation.     Afib. On coumadin     History of ESBL, MDRO. From recent urine cultures.     CKD. Baseline Cr 1.0, CrCl ~30 but may be overestimated.   Rec:  • Follow creatinine closely and dose-adjust antibiotics as indicated     The above plan was discussed in detail with Dr. Jia Castro. The patient is stable from an ID standpoint for D/C back to SNF.     Antibiotics:  Cefepime #7  Antibiotics #8  POD #8    Subjective:  Patient seen on AM rounds. Diarrhea (chronic). Vomited this AM.  Incontinent of urine. 24 Hour Events:  No documented fevers, chills, sweats. Platelets trending down. Objective:  Vitals:  Temp:  [97.3 °F (36.3 °C)-97.9 °F (36.6 °C)] 97.8 °F (36.6 °C)  HR:  [69] 69  Resp:  [22] 22  BP: ()/(52-78) 110/52  SpO2:  [97 %-98 %] 97 %  Temp (24hrs), Av.7 °F (36.5 °C), Min:97.3 °F (36.3 °C), Max:97.9 °F (36.6 °C)  Current: Temperature: 97.8 °F (36.6 °C)    Physical Exam:   General:  No acute distress  Psychiatric:  Awake and alert  Pulmonary:  Normal respiratory excursion without accessory muscle use  Abdomen:  Soft, nontender  Extremities:  No edema  Skin:  No rashes    Lab Results:  I have personally reviewed pertinent labs. Results from last 7 days   Lab Units 23  0428 23  0507 23  1317 23  0551 23  2112 23  0000   POTASSIUM mmol/L 3.2* 3.0* 3.5 2.8*   < > 3.2*   CHLORIDE mmol/L 110* 110*  --  109*   < > 110*   CO2 mmol/L 23 24  --  24   < > 20*   BUN mg/dL 26* 27*  --  27*   < > 26*   CREATININE mg/dL 1.20 1.18  --  1.15   < > 1.07   EGFR ml/min/1.73sq m 39 40  --  41   < > 46   CALCIUM mg/dL 7.1* 7.3*  --  7.3*   < > 7.5*   AST U/L  --   --   --   --   --  17   ALT U/L  --   --   --   --   --  7   ALK PHOS U/L  --   --   --   --   --  71    < > = values in this interval not displayed. Results from last 7 days   Lab Units 23  0428 23  0507 23  0551   WBC Thousand/uL 9.90 9.66 9.44   HEMOGLOBIN g/dL 7.6* 7.9* 8.3*   PLATELETS Thousands/uL 95* 101* 107*     Results from last 7 days   Lab Units 23  1019   BLOOD CULTURE  No Growth After 5 Days. No Growth After 5 Days.        Imaging Studies:   I have personally reviewed pertinent imaging study reports and images in PACS.    EKG, Pathology, and Other Studies:   I have personally reviewed pertinent reports.

## 2023-09-05 NOTE — CASE MANAGEMENT
Case Management Discharge Planning Note    Patient name Sridevi Peck  Location [facility] 507/[facility] 503-36 MRN [id number]  : 1933 Date 2023       Current Admission Date: 2023  Current Admission Diagnosis:Infection of right prosthetic hip joint [facility])   Patient Active Problem List    Diagnosis Date Noted   • Moderate protein-calorie malnutrition ([address]) 2023   • Hyperglycemia 2023   • Malnutrition ([address]) 2023   • Pain and swelling of left upper extremity 2023   • Multiple wounds 2023   • Gastroesophageal reflux disease without esophagitis 2023   • Infection of right prosthetic hip joint ([address]) 2023   • Chronic diarrhea 2023   • Hyponatremia 06/15/2023   • Lumbar radiculopathy 02/10/2023   • Low back pain with sciatica 02/10/2023   • Lumbar spondylosis 02/10/2023   • S/P lumbar fusion 02/10/2023   • Peripheral vascular disease, unspecified ([address]) 2022   • Negative depression screening 2021   • Benign essential HTN 2021   • Trochanteric bursitis 2021   • Meningioma ([address]) 11/10/2020   • Trochanteric bursitis of right hip 11/10/2020   • Stage 3 chronic kidney disease ([address]) 11/10/2020   • Fall 10/13/2020   • Hematoma 10/13/2020   • Ecchymosis of eye 10/13/2020   • Elevated C-reactive protein (CRP) 2020   • S/P scar revision 2019   • Hypertrophic scar of skin 2019   • Cellulitis of right lower extremity 2019   • Osteomyelitis ([address]) 2019   • MCI (mild cognitive impairment) 2019   • Ambulatory dysfunction 2019   • Status post right hip replacement 2019   • Macrocytosis 2019   • Status post gastrectomy 2019   • Hypothyroidism 10/11/2018   • Pacemaker 2018   • Chronic heart failure with preserved ejection fraction ([address]) 2016   • Depression 2016   • Class 1 obesity due to excess calories with serious comorbidity and body mass index (BMI) of 33.0 to 33.9 in adult 08/23/2016   • Chronic pain 08/23/2016   • Chronic pain of lower extremity, bilateral 06/23/2016   • External hemorrhoids 03/25/2016   • Anemia 03/09/2016   • Chronic atrial fibrillation ([address]) 03/07/2016   • Irritable bowel syndrome with constipation 09/15/2015   • Synovial cyst of right popliteal space 08/04/2015   • Primary osteoarthritis of right knee 06/30/2015   • Osteoarthritis, multiple sites 06/26/2014   • Moderate mitral regurgitation 05/13/2014   • Osteoporosis 02/04/2014   • Fibromyalgia 05/31/2012   • Peripheral neuropathy 05/25/2012   • Lumbar canal stenosis 05/17/2012      LOS (days): 8  Geometric Mean LOS (GMLOS) (days): 3.40  Days to GMLOS:-4.5     OBJECTIVE:  Risk of Unplanned Readmission Score: 50.06         Current admission status: Inpatient   Preferred Pharmacy:   [address] [place] [address]  Phone: [phone] Fax: [phone]    Primary Care Provider: Virgilio Pfeiffer DO    Primary Insurance: [facility]  Secondary Insurance:     DISCHARGE DETAILS:    Discharge planning discussed with[de-identified] Patient  Freedom of Choice: Yes     CM contacted family/caregiver?: Yes  Were Treatment Team discharge recommendations reviewed with patient/caregiver?: Yes  Did patient/caregiver verbalize understanding of patient care needs?: N/A- going to facility  Were patient/caregiver advised of the risks associated with not following Treatment Team discharge recommendations?: Yes    Contacts  Patient Contacts: Bradley pang)  Relationship to Patient[de-identified] Family  Contact Method: Phone  Phone Number: [phone]  Reason/Outcome: Discharge Planning    Treatment Team Recommendation: SNF  Discharge Destination Plan[de-identified] SNF  Transport at Discharge : [facility] Ambulance        Transported by Raul Davila and Unit #): Bell [phone]  ETA of Transport (Date): 09/05/23  ETA of Transport (Time): 1290 Transfer Mode: Stretcher  Accompanied by: EMS personnel     IMM Given (Date):: 09/05/23  IMM Given to[de-identified] Patient     Receiving Facility/Agency Phone Number: [phone]  Facility/Agency Fax Number: [phone]         Summary: Patient cleared for DC home. [facility] and [address] [place] [address] Drive able to accept patient back today. COVID completed. Transport set and confirmed for today at 2:30 PM. Met with patient at bedside to discuss DC plan. Patient verbalized understanding. IMM reviewed with patient, patient agrees with discharge determination. Left a VM for patient’s [facility] [employer] [phone] and updated on DC plan. AVS faxed to facility.

## 2023-09-05 NOTE — ACP (ADVANCE CARE PLANNING)
Discussed code status with daughter, Mary Kate Jennings. She states that her mother has verbalized desire to remain a full code at this time. She acknowledges patient's frailty. Education provided on expectations of CPR should she have a devastating cardiac event though no particular concerns at this time as patient is planned for discharge within the next 24H. Mary Kate Jennings and Abel Marte both express desire to continue rehab focused care but are open to code status discussions in the future should he condition change. Recommended asking SNF for a POLST to guide this discussion in the future and also offered follow-up with palliative medicine on an as needed basis. See progress note for further details.

## 2023-09-06 ENCOUNTER — TELEPHONE (OUTPATIENT)
Dept: INFECTIOUS DISEASES | Facility: CLINIC | Age: 88
End: 2023-09-06

## 2023-09-06 DIAGNOSIS — Z96.649 PROSTHETIC HIP INFECTION (HCC): ICD-10-CM

## 2023-09-06 DIAGNOSIS — M86.9 OSTEOMYELITIS (HCC): ICD-10-CM

## 2023-09-06 DIAGNOSIS — T84.51XD INFECTION AND INFLAMMATORY REACTION DUE TO INTERNAL RIGHT HIP PROSTHESIS, SUBSEQUENT ENCOUNTER: Primary | ICD-10-CM

## 2023-09-06 DIAGNOSIS — T84.59XA PROSTHETIC HIP INFECTION (HCC): ICD-10-CM

## 2023-09-06 NOTE — UTILIZATION REVIEW
NOTIFICATION OF ADMISSION DISCHARGE   This is a Notification of Discharge from [facility]. Please be advised that this patient has been discharge from our facility. Below you will find the admission and discharge date and time including the patient’s disposition. UTILIZATION REVIEW CONTACT:  Mehul Horan  Utilization   Network Utilization Review Department  Phone: [phone] x carefully listen to the prompts. All voicemails are confidential.  Email: [email or web address]     ADMISSION INFORMATION  PRESENTATION DATE: 8/28/2023  5:42 AM  OBERVATION ADMISSION DATE:   INPATIENT ADMISSION DATE: 8/28/23  3:07 PM   DISCHARGE DATE: 9/5/2023  4:37 PM   DISPOSITION:Non [facility] SNF/TCU/SNU    IMPORTANT INFORMATION:  Send all requests for admission clinical reviews, approved or denied determinations and any other requests to dedicated fax number below belonging to the campus where the patient is receiving treatment.  List of dedicated fax numbers:  [place] DENIALS (Administrative/Medical Necessity) [phone] [address] [facility] (Maternity/NICU/Pediatrics) [phone]   [facility] [phone]   [facility] [phone] [address] [phone]   [address] [place] [address] [phone]   [facility] [phone]   [address] [place], [place] [address] [facility] [phone]   [address] [place] [address] [phone]   [address] [facility] [phone]   [address] [facility] [address] 32[facility] [phone]

## 2023-09-06 NOTE — PROGRESS NOTES
OPAT NOTE    Supervising/Discharge physician:Vidhya    Diagnosis: Rt Hip PJI/Osteomyelitis    Drug: Cefepime    Dose/Frequency: 1g IV Q12    Labs/Frequency: CBCD Cr ESR weekly    End Date: 10/8    Infusion/[facility] contact: [facility]    Next appointment: 9/13        MA assigned: Lina Beaver

## 2023-09-08 ENCOUNTER — TELEPHONE (OUTPATIENT)
Dept: INFECTIOUS DISEASES | Facility: CLINIC | Age: 88
End: 2023-09-08

## 2023-09-08 NOTE — TELEPHONE ENCOUNTER
Raleigh Ramsay from [address] calls office today regarding patients appointment. Raleigh Ramsay states he is unable to arrange transport for 9/12/23. Patient is rescheduled 9/13/23 at 2:30PM for a virtual appointment with Dr. Bisi Beck. Informed Raleigh Ramsay morning of the appointment facility will need to fax updated medication list and vitals to office and office fax number provided. Raleigh Ramsay verbalizes understanding at this time.

## 2023-09-08 NOTE — TELEPHONE ENCOUNTER
Bert Noriega from [facility] calls the office today regarding patients appointment. Bert Noriega states patient is scheduled 9/13/23 for a follow up appointment. Bert Noriega [facility] is unable to arrange transport for that day. Patient is rescheduled 9/12/23 at 9:30AM with Kirstin Somers at the [facility] office. Bert Noriega verbalizes understanding at this time.

## 2023-09-11 LAB
FUNGUS SPEC CULT: NORMAL
FUNGUS SPEC CULT: NORMAL

## 2023-09-13 NOTE — TELEPHONE ENCOUNTER
Jonah Berumen from [address] calls office today regarding patients appointment. Jonah Berumen states he will needs to cancel patients upcoming appointment as she is currently under hospice care [facility] Also spoke with Peg, Nurse at facility. Peg states patient currently under hospice care [facility] [place] [facility]).

## 2023-09-14 NOTE — TELEPHONE ENCOUNTER
Radha from [facility] calls the office today. Radha states she is calling to inform office that patient was on hospice care and passed away today.

## 2023-09-18 LAB
FUNGUS SPEC CULT: NORMAL
FUNGUS SPEC CULT: NORMAL

## 2023-09-25 LAB
FUNGUS SPEC CULT: NORMAL
FUNGUS SPEC CULT: NORMAL

## 2024-02-27 NOTE — RESULT NOTES
Message   #! Michael Callejas Please place her PT/INR results on her yellow coumadin flowsheet to be addressed       Verified Results  (1) PT WITH INR 11Oct2016 10:44AM Kevin Nunez     Test Name Result Flag Reference   INR 2 58 H 0 86-1 16   PT 27 3 seconds H 12 0-14 3 Breath sounds clear and equal bilaterally. nl resp effort. no w/r/r

## 2024-08-08 NOTE — ASSESSMENT & PLAN NOTE
History of intolerance and bradycardia with AVN blocking agents for chronic atrial fibrillation. She underwent AV node ablation in 8/2018 and implantation of MDT single chamber His PPM - medtronic.  99%     Plan   - monitor vitals no lesions, no deformities, no traumatic injuries, no significant scars are present, chest wall non-tender, no masses present, breathing is unlabored without accessory muscle use,normal breath sounds

## 2025-02-27 NOTE — H&P
Fax received from [facility]:      DME referral entered for processing.   H&P- Leyla File 1933, 80 y o  female MRN: [id number]    Unit/Bed#: ED 17 Encounter: [id number]    Primary Care Provider: Jose Livingston DO   Date and time admitted to hospital: 7/28/2020 10:31 AM    * Right hip pain  Assessment & Plan  Patient presents with acute on chronic R hip pain that started acutely 2 days ago without any injury/trauma  Has been unable to ambulate  · Has hx of R hip replacement 4/2019 and R scar revision 7/2019 with Dr Darrell Castillo  · Lumbar spine xray negative for acute findings, R hip/pelvis xray shows increased lucency surrounding acetabular cup raising possibility of particle disease and/or hardware loosening  No acute periprosthetic fractures are identified  · Consult orthopedics for input, may require additional imaging? · Pain control with scheduled tylenol 975 mg q8h, lidoderm patch, aqua K pad, robaxin  Add gabapentin  Pt with codeine allergy but reports she tolerates morphine?   · PT/OT    Ambulatory dysfunction  Assessment & Plan  Likely 2/2 above  · Pain control  · Fall precautions   · PT/OT     Leukocytosis  Assessment & Plan  · Possibly reactive in the setting of pain, no identifiable infectious source, continue to monitor CBC    Hypokalemia  Assessment & Plan  Replete K  · AM BMP    Pacemaker  Assessment & Plan  · 2018  · Noted     Depression  Assessment & Plan  · Continue cymbalta    Chronic diastolic CHF (congestive heart failure) (HCC)  Assessment & Plan  Wt Readings from Last 3 Encounters:   07/28/20 78 kg (172 lb)   06/17/20 81 2 kg (179 lb)   06/10/20 78 9 kg (174 lb)       Appears euvolemic, on demadex 20 mg daily  · Monitor volume status and renal function   · Outpatient cardiology f/u       Chronic atrial fibrillation (HCC)  Assessment & Plan  On warfarin 4 mg daily; does not appear to be on any rate control medications; hx of ablation  · Previously saw Dr Nola Lopez  · Monitor INR, slightly subtherapeutic      VTE Prophylaxis: Warfarin (Coumadin)  / sequential compression device   Code Status:  Full code  POLST: POLST form is not discussed and not completed at this time  Discussion with family:  Patient only    Anticipated Length of Stay:  Patient will be admitted on an Observation basis with an anticipated length of stay of  < 2 midnights  Justification for Hospital Stay:  Ortho consult, pain control, PT/OT    Total Time for Visit, including Counseling / Coordination of Care: 45 minutes  Greater than 50% of this total time spent on direct patient counseling and coordination of care  Chief Complaint:   Right hip pain    History of Present Illness:    Emily Enrique is a 80 y o  female with past medical history of fibromyalgia, hypothyroidism, chronic AFib on warfarin, hypertension, chronic diastolic heart failure, history of [place]-en-Y gastric bypass, mitral regurgitation, permanent pacemaker placement, chronic right hip pain status post replacement and scar revision in 2019 who presents with sharp constant right posterior hip pain (directly below right buttock) since Sunday  Patient states that pain began somewhat acutely on Sunday though she denies any injury or trauma  Denies any numbness or tingling down leg  No other joint pain  She states that she typically has some degree of pain following her surgery which resulted in pelvic fractures, however this pain was much worse and reminded her of when she 1st had her surgery  She denies any fevers or chills  She states that she has not been able to ambulate and thus has not taken her diuretic in several days because she was not able to get to the bathroom  She usually walks with a walker and lives with her   Review of Systems:    Review of Systems   Constitutional: Negative for chills, fatigue and fever  HENT: Negative  Respiratory: Negative  Cardiovascular: Negative  Gastrointestinal: Negative  Genitourinary: Negative      Musculoskeletal: Positive for arthralgias (Right hip pain) and gait problem  Negative for back pain and myalgias  Neurological: Negative for headaches  All other systems reviewed and are negative  Past Medical and Surgical History:     Past Medical History:   Diagnosis Date    Abdominal bloating 8/1/2016    Anemia     Arthritis     Cancer ([address] )     basal cell    CHF (congestive heart failure) (HCC)     Disease of thyroid gland     Disturbance of smell and taste     disturbance of taste    Effusion of knee joint right     Fibromyalgia     Heart murmur     reported a previous heart murmur    History of acute myocardial infarction     History of atrial fibrillation     History of bruising easily     History of cancer     History of dermatitis     History of mammogram     History of methicillin resistant staphylococcus aureus (MRSA)     Negative nasal culture, isolation discontinued 8/17/2018      History of methicillin resistant staphylococcus aureus (MRSA) 08/17/2018    negative nasal culture-isolation and hx discontinued 8/17/2018    History of obesity     History of osteopenia     History of sciatica     History of shortness of breath     History of sinusitis     History of sore throat     History of syncope     History of umbilical hernia     History of viral infection     Irritable bowel syndrome (IBS)     Joint pain, hip     Limb pain     Myalgia     myalgia and myositis    Need for prophylactic vaccination against single diseases     Need for prophylactic vaccination and inoculation against influenza     Preoperative cardiovascular examination     Primary osteoarthritis of right knee     Right leg pain     Vaginal Pap smear     reported pap smear       Past Surgical History:   Procedure Laterality Date    ANKLE ARTHRODESIS Right     BACK SURGERY      BARIATRIC SURGERY      CHOLECYSTECTOMY      x2    COLONOSCOPY      ESOPHAGOGASTRODUODENOSCOPY N/A 3/23/2018    Procedure: ESOPHAGOGASTRODUODENOSCOPY (EGD); Surgeon: Sandy Morrison MD;  Location: [facility]; Service: Gastroenterology    FOOT SURGERY      HIP CLOSE REDUCTION Right 8/21/2016    Procedure: CLOSED REDUCTION RIGHT TOTAL HIP;  Surgeon: Kathy Roldan MD;  [facility];  Service:    Tegan Evangelista / April Cole / Sheyla Pancake REPLACEMENT      LEFT KNEE REPLACEMENT    JOINT REPLACEMENT      Right HIP    PILONIDAL CYST EXCISION      x2    ID EXC SKIN BENIG <0 5 CM TRUNK,ARM,LEG Right 7/24/2019    Procedure: REVISION SCAR EXTREMITY Rt Hip; Surgeon: Era Robbins MD;  Location: [facility];  Service: Orthopedics    ID REVISE TOTAL HIP REPLACEMENT Right 4/15/2019    Procedure: ARTHROPLASTY HIP TOTAL ACETABULAR REVISION;  Surgeon: Era Robbins MD;  Location[facility];  Service: Orthopedics    REPLACEMENT TOTAL KNEE Right     SILVANA-EN-Y PROCEDURE      x2    TOTAL KNEE ARTHROPLASTY      UMBILICAL HERNIA REPAIR      x2       Meds/Allergies:    Prior to Admission medications    Medication Sig Start Date End Date Taking?  Authorizing Provider   ascorbic acid (VITAMIN C) 500 mg tablet Take 1 tablet (500 mg total) by mouth 2 (two) times a day  Patient taking differently: Take 500 mg by mouth daily  12/18/18   Era Robbins MD   Cholecalciferol 125 MCG (5000 UT) TABS Take 5,000 Units by mouth daily    Historical Provider, MD   Cyanocobalamin (VITAMIN B12 PO) Take 1 capsule by mouth daily     Historical Provider, MD   desoximetasone (TOPICORT) 0 25 % cream Apply topically as needed for irritation 5/1/20   Cara Dillard MD   dicyclomine (BENTYL) 10 mg capsule Take 1 capsule by mouth every 12 (twelve) hours 4/1/13   Historical Provider, MD   DULoxetine (CYMBALTA) 60 mg delayed release capsule Take 1 capsule (60 mg total) by mouth daily 7/8/20   Maria Ines Oconnor DO   fluticasone (FLONASE) 50 mcg/act nasal spray SPRAY 2 SPRAYS INTO EACH NOSTRIL EVERY DAY 12/6/19   Maria Ines Oconnor DO   levothyroxine 75 mcg tablet Take 1 tablet (75 mcg total) by mouth daily 3/17/20   Laurel Cast, DO   methocarbamol (ROBAXIN) 500 mg tablet Take 1 tablet (500 mg total) by mouth daily at bedtime as needed for muscle spasms for up to 3 days 7/8/20 7/11/20  Laurel Cast, DO   potassium chloride (K-DUR,KLOR-CON) 20 mEq tablet TAKE 1 TABLET DAILY 11/1/18   Corazon Clark MD   sucralfate (CARAFATE) 1 g tablet Take 1 tablet (1 g total) by mouth 2 (two) times a day 7/7/20   Laurel Cast, DO   torsemide (DEMADEX) 20 mg tablet Take 1 tablet (20 mg total) by mouth daily 3/15/19   Laurel Cast, DO   warfarin (COUMADIN) 4 mg tablet TAKE ONE TABLET DAILY AS PREVIOUSLY DIRECTED BY CARDIOLOGY 2/25/20   Laurel Cast, DO   traMADol (ULTRAM) 50 mg tablet Take 1 tablet (50 mg total) by mouth 2 (two) times a day as needed for moderate pain 4/17/20 7/28/20  Tiffany Bravo MD     I have reviewed home medications with patient personally  Allergies: Allergies   Allergen Reactions    Amoxicillin      Reports nausea, headache, dizzy    Codeine Vomiting and GI Intolerance     GI upset, ana m morphine  Reaction Date: [id number];     Other      Other reaction(s): Other (See Comments)  ana m toradol in OR 6/06 sah    Oxycodone-Acetaminophen      Other reaction(s): Other (See Comments)  GI upset; ana m vicodin    Percocet [Oxycodone-Acetaminophen]     Seasonal Ic  [Cholestatin] Allergic Rhinitis    Trimethoprim     Bactrim [Sulfamethoxazole-Trimethoprim] Rash    Folic Acid-Vit K2-JMY R59 Diarrhea    Sulfa Antibiotics Rash     Other reaction(s):  Other (See Comments)  takes lasix @home       Social History:     Marital Status: /Civil Union   Occupation:  Retired  Patient Pre-hospital Living Situation:  Lives at home with   Patient Pre-hospital Level of Mobility:  Uses a walker  Patient Pre-hospital Diet Restrictions:  None  Substance Use History:   Social History     Substance and Sexual Activity   Alcohol Use Not Currently    Frequency: Never Social History     Tobacco Use   Smoking Status Never Smoker   Smokeless Tobacco Never Used     Social History     Substance and Sexual Activity   Drug Use No       Family History:    non-contributory    Physical Exam:     Vitals:   Blood Pressure: 153/67 (07/28/20 1045)  Pulse: 72 (07/28/20 1045)  Temperature: 99 1 °F (37 3 °C) (07/28/20 1047)  Temp Source: Oral (07/28/20 1045)  Respirations: 18 (07/28/20 1045)  Height: 5' (152 4 cm) (07/28/20 1045)  Weight - Scale: 78 kg (172 lb) (07/28/20 1045)  SpO2: 100 % (07/28/20 1045)    Physical Exam   Constitutional: She is oriented to person, place, and time  No distress  Cardiovascular: An irregularly irregular rhythm present  Pulmonary/Chest: Effort normal and breath sounds normal  No respiratory distress  Abdominal: Soft  Bowel sounds are normal  She exhibits no distension  Musculoskeletal: She exhibits edema (Plus one, right lower extremity greater than left)  Pain with right leg active range of motion off bed   Neurological: She is alert and oriented to person, place, and time  Skin: There is pallor  Psychiatric: She has a normal mood and affect  Nursing note and vitals reviewed  Additional Data:     Lab Results: I have personally reviewed pertinent reports        Results from last 7 days   Lab Units 07/28/20  1357   WBC Thousand/uL 12 54*   HEMOGLOBIN g/dL 11 9   HEMATOCRIT % 37 3   PLATELETS Thousands/uL 131*   NEUTROS PCT % 85*   LYMPHS PCT % 7*   MONOS PCT % 8   EOS PCT % 0     Results from last 7 days   Lab Units 07/28/20  1357   SODIUM mmol/L 137   POTASSIUM mmol/L 3 2*   CHLORIDE mmol/L 103   CO2 mmol/L 23   BUN mg/dL 11   CREATININE mg/dL 0 80   ANION GAP mmol/L 11   CALCIUM mg/dL 8 3   ALBUMIN g/dL 3 0*   TOTAL BILIRUBIN mg/dL 0 81   ALK PHOS U/L 85   ALT U/L 20   AST U/L 23   GLUCOSE RANDOM mg/dL 105     Results from last 7 days   Lab Units 07/28/20  1357   INR  1 64*                   Imaging: I have personally reviewed pertinent reports  XR lumbar spine 2 or 3 views   Final Result by Daniella Park MD (07/28 1424)      No acute lumbar spine fractures identified  Workstation performed: [id number]         XR hip/pelv 2-3 vws right if performed   Final Result by Daniella Park MD (07/28 1423)      Status post right hip arthroplasty with increased lucency surrounding the acetabular cup raising the possibility of particle disease and/or hardware loosening  No acute periprosthetic fractures are identified  Workstation performed: [id number]             EKG, Pathology, and Other Studies Reviewed on Admission:   · EKG:  None    Allscripts / Epic Records Reviewed: Yes     ** Please Note: This note has been constructed using a voice recognition system   **

## (undated) DEVICE — DRAPE EQUIPMENT RF WAND

## (undated) DEVICE — TRAY FOLEY 16FR URIMETER SURESTEP

## (undated) DEVICE — SUT VICRYL 1 CTB-1 36 IN JB947

## (undated) DEVICE — 450 ML BOTTLE OF 0.05% CHLORHEXIDINE GLUCONATE IN 99.95% STERILE WATER FOR IRRIGATION, USP AND APPLICATOR.: Brand: IRRISEPT ANTIMICROBIAL WOUND LAVAGE

## (undated) DEVICE — ACE WRAP 6 IN UNSTERILE

## (undated) DEVICE — INTENDED FOR TISSUE SEPARATION, AND OTHER PROCEDURES THAT REQUIRE A SHARP SURGICAL BLADE TO PUNCTURE OR CUT.: Brand: BARD-PARKER SAFETY BLADES SIZE 10, STERILE

## (undated) DEVICE — PADDING CAST 4 IN  COTTON STRL

## (undated) DEVICE — CHLORAPREP HI-LITE 26ML ORANGE

## (undated) DEVICE — U-DRAPE: Brand: CONVERTORS

## (undated) DEVICE — STOCKINETTE REGULAR

## (undated) DEVICE — SUT VICRYL PLUS 2-0 CTB-1 27 IN VCPB259H

## (undated) DEVICE — PAD GROUNDING ADULT

## (undated) DEVICE — 2108 SERIES SAGITTAL BLADE (18.6 X 0.64 X 61.1MM)

## (undated) DEVICE — SCD SEQUENTIAL COMPRESSION COMFORT SLEEVE MEDIUM KNEE LENGTH: Brand: KENDALL SCD

## (undated) DEVICE — SPONGE PVP SCRUB WING STERILE

## (undated) DEVICE — PLUMEPEN PRO 10FT

## (undated) DEVICE — 3M™ TEGADERM™ TRANSPARENT FILM DRESSING FRAME STYLE, 1626W, 4 IN X 4-3/4 IN (10 CM X 12 CM), 50/CT 4CT/CASE: Brand: 3M™ TEGADERM™

## (undated) DEVICE — BETHLEHEM UNIV MAJ EXT ,KIT: Brand: CARDINAL HEALTH

## (undated) DEVICE — BETHLEHEM TOTAL HIP, KIT: Brand: CARDINAL HEALTH

## (undated) DEVICE — SUT VICRYL PLUS 1 CTB-1 36 IN VCPB947H

## (undated) DEVICE — DRESSING MEPILEX AG BORDER 4 X 12 IN

## (undated) DEVICE — STOCKINETTE IMPERVIOUS

## (undated) DEVICE — GLOVE SRG BIOGEL 8

## (undated) DEVICE — GLOVE INDICATOR PI UNDERGLOVE SZ 8.5 BLUE

## (undated) DEVICE — THE SIMPULSE SOLO SYSTEM WITH ULTREX RETRACTABLE SPLASH SHIELD TIP: Brand: SIMPULSE SOLO

## (undated) DEVICE — HOOD: Brand: FLYTE, SURGICOOL

## (undated) DEVICE — PENCIL ELECTROSURG E-Z CLEAN -0035H

## (undated) DEVICE — DRESSING MEPILEX AG BORDER POST-OP 4 X 12 IN

## (undated) DEVICE — SILVER-COATED ANTIMICROBIAL BARRIER DRESSING: Brand: ACTICOAT   4" X 8"

## (undated) DEVICE — IMPLANTABLE DEVICE
Type: IMPLANTABLE DEVICE | Site: HIP | Status: NON-FUNCTIONAL
Removed: 2019-04-15

## (undated) DEVICE — GLOVE SRG BIOGEL 9

## (undated) DEVICE — SUT VICRYL 2-0 CTB-1 36 IN JB945

## (undated) DEVICE — DRAIN HEMOVAC 1/4 IN KIT

## (undated) DEVICE — HANDPIECE SET WITH RETRACTABLE COAXIAL FAN SPRAY TIP AND SUCTION TUBE: Brand: INTERPULSE

## (undated) DEVICE — GLOVE INDICATOR PI UNDERGLOVE SZ 9 BLUE